# Patient Record
Sex: FEMALE | Race: WHITE | NOT HISPANIC OR LATINO | Employment: PART TIME | URBAN - NONMETROPOLITAN AREA
[De-identification: names, ages, dates, MRNs, and addresses within clinical notes are randomized per-mention and may not be internally consistent; named-entity substitution may affect disease eponyms.]

---

## 2020-11-02 ENCOUNTER — APPOINTMENT (EMERGENCY)
Dept: CT IMAGING | Facility: HOSPITAL | Age: 65
DRG: 140 | End: 2020-11-02
Payer: COMMERCIAL

## 2020-11-02 ENCOUNTER — APPOINTMENT (EMERGENCY)
Dept: RADIOLOGY | Facility: HOSPITAL | Age: 65
DRG: 140 | End: 2020-11-02
Payer: COMMERCIAL

## 2020-11-02 ENCOUNTER — HOSPITAL ENCOUNTER (INPATIENT)
Facility: HOSPITAL | Age: 65
LOS: 3 days | Discharge: HOME | DRG: 140 | End: 2020-11-05
Attending: EMERGENCY MEDICINE | Admitting: FAMILY MEDICINE
Payer: COMMERCIAL

## 2020-11-02 DIAGNOSIS — J44.1 COPD WITH ACUTE EXACERBATION (HCC): ICD-10-CM

## 2020-11-02 DIAGNOSIS — R09.02 HYPOXIA: ICD-10-CM

## 2020-11-02 DIAGNOSIS — R06.00 DYSPNEA: Primary | ICD-10-CM

## 2020-11-02 PROBLEM — R60.0 LOCALIZED EDEMA: Status: ACTIVE | Noted: 2020-11-02

## 2020-11-02 PROBLEM — R06.02 SHORTNESS OF BREATH: Status: ACTIVE | Noted: 2020-11-02

## 2020-11-02 PROBLEM — J90 PLEURAL EFFUSION: Status: ACTIVE | Noted: 2020-11-02

## 2020-11-02 PROBLEM — J96.01 ACUTE RESPIRATORY FAILURE WITH HYPOXIA (HCC): Status: ACTIVE | Noted: 2020-11-02

## 2020-11-02 PROBLEM — E87.1 HYPONATREMIA: Status: ACTIVE | Noted: 2020-11-02

## 2020-11-02 PROBLEM — Z72.0 TOBACCO ABUSE: Status: ACTIVE | Noted: 2020-11-02

## 2020-11-02 PROBLEM — E11.9 TYPE 2 DIABETES MELLITUS WITHOUT COMPLICATION, WITHOUT LONG-TERM CURRENT USE OF INSULIN (HCC): Status: ACTIVE | Noted: 2020-11-02

## 2020-11-02 PROBLEM — I10 ESSENTIAL HYPERTENSION: Status: ACTIVE | Noted: 2020-11-02

## 2020-11-02 LAB
ALBUMIN SERPL BCP-MCNC: 3 G/DL (ref 3.5–5)
ALP SERPL-CCNC: 112 U/L (ref 46–116)
ALT SERPL W P-5'-P-CCNC: 17 U/L (ref 12–78)
ANION GAP SERPL CALCULATED.3IONS-SCNC: 2 MMOL/L (ref 4–13)
AST SERPL W P-5'-P-CCNC: 12 U/L (ref 5–45)
ATRIAL RATE: 63 BPM
ATRIAL RATE: 68 BPM
BACTERIA UR QL AUTO: ABNORMAL /HPF
BASE EX.OXY STD BLDV CALC-SCNC: 59.9 % (ref 60–80)
BASE EXCESS BLDV CALC-SCNC: 7.3 MMOL/L
BASOPHILS # BLD AUTO: 0.02 THOUSANDS/ΜL (ref 0–0.1)
BASOPHILS NFR BLD AUTO: 0 % (ref 0–1)
BILIRUB SERPL-MCNC: 0.82 MG/DL (ref 0.2–1)
BILIRUB UR QL STRIP: NEGATIVE
BUN SERPL-MCNC: 8 MG/DL (ref 5–25)
CALCIUM ALBUM COR SERPL-MCNC: 9.4 MG/DL (ref 8.3–10.1)
CALCIUM SERPL-MCNC: 8.6 MG/DL (ref 8.3–10.1)
CHLORIDE SERPL-SCNC: 92 MMOL/L (ref 100–108)
CHOLEST SERPL-MCNC: 151 MG/DL (ref 50–200)
CLARITY UR: CLEAR
CO2 SERPL-SCNC: 37 MMOL/L (ref 21–32)
COLOR UR: YELLOW
CREAT SERPL-MCNC: 0.83 MG/DL (ref 0.6–1.3)
D DIMER PPP FEU-MCNC: 0.93 UG/ML FEU
EOSINOPHIL # BLD AUTO: 0.05 THOUSAND/ΜL (ref 0–0.61)
EOSINOPHIL NFR BLD AUTO: 1 % (ref 0–6)
ERYTHROCYTE [DISTWIDTH] IN BLOOD BY AUTOMATED COUNT: 15.8 % (ref 11.6–15.1)
GFR SERPL CREATININE-BSD FRML MDRD: 75 ML/MIN/1.73SQ M
GLUCOSE SERPL-MCNC: 172 MG/DL (ref 65–140)
GLUCOSE SERPL-MCNC: 232 MG/DL (ref 65–140)
GLUCOSE UR STRIP-MCNC: NEGATIVE MG/DL
HCO3 BLDV-SCNC: 36.5 MMOL/L (ref 24–30)
HCT VFR BLD AUTO: 45.7 % (ref 34.8–46.1)
HDLC SERPL-MCNC: 44 MG/DL
HGB BLD-MCNC: 14.6 G/DL (ref 11.5–15.4)
HGB UR QL STRIP.AUTO: NEGATIVE
IMM GRANULOCYTES # BLD AUTO: 0.04 THOUSAND/UL (ref 0–0.2)
IMM GRANULOCYTES NFR BLD AUTO: 0 % (ref 0–2)
KETONES UR STRIP-MCNC: NEGATIVE MG/DL
LACTATE SERPL-SCNC: 1.4 MMOL/L (ref 0.5–2)
LDLC SERPL CALC-MCNC: 91 MG/DL (ref 0–100)
LEUKOCYTE ESTERASE UR QL STRIP: ABNORMAL
LYMPHOCYTES # BLD AUTO: 1.19 THOUSANDS/ΜL (ref 0.6–4.47)
LYMPHOCYTES NFR BLD AUTO: 13 % (ref 14–44)
MCH RBC QN AUTO: 30.7 PG (ref 26.8–34.3)
MCHC RBC AUTO-ENTMCNC: 31.9 G/DL (ref 31.4–37.4)
MCV RBC AUTO: 96 FL (ref 82–98)
MONOCYTES # BLD AUTO: 0.99 THOUSAND/ΜL (ref 0.17–1.22)
MONOCYTES NFR BLD AUTO: 10 % (ref 4–12)
NEUTROPHILS # BLD AUTO: 7.2 THOUSANDS/ΜL (ref 1.85–7.62)
NEUTS SEG NFR BLD AUTO: 76 % (ref 43–75)
NITRITE UR QL STRIP: NEGATIVE
NON-SQ EPI CELLS URNS QL MICRO: ABNORMAL /HPF
NONHDLC SERPL-MCNC: 107 MG/DL
NRBC BLD AUTO-RTO: 0 /100 WBCS
NT-PROBNP SERPL-MCNC: 2853 PG/ML
O2 CT BLDV-SCNC: 13.3 ML/DL
P AXIS: 71 DEGREES
P AXIS: 75 DEGREES
PCO2 BLDV: 72.1 MM HG (ref 42–50)
PH BLDV: 7.32 [PH] (ref 7.3–7.4)
PH UR STRIP.AUTO: 5.5 [PH]
PLATELET # BLD AUTO: 216 THOUSANDS/UL (ref 149–390)
PMV BLD AUTO: 10.1 FL (ref 8.9–12.7)
PO2 BLDV: 33.9 MM HG (ref 35–45)
POTASSIUM SERPL-SCNC: 3.5 MMOL/L (ref 3.5–5.3)
PR INTERVAL: 124 MS
PR INTERVAL: 138 MS
PROT SERPL-MCNC: 6.9 G/DL (ref 6.4–8.2)
PROT UR STRIP-MCNC: NEGATIVE MG/DL
QRS AXIS: 69 DEGREES
QRS AXIS: 70 DEGREES
QRSD INTERVAL: 74 MS
QRSD INTERVAL: 86 MS
QT INTERVAL: 424 MS
QT INTERVAL: 432 MS
QTC INTERVAL: 433 MS
QTC INTERVAL: 459 MS
RBC # BLD AUTO: 4.75 MILLION/UL (ref 3.81–5.12)
RBC #/AREA URNS AUTO: ABNORMAL /HPF
SARS-COV-2 RNA RESP QL NAA+PROBE: NEGATIVE
SODIUM SERPL-SCNC: 131 MMOL/L (ref 136–145)
SP GR UR STRIP.AUTO: <=1.005 (ref 1–1.03)
T WAVE AXIS: 63 DEGREES
T WAVE AXIS: 69 DEGREES
TRIGL SERPL-MCNC: 80 MG/DL
TROPONIN I SERPL-MCNC: 0.02 NG/ML
TSH SERPL DL<=0.05 MIU/L-ACNC: 1.34 UIU/ML (ref 0.36–3.74)
UROBILINOGEN UR QL STRIP.AUTO: 1 E.U./DL
VENTRICULAR RATE: 63 BPM
VENTRICULAR RATE: 68 BPM
WBC # BLD AUTO: 9.49 THOUSAND/UL (ref 4.31–10.16)
WBC #/AREA URNS AUTO: ABNORMAL /HPF

## 2020-11-02 PROCEDURE — 82805 BLOOD GASES W/O2 SATURATION: CPT | Performed by: EMERGENCY MEDICINE

## 2020-11-02 PROCEDURE — 93005 ELECTROCARDIOGRAM TRACING: CPT

## 2020-11-02 PROCEDURE — 81001 URINALYSIS AUTO W/SCOPE: CPT | Performed by: EMERGENCY MEDICINE

## 2020-11-02 PROCEDURE — 84145 PROCALCITONIN (PCT): CPT | Performed by: FAMILY MEDICINE

## 2020-11-02 PROCEDURE — 83605 ASSAY OF LACTIC ACID: CPT | Performed by: EMERGENCY MEDICINE

## 2020-11-02 PROCEDURE — G1004 CDSM NDSC: HCPCS

## 2020-11-02 PROCEDURE — 84443 ASSAY THYROID STIM HORMONE: CPT | Performed by: FAMILY MEDICINE

## 2020-11-02 PROCEDURE — 87449 NOS EACH ORGANISM AG IA: CPT | Performed by: FAMILY MEDICINE

## 2020-11-02 PROCEDURE — 36415 COLL VENOUS BLD VENIPUNCTURE: CPT | Performed by: EMERGENCY MEDICINE

## 2020-11-02 PROCEDURE — 80053 COMPREHEN METABOLIC PANEL: CPT | Performed by: EMERGENCY MEDICINE

## 2020-11-02 PROCEDURE — 99285 EMERGENCY DEPT VISIT HI MDM: CPT | Performed by: EMERGENCY MEDICINE

## 2020-11-02 PROCEDURE — 80061 LIPID PANEL: CPT | Performed by: FAMILY MEDICINE

## 2020-11-02 PROCEDURE — 99223 1ST HOSP IP/OBS HIGH 75: CPT | Performed by: FAMILY MEDICINE

## 2020-11-02 PROCEDURE — 85025 COMPLETE CBC W/AUTO DIFF WBC: CPT | Performed by: EMERGENCY MEDICINE

## 2020-11-02 PROCEDURE — 99285 EMERGENCY DEPT VISIT HI MDM: CPT

## 2020-11-02 PROCEDURE — 96374 THER/PROPH/DIAG INJ IV PUSH: CPT

## 2020-11-02 PROCEDURE — 87635 SARS-COV-2 COVID-19 AMP PRB: CPT | Performed by: EMERGENCY MEDICINE

## 2020-11-02 PROCEDURE — 71275 CT ANGIOGRAPHY CHEST: CPT

## 2020-11-02 PROCEDURE — 83880 ASSAY OF NATRIURETIC PEPTIDE: CPT | Performed by: EMERGENCY MEDICINE

## 2020-11-02 PROCEDURE — 85379 FIBRIN DEGRADATION QUANT: CPT | Performed by: EMERGENCY MEDICINE

## 2020-11-02 PROCEDURE — 71045 X-RAY EXAM CHEST 1 VIEW: CPT

## 2020-11-02 PROCEDURE — 82948 REAGENT STRIP/BLOOD GLUCOSE: CPT

## 2020-11-02 PROCEDURE — 83036 HEMOGLOBIN GLYCOSYLATED A1C: CPT | Performed by: FAMILY MEDICINE

## 2020-11-02 PROCEDURE — 84484 ASSAY OF TROPONIN QUANT: CPT | Performed by: EMERGENCY MEDICINE

## 2020-11-02 PROCEDURE — 87040 BLOOD CULTURE FOR BACTERIA: CPT | Performed by: EMERGENCY MEDICINE

## 2020-11-02 RX ORDER — TRAZODONE HYDROCHLORIDE 50 MG/1
100 TABLET ORAL
Status: DISCONTINUED | OUTPATIENT
Start: 2020-11-02 | End: 2020-11-05 | Stop reason: HOSPADM

## 2020-11-02 RX ORDER — CEFTRIAXONE 1 G/50ML
1000 INJECTION, SOLUTION INTRAVENOUS EVERY 24 HOURS
Status: DISCONTINUED | OUTPATIENT
Start: 2020-11-03 | End: 2020-11-04

## 2020-11-02 RX ORDER — METOPROLOL TARTRATE 100 MG/1
100 TABLET ORAL EVERY 12 HOURS SCHEDULED
COMMUNITY

## 2020-11-02 RX ORDER — AZITHROMYCIN 250 MG/1
500 TABLET, FILM COATED ORAL EVERY 24 HOURS
Status: DISCONTINUED | OUTPATIENT
Start: 2020-11-03 | End: 2020-11-04

## 2020-11-02 RX ORDER — CITALOPRAM 10 MG/1
10 TABLET ORAL DAILY
COMMUNITY

## 2020-11-02 RX ORDER — AMLODIPINE BESYLATE 10 MG/1
10 TABLET ORAL
COMMUNITY

## 2020-11-02 RX ORDER — ATORVASTATIN CALCIUM 40 MG/1
40 TABLET, FILM COATED ORAL DAILY
Status: DISCONTINUED | OUTPATIENT
Start: 2020-11-03 | End: 2020-11-05 | Stop reason: HOSPADM

## 2020-11-02 RX ORDER — CITALOPRAM 10 MG/1
10 TABLET ORAL DAILY
Status: DISCONTINUED | OUTPATIENT
Start: 2020-11-03 | End: 2020-11-05 | Stop reason: HOSPADM

## 2020-11-02 RX ORDER — LISINOPRIL 20 MG/1
40 TABLET ORAL DAILY
Status: DISCONTINUED | OUTPATIENT
Start: 2020-11-03 | End: 2020-11-05 | Stop reason: HOSPADM

## 2020-11-02 RX ORDER — PANTOPRAZOLE SODIUM 20 MG/1
20 TABLET, DELAYED RELEASE ORAL
Status: DISCONTINUED | OUTPATIENT
Start: 2020-11-03 | End: 2020-11-05 | Stop reason: HOSPADM

## 2020-11-02 RX ORDER — LISINOPRIL 40 MG/1
40 TABLET ORAL DAILY
COMMUNITY

## 2020-11-02 RX ORDER — CLONIDINE HYDROCHLORIDE 0.1 MG/1
0.1 TABLET ORAL EVERY 12 HOURS SCHEDULED
COMMUNITY

## 2020-11-02 RX ORDER — DEXAMETHASONE SODIUM PHOSPHATE 10 MG/ML
10 INJECTION, SOLUTION INTRAMUSCULAR; INTRAVENOUS ONCE
Status: COMPLETED | OUTPATIENT
Start: 2020-11-02 | End: 2020-11-02

## 2020-11-02 RX ORDER — CLONIDINE HYDROCHLORIDE 0.1 MG/1
0.1 TABLET ORAL EVERY 12 HOURS SCHEDULED
Status: DISCONTINUED | OUTPATIENT
Start: 2020-11-02 | End: 2020-11-05 | Stop reason: HOSPADM

## 2020-11-02 RX ORDER — DOCUSATE SODIUM 100 MG/1
100 CAPSULE, LIQUID FILLED ORAL 2 TIMES DAILY
Status: DISCONTINUED | OUTPATIENT
Start: 2020-11-02 | End: 2020-11-05 | Stop reason: HOSPADM

## 2020-11-02 RX ORDER — GUAIFENESIN 100 MG/5ML
200 SOLUTION ORAL EVERY 4 HOURS PRN
Status: DISCONTINUED | OUTPATIENT
Start: 2020-11-02 | End: 2020-11-05 | Stop reason: HOSPADM

## 2020-11-02 RX ORDER — OMEPRAZOLE 20 MG/1
20 CAPSULE, DELAYED RELEASE ORAL DAILY
Status: ON HOLD | COMMUNITY
End: 2021-03-26

## 2020-11-02 RX ORDER — POLYETHYLENE GLYCOL 3350 17 G/17G
17 POWDER, FOR SOLUTION ORAL DAILY
Status: DISCONTINUED | OUTPATIENT
Start: 2020-11-03 | End: 2020-11-05 | Stop reason: HOSPADM

## 2020-11-02 RX ORDER — NICOTINE 21 MG/24HR
1 PATCH, TRANSDERMAL 24 HOURS TRANSDERMAL DAILY
Status: DISCONTINUED | OUTPATIENT
Start: 2020-11-03 | End: 2020-11-05 | Stop reason: HOSPADM

## 2020-11-02 RX ORDER — AMLODIPINE BESYLATE 10 MG/1
10 TABLET ORAL DAILY
Status: DISCONTINUED | OUTPATIENT
Start: 2020-11-03 | End: 2020-11-05 | Stop reason: HOSPADM

## 2020-11-02 RX ORDER — PIOGLITAZONEHYDROCHLORIDE 15 MG/1
15 TABLET ORAL DAILY
COMMUNITY

## 2020-11-02 RX ORDER — SODIUM CHLORIDE FOR INHALATION 0.9 %
3 VIAL, NEBULIZER (ML) INHALATION
Status: DISCONTINUED | OUTPATIENT
Start: 2020-11-02 | End: 2020-11-03

## 2020-11-02 RX ORDER — METOPROLOL TARTRATE 50 MG/1
100 TABLET, FILM COATED ORAL EVERY 12 HOURS SCHEDULED
Status: DISCONTINUED | OUTPATIENT
Start: 2020-11-02 | End: 2020-11-05 | Stop reason: HOSPADM

## 2020-11-02 RX ORDER — TRAZODONE HYDROCHLORIDE 100 MG/1
100 TABLET ORAL
COMMUNITY

## 2020-11-02 RX ORDER — ALBUTEROL SULFATE 90 UG/1
4 AEROSOL, METERED RESPIRATORY (INHALATION) ONCE
Status: COMPLETED | OUTPATIENT
Start: 2020-11-02 | End: 2020-11-02

## 2020-11-02 RX ORDER — GLIPIZIDE 5 MG/1
5 TABLET ORAL DAILY
COMMUNITY

## 2020-11-02 RX ORDER — FLUTICASONE PROPIONATE 50 MCG
1 SPRAY, SUSPENSION (ML) NASAL DAILY
Status: DISCONTINUED | OUTPATIENT
Start: 2020-11-03 | End: 2020-11-05 | Stop reason: HOSPADM

## 2020-11-02 RX ORDER — FUROSEMIDE 10 MG/ML
40 INJECTION INTRAMUSCULAR; INTRAVENOUS ONCE
Status: COMPLETED | OUTPATIENT
Start: 2020-11-02 | End: 2020-11-02

## 2020-11-02 RX ORDER — ATORVASTATIN CALCIUM 40 MG/1
40 TABLET, FILM COATED ORAL
COMMUNITY

## 2020-11-02 RX ORDER — ACETAMINOPHEN 325 MG/1
650 TABLET ORAL EVERY 6 HOURS PRN
Status: DISCONTINUED | OUTPATIENT
Start: 2020-11-02 | End: 2020-11-05 | Stop reason: HOSPADM

## 2020-11-02 RX ORDER — LEVALBUTEROL 1.25 MG/.5ML
1.25 SOLUTION, CONCENTRATE RESPIRATORY (INHALATION)
Status: DISCONTINUED | OUTPATIENT
Start: 2020-11-02 | End: 2020-11-05 | Stop reason: HOSPADM

## 2020-11-02 RX ORDER — BUDESONIDE 0.5 MG/2ML
0.5 INHALANT ORAL
Status: DISCONTINUED | OUTPATIENT
Start: 2020-11-02 | End: 2020-11-05 | Stop reason: HOSPADM

## 2020-11-02 RX ORDER — CEFTRIAXONE 1 G/50ML
1000 INJECTION, SOLUTION INTRAVENOUS ONCE
Status: COMPLETED | OUTPATIENT
Start: 2020-11-02 | End: 2020-11-02

## 2020-11-02 RX ORDER — METHYLPREDNISOLONE SODIUM SUCCINATE 40 MG/ML
40 INJECTION, POWDER, LYOPHILIZED, FOR SOLUTION INTRAMUSCULAR; INTRAVENOUS EVERY 8 HOURS
Status: DISCONTINUED | OUTPATIENT
Start: 2020-11-02 | End: 2020-11-03

## 2020-11-02 RX ORDER — FLUTICASONE PROPIONATE 50 MCG
1 SPRAY, SUSPENSION (ML) NASAL DAILY
COMMUNITY
End: 2021-06-20 | Stop reason: ALTCHOICE

## 2020-11-02 RX ORDER — NITROGLYCERIN 0.4 MG/1
0.4 TABLET SUBLINGUAL ONCE
Status: DISCONTINUED | OUTPATIENT
Start: 2020-11-02 | End: 2020-11-05 | Stop reason: HOSPADM

## 2020-11-02 RX ADMIN — METHYLPREDNISOLONE SODIUM SUCCINATE 40 MG: 40 INJECTION, POWDER, FOR SOLUTION INTRAMUSCULAR; INTRAVENOUS at 21:44

## 2020-11-02 RX ADMIN — DEXAMETHASONE SODIUM PHOSPHATE 10 MG: 10 INJECTION, SOLUTION INTRAMUSCULAR; INTRAVENOUS at 15:44

## 2020-11-02 RX ADMIN — ISODIUM CHLORIDE 3 ML: 0.03 SOLUTION RESPIRATORY (INHALATION) at 21:21

## 2020-11-02 RX ADMIN — CEFTRIAXONE 1000 MG: 1 INJECTION, SOLUTION INTRAVENOUS at 17:58

## 2020-11-02 RX ADMIN — INSULIN LISPRO 1 UNITS: 100 INJECTION, SOLUTION INTRAVENOUS; SUBCUTANEOUS at 21:50

## 2020-11-02 RX ADMIN — NITROGLYCERIN 1 INCH: 20 OINTMENT TOPICAL at 16:24

## 2020-11-02 RX ADMIN — BUDESONIDE 0.5 MG: 0.5 INHALANT RESPIRATORY (INHALATION) at 21:22

## 2020-11-02 RX ADMIN — AZITHROMYCIN MONOHYDRATE 500 MG: 500 INJECTION, POWDER, LYOPHILIZED, FOR SOLUTION INTRAVENOUS at 16:40

## 2020-11-02 RX ADMIN — DOCUSATE SODIUM 100 MG: 100 CAPSULE, LIQUID FILLED ORAL at 21:41

## 2020-11-02 RX ADMIN — IOHEXOL 85 ML: 350 INJECTION, SOLUTION INTRAVENOUS at 15:14

## 2020-11-02 RX ADMIN — IPRATROPIUM BROMIDE 0.5 MG: 0.5 SOLUTION RESPIRATORY (INHALATION) at 21:21

## 2020-11-02 RX ADMIN — FUROSEMIDE 40 MG: 10 INJECTION, SOLUTION INTRAMUSCULAR; INTRAVENOUS at 17:58

## 2020-11-02 RX ADMIN — ALBUTEROL SULFATE 4 PUFF: 90 AEROSOL, METERED RESPIRATORY (INHALATION) at 14:20

## 2020-11-02 RX ADMIN — METOPROLOL TARTRATE 100 MG: 50 TABLET, FILM COATED ORAL at 21:41

## 2020-11-02 RX ADMIN — LEVALBUTEROL HYDROCHLORIDE 1.25 MG: 1.25 SOLUTION, CONCENTRATE RESPIRATORY (INHALATION) at 21:22

## 2020-11-02 RX ADMIN — CLONIDINE HYDROCHLORIDE 0.1 MG: 0.1 TABLET ORAL at 21:40

## 2020-11-03 ENCOUNTER — APPOINTMENT (INPATIENT)
Dept: NON INVASIVE DIAGNOSTICS | Facility: HOSPITAL | Age: 65
DRG: 140 | End: 2020-11-03
Payer: COMMERCIAL

## 2020-11-03 PROBLEM — J44.1 COPD WITH ACUTE EXACERBATION (HCC): Status: ACTIVE | Noted: 2020-11-03

## 2020-11-03 PROBLEM — R06.02 SHORTNESS OF BREATH: Status: RESOLVED | Noted: 2020-11-02 | Resolved: 2020-11-03

## 2020-11-03 LAB
ALBUMIN SERPL BCP-MCNC: 2.6 G/DL (ref 3.5–5)
ALP SERPL-CCNC: 95 U/L (ref 46–116)
ALT SERPL W P-5'-P-CCNC: 15 U/L (ref 12–78)
ANION GAP SERPL CALCULATED.3IONS-SCNC: 3 MMOL/L (ref 4–13)
AST SERPL W P-5'-P-CCNC: 10 U/L (ref 5–45)
BASOPHILS # BLD AUTO: 0 THOUSANDS/ΜL (ref 0–0.1)
BASOPHILS NFR BLD AUTO: 0 % (ref 0–1)
BILIRUB SERPL-MCNC: 0.55 MG/DL (ref 0.2–1)
BUN SERPL-MCNC: 6 MG/DL (ref 5–25)
CALCIUM ALBUM COR SERPL-MCNC: 9.9 MG/DL (ref 8.3–10.1)
CALCIUM SERPL-MCNC: 8.8 MG/DL (ref 8.3–10.1)
CHLORIDE SERPL-SCNC: 96 MMOL/L (ref 100–108)
CO2 SERPL-SCNC: 39 MMOL/L (ref 21–32)
CREAT SERPL-MCNC: 0.59 MG/DL (ref 0.6–1.3)
EOSINOPHIL # BLD AUTO: 0.02 THOUSAND/ΜL (ref 0–0.61)
EOSINOPHIL NFR BLD AUTO: 0 % (ref 0–6)
ERYTHROCYTE [DISTWIDTH] IN BLOOD BY AUTOMATED COUNT: 15.6 % (ref 11.6–15.1)
EST. AVERAGE GLUCOSE BLD GHB EST-MCNC: 140 MG/DL
GFR SERPL CREATININE-BSD FRML MDRD: 98 ML/MIN/1.73SQ M
GLUCOSE SERPL-MCNC: 150 MG/DL (ref 65–140)
GLUCOSE SERPL-MCNC: 166 MG/DL (ref 65–140)
GLUCOSE SERPL-MCNC: 168 MG/DL (ref 65–140)
GLUCOSE SERPL-MCNC: 209 MG/DL (ref 65–140)
GLUCOSE SERPL-MCNC: 74 MG/DL (ref 65–140)
HBA1C MFR BLD: 6.5 %
HCT VFR BLD AUTO: 45.1 % (ref 34.8–46.1)
HGB BLD-MCNC: 14.2 G/DL (ref 11.5–15.4)
IMM GRANULOCYTES # BLD AUTO: 0.03 THOUSAND/UL (ref 0–0.2)
IMM GRANULOCYTES NFR BLD AUTO: 1 % (ref 0–2)
L PNEUMO1 AG UR QL IA.RAPID: NEGATIVE
LYMPHOCYTES # BLD AUTO: 0.38 THOUSANDS/ΜL (ref 0.6–4.47)
LYMPHOCYTES NFR BLD AUTO: 9 % (ref 14–44)
MCH RBC QN AUTO: 29.8 PG (ref 26.8–34.3)
MCHC RBC AUTO-ENTMCNC: 31.5 G/DL (ref 31.4–37.4)
MCV RBC AUTO: 95 FL (ref 82–98)
MONOCYTES # BLD AUTO: 0.14 THOUSAND/ΜL (ref 0.17–1.22)
MONOCYTES NFR BLD AUTO: 3 % (ref 4–12)
NEUTROPHILS # BLD AUTO: 3.9 THOUSANDS/ΜL (ref 1.85–7.62)
NEUTS SEG NFR BLD AUTO: 87 % (ref 43–75)
NRBC BLD AUTO-RTO: 0 /100 WBCS
PLATELET # BLD AUTO: 168 THOUSANDS/UL (ref 149–390)
PMV BLD AUTO: 10.3 FL (ref 8.9–12.7)
POTASSIUM SERPL-SCNC: 3.4 MMOL/L (ref 3.5–5.3)
PROCALCITONIN SERPL-MCNC: <0.05 NG/ML
PROT SERPL-MCNC: 6.1 G/DL (ref 6.4–8.2)
RBC # BLD AUTO: 4.77 MILLION/UL (ref 3.81–5.12)
S PNEUM AG UR QL: NEGATIVE
SODIUM SERPL-SCNC: 138 MMOL/L (ref 136–145)
WBC # BLD AUTO: 4.47 THOUSAND/UL (ref 4.31–10.16)

## 2020-11-03 PROCEDURE — 94760 N-INVAS EAR/PLS OXIMETRY 1: CPT

## 2020-11-03 PROCEDURE — 93306 TTE W/DOPPLER COMPLETE: CPT

## 2020-11-03 PROCEDURE — 85025 COMPLETE CBC W/AUTO DIFF WBC: CPT | Performed by: FAMILY MEDICINE

## 2020-11-03 PROCEDURE — 82948 REAGENT STRIP/BLOOD GLUCOSE: CPT

## 2020-11-03 PROCEDURE — 99232 SBSQ HOSP IP/OBS MODERATE 35: CPT | Performed by: FAMILY MEDICINE

## 2020-11-03 PROCEDURE — 80053 COMPREHEN METABOLIC PANEL: CPT | Performed by: FAMILY MEDICINE

## 2020-11-03 PROCEDURE — 99223 1ST HOSP IP/OBS HIGH 75: CPT | Performed by: INTERNAL MEDICINE

## 2020-11-03 PROCEDURE — 94640 AIRWAY INHALATION TREATMENT: CPT

## 2020-11-03 RX ORDER — METHYLPREDNISOLONE SODIUM SUCCINATE 40 MG/ML
40 INJECTION, POWDER, LYOPHILIZED, FOR SOLUTION INTRAMUSCULAR; INTRAVENOUS EVERY 12 HOURS SCHEDULED
Status: DISCONTINUED | OUTPATIENT
Start: 2020-11-03 | End: 2020-11-05 | Stop reason: HOSPADM

## 2020-11-03 RX ADMIN — AMLODIPINE BESYLATE 10 MG: 10 TABLET ORAL at 09:17

## 2020-11-03 RX ADMIN — INSULIN LISPRO 2 UNITS: 100 INJECTION, SOLUTION INTRAVENOUS; SUBCUTANEOUS at 11:58

## 2020-11-03 RX ADMIN — INSULIN LISPRO 1 UNITS: 100 INJECTION, SOLUTION INTRAVENOUS; SUBCUTANEOUS at 17:03

## 2020-11-03 RX ADMIN — IPRATROPIUM BROMIDE 0.5 MG: 0.5 SOLUTION RESPIRATORY (INHALATION) at 14:10

## 2020-11-03 RX ADMIN — CITALOPRAM HYDROBROMIDE 10 MG: 10 TABLET ORAL at 09:20

## 2020-11-03 RX ADMIN — ATORVASTATIN CALCIUM 40 MG: 40 TABLET, FILM COATED ORAL at 09:17

## 2020-11-03 RX ADMIN — LISINOPRIL 40 MG: 20 TABLET ORAL at 09:17

## 2020-11-03 RX ADMIN — INSULIN HUMAN 10 UNITS: 100 INJECTION, SUSPENSION SUBCUTANEOUS at 17:04

## 2020-11-03 RX ADMIN — ENOXAPARIN SODIUM 40 MG: 40 INJECTION SUBCUTANEOUS at 09:16

## 2020-11-03 RX ADMIN — METOPROLOL TARTRATE 100 MG: 50 TABLET, FILM COATED ORAL at 09:17

## 2020-11-03 RX ADMIN — CLONIDINE HYDROCHLORIDE 0.1 MG: 0.1 TABLET ORAL at 09:17

## 2020-11-03 RX ADMIN — METHYLPREDNISOLONE SODIUM SUCCINATE 40 MG: 40 INJECTION, POWDER, FOR SOLUTION INTRAMUSCULAR; INTRAVENOUS at 06:44

## 2020-11-03 RX ADMIN — CEFTRIAXONE 1000 MG: 1 INJECTION, SOLUTION INTRAVENOUS at 17:02

## 2020-11-03 RX ADMIN — LEVALBUTEROL HYDROCHLORIDE 1.25 MG: 1.25 SOLUTION, CONCENTRATE RESPIRATORY (INHALATION) at 07:15

## 2020-11-03 RX ADMIN — BUDESONIDE 0.5 MG: 0.5 INHALANT RESPIRATORY (INHALATION) at 19:45

## 2020-11-03 RX ADMIN — CLONIDINE HYDROCHLORIDE 0.1 MG: 0.1 TABLET ORAL at 21:14

## 2020-11-03 RX ADMIN — LEVALBUTEROL HYDROCHLORIDE 1.25 MG: 1.25 SOLUTION, CONCENTRATE RESPIRATORY (INHALATION) at 14:10

## 2020-11-03 RX ADMIN — DOCUSATE SODIUM 100 MG: 100 CAPSULE, LIQUID FILLED ORAL at 17:06

## 2020-11-03 RX ADMIN — METOPROLOL TARTRATE 100 MG: 50 TABLET, FILM COATED ORAL at 21:15

## 2020-11-03 RX ADMIN — TRAZODONE HYDROCHLORIDE 100 MG: 50 TABLET ORAL at 21:15

## 2020-11-03 RX ADMIN — AZITHROMYCIN 500 MG: 250 TABLET, FILM COATED ORAL at 17:02

## 2020-11-03 RX ADMIN — DOCUSATE SODIUM 100 MG: 100 CAPSULE, LIQUID FILLED ORAL at 09:17

## 2020-11-03 RX ADMIN — METHYLPREDNISOLONE SODIUM SUCCINATE 40 MG: 40 INJECTION, POWDER, FOR SOLUTION INTRAMUSCULAR; INTRAVENOUS at 21:14

## 2020-11-03 RX ADMIN — IPRATROPIUM BROMIDE 0.5 MG: 0.5 SOLUTION RESPIRATORY (INHALATION) at 19:45

## 2020-11-03 RX ADMIN — IPRATROPIUM BROMIDE 0.5 MG: 0.5 SOLUTION RESPIRATORY (INHALATION) at 07:14

## 2020-11-03 RX ADMIN — POLYETHYLENE GLYCOL 3350 17 G: 17 POWDER, FOR SOLUTION ORAL at 09:19

## 2020-11-03 RX ADMIN — LEVALBUTEROL HYDROCHLORIDE 1.25 MG: 1.25 SOLUTION, CONCENTRATE RESPIRATORY (INHALATION) at 19:45

## 2020-11-03 RX ADMIN — BUDESONIDE 0.5 MG: 0.5 INHALANT RESPIRATORY (INHALATION) at 07:14

## 2020-11-03 RX ADMIN — PANTOPRAZOLE SODIUM 20 MG: 20 TABLET, DELAYED RELEASE ORAL at 05:05

## 2020-11-04 LAB
GLUCOSE SERPL-MCNC: 103 MG/DL (ref 65–140)
GLUCOSE SERPL-MCNC: 141 MG/DL (ref 65–140)
GLUCOSE SERPL-MCNC: 155 MG/DL (ref 65–140)
GLUCOSE SERPL-MCNC: 181 MG/DL (ref 65–140)
PROCALCITONIN SERPL-MCNC: <0.05 NG/ML

## 2020-11-04 PROCEDURE — 94760 N-INVAS EAR/PLS OXIMETRY 1: CPT

## 2020-11-04 PROCEDURE — 82948 REAGENT STRIP/BLOOD GLUCOSE: CPT

## 2020-11-04 PROCEDURE — 87070 CULTURE OTHR SPECIMN AEROBIC: CPT | Performed by: FAMILY MEDICINE

## 2020-11-04 PROCEDURE — 94640 AIRWAY INHALATION TREATMENT: CPT

## 2020-11-04 PROCEDURE — 99232 SBSQ HOSP IP/OBS MODERATE 35: CPT | Performed by: FAMILY MEDICINE

## 2020-11-04 PROCEDURE — 87205 SMEAR GRAM STAIN: CPT | Performed by: FAMILY MEDICINE

## 2020-11-04 PROCEDURE — 84145 PROCALCITONIN (PCT): CPT | Performed by: FAMILY MEDICINE

## 2020-11-04 RX ORDER — AZITHROMYCIN 250 MG/1
500 TABLET, FILM COATED ORAL EVERY 24 HOURS
Status: DISCONTINUED | OUTPATIENT
Start: 2020-11-04 | End: 2020-11-05 | Stop reason: HOSPADM

## 2020-11-04 RX ADMIN — CLONIDINE HYDROCHLORIDE 0.1 MG: 0.1 TABLET ORAL at 08:15

## 2020-11-04 RX ADMIN — IPRATROPIUM BROMIDE 0.5 MG: 0.5 SOLUTION RESPIRATORY (INHALATION) at 13:30

## 2020-11-04 RX ADMIN — BUDESONIDE 0.5 MG: 0.5 INHALANT RESPIRATORY (INHALATION) at 19:47

## 2020-11-04 RX ADMIN — PANTOPRAZOLE SODIUM 20 MG: 20 TABLET, DELAYED RELEASE ORAL at 05:23

## 2020-11-04 RX ADMIN — ENOXAPARIN SODIUM 40 MG: 40 INJECTION SUBCUTANEOUS at 08:15

## 2020-11-04 RX ADMIN — INSULIN HUMAN 10 UNITS: 100 INJECTION, SUSPENSION SUBCUTANEOUS at 16:11

## 2020-11-04 RX ADMIN — AZITHROMYCIN 500 MG: 250 TABLET, FILM COATED ORAL at 15:53

## 2020-11-04 RX ADMIN — LEVALBUTEROL HYDROCHLORIDE 1.25 MG: 1.25 SOLUTION, CONCENTRATE RESPIRATORY (INHALATION) at 07:15

## 2020-11-04 RX ADMIN — BUDESONIDE 0.5 MG: 0.5 INHALANT RESPIRATORY (INHALATION) at 07:15

## 2020-11-04 RX ADMIN — FLUTICASONE PROPIONATE 1 SPRAY: 50 SPRAY, METERED NASAL at 08:27

## 2020-11-04 RX ADMIN — DOCUSATE SODIUM 100 MG: 100 CAPSULE, LIQUID FILLED ORAL at 08:15

## 2020-11-04 RX ADMIN — INSULIN LISPRO 1 UNITS: 100 INJECTION, SOLUTION INTRAVENOUS; SUBCUTANEOUS at 16:11

## 2020-11-04 RX ADMIN — METOPROLOL TARTRATE 100 MG: 50 TABLET, FILM COATED ORAL at 08:16

## 2020-11-04 RX ADMIN — LISINOPRIL 40 MG: 20 TABLET ORAL at 08:15

## 2020-11-04 RX ADMIN — LEVALBUTEROL HYDROCHLORIDE 1.25 MG: 1.25 SOLUTION, CONCENTRATE RESPIRATORY (INHALATION) at 13:30

## 2020-11-04 RX ADMIN — AMLODIPINE BESYLATE 10 MG: 10 TABLET ORAL at 08:15

## 2020-11-04 RX ADMIN — POLYETHYLENE GLYCOL 3350 17 G: 17 POWDER, FOR SOLUTION ORAL at 08:15

## 2020-11-04 RX ADMIN — CITALOPRAM HYDROBROMIDE 10 MG: 10 TABLET ORAL at 08:28

## 2020-11-04 RX ADMIN — INSULIN HUMAN 10 UNITS: 100 INJECTION, SUSPENSION SUBCUTANEOUS at 08:27

## 2020-11-04 RX ADMIN — IPRATROPIUM BROMIDE 0.5 MG: 0.5 SOLUTION RESPIRATORY (INHALATION) at 19:47

## 2020-11-04 RX ADMIN — LEVALBUTEROL HYDROCHLORIDE 1.25 MG: 1.25 SOLUTION, CONCENTRATE RESPIRATORY (INHALATION) at 19:47

## 2020-11-04 RX ADMIN — METHYLPREDNISOLONE SODIUM SUCCINATE 40 MG: 40 INJECTION, POWDER, FOR SOLUTION INTRAMUSCULAR; INTRAVENOUS at 22:24

## 2020-11-04 RX ADMIN — IPRATROPIUM BROMIDE 0.5 MG: 0.5 SOLUTION RESPIRATORY (INHALATION) at 07:15

## 2020-11-04 RX ADMIN — ATORVASTATIN CALCIUM 40 MG: 40 TABLET, FILM COATED ORAL at 08:15

## 2020-11-04 RX ADMIN — DOCUSATE SODIUM 100 MG: 100 CAPSULE, LIQUID FILLED ORAL at 17:08

## 2020-11-04 RX ADMIN — METOPROLOL TARTRATE 100 MG: 50 TABLET, FILM COATED ORAL at 22:24

## 2020-11-04 RX ADMIN — METHYLPREDNISOLONE SODIUM SUCCINATE 40 MG: 40 INJECTION, POWDER, FOR SOLUTION INTRAMUSCULAR; INTRAVENOUS at 08:15

## 2020-11-04 RX ADMIN — Medication 1 PATCH: at 08:16

## 2020-11-04 RX ADMIN — INSULIN LISPRO 1 UNITS: 100 INJECTION, SOLUTION INTRAVENOUS; SUBCUTANEOUS at 12:15

## 2020-11-04 RX ADMIN — CLONIDINE HYDROCHLORIDE 0.1 MG: 0.1 TABLET ORAL at 22:23

## 2020-11-05 VITALS
TEMPERATURE: 98.4 F | WEIGHT: 155.2 LBS | DIASTOLIC BLOOD PRESSURE: 89 MMHG | OXYGEN SATURATION: 93 % | HEIGHT: 63 IN | SYSTOLIC BLOOD PRESSURE: 156 MMHG | HEART RATE: 68 BPM | BODY MASS INDEX: 27.5 KG/M2 | RESPIRATION RATE: 16 BRPM

## 2020-11-05 LAB
GLUCOSE SERPL-MCNC: 153 MG/DL (ref 65–140)
GLUCOSE SERPL-MCNC: 168 MG/DL (ref 65–140)

## 2020-11-05 PROCEDURE — 99239 HOSP IP/OBS DSCHRG MGMT >30: CPT | Performed by: FAMILY MEDICINE

## 2020-11-05 PROCEDURE — 94760 N-INVAS EAR/PLS OXIMETRY 1: CPT

## 2020-11-05 PROCEDURE — 94640 AIRWAY INHALATION TREATMENT: CPT

## 2020-11-05 PROCEDURE — 82948 REAGENT STRIP/BLOOD GLUCOSE: CPT

## 2020-11-05 RX ORDER — PREDNISONE 10 MG/1
TABLET ORAL
Qty: 20 TABLET | Refills: 0 | Status: SHIPPED | OUTPATIENT
Start: 2020-11-06 | End: 2020-11-14

## 2020-11-05 RX ORDER — NICOTINE 21 MG/24HR
1 PATCH, TRANSDERMAL 24 HOURS TRANSDERMAL DAILY
Qty: 28 PATCH | Refills: 0 | Status: SHIPPED | OUTPATIENT
Start: 2020-11-06 | End: 2021-06-20 | Stop reason: ALTCHOICE

## 2020-11-05 RX ORDER — AZITHROMYCIN 500 MG/1
500 TABLET, FILM COATED ORAL EVERY 24 HOURS
Qty: 2 TABLET | Refills: 0 | Status: SHIPPED | OUTPATIENT
Start: 2020-11-05 | End: 2020-11-07

## 2020-11-05 RX ORDER — FLUTICASONE FUROATE AND VILANTEROL 100; 25 UG/1; UG/1
1 POWDER RESPIRATORY (INHALATION) DAILY
Qty: 1 INHALER | Refills: 0 | Status: SHIPPED | OUTPATIENT
Start: 2020-11-05 | End: 2021-03-30 | Stop reason: HOSPADM

## 2020-11-05 RX ADMIN — FLUTICASONE PROPIONATE 1 SPRAY: 50 SPRAY, METERED NASAL at 08:09

## 2020-11-05 RX ADMIN — IPRATROPIUM BROMIDE 0.5 MG: 0.5 SOLUTION RESPIRATORY (INHALATION) at 08:28

## 2020-11-05 RX ADMIN — CLONIDINE HYDROCHLORIDE 0.1 MG: 0.1 TABLET ORAL at 08:07

## 2020-11-05 RX ADMIN — CITALOPRAM HYDROBROMIDE 10 MG: 10 TABLET ORAL at 08:09

## 2020-11-05 RX ADMIN — LEVALBUTEROL HYDROCHLORIDE 1.25 MG: 1.25 SOLUTION, CONCENTRATE RESPIRATORY (INHALATION) at 14:32

## 2020-11-05 RX ADMIN — PANTOPRAZOLE SODIUM 20 MG: 20 TABLET, DELAYED RELEASE ORAL at 05:15

## 2020-11-05 RX ADMIN — ENOXAPARIN SODIUM 40 MG: 40 INJECTION SUBCUTANEOUS at 08:07

## 2020-11-05 RX ADMIN — AZITHROMYCIN 500 MG: 250 TABLET, FILM COATED ORAL at 16:05

## 2020-11-05 RX ADMIN — INSULIN LISPRO 1 UNITS: 100 INJECTION, SOLUTION INTRAVENOUS; SUBCUTANEOUS at 11:06

## 2020-11-05 RX ADMIN — AMLODIPINE BESYLATE 10 MG: 10 TABLET ORAL at 08:07

## 2020-11-05 RX ADMIN — ATORVASTATIN CALCIUM 40 MG: 40 TABLET, FILM COATED ORAL at 08:07

## 2020-11-05 RX ADMIN — METHYLPREDNISOLONE SODIUM SUCCINATE 40 MG: 40 INJECTION, POWDER, FOR SOLUTION INTRAMUSCULAR; INTRAVENOUS at 08:07

## 2020-11-05 RX ADMIN — METOPROLOL TARTRATE 100 MG: 50 TABLET, FILM COATED ORAL at 08:07

## 2020-11-05 RX ADMIN — Medication 1 PATCH: at 08:08

## 2020-11-05 RX ADMIN — BUDESONIDE 0.5 MG: 0.5 INHALANT RESPIRATORY (INHALATION) at 08:28

## 2020-11-05 RX ADMIN — INSULIN HUMAN 10 UNITS: 100 INJECTION, SUSPENSION SUBCUTANEOUS at 08:09

## 2020-11-05 RX ADMIN — DOCUSATE SODIUM 100 MG: 100 CAPSULE, LIQUID FILLED ORAL at 08:07

## 2020-11-05 RX ADMIN — LISINOPRIL 40 MG: 20 TABLET ORAL at 08:06

## 2020-11-05 RX ADMIN — INSULIN LISPRO 1 UNITS: 100 INJECTION, SOLUTION INTRAVENOUS; SUBCUTANEOUS at 08:09

## 2020-11-05 RX ADMIN — IPRATROPIUM BROMIDE 0.5 MG: 0.5 SOLUTION RESPIRATORY (INHALATION) at 14:32

## 2020-11-06 LAB
BACTERIA SPT RESP CULT: NORMAL
GRAM STN SPEC: NORMAL

## 2020-11-07 LAB
BACTERIA BLD CULT: NORMAL
BACTERIA BLD CULT: NORMAL

## 2021-02-12 ENCOUNTER — TELEPHONE (OUTPATIENT)
Dept: FAMILY MEDICINE CLINIC | Facility: HOSPITAL | Age: 66
End: 2021-02-12

## 2021-03-26 ENCOUNTER — HOSPITAL ENCOUNTER (INPATIENT)
Facility: HOSPITAL | Age: 66
LOS: 4 days | Discharge: HOME | DRG: 133 | End: 2021-03-30
Attending: EMERGENCY MEDICINE | Admitting: FAMILY MEDICINE
Payer: COMMERCIAL

## 2021-03-26 ENCOUNTER — APPOINTMENT (EMERGENCY)
Dept: RADIOLOGY | Facility: HOSPITAL | Age: 66
DRG: 133 | End: 2021-03-26
Payer: COMMERCIAL

## 2021-03-26 DIAGNOSIS — J44.1 COPD EXACERBATION (HCC): Primary | ICD-10-CM

## 2021-03-26 DIAGNOSIS — J44.1 COPD WITH ACUTE EXACERBATION (HCC): ICD-10-CM

## 2021-03-26 DIAGNOSIS — Z72.0 TOBACCO ABUSE: ICD-10-CM

## 2021-03-26 DIAGNOSIS — J18.9 PNEUMONIA: ICD-10-CM

## 2021-03-26 DIAGNOSIS — R09.02 HYPOXIA: ICD-10-CM

## 2021-03-26 LAB
ALBUMIN SERPL BCP-MCNC: 3.3 G/DL (ref 3.5–5)
ALP SERPL-CCNC: 132 U/L (ref 46–116)
ALT SERPL W P-5'-P-CCNC: 17 U/L (ref 12–78)
ANION GAP SERPL CALCULATED.3IONS-SCNC: 3 MMOL/L (ref 4–13)
AST SERPL W P-5'-P-CCNC: 13 U/L (ref 5–45)
BASOPHILS # BLD AUTO: 0.03 THOUSANDS/ΜL (ref 0–0.1)
BASOPHILS NFR BLD AUTO: 1 % (ref 0–1)
BILIRUB SERPL-MCNC: 0.95 MG/DL (ref 0.2–1)
BUN SERPL-MCNC: 6 MG/DL (ref 5–25)
CALCIUM ALBUM COR SERPL-MCNC: 9.6 MG/DL (ref 8.3–10.1)
CALCIUM SERPL-MCNC: 9 MG/DL (ref 8.3–10.1)
CHLORIDE SERPL-SCNC: 95 MMOL/L (ref 100–108)
CK SERPL-CCNC: 42 U/L (ref 26–192)
CO2 SERPL-SCNC: 35 MMOL/L (ref 21–32)
CREAT SERPL-MCNC: 0.7 MG/DL (ref 0.6–1.3)
CRP SERPL QL: 12.9 MG/L
EOSINOPHIL # BLD AUTO: 0.02 THOUSAND/ΜL (ref 0–0.61)
EOSINOPHIL NFR BLD AUTO: 0 % (ref 0–6)
ERYTHROCYTE [DISTWIDTH] IN BLOOD BY AUTOMATED COUNT: 13.2 % (ref 11.6–15.1)
FLUAV RNA RESP QL NAA+PROBE: NEGATIVE
FLUBV RNA RESP QL NAA+PROBE: NEGATIVE
GFR SERPL CREATININE-BSD FRML MDRD: 92 ML/MIN/1.73SQ M
GLUCOSE SERPL-MCNC: 113 MG/DL (ref 65–140)
GLUCOSE SERPL-MCNC: 125 MG/DL (ref 65–140)
GLUCOSE SERPL-MCNC: 245 MG/DL (ref 65–140)
HCT VFR BLD AUTO: 48.1 % (ref 34.8–46.1)
HGB BLD-MCNC: 15.5 G/DL (ref 11.5–15.4)
IMM GRANULOCYTES # BLD AUTO: 0.01 THOUSAND/UL (ref 0–0.2)
IMM GRANULOCYTES NFR BLD AUTO: 0 % (ref 0–2)
INR PPP: 1.04 (ref 0.84–1.19)
LACTATE SERPL-SCNC: 0.7 MMOL/L (ref 0.5–2)
LYMPHOCYTES # BLD AUTO: 1.04 THOUSANDS/ΜL (ref 0.6–4.47)
LYMPHOCYTES NFR BLD AUTO: 16 % (ref 14–44)
MAGNESIUM SERPL-MCNC: 1.7 MG/DL (ref 1.6–2.6)
MCH RBC QN AUTO: 30.3 PG (ref 26.8–34.3)
MCHC RBC AUTO-ENTMCNC: 32.2 G/DL (ref 31.4–37.4)
MCV RBC AUTO: 94 FL (ref 82–98)
MONOCYTES # BLD AUTO: 0.67 THOUSAND/ΜL (ref 0.17–1.22)
MONOCYTES NFR BLD AUTO: 10 % (ref 4–12)
NEUTROPHILS # BLD AUTO: 4.65 THOUSANDS/ΜL (ref 1.85–7.62)
NEUTS SEG NFR BLD AUTO: 73 % (ref 43–75)
NRBC BLD AUTO-RTO: 0 /100 WBCS
NT-PROBNP SERPL-MCNC: 990 PG/ML
PLATELET # BLD AUTO: 195 THOUSANDS/UL (ref 149–390)
PMV BLD AUTO: 10.6 FL (ref 8.9–12.7)
POTASSIUM SERPL-SCNC: 3.5 MMOL/L (ref 3.5–5.3)
PROT SERPL-MCNC: 7.5 G/DL (ref 6.4–8.2)
PROTHROMBIN TIME: 13.4 SECONDS (ref 11.6–14.5)
RBC # BLD AUTO: 5.12 MILLION/UL (ref 3.81–5.12)
RSV RNA RESP QL NAA+PROBE: NEGATIVE
SARS-COV-2 RNA RESP QL NAA+PROBE: NEGATIVE
SODIUM SERPL-SCNC: 133 MMOL/L (ref 136–145)
TROPONIN I SERPL-MCNC: <0.02 NG/ML
WBC # BLD AUTO: 6.42 THOUSAND/UL (ref 4.31–10.16)

## 2021-03-26 PROCEDURE — 94640 AIRWAY INHALATION TREATMENT: CPT

## 2021-03-26 PROCEDURE — 94760 N-INVAS EAR/PLS OXIMETRY 1: CPT

## 2021-03-26 PROCEDURE — 99285 EMERGENCY DEPT VISIT HI MDM: CPT | Performed by: EMERGENCY MEDICINE

## 2021-03-26 PROCEDURE — 99223 1ST HOSP IP/OBS HIGH 75: CPT | Performed by: FAMILY MEDICINE

## 2021-03-26 PROCEDURE — 87040 BLOOD CULTURE FOR BACTERIA: CPT | Performed by: EMERGENCY MEDICINE

## 2021-03-26 PROCEDURE — 82550 ASSAY OF CK (CPK): CPT | Performed by: EMERGENCY MEDICINE

## 2021-03-26 PROCEDURE — 84484 ASSAY OF TROPONIN QUANT: CPT | Performed by: EMERGENCY MEDICINE

## 2021-03-26 PROCEDURE — 86140 C-REACTIVE PROTEIN: CPT | Performed by: EMERGENCY MEDICINE

## 2021-03-26 PROCEDURE — 93005 ELECTROCARDIOGRAM TRACING: CPT

## 2021-03-26 PROCEDURE — 83605 ASSAY OF LACTIC ACID: CPT | Performed by: EMERGENCY MEDICINE

## 2021-03-26 PROCEDURE — 85610 PROTHROMBIN TIME: CPT | Performed by: EMERGENCY MEDICINE

## 2021-03-26 PROCEDURE — 71045 X-RAY EXAM CHEST 1 VIEW: CPT

## 2021-03-26 PROCEDURE — 0241U HB NFCT DS VIR RESP RNA 4 TRGT: CPT | Performed by: EMERGENCY MEDICINE

## 2021-03-26 PROCEDURE — 80053 COMPREHEN METABOLIC PANEL: CPT | Performed by: EMERGENCY MEDICINE

## 2021-03-26 PROCEDURE — 85025 COMPLETE CBC W/AUTO DIFF WBC: CPT | Performed by: EMERGENCY MEDICINE

## 2021-03-26 PROCEDURE — 99285 EMERGENCY DEPT VISIT HI MDM: CPT

## 2021-03-26 PROCEDURE — 83880 ASSAY OF NATRIURETIC PEPTIDE: CPT | Performed by: EMERGENCY MEDICINE

## 2021-03-26 PROCEDURE — 83735 ASSAY OF MAGNESIUM: CPT | Performed by: EMERGENCY MEDICINE

## 2021-03-26 PROCEDURE — 36415 COLL VENOUS BLD VENIPUNCTURE: CPT | Performed by: EMERGENCY MEDICINE

## 2021-03-26 PROCEDURE — 82948 REAGENT STRIP/BLOOD GLUCOSE: CPT

## 2021-03-26 RX ORDER — AMLODIPINE BESYLATE 10 MG/1
10 TABLET ORAL DAILY
Status: DISCONTINUED | OUTPATIENT
Start: 2021-03-26 | End: 2021-03-30 | Stop reason: HOSPADM

## 2021-03-26 RX ORDER — IPRATROPIUM BROMIDE AND ALBUTEROL SULFATE 2.5; .5 MG/3ML; MG/3ML
3 SOLUTION RESPIRATORY (INHALATION)
Status: DISCONTINUED | OUTPATIENT
Start: 2021-03-26 | End: 2021-03-26

## 2021-03-26 RX ORDER — METOPROLOL TARTRATE 50 MG/1
100 TABLET, FILM COATED ORAL EVERY 12 HOURS SCHEDULED
Status: DISCONTINUED | OUTPATIENT
Start: 2021-03-26 | End: 2021-03-30 | Stop reason: HOSPADM

## 2021-03-26 RX ORDER — TRAZODONE HYDROCHLORIDE 50 MG/1
100 TABLET ORAL
Status: DISCONTINUED | OUTPATIENT
Start: 2021-03-26 | End: 2021-03-30 | Stop reason: HOSPADM

## 2021-03-26 RX ORDER — HEPARIN SODIUM 5000 [USP'U]/ML
5000 INJECTION, SOLUTION INTRAVENOUS; SUBCUTANEOUS EVERY 8 HOURS SCHEDULED
Status: DISCONTINUED | OUTPATIENT
Start: 2021-03-26 | End: 2021-03-30 | Stop reason: HOSPADM

## 2021-03-26 RX ORDER — ALBUTEROL SULFATE 2.5 MG/3ML
2.5 SOLUTION RESPIRATORY (INHALATION) EVERY 4 HOURS PRN
Status: DISCONTINUED | OUTPATIENT
Start: 2021-03-26 | End: 2021-03-30 | Stop reason: HOSPADM

## 2021-03-26 RX ORDER — LEVALBUTEROL 1.25 MG/.5ML
1.25 SOLUTION, CONCENTRATE RESPIRATORY (INHALATION)
Status: DISCONTINUED | OUTPATIENT
Start: 2021-03-26 | End: 2021-03-26

## 2021-03-26 RX ORDER — FLUTICASONE PROPIONATE 50 MCG
1 SPRAY, SUSPENSION (ML) NASAL DAILY
Status: DISCONTINUED | OUTPATIENT
Start: 2021-03-26 | End: 2021-03-30 | Stop reason: HOSPADM

## 2021-03-26 RX ORDER — CITALOPRAM 10 MG/1
10 TABLET ORAL DAILY
Status: DISCONTINUED | OUTPATIENT
Start: 2021-03-26 | End: 2021-03-30 | Stop reason: HOSPADM

## 2021-03-26 RX ORDER — LEVALBUTEROL 1.25 MG/.5ML
1.25 SOLUTION, CONCENTRATE RESPIRATORY (INHALATION)
Status: DISCONTINUED | OUTPATIENT
Start: 2021-03-26 | End: 2021-03-28

## 2021-03-26 RX ORDER — BUDESONIDE 0.5 MG/2ML
0.5 INHALANT ORAL
Status: DISCONTINUED | OUTPATIENT
Start: 2021-03-26 | End: 2021-03-30 | Stop reason: HOSPADM

## 2021-03-26 RX ORDER — METHYLPREDNISOLONE SODIUM SUCCINATE 125 MG/2ML
125 INJECTION, POWDER, LYOPHILIZED, FOR SOLUTION INTRAMUSCULAR; INTRAVENOUS ONCE
Status: COMPLETED | OUTPATIENT
Start: 2021-03-26 | End: 2021-03-26

## 2021-03-26 RX ORDER — CEFTRIAXONE 1 G/50ML
1000 INJECTION, SOLUTION INTRAVENOUS ONCE
Status: COMPLETED | OUTPATIENT
Start: 2021-03-26 | End: 2021-03-26

## 2021-03-26 RX ORDER — AZITHROMYCIN 250 MG/1
500 TABLET, FILM COATED ORAL EVERY 24 HOURS
Status: COMPLETED | OUTPATIENT
Start: 2021-03-26 | End: 2021-03-28

## 2021-03-26 RX ORDER — PANTOPRAZOLE SODIUM 20 MG/1
20 TABLET, DELAYED RELEASE ORAL
Status: DISCONTINUED | OUTPATIENT
Start: 2021-03-27 | End: 2021-03-30 | Stop reason: HOSPADM

## 2021-03-26 RX ORDER — CLONIDINE HYDROCHLORIDE 0.1 MG/1
0.1 TABLET ORAL EVERY 12 HOURS SCHEDULED
Status: DISCONTINUED | OUTPATIENT
Start: 2021-03-26 | End: 2021-03-30 | Stop reason: HOSPADM

## 2021-03-26 RX ORDER — NICOTINE 21 MG/24HR
1 PATCH, TRANSDERMAL 24 HOURS TRANSDERMAL DAILY
Status: DISCONTINUED | OUTPATIENT
Start: 2021-03-26 | End: 2021-03-30 | Stop reason: HOSPADM

## 2021-03-26 RX ORDER — ATORVASTATIN CALCIUM 40 MG/1
40 TABLET, FILM COATED ORAL DAILY
Status: DISCONTINUED | OUTPATIENT
Start: 2021-03-26 | End: 2021-03-30 | Stop reason: HOSPADM

## 2021-03-26 RX ORDER — GUAIFENESIN 600 MG
600 TABLET, EXTENDED RELEASE 12 HR ORAL 2 TIMES DAILY
Status: DISCONTINUED | OUTPATIENT
Start: 2021-03-26 | End: 2021-03-30 | Stop reason: HOSPADM

## 2021-03-26 RX ORDER — METHYLPREDNISOLONE SODIUM SUCCINATE 40 MG/ML
40 INJECTION, POWDER, LYOPHILIZED, FOR SOLUTION INTRAMUSCULAR; INTRAVENOUS EVERY 8 HOURS
Status: DISCONTINUED | OUTPATIENT
Start: 2021-03-26 | End: 2021-03-27

## 2021-03-26 RX ORDER — LISINOPRIL 20 MG/1
40 TABLET ORAL DAILY
Status: DISCONTINUED | OUTPATIENT
Start: 2021-03-26 | End: 2021-03-30 | Stop reason: HOSPADM

## 2021-03-26 RX ADMIN — HEPARIN SODIUM 5000 UNITS: 5000 INJECTION INTRAVENOUS; SUBCUTANEOUS at 16:08

## 2021-03-26 RX ADMIN — CEFTRIAXONE 1000 MG: 1 INJECTION, SOLUTION INTRAVENOUS at 16:01

## 2021-03-26 RX ADMIN — TRAZODONE HYDROCHLORIDE 100 MG: 50 TABLET ORAL at 21:02

## 2021-03-26 RX ADMIN — CITALOPRAM HYDROBROMIDE 10 MG: 10 TABLET ORAL at 16:02

## 2021-03-26 RX ADMIN — INSULIN LISPRO 3 UNITS: 100 INJECTION, SOLUTION INTRAVENOUS; SUBCUTANEOUS at 21:01

## 2021-03-26 RX ADMIN — IPRATROPIUM BROMIDE AND ALBUTEROL SULFATE 3 ML: 2.5; .5 SOLUTION RESPIRATORY (INHALATION) at 13:06

## 2021-03-26 RX ADMIN — METHYLPREDNISOLONE SODIUM SUCCINATE 40 MG: 40 INJECTION, POWDER, FOR SOLUTION INTRAMUSCULAR; INTRAVENOUS at 16:02

## 2021-03-26 RX ADMIN — METHYLPREDNISOLONE SODIUM SUCCINATE 125 MG: 125 INJECTION, POWDER, FOR SOLUTION INTRAMUSCULAR; INTRAVENOUS at 14:23

## 2021-03-26 RX ADMIN — GUAIFENESIN 600 MG: 600 TABLET ORAL at 17:04

## 2021-03-26 RX ADMIN — CLONIDINE HYDROCHLORIDE 0.1 MG: 0.1 TABLET ORAL at 21:02

## 2021-03-26 RX ADMIN — BUDESONIDE 0.5 MG: 0.5 INHALANT RESPIRATORY (INHALATION) at 19:59

## 2021-03-26 RX ADMIN — ATORVASTATIN CALCIUM 40 MG: 40 TABLET, FILM COATED ORAL at 16:03

## 2021-03-26 RX ADMIN — AMLODIPINE BESYLATE 10 MG: 10 TABLET ORAL at 16:02

## 2021-03-26 RX ADMIN — HEPARIN SODIUM 5000 UNITS: 5000 INJECTION INTRAVENOUS; SUBCUTANEOUS at 21:00

## 2021-03-26 RX ADMIN — IPRATROPIUM BROMIDE 0.5 MG: 0.5 SOLUTION RESPIRATORY (INHALATION) at 19:59

## 2021-03-26 RX ADMIN — METHYLPREDNISOLONE SODIUM SUCCINATE 40 MG: 40 INJECTION, POWDER, FOR SOLUTION INTRAMUSCULAR; INTRAVENOUS at 23:42

## 2021-03-26 RX ADMIN — Medication 1 PATCH: at 16:04

## 2021-03-26 RX ADMIN — METOPROLOL TARTRATE 100 MG: 50 TABLET, FILM COATED ORAL at 21:02

## 2021-03-26 RX ADMIN — LEVALBUTEROL HYDROCHLORIDE 1.25 MG: 1.25 SOLUTION, CONCENTRATE RESPIRATORY (INHALATION) at 19:59

## 2021-03-26 RX ADMIN — AZITHROMYCIN 500 MG: 250 TABLET, FILM COATED ORAL at 16:07

## 2021-03-26 RX ADMIN — LISINOPRIL 40 MG: 20 TABLET ORAL at 16:02

## 2021-03-26 NOTE — ED PROVIDER NOTES
History  Chief Complaint   Patient presents with    Shortness of Breath     pt c/o worsening sob w/fatigue and  diminished appetite for past 3 wks  deniest travel/fevers/cough     Patient is a 30-year-old female presenting to the emergency department complaining of worsening shortness of breath over the past 2 weeks, with fatigue and decreased appetite, she denies a fever or chills, denies a cough or congestion, no nausea, vomiting or diarrhea, patient is a smoker, she was briefly on home oxygen after a recent admission for COPD exacerbation, she denies any COVID-19 contacts          Prior to Admission Medications   Prescriptions Last Dose Informant Patient Reported? Taking? Ergocalciferol (VITAMIN D2 PO)   Yes No   Sig: Take 1 25 mg by mouth once a week   amLODIPine (NORVASC) 10 mg tablet   Yes No   Sig: Take 10 mg by mouth daily   atorvastatin (LIPITOR) 40 mg tablet   Yes No   Sig: Take 40 mg by mouth daily   citalopram (CeleXA) 10 mg tablet   Yes No   Sig: Take 10 mg by mouth daily   cloNIDine (CATAPRES) 0 1 mg tablet   Yes No   Sig: Take 0 1 mg by mouth every 12 (twelve) hours   fluticasone (FLONASE) 50 mcg/act nasal spray   Yes No   Si spray into each nostril daily   fluticasone-vilanterol (BREO ELLIPTA) 100-25 mcg/inh inhaler   No No   Sig: Inhale 1 puff daily Rinse mouth after use     glipiZIDE (GLUCOTROL) 5 mg tablet   Yes No   Sig: Take 5 mg by mouth daily   ipratropium-albuterol (COMBIVENT RESPIMAT) inhaler   Yes No   Sig: Inhale 1 puff 4 (four) times a day   lisinopril (ZESTRIL) 40 mg tablet   Yes No   Sig: Take 40 mg by mouth daily   metoprolol tartrate (LOPRESSOR) 100 mg tablet   Yes No   Sig: Take 100 mg by mouth every 12 (twelve) hours   nicotine (NICODERM CQ) 14 mg/24hr TD 24 hr patch   No No   Sig: Place 1 patch on the skin daily   omeprazole (PriLOSEC OTC) 20 MG tablet   Yes No   Sig: Take by mouth   omeprazole (PriLOSEC) 20 mg delayed release capsule   Yes No   Sig: Take 20 mg by mouth daily pioglitazone (ACTOS) 15 mg tablet   Yes No   Sig: Take 15 mg by mouth daily   tiotropium (SPIRIVA) 18 mcg inhalation capsule   Yes No   Sig: Place 18 mcg into inhaler and inhale daily   traZODone (DESYREL) 100 mg tablet   Yes No   Sig: Take 100 mg by mouth daily at bedtime      Facility-Administered Medications: None       Past Medical History:   Diagnosis Date    COPD (chronic obstructive pulmonary disease) ([facility] [address] )        History reviewed  No pertinent surgical history  History reviewed  No pertinent family history  I have reviewed and agree with the history as documented  E-Cigarette/Vaping    E-Cigarette Use Never User      E-Cigarette/Vaping Substances     Social History     Tobacco Use    Smoking status: Current Every Day Smoker     Types: Cigarettes    Smokeless tobacco: Never Used   Substance Use Topics    Alcohol use: Not Currently    Drug use: Never       Review of Systems   Constitutional: Positive for appetite change and fatigue  HENT: Negative  Eyes: Negative  Respiratory: Positive for shortness of breath and wheezing  Cardiovascular: Negative  Gastrointestinal: Negative  Endocrine: Negative  Genitourinary: Negative  Musculoskeletal: Negative  Skin: Negative  Allergic/Immunologic: Negative  Neurological: Negative  Hematological: Negative  Psychiatric/Behavioral: Negative  Physical Exam  Physical Exam  Constitutional:       Appearance: She is well-developed  She is ill-appearing  HENT:      Head: Normocephalic and atraumatic  Mouth/Throat:      Mouth: Mucous membranes are dry  Eyes:      Conjunctiva/sclera: Conjunctivae normal       Pupils: Pupils are equal, round, and reactive to light  Neck:      Musculoskeletal: Normal range of motion and neck supple  Cardiovascular:      Rate and Rhythm: Normal rate  Pulmonary:      Effort: Pulmonary effort is normal  Tachypnea present  Breath sounds: Wheezing present     Abdominal: Palpations: Abdomen is soft  Musculoskeletal: Normal range of motion  Skin:     General: Skin is warm and dry  Neurological:      Mental Status: She is alert and oriented to person, place, and time  Vital Signs  ED Triage Vitals [03/26/21 1217]   Temperature Pulse Respirations Blood Pressure SpO2   98 9 °F (37 2 °C) 87 (!) 25 (!) 183/93 94 %      Temp Source Heart Rate Source Patient Position - Orthostatic VS BP Location FiO2 (%)   Temporal Monitor Lying Right arm --      Pain Score       --           Vitals:    03/26/21 1245 03/26/21 1300 03/26/21 1330 03/26/21 1345   BP: 162/89 138/62 128/67 128/70   Pulse: 75 70 62 61   Patient Position - Orthostatic VS:  Lying                 ED Medications  Medications   ipratropium-albuterol (DUO-NEB) 0 5-2 5 mg/3 mL inhalation solution 3 mL (3 mL Nebulization Given 3/26/21 1306)   ipratropium-albuterol (DUO-NEB) 0 5-2 5 mg/3 mL inhalation solution 3 mL (3 mL Nebulization Given 3/26/21 1306)   ceftriaxone (ROCEPHIN) 1 g/50 mL in dextrose IVPB (has no administration in time range)   methylPREDNISolone sodium succinate (Solu-MEDROL) injection 125 mg (has no administration in time range)       Diagnostic Studies  Results Reviewed     Procedure Component Value Units Date/Time    Blood culture #1 [[id number]] Collected: 03/26/21 1343    Lab Status: In process Specimen: Blood from Arm, Right Updated: 03/26/21 1345    COVID19, Influenza A/B, RSV PCR, [facility] [[id number]]  (Normal) Collected: 03/26/21 1248    Lab Status: Final result Specimen: Nares from Nose Updated: 03/26/21 1334     SARS-CoV-2 Negative     INFLUENZA A PCR Negative     INFLUENZA B PCR Negative     RSV PCR Negative    Narrative: This test has been authorized by FDA under an EUA (Emergency Use Assay) for use by authorized laboratories  Clinical caution and judgement should be used with the interpretation of these results with consideration of the clinical impression and other laboratory testing  Testing reported as "Positive" or "Negative" has been proven to be accurate according to standard laboratory validation requirements  All testing is performed with control materials showing appropriate reactivity at standard intervals  Troponin I [[id number]]  (Normal) Collected: 03/26/21 1248    Lab Status: Final result Specimen: Blood from Arm, Right Updated: 03/26/21 1319     Troponin I <0 02 ng/mL     Magnesium [[id number]]  (Normal) Collected: 03/26/21 1248    Lab Status: Final result Specimen: Blood from Arm, Right Updated: 03/26/21 1318     Magnesium 1 7 mg/dL     C-reactive protein [[id number]]  (Abnormal) Collected: 03/26/21 1248    Lab Status: Final result Specimen: Blood from Arm, Right Updated: 03/26/21 1318     CRP 12 9 mg/L     NT-BNP PRO [[id number]]  (Abnormal) Collected: 03/26/21 1248    Lab Status: Final result Specimen: Blood from Arm, Right Updated: 03/26/21 1318     NT-proBNP 990 pg/mL     CK (with reflex to MB) [[id number]]  (Normal) Collected: 03/26/21 1248    Lab Status: Final result Specimen: Blood from Arm, Right Updated: 03/26/21 1318     Total CK 42 U/L     Lactic acid, plasma [[id number]]  (Normal) Collected: 03/26/21 1248    Lab Status: Final result Specimen: Blood from Arm, Right Updated: 03/26/21 1314     LACTIC ACID 0 7 mmol/L     Narrative:      Result may be elevated if tourniquet was used during collection      Comprehensive metabolic panel [[id number]]  (Abnormal) Collected: 03/26/21 1248    Lab Status: Final result Specimen: Blood from Arm, Right Updated: 03/26/21 1311     Sodium 133 mmol/L      Potassium 3 5 mmol/L      Chloride 95 mmol/L      CO2 35 mmol/L      ANION GAP 3 mmol/L      BUN 6 mg/dL      Creatinine 0 70 mg/dL      Glucose 125 mg/dL      Calcium 9 0 mg/dL      Corrected Calcium 9 6 mg/dL      AST 13 U/L      ALT 17 U/L      Alkaline Phosphatase 132 U/L      Total Protein 7 5 g/dL      Albumin 3 3 g/dL      Total Bilirubin 0 95 mg/dL      eGFR 92 ml/min/1 73sq m Narrative:      National Kidney Disease Foundation guidelines for Chronic Kidney Disease (CKD):     Stage 1 with normal or high GFR (GFR > 90 mL/min/1 73 square meters)    Stage 2 Mild CKD (GFR = 60-89 mL/min/1 73 square meters)    Stage 3A Moderate CKD (GFR = 45-59 mL/min/1 73 square meters)    Stage 3B Moderate CKD (GFR = 30-44 mL/min/1 73 square meters)    Stage 4 Severe CKD (GFR = 15-29 mL/min/1 73 square meters)    Stage 5 End Stage CKD (GFR <15 mL/min/1 73 square meters)  Note: GFR calculation is accurate only with a steady state creatinine    Protime-INR [[id number]]  (Normal) Collected: 03/26/21 1248    Lab Status: Final result Specimen: Blood from Arm, Right Updated: 03/26/21 1306     Protime 13 4 seconds      INR 1 04    CBC and differential [[id number]]  (Abnormal) Collected: 03/26/21 1248    Lab Status: Final result Specimen: Blood from Arm, Right Updated: 03/26/21 1257     WBC 6 42 Thousand/uL      RBC 5 12 Million/uL      Hemoglobin 15 5 g/dL      Hematocrit 48 1 %      MCV 94 fL      MCH 30 3 pg      MCHC 32 2 g/dL      RDW 13 2 %      MPV 10 6 fL      Platelets 514 Thousands/uL      nRBC 0 /100 WBCs      Neutrophils Relative 73 %      Immat GRANS % 0 %      Lymphocytes Relative 16 %      Monocytes Relative 10 %      Eosinophils Relative 0 %      Basophils Relative 1 %      Neutrophils Absolute 4 65 Thousands/µL      Immature Grans Absolute 0 01 Thousand/uL      Lymphocytes Absolute 1 04 Thousands/µL      Monocytes Absolute 0 67 Thousand/µL      Eosinophils Absolute 0 02 Thousand/µL      Basophils Absolute 0 03 Thousands/µL     Blood culture #2 [[id number]] Collected: 03/26/21 1248    Lab Status: In process Specimen: Blood from Arm, Right Updated: 03/26/21 1253                 XR chest 1 view portable   ED Interpretation by Dora Hamilton,  (03/26 1356)   Haziness RLL, ?early infiltrate      Final Result by Grey Hilliard MD (03/26 1356)      No acute cardiopulmonary disease  Workstation performed: [id number]                    Procedures  ECG 12 Lead Documentation Only    Date/Time: 3/26/2021 12:32 PM  Performed by: Danielle Cleveland DO  Authorized by: Danielle Cleveland DO     Indications / Diagnosis:  Shortness breath  ECG reviewed by me, the ED Provider: yes    Patient location:  ED  Previous ECG:     Previous ECG:  Compared to current    Comparison ECG info:  11-2-2020    Similarity:  No change    Comparison to cardiac monitor: Yes    Interpretation:     Interpretation: non-specific    Rate:     ECG rate:  81    ECG rate assessment: normal    Rhythm:     Rhythm: sinus rhythm    Ectopy:     Ectopy: none    QRS:     QRS intervals:  Normal  Conduction:     Conduction: normal    ST segments:     ST segments:  Normal  T waves:     T waves: normal               ED Course  ED Course as of Mar 26 1401   Fri Mar 26, 2021   1335 As a walked in the room for my initial evaluation patient's pulse ox 72% on room air with a good waveform, placed on 3 L nasal cannula provided with DuoNeb and albuterol breathing treatments, maintaining oxygenation above 94% on 3 L nasal cannula      1359 Patient with COPD exacerbation, hypoxic without oxygen, does not have oxygen at home, she did have a for brief time after a previous admission but states it was taken away when PCP said she no longer needed at, patient requiring 3 L nasal cannula to make it pain oxygenation above 90%, therefore discussed with hospitalist team who agrees to admit for further evaluation and treatment, plan was discussed with patient who is in agreement as well                                                Disposition  Final diagnoses:   [facility] exacerbation ([facility] )   Pneumonia   Hypoxia     Time reflects when diagnosis was documented in both MDM as applicable and the Disposition within this note     Time User Action Codes Description Comment    3/26/2021  2:00 PM Zander Olivarez Add [J44 1] COPD exacerbation ([facility] )     3/26/2021  2:01 PM Michelle Rowe [J18 9] Pneumonia     3/26/2021  2:01 PM Jud Olivarez [R09 02] Hypoxia       ED Disposition     ED Disposition Condition Date/Time Comment    Admit Stable Fri Mar 26, 2021  2:00 PM Case was discussed with Dr Chris Coronel and the patient's admission status was agreed to be Admission Status: inpatient status to the service of Dr Chris Coronel   Follow-up Information    None         Patient's Medications   Discharge Prescriptions    No medications on file     No discharge procedures on file      PDMP Review       Value Time User    PDMP Reviewed  Yes 11/2/2020  4:44 PM Lexy Phipps MD          ED Provider  Electronically Signed by           Tara Garrison DO  03/26/21 5477

## 2021-03-26 NOTE — ASSESSMENT & PLAN NOTE
· Due to starting smoking and she heard herself wheezing for the past 3 weeks not feeling well start steroids Solu-Medrol 40 mg IV q 8 hours  Place her on Pulmicort and DuoNeb Q 6  Mucinex  On Zithromax for 3 days

## 2021-03-26 NOTE — RESPIRATORY THERAPY NOTE
RT Protocol Note  Eunice Nelson 61 y o  female MRN: [id number]  Unit/Bed#: -01 Encounter: [id number]    Assessment    Principal Problem:    Acute respiratory failure with hypoxia ([facility] )  Active Problems:    Tobacco abuse      Home Pulmonary Medications:  Combivent, Spiriva       Past Medical History:   Diagnosis Date    COPD (chronic obstructive pulmonary disease) ([facility] )      Social History     Socioeconomic History    Marital status:      Spouse name: None    Number of children: None    Years of education: None    Highest education level: None   Occupational History    None   Social Needs    Financial resource strain: None    Food insecurity     Worry: None     Inability: None    Transportation needs     Medical: None     Non-medical: None   Tobacco Use    Smoking status: Current Every Day Smoker     Types: Cigarettes    Smokeless tobacco: Never Used   Substance and Sexual Activity    Alcohol use: Not Currently    Drug use: Never    Sexual activity: None   Lifestyle    Physical activity     Days per week: None     Minutes per session: None    Stress: None   Relationships    Social connections     Talks on phone: None     Gets together: None     Attends Muslim service: None     Active member of club or organization: None     Attends meetings of clubs or organizations: None     Relationship status: None    Intimate partner violence     Fear of current or ex partner: None     Emotionally abused: None     Physically abused: None     Forced sexual activity: None   Other Topics Concern    None   Social History Narrative    None       Subjective    Subjective Data: Pt feels much improved since last udn tx       Objective    Physical Exam:   Assessment Type: Assess only  General Appearance: Alert, Awake  Respiratory Pattern: Normal  Chest Assessment: Chest expansion symmetrical  Bilateral Breath Sounds: Diminished  Cough: Non-productive  O2 Device: 3LPM NC    Vitals:  Blood pressure 125/69, pulse 64, temperature 98 9 °F (37 2 °C), temperature source Temporal, resp  rate 20, height 5' 3" (1 6 m), weight 87 6 kg (193 lb 2 oz), SpO2 93 %  Imaging and other studies: I have personally reviewed pertinent reports  O2 Device: 3LPM NC     Plan    Respiratory Plan: Mild Distress pathway, Home Bronchodilator Patient pathway        Resp Comments: Pt has increasing SOB over last 2 weeks  CXR shows clear lungs  Pt is a current 1ppd smoker with 40yr hx  Pt left here 11/20 on home O2 however she was able to come off of it  Pt has COPD hx  Pt states she is not taking Breo but is taking inhalers when needed  Pt states she ran out of nicotine patches and just did not call her MD for more  Pt feels much improved since last udn tx at 1300  Xopenex/Atrovent Q6 ordered  Pt resting comfortably on 3LPM NC

## 2021-03-26 NOTE — ASSESSMENT & PLAN NOTE
Lab Results   Component Value Date    HGBA1C 6 5 (H) 11/02/2020       No results for input(s): POCGLU in the last 72 hours  Blood Sugar Average: Last 72 hrs:  ·  will hold p o  medications all placed on sliding scale adjust as needed as she is on steroids  Alar Island Pedicle Flap Text: The defect edges were debeveled with a #15 scalpel blade.  Given the location of the defect, shape of the defect and the proximity to the alar rim an island pedicle advancement flap was deemed most appropriate.  Using a sterile surgical marker, an appropriate advancement flap was drawn incorporating the defect, outlining the appropriate donor tissue and placing the expected incisions within the nasal ala running parallel to the alar rim. The area thus outlined was incised with a #15 scalpel blade.  The skin margins were undermined minimally to an appropriate distance in all directions around the primary defect and laterally outward around the island pedicle utilizing iris scissors.  There was minimal undermining beneath the pedicle flap.

## 2021-03-26 NOTE — ASSESSMENT & PLAN NOTE
· Patient quit smoking with subsidence back smoking in January because her son  in her sister  and she did not know that she had to call her PCP to have refills for nicotine patches  her on smoking cessation also provided her a nicotine patch while she is here

## 2021-03-26 NOTE — H&P
[address]  H&P- Jesica Von 1955, 61 y o  female MRN: [id number]  Unit/Bed#: -Atul Encounter: [id number]  Primary Care Provider: Chantel Bal DO   Date and time admitted to hospital: 3/26/2021 12:14 PM    * Acute respiratory failure with hypoxia ([facility] [address] )  Assessment & Plan  · The patient presenting 76% on room air currently on 3 L secondary to acute COPD exacerbation and she started smoking again  Will provide COPD treatment chest x-ray is negative for any pneumonia COVID-19 is negative  COPD with acute exacerbation ([facility] [address] )  Assessment & Plan  · Due to starting smoking and she heard herself wheezing for the past 3 weeks not feeling well start steroids Solu-Medrol 40 mg IV q 8 hours  Place her on Pulmicort and DuoNeb Q 6  Mucinex  On Zithromax for 3 days  Type 2 diabetes mellitus without complication, without long-term current use of insulin ([facility])  Assessment & Plan  Lab Results   Component Value Date    HGBA1C 6 5 (H) 2020       No results for input(s): POCGLU in the last 72 hours  Blood Sugar Average: Last 72 hrs:  ·  will hold p o  medications all placed on sliding scale adjust as needed as she is on steroids  Essential hypertension  Assessment & Plan  · For start lisinopril clonidine metoprolol and Norvasc    Tobacco abuse  Assessment & Plan  · Patient quit smoking with subsidence back smoking in January because her son  in her sister  and she did not know that she had to call her PCP to have refills for nicotine patches  her on smoking cessation also provided her a nicotine patch while she is here      VTE Prophylaxis: Heparin  / sequential compression device   Code Status:  Full code  POLST: There is no POLST form on file for this patient (pre-hospital)  Discussion with family:  No    Anticipated Length of Stay:  Patient will be admitted on an Inpatient basis with an anticipated length of stay of  > 2 midnights     Justification for Hospital Stay:  Acute hypoxic respiratory failure with COPD exacerbation    Total Time for Visit, including Counseling / Coordination of Care: 1 hour  Greater than 50% of this total time spent on direct patient counseling and coordination of care  Chief Complaint:   Shortness of breath    History of Present Illness:    Sea Da Silva is a 61 y o  female who presents with not feeling well for the past 3 weeks she actually started smoking back in January and she was short of breath for the past 3 weeks wheezing but not coughing no chest pain  She is doing much better after the treatment downstairs  Her son  in February and so did her sister and also she does not know that she has to call and actually get her nicotine patches refills  Since last admission she was doing relatively well and she was able to get off oxygen after a month post discharge     Denies any fever    Review of Systems:    Review of Systems   Constitutional: Negative for chills and fever  HENT: Negative for ear pain and sore throat  Eyes: Negative for pain and visual disturbance  Respiratory: Positive for shortness of breath  Negative for cough  Cardiovascular: Negative for chest pain and palpitations  Gastrointestinal: Negative for abdominal pain and vomiting  Genitourinary: Negative for dysuria and hematuria  Musculoskeletal: Negative for arthralgias and back pain  Skin: Negative for color change and rash  Neurological: Negative for seizures and syncope  All other systems reviewed and are negative  Past Medical and Surgical History:     Past Medical History:   Diagnosis Date    COPD [facility]chronic obstructive [facility] disease) ([facility]        History reviewed  No pertinent surgical history  Meds/Allergies:    Prior to Admission medications    Medication Sig Start Date End Date Taking?  Authorizing Provider   amLODIPine (NORVASC) 10 mg tablet Take 10 mg by mouth daily   Yes Historical Provider, MD   atorvastatin (LIPITOR) 40 mg tablet Take 40 mg by mouth daily   Yes Historical Provider, MD   cloNIDine (CATAPRES) 0 1 mg tablet Take 0 1 mg by mouth every 12 (twelve) hours   Yes Historical Provider, MD   Ergocalciferol (VITAMIN D2 PO) Take 1 25 mg by mouth once a week   Yes Historical Provider, MD   glipiZIDE (GLUCOTROL) 5 mg tablet Take 5 mg by mouth daily   Yes Historical Provider, MD   ipratropium-albuterol (COMBIVENT RESPIMAT) inhaler Inhale 1 puff 4 (four) times a day   Yes Historical Provider, MD   lisinopril (ZESTRIL) 40 mg tablet Take 40 mg by mouth daily   Yes Historical Provider, MD   metoprolol tartrate (LOPRESSOR) 100 mg tablet Take 100 mg by mouth every 12 (twelve) hours   Yes Historical Provider, MD   omeprazole (PriLOSEC OTC) 20 MG tablet Take by mouth   Yes Historical Provider, MD   pioglitazone (ACTOS) 15 mg tablet Take 15 mg by mouth daily   Yes Historical Provider, MD   tiotropium (SPIRIVA) 18 mcg inhalation capsule Place 18 mcg into inhaler and inhale daily   Yes Historical Provider, MD   citalopram (CeleXA) 10 mg tablet Take 10 mg by mouth daily    Historical Provider, MD   fluticasone (FLONASE) 50 mcg/act nasal spray 1 spray into each nostril daily    Historical Provider, MD   fluticasone-vilanterol (BREO ELLIPTA) 100-25 mcg/inh inhaler Inhale 1 puff daily Rinse mouth after use  Patient not taking: Reported on 3/26/2021 11/5/20   John Bird MD   nicotine (NICODERM CQ) 14 mg/24hr TD 24 hr patch Place 1 patch on the skin daily  Patient not taking: Reported on 3/26/2021 11/6/20   John Bird MD   traZODone (DESYREL) 100 mg tablet Take 100 mg by mouth daily at bedtime    Historical Provider, MD   omeprazole (PriLOSEC) 20 mg delayed release capsule Take 20 mg by mouth daily  3/26/21  Historical Provider, MD LIEBERMAN have reviewed home medications using allscripts  Allergies: Allergies   Allergen Reactions    Clindamycin        Social History:     Marital Status:     Substance Use History: Social History     Substance and Sexual Activity   Alcohol Use Not Currently     Social History     Tobacco Use   Smoking Status Current Every Day Smoker    Types: Cigarettes   Smokeless Tobacco Never Used     Social History     Substance and Sexual Activity   Drug Use Never       Family History:    History reviewed  No pertinent family history  Physical Exam:     Vitals:   Blood Pressure: 125/69 (03/26/21 1400)  Pulse: 64 (03/26/21 1515)  Temperature: 98 9 °F (37 2 °C) (03/26/21 1217)  Temp Source: Temporal (03/26/21 1217)  Respirations: 20 (03/26/21 1515)  Height: 5' 3" (160 cm) (03/26/21 1217)  Weight - Scale: 87 6 kg (193 lb 2 oz) (03/26/21 1217)  SpO2: 93 % (03/26/21 1515)    Physical Exam  Vitals signs and nursing note reviewed  Constitutional:       General: She is not in acute distress  Appearance: She is well-developed  HENT:      Head: Normocephalic and atraumatic  Eyes:      Conjunctiva/sclera: Conjunctivae normal    Neck:      Musculoskeletal: Neck supple  Cardiovascular:      Rate and Rhythm: Normal rate and regular rhythm  Heart sounds: No murmur  Pulmonary:      Effort: Pulmonary effort is normal  No respiratory distress  Comments: A decreased but not hear any wheezing  Abdominal:      Palpations: Abdomen is soft  Tenderness: There is no abdominal tenderness  Skin:     General: Skin is warm and dry  Neurological:      General: No focal deficit present  Mental Status: She is alert and oriented to person, place, and time  Mental status is at baseline     Psychiatric:         Mood and Affect: Mood normal              Additional Data:     Lab Results: I have personally reviewed pertinent films in PACS    Results from last 7 days   Lab Units 03/26/21  1248   WBC Thousand/uL 6 42   HEMOGLOBIN g/dL 15 5*   HEMATOCRIT % 48 1*   PLATELETS Thousands/uL 195   NEUTROS PCT % 73   LYMPHS PCT % 16   MONOS PCT % 10   EOS PCT % 0     Results from last 7 days   Lab Units 03/26/21  1248   SODIUM mmol/L 133*   POTASSIUM mmol/L 3 5   CHLORIDE mmol/L 95*   CO2 mmol/L 35*   BUN mg/dL 6   CREATININE mg/dL 0 70   ANION GAP mmol/L 3*   CALCIUM mg/dL 9 0   ALBUMIN g/dL 3 3*   TOTAL BILIRUBIN mg/dL 0 95   ALK PHOS U/L 132*   ALT U/L 17   AST U/L 13   GLUCOSE RANDOM mg/dL 125     Results from last 7 days   Lab Units 03/26/21  1248   INR  1 04             Results from last 7 days   Lab Units 03/26/21  1248   LACTIC ACID mmol/L 0 7       Imaging: I have personally reviewed pertinent films in PACS    XR chest 1 view portable   ED Interpretation by Danielle Cleveland DO (03/26 1356)   Haziness RLL, ?early infiltrate      Final Result by Venecia Lim MD (03/26 1356)      No acute cardiopulmonary disease  Workstation performed: [id number]             EKG, Pathology, and Other Studies Reviewed on Admission:   · EKG:  Reviewed    Allscripts / Epic Records Reviewed: Yes     ** Please Note: This note has been constructed using a voice recognition system   **

## 2021-03-26 NOTE — PLAN OF CARE
Problem: RESPIRATORY - ADULT  Goal: Achieves optimal ventilation and oxygenation  Description: INTERVENTIONS:  - Assess for changes in respiratory status  - Assess for changes in mentation and behavior  - Position to facilitate oxygenation and minimize respiratory effort  - Oxygen administered by appropriate delivery if ordered  - Initiate smoking cessation education as indicated  - Encourage broncho-pulmonary hygiene including cough, deep breathe, Incentive Spirometry  - Assess the need for suctioning and aspirate as needed  - Assess and instruct to report SOB or any respiratory difficulty  - Respiratory Therapy support as indicated  Outcome: Progressing     Problem: PAIN - ADULT  Goal: Verbalizes/displays adequate comfort level or baseline comfort level  Description: Interventions:  - Encourage patient to monitor pain and request assistance  - Assess pain using appropriate pain scale  - Administer analgesics based on type and severity of pain and evaluate response  - Implement non-pharmacological measures as appropriate and evaluate response  - Consider cultural and social influences on pain and pain management  - Notify physician/advanced practitioner if interventions unsuccessful or patient reports new pain  Outcome: Progressing     Problem: INFECTION - ADULT  Goal: Absence or prevention of progression during hospitalization  Description: INTERVENTIONS:  - Assess and monitor for signs and symptoms of infection  - Monitor lab/diagnostic results  - Monitor all insertion sites, i e  indwelling lines, tubes, and drains  - Monitor endotracheal if appropriate and nasal secretions for changes in amount and color  - [place] appropriate cooling/warming therapies per order  - Administer medications as ordered  - Instruct and encourage patient and family to use good hand hygiene technique  - Identify and instruct in appropriate isolation precautions for identified infection/condition  Outcome: Progressing     Problem: SAFETY ADULT  Goal: Patient will remain free of falls  Description: INTERVENTIONS:  - Assess patient frequently for physical needs  -  Identify cognitive and physical deficits and behaviors that affect risk of falls    -  [place] fall precautions as indicated by assessment   - Educate patient/family on patient safety including physical limitations  - Instruct patient to call for assistance with activity based on assessment  - Modify environment to reduce risk of injury  - Consider OT/PT consult to assist with strengthening/mobility  Outcome: Progressing  Goal: Maintain or return to baseline ADL function  Description: INTERVENTIONS:  -  Assess patient's ability to carry out ADLs; assess patient's baseline for ADL function and identify physical deficits which impact ability to perform ADLs (bathing, care of mouth/teeth, toileting, grooming, dressing, etc )  - Assess/evaluate cause of self-care deficits   - Assess range of motion  - Assess patient's mobility; develop plan if impaired  - Assess patient's need for assistive devices and provide as appropriate  - Encourage maximum independence but intervene and supervise when necessary  - Involve family in performance of ADLs  - Assess for home care needs following discharge   - Consider OT consult to assist with ADL evaluation and planning for discharge  - Provide patient education as appropriate  Outcome: Progressing  Goal: Maintain or return mobility status to optimal level  Description: INTERVENTIONS:  - Assess patient's baseline mobility status (ambulation, transfers, stairs, etc )    - Identify cognitive and physical deficits and behaviors that affect mobility  - Identify mobility aids required to assist with transfers and/or ambulation (gait belt, sit-to-stand, lift, walker, cane, etc )  - [place] fall precautions as indicated by assessment  - Record patient progress and toleration of activity level on Mobility SBAR; progress patient to next Phase/Stage  - Instruct patient to call for assistance with activity based on assessment  - Consider rehabilitation consult to assist with strengthening/weightbearing, etc   Outcome: Progressing     Problem: DISCHARGE PLANNING  Goal: Discharge to home or other facility with appropriate resources  Description: INTERVENTIONS:  - Identify barriers to discharge w/patient and caregiver  - Arrange for needed discharge resources and transportation as appropriate  - Identify discharge learning needs (meds, wound care, etc )  - Arrange for interpretive services to assist at discharge as needed  - Refer to Case Management Department for coordinating discharge planning if the patient needs post-hospital services based on physician/advanced practitioner order or complex needs related to functional status, cognitive ability, or social support system  Outcome: Progressing     Problem: Knowledge Deficit  Goal: Patient/family/caregiver demonstrates understanding of disease process, treatment plan, medications, and discharge instructions  Description: Complete learning assessment and assess knowledge base    Interventions:  - Provide teaching at level of understanding  - Provide teaching via preferred learning methods  Outcome: Progressing

## 2021-03-26 NOTE — ASSESSMENT & PLAN NOTE
· The patient presenting 76% on room air currently on 3 L secondary to acute COPD exacerbation and she started smoking again  Will provide COPD treatment chest x-ray is negative for any pneumonia COVID-19 is negative

## 2021-03-27 LAB
GLUCOSE SERPL-MCNC: 171 MG/DL (ref 65–140)
GLUCOSE SERPL-MCNC: 192 MG/DL (ref 65–140)
GLUCOSE SERPL-MCNC: 232 MG/DL (ref 65–140)
GLUCOSE SERPL-MCNC: 289 MG/DL (ref 65–140)

## 2021-03-27 PROCEDURE — 82948 REAGENT STRIP/BLOOD GLUCOSE: CPT

## 2021-03-27 PROCEDURE — 94760 N-INVAS EAR/PLS OXIMETRY 1: CPT

## 2021-03-27 PROCEDURE — 99232 SBSQ HOSP IP/OBS MODERATE 35: CPT | Performed by: FAMILY MEDICINE

## 2021-03-27 PROCEDURE — 94640 AIRWAY INHALATION TREATMENT: CPT

## 2021-03-27 RX ORDER — METHYLPREDNISOLONE SODIUM SUCCINATE 40 MG/ML
40 INJECTION, POWDER, LYOPHILIZED, FOR SOLUTION INTRAMUSCULAR; INTRAVENOUS EVERY 12 HOURS SCHEDULED
Status: DISCONTINUED | OUTPATIENT
Start: 2021-03-27 | End: 2021-03-30 | Stop reason: HOSPADM

## 2021-03-27 RX ORDER — DOCUSATE SODIUM 100 MG/1
100 CAPSULE, LIQUID FILLED ORAL 2 TIMES DAILY PRN
Status: DISCONTINUED | OUTPATIENT
Start: 2021-03-27 | End: 2021-03-30 | Stop reason: HOSPADM

## 2021-03-27 RX ADMIN — FLUTICASONE PROPIONATE 1 SPRAY: 50 SPRAY, METERED NASAL at 08:53

## 2021-03-27 RX ADMIN — LEVALBUTEROL HYDROCHLORIDE 1.25 MG: 1.25 SOLUTION, CONCENTRATE RESPIRATORY (INHALATION) at 01:41

## 2021-03-27 RX ADMIN — INSULIN HUMAN 10 UNITS: 100 INJECTION, SUSPENSION SUBCUTANEOUS at 16:44

## 2021-03-27 RX ADMIN — CLONIDINE HYDROCHLORIDE 0.1 MG: 0.1 TABLET ORAL at 21:24

## 2021-03-27 RX ADMIN — METOPROLOL TARTRATE 100 MG: 50 TABLET, FILM COATED ORAL at 09:00

## 2021-03-27 RX ADMIN — HEPARIN SODIUM 5000 UNITS: 5000 INJECTION INTRAVENOUS; SUBCUTANEOUS at 14:21

## 2021-03-27 RX ADMIN — TRAZODONE HYDROCHLORIDE 100 MG: 50 TABLET ORAL at 21:24

## 2021-03-27 RX ADMIN — CLONIDINE HYDROCHLORIDE 0.1 MG: 0.1 TABLET ORAL at 09:00

## 2021-03-27 RX ADMIN — METHYLPREDNISOLONE SODIUM SUCCINATE 40 MG: 40 INJECTION, POWDER, FOR SOLUTION INTRAMUSCULAR; INTRAVENOUS at 21:20

## 2021-03-27 RX ADMIN — LEVALBUTEROL HYDROCHLORIDE 1.25 MG: 1.25 SOLUTION, CONCENTRATE RESPIRATORY (INHALATION) at 07:21

## 2021-03-27 RX ADMIN — LEVALBUTEROL HYDROCHLORIDE 1.25 MG: 1.25 SOLUTION, CONCENTRATE RESPIRATORY (INHALATION) at 13:13

## 2021-03-27 RX ADMIN — INSULIN LISPRO 2 UNITS: 100 INJECTION, SOLUTION INTRAVENOUS; SUBCUTANEOUS at 22:54

## 2021-03-27 RX ADMIN — LISINOPRIL 40 MG: 20 TABLET ORAL at 09:00

## 2021-03-27 RX ADMIN — LEVALBUTEROL HYDROCHLORIDE 1.25 MG: 1.25 SOLUTION, CONCENTRATE RESPIRATORY (INHALATION) at 22:39

## 2021-03-27 RX ADMIN — HEPARIN SODIUM 5000 UNITS: 5000 INJECTION INTRAVENOUS; SUBCUTANEOUS at 05:07

## 2021-03-27 RX ADMIN — AZITHROMYCIN 500 MG: 250 TABLET, FILM COATED ORAL at 16:43

## 2021-03-27 RX ADMIN — INSULIN LISPRO 1 UNITS: 100 INJECTION, SOLUTION INTRAVENOUS; SUBCUTANEOUS at 08:00

## 2021-03-27 RX ADMIN — PANTOPRAZOLE SODIUM 20 MG: 20 TABLET, DELAYED RELEASE ORAL at 05:06

## 2021-03-27 RX ADMIN — CITALOPRAM HYDROBROMIDE 10 MG: 10 TABLET ORAL at 09:01

## 2021-03-27 RX ADMIN — IPRATROPIUM BROMIDE 0.5 MG: 0.5 SOLUTION RESPIRATORY (INHALATION) at 07:21

## 2021-03-27 RX ADMIN — INSULIN LISPRO 4 UNITS: 100 INJECTION, SOLUTION INTRAVENOUS; SUBCUTANEOUS at 12:23

## 2021-03-27 RX ADMIN — GUAIFENESIN 600 MG: 600 TABLET ORAL at 09:00

## 2021-03-27 RX ADMIN — AMLODIPINE BESYLATE 10 MG: 10 TABLET ORAL at 09:00

## 2021-03-27 RX ADMIN — IPRATROPIUM BROMIDE 0.5 MG: 0.5 SOLUTION RESPIRATORY (INHALATION) at 22:40

## 2021-03-27 RX ADMIN — INSULIN HUMAN 10 UNITS: 100 INJECTION, SUSPENSION SUBCUTANEOUS at 08:54

## 2021-03-27 RX ADMIN — IPRATROPIUM BROMIDE 0.5 MG: 0.5 SOLUTION RESPIRATORY (INHALATION) at 13:13

## 2021-03-27 RX ADMIN — IPRATROPIUM BROMIDE 0.5 MG: 0.5 SOLUTION RESPIRATORY (INHALATION) at 01:41

## 2021-03-27 RX ADMIN — HEPARIN SODIUM 5000 UNITS: 5000 INJECTION INTRAVENOUS; SUBCUTANEOUS at 21:18

## 2021-03-27 RX ADMIN — METHYLPREDNISOLONE SODIUM SUCCINATE 40 MG: 40 INJECTION, POWDER, FOR SOLUTION INTRAMUSCULAR; INTRAVENOUS at 09:00

## 2021-03-27 RX ADMIN — Medication 1 PATCH: at 09:01

## 2021-03-27 RX ADMIN — BUDESONIDE 0.5 MG: 0.5 INHALANT RESPIRATORY (INHALATION) at 22:40

## 2021-03-27 RX ADMIN — BUDESONIDE 0.5 MG: 0.5 INHALANT RESPIRATORY (INHALATION) at 07:21

## 2021-03-27 RX ADMIN — GUAIFENESIN 600 MG: 600 TABLET ORAL at 16:50

## 2021-03-27 RX ADMIN — METOPROLOL TARTRATE 100 MG: 50 TABLET, FILM COATED ORAL at 21:24

## 2021-03-27 RX ADMIN — INSULIN LISPRO 3 UNITS: 100 INJECTION, SOLUTION INTRAVENOUS; SUBCUTANEOUS at 16:44

## 2021-03-27 RX ADMIN — ATORVASTATIN CALCIUM 40 MG: 40 TABLET, FILM COATED ORAL at 09:00

## 2021-03-27 RX ADMIN — BISACODYL 10 MG: 5 TABLET, COATED ORAL at 12:24

## 2021-03-27 NOTE — UTILIZATION REVIEW
Notification of Inpatient Admission/Inpatient Authorization Request   This is a Notification of Inpatient Admission for [address]  Be advised that this patient was admitted to our facility under Inpatient Status  Contact Garima Nash at [phone] for additional admission information  [facility] DEPT  DEDICATED [phone]  Patient Name:   Coretta Spangler   YOB: 1955       [address]   [address]:   [address]  Tax ID: [id number]  NPI: [id number] Attending Provider/NPI:  Phone:  Address: [address] [[id number]]  [phone]  [facility]AME AS FACILITY   Place of Service Code: 24 Place of Service Name:  [address] [place] [address]   Start Date: 3/26/21 1401     Discharge Date & Time: No discharge date for patient encounter  Type of Admission: Inpatient Status Discharge Disposition (if discharged): Home/Self Care   Patient Diagnoses: Pneumonia [J18 9]  SOB (shortness of breath) [R06 02]  Hypoxia [R09 02]  COPD exacerbation ([address] ) [J44 1]     Orders: Admission Orders (From admission, onward)     Ordered        03/26/21 1401  Inpatient Admission  Once                    Assigned Utilization Review Contact: Garima Nash  Utilization   Network Utilization Review Department  Phone: [phone]; Fax [phone]  Email: [email or web address]   ATTENTION PAYERS: Please call the assigned Utilization  directly with any questions or concerns ALL voicemails in the department are confidential  Send all requests for admission clinical reviews, approved or denied determinations and any other requests to dedicated fax number belonging to the campus where the patient is receiving treatment

## 2021-03-27 NOTE — RESPIRATORY THERAPY NOTE
RT Protocol Note  Rodriguez Arias 61 y o  female MRN: [id number]  Unit/Bed#: -01 Encounter: [id number]    Assessment    Principal Problem:    Acute respiratory failure with hypoxia ([place] [address] )  Active Problems:    Tobacco abuse    Essential hypertension    Type 2 diabetes mellitus without complication, without long-term current use of insulin ([facility])    COPD with acute exacerbation ([place] [address] )      Home Pulmonary Medications:Combovent, breo elipta       Past Medical History:   Diagnosis Date    COPD (chronic obstructive pulmonary disease) ([place] [address] )      Social History     Socioeconomic History    Marital status:      Spouse name: None    Number of children: None    Years of education: None    Highest education level: None   Occupational History    None   Social Needs    Financial resource strain: None    Food insecurity     Worry: None     Inability: None    Transportation needs     Medical: None     Non-medical: None   Tobacco Use    Smoking status: Current Every Day Smoker     Types: Cigarettes    Smokeless tobacco: Never Used   Substance and Sexual Activity    Alcohol use: Not Currently    Drug use: Never    Sexual activity: None   Lifestyle    Physical activity     Days per week: None     Minutes per session: None    Stress: None   Relationships    Social connections     Talks on phone: None     Gets together: None     Attends Sabianism service: None     Active member of club or organization: None     Attends meetings of clubs or organizations: None     Relationship status: None    Intimate partner violence     Fear of current or ex partner: None     Emotionally abused: None     Physically abused: None     Forced sexual activity: None   Other Topics Concern    None   Social History Narrative    None       Subjective    Subjective Data: Pt feels much improved since last udn tx       Objective    Physical Exam:   Assessment Type: During-treatment  General Appearance: Alert, Awake  Respiratory Pattern: Normal  Chest Assessment: Chest expansion symmetrical  Bilateral Breath Sounds: Diminished  Cough: None  O2 Device: 3L nasal cannula    Vitals:  Blood pressure 125/69, pulse 83, temperature 98 9 °F (37 2 °C), temperature source Temporal, resp  rate 17, height 5' 3" (1 6 m), weight 87 6 kg (193 lb 2 oz), SpO2 92 %            Imaging and other studies: No acute cardiopulmonary disease        O2 Device: 3L nasal cannula     Plan    Respiratory Plan: Mild Distress pathway        Resp Comments: no dyspnea on 3L

## 2021-03-27 NOTE — ASSESSMENT & PLAN NOTE
Lab Results   Component Value Date    HGBA1C 6 5 (H) 11/02/2020       Recent Labs     03/26/21  1611 03/26/21 2058 03/27/21  0707   POCGLU 113 245* 171*       Blood Sugar Average: Last 72 hrs:  · (P) 025 [id number]   · Uncontrolled secondary to the steroids  Will place facing to while here in [facility] 10 units b i d

## 2021-03-27 NOTE — PLAN OF CARE
Problem: RESPIRATORY - ADULT  Goal: Achieves optimal ventilation and oxygenation  Description: INTERVENTIONS:  - Assess for changes in respiratory status  - Assess for changes in mentation and behavior  - Position to facilitate oxygenation and minimize respiratory effort, encourage ambulation OOB to chair  - Oxygen administered by appropriate delivery if ordered  - Initiate smoking cessation education as indicated  - Encourage broncho-pulmonary hygiene including cough, deep breathe, Incentive Spirometry  - Assess the need for suctioning and aspirate as needed  - Assess and instruct to report SOB or any respiratory difficulty  - Respiratory Therapy support as indicated  Outcome: Progressing     Problem: PAIN - ADULT  Goal: Verbalizes/displays adequate comfort level or baseline comfort level  Description: Interventions:  - Encourage patient to monitor pain and request assistance  - Assess pain using appropriate pain scale  0-10  - Administer analgesics based on type and severity of pain and evaluate response  - Implement non-pharmacological measures as appropriate and evaluate response  - Consider cultural and social influences on pain and pain management  - Notify physician/advanced practitioner if interventions unsuccessful or patient reports new pain  Outcome: Progressing     Problem: INFECTION - ADULT  Goal: Absence or prevention of progression during hospitalization  Description: INTERVENTIONS:  - Assess and monitor for signs and symptoms of infection  - Monitor lab/diagnostic results  - Monitor all insertion sites, i e  indwelling lines, tubes, and drains  - Monitor endotracheal if appropriate and nasal secretions for changes in amount and color  - [place] appropriate cooling/warming therapies per order  - Administer medications as ordered  - Instruct and encourage patient and family to use good hand hygiene technique  - Identify and instruct in appropriate isolation precautions for identified infection/condition  Outcome: Progressing     Problem: SAFETY ADULT  Goal: Patient will remain free of falls  Description: INTERVENTIONS:  - Assess patient frequently for physical needs  -  Identify cognitive and physical deficits and behaviors that affect risk of falls    -  [place] fall precautions as indicated by assessment   - Educate patient/family on patient safety including physical limitations  - Instruct patient to call for assistance with activity based on assessment  - Modify environment to reduce risk of injury, call bell within reach  - Consider OT/PT consult to assist with strengthening/mobility  Outcome: Progressing  Goal: Maintain or return to baseline ADL function  Description: INTERVENTIONS:  -  Assess patient's ability to carry out ADLs; assess patient's baseline for ADL function and identify physical deficits which impact ability to perform ADLs (bathing, care of mouth/teeth, toileting, grooming, dressing, etc )  - Assess/evaluate cause of self-care deficits   - Assess range of motion  - Assess patient's mobility; develop plan if impaired  - Assess patient's need for assistive devices and provide as appropriate  - Encourage maximum independence but intervene and supervise when necessary  - Involve family in performance of ADLs  - Assess for home care needs following discharge   - Consider OT consult to assist with ADL evaluation and planning for discharge  - Provide patient education as appropriate  Outcome: Progressing  Goal: Maintain or return mobility status to optimal level  Description: INTERVENTIONS:  - Assess patient's baseline mobility status (ambulation, transfers, stairs, etc )    - Identify cognitive and physical deficits and behaviors that affect mobility  - Identify mobility aids required to assist with transfers and/or ambulation (gait belt, sit-to-stand, lift, walker, cane, etc )  - [place] fall precautions as indicated by assessment  - Record patient progress and toleration of activity level on Mobility SBAR; progress patient to next Phase/Stage  - Instruct patient to call for assistance with activity based on assessment  - Consider rehabilitation consult to assist with strengthening/weightbearing, etc   Outcome: Progressing     Problem: DISCHARGE PLANNING  Goal: Discharge to home or other facility with appropriate resources  Description: INTERVENTIONS:  - Identify barriers to discharge w/patient and caregiver  - Arrange for needed discharge resources and transportation as appropriate  - Identify discharge learning needs (meds, wound care, etc )  - Arrange for interpretive services to assist at discharge as needed  - Refer to Case Management Department for coordinating discharge planning if the patient needs post-hospital services based on physician/advanced practitioner order or complex needs related to functional status, cognitive ability, or social support system  Outcome: Progressing     Problem: Knowledge Deficit  Goal: Patient/family/caregiver demonstrates understanding of disease process, treatment plan, medications, and discharge instructions  Description: Complete learning assessment and assess knowledge base  Interventions:  - Provide teaching at level of understanding  - Provide teaching via preferred learning methods  Outcome: Progressing     Problem: Potential for Falls  Goal: Patient will remain free of falls  Description: INTERVENTIONS:  - Assess patient frequently for physical needs  -  Identify cognitive and physical deficits and behaviors that affect risk of falls    -  [place] fall precautions as indicated by assessment   - Educate patient/family on patient safety including physical limitations  - Instruct patient to call for assistance with activity based on assessment  - Modify environment to reduce risk of injury, call bell within reach  - Consider OT/PT consult to assist with strengthening/mobility  Outcome: Progressing

## 2021-03-27 NOTE — ASSESSMENT & PLAN NOTE
· Improved Due to starting smoking continue steroids were decreased down to 40 mg IV q 12 hours  Continue Mucinex Zithromax 2 more days and Pulmicort    Will do home O2 studies tomorrow and suspect will be discharged home tomorrow

## 2021-03-27 NOTE — PROGRESS NOTES
[address]  Progress Note Selvin Hayes 1955, 61 y o  female MRN: [id number]  Unit/Bed#: -Atul Encounter: [id number]  Primary Care Provider: Vic Mortensen DO   Date and time admitted to hospital: 3/26/2021 12:14 PM    * Acute respiratory failure with hypoxia ([facility] [address] )  Assessment & Plan  · The patient presenting 76% on room air currently on 3 L secondary to acute COPD exacerbation and she started smoking again  Will provide COPD treatment chest x-ray is negative for any pneumonia COVID-19 is negative  Will do home O2 studies tomorrow    COPD with acute exacerbation ([facility] [address] )  Assessment & Plan  · Improved Due to starting smoking continue steroids were decreased down to 40 mg IV q 12 hours  Continue Mucinex Zithromax 2 more days and Pulmicort  Will do home O2 studies tomorrow and suspect will be discharged home tomorrow    Type 2 diabetes mellitus without complication, without long-term current use of insulin [facility])  Assessment & Plan  Lab Results   Component Value Date    HGBA1C 6 5 (H) 2020       Recent Labs     21  1611 21  2058 21  0707   POCGLU 113 245* 171*       Blood Sugar Average: Last 72 hrs:  · (P) 961 [id number]   · Uncontrolled secondary to the steroids  Will place facing to while here in [facility] 10 units b i d  Essential hypertension  Assessment & Plan  · Continue lisinopril clonidine metoprolol and Norvasc    Tobacco abuse  Assessment & Plan  · Patient quit smoking with subsidence back smoking in January because her son  in her sister  and she did not know that she had to call her PCP to have refills for nicotine patches  her on smoking cessation also provided her a nicotine patch while she is here        VTE Pharmacologic Prophylaxis:   Pharmacologic: Heparin  Mechanical VTE Prophylaxis in Place: Yes    Patient Centered Rounds: I have performed bedside rounds with nursing staff today      Discussions with Specialists or Other Care Team Provider:  I discussed with the nursing    Education and Discussions with Family / Patient:  Patient    Time Spent for Care: 30 minutes  More than 50% of total time spent on counseling and coordination of care as described above  Current Length of Stay: 1 day(s)    Current Patient Status: Inpatient   Certification Statement: The patient will continue to require additional inpatient hospital stay due to COPD exacerbation    Discharge Plan:  Anticipate discharge tomorrow    Code Status: Level 1 - Full Code      Subjective:   Patient seen and examined shortness of breath is improved she has been eating no chest pain and cough improved    Objective:     Vitals:   Temp (24hrs), Av °F (36 7 °C), Min:97 5 °F (36 4 °C), Max:98 9 °F (37 2 °C)    Temp:  [97 5 °F (36 4 °C)-98 9 °F (37 2 °C)] 97 6 °F (36 4 °C)  HR:  [61-87] 66  Resp:  [17-25] 18  BP: (112-183)/(61-93) 134/86  SpO2:  [89 %-94 %] 93 %  Body mass index is 34 21 kg/m²  Input and Output Summary (last 24 hours): Intake/Output Summary (Last 24 hours) at 3/27/2021 1130  Last data filed at 3/27/2021 1101  Gross per 24 hour   Intake 720 ml   Output --   Net 720 ml       Physical Exam:     Physical Exam  Vitals signs and nursing note reviewed  Constitutional:       General: She is not in acute distress  Appearance: She is well-developed  HENT:      Head: Normocephalic and atraumatic  Eyes:      Conjunctiva/sclera: Conjunctivae normal    Neck:      Musculoskeletal: Neck supple  Cardiovascular:      Rate and Rhythm: Normal rate and regular rhythm  Heart sounds: No murmur  Pulmonary:      Effort: Pulmonary effort is normal  No respiratory distress  Breath sounds: No wheezing or rales  Comments: Mild decreased  Abdominal:      Palpations: Abdomen is soft  Tenderness: There is no abdominal tenderness  Musculoskeletal:         General: No swelling  Skin:     General: Skin is warm and dry     Neurological: General: No focal deficit present  Mental Status: She is alert and oriented to person, place, and time  Psychiatric:         Mood and Affect: Mood normal            Additional Data:     Labs:    Results from last 7 days   Lab Units 03/26/21  1248   WBC Thousand/uL 6 42   HEMOGLOBIN g/dL 15 5*   HEMATOCRIT % 48 1*   PLATELETS Thousands/uL 195   NEUTROS PCT % 73   LYMPHS PCT % 16   MONOS PCT % 10   EOS PCT % 0     Results from last 7 days   Lab Units 03/26/21  1248   SODIUM mmol/L 133*   POTASSIUM mmol/L 3 5   CHLORIDE mmol/L 95*   CO2 mmol/L 35*   BUN mg/dL 6   CREATININE mg/dL 0 70   ANION GAP mmol/L 3*   CALCIUM mg/dL 9 0   ALBUMIN g/dL 3 3*   TOTAL BILIRUBIN mg/dL 0 95   ALK PHOS U/L 132*   ALT U/L 17   AST U/L 13   GLUCOSE RANDOM mg/dL 125     Results from last 7 days   Lab Units 03/26/21  1248   INR  1 04     Results from last 7 days   Lab Units 03/27/21  0707 03/26/21  2058 03/26/21  1611   POC GLUCOSE mg/dl 171* 245* 113         Results from last 7 days   Lab Units 03/26/21  1248   LACTIC ACID mmol/L 0 7           * I Have Reviewed All Lab Data Listed Above  * Additional Pertinent Lab Tests Reviewed: No New Labs Available For Today    Imaging:    Imaging Reports Reviewed Today Include:  None today  Imaging Personally Reviewed by Myself Includes:      Recent Cultures (last 7 days):     Results from last 7 days   Lab Units 03/26/21  1343 03/26/21  1248   BLOOD CULTURE  Received in Microbiology Lab  Culture in Progress  Received in Microbiology Lab  Culture in Progress         Last 24 Hours Medication List:   Current Facility-Administered Medications   Medication Dose Route Frequency Provider Last Rate    albuterol  2 5 mg Nebulization Q4H PRN Sho Del Rio MD      amLODIPine  10 mg Oral Daily Sho Del Rio MD      atorvastatin  40 mg Oral Daily Sho Del Rio MD      azithromycin  500 mg Oral Q24H Sho Del Rio MD      bisacodyl  10 mg Oral Once Sho Del Rio MD      budesonide  0 5 mg Nebulization Q12H Kevin Singer MD      citalopram  10 mg Oral Daily Kevin Singer MD      cloNIDine  0 1 mg Oral Q12H [address] Kevin Singer MD      docusate sodium  100 mg Oral BID PRN Kevin Singer MD      fluticasone  1 spray Nasal Daily Kevin Singer MD      guaiFENesin  600 mg Oral BID Kevin Singer MD      heparin (porcine)  5,000 Units Subcutaneous [facility] Kevin Singer MD      insulin lispro  1-6 Units Subcutaneous TID [facility] Kevin Singer MD      insulin lispro  1-6 Units Subcutaneous HS Kevin Singer MD      insulin NPH  10 Units Subcutaneous BID AC Kevin Singer MD      ipratropium  0 5 mg Nebulization Q6H Kevin Singer MD      levalbuterol  1 25 mg Nebulization Q6H Kevin Singer MD      lisinopril  40 mg Oral Daily Kevin Singer MD      methylPREDNISolone sodium succinate  40 mg Intravenous Q12H Tre English MD      metoprolol tartrate  100 mg Oral Q12H [address] Kevin Singer MD      nicotine  1 patch Transdermal Daily Kevin Singer MD      pantoprazole  20 mg Oral Early Morning Kevin Singer MD      traZODone  100 mg Oral HS Kevin Singer MD          Today, Patient Was Seen By: Kevin Singer MD    ** Please Note: Dictation voice to text software may have been used in the creation of this document   **

## 2021-03-27 NOTE — UTILIZATION REVIEW
Initial Clinical Review    Admission: Date/Time/Statement:   Admission Orders (From admission, onward)     Ordered        03/26/21 1401  Inpatient Admission  Once                   Orders Placed This Encounter   Procedures    Inpatient Admission     Standing Status:   Standing     Number of Occurrences:   1     Order Specific Question:   Level of Care     Answer:   Med Surg [16]     Order Specific Question:   Estimated length of stay     Answer:   More than 2 Midnights     Order Specific Question:   Certification     Answer:   I certify that inpatient services are medically necessary for this patient for a duration of greater than two midnights  See H&P and MD Progress Notes for additional information about the patient's course of treatment  ED Arrival Information     Expected Arrival Acuity Means of Arrival Escorted By Service Admission Type    - 3/26/2021 12:09 Emergent Walk-In Family Member Hospitalist Emergency    Arrival Complaint    sob         Chief Complaint   Patient presents with    Shortness of Breath     pt c/o worsening sob w/fatigue and  diminished appetite for past 3 wks  deniest travel/fevers/cough     Assessment/Plan:  62 yo female to ED from home w/ not feeling well for the past 3 weeks , started smoking back in Jan , SOB for the past 3 weeks wheezing but not coughing   Feels better after Tx   Admitted IP status plan for acute resp failure w/ hypoxia   76 %on RA on 3l sec to COPD exacerbation   Start IV solumedrol , place on pulmicort and duonebs , on zithromax x3 days    DM SSI and monitor   HTN lisinopril and lopressor and norvasc  3/27 IM Note   On 2l , cont COPD tx   Do home O2 study tomorrow  Steroids dec to 40 m IV q12h , cont Mucinex Zithromax 2 more days and Pulmicort  BS uncontrolled sec to steroids inc NPH and monitor   Breath sounds dec     ED Triage Vitals   Temperature Pulse Respirations Blood Pressure SpO2   03/26/21 1217 03/26/21 1217 03/26/21 1217 03/26/21 1217 03/26/21 1217   98 9 °F (37 2 °C) 87 (!) 25 (!) 183/93 94 %      Temp Source Heart Rate Source Patient Position - Orthostatic VS BP Location FiO2 (%)   03/26/21 1217 03/26/21 1217 03/26/21 1217 03/26/21 1217 --   Temporal Monitor Lying Right arm       Pain Score       03/26/21 1432       No Pain          Wt Readings from Last 1 Encounters:   03/26/21 87 6 kg (193 lb 2 oz)     Additional Vital Signs:   03/27/21 0720  --  --  18  --  --  93 %  28  2 L/min  Nasal cannula  --   03/27/21 07:04:08  97 6 °F (36 4 °C)  66  18  134/86  102  93 %  --  --  --  --   03/27/21 0143  --  --  --  --  --  93 %  --  --  --  --   03/26/21 21:57:27  97 5 °F (36 4 °C)  71  20  112/61  78  91 %  --  --  --  --   03/26/21 20:58:27  --  79  --  114/65  81  89 %Abnormal   --  --  --  --   03/26/21 1959  --  83  17  --  --  92 %  --  --  --  --   03/26/21 1515  --  64  20  --  --  93 %  32  3 L/min  Nasal cannula  --   03/26/21 1441  --  --  --  --  --  --  36  4 L/min  Nasal cannula  --   03/26/21 1400  --  61  20  125/69  92  94 %  32  3 L/min  Nasal cannula  --   03/26/21 1345  --  61  20  128/70  94  91 %  32  3 L/min  Nasal cannula  --   03/26/21 1330  --  62  18  128/67  92  90 %  32  3 L/min  Nasal cannula  --   03/26/21 1300  --  70  20  138/62  99  94 %  32  3 L/min  Nasal cannula  Lying   03/26/21 1245  --  75  20  162/89  119  93 %  32  3 L/min  Nasal cannula  --   03/26/21 1230  --  73  20  150/82  111  91 %  32             Pertinent Labs/Diagnostic Test Results:   3/26  PCXR No acute cardiopulmonary disease  3/26 EKG  NSR   Results from last 7 days   Lab Units 03/26/21  1248   SARS-COV-2  Negative     Results from last 7 days   Lab Units 03/26/21  1248   WBC Thousand/uL 6 42   HEMOGLOBIN g/dL 15 5*   HEMATOCRIT % 48 1*   PLATELETS Thousands/uL 195   NEUTROS ABS Thousands/µL 4 65     Results from last 7 days   Lab Units 03/26/21  1248   SODIUM mmol/L 133*   POTASSIUM mmol/L 3 5   CHLORIDE mmol/L 95*   CO2 mmol/L 35*   ANION GAP mmol/L 3* BUN mg/dL 6   CREATININE mg/dL 0 70   EGFR ml/min/1 73sq m 92   CALCIUM mg/dL 9 0   MAGNESIUM mg/dL 1 7     Results from last 7 days   Lab Units 03/26/21  1248   AST U/L 13   ALT U/L 17   ALK PHOS U/L 132*   TOTAL PROTEIN g/dL 7 5   ALBUMIN g/dL 3 3*   TOTAL BILIRUBIN mg/dL 0 95     Results from last 7 days   Lab Units 03/27/21  0707 03/26/21  2058 03/26/21  1611   POC GLUCOSE mg/dl 171* 245* 113     Results from last 7 days   Lab Units 03/26/21  1248   GLUCOSE RANDOM mg/dL 125     Results from last 7 days   Lab Units 03/26/21  1248   CK TOTAL U/L 42     Results from last 7 days   Lab Units 03/26/21  1248   TROPONIN I ng/mL <0 02     Results from last 7 days   Lab Units 03/26/21  1248   PROTIME seconds 13 4   INR  1 04       Results from last 7 days   Lab Units 03/26/21  1248   LACTIC ACID mmol/L 0 7       Results from last 7 days   Lab Units 03/26/21  1248   NT-PRO BNP pg/mL 990*     Results from last 7 days   Lab Units 03/26/21  1248   CRP mg/L 12 9*     Results from last 7 days   Lab Units 03/26/21  1248   INFLUENZA A PCR  Negative   INFLUENZA B PCR  Negative   RSV PCR  Negative     Results from last 7 days   Lab Units 03/26/21  1343 03/26/21  1248   BLOOD CULTURE  Received in Microbiology Lab  Culture in Progress  Received in Microbiology Lab  Culture in Progress       ED Treatment:   Medication Administration from 03/26/2021 1208 to 03/26/2021 1431       Date/Time Order Dose Route Action     03/26/2021 1306 ipratropium-albuterol (DUO-NEB) 0 5-2 5 mg/3 mL inhalation solution 3 mL 3 mL Nebulization Given     03/26/2021 1306 ipratropium-albuterol (DUO-NEB) 0 5-2 5 mg/3 mL inhalation solution 3 mL 3 mL Nebulization Given     03/26/2021 1423 methylPREDNISolone sodium succinate (Solu-MEDROL) injection 125 mg 125 mg Intravenous Given        Past Medical History:   Diagnosis Date    [facility]      Present on Admission:   Acute respiratory failure with hypoxia (HCC)   Tobacco abuse   Essential hypertension   COPD with acute exacerbation (HCC)   Type 2 diabetes mellitus without complication, without long-term current use of insulin (HCC)      Admitting Diagnosis: Pneumonia [J18 9]  SOB (shortness of breath) [R06 02]  Hypoxia [R09 02]  COPD exacerbation (HCC) [J44 1]  Age/Sex: 61 y o  female  Admission Orders:  Scheduled Medications:  amLODIPine, 10 mg, Oral, Daily  atorvastatin, 40 mg, Oral, Daily  azithromycin, 500 mg, Oral, Q24H  budesonide, 0 5 mg, Nebulization, Q12H  citalopram, 10 mg, Oral, Daily  cloNIDine, 0 1 mg, Oral, Q12H NATHALY  fluticasone, 1 spray, Nasal, Daily  guaiFENesin, 600 mg, Oral, BID  heparin (porcine), 5,000 Units, Subcutaneous, Q8H NATHALY  insulin lispro, 1-6 Units, Subcutaneous, TID AC  insulin lispro, 1-6 Units, Subcutaneous, HS  insulin NPH, 10 Units, Subcutaneous, BID AC  ipratropium, 0 5 mg, Nebulization, Q6H  levalbuterol, 1 25 mg, Nebulization, Q6H  lisinopril, 40 mg, Oral, Daily  methylPREDNISolone sodium succinate, 40 mg, Intravenous, Q8H  metoprolol tartrate, 100 mg, Oral, Q12H NATHALY  nicotine, 1 patch, Transdermal, Daily  pantoprazole, 20 mg, Oral, Early Morning  traZODone, 100 mg, Oral, HS      Continuous IV Infusions:     PRN Meds:  albuterol, 2 5 mg, Nebulization, Q4H PRN          Network Utilization Review Department  ATTENTION: Please call with any questions or concerns to [phone] and carefully listen to the prompts so that you are directed to the right person  All voicemails are confidential   Maeve Chan all requests for admission clinical reviews, approved or denied determinations and any other requests to dedicated fax number below belonging to the campus where the patient is receiving treatment   List of dedicated fax numbers for the Facilities:  [address] DENIALS (Administrative/Medical Necessity) [phone]   [address] [place] [address] (Maternity/NICU/Pediatrics) [address] [place] [address],[address] [address] [facility] [address] ([address]) [address] [place] [address] [address] [place] [address] [phone]

## 2021-03-27 NOTE — ASSESSMENT & PLAN NOTE
· The patient presenting 76% on room air currently on 3 L secondary to acute COPD exacerbation and she started smoking again  Will provide COPD treatment chest x-ray is negative for any pneumonia COVID-19 is negative    Will do home O2 studies tomorrow

## 2021-03-27 NOTE — PLAN OF CARE
Problem: SAFETY ADULT  Goal: Patient will remain free of falls  Description: INTERVENTIONS:  - Assess patient frequently for physical needs  -  Identify cognitive and physical deficits and behaviors that affect risk of falls    -  [place] fall precautions as indicated by assessment   - Educate patient/family on patient safety including physical limitations  - Instruct patient to call for assistance with activity based on assessment  - Modify environment to reduce risk of injury  - Consider OT/PT consult to assist with strengthening/mobility  Outcome: Progressing  Goal: Maintain or return to baseline ADL function  Description: INTERVENTIONS:  -  Assess patient's ability to carry out ADLs; assess patient's baseline for ADL function and identify physical deficits which impact ability to perform ADLs (bathing, care of mouth/teeth, toileting, grooming, dressing, etc )  - Assess/evaluate cause of self-care deficits   - Assess range of motion  - Assess patient's mobility; develop plan if impaired  - Assess patient's need for assistive devices and provide as appropriate  - Encourage maximum independence but intervene and supervise when necessary  - Involve family in performance of ADLs  - Assess for home care needs following discharge   - Consider OT consult to assist with ADL evaluation and planning for discharge  - Provide patient education as appropriate  Outcome: Progressing  Goal: Maintain or return mobility status to optimal level  Description: INTERVENTIONS:  - Assess patient's baseline mobility status (ambulation, transfers, stairs, etc )    - Identify cognitive and physical deficits and behaviors that affect mobility  - Identify mobility aids required to assist with transfers and/or ambulation (gait belt, sit-to-stand, lift, walker, cane, etc )  - [place] fall precautions as indicated by assessment  - Record patient progress and toleration of activity level on Mobility SBAR; progress patient to next Phase/Stage  - Instruct patient to call for assistance with activity based on assessment  - Consider rehabilitation consult to assist with strengthening/weightbearing, etc   Outcome: Progressing

## 2021-03-27 NOTE — NURSING NOTE
Offered patient oob to chair, bath deferred at this time   Per patient I am being discharged tomorrow will shower when I get home

## 2021-03-28 ENCOUNTER — APPOINTMENT (INPATIENT)
Dept: RADIOLOGY | Facility: HOSPITAL | Age: 66
DRG: 133 | End: 2021-03-28
Payer: COMMERCIAL

## 2021-03-28 LAB
ATRIAL RATE: 81 BPM
GLUCOSE SERPL-MCNC: 150 MG/DL (ref 65–140)
GLUCOSE SERPL-MCNC: 159 MG/DL (ref 65–140)
GLUCOSE SERPL-MCNC: 170 MG/DL (ref 65–140)
GLUCOSE SERPL-MCNC: 216 MG/DL (ref 65–140)
P AXIS: 77 DEGREES
PR INTERVAL: 146 MS
QRS AXIS: 83 DEGREES
QRSD INTERVAL: 76 MS
QT INTERVAL: 354 MS
QTC INTERVAL: 411 MS
T WAVE AXIS: 64 DEGREES
VENTRICULAR RATE: 81 BPM

## 2021-03-28 PROCEDURE — 94761 N-INVAS EAR/PLS OXIMETRY MLT: CPT

## 2021-03-28 PROCEDURE — 94640 AIRWAY INHALATION TREATMENT: CPT

## 2021-03-28 PROCEDURE — 94760 N-INVAS EAR/PLS OXIMETRY 1: CPT

## 2021-03-28 PROCEDURE — 82948 REAGENT STRIP/BLOOD GLUCOSE: CPT

## 2021-03-28 PROCEDURE — 71046 X-RAY EXAM CHEST 2 VIEWS: CPT

## 2021-03-28 PROCEDURE — 99232 SBSQ HOSP IP/OBS MODERATE 35: CPT | Performed by: FAMILY MEDICINE

## 2021-03-28 RX ORDER — GLIPIZIDE 5 MG/1
5 TABLET ORAL DAILY
Status: DISCONTINUED | OUTPATIENT
Start: 2021-03-28 | End: 2021-03-30 | Stop reason: HOSPADM

## 2021-03-28 RX ORDER — LEVALBUTEROL 1.25 MG/.5ML
1.25 SOLUTION, CONCENTRATE RESPIRATORY (INHALATION)
Status: DISCONTINUED | OUTPATIENT
Start: 2021-03-28 | End: 2021-03-30 | Stop reason: HOSPADM

## 2021-03-28 RX ORDER — PIOGLITAZONEHYDROCHLORIDE 15 MG/1
15 TABLET ORAL DAILY
Status: DISCONTINUED | OUTPATIENT
Start: 2021-03-28 | End: 2021-03-30 | Stop reason: HOSPADM

## 2021-03-28 RX ADMIN — INSULIN HUMAN 10 UNITS: 100 INJECTION, SUSPENSION SUBCUTANEOUS at 08:00

## 2021-03-28 RX ADMIN — INSULIN LISPRO 1 UNITS: 100 INJECTION, SOLUTION INTRAVENOUS; SUBCUTANEOUS at 16:30

## 2021-03-28 RX ADMIN — METHYLPREDNISOLONE SODIUM SUCCINATE 40 MG: 40 INJECTION, POWDER, FOR SOLUTION INTRAMUSCULAR; INTRAVENOUS at 08:27

## 2021-03-28 RX ADMIN — PIOGLITAZONE 15 MG: 15 TABLET ORAL at 13:20

## 2021-03-28 RX ADMIN — LEVALBUTEROL HYDROCHLORIDE 1.25 MG: 1.25 SOLUTION, CONCENTRATE RESPIRATORY (INHALATION) at 07:08

## 2021-03-28 RX ADMIN — HEPARIN SODIUM 5000 UNITS: 5000 INJECTION INTRAVENOUS; SUBCUTANEOUS at 13:23

## 2021-03-28 RX ADMIN — Medication 1 PATCH: at 08:26

## 2021-03-28 RX ADMIN — METOPROLOL TARTRATE 100 MG: 50 TABLET, FILM COATED ORAL at 21:50

## 2021-03-28 RX ADMIN — BUDESONIDE 0.5 MG: 0.5 INHALANT RESPIRATORY (INHALATION) at 07:08

## 2021-03-28 RX ADMIN — PANTOPRAZOLE SODIUM 20 MG: 20 TABLET, DELAYED RELEASE ORAL at 05:14

## 2021-03-28 RX ADMIN — GLIPIZIDE 5 MG: 5 TABLET ORAL at 13:20

## 2021-03-28 RX ADMIN — ATORVASTATIN CALCIUM 40 MG: 40 TABLET, FILM COATED ORAL at 08:27

## 2021-03-28 RX ADMIN — TRAZODONE HYDROCHLORIDE 100 MG: 50 TABLET ORAL at 21:50

## 2021-03-28 RX ADMIN — INSULIN LISPRO 2 UNITS: 100 INJECTION, SOLUTION INTRAVENOUS; SUBCUTANEOUS at 11:31

## 2021-03-28 RX ADMIN — LEVALBUTEROL HYDROCHLORIDE 1.25 MG: 1.25 SOLUTION, CONCENTRATE RESPIRATORY (INHALATION) at 13:01

## 2021-03-28 RX ADMIN — CLONIDINE HYDROCHLORIDE 0.1 MG: 0.1 TABLET ORAL at 08:27

## 2021-03-28 RX ADMIN — BUDESONIDE 0.5 MG: 0.5 INHALANT RESPIRATORY (INHALATION) at 20:33

## 2021-03-28 RX ADMIN — FLUTICASONE PROPIONATE 1 SPRAY: 50 SPRAY, METERED NASAL at 08:16

## 2021-03-28 RX ADMIN — DOCUSATE SODIUM 100 MG: 100 CAPSULE ORAL at 08:48

## 2021-03-28 RX ADMIN — CITALOPRAM HYDROBROMIDE 10 MG: 10 TABLET ORAL at 08:27

## 2021-03-28 RX ADMIN — HEPARIN SODIUM 5000 UNITS: 5000 INJECTION INTRAVENOUS; SUBCUTANEOUS at 05:14

## 2021-03-28 RX ADMIN — IPRATROPIUM BROMIDE 0.5 MG: 0.5 SOLUTION RESPIRATORY (INHALATION) at 20:33

## 2021-03-28 RX ADMIN — IPRATROPIUM BROMIDE 0.5 MG: 0.5 SOLUTION RESPIRATORY (INHALATION) at 13:01

## 2021-03-28 RX ADMIN — HEPARIN SODIUM 5000 UNITS: 5000 INJECTION INTRAVENOUS; SUBCUTANEOUS at 21:50

## 2021-03-28 RX ADMIN — INSULIN LISPRO 1 UNITS: 100 INJECTION, SOLUTION INTRAVENOUS; SUBCUTANEOUS at 21:51

## 2021-03-28 RX ADMIN — GUAIFENESIN 600 MG: 600 TABLET ORAL at 08:27

## 2021-03-28 RX ADMIN — CLONIDINE HYDROCHLORIDE 0.1 MG: 0.1 TABLET ORAL at 21:50

## 2021-03-28 RX ADMIN — AZITHROMYCIN 500 MG: 250 TABLET, FILM COATED ORAL at 15:36

## 2021-03-28 RX ADMIN — LISINOPRIL 40 MG: 20 TABLET ORAL at 08:27

## 2021-03-28 RX ADMIN — AMLODIPINE BESYLATE 10 MG: 10 TABLET ORAL at 08:27

## 2021-03-28 RX ADMIN — GUAIFENESIN 600 MG: 600 TABLET ORAL at 17:40

## 2021-03-28 RX ADMIN — LEVALBUTEROL HYDROCHLORIDE 1.25 MG: 1.25 SOLUTION, CONCENTRATE RESPIRATORY (INHALATION) at 20:33

## 2021-03-28 RX ADMIN — DOCUSATE SODIUM 100 MG: 100 CAPSULE ORAL at 17:44

## 2021-03-28 RX ADMIN — METHYLPREDNISOLONE SODIUM SUCCINATE 40 MG: 40 INJECTION, POWDER, FOR SOLUTION INTRAMUSCULAR; INTRAVENOUS at 21:50

## 2021-03-28 RX ADMIN — METOPROLOL TARTRATE 100 MG: 50 TABLET, FILM COATED ORAL at 08:27

## 2021-03-28 RX ADMIN — INSULIN LISPRO 1 UNITS: 100 INJECTION, SOLUTION INTRAVENOUS; SUBCUTANEOUS at 08:16

## 2021-03-28 RX ADMIN — IPRATROPIUM BROMIDE 0.5 MG: 0.5 SOLUTION RESPIRATORY (INHALATION) at 07:08

## 2021-03-28 NOTE — ASSESSMENT & PLAN NOTE
· Improved Due to starting smoking continue steroids were decreased down to 40 mg IV q 12 hours  Continue Mucinex Zithromax 2 more days and Pulmicort  Her lungs without wheezing more airway but she did not do well on her ambulatory O2 studies  Will do another chest x-ray continue current treatment    Will do another home O2 tomorrow

## 2021-03-28 NOTE — PLAN OF CARE
Problem: RESPIRATORY - ADULT  Goal: Achieves optimal ventilation and oxygenation  Description: INTERVENTIONS:  - Assess for changes in respiratory status  - Assess for changes in mentation and behavior  - Position to facilitate oxygenation and minimize respiratory effort, encourage ambulation OOB to chair  - Oxygen administered by appropriate delivery if ordered  - Initiate smoking cessation education as indicated  - Encourage broncho-pulmonary hygiene including cough, deep breathe, Incentive Spirometry  - Assess the need for suctioning and aspirate as needed  - Assess and instruct to report SOB or any respiratory difficulty  - Respiratory Therapy support as indicated  Outcome: Not Progressing     Problem: PAIN - ADULT  Goal: Verbalizes/displays adequate comfort level or baseline comfort level  Description: Interventions:  - Encourage patient to monitor pain and request assistance  - Assess pain using appropriate pain scale  0-10  - Administer analgesics based on type and severity of pain and evaluate response  - Implement non-pharmacological measures as appropriate and evaluate response  - Consider cultural and social influences on pain and pain management  - Notify physician/advanced practitioner if interventions unsuccessful or patient reports new pain  Outcome: Progressing     Problem: INFECTION - ADULT  Goal: Absence or prevention of progression during hospitalization  Description: INTERVENTIONS:  - Assess and monitor for signs and symptoms of infection  - Monitor lab/diagnostic results  - Monitor all insertion sites, i e  indwelling lines, tubes, and drains  - Monitor endotracheal if appropriate and nasal secretions for changes in amount and color  - [place] appropriate cooling/warming therapies per order  - Administer medications as ordered  - Instruct and encourage patient and family to use good hand hygiene technique  - Identify and instruct in appropriate isolation precautions for identified infection/condition  Outcome: Progressing     Problem: SAFETY ADULT  Goal: Patient will remain free of falls  Description: INTERVENTIONS:  - Assess patient frequently for physical needs  -  Identify cognitive and physical deficits and behaviors that affect risk of falls    -  [place] fall precautions as indicated by assessment   - Educate patient/family on patient safety including physical limitations  - Instruct patient to call for assistance with activity based on assessment  - Modify environment to reduce risk of injury, call bell within reach  - Consider OT/PT consult to assist with strengthening/mobility  Outcome: Progressing  Goal: Maintain or return to baseline ADL function  Description: INTERVENTIONS:  -  Assess patient's ability to carry out ADLs; assess patient's baseline for ADL function and identify physical deficits which impact ability to perform ADLs (bathing, care of mouth/teeth, toileting, grooming, dressing, etc )  - Assess/evaluate cause of self-care deficits   - Assess range of motion  - Assess patient's mobility; develop plan if impaired  - Assess patient's need for assistive devices and provide as appropriate  - Encourage maximum independence but intervene and supervise when necessary  - Involve family in performance of ADLs  - Assess for home care needs following discharge   - Consider OT consult to assist with ADL evaluation and planning for discharge  - Provide patient education as appropriate  Outcome: Progressing  Goal: Maintain or return mobility status to optimal level  Description: INTERVENTIONS:  - Assess patient's baseline mobility status (ambulation, transfers, stairs, etc )    - Identify cognitive and physical deficits and behaviors that affect mobility  - Identify mobility aids required to assist with transfers and/or ambulation (gait belt, sit-to-stand, lift, walker, cane, etc )  - [place] fall precautions as indicated by assessment  - Record patient progress and toleration of activity level on Mobility SBAR; progress patient to next Phase/Stage  - Instruct patient to call for assistance with activity based on assessment  - Consider rehabilitation consult to assist with strengthening/weightbearing, etc   Outcome: Progressing     Problem: DISCHARGE PLANNING  Goal: Discharge to home or other facility with appropriate resources  Description: INTERVENTIONS:  - Identify barriers to discharge w/patient and caregiver  - Arrange for needed discharge resources and transportation as appropriate  - Identify discharge learning needs (meds, wound care, etc )  - Arrange for interpretive services to assist at discharge as needed  - Refer to Case Management Department for coordinating discharge planning if the patient needs post-hospital services based on physician/advanced practitioner order or complex needs related to functional status, cognitive ability, or social support system  Outcome: Progressing     Problem: Knowledge Deficit  Goal: Patient/family/caregiver demonstrates understanding of disease process, treatment plan, medications, and discharge instructions  Description: Complete learning assessment and assess knowledge base  Interventions:  - Provide teaching at level of understanding  - Provide teaching via preferred learning methods  Outcome: Progressing     Problem: Potential for Falls  Goal: Patient will remain free of falls  Description: INTERVENTIONS:  - Assess patient frequently for physical needs  -  Identify cognitive and physical deficits and behaviors that affect risk of falls    -  [place] fall precautions as indicated by assessment   - Educate patient/family on patient safety including physical limitations  - Instruct patient to call for assistance with activity based on assessment  - Modify environment to reduce risk of injury, call bell within reach  - Consider OT/PT consult to assist with strengthening/mobility  Outcome: Progressing

## 2021-03-28 NOTE — PROGRESS NOTES
[address]  Progress Note Leon Blair 1955, 61 y o  female MRN: [id number]  Unit/Bed#: -Atul Encounter: [id number]  Primary Care Provider: Carol Beasley DO   Date and time admitted to hospital: 3/26/2021 12:14 PM    * Acute respiratory failure with hypoxia ([facility] [address] )  Assessment & Plan  · The patient presenting 76% on room air currently on 3 L secondary to acute COPD exacerbation and she started smoking again  Will provide COPD treatment chest x-ray is negative for any pneumonia COVID-19 is negative  Home O2 studies sugar is 6 L ambulation  Will repeat another chest x-ray continue with steroids    COPD with acute exacerbation ([facility] [address] )  Assessment & Plan  · Improved Due to starting smoking continue steroids were decreased down to 40 mg IV q 12 hours  Continue Mucinex Zithromax 2 more days and Pulmicort  Her lungs without wheezing more airway but she did not do well on her ambulatory O2 studies  Will do another chest x-ray continue current treatment  Will do another home O2 tomorrow    Type 2 diabetes mellitus without complication, without long-term current use of insulin [facility])  Assessment & Plan  Lab Results   Component Value Date    HGBA1C 6 5 (H) 2020       Recent Labs     21  1556 21  2219 21  0806 21  1152   POCGLU 232* 192* 159* 216*       Blood Sugar Average: Last 72 hrs:  · (P) 202  125   · Uncontrolled secondary to the steroids    Will restart her glyburide and Actos she is eating better    Essential hypertension  Assessment & Plan  · Continue lisinopril clonidine metoprolol and Norvasc    Tobacco abuse  Assessment & Plan  · Patient quit smoking with subsidence back smoking in January because her son  in her sister  and she did not know that she had to call her PCP to have refills for nicotine patches  her on smoking cessation also provided her a nicotine patch while she is here      VTE Pharmacologic Prophylaxis: Pharmacologic: Heparin  Mechanical VTE Prophylaxis in Place: Yes    Patient Centered Rounds: I have performed bedside rounds with nursing staff today  Discussions with Specialists or Other Care Team Provider:  Have discussed with nursing    Education and Discussions with Family / Patient:  Patient    Time Spent for Care: 30 minutes  More than 50% of total time spent on counseling and coordination of care as described above  Current Length of Stay: 2 day(s)    Current Patient Status: Inpatient   Certification Statement: The patient will continue to require additional inpatient hospital stay due to Acute hypoxic respiratory failure    Discharge Plan:  Pending progress    Code Status: Level 1 - Full Code      Subjective:   Patient seen and examined denies any shortness of breath or chest pain    Objective:     Vitals:   Temp (24hrs), Av 8 °F (36 6 °C), Min:97 6 °F (36 4 °C), Max:98 1 °F (36 7 °C)    Temp:  [97 6 °F (36 4 °C)-98 1 °F (36 7 °C)] 97 6 °F (36 4 °C)  HR:  [64-77] 69  Resp:  [15-20] 20  BP: (101-115)/(52-63) 114/63  SpO2:  [89 %-96 %] 93 %  Body mass index is 34 21 kg/m²  Input and Output Summary (last 24 hours): Intake/Output Summary (Last 24 hours) at 3/28/2021 1203  Last data filed at 3/28/2021 0519  Gross per 24 hour   Intake 480 ml   Output --   Net 480 ml       Physical Exam:     Physical Exam  Vitals signs and nursing note reviewed  Constitutional:       General: She is not in acute distress  Appearance: She is well-developed  HENT:      Head: Normocephalic and atraumatic  Eyes:      Conjunctiva/sclera: Conjunctivae normal    Neck:      Musculoskeletal: Neck supple  Cardiovascular:      Rate and Rhythm: Normal rate and regular rhythm  Heart sounds: No murmur  Pulmonary:      Effort: Pulmonary effort is normal  No respiratory distress  Breath sounds: No wheezing or rales  Comments: Mild decrease  Abdominal:      Palpations: Abdomen is soft  Tenderness: There is no abdominal tenderness  Skin:     General: Skin is warm and dry  Neurological:      Mental Status: She is alert  Additional Data:     Labs:    Results from last 7 days   Lab Units 03/26/21  1248   WBC Thousand/uL 6 42   HEMOGLOBIN g/dL 15 5*   HEMATOCRIT % 48 1*   PLATELETS Thousands/uL 195   NEUTROS PCT % 73   LYMPHS PCT % 16   MONOS PCT % 10   EOS PCT % 0     Results from last 7 days   Lab Units 03/26/21  1248   SODIUM mmol/L 133*   POTASSIUM mmol/L 3 5   CHLORIDE mmol/L 95*   CO2 mmol/L 35*   BUN mg/dL 6   CREATININE mg/dL 0 70   ANION GAP mmol/L 3*   CALCIUM mg/dL 9 0   ALBUMIN g/dL 3 3*   TOTAL BILIRUBIN mg/dL 0 95   ALK PHOS U/L 132*   ALT U/L 17   AST U/L 13   GLUCOSE RANDOM mg/dL 125     Results from last 7 days   Lab Units 03/26/21  1248   INR  1 04     Results from last 7 days   Lab Units 03/28/21  1152 03/28/21  0806 03/27/21  2219 03/27/21  1556 03/27/21  1156 03/27/21  0707 03/26/21  2058 03/26/21  1611   POC GLUCOSE mg/dl 216* 159* 192* 232* 289* 171* 245* 113         Results from last 7 days   Lab Units 03/26/21  1248   LACTIC ACID mmol/L 0 7           * I Have Reviewed All Lab Data Listed Above  * Additional Pertinent Lab Tests Reviewed: All Labs Within Last 24 Hours Reviewed    Imaging:    Imaging Reports Reviewed Today Include: none today  Imaging Personally Reviewed by Myself Includes:      Recent Cultures (last 7 days):     Results from last 7 days   Lab Units 03/26/21  1343 03/26/21  1248   BLOOD CULTURE  No Growth at 24 hrs  No Growth at 24 hrs         Last 24 Hours Medication List:   Current Facility-Administered Medications   Medication Dose Route Frequency Provider Last Rate    albuterol  2 5 mg Nebulization Q4H PRN Sho Del Rio MD      amLODIPine  10 mg Oral Daily Sho Del Rio MD      atorvastatin  40 mg Oral Daily Sho Del Rio MD      azithromycin  500 mg Oral Q24H Sho Del Rio MD      budesonide  0 5 mg Nebulization Q12H Lorena Floyd Chavo Block MD      citalopram  10 mg Oral Daily Stephanie Bryan MD      cloNIDine  0 1 mg Oral Q12H [facility] Stephanie Bryan MD      docusate sodium  100 mg Oral BID PRN Stephanie Bryan MD      fluticasone  1 spray Nasal Daily Stephanie Bryan MD      glipiZIDE  5 mg Oral Daily Stephanie Bryan MD      guaiFENesin  600 mg Oral BID Stephanie Bryan MD      heparin (porcine)  5,000 Units Subcutaneous [facility] Stehpanie Bryan MD      insulin lispro  1-6 Units Subcutaneous TID [facility] Stephanie Bryan MD      insulin lispro  1-6 Units Subcutaneous HS Stephanie Bryan MD      ipratropium  0 5 mg Nebulization Q6H While awake Stephanie Bryan MD      levalbuterol  1 25 mg Nebulization Q6H While awake Stephanie Bryan MD      lisinopril  40 mg Oral Daily Stephanie Bryan MD      methylPREDNISolone sodium succinate  40 mg Intravenous Q12H [facility] Stephanie Bryan MD      metoprolol tartrate  100 mg Oral Q12H [facility] Stephanie Bryan MD      nicotine  1 patch Transdermal Daily Stephanie Bryan MD      pantoprazole  20 mg Oral Early Morning Stephanie Bryan MD      pioglitazone  15 mg Oral Daily Stephanie Bryan MD      traZODone  100 mg Oral HS Stephanie Bryan MD          Today, Patient Was Seen By: Stephanie Bryan MD    ** Please Note: Dictation voice to text software may have been used in the creation of this document   **

## 2021-03-28 NOTE — ASSESSMENT & PLAN NOTE
Lab Results   Component Value Date    HGBA1C 6 5 (H) 11/02/2020       Recent Labs     03/27/21  1556 03/27/21  2219 03/28/21  0806 03/28/21  1152   POCGLU 232* 192* 159* 216*       Blood Sugar Average: Last 72 hrs:  · (P) 202  125   · Uncontrolled secondary to the steroids    Will restart her glyburide and Actos she is eating better

## 2021-03-28 NOTE — RESPIRATORY THERAPY NOTE
Home Oxygen Qualifying Test       Patient name: Lucia Key        : 1955   Date of Test:  2021  Diagnosis:      Home Oxygen Test:    **Medicare Guidelines require item(s) 1-5 on all ambulatory patients or 1 and 2 on non-ambulatory patients  1   Baseline SPO2 on Room Air at rest 80 %  2   SPO2 during exercise on Room Air N/A %  During exercise monitor SpO2  If SPO2 increases >=89% with ambulation do not add supplemental             oxygen  If <= 88% on room air add O2 via NC and titrate patient  Patient must be ambulated with O2 and titrated to > 88% with exertion  3   SPO2 on Oxygen at rest 90 % 3 lpm     4   SPO2 during exercise on Oxygen  84% a liter flow of 6 lpm     5   Exercise performed:          walking          [x]  Supplemental Home Oxygen is indicated  []  Client does not qualify for home oxygen  Respiratory Additional Notes- Pt requires 3LPM NC O2 during rest to maintain SpO2>89%  Pt SpO2 84% on 6LPM NC during ambulation     Renuka Mirza, RT

## 2021-03-28 NOTE — RESPIRATORY THERAPY NOTE
RT Protocol Note  Sea Da Silva 61 y o  female MRN: [id number]  Unit/Bed#: -01 Encounter: [id number]    Assessment    Principal Problem:    Acute respiratory failure with hypoxia ([place] [address] )  Active Problems:    Tobacco abuse    Essential hypertension    Type 2 diabetes mellitus without complication, without long-term current use of insulin (HCC)    COPD with acute exacerbation ([facility])      Home Pulmonary Medications:  Dwana Lulas, Combovent       Past Medical History:   Diagnosis Date    COPD (chronic obstructive pulmonary disease) ([place] [address] )      Social History     Socioeconomic History    Marital status:       Spouse name: None    Number of children: None    Years of education: None    Highest education level: None   Occupational History    None   Social Needs    Financial resource strain: None    Food insecurity     Worry: None     Inability: None    Transportation needs     Medical: None     Non-medical: None   Tobacco Use    Smoking status: Current Every Day Smoker     Types: Cigarettes    Smokeless tobacco: Never Used   Substance and Sexual Activity    Alcohol use: Not Currently    Drug use: Never    Sexual activity: None   Lifestyle    Physical activity     Days per week: None     Minutes per session: None    Stress: None   Relationships    Social connections     Talks on phone: None     Gets together: None     Attends Adventism service: None     Active member of club or organization: None     Attends meetings of clubs or organizations: None     Relationship status: None    Intimate partner violence     Fear of current or ex partner: None     Emotionally abused: None     Physically abused: None     Forced sexual activity: None   Other Topics Concern    None   Social History Narrative    None       Subjective    Subjective Data: Patient requested she be allowed to sleep ay 0200    Objective    Physical Exam:   Assessment Type: During-treatment  General Appearance: Alert, Awake  Respiratory Pattern: Normal  Chest Assessment: Chest expansion symmetrical  Bilateral Breath Sounds: Diminished  O2 Device: 2L nasal cannula    Vitals:  Blood pressure 114/62, pulse 72, temperature 97 7 °F (36 5 °C), resp  rate 15, height 5' 3" (1 6 m), weight 87 6 kg (193 lb 2 oz), SpO2 93 %  Imaging and other studies: The lungs are clear  No pneumothorax or pleural effusion      O2 Device: 2L nasal cannula     Plan    Respiratory Plan: Mild Distress pathway        Resp Comments: treatnebt late as therapist was needed in ED

## 2021-03-28 NOTE — ASSESSMENT & PLAN NOTE
· The patient presenting 76% on room air currently on 3 L secondary to acute COPD exacerbation and she started smoking again  Will provide COPD treatment chest x-ray is negative for any pneumonia COVID-19 is negative  Home O2 studies sugar is 6 L ambulation    Will repeat another chest x-ray continue with steroids

## 2021-03-28 NOTE — PLAN OF CARE
Problem: SAFETY ADULT  Goal: Patient will remain free of falls  Description: INTERVENTIONS:  - Assess patient frequently for physical needs  -  Identify cognitive and physical deficits and behaviors that affect risk of falls    -  [place] fall precautions as indicated by assessment   - Educate patient/family on patient safety including physical limitations  - Instruct patient to call for assistance with activity based on assessment  - Modify environment to reduce risk of injury, call bell within reach  - Consider OT/PT consult to assist with strengthening/mobility  Outcome: Progressing  Goal: Maintain or return to baseline ADL function  Description: INTERVENTIONS:  -  Assess patient's ability to carry out ADLs; assess patient's baseline for ADL function and identify physical deficits which impact ability to perform ADLs (bathing, care of mouth/teeth, toileting, grooming, dressing, etc )  - Assess/evaluate cause of self-care deficits   - Assess range of motion  - Assess patient's mobility; develop plan if impaired  - Assess patient's need for assistive devices and provide as appropriate  - Encourage maximum independence but intervene and supervise when necessary  - Involve family in performance of ADLs  - Assess for home care needs following discharge   - Consider OT consult to assist with ADL evaluation and planning for discharge  - Provide patient education as appropriate  Outcome: Progressing  Goal: Maintain or return mobility status to optimal level  Description: INTERVENTIONS:  - Assess patient's baseline mobility status (ambulation, transfers, stairs, etc )    - Identify cognitive and physical deficits and behaviors that affect mobility  - Identify mobility aids required to assist with transfers and/or ambulation (gait belt, sit-to-stand, lift, walker, cane, etc )  - [place] fall precautions as indicated by assessment  - Record patient progress and toleration of activity level on Mobility SBAR; progress patient to next Phase/Stage  - Instruct patient to call for assistance with activity based on assessment  - Consider rehabilitation consult to assist with strengthening/weightbearing, etc   Outcome: Progressing

## 2021-03-29 LAB
D DIMER PPP FEU-MCNC: <0.27 UG/ML FEU
GLUCOSE SERPL-MCNC: 126 MG/DL (ref 65–140)
GLUCOSE SERPL-MCNC: 191 MG/DL (ref 65–140)
GLUCOSE SERPL-MCNC: 281 MG/DL (ref 65–140)
GLUCOSE SERPL-MCNC: 71 MG/DL (ref 65–140)

## 2021-03-29 PROCEDURE — 94760 N-INVAS EAR/PLS OXIMETRY 1: CPT

## 2021-03-29 PROCEDURE — 85379 FIBRIN DEGRADATION QUANT: CPT | Performed by: FAMILY MEDICINE

## 2021-03-29 PROCEDURE — 94761 N-INVAS EAR/PLS OXIMETRY MLT: CPT

## 2021-03-29 PROCEDURE — 94640 AIRWAY INHALATION TREATMENT: CPT

## 2021-03-29 PROCEDURE — 82948 REAGENT STRIP/BLOOD GLUCOSE: CPT

## 2021-03-29 PROCEDURE — 99232 SBSQ HOSP IP/OBS MODERATE 35: CPT | Performed by: FAMILY MEDICINE

## 2021-03-29 RX ORDER — FUROSEMIDE 10 MG/ML
20 INJECTION INTRAMUSCULAR; INTRAVENOUS ONCE
Status: COMPLETED | OUTPATIENT
Start: 2021-03-29 | End: 2021-03-29

## 2021-03-29 RX ADMIN — HEPARIN SODIUM 5000 UNITS: 5000 INJECTION INTRAVENOUS; SUBCUTANEOUS at 21:13

## 2021-03-29 RX ADMIN — METHYLPREDNISOLONE SODIUM SUCCINATE 40 MG: 40 INJECTION, POWDER, FOR SOLUTION INTRAMUSCULAR; INTRAVENOUS at 21:13

## 2021-03-29 RX ADMIN — CLONIDINE HYDROCHLORIDE 0.1 MG: 0.1 TABLET ORAL at 21:13

## 2021-03-29 RX ADMIN — FUROSEMIDE 20 MG: 10 INJECTION, SOLUTION INTRAMUSCULAR; INTRAVENOUS at 12:27

## 2021-03-29 RX ADMIN — GLIPIZIDE 5 MG: 5 TABLET ORAL at 08:47

## 2021-03-29 RX ADMIN — INSULIN LISPRO 4 UNITS: 100 INJECTION, SOLUTION INTRAVENOUS; SUBCUTANEOUS at 16:40

## 2021-03-29 RX ADMIN — IPRATROPIUM BROMIDE 0.5 MG: 0.5 SOLUTION RESPIRATORY (INHALATION) at 19:40

## 2021-03-29 RX ADMIN — PIOGLITAZONE 15 MG: 15 TABLET ORAL at 08:47

## 2021-03-29 RX ADMIN — FLUTICASONE PROPIONATE 1 SPRAY: 50 SPRAY, METERED NASAL at 12:23

## 2021-03-29 RX ADMIN — LEVALBUTEROL HYDROCHLORIDE 1.25 MG: 1.25 SOLUTION, CONCENTRATE RESPIRATORY (INHALATION) at 13:30

## 2021-03-29 RX ADMIN — IPRATROPIUM BROMIDE 0.5 MG: 0.5 SOLUTION RESPIRATORY (INHALATION) at 07:22

## 2021-03-29 RX ADMIN — IPRATROPIUM BROMIDE 0.5 MG: 0.5 SOLUTION RESPIRATORY (INHALATION) at 13:30

## 2021-03-29 RX ADMIN — METOPROLOL TARTRATE 100 MG: 50 TABLET, FILM COATED ORAL at 08:47

## 2021-03-29 RX ADMIN — Medication 1 PATCH: at 08:48

## 2021-03-29 RX ADMIN — AMLODIPINE BESYLATE 10 MG: 10 TABLET ORAL at 08:47

## 2021-03-29 RX ADMIN — LEVALBUTEROL HYDROCHLORIDE 1.25 MG: 1.25 SOLUTION, CONCENTRATE RESPIRATORY (INHALATION) at 19:40

## 2021-03-29 RX ADMIN — LEVALBUTEROL HYDROCHLORIDE 1.25 MG: 1.25 SOLUTION, CONCENTRATE RESPIRATORY (INHALATION) at 07:22

## 2021-03-29 RX ADMIN — ATORVASTATIN CALCIUM 40 MG: 40 TABLET, FILM COATED ORAL at 08:47

## 2021-03-29 RX ADMIN — CLONIDINE HYDROCHLORIDE 0.1 MG: 0.1 TABLET ORAL at 08:47

## 2021-03-29 RX ADMIN — INSULIN LISPRO 2 UNITS: 100 INJECTION, SOLUTION INTRAVENOUS; SUBCUTANEOUS at 21:16

## 2021-03-29 RX ADMIN — METOPROLOL TARTRATE 100 MG: 50 TABLET, FILM COATED ORAL at 21:13

## 2021-03-29 RX ADMIN — GUAIFENESIN 600 MG: 600 TABLET ORAL at 08:47

## 2021-03-29 RX ADMIN — LISINOPRIL 40 MG: 20 TABLET ORAL at 08:47

## 2021-03-29 RX ADMIN — BUDESONIDE 0.5 MG: 0.5 INHALANT RESPIRATORY (INHALATION) at 07:22

## 2021-03-29 RX ADMIN — BUDESONIDE 0.5 MG: 0.5 INHALANT RESPIRATORY (INHALATION) at 19:40

## 2021-03-29 RX ADMIN — HEPARIN SODIUM 5000 UNITS: 5000 INJECTION INTRAVENOUS; SUBCUTANEOUS at 06:04

## 2021-03-29 RX ADMIN — CITALOPRAM HYDROBROMIDE 10 MG: 10 TABLET ORAL at 08:47

## 2021-03-29 RX ADMIN — GUAIFENESIN 600 MG: 600 TABLET ORAL at 18:42

## 2021-03-29 RX ADMIN — METHYLPREDNISOLONE SODIUM SUCCINATE 40 MG: 40 INJECTION, POWDER, FOR SOLUTION INTRAMUSCULAR; INTRAVENOUS at 08:47

## 2021-03-29 RX ADMIN — HEPARIN SODIUM 5000 UNITS: 5000 INJECTION INTRAVENOUS; SUBCUTANEOUS at 14:23

## 2021-03-29 RX ADMIN — PANTOPRAZOLE SODIUM 20 MG: 20 TABLET, DELAYED RELEASE ORAL at 06:04

## 2021-03-29 NOTE — RESPIRATORY THERAPY NOTE
RT Protocol Note  Sukhjinder Reed 61 y o  female MRN: [id number]  Unit/Bed#: -01 Encounter: [id number]    Assessment    Principal Problem:    Acute respiratory failure with hypoxia (HCC)  Active Problems:    Tobacco abuse    Essential hypertension    Type 2 diabetes mellitus without complication, without long-term current use of insulin (HCC)    COPD with acute exacerbation (HCC)      Home Pulmonary Medications:         Past Medical History:   Diagnosis Date    COPD (chronic obstructive pulmonary disease) ([facility] [address] )      Social History     Socioeconomic History    Marital status:      Spouse name: None    Number of children: None    Years of education: None    Highest education level: None   Occupational History    None   Social Needs    Financial resource strain: None    Food insecurity     Worry: None     Inability: None    Transportation needs     Medical: None     Non-medical: None   Tobacco Use    Smoking status: Current Every Day Smoker     Types: Cigarettes    Smokeless tobacco: Never Used   Substance and Sexual Activity    Alcohol use: Not Currently    Drug use: Never    Sexual activity: None   Lifestyle    Physical activity     Days per week: None     Minutes per session: None    Stress: None   Relationships    Social connections     Talks on phone: None     Gets together: None     Attends Denominational service: None     Active member of club or organization: None     Attends meetings of clubs or organizations: None     Relationship status: None    Intimate partner violence     Fear of current or ex partner: None     Emotionally abused: None     Physically abused: None     Forced sexual activity: None   Other Topics Concern    None   Social History Narrative    None       Subjective    Subjective Data: Pt states breathing feels good today      Objective    Physical Exam:   Assessment Type: During-treatment  General Appearance: Alert, Awake  Respiratory Pattern: Normal  Chest Assessment: Chest expansion symmetrical  Bilateral Breath Sounds: Diminished, Clear  Cough: None  O2 Device: 3LPM NC    Vitals:  Blood pressure 134/76, pulse 73, temperature (!) 97 3 °F (36 3 °C), resp  rate 18, height 5' 3" (1 6 m), weight 87 6 kg (193 lb 2 oz), SpO2 (!) 89 %  Imaging and other studies: I have personally reviewed pertinent reports  O2 Device: 3LPM NC     Plan    Respiratory Plan: Mild Distress pathway        Resp Comments: (P) Home O2 eval done  Pt is improved from yesterday however SpO2 continues<89% on 6LM NC with ambulation  Dr Joel Fuentes aware, would like to continue steriods, no oxymizer trial at this time

## 2021-03-29 NOTE — ASSESSMENT & PLAN NOTE
Lab Results   Component Value Date    HGBA1C 6 5 (H) 11/02/2020       Recent Labs     03/28/21  1607 03/28/21  2046 03/29/21  0709 03/29/21  1058   POCGLU 150* 170* 71 126       Blood Sugar Average: Last 72 hrs:  · (P) 541 [id number]   · Uncontrolled secondary to the steroids    continue glyburide and Actos she is eating better sugars are stable

## 2021-03-29 NOTE — PROGRESS NOTES
[address]  Progress Note Almaz Kaye 1955, 61 y o  female MRN: [id number]  Unit/Bed#: [facility] [place] Encounter: [id number]  Primary Care Provider: Allen Samuel DO   Date and time admitted to hospital: 3/26/2021 12:14 PM    * Acute respiratory failure with hypoxia ([facility] )  Assessment & Plan  · The patient presenting 76% on room air currently on 3 L secondary to acute COPD exacerbation and she started smoking again  Will provide COPD treatment chest x-ray is negative for any pneumonia COVID-19 is negative  Home O2 studies at rest her fine at 3 L but when she did ambulation again she requires 6 L  I did repeat a chest x-ray which looks like there is cephalization and there is some mild effusions  I will repeat her per proBNP she had a normal 2D echo I suspected she does have some pulmonary hypertension will give her a trial of Lasix 20 mg IV x1 I did obtain a D-dimer which was negative  Will re-evaluate after diuresis tomorrow    COPD with acute exacerbation ([facility] )  Assessment & Plan  · Improved Due to starting smoking continue steroids were decreased down to 40 mg IV q 12 hours  Continue Mucinex Zithromax 2 more days and Pulmicort  Her lungs without wheezing more airway but she did not do well on her ambulatory O2 studies  Will do another home O2 tomorrow again tomorrow    Type 2 diabetes mellitus without complication, without long-term current use of insulin [facility])  Assessment & Plan  Lab Results   Component Value Date    HGBA1C 6 5 (H) 11/02/2020       Recent Labs     03/28/21  1607 03/28/21  2046 03/29/21  0709 03/29/21  1058   POCGLU 150* 170* 71 126       Blood Sugar Average: Last 72 hrs:  · (P) 986 [id number]   · Uncontrolled secondary to the steroids    continue glyburide and Actos she is eating better sugars are stable    Essential hypertension  Assessment & Plan  · Continue lisinopril clonidine metoprolol and Norvasc    Tobacco abuse  Assessment & Plan  · Patient quit smoking with subsidence back smoking in January because her son  in her sister  and she did not know that she had to call her PCP to have refills for nicotine patches  her on smoking cessation also provided her a nicotine patch while she is here        VTE Pharmacologic Prophylaxis:   Pharmacologic: Heparin  Mechanical VTE Prophylaxis in Place: Yes    Patient Centered Rounds: I have performed bedside rounds with nursing staff today  Discussions with Specialists or Other Care Team Provider:  I have discussed with nursing    Education and Discussions with Family / Patient:  Patient    Time Spent for Care: 30 minutes  More than 50% of total time spent on counseling and coordination of care as described above  Current Length of Stay: 3 day(s)    Current Patient Status: Inpatient   Certification Statement: The patient will continue to require additional inpatient hospital stay due to Hypoxic respiratory failure    Discharge Plan:  Possible discharge in 24 hours    Code Status: Level 1 - Full Code      Subjective:   Patient seen and examined although she is feeling much better shortness of breath has improved cough is improved    Objective:     Vitals:   Temp (24hrs), Av 7 °F (36 5 °C), Min:97 3 °F (36 3 °C), Max:98 1 °F (36 7 °C)    Temp:  [97 3 °F (36 3 °C)-98 1 °F (36 7 °C)] 97 3 °F (36 3 °C)  HR:  [70-84] 73  Resp:  [15-20] 18  BP: (116-134)/(63-76) 134/76  SpO2:  [89 %-97 %] 89 %  Body mass index is 34 21 kg/m²  Input and Output Summary (last 24 hours): Intake/Output Summary (Last 24 hours) at 3/29/2021 1115  Last data filed at 3/29/2021 0500  Gross per 24 hour   Intake 1320 ml   Output --   Net 1320 ml       Physical Exam:     Physical Exam  Vitals signs and nursing note reviewed  Constitutional:       General: She is not in acute distress  Appearance: She is well-developed  HENT:      Head: Normocephalic and atraumatic     Eyes: Conjunctiva/sclera: Conjunctivae normal    Neck:      Musculoskeletal: Neck supple  Cardiovascular:      Rate and Rhythm: Normal rate and regular rhythm  Heart sounds: No murmur  Pulmonary:      Effort: Pulmonary effort is normal  No respiratory distress  Breath sounds: Rales (Bibasilar) present  Abdominal:      Palpations: Abdomen is soft  Tenderness: There is no abdominal tenderness  Skin:     General: Skin is warm and dry  Neurological:      General: No focal deficit present  Mental Status: She is alert and oriented to person, place, and time  Psychiatric:         Mood and Affect: Mood normal            Additional Data:     Labs:    Results from last 7 days   Lab Units 03/26/21  1248   WBC Thousand/uL 6 42   HEMOGLOBIN g/dL 15 5*   HEMATOCRIT % 48 1*   PLATELETS Thousands/uL 195   NEUTROS PCT % 73   LYMPHS PCT % 16   MONOS PCT % 10   EOS PCT % 0     Results from last 7 days   Lab Units 03/26/21  1248   SODIUM mmol/L 133*   POTASSIUM mmol/L 3 5   CHLORIDE mmol/L 95*   CO2 mmol/L 35*   BUN mg/dL 6   CREATININE mg/dL 0 70   ANION GAP mmol/L 3*   CALCIUM mg/dL 9 0   ALBUMIN g/dL 3 3*   TOTAL BILIRUBIN mg/dL 0 95   ALK PHOS U/L 132*   ALT U/L 17   AST U/L 13   GLUCOSE RANDOM mg/dL 125     Results from last 7 days   Lab Units 03/26/21  1248   INR  1 04     Results from last 7 days   Lab Units 03/29/21  1058 03/29/21  0709 03/28/21  2046 03/28/21  1607 03/28/21  1152 03/28/21  0806 03/27/21  2219 03/27/21  1556 03/27/21  1156 03/27/21  0707 03/26/21  2058 03/26/21  1611   POC GLUCOSE mg/dl 126 71 170* 150* 216* 159* 192* 232* 289* 171* 245* 113         Results from last 7 days   Lab Units 03/26/21  1248   LACTIC ACID mmol/L 0 7           * I Have Reviewed All Lab Data Listed Above  * Additional Pertinent Lab Tests Reviewed:  All Labs Within Last 24 Hours Reviewed    Imaging:    Imaging Reports Reviewed Today Include:  Chest x-ray today  Imaging Personally Reviewed by Myself Includes: Recent Cultures (last 7 days):     Results from last 7 days   Lab Units 03/26/21  1343 03/26/21  1248   BLOOD CULTURE  No Growth at 48 hrs  No Growth at 48 hrs  Last 24 Hours Medication List:   Current Facility-Administered Medications   Medication Dose Route Frequency Provider Last Rate    albuterol  2 5 mg Nebulization Q4H PRN Albert Mallory MD      amLODIPine  10 mg Oral Daily Albert Mallory MD      atorvastatin  40 mg Oral Daily Albert Mallory MD      budesonide  0 5 mg Nebulization Q12H Albert Mallory MD      citalopram  10 mg Oral Daily Albert Mallory MD      cloNIDine  0 1 mg Oral Q12H [address] Albert Mallory MD      docusate sodium  100 mg Oral BID PRN Albert Mallory MD      fluticasone  1 spray Nasal Daily Albert Mallory MD      furosemide  20 mg Intravenous Once Albert Mallory MD      glipiZIDE  5 mg Oral Daily Albert Mallory MD      guaiFENesin  600 mg Oral BID Albert Mallory MD      heparin (porcine)  5,000 Units Subcutaneous [facility] Albert Mallory MD      insulin lispro  1-6 Units Subcutaneous TID [facility] Albert Mallory MD      insulin lispro  1-6 Units Subcutaneous HS Albert Mallory MD      ipratropium  0 5 mg Nebulization Q6H While awake Albert Mallory MD      levalbuterol  1 25 mg Nebulization Q6H While awake Albert Mallory MD      lisinopril  40 mg Oral Daily Albert Mallory MD      methylPREDNISolone sodium succinate  40 mg Intravenous Q12H [address] Albert Mallory MD      metoprolol tartrate  100 mg Oral Q12H [address] Albert Mallory MD      nicotine  1 patch Transdermal Daily Albert Mallory MD      pantoprazole  20 mg Oral Early Morning Albert Mallory MD      pioglitazone  15 mg Oral Daily Albert Mallory MD      traZODone  100 mg Oral HS Albert Mallory MD          Today, Patient Was Seen By: Albert Mallory MD    ** Please Note: Dictation voice to text software may have been used in the creation of this document   **

## 2021-03-29 NOTE — RESPIRATORY THERAPY NOTE
RT Protocol Note  Francisco Flor 61 y o  female MRN: [id number]  Unit/Bed#: -01 Encounter: [id number]    Assessment    Principal Problem:    Acute respiratory failure with hypoxia ([facility] )  Active Problems:    Tobacco abuse    Essential hypertension    Type 2 diabetes mellitus without complication, without long-term current use of insulin (HCC)    COPD with acute exacerbation ([facility])      Home Pulmonary Medications:  Reta Hines       Past Medical History:   Diagnosis Date    COPD (chronic obstructive pulmonary disease) ([facility] )      Social History     Socioeconomic History    Marital status:      Spouse name: None    Number of children: None    Years of education: None    Highest education level: None   Occupational History    None   Social Needs    Financial resource strain: None    Food insecurity     Worry: None     Inability: None    Transportation needs     Medical: None     Non-medical: None   Tobacco Use    Smoking status: Current Every Day Smoker     Types: Cigarettes    Smokeless tobacco: Never Used   Substance and Sexual Activity    Alcohol use: Not Currently    Drug use: Never    Sexual activity: None   Lifestyle    Physical activity     Days per week: None     Minutes per session: None    Stress: None   Relationships    Social connections     Talks on phone: None     Gets together: None     Attends Druze service: None     Active member of club or organization: None     Attends meetings of clubs or organizations: None     Relationship status: None    Intimate partner violence     Fear of current or ex partner: None     Emotionally abused: None     Physically abused: None     Forced sexual activity: None   Other Topics Concern    None   Social History Narrative    None       Subjective    Subjective Data: Pt offers no resp c/o at this time       Objective    Physical Exam:   Assessment Type: During-treatment  General Appearance: Alert, Awake  Respiratory Pattern: Normal  Chest Assessment: Chest expansion symmetrical  Bilateral Breath Sounds: Clear, Diminished  Cough: None  O2 Device: 3L nasal cannula    Vitals:  Blood pressure 117/64, pulse 84, temperature 98 1 °F (36 7 °C), resp  rate 15, height 5' 3" (1 6 m), weight 87 6 kg (193 lb 2 oz), SpO2 92 %            The lungs are clear    No pneumothorax or pleural effusion  Imaging and other studies:     O2 Device: 3L nasal cannula     Plan    Respiratory Plan: Mild Distress pathway        Resp Comments: no dyspnea at rest on 3L

## 2021-03-29 NOTE — RESPIRATORY THERAPY NOTE
Home Oxygen Qualifying Test       Patient name: Warden Roper        : 1955   Date of Test:  2021  Diagnosis:      Home Oxygen Test:    **Medicare Guidelines require item(s) 1-5 on all ambulatory patients or 1 and 2 on non-ambulatory patients  1   Baseline SPO2 on Room Air at rest 85 %  2   SPO2 during exercise on Room Air N/A %  During exercise monitor SpO2  If SPO2 increases >=89% with ambulation do not add supplemental             oxygen  If <= 88% on room air add O2 via NC and titrate patient  Patient must be ambulated with O2 and titrated to > 88% with exertion  3   SPO2 on Oxygen at rest 94 % 3 lpm     4   SPO2 during exercise on Oxygen  87% a liter flow of 6 lpm     5   Exercise performed:          walking          [x]  Supplemental Home Oxygen is indicated  []  Client does not qualify for home oxygen  Respiratory Additional Notes- Pt can not maintain SpO2>89% on 6LPM NC with ambulation     Jerome Mirza, RT

## 2021-03-29 NOTE — CASE MANAGEMENT
Current LOS 3 days  BRIANNE completed chart review  Pt case discussed with attending Dr Virgilio Mcintyre  Pt admitted with Acute Respiratory Failure with hypoxia  Pt currently requiring 3L NC supplemental O2  Pt did have Home O2 study completed  Pt requires 3L at rest and 6L with ambulation  Pt will need a new home set up prior to d/c  Pt had repeat chest x-ray appears there is cephalization and there is some mild effusions  Suspected she does have some pulmonary hypertension  Started on IV Lasix  CM will continue to follow pt medical course and assist with d/c planning as needed

## 2021-03-29 NOTE — ASSESSMENT & PLAN NOTE
· Improved Due to starting smoking continue steroids were decreased down to 40 mg IV q 12 hours  Continue Mucinex Zithromax 2 more days and Pulmicort  Her lungs without wheezing more airway but she did not do well on her ambulatory O2 studies      Will do another home O2 tomorrow again tomorrow

## 2021-03-29 NOTE — ASSESSMENT & PLAN NOTE
· The patient presenting 76% on room air currently on 3 L secondary to acute COPD exacerbation and she started smoking again  Will provide COPD treatment chest x-ray is negative for any pneumonia COVID-19 is negative  Home O2 studies at rest her fine at 3 L but when she did ambulation again she requires 6 L  I did repeat a chest x-ray which looks like there is cephalization and there is some mild effusions  I will repeat her per proBNP she had a normal 2D echo I suspected she does have some pulmonary hypertension will give her a trial of Lasix 20 mg IV x1 I did obtain a D-dimer which was negative    Will re-evaluate after diuresis tomorrow

## 2021-03-29 NOTE — PLAN OF CARE
Problem: RESPIRATORY - ADULT  Goal: Achieves optimal ventilation and oxygenation  Description: INTERVENTIONS:  - Assess for changes in respiratory status  - Assess for changes in mentation and behavior  - Position to facilitate oxygenation and minimize respiratory effort, encourage ambulation OOB to chair  - Oxygen administered by appropriate delivery if ordered  - Initiate smoking cessation education as indicated  - Encourage broncho-pulmonary hygiene including cough, deep breathe, Incentive Spirometry  - Assess the need for suctioning and aspirate as needed  - Assess and instruct to report SOB or any respiratory difficulty  - Respiratory Therapy support as indicated  Outcome: Progressing     Problem: PAIN - ADULT  Goal: Verbalizes/displays adequate comfort level or baseline comfort level  Description: Interventions:  - Encourage patient to monitor pain and request assistance  - Assess pain using appropriate pain scale  0-10  - Administer analgesics based on type and severity of pain and evaluate response  - Implement non-pharmacological measures as appropriate and evaluate response  - Consider cultural and social influences on pain and pain management  - Notify physician/advanced practitioner if interventions unsuccessful or patient reports new pain  Outcome: Progressing     Problem: INFECTION - ADULT  Goal: Absence or prevention of progression during hospitalization  Description: INTERVENTIONS:  - Assess and monitor for signs and symptoms of infection  - Monitor lab/diagnostic results  - Monitor all insertion sites, i e  indwelling lines, tubes, and drains  - Monitor endotracheal if appropriate and nasal secretions for changes in amount and color  - [place] appropriate cooling/warming therapies per order  - Administer medications as ordered  - Instruct and encourage patient and family to use good hand hygiene technique  - Identify and instruct in appropriate isolation precautions for identified infection/condition  Outcome: Progressing     Problem: SAFETY ADULT  Goal: Patient will remain free of falls  Description: INTERVENTIONS:  - Assess patient frequently for physical needs  -  Identify cognitive and physical deficits and behaviors that affect risk of falls    -  [place] fall precautions as indicated by assessment   - Educate patient/family on patient safety including physical limitations  - Instruct patient to call for assistance with activity based on assessment  - Modify environment to reduce risk of injury, call bell within reach  - Consider OT/PT consult to assist with strengthening/mobility  Outcome: Progressing  Goal: Maintain or return to baseline ADL function  Description: INTERVENTIONS:  -  Assess patient's ability to carry out ADLs; assess patient's baseline for ADL function and identify physical deficits which impact ability to perform ADLs (bathing, care of mouth/teeth, toileting, grooming, dressing, etc )  - Assess/evaluate cause of self-care deficits   - Assess range of motion  - Assess patient's mobility; develop plan if impaired  - Assess patient's need for assistive devices and provide as appropriate  - Encourage maximum independence but intervene and supervise when necessary  - Involve family in performance of ADLs  - Assess for home care needs following discharge   - Consider OT consult to assist with ADL evaluation and planning for discharge  - Provide patient education as appropriate  Outcome: Progressing  Goal: Maintain or return mobility status to optimal level  Description: INTERVENTIONS:  - Assess patient's baseline mobility status (ambulation, transfers, stairs, etc )    - Identify cognitive and physical deficits and behaviors that affect mobility  - Identify mobility aids required to assist with transfers and/or ambulation (gait belt, sit-to-stand, lift, walker, cane, etc )  - [place] fall precautions as indicated by assessment  - Record patient progress and toleration of activity level on Mobility SBAR; progress patient to next Phase/Stage  - Instruct patient to call for assistance with activity based on assessment  - Consider rehabilitation consult to assist with strengthening/weightbearing, etc   Outcome: Progressing     Problem: DISCHARGE PLANNING  Goal: Discharge to home or other facility with appropriate resources  Description: INTERVENTIONS:  - Identify barriers to discharge w/patient and caregiver  - Arrange for needed discharge resources and transportation as appropriate  - Identify discharge learning needs (meds, wound care, etc )  - Arrange for interpretive services to assist at discharge as needed  - Refer to Case Management Department for coordinating discharge planning if the patient needs post-hospital services based on physician/advanced practitioner order or complex needs related to functional status, cognitive ability, or social support system  Outcome: Progressing     Problem: Knowledge Deficit  Goal: Patient/family/caregiver demonstrates understanding of disease process, treatment plan, medications, and discharge instructions  Description: Complete learning assessment and assess knowledge base  Interventions:  - Provide teaching at level of understanding  - Provide teaching via preferred learning methods  Outcome: Progressing     Problem: Potential for Falls  Goal: Patient will remain free of falls  Description: INTERVENTIONS:  - Assess patient frequently for physical needs  -  Identify cognitive and physical deficits and behaviors that affect risk of falls    -  [place] fall precautions as indicated by assessment   - Educate patient/family on patient safety including physical limitations  - Instruct patient to call for assistance with activity based on assessment  - Modify environment to reduce risk of injury, call bell within reach  - Consider OT/PT consult to assist with strengthening/mobility  Outcome: Progressing

## 2021-03-30 VITALS
TEMPERATURE: 98.1 F | BODY MASS INDEX: 34.22 KG/M2 | SYSTOLIC BLOOD PRESSURE: 115 MMHG | WEIGHT: 193.12 LBS | RESPIRATION RATE: 21 BRPM | DIASTOLIC BLOOD PRESSURE: 64 MMHG | HEART RATE: 70 BPM | OXYGEN SATURATION: 95 % | HEIGHT: 63 IN

## 2021-03-30 LAB
ANION GAP SERPL CALCULATED.3IONS-SCNC: 0 MMOL/L (ref 4–13)
BUN SERPL-MCNC: 12 MG/DL (ref 5–25)
CALCIUM SERPL-MCNC: 9.2 MG/DL (ref 8.3–10.1)
CHLORIDE SERPL-SCNC: 98 MMOL/L (ref 100–108)
CO2 SERPL-SCNC: 39 MMOL/L (ref 21–32)
CREAT SERPL-MCNC: 0.69 MG/DL (ref 0.6–1.3)
GFR SERPL CREATININE-BSD FRML MDRD: 92 ML/MIN/1.73SQ M
GLUCOSE SERPL-MCNC: 125 MG/DL (ref 65–140)
GLUCOSE SERPL-MCNC: 165 MG/DL (ref 65–140)
GLUCOSE SERPL-MCNC: 204 MG/DL (ref 65–140)
GLUCOSE SERPL-MCNC: 217 MG/DL (ref 65–140)
NT-PROBNP SERPL-MCNC: 244 PG/ML
POTASSIUM SERPL-SCNC: 4.7 MMOL/L (ref 3.5–5.3)
SODIUM SERPL-SCNC: 137 MMOL/L (ref 136–145)

## 2021-03-30 PROCEDURE — 83880 ASSAY OF NATRIURETIC PEPTIDE: CPT | Performed by: FAMILY MEDICINE

## 2021-03-30 PROCEDURE — 94640 AIRWAY INHALATION TREATMENT: CPT

## 2021-03-30 PROCEDURE — 82948 REAGENT STRIP/BLOOD GLUCOSE: CPT

## 2021-03-30 PROCEDURE — 94760 N-INVAS EAR/PLS OXIMETRY 1: CPT

## 2021-03-30 PROCEDURE — 94761 N-INVAS EAR/PLS OXIMETRY MLT: CPT

## 2021-03-30 PROCEDURE — 99239 HOSP IP/OBS DSCHRG MGMT >30: CPT | Performed by: STUDENT IN AN ORGANIZED HEALTH CARE EDUCATION/TRAINING PROGRAM

## 2021-03-30 PROCEDURE — 80048 BASIC METABOLIC PNL TOTAL CA: CPT | Performed by: FAMILY MEDICINE

## 2021-03-30 RX ORDER — PREDNISONE 20 MG/1
TABLET ORAL
Qty: 12 TABLET | Refills: 0 | Status: SHIPPED | OUTPATIENT
Start: 2021-03-30 | End: 2021-04-11

## 2021-03-30 RX ORDER — NICOTINE 21 MG/24HR
1 PATCH, TRANSDERMAL 24 HOURS TRANSDERMAL DAILY
Qty: 28 PATCH | Refills: 0 | Status: SHIPPED | OUTPATIENT
Start: 2021-03-31 | End: 2021-06-20 | Stop reason: ALTCHOICE

## 2021-03-30 RX ADMIN — IPRATROPIUM BROMIDE 0.5 MG: 0.5 SOLUTION RESPIRATORY (INHALATION) at 13:44

## 2021-03-30 RX ADMIN — CITALOPRAM HYDROBROMIDE 10 MG: 10 TABLET ORAL at 08:29

## 2021-03-30 RX ADMIN — LEVALBUTEROL HYDROCHLORIDE 1.25 MG: 1.25 SOLUTION, CONCENTRATE RESPIRATORY (INHALATION) at 13:44

## 2021-03-30 RX ADMIN — ATORVASTATIN CALCIUM 40 MG: 40 TABLET, FILM COATED ORAL at 08:29

## 2021-03-30 RX ADMIN — LEVALBUTEROL HYDROCHLORIDE 1.25 MG: 1.25 SOLUTION, CONCENTRATE RESPIRATORY (INHALATION) at 07:36

## 2021-03-30 RX ADMIN — INSULIN LISPRO 2 UNITS: 100 INJECTION, SOLUTION INTRAVENOUS; SUBCUTANEOUS at 11:41

## 2021-03-30 RX ADMIN — METOPROLOL TARTRATE 100 MG: 50 TABLET, FILM COATED ORAL at 08:29

## 2021-03-30 RX ADMIN — METHYLPREDNISOLONE SODIUM SUCCINATE 40 MG: 40 INJECTION, POWDER, FOR SOLUTION INTRAMUSCULAR; INTRAVENOUS at 08:29

## 2021-03-30 RX ADMIN — INSULIN LISPRO 2 UNITS: 100 INJECTION, SOLUTION INTRAVENOUS; SUBCUTANEOUS at 16:56

## 2021-03-30 RX ADMIN — PIOGLITAZONE 15 MG: 15 TABLET ORAL at 08:29

## 2021-03-30 RX ADMIN — LISINOPRIL 40 MG: 20 TABLET ORAL at 08:29

## 2021-03-30 RX ADMIN — CLONIDINE HYDROCHLORIDE 0.1 MG: 0.1 TABLET ORAL at 08:29

## 2021-03-30 RX ADMIN — GLIPIZIDE 5 MG: 5 TABLET ORAL at 08:29

## 2021-03-30 RX ADMIN — BUDESONIDE 0.5 MG: 0.5 INHALANT RESPIRATORY (INHALATION) at 07:36

## 2021-03-30 RX ADMIN — HEPARIN SODIUM 5000 UNITS: 5000 INJECTION INTRAVENOUS; SUBCUTANEOUS at 14:46

## 2021-03-30 RX ADMIN — GUAIFENESIN 600 MG: 600 TABLET ORAL at 08:29

## 2021-03-30 RX ADMIN — Medication 1 PATCH: at 08:34

## 2021-03-30 RX ADMIN — PANTOPRAZOLE SODIUM 20 MG: 20 TABLET, DELAYED RELEASE ORAL at 05:08

## 2021-03-30 RX ADMIN — AMLODIPINE BESYLATE 10 MG: 10 TABLET ORAL at 08:29

## 2021-03-30 RX ADMIN — IPRATROPIUM BROMIDE 0.5 MG: 0.5 SOLUTION RESPIRATORY (INHALATION) at 07:36

## 2021-03-30 RX ADMIN — HEPARIN SODIUM 5000 UNITS: 5000 INJECTION INTRAVENOUS; SUBCUTANEOUS at 05:08

## 2021-03-30 NOTE — PLAN OF CARE
Problem: RESPIRATORY - ADULT  Goal: Achieves optimal ventilation and oxygenation  Description: INTERVENTIONS:  - Assess for changes in respiratory status  - Assess for changes in mentation and behavior  - Position to facilitate oxygenation and minimize respiratory effort, encourage ambulation OOB to chair  - Oxygen administered by appropriate delivery if ordered  - Initiate smoking cessation education as indicated  - Encourage broncho-pulmonary hygiene including cough, deep breathe, Incentive Spirometry  - Assess the need for suctioning and aspirate as needed  - Assess and instruct to report SOB or any respiratory difficulty  - Respiratory Therapy support as indicated  3/30/2021 1449 by Cindy Wang RN  Outcome: Adequate for Discharge  3/30/2021 1209 by Cindy Wang RN  Outcome: Progressing     Problem: PAIN - ADULT  Goal: Verbalizes/displays adequate comfort level or baseline comfort level  Description: Interventions:  - Encourage patient to monitor pain and request assistance  - Assess pain using appropriate pain scale  0-10  - Administer analgesics based on type and severity of pain and evaluate response  - Implement non-pharmacological measures as appropriate and evaluate response  - Consider cultural and social influences on pain and pain management  - Notify physician/advanced practitioner if interventions unsuccessful or patient reports new pain  3/30/2021 1449 by Cindy Wang RN  Outcome: Adequate for Discharge  3/30/2021 1209 by Cindy Wang RN  Outcome: Progressing     Problem: INFECTION - ADULT  Goal: Absence or prevention of progression during hospitalization  Description: INTERVENTIONS:  - Assess and monitor for signs and symptoms of infection  - Monitor lab/diagnostic results  - Monitor all insertion sites, i e  indwelling lines, tubes, and drains  - Monitor endotracheal if appropriate and nasal secretions for changes in amount and color  - [place] appropriate cooling/warming therapies per order  - Administer medications as ordered  - Instruct and encourage patient and family to use good hand hygiene technique  - Identify and instruct in appropriate isolation precautions for identified infection/condition  3/30/2021 1449 by Albaro Liao RN  Outcome: Adequate for Discharge  3/30/2021 1209 by Albaro Liao RN  Outcome: Progressing     Problem: SAFETY ADULT  Goal: Patient will remain free of falls  Description: INTERVENTIONS:  - Assess patient frequently for physical needs  -  Identify cognitive and physical deficits and behaviors that affect risk of falls    -  [place] fall precautions as indicated by assessment   - Educate patient/family on patient safety including physical limitations  - Instruct patient to call for assistance with activity based on assessment  - Modify environment to reduce risk of injury, call bell within reach  - Consider OT/PT consult to assist with strengthening/mobility  3/30/2021 1449 by Albaro Liao RN  Outcome: Adequate for Discharge  3/30/2021 1209 by Albaro Liao RN  Outcome: Progressing  Goal: Maintain or return to baseline ADL function  Description: INTERVENTIONS:  -  Assess patient's ability to carry out ADLs; assess patient's baseline for ADL function and identify physical deficits which impact ability to perform ADLs (bathing, care of mouth/teeth, toileting, grooming, dressing, etc )  - Assess/evaluate cause of self-care deficits   - Assess range of motion  - Assess patient's mobility; develop plan if impaired  - Assess patient's need for assistive devices and provide as appropriate  - Encourage maximum independence but intervene and supervise when necessary  - Involve family in performance of ADLs  - Assess for home care needs following discharge   - Consider OT consult to assist with ADL evaluation and planning for discharge  - Provide patient education as appropriate  3/30/2021 1449 by Albaro Liao RN  Outcome: Adequate for Discharge  3/30/2021 1209 by Albaro Liao RN  Outcome: Progressing  Goal: Maintain or return mobility status to optimal level  Description: INTERVENTIONS:  - Assess patient's baseline mobility status (ambulation, transfers, stairs, etc )    - Identify cognitive and physical deficits and behaviors that affect mobility  - Identify mobility aids required to assist with transfers and/or ambulation (gait belt, sit-to-stand, lift, walker, cane, etc )  - [place] fall precautions as indicated by assessment  - Record patient progress and toleration of activity level on Mobility SBAR; progress patient to next Phase/Stage  - Instruct patient to call for assistance with activity based on assessment  - Consider rehabilitation consult to assist with strengthening/weightbearing, etc   3/30/2021 1449 by Hailey Villareal RN  Outcome: Adequate for Discharge  3/30/2021 1209 by Hailey Villareal RN  Outcome: Progressing     Problem: DISCHARGE PLANNING  Goal: Discharge to home or other facility with appropriate resources  Description: INTERVENTIONS:  - Identify barriers to discharge w/patient and caregiver  - Arrange for needed discharge resources and transportation as appropriate  - Identify discharge learning needs (meds, wound care, etc )  - Arrange for interpretive services to assist at discharge as needed  - Refer to Case Management Department for coordinating discharge planning if the patient needs post-hospital services based on physician/advanced practitioner order or complex needs related to functional status, cognitive ability, or social support system  3/30/2021 1449 by Hailey Villareal RN  Outcome: Adequate for Discharge  3/30/2021 1209 by Hailey Villareal RN  Outcome: Progressing     Problem: Knowledge Deficit  Goal: Patient/family/caregiver demonstrates understanding of disease process, treatment plan, medications, and discharge instructions  Description: Complete learning assessment and assess knowledge base    Interventions:  - Provide teaching at level of understanding  - Provide teaching via preferred learning methods  3/30/2021 1449 by Letitia Ahumada, RN  Outcome: Adequate for Discharge  3/30/2021 1209 by Letitia Ahumada, RN  Outcome: Progressing     Problem: Potential for Falls  Goal: Patient will remain free of falls  Description: INTERVENTIONS:  - Assess patient frequently for physical needs  -  Identify cognitive and physical deficits and behaviors that affect risk of falls    -  [place] fall precautions as indicated by assessment   - Educate patient/family on patient safety including physical limitations  - Instruct patient to call for assistance with activity based on assessment  - Modify environment to reduce risk of injury, call bell within reach  - Consider OT/PT consult to assist with strengthening/mobility  3/30/2021 1449 by Letitia Ahumada, RN  Outcome: Adequate for Discharge  3/30/2021 1209 by Letitia Ahumada, RN  Outcome: Progressing

## 2021-03-30 NOTE — DISCHARGE SUMMARY
[address]  Discharge- Sukhjinder Reed 1955, 61 y o  female MRN: [id number]  Unit/Bed#: -01 Encounter: [id number]  Primary Care Provider: Michael Clark DO   Date and time admitted to hospital: 3/26/2021 12:14 PM    COPD with acute exacerbation [facility])  Assessment & Plan  · Improved Due to starting smoking continue steroids were decreased down to 40 mg IV q 12 hours  Continue Mucinex Zithromax 2 more days and Pulmicort  Her lungs without wheezing more airway but she did not do well on her ambulatory O2 studies  · Home O2 eval improved today - 2L at rest and 5L on ambulation    Type 2 diabetes mellitus without complication, without long-term current use of insulin [facility])  Assessment & Plan  Lab Results   Component Value Date    HGBA1C 6 5 (H) 2020       Recent Labs     21  1606 21  2034 21  0725 21  1054   POCGLU 281* 191* 125 204*       Blood Sugar Average: Last 72 hrs:  · (P) 552 [id number]   · Uncontrolled secondary to the steroids  continue glyburide and Actos she is eating better sugars are stable    Essential hypertension  Assessment & Plan  · Continue lisinopril clonidine metoprolol and Norvasc    Tobacco abuse  Assessment & Plan  · Patient quit smoking with subsidence back smoking in January because her son  in her sister  and she did not know that she had to call her PCP to have refills for nicotine patches  her on smoking cessation also provided her a nicotine patch while she is here  · Discharge with nicotine patch as patient states that this has helped her a lot    * Acute respiratory failure with hypoxia ([facility] [address] )  Assessment & Plan  · The patient presenting 76% on room air currently on 3 L secondary to acute COPD exacerbation and she started smoking again      · Patient's symptoms improved with COPD treatment, steroids and nebs  · Chest x-ray is negative for any pneumonia   · COVID-19 is negative  · Home O2 studies today improved: 5L on ambulation, 2L at rest   · Normal 2D echo   · D-dimer negative  · Pro-BNP mildly elevated at 244      Discharging Physician / Practitioner: Johana Mercado MD  PCP: Chetna Arnold DO  Admission Date:   Admission Orders (From admission, onward)     Ordered        03/26/21 1401  Inpatient Admission  Once                   Discharge Date: 03/30/21    Resolved Problems  Date Reviewed: 3/29/2021    None          Consultations During Hospital Stay:  · none    Procedures Performed:   · none    Significant Findings / Test Results:   XR chest pa & lateral   Final Result by Henny Floyd MD (03/29 2835)      Stable mild interstitial prominence and pulmonary vascular redistribution  Small effusions have increased  Workstation performed: [id number]         XR chest 1 view portable   ED Interpretation by Nathalie Quinonez DO (03/26 9793)   Haziness RLL, ?early infiltrate      Final Result by Mirella Escalera MD (03/26 8656)      No acute cardiopulmonary disease  Workstation performed: [id number]         ·   ·     Incidental Findings:   · none    Test Results Pending at Discharge (will require follow up):   · none     Outpatient Tests Requested:  · none    Complications:  none    Reason for Admission:  Acute COPD exacerbation    Hospital Course:     Rodriguez Arias is a 61 y o  female patient who originally presented to the hospital on 3/26/2021 due to worsening dyspnea  Patient states that her shortness of breath had worsened when she had started smoking again when she ran out of nicotine patches  Patient denied any or recent upper respiratory tract infection denied any cold  Denies any fevers or chills  Denies any sick contacts  Denies any lower extremity edema  Please see above list of diagnoses and related plan for additional information       Condition at Discharge: stable     Discharge Day Visit / Exam:     Subjective:  Patient seen examined at bedside  No events overnight  Patient endorses that she is feeling significantly better at this time  Denies any shortness of breath at this time and endorses that she is able to ambulate without any problems on oxygen  Vitals: Blood Pressure: 115/64 (03/30/21 1531)  Pulse: 70 (03/30/21 1531)  Temperature: 98 1 °F (36 7 °C) (03/30/21 1531)  Temp Source: Oral (03/30/21 0726)  Respirations: 21 (03/30/21 0726)  Height: 5' 3" (160 cm) (03/26/21 1217)  Weight - Scale: 87 6 kg (193 lb 2 oz) (03/26/21 1217)  SpO2: 95 % (03/30/21 1531)    Exam:   Physical Exam  Vitals signs and nursing note reviewed  Constitutional:       General: She is not in acute distress  Appearance: She is well-developed  HENT:      Head: Normocephalic and atraumatic  Eyes:      Conjunctiva/sclera: Conjunctivae normal    Neck:      Musculoskeletal: Neck supple  Cardiovascular:      Rate and Rhythm: Normal rate and regular rhythm  Heart sounds: No murmur  Pulmonary:      Effort: Pulmonary effort is normal  No respiratory distress  Breath sounds: Normal breath sounds  Abdominal:      Palpations: Abdomen is soft  Tenderness: There is no abdominal tenderness  Skin:     General: Skin is warm and dry  Neurological:      Mental Status: She is alert  Discussion with Family:  Patient to update family      Discharge instructions/Information to patient and family:   See after visit summary for information provided to patient and family  Provisions for Follow-Up Care:  See after visit summary for information related to follow-up care and any pertinent home health orders  Disposition:     Home    For Discharges to [facility]:   · Not Applicable to this Patient - Not Applicable to this Patient    Planned Readmission: none     Discharge Statement:  I spent >35 minutes discharging the patient  This time was spent on the day of discharge   I had direct contact with the patient on the day of discharge  Greater than 50% of the total time was spent examining patient, answering all patient questions, arranging and discussing plan of care with patient as well as directly providing post-discharge instructions  Additional time then spent on discharge activities  Discharge Medications:  See after visit summary for reconciled discharge medications provided to patient and family        ** Please Note: This note has been constructed using a voice recognition system **

## 2021-03-30 NOTE — ASSESSMENT & PLAN NOTE
· Patient quit smoking with subsidence back smoking in January because her son  in her sister  and she did not know that she had to call her PCP to have refills for nicotine patches  her on smoking cessation also provided her a nicotine patch while she is here  · Discharge with nicotine patch as patient states that this has helped her a lot

## 2021-03-30 NOTE — CASE MANAGEMENT
Current LOS 4 days  CM completed chart review  Pt case discussed with attending Dr Aracely Howell  Pt admitted with Acute Respiratory Failure with hypoxia  Pt currently requiring 2L NC supplemental O2  Pt is medically stable for d/c  Pt had home O2 evaluation completed  Pt will require O2 at time of d/c  All documents faxed to [facility] Patient  Confirmed with Lorraine Comment that a portable tank will be delivered to the bedside for transportation home  Information for [facility] and home delivery instruction added to AVS      PCP f/u scheduled  Information added to AVS      CM confirmed pt will have transportation home at time of d/c  Pt states her granddaughter is able to pick her up  CM will continue to follow pt care and remain available until d/c

## 2021-03-30 NOTE — PLAN OF CARE
Problem: RESPIRATORY - ADULT  Goal: Achieves optimal ventilation and oxygenation  Description: INTERVENTIONS:  - Assess for changes in respiratory status  - Assess for changes in mentation and behavior  - Position to facilitate oxygenation and minimize respiratory effort, encourage ambulation OOB to chair  - Oxygen administered by appropriate delivery if ordered  - Initiate smoking cessation education as indicated  - Encourage broncho-pulmonary hygiene including cough, deep breathe, Incentive Spirometry  - Assess the need for suctioning and aspirate as needed  - Assess and instruct to report SOB or any respiratory difficulty  - Respiratory Therapy support as indicated  Outcome: Progressing     Problem: PAIN - ADULT  Goal: Verbalizes/displays adequate comfort level or baseline comfort level  Description: Interventions:  - Encourage patient to monitor pain and request assistance  - Assess pain using appropriate pain scale  0-10  - Administer analgesics based on type and severity of pain and evaluate response  - Implement non-pharmacological measures as appropriate and evaluate response  - Consider cultural and social influences on pain and pain management  - Notify physician/advanced practitioner if interventions unsuccessful or patient reports new pain  Outcome: Progressing     Problem: INFECTION - ADULT  Goal: Absence or prevention of progression during hospitalization  Description: INTERVENTIONS:  - Assess and monitor for signs and symptoms of infection  - Monitor lab/diagnostic results  - Monitor all insertion sites, i e  indwelling lines, tubes, and drains  - Monitor endotracheal if appropriate and nasal secretions for changes in amount and color  - [place] appropriate cooling/warming therapies per order  - Administer medications as ordered  - Instruct and encourage patient and family to use good hand hygiene technique  - Identify and instruct in appropriate isolation precautions for identified infection/condition  Outcome: Progressing     Problem: SAFETY ADULT  Goal: Patient will remain free of falls  Description: INTERVENTIONS:  - Assess patient frequently for physical needs  -  Identify cognitive and physical deficits and behaviors that affect risk of falls    -  [place] fall precautions as indicated by assessment   - Educate patient/family on patient safety including physical limitations  - Instruct patient to call for assistance with activity based on assessment  - Modify environment to reduce risk of injury, call bell within reach  - Consider OT/PT consult to assist with strengthening/mobility  Outcome: Progressing  Goal: Maintain or return to baseline ADL function  Description: INTERVENTIONS:  -  Assess patient's ability to carry out ADLs; assess patient's baseline for ADL function and identify physical deficits which impact ability to perform ADLs (bathing, care of mouth/teeth, toileting, grooming, dressing, etc )  - Assess/evaluate cause of self-care deficits   - Assess range of motion  - Assess patient's mobility; develop plan if impaired  - Assess patient's need for assistive devices and provide as appropriate  - Encourage maximum independence but intervene and supervise when necessary  - Involve family in performance of ADLs  - Assess for home care needs following discharge   - Consider OT consult to assist with ADL evaluation and planning for discharge  - Provide patient education as appropriate  Outcome: Progressing  Goal: Maintain or return mobility status to optimal level  Description: INTERVENTIONS:  - Assess patient's baseline mobility status (ambulation, transfers, stairs, etc )    - Identify cognitive and physical deficits and behaviors that affect mobility  - Identify mobility aids required to assist with transfers and/or ambulation (gait belt, sit-to-stand, lift, walker, cane, etc )  - [place] fall precautions as indicated by assessment  - Record patient progress and toleration of activity level on Mobility SBAR; progress patient to next Phase/Stage  - Instruct patient to call for assistance with activity based on assessment  - Consider rehabilitation consult to assist with strengthening/weightbearing, etc   Outcome: Progressing     Problem: DISCHARGE PLANNING  Goal: Discharge to home or other facility with appropriate resources  Description: INTERVENTIONS:  - Identify barriers to discharge w/patient and caregiver  - Arrange for needed discharge resources and transportation as appropriate  - Identify discharge learning needs (meds, wound care, etc )  - Arrange for interpretive services to assist at discharge as needed  - Refer to Case Management Department for coordinating discharge planning if the patient needs post-hospital services based on physician/advanced practitioner order or complex needs related to functional status, cognitive ability, or social support system  Outcome: Progressing     Problem: Knowledge Deficit  Goal: Patient/family/caregiver demonstrates understanding of disease process, treatment plan, medications, and discharge instructions  Description: Complete learning assessment and assess knowledge base  Interventions:  - Provide teaching at level of understanding  - Provide teaching via preferred learning methods  Outcome: Progressing     Problem: Potential for Falls  Goal: Patient will remain free of falls  Description: INTERVENTIONS:  - Assess patient frequently for physical needs  -  Identify cognitive and physical deficits and behaviors that affect risk of falls    -  [place] fall precautions as indicated by assessment   - Educate patient/family on patient safety including physical limitations  - Instruct patient to call for assistance with activity based on assessment  - Modify environment to reduce risk of injury, call bell within reach  - Consider OT/PT consult to assist with strengthening/mobility  Outcome: Progressing

## 2021-03-30 NOTE — RESPIRATORY THERAPY NOTE
Home Oxygen Qualifying Test       Patient name: Misha Maher        : 1955   Date of Test:  2021  Diagnosis:      Home Oxygen Test:    **Medicare Guidelines require item(s) 1-5 on all ambulatory patients or 1 and 2 on non-ambulatory patients  1   Baseline SPO2 on Room Air at rest 88 %  2   SPO2 during exercise on Room Air 85 %  During exercise monitor SpO2  If SPO2 increases >=89% with ambulation do not add supplemental             oxygen  If <= 88% on room air add O2 via NC and titrate patient  Patient must be ambulated with O2 and titrated to > 88% with exertion  3   SPO2 on Oxygen at rest 92 % 2 lpm     4   SPO2 during exercise on Oxygen  92% a liter flow of 5 lpm     5   Exercise performed:          walking, duration 6 (min), distance 200 (feet)          [x]  Supplemental Home Oxygen is indicated  []  Client does not qualify for home oxygen  Respiratory Additional Notes- Pt requires 5 L NC to walk and 2 L NC at rest at this time       [address], RT

## 2021-03-30 NOTE — ASSESSMENT & PLAN NOTE
· Improved Due to starting smoking continue steroids were decreased down to 40 mg IV q 12 hours  Continue Mucinex Zithromax 2 more days and Pulmicort  Her lungs without wheezing more airway but she did not do well on her ambulatory O2 studies        · Home O2 eval improved today - 2L at rest and 5L on ambulation

## 2021-03-30 NOTE — PLAN OF CARE
Problem: RESPIRATORY - ADULT  Goal: Achieves optimal ventilation and oxygenation  Description: INTERVENTIONS:  - Assess for changes in respiratory status  - Assess for changes in mentation and behavior  - Position to facilitate oxygenation and minimize respiratory effort, encourage ambulation OOB to chair  - Oxygen administered by appropriate delivery if ordered  - Initiate smoking cessation education as indicated  - Encourage broncho-pulmonary hygiene including cough, deep breathe, Incentive Spirometry  - Assess the need for suctioning and aspirate as needed  - Assess and instruct to report SOB or any respiratory difficulty  - Respiratory Therapy support as indicated  Outcome: Progressing     Problem: PAIN - ADULT  Goal: Verbalizes/displays adequate comfort level or baseline comfort level  Description: Interventions:  - Encourage patient to monitor pain and request assistance  - Assess pain using appropriate pain scale  0-10  - Administer analgesics based on type and severity of pain and evaluate response  - Implement non-pharmacological measures as appropriate and evaluate response  - Consider cultural and social influences on pain and pain management  - Notify physician/advanced practitioner if interventions unsuccessful or patient reports new pain  Outcome: Progressing     Problem: INFECTION - ADULT  Goal: Absence or prevention of progression during hospitalization  Description: INTERVENTIONS:  - Assess and monitor for signs and symptoms of infection  - Monitor lab/diagnostic results  - Monitor all insertion sites, i e  indwelling lines, tubes, and drains  - Monitor endotracheal if appropriate and nasal secretions for changes in amount and color  - [place] appropriate cooling/warming therapies per order  - Administer medications as ordered  - Instruct and encourage patient and family to use good hand hygiene technique  - Identify and instruct in appropriate isolation precautions for identified infection/condition  Outcome: Progressing     Problem: SAFETY ADULT  Goal: Patient will remain free of falls  Description: INTERVENTIONS:  - Assess patient frequently for physical needs  -  Identify cognitive and physical deficits and behaviors that affect risk of falls    -  [place] fall precautions as indicated by assessment   - Educate patient/family on patient safety including physical limitations  - Instruct patient to call for assistance with activity based on assessment  - Modify environment to reduce risk of injury, call bell within reach  - Consider OT/PT consult to assist with strengthening/mobility  Outcome: Progressing  Goal: Maintain or return to baseline ADL function  Description: INTERVENTIONS:  -  Assess patient's ability to carry out ADLs; assess patient's baseline for ADL function and identify physical deficits which impact ability to perform ADLs (bathing, care of mouth/teeth, toileting, grooming, dressing, etc )  - Assess/evaluate cause of self-care deficits   - Assess range of motion  - Assess patient's mobility; develop plan if impaired  - Assess patient's need for assistive devices and provide as appropriate  - Encourage maximum independence but intervene and supervise when necessary  - Involve family in performance of ADLs  - Assess for home care needs following discharge   - Consider OT consult to assist with ADL evaluation and planning for discharge  - Provide patient education as appropriate  Outcome: Progressing  Goal: Maintain or return mobility status to optimal level  Description: INTERVENTIONS:  - Assess patient's baseline mobility status (ambulation, transfers, stairs, etc )    - Identify cognitive and physical deficits and behaviors that affect mobility  - Identify mobility aids required to assist with transfers and/or ambulation (gait belt, sit-to-stand, lift, walker, cane, etc )  - [place] fall precautions as indicated by assessment  - Record patient progress and toleration of activity level on Mobility SBAR; progress patient to next Phase/Stage  - Instruct patient to call for assistance with activity based on assessment  - Consider rehabilitation consult to assist with strengthening/weightbearing, etc   Outcome: Progressing     Problem: DISCHARGE PLANNING  Goal: Discharge to home or other facility with appropriate resources  Description: INTERVENTIONS:  - Identify barriers to discharge w/patient and caregiver  - Arrange for needed discharge resources and transportation as appropriate  - Identify discharge learning needs (meds, wound care, etc )  - Arrange for interpretive services to assist at discharge as needed  - Refer to Case Management Department for coordinating discharge planning if the patient needs post-hospital services based on physician/advanced practitioner order or complex needs related to functional status, cognitive ability, or social support system  Outcome: Progressing     Problem: Knowledge Deficit  Goal: Patient/family/caregiver demonstrates understanding of disease process, treatment plan, medications, and discharge instructions  Description: Complete learning assessment and assess knowledge base  Interventions:  - Provide teaching at level of understanding  - Provide teaching via preferred learning methods  Outcome: Progressing     Problem: Potential for Falls  Goal: Patient will remain free of falls  Description: INTERVENTIONS:  - Assess patient frequently for physical needs  -  Identify cognitive and physical deficits and behaviors that affect risk of falls    -  [place] fall precautions as indicated by assessment   - Educate patient/family on patient safety including physical limitations  - Instruct patient to call for assistance with activity based on assessment  - Modify environment to reduce risk of injury, call bell within reach  - Consider OT/PT consult to assist with strengthening/mobility  Outcome: Progressing

## 2021-03-30 NOTE — ASSESSMENT & PLAN NOTE
Lab Results   Component Value Date    HGBA1C 6 5 (H) 11/02/2020       Recent Labs     03/29/21  1606 03/29/21  2034 03/30/21  0725 03/30/21  1054   POCGLU 281* 191* 125 204*       Blood Sugar Average: Last 72 hrs:  · (P) 837 [id number]   · Uncontrolled secondary to the steroids    continue glyburide and Actos she is eating better sugars are stable

## 2021-03-30 NOTE — NURSING NOTE
Patient ready for discharge; IV removed intact, no bleeding noted;  AVs printed and reviewed with patient to include portable oxygen use  Patient transported via wheelchair to exit accompanied by PCA and granddaughter

## 2021-03-31 LAB
BACTERIA BLD CULT: NORMAL
BACTERIA BLD CULT: NORMAL

## 2021-03-31 NOTE — UTILIZATION REVIEW
Notification of Discharge  This is a Notification of Discharge from our facility [address]  Please be advised that this patient has been discharge from our facility  Below you will find the admission and discharge date and time including the patients disposition  PRESENTATION DATE: 3/26/2021 12:14 PM  OBS ADMISSION DATE:   IP ADMISSION DATE: 3/26/21 1401   DISCHARGE DATE: 3/30/2021  6:05 PM  DISPOSITION: Home/Self Care Home/Self Care   Admission Orders listed below:  Admission Orders (From admission, onward)     Ordered        03/26/21 1401  Inpatient Admission  Once                   Please contact the UR Department if additional information is required to close this patient's authorization/case  [address] [facility] Utilization Review Department  Main: [phone] x carefully listen to the prompts  All voicemails are confidential   [email or web address]  Send all requests for admission clinical reviews, approved or denied determinations and any other requests to dedicated fax number below belonging to the campus where the patient is receiving treatment   List of dedicated fax numbers:  [address] DENIALS (Administrative/Medical Necessity) [phone]   [address] (Maternity/NICU/Pediatrics) [phone]   Shell Quinones [phone]   Dev Zhong [phone]   [address] [place] [address] [phone]   [address] [place] [address] [phone]   [facility]  [phone]   [address] [facility] [address]  [address] [address] [address] [facility] [phone]

## 2021-06-20 ENCOUNTER — HOSPITAL ENCOUNTER (EMERGENCY)
Facility: HOSPITAL | Age: 66
Discharge: HOME | End: 2021-06-20
Attending: EMERGENCY MEDICINE | Admitting: EMERGENCY MEDICINE
Payer: COMMERCIAL

## 2021-06-20 ENCOUNTER — APPOINTMENT (EMERGENCY)
Dept: RADIOLOGY | Facility: HOSPITAL | Age: 66
End: 2021-06-20
Payer: COMMERCIAL

## 2021-06-20 VITALS
HEART RATE: 66 BPM | HEIGHT: 63 IN | TEMPERATURE: 98.4 F | DIASTOLIC BLOOD PRESSURE: 81 MMHG | OXYGEN SATURATION: 95 % | RESPIRATION RATE: 24 BRPM | WEIGHT: 194.45 LBS | BODY MASS INDEX: 34.45 KG/M2 | SYSTOLIC BLOOD PRESSURE: 138 MMHG

## 2021-06-20 DIAGNOSIS — J44.1 COPD WITH ACUTE EXACERBATION (HCC): Primary | ICD-10-CM

## 2021-06-20 LAB
ALBUMIN SERPL BCP-MCNC: 3.3 G/DL (ref 3.5–5)
ALP SERPL-CCNC: 128 U/L (ref 46–116)
ALT SERPL W P-5'-P-CCNC: 19 U/L (ref 12–78)
ANION GAP SERPL CALCULATED.3IONS-SCNC: 3 MMOL/L (ref 4–13)
AST SERPL W P-5'-P-CCNC: 19 U/L (ref 5–45)
BASOPHILS # BLD AUTO: 0.02 THOUSANDS/ΜL (ref 0–0.1)
BASOPHILS NFR BLD AUTO: 0 % (ref 0–1)
BILIRUB SERPL-MCNC: 0.6 MG/DL (ref 0.2–1)
BUN SERPL-MCNC: 7 MG/DL (ref 5–25)
CALCIUM ALBUM COR SERPL-MCNC: 10.3 MG/DL (ref 8.3–10.1)
CALCIUM SERPL-MCNC: 9.7 MG/DL (ref 8.3–10.1)
CHLORIDE SERPL-SCNC: 93 MMOL/L (ref 100–108)
CO2 SERPL-SCNC: 36 MMOL/L (ref 21–32)
CREAT SERPL-MCNC: 0.56 MG/DL (ref 0.6–1.3)
EOSINOPHIL # BLD AUTO: 0.03 THOUSAND/ΜL (ref 0–0.61)
EOSINOPHIL NFR BLD AUTO: 1 % (ref 0–6)
ERYTHROCYTE [DISTWIDTH] IN BLOOD BY AUTOMATED COUNT: 13.8 % (ref 11.6–15.1)
GFR SERPL CREATININE-BSD FRML MDRD: 99 ML/MIN/1.73SQ M
GLUCOSE SERPL-MCNC: 108 MG/DL (ref 65–140)
HCT VFR BLD AUTO: 39.8 % (ref 34.8–46.1)
HGB BLD-MCNC: 13.4 G/DL (ref 11.5–15.4)
IMM GRANULOCYTES # BLD AUTO: 0.03 THOUSAND/UL (ref 0–0.2)
IMM GRANULOCYTES NFR BLD AUTO: 1 % (ref 0–2)
LYMPHOCYTES # BLD AUTO: 1.58 THOUSANDS/ΜL (ref 0.6–4.47)
LYMPHOCYTES NFR BLD AUTO: 27 % (ref 14–44)
MCH RBC QN AUTO: 29.3 PG (ref 26.8–34.3)
MCHC RBC AUTO-ENTMCNC: 33.7 G/DL (ref 31.4–37.4)
MCV RBC AUTO: 87 FL (ref 82–98)
MONOCYTES # BLD AUTO: 0.71 THOUSAND/ΜL (ref 0.17–1.22)
MONOCYTES NFR BLD AUTO: 12 % (ref 4–12)
NEUTROPHILS # BLD AUTO: 3.59 THOUSANDS/ΜL (ref 1.85–7.62)
NEUTS SEG NFR BLD AUTO: 59 % (ref 43–75)
NRBC BLD AUTO-RTO: 0 /100 WBCS
NT-PROBNP SERPL-MCNC: 304 PG/ML
PLATELET # BLD AUTO: 171 THOUSANDS/UL (ref 149–390)
PMV BLD AUTO: 10.7 FL (ref 8.9–12.7)
POTASSIUM SERPL-SCNC: 4.1 MMOL/L (ref 3.5–5.3)
PROT SERPL-MCNC: 7.5 G/DL (ref 6.4–8.2)
RBC # BLD AUTO: 4.57 MILLION/UL (ref 3.81–5.12)
SARS-COV-2 RNA RESP QL NAA+PROBE: NEGATIVE
SODIUM SERPL-SCNC: 132 MMOL/L (ref 136–145)
TROPONIN I SERPL-MCNC: <0.02 NG/ML
WBC # BLD AUTO: 5.96 THOUSAND/UL (ref 4.31–10.16)

## 2021-06-20 PROCEDURE — 99285 EMERGENCY DEPT VISIT HI MDM: CPT | Performed by: EMERGENCY MEDICINE

## 2021-06-20 PROCEDURE — 83880 ASSAY OF NATRIURETIC PEPTIDE: CPT | Performed by: EMERGENCY MEDICINE

## 2021-06-20 PROCEDURE — U0005 INFEC AGEN DETEC AMPLI PROBE: HCPCS | Performed by: EMERGENCY MEDICINE

## 2021-06-20 PROCEDURE — U0003 INFECTIOUS AGENT DETECTION BY NUCLEIC ACID (DNA OR RNA); SEVERE ACUTE RESPIRATORY SYNDROME CORONAVIRUS 2 (SARS-COV-2) (CORONAVIRUS DISEASE [COVID-19]), AMPLIFIED PROBE TECHNIQUE, MAKING USE OF HIGH THROUGHPUT TECHNOLOGIES AS DESCRIBED BY CMS-2020-01-R: HCPCS | Performed by: EMERGENCY MEDICINE

## 2021-06-20 PROCEDURE — 99285 EMERGENCY DEPT VISIT HI MDM: CPT

## 2021-06-20 PROCEDURE — 36415 COLL VENOUS BLD VENIPUNCTURE: CPT | Performed by: EMERGENCY MEDICINE

## 2021-06-20 PROCEDURE — 85025 COMPLETE CBC W/AUTO DIFF WBC: CPT | Performed by: EMERGENCY MEDICINE

## 2021-06-20 PROCEDURE — 71045 X-RAY EXAM CHEST 1 VIEW: CPT

## 2021-06-20 PROCEDURE — 93005 ELECTROCARDIOGRAM TRACING: CPT

## 2021-06-20 PROCEDURE — 94640 AIRWAY INHALATION TREATMENT: CPT

## 2021-06-20 PROCEDURE — 84484 ASSAY OF TROPONIN QUANT: CPT | Performed by: EMERGENCY MEDICINE

## 2021-06-20 PROCEDURE — 80053 COMPREHEN METABOLIC PANEL: CPT | Performed by: EMERGENCY MEDICINE

## 2021-06-20 RX ORDER — ALBUTEROL SULFATE 2.5 MG/3ML
2.5 SOLUTION RESPIRATORY (INHALATION) EVERY 6 HOURS PRN
Qty: 75 ML | Refills: 3 | Status: SHIPPED | OUTPATIENT
Start: 2021-06-20

## 2021-06-20 RX ORDER — SULFAMETHOXAZOLE AND TRIMETHOPRIM 800; 160 MG/1; MG/1
1 TABLET ORAL 2 TIMES DAILY
Qty: 14 TABLET | Refills: 0 | Status: SHIPPED | OUTPATIENT
Start: 2021-06-20 | End: 2021-06-27

## 2021-06-20 RX ORDER — PREDNISONE 20 MG/1
40 TABLET ORAL DAILY
Qty: 10 TABLET | Refills: 0 | Status: SHIPPED | OUTPATIENT
Start: 2021-06-21 | End: 2021-06-26

## 2021-06-20 RX ORDER — PREDNISONE 20 MG/1
60 TABLET ORAL ONCE
Status: COMPLETED | OUTPATIENT
Start: 2021-06-20 | End: 2021-06-20

## 2021-06-20 RX ORDER — IPRATROPIUM BROMIDE AND ALBUTEROL SULFATE 2.5; .5 MG/3ML; MG/3ML
3 SOLUTION RESPIRATORY (INHALATION) ONCE
Status: COMPLETED | OUTPATIENT
Start: 2021-06-20 | End: 2021-06-20

## 2021-06-20 RX ORDER — SULFAMETHOXAZOLE AND TRIMETHOPRIM 800; 160 MG/1; MG/1
1 TABLET ORAL ONCE
Status: COMPLETED | OUTPATIENT
Start: 2021-06-20 | End: 2021-06-20

## 2021-06-20 RX ADMIN — IPRATROPIUM BROMIDE AND ALBUTEROL SULFATE 3 ML: 2.5; .5 SOLUTION RESPIRATORY (INHALATION) at 18:21

## 2021-06-20 RX ADMIN — SULFAMETHOXAZOLE AND TRIMETHOPRIM 1 TABLET: 800; 160 TABLET ORAL at 19:16

## 2021-06-20 RX ADMIN — PREDNISONE 60 MG: 20 TABLET ORAL at 18:14

## 2021-06-20 NOTE — ED NOTES
Reviewed with patient how to use nebulizer  Her and caregiver verbalized understanding of essential concepts       Doreen López RN  06/20/21 1932

## 2021-06-20 NOTE — ED PROVIDER NOTES
History  Chief Complaint   Patient presents with    Shortness of Breath     pt c/o worsening sob w/tightness in chest and cough for past week  hx copd  wears 2-4 L NC daily  denies travel/fevers/n/v/d     Patient is a very patient is very pleasant 81-year-old female presents emergency room with chief complaint of shortness of breath and worsening chest tightness ongoing for about the last week  Denies any diaphoresis  Patient has a history of COPD and does continue to smoke  She is on home O2 chronically  She has been recently in our facility for similar symptomatology  She has also had previously had 1 episode of congestive heart failure as reported by her granddaughter  She has also had pneumonias in the past     Patient reports that she is having chest tightness  Is not radiating  No nausea vomiting  No diaphoresis  She is having shortness of breath  She has no previous history of myocardial infarction  She has had a cough  There is no fever reported  Cough has been nonproductive  She also feels as if she may be wheezing  She has been compliant with her home medications as per her own report      History provided by:  Patient  Shortness of Breath  Severity:  Moderate  Onset quality:  Gradual  Duration:  5 days  Timing:  Constant  Progression:  Worsening  Chronicity:  Chronic  Context: activity    Relieved by:  Rest and oxygen  Worsened by:  Exertion, stress and activity  Ineffective treatments:  Inhaler  Associated symptoms: chest pain and cough    Associated symptoms: no abdominal pain, no ear pain, no fever, no headaches, no rash, no sore throat, no vomiting and no wheezing    Chest pain:     Timing:  Intermittent    Progression:  Waxing and waning  Cough:     Cough characteristics:  Unable to specify    Sputum characteristics:  Unable to specify      Prior to Admission Medications   Prescriptions Last Dose Informant Patient Reported? Taking?    Ergocalciferol (VITAMIN D2 PO)   Yes Yes   Sig: Take 1 25 mg by mouth once a week   amLODIPine (NORVASC) 10 mg tablet   Yes Yes   Sig: Take 10 mg by mouth daily at bedtime    atorvastatin (LIPITOR) 40 mg tablet   Yes Yes   Sig: Take 40 mg by mouth daily at bedtime    citalopram (CeleXA) 10 mg tablet   Yes Yes   Sig: Take 10 mg by mouth daily   cloNIDine (CATAPRES) 0 1 mg tablet   Yes Yes   Sig: Take 0 1 mg by mouth every 12 (twelve) hours   glipiZIDE (GLUCOTROL) 5 mg tablet   Yes Yes   Sig: Take 5 mg by mouth daily   ipratropium-albuterol (COMBIVENT RESPIMAT) inhaler   Yes Yes   Sig: Inhale 1 puff 4 (four) times a day   lisinopril (ZESTRIL) 40 mg tablet   Yes Yes   Sig: Take 40 mg by mouth daily   metoprolol tartrate (LOPRESSOR) 100 mg tablet   Yes Yes   Sig: Take 100 mg by mouth every 12 (twelve) hours   omeprazole (PriLOSEC OTC) 20 MG tablet   Yes Yes   Sig: Take 20 mg by mouth daily    pioglitazone (ACTOS) 15 mg tablet   Yes Yes   Sig: Take 15 mg by mouth daily   tiotropium (SPIRIVA) 18 mcg inhalation capsule   Yes Yes   Sig: Place 18 mcg into inhaler and inhale daily   traZODone (DESYREL) 100 mg tablet   Yes Yes   Sig: Take 100 mg by mouth daily at bedtime as needed       Facility-Administered Medications: None       Past Medical History:   Diagnosis Date    COPD (chronic obstructive pulmonary disease) ([facility])        History reviewed  No pertinent surgical history  History reviewed  No pertinent family history  I have reviewed and agree with the history as documented  E-Cigarette/Vaping    E-Cigarette Use Never User      E-Cigarette/Vaping Substances     Social History     Tobacco Use    Smoking status: Light Tobacco Smoker     Types: Cigarettes    Smokeless tobacco: Never Used   Vaping Use    Vaping Use: Never used   Substance Use Topics    Alcohol use: Not Currently    Drug use: Never       Review of Systems   Constitutional: Negative for activity change, chills, fatigue and fever  HENT: Positive for congestion   Negative for ear pain, nosebleeds, rhinorrhea, sinus pressure and sore throat  Eyes: Negative  Negative for redness and itching  Respiratory: Positive for cough, chest tightness and shortness of breath  Negative for wheezing  Cardiovascular: Positive for chest pain  Negative for palpitations and leg swelling  Gastrointestinal: Negative for abdominal pain, diarrhea, nausea and vomiting  Endocrine: Negative  Negative for cold intolerance and heat intolerance  Genitourinary: Negative for dysuria, flank pain, frequency and urgency  Musculoskeletal: Positive for back pain and myalgias  Negative for arthralgias  Skin: Negative  Negative for pallor, rash and wound  Allergic/Immunologic: Negative  Neurological: Negative for dizziness, weakness and headaches  Hematological: Negative  Does not bruise/bleed easily  Psychiatric/Behavioral: Negative  All other systems reviewed and are negative  Physical Exam  Physical Exam  Vitals and nursing note reviewed  Constitutional:       General: She is awake  She is not in acute distress  Appearance: Normal appearance  She is well-developed  She is not ill-appearing or toxic-appearing  HENT:      Head: Normocephalic and atraumatic  Hair is normal       Jaw: No pain on movement  Right Ear: External ear normal       Left Ear: External ear normal       Nose: Nose normal    Eyes:      General: Lids are normal  No scleral icterus  Extraocular Movements: Extraocular movements intact  Pupils: Pupils are equal, round, and reactive to light  Cardiovascular:      Rate and Rhythm: Normal rate and regular rhythm  Heart sounds: Normal heart sounds  No murmur heard  Pulmonary:      Effort: Pulmonary effort is normal  No respiratory distress  Breath sounds: No stridor  Decreased breath sounds and wheezing present  No rhonchi or rales  Abdominal:      General: Abdomen is flat  There is no distension  Palpations: Abdomen is soft  Tenderness: There is no abdominal tenderness  There is no guarding  Musculoskeletal:         General: No deformity  Normal range of motion  Cervical back: Normal range of motion and neck supple  Right lower leg: No tenderness  Left lower leg: No tenderness  Skin:     General: Skin is warm and dry  Coloration: Skin is not jaundiced or pale  Findings: No rash  Neurological:      Mental Status: She is alert and oriented to person, place, and time  Mental status is at baseline  Cranial Nerves: No cranial nerve deficit  Psychiatric:         Attention and Perception: Attention normal          Mood and Affect: Mood normal          Speech: Speech normal          Behavior: Behavior normal          Vital Signs  ED Triage Vitals [06/20/21 1743]   Temperature Pulse Respirations Blood Pressure SpO2   98 4 °F (36 9 °C) 73 (!) 25 (!) 181/94 96 %      Temp Source Heart Rate Source Patient Position - Orthostatic VS BP Location FiO2 (%)   Temporal Monitor Lying Left arm --      Pain Score       7           Vitals:    06/20/21 1743 06/20/21 1830 06/20/21 1915   BP: (!) 181/94 159/82 138/81   Pulse: 73 66 66   Patient Position - Orthostatic VS: Lying           Visual Acuity      ED Medications  Medications   ipratropium-albuterol (DUO-NEB) 0 5-2 5 mg/3 mL inhalation solution 3 mL (3 mL Nebulization Given 6/20/21 1821)   predniSONE tablet 60 mg (60 mg Oral Given 6/20/21 1814)   sulfamethoxazole-trimethoprim (BACTRIM DS) 800-160 mg per tablet 1 tablet (1 tablet Oral Given 6/20/21 1916)       Diagnostic Studies  Results Reviewed     Procedure Component Value Units Date/Time    Novel Coronavirus Skip Galeano [facility] [[id number]]  (Normal) Collected: 06/20/21 1820    Lab Status: Final result Specimen: Nares from Nose Updated: 06/20/21 1925     SARS-CoV-2 Negative    Narrative:       The specimen collection materials, transport medium, and/or testing methodology utilized in the production of these test results have been proven to be reliable in a limited validation with an abbreviated program under the Emergency Utilization Authorization provided by the FDA  Testing reported as "Presumptive positive" will be confirmed with secondary testing to ensure result accuracy  Clinical caution and judgement should be used with the interpretation of these results with consideration of the clinical impression and other laboratory testing  Testing reported as "Positive" or "Negative" has been proven to be accurate according to standard laboratory validation requirements  All testing is performed with control materials showing appropriate reactivity at standard intervals        NT-BNP PRO [[id number]]  (Abnormal) Collected: 06/20/21 1820    Lab Status: Final result Specimen: Blood from Arm, Right Updated: 06/20/21 1854     NT-proBNP 304 pg/mL     Troponin I [[id number]]  (Normal) Collected: 06/20/21 1820    Lab Status: Final result Specimen: Blood from Arm, Right Updated: 06/20/21 1852     Troponin I <0 02 ng/mL     Comprehensive metabolic panel [[id number]]  (Abnormal) Collected: 06/20/21 1820    Lab Status: Final result Specimen: Blood from Arm, Right Updated: 06/20/21 1848     Sodium 132 mmol/L      Potassium 4 1 mmol/L      Chloride 93 mmol/L      CO2 36 mmol/L      ANION GAP 3 mmol/L      BUN 7 mg/dL      Creatinine 0 56 mg/dL      Glucose 108 mg/dL      Calcium 9 7 mg/dL      Corrected Calcium 10 3 mg/dL      AST 19 U/L      ALT 19 U/L      Alkaline Phosphatase 128 U/L      Total Protein 7 5 g/dL      Albumin 3 3 g/dL      Total Bilirubin 0 60 mg/dL      eGFR 99 ml/min/1 73sq m     Narrative:      [place] guidelines for Chronic Kidney Disease (CKD):     Stage 1 with normal or high GFR (GFR > 90 mL/min/1 73 square meters)    Stage 2 Mild CKD (GFR = 60-89 mL/min/1 73 square meters)    Stage 3A Moderate CKD (GFR = 45-59 mL/min/1 73 square meters)    Stage 3B Moderate CKD (GFR = 30-44 mL/min/1 73 square meters)    Stage 4 Severe CKD (GFR = 15-29 mL/min/1 73 square meters)    Stage 5 End Stage CKD (GFR <15 mL/min/1 73 square meters)  Note: GFR calculation is accurate only with a steady state creatinine    CBC and differential [[id number]] Collected: 06/20/21 1820    Lab Status: Final result Specimen: Blood from Arm, Right Updated: 06/20/21 1833     WBC 5 96 Thousand/uL      RBC 4 57 Million/uL      Hemoglobin 13 4 g/dL      Hematocrit 39 8 %      MCV 87 fL      MCH 29 3 pg      MCHC 33 7 g/dL      RDW 13 8 %      MPV 10 7 fL      Platelets 487 Thousands/uL      nRBC 0 /100 WBCs      Neutrophils Relative 59 %      Immat GRANS % 1 %      Lymphocytes Relative 27 %      Monocytes Relative 12 %      Eosinophils Relative 1 %      Basophils Relative 0 %      Neutrophils Absolute 3 59 Thousands/µL      Immature Grans Absolute 0 03 Thousand/uL      Lymphocytes Absolute 1 58 Thousands/µL      Monocytes Absolute 0 71 Thousand/µL      Eosinophils Absolute 0 03 Thousand/µL      Basophils Absolute 0 02 Thousands/µL                  XR chest 1 view portable   ED Interpretation by Rosi Russo DO (06/20 1851)   No acute change from March 26, 2021 no active disease at this time                   Procedures  ECG 12 Lead Documentation Only    Date/Time: 6/20/2021 5:59 PM  Performed by: Rosi Russo DO  Authorized by: Rosi Russo DO     Indications / Diagnosis:  Chest tightness  ECG reviewed by me, the ED Provider: yes    Patient location:  ED  Previous ECG:     Previous ECG:  Compared to current    Comparison ECG info:  No acute change when compared to an EKG performed on March 26, 2021    Similarity:  No change    Comparison to cardiac monitor: Yes    Interpretation:     Interpretation: normal    Rate:     ECG rate assessment: normal    Rhythm:     Rhythm: sinus rhythm    Ectopy:     Ectopy: none    QRS:     QRS axis:  Normal  Conduction:     Conduction: normal    ST segments:     ST segments: Normal  T waves:     T waves: normal               ED Course  ED Course as of Jun 20 2005   Sun Jun 20, 2021   1849 Patient's chemistries are reviewed and consistent with patient's baseline  1859 BNP is only mildly elevated      1859 Troponin I: <0 02   1909 Discussed findings with patient and her family who are at bedside  Low threshold for return  Will start antibiotics, steroids and nebulizer  1940 COVID negative                                              MDM  Number of Diagnoses or Management Options  COPD with acute exacerbation [facility]): new and requires workup     Amount and/or Complexity of Data Reviewed  Clinical lab tests: ordered and reviewed  Tests in the radiology section of CPT®: ordered and reviewed  Decide to obtain previous medical records or to obtain history from someone other than the patient: yes  Independent visualization of images, tracings, or specimens: yes    Risk of Complications, Morbidity, and/or Mortality  Presenting problems: high  Diagnostic procedures: moderate  Management options: moderate    Patient Progress  Patient progress: improved      Disposition  Final diagnoses:   COPD with acute exacerbation ([address] )     Time reflects when diagnosis was documented in both MDM as applicable and the Disposition within this note     Time User Action Codes Description Comment    6/20/2021  7:06 PM Lorenza Eli Add [J44 1] COPD with acute exacerbation [facility])       ED Disposition     ED Disposition Condition Date/Time Comment    Discharge Stable [place] Jun 20, 2021  7:41 PM Jessika Lux discharge to home/self care              Follow-up Information     Follow up With Specialties Details Why Contact Mandy Griffith, DO Family Medicine  As scheduled Trey Mosley [address] [id number] [phone]            Discharge Medication List as of 6/20/2021  7:41 PM      START taking these medications    Details   albuterol (2 5 mg/3 mL) 0 083 % nebulizer solution Take 3 mL (2 5 mg total) by nebulization every 6 (six) hours as needed for wheezing or shortness of breath, Starting Sun 6/20/2021, Normal      predniSONE 20 mg tablet Take 2 tablets (40 mg total) by mouth daily for 5 days, Starting Mon 6/21/2021, Until Sat 6/26/2021, Normal      sulfamethoxazole-trimethoprim (BACTRIM DS) 800-160 mg per tablet Take 1 tablet by mouth 2 (two) times a day for 7 days smx-tmp DS (BACTRIM) 800-160 mg tabs (1tab q12 D10), Starting Sun 6/20/2021, Until Sun 6/27/2021, Normal         CONTINUE these medications which have NOT CHANGED    Details   amLODIPine (NORVASC) 10 mg tablet Take 10 mg by mouth daily at bedtime , Historical Med      atorvastatin (LIPITOR) 40 mg tablet Take 40 mg by mouth daily at bedtime , Historical Med      citalopram (CeleXA) 10 mg tablet Take 10 mg by mouth daily, Historical Med      cloNIDine (CATAPRES) 0 1 mg tablet Take 0 1 mg by mouth every 12 (twelve) hours, Historical Med      Ergocalciferol (VITAMIN D2 PO) Take 1 25 mg by mouth once a week, Historical Med      glipiZIDE (GLUCOTROL) 5 mg tablet Take 5 mg by mouth daily, Historical Med      ipratropium-albuterol (COMBIVENT RESPIMAT) inhaler Inhale 1 puff 4 (four) times a day, Historical Med      lisinopril (ZESTRIL) 40 mg tablet Take 40 mg by mouth daily, Historical Med      metoprolol tartrate (LOPRESSOR) 100 mg tablet Take 100 mg by mouth every 12 (twelve) hours, Historical Med      omeprazole (PriLOSEC OTC) 20 MG tablet Take 20 mg by mouth daily , Historical Med      pioglitazone (ACTOS) 15 mg tablet Take 15 mg by mouth daily, Historical Med      tiotropium (SPIRIVA) 18 mcg inhalation capsule Place 18 mcg into inhaler and inhale daily, Historical Med      traZODone (DESYREL) 100 mg tablet Take 100 mg by mouth daily at bedtime as needed , Historical Med           Outpatient Discharge Orders   Nebulizer       PDMP Review       Value Time User    PDMP Reviewed  Yes 3/30/2021  1:09 PM Laurel Marroquin MD ED Provider  Electronically Signed by           Bola Gibson DO  06/20/21 2005

## 2021-06-20 NOTE — DISCHARGE INSTRUCTIONS
Please continue your routine medications  We have added an antibiotic for the next 7 days as well as steroids for the next 5 days  We have also provided you with a nebulizer  This is to be used as an alternative to the albuterol you are using or as a rescue  However you should still use your regular inhalers  If you become at all worse you should return to the emergency room  Your imaging studies have been preliminarily reviewed by the emergency department  Further review by Radiology is pending at this time  If there is a discrepancy or a finding of additional concern identified, we will attempt to contact you at the number you have provided us  If you do not hear from us, follow-up with your primary care provider within 1-2 weeks is always recommended to ensure that all findings were normal or as initially reported    Your results may also be available on [facility]

## 2021-06-27 LAB
ATRIAL RATE: 73 BPM
P AXIS: 72 DEGREES
PR INTERVAL: 126 MS
QRS AXIS: 69 DEGREES
QRSD INTERVAL: 78 MS
QT INTERVAL: 384 MS
QTC INTERVAL: 423 MS
T WAVE AXIS: 68 DEGREES
VENTRICULAR RATE: 73 BPM

## 2021-06-27 PROCEDURE — 93010 ELECTROCARDIOGRAM REPORT: CPT | Performed by: INTERNAL MEDICINE

## 2021-10-27 ENCOUNTER — TRANSCRIBE ORDERS (OUTPATIENT)
Dept: CARDIOLOGY CLINIC | Facility: CLINIC | Age: 66
End: 2021-10-27

## 2021-10-27 DIAGNOSIS — J44.1 COPD EXACERBATION (HCC): Primary | ICD-10-CM

## 2022-03-17 DIAGNOSIS — Z12.31 ENCOUNTER FOR SCREENING MAMMOGRAM FOR MALIGNANT NEOPLASM OF BREAST: ICD-10-CM

## 2023-02-03 ENCOUNTER — APPOINTMENT (EMERGENCY)
Dept: RADIOLOGY | Facility: HOSPITAL | Age: 68
End: 2023-02-03

## 2023-02-03 ENCOUNTER — HOSPITAL ENCOUNTER (INPATIENT)
Facility: HOSPITAL | Age: 68
LOS: 3 days | Discharge: HOME | End: 2023-02-06
Attending: EMERGENCY MEDICINE | Admitting: FAMILY MEDICINE

## 2023-02-03 ENCOUNTER — APPOINTMENT (EMERGENCY)
Dept: CT IMAGING | Facility: HOSPITAL | Age: 68
End: 2023-02-03

## 2023-02-03 DIAGNOSIS — K57.90 DIVERTICULOSIS: ICD-10-CM

## 2023-02-03 DIAGNOSIS — E87.1 HYPONATREMIA: ICD-10-CM

## 2023-02-03 DIAGNOSIS — J44.1 COPD EXACERBATION (HCC): Primary | ICD-10-CM

## 2023-02-03 DIAGNOSIS — J44.1 COPD WITH ACUTE EXACERBATION (HCC): ICD-10-CM

## 2023-02-03 DIAGNOSIS — F17.200 SMOKER: ICD-10-CM

## 2023-02-03 PROBLEM — R11.10 VOMITING: Status: ACTIVE | Noted: 2023-02-03

## 2023-02-03 LAB
2HR DELTA HS TROPONIN: 1 NG/L
4HR DELTA HS TROPONIN: 1 NG/L
ALBUMIN SERPL BCP-MCNC: 4.6 G/DL (ref 3.5–5)
ALP SERPL-CCNC: 90 U/L (ref 34–104)
ALT SERPL W P-5'-P-CCNC: 12 U/L (ref 7–52)
ANION GAP SERPL CALCULATED.3IONS-SCNC: 1 MMOL/L (ref 4–13)
ANION GAP SERPL CALCULATED.3IONS-SCNC: 5 MMOL/L (ref 4–13)
AST SERPL W P-5'-P-CCNC: 19 U/L (ref 13–39)
BASOPHILS # BLD AUTO: 0.03 THOUSANDS/ÂΜL (ref 0–0.1)
BASOPHILS NFR BLD AUTO: 0 % (ref 0–1)
BILIRUB DIRECT SERPL-MCNC: 0.13 MG/DL (ref 0–0.2)
BILIRUB SERPL-MCNC: 0.53 MG/DL (ref 0.2–1)
BUN SERPL-MCNC: 11 MG/DL (ref 5–25)
BUN SERPL-MCNC: 12 MG/DL (ref 5–25)
CALCIUM SERPL-MCNC: 9 MG/DL (ref 8.4–10.2)
CALCIUM SERPL-MCNC: 9.9 MG/DL (ref 8.4–10.2)
CARDIAC TROPONIN I PNL SERPL HS: 3 NG/L
CARDIAC TROPONIN I PNL SERPL HS: 4 NG/L
CARDIAC TROPONIN I PNL SERPL HS: 4 NG/L
CHLORIDE SERPL-SCNC: 83 MMOL/L (ref 96–108)
CHLORIDE SERPL-SCNC: 83 MMOL/L (ref 96–108)
CO2 SERPL-SCNC: 38 MMOL/L (ref 21–32)
CO2 SERPL-SCNC: 40 MMOL/L (ref 21–32)
CREAT SERPL-MCNC: 0.57 MG/DL (ref 0.6–1.3)
CREAT SERPL-MCNC: 0.62 MG/DL (ref 0.6–1.3)
EOSINOPHIL # BLD AUTO: 0.01 THOUSAND/ÂΜL (ref 0–0.61)
EOSINOPHIL NFR BLD AUTO: 0 % (ref 0–6)
ERYTHROCYTE [DISTWIDTH] IN BLOOD BY AUTOMATED COUNT: 12.3 % (ref 11.6–15.1)
FLUAV RNA RESP QL NAA+PROBE: NEGATIVE
FLUBV RNA RESP QL NAA+PROBE: NEGATIVE
GFR SERPL CREATININE-BSD FRML MDRD: 94 ML/MIN/1.73SQ M
GFR SERPL CREATININE-BSD FRML MDRD: 96 ML/MIN/1.73SQ M
GLUCOSE SERPL-MCNC: 101 MG/DL (ref 65–140)
GLUCOSE SERPL-MCNC: 218 MG/DL (ref 65–140)
HCT VFR BLD AUTO: 45.4 % (ref 34.8–46.1)
HGB BLD-MCNC: 14.7 G/DL (ref 11.5–15.4)
IMM GRANULOCYTES # BLD AUTO: 0.03 THOUSAND/UL (ref 0–0.2)
IMM GRANULOCYTES NFR BLD AUTO: 0 % (ref 0–2)
LIPASE SERPL-CCNC: 7 U/L (ref 11–82)
LYMPHOCYTES # BLD AUTO: 1.57 THOUSANDS/ÂΜL (ref 0.6–4.47)
LYMPHOCYTES NFR BLD AUTO: 21 % (ref 14–44)
MCH RBC QN AUTO: 29.1 PG (ref 26.8–34.3)
MCHC RBC AUTO-ENTMCNC: 32.4 G/DL (ref 31.4–37.4)
MCV RBC AUTO: 90 FL (ref 82–98)
MONOCYTES # BLD AUTO: 1.02 THOUSAND/ÂΜL (ref 0.17–1.22)
MONOCYTES NFR BLD AUTO: 14 % (ref 4–12)
NEUTROPHILS # BLD AUTO: 4.87 THOUSANDS/ÂΜL (ref 1.85–7.62)
NEUTS SEG NFR BLD AUTO: 65 % (ref 43–75)
NRBC BLD AUTO-RTO: 0 /100 WBCS
OSMOLALITY UR: 533 MMOL/KG
PLATELET # BLD AUTO: 190 THOUSANDS/UL (ref 149–390)
PLATELET # BLD AUTO: 201 THOUSANDS/UL (ref 149–390)
PMV BLD AUTO: 10.4 FL (ref 8.9–12.7)
PMV BLD AUTO: 10.4 FL (ref 8.9–12.7)
POTASSIUM SERPL-SCNC: 4.4 MMOL/L (ref 3.5–5.3)
POTASSIUM SERPL-SCNC: 4.9 MMOL/L (ref 3.5–5.3)
PROCALCITONIN SERPL-MCNC: <0.05 NG/ML
PROT SERPL-MCNC: 8.7 G/DL (ref 6.4–8.4)
RBC # BLD AUTO: 5.05 MILLION/UL (ref 3.81–5.12)
RSV RNA RESP QL NAA+PROBE: NEGATIVE
SARS-COV-2 RNA RESP QL NAA+PROBE: NEGATIVE
SODIUM 24H UR-SCNC: 32 MOL/L
SODIUM SERPL-SCNC: 124 MMOL/L (ref 135–147)
SODIUM SERPL-SCNC: 126 MMOL/L (ref 135–147)
WBC # BLD AUTO: 7.53 THOUSAND/UL (ref 4.31–10.16)

## 2023-02-03 RX ORDER — PANTOPRAZOLE SODIUM 40 MG/1
40 TABLET, DELAYED RELEASE ORAL
Status: DISCONTINUED | OUTPATIENT
Start: 2023-02-03 | End: 2023-02-03

## 2023-02-03 RX ORDER — GUAIFENESIN 600 MG/1
600 TABLET, EXTENDED RELEASE ORAL 2 TIMES DAILY
Status: DISCONTINUED | OUTPATIENT
Start: 2023-02-03 | End: 2023-02-05

## 2023-02-03 RX ORDER — IPRATROPIUM BROMIDE AND ALBUTEROL SULFATE 2.5; .5 MG/3ML; MG/3ML
3 SOLUTION RESPIRATORY (INHALATION)
Status: DISCONTINUED | OUTPATIENT
Start: 2023-02-03 | End: 2023-02-06 | Stop reason: HOSPADM

## 2023-02-03 RX ORDER — LISINOPRIL 20 MG/1
40 TABLET ORAL DAILY
Status: DISCONTINUED | OUTPATIENT
Start: 2023-02-03 | End: 2023-02-06 | Stop reason: HOSPADM

## 2023-02-03 RX ORDER — ATORVASTATIN CALCIUM 40 MG/1
40 TABLET, FILM COATED ORAL
Status: DISCONTINUED | OUTPATIENT
Start: 2023-02-03 | End: 2023-02-06 | Stop reason: HOSPADM

## 2023-02-03 RX ORDER — AZITHROMYCIN 250 MG/1
500 TABLET, FILM COATED ORAL EVERY 24 HOURS
Status: COMPLETED | OUTPATIENT
Start: 2023-02-03 | End: 2023-02-05

## 2023-02-03 RX ORDER — PANTOPRAZOLE SODIUM 40 MG/1
40 TABLET, DELAYED RELEASE ORAL
Status: DISCONTINUED | OUTPATIENT
Start: 2023-02-03 | End: 2023-02-06 | Stop reason: HOSPADM

## 2023-02-03 RX ORDER — SODIUM CHLORIDE, SODIUM GLUCONATE, SODIUM ACETATE, POTASSIUM CHLORIDE, MAGNESIUM CHLORIDE, SODIUM PHOSPHATE, DIBASIC, AND POTASSIUM PHOSPHATE .53; .5; .37; .037; .03; .012; .00082 G/100ML; G/100ML; G/100ML; G/100ML; G/100ML; G/100ML; G/100ML
50 INJECTION, SOLUTION INTRAVENOUS CONTINUOUS
Status: DISCONTINUED | OUTPATIENT
Start: 2023-02-03 | End: 2023-02-04

## 2023-02-03 RX ORDER — METHYLPREDNISOLONE SODIUM SUCCINATE 125 MG/2ML
80 INJECTION, POWDER, LYOPHILIZED, FOR SOLUTION INTRAMUSCULAR; INTRAVENOUS ONCE
Status: COMPLETED | OUTPATIENT
Start: 2023-02-03 | End: 2023-02-03

## 2023-02-03 RX ORDER — ACETAMINOPHEN 325 MG/1
650 TABLET ORAL EVERY 6 HOURS PRN
Status: DISCONTINUED | OUTPATIENT
Start: 2023-02-03 | End: 2023-02-06 | Stop reason: HOSPADM

## 2023-02-03 RX ORDER — SODIUM CHLORIDE 9 MG/ML
3 INJECTION INTRAVENOUS
Status: DISCONTINUED | OUTPATIENT
Start: 2023-02-03 | End: 2023-02-06 | Stop reason: HOSPADM

## 2023-02-03 RX ORDER — HEPARIN SODIUM 5000 [USP'U]/ML
5000 INJECTION, SOLUTION INTRAVENOUS; SUBCUTANEOUS EVERY 8 HOURS SCHEDULED
Status: DISCONTINUED | OUTPATIENT
Start: 2023-02-03 | End: 2023-02-06 | Stop reason: HOSPADM

## 2023-02-03 RX ORDER — CLONIDINE HYDROCHLORIDE 0.1 MG/1
0.1 TABLET ORAL EVERY 12 HOURS SCHEDULED
Status: DISCONTINUED | OUTPATIENT
Start: 2023-02-03 | End: 2023-02-06 | Stop reason: HOSPADM

## 2023-02-03 RX ORDER — IPRATROPIUM BROMIDE AND ALBUTEROL SULFATE 2.5; .5 MG/3ML; MG/3ML
3 SOLUTION RESPIRATORY (INHALATION) EVERY 20 MIN
Status: COMPLETED | OUTPATIENT
Start: 2023-02-03 | End: 2023-02-03

## 2023-02-03 RX ORDER — METOPROLOL TARTRATE 50 MG/1
100 TABLET, FILM COATED ORAL EVERY 12 HOURS SCHEDULED
Status: DISCONTINUED | OUTPATIENT
Start: 2023-02-03 | End: 2023-02-06 | Stop reason: HOSPADM

## 2023-02-03 RX ORDER — ONDANSETRON 2 MG/ML
4 INJECTION INTRAMUSCULAR; INTRAVENOUS ONCE
Status: COMPLETED | OUTPATIENT
Start: 2023-02-03 | End: 2023-02-03

## 2023-02-03 RX ORDER — CITALOPRAM 10 MG/1
10 TABLET ORAL DAILY
Status: DISCONTINUED | OUTPATIENT
Start: 2023-02-03 | End: 2023-02-06 | Stop reason: HOSPADM

## 2023-02-03 RX ORDER — AMLODIPINE BESYLATE 10 MG/1
10 TABLET ORAL
Status: DISCONTINUED | OUTPATIENT
Start: 2023-02-03 | End: 2023-02-06 | Stop reason: HOSPADM

## 2023-02-03 RX ORDER — TRAZODONE HYDROCHLORIDE 100 MG/1
100 TABLET ORAL
Status: DISCONTINUED | OUTPATIENT
Start: 2023-02-03 | End: 2023-02-06 | Stop reason: HOSPADM

## 2023-02-03 RX ORDER — METHYLPREDNISOLONE SODIUM SUCCINATE 40 MG/ML
40 INJECTION, POWDER, LYOPHILIZED, FOR SOLUTION INTRAMUSCULAR; INTRAVENOUS EVERY 8 HOURS
Status: DISCONTINUED | OUTPATIENT
Start: 2023-02-03 | End: 2023-02-04

## 2023-02-03 RX ORDER — ONDANSETRON 2 MG/ML
4 INJECTION INTRAMUSCULAR; INTRAVENOUS EVERY 8 HOURS PRN
Status: DISCONTINUED | OUTPATIENT
Start: 2023-02-03 | End: 2023-02-06 | Stop reason: HOSPADM

## 2023-02-03 RX ADMIN — IPRATROPIUM BROMIDE AND ALBUTEROL SULFATE 3 ML: 2.5; .5 SOLUTION RESPIRATORY (INHALATION) at 05:55

## 2023-02-03 RX ADMIN — IPRATROPIUM BROMIDE AND ALBUTEROL SULFATE 3 ML: 2.5; .5 SOLUTION RESPIRATORY (INHALATION) at 08:20

## 2023-02-03 RX ADMIN — METHYLPREDNISOLONE SODIUM SUCCINATE 40 MG: 40 INJECTION, POWDER, FOR SOLUTION INTRAMUSCULAR; INTRAVENOUS at 21:14

## 2023-02-03 RX ADMIN — IOHEXOL 100 ML: 350 INJECTION, SOLUTION INTRAVENOUS at 06:06

## 2023-02-03 RX ADMIN — IPRATROPIUM BROMIDE AND ALBUTEROL SULFATE 3 ML: 2.5; .5 SOLUTION RESPIRATORY (INHALATION) at 05:45

## 2023-02-03 RX ADMIN — IPRATROPIUM BROMIDE AND ALBUTEROL SULFATE 3 ML: 2.5; .5 SOLUTION RESPIRATORY (INHALATION) at 14:02

## 2023-02-03 RX ADMIN — IPRATROPIUM BROMIDE AND ALBUTEROL SULFATE 3 ML: 2.5; .5 SOLUTION RESPIRATORY (INHALATION) at 20:45

## 2023-02-03 RX ADMIN — LISINOPRIL 40 MG: 20 TABLET ORAL at 08:14

## 2023-02-03 RX ADMIN — CITALOPRAM HYDROBROMIDE 10 MG: 20 TABLET ORAL at 08:17

## 2023-02-03 RX ADMIN — PANTOPRAZOLE SODIUM 40 MG: 40 TABLET, DELAYED RELEASE ORAL at 08:14

## 2023-02-03 RX ADMIN — ONDANSETRON 4 MG: 2 INJECTION INTRAMUSCULAR; INTRAVENOUS at 21:25

## 2023-02-03 RX ADMIN — METHYLPREDNISOLONE SODIUM SUCCINATE 40 MG: 40 INJECTION, POWDER, FOR SOLUTION INTRAMUSCULAR; INTRAVENOUS at 14:10

## 2023-02-03 RX ADMIN — HEPARIN SODIUM 5000 UNITS: 5000 INJECTION INTRAVENOUS; SUBCUTANEOUS at 08:16

## 2023-02-03 RX ADMIN — CLONIDINE HYDROCHLORIDE 0.1 MG: 0.1 TABLET ORAL at 08:15

## 2023-02-03 RX ADMIN — IPRATROPIUM BROMIDE AND ALBUTEROL SULFATE 3 ML: 2.5; .5 SOLUTION RESPIRATORY (INHALATION) at 05:15

## 2023-02-03 RX ADMIN — GUAIFENESIN 600 MG: 600 TABLET, EXTENDED RELEASE ORAL at 08:19

## 2023-02-03 RX ADMIN — GUAIFENESIN 600 MG: 600 TABLET, EXTENDED RELEASE ORAL at 17:55

## 2023-02-03 RX ADMIN — ATORVASTATIN CALCIUM 40 MG: 40 TABLET, FILM COATED ORAL at 21:35

## 2023-02-03 RX ADMIN — ONDANSETRON 4 MG: 2 INJECTION INTRAMUSCULAR; INTRAVENOUS at 05:26

## 2023-02-03 RX ADMIN — AMLODIPINE BESYLATE 10 MG: 10 TABLET ORAL at 21:37

## 2023-02-03 RX ADMIN — METOPROLOL TARTRATE 100 MG: 50 TABLET, FILM COATED ORAL at 08:18

## 2023-02-03 RX ADMIN — SODIUM CHLORIDE, SODIUM GLUCONATE, SODIUM ACETATE, POTASSIUM CHLORIDE, MAGNESIUM CHLORIDE, SODIUM PHOSPHATE, DIBASIC, AND POTASSIUM PHOSPHATE 50 ML/HR: .53; .5; .37; .037; .03; .012; .00082 INJECTION, SOLUTION INTRAVENOUS at 21:04

## 2023-02-03 RX ADMIN — HEPARIN SODIUM 5000 UNITS: 5000 INJECTION INTRAVENOUS; SUBCUTANEOUS at 21:14

## 2023-02-03 RX ADMIN — HEPARIN SODIUM 5000 UNITS: 5000 INJECTION INTRAVENOUS; SUBCUTANEOUS at 14:15

## 2023-02-03 RX ADMIN — AZITHROMYCIN MONOHYDRATE 500 MG: 250 TABLET ORAL at 08:10

## 2023-02-03 RX ADMIN — METHYLPREDNISOLONE SODIUM SUCCINATE 80 MG: 125 INJECTION, POWDER, FOR SOLUTION INTRAMUSCULAR; INTRAVENOUS at 05:27

## 2023-02-03 RX ADMIN — METOPROLOL TARTRATE 100 MG: 50 TABLET, FILM COATED ORAL at 21:38

## 2023-02-03 RX ADMIN — CLONIDINE HYDROCHLORIDE 0.1 MG: 0.1 TABLET ORAL at 21:37

## 2023-02-03 RX ADMIN — SODIUM CHLORIDE, SODIUM GLUCONATE, SODIUM ACETATE, POTASSIUM CHLORIDE, MAGNESIUM CHLORIDE, SODIUM PHOSPHATE, DIBASIC, AND POTASSIUM PHOSPHATE 50 ML/HR: .53; .5; .37; .037; .03; .012; .00082 INJECTION, SOLUTION INTRAVENOUS at 07:43

## 2023-02-03 NOTE — ASSESSMENT & PLAN NOTE
Lab Results   Component Value Date    HGBA1C 6 4 (H) 10/18/2022       No results for input(s): POCGLU in the last 72 hours      Blood Sugar Average: Last 72 hrs:  · Hold oral medications  · Start SSI  · Monitor closely while on steroids

## 2023-02-03 NOTE — ED NOTES
Pt stated to AM RN that she needed all of her pills crushed, that was provided in hand off report to this RN at 1100  This RN informed pt that Mucinex is unable to be crushed, split or chewed per pharmacy  Pt was able to take mucinex whole with water w/o any issue  Pt has a regular diet ordered and has had no issue with swallowing while eating or drinking        Hettie Phalen, JENNIFER  02/03/23 1873

## 2023-02-03 NOTE — ASSESSMENT & PLAN NOTE
· Episode of vomiting on arrival   · CT a/p without acute findings  · Currently resolved, afebrile     ·  PRN antiemetics

## 2023-02-03 NOTE — ASSESSMENT & PLAN NOTE
· Na 126 on admission   · Appears slightly hypovolemic on exam   · Will start Isolyte @ 50 cc/hr   · Check urine lytes  · Recheck BMP this afternoon

## 2023-02-03 NOTE — ED PROVIDER NOTES
History  Chief Complaint   Patient presents with   • Shortness of Breath     Pt started with increased SOB on Wednesday and has been getting increasingly worse  Pt on 4L NC at baseline for COPD     HPI   68F w hx of COPD on 4L of O2, HTN, DM2 at baseline presenting with shortness of breath  "I just want my lungs checked " Has been feeling increasingly short of breath for the past few days  Vomiting today as well  Denies fevers, cough, rhinorrhea, chest pain/pressure, abd pain, n/v/d  Endorses compliance w medications  Not been on steroids or antibiotics  She smokes  Semajronnell Deborah is with patient and approached me after I spoke to patient  He says she minimizes her symptoms and she has been having vomiting throughout the day  Also with shortness of breath over the past few days and today at rest  He says she smokes a lot  Prior to Admission Medications   Prescriptions Last Dose Informant Patient Reported? Taking?    Ergocalciferol (VITAMIN D2 PO) Not Taking  Yes No   Sig: Take 1 25 mg by mouth once a week   Patient not taking: Reported on 2/3/2023   albuterol (2 5 mg/3 mL) 0 083 % nebulizer solution 2/3/2023  No Yes   Sig: Take 3 mL (2 5 mg total) by nebulization every 6 (six) hours as needed for wheezing or shortness of breath   amLODIPine (NORVASC) 10 mg tablet 2/2/2023  Yes Yes   Sig: Take 10 mg by mouth daily at bedtime    atorvastatin (LIPITOR) 40 mg tablet 2/2/2023  Yes Yes   Sig: Take 40 mg by mouth daily at bedtime    citalopram (CeleXA) 10 mg tablet 2/2/2023  Yes Yes   Sig: Take 10 mg by mouth daily   cloNIDine (CATAPRES) 0 1 mg tablet 2/2/2023  Yes Yes   Sig: Take 0 1 mg by mouth every 12 (twelve) hours   glipiZIDE (GLUCOTROL) 5 mg tablet 2/2/2023  Yes Yes   Sig: Take 5 mg by mouth daily   ipratropium-albuterol (COMBIVENT RESPIMAT) inhaler 2/2/2023  Yes Yes   Sig: Inhale 1 puff 4 (four) times a day   lisinopril (ZESTRIL) 40 mg tablet 2/2/2023  Yes Yes   Sig: Take 40 mg by mouth daily   metoprolol tartrate (LOPRESSOR) 100 mg tablet 2/2/2023  Yes Yes   Sig: Take 100 mg by mouth every 12 (twelve) hours   omeprazole (PriLOSEC OTC) 20 MG tablet 2/2/2023  Yes Yes   Sig: Take 20 mg by mouth daily    pioglitazone (ACTOS) 15 mg tablet 2/2/2023  Yes Yes   Sig: Take 15 mg by mouth daily   tiotropium (SPIRIVA) 18 mcg inhalation capsule 2/2/2023  Yes Yes   Sig: Place 18 mcg into inhaler and inhale daily   traZODone (DESYREL) 100 mg tablet Past Week  Yes Yes   Sig: Take 100 mg by mouth daily at bedtime as needed       Facility-Administered Medications: None       Past Medical History:   Diagnosis Date   • COPD (chronic obstructive pulmonary disease) ([facility] [address] )        History reviewed  No pertinent surgical history  History reviewed  No pertinent family history  I have reviewed and agree with the history as documented  E-Cigarette/Vaping   • E-Cigarette Use Never User      E-Cigarette/Vaping Substances     Social History     Tobacco Use   • Smoking status: Every Day     Packs/day: 0 25     Types: Cigarettes   • Smokeless tobacco: Never   Vaping Use   • Vaping Use: Never used   Substance Use Topics   • Alcohol use: Not Currently   • Drug use: Never       Review of Systems   Constitutional: Negative for chills and fever  HENT: Negative for ear pain and sore throat  Eyes: Negative for pain and visual disturbance  Respiratory: Positive for shortness of breath  Negative for cough  Cardiovascular: Negative for chest pain and palpitations  Gastrointestinal: Positive for nausea and vomiting  Negative for abdominal pain  Genitourinary: Negative for dysuria and hematuria  Musculoskeletal: Negative for arthralgias and back pain  Skin: Negative for color change and rash  Neurological: Negative for seizures and syncope  All other systems reviewed and are negative  Physical Exam  Physical Exam  Vitals and nursing note reviewed  Constitutional:       General: She is not in acute distress       Appearance: She is well-developed  She is ill-appearing  HENT:      Head: Normocephalic and atraumatic  Right Ear: External ear normal       Left Ear: External ear normal       Nose: Nose normal    Eyes:      Conjunctiva/sclera: Conjunctivae normal    Cardiovascular:      Rate and Rhythm: Normal rate and regular rhythm  Pulmonary:      Effort: Pulmonary effort is normal  Tachypnea present  No respiratory distress  Breath sounds: Examination of the right-upper field reveals decreased breath sounds  Examination of the left-upper field reveals decreased breath sounds  Examination of the right-middle field reveals decreased breath sounds  Examination of the left-middle field reveals decreased breath sounds  Examination of the right-lower field reveals decreased breath sounds  Examination of the left-lower field reveals decreased breath sounds  Decreased breath sounds present  Abdominal:      Palpations: Abdomen is soft  Tenderness: There is no abdominal tenderness  Musculoskeletal:      Cervical back: Normal range of motion and neck supple  Skin:     General: Skin is warm and dry  Neurological:      General: No focal deficit present  Mental Status: She is alert  Mental status is at baseline           Vital Signs  ED Triage Vitals   Temperature Pulse Respirations Blood Pressure SpO2   02/03/23 0454 02/03/23 0454 02/03/23 0454 02/03/23 0456 02/03/23 0454   (!) 97 °F (36 1 °C) 89 (!) 24 (!) 175/85 (!) 78 %      Temp Source Heart Rate Source Patient Position - Orthostatic VS BP Location FiO2 (%)   02/03/23 0454 02/03/23 0454 02/03/23 0456 02/03/23 0456 --   Temporal Monitor Lying Left arm       Pain Score       02/03/23 0454       No Pain           Vitals:    02/03/23 1530 02/03/23 1545 02/03/23 1600 02/03/23 1615   BP: 140/77 145/83     Pulse: 68 77 85 80   Patient Position - Orthostatic VS:             Visual Acuity      ED Medications  Medications   sodium chloride (PF) 0 9 % injection 3 mL (has no administration in time range)   amLODIPine (NORVASC) tablet 10 mg (has no administration in time range)   atorvastatin (LIPITOR) tablet 40 mg (has no administration in time range)   citalopram (CeleXA) tablet 10 mg (10 mg Oral Given 2/3/23 0817)   cloNIDine (CATAPRES) tablet 0 1 mg (0 1 mg Oral Given 2/3/23 0815)   lisinopril (ZESTRIL) tablet 40 mg (40 mg Oral Given 2/3/23 0814)   metoprolol tartrate (LOPRESSOR) tablet 100 mg (100 mg Oral Given 2/3/23 0818)   pantoprazole (PROTONIX) EC tablet 40 mg (40 mg Oral Given 2/3/23 0814)   traZODone (DESYREL) tablet 100 mg (has no administration in time range)   multi-electrolyte (PLASMALYTE-A/ISOLYTE-S PH 7 4) IV solution (50 mL/hr Intravenous New Bag 2/3/23 0743)   acetaminophen (TYLENOL) tablet 650 mg (has no administration in time range)   guaiFENesin (MUCINEX) 12 hr tablet 600 mg (600 mg Oral Given 2/3/23 0819)   ipratropium-albuterol (DUO-NEB) 0 5-2 5 mg/3 mL inhalation solution 3 mL (3 mL Nebulization Given 2/3/23 1402)   methylPREDNISolone sodium succinate (Solu-MEDROL) injection 40 mg (40 mg Intravenous Given 2/3/23 1410)   heparin (porcine) subcutaneous injection 5,000 Units (5,000 Units Subcutaneous Given 2/3/23 1415)   azithromycin (ZITHROMAX) tablet 500 mg (500 mg Oral Given 2/3/23 0810)   ondansetron (ZOFRAN) injection 4 mg (has no administration in time range)   ipratropium-albuterol (DUO-NEB) 0 5-2 5 mg/3 mL inhalation solution 3 mL (3 mL Nebulization Given 2/3/23 0555)   methylPREDNISolone sodium succinate (Solu-MEDROL) injection 80 mg (80 mg Intravenous Given 2/3/23 0527)   ondansetron (ZOFRAN) injection 4 mg (4 mg Intravenous Given 2/3/23 0526)   iohexol (OMNIPAQUE) 350 MG/ML injection (SINGLE-DOSE) 100 mL (100 mL Intravenous Given 2/3/23 0606)       Diagnostic Studies  Results Reviewed     Procedure Component Value Units Date/Time    Basic metabolic panel [[id number]]  (Abnormal) Collected: 02/03/23 1415    Lab Status: Final result Specimen: Blood from Arm, Left Updated: 02/03/23 1439     Sodium 124 mmol/L      Potassium 4 9 mmol/L      Chloride 83 mmol/L      CO2 40 mmol/L      ANION GAP 1 mmol/L      BUN 12 mg/dL      Creatinine 0 62 mg/dL      Glucose 218 mg/dL      Calcium 9 0 mg/dL      eGFR 94 ml/min/1 73sq m     Narrative:      [place] guidelines for Chronic Kidney Disease (CKD):   •  Stage 1 with normal or high GFR (GFR > 90 mL/min/1 73 square meters)  •  Stage 2 Mild CKD (GFR = 60-89 mL/min/1 73 square meters)  •  Stage 3A Moderate CKD (GFR = 45-59 mL/min/1 73 square meters)  •  Stage 3B Moderate CKD (GFR = 30-44 mL/min/1 73 square meters)  •  Stage 4 Severe CKD (GFR = 15-29 mL/min/1 73 square meters)  •  Stage 5 End Stage CKD (GFR <15 mL/min/1 73 square meters)  Note: GFR calculation is accurate only with a steady state creatinine    Osmolality, urine [[id number]]  (Normal) Collected: 02/03/23 0928    Lab Status: Final result Specimen: Urine, Other Updated: 02/03/23 1340     Osmolality, Ur 533 mmol/KG     Blood culture #1 [[id number]] Collected: 02/03/23 0533    Lab Status: Preliminary result Specimen: Blood from Hand, Left Updated: 02/03/23 1101     Blood Culture Received in Microbiology Lab  Culture in Progress  Blood culture #2 [[id number]] Collected: 02/03/23 0526    Lab Status: Preliminary result Specimen: Blood from Arm, Left Updated: 02/03/23 1101     Blood Culture Received in Microbiology Lab  Culture in Progress      HS Troponin I 4hr [[id number]]  (Normal) Collected: 02/03/23 1020    Lab Status: Final result Specimen: Blood from Arm, Left Updated: 02/03/23 1049     hs TnI 4hr 4 ng/L      Delta 4hr hsTnI 1 ng/L     Sodium, urine, random [[id number]] Collected: 02/03/23 0928    Lab Status: Final result Specimen: Urine, Other Updated: 02/03/23 0948     Sodium, Ur 32    Procalcitonin [[id number]]  (Normal) Collected: 02/03/23 0511    Lab Status: Final result Specimen: Blood from Arm, Left Updated: 02/03/23 0966 Procalcitonin <0 05 ng/ml     HS Troponin I 2hr [[id number]]  (Normal) Collected: 02/03/23 0731    Lab Status: Final result Specimen: Blood from Arm, Left Updated: 02/03/23 0810     hs TnI 2hr 4 ng/L      Delta 2hr hsTnI 1 ng/L     Platelet count [[id number]]  (Normal) Collected: 02/03/23 0731    Lab Status: Final result Specimen: Blood from Arm, Left Updated: 02/03/23 0739     Platelets 483 Thousands/uL      MPV 10 4 fL     Sputum culture and Gram stain [[id number]]     Lab Status: No result Specimen: Sputum     Lipase [[id number]]  (Abnormal) Collected: 02/03/23 0511    Lab Status: Final result Specimen: Blood from Arm, Left Updated: 02/03/23 0715     Lipase 7 u/L     Hepatic function panel [[id number]]  (Abnormal) Collected: 02/03/23 0511    Lab Status: Final result Specimen: Blood from Arm, Left Updated: 02/03/23 0715     Total Bilirubin 0 53 mg/dL      Bilirubin, Direct 0 13 mg/dL      Alkaline Phosphatase 90 U/L      AST 19 U/L      ALT 12 U/L      Total Protein 8 7 g/dL      Albumin 4 6 g/dL     FLU/RSV/COVID - if FLU/RSV clinically relevant [[id number]]  (Normal) Collected: 02/03/23 0511    Lab Status: Final result Specimen: Nares from Nasopharyngeal Swab Updated: 02/03/23 0602     SARS-CoV-2 Negative     INFLUENZA A PCR Negative     INFLUENZA B PCR Negative     RSV PCR Negative    Narrative:      FOR PEDIATRIC PATIENTS - copy/paste COVID Guidelines URL to browser: [email or web address]    SARS-CoV-2 assay is a Nucleic Acid Amplification assay intended for the  qualitative detection of nucleic acid from SARS-CoV-2 in nasopharyngeal  swabs  Results are for the presumptive identification of SARS-CoV-2 RNA  Positive results are indicative of infection with SARS-CoV-2, the virus  causing COVID-19, but do not rule out bacterial infection or co-infection  with other viruses   Laboratories within the [employer] and its  territories are required to report all positive results to the appropriate  public health authorities  Negative results do not preclude SARS-CoV-2  infection and should not be used as the sole basis for treatment or other  patient management decisions  Negative results must be combined with  clinical observations, patient history, and epidemiological information  This test has not been FDA cleared or approved  This test has been authorized by FDA under an Emergency Use Authorization  (EUA)  This test is only authorized for the duration of time the  declaration that circumstances exist justifying the authorization of the  emergency use of an in vitro diagnostic tests for detection of SARS-CoV-2  virus and/or diagnosis of COVID-19 infection under section 564(b)(1) of  the Act, 21 U  S C  559GLY-6(S)(1), unless the authorization is terminated  or revoked sooner  The test has been validated but independent review by FDA  and CLIA is pending  Test performed using [employer] (acquired by [employer]) GeneXpert: This RT-PCR assay targets N2,  a region unique to SARS-CoV-2  A conserved region in the E-gene was chosen  for pan-Sarbecovirus detection which includes SARS-CoV-2  According to CMS-2020-01-R, this platform meets the definition of high-throughput technology      HS Troponin 0hr (reflex protocol) [[id number]]  (Normal) Collected: 02/03/23 0511    Lab Status: Final result Specimen: Blood from Arm, Left Updated: 02/03/23 0554     hs TnI 0hr 3 ng/L     Basic metabolic panel [[id number]]  (Abnormal) Collected: 02/03/23 0511    Lab Status: Final result Specimen: Blood from Arm, Left Updated: 02/03/23 0547     Sodium 126 mmol/L      Potassium 4 4 mmol/L      Chloride 83 mmol/L      CO2 38 mmol/L      ANION GAP 5 mmol/L      BUN 11 mg/dL      Creatinine 0 57 mg/dL      Glucose 101 mg/dL      Calcium 9 9 mg/dL      eGFR 96 ml/min/1 73sq m     Narrative:      [place] guidelines for Chronic Kidney Disease (CKD):   •  Stage 1 with normal or high GFR (GFR > 90 mL/min/1 73 square meters)  •  Stage 2 Mild CKD (GFR = 60-89 mL/min/1 73 square meters)  •  Stage 3A Moderate CKD (GFR = 45-59 mL/min/1 73 square meters)  •  Stage 3B Moderate CKD (GFR = 30-44 mL/min/1 73 square meters)  •  Stage 4 Severe CKD (GFR = 15-29 mL/min/1 73 square meters)  •  Stage 5 End Stage CKD (GFR <15 mL/min/1 73 square meters)  Note: GFR calculation is accurate only with a steady state creatinine    CBC and differential [[id number]]  (Abnormal) Collected: 02/03/23 0511    Lab Status: Final result Specimen: Blood from Arm, Left Updated: 02/03/23 0523     WBC 7 53 Thousand/uL      RBC 5 05 Million/uL      Hemoglobin 14 7 g/dL      Hematocrit 45 4 %      MCV 90 fL      MCH 29 1 pg      MCHC 32 4 g/dL      RDW 12 3 %      MPV 10 4 fL      Platelets 450 Thousands/uL      nRBC 0 /100 WBCs      Neutrophils Relative 65 %      Immat GRANS % 0 %      Lymphocytes Relative 21 %      Monocytes Relative 14 %      Eosinophils Relative 0 %      Basophils Relative 0 %      Neutrophils Absolute 4 87 Thousands/µL      Immature Grans Absolute 0 03 Thousand/uL      Lymphocytes Absolute 1 57 Thousands/µL      Monocytes Absolute 1 02 Thousand/µL      Eosinophils Absolute 0 01 Thousand/µL      Basophils Absolute 0 03 Thousands/µL                  X-ray chest 2 views   ED Interpretation by Yohana Goodrich MD ([id number]/63 [id number])   No acute cardiopulm abnl      Final Result by Puneet Lehman MD (02/03 1521)      No acute cardiopulmonary disease  Workstation performed: [id number]         CT abdomen pelvis with contrast   Final Result by Marj Garcia MD ([id number]/63 [id number])      No acute pathology  Colonic diverticulosis              Workstation performed: [id number]                  Procedures  ECG 12 Lead Documentation Only    Date/Time: 2/3/2023 6:26 AM  Performed by: Yohana Goodrich MD  Authorized by: Yohana Goodrich MD     Patient location:  ED  Previous ECG:     Previous ECG:  Compared to current    Comparison ECG info: 6/20/2021    Similarity:  No change  Interpretation:     Interpretation: normal    Rate:     ECG rate:  88    ECG rate assessment: normal    Rhythm:     Rhythm: sinus rhythm    Ectopy:     Ectopy: none    QRS:     QRS axis:  Normal  Conduction:     Conduction: normal    ST segments:     ST segments:  Non-specific  T waves:     T waves: non-specific             ED Course  ED Course as of 02/03/23 1625   Fri Feb 03, 2023   0526 WBC: 7 53   0526 Hemoglobin: 14 7   0553 Sodium(!): 126  Acute hyponatremia   0559 hs TnI 0hr: 3   0617 CT Abd/Pelvis  IMPRESSION:  No acute pathology  Colonic diverticulosis  4707 I discussed with Jonnathan Fernandez, hospitalist  Accepts patient for admission  Medical Decision Making  68F on 4L of O2 at baseline presenting with shortness of breath and nausea/vomiting  Poor historian  Reported by her grandson to minimize her symptoms  On exam, patient with very diminished breath sounds and tightness  Also began vomiting in the room while bloodwork was collected  Patient given zofran for nausea/vomiting  Patient given stacked Duonebs and IV methylprednisolone for COPD  Lungs sounds continued to be diminished but were less so after Duoneb treatment  For SOB, ACS unlikely as patient's EKG w/o acute ischemic abnl and troponins stable at 3 -> 4  Negative for COVD/Flu/RSV  CXR independently reviewed and interpreted by me as no acute abnl; no infiltrates or effusion contributing to patient's dyspnea  Patient therefore thought to have COPD exacerbation  As for nausea/vomiting, patient worked up for intraabdominal pathologies with CT abdd/pelvis such as bowel obstruction vs diverticulitis vs cholecystitis vs hepatitis  CT abd pelvis showed no acute pathologies  However, CMP significant for hyponatremia 126 (prior sodiums from 132-137)  Patient was therefore admitted to medicine for COPD exacerbation and acute hyponatremia       COPD exacerbation ([facility] [address] ): acute illness or injury  Hyponatremia: acute illness or injury  Amount and/or Complexity of Data Reviewed  Independent Historian:      Details: mor  Labs: ordered  Decision-making details documented in ED Course  Radiology: ordered and independent interpretation performed  Decision-making details documented in ED Course  ECG/medicine tests: ordered and independent interpretation performed  Decision-making details documented in ED Course  Risk  Prescription drug management  Decision regarding hospitalization  Disposition  Final diagnoses:   COPD exacerbation ([facility] )   Hyponatremia   Diverticulosis   Smoker     Time reflects when diagnosis was documented in both MDM as applicable and the Disposition within this note     Time User Action Codes Description Comment    2/3/2023  6:00 AM Veldon Lown Add [J44 1] COPD exacerbation ([facility] )     2/3/2023  6:00 AM Veldon Lown Add [E87 1] Hyponatremia     2/3/2023  7:12 AM Analilia Pierce Add [J44 1] COPD with acute exacerbation ([facility] )     2/3/2023  4:25 PM Veldon Lown Add [K57 90] Diverticulosis     2/3/2023  4:25 PM Veldon Lown Add Fred Cogan Smoker       ED Disposition     ED Disposition   Admit    Condition   Stable    Date/Time   Fri Feb 3, 2023  6:47 AM    Comment   Case was discussed with Derrell Santos and the patient's admission status was agreed to be Admission Status: inpatient status to the service of Dr Kostas Allison            Follow-up Information    None         Patient's Medications   Discharge Prescriptions    No medications on file       No discharge procedures on file      PDMP Review       Value Time User    PDMP Reviewed  Yes 3/30/2021  1:09 PM Gab Maradiaga MD          ED Provider  Electronically Signed by           Renetta Gaitan MD  02/03/23 9910

## 2023-02-03 NOTE — H&P
[address]  H&P- Bisi Kaplan 1955, 79 y o  female MRN: [id number]  Unit/Bed#: ED 11 Encounter: [id number]  Primary Care Provider: Terell Moran DO   Date and time admitted to hospital: 2/3/2023  4:50 AM    * COPD exacerbation ([address] )  Assessment & Plan  · Presents with shortness of breath, chest tightness over the last several days  Found to be hypoxic on chronic 4L NC, required up to 6L NC  · In ED, received neb treatment, IV steroids with improvement-> back down to 4L NC  · Patient with severely diminished breath sounds on exam  · Will continue IV Solumedrol for now   · Continue DuoNebs   · Start Zithromax per protocol     Acute hyponatremia  Assessment & Plan  · Na 126 on admission   · Appears slightly hypovolemic on exam   · Will start Isolyte @ 50 cc/hr   · Check urine lytes  · Recheck BMP this afternoon     Type 2 diabetes mellitus without complication, without long-term current use of insulin ([facility])  Assessment & Plan  Lab Results   Component Value Date    HGBA1C 6 4 (H) 10/18/2022       No results for input(s): POCGLU in the last 72 hours  Blood Sugar Average: Last 72 hrs:  · Hold oral medications  · Start SSI  · Monitor closely while on steroids     Vomiting  Assessment & Plan  · Episode of vomiting on arrival   · CT a/p without acute findings  · Currently resolved, afebrile   ·  PRN antiemetics     Essential hypertension  Assessment & Plan  · Continue Lopressor 100 mg BID, Norvasc, clonidine, lisinopril     Tobacco abuse  Assessment & Plan  · Current smoker  · Cessation encouraged, declined nicotine patch       VTE Pharmacologic Prophylaxis:   Moderate Risk (Score 3-4) - Pharmacological DVT Prophylaxis Ordered: heparin  Code Status: Level 1 - Full Code   Discussion with family: Patient declined call to        Anticipated Length of Stay: Patient will be admitted on an inpatient basis with an anticipated length of stay of greater than 2 midnights secondary to copd exac , hyponatremia- iv steroids, nebs, ivf, labs  Total Time for Visit, including Counseling / Coordination of Care: 70 minutes Greater than 50% of this total time spent on direct patient counseling and coordination of care  Chief Complaint: sob    History of Present Illness:  Forrest Jarquin is a 79 y o  female with a PMH of COPD , chronic resp fail on 4L, tobacco use, DM2 who presents with complaints of shortness of breath and chest tightness over the last several days  Patient reports increased use of rescue inhaler  Productive cough with white sputum  Wears 4L NC at baseline  Current smoker about 7 cigarettes daily  Denies fevers, chills, chest pain, abdominal pain  Review of Systems:  Review of Systems   Constitutional: Positive for activity change  Respiratory: Positive for cough, chest tightness and shortness of breath  Cardiovascular: Negative for palpitations and leg swelling  Gastrointestinal: Positive for nausea and vomiting  Negative for abdominal pain, constipation and diarrhea  Genitourinary: Negative for difficulty urinating  Musculoskeletal: Negative for arthralgias and back pain  Neurological: Negative for dizziness, light-headedness, numbness and headaches  All other systems reviewed and are negative  Past Medical and Surgical History:   Past Medical History:   Diagnosis Date   • COPD (chronic obstructive pulmonary disease) ([facility] [address] )        History reviewed  No pertinent surgical history  Meds/Allergies:  Prior to Admission medications    Medication Sig Start Date End Date Taking?  Authorizing Provider   albuterol (2 5 mg/3 mL) 0 083 % nebulizer solution Take 3 mL (2 5 mg total) by nebulization every 6 (six) hours as needed for wheezing or shortness of breath 6/20/21  Yes Shen Buckley,    amLODIPine (NORVASC) 10 mg tablet Take 10 mg by mouth daily at bedtime    Yes Historical Provider, MD   atorvastatin (LIPITOR) 40 mg tablet Take 40 mg by mouth daily at bedtime    Yes Historical Provider, MD   citalopram (CeleXA) 10 mg tablet Take 10 mg by mouth daily   Yes Historical Provider, MD   cloNIDine (CATAPRES) 0 1 mg tablet Take 0 1 mg by mouth every 12 (twelve) hours   Yes Historical Provider, MD   glipiZIDE (GLUCOTROL) 5 mg tablet Take 5 mg by mouth daily   Yes Historical Provider, MD   ipratropium-albuterol (COMBIVENT RESPIMAT) inhaler Inhale 1 puff 4 (four) times a day   Yes Historical Provider, MD   lisinopril (ZESTRIL) 40 mg tablet Take 40 mg by mouth daily   Yes Historical Provider, MD   metoprolol tartrate (LOPRESSOR) 100 mg tablet Take 100 mg by mouth every 12 (twelve) hours   Yes Historical Provider, MD   omeprazole (PriLOSEC OTC) 20 MG tablet Take 20 mg by mouth daily    Yes Historical Provider, MD   pioglitazone (ACTOS) 15 mg tablet Take 15 mg by mouth daily   Yes Historical Provider, MD   tiotropium (SPIRIVA) 18 mcg inhalation capsule Place 18 mcg into inhaler and inhale daily   Yes Historical Provider, MD   traZODone (DESYREL) 100 mg tablet Take 100 mg by mouth daily at bedtime as needed    Yes Historical Provider, MD   Ergocalciferol (VITAMIN D2 PO) Take 1 25 mg by mouth once a week  Patient not taking: Reported on 2/3/2023    Historical Provider, MD     I have reviewed home medications with patient personally  Allergies: Allergies   Allergen Reactions   • Clindamycin        Social History:  Marital Status:      Patient Pre-hospital Living Situation: Home  Patient Pre-hospital Level of Mobility: walks  Patient Pre-hospital Diet Restrictions:   Substance Use History:   Social History     Substance and Sexual Activity   Alcohol Use Not Currently     Social History     Tobacco Use   Smoking Status Every Day   • Packs/day: 0 25   • Types: Cigarettes   Smokeless Tobacco Never     Social History     Substance and Sexual Activity   Drug Use Never       Family History:  History reviewed  No pertinent family history      Physical Exam: Vitals:   Blood Pressure: 146/75 (02/03/23 0545)  Pulse: 79 (02/03/23 0545)  Temperature: (!) 97 °F (36 1 °C) (02/03/23 0454)  Temp Source: Temporal (02/03/23 0454)  Respirations: (!) 32 (02/03/23 0545)  Height: 5' 3" (160 cm) (02/03/23 0454)  Weight - Scale: 68 kg (150 lb) (02/03/23 0454)  SpO2: 95 % (02/03/23 0545)    Physical Exam  Vitals and nursing note reviewed  Constitutional:       General: She is not in acute distress  Appearance: Normal appearance  She is well-developed  She is not toxic-appearing  HENT:      Head: Normocephalic and atraumatic  Eyes:      Conjunctiva/sclera: Conjunctivae normal    Cardiovascular:      Rate and Rhythm: Normal rate and regular rhythm  Heart sounds: No murmur heard  Pulmonary:      Effort: Pulmonary effort is normal  No respiratory distress  Breath sounds: Normal breath sounds  No wheezing or rales  Comments: Severely diminished breath sounds throughout   Abdominal:      General: There is no distension  Palpations: Abdomen is soft  Tenderness: There is no abdominal tenderness  There is no guarding  Musculoskeletal:         General: No swelling  Normal range of motion  Cervical back: Neck supple  Right lower leg: No edema  Left lower leg: No edema  Skin:     General: Skin is warm and dry  Capillary Refill: Capillary refill takes less than 2 seconds  Neurological:      General: No focal deficit present  Mental Status: She is alert and oriented to person, place, and time     Psychiatric:         Mood and Affect: Mood normal           Additional Data:     Lab Results:  Results from last 7 days   Lab Units 02/03/23  0511   WBC Thousand/uL 7 53   HEMOGLOBIN g/dL 14 7   HEMATOCRIT % 45 4   PLATELETS Thousands/uL 201   NEUTROS PCT % 65   LYMPHS PCT % 21   MONOS PCT % 14*   EOS PCT % 0     Results from last 7 days   Lab Units 02/03/23  0511   SODIUM mmol/L 126*   POTASSIUM mmol/L 4 4   CHLORIDE mmol/L 83*   CO2 mmol/L 38*   BUN mg/dL 11   CREATININE mg/dL 0 57*   ANION GAP mmol/L 5   CALCIUM mg/dL 9 9   ALBUMIN g/dL 4 6   TOTAL BILIRUBIN mg/dL 0 53   ALK PHOS U/L 90   ALT U/L 12   AST U/L 19   GLUCOSE RANDOM mg/dL 101                       Lines/Drains:  Invasive Devices     Peripheral Intravenous Line  Duration           Peripheral IV 02/03/23 Left Antecubital <1 day                    Imaging: Reviewed radiology reports from this admission including: chest xray and abdominal/pelvic CT  X-ray chest 2 views   ED Interpretation by Zion De Leon MD (02/03 6251)   No acute cardiopulm abnl      CT abdomen pelvis with contrast   Final Result by Rhina Willard MD (02/03 0706)      No acute pathology  Colonic diverticulosis  Workstation performed: [id number]             EKG and Other Studies Reviewed on Admission:   · EKG: NSR  HR 88     ** Please Note: This note has been constructed using a voice recognition system   **

## 2023-02-03 NOTE — ASSESSMENT & PLAN NOTE
· Presents with shortness of breath, chest tightness over the last several days  Found to be hypoxic on chronic 4L NC, required up to 6L NC     · In ED, received neb treatment, IV steroids with improvement-> back down to 4L NC  · Patient with severely diminished breath sounds on exam  · Will continue IV Solumedrol for now   · Continue DuoNebs   · Start Zithromax per protocol

## 2023-02-04 PROBLEM — R78.81 BACTEREMIA: Status: ACTIVE | Noted: 2023-02-04

## 2023-02-04 LAB
ANION GAP SERPL CALCULATED.3IONS-SCNC: 1 MMOL/L (ref 4–13)
ANION GAP SERPL CALCULATED.3IONS-SCNC: 2 MMOL/L (ref 4–13)
BUN SERPL-MCNC: 13 MG/DL (ref 5–25)
BUN SERPL-MCNC: 16 MG/DL (ref 5–25)
CALCIUM SERPL-MCNC: 9.2 MG/DL (ref 8.4–10.2)
CALCIUM SERPL-MCNC: 9.4 MG/DL (ref 8.4–10.2)
CHLORIDE SERPL-SCNC: 81 MMOL/L (ref 96–108)
CHLORIDE SERPL-SCNC: 85 MMOL/L (ref 96–108)
CO2 SERPL-SCNC: 43 MMOL/L (ref 21–32)
CO2 SERPL-SCNC: 43 MMOL/L (ref 21–32)
CREAT SERPL-MCNC: 0.59 MG/DL (ref 0.6–1.3)
CREAT SERPL-MCNC: 0.68 MG/DL (ref 0.6–1.3)
FLUAV RNA RESP QL NAA+PROBE: NEGATIVE
FLUBV RNA RESP QL NAA+PROBE: NEGATIVE
GFR SERPL CREATININE-BSD FRML MDRD: 91 ML/MIN/1.73SQ M
GFR SERPL CREATININE-BSD FRML MDRD: 95 ML/MIN/1.73SQ M
GLUCOSE SERPL-MCNC: 185 MG/DL (ref 65–140)
GLUCOSE SERPL-MCNC: 187 MG/DL (ref 65–140)
POTASSIUM SERPL-SCNC: 4.2 MMOL/L (ref 3.5–5.3)
POTASSIUM SERPL-SCNC: 4.8 MMOL/L (ref 3.5–5.3)
PROCALCITONIN SERPL-MCNC: <0.05 NG/ML
RSV RNA RESP QL NAA+PROBE: NEGATIVE
SARS-COV-2 RNA RESP QL NAA+PROBE: NEGATIVE
SODIUM SERPL-SCNC: 125 MMOL/L (ref 135–147)
SODIUM SERPL-SCNC: 130 MMOL/L (ref 135–147)
STREPTOCOCCUS DNA BLD POS NAA+NON-PROBE: DETECTED
URATE SERPL-MCNC: 3.4 MG/DL (ref 2–7.5)

## 2023-02-04 RX ORDER — CEFTRIAXONE 1 G/50ML
1000 INJECTION, SOLUTION INTRAVENOUS EVERY 24 HOURS
Status: DISCONTINUED | OUTPATIENT
Start: 2023-02-04 | End: 2023-02-06 | Stop reason: HOSPADM

## 2023-02-04 RX ORDER — METHYLPREDNISOLONE SODIUM SUCCINATE 40 MG/ML
40 INJECTION, POWDER, LYOPHILIZED, FOR SOLUTION INTRAMUSCULAR; INTRAVENOUS EVERY 12 HOURS SCHEDULED
Status: DISCONTINUED | OUTPATIENT
Start: 2023-02-04 | End: 2023-02-06 | Stop reason: HOSPADM

## 2023-02-04 RX ORDER — SODIUM CHLORIDE 9 MG/ML
50 INJECTION, SOLUTION INTRAVENOUS ONCE
Status: COMPLETED | OUTPATIENT
Start: 2023-02-04 | End: 2023-02-04

## 2023-02-04 RX ADMIN — GUAIFENESIN 600 MG: 600 TABLET, EXTENDED RELEASE ORAL at 08:33

## 2023-02-04 RX ADMIN — METOPROLOL TARTRATE 100 MG: 50 TABLET, FILM COATED ORAL at 08:33

## 2023-02-04 RX ADMIN — GUAIFENESIN 600 MG: 600 TABLET, EXTENDED RELEASE ORAL at 16:36

## 2023-02-04 RX ADMIN — METHYLPREDNISOLONE SODIUM SUCCINATE 40 MG: 40 INJECTION, POWDER, FOR SOLUTION INTRAMUSCULAR; INTRAVENOUS at 22:00

## 2023-02-04 RX ADMIN — IPRATROPIUM BROMIDE AND ALBUTEROL SULFATE 3 ML: 2.5; .5 SOLUTION RESPIRATORY (INHALATION) at 01:43

## 2023-02-04 RX ADMIN — CEFTRIAXONE 1000 MG: 1 INJECTION, SOLUTION INTRAVENOUS at 12:22

## 2023-02-04 RX ADMIN — CITALOPRAM HYDROBROMIDE 10 MG: 20 TABLET ORAL at 08:33

## 2023-02-04 RX ADMIN — IPRATROPIUM BROMIDE AND ALBUTEROL SULFATE 3 ML: 2.5; .5 SOLUTION RESPIRATORY (INHALATION) at 19:40

## 2023-02-04 RX ADMIN — CLONIDINE HYDROCHLORIDE 0.1 MG: 0.1 TABLET ORAL at 08:33

## 2023-02-04 RX ADMIN — ATORVASTATIN CALCIUM 40 MG: 40 TABLET, FILM COATED ORAL at 22:04

## 2023-02-04 RX ADMIN — HEPARIN SODIUM 5000 UNITS: 5000 INJECTION INTRAVENOUS; SUBCUTANEOUS at 05:11

## 2023-02-04 RX ADMIN — PANTOPRAZOLE SODIUM 40 MG: 40 TABLET, DELAYED RELEASE ORAL at 05:13

## 2023-02-04 RX ADMIN — LISINOPRIL 40 MG: 20 TABLET ORAL at 08:33

## 2023-02-04 RX ADMIN — AMLODIPINE BESYLATE 10 MG: 10 TABLET ORAL at 22:03

## 2023-02-04 RX ADMIN — AZITHROMYCIN MONOHYDRATE 500 MG: 250 TABLET ORAL at 08:33

## 2023-02-04 RX ADMIN — HEPARIN SODIUM 5000 UNITS: 5000 INJECTION INTRAVENOUS; SUBCUTANEOUS at 22:03

## 2023-02-04 RX ADMIN — SODIUM CHLORIDE 50 ML/HR: 0.9 INJECTION, SOLUTION INTRAVENOUS at 13:36

## 2023-02-04 RX ADMIN — METOPROLOL TARTRATE 100 MG: 50 TABLET, FILM COATED ORAL at 22:00

## 2023-02-04 RX ADMIN — IPRATROPIUM BROMIDE AND ALBUTEROL SULFATE 3 ML: 2.5; .5 SOLUTION RESPIRATORY (INHALATION) at 13:15

## 2023-02-04 RX ADMIN — HEPARIN SODIUM 5000 UNITS: 5000 INJECTION INTRAVENOUS; SUBCUTANEOUS at 16:35

## 2023-02-04 RX ADMIN — METHYLPREDNISOLONE SODIUM SUCCINATE 40 MG: 40 INJECTION, POWDER, FOR SOLUTION INTRAMUSCULAR; INTRAVENOUS at 04:44

## 2023-02-04 RX ADMIN — IPRATROPIUM BROMIDE AND ALBUTEROL SULFATE 3 ML: 2.5; .5 SOLUTION RESPIRATORY (INHALATION) at 07:55

## 2023-02-04 RX ADMIN — CLONIDINE HYDROCHLORIDE 0.1 MG: 0.1 TABLET ORAL at 22:00

## 2023-02-04 NOTE — ASSESSMENT & PLAN NOTE
1 set of blood culture is growing gram-positive cocci in chains and pairs  Consult blood culture still pending  Placed on ceftriaxone  Also received azithromycin as per COPD protocol  Monitor for final reports from culture

## 2023-02-04 NOTE — RESPIRATORY THERAPY NOTE
RT Protocol Note  Janeen Sheppard 79 y o  female MRN: [id number]  Unit/Bed#: -01 Encounter: [id number]    Assessment    Principal Problem:    COPD exacerbation ([place] [address] )  Active Problems:    Acute hyponatremia    Tobacco abuse    Essential hypertension    Type 2 diabetes mellitus without complication, without long-term current use of insulin (HCC)    Vomiting      Home Pulmonary Medications:  albuterol  Home Devices/Therapy: Home O2    Past Medical History:   Diagnosis Date   • COPD (chronic obstructive pulmonary disease) ([place] [address] )      Social History     Socioeconomic History   • Marital status:      Spouse name: None   • Number of children: None   • Years of education: None   • Highest education level: None   Occupational History   • None   Tobacco Use   • Smoking status: Every Day     Packs/day: 0 25     Types: Cigarettes   • Smokeless tobacco: Never   Vaping Use   • Vaping Use: Never used   Substance and Sexual Activity   • Alcohol use: Not Currently   • Drug use: Never   • Sexual activity: None   Other Topics Concern   • None   Social History Narrative   • None     Social Determinants of Health     Financial Resource Strain: Not on file   Food Insecurity: Not on file   Transportation Needs: Not on file   Physical Activity: Not on file   Stress: Not on file   Social Connections: Not on file   Intimate Partner Violence: Not on file   Housing Stability: Not on file       Subjective    Subjective Data: Patient is current 1/2 pack per day smoker  She uses 2-3L O2 continuously and uses albuterol 3-4 times per day    Objective    Physical Exam:   Assessment Type: During-treatment  General Appearance: Awake, Alert  Respiratory Pattern: Normal  Chest Assessment: Chest expansion symmetrical  Bilateral Breath Sounds: Expiratory wheezes  Cough: None  O2 Device: nasal cannula (4L)    Vitals:  Blood pressure 162/67, pulse 80, temperature 98 3 °F (36 8 °C), temperature source Axillary, resp   rate 16, height 5' 3" (1 6 m), weight 71 3 kg (157 lb 3 oz), SpO2 99 %  Imaging and other studies: The lungs are clear  No pneumothorax or pleural effusion      O2 Device: nasal cannula (4L)     Plan    Respiratory Plan: Mild Distress pathway        Resp Comments: stable on 4L

## 2023-02-04 NOTE — UTILIZATION REVIEW
NOTIFICATION OF INPATIENT ADMISSION   AUTHORIZATION REQUEST   SERVICING FACILITY:   [address] [place] [address]  Diogo Forte, [address] Solo  Tax ID: [id number]  NPI: [id number] ATTENDING PROVIDER:  Attending Name and NPI#: Blanca Hernandez Md [[id number]]  Address: [facility]  Diogo Forte, Jeremiah Solo  Phone: [phone]   ADMISSION INFORMATION:  Place of Service: Inpatient [address] [place] [address]  [address]  Place of Service Code: 21  Inpatient Admission Date/Time: 2/3/23  6:47 AM  Discharge Date/Time: No discharge date for patient encounter  Admitting Diagnosis Code/Description:  Diverticulosis [K57 90]  Hyponatremia [E87 1]  SOB (shortness of breath) [R06 02]  Smoker [F17 200]  COPD exacerbation ([address] ) [J44 1]  COPD with acute exacerbation ([facility] [address] ) [J44 1]     UTILIZATION REVIEW CONTACT:  Eliza Cantor Utilization   Network Utilization Review Department  Phone: [phone]  Fax [phone]  Email: [email or web address]  Contact for approvals/pending authorizations, clinical reviews, and discharge  PHYSICIAN ADVISORY SERVICES:  Medical Necessity Denial & [employer] Review  Phone: [phone]  Fax: [phone]  Email: [email or web address]

## 2023-02-04 NOTE — ED NOTES
Pt placed in an inpatient bed for comfort for the night  Pt provided pillows and warm blankets  Pt denies no other complaints or needs at this time       Awa Taveras RN  02/03/23 9737

## 2023-02-04 NOTE — PROGRESS NOTES
[address] Zhou  Progress Note Boni Johnson 1955, 79 y o  female MRN: [id number]  Unit/Bed#: -01 Encounter: [id number]  Primary Care Provider: Odalis Beard DO   Date and time admitted to hospital: 2/3/2023  4:50 AM    COPD exacerbation [facility])  Assessment & Plan  · Presents with shortness of breath, chest tightness over the last several days  Found to be hypoxic on chronic 4L NC, required up to 6L NC  · In ED, received neb treatment, IV steroids with improvement-> back down to 4L NC  · Patient with severely diminished breath sounds on exam  · Will continue IV Solumedrol for now   · Continue DuoNebs   · Start Zithromax per protocol     * Bacteremia  Assessment & Plan  1 set of blood culture is growing gram-positive cocci in chains and pairs  Consult blood culture still pending  Placed on ceftriaxone  Also received azithromycin as per COPD protocol  Monitor for final reports from culture    Vomiting  Assessment & Plan  · Episode of vomiting on arrival   · CT a/p without acute findings  · Currently resolved, afebrile   ·  PRN antiemetics     Type 2 diabetes mellitus without complication, without long-term current use of insulin (HCC)  Assessment & Plan  Lab Results   Component Value Date    HGBA1C 6 4 (H) 10/18/2022       No results for input(s): POCGLU in the last 72 hours      Blood Sugar Average: Last 72 hrs:  · Hold oral medications  · Start SSI  · Monitor closely while on steroids     Essential hypertension  Assessment & Plan  · Continue Lopressor 100 mg BID, Norvasc, clonidine, lisinopril     Tobacco abuse  Assessment & Plan  · Current smoker  · Cessation encouraged, declined nicotine patch     Acute hyponatremia  Assessment & Plan  · Na 126 on admission > 124  · And received gentle hydration, but is still not significant improvement  · Patient is on Celexa-also developing bacteremia  · Appears slightly hypovolemic on exam   · Follow nephrology recommendation          VTE Pharmacologic Prophylaxis:   Moderate Risk (Score 3-4) - Pharmacological DVT Prophylaxis Ordered: heparin  Patient Centered Rounds: I performed bedside rounds with nursing staff today  Discussions with Specialists or Other Care Team Provider: None    Education and Discussions with Family / Patient: Updated  (grand daugtter) via phone  Current Length of Stay: 1 day(s)  Current Patient Status: Inpatient   Certification Statement: The patient will continue to require additional inpatient hospital stay due to To monitor above condition  Discharge Plan: Anticipate discharge in 48 hrs to To be determined    Code Status: Level 1 - Full Code    Subjective: And evaluated during the rounds  Resting comfortably  Denies any significant complaint other than mild short of breath  Objective:     Vitals:   Temp (24hrs), Av 8 °F (37 1 °C), Min:98 3 °F (36 8 °C), Max:99 2 °F (37 3 °C)    Temp:  [98 3 °F (36 8 °C)-99 2 °F (37 3 °C)] 99 2 °F (37 3 °C)  HR:  [] 80  Resp:  [11-31] 15  BP: (138-162)/(67-83) 138/78  SpO2:  [93 %-99 %] 93 %  Body mass index is 27 84 kg/m²  Input and Output Summary (last 24 hours): Intake/Output Summary (Last 24 hours) at 2023 1407  Last data filed at 2023 0701  Gross per 24 hour   Intake 1497 5 ml   Output --   Net 1497 5 ml       Physical Exam:   Physical Exam  Vitals and nursing note reviewed  Constitutional:       Appearance: Normal appearance  She is not ill-appearing or diaphoretic  HENT:      Mouth/Throat:      Pharynx: No oropharyngeal exudate or posterior oropharyngeal erythema  Eyes:      General: No scleral icterus  Left eye: No discharge  Extraocular Movements: Extraocular movements intact  Conjunctiva/sclera: Conjunctivae normal       Pupils: Pupils are equal, round, and reactive to light  Cardiovascular:      Rate and Rhythm: Normal rate  Heart sounds: No murmur heard      No friction rub  No gallop  Pulmonary:      Effort: Pulmonary effort is normal  No respiratory distress  Breath sounds: No stridor  Wheezing present  No rhonchi  Abdominal:      General: Abdomen is flat  Bowel sounds are normal  There is no distension  Palpations: There is no mass  Tenderness: There is no abdominal tenderness  Hernia: No hernia is present  Musculoskeletal:         General: No swelling, tenderness, deformity or signs of injury  Cervical back: Normal range of motion  Skin:     General: Skin is warm  Capillary Refill: Capillary refill takes less than 2 seconds  Coloration: Skin is not jaundiced or pale  Findings: No bruising or erythema  Neurological:      General: No focal deficit present  Mental Status: She is alert  She is disoriented  Cranial Nerves: No cranial nerve deficit  Sensory: No sensory deficit  Motor: No weakness  Coordination: Coordination normal          Additional Data:     Labs:  Results from last 7 days   Lab Units 02/03/23  0731 02/03/23  0511   WBC Thousand/uL  --  7 53   HEMOGLOBIN g/dL  --  14 7   HEMATOCRIT %  --  45 4   PLATELETS Thousands/uL 190 201   NEUTROS PCT %  --  65   LYMPHS PCT %  --  21   MONOS PCT %  --  14*   EOS PCT %  --  0     Results from last 7 days   Lab Units 02/04/23  1211 02/03/23  1415 02/03/23  0511   SODIUM mmol/L 125*   < > 126*   POTASSIUM mmol/L 4 8   < > 4 4   CHLORIDE mmol/L 81*   < > 83*   CO2 mmol/L 43*   < > 38*   BUN mg/dL 13   < > 11   CREATININE mg/dL 0 59*   < > 0 57*   ANION GAP mmol/L 1*   < > 5   CALCIUM mg/dL 9 4   < > 9 9   ALBUMIN g/dL  --   --  4 6   TOTAL BILIRUBIN mg/dL  --   --  0 53   ALK PHOS U/L  --   --  90   ALT U/L  --   --  12   AST U/L  --   --  19   GLUCOSE RANDOM mg/dL 185*   < > 101    < > = values in this interval not displayed                   Results from last 7 days   Lab Units 02/04/23  0443 02/03/23  0511   PROCALCITONIN ng/ml <0 05 <0 05 Lines/Drains:  Invasive Devices     Peripheral Intravenous Line  Duration           Peripheral IV 02/03/23 Left Antecubital 1 day                      Imaging: No pertinent imaging reviewed  Recent Cultures (last 7 days):   Results from last 7 days   Lab Units 02/03/23  0533 02/03/23  0526   BLOOD CULTURE   --  No Growth at 24 hrs  GRAM STAIN RESULT  Gram positive cocci in pairs and chains*  --        Last 24 Hours Medication List:   Current Facility-Administered Medications   Medication Dose Route Frequency Provider Last Rate   • acetaminophen  650 mg Oral Q6H PRN AYLIN Hazel     • amLODIPine  10 mg Oral HS AYLIN Hazel     • atorvastatin  40 mg Oral HS AYLIN Hazel     • azithromycin  500 mg Oral Q24H AYLIN Hazel     • cefTRIAXone  1,000 mg Intravenous Q24H Litzy Hills MD 1,000 mg (02/04/23 1222)   • citalopram  10 mg Oral Daily AYLIN Hazel     • cloNIDine  0 1 mg Oral Q12H 1 AYLIN Chacko     • guaiFENesin  600 mg Oral BID AYLIN Hazel     • heparin (porcine)  5,000 Units Subcutaneous Q8H [address] AYLIN Hazel     • ipratropium-albuterol  3 mL Nebulization Q6H AYLIN Hazel     • lisinopril  40 mg Oral Daily AYLIN Hazel     • methylPREDNISolone sodium succinate  40 mg Intravenous Q12H [address] Ailyn Aguilera MD     • metoprolol tartrate  100 mg Oral Q12H [address] AYLIN Hazel     • ondansetron  4 mg Intravenous Q8H PRN AYLIN Hazel     • pantoprazole  40 mg Oral Early Morning AYLIN Hazel     • sodium chloride (PF)  3 mL Intravenous Q1H PRN Goldy Cesar MD     • traZODone  100 mg Oral HS PRN AYLIN Hazel          Today, Patient Was Seen By: Litzy Hills MD    **Please Note: This note may have been constructed using a voice recognition system  **

## 2023-02-04 NOTE — ASSESSMENT & PLAN NOTE
· Na 126 on admission > 124  · And received gentle hydration, but is still not significant improvement  · Patient is on Celexa-also developing bacteremia  · Appears slightly hypovolemic on exam   · Follow nephrology recommendation

## 2023-02-04 NOTE — CONSULTS
Loly Macias Viars 79 y o  female MRN: [id number]  Unit/Bed#: -01 Encounter: [id number]        Assessment and Plan:    1  Hyponatremia, acute on chronic, appears euvolemic, asymptomatic  Potential etiologies include nonosmotic adh release due to COPD, vomiting, celexa  Reports poor intake that would sound concerning for insufficient solute, however, urine osm 530 (which goes against this)  Uric acid 3 4 more supportive for SIADH  Not at risk for overcorrecting  Na trend unchanged  Orthostatic negative  If Na declines on NSS with repeat chem tonight, would stop IVF  I started a moderate FR at 1500ml although she does not report excess intake  Denies etoh use as well  Would consider tolvaptan tomorrow if Na <125 at 7 5 mg x1     2  Elevated bicarbonate due to compensation for chronic resp acidosis  Avoid bicarb containing fluids  3  Bacteremia, repeat cx pending  abx per primary  4  Type 2 dm   A1C under good control from October  bg per primary  5  Essential HTN   Under reasonable control at present  Continue lopressor, norvasc, clonidine and lisinopril  Avoid HCTZ  HPI:    Reese Rosen is a 79 y o  female with hx COPD and chronic resp failure presented to ED with SOB/chest tighness for several days with increased rescue inhaler use  She admitted to several episodes of vomiting  She had productive cough with white sputum  She does not drink more than 2 quarts of fluid per day  Denied any leg swelling or weight changes  She denies hx hyponatremia  Her sodium has been mildly low in the past  Her sodium on 2/3 was 126  Her sodium repeated was 124  This afternoon Na 125  With glucose correction she is closer to 126  She has chronic resp acidosis with compensatory met alkalosis  Denies following with nephrology in the past for this issue  She is on celexa  Orthostatic checked and were negative  Urine osm 533, urine Na 32     She was given plasmalyte yesterday which was stopped this AM      Denies etoh use  Reports poor po intake in general--eats 1-2 meals per day, mainly carbs  Reason for Consult:     Review of Systems:    A complete 10-point review of systems was performed  Aside from what was mentioned in the HPI, it is otherwise negative  Historical Information   Past Medical History:   Diagnosis Date   • COPD (chronic obstructive pulmonary disease) ([facility] [address] )      History reviewed  No pertinent surgical history  Social History   Social History     Substance and Sexual Activity   Alcohol Use Not Currently     Social History     Substance and Sexual Activity   Drug Use Never     Social History     Tobacco Use   Smoking Status Every Day   • Packs/day: 0 25   • Types: Cigarettes   Smokeless Tobacco Never       Family History:   History reviewed  No pertinent family history      Medications:  Pertinent medications were reviewed  Current Facility-Administered Medications   Medication Dose Route Frequency Provider Last Rate   • acetaminophen  650 mg Oral Q6H PRN AYLIN Pereyra     • amLODIPine  10 mg Oral HS AYLIN Pereyra     • atorvastatin  40 mg Oral HS AYLIN Pereyra     • azithromycin  500 mg Oral Q24H AYLIN Pereyra     • cefTRIAXone  1,000 mg Intravenous Q24H Erica Davila MD 1,000 mg (02/04/23 1222)   • citalopram  10 mg Oral Daily AYLIN Pereyra     • cloNIDine  0 1 mg Oral Q12H [address] AYLIN Pereyra     • guaiFENesin  600 mg Oral BID AYLIN Pereyra     • heparin (porcine)  5,000 Units Subcutaneous [facility] AYLIN Pereyra     • ipratropium-albuterol  3 mL Nebulization Q6H AYLIN Pereyra     • lisinopril  40 mg Oral Daily AYLIN Pereyra     • methylPREDNISolone sodium succinate  40 mg Intravenous Q12H [address] Annemarie Aguilera MD     • metoprolol tartrate  100 mg Oral Q12H 1 AYLIN Chacko     • ondansetron  4 mg Intravenous Q8H PRN AYLIN Damon     • pantoprazole  40 mg Oral Early Morning AYLIN Damon     • sodium chloride (PF)  3 mL Intravenous Q1H PRN Renetta Gaitan MD     • traZODone  100 mg Oral HS PRN AYLIN Damon           Allergies   Allergen Reactions   • Clindamycin          Vitals:   /77 (BP Location: Right arm)   Pulse 78   Temp 98 1 °F (36 7 °C) (Temporal)   Resp 17   Ht 5' 3" (1 6 m)   Wt 71 3 kg (157 lb 3 oz)   SpO2 93%   BMI 27 84 kg/m²   Body mass index is 27 84 kg/m²  SpO2: 93 %,   SpO2 Activity: At Rest,   O2 Device: Nasal cannula      Intake/Output Summary (Last 24 hours) at 2/4/2023 1713  Last data filed at 2/4/2023 1641  Gross per 24 hour   Intake 1826 67 ml   Output 450 ml   Net 1376 67 ml     Invasive Devices     Peripheral Intravenous Line  Duration           Peripheral IV 02/03/23 Left Antecubital 1 day                Physical Exam:  General: conscious, cooperative, in no acute distress  Eyes: conjunctivae pink, anicteric sclerae  ENT: lips and mucous membranes moist  Neck: supple, no JVD, no masses  Chest: clear breath sounds bilateral, no crackles, ronchus or wheezings  CVS: distinct S1 & S2, normal rate, regular rhythm  Abdomen: soft, non-tender, non-distended, normoactive bowel sounds  Extremities: no edema of both legs  Skin: no rash  Neuro: awake, alert, oriented      Diagnostic Data:  Lab: I have personally reviewed pertinent lab results  ,   CBC:  Results from last 7 days   Lab Units 02/03/23  0731 02/03/23  0511   WBC Thousand/uL  --  7 53   HEMOGLOBIN g/dL  --  14 7   HEMATOCRIT %  --  45 4   PLATELETS Thousands/uL 190 201      CMP:   Lab Results   Component Value Date    SODIUM 125 (L) 02/04/2023    K 4 8 02/04/2023    CL 81 (L) 02/04/2023    CO2 43 (H) 02/04/2023    BUN 13 02/04/2023    CREATININE 0 59 (L) 02/04/2023    CALCIUM 9 4 02/04/2023    EGFR 95 02/04/2023   ,   PT/INR: No results found for: PT, INR, Magnesium: No components found for: MAG,  Phosphorous: No results found for: PHOS    Microbiology:  @LABRCNTIP,(urinecx:7)@        Humera Arrow, DO    Portions of the record may have been created with voice recognition software  Occasional wrong word or "sound a like" substitutions may have occurred due to the inherent limitations of voice recognition software  Read the chart carefully and recognize, using context, where substitutions have occurred

## 2023-02-05 LAB
ALBUMIN SERPL BCP-MCNC: 3.8 G/DL (ref 3.5–5)
ALP SERPL-CCNC: 65 U/L (ref 34–104)
ALT SERPL W P-5'-P-CCNC: 11 U/L (ref 7–52)
ANION GAP SERPL CALCULATED.3IONS-SCNC: 0 MMOL/L (ref 4–13)
AST SERPL W P-5'-P-CCNC: 12 U/L (ref 13–39)
ATRIAL RATE: 73 BPM
ATRIAL RATE: 88 BPM
ATRIAL RATE: 92 BPM
ATRIAL RATE: 93 BPM
BASOPHILS # BLD AUTO: 0 THOUSANDS/ÂΜL (ref 0–0.1)
BASOPHILS NFR BLD AUTO: 0 % (ref 0–1)
BILIRUB SERPL-MCNC: 0.33 MG/DL (ref 0.2–1)
BUN SERPL-MCNC: 14 MG/DL (ref 5–25)
CALCIUM SERPL-MCNC: 9.3 MG/DL (ref 8.4–10.2)
CHLORIDE SERPL-SCNC: 87 MMOL/L (ref 96–108)
CO2 SERPL-SCNC: 44 MMOL/L (ref 21–32)
CREAT SERPL-MCNC: 0.58 MG/DL (ref 0.6–1.3)
EOSINOPHIL # BLD AUTO: 0 THOUSAND/ÂΜL (ref 0–0.61)
EOSINOPHIL NFR BLD AUTO: 0 % (ref 0–6)
ERYTHROCYTE [DISTWIDTH] IN BLOOD BY AUTOMATED COUNT: 12.2 % (ref 11.6–15.1)
GFR SERPL CREATININE-BSD FRML MDRD: 96 ML/MIN/1.73SQ M
GLUCOSE SERPL-MCNC: 187 MG/DL (ref 65–140)
GLUCOSE SERPL-MCNC: 203 MG/DL (ref 65–140)
GLUCOSE SERPL-MCNC: 213 MG/DL (ref 65–140)
GLUCOSE SERPL-MCNC: 231 MG/DL (ref 65–140)
HCT VFR BLD AUTO: 38.7 % (ref 34.8–46.1)
HGB BLD-MCNC: 12.4 G/DL (ref 11.5–15.4)
IMM GRANULOCYTES # BLD AUTO: 0.03 THOUSAND/UL (ref 0–0.2)
IMM GRANULOCYTES NFR BLD AUTO: 0 % (ref 0–2)
LYMPHOCYTES # BLD AUTO: 0.61 THOUSANDS/ÂΜL (ref 0.6–4.47)
LYMPHOCYTES NFR BLD AUTO: 9 % (ref 14–44)
MCH RBC QN AUTO: 29.2 PG (ref 26.8–34.3)
MCHC RBC AUTO-ENTMCNC: 32 G/DL (ref 31.4–37.4)
MCV RBC AUTO: 91 FL (ref 82–98)
MONOCYTES # BLD AUTO: 0.28 THOUSAND/ÂΜL (ref 0.17–1.22)
MONOCYTES NFR BLD AUTO: 4 % (ref 4–12)
NEUTROPHILS # BLD AUTO: 5.93 THOUSANDS/ÂΜL (ref 1.85–7.62)
NEUTS SEG NFR BLD AUTO: 87 % (ref 43–75)
NRBC BLD AUTO-RTO: 0 /100 WBCS
P AXIS: 53 DEGREES
P AXIS: 79 DEGREES
PLATELET # BLD AUTO: 152 THOUSANDS/UL (ref 149–390)
PMV BLD AUTO: 10.5 FL (ref 8.9–12.7)
POTASSIUM SERPL-SCNC: 4.5 MMOL/L (ref 3.5–5.3)
PR INTERVAL: 100 MS
PR INTERVAL: 130 MS
PR INTERVAL: 136 MS
PR INTERVAL: 150 MS
PROT SERPL-MCNC: 6.8 G/DL (ref 6.4–8.4)
QRS AXIS: 57 DEGREES
QRS AXIS: 57 DEGREES
QRS AXIS: 63 DEGREES
QRS AXIS: 70 DEGREES
QRSD INTERVAL: 60 MS
QRSD INTERVAL: 72 MS
QRSD INTERVAL: 76 MS
QRSD INTERVAL: 76 MS
QT INTERVAL: 358 MS
QT INTERVAL: 360 MS
QT INTERVAL: 362 MS
QT INTERVAL: 392 MS
QTC INTERVAL: 431 MS
QTC INTERVAL: 433 MS
QTC INTERVAL: 447 MS
QTC INTERVAL: 447 MS
RBC # BLD AUTO: 4.24 MILLION/UL (ref 3.81–5.12)
SODIUM SERPL-SCNC: 131 MMOL/L (ref 135–147)
T WAVE AXIS: 68 DEGREES
T WAVE AXIS: 68 DEGREES
T WAVE AXIS: 74 DEGREES
T WAVE AXIS: 76 DEGREES
VENTRICULAR RATE: 73 BPM
VENTRICULAR RATE: 88 BPM
VENTRICULAR RATE: 92 BPM
VENTRICULAR RATE: 93 BPM
WBC # BLD AUTO: 6.85 THOUSAND/UL (ref 4.31–10.16)

## 2023-02-05 RX ORDER — GUAIFENESIN 100 MG/5ML
200 SOLUTION ORAL EVERY 4 HOURS PRN
Status: DISCONTINUED | OUTPATIENT
Start: 2023-02-05 | End: 2023-02-06 | Stop reason: HOSPADM

## 2023-02-05 RX ORDER — INSULIN LISPRO 100 [IU]/ML
2-12 INJECTION, SOLUTION INTRAVENOUS; SUBCUTANEOUS
Status: DISCONTINUED | OUTPATIENT
Start: 2023-02-05 | End: 2023-02-06 | Stop reason: HOSPADM

## 2023-02-05 RX ADMIN — HEPARIN SODIUM 5000 UNITS: 5000 INJECTION INTRAVENOUS; SUBCUTANEOUS at 06:25

## 2023-02-05 RX ADMIN — GUAIFENESIN 200 MG: 200 SOLUTION ORAL at 18:10

## 2023-02-05 RX ADMIN — CLONIDINE HYDROCHLORIDE 0.1 MG: 0.1 TABLET ORAL at 08:44

## 2023-02-05 RX ADMIN — IPRATROPIUM BROMIDE AND ALBUTEROL SULFATE 3 ML: 2.5; .5 SOLUTION RESPIRATORY (INHALATION) at 13:14

## 2023-02-05 RX ADMIN — CEFTRIAXONE 1000 MG: 1 INJECTION, SOLUTION INTRAVENOUS at 08:44

## 2023-02-05 RX ADMIN — IPRATROPIUM BROMIDE AND ALBUTEROL SULFATE 3 ML: 2.5; .5 SOLUTION RESPIRATORY (INHALATION) at 08:05

## 2023-02-05 RX ADMIN — AZITHROMYCIN MONOHYDRATE 500 MG: 250 TABLET ORAL at 08:44

## 2023-02-05 RX ADMIN — METHYLPREDNISOLONE SODIUM SUCCINATE 40 MG: 40 INJECTION, POWDER, FOR SOLUTION INTRAMUSCULAR; INTRAVENOUS at 08:44

## 2023-02-05 RX ADMIN — CITALOPRAM HYDROBROMIDE 10 MG: 20 TABLET ORAL at 08:44

## 2023-02-05 RX ADMIN — GUAIFENESIN 600 MG: 600 TABLET, EXTENDED RELEASE ORAL at 08:44

## 2023-02-05 RX ADMIN — INSULIN LISPRO 4 UNITS: 100 INJECTION, SOLUTION INTRAVENOUS; SUBCUTANEOUS at 12:56

## 2023-02-05 RX ADMIN — HEPARIN SODIUM 5000 UNITS: 5000 INJECTION INTRAVENOUS; SUBCUTANEOUS at 14:23

## 2023-02-05 RX ADMIN — METOPROLOL TARTRATE 100 MG: 50 TABLET, FILM COATED ORAL at 08:44

## 2023-02-05 RX ADMIN — CLONIDINE HYDROCHLORIDE 0.1 MG: 0.1 TABLET ORAL at 21:37

## 2023-02-05 RX ADMIN — IPRATROPIUM BROMIDE AND ALBUTEROL SULFATE 3 ML: 2.5; .5 SOLUTION RESPIRATORY (INHALATION) at 19:23

## 2023-02-05 RX ADMIN — ATORVASTATIN CALCIUM 40 MG: 40 TABLET, FILM COATED ORAL at 21:37

## 2023-02-05 RX ADMIN — HEPARIN SODIUM 5000 UNITS: 5000 INJECTION INTRAVENOUS; SUBCUTANEOUS at 21:37

## 2023-02-05 RX ADMIN — METHYLPREDNISOLONE SODIUM SUCCINATE 40 MG: 40 INJECTION, POWDER, FOR SOLUTION INTRAMUSCULAR; INTRAVENOUS at 21:37

## 2023-02-05 RX ADMIN — PANTOPRAZOLE SODIUM 40 MG: 40 TABLET, DELAYED RELEASE ORAL at 06:25

## 2023-02-05 RX ADMIN — METOPROLOL TARTRATE 100 MG: 50 TABLET, FILM COATED ORAL at 21:37

## 2023-02-05 RX ADMIN — LISINOPRIL 40 MG: 20 TABLET ORAL at 08:44

## 2023-02-05 RX ADMIN — IPRATROPIUM BROMIDE AND ALBUTEROL SULFATE 3 ML: 2.5; .5 SOLUTION RESPIRATORY (INHALATION) at 03:07

## 2023-02-05 RX ADMIN — INSULIN LISPRO 2 UNITS: 100 INJECTION, SOLUTION INTRAVENOUS; SUBCUTANEOUS at 16:20

## 2023-02-05 RX ADMIN — AMLODIPINE BESYLATE 10 MG: 10 TABLET ORAL at 21:37

## 2023-02-05 RX ADMIN — INSULIN LISPRO 4 UNITS: 100 INJECTION, SOLUTION INTRAVENOUS; SUBCUTANEOUS at 21:40

## 2023-02-05 NOTE — ASSESSMENT & PLAN NOTE
1 set of blood culture is growing gram-positive cocci in chains and pairs  Second set of culture shows no growth in 48 hours-seems contaminated  Placed on ceftriaxone  Also received azithromycin as per COPD protocol

## 2023-02-05 NOTE — PLAN OF CARE
Problem: Potential for Falls  Goal: Patient will remain free of falls  Description: INTERVENTIONS:  - Educate patient/family on patient safety including physical limitations  - Instruct patient to call for assistance with activity   - Consult OT/PT to assist with strengthening/mobility   - Keep Call bell within reach  - Keep bed low and locked with side rails adjusted as appropriate  - Keep care items and personal belongings within reach  - Initiate and maintain comfort rounds  - Make Fall Risk Sign visible to staff  - Offer Toileting every 2Hours, in advance of need  - Obtain necessary fall risk management equipment:   - Apply yellow socks and bracelet for high fall risk patients  - Consider moving patient to room near nurses station  Outcome: Progressing

## 2023-02-05 NOTE — QUICK NOTE
Patient not seen and examined today  Chart reviewed  Patient sodium up to 131 this AM, appropriate correction  Stable from renal perspective  Sp NSS yesterday  Would not check cortisol on steroids     Can check thyroid studies for hyponatremia eval

## 2023-02-05 NOTE — PLAN OF CARE
Problem: Potential for Falls  Goal: Patient will remain free of falls  Description: INTERVENTIONS:  - Educate patient/family on patient safety including physical limitations  - Instruct patient to call for assistance with activity   - Consult OT/PT to assist with strengthening/mobility   - Keep Call bell within reach  - Keep bed low and locked with side rails adjusted as appropriate  - Keep care items and personal belongings within reach  - Initiate and maintain comfort rounds  - Make Fall Risk Sign visible to staff  - Offer Toileting every 2 Hours, in advance of need  - Initiate/Maintain bed alarm  - Obtain necessary fall risk management equipment: walker, yellow socks  - Apply yellow socks and bracelet for high fall risk patients  - Consider moving patient to room near nurses station  Outcome: Progressing     Problem: Prexisting or High Potential for Compromised Skin Integrity  Goal: Skin integrity is maintained or improved  Description: INTERVENTIONS:  - Identify patients at risk for skin breakdown  - Assess and monitor skin integrity  - Assess and monitor nutrition and hydration status  - Monitor labs   - Assess for incontinence   - Turn and reposition patient  - Assist with mobility/ambulation  - Relieve pressure over bony prominences  - Avoid friction and shearing  - Provide appropriate hygiene as needed including keeping skin clean and dry  - Evaluate need for skin moisturizer/barrier cream  - Collaborate with interdisciplinary team   - Patient/family teaching  - Consider wound care consult   Outcome: Progressing     Problem: PAIN - ADULT  Goal: Verbalizes/displays adequate comfort level or baseline comfort level  Description: Interventions:  - Encourage patient to monitor pain and request assistance  - Assess pain using appropriate pain scale  - Administer analgesics based on type and severity of pain and evaluate response  - Implement non-pharmacological measures as appropriate and evaluate response  - Consider cultural and social influences on pain and pain management  - Notify physician/advanced practitioner if interventions unsuccessful or patient reports new pain  Outcome: Progressing     Problem: INFECTION - ADULT  Goal: Absence or prevention of progression during hospitalization  Description: INTERVENTIONS:  - Assess and monitor for signs and symptoms of infection  - Monitor lab/diagnostic results  - Monitor all insertion sites, i e  indwelling lines, tubes, and drains  - Monitor endotracheal if appropriate and nasal secretions for changes in amount and color  - [place] appropriate cooling/warming therapies per order  - Administer medications as ordered  - Instruct and encourage patient and family to use good hand hygiene technique  - Identify and instruct in appropriate isolation precautions for identified infection/condition  Outcome: Progressing  Goal: Absence of fever/infection during neutropenic period  Description: INTERVENTIONS:  - Monitor WBC    Outcome: Progressing     Problem: SAFETY ADULT  Goal: Patient will remain free of falls  Description: INTERVENTIONS:  - Educate patient/family on patient safety including physical limitations  - Instruct patient to call for assistance with activity   - Consult OT/PT to assist with strengthening/mobility   - Keep Call bell within reach  - Keep bed low and locked with side rails adjusted as appropriate  - Keep care items and personal belongings within reach  - Initiate and maintain comfort rounds  - Make Fall Risk Sign visible to staff  - Offer Toileting every 2 Hours, in advance of need  - Initiate/Maintain bed alarm  - Obtain necessary fall risk management equipment: walker, yellow socks  - Apply yellow socks and bracelet for high fall risk patients  - Consider moving patient to room near nurses station  Outcome: Progressing  Goal: Maintain or return to baseline ADL function  Description: INTERVENTIONS:  -  Assess patient's ability to carry out ADLs; assess patient's baseline for ADL function and identify physical deficits which impact ability to perform ADLs (bathing, care of mouth/teeth, toileting, grooming, dressing, etc )  - Assess/evaluate cause of self-care deficits   - Assess range of motion  - Assess patient's mobility; develop plan if impaired  - Assess patient's need for assistive devices and provide as appropriate  - Encourage maximum independence but intervene and supervise when necessary  - Involve family in performance of ADLs  - Assess for home care needs following discharge   - Consider OT consult to assist with ADL evaluation and planning for discharge  - Provide patient education as appropriate  Outcome: Progressing  Goal: Maintains/Returns to pre admission functional level  Description: INTERVENTIONS:  - Perform BMAT or MOVE assessment daily    - Set and communicate daily mobility goal to care team and patient/family/caregiver  - Collaborate with rehabilitation services on mobility goals if consulted  - Perform Range of Motion  4 times a day  - Reposition patient every 2 hours    - Dangle patient 2 times a day  - Stand patient 2 times a day  - Ambulate patient 2 times a day  - Out of bed to chair 2 times a day   - Out of bed for meals 3 times a day  - Out of bed for toileting  - Record patient progress and toleration of activity level   Outcome: Progressing     Problem: DISCHARGE PLANNING  Goal: Discharge to home or other facility with appropriate resources  Description: INTERVENTIONS:  - Identify barriers to discharge w/patient and caregiver  - Arrange for needed discharge resources and transportation as appropriate  - Identify discharge learning needs (meds, wound care, etc )  - Arrange for interpretive services to assist at discharge as needed  - Refer to Case Management Department for coordinating discharge planning if the patient needs post-hospital services based on physician/advanced practitioner order or complex needs related to functional status, cognitive ability, or social support system  Outcome: Progressing     Problem: Knowledge Deficit  Goal: Patient/family/caregiver demonstrates understanding of disease process, treatment plan, medications, and discharge instructions  Description: Complete learning assessment and assess knowledge base    Interventions:  - Provide teaching at level of understanding  - Provide teaching via preferred learning methods  Outcome: Progressing     Problem: RESPIRATORY - ADULT  Goal: Achieves optimal ventilation and oxygenation  Description: INTERVENTIONS:  - Assess for changes in respiratory status  - Assess for changes in mentation and behavior  - Position to facilitate oxygenation and minimize respiratory effort  - Oxygen administered by appropriate delivery if ordered  - Initiate smoking cessation education as indicated  - Encourage broncho-pulmonary hygiene including cough, deep breathe, Incentive Spirometry  - Assess the need for suctioning and aspirate as needed  - Assess and instruct to report SOB or any respiratory difficulty  - Respiratory Therapy support as indicated  Outcome: Progressing

## 2023-02-05 NOTE — RESPIRATORY THERAPY NOTE
RT Protocol Note  Elizabeth Keith 79 y o  female MRN: [id number]  Unit/Bed#: -01 Encounter: [id number]    Assessment    Principal Problem:    Bacteremia  Active Problems:    Acute hyponatremia    Tobacco abuse    Essential hypertension    Type 2 diabetes mellitus without complication, without long-term current use of insulin (HCC)    COPD exacerbation (HCC)    Vomiting      Home Pulmonary Medications:    Home Devices/Therapy: Home O2    Past Medical History:   Diagnosis Date   • COPD (chronic obstructive pulmonary disease) ([address] )      Social History     Socioeconomic History   • Marital status:      Spouse name: None   • Number of children: None   • Years of education: None   • Highest education level: None   Occupational History   • None   Tobacco Use   • Smoking status: Every Day     Packs/day: 0 25     Types: Cigarettes   • Smokeless tobacco: Never   Vaping Use   • Vaping Use: Never used   Substance and Sexual Activity   • Alcohol use: Not Currently   • Drug use: Never   • Sexual activity: None   Other Topics Concern   • None   Social History Narrative   • None     Social Determinants of Health     Financial Resource Strain: Not on file   Food Insecurity: Not on file   Transportation Needs: Not on file   Physical Activity: Not on file   Stress: Not on file   Social Connections: Not on file   Intimate Partner Violence: Not on file   Housing Stability: Not on file       Subjective    Subjective Data: Patient is current 1/2 pack per day smoker  She uses 2-3L O2 continuously and uses albuterol 3-4 times per day    Objective    Physical Exam:        Vitals:  Blood pressure 159/74, pulse 75, temperature 98 °F (36 7 °C), temperature source Temporal, resp  rate 20, height 5' 3" (1 6 m), weight 71 3 kg (157 lb 3 oz), SpO2 98 %  Imaging and other studies: I have personally reviewed pertinent reports           Plan    Respiratory Plan: Mild Distress pathway        Resp Comments: Decreased to 3L

## 2023-02-05 NOTE — PROGRESS NOTES
[address]  Progress Note Isabella Clemencia 1955, 79 y o  female MRN: [id number]  Unit/Bed#: -01 Encounter: [id number]  Primary Care Provider: Rl Bear DO   Date and time admitted to hospital: 2/3/2023  4:50 AM    COPD exacerbation [facility])  Assessment & Plan  · Presents with shortness of breath, chest tightness over the last several days  Found to be hypoxic on chronic 4L NC, required up to 6L NC  · Currently saturating 3 L oxygen  · Patient with severely diminished breath sounds on exam  · Will continue IV Solumedrol for now   · Continue DuoNebs   · Start Zithromax per protocol     * Bacteremia  Assessment & Plan  1 set of blood culture is growing gram-positive cocci in chains and pairs  Second set of culture shows no growth in 48 hours-seems contaminated  Placed on ceftriaxone  Also received azithromycin as per COPD protocol      Vomiting  Assessment & Plan  · Episode of vomiting on arrival   · CT a/p without acute findings  · Currently resolved, afebrile     ·  PRN antiemetics     Type 2 diabetes mellitus without complication, without long-term current use of insulin ([facility])  Assessment & Plan  Lab Results   Component Value Date    HGBA1C 6 4 (H) 10/18/2022       Recent Labs     02/05/23  1234   POCGLU 203*       Blood Sugar Average: Last 72 hrs:  · Hold oral medications  · Start SSI  · Monitor closely while on steroids     Essential hypertension  Assessment & Plan  · Continue Lopressor 100 mg BID, Norvasc, clonidine, lisinopril     Tobacco abuse  Assessment & Plan  · Current smoker  · Cessation encouraged, declined nicotine patch     Acute hyponatremia  Assessment & Plan  · Na 126 on admission > 124  · After receiving normal saline as per nephrology recommendation, sodium improved to 131  · Check TSH  · About to check cortisol since patient remained on a steroid          VTE Pharmacologic Prophylaxis:   Moderate Risk (Score 3-4) - Pharmacological DVT Prophylaxis Ordered: heparin  Patient Centered Rounds: I performed bedside rounds with nursing staff today  Discussions with Specialists or Other Care Team Provider: Nephrology note reviewed    Education and Discussions with Family / Patient: none  Current Length of Stay: 2 day(s)  Current Patient Status: Inpatient   Certification Statement: The patient will continue to require additional inpatient hospital stay due to To monitor above conditions  Discharge Plan: Anticipate discharge in 24-48 hrs to rehab facility  Code Status: Level 1 - Full Code    Subjective:   Seen and evaluated and examined  Resting comfortably  Denies any significant complaint  Objective:     Vitals:   Temp (24hrs), Av 2 °F (36 8 °C), Min:97 7 °F (36 5 °C), Max:99 1 °F (37 3 °C)    Temp:  [97 7 °F (36 5 °C)-99 1 °F (37 3 °C)] 97 7 °F (36 5 °C)  HR:  [65-95] 65  Resp:  [17-38] 38  BP: (129-159)/(66-78) 130/78  SpO2:  [91 %-98 %] 98 %  Body mass index is 27 84 kg/m²  Input and Output Summary (last 24 hours): Intake/Output Summary (Last 24 hours) at 2023 1352  Last data filed at 2023 2837  Gross per 24 hour   Intake 590 ml   Output 1600 ml   Net -1010 ml       Physical Exam:   Physical Exam  Vitals and nursing note reviewed  HENT:      Mouth/Throat:      Mouth: Mucous membranes are moist       Pharynx: Oropharynx is clear  No oropharyngeal exudate  Eyes:      General: No scleral icterus  Left eye: No discharge  Extraocular Movements: Extraocular movements intact  Conjunctiva/sclera: Conjunctivae normal       Pupils: Pupils are equal, round, and reactive to light  Cardiovascular:      Rate and Rhythm: Normal rate  Heart sounds: No murmur heard  No friction rub  No gallop  Pulmonary:      Effort: Pulmonary effort is normal       Breath sounds: No stridor  Wheezing present  No rhonchi or rales  Abdominal:      General: Abdomen is flat  Bowel sounds are normal  There is no distension  Palpations: There is no mass  Tenderness: There is no abdominal tenderness  Hernia: No hernia is present  Musculoskeletal:      Cervical back: Normal range of motion  No rigidity  Lymphadenopathy:      Cervical: No cervical adenopathy  Skin:     General: Skin is warm  Coloration: Skin is not jaundiced  Findings: No bruising  Neurological:      General: No focal deficit present  Mental Status: She is alert and oriented to person, place, and time  Cranial Nerves: No cranial nerve deficit  Sensory: No sensory deficit  Motor: No weakness  Coordination: Coordination normal    Psychiatric:         Mood and Affect: Mood normal        Additional Data:     Labs:  Results from last 7 days   Lab Units 02/05/23  0425   WBC Thousand/uL 6 85   HEMOGLOBIN g/dL 12 4   HEMATOCRIT % 38 7   PLATELETS Thousands/uL 152   NEUTROS PCT % 87*   LYMPHS PCT % 9*   MONOS PCT % 4   EOS PCT % 0     Results from last 7 days   Lab Units 02/05/23  0425   SODIUM mmol/L 131*   POTASSIUM mmol/L 4 5   CHLORIDE mmol/L 87*   CO2 mmol/L 44*   BUN mg/dL 14   CREATININE mg/dL 0 58*   ANION GAP mmol/L 0*   CALCIUM mg/dL 9 3   ALBUMIN g/dL 3 8   TOTAL BILIRUBIN mg/dL 0 33   ALK PHOS U/L 65   ALT U/L 11   AST U/L 12*   GLUCOSE RANDOM mg/dL 213*         Results from last 7 days   Lab Units 02/05/23  1234   POC GLUCOSE mg/dl 203*         Results from last 7 days   Lab Units 02/04/23  0443 02/03/23  0511   PROCALCITONIN ng/ml <0 05 <0 05       Lines/Drains:  Invasive Devices     Peripheral Intravenous Line  Duration           Peripheral IV 02/03/23 Left Antecubital 2 days                      Imaging: No pertinent imaging reviewed  Recent Cultures (last 7 days):   Results from last 7 days   Lab Units 02/03/23  0533 02/03/23  0526   BLOOD CULTURE  Alpha Hemolytic Streptococcus* No Growth at 48 hrs     GRAM STAIN RESULT  Gram positive cocci in pairs and chains*  --        Last 24 Hours Medication List: Current Facility-Administered Medications   Medication Dose Route Frequency Provider Last Rate   • acetaminophen  650 mg Oral Q6H PRN Litzy Hills MD     • amLODIPine  10 mg Oral HS Litzy Hills MD     • atorvastatin  40 mg Oral HS Litzy Hills MD     • cefTRIAXone  1,000 mg Intravenous Q24H Litzy Hills MD Stopped (02/05/23 0354)   • citalopram  10 mg Oral Daily Litzy Hills MD     • cloNIDine  0 1 mg Oral Q12H [facility] Ailyn Aguilera MD     • guaiFENesin  600 mg Oral BID Litzy Hills MD     • heparin (porcine)  5,000 Units Subcutaneous Q8H Faisal Aguilera MD     • insulin lispro  2-12 Units Subcutaneous TID AC Litzy Hills MD     • insulin lispro  2-12 Units Subcutaneous HS Litzy Hills MD     • ipratropium-albuterol  3 mL Nebulization Q6H Litzy Hills MD     • lisinopril  40 mg Oral Daily Litzy Hills MD     • methylPREDNISolone sodium succinate  40 mg Intravenous Q12H Faisal Aguilera MD     • metoprolol tartrate  100 mg Oral Q12H [facility] Ailyn Aguilera MD     • ondansetron  4 mg Intravenous Q8H PRN Litzy Hills MD     • pantoprazole  40 mg Oral Early Morning Litzy Hills MD     • sodium chloride (PF)  3 mL Intravenous Q1H PRN Litzy Hills MD     • traZODone  100 mg Oral HS PRN Litzy Hills MD          Today, Patient Was Seen By: Litzy Hills MD    **Please Note: This note may have been constructed using a voice recognition system  **

## 2023-02-05 NOTE — ASSESSMENT & PLAN NOTE
· Na 126 on admission > 124  · After receiving normal saline as per nephrology recommendation, sodium improved to 131  · Check TSH  · About to check cortisol since patient remained on a steroid

## 2023-02-05 NOTE — ASSESSMENT & PLAN NOTE
Lab Results   Component Value Date    HGBA1C 6 4 (H) 10/18/2022       Recent Labs     02/05/23  1234   POCGLU 203*       Blood Sugar Average: Last 72 hrs:  · Hold oral medications  · Start SSI  · Monitor closely while on steroids

## 2023-02-05 NOTE — ASSESSMENT & PLAN NOTE
· Presents with shortness of breath, chest tightness over the last several days  Found to be hypoxic on chronic 4L NC, required up to 6L NC     · Currently saturating 3 L oxygen  · Patient with severely diminished breath sounds on exam  · Will continue IV Solumedrol for now   · Continue DuoNebs   · Start Zithromax per protocol

## 2023-02-05 NOTE — UTILIZATION REVIEW
Initial Clinical Review    Admission: Date/Time/Statement:   Admission Orders (From admission, onward)     Ordered        02/03/23 0647  INPATIENT ADMISSION  Once                      Orders Placed This Encounter   Procedures   • INPATIENT ADMISSION     Standing Status:   Standing     Number of Occurrences:   1     Order Specific Question:   Level of Care     Answer:   Med Surg [16]     Order Specific Question:   Estimated length of stay     Answer:   More than 2 Midnights     Order Specific Question:   Certification     Answer:   I certify that inpatient services are medically necessary for this patient for a duration of greater than two midnights  See H&P and MD Progress Notes for additional information about the patient's course of treatment  ED Arrival Information     Expected   -    Arrival   2/3/2023 04:46    Acuity   Emergent            Means of arrival   Walk-In    Escorted by   Family Member    Service   Hospitalist    Admission type   Emergency            Arrival complaint   abd pain           Chief Complaint   Patient presents with   • Shortness of Breath     Pt started with increased SOB on Wednesday and has been getting increasingly worse  Pt on 4L NC at baseline for COPD       Initial Presentation: 79 y o  female presents to ED from home with shortness of breath and chest tightness for past several days  Has increased her use of rescue inhaler  Has a  Productive cough with white sputum  Severely  Diminished breath sounds on exam    PMH  Is  COPD, chronic respiratory failure on home  O2  4L  NC, DM2 and tobacco abuse  Labs reveal sodium  126  CT abd/pelvis  Shows diverticulosis  CXR  NAD  Required  O2  Up to 6L  NC on  Arrival,  After IV  Steroids and nebulizers, back down to 4 L  NC  Admit  Ip  ICU LOC  With  Exacerbation  COPD, Acute hyponatremia and plan is  IV  S/medrol,  O2  4L  NC, Duonebs, monitor labs  And  PRASANNA  Date:    2/4        Day 2:   Continue  IV  S/medrol and PRASANNA  Continue Duonebs  Monitor labs and follow blood cultures  Sodium today  124  Complains  Of  Shortness of breath  1/2  Blood cultures now positive  GPC  Waiting final cultures  Adding  Zithromax  Nephrology consult  Hyponatremia, acute/chronic  Asymptomatic  Start fluid restrict  Potential etiologies include nonosmotic adh release due to COPD, vomiting, celexa  Reports poor intake that would sound concerning for insufficient solute, however, urine osm 530 (which goes against this)  Uric acid 3 4 more supportive for SIADH  Avoid bicarb containing fluids       ED Triage Vitals   Temperature Pulse Respirations Blood Pressure SpO2   02/03/23 0454 02/03/23 0454 02/03/23 0454 02/03/23 0456 02/03/23 0454   (!) 97 °F (36 1 °C) 89 (!) 24 (!) 175/85 (!) 78 %      Temp Source Heart Rate Source Patient Position - Orthostatic VS BP Location FiO2 (%)   02/03/23 0454 02/03/23 0454 02/03/23 0456 02/03/23 0456 --   Temporal Monitor Lying Left arm       Pain Score       02/03/23 0454       No Pain          Wt Readings from Last 1 Encounters:   02/04/23 71 3 kg (157 lb 3 oz)     Additional Vital Signs:   02/04/23 1155 -- -- -- 138/78 -- -- -- -- -- Sitting - Orthostatic VS   02/04/23 1150 -- -- -- 148/78 -- -- -- -- -- Lying - Orthostatic VS   02/04/23 0740 -- -- -- -- -- -- 36 4 L/min  Nasal cannula --   Nasal Cannula O2 Flow Rate (L/min): Decreased to 3L at 02/04/23 0740   02/04/23 0737 99 2 °F (37 3 °C) 81 15 148/72 104 98 % -- -- Nasal cannula Lying   02/03/23 2300 98 3 °F (36 8 °C) 80 16 162/67 -- 99 % 36 4 L/min Nasal cannula --   02/03/23 2045 -- 80 17 -- -- 97 % -- -- -- --   02/03/23 1800 -- 80 -- -- -- 93 % -- -- -- --   02/03/23 1745 -- 78 -- -- -- 98 % -- -- -- --   02/03/23 1730 -- 77 -- -- -- 98 % -- -- -- --   02/03/23 1715 -- 102 -- -- -- 96 % -- -- -- --   02/03/23 1700 -- 72 -- -- -- 98 % -- -- -- --   02/03/23 1645 -- 83 -- -- -- 96 % -- -- -- --   02/03/23 1630 -- 72 -- -- -- 99 % -- -- -- -- 02/03/23 1615 -- 80 -- -- -- 93 % -- -- -- --   02/03/23 1600 -- 85 11 Abnormal  -- -- 97 % -- -- -- --   02/03/23 1545 -- 77 31 Abnormal  145/83 108 99 % -- -- -- --   02/03/23 1530 -- 68 -- 140/77 101 97 % -- -- -- --   02/03/23 1445 -- 70 -- 141/74 101 96 % -- -- -- --   02/03/23 1430 -- 78 17 149/81 110 96 % -- -- -- --   02/03/23 1415 -- 65 -- 157/80 110 97 % -- -- -- --   02/03/23 1400 -- 70 30 Abnormal  151/83 103 97 % -- -- -- --   02/03/23 1345 -- 64 23 Abnormal  150/74 106 98 % -- -- -- --   02/03/23 1330 -- 71 43 Abnormal  141/65 94 98 % -- -- -- --   02/03/23 1315 -- 74 57 Abnormal  162/82 116 98 % -- -- -- --   02/03/23 1300 -- 74 48 Abnormal  145/83 109 94 % -- -- -- --   02/03/23 1245 -- 67 33 Abnormal  -- -- 100 % -- -- -- --   02/03/23 1230 -- 59 37 Abnormal  -- -- 100 % -- -- -- --   02/03/23 1215 -- 71 38 Abnormal  -- -- 100 % -- -- -- --   02/03/23 1200 -- 73 30 Abnormal  -- -- 98 % -- -- -- --   02/03/23 1145 -- 59 32 Abnormal  -- -- 100 % -- -- -- --   02/03/23 1130 -- 60 30 Abnormal  -- -- 100 % -- -- -- --   02/03/23 1115 -- 61 34 Abnormal  -- -- 100 % -- -- -- --   02/03/23 1100 -- 66 34 Abnormal  -- -- 99 % -- -- -- --   02/03/23 1000 -- 76 26 Abnormal  -- -- 91 % -- -- -- --   02/03/23 0915 -- 83 28 Abnormal  -- -- 89 % Abnormal  -- -- None (Room air) --   02/03/23 0900 -- -- 26 Abnormal  -- -- 91 % -- -- None (Room air)        Pertinent Labs/Diagnostic Test Results:   X-ray chest 2 views   ED Interpretation by Clemencia Unger MD (02/03 1663)   No acute cardiopulm abnl      Final Result by Rudolph Ojeda MD (02/03 5261)      No acute cardiopulmonary disease  Workstation performed: [id number]         CT abdomen pelvis with contrast   Final Result by Debbie Sung MD (57/59 5378)      No acute pathology  Colonic diverticulosis              Workstation performed: [id number]           Results from last 7 days   Lab Units 02/04/23  0011 02/03/23  0511   SARS-COV-2 Negative Negative     Results from last 7 days   Lab Units 02/05/23  0425 02/03/23  0731 02/03/23  0511   WBC Thousand/uL 6 85  --  7 53   HEMOGLOBIN g/dL 12 4  --  14 7   HEMATOCRIT % 38 7  --  45 4   PLATELETS Thousands/uL 152 190 201   NEUTROS ABS Thousands/µL 5 93  --  4 87         Results from last 7 days   Lab Units 02/05/23  0425 02/04/23 2014 02/04/23  1211 02/03/23  1415 02/03/23  0511   SODIUM mmol/L 131* 130* 125* 124* 126*   POTASSIUM mmol/L 4 5 4 2 4 8 4 9 4 4   CHLORIDE mmol/L 87* 85* 81* 83* 83*   CO2 mmol/L 44* 43* 43* 40* 38*   ANION GAP mmol/L 0* 2* 1* 1* 5   BUN mg/dL 14 16 13 12 11   CREATININE mg/dL 0 58* 0 68 0 59* 0 62 0 57*   EGFR ml/min/1 73sq m 96 91 95 94 96   CALCIUM mg/dL 9 3 9 2 9 4 9 0 9 9     Results from last 7 days   Lab Units 02/05/23  0425 02/03/23  0511   AST U/L 12* 19   ALT U/L 11 12   ALK PHOS U/L 65 90   TOTAL PROTEIN g/dL 6 8 8 7*   ALBUMIN g/dL 3 8 4 6   TOTAL BILIRUBIN mg/dL 0 33 0 53   BILIRUBIN DIRECT mg/dL  --  0 13         Results from last 7 days   Lab Units 02/05/23  0425 02/04/23 2014 02/04/23  1211 02/03/23  1415 02/03/23  0511   GLUCOSE RANDOM mg/dL 213* 187* 185* 218* 101           Results from last 7 days   Lab Units 02/03/23  1020 02/03/23  0731 02/03/23  0511   HS TNI 0HR ng/L  --   --  3   HS TNI 2HR ng/L  --  4  --    HSTNI D2 ng/L  --  1  --    HS TNI 4HR ng/L 4  --   --    HSTNI D4 ng/L 1  --   --                  Results from last 7 days   Lab Units 02/04/23  0443 02/03/23  0511   PROCALCITONIN ng/ml <0 05 <0 05                             Results from last 7 days   Lab Units 02/03/23  0511   LIPASE u/L 7*             Results from last 7 days   Lab Units 02/03/23  0928   OSMO UR mmol/     Results from last 7 days   Lab Units 02/03/23  0928   SODIUM UR  32     Results from last 7 days   Lab Units 02/04/23  0011 02/03/23  0511   INFLUENZA A PCR  Negative Negative   INFLUENZA B PCR  Negative Negative   RSV PCR  Negative Negative               Results from last 7 days   Lab Units 02/03/23  0533 02/03/23  0526   BLOOD CULTURE   --  No Growth at 48 hrs     GRAM STAIN RESULT  Gram positive cocci in pairs and chains*  --                ED Treatment:   Medication Administration from 02/03/2023 0446 to 02/04/2023 0434       Date/Time Order Dose Route Action Comments     02/03/2023 0555 EST ipratropium-albuterol (DUO-NEB) 0 5-2 5 mg/3 mL inhalation solution 3 mL 3 mL Nebulization Given --     02/03/2023 0545 EST ipratropium-albuterol (DUO-NEB) 0 5-2 5 mg/3 mL inhalation solution 3 mL 3 mL Nebulization Given --     02/03/2023 0515 EST ipratropium-albuterol (DUO-NEB) 0 5-2 5 mg/3 mL inhalation solution 3 mL 3 mL Nebulization Given --     02/03/2023 0527 EST methylPREDNISolone sodium succinate (Solu-MEDROL) injection 80 mg 80 mg Intravenous Given --     02/03/2023 0526 EST ondansetron (ZOFRAN) injection 4 mg 4 mg Intravenous Given --     02/03/2023 0606 EST iohexol (OMNIPAQUE) 350 MG/ML injection (SINGLE-DOSE) 100 mL 100 mL Intravenous Given --     02/03/2023 2137 EST amLODIPine (NORVASC) tablet 10 mg 10 mg Oral Given --     02/03/2023 2135 EST atorvastatin (LIPITOR) tablet 40 mg 40 mg Oral Given --     02/03/2023 0817 EST citalopram (CeleXA) tablet 10 mg 10 mg Oral Given --     02/03/2023 2137 EST cloNIDine (CATAPRES) tablet 0 1 mg 0 1 mg Oral Given --     02/03/2023 0815 EST cloNIDine (CATAPRES) tablet 0 1 mg 0 1 mg Oral Given --     02/03/2023 0814 EST lisinopril (ZESTRIL) tablet 40 mg 40 mg Oral Given --     02/03/2023 2138 EST metoprolol tartrate (LOPRESSOR) tablet 100 mg 100 mg Oral Given --     02/03/2023 0818 EST metoprolol tartrate (LOPRESSOR) tablet 100 mg 100 mg Oral Given --     02/03/2023 0814 EST pantoprazole (PROTONIX) EC tablet 40 mg 40 mg Oral Given --     02/03/2023 2104 EST multi-electrolyte (PLASMALYTE-A/ISOLYTE-S PH 7 4) IV solution 50 mL/hr Intravenous New Bag --     02/03/2023 2100 EST multi-electrolyte (PLASMALYTE-A/ISOLYTE-S PH 7 4) IV solution 0 mL/hr Intravenous Stopped --     02/03/2023 0743 EST multi-electrolyte (PLASMALYTE-A/ISOLYTE-S PH 7 4) IV solution 50 mL/hr Intravenous New Bag --     02/03/2023 1755 EST guaiFENesin (MUCINEX) 12 hr tablet 600 mg 600 mg Oral Given --     02/03/2023 0819 EST guaiFENesin (MUCINEX) 12 hr tablet 600 mg 600 mg Oral Given --     02/04/2023 0143 EST ipratropium-albuterol (DUO-NEB) 0 5-2 5 mg/3 mL inhalation solution 3 mL 3 mL Nebulization Given --     02/03/2023 2045 EST ipratropium-albuterol (DUO-NEB) 0 5-2 5 mg/3 mL inhalation solution 3 mL 3 mL Nebulization Given --     02/03/2023 1402 EST ipratropium-albuterol (DUO-NEB) 0 5-2 5 mg/3 mL inhalation solution 3 mL 3 mL Nebulization Given --     02/03/2023 0820 EST ipratropium-albuterol (DUO-NEB) 0 5-2 5 mg/3 mL inhalation solution 3 mL 3 mL Nebulization Given --     02/03/2023 2114 EST methylPREDNISolone sodium succinate (Solu-MEDROL) injection 40 mg 40 mg Intravenous Given --     02/03/2023 1410 EST methylPREDNISolone sodium succinate (Solu-MEDROL) injection 40 mg 40 mg Intravenous Given --     02/03/2023 2114 EST heparin (porcine) subcutaneous injection 5,000 Units 5,000 Units Subcutaneous Given --     02/03/2023 1415 EST heparin (porcine) subcutaneous injection 5,000 Units 5,000 Units Subcutaneous Given --     02/03/2023 0816 EST heparin (porcine) subcutaneous injection 5,000 Units 5,000 Units Subcutaneous Given --     02/03/2023 0810 EST azithromycin (ZITHROMAX) tablet 500 mg 500 mg Oral Given --     02/03/2023 2125 EST ondansetron (ZOFRAN) injection 4 mg 4 mg Intravenous Given --     02/04/2023 0133 EST pantoprazole (PROTONIX) EC tablet 40 mg 40 mg Oral Not Given Symptoms resolved        Present on Admission:  • Type 2 diabetes mellitus without complication, without long-term current use of insulin (HCC)  • Tobacco abuse  • Essential hypertension  • Acute hyponatremia  • Bacteremia      Admitting Diagnosis: Diverticulosis [K57 90]  Hyponatremia [E87 1]  SOB (shortness of breath) [R06 02]  Smoker [F17 200]  COPD exacerbation (HCC) [J44 1]  COPD with acute exacerbation ([facility] [address] ) [J44 1]  Age/Sex: 79 y o  female  Admission Orders:  Scheduled Medications:  amLODIPine, 10 mg, Oral, HS  atorvastatin, 40 mg, Oral, HS  cefTRIAXone, 1,000 mg, Intravenous, Q24H  citalopram, 10 mg, Oral, Daily  cloNIDine, 0 1 mg, Oral, Q12H NATHALY  guaiFENesin, 600 mg, Oral, BID  heparin (porcine), 5,000 Units, Subcutaneous, Q8H [facility]  ipratropium-albuterol, 3 mL, Nebulization, Q6H  lisinopril, 40 mg, Oral, Daily  methylPREDNISolone sodium succinate, 40 mg, Intravenous, Q12H NATHALY  metoprolol tartrate, 100 mg, Oral, Q12H NATHALY  pantoprazole, 40 mg, Oral, Early Morning      Continuous IV Infusions:     PRN Meds:  acetaminophen, 650 mg, Oral, Q6H PRN  ondansetron, 4 mg, Intravenous, Q8H PRN  sodium chloride (PF), 3 mL, Intravenous, Q1H PRN  traZODone, 100 mg, Oral, HS PRN        IP CONSULT TO NEPHROLOGY    Network Utilization Review Department  ATTENTION: Please call with any questions or concerns to [phone] and carefully listen to the prompts so that you are directed to the right person  All voicemails are confidential   Ehsan Nassar all requests for admission clinical reviews, approved or denied determinations and any other requests to dedicated fax number below belonging to the campus where the patient is receiving treatment   List of dedicated fax numbers for the Facilities:  [address] DENIALS (Administrative/Medical Necessity) [phone]   [address] (Maternity/NICU/Pediatrics) [phone]   [address] Monica Solo [phone]   Azeb Garcia  [phone]   [address] [place] [address] [address] [facility] - 911 [facility] [phone] [address] [place] [address] 000 [facility] [phone]

## 2023-02-06 ENCOUNTER — TELEPHONE (OUTPATIENT)
Dept: OTHER | Facility: HOSPITAL | Age: 68
End: 2023-02-06

## 2023-02-06 VITALS
HEIGHT: 63 IN | WEIGHT: 157.19 LBS | DIASTOLIC BLOOD PRESSURE: 73 MMHG | OXYGEN SATURATION: 97 % | RESPIRATION RATE: 16 BRPM | BODY MASS INDEX: 27.85 KG/M2 | SYSTOLIC BLOOD PRESSURE: 133 MMHG | TEMPERATURE: 98.1 F | HEART RATE: 77 BPM

## 2023-02-06 DIAGNOSIS — E87.1 HYPONATREMIA: Primary | ICD-10-CM

## 2023-02-06 LAB
ALBUMIN SERPL BCP-MCNC: 3.6 G/DL (ref 3.5–5)
ALP SERPL-CCNC: 57 U/L (ref 34–104)
ALT SERPL W P-5'-P-CCNC: 11 U/L (ref 7–52)
ANION GAP SERPL CALCULATED.3IONS-SCNC: -2 MMOL/L (ref 4–13)
AST SERPL W P-5'-P-CCNC: 10 U/L (ref 13–39)
BACTERIA BLD CULT: ABNORMAL
BASE EX.OXY STD BLDV CALC-SCNC: 94.9 % (ref 60–80)
BASE EXCESS BLDV CALC-SCNC: 7.8 MMOL/L
BASOPHILS # BLD AUTO: 0.01 THOUSANDS/ÂΜL (ref 0–0.1)
BASOPHILS NFR BLD AUTO: 0 % (ref 0–1)
BILIRUB SERPL-MCNC: 0.29 MG/DL (ref 0.2–1)
BUN SERPL-MCNC: 16 MG/DL (ref 5–25)
CALCIUM SERPL-MCNC: 9.1 MG/DL (ref 8.4–10.2)
CHLORIDE SERPL-SCNC: 91 MMOL/L (ref 96–108)
CO2 SERPL-SCNC: 45 MMOL/L (ref 21–32)
CREAT SERPL-MCNC: 0.54 MG/DL (ref 0.6–1.3)
EOSINOPHIL # BLD AUTO: 0 THOUSAND/ÂΜL (ref 0–0.61)
EOSINOPHIL NFR BLD AUTO: 0 % (ref 0–6)
ERYTHROCYTE [DISTWIDTH] IN BLOOD BY AUTOMATED COUNT: 12.3 % (ref 11.6–15.1)
GFR SERPL CREATININE-BSD FRML MDRD: 98 ML/MIN/1.73SQ M
GLUCOSE SERPL-MCNC: 174 MG/DL (ref 65–140)
GLUCOSE SERPL-MCNC: 210 MG/DL (ref 65–140)
GLUCOSE SERPL-MCNC: 250 MG/DL (ref 65–140)
GRAM STN SPEC: ABNORMAL
HCO3 BLDV-SCNC: 34.2 MMOL/L (ref 24–30)
HCT VFR BLD AUTO: 38 % (ref 34.8–46.1)
HGB BLD-MCNC: 12.1 G/DL (ref 11.5–15.4)
IMM GRANULOCYTES # BLD AUTO: 0.03 THOUSAND/UL (ref 0–0.2)
IMM GRANULOCYTES NFR BLD AUTO: 1 % (ref 0–2)
LYMPHOCYTES # BLD AUTO: 0.55 THOUSANDS/ÂΜL (ref 0.6–4.47)
LYMPHOCYTES NFR BLD AUTO: 10 % (ref 14–44)
MCH RBC QN AUTO: 29.2 PG (ref 26.8–34.3)
MCHC RBC AUTO-ENTMCNC: 31.8 G/DL (ref 31.4–37.4)
MCV RBC AUTO: 92 FL (ref 82–98)
MONOCYTES # BLD AUTO: 0.22 THOUSAND/ÂΜL (ref 0.17–1.22)
MONOCYTES NFR BLD AUTO: 4 % (ref 4–12)
NEUTROPHILS # BLD AUTO: 4.48 THOUSANDS/ÂΜL (ref 1.85–7.62)
NEUTS SEG NFR BLD AUTO: 85 % (ref 43–75)
NRBC BLD AUTO-RTO: 0 /100 WBCS
O2 CT BLDV-SCNC: 18.2 ML/DL
PCO2 BLDV: 55.4 MM HG (ref 42–50)
PH BLDV: 7.41 [PH] (ref 7.3–7.4)
PLATELET # BLD AUTO: 155 THOUSANDS/UL (ref 149–390)
PMV BLD AUTO: 10.4 FL (ref 8.9–12.7)
PO2 BLDV: 80.2 MM HG (ref 35–45)
POTASSIUM SERPL-SCNC: 4.3 MMOL/L (ref 3.5–5.3)
PROT SERPL-MCNC: 6.4 G/DL (ref 6.4–8.4)
RBC # BLD AUTO: 4.15 MILLION/UL (ref 3.81–5.12)
SODIUM SERPL-SCNC: 134 MMOL/L (ref 135–147)
STREPTOCOCCUS DNA BLD POS NAA+NON-PROBE: DETECTED
T4 FREE SERPL-MCNC: 1.18 NG/DL (ref 0.76–1.46)
TSH SERPL DL<=0.05 MIU/L-ACNC: 0.19 UIU/ML (ref 0.45–4.5)
WBC # BLD AUTO: 5.29 THOUSAND/UL (ref 4.31–10.16)

## 2023-02-06 RX ORDER — GUAIFENESIN 100 MG/5ML
200 SOLUTION ORAL EVERY 4 HOURS PRN
Qty: 118 ML | Refills: 0 | Status: SHIPPED | OUTPATIENT
Start: 2023-02-06

## 2023-02-06 RX ORDER — PREDNISONE 20 MG/1
40 TABLET ORAL DAILY
Qty: 10 TABLET | Refills: 0 | Status: SHIPPED | OUTPATIENT
Start: 2023-02-06 | End: 2023-02-11

## 2023-02-06 RX ORDER — POLYETHYLENE GLYCOL 3350 17 G/17G
17 POWDER, FOR SOLUTION ORAL DAILY
Status: DISCONTINUED | OUTPATIENT
Start: 2023-02-06 | End: 2023-02-06 | Stop reason: HOSPADM

## 2023-02-06 RX ADMIN — INSULIN LISPRO 2 UNITS: 100 INJECTION, SOLUTION INTRAVENOUS; SUBCUTANEOUS at 08:44

## 2023-02-06 RX ADMIN — METOPROLOL TARTRATE 100 MG: 50 TABLET, FILM COATED ORAL at 08:44

## 2023-02-06 RX ADMIN — IPRATROPIUM BROMIDE AND ALBUTEROL SULFATE 3 ML: 2.5; .5 SOLUTION RESPIRATORY (INHALATION) at 07:05

## 2023-02-06 RX ADMIN — HEPARIN SODIUM 5000 UNITS: 5000 INJECTION INTRAVENOUS; SUBCUTANEOUS at 04:55

## 2023-02-06 RX ADMIN — POLYETHYLENE GLYCOL 3350 17 G: 17 POWDER, FOR SOLUTION ORAL at 11:14

## 2023-02-06 RX ADMIN — CITALOPRAM HYDROBROMIDE 10 MG: 20 TABLET ORAL at 08:44

## 2023-02-06 RX ADMIN — IPRATROPIUM BROMIDE AND ALBUTEROL SULFATE 3 ML: 2.5; .5 SOLUTION RESPIRATORY (INHALATION) at 01:32

## 2023-02-06 RX ADMIN — INSULIN LISPRO 6 UNITS: 100 INJECTION, SOLUTION INTRAVENOUS; SUBCUTANEOUS at 11:35

## 2023-02-06 RX ADMIN — IPRATROPIUM BROMIDE AND ALBUTEROL SULFATE 3 ML: 2.5; .5 SOLUTION RESPIRATORY (INHALATION) at 13:00

## 2023-02-06 RX ADMIN — PANTOPRAZOLE SODIUM 40 MG: 40 TABLET, DELAYED RELEASE ORAL at 04:55

## 2023-02-06 RX ADMIN — METHYLPREDNISOLONE SODIUM SUCCINATE 40 MG: 40 INJECTION, POWDER, FOR SOLUTION INTRAMUSCULAR; INTRAVENOUS at 08:45

## 2023-02-06 RX ADMIN — CEFTRIAXONE 1000 MG: 1 INJECTION, SOLUTION INTRAVENOUS at 08:45

## 2023-02-06 RX ADMIN — LISINOPRIL 40 MG: 20 TABLET ORAL at 08:44

## 2023-02-06 RX ADMIN — CLONIDINE HYDROCHLORIDE 0.1 MG: 0.1 TABLET ORAL at 08:44

## 2023-02-06 NOTE — PLAN OF CARE
Problem: Potential for Falls  Goal: Patient will remain free of falls  Description: INTERVENTIONS:  - Educate patient/family on patient safety including physical limitations  - Instruct patient to call for assistance with activity   - Consult OT/PT to assist with strengthening/mobility   - Keep Call bell within reach  - Keep bed low and locked with side rails adjusted as appropriate  - Keep care items and personal belongings within reach  - Initiate and maintain comfort rounds  - Make Fall Risk Sign visible to staff  - Offer Toileting every 2 Hours, in advance of need  - Initiate/Maintain bed alarm  - Obtain necessary fall risk management equipment: walker, yellow socks  - Apply yellow socks and bracelet for high fall risk patients  - Consider moving patient to room near nurses station  Outcome: Progressing     Problem: Prexisting or High Potential for Compromised Skin Integrity  Goal: Skin integrity is maintained or improved  Description: INTERVENTIONS:  - Identify patients at risk for skin breakdown  - Assess and monitor skin integrity  - Assess and monitor nutrition and hydration status  - Monitor labs   - Assess for incontinence   - Turn and reposition patient  - Assist with mobility/ambulation  - Relieve pressure over bony prominences  - Avoid friction and shearing  - Provide appropriate hygiene as needed including keeping skin clean and dry  - Evaluate need for skin moisturizer/barrier cream  - Collaborate with interdisciplinary team   - Patient/family teaching  - Consider wound care consult   Outcome: Progressing     Problem: PAIN - ADULT  Goal: Verbalizes/displays adequate comfort level or baseline comfort level  Description: Interventions:  - Encourage patient to monitor pain and request assistance  - Assess pain using appropriate pain scale  - Administer analgesics based on type and severity of pain and evaluate response  - Implement non-pharmacological measures as appropriate and evaluate response  - Consider cultural and social influences on pain and pain management  - Notify physician/advanced practitioner if interventions unsuccessful or patient reports new pain  Outcome: Progressing     Problem: INFECTION - ADULT  Goal: Absence or prevention of progression during hospitalization  Description: INTERVENTIONS:  - Assess and monitor for signs and symptoms of infection  - Monitor lab/diagnostic results  - Monitor all insertion sites, i e  indwelling lines, tubes, and drains  - Monitor endotracheal if appropriate and nasal secretions for changes in amount and color  - [place] appropriate cooling/warming therapies per order  - Administer medications as ordered  - Instruct and encourage patient and family to use good hand hygiene technique  - Identify and instruct in appropriate isolation precautions for identified infection/condition  Outcome: Progressing  Goal: Absence of fever/infection during neutropenic period  Description: INTERVENTIONS:  - Monitor WBC    Outcome: Progressing     Problem: SAFETY ADULT  Goal: Patient will remain free of falls  Description: INTERVENTIONS:  - Educate patient/family on patient safety including physical limitations  - Instruct patient to call for assistance with activity   - Consult OT/PT to assist with strengthening/mobility   - Keep Call bell within reach  - Keep bed low and locked with side rails adjusted as appropriate  - Keep care items and personal belongings within reach  - Initiate and maintain comfort rounds  - Make Fall Risk Sign visible to staff  - Offer Toileting every 2 Hours, in advance of need  - Initiate/Maintain bed alarm  - Obtain necessary fall risk management equipment: walker, yellow socks  - Apply yellow socks and bracelet for high fall risk patients  - Consider moving patient to room near nurses station  Outcome: Progressing  Goal: Maintain or return to baseline ADL function  Description: INTERVENTIONS:  -  Assess patient's ability to carry out ADLs; assess patient's baseline for ADL function and identify physical deficits which impact ability to perform ADLs (bathing, care of mouth/teeth, toileting, grooming, dressing, etc )  - Assess/evaluate cause of self-care deficits   - Assess range of motion  - Assess patient's mobility; develop plan if impaired  - Assess patient's need for assistive devices and provide as appropriate  - Encourage maximum independence but intervene and supervise when necessary  - Involve family in performance of ADLs  - Assess for home care needs following discharge   - Consider OT consult to assist with ADL evaluation and planning for discharge  - Provide patient education as appropriate  Outcome: Progressing  Goal: Maintains/Returns to pre admission functional level  Description: INTERVENTIONS:  - Perform BMAT or MOVE assessment daily    - Set and communicate daily mobility goal to care team and patient/family/caregiver  - Collaborate with rehabilitation services on mobility goals if consulted  - Perform Range of Motion  4 times a day  - Reposition patient every 2 hours    - Dangle patient 2 times a day  - Stand patient 2 times a day  - Ambulate patient 2 times a day  - Out of bed to chair 2 times a day   - Out of bed for meals 3 times a day  - Out of bed for toileting  - Record patient progress and toleration of activity level   Outcome: Progressing

## 2023-02-06 NOTE — CASE MANAGEMENT
Case Management Discharge Planning Note    Patient name Syd Jarrell  Location /-69 MRN [id number]  : 1955 Date 2023       Current Admission Date: 2/3/2023  Current Admission Diagnosis:Bacteremia   Patient Active Problem List    Diagnosis Date Noted   • Bacteremia 2023   • Vomiting 2023   • COPD exacerbation ([address] ) 2020   • Acute respiratory failure with hypoxia ([address] ) 2020   • Acute hyponatremia 2020   • Tobacco abuse 2020   • Essential hypertension 2020   • Type 2 diabetes mellitus without complication, without long-term current use of insulin ([facility] [address] ) 2020      LOS (days): 3  Geometric Mean LOS (GMLOS) (days): 2 70  Days to GMLOS:-0 6     OBJECTIVE:  Risk of Unplanned Readmission Score: 13 24         Current admission status: Inpatient   Preferred Pharmacy:   [address] [place] [address] #[id number] [facility], [facility] [address]  [address] [place] [address]  Phone: [phone] Fax: [phone] [phone] [address] [place] [id number]  Phone: [phone] Fax: [phone]    Primary Care Provider: Cherri Bey DO    Primary Insurance: Ev Flores [facility] HOSPITAL REP  Secondary Insurance: [address]:        Patient discussed in huddle today stable for discharge home with family    Patient has chonic o2 at home and caretaker brought O2 tank to transport patient home      Family transported pt home today   No needs identified

## 2023-02-06 NOTE — PLAN OF CARE
Problem: Potential for Falls  Goal: Patient will remain free of falls  Description: INTERVENTIONS:  - Educate patient/family on patient safety including physical limitations  - Instruct patient to call for assistance with activity   - Consult OT/PT to assist with strengthening/mobility   - Keep Call bell within reach  - Keep bed low and locked with side rails adjusted as appropriate  - Keep care items and personal belongings within reach  - Initiate and maintain comfort rounds  - Make Fall Risk Sign visible to staff  - Offer Toileting every 2 Hours, in advance of need  - Initiate/Maintain bed alarm  - Obtain necessary fall risk management equipment: walker, yellow socks  - Apply yellow socks and bracelet for high fall risk patients  - Consider moving patient to room near nurses station  Outcome: Progressing     Problem: Prexisting or High Potential for Compromised Skin Integrity  Goal: Skin integrity is maintained or improved  Description: INTERVENTIONS:  - Identify patients at risk for skin breakdown  - Assess and monitor skin integrity  - Assess and monitor nutrition and hydration status  - Monitor labs   - Assess for incontinence   - Turn and reposition patient  - Assist with mobility/ambulation  - Relieve pressure over bony prominences  - Avoid friction and shearing  - Provide appropriate hygiene as needed including keeping skin clean and dry  - Evaluate need for skin moisturizer/barrier cream  - Collaborate with interdisciplinary team   - Patient/family teaching  - Consider wound care consult   Outcome: Progressing     Problem: PAIN - ADULT  Goal: Verbalizes/displays adequate comfort level or baseline comfort level  Description: Interventions:  - Encourage patient to monitor pain and request assistance  - Assess pain using appropriate pain scale  - Administer analgesics based on type and severity of pain and evaluate response  - Implement non-pharmacological measures as appropriate and evaluate response  - Consider cultural and social influences on pain and pain management  - Notify physician/advanced practitioner if interventions unsuccessful or patient reports new pain  Outcome: Progressing     Problem: INFECTION - ADULT  Goal: Absence or prevention of progression during hospitalization  Description: INTERVENTIONS:  - Assess and monitor for signs and symptoms of infection  - Monitor lab/diagnostic results  - Monitor all insertion sites, i e  indwelling lines, tubes, and drains  - Monitor endotracheal if appropriate and nasal secretions for changes in amount and color  - [place] appropriate cooling/warming therapies per order  - Administer medications as ordered  - Instruct and encourage patient and family to use good hand hygiene technique  - Identify and instruct in appropriate isolation precautions for identified infection/condition  Outcome: Progressing  Goal: Absence of fever/infection during neutropenic period  Description: INTERVENTIONS:  - Monitor WBC    Outcome: Progressing     Problem: SAFETY ADULT  Goal: Patient will remain free of falls  Description: INTERVENTIONS:  - Educate patient/family on patient safety including physical limitations  - Instruct patient to call for assistance with activity   - Consult OT/PT to assist with strengthening/mobility   - Keep Call bell within reach  - Keep bed low and locked with side rails adjusted as appropriate  - Keep care items and personal belongings within reach  - Initiate and maintain comfort rounds  - Make Fall Risk Sign visible to staff  - Offer Toileting every 2 Hours, in advance of need  - Initiate/Maintain bed alarm  - Obtain necessary fall risk management equipment: walker, yellow socks  - Apply yellow socks and bracelet for high fall risk patients  - Consider moving patient to room near nurses station  Outcome: Progressing  Goal: Maintain or return to baseline ADL function  Description: INTERVENTIONS:  -  Assess patient's ability to carry out ADLs; assess patient's baseline for ADL function and identify physical deficits which impact ability to perform ADLs (bathing, care of mouth/teeth, toileting, grooming, dressing, etc )  - Assess/evaluate cause of self-care deficits   - Assess range of motion  - Assess patient's mobility; develop plan if impaired  - Assess patient's need for assistive devices and provide as appropriate  - Encourage maximum independence but intervene and supervise when necessary  - Involve family in performance of ADLs  - Assess for home care needs following discharge   - Consider OT consult to assist with ADL evaluation and planning for discharge  - Provide patient education as appropriate  Outcome: Progressing  Goal: Maintains/Returns to pre admission functional level  Description: INTERVENTIONS:  - Perform BMAT or MOVE assessment daily    - Set and communicate daily mobility goal to care team and patient/family/caregiver  - Collaborate with rehabilitation services on mobility goals if consulted  - Perform Range of Motion  4 times a day  - Reposition patient every 2 hours    - Dangle patient 2 times a day  - Stand patient 2 times a day  - Ambulate patient 2 times a day  - Out of bed to chair 2 times a day   - Out of bed for meals 3 times a day  - Out of bed for toileting  - Record patient progress and toleration of activity level   Outcome: Progressing     Problem: DISCHARGE PLANNING  Goal: Discharge to home or other facility with appropriate resources  Description: INTERVENTIONS:  - Identify barriers to discharge w/patient and caregiver  - Arrange for needed discharge resources and transportation as appropriate  - Identify discharge learning needs (meds, wound care, etc )  - Arrange for interpretive services to assist at discharge as needed  - Refer to Case Management Department for coordinating discharge planning if the patient needs post-hospital services based on physician/advanced practitioner order or complex needs related to functional status, cognitive ability, or social support system  Outcome: Progressing     Problem: Knowledge Deficit  Goal: Patient/family/caregiver demonstrates understanding of disease process, treatment plan, medications, and discharge instructions  Description: Complete learning assessment and assess knowledge base    Interventions:  - Provide teaching at level of understanding  - Provide teaching via preferred learning methods  Outcome: Progressing     Problem: RESPIRATORY - ADULT  Goal: Achieves optimal ventilation and oxygenation  Description: INTERVENTIONS:  - Assess for changes in respiratory status  - Assess for changes in mentation and behavior  - Position to facilitate oxygenation and minimize respiratory effort  - Oxygen administered by appropriate delivery if ordered  - Initiate smoking cessation education as indicated  - Encourage broncho-pulmonary hygiene including cough, deep breathe, Incentive Spirometry  - Assess the need for suctioning and aspirate as needed  - Assess and instruct to report SOB or any respiratory difficulty  - Respiratory Therapy support as indicated  Outcome: Progressing     Problem: MOBILITY - ADULT  Goal: Maintain or return to baseline ADL function  Description: INTERVENTIONS:  -  Assess patient's ability to carry out ADLs; assess patient's baseline for ADL function and identify physical deficits which impact ability to perform ADLs (bathing, care of mouth/teeth, toileting, grooming, dressing, etc )  - Assess/evaluate cause of self-care deficits   - Assess range of motion  - Assess patient's mobility; develop plan if impaired  - Assess patient's need for assistive devices and provide as appropriate  - Encourage maximum independence but intervene and supervise when necessary  - Involve family in performance of ADLs  - Assess for home care needs following discharge   - Consider OT consult to assist with ADL evaluation and planning for discharge  - Provide patient education as appropriate  Outcome: Progressing  Goal: Maintains/Returns to pre admission functional level  Description: INTERVENTIONS:  - Perform BMAT or MOVE assessment daily    - Set and communicate daily mobility goal to care team and patient/family/caregiver  - Collaborate with rehabilitation services on mobility goals if consulted  - Perform Range of Motion  4 times a day  - Reposition patient every 2 hours    - Dangle patient 2 times a day  - Stand patient 2 times a day  - Ambulate patient 2 times a day  - Out of bed to chair 2 times a day   - Out of bed for meals 3 times a day  - Out of bed for toileting  - Record patient progress and toleration of activity level   Outcome: Progressing

## 2023-02-06 NOTE — NURSING NOTE
Discharge instructions reviewed with patient and grand daughter   Both verbalize understanding of same and agree to follow up with PCP nephrology and Pulmonology

## 2023-02-06 NOTE — ASSESSMENT & PLAN NOTE
Lab Results   Component Value Date    HGBA1C 6 4 (H) 10/18/2022       Recent Labs     02/05/23  1554 02/05/23  2111 02/06/23  0725 02/06/23  1130   POCGLU 187* 231* 174* 250*       Blood Sugar Average: Last 72 hrs:  · Blood sugar elevated secondary to steroid effect  · Continue to monitor  · Can resume home medication

## 2023-02-06 NOTE — ASSESSMENT & PLAN NOTE
1 set of blood culture is growing gram-positive cocci in chains and pairs  Second set of culture shows no growth in 72 hours-seems contaminated  Prescription 3 dose of IV ceftriaxone    Also received azithromycin as per COPD protocol

## 2023-02-06 NOTE — PLAN OF CARE
Problem: Potential for Falls  Goal: Patient will remain free of falls  Description: INTERVENTIONS:  - Educate patient/family on patient safety including physical limitations  - Instruct patient to call for assistance with activity   - Consult OT/PT to assist with strengthening/mobility   - Keep Call bell within reach  - Keep bed low and locked with side rails adjusted as appropriate  - Keep care items and personal belongings within reach  - Initiate and maintain comfort rounds  - Make Fall Risk Sign visible to staff  - Offer Toileting every 2 Hours, in advance of need  - Initiate/Maintain bed alarm  - Obtain necessary fall risk management equipment: walker, yellow socks  - Apply yellow socks and bracelet for high fall risk patients  - Consider moving patient to room near nurses station  2/6/2023 1414 by Mounika Graves RN  Outcome: Adequate for Discharge  2/6/2023 1059 by Mounika Graves RN  Outcome: Progressing     Problem: Prexisting or High Potential for Compromised Skin Integrity  Goal: Skin integrity is maintained or improved  Description: INTERVENTIONS:  - Identify patients at risk for skin breakdown  - Assess and monitor skin integrity  - Assess and monitor nutrition and hydration status  - Monitor labs   - Assess for incontinence   - Turn and reposition patient  - Assist with mobility/ambulation  - Relieve pressure over bony prominences  - Avoid friction and shearing  - Provide appropriate hygiene as needed including keeping skin clean and dry  - Evaluate need for skin moisturizer/barrier cream  - Collaborate with interdisciplinary team   - Patient/family teaching  - Consider wound care consult   2/6/2023 1414 by Mounika Graves RN  Outcome: Adequate for Discharge  2/6/2023 1059 by Mounika Graves RN  Outcome: Progressing     Problem: PAIN - ADULT  Goal: Verbalizes/displays adequate comfort level or baseline comfort level  Description: Interventions:  - Encourage patient to monitor pain and request assistance  - Assess pain using appropriate pain scale  - Administer analgesics based on type and severity of pain and evaluate response  - Implement non-pharmacological measures as appropriate and evaluate response  - Consider cultural and social influences on pain and pain management  - Notify physician/advanced practitioner if interventions unsuccessful or patient reports new pain  2/6/2023 1414 by Meenakshi Kevin RN  Outcome: Adequate for Discharge  2/6/2023 1059 by Meenakhsi Kevin RN  Outcome: Progressing     Problem: INFECTION - ADULT  Goal: Absence or prevention of progression during hospitalization  Description: INTERVENTIONS:  - Assess and monitor for signs and symptoms of infection  - Monitor lab/diagnostic results  - Monitor all insertion sites, i e  indwelling lines, tubes, and drains  - Monitor endotracheal if appropriate and nasal secretions for changes in amount and color  - [place] appropriate cooling/warming therapies per order  - Administer medications as ordered  - Instruct and encourage patient and family to use good hand hygiene technique  - Identify and instruct in appropriate isolation precautions for identified infection/condition  2/6/2023 1414 by Meenakshi Kevin RN  Outcome: Adequate for Discharge  2/6/2023 1059 by Meenakshi Kevin RN  Outcome: Progressing  Goal: Absence of fever/infection during neutropenic period  Description: INTERVENTIONS:  - Monitor WBC    2/6/2023 1414 by Meenakshi Kevin RN  Outcome: Adequate for Discharge  2/6/2023 1059 by Meenakshi Kevin RN  Outcome: Progressing     Problem: SAFETY ADULT  Goal: Patient will remain free of falls  Description: INTERVENTIONS:  - Educate patient/family on patient safety including physical limitations  - Instruct patient to call for assistance with activity   - Consult OT/PT to assist with strengthening/mobility   - Keep Call bell within reach  - Keep bed low and locked with side rails adjusted as appropriate  - Keep care items and personal belongings within reach  - Initiate and maintain comfort rounds  - Make Fall Risk Sign visible to staff  - Offer Toileting every 2 Hours, in advance of need  - Initiate/Maintain bed alarm  - Obtain necessary fall risk management equipment: walker, yellow socks  - Apply yellow socks and bracelet for high fall risk patients  - Consider moving patient to room near nurses station  2/6/2023 1414 by Quan Gutierrez RN  Outcome: Adequate for Discharge  2/6/2023 1059 by Quan Gutierrez RN  Outcome: Progressing  Goal: Maintain or return to baseline ADL function  Description: INTERVENTIONS:  -  Assess patient's ability to carry out ADLs; assess patient's baseline for ADL function and identify physical deficits which impact ability to perform ADLs (bathing, care of mouth/teeth, toileting, grooming, dressing, etc )  - Assess/evaluate cause of self-care deficits   - Assess range of motion  - Assess patient's mobility; develop plan if impaired  - Assess patient's need for assistive devices and provide as appropriate  - Encourage maximum independence but intervene and supervise when necessary  - Involve family in performance of ADLs  - Assess for home care needs following discharge   - Consider OT consult to assist with ADL evaluation and planning for discharge  - Provide patient education as appropriate  2/6/2023 1414 by Quan Gutierrez RN  Outcome: Adequate for Discharge  2/6/2023 1059 by Quan Gutierrez RN  Outcome: Progressing  Goal: Maintains/Returns to pre admission functional level  Description: INTERVENTIONS:  - Perform BMAT or MOVE assessment daily    - Set and communicate daily mobility goal to care team and patient/family/caregiver  - Collaborate with rehabilitation services on mobility goals if consulted  - Perform Range of Motion  4 times a day  - Reposition patient every 2 hours    - Dangle patient 2 times a day  - Stand patient 2 times a day  - Ambulate patient 2 times a day  - Out of bed to chair 2 times a day   - Out of bed for meals 3 times a day  - Out of bed for toileting  - Record patient progress and toleration of activity level   2/6/2023 1414 by Lauren Rice RN  Outcome: Adequate for Discharge  2/6/2023 1059 by Lauren Rice RN  Outcome: Progressing     Problem: DISCHARGE PLANNING  Goal: Discharge to home or other facility with appropriate resources  Description: INTERVENTIONS:  - Identify barriers to discharge w/patient and caregiver  - Arrange for needed discharge resources and transportation as appropriate  - Identify discharge learning needs (meds, wound care, etc )  - Arrange for interpretive services to assist at discharge as needed  - Refer to Case Management Department for coordinating discharge planning if the patient needs post-hospital services based on physician/advanced practitioner order or complex needs related to functional status, cognitive ability, or social support system  2/6/2023 1414 by Lauren Rice RN  Outcome: Adequate for Discharge  2/6/2023 1059 by Lauren Rice RN  Outcome: Progressing     Problem: Knowledge Deficit  Goal: Patient/family/caregiver demonstrates understanding of disease process, treatment plan, medications, and discharge instructions  Description: Complete learning assessment and assess knowledge base    Interventions:  - Provide teaching at level of understanding  - Provide teaching via preferred learning methods  2/6/2023 1414 by Lauren Rice RN  Outcome: Adequate for Discharge  2/6/2023 1059 by Lauren Rice RN  Outcome: Progressing     Problem: RESPIRATORY - ADULT  Goal: Achieves optimal ventilation and oxygenation  Description: INTERVENTIONS:  - Assess for changes in respiratory status  - Assess for changes in mentation and behavior  - Position to facilitate oxygenation and minimize respiratory effort  - Oxygen administered by appropriate delivery if ordered  - Initiate smoking cessation education as indicated  - Encourage broncho-pulmonary hygiene including cough, deep breathe, Incentive Spirometry  - Assess the need for suctioning and aspirate as needed  - Assess and instruct to report SOB or any respiratory difficulty  - Respiratory Therapy support as indicated  2/6/2023 1414 by Luis Manuel Can RN  Outcome: Adequate for Discharge  2/6/2023 1059 by Luis Manuel Can RN  Outcome: Progressing     Problem: MOBILITY - ADULT  Goal: Maintain or return to baseline ADL function  Description: INTERVENTIONS:  -  Assess patient's ability to carry out ADLs; assess patient's baseline for ADL function and identify physical deficits which impact ability to perform ADLs (bathing, care of mouth/teeth, toileting, grooming, dressing, etc )  - Assess/evaluate cause of self-care deficits   - Assess range of motion  - Assess patient's mobility; develop plan if impaired  - Assess patient's need for assistive devices and provide as appropriate  - Encourage maximum independence but intervene and supervise when necessary  - Involve family in performance of ADLs  - Assess for home care needs following discharge   - Consider OT consult to assist with ADL evaluation and planning for discharge  - Provide patient education as appropriate  2/6/2023 1414 by Luis Manuel Can RN  Outcome: Adequate for Discharge  2/6/2023 1059 by Luis Manuel Can RN  Outcome: Progressing  Goal: Maintains/Returns to pre admission functional level  Description: INTERVENTIONS:  - Perform BMAT or MOVE assessment daily    - Set and communicate daily mobility goal to care team and patient/family/caregiver  - Collaborate with rehabilitation services on mobility goals if consulted  - Perform Range of Motion  4 times a day  - Reposition patient every    2 hours    - Dangle patient 2 times a day  - Stand patient 2 times a day  - Ambulate patient 2 times a day  - Out of bed to chair 2 times a day   - Out of bed for meals 3 times a day  - Out of bed for toileting  - Record patient progress and toleration of activity level   2/6/2023 1414 by Luis Manuel Can RN  Outcome: Adequate for Discharge  2/6/2023 1059 by Luis Manuel Can RN  Outcome: Progressing

## 2023-02-06 NOTE — ASSESSMENT & PLAN NOTE
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

## 2023-02-06 NOTE — DISCHARGE SUMMARY
[address]  Discharge- Marie Mail 1955, 79 y o  female MRN: [id number]  Unit/Bed#: -01 Encounter: [id number]  Primary Care Provider: Martinez Childers DO   Date and time admitted to hospital: 2/3/2023  4:50 AM    COPD exacerbation [facility])  Assessment & Plan  · Presents with shortness of breath, chest tightness over the last several days  Found to be hypoxic on chronic 4L NC, required up to 6L NC    · Patient condition significantly improved, back to baseline with 3 L of oxygen    * Bacteremia  Assessment & Plan  1 set of blood culture is growing gram-positive cocci in chains and pairs  Second set of culture shows no growth in 72 hours-seems contaminated  Prescription 3 dose of IV ceftriaxone  Also received azithromycin as per COPD protocol      Vomiting  Assessment & Plan  · Episode of vomiting on arrival   · CT a/p without acute findings  · Currently resolved, afebrile     ·  PRN antiemetics     Type 2 diabetes mellitus without complication, without long-term current use of insulin [facility])  Assessment & Plan  Lab Results   Component Value Date    HGBA1C 6 4 (H) 10/18/2022       Recent Labs     02/05/23  1554 02/05/23  2111 02/06/23  0725 02/06/23  1130   POCGLU 187* 231* 174* 250*       Blood Sugar Average: Last 72 hrs:  · Blood sugar elevated secondary to steroid effect  · Continue to monitor  · Can resume home medication    Essential hypertension  Assessment & Plan  · Continue Lopressor 100 mg BID, Norvasc, clonidine, lisinopril     Tobacco abuse  Assessment & Plan  · Current smoker  · Cessation encouraged, declined nicotine patch     Acute hyponatremia  Assessment & Plan  · Sodium level improved to 134  · Mildly factorial in nature-vomiting, COPD, Celexa, urine studies concerning for SIADH-as per nephrology, continue fluid restriction to 1500 cc/day  · After receiving normal saline as per nephrology recommendation, sodium improved to 131  Lab Results   Component Value Date [id number] 0 194 (L) 02/06/2023       · About to check cortisol since patient remained on a steroid      Medical Problems     Resolved Problems  Date Reviewed: 2/3/2023   None       Discharging Physician / Practitioner: Farzana See MD  PCP: Rl Bear DO  Admission Date:   Admission Orders (From admission, onward)     Ordered        02/03/23 0647  INPATIENT ADMISSION  Once                      Discharge Date: 02/06/23    Consultations During Hospital Stay:  · Nephrology  · PT/OT    Procedures Performed:   X-ray chest 2 views   ED Interpretation by Vale Hammans, MD (95/28 [id number])   No acute cardiopulm abnl      Final Result by Ines Baeza MD (02/03 1521)      No acute cardiopulmonary disease  Workstation performed: [id number]         CT abdomen pelvis with contrast   Final Result by Terra López MD (95/17 [id number])      No acute pathology  Colonic diverticulosis              Workstation performed: [id number]         ·   ·     Significant Findings / Test Results:   Lab Results   Component Value Date    WBC 5 29 02/06/2023    HGB 12 1 02/06/2023    HCT 38 0 02/06/2023    MCV 92 02/06/2023     02/06/2023   ·   Lab Results   Component Value Date    SODIUM 134 (L) 02/06/2023    K 4 3 02/06/2023    CL 91 (L) 02/06/2023    CO2 45 (H) 02/06/2023    AGAP -2 (L) 02/06/2023    BUN 16 02/06/2023    CREATININE 0 54 (L) 02/06/2023    GLUC 210 (H) 02/06/2023    CALCIUM 9 1 02/06/2023    AST 10 (L) 02/06/2023    ALT 11 02/06/2023    ALKPHOS 57 02/06/2023    TP 6 4 02/06/2023    TBILI 0 29 02/06/2023    EGFR 98 02/06/2023   ·   Collected Updated Procedure Result Status Patient Facility Result Comment    02/04/2023 0011 02/04/2023 0055 FLU/RSV/COVID - if FLU/RSV clinically relevant [[id number]]    Nares from Nasopharyngeal Swab    Final result [facility] - copy/paste COVID Guidelines URL to browser: [email or web address]   SARS-CoV-2 assay is a Nucleic Acid Amplification assay intended for the   qualitative detection of nucleic acid from SARS-CoV-2 in nasopharyngeal   swabs  Results are for the presumptive identification of SARS-CoV-2 RNA  Positive results are indicative of infection with SARS-CoV-2, the virus   causing COVID-19, but do not rule out bacterial infection or co-infection   with other viruses  Laboratories within the [place] and its   territories are required to report all positive results to the appropriate   public health authorities  Negative results do not preclude SARS-CoV-2   infection and should not be used as the sole basis for treatment or other   patient management decisions  Negative results must be combined with   clinical observations, patient history, and epidemiological information  This test has not been FDA cleared or approved  This test has been authorized by FDA under an Emergency Use Authorization   (EUA)  This test is only authorized for the duration of time the   declaration that circumstances exist justifying the authorization of the   emergency use of an in vitro diagnostic tests for detection of SARS-CoV-2   virus and/or diagnosis of COVID-19 infection under section 564(b)(1) of   the Act, 21 U  [place] C  615IWA-0(W)(5), unless the authorization is terminated   or revoked sooner  The test has been validated but independent review by FDA   and CLIA is pending  Test performed using [employer] Gene[employer]pert: This RT-PCR assay targets N2,   a region unique to SARS-CoV-2  A conserved region in the E-gene was chosen   for pan-Sarbecovirus detection which includes SARS-CoV-2  According to CMS-2020-01-R, this platform meets the definition of high-throughput technology      Component Value   SARS-CoV-2 Negative   INFLUENZA A PCR Negative   INFLUENZA B PCR Negative   RSV PCR Negative          02/03/2023 0533 02/06/2023 1317 Blood culture #1 [[id number]]   (Abnormal)   Blood from Hand, Left    Preliminary result [address]  Component Value   Blood Culture Streptococcus thermophilus Abnormal  P    Presumptive identification   This organism has been edited  The previous result was Alpha Hemolytic Streptococcus on 2/5/2023 at 1230 EST  Gram Stain Result Gram positive cocci in pairs and chains Abnormal  P    Refer to Blood Culture Identification Panel results     This is an appended report  These results have been appended to a previously preliminary verified report  02/03/2023 0533 02/04/2023 0141 Blood Culture Identification Panel [[id number]]    (Abnormal)   Blood from Hand, Left    Preliminary result [address] Routine culture and susceptiblity to follow for confirmation  Film Array panel tests for 11 gram positive organisms, 15 gram negative organisms, 7 yeast species and 10 resistance genes  Component Value   Streptococcus Detected Abnormal  P    Pathogen is not Enterococcus, Streptococcus agalactiae, or Streptococcus pyogenes     Possible contaminant; consider ceftriaxone if critically ill OR growing in 2/2 OR suspect true bacteremia              02/03/2023 0526 02/06/2023 1001 Blood culture #2 [[id number]]   Blood from Arm, Left    Preliminary result [address]  Component Value   Blood Culture No Growth at 72 hrs  P             02/03/2023 0511 02/03/2023 0602 FLU/RSV/COVID - if FLU/RSV clinically relevant [[id number]]    Nares from Nasopharyngeal Swab    Final result [facility] - copy/paste COVID Guidelines URL to browser: [email or web address]   SARS-CoV-2 assay is a Nucleic Acid Amplification assay intended for the   qualitative detection of nucleic acid from SARS-CoV-2 in nasopharyngeal   swabs   Results are for the presumptive identification of SARS-CoV-2 RNA  Positive results are indicative of infection with SARS-CoV-2, the virus   causing COVID-19, but do not rule out bacterial infection or co-infection   with other viruses  Laboratories within the [place] and its   territories are required to report all positive results to the appropriate   public health authorities  Negative results do not preclude SARS-CoV-2   infection and should not be used as the sole basis for treatment or other   patient management decisions  Negative results must be combined with   clinical observations, patient history, and epidemiological information  This test has not been FDA cleared or approved  This test has been authorized by FDA under an Emergency Use Authorization   (EUA)  This test is only authorized for the duration of time the   declaration that circumstances exist justifying the authorization of the   emergency use of an in vitro diagnostic tests for detection of SARS-CoV-2   virus and/or diagnosis of COVID-19 infection under section 564(b)(1) of   the Act, 21 U  S C  860IYY-8(J)(7), unless the authorization is terminated   or revoked sooner  The test has been validated but independent review by FDA   and CLIA is pending  Test performed using [employer] Gene[employer]pert: This RT-PCR assay targets N2,   a region unique to SARS-CoV-2  A conserved region in the E-gene was chosen   for pan-Sarbecovirus detection which includes SARS-CoV-2  According to CMS-2020-01-R, this platform meets the definition of high-throughput technology  Component Value   SARS-CoV-2 Negative   INFLUENZA A PCR Negative   INFLUENZA B PCR Negative   RSV PCR Negative            ·     Incidental Findings:   · As mentioned above  · I reviewed the above mentioned incidental findings with the patient and/or family and they expressed understanding      Test Results Pending at Discharge (will require follow up):   · none     Outpatient Tests Requested:  · BMP in 1 week    Complications: Hyponatremia    Reason for Admission: Shortness of breath    Hospital Course:   Osmar Melendez is a 79 y o  female patient who originally presented to the hospital on 2/3/2023 due to shortness of breath secondary to COPD exacerbation  Patient admitted  The diagnosis for COPD exacerbation, received treatment with IV Solu-Medrol, inhaled DuoNeb and Zithromax  Later in patient blood culture, 1 set of the blood culture came back positive, second set blood culture shows negative, which seems contaminated  Patient received ceftriaxone IV 3 doses  No more antibiotic needed  She also has diabetes, blood sugar remain elevated due to steroid side effect  Patient also found acute hyponatremia, nephrology consulted, with treatment, sodium level improved  It was suspected multifactorial from COPD, vomiting and Celexa also urine studies concerning for SIADH  Nephrology recommends 1500 cc fluid restriction per day  Patient also had chronic respiratory acidosis with compensation  With treatment, patient condition improved, patient back to her baseline  Lab results, imaging findings, treatment plan and option discussed in details with patient  And family on the bedside  Verbalized understanding agrees  Please see above list of diagnoses and related plan for additional information  Condition at Discharge: stable    Discharge Day Visit / Exam:   Subjective: Seen and evaluated during the event  Resting comfortably  Denies any significant complaint other than cough  Vitals: Blood Pressure: 133/73 (02/06/23 0725)  Pulse: 77 (02/06/23 0725)  Temperature: 98 1 °F (36 7 °C) (02/06/23 0725)  Temp Source: Temporal (02/05/23 1464)  Respirations: 16 (02/06/23 0725)  Height: 5' 3" (160 cm) (02/04/23 0436)  Weight - Scale: 71 3 kg (157 lb 3 oz) (02/04/23 0436)  SpO2: 97 % (02/06/23 1300)  Exam:   Physical Exam  Vitals and nursing note reviewed  Exam conducted with a chaperone present  Constitutional:       Appearance: Normal appearance  She is not ill-appearing or diaphoretic  HENT:      Head: Normocephalic  Nose: Nose normal  No congestion  Mouth/Throat:      Mouth: Mucous membranes are moist       Pharynx: Oropharynx is clear  No oropharyngeal exudate  Eyes:      General: No scleral icterus  Left eye: No discharge  Extraocular Movements: Extraocular movements intact  Conjunctiva/sclera: Conjunctivae normal       Pupils: Pupils are equal, round, and reactive to light  Cardiovascular:      Rate and Rhythm: Normal rate  Heart sounds: Normal heart sounds  No murmur heard  No friction rub  No gallop  Pulmonary:      Effort: Pulmonary effort is normal  No respiratory distress  Breath sounds: No stridor  No wheezing or rhonchi  Abdominal:      General: Abdomen is flat  Bowel sounds are normal  There is no distension  Palpations: There is no mass  Tenderness: There is no abdominal tenderness  Hernia: No hernia is present  Musculoskeletal:         General: No swelling, tenderness or deformity  Normal range of motion  Cervical back: Normal range of motion  No rigidity  Lymphadenopathy:      Cervical: No cervical adenopathy  Neurological:      General: No focal deficit present  Mental Status: She is alert and oriented to person, place, and time  Cranial Nerves: No cranial nerve deficit  Sensory: No sensory deficit  Motor: No weakness  Coordination: Coordination normal          Discussion with Family: Updated  (Multiple family members) at bedside  Discharge instructions/Information to patient and family:   See after visit summary for information provided to patient and family  Provisions for Follow-Up Care:  See after visit summary for information related to follow-up care and any pertinent home health orders         Disposition:   Home    Planned Readmission: If condition get worse Discharge Statement:    Greater than 50% of the total time was spent examining patient, answering all patient questions, arranging and discussing plan of care with patient as well as directly providing post-discharge instructions  Additional time then spent on discharge activities  Discharge Medications:  See after visit summary for reconciled discharge medications provided to patient and/or family        **Please Note: This note may have been constructed using a voice recognition system**

## 2023-02-06 NOTE — PROGRESS NOTES
Progress Note - Nephrology   Irene Calhoun 79 y o  female MRN: [id number]  Unit/Bed#: -01 Encounter: [id number]    A/P:  1  Hyponatremia, uncorrected Na 134  Etiology multifactorial with COPD,vomiting, celexa  urine studies were concerning for SIADH  Orthostatic negative  Low uric acid noted  She did partially improve with IVF trial and FR  May have had hypovolemic component as well  Stable from sodium perspective  Continue mod FR 50 ounces at DC  Fu bmp in 1 week at dc  2  Elevated bicarbonate due to metabolic compensation from chronic respiratory acidosis likely  Could consider VBG to assess given rising trends  She is not on any diuretics  Her baseline TCO2 upper 35-39      3  Acute COPD exacerbation  Management per primary  oxygen requirements stable  4  Essential HTN   Continue current regimen--lopressor, norvasc, clonidine, lisinopril  Follow up reason for today's visit:     Hyponatremia       Subjective:   Patient seen and examined today  Reporting constipation  Denies cp/sob/n/v/d  Tolerating diet  No issues urinating  Discussed care with RN  A complete 10 point review of systems was performed and is otherwise negative  O2 requirement stable at 3 L  Objective:     Vitals: Blood pressure 133/73, pulse 77, temperature 98 1 °F (36 7 °C), resp  rate 16, height 5' 3" (1 6 m), weight 71 3 kg (157 lb 3 oz), SpO2 97 %  ,Body mass index is 27 84 kg/m²  Weight (last 2 days)     Date/Time Weight    02/04/23 0436 71 3 (157 19)            Intake/Output Summary (Last 24 hours) at 2/6/2023 1112  Last data filed at 2/6/2023 0828  Gross per 24 hour   Intake 523 ml   Output --   Net 523 ml     I/O last 3 completed shifts:   In: 725 [P O :645; I V :30; IV Piggyback:50]  Out: 1150 [Urine:1150]         Physical Exam: /73   Pulse 77   Temp 98 1 °F (36 7 °C)   Resp 16   Ht 5' 3" (1 6 m)   Wt 71 3 kg (157 lb 3 oz)   SpO2 97%   BMI 27 84 kg/m²     General Appearance:    Alert, cooperative, no distress, appears stated age   Head:    Normocephalic, without obvious abnormality, atraumatic   Eyes:    Conjunctiva/corneas clear   Ears:    Normal external ears   Nose:   Nares normal, septum midline, mucosa normal, no drainage    or sinus tenderness   Throat:   Lips, mucosa, and tongue normal; teeth and gums normal   Neck:    Back:      Lungs:     + wheezing  No rales    Chest wall:    No tenderness or deformity   Heart:    Regular rate and rhythm, S1 and S2 normal, no murmur, rub   or gallop   Abdomen:     Soft, non-tender, bowel sounds active   Extremities:   Extremities normal, atraumatic, no cyanosis or edema   Skin:   Skin color, texture, turgor normal, no rashes or lesions   Lymph nodes:   Cervical normal   Neurologic:   CNII-XII intact            Lab, Imaging and other studies: I have personally reviewed pertinent labs  CBC:   Lab Results   Component Value Date    WBC 5 29 02/06/2023    HGB 12 1 02/06/2023    HCT 38 0 02/06/2023    MCV 92 02/06/2023     02/06/2023    MCH 29 2 02/06/2023    MCHC 31 8 02/06/2023    RDW 12 3 02/06/2023    MPV 10 4 02/06/2023    NRBC 0 02/06/2023     CMP:   Lab Results   Component Value Date    K 4 3 02/06/2023    CL 91 (L) 02/06/2023    CO2 45 (H) 02/06/2023    BUN 16 02/06/2023    CREATININE 0 54 (L) 02/06/2023    CALCIUM 9 1 02/06/2023    AST 10 (L) 02/06/2023    ALT 11 02/06/2023    ALKPHOS 57 02/06/2023    EGFR 98 02/06/2023         Results from last 7 days   Lab Units 02/06/23  0502 02/05/23  0425 02/04/23 2014 02/03/23  1415 02/03/23  0511   POTASSIUM mmol/L 4 3 4 5 4 2   < > 4 4   CHLORIDE mmol/L 91* 87* 85*   < > 83*   CO2 mmol/L 45* 44* 43*   < > 38*   BUN mg/dL 16 14 16   < > 11   CREATININE mg/dL 0 54* 0 58* 0 68   < > 0 57*   CALCIUM mg/dL 9 1 9 3 9 2   < > 9 9   ALK PHOS U/L 57 65  --   --  90   ALT U/L 11 11  --   --  12   AST U/L 10* 12*  --   --  19    < > = values in this interval not displayed           Phosphorus: No results found for: PHOS  Magnesium: No results found for: MG  Urinalysis: No results found for: Kerney Snow, SPECGRAV, PHUR, LEUKOCYTESUR, NITRITE, PROTEINUA, GLUCOSEU, KETONESU, BILIRUBINUR, BLOODU  Ionized Calcium: No results found for: CAION  Coagulation: No results found for: PT, INR, APTT  Troponin: No results found for: TROPONINI  ABG: No results found for: PHART, PND7MTH, PO2ART, ZGK0KDZ, G2FUWXIY, BEART, SOURCE  Radiology review:     IMAGING  No results found      Current Facility-Administered Medications   Medication Dose Route Frequency   • acetaminophen (TYLENOL) tablet 650 mg  650 mg Oral Q6H PRN   • amLODIPine (NORVASC) tablet 10 mg  10 mg Oral HS   • atorvastatin (LIPITOR) tablet 40 mg  40 mg Oral HS   • cefTRIAXone (ROCEPHIN) IVPB (premix in dextrose) 1,000 mg 50 mL  1,000 mg Intravenous Q24H   • citalopram (CeleXA) tablet 10 mg  10 mg Oral Daily   • cloNIDine (CATAPRES) tablet 0 1 mg  0 1 mg Oral Q12H [address]   • guaiFENesin (ROBITUSSIN) oral liquid 200 mg  200 mg Oral Q4H PRN   • heparin (porcine) subcutaneous injection 5,000 Units  5,000 Units Subcutaneous Q8H [address]   • insulin lispro (HumaLOG) 100 units/mL subcutaneous injection 2-12 Units  2-12 Units Subcutaneous TID AC   • insulin lispro (HumaLOG) 100 units/mL subcutaneous injection 2-12 Units  2-12 Units Subcutaneous HS   • ipratropium-albuterol (DUO-NEB) 0 5-2 5 mg/3 mL inhalation solution 3 mL  3 mL Nebulization Q6H   • lisinopril (ZESTRIL) tablet 40 mg  40 mg Oral Daily   • methylPREDNISolone sodium succinate (Solu-MEDROL) injection 40 mg  40 mg Intravenous Q12H NATHALY   • metoprolol tartrate (LOPRESSOR) tablet 100 mg  100 mg Oral Q12H [address]   • ondansetron (ZOFRAN) injection 4 mg  4 mg Intravenous Q8H PRN   • pantoprazole (PROTONIX) EC tablet 40 mg  40 mg Oral Early Morning   • polyethylene glycol (MIRALAX) packet 17 g  17 g Oral Daily   • sodium chloride (PF) 0 9 % injection 3 mL  3 mL Intravenous Q1H PRN   • traZODone (DESYREL) tablet 100 mg  100 mg Oral HS PRN     Medications Discontinued During This Encounter   Medication Reason   • pantoprazole (PROTONIX) EC tablet 40 mg    • methylPREDNISolone sodium succinate (Solu-MEDROL) injection 40 mg    • multi-electrolyte (PLASMALYTE-A/ISOLYTE-S PH 7 4) IV solution    • guaiFENesin (MUCINEX) 12 hr tablet 600 mg        DO Shar      This progress note was produced in part using a dictation device which may document imprecise wording from author's original intent

## 2023-02-06 NOTE — ASSESSMENT & PLAN NOTE
· Presents with shortness of breath, chest tightness over the last several days   Found to be hypoxic on chronic 4L NC, required up to 6L NC    · Patient condition significantly improved, back to baseline with 3 L of oxygen

## 2023-02-08 LAB — BACTERIA BLD CULT: NORMAL

## 2023-02-22 ENCOUNTER — TELEPHONE (OUTPATIENT)
Dept: CARDIAC REHAB | Facility: CLINIC | Age: 68
End: 2023-02-22

## 2023-02-23 ENCOUNTER — TELEPHONE (OUTPATIENT)
Dept: CARDIOLOGY CLINIC | Facility: CLINIC | Age: 68
End: 2023-02-23

## 2023-06-24 ENCOUNTER — HOSPITAL ENCOUNTER (INPATIENT)
Facility: HOSPITAL | Age: 68
LOS: 4 days | Discharge: HOME | DRG: 190 | End: 2023-06-28
Attending: EMERGENCY MEDICINE | Admitting: HOSPITALIST
Payer: COMMERCIAL

## 2023-06-24 ENCOUNTER — APPOINTMENT (EMERGENCY)
Dept: RADIOLOGY | Facility: HOSPITAL | Age: 68
DRG: 190 | End: 2023-06-24
Payer: COMMERCIAL

## 2023-06-24 DIAGNOSIS — J44.1 COPD EXACERBATION (HCC): Primary | ICD-10-CM

## 2023-06-24 DIAGNOSIS — Z72.0 TOBACCO ABUSE: ICD-10-CM

## 2023-06-24 DIAGNOSIS — R09.02 HYPOXIA: ICD-10-CM

## 2023-06-24 LAB
ALBUMIN SERPL BCP-MCNC: 4.2 G/DL (ref 3.5–5)
ALP SERPL-CCNC: 75 U/L (ref 34–104)
ALT SERPL W P-5'-P-CCNC: 8 U/L (ref 7–52)
ANION GAP SERPL CALCULATED.3IONS-SCNC: 6 MMOL/L
AST SERPL W P-5'-P-CCNC: 12 U/L (ref 13–39)
BASOPHILS # BLD AUTO: 0.02 THOUSANDS/ÂΜL (ref 0–0.1)
BASOPHILS NFR BLD AUTO: 0 % (ref 0–1)
BILIRUB SERPL-MCNC: 0.46 MG/DL (ref 0.2–1)
BNP SERPL-MCNC: 189 PG/ML (ref 0–100)
BUN SERPL-MCNC: 10 MG/DL (ref 5–25)
CALCIUM SERPL-MCNC: 9.7 MG/DL (ref 8.4–10.2)
CARDIAC TROPONIN I PNL SERPL HS: 10 NG/L
CHLORIDE SERPL-SCNC: 92 MMOL/L (ref 96–108)
CO2 SERPL-SCNC: 35 MMOL/L (ref 21–32)
CREAT SERPL-MCNC: 0.55 MG/DL (ref 0.6–1.3)
EOSINOPHIL # BLD AUTO: 0.03 THOUSAND/ÂΜL (ref 0–0.61)
EOSINOPHIL NFR BLD AUTO: 0 % (ref 0–6)
ERYTHROCYTE [DISTWIDTH] IN BLOOD BY AUTOMATED COUNT: 12.4 % (ref 11.6–15.1)
GFR SERPL CREATININE-BSD FRML MDRD: 98 ML/MIN/1.73SQ M
GLUCOSE SERPL-MCNC: 132 MG/DL (ref 65–140)
GLUCOSE SERPL-MCNC: 169 MG/DL (ref 65–140)
HCT VFR BLD AUTO: 40.4 % (ref 34.8–46.1)
HGB BLD-MCNC: 12.6 G/DL (ref 11.5–15.4)
IMM GRANULOCYTES # BLD AUTO: 0.06 THOUSAND/UL (ref 0–0.2)
IMM GRANULOCYTES NFR BLD AUTO: 1 % (ref 0–2)
LYMPHOCYTES # BLD AUTO: 1.29 THOUSANDS/ÂΜL (ref 0.6–4.47)
LYMPHOCYTES NFR BLD AUTO: 12 % (ref 14–44)
MCH RBC QN AUTO: 29.6 PG (ref 26.8–34.3)
MCHC RBC AUTO-ENTMCNC: 31.2 G/DL (ref 31.4–37.4)
MCV RBC AUTO: 95 FL (ref 82–98)
MONOCYTES # BLD AUTO: 1.06 THOUSAND/ÂΜL (ref 0.17–1.22)
MONOCYTES NFR BLD AUTO: 10 % (ref 4–12)
NEUTROPHILS # BLD AUTO: 8.38 THOUSANDS/ÂΜL (ref 1.85–7.62)
NEUTS SEG NFR BLD AUTO: 77 % (ref 43–75)
NRBC BLD AUTO-RTO: 0 /100 WBCS
PLATELET # BLD AUTO: 157 THOUSANDS/UL (ref 149–390)
PMV BLD AUTO: 10.2 FL (ref 8.9–12.7)
POTASSIUM SERPL-SCNC: 4.1 MMOL/L (ref 3.5–5.3)
PROCALCITONIN SERPL-MCNC: <0.05 NG/ML
PROT SERPL-MCNC: 7.5 G/DL (ref 6.4–8.4)
RBC # BLD AUTO: 4.25 MILLION/UL (ref 3.81–5.12)
SARS-COV-2 RNA RESP QL NAA+PROBE: NEGATIVE
SODIUM SERPL-SCNC: 133 MMOL/L (ref 135–147)
WBC # BLD AUTO: 10.84 THOUSAND/UL (ref 4.31–10.16)

## 2023-06-24 PROCEDURE — 83880 ASSAY OF NATRIURETIC PEPTIDE: CPT | Performed by: PHYSICIAN ASSISTANT

## 2023-06-24 PROCEDURE — 94760 N-INVAS EAR/PLS OXIMETRY 1: CPT

## 2023-06-24 PROCEDURE — 80053 COMPREHEN METABOLIC PANEL: CPT | Performed by: PHYSICIAN ASSISTANT

## 2023-06-24 PROCEDURE — 36415 COLL VENOUS BLD VENIPUNCTURE: CPT | Performed by: PHYSICIAN ASSISTANT

## 2023-06-24 PROCEDURE — 84484 ASSAY OF TROPONIN QUANT: CPT | Performed by: PHYSICIAN ASSISTANT

## 2023-06-24 PROCEDURE — 96374 THER/PROPH/DIAG INJ IV PUSH: CPT

## 2023-06-24 PROCEDURE — 87635 SARS-COV-2 COVID-19 AMP PRB: CPT | Performed by: PHYSICIAN ASSISTANT

## 2023-06-24 PROCEDURE — 93005 ELECTROCARDIOGRAM TRACING: CPT

## 2023-06-24 PROCEDURE — 99285 EMERGENCY DEPT VISIT HI MDM: CPT

## 2023-06-24 PROCEDURE — 99223 1ST HOSP IP/OBS HIGH 75: CPT | Performed by: NURSE PRACTITIONER

## 2023-06-24 PROCEDURE — 94640 AIRWAY INHALATION TREATMENT: CPT

## 2023-06-24 PROCEDURE — 94664 DEMO&/EVAL PT USE INHALER: CPT

## 2023-06-24 PROCEDURE — 71045 X-RAY EXAM CHEST 1 VIEW: CPT

## 2023-06-24 PROCEDURE — 82948 REAGENT STRIP/BLOOD GLUCOSE: CPT

## 2023-06-24 PROCEDURE — 84145 PROCALCITONIN (PCT): CPT | Performed by: NURSE PRACTITIONER

## 2023-06-24 PROCEDURE — 85025 COMPLETE CBC W/AUTO DIFF WBC: CPT | Performed by: PHYSICIAN ASSISTANT

## 2023-06-24 RX ORDER — LEVALBUTEROL INHALATION SOLUTION 1.25 MG/3ML
1.25 SOLUTION RESPIRATORY (INHALATION) EVERY 6 HOURS PRN
Status: DISCONTINUED | OUTPATIENT
Start: 2023-06-24 | End: 2023-06-28 | Stop reason: HOSPADM

## 2023-06-24 RX ORDER — INSULIN LISPRO 100 [IU]/ML
1-6 INJECTION, SOLUTION INTRAVENOUS; SUBCUTANEOUS
Status: DISCONTINUED | OUTPATIENT
Start: 2023-06-24 | End: 2023-06-28 | Stop reason: HOSPADM

## 2023-06-24 RX ORDER — ACETAMINOPHEN 325 MG/1
650 TABLET ORAL EVERY 6 HOURS PRN
Status: DISCONTINUED | OUTPATIENT
Start: 2023-06-24 | End: 2023-06-28 | Stop reason: HOSPADM

## 2023-06-24 RX ORDER — ENOXAPARIN SODIUM 100 MG/ML
40 INJECTION SUBCUTANEOUS DAILY
Status: DISCONTINUED | OUTPATIENT
Start: 2023-06-25 | End: 2023-06-28 | Stop reason: HOSPADM

## 2023-06-24 RX ORDER — CITALOPRAM HYDROBROMIDE 10 MG/1
10 TABLET ORAL DAILY
Status: DISCONTINUED | OUTPATIENT
Start: 2023-06-25 | End: 2023-06-28 | Stop reason: HOSPADM

## 2023-06-24 RX ORDER — PANTOPRAZOLE SODIUM 40 MG/1
40 TABLET, DELAYED RELEASE ORAL
Status: DISCONTINUED | OUTPATIENT
Start: 2023-06-25 | End: 2023-06-28 | Stop reason: HOSPADM

## 2023-06-24 RX ORDER — ATORVASTATIN CALCIUM 40 MG/1
40 TABLET, FILM COATED ORAL
Status: DISCONTINUED | OUTPATIENT
Start: 2023-06-24 | End: 2023-06-28 | Stop reason: HOSPADM

## 2023-06-24 RX ORDER — BUDESONIDE 0.5 MG/2ML
0.5 INHALANT ORAL
Status: DISCONTINUED | OUTPATIENT
Start: 2023-06-25 | End: 2023-06-28 | Stop reason: HOSPADM

## 2023-06-24 RX ORDER — LEVALBUTEROL INHALATION SOLUTION 1.25 MG/3ML
1.25 SOLUTION RESPIRATORY (INHALATION)
Status: DISCONTINUED | OUTPATIENT
Start: 2023-06-25 | End: 2023-06-28 | Stop reason: HOSPADM

## 2023-06-24 RX ORDER — IPRATROPIUM BROMIDE AND ALBUTEROL SULFATE 2.5; .5 MG/3ML; MG/3ML
3 SOLUTION RESPIRATORY (INHALATION) ONCE
Status: COMPLETED | OUTPATIENT
Start: 2023-06-24 | End: 2023-06-24

## 2023-06-24 RX ORDER — LISINOPRIL 20 MG/1
40 TABLET ORAL DAILY
Status: DISCONTINUED | OUTPATIENT
Start: 2023-06-25 | End: 2023-06-28 | Stop reason: HOSPADM

## 2023-06-24 RX ORDER — SODIUM CHLORIDE FOR INHALATION 0.9 %
3 VIAL, NEBULIZER (ML) INHALATION
Status: DISCONTINUED | OUTPATIENT
Start: 2023-06-25 | End: 2023-06-25

## 2023-06-24 RX ORDER — INSULIN LISPRO 100 [IU]/ML
1-6 INJECTION, SOLUTION INTRAVENOUS; SUBCUTANEOUS
Status: DISCONTINUED | OUTPATIENT
Start: 2023-06-25 | End: 2023-06-28 | Stop reason: HOSPADM

## 2023-06-24 RX ORDER — GLIPIZIDE 5 MG/1
10 TABLET ORAL
Status: DISCONTINUED | OUTPATIENT
Start: 2023-06-25 | End: 2023-06-27

## 2023-06-24 RX ORDER — PIOGLITAZONEHYDROCHLORIDE 15 MG/1
15 TABLET ORAL DAILY
Status: DISCONTINUED | OUTPATIENT
Start: 2023-06-25 | End: 2023-06-27

## 2023-06-24 RX ORDER — METHYLPREDNISOLONE SODIUM SUCCINATE 40 MG/ML
40 INJECTION, POWDER, LYOPHILIZED, FOR SOLUTION INTRAMUSCULAR; INTRAVENOUS EVERY 12 HOURS SCHEDULED
Status: COMPLETED | OUTPATIENT
Start: 2023-06-25 | End: 2023-06-25

## 2023-06-24 RX ORDER — AMLODIPINE BESYLATE 10 MG/1
10 TABLET ORAL
Status: DISCONTINUED | OUTPATIENT
Start: 2023-06-24 | End: 2023-06-28 | Stop reason: HOSPADM

## 2023-06-24 RX ORDER — CLONIDINE HYDROCHLORIDE 0.1 MG/1
0.1 TABLET ORAL EVERY 12 HOURS SCHEDULED
Status: DISCONTINUED | OUTPATIENT
Start: 2023-06-24 | End: 2023-06-28 | Stop reason: HOSPADM

## 2023-06-24 RX ORDER — LORAZEPAM 0.5 MG/1
0.5 TABLET ORAL EVERY 6 HOURS PRN
Status: DISCONTINUED | OUTPATIENT
Start: 2023-06-24 | End: 2023-06-28 | Stop reason: HOSPADM

## 2023-06-24 RX ORDER — BUDESONIDE 0.5 MG/2ML
0.5 INHALANT ORAL 2 TIMES DAILY
COMMUNITY

## 2023-06-24 RX ORDER — METHYLPREDNISOLONE SODIUM SUCCINATE 125 MG/2ML
125 INJECTION, POWDER, LYOPHILIZED, FOR SOLUTION INTRAMUSCULAR; INTRAVENOUS ONCE
Status: COMPLETED | OUTPATIENT
Start: 2023-06-24 | End: 2023-06-24

## 2023-06-24 RX ORDER — GUAIFENESIN 600 MG/1
600 TABLET, EXTENDED RELEASE ORAL 2 TIMES DAILY
Status: DISCONTINUED | OUTPATIENT
Start: 2023-06-24 | End: 2023-06-25

## 2023-06-24 RX ORDER — ERGOCALCIFEROL 1.25 MG/1
50000 CAPSULE ORAL WEEKLY
COMMUNITY

## 2023-06-24 RX ORDER — GLIPIZIDE 10 MG/1
10 TABLET ORAL
COMMUNITY

## 2023-06-24 RX ORDER — SODIUM CHLORIDE FOR INHALATION 0.9 %
3 VIAL, NEBULIZER (ML) INHALATION EVERY 6 HOURS PRN
Status: DISCONTINUED | OUTPATIENT
Start: 2023-06-24 | End: 2023-06-28 | Stop reason: HOSPADM

## 2023-06-24 RX ORDER — AZITHROMYCIN 250 MG/1
500 TABLET, FILM COATED ORAL EVERY 24 HOURS
Status: COMPLETED | OUTPATIENT
Start: 2023-06-24 | End: 2023-06-26

## 2023-06-24 RX ADMIN — LORAZEPAM 0.5 MG: 0.5 TABLET ORAL at 22:17

## 2023-06-24 RX ADMIN — LEVALBUTEROL HYDROCHLORIDE 1.25 MG: 1.25 SOLUTION RESPIRATORY (INHALATION) at 22:47

## 2023-06-24 RX ADMIN — GUAIFENESIN 600 MG: 600 TABLET ORAL at 22:16

## 2023-06-24 RX ADMIN — AMLODIPINE BESYLATE 10 MG: 10 TABLET ORAL at 22:18

## 2023-06-24 RX ADMIN — METHYLPREDNISOLONE SODIUM SUCCINATE 125 MG: 125 INJECTION, POWDER, FOR SOLUTION INTRAMUSCULAR; INTRAVENOUS at 18:39

## 2023-06-24 RX ADMIN — INSULIN LISPRO 1 UNITS: 100 INJECTION, SOLUTION INTRAVENOUS; SUBCUTANEOUS at 22:19

## 2023-06-24 RX ADMIN — IPRATROPIUM BROMIDE AND ALBUTEROL SULFATE 3 ML: 2.5; .5 SOLUTION RESPIRATORY (INHALATION) at 18:39

## 2023-06-24 RX ADMIN — AZITHROMYCIN MONOHYDRATE 500 MG: 250 TABLET ORAL at 22:16

## 2023-06-24 RX ADMIN — CLONIDINE HYDROCHLORIDE 0.1 MG: 0.1 TABLET ORAL at 22:19

## 2023-06-24 NOTE — ED PROVIDER NOTES
History  Chief Complaint   Patient presents with   • Shortness of Breath     Pt having difficulty breathing x 2-3 days  PMHX: COPD Pt usually uses 3L O2 but is on 4L at this time  61-year-old female presents the emergency department with daughter for evaluation of shortness of breath  Patient states for the past couple days she has had increased difficulty taking deep breath  Does have history of COPD and is on 2 L at rest  to 3 L nasal cannula oxygen with activity  chronically  Has been requiring increased nasal cannula oxygen at home  Currently on 4 L nasal cannula  Patient denies any leg swelling  She denies any palpitations or chest pain  She denies any cough  Patient reports her face does occasionally feel flushed  She denies headaches, visual changes, dizziness or confusion  Unable to lay flat due to increased shortness of breath  Is a current every day smoker  Prior to Admission Medications   Prescriptions Last Dose Informant Patient Reported? Taking?    albuterol (2 5 mg/3 mL) 0 083 % nebulizer solution   No No   Sig: Take 3 mL (2 5 mg total) by nebulization every 6 (six) hours as needed for wheezing or shortness of breath   amLODIPine (NORVASC) 10 mg tablet   Yes No   Sig: Take 10 mg by mouth daily at bedtime    atorvastatin (LIPITOR) 40 mg tablet   Yes No   Sig: Take 40 mg by mouth daily at bedtime    citalopram (CeleXA) 10 mg tablet   Yes No   Sig: Take 10 mg by mouth daily   cloNIDine (CATAPRES) 0 1 mg tablet   Yes No   Sig: Take 0 1 mg by mouth every 12 (twelve) hours   glipiZIDE (GLUCOTROL) 5 mg tablet   Yes No   Sig: Take 5 mg by mouth daily   guaiFENesin (ROBITUSSIN) 200 MG/10ML oral liquid   No No   Sig: Take 10 mL (200 mg total) by mouth every 4 (four) hours as needed (cough)   ipratropium-albuterol (COMBIVENT RESPIMAT) inhaler   Yes No   Sig: Inhale 1 puff 4 (four) times a day   lisinopril (ZESTRIL) 40 mg tablet   Yes No   Sig: Take 40 mg by mouth daily   metoprolol tartrate (LOPRESSOR) 100 mg tablet   Yes No   Sig: Take 100 mg by mouth every 12 (twelve) hours   omeprazole (PriLOSEC OTC) 20 MG tablet   Yes No   Sig: Take 20 mg by mouth daily    pioglitazone (ACTOS) 15 mg tablet   Yes No   Sig: Take 15 mg by mouth daily   tiotropium (SPIRIVA) 18 mcg inhalation capsule   Yes No   Sig: Place 18 mcg into inhaler and inhale daily   traZODone (DESYREL) 100 mg tablet   Yes No   Sig: Take 100 mg by mouth daily at bedtime as needed       Facility-Administered Medications: None       Past Medical History:   Diagnosis Date   • COPD (chronic obstructive pulmonary disease) ([facility] [address] )        History reviewed  No pertinent surgical history  History reviewed  No pertinent family history  I have reviewed and agree with the history as documented  E-Cigarette/Vaping   • E-Cigarette Use Never User      E-Cigarette/Vaping Substances     Social History     Tobacco Use   • Smoking status: Every Day     Packs/day: 0 25     Types: Cigarettes   • Smokeless tobacco: Never   Vaping Use   • Vaping Use: Never used   Substance Use Topics   • Alcohol use: Not Currently   • Drug use: Never       Review of Systems   Constitutional: Negative for appetite change, diaphoresis, fatigue and fever  HENT: Negative  Respiratory: Positive for shortness of breath  Negative for cough, choking, chest tightness, wheezing and stridor  Cardiovascular: Negative for chest pain, palpitations and leg swelling  Gastrointestinal: Negative  Musculoskeletal: Negative  Skin: Negative  Neurological: Negative  All other systems reviewed and are negative  Physical Exam  Physical Exam  Vitals and nursing note reviewed  Constitutional:       General: She is not in acute distress  Appearance: She is well-developed  She is ill-appearing  She is not toxic-appearing or diaphoretic  HENT:      Head: Normocephalic        Mouth/Throat:      Mouth: Mucous membranes are moist    Eyes:      Pupils: Pupils are equal, round, and reactive to light  Cardiovascular:      Rate and Rhythm: Normal rate and regular rhythm  Pulmonary:      Effort: Tachypnea present  Breath sounds: Decreased breath sounds and wheezing present  Chest:      Chest wall: No mass or tenderness  Abdominal:      Palpations: Abdomen is soft  Tenderness: There is no abdominal tenderness  Musculoskeletal:      Cervical back: Normal range of motion and neck supple  Comments: Trace edema mainly around bilateral ankles   Skin:     General: Skin is warm and dry  Capillary Refill: Capillary refill takes less than 2 seconds  Findings: No ecchymosis, erythema or rash  Neurological:      General: No focal deficit present  Mental Status: She is alert and oriented to person, place, and time     Psychiatric:         Mood and Affect: Mood normal          Vital Signs  ED Triage Vitals   Temperature Pulse Respirations Blood Pressure SpO2   06/24/23 1813 06/24/23 1813 06/24/23 1813 06/24/23 1813 06/24/23 1813   97 9 °F (36 6 °C) 98 (!) 26 (!) 195/86 (!) 88 %      Temp Source Heart Rate Source Patient Position - Orthostatic VS BP Location FiO2 (%)   06/24/23 1813 06/24/23 1915 06/24/23 1813 06/24/23 1813 --   Temporal Monitor Sitting Right arm       Pain Score       --                  Vitals:    06/24/23 1813 06/24/23 1915 06/24/23 1946   BP: (!) 195/86 145/66    Pulse: 98 76 93   Patient Position - Orthostatic VS: Sitting           Visual Acuity      ED Medications  Medications   methylPREDNISolone sodium succinate (Solu-MEDROL) injection 125 mg (125 mg Intravenous Given 6/24/23 1839)   ipratropium-albuterol (DUO-NEB) 0 5-2 5 mg/3 mL inhalation solution 3 mL (3 mL Nebulization Given 6/24/23 1839)       Diagnostic Studies  Results Reviewed     Procedure Component Value Units Date/Time    COVID only [[id number]]  (Normal) Collected: 06/24/23 1836    Lab Status: Final result Specimen: Nares from Nose Updated: 06/24/23 1911 SARS-CoV-2 Negative    Narrative:      FOR PEDIATRIC PATIENTS - copy/paste COVID Guidelines URL to browser: [email or web address]    SARS-CoV-2 assay is a Nucleic Acid Amplification assay intended for the  qualitative detection of nucleic acid from SARS-CoV-2 in nasopharyngeal  swabs  Results are for the presumptive identification of SARS-CoV-2 RNA  Positive results are indicative of infection with SARS-CoV-2, the virus  causing COVID-19, but do not rule out bacterial infection or co-infection  with other viruses  Laboratories within the [place] and its  territories are required to report all positive results to the appropriate  public health authorities  Negative results do not preclude SARS-CoV-2  infection and should not be used as the sole basis for treatment or other  patient management decisions  Negative results must be combined with  clinical observations, patient history, and epidemiological information  This test has not been FDA cleared or approved  This test has been authorized by FDA under an Emergency Use Authorization  (EUA)  This test is only authorized for the duration of time the  declaration that circumstances exist justifying the authorization of the  emergency use of an in vitro diagnostic tests for detection of SARS-CoV-2  virus and/or diagnosis of COVID-19 infection under section 564(b)(1) of  the Act, 21 U  S C  474HEH-6(H)(5), unless the authorization is terminated  or revoked sooner  The test has been validated but independent review by FDA  and CLIA is pending  Test performed using [employer] Gene[employer]pert: This RT-PCR assay targets N2,  a region unique to SARS-CoV-2  A conserved region in the E-gene was chosen  for pan-Sarbecovirus detection which includes SARS-CoV-2  According to CMS-2020-01-R, this platform meets the definition of high-throughput technology      HS Troponin 0hr (reflex protocol) [[id number]]  (Normal) Collected: 06/24/23 1836    Lab Status: Final result Specimen: Blood from Arm, Right Updated: 06/24/23 1906     hs TnI 0hr 10 ng/L     HS Troponin I 2hr [[id number]]     Lab Status: No result Specimen: Blood     B-Type Natriuretic Peptide(BNP) [[id number]]  (Abnormal) Collected: 06/24/23 1836    Lab Status: Final result Specimen: Blood from Arm, Right Updated: 06/24/23 1905      pg/mL     Comprehensive metabolic panel [[id number]]  (Abnormal) Collected: 06/24/23 1836    Lab Status: Final result Specimen: Blood from Arm, Right Updated: 06/24/23 1901     Sodium 133 mmol/L      Potassium 4 1 mmol/L      Chloride 92 mmol/L      CO2 35 mmol/L      ANION GAP 6 mmol/L      BUN 10 mg/dL      Creatinine 0 55 mg/dL      Glucose 132 mg/dL      Calcium 9 7 mg/dL      AST 12 U/L      ALT 8 U/L      Alkaline Phosphatase 75 U/L      Total Protein 7 5 g/dL      Albumin 4 2 g/dL      Total Bilirubin 0 46 mg/dL      eGFR 98 ml/min/1 73sq m     Narrative:      [facility] guidelines for Chronic Kidney Disease (CKD):   •  Stage 1 with normal or high GFR (GFR > 90 mL/min/1 73 square meters)  •  Stage 2 Mild CKD (GFR = 60-89 mL/min/1 73 square meters)  •  Stage 3A Moderate CKD (GFR = 45-59 mL/min/1 73 square meters)  •  Stage 3B Moderate CKD (GFR = 30-44 mL/min/1 73 square meters)  •  Stage 4 Severe CKD (GFR = 15-29 mL/min/1 73 square meters)  •  Stage 5 End Stage CKD (GFR <15 mL/min/1 73 square meters)  Note: GFR calculation is accurate only with a steady state creatinine    CBC and differential [[id number]]  (Abnormal) Collected: 06/24/23 1836    Lab Status: Final result Specimen: Blood from Arm, Right Updated: 06/24/23 1841     WBC 10 84 Thousand/uL      RBC 4 25 Million/uL      Hemoglobin 12 6 g/dL      Hematocrit 40 4 %      MCV 95 fL      MCH 29 6 pg      MCHC 31 2 g/dL      RDW 12 4 %      MPV 10 2 fL      Platelets 941 Thousands/uL      nRBC 0 /100 WBCs      Neutrophils Relative 77 %      Immat GRANS % 1 %      Lymphocytes Relative 12 %      Monocytes Relative 10 %      Eosinophils Relative 0 %      Basophils Relative 0 %      Neutrophils Absolute 8 38 Thousands/µL      Immature Grans Absolute 0 06 Thousand/uL      Lymphocytes Absolute 1 29 Thousands/µL      Monocytes Absolute 1 06 Thousand/µL      Eosinophils Absolute 0 03 Thousand/µL      Basophils Absolute 0 02 Thousands/µL                  XR chest 1 view portable    (Results Pending)              Procedures  ECG 12 Lead Documentation Only    Date/Time: 6/24/2023 6:25 PM    Performed by: Aby Ruiz PA-C  Authorized by: Aby Ruiz PA-C    Patient location:  ED  Interpretation:     Interpretation: non-specific    Rate:     ECG rate:  77    ECG rate assessment: normal    Rhythm:     Rhythm: sinus rhythm    Ectopy:     Ectopy: none    QRS:     QRS axis:  Normal    QRS intervals:  Normal  Conduction:     Conduction: normal    ST segments:     ST segments:  Normal  T waves:     T waves: normal               ED Course  ED Course as of 06/24/23 1954   Sat Jun 24, 2023   1906 WBC(!): 10 84   1907 hs TnI 0hr: 10   1907 Sodium(!): 133   1907 BNP(!): 189   1911 SARS-COV-2: Negative   1931 TT sent to medicine requesting admission   1944 Accepted by medicine for admission             SBIRT 22yo+    Flowsheet Row Most Recent Value   Initial Alcohol Screen: US AUDIT-C     1  How often do you have a drink containing alcohol? 0 Filed at: 06/24/2023 1842   2  How many drinks containing alcohol do you have on a typical day you are drinking? 0 Filed at: 06/24/2023 1842   3a  Male UNDER 65: How often do you have five or more drinks on one occasion? 0 Filed at: 06/24/2023 1842   3b  FEMALE Any Age, or MALE 65+: How often do you have 4 or more drinks on one occassion? 0 Filed at: 06/24/2023 1842   Audit-C Score 0 Filed at: 06/24/2023 1842   LIVIER: How many times in the past year have you    Used an illegal drug or used a prescription medication for non-medical reasons? Never Filed at: 06/24/2023 [address]  51-year-old female presented to the emergency department for evaluation of increased shortness of breath  History of COPD, utilizes2/3 liters nasal cannula oxygen at baseline  Vitals and medical record reviewed  On exam patient with noted wheezing  Trace edema bilateral ankles  Heart regular rate and rhythm  She was hypoxic on arrival on her 2 L nasal cannula this was increased  Status post DuoNeb and Solu-Medrol she had improvement and was around 92% on 2L nc   ED interpretation of chest x-ray was negative for acute cardiopulmonary findings  EKG nonischemic, troponin 10, low concern for MI patient denies any chest pain  She did have elevated BNP  COVID-negative  Patient continues with mild shortness of breath  Is tachypneic and granddaughter states she is breathing heavier than normal   I recommended admission for continued treatment and patient was agreeable to treatment plan  She was excepted to medicine service  COPD exacerbation ([facility] ): acute illness or injury  Hypoxia: acute illness or injury  Amount and/or Complexity of Data Reviewed  Independent Historian:      Details: Granddaughter helps provide history  Labs: ordered  Decision-making details documented in ED Course  Radiology: ordered  ECG/medicine tests: ordered and independent interpretation performed  Discussion of management or test interpretation with external provider(s): Medicine for admission    Risk  Prescription drug management  Decision regarding hospitalization            Disposition  Final diagnoses:   COPD exacerbation ([facility] )   Hypoxia     Time reflects when diagnosis was documented in both MDM as applicable and the Disposition within this note     Time User Action Codes Description Comment    6/24/2023  7:44 PM Fabian Rae Add [J44 1] COPD exacerbation ([facility] )     6/24/2023  7:44 PM Fabian Rae Add [R09 02] Hypoxia       ED Disposition     ED Disposition   Admit    Condition   Stable    Date/Time   Sat Jun 24, 2023  7:44 PM    Comment   Case was discussed with Maria Antonia and the patient's admission status was agreed to be Admission Status: inpatient status to the service of Dr Jemma Maria             Follow-up Information    None         Patient's Medications   Discharge Prescriptions    No medications on file       No discharge procedures on file      PDMP Review       Value Time User    PDMP Reviewed  Yes 3/30/2021  1:09 PM Puneet Salmeron MD          ED Provider  Electronically Signed by           Delaney Daniels PA-C  06/24/23 1954

## 2023-06-25 PROBLEM — R00.0 TACHYCARDIA: Status: ACTIVE | Noted: 2023-06-25

## 2023-06-25 PROBLEM — J96.21 ACUTE ON CHRONIC RESPIRATORY FAILURE WITH HYPOXIA (HCC): Status: ACTIVE | Noted: 2020-11-02

## 2023-06-25 LAB
ANION GAP SERPL CALCULATED.3IONS-SCNC: 8 MMOL/L
BASO STIPL BLD QL SMEAR: PRESENT
BASOPHILS # BLD MANUAL: 0 THOUSAND/UL (ref 0–0.1)
BASOPHILS NFR MAR MANUAL: 0 % (ref 0–1)
BUN SERPL-MCNC: 12 MG/DL (ref 5–25)
CALCIUM SERPL-MCNC: 9.5 MG/DL (ref 8.4–10.2)
CHLORIDE SERPL-SCNC: 91 MMOL/L (ref 96–108)
CO2 SERPL-SCNC: 33 MMOL/L (ref 21–32)
CREAT SERPL-MCNC: 0.55 MG/DL (ref 0.6–1.3)
EOSINOPHIL # BLD MANUAL: 0 THOUSAND/UL (ref 0–0.4)
EOSINOPHIL NFR BLD MANUAL: 0 % (ref 0–6)
ERYTHROCYTE [DISTWIDTH] IN BLOOD BY AUTOMATED COUNT: 12 % (ref 11.6–15.1)
GFR SERPL CREATININE-BSD FRML MDRD: 98 ML/MIN/1.73SQ M
GLUCOSE SERPL-MCNC: 156 MG/DL (ref 65–140)
GLUCOSE SERPL-MCNC: 179 MG/DL (ref 65–140)
GLUCOSE SERPL-MCNC: 184 MG/DL (ref 65–140)
GLUCOSE SERPL-MCNC: 240 MG/DL (ref 65–140)
GLUCOSE SERPL-MCNC: 47 MG/DL (ref 65–140)
GLUCOSE SERPL-MCNC: 53 MG/DL (ref 65–140)
GLUCOSE SERPL-MCNC: 99 MG/DL (ref 65–140)
HCT VFR BLD AUTO: 36.7 % (ref 34.8–46.1)
HGB BLD-MCNC: 11.9 G/DL (ref 11.5–15.4)
LYMPHOCYTES # BLD AUTO: 0.67 THOUSAND/UL (ref 0.6–4.47)
LYMPHOCYTES # BLD AUTO: 7 % (ref 14–44)
MAGNESIUM SERPL-MCNC: 1.8 MG/DL (ref 1.9–2.7)
MCH RBC QN AUTO: 30 PG (ref 26.8–34.3)
MCHC RBC AUTO-ENTMCNC: 32.4 G/DL (ref 31.4–37.4)
MCV RBC AUTO: 92 FL (ref 82–98)
MONOCYTES # BLD AUTO: 0.1 THOUSAND/UL (ref 0–1.22)
MONOCYTES NFR BLD: 1 % (ref 4–12)
NEUTROPHILS # BLD MANUAL: 8.78 THOUSAND/UL (ref 1.85–7.62)
NEUTS BAND NFR BLD MANUAL: 1 % (ref 0–8)
NEUTS SEG NFR BLD AUTO: 91 % (ref 43–75)
PLATELET # BLD AUTO: 159 THOUSANDS/UL (ref 149–390)
PLATELET BLD QL SMEAR: ADEQUATE
PMV BLD AUTO: 10.1 FL (ref 8.9–12.7)
POLYCHROMASIA BLD QL SMEAR: PRESENT
POTASSIUM SERPL-SCNC: 4.1 MMOL/L (ref 3.5–5.3)
PROCALCITONIN SERPL-MCNC: <0.05 NG/ML
RBC # BLD AUTO: 3.97 MILLION/UL (ref 3.81–5.12)
RBC MORPH BLD: PRESENT
SODIUM SERPL-SCNC: 132 MMOL/L (ref 135–147)
STOMATOCYTES BLD QL SMEAR: PRESENT
WBC # BLD AUTO: 9.54 THOUSAND/UL (ref 4.31–10.16)

## 2023-06-25 PROCEDURE — 84145 PROCALCITONIN (PCT): CPT | Performed by: NURSE PRACTITIONER

## 2023-06-25 PROCEDURE — 80048 BASIC METABOLIC PNL TOTAL CA: CPT | Performed by: NURSE PRACTITIONER

## 2023-06-25 PROCEDURE — 94664 DEMO&/EVAL PT USE INHALER: CPT

## 2023-06-25 PROCEDURE — 94640 AIRWAY INHALATION TREATMENT: CPT

## 2023-06-25 PROCEDURE — 83735 ASSAY OF MAGNESIUM: CPT | Performed by: NURSE PRACTITIONER

## 2023-06-25 PROCEDURE — 99233 SBSQ HOSP IP/OBS HIGH 50: CPT | Performed by: HOSPITALIST

## 2023-06-25 PROCEDURE — 82948 REAGENT STRIP/BLOOD GLUCOSE: CPT

## 2023-06-25 PROCEDURE — 85027 COMPLETE CBC AUTOMATED: CPT | Performed by: NURSE PRACTITIONER

## 2023-06-25 PROCEDURE — 94760 N-INVAS EAR/PLS OXIMETRY 1: CPT

## 2023-06-25 PROCEDURE — 85007 BL SMEAR W/DIFF WBC COUNT: CPT | Performed by: NURSE PRACTITIONER

## 2023-06-25 RX ORDER — POLYETHYLENE GLYCOL 3350 17 G/17G
17 POWDER, FOR SOLUTION ORAL DAILY
Status: DISCONTINUED | OUTPATIENT
Start: 2023-06-26 | End: 2023-06-28 | Stop reason: HOSPADM

## 2023-06-25 RX ORDER — PREDNISONE 20 MG/1
40 TABLET ORAL DAILY
Status: DISCONTINUED | OUTPATIENT
Start: 2023-06-26 | End: 2023-06-28 | Stop reason: HOSPADM

## 2023-06-25 RX ORDER — METOPROLOL TARTRATE 50 MG/1
50 TABLET, FILM COATED ORAL EVERY 12 HOURS SCHEDULED
Status: DISCONTINUED | OUTPATIENT
Start: 2023-06-25 | End: 2023-06-28 | Stop reason: HOSPADM

## 2023-06-25 RX ORDER — INSULIN LISPRO 100 [IU]/ML
5 INJECTION, SOLUTION INTRAVENOUS; SUBCUTANEOUS
Status: DISCONTINUED | OUTPATIENT
Start: 2023-06-25 | End: 2023-06-25

## 2023-06-25 RX ORDER — INSULIN LISPRO 100 [IU]/ML
4 INJECTION, SOLUTION INTRAVENOUS; SUBCUTANEOUS
Status: DISCONTINUED | OUTPATIENT
Start: 2023-06-25 | End: 2023-06-26

## 2023-06-25 RX ADMIN — ENOXAPARIN SODIUM 40 MG: 40 INJECTION SUBCUTANEOUS at 08:21

## 2023-06-25 RX ADMIN — IPRATROPIUM BROMIDE 0.5 MG: 0.5 SOLUTION RESPIRATORY (INHALATION) at 14:00

## 2023-06-25 RX ADMIN — AMLODIPINE BESYLATE 10 MG: 10 TABLET ORAL at 21:09

## 2023-06-25 RX ADMIN — IPRATROPIUM BROMIDE 0.5 MG: 0.5 SOLUTION RESPIRATORY (INHALATION) at 19:46

## 2023-06-25 RX ADMIN — PANTOPRAZOLE SODIUM 40 MG: 40 TABLET, DELAYED RELEASE ORAL at 06:12

## 2023-06-25 RX ADMIN — INSULIN LISPRO 4 UNITS: 100 INJECTION, SOLUTION INTRAVENOUS; SUBCUTANEOUS at 18:47

## 2023-06-25 RX ADMIN — METOPROLOL TARTRATE 50 MG: 50 TABLET, FILM COATED ORAL at 12:03

## 2023-06-25 RX ADMIN — LEVALBUTEROL HYDROCHLORIDE 1.25 MG: 1.25 SOLUTION RESPIRATORY (INHALATION) at 14:00

## 2023-06-25 RX ADMIN — BUDESONIDE 0.5 MG: 0.5 INHALANT RESPIRATORY (INHALATION) at 08:41

## 2023-06-25 RX ADMIN — INSULIN LISPRO 3 UNITS: 100 INJECTION, SOLUTION INTRAVENOUS; SUBCUTANEOUS at 11:58

## 2023-06-25 RX ADMIN — AZITHROMYCIN MONOHYDRATE 500 MG: 250 TABLET ORAL at 21:09

## 2023-06-25 RX ADMIN — INSULIN LISPRO 4 UNITS: 100 INJECTION, SOLUTION INTRAVENOUS; SUBCUTANEOUS at 11:59

## 2023-06-25 RX ADMIN — CLONIDINE HYDROCHLORIDE 0.1 MG: 0.1 TABLET ORAL at 21:09

## 2023-06-25 RX ADMIN — METHYLPREDNISOLONE SODIUM SUCCINATE 40 MG: 40 INJECTION, POWDER, FOR SOLUTION INTRAMUSCULAR; INTRAVENOUS at 06:10

## 2023-06-25 RX ADMIN — LISINOPRIL 40 MG: 20 TABLET ORAL at 08:19

## 2023-06-25 RX ADMIN — LEVALBUTEROL HYDROCHLORIDE 1.25 MG: 1.25 SOLUTION RESPIRATORY (INHALATION) at 19:46

## 2023-06-25 RX ADMIN — IPRATROPIUM BROMIDE 0.5 MG: 0.5 SOLUTION RESPIRATORY (INHALATION) at 08:41

## 2023-06-25 RX ADMIN — CLONIDINE HYDROCHLORIDE 0.1 MG: 0.1 TABLET ORAL at 08:20

## 2023-06-25 RX ADMIN — CITALOPRAM HYDROBROMIDE 10 MG: 10 TABLET ORAL at 08:20

## 2023-06-25 RX ADMIN — INSULIN LISPRO 1 UNITS: 100 INJECTION, SOLUTION INTRAVENOUS; SUBCUTANEOUS at 18:46

## 2023-06-25 RX ADMIN — LEVALBUTEROL HYDROCHLORIDE 1.25 MG: 1.25 SOLUTION RESPIRATORY (INHALATION) at 08:41

## 2023-06-25 RX ADMIN — METOPROLOL TARTRATE 50 MG: 50 TABLET, FILM COATED ORAL at 21:27

## 2023-06-25 RX ADMIN — METHYLPREDNISOLONE SODIUM SUCCINATE 40 MG: 40 INJECTION, POWDER, FOR SOLUTION INTRAMUSCULAR; INTRAVENOUS at 21:06

## 2023-06-25 RX ADMIN — BUDESONIDE 0.5 MG: 0.5 INHALANT RESPIRATORY (INHALATION) at 19:46

## 2023-06-25 RX ADMIN — PIOGLITAZONE HYDROCHLORIDE 15 MG: 15 TABLET ORAL at 08:19

## 2023-06-25 RX ADMIN — ATORVASTATIN CALCIUM 40 MG: 40 TABLET, FILM COATED ORAL at 21:09

## 2023-06-25 RX ADMIN — GLIPIZIDE 10 MG: 5 TABLET ORAL at 06:12

## 2023-06-25 NOTE — ASSESSMENT & PLAN NOTE
Lab Results   Component Value Date    HGBA1C 6 9 (H) 02/10/2023       Recent Labs     06/24/23 2137   POCGLU 169*       Blood Sugar Average: Last 72 hrs:  (P) 169     · Not insulin-dependent  · Continue PTA Actos and glipizide, metformin on hold  · Sliding scale coverage Accu-Cheks  · Hypoglycemia protocol  · Monitor closely due to steroid-induced hyperglycemia  · Carbohydrate controlled diet

## 2023-06-25 NOTE — H&P
[address] [facility]  H&P  Name: Kenisha Medley 79 y o  female I MRN: [id number]  Unit/Bed#: -01 I Date of Admission: 6/24/2023   Date of Service: 6/25/2023 I Hospital Day: 1      Assessment/Plan   * COPD exacerbation ([place] [address] )  Assessment & Plan  · POA with dyspnea, hypoxia, wheezing unrelieved by home medications  · Continued tobacco abuse  · Admit under COPD pathway  · Nebulized bronchodilators, inhaled corticosteroids  · IV Solu-Medrol with transition to prednisone with rapid taper  · Appreciate pulmonary consultation    Type 2 diabetes mellitus without complication, without long-term current use of insulin ([place] [address] )  Assessment & Plan  Lab Results   Component Value Date    HGBA1C 6 9 (H) 02/10/2023       Recent Labs     06/24/23 2137   POCGLU 169*       Blood Sugar Average: Last 72 hrs:  (P) 169     · Not insulin-dependent  · Continue PTA Actos and glipizide, metformin on hold  · Sliding scale coverage Accu-Cheks  · Hypoglycemia protocol  · Monitor closely due to steroid-induced hyperglycemia  · Carbohydrate controlled diet    Essential hypertension  Assessment & Plan  · Continue PTA lisinopril, clonidine, amlodipine  · PTA metoprolol 100 mg twice daily on hold due to acute COPD exacerbation, may benefit from cessation of medication with severe COPD  · Trend blood pressures    Tobacco abuse  Assessment & Plan  · Continues to smoke 7 cigarettes/day  · Cessation counseling discussed  · Declines nicotine patch    Chronic hyponatremia  Assessment & Plan  · Sodium at baseline  · Monitor    Acute on chronic respiratory failure with hypoxia (HCC)  Assessment & Plan  · Chronically maintained on 2 5 L at rest and 4 L with exertion  · Requiring 4 L at rest at time of admission due to COPD exacerbation  · Wean to home requirement as tolerated       VTE Pharmacologic Prophylaxis: VTE Score: 5 High Risk (Score >/= 5) - Pharmacological DVT Prophylaxis Ordered: enoxaparin (Lovenox)   Sequential Compression Devices Ordered  Code Status: Level 1 - Full Code   Discussion with family: Patient declined call to   Anticipated Length of Stay: Patient will be admitted on an inpatient basis with an anticipated length of stay of greater than 2 midnights secondary to COPD exacerbation  Total Time Spent on Date of Encounter in care of patient: 65 minutes This time was spent on one or more of the following: performing physical exam; counseling and coordination of care; obtaining or reviewing history; documenting in the medical record; reviewing/ordering tests, medications or procedures; communicating with other healthcare professionals and discussing with patient's family/caregivers  Chief Complaint: Dyspnea    History of Present Illness:  Janae Davila is a 79 y o  female with a PMH of COPD chronic leg maintained on supplemental home oxygen, tobacco abuse, diabetes mellitus non-insulin-dependent, hypertension and anxiety who presents with worsening dyspnea worsening over the past few days  Found to have COPD exacerbation requiring 4 L of supplemental oxygen at time of rest, improving with nebulized bronchodilators and IV Solu-Medrol  Presented to the medical service for further evaluation and treatment  Review of Systems:  Review of Systems   Constitutional: Positive for activity change  Negative for chills and fever  HENT: Negative for ear pain and sore throat  Eyes: Negative for pain and visual disturbance  Respiratory: Positive for cough and shortness of breath  Cardiovascular: Negative for chest pain and palpitations  Gastrointestinal: Negative for abdominal pain and vomiting  Genitourinary: Negative for dysuria and hematuria  Musculoskeletal: Negative for arthralgias and back pain  Skin: Negative for color change and rash  Neurological: Negative for seizures and syncope  Psychiatric/Behavioral: The patient is nervous/anxious      All other systems reviewed and are negative  Past Medical and Surgical History:   Past Medical History:   Diagnosis Date   • Anxiety    • COPD (chronic obstructive pulmonary disease) ([facility] )    • Diabetes mellitus ([facility] )    • GERD (gastroesophageal reflux disease)    • Smoker        History reviewed  No pertinent surgical history  Meds/Allergies:  Prior to Admission medications    Medication Sig Start Date End Date Taking? Authorizing Provider   albuterol (2 5 mg/3 mL) 0 083 % nebulizer solution Take 3 mL (2 5 mg total) by nebulization every 6 (six) hours as needed for wheezing or shortness of breath 6/20/21  Yes Jeremy Buckley DO   amLODIPine (NORVASC) 10 mg tablet Take 10 mg by mouth daily at bedtime    Yes Historical Provider, MD   atorvastatin (LIPITOR) 40 mg tablet Take 40 mg by mouth daily at bedtime    Yes Historical Provider, MD   budesonide (PULMICORT) 0 5 mg/2 mL nebulizer solution Take 0 5 mg by nebulization 2 (two) times a day Rinse mouth after use     Yes Historical Provider, MD   citalopram (CeleXA) 10 mg tablet Take 10 mg by mouth daily   Yes Historical Provider, MD   cloNIDine (CATAPRES) 0 1 mg tablet Take 0 1 mg by mouth every 12 (twelve) hours   Yes Historical Provider, MD   ergocalciferol (VITAMIN D2) 50,000 units Take 50,000 Units by mouth once a week   Yes Historical Provider, MD   glipiZIDE (GLUCOTROL) 10 mg tablet Take 10 mg by mouth daily in the early morning W breakfast   Yes Historical Provider, MD   lisinopril (ZESTRIL) 40 mg tablet Take 40 mg by mouth daily   Yes Historical Provider, MD   metoprolol tartrate (LOPRESSOR) 100 mg tablet Take 100 mg by mouth every 12 (twelve) hours   Yes Historical Provider, MD   omeprazole (PriLOSEC OTC) 20 MG tablet Take 20 mg by mouth daily    Yes Historical Provider, MD   pioglitazone (ACTOS) 15 mg tablet Take 15 mg by mouth daily   Yes Historical Provider, MD   tiotropium (SPIRIVA) 18 mcg inhalation capsule Place 18 mcg into inhaler and inhale daily   Yes Historical Provider, MD "  ipratropium-albuterol (COMBIVENT RESPIMAT) inhaler Inhale 1 puff 4 (four) times a day    Historical Provider, MD   traZODone (DESYREL) 100 mg tablet Take 100 mg by mouth daily at bedtime as needed   Patient not taking: Reported on 6/24/2023    Historical Provider, MD   glipiZIDE (GLUCOTROL) 5 mg tablet Take 5 mg by mouth daily  Patient not taking: Reported on 6/24/2023 6/24/23  Historical Provider, MD   guaiFENesin (ROBITUSSIN) 200 MG/10ML oral liquid Take 10 mL (200 mg total) by mouth every 4 (four) hours as needed (cough)  Patient not taking: Reported on 6/24/2023 2/6/23 6/24/23  Maryann Ornelas MD     I have reviewed home medications with patient personally  Allergies: Allergies   Allergen Reactions   • Clindamycin        Social History:  Marital Status:    Occupation: retired  Patient Pre-hospital Living Situation: Home  Patient Pre-hospital Level of Mobility: walks  Patient Pre-hospital Diet Restrictions: none  Substance Use History:   Social History     Substance and Sexual Activity   Alcohol Use Not Currently     Social History     Tobacco Use   Smoking Status Every Day   • Packs/day: 0 25   • Types: Cigarettes   Smokeless Tobacco Never     Social History     Substance and Sexual Activity   Drug Use Never       Family History:  History reviewed  No pertinent family history  Physical Exam:     Vitals:   Blood Pressure: 162/88 (06/24/23 2140)  Pulse: 67 (06/25/23 0404)  Temperature: 97 7 °F (36 5 °C) (06/24/23 2140)  Temp Source: Temporal (06/24/23 1813)  Respirations: 20 (06/25/23 0404)  Height: 5' 3\" (160 cm) (06/24/23 1813)  Weight - Scale: 71 7 kg (158 lb) (06/24/23 2039)  SpO2: 97 % (06/25/23 0404)    Physical Exam  Vitals and nursing note reviewed  Constitutional:       General: She is not in acute distress  Appearance: She is well-developed  She is ill-appearing  HENT:      Head: Normocephalic and atraumatic     Eyes:      Conjunctiva/sclera: Conjunctivae normal  " Cardiovascular:      Rate and Rhythm: Normal rate and regular rhythm  Heart sounds: No murmur heard  Pulmonary:      Effort: Respiratory distress present  Breath sounds: Wheezing present  Abdominal:      Palpations: Abdomen is soft  Tenderness: There is no abdominal tenderness  Musculoskeletal:         General: No swelling  Cervical back: Neck supple  Skin:     General: Skin is warm and dry  Capillary Refill: Capillary refill takes less than 2 seconds  Neurological:      General: No focal deficit present  Mental Status: She is alert and oriented to person, place, and time  Psychiatric:         Mood and Affect: Mood is anxious           Behavior: Behavior normal           Additional Data:     Lab Results:  Results from last 7 days   Lab Units 06/25/23  0445 06/24/23  1836   WBC Thousand/uL 9 54 10 84*   HEMOGLOBIN g/dL 11 9 12 6   HEMATOCRIT % 36 7 40 4   PLATELETS Thousands/uL 159 157   NEUTROS PCT %  --  77*   LYMPHS PCT %  --  12*   MONOS PCT %  --  10   EOS PCT %  --  0     Results from last 7 days   Lab Units 06/25/23  0445 06/24/23  1836   SODIUM mmol/L 132* 133*   POTASSIUM mmol/L 4 1 4 1   CHLORIDE mmol/L 91* 92*   CO2 mmol/L 33* 35*   BUN mg/dL 12 10   CREATININE mg/dL 0 55* 0 55*   ANION GAP mmol/L 8 6   CALCIUM mg/dL 9 5 9 7   ALBUMIN g/dL  --  4 2   TOTAL BILIRUBIN mg/dL  --  0 46   ALK PHOS U/L  --  75   ALT U/L  --  8   AST U/L  --  12*   GLUCOSE RANDOM mg/dL 179* 132         Results from last 7 days   Lab Units 06/24/23  2137   POC GLUCOSE mg/dl 169*         Results from last 7 days   Lab Units 06/25/23  0445 06/24/23  1836   PROCALCITONIN ng/ml <0 05 <0 05       Lines/Drains:  Invasive Devices     Peripheral Intravenous Line  Duration           Peripheral IV 06/24/23 Right Antecubital <1 day                    Imaging: Reviewed radiology reports from this admission including: chest xray  XR chest 1 view portable    (Results Pending)   no infiltrate or effusion    EKG and Other Studies Reviewed on Admission:   · EKG: NSR  HR 77     ** Please Note: This note has been constructed using a voice recognition system   **

## 2023-06-25 NOTE — RESPIRATORY THERAPY NOTE
"RT Protocol Note  Princess Pratt 79 y o  female MRN: [id number]  Unit/Bed#: -01 Encounter: [id number]    Assessment    Principal Problem:    COPD exacerbation ([place] [address] )  Active Problems:    Acute respiratory failure with hypoxia ([place] [address] )    Tobacco abuse    Essential hypertension    Type 2 diabetes mellitus without complication, without long-term current use of insulin ([facility])      Home Pulmonary Medications:  Albuterol and Pulmicort  Home Devices/Therapy: Home O2    Past Medical History:   Diagnosis Date   • Anxiety    • COPD (chronic obstructive pulmonary disease) ([facility])    • Diabetes mellitus ([place] [address] )    • GERD (gastroesophageal reflux disease)    • Smoker      Social History     Socioeconomic History   • Marital status:      Spouse name: None   • Number of children: None   • Years of education: None   • Highest education level: None   Occupational History   • None   Tobacco Use   • Smoking status: Every Day     Packs/day: 0 25     Types: Cigarettes   • Smokeless tobacco: Never   Vaping Use   • Vaping Use: Never used   Substance and Sexual Activity   • Alcohol use: Not Currently   • Drug use: Never   • Sexual activity: None   Other Topics Concern   • None   Social History Narrative   • None     Social Determinants of Health     Financial Resource Strain: Not on file   Food Insecurity: Not on file   Transportation Needs: Not on file   Physical Activity: Not on file   Stress: Not on file   Social Connections: Not on file   Intimate Partner Violence: Not on file   Housing Stability: Not on file       Subjective    Subjective Data: Pt is a current smoker of about 1/2 pack of cigarettes    Objective    Physical Exam:   Assessment Type: During-treatment  General Appearance: Alert, Awake  Respiratory Pattern: Dyspnea with exertion  Chest Assessment: Chest expansion symmetrical  O2 Device: 4 lpm nc    Vitals:  Blood pressure 162/88, pulse 95, temperature 97 7 °F (36 5 °C), resp   rate (!) 24, height 5' 3\" (1 6 m), weight " 71 7 kg (158 lb), SpO2 99 %  Imaging and other studies: I have personally reviewed pertinent reports  O2 Device: 4 lpm nc     Plan    Respiratory Plan: Mild Distress pathway, Home Bronchodilator Patient pathway        Resp Comments: 79 yr old pt, S/p SOB, Hx of COPD and chronic 02 support of 2-3lpm, pt indicates she utilizes Albuterol and pulmicort txs at home daily  awake and responsive at this time, SOB with exertion, prn tx provided  Pt is a current smoker

## 2023-06-25 NOTE — RESPIRATORY THERAPY NOTE
"RT Protocol Note  Jessica Michael 79 y o  female MRN: [id number]  Unit/Bed#: -01 Encounter: [id number]    Assessment    Principal Problem:    COPD exacerbation ([facility] )  Active Problems:    Acute on chronic respiratory failure with hypoxia ([facility])    Chronic hyponatremia    Tobacco abuse    Essential hypertension    Type 2 diabetes mellitus without complication, without long-term current use of insulin ([facility])    Tachycardia      Home Pulmonary Medications:    Home Devices/Therapy: Home O2    Past Medical History:   Diagnosis Date   • Anxiety    • COPD (chronic obstructive pulmonary disease) ([facility])    • Diabetes mellitus ([facility] )    • GERD (gastroesophageal reflux disease)    • Smoker      Social History     Socioeconomic History   • Marital status:      Spouse name: None   • Number of children: None   • Years of education: None   • Highest education level: None   Occupational History   • None   Tobacco Use   • Smoking status: Every Day     Packs/day: 0 25     Types: Cigarettes   • Smokeless tobacco: Never   Vaping Use   • Vaping Use: Never used   Substance and Sexual Activity   • Alcohol use: Not Currently   • Drug use: Never   • Sexual activity: None   Other Topics Concern   • None   Social History Narrative   • None     Social Determinants of Health     Financial Resource Strain: Not on file   Food Insecurity: Not on file   Transportation Needs: Not on file   Physical Activity: Not on file   Stress: Not on file   Social Connections: Not on file   Intimate Partner Violence: Not on file   Housing Stability: Not on file       Subjective    Subjective Data: Pt is a current smoker of about 1/2 pack of cigarettes    Objective    Physical Exam:   Assessment Type: During-treatment  General Appearance: Awake, Alert  Respiratory Pattern: Dyspnea with exertion  Chest Assessment: Chest expansion symmetrical    Vitals:  Blood pressure 133/66, pulse 76, temperature 97 5 °F (36 4 °C), resp   rate 20, height 5' 3\" (1 6 m), " weight 71 7 kg (158 lb), SpO2 94 %  Imaging and other studies: I have personally reviewed pertinent reports        O2 Device: 4 lpm nc     Plan    Respiratory Plan: Mild Distress pathway, Home Bronchodilator Patient pathway        Resp Comments: Pt stable on 3lpm NC, Sp02 ~ 95%, scheduled neb tx given, breathing pattern unlabored during rest

## 2023-06-25 NOTE — ASSESSMENT & PLAN NOTE
· Continue PTA lisinopril, clonidine, amlodipine  · PTA metoprolol 100 mg twice daily, consider given severe COPD may benefit from cessation, was stopped on admission now with tachycardia suspect a component of which may be BB withdrawal, will restart at 50mg BID and reduce further as able  · Trend blood pressures

## 2023-06-25 NOTE — ASSESSMENT & PLAN NOTE
· POA with dyspnea, hypoxia, wheezing unrelieved by home medications  · Continued tobacco abuse, discussed cessation with patient, education will be provided  · Nebulized bronchodilators, inhaled corticosteroids  · IV Solu-Medrol to transition to PO steroids tomorrow  · Appreciate pulmonary consultation

## 2023-06-25 NOTE — ASSESSMENT & PLAN NOTE
· Chronically maintained on 2 5 L at rest and 4 L with exertion  · Requiring 4 L at rest at time of admission due to COPD exacerbation  · Wean to home requirement as tolerated

## 2023-06-25 NOTE — ASSESSMENT & PLAN NOTE
· POA with dyspnea, hypoxia, wheezing unrelieved by home medications  · Continued tobacco abuse  · Admit under COPD pathway  · Nebulized bronchodilators, inhaled corticosteroids  · IV Solu-Medrol with transition to prednisone with rapid taper  · Appreciate pulmonary consultation

## 2023-06-25 NOTE — ASSESSMENT & PLAN NOTE
· Chronically maintained on 2 5 L at rest and 4 L with exertion  · Requiring 4 L at rest at time of admission due to COPD exacerbation  · Wean to home requirement as tolerated, patient currently at rest 2 5L which is baseline

## 2023-06-25 NOTE — ASSESSMENT & PLAN NOTE
· Likely multifactorial related to steroids, underlying illness, as well as possible BB withdrawal given cessation of home dose on admission  · EKG on admission NSR, regular on exam so likely sinus tachycardia  · Continue to monitor VS  · Denies CP, palpitations  · Restart BB dose as above and address underlying acute illness as above

## 2023-06-25 NOTE — PROGRESS NOTES
[address]  Progress Note  Name: Reagan Vail  MRN: [id number]  Unit/Bed#: -64 I Date of Admission: 6/24/2023   Date of Service: 6/25/2023 I Hospital Day: 1    Assessment/Plan   * COPD exacerbation ([address] )  Assessment & Plan  · POA with dyspnea, hypoxia, wheezing unrelieved by home medications  · Continued tobacco abuse, discussed cessation with patient, education will be provided  · Nebulized bronchodilators, inhaled corticosteroids  · IV Solu-Medrol to transition to PO steroids tomorrow  · Appreciate pulmonary consultation    Acute on chronic respiratory failure with hypoxia (HCC)  Assessment & Plan  · Chronically maintained on 2 5 L at rest and 4 L with exertion  · Requiring 4 L at rest at time of admission due to COPD exacerbation  · Wean to home requirement as tolerated, patient currently at rest 3L    Tachycardia  Assessment & Plan  · Likely multifactorial related to steroids, underlying illness, as well as possible BB withdrawal given cessation of home dose on admission  · EKG on admission NSR, regular on exam so likely sinus tachycardia  · Continue to monitor VS  · Denies CP, palpitations  · Restart BB dose as above and address underlying acute illness as above    Type 2 diabetes mellitus without complication, without long-term current use of insulin [facility])  Assessment & Plan  Lab Results   Component Value Date    HGBA1C 6 9 (H) 02/10/2023       Recent Labs     06/24/23  2137 06/25/23  0739 06/25/23  1053   POCGLU 169* 184* 240*       Blood Sugar Average: Last 72 hrs:  (P) 242 [id number]     · Not insulin-dependent  · Continue PTA Actos and glipizide, metformin on hold  · Sliding scale coverage Accu-Cheks, will add meal time coverage  · Hypoglycemia protocol  · Monitor closely due to steroid-induced hyperglycemia  · Carbohydrate controlled diet    Essential hypertension  Assessment & Plan  · Continue PTA lisinopril, clonidine, amlodipine  · PTA metoprolol 100 mg twice daily, consider given severe COPD may benefit from cessation, was stopped on admission now with tachycardia suspect a component of which may be BB withdrawal, will restart at 50mg BID and reduce further as able  · Trend blood pressures    Tobacco abuse  Assessment & Plan  · Continues to smoke 7 cigarettes/day  · Cessation counseling discussed  · Declines nicotine patch    Chronic hyponatremia  Assessment & Plan  · Sodium at baseline  · Monitor             VTE Pharmacologic Prophylaxis: VTE Score: 5 High Risk (Score >/= 5) - Pharmacological DVT Prophylaxis Ordered: enoxaparin (Lovenox)  Sequential Compression Devices Ordered  Patient Centered Rounds: I performed bedside rounds with nursing staff today  Discussions with Specialists or Other Care Team Provider: None    Education and Discussions with Family / Patient: Attempted to update  (granddaughter) via phone  Total Time Spent on Date of Encounter in care of patient: 35 minutes This time was spent on one or more of the following: performing physical exam; counseling and coordination of care; obtaining or reviewing history; documenting in the medical record; reviewing/ordering tests, medications or procedures; communicating with other healthcare professionals and discussing with patient's family/caregivers  Current Length of Stay: 1 day(s)  Current Patient Status: Inpatient   Certification Statement: The patient will continue to require additional inpatient hospital stay due to ongoing management of acute illness  Discharge Plan: Anticipate discharge in 24-48 hrs to home  Code Status: Level 1 - Full Code    Subjective:   Patient reports feeling better than prior, but only slightly, her breathing feels a little more comfortable but she still feels short of breath  She did not have a significant cough or mucus production  She denies any chest pain      Objective:     Vitals:   Temp (24hrs), Av 8 °F (36 6 °C), Min:97 7 °F (36 5 °C), Max:97 9 °F (36 6 °C)    Temp:  [97 7 °F (36 5 °C)-97 9 °F (36 6 °C)] 97 7 °F (36 5 °C)  HR:  [] 122  Resp:  [16-26] 20  BP: (145-195)/(66-98) 174/98  SpO2:  [86 %-99 %] 90 %  Body mass index is 27 99 kg/m²  Input and Output Summary (last 24 hours): Intake/Output Summary (Last 24 hours) at 6/25/2023 1109  Last data filed at 6/24/2023 2235  Gross per 24 hour   Intake 582 ml   Output --   Net 582 ml       Physical Exam:   Physical Exam  Vitals and nursing note reviewed  Constitutional:       General: She is not in acute distress  Appearance: She is well-developed  HENT:      Head: Normocephalic and atraumatic  Eyes:      Conjunctiva/sclera: Conjunctivae normal    Cardiovascular:      Rate and Rhythm: Regular rhythm  Tachycardia present  Heart sounds: No murmur heard  Pulmonary:      Effort: No respiratory distress  Comments: Patient with moderately reduced breath sounds, no wheezing, adequate respiratory effort, no respiratory distress  Patient on 3 L nasal cannula  Abdominal:      Palpations: Abdomen is soft  Tenderness: There is no abdominal tenderness  Musculoskeletal:         General: No swelling  Cervical back: Neck supple  Skin:     General: Skin is warm and dry  Capillary Refill: Capillary refill takes less than 2 seconds  Neurological:      Mental Status: She is alert     Psychiatric:         Mood and Affect: Mood normal           Additional Data:     Labs:  Results from last 7 days   Lab Units 06/25/23  0445 06/24/23  1836   WBC Thousand/uL 9 54 10 84*   HEMOGLOBIN g/dL 11 9 12 6   HEMATOCRIT % 36 7 40 4   PLATELETS Thousands/uL 159 157   BANDS PCT % 1  --    NEUTROS PCT %  --  77*   LYMPHS PCT %  --  12*   LYMPHO PCT % 7*  --    MONOS PCT %  --  10   MONO PCT % 1*  --    EOS PCT % 0 0     Results from last 7 days   Lab Units 06/25/23  0445 06/24/23  1836   SODIUM mmol/L 132* 133*   POTASSIUM mmol/L 4 1 4 1   CHLORIDE mmol/L 91* 92*   CO2 mmol/L 33* 35*   BUN mg/dL 12 10   CREATININE mg/dL 0 55* 0 55*   ANION GAP mmol/L 8 6   CALCIUM mg/dL 9 5 9 7   ALBUMIN g/dL  --  4 2   TOTAL BILIRUBIN mg/dL  --  0 46   ALK PHOS U/L  --  75   ALT U/L  --  8   AST U/L  --  12*   GLUCOSE RANDOM mg/dL 179* 132         Results from last 7 days   Lab Units 06/25/23  1053 06/25/23  0739 06/24/23  2137   POC GLUCOSE mg/dl 240* 184* 169*         Results from last 7 days   Lab Units 06/25/23  0445 06/24/23  1836   PROCALCITONIN ng/ml <0 05 <0 05       Lines/Drains:  Invasive Devices     Peripheral Intravenous Line  Duration           Peripheral IV 06/24/23 Right Antecubital <1 day                      Imaging: Reviewed radiology reports from this admission including: chest xray and Personally reviewed the following imaging: chest xray    Recent Cultures (last 7 days):         Last 24 Hours Medication List:   Current Facility-Administered Medications   Medication Dose Route Frequency Provider Last Rate   • acetaminophen  650 mg Oral Q6H PRN Hailey S Maria Antonia, CRNP     • amLODIPine  10 mg Oral HS Hailey S Maria Antonia, CRNP     • atorvastatin  40 mg Oral HS Hailey S Maria Antonia, CRNP     • azithromycin  500 mg Oral Q24H Hailey S Maria Antonia, CRNP     • budesonide  0 5 mg Nebulization Q12H Hailey S Maria Antonia, CRNP     • citalopram  10 mg Oral Daily Hailey S Maria Antonia, CRNP     • cloNIDine  0 1 mg Oral Q12H [facility] Hailey S Maria Antonia, CRNP     • enoxaparin  40 mg Subcutaneous Daily Hailey S Maria Antonia, CRNP     • glipiZIDE  10 mg Oral Early Morning Hailey S Maria Antonia, CRNP     • guaiFENesin  600 mg Oral BID Hailey S Maria Antonia, CRNP     • insulin lispro  1-6 Units Subcutaneous TID AC Hailey S Maria Antonia, CRNP     • insulin lispro  1-6 Units Subcutaneous HS Hailey S Maria Antonia, CRNP     • ipratropium  0 5 mg Nebulization TID Hailey S Maria Antonia, CRNP     • levalbuterol  1 25 mg Nebulization TID Hailey S Maria Antonia, CRNP     • levalbuterol  1 25 mg Nebulization Q6H PRN Hailey S Maria Antonia, CRNP      And   • sodium chloride  3 mL Nebulization Q6H PRN Hailey Em, GURUNP     • lisinopril  40 mg Oral Daily Hailey S Maria Antonia, CRNP     • LORazepam  0 5 mg Oral Q6H PRN Hailey S Maria Antonia, CRNP     • methylPREDNISolone sodium succinate  40 mg Intravenous Q12H [address] Adrian Oniel Counts, DO     • metoprolol tartrate  50 mg Oral Q12H [address] Adrian Oniel Counts, DO     • pantoprazole  40 mg Oral Early Morning Hailey S Maria Antonia, CRNP     • pioglitazone  15 mg Oral Daily Hailey S Maria Antonia, CRNP     • [START ON 6/26/2023] predniSONE  40 mg Oral Daily Adrian Oniel Counts, DO          Today, Patient Was Seen By: Camila Mason DO    **Please Note: This note may have been constructed using a voice recognition system  **

## 2023-06-25 NOTE — ASSESSMENT & PLAN NOTE
· Continue PTA lisinopril, clonidine, amlodipine  · PTA metoprolol 100 mg twice daily on hold due to acute COPD exacerbation, may benefit from cessation of medication with severe COPD  · Trend blood pressures

## 2023-06-25 NOTE — UTILIZATION REVIEW
Initial Clinical Review    Admission: Date/Time/Statement:   Admission Orders (From admission, onward)     Ordered        06/24/23 1945  INPATIENT ADMISSION  Once                      Orders Placed This Encounter   Procedures   • INPATIENT ADMISSION     Standing Status:   Standing     Number of Occurrences:   1     Order Specific Question:   Level of Care     Answer:   Med Surg [16]     Order Specific Question:   Estimated length of stay     Answer:   More than 2 Midnights     Order Specific Question:   Certification     Answer:   I certify that inpatient services are medically necessary for this patient for a duration of greater than two midnights  See H&P and MD Progress Notes for additional information about the patient's course of treatment  ED Arrival Information     Expected   -    Arrival   6/24/2023 18:09    Acuity   Emergent            Means of arrival   Walk-In    Escorted by   Family Member    Service   Hospitalist    Admission type   Emergency            Arrival complaint   sob           Chief Complaint   Patient presents with   • Shortness of Breath     Pt having difficulty breathing x 2-3 days  PMHX: COPD Pt usually uses 3L O2 but is on 4L at this time  Initial Presentation: 79 y o  female to ED from home w/  worsening dyspnea worsening over the past few days  Found to have COPD exacerbation requiring 4 L of supplemental oxygen at time of rest, improving with nebulized bronchodilators and IV Solu-Medrol  PMHX COPD   Admitted IP status w/ COPD exacerbation plan for IV solumedrol , pulm consult   DM SSI and monitor   HTN cont lisinopril , clonidine , amlodipine and trend BP   Chronically maintained on 2 5l at rest and 4l w/ exertion , wean as able   PE: wheezing , anxious     Date: 6/25   Day 2: cont to feel SOB   Cont IV steroids plan to transition to po 6/26  Cont nebs   O2 sat 90 % on 3l        Date: 6/26    Day 3: Has surpassed a 2nd midnight with active treatments and services, which include treatment of COPD exacerbation , wean O2 , IV steroids and nebs          ED Triage Vitals   Temperature Pulse Respirations Blood Pressure SpO2   06/24/23 1813 06/24/23 1813 06/24/23 1813 06/24/23 1813 06/24/23 1813   97 9 °F (36 6 °C) 98 (!) 26 (!) 195/86 (!) 88 %      Temp Source Heart Rate Source Patient Position - Orthostatic VS BP Location FiO2 (%)   06/24/23 1813 06/24/23 1915 06/24/23 1813 06/24/23 1813 --   Temporal Monitor Sitting Right arm       Pain Score       06/24/23 2104       No Pain          Wt Readings from Last 1 Encounters:   06/24/23 71 7 kg (158 lb)     Additional Vital Signs:   06/25/23 0842 -- -- -- -- -- 90 % 32 3 L/min Nasal cannula --   06/25/23 07:13:09 97 7 °F (36 5 °C) 122 Abnormal  -- 174/98 Abnormal  123 93 % -- -- -- --   06/25/23 0404 -- 67 20 -- -- 97 % 34 3 5 L/min Nasal cannula --   06/24/23 2252 -- 95 24 Abnormal  -- -- 99 % -- -- -- --   06/24/23 2247 -- -- -- -- -- 90 % -- -- -- --   06/24/23 2238 -- -- -- -- -- 90 % 44 6 L/min -- --   06/24/23 21:40:03 97 7 °F (36 5 °C) 96 24 Abnormal  162/88 113 91 % 36 4 L/min Nasal cannula --   06/24/23 20:46:50 97 7 °F (36 5 °C) 86 22 157/89 112 92 % -- -- -- --   06/24/23 1946 -- 93 20 -- -- 86 % Abnormal  36 4 L/min Nasal cannula --   06/24/23 1915 -- 76 16 145/66 95 92 % 36 4 L/min -- --   06/24/23 1842 -- -- -- -- -- -- -- -- Nasal cannula --   06/24/23 1815 -- -- -- -- -- 91 % 36 4 L/min         Pertinent Labs/Diagnostic Test Results:   6/24 EKG   Rhythm: sinus rhythm     Ectopy:     Ectopy: none     QRS:     QRS axis:  Normal     QRS intervals:  Normal   Conduction:     Conduction: normal     ST segments:     ST segments:  Normal   T waves:     T waves: normal    XR chest 1 view portable    (Results Pending)   No acute cardiopulmonary disease  Results from last 7 days   Lab Units 06/24/23 1836   SARS-COV-2  Negative     Results from last 7 days   Lab Units 06/25/23  0445 06/24/23 1836   WBC Thousand/uL 9 54 10 84* HEMOGLOBIN g/dL 11 9 12 6   HEMATOCRIT % 36 7 40 4   PLATELETS Thousands/uL 159 157   NEUTROS ABS Thousands/µL  --  8 38*   BANDS PCT % 1  --          Results from last 7 days   Lab Units 06/25/23  0445 06/24/23  1836   SODIUM mmol/L 132* 133*   POTASSIUM mmol/L 4 1 4 1   CHLORIDE mmol/L 91* 92*   CO2 mmol/L 33* 35*   ANION GAP mmol/L 8 6   BUN mg/dL 12 10   CREATININE mg/dL 0 55* 0 55*   EGFR ml/min/1 73sq m 98 98   CALCIUM mg/dL 9 5 9 7   MAGNESIUM mg/dL 1 8*  --      Results from last 7 days   Lab Units 06/24/23  1836   AST U/L 12*   ALT U/L 8   ALK PHOS U/L 75   TOTAL PROTEIN g/dL 7 5   ALBUMIN g/dL 4 2   TOTAL BILIRUBIN mg/dL 0 46     Results from last 7 days   Lab Units 06/25/23  0739 06/24/23  2137   POC GLUCOSE mg/dl 184* 169*     Results from last 7 days   Lab Units 06/25/23  0445 06/24/23  1836   GLUCOSE RANDOM mg/dL 179* 132       Results from last 7 days   Lab Units 06/24/23  1836   HS TNI 0HR ng/L 10       Results from last 7 days   Lab Units 06/25/23  0445 06/24/23  1836   PROCALCITONIN ng/ml <0 05 <0 05       Results from last 7 days   Lab Units 06/24/23  1836   BNP pg/mL 189*         ED Treatment:   Medication Administration from 06/24/2023 1807 to 06/24/2023 2038       Date/Time Order Dose Route Action     06/24/2023 1839 EDT methylPREDNISolone sodium succinate (Solu-MEDROL) injection 125 mg 125 mg Intravenous Given     06/24/2023 1839 EDT ipratropium-albuterol (DUO-NEB) 0 5-2 5 mg/3 mL inhalation solution 3 mL 3 mL Nebulization Given        Past Medical History:   Diagnosis Date   • Anxiety    • COPD (chronic obstructive pulmonary disease) ([facility])    • Diabetes mellitus ([place] [address] )    • GERD (gastroesophageal reflux disease)    • Smoker      Present on Admission:  • Type 2 diabetes mellitus without complication, without long-term current use of insulin ([facility])  • Tobacco abuse  • COPD exacerbation ([address] )  • Essential hypertension  • Acute on chronic respiratory failure with hypoxia ([facility] )      Admitting Diagnosis: Shortness of breath [R06 02]  Hypoxia [R09 02]  COPD exacerbation (HCC) [J44 1]  Age/Sex: 79 y o  female  Admission Orders:  Scheduled Medications:  amLODIPine, 10 mg, Oral, HS  atorvastatin, 40 mg, Oral, HS  azithromycin, 500 mg, Oral, Q24H  budesonide, 0 5 mg, Nebulization, Q12H  citalopram, 10 mg, Oral, Daily  cloNIDine, 0 1 mg, Oral, Q12H NATHALY  enoxaparin, 40 mg, Subcutaneous, Daily  glipiZIDE, 10 mg, Oral, Early Morning  guaiFENesin, 600 mg, Oral, BID  insulin lispro, 1-6 Units, Subcutaneous, TID AC  insulin lispro, 1-6 Units, Subcutaneous, HS  ipratropium, 0 5 mg, Nebulization, TID  levalbuterol, 1 25 mg, Nebulization, TID  lisinopril, 40 mg, Oral, Daily  methylPREDNISolone sodium succinate, 40 mg, Intravenous, Q12H NATHALY  pantoprazole, 40 mg, Oral, Early Morning  pioglitazone, 15 mg, Oral, Daily      Continuous IV Infusions:     PRN Meds:  acetaminophen, 650 mg, Oral, Q6H PRN  levalbuterol, 1 25 mg, Nebulization, Q6H PRN   And  sodium chloride, 3 mL, Nebulization, Q6H PRN  LORazepam, 0 5 mg, Oral, Q6H PRN    fingerstick ac and hs   Up and OOB       IP CONSULT TO PULMONOLOGY    Network Utilization Review Department  ATTENTION: Please call with any questions or concerns to [phone] and carefully listen to the prompts so that you are directed to the right person  All voicemails are confidential   Gabriela Rosas all requests for admission clinical reviews, approved or denied determinations and any other requests to dedicated fax number below belonging to the campus where the patient is receiving treatment   List of dedicated fax numbers for the Facilities:  [address] DENIALS (Administrative/Medical Necessity) [phone]   [address] (Maternity/NICU/Pediatrics) Gloria Alanis 172 [phone]   [facility] [phone]   [facility] [phone] 1306 [facility] 150 [facility] [address] [place] [address] [facility] [address] [facility] 134 289 [facility] [phone]

## 2023-06-25 NOTE — ASSESSMENT & PLAN NOTE
· Likely multifactorial related to steroids, underlying illness, as well as possible BB withdrawal given cessation of home dose on admission  · EKG on admission NSR, regular on exam so likely sinus tachycardia  · Continue to monitor VS  · Denies CP, palpitations  · Resolved/improved, continue BB as above and manage underlying illness

## 2023-06-25 NOTE — ASSESSMENT & PLAN NOTE
· POA with dyspnea, hypoxia, wheezing unrelieved by home medications  · Continued tobacco abuse, discussed cessation with patient, education will be provided  · Nebulized bronchodilators, inhaled corticosteroids  · Transitioned [place] steroids  · Incentive spirometer  · Appreciate pulmonary consultation

## 2023-06-25 NOTE — ASSESSMENT & PLAN NOTE
Lab Results   Component Value Date    HGBA1C 6 9 (H) 02/10/2023       Recent Labs     06/24/23  2137 06/25/23  0739 06/25/23  1053   POCGLU 169* 184* 240*       Blood Sugar Average: Last 72 hrs:  (P) 232 [id number]     · Not insulin-dependent  · Continue PTA Actos and glipizide, metformin on hold  · Sliding scale coverage Accu-Cheks, and added mealtime coverage -- had some hypoglycemia so reduced to 2u AC  · Hypoglycemia protocol  · Monitor closely due to steroid-induced hyperglycemia  · Carbohydrate controlled diet

## 2023-06-25 NOTE — ASSESSMENT & PLAN NOTE
· Continue PTA lisinopril, clonidine, amlodipine  · PTA metoprolol 100 mg twice daily, given severe COPD may benefit from cessation, was stopped on admission and had some tachycardia (likely multifactorial but considered BB withdrawal)  · Continue Metoprolol at 50mg BID, wean further if able  · Trend blood pressures

## 2023-06-25 NOTE — ASSESSMENT & PLAN NOTE
· Chronically maintained on 2 5 L at rest and 4 L with exertion  · Requiring 4 L at rest at time of admission due to COPD exacerbation  · Wean to home requirement as tolerated, patient currently at rest 3L

## 2023-06-25 NOTE — UTILIZATION REVIEW
NOTIFICATION OF INPATIENT ADMISSION   AUTHORIZATION REQUEST   SERVICING FACILITY:   [address] [place] [address]  Diogo Wilcox [address] [place] [address], [address]  Tax ID: [id number]  NPI: [id number] ATTENDING PROVIDER:  Attending Name and NPI#: Nikki Anderson [[id number]]  Address: [facility]  Diogo Lord[address], [facility] [place]  Phone: [phone]   ADMISSION INFORMATION:  Place of Service: [place] [address]  Place of Service Code: 21  Inpatient Admission Date/Time: 6/24/23  7:45 PM  Discharge Date/Time: No discharge date for patient encounter  Admitting Diagnosis Code/Description:  Shortness of breath [R06 02]  Hypoxia [R09 02]  [facility] [J44 1]     UTILIZATION REVIEW CONTACT:  Bib Carroll Utilization   Network Utilization Review Department  Phone: [phone]  Fax [phone]  Email: [email or web address]  Contact for approvals/pending authorizations, clinical reviews, and discharge  PHYSICIAN ADVISORY SERVICES:  Medical Necessity Denial & [facility] Review  Phone: [phone]  Fax: [phone]  Email: [email or web address]

## 2023-06-26 LAB
ANION GAP SERPL CALCULATED.3IONS-SCNC: 2 MMOL/L
ATRIAL RATE: 77 BPM
BUN SERPL-MCNC: 15 MG/DL (ref 5–25)
CALCIUM SERPL-MCNC: 9.6 MG/DL (ref 8.4–10.2)
CHLORIDE SERPL-SCNC: 90 MMOL/L (ref 96–108)
CO2 SERPL-SCNC: 41 MMOL/L (ref 21–32)
CREAT SERPL-MCNC: 0.62 MG/DL (ref 0.6–1.3)
GFR SERPL CREATININE-BSD FRML MDRD: 94 ML/MIN/1.73SQ M
GLUCOSE SERPL-MCNC: 117 MG/DL (ref 65–140)
GLUCOSE SERPL-MCNC: 166 MG/DL (ref 65–140)
GLUCOSE SERPL-MCNC: 167 MG/DL (ref 65–140)
GLUCOSE SERPL-MCNC: 180 MG/DL (ref 65–140)
GLUCOSE SERPL-MCNC: 219 MG/DL (ref 65–140)
GLUCOSE SERPL-MCNC: 268 MG/DL (ref 65–140)
GLUCOSE SERPL-MCNC: 67 MG/DL (ref 65–140)
P AXIS: 71 DEGREES
POTASSIUM SERPL-SCNC: 4.5 MMOL/L (ref 3.5–5.3)
PR INTERVAL: 134 MS
QRS AXIS: 51 DEGREES
QRSD INTERVAL: 76 MS
QT INTERVAL: 354 MS
QTC INTERVAL: 400 MS
SODIUM SERPL-SCNC: 133 MMOL/L (ref 135–147)
T WAVE AXIS: 63 DEGREES
VENTRICULAR RATE: 77 BPM

## 2023-06-26 PROCEDURE — 82948 REAGENT STRIP/BLOOD GLUCOSE: CPT

## 2023-06-26 PROCEDURE — 80048 BASIC METABOLIC PNL TOTAL CA: CPT | Performed by: HOSPITALIST

## 2023-06-26 PROCEDURE — 94640 AIRWAY INHALATION TREATMENT: CPT

## 2023-06-26 PROCEDURE — 99233 SBSQ HOSP IP/OBS HIGH 50: CPT | Performed by: HOSPITALIST

## 2023-06-26 PROCEDURE — 94760 N-INVAS EAR/PLS OXIMETRY 1: CPT

## 2023-06-26 RX ORDER — INSULIN LISPRO 100 [IU]/ML
2 INJECTION, SOLUTION INTRAVENOUS; SUBCUTANEOUS
Status: DISCONTINUED | OUTPATIENT
Start: 2023-06-26 | End: 2023-06-27

## 2023-06-26 RX ADMIN — INSULIN LISPRO 4 UNITS: 100 INJECTION, SOLUTION INTRAVENOUS; SUBCUTANEOUS at 11:39

## 2023-06-26 RX ADMIN — CLONIDINE HYDROCHLORIDE 0.1 MG: 0.1 TABLET ORAL at 09:13

## 2023-06-26 RX ADMIN — POLYETHYLENE GLYCOL 3350 17 G: 17 POWDER, FOR SOLUTION ORAL at 09:12

## 2023-06-26 RX ADMIN — METOPROLOL TARTRATE 50 MG: 50 TABLET, FILM COATED ORAL at 22:15

## 2023-06-26 RX ADMIN — IPRATROPIUM BROMIDE 0.5 MG: 0.5 SOLUTION RESPIRATORY (INHALATION) at 13:40

## 2023-06-26 RX ADMIN — INSULIN LISPRO 1 UNITS: 100 INJECTION, SOLUTION INTRAVENOUS; SUBCUTANEOUS at 11:40

## 2023-06-26 RX ADMIN — GLIPIZIDE 10 MG: 5 TABLET ORAL at 05:23

## 2023-06-26 RX ADMIN — LEVALBUTEROL HYDROCHLORIDE 1.25 MG: 1.25 SOLUTION RESPIRATORY (INHALATION) at 07:06

## 2023-06-26 RX ADMIN — BUDESONIDE 0.5 MG: 0.5 INHALANT RESPIRATORY (INHALATION) at 07:06

## 2023-06-26 RX ADMIN — CITALOPRAM HYDROBROMIDE 10 MG: 10 TABLET ORAL at 09:13

## 2023-06-26 RX ADMIN — MAGNESIUM HYDROXIDE 30 ML: 400 SUSPENSION ORAL at 15:50

## 2023-06-26 RX ADMIN — INSULIN LISPRO 1 UNITS: 100 INJECTION, SOLUTION INTRAVENOUS; SUBCUTANEOUS at 22:21

## 2023-06-26 RX ADMIN — METOPROLOL TARTRATE 50 MG: 50 TABLET, FILM COATED ORAL at 09:13

## 2023-06-26 RX ADMIN — AZITHROMYCIN MONOHYDRATE 500 MG: 250 TABLET ORAL at 22:18

## 2023-06-26 RX ADMIN — AMLODIPINE BESYLATE 10 MG: 10 TABLET ORAL at 22:15

## 2023-06-26 RX ADMIN — BUDESONIDE 0.5 MG: 0.5 INHALANT RESPIRATORY (INHALATION) at 19:48

## 2023-06-26 RX ADMIN — ENOXAPARIN SODIUM 40 MG: 40 INJECTION SUBCUTANEOUS at 09:12

## 2023-06-26 RX ADMIN — LEVALBUTEROL HYDROCHLORIDE 1.25 MG: 1.25 SOLUTION RESPIRATORY (INHALATION) at 13:40

## 2023-06-26 RX ADMIN — CLONIDINE HYDROCHLORIDE 0.1 MG: 0.1 TABLET ORAL at 22:15

## 2023-06-26 RX ADMIN — INSULIN LISPRO 2 UNITS: 100 INJECTION, SOLUTION INTRAVENOUS; SUBCUTANEOUS at 09:11

## 2023-06-26 RX ADMIN — PIOGLITAZONE HYDROCHLORIDE 15 MG: 15 TABLET ORAL at 09:13

## 2023-06-26 RX ADMIN — LISINOPRIL 40 MG: 20 TABLET ORAL at 09:13

## 2023-06-26 RX ADMIN — IPRATROPIUM BROMIDE 0.5 MG: 0.5 SOLUTION RESPIRATORY (INHALATION) at 07:06

## 2023-06-26 RX ADMIN — INSULIN LISPRO 4 UNITS: 100 INJECTION, SOLUTION INTRAVENOUS; SUBCUTANEOUS at 09:12

## 2023-06-26 RX ADMIN — IPRATROPIUM BROMIDE 0.5 MG: 0.5 SOLUTION RESPIRATORY (INHALATION) at 19:48

## 2023-06-26 RX ADMIN — PANTOPRAZOLE SODIUM 40 MG: 40 TABLET, DELAYED RELEASE ORAL at 05:23

## 2023-06-26 RX ADMIN — ATORVASTATIN CALCIUM 40 MG: 40 TABLET, FILM COATED ORAL at 22:18

## 2023-06-26 RX ADMIN — LEVALBUTEROL HYDROCHLORIDE 1.25 MG: 1.25 SOLUTION RESPIRATORY (INHALATION) at 19:48

## 2023-06-26 RX ADMIN — PREDNISONE 40 MG: 20 TABLET ORAL at 09:13

## 2023-06-26 NOTE — PROGRESS NOTES
[address]  Progress Note  Name: Ryan Doran  MRN: [id number]  Unit/Bed#: -81 I Date of Admission: 6/24/2023   Date of Service: 6/26/2023 I Hospital Day: 2    Assessment/Plan   * COPD exacerbation ([address] )  Assessment & Plan  · POA with dyspnea, hypoxia, wheezing unrelieved by home medications  · Continued tobacco abuse, discussed cessation with patient, education will be provided  · Nebulized bronchodilators, inhaled corticosteroids  · Transitioned [place] steroids  · Incentive spirometer  · Appreciate pulmonary consultation    Acute on chronic respiratory failure with hypoxia (HCC)  Assessment & Plan  · Chronically maintained on 2 5 L at rest and 4 L with exertion  · Requiring 4 L at rest at time of admission due to COPD exacerbation  · Wean to home requirement as tolerated, patient currently at rest 2 5L which is baseline    Tachycardia  Assessment & Plan  · Likely multifactorial related to steroids, underlying illness, as well as possible BB withdrawal given cessation of home dose on admission  · EKG on admission NSR, regular on exam so likely sinus tachycardia  · Continue to monitor VS  · Denies CP, palpitations  · Resolved/improved, continue BB as above and manage underlying illness    Type 2 diabetes mellitus without complication, without long-term current use of insulin [facility])  Assessment & Plan  Lab Results   Component Value Date    HGBA1C 6 9 (H) 02/10/2023       Recent Labs     06/24/23  2137 06/25/23  0739 06/25/23  1053   POCGLU 169* 184* 240*       Blood Sugar Average: Last 72 hrs:  (P) 584 [id number]     · Not insulin-dependent  · Continue PTA Actos and glipizide, metformin on hold  · Sliding scale coverage Accu-Cheks, and added mealtime coverage -- had some hypoglycemia so reduced to 2u AC  · Hypoglycemia protocol  · Monitor closely due to steroid-induced hyperglycemia  · Carbohydrate controlled diet    Essential hypertension  Assessment & Plan  · Continue PTA lisinopril, clonidine, amlodipine  · PTA metoprolol 100 mg twice daily, given severe COPD may benefit from cessation, was stopped on admission and had some tachycardia (likely multifactorial but considered BB withdrawal)  · Continue Metoprolol at 50mg BID, wean further if able  · Trend blood pressures    Tobacco abuse  Assessment & Plan  · Continues to smoke 7 cigarettes/day  · Cessation counseling discussed  · Declines nicotine patch    Chronic hyponatremia  Assessment & Plan  · Sodium at baseline  · Monitor as needed               VTE Pharmacologic Prophylaxis: VTE Score: 5 High Risk (Score >/= 5) - Pharmacological DVT Prophylaxis Ordered: enoxaparin (Lovenox)  Sequential Compression Devices Ordered  Patient Centered Rounds: I performed bedside rounds with nursing staff today  Discussions with Specialists or Other Care Team Provider: none    Education and Discussions with Family / Patient: Updated  (grand daughter) via phone  Total Time Spent on Date of Encounter in care of patient: 45 minutes This time was spent on one or more of the following: performing physical exam; counseling and coordination of care; obtaining or reviewing history; documenting in the medical record; reviewing/ordering tests, medications or procedures; communicating with other healthcare professionals and discussing with patient's family/caregivers  Current Length of Stay: 2 day(s)  Current Patient Status: Inpatient   Certification Statement: The patient will continue to require additional inpatient hospital stay due to ongoing management COPD exac  Discharge Plan: Anticipate discharge in 24-48 hrs to home      Code Status: Level 1 - Full Code    Subjective:   Patient feels improved breathing at rest, still dyspneic with exertion  Starting to have a somewhat dry cough    Objective:     Vitals:   Temp (24hrs), Av 5 °F (36 4 °C), Min:97 3 °F (36 3 °C), Max:97 7 °F (36 5 °C)    Temp:  [97 3 °F (36 3 °C)-97 7 °F (36 5 °C)] 97 3 °F (36 3 °C)  HR:  [] 89  Resp:  [18-22] 18  BP: (133-166)/(66-81) 166/80  SpO2:  [87 %-96 %] 91 %  Body mass index is 27 99 kg/m²  Input and Output Summary (last 24 hours): Intake/Output Summary (Last 24 hours) at 6/26/2023 1219  Last data filed at 6/25/2023 1700  Gross per 24 hour   Intake 200 ml   Output --   Net 200 ml       Physical Exam:   Physical Exam  Vitals and nursing note reviewed  Constitutional:       General: She is not in acute distress  Appearance: She is well-developed  HENT:      Head: Normocephalic and atraumatic  Eyes:      Conjunctiva/sclera: Conjunctivae normal    Cardiovascular:      Rate and Rhythm: Regular rhythm  Tachycardia present  Heart sounds: No murmur heard  Pulmonary:      Effort: No respiratory distress  Comments: Airways are tight, minimal airway movement, no wheezing, not significantly changed from prior  Abdominal:      Palpations: Abdomen is soft  Tenderness: There is no abdominal tenderness  Musculoskeletal:         General: No swelling  Cervical back: Neck supple  Skin:     General: Skin is warm and dry  Capillary Refill: Capillary refill takes less than 2 seconds  Neurological:      Mental Status: She is alert     Psychiatric:         Mood and Affect: Mood normal           Additional Data:     Labs:  Results from last 7 days   Lab Units 06/25/23  0445 06/24/23  1836   WBC Thousand/uL 9 54 10 84*   HEMOGLOBIN g/dL 11 9 12 6   HEMATOCRIT % 36 7 40 4   PLATELETS Thousands/uL 159 157   BANDS PCT % 1  --    NEUTROS PCT %  --  77*   LYMPHS PCT %  --  12*   LYMPHO PCT % 7*  --    MONOS PCT %  --  10   MONO PCT % 1*  --    EOS PCT % 0 0     Results from last 7 days   Lab Units 06/26/23  0455 06/25/23  0445 06/24/23  1836   SODIUM mmol/L 133*   < > 133*   POTASSIUM mmol/L 4 5   < > 4 1   CHLORIDE mmol/L 90*   < > 92*   CO2 mmol/L 41*   < > 35*   BUN mg/dL 15   < > 10   CREATININE mg/dL 0 62   < > 0 55*   ANION GAP mmol/L 2   < > 6   CALCIUM mg/dL 9 6   < > 9 7   ALBUMIN g/dL  --   --  4 2   TOTAL BILIRUBIN mg/dL  --   --  0 46   ALK PHOS U/L  --   --  75   ALT U/L  --   --  8   AST U/L  --   --  12*   GLUCOSE RANDOM mg/dL 180*   < > 132    < > = values in this interval not displayed  Results from last 7 days   Lab Units 06/26/23  1059 06/26/23  0741 06/26/23  0522 06/25/23  2126 06/25/23  2051 06/25/23  2049 06/25/23  1610 06/25/23  1053 06/25/23  0739 06/24/23  2137   POC GLUCOSE mg/dl 167* 219* 268* 99 47* 53* 156* 240* 184* 169*         Results from last 7 days   Lab Units 06/25/23  0445 06/24/23  1836   PROCALCITONIN ng/ml <0 05 <0 05       Lines/Drains:  Invasive Devices     Peripheral Intravenous Line  Duration           Peripheral IV 06/24/23 Right Antecubital 1 day                      Imaging: No pertinent imaging reviewed      Recent Cultures (last 7 days):         Last 24 Hours Medication List:   Current Facility-Administered Medications   Medication Dose Route Frequency Provider Last Rate   • acetaminophen  650 mg Oral Q6H PRN Hailey S Maria Antonia, CRNP     • amLODIPine  10 mg Oral HS Hailey S Maria Antonia, CRNP     • atorvastatin  40 mg Oral HS Hailey S Maria Antonia, CRNP     • azithromycin  500 mg Oral Q24H Hailey S Maria Antonia, CRNP     • budesonide  0 5 mg Nebulization Q12H Hailey S Maria Antonia, CRNP     • citalopram  10 mg Oral Daily Hailey S Maria Antonia, CRNP     • cloNIDine  0 1 mg Oral Q12H [facility] Hailey S Maria Antonia, CRNP     • enoxaparin  40 mg Subcutaneous Daily Hailey S Maria Antonia, CRNP     • glipiZIDE  10 mg Oral Early Morning Hailey S Maria Antonia, CRNP     • insulin lispro  1-6 Units Subcutaneous TID AC Hailey S Maria Antonia, CRNP     • insulin lispro  1-6 Units Subcutaneous HS Hailey S Maria Antonia, CRNP     • insulin lispro  2 Units Subcutaneous TID With Meals Adrian Mason, DO     • ipratropium  0 5 mg Nebulization TID Hailey S Maria Antonia, CRNP     • levalbuterol  1 25 mg Nebulization TID Hailey S Maria Antonia, CRNP     • levalbuterol  1 25 mg Nebulization Q6H PRN Hailey S Maria Antonia, CRNP      And   • sodium chloride  3 mL Nebulization Q6H PRN Hailey S Maria Antonia, CRNP     • lisinopril  40 mg Oral Daily Hailey S Maria Antonia, CRNP     • LORazepam  0 5 mg Oral Q6H PRN Hailey S Maria Antonia, CRNP     • metoprolol tartrate  50 mg Oral Q12H [facility] Adrian Mason, DO     • pantoprazole  40 mg Oral Early Morning Hailey S Maria Antonia, CRNP     • pioglitazone  15 mg Oral Daily Hailey S Maria Antonia, CRNP     • polyethylene glycol  17 g Oral Daily Ailyn Aguilera MD     • predniSONE  40 mg Oral Daily Adrian Mason, DO          Today, Patient Was Seen By: Cally Mason DO    **Please Note: This note may have been constructed using a voice recognition system  **

## 2023-06-26 NOTE — PLAN OF CARE
Problem: Potential for Falls  Goal: Patient will remain free of falls  Description: INTERVENTIONS:  - Educate patient/family on patient safety including physical limitations  - Instruct patient to call for assistance with activity   - Consult OT/PT to assist with strengthening/mobility   - Keep Call bell within reach  - Keep bed low and locked with side rails adjusted as appropriate  - Keep care items and personal belongings within reach  - Initiate and maintain comfort rounds  - Make Fall Risk Sign visible to staff  - Apply yellow socks and bracelet for high fall risk patients  - Consider moving patient to room near nurses station  Outcome: Progressing     Problem: MOBILITY - ADULT  Goal: Maintain or return to baseline ADL function  Description: INTERVENTIONS:  -  Assess patient's ability to carry out ADLs; assess patient's baseline for ADL function and identify physical deficits which impact ability to perform ADLs (bathing, care of mouth/teeth, toileting, grooming, dressing, etc )  - Assess/evaluate cause of self-care deficits   - Assess range of motion  - Assess patient's mobility; develop plan if impaired  - Assess patient's need for assistive devices and provide as appropriate  - Encourage maximum independence but intervene and supervise when necessary  - Involve family in performance of ADLs  - Assess for home care needs following discharge   - Consider OT consult to assist with ADL evaluation and planning for discharge  - Provide patient education as appropriate  Outcome: Progressing  Goal: Maintains/Returns to pre admission functional level  Description: INTERVENTIONS:  - Perform BMAT or MOVE assessment daily    - Set and communicate daily mobility goal to care team and patient/family/caregiver     - Collaborate with rehabilitation services on mobility goals if consulted  - Out of bed for toileting  - Record patient progress and toleration of activity level   Outcome: Progressing     Problem: PAIN - ADULT  Goal: Verbalizes/displays adequate comfort level or baseline comfort level  Description: Interventions:  - Encourage patient to monitor pain and request assistance  - Assess pain using appropriate pain scale  - Administer analgesics based on type and severity of pain and evaluate response  - Implement non-pharmacological measures as appropriate and evaluate response  - Consider cultural and social influences on pain and pain management  - Notify physician/advanced practitioner if interventions unsuccessful or patient reports new pain  Outcome: Progressing     Problem: INFECTION - ADULT  Goal: Absence or prevention of progression during hospitalization  Description: INTERVENTIONS:  - Assess and monitor for signs and symptoms of infection  - Monitor lab/diagnostic results  - Monitor all insertion sites, i e  indwelling lines, tubes, and drains  - Monitor endotracheal if appropriate and nasal secretions for changes in amount and color  - [place] appropriate cooling/warming therapies per order  - Administer medications as ordered  - Instruct and encourage patient and family to use good hand hygiene technique  - Identify and instruct in appropriate isolation precautions for identified infection/condition  Outcome: Progressing  Goal: Absence of fever/infection during neutropenic period  Description: INTERVENTIONS:  - Monitor WBC    Outcome: Progressing     Problem: SAFETY ADULT  Goal: Patient will remain free of falls  Description: INTERVENTIONS:  - Educate patient/family on patient safety including physical limitations  - Instruct patient to call for assistance with activity   - Consult OT/PT to assist with strengthening/mobility   - Keep Call bell within reach  - Keep bed low and locked with side rails adjusted as appropriate  - Keep care items and personal belongings within reach  - Initiate and maintain comfort rounds  - Make Fall Risk Sign visible to staff  - Apply yellow socks and bracelet for high fall risk patients  - Consider moving patient to room near nurses station  Outcome: Progressing  Goal: Maintain or return to baseline ADL function  Description: INTERVENTIONS:  -  Assess patient's ability to carry out ADLs; assess patient's baseline for ADL function and identify physical deficits which impact ability to perform ADLs (bathing, care of mouth/teeth, toileting, grooming, dressing, etc )  - Assess/evaluate cause of self-care deficits   - Assess range of motion  - Assess patient's mobility; develop plan if impaired  - Assess patient's need for assistive devices and provide as appropriate  - Encourage maximum independence but intervene and supervise when necessary  - Involve family in performance of ADLs  - Assess for home care needs following discharge   - Consider OT consult to assist with ADL evaluation and planning for discharge  - Provide patient education as appropriate  Outcome: Progressing  Goal: Maintains/Returns to pre admission functional level  Description: INTERVENTIONS:  - Perform BMAT or MOVE assessment daily    - Set and communicate daily mobility goal to care team and patient/family/caregiver     - Collaborate with rehabilitation services on mobility goals if consulted  - Out of bed for toileting  - Record patient progress and toleration of activity level   Outcome: Progressing     Problem: DISCHARGE PLANNING  Goal: Discharge to home or other facility with appropriate resources  Description: INTERVENTIONS:  - Identify barriers to discharge w/patient and caregiver  - Arrange for needed discharge resources and transportation as appropriate  - Identify discharge learning needs (meds, wound care, etc )  - Arrange for interpretive services to assist at discharge as needed  - Refer to Case Management Department for coordinating discharge planning if the patient needs post-hospital services based on physician/advanced practitioner order or complex needs related to functional status, cognitive ability, or social support system  Outcome: Progressing     Problem: Knowledge Deficit  Goal: Patient/family/caregiver demonstrates understanding of disease process, treatment plan, medications, and discharge instructions  Description: Complete learning assessment and assess knowledge base    Interventions:  - Provide teaching at level of understanding  - Provide teaching via preferred learning methods  Outcome: Progressing     Problem: RESPIRATORY - ADULT  Goal: Achieves optimal ventilation and oxygenation  Description: INTERVENTIONS:  - Assess for changes in respiratory status  - Assess for changes in mentation and behavior  - Position to facilitate oxygenation and minimize respiratory effort  - Oxygen administered by appropriate delivery if ordered  - Initiate smoking cessation education as indicated  - Encourage broncho-pulmonary hygiene including cough, deep breathe, Incentive Spirometry  - Assess the need for suctioning and aspirate as needed  - Assess and instruct to report SOB or any respiratory difficulty  - Respiratory Therapy support as indicated  Outcome: Progressing     Problem: MUSCULOSKELETAL - ADULT  Goal: Maintain or return mobility to safest level of function  Description: INTERVENTIONS:  - Assess patient's ability to carry out ADLs; assess patient's baseline for ADL function and identify physical deficits which impact ability to perform ADLs (bathing, care of mouth/teeth, toileting, grooming, dressing, etc )  - Assess/evaluate cause of self-care deficits   - Assess range of motion  - Assess patient's mobility  - Assess patient's need for assistive devices and provide as appropriate  - Encourage maximum independence but intervene and supervise when necessary  - Involve family in performance of ADLs  - Assess for home care needs following discharge   - Consider OT consult to assist with ADL evaluation and planning for discharge  - Provide patient education as appropriate  Outcome: Progressing  Goal: Maintain proper alignment of affected body part  Description: INTERVENTIONS:  - Support, maintain and protect limb and body alignment  - Provide patient/ family with appropriate education  Outcome: Progressing     Problem: Prexisting or High Potential for Compromised Skin Integrity  Goal: Skin integrity is maintained or improved  Description: INTERVENTIONS:  - Identify patients at risk for skin breakdown  - Assess and monitor skin integrity  - Assess and monitor nutrition and hydration status  - Monitor labs   - Assess for incontinence   - Turn and reposition patient  - Assist with mobility/ambulation  - Relieve pressure over bony prominences  - Avoid friction and shearing  - Provide appropriate hygiene as needed including keeping skin clean and dry  - Evaluate need for skin moisturizer/barrier cream  - Collaborate with interdisciplinary team   - Patient/family teaching  - Consider wound care consult   Outcome: Progressing     Problem: Nutrition/Hydration-ADULT  Goal: Nutrient/Hydration intake appropriate for improving, restoring or maintaining nutritional needs  Description: Monitor and assess patient's nutrition/hydration status for malnutrition  Collaborate with interdisciplinary team and initiate plan and interventions as ordered  Monitor patient's weight and dietary intake as ordered or per policy  Utilize nutrition screening tool and intervene as necessary  Determine patient's food preferences and provide high-protein, high-caloric foods as appropriate       INTERVENTIONS:  - Monitor oral intake, urinary output, labs, and treatment plans  - Assess nutrition and hydration status and recommend course of action  - Evaluate amount of meals eaten  - Assist patient with eating if necessary   - Allow adequate time for meals  - Recommend/ encourage appropriate diets, oral nutritional supplements, and vitamin/mineral supplements  - Order, calculate, and assess calorie counts as needed  - Recommend, monitor, and adjust tube feedings and TPN/PPN based on assessed needs  - Assess need for intravenous fluids  - Provide specific nutrition/hydration education as appropriate  - Include patient/family/caregiver in decisions related to nutrition  Outcome: Progressing

## 2023-06-26 NOTE — CASE MANAGEMENT
Case Management Assessment & Discharge Planning Note    Patient name Mane Vera  Location /-25 MRN [id number]  : 1955 Date 2023       Current Admission Date: 2023  Current Admission Diagnosis:COPD exacerbation [facility])   Patient Active Problem List    Diagnosis Date Noted   • Tachycardia 2023   • Bacteremia 2023   • Vomiting 2023   • [facility] ) 2020   • Acute on chronic respiratory failure with hypoxia ([place] [address] ) 2020   • Chronic hyponatremia 2020   • Tobacco abuse 2020   • Essential hypertension 2020   • Type 2 diabetes mellitus without complication, without long-term current use of insulin ([place] [address] ) 2020      LOS (days): 2  Geometric Mean LOS (GMLOS) (days):   Days to GMLOS:     OBJECTIVE:    Risk of Unplanned Readmission Score: 16 17         Current admission status: Inpatient  Referral Reason:  (discharge planning   asking patient about Home Health due to CPOD and DM)    Preferred Pharmacy:   [facility] [address] [place] [address] [address] [place] [address] [address] [address]  Phone: [phone] Fax: [phone] - [address] [place] [address] [place] [id number]  Phone: [phone] Fax: [phone]    Primary Care Provider: Epi Mckee DO    Primary Insurance: Florence Tomas [facility]  Secondary Insurance: [address]    ASSESSMENT:    CM met with patient at the bedside,baseline information  was obtained  CM discussed the role of CM in helping the patient develop a discharge plan and assist the patient in carry out their plan  Patient stated she lives with grand daughter who assist as needed  Patient stated she gets her O2 from [employer] [facility] CM called and they said she isnt there client      Patient stated she has Nebulizer and it is broken no sure where she got it from There are no active Health Care Proxies on file  Advance Directives  Does patient have a [address]?: No  Was patient offered paperwork?: Yes (declined)  Does patient currently have a Health Care decision maker?: Yes, please see Health Care Proxy section ([address] granddaughter)  Does patient have Advance Directives?: No  Was patient offered paperwork?: Yes (declined)  Primary Contact: Momo Wei daughter         Readmission Root Cause  30 Day Readmission: No    Patient Information  Admitted from[de-identified] Home  Mental Status: Alert  During Assessment patient was accompanied by: Not accompanied during assessment  Assessment information provided by[de-identified] Patient  Primary Caregiver:  Other (Comment) (grand daughter)  Caregiver's Name[de-identified] Momo Wei daughter  Caregiver's Relationship to Patient[de-identified] Family Member  Caregiver's Telephone Number[de-identified] see face sheet  Support Systems: Other (Comment) (granddaughter [address])  [place] of Residence: [address] do you live in?: [address] [place] [address]  In the last 12 months, was there a time when you were not able to pay the mortgage or rent on time?: No  In the last 12 months, how many places have you lived?: 1  In the last 12 months, was there a time when you did not have a steady place to sleep or slept in a shelter (including now)?: No  Homeless/housing insecurity resource given?: N/A  Living Arrangements: Other (Comment) (grand daughter Barry Carver)  Is patient a ?: No    Activities of Daily Living Prior to Admission  Functional Status: Independent  Completes ADLs independently?: Yes  Ambulates independently?: Yes  Does patient use assisted devices?: No  Does patient currently own DME?: No  Does patient have a history of Outpatient Therapy (PT/OT)?: No  Does the patient have a history of Short-Term Rehab?: No  Does patient have a history of HHC?: No  Does patient currently have [facility]?: No         Patient Information Continued  Income Source: SSI/SSD  Does patient have prescription coverage?: Yes  Within the past 12 months, you worried that your food would run out before you got the money to buy more : Never true  Within the past 12 months, the food you bought just didn't last and you didn't have money to get more : Never true  Food insecurity resource given?: N/A  Does patient receive dialysis treatments?: No  Does patient have a history of substance abuse?: No  Does patient have a history of Mental Health Diagnosis?: No         Means of Transportation  Means of Transport to Appts[de-identified] Family transport  In the past 12 months, has lack of transportation kept you from medical appointments or from getting medications?: No  In the past 12 months, has lack of transportation kept you from meetings, work, or from getting things needed for daily living?: No  Was application for public transport provided?: N/A        DISCHARGE DETAILS:    Discharge planning discussed with[de-identified] patient  Freedom of Choice: Yes  Comments - Freedom of Choice: CM discussed FOC for Home health services on discharge  patient declining  CM encouraged home health to help manage long  term DX  COPD and DM patient stated she will think about it  CM contacted family/caregiver?: No- see comments (no at this time)  Were Treatment Team discharge recommendations reviewed with patient/caregiver?: Yes  Did patient/caregiver verbalize understanding of patient care needs?: Yes  Were patient/caregiver advised of the risks associated with not following Treatment Team discharge recommendations?: Yes    Contacts  Patient Contacts: Micki Jules daughter  Relationship to Patient[de-identified] Family  Reason/Outcome: Discharge Planning          Patient thinking about  Home Health to help manage COPD and DM   Patient stated her nebulizer is working correctly at home  CM to follow

## 2023-06-26 NOTE — PLAN OF CARE
Problem: Potential for Falls  Goal: Patient will remain free of falls  Description: INTERVENTIONS:  - Educate patient/family on patient safety including physical limitations  - Instruct patient to call for assistance with activity   - Consult OT/PT to assist with strengthening/mobility   - Keep Call bell within reach  - Keep bed low and locked with side rails adjusted as appropriate  - Keep care items and personal belongings within reach  - Initiate and maintain comfort rounds  - Make Fall Risk Sign visible to staff  - Offer Toileting every  Hours, in advance of need  - Initiate/Maintain alarm  - Obtain necessary fall risk management equipment:   - Apply yellow socks and bracelet for high fall risk patients  - Consider moving patient to room near nurses station  Outcome: Progressing     Problem: MOBILITY - ADULT  Goal: Maintain or return to baseline ADL function  Description: INTERVENTIONS:  -  Assess patient's ability to carry out ADLs; assess patient's baseline for ADL function and identify physical deficits which impact ability to perform ADLs (bathing, care of mouth/teeth, toileting, grooming, dressing, etc )  - Assess/evaluate cause of self-care deficits   - Assess range of motion  - Assess patient's mobility; develop plan if impaired  - Assess patient's need for assistive devices and provide as appropriate  - Encourage maximum independence but intervene and supervise when necessary  - Involve family in performance of ADLs  - Assess for home care needs following discharge   - Consider OT consult to assist with ADL evaluation and planning for discharge  - Provide patient education as appropriate  Outcome: Progressing     Problem: PAIN - ADULT  Goal: Verbalizes/displays adequate comfort level or baseline comfort level  Description: Interventions:  - Encourage patient to monitor pain and request assistance  - Assess pain using appropriate pain scale  - Administer analgesics based on type and severity of pain and evaluate response  - Implement non-pharmacological measures as appropriate and evaluate response  - Consider cultural and social influences on pain and pain management  - Notify physician/advanced practitioner if interventions unsuccessful or patient reports new pain  Outcome: Progressing

## 2023-06-27 LAB
GLUCOSE SERPL-MCNC: 104 MG/DL (ref 65–140)
GLUCOSE SERPL-MCNC: 227 MG/DL (ref 65–140)
GLUCOSE SERPL-MCNC: 257 MG/DL (ref 65–140)
GLUCOSE SERPL-MCNC: 57 MG/DL (ref 65–140)
GLUCOSE SERPL-MCNC: 60 MG/DL (ref 65–140)
GLUCOSE SERPL-MCNC: 62 MG/DL (ref 65–140)

## 2023-06-27 PROCEDURE — 94640 AIRWAY INHALATION TREATMENT: CPT

## 2023-06-27 PROCEDURE — 82948 REAGENT STRIP/BLOOD GLUCOSE: CPT

## 2023-06-27 PROCEDURE — 99223 1ST HOSP IP/OBS HIGH 75: CPT | Performed by: PHYSICIAN ASSISTANT

## 2023-06-27 PROCEDURE — 99232 SBSQ HOSP IP/OBS MODERATE 35: CPT | Performed by: FAMILY MEDICINE

## 2023-06-27 PROCEDURE — 94760 N-INVAS EAR/PLS OXIMETRY 1: CPT

## 2023-06-27 RX ADMIN — INSULIN LISPRO 3 UNITS: 100 INJECTION, SOLUTION INTRAVENOUS; SUBCUTANEOUS at 21:31

## 2023-06-27 RX ADMIN — METOPROLOL TARTRATE 50 MG: 50 TABLET, FILM COATED ORAL at 09:20

## 2023-06-27 RX ADMIN — BUDESONIDE 0.5 MG: 0.5 INHALANT RESPIRATORY (INHALATION) at 07:35

## 2023-06-27 RX ADMIN — GLIPIZIDE 10 MG: 5 TABLET ORAL at 05:26

## 2023-06-27 RX ADMIN — PIOGLITAZONE HYDROCHLORIDE 15 MG: 15 TABLET ORAL at 09:20

## 2023-06-27 RX ADMIN — PANTOPRAZOLE SODIUM 40 MG: 40 TABLET, DELAYED RELEASE ORAL at 05:26

## 2023-06-27 RX ADMIN — CLONIDINE HYDROCHLORIDE 0.1 MG: 0.1 TABLET ORAL at 21:06

## 2023-06-27 RX ADMIN — IPRATROPIUM BROMIDE 0.5 MG: 0.5 SOLUTION RESPIRATORY (INHALATION) at 13:41

## 2023-06-27 RX ADMIN — METOPROLOL TARTRATE 50 MG: 50 TABLET, FILM COATED ORAL at 21:06

## 2023-06-27 RX ADMIN — LISINOPRIL 40 MG: 20 TABLET ORAL at 09:21

## 2023-06-27 RX ADMIN — BUDESONIDE 0.5 MG: 0.5 INHALANT RESPIRATORY (INHALATION) at 20:02

## 2023-06-27 RX ADMIN — INSULIN LISPRO 1 UNITS: 100 INJECTION, SOLUTION INTRAVENOUS; SUBCUTANEOUS at 09:24

## 2023-06-27 RX ADMIN — PREDNISONE 40 MG: 20 TABLET ORAL at 09:20

## 2023-06-27 RX ADMIN — IPRATROPIUM BROMIDE 0.5 MG: 0.5 SOLUTION RESPIRATORY (INHALATION) at 07:35

## 2023-06-27 RX ADMIN — CITALOPRAM HYDROBROMIDE 10 MG: 10 TABLET ORAL at 09:21

## 2023-06-27 RX ADMIN — ATORVASTATIN CALCIUM 40 MG: 40 TABLET, FILM COATED ORAL at 21:06

## 2023-06-27 RX ADMIN — ENOXAPARIN SODIUM 40 MG: 40 INJECTION SUBCUTANEOUS at 09:21

## 2023-06-27 RX ADMIN — IPRATROPIUM BROMIDE 0.5 MG: 0.5 SOLUTION RESPIRATORY (INHALATION) at 20:02

## 2023-06-27 RX ADMIN — INSULIN LISPRO 2 UNITS: 100 INJECTION, SOLUTION INTRAVENOUS; SUBCUTANEOUS at 09:24

## 2023-06-27 RX ADMIN — LEVALBUTEROL HYDROCHLORIDE 1.25 MG: 1.25 SOLUTION RESPIRATORY (INHALATION) at 13:40

## 2023-06-27 RX ADMIN — INSULIN LISPRO 2 UNITS: 100 INJECTION, SOLUTION INTRAVENOUS; SUBCUTANEOUS at 16:21

## 2023-06-27 RX ADMIN — CLONIDINE HYDROCHLORIDE 0.1 MG: 0.1 TABLET ORAL at 09:20

## 2023-06-27 RX ADMIN — POLYETHYLENE GLYCOL 3350 17 G: 17 POWDER, FOR SOLUTION ORAL at 09:20

## 2023-06-27 RX ADMIN — AMLODIPINE BESYLATE 10 MG: 10 TABLET ORAL at 21:06

## 2023-06-27 RX ADMIN — LEVALBUTEROL HYDROCHLORIDE 1.25 MG: 1.25 SOLUTION RESPIRATORY (INHALATION) at 20:02

## 2023-06-27 RX ADMIN — LEVALBUTEROL HYDROCHLORIDE 1.25 MG: 1.25 SOLUTION RESPIRATORY (INHALATION) at 07:35

## 2023-06-27 NOTE — PROGRESS NOTES
[address]  Progress Note  Name: Gary Lyon  MRN: [id number]  Unit/Bed#: -29 I Date of Admission: 6/24/2023   Date of Service: 6/27/2023 I Hospital Day: 3    Assessment/Plan   Acute on chronic respiratory failure with hypoxia (HCC)  Assessment & Plan  · Chronically maintained on 2 5 L at rest and 4 L with exertion  · Requiring 4 L at rest at time of admission due to COPD exacerbation  · Wean to home requirement as tolerated, patient currently at rest 2 5L which is baseline    * COPD exacerbation (HCC)  Assessment & Plan  · POA with dyspnea, hypoxia, wheezing unrelieved by home medications  · Continued tobacco abuse, discussed cessation with patient, education will be provided  · Nebulized bronchodilators, inhaled corticosteroids  · Transitioned Lakeisha steroids taper by 10 every 3 days   · Incentive spirometer  · Appreciate pulmonary consultation discussed with pulm    Tachycardia  Assessment & Plan  · Likely multifactorial related to steroids, underlying illness, as well as possible BB withdrawal given cessation of home dose on admission  · EKG on admission NSR, regular on exam so likely sinus tachycardia  · Continue to monitor VS  · Denies CP, palpitations  · Resolved/improved, continue BB as above and manage underlying illness    Type 2 diabetes mellitus without complication, without long-term current use of insulin [facility])  Assessment & Plan  Lab Results   Component Value Date    HGBA1C 6 9 (H) 02/10/2023       Recent Labs     06/27/23  0716 06/27/23  1048 06/27/23  1108 06/27/23  1131   POCGLU 62* 57* 60* 104       Blood Sugar Average: Last 72 hrs:  (P) 131 [id number]     · Not insulin-dependent  · Pt hypoglycemic dc actos and glipizide  · Sliding scale coverage Accu-Cheks, and added mealtime coverage -- had some hypoglycemia so reduced to 2u AC  · Hypoglycemia protocol  · Monitor closely due to steroid-induced hyperglycemia  · Carbohydrate controlled diet    Essential hypertension  Assessment & Plan  · Continue PTA lisinopril, clonidine, amlodipine  · PTA metoprolol 100 mg twice daily, given severe COPD may benefit from cessation, was stopped on admission and had some tachycardia (likely multifactorial but considered BB withdrawal)  · Continue Metoprolol at 50mg BID, wean further if able  · Trend blood pressures    Tobacco abuse  Assessment & Plan  · Continues to smoke 7 cigarettes/day  · Cessation counseling discussed  · Declines nicotine patch    Chronic hyponatremia  Assessment & Plan  · Sodium at baseline 130's   · Monitor as needed             VTE Pharmacologic Prophylaxis: VTE Score: 5 High Risk (Score >/= 5) - Pharmacological DVT Prophylaxis Ordered: enoxaparin (Lovenox)  Sequential Compression Devices Ordered  Patient Centered Rounds: I performed bedside rounds with nursing staff today  Discussions with Specialists or Other Care Team Provider: pulm    Education and Discussions with Family / Patient:pt  Total Time Spent on Date of Encounter in care of patient: 35 minutes This time was spent on one or more of the following: performing physical exam; counseling and coordination of care; obtaining or reviewing history; documenting in the medical record; reviewing/ordering tests, medications or procedures; communicating with other healthcare professionals and discussing with patient's family/caregivers  Current Length of Stay: 3 day(s)  Current Patient Status: Inpatient   Certification Statement: The patient will continue to require additional inpatient hospital stay due to copd exacerbation  Discharge Plan: Anticipate discharge tomorrow to home      Code Status: Level 1 - Full Code    Subjective:   Seen and examined mild improvement of sob    Objective:     Vitals:   Temp (24hrs), Av 4 °F (36 3 °C), Min:97 3 °F (36 3 °C), Max:97 5 °F (36 4 °C)    Temp:  [97 3 °F (36 3 °C)-97 5 °F (36 4 °C)] 97 3 °F (36 3 °C)  HR:  [] 89  Resp:  [16-19] 16  BP: (130-164)/(60-94) 130/60  SpO2:  [87 %-96 %] 88 %  Body mass index is 27 99 kg/m²  Input and Output Summary (last 24 hours): Intake/Output Summary (Last 24 hours) at 6/27/2023 1258  Last data filed at 6/27/2023 0815  Gross per 24 hour   Intake 1140 ml   Output --   Net 1140 ml       Physical Exam:   Physical Exam  Vitals and nursing note reviewed  Constitutional:       General: She is not in acute distress  Appearance: She is well-developed  HENT:      Head: Normocephalic and atraumatic  Eyes:      Conjunctiva/sclera: Conjunctivae normal    Cardiovascular:      Rate and Rhythm: Normal rate and regular rhythm  Heart sounds: No murmur heard  Pulmonary:      Effort: Pulmonary effort is normal  No respiratory distress  Breath sounds: Normal breath sounds  No wheezing or rales  Abdominal:      General: There is no distension  Palpations: Abdomen is soft  Tenderness: There is no abdominal tenderness  Musculoskeletal:         General: No swelling  Cervical back: Neck supple  Skin:     General: Skin is warm and dry  Capillary Refill: Capillary refill takes less than 2 seconds  Neurological:      Mental Status: She is alert and oriented to person, place, and time     Psychiatric:         Mood and Affect: Mood normal          Additional Data:     Labs:  Results from last 7 days   Lab Units 06/25/23  0445 06/24/23  1836   WBC Thousand/uL 9 54 10 84*   HEMOGLOBIN g/dL 11 9 12 6   HEMATOCRIT % 36 7 40 4   PLATELETS Thousands/uL 159 157   BANDS PCT % 1  --    NEUTROS PCT %  --  77*   LYMPHS PCT %  --  12*   LYMPHO PCT % 7*  --    MONOS PCT %  --  10   MONO PCT % 1*  --    EOS PCT % 0 0     Results from last 7 days   Lab Units 06/26/23  0455 06/25/23  0445 06/24/23  1836   SODIUM mmol/L 133*   < > 133*   POTASSIUM mmol/L 4 5   < > 4 1   CHLORIDE mmol/L 90*   < > 92*   CO2 mmol/L 41*   < > 35*   BUN mg/dL 15   < > 10   CREATININE mg/dL 0 62   < > 0 55*   ANION GAP mmol/L 2   < > 6   CALCIUM mg/dL 9 6   < > 9 7   ALBUMIN g/dL  --   --  4 2   TOTAL BILIRUBIN mg/dL  --   --  0 46   ALK PHOS U/L  --   --  75   ALT U/L  --   --  8   AST U/L  --   --  12*   GLUCOSE RANDOM mg/dL 180*   < > 132    < > = values in this interval not displayed           Results from last 7 days   Lab Units 06/27/23  1131 06/27/23  1108 06/27/23  1048 06/27/23  0716 06/26/23  2146 06/26/23  1618 06/26/23  1548 06/26/23  1059 06/26/23  0741 06/26/23  0522 06/25/23  2126 06/25/23  2051   POC GLUCOSE mg/dl 104 60* 57* 62* 166* 117 67 167* 219* 268* 99 47*         Results from last 7 days   Lab Units 06/25/23  0445 06/24/23  1836   PROCALCITONIN ng/ml <0 05 <0 05       Lines/Drains:  Invasive Devices     Peripheral Intravenous Line  Duration           Peripheral IV 06/24/23 Right Antecubital 2 days                      Imaging: Reviewed radiology reports from this admission including: chest xray    Recent Cultures (last 7 days):         Last 24 Hours Medication List:   Current Facility-Administered Medications   Medication Dose Route Frequency Provider Last Rate   • acetaminophen  650 mg Oral Q6H PRN Hailey S Maria Antonia, CRNP     • amLODIPine  10 mg Oral HS Hailey S Maria Antonia, CRNP     • atorvastatin  40 mg Oral HS Hailey S Maria Antonia, CRNP     • budesonide  0 5 mg Nebulization Q12H Hailey S Maria Antonia, CRNP     • citalopram  10 mg Oral Daily Hailey S Maria Antonia, CRNP     • cloNIDine  0 1 mg Oral Q12H [facility] Hailey S Maria Antonia, CRNP     • enoxaparin  40 mg Subcutaneous Daily Hailey S Maria Antonia, CRNP     • insulin lispro  1-6 Units Subcutaneous TID AC Hailey S Maria Antonia, CRNP     • insulin lispro  1-6 Units Subcutaneous HS Hailey S Maria Antonia, CRNP     • ipratropium  0 5 mg Nebulization TID Hailey S Maria Antonia, CRNP     • levalbuterol  1 25 mg Nebulization TID Hailey S Maria Antonia, CRNP     • levalbuterol  1 25 mg Nebulization Q6H PRN Hailey S Maria Antonia, CRNP      And   • sodium chloride  3 mL Nebulization Q6H PRN Hailey BLAS Maria Antonia, CRNP     • lisinopril  40 mg Oral Daily Hailey Wattx, CRNP • LORazepam  0 5 mg Oral Q6H PRN Hailey S Maria Antonia, CRNP     • magnesium hydroxide  30 mL Oral Daily PRN Adrian Oniel Counts, DO     • metoprolol tartrate  50 mg Oral Q12H [address] Adrian Oniel Counts, DO     • pantoprazole  40 mg Oral Early Morning Hailey S Maria Antonia, CRNP     • polyethylene glycol  17 g Oral Daily Ailyn Aguilera MD     • predniSONE  40 mg Oral Daily Adrian Oniel Counts, DO          Today, Patient Was Seen By: Farhan Jackson MD    **Please Note: This note may have been constructed using a voice recognition system  **

## 2023-06-27 NOTE — ASSESSMENT & PLAN NOTE
Lab Results   Component Value Date    HGBA1C 6 9 (H) 02/10/2023       Recent Labs     06/27/23  0716 06/27/23  1048 06/27/23  1108 06/27/23  1131   POCGLU 62* 57* 60* 104       Blood Sugar Average: Last 72 hrs:  (P) 891 [id number]     · Not insulin-dependent  · Pt hypoglycemic dc actos and glipizide  · Sliding scale coverage Accu-Cheks, and added mealtime coverage -- had some hypoglycemia so reduced to 2u AC  · Hypoglycemia protocol  · Monitor closely due to steroid-induced hyperglycemia  · Carbohydrate controlled diet

## 2023-06-27 NOTE — PLAN OF CARE
Problem: Potential for Falls  Goal: Patient will remain free of falls  Description: INTERVENTIONS:  - Educate patient/family on patient safety including physical limitations  - Instruct patient to call for assistance with activity   - Consult OT/PT to assist with strengthening/mobility   - Keep Call bell within reach  - Keep bed low and locked with side rails adjusted as appropriate  - Keep care items and personal belongings within reach  - Initiate and maintain comfort rounds  - Make Fall Risk Sign visible to staff  - Offer Toileting every  Hours, in advance of need  - Initiate/Maintain alarm  - Obtain necessary fall risk management equipment:   - Apply yellow socks and bracelet for high fall risk patients  - Consider moving patient to room near nurses station  Outcome: Progressing     Problem: MOBILITY - ADULT  Goal: Maintain or return to baseline ADL function  Description: INTERVENTIONS:  -  Assess patient's ability to carry out ADLs; assess patient's baseline for ADL function and identify physical deficits which impact ability to perform ADLs (bathing, care of mouth/teeth, toileting, grooming, dressing, etc )  - Assess/evaluate cause of self-care deficits   - Assess range of motion  - Assess patient's mobility; develop plan if impaired  - Assess patient's need for assistive devices and provide as appropriate  - Encourage maximum independence but intervene and supervise when necessary  - Involve family in performance of ADLs  - Assess for home care needs following discharge   - Consider OT consult to assist with ADL evaluation and planning for discharge  - Provide patient education as appropriate  Outcome: Progressing  Goal: Maintains/Returns to pre admission functional level  Description: INTERVENTIONS:  - Perform BMAT or MOVE assessment daily    - Set and communicate daily mobility goal to care team and patient/family/caregiver     - Collaborate with rehabilitation services on mobility goals if consulted  - Perform Range of Motion  times a day  - Reposition patient every  hours    - Dangle patient  times a day  - Stand patient  times a day  - Ambulate patient  times a day  - Out of bed to chair times a day   - Out of bed for meals  times a day  - Out of bed for toileting  - Record patient progress and toleration of activity level   Outcome: Progressing     Problem: PAIN - ADULT  Goal: Verbalizes/displays adequate comfort level or baseline comfort level  Description: Interventions:  - Encourage patient to monitor pain and request assistance  - Assess pain using appropriate pain scale  - Administer analgesics based on type and severity of pain and evaluate response  - Implement non-pharmacological measures as appropriate and evaluate response  - Consider cultural and social influences on pain and pain management  - Notify physician/advanced practitioner if interventions unsuccessful or patient reports new pain  Outcome: Progressing     Problem: INFECTION - ADULT  Goal: Absence or prevention of progression during hospitalization  Description: INTERVENTIONS:  - Assess and monitor for signs and symptoms of infection  - Monitor lab/diagnostic results  - Monitor all insertion sites, i e  indwelling lines, tubes, and drains  - Monitor endotracheal if appropriate and nasal secretions for changes in amount and color  - [place] appropriate cooling/warming therapies per order  - Administer medications as ordered  - Instruct and encourage patient and family to use good hand hygiene technique  - Identify and instruct in appropriate isolation precautions for identified infection/condition  Outcome: Progressing  Goal: Absence of fever/infection during neutropenic period  Description: INTERVENTIONS:  - Monitor WBC    Outcome: Progressing     Problem: SAFETY ADULT  Goal: Patient will remain free of falls  Description: INTERVENTIONS:  - Educate patient/family on patient safety including physical limitations  - Instruct patient to call for assistance with activity   - Consult OT/PT to assist with strengthening/mobility   - Keep Call bell within reach  - Keep bed low and locked with side rails adjusted as appropriate  - Keep care items and personal belongings within reach  - Initiate and maintain comfort rounds  - Make Fall Risk Sign visible to staff  - Offer Toileting every  Hours, in advance of need  - Initiate/Maintain alarm  - Obtain necessary fall risk management equipment:   - Apply yellow socks and bracelet for high fall risk patients  - Consider moving patient to room near nurses station  Outcome: Progressing  Goal: Maintain or return to baseline ADL function  Description: INTERVENTIONS:  -  Assess patient's ability to carry out ADLs; assess patient's baseline for ADL function and identify physical deficits which impact ability to perform ADLs (bathing, care of mouth/teeth, toileting, grooming, dressing, etc )  - Assess/evaluate cause of self-care deficits   - Assess range of motion  - Assess patient's mobility; develop plan if impaired  - Assess patient's need for assistive devices and provide as appropriate  - Encourage maximum independence but intervene and supervise when necessary  - Involve family in performance of ADLs  - Assess for home care needs following discharge   - Consider OT consult to assist with ADL evaluation and planning for discharge  - Provide patient education as appropriate  Outcome: Progressing  Goal: Maintains/Returns to pre admission functional level  Description: INTERVENTIONS:  - Perform BMAT or MOVE assessment daily    - Set and communicate daily mobility goal to care team and patient/family/caregiver  - Collaborate with rehabilitation services on mobility goals if consulted  - Perform Range of Motion  times a day  - Reposition patient every  hours    - Dangle patient  times a day  - Stand patient  times a day  - Ambulate patient  times a day  - Out of bed to chair  times a day   - Out of bed for meals  times a day  - Out of bed for toileting  - Record patient progress and toleration of activity level   Outcome: Progressing     Problem: DISCHARGE PLANNING  Goal: Discharge to home or other facility with appropriate resources  Description: INTERVENTIONS:  - Identify barriers to discharge w/patient and caregiver  - Arrange for needed discharge resources and transportation as appropriate  - Identify discharge learning needs (meds, wound care, etc )  - Arrange for interpretive services to assist at discharge as needed  - Refer to Case Management Department for coordinating discharge planning if the patient needs post-hospital services based on physician/advanced practitioner order or complex needs related to functional status, cognitive ability, or social support system  Outcome: Progressing     Problem: Knowledge Deficit  Goal: Patient/family/caregiver demonstrates understanding of disease process, treatment plan, medications, and discharge instructions  Description: Complete learning assessment and assess knowledge base    Interventions:  - Provide teaching at level of understanding  - Provide teaching via preferred learning methods  Outcome: Progressing     Problem: RESPIRATORY - ADULT  Goal: Achieves optimal ventilation and oxygenation  Description: INTERVENTIONS:  - Assess for changes in respiratory status  - Assess for changes in mentation and behavior  - Position to facilitate oxygenation and minimize respiratory effort  - Oxygen administered by appropriate delivery if ordered  - Initiate smoking cessation education as indicated  - Encourage broncho-pulmonary hygiene including cough, deep breathe, Incentive Spirometry  - Assess the need for suctioning and aspirate as needed  - Assess and instruct to report SOB or any respiratory difficulty  - Respiratory Therapy support as indicated  Outcome: Progressing     Problem: MUSCULOSKELETAL - ADULT  Goal: Maintain or return mobility to safest level of function  Description: INTERVENTIONS:  - Assess patient's ability to carry out ADLs; assess patient's baseline for ADL function and identify physical deficits which impact ability to perform ADLs (bathing, care of mouth/teeth, toileting, grooming, dressing, etc )  - Assess/evaluate cause of self-care deficits   - Assess range of motion  - Assess patient's mobility  - Assess patient's need for assistive devices and provide as appropriate  - Encourage maximum independence but intervene and supervise when necessary  - Involve family in performance of ADLs  - Assess for home care needs following discharge   - Consider OT consult to assist with ADL evaluation and planning for discharge  - Provide patient education as appropriate  Outcome: Progressing  Goal: Maintain proper alignment of affected body part  Description: INTERVENTIONS:  - Support, maintain and protect limb and body alignment  - Provide patient/ family with appropriate education  Outcome: Progressing     Problem: Prexisting or High Potential for Compromised Skin Integrity  Goal: Skin integrity is maintained or improved  Description: INTERVENTIONS:  - Identify patients at risk for skin breakdown  - Assess and monitor skin integrity  - Assess and monitor nutrition and hydration status  - Monitor labs   - Assess for incontinence   - Turn and reposition patient  - Assist with mobility/ambulation  - Relieve pressure over bony prominences  - Avoid friction and shearing  - Provide appropriate hygiene as needed including keeping skin clean and dry  - Evaluate need for skin moisturizer/barrier cream  - Collaborate with interdisciplinary team   - Patient/family teaching  - Consider wound care consult   Outcome: Progressing     Problem: Nutrition/Hydration-ADULT  Goal: Nutrient/Hydration intake appropriate for improving, restoring or maintaining nutritional needs  Description: Monitor and assess patient's nutrition/hydration status for malnutrition   Collaborate with interdisciplinary team and initiate plan and interventions as ordered  Monitor patient's weight and dietary intake as ordered or per policy  Utilize nutrition screening tool and intervene as necessary  Determine patient's food preferences and provide high-protein, high-caloric foods as appropriate       INTERVENTIONS:  - Monitor oral intake, urinary output, labs, and treatment plans  - Assess nutrition and hydration status and recommend course of action  - Evaluate amount of meals eaten  - Assist patient with eating if necessary   - Allow adequate time for meals  - Recommend/ encourage appropriate diets, oral nutritional supplements, and vitamin/mineral supplements  - Order, calculate, and assess calorie counts as needed  - Recommend, monitor, and adjust tube feedings and TPN/PPN based on assessed needs  - Assess need for intravenous fluids  - Provide specific nutrition/hydration education as appropriate  - Include patient/family/caregiver in decisions related to nutrition  Outcome: Progressing

## 2023-06-27 NOTE — ASSESSMENT & PLAN NOTE
· POA with dyspnea, hypoxia, wheezing unrelieved by home medications  · Continued tobacco abuse, discussed cessation with patient, education will be provided  · Nebulized bronchodilators, inhaled corticosteroids  · Transitioned [place] steroids taper by 10 every 3 days   · Incentive spirometer  · Appreciate pulmonary consultation discussed with pulm

## 2023-06-27 NOTE — PLAN OF CARE
Problem: MOBILITY - ADULT  Goal: Maintain or return to baseline ADL function  Description: INTERVENTIONS:  -  Assess patient's ability to carry out ADLs; assess patient's baseline for ADL function and identify physical deficits which impact ability to perform ADLs (bathing, care of mouth/teeth, toileting, grooming, dressing, etc )  - Assess/evaluate cause of self-care deficits   - Assess range of motion  - Assess patient's mobility; develop plan if impaired  - Assess patient's need for assistive devices and provide as appropriate  - Encourage maximum independence but intervene and supervise when necessary  - Involve family in performance of ADLs  - Assess for home care needs following discharge   - Consider OT consult to assist with ADL evaluation and planning for discharge  - Provide patient education as appropriate  Outcome: Progressing     Problem: PAIN - ADULT  Goal: Verbalizes/displays adequate comfort level or baseline comfort level  Description: Interventions:  - Encourage patient to monitor pain and request assistance  - Assess pain using appropriate pain scale  - Administer analgesics based on type and severity of pain and evaluate response  - Implement non-pharmacological measures as appropriate and evaluate response  - Consider cultural and social influences on pain and pain management  - Notify physician/advanced practitioner if interventions unsuccessful or patient reports new pain  Outcome: Progressing     Problem: INFECTION - ADULT  Goal: Absence or prevention of progression during hospitalization  Description: INTERVENTIONS:  - Assess and monitor for signs and symptoms of infection  - Monitor lab/diagnostic results  - Monitor all insertion sites, i e  indwelling lines, tubes, and drains  - Monitor endotracheal if appropriate and nasal secretions for changes in amount and color  - [place] appropriate cooling/warming therapies per order  - Administer medications as ordered  - Instruct and encourage patient and family to use good hand hygiene technique  - Identify and instruct in appropriate isolation precautions for identified infection/condition  Outcome: Progressing  Goal: Absence of fever/infection during neutropenic period  Description: INTERVENTIONS:  - Monitor WBC    Outcome: Progressing     Problem: DISCHARGE PLANNING  Goal: Discharge to home or other facility with appropriate resources  Description: INTERVENTIONS:  - Identify barriers to discharge w/patient and caregiver  - Arrange for needed discharge resources and transportation as appropriate  - Identify discharge learning needs (meds, wound care, etc )  - Arrange for interpretive services to assist at discharge as needed  - Refer to Case Management Department for coordinating discharge planning if the patient needs post-hospital services based on physician/advanced practitioner order or complex needs related to functional status, cognitive ability, or social support system  Outcome: Progressing     Problem: Knowledge Deficit  Goal: Patient/family/caregiver demonstrates understanding of disease process, treatment plan, medications, and discharge instructions  Description: Complete learning assessment and assess knowledge base    Interventions:  - Provide teaching at level of understanding  - Provide teaching via preferred learning methods  Outcome: Progressing     Problem: RESPIRATORY - ADULT  Goal: Achieves optimal ventilation and oxygenation  Description: INTERVENTIONS:  - Assess for changes in respiratory status  - Assess for changes in mentation and behavior  - Position to facilitate oxygenation and minimize respiratory effort  - Oxygen administered by appropriate delivery if ordered  - Initiate smoking cessation education as indicated  - Encourage broncho-pulmonary hygiene including cough, deep breathe, Incentive Spirometry  - Assess the need for suctioning and aspirate as needed  - Assess and instruct to report SOB or any respiratory difficulty  - Respiratory Therapy support as indicated  Outcome: Progressing     Problem: Nutrition/Hydration-ADULT  Goal: Nutrient/Hydration intake appropriate for improving, restoring or maintaining nutritional needs  Description: Monitor and assess patient's nutrition/hydration status for malnutrition  Collaborate with interdisciplinary team and initiate plan and interventions as ordered  Monitor patient's weight and dietary intake as ordered or per policy  Utilize nutrition screening tool and intervene as necessary  Determine patient's food preferences and provide high-protein, high-caloric foods as appropriate       INTERVENTIONS:  - Monitor oral intake, urinary output, labs, and treatment plans  - Assess nutrition and hydration status and recommend course of action  - Evaluate amount of meals eaten  - Assist patient with eating if necessary   - Allow adequate time for meals  - Recommend/ encourage appropriate diets, oral nutritional supplements, and vitamin/mineral supplements  - Order, calculate, and assess calorie counts as needed  - Recommend, monitor, and adjust tube feedings and TPN/PPN based on assessed needs  - Assess need for intravenous fluids  - Provide specific nutrition/hydration education as appropriate  - Include patient/family/caregiver in decisions related to nutrition  Outcome: Progressing

## 2023-06-27 NOTE — NURSING NOTE
Patient's blood sugar 57  Patient asymptomatic and denies any signs or symptoms of hypoglycemia  Given apple juice, milk, and dalton crackers with peanut butter  Blood sugar checked 15 minutes later and sugar was 60  Patient was given two orange juices  Will recheck in 15 minutes  Will continue to monitor

## 2023-06-27 NOTE — CONSULTS
Consultation - Pulmonary Medicine   Yang Cerda 79 y o  female MRN: [id number]  Unit/Bed#: -01 Encounter: [id number]      Assessment/Plan:    1  Chronic hypoxic respiratory failure  1  Initially requiring 4 L nasal cannula continuously  Acute portion resolved and now back on 2 5 L nasal cannula which is her requirement at baseline  2  Keep SPO2 above 88%  3  Pulmonary toilet:  Increase activity as tolerated, out of bed as tolerated, cough and deep breathing exercises encourage, incentive spirometry q 1 hour  2  COPD of unknown severity with acute exacerbation  1  Transitioned to prednisone 40 mg p o  daily yesterday  Taper by 10 mg every 3 days until completion  2  Completed azithromycin x3 days this admission  3  While inpatient continue Pulmicort every 12 hours and Xopenex/Atrovent nebs 3 times daily  4  At discharge, pulmonary regimen is Pulmicort every 12 hours, Spiriva Respimat 2 5 mcg daily, as needed albuterol HFA/nebs  5  Would benefit from outpatient pulmonary follow-up and pulmonary function test   3  Nicotine dependence  1  Smoking cessation encouraged  2  NRT while inpatient     History of Present Illness   Physician Requesting Consult: Josef Roach MD  Reason for Consult / Principal Problem: COPD exacerbation  Hx and PE limited by: n/a  Chief Complaint: SOB  HPI: Yang Cerda is a 79 y o   female who presented to [address] [place] [address] with complaints of shortness of breath  Patient has past medical hist positive for COPD, chronic hypoxic respiratory failure on 2 5 L nasal cannula, nicotine dependence, diabetes mellitus type 2, hypertension, anxiety  Patient reports several days of worsening dyspnea  Other pertinent symptoms included wheeze and nonproductive cough/chest congestion  Patient came to the ED 6/24/2023 for the above-mentioned symptoms  Found to be in a COPD exacerbation requiring 4 L nasal cannula    Reviewed with nebulizers and IV Solu-Medrol and admitted to medical surgical floor  Pulmonary consult to help manage this  Presently, patient states that she is feeling improved but not yet to the point where she cannot take care of herself at home  She states that she spends most of her days by herself while her granddaughter is at work but she is with her at night to help with things  Denies fevers chills, dizziness or headache  No difficulty swallowing, GERD, nausea, vomiting, abdominal pain or diarrhea, leg pain or leg swelling  Denies worsening cough or hemoptysis  Wheezing improved  Dyspnea on exertion still persists when she gets up to use the bathroom  From a pulmonary perspective, patient states that she was supposed to follow-up with pulmonologist years ago but never followed through  She was told that she has severe COPD  Per chart review there is orders from 2017 but no physical report that I can review  She is maintained on budesonide, Spiriva, as needed albuterol  She uses 2 5 L nasal cannula continuously according to patient  She currently smokes half pack per day  On average smoked 1 pack/day x 45 years  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 products  No recent travel  Denies bird exposure  Patient has several dogs and birds as pets  Inpatient consult to Pulmonology  Consult performed by: Afsaneh Don PA-C  Consult ordered by: AYLIN Valladares          Review of Systems   All other systems reviewed and are negative  Historical Information   Past Medical History:   Diagnosis Date   • Anxiety    • COPD (chronic obstructive pulmonary disease) ([facility] [address] )    • [facility]    • GERD (gastroesophageal reflux disease)    • Smoker      History reviewed  No pertinent surgical history    Social History   Social History     Substance and Sexual Activity   Alcohol Use Not Currently     Social History     Substance and Sexual Activity   Drug Use Never     Social History     Tobacco Use Smoking Status Every Day   • Packs/day: 0 25   • Types: Cigarettes   Smokeless Tobacco Never     E-Cigarette/Vaping   • E-Cigarette Use Never User      E-Cigarette/Vaping Substances     Occupational History: Retired     Family History: History reviewed  No pertinent family history      Meds/Allergies   all current active meds have been reviewed, pertinent pulmonary meds have been reviewed and current meds:   Current Facility-Administered Medications   Medication Dose Route Frequency   • acetaminophen (TYLENOL) tablet 650 mg  650 mg Oral Q6H PRN   • amLODIPine (NORVASC) tablet 10 mg  10 mg Oral HS   • atorvastatin (LIPITOR) tablet 40 mg  40 mg Oral HS   • budesonide (PULMICORT) inhalation solution 0 5 mg  0 5 mg Nebulization Q12H   • citalopram (CeleXA) tablet 10 mg  10 mg Oral Daily   • cloNIDine (CATAPRES) tablet 0 1 mg  0 1 mg Oral Q12H [facility]   • enoxaparin (LOVENOX) subcutaneous injection 40 mg  40 mg Subcutaneous Daily   • insulin lispro (HumaLOG) 100 units/mL subcutaneous injection 1-6 Units  1-6 Units Subcutaneous TID AC   • insulin lispro (HumaLOG) 100 units/mL subcutaneous injection 1-6 Units  1-6 Units Subcutaneous HS   • ipratropium (ATROVENT) 0 02 % inhalation solution 0 5 mg  0 5 mg Nebulization TID   • levalbuterol (XOPENEX) inhalation solution 1 25 mg  1 25 mg Nebulization TID   • levalbuterol (XOPENEX) inhalation solution 1 25 mg  1 25 mg Nebulization Q6H PRN    And   • sodium chloride 0 9 % inhalation solution 3 mL  3 mL Nebulization Q6H PRN   • lisinopril (ZESTRIL) tablet 40 mg  40 mg Oral Daily   • LORazepam (ATIVAN) tablet 0 5 mg  0 5 mg Oral Q6H PRN   • magnesium hydroxide (MILK OF MAGNESIA) oral suspension 30 mL  30 mL Oral Daily PRN   • metoprolol tartrate (LOPRESSOR) tablet 50 mg  50 mg Oral Q12H NATHALY   • pantoprazole (PROTONIX) EC tablet 40 mg  40 mg Oral Early Morning   • polyethylene glycol (MIRALAX) packet 17 g  17 g Oral Daily   • predniSONE tablet 40 mg  40 mg Oral Daily "      Allergies   Allergen Reactions   • Clindamycin        Objective   Vitals: Blood pressure 142/80, pulse 91, temperature 97 5 °F (36 4 °C), resp  rate 19, height 5' 3\" (1 6 m), weight 71 7 kg (158 lb), SpO2 90 %  2 5L NC,Body mass index is 27 99 kg/m²  Intake/Output Summary (Last 24 hours) at 6/27/2023 1523  Last data filed at 6/27/2023 0815  Gross per 24 hour   Intake 1140 ml   Output --   Net 1140 ml     Invasive Devices     Peripheral Intravenous Line  Duration           Peripheral IV 06/24/23 Right Antecubital 2 days                Physical Exam  Vitals and nursing note reviewed  Constitutional:       General: She is not in acute distress  Appearance: Normal appearance  HENT:      Head: Normocephalic and atraumatic  Nose: Nose normal       Mouth/Throat:      Mouth: Mucous membranes are moist       Pharynx: Oropharynx is clear  Eyes:      Extraocular Movements: Extraocular movements intact  Cardiovascular:      Rate and Rhythm: Normal rate and regular rhythm  Heart sounds: No murmur heard  Pulmonary:      Effort: Pulmonary effort is normal       Breath sounds: No wheezing or rhonchi  Musculoskeletal:      Cervical back: Normal range of motion  No muscular tenderness  Skin:     General: Skin is warm and dry  Neurological:      General: No focal deficit present  Mental Status: She is alert  Mental status is at baseline  Coordination: Abnormal coordination:      Psychiatric:         Mood and Affect: Mood normal          Behavior: Behavior normal          Lab Results: I have personally reviewed pertinent lab results  , ABG: No results found for: \"PHART\", \"VPN0XYN\", \"PO2ART\", \"SOX8CXT\", \"Y3KYNRRJ\", \"BEART\", \"SOURCE\", BNP: No results found for: \"BNP\", CBC: No results found for: \"WBC\", \"HGB\", \"HCT\", \"MCV\", \"PLT\", \"ADJUSTEDWBC\", \"RBC\", \"MCH\", \"MCHC\", \"RDW\", \"MPV\", \"NRBC\", CMP: No results found for: \"SODIUM\", \"K\", \"CL\", \"CO2\", \"ANIONGAP\", \"BUN\", \"CREATININE\", \"GLUCOSE\", " "\"CALCIUM\", \"AST\", \"ALT\", \"ALKPHOS\", \"PROT\", \"BILITOT\", \"EGFR\", PT/INR: No results found for: \"PT\", \"INR\", Troponin: No results found for: \"TROPONINI\"    Imaging Studies: I have personally reviewed pertinent reports  and I have personally reviewed pertinent films in PACS     Chest x-ray 6/24/2023  No acute cardiopulmonary disease  EKG, Pathology, and Other Studies: I have personally reviewed pertinent reports  Echo 11/3/2020  EF is 55 to 60%  Right ventricle mildly dilated with normal systolic function  Trace tricuspid regurgitation  Pulmonary Results (PFTs, PSG): none to review      VTE Prophylaxis: Enoxaparin (Lovenox)    Code Status: Level 1 - Full Code      Portions of the record may have been created with voice recognition software  Occasional wrong word or \"sound a like\" substitutions may have occurred due to the inherent limitations of voice recognition software  Read the chart carefully and recognize, using context, where substitutions have occurred      "

## 2023-06-27 NOTE — ASSESSMENT & PLAN NOTE
Anesthesia Pre Eval Note    Anesthesia ROS/Med Hx        Anesthetic Complication History:  Patient does not have a history of anesthetic complications      Pulmonary Review:    The patient is a former smoker.     Neuro/Psych Review:  Patient does not have a neuro/psych history       Cardiovascular Review:  Exercise tolerance: good (>4 METS)  Positive for hypertension  Positive for hyperlipidemia    GI/HEPATIC/RENAL Review:  Patient does not have a GI/hepatic/renalhistory       End/Other Review:  Positive for diabetes  Positive for obesity class I - 30.00 - 34.99  Positive for cancer  Additional Results:     ALLERGIES:   -- Ace Inhibitors -- Angioedema   -- Lisinopril -- SWELLING       Lab Results       Component                Value               Date                       WBC                      5.4                 11/10/2021                 WBC                      5.7                 05/24/2019                 RBC                      4.13                11/10/2021                 RBC                      4.29                05/24/2019                 HGB                      12.2                11/10/2021                 HGB                      12.6                05/24/2019                 HCT                      35.7 (L)            11/10/2021                 HCT                      37.3                05/24/2019                 MCHC                     34.2                11/10/2021                 MCHC                     33.8                05/24/2019                 SODIUM                   141                 11/10/2021                 SODIUM                   141                 05/24/2019                 POTASSIUM                3.4                 11/10/2021                 POTASSIUM                4.5                 05/24/2019                 CHLORIDE                 102                 11/10/2021                 CHLORIDE                 103                 05/24/2019                 CO2            · Continues to smoke 7 cigarettes/day  · Cessation counseling discussed  · Declines nicotine patch            30                  11/10/2021                 CO2                      25                  05/24/2019                 GLUCOSE                  144 (H)             11/10/2021                 GLUCOSE                  158 (H)             05/24/2019                 BUN                      7                   11/10/2021                 BUN                      14                  05/24/2019                 CREATININE               0.66                11/10/2021                 CREATININE               0.66                05/24/2019                 GFRESTIMATE              >90                 11/10/2021                 GFRA                     >90                 05/24/2019                 GFRNA                    87                  05/24/2019                 CALCIUM                  9.2                 11/10/2021                 CALCIUM                  9.5                 05/24/2019                 PLT                      281                 11/10/2021                 PLT                      280                 05/24/2019             Past Medical History:  No date: Colon polyp  No date: Diabetes mellitus (CMS/HCC)  No date: Essential (primary) hypertension  No date: High cholesterol    Past Surgical History:  1973: Abdomen surgery      Comment:  post op pregnancy bleeding done vaginally  No date: Colonoscopy       Prior to Admission medications :  Medication metformin (GLUCOPHAGE) 1000 MG tablet, Sig TAKE 1 TABLET TWICE DAILY, Start Date 1/13/22, End Date , Taking? Yes, Authorizing Provider Esau Hwang MD    Medication amLODIPine (NORVASC) 10 MG tablet, Sig TAKE 1 TABLET BY MOUTH EVERY DAY, Start Date 1/13/22, End Date , Taking? Yes, Authorizing Provider Esau Hwang MD    Medication bisacodyl (DULCOLAX) 5 MG EC tablet, Sig Take 2 tablets by mouth at 1 time after drinking the first half of the prep., Start Date 1/12/22, End Date , Taking? Yes, Authorizing Provider Mellisa Ferguson  MD    Medication metoPROLOL succinate (TOPROL-XL) 25 MG 24 hr tablet, Sig Take 1 tablet by mouth daily., Start Date 10/20/21, End Date , Taking? Yes, Authorizing Provider Esau Hwang MD    Medication pravastatin (PRAVACHOL) 40 MG tablet, Sig TAKE 1 TABLET BY MOUTH EVERY DAY, Start Date 10/18/21, End Date , Taking? Yes, Authorizing Provider Esau Hwang MD    Medication fexofenadine (ALLEGRA) 180 MG tablet, Sig Take 1 tablet every 12 hours for 3 months, then reduce dose as directed as tolerated., Start Date 8/10/21, End Date , Taking? Yes, Authorizing Provider Meño Carroll MD    Medication aspirin 81 MG tablet, Sig Take 81 mg by mouth daily., Start Date , End Date , Taking? Yes, Authorizing Provider Outside Provider    Medication calcium carbonate-vitamin D (CALTRATE+D) 600-400 MG-UNIT per tablet, Sig Take 1 tablet by mouth daily., Start Date , End Date , Taking? Yes, Authorizing Provider Outside Provider    Medication triamcinolone (ARISTOCORT) 0.1 % cream, Sig Apply sparingly 2 a day  Once better stop, Start Date 10/20/21, End Date 1/27/22, Taking? , Authorizing Provider Esau Hwang MD    Medication econazole (SPECTAZOLE) 1 % cream, Sig Apply topically 2 times daily., Start Date 7/21/21, End Date , Taking? , Authorizing Provider Esau Hwang MD    Medication Vitamin D, Ergocalciferol, 1.25 mg (50,000 units) capsule, Sig Take 1 cap a wk for 3 months once done take calcium with vitamin d 600 once a day, Start Date 2/24/21, End Date 1/27/22, Taking? , Authorizing Provider Esau Hwang MD    Medication Continuous Blood Gluc  (FreeStyle [employer] 14 Day [place]) Device, Sig 1 kit 2 times daily., Start Date 10/21/20, End Date , Taking? , Authorizing Provider Esau Hwang MD    Medication Continuous Blood Gluc Sensor (FreeStyle [employer] 14 Day Sensor) Misc, Sig 1 Units 2 times daily., Start Date 10/21/20, End Date , Taking? , Authorizing Provider Esau Hwang MD    Medication  EPINEPHrine (Auvi-Q) 0.3 MG/0.3ML auto-injector, Sig Inject 0.3 mLs into the muscle 1 time as needed for Anaphylaxis., Start Date 10/14/20, End Date , Taking? , Authorizing Provider Esau Hwang MD    Medication diphenhydrAMINE (BENADRYL) 50 MG tablet, Sig Take 1 tablet by mouth 3 times daily. Cam cause drowsiness, Start Date 10/14/20, End Date , Taking? , Authorizing Provider Esau Hwang MD    Medication zoster vaccine recomb adjuvanted (SHINGRIX) 50 MCG/0.5ML injection, Sig Inject 0.5 mLs into the muscle 1 time. Repeat dose in 2 to 6 months (unless 1 dose already given), for a total of 2 doses., Start Date 5/31/19, End Date , Taking? , Authorizing Provider Esau Hwang MD         Patient Vitals in the past 24 hrs:  01/27/22 1009, Height:5' 3\" (1.6 m), Weight:88 kg (194 lb)  01/27/22 1002, BP:(!) 155/75, Temp:36.8 °C (98.2 °F), Temp src:Oral, Pulse:96, Resp:16, SpO2:98 %      Relevant Problems   No relevant active problems       Physical Exam     Airway   Mallampati: III  TM Distance: >3 FB  Neck ROM: Full  TMJ Mobility: Good    Cardiovascular  Cardiovascular exam normal  Cardio Rhythm: Regular  Cardio Rate: Normal    Head Assessment  Head assessment: Normocephalic and Atraumatic    General Assessment  General Assessment: Alert and oriented and No acute distress    Dental Exam    Patient has:  Upper dentures and Lower dentures    Pulmonary Exam  Pulmonary exam normal  Breath sounds clear to auscultation:  Yes    Abdominal Exam    Patient Demonstrates:  Obese      Anesthesia Plan:    ASA Status: 3  Anesthesia Type: MAC    Induction: Intravenous  Preferred Airway Type: Nasal Cannula  Premedication: None      Post-op Pain Management: Per Surgeon      Checklist  Reviewed: Lab Results, Nursing Notes, Patient Summary, Beta Blocker Status, Outside Records, Care Everywhere, DNR Status, NPO Status, Allergies, Medications, Problem list and Past Med History  Consent/Risks Discussed Statement:  The proposed  anesthetic plan, including its risks and benefits, have been discussed with the Patient along with the risks and benefits of alternatives. Questions were encouraged and answered and the patient and/or representative understands and agrees to proceed.    I have discussed elements of the patient's history or examination, as noted above and/or as follows, that place the patient at higher risk of complications; BMI> 30 (obesity) and age.    I discussed with the patient (and/or patient's legal representative) the risks and benefits of the proposed anesthesia plan, MAC, which may include services performed by other anesthesia providers.    Alternative anesthesia plans, if available, were reviewed with the patient (and/or patient's legal representative). Discussion has been held with the patient (and/or patient's legal representative) regarding risks of anesthesia, which include Sore Throat, Dental Injury, Hypotension, Allergic Reaction, Aspiration, Intra-operative Awareness, Emergence Delirium, conversion to general anesthesia, anxiety, depressed breathing, nausea, bleeding/hematoma, eye injury, nerve injury, oral injury, organ damage, post-op intubation, infection, ICU admit, death, vomiting and memory loss and emergent situations that may require change in anesthesia plan.    The patient (and/or patient's legal representative) has indicated understanding, his/her questions have been answered, and he/she wishes to proceed with the planned anesthetic.    Blood Products: Not Anticipated    Comments  Plan Comments: RECORDS, EKG AND CHEST X-RAYS WHEN AVAILABLE

## 2023-06-28 VITALS
HEART RATE: 94 BPM | BODY MASS INDEX: 28 KG/M2 | RESPIRATION RATE: 20 BRPM | OXYGEN SATURATION: 95 % | WEIGHT: 158 LBS | TEMPERATURE: 97.5 F | DIASTOLIC BLOOD PRESSURE: 79 MMHG | SYSTOLIC BLOOD PRESSURE: 152 MMHG | HEIGHT: 63 IN

## 2023-06-28 LAB
GLUCOSE SERPL-MCNC: 288 MG/DL (ref 65–140)
GLUCOSE SERPL-MCNC: 99 MG/DL (ref 65–140)

## 2023-06-28 PROCEDURE — 94640 AIRWAY INHALATION TREATMENT: CPT

## 2023-06-28 PROCEDURE — 94760 N-INVAS EAR/PLS OXIMETRY 1: CPT

## 2023-06-28 PROCEDURE — 99239 HOSP IP/OBS DSCHRG MGMT >30: CPT | Performed by: FAMILY MEDICINE

## 2023-06-28 PROCEDURE — 82948 REAGENT STRIP/BLOOD GLUCOSE: CPT

## 2023-06-28 RX ORDER — PREDNISONE 10 MG/1
TABLET ORAL
Qty: 18 TABLET | Refills: 0 | Status: SHIPPED | OUTPATIENT
Start: 2023-06-29 | End: 2023-07-08

## 2023-06-28 RX ORDER — FLUTICASONE PROPIONATE 50 MCG
1 SPRAY, SUSPENSION (ML) NASAL DAILY
Qty: 9.9 ML | Refills: 0 | Status: SHIPPED | OUTPATIENT
Start: 2023-06-28

## 2023-06-28 RX ORDER — FLUTICASONE PROPIONATE 50 MCG
1 SPRAY, SUSPENSION (ML) NASAL DAILY
Status: DISCONTINUED | OUTPATIENT
Start: 2023-06-28 | End: 2023-06-28 | Stop reason: HOSPADM

## 2023-06-28 RX ADMIN — INSULIN LISPRO 4 UNITS: 100 INJECTION, SOLUTION INTRAVENOUS; SUBCUTANEOUS at 11:27

## 2023-06-28 RX ADMIN — PANTOPRAZOLE SODIUM 40 MG: 40 TABLET, DELAYED RELEASE ORAL at 05:05

## 2023-06-28 RX ADMIN — BUDESONIDE 0.5 MG: 0.5 INHALANT RESPIRATORY (INHALATION) at 08:30

## 2023-06-28 RX ADMIN — POLYETHYLENE GLYCOL 3350 17 G: 17 POWDER, FOR SOLUTION ORAL at 09:26

## 2023-06-28 RX ADMIN — METOPROLOL TARTRATE 50 MG: 50 TABLET, FILM COATED ORAL at 09:26

## 2023-06-28 RX ADMIN — LISINOPRIL 40 MG: 20 TABLET ORAL at 09:27

## 2023-06-28 RX ADMIN — CLONIDINE HYDROCHLORIDE 0.1 MG: 0.1 TABLET ORAL at 09:27

## 2023-06-28 RX ADMIN — IPRATROPIUM BROMIDE 0.5 MG: 0.5 SOLUTION RESPIRATORY (INHALATION) at 08:25

## 2023-06-28 RX ADMIN — FLUTICASONE PROPIONATE 1 SPRAY: 50 SPRAY, METERED NASAL at 11:27

## 2023-06-28 RX ADMIN — CITALOPRAM HYDROBROMIDE 10 MG: 10 TABLET ORAL at 09:27

## 2023-06-28 RX ADMIN — LEVALBUTEROL HYDROCHLORIDE 1.25 MG: 1.25 SOLUTION RESPIRATORY (INHALATION) at 08:20

## 2023-06-28 RX ADMIN — PREDNISONE 40 MG: 20 TABLET ORAL at 09:27

## 2023-06-28 RX ADMIN — ENOXAPARIN SODIUM 40 MG: 40 INJECTION SUBCUTANEOUS at 09:26

## 2023-06-28 NOTE — ASSESSMENT & PLAN NOTE
Lab Results   Component Value Date    HGBA1C 6 9 (H) 02/10/2023       Recent Labs     06/27/23  1131 06/27/23  1548 06/27/23  2128 06/28/23  0733   POCGLU 104 227* 257* 99       Blood Sugar Average: Last 72 hrs:  (P) 940 [id number]     · Not insulin-dependent  · Pt hypoglycemic dc actos and glipizide  · Sliding scale coverage Accu-Cheks, and added mealtime coverage --hypoglycemia has resolved has hyperglycemia yesterday    May restart her glipizide and Actos as an outpatient  · Hypoglycemia protocol  · Monitor closely due to steroid-induced hyperglycemia  · Carbohydrate controlled diet

## 2023-06-28 NOTE — DISCHARGE SUMMARY
[address]  Discharge- Sebastien Elmerks 1955, 79 y o  female MRN: [id number]  Unit/Bed#: -01 Encounter: [id number]  Primary Care Provider: Martín Morales DO   Date and time admitted to hospital: 6/24/2023  6:16 PM    Acute on chronic respiratory failure with hypoxia ([address] )  Assessment & Plan  · Chronically maintained on 2 5 L at rest and 4 L with exertion  · Requiring 4 L at rest at time of admission due to COPD exacerbation  · Wean to home requirement as tolerated, patient currently at rest 2 5L which is baseline    * COPD exacerbation (HCC)  Assessment & Plan  · POA with dyspnea, hypoxia, wheezing unrelieved by home medications  · Continued tobacco abuse, discussed cessation with patient, education will be provided  · Nebulized bronchodilators, inhaled corticosteroids  · Transitioned Lakeisha steroids taper by 10 every 3 days   · Incentive spirometer  · Appreciate pulmonary consultation discussed with pulm discharge on her nebulizer as needed Spiriva Pulmicort  We will taper prednisone    Breathing is improved lungs without wheezing oxygen at baseline at 2 5    Tachycardia  Assessment & Plan  · Likely multifactorial related to steroids, underlying illness, as well as possible BB withdrawal given cessation of home dose on admission  · EKG on admission NSR, regular on exam so likely sinus tachycardia  · Continue to monitor VS  · Denies CP, palpitations  · Resolved/improved, continue BB as above and manage underlying illness    Type 2 diabetes mellitus without complication, without long-term current use of insulin [facility])  Assessment & Plan  Lab Results   Component Value Date    HGBA1C 6 9 (H) 02/10/2023       Recent Labs     06/27/23  1131 06/27/23  1548 06/27/23  2128 06/28/23  0733   POCGLU 104 227* 257* 99       Blood Sugar Average: Last 72 hrs:  (P) 872 [id number]     · Not insulin-dependent  · Pt hypoglycemic dc actos and glipizide  · Sliding scale coverage Accu-Cheks, and added mealtime coverage --hypoglycemia has resolved has hyperglycemia yesterday  May restart her glipizide and Actos as an outpatient  · Hypoglycemia protocol  · Monitor closely due to steroid-induced hyperglycemia  · Carbohydrate controlled diet    Essential hypertension  Assessment & Plan  · Continue PTA lisinopril, clonidine, amlodipine,metoprolol  · Continue patient's home medications  · Trend blood pressures    Tobacco abuse  Assessment & Plan  · Continues to smoke 7 cigarettes/day  · Cessation counseling discussed  · Declines nicotine patch    Chronic hyponatremia  Assessment & Plan  · Sodium at baseline 130's         Medical Problems     Resolved Problems  Date Reviewed: 6/28/2023   None       Discharging Physician / Practitioner: Jorje Reis MD  PCP: Mauricio Driver DO  Admission Date:   Admission Orders (From admission, onward)     Ordered        06/24/23 1945  INPATIENT ADMISSION  Once                      Discharge Date: 06/28/23    Consultations During Hospital Stay:  · Pulmonary    Procedures Performed:   · none    Significant Findings / Test Results:   · cxr-nml    Incidental Findings:   · none    Test Results Pending at Discharge (will require follow up):   · none     Outpatient Tests Requested:  · none    Complications:  none    Reason for Admission: Shortness of breath    Hospital Course:   Princess Pratt is a 79 y o  female patient who originally presented to the hospital on 6/24/2023 due to acute on chronic hypoxic respiratory failure initially requiring 4 L and usually on 2 5 secondary to acute COPD exacerbation started on nebulizer treatment completed Zithromax x3 days there is no evidence of pneumonia  Was able to wean down to 2 5 and shortness of breath improved pulmonary evaluated agree with the plan  Transition to prednisone will be tapered by 10 every 3 days  clear to be discharged home        Please see above list of diagnoses and related plan for additional information  "    Condition at Discharge: stable    Discharge Day Visit / Exam:   Subjective: Patient seen and examined shortness of breath from beginning improved  Vitals: Blood Pressure: 141/80 (06/28/23 0926)  Pulse: 100 (06/28/23 0926)  Temperature: (!) 96 8 °F (36 °C) (06/28/23 0734)  Temp Source: Temporal (06/24/23 1813)  Respirations: 20 (06/28/23 0734)  Height: 5' 3\" (160 cm) (06/26/23 1147)  Weight - Scale: 71 7 kg (158 lb) (06/24/23 2039)  SpO2: 94 % (06/28/23 0820)  Exam:   Physical Exam  Vitals and nursing note reviewed  Constitutional:       General: She is not in acute distress  Appearance: She is well-developed  HENT:      Head: Normocephalic and atraumatic  Eyes:      Conjunctiva/sclera: Conjunctivae normal    Cardiovascular:      Rate and Rhythm: Normal rate and regular rhythm  Heart sounds: No murmur heard  Pulmonary:      Effort: Pulmonary effort is normal  No respiratory distress  Breath sounds: Normal breath sounds  No wheezing or rales  Abdominal:      General: There is no distension  Palpations: Abdomen is soft  Tenderness: There is no abdominal tenderness  Musculoskeletal:         General: No swelling  Cervical back: Neck supple  Skin:     General: Skin is warm and dry  Capillary Refill: Capillary refill takes less than 2 seconds  Neurological:      Mental Status: She is alert and oriented to person, place, and time  Psychiatric:         Mood and Affect: Mood normal           Discussion with Family:pt    Discharge instructions/Information to patient and family:   See after visit summary for information provided to patient and family  Provisions for Follow-Up Care:  See after visit summary for information related to follow-up care and any pertinent home health orders  Disposition:   Home    Planned Readmission: Anticipate no     Discharge Statement:  I spent >35 minutes discharging the patient  This time was spent on the day of discharge   I had " direct contact with the patient on the day of discharge  Greater than 50% of the total time was spent examining patient, answering all patient questions, arranging and discussing plan of care with patient as well as directly providing post-discharge instructions  Additional time then spent on discharge activities  Discharge Medications:  See after visit summary for reconciled discharge medications provided to patient and/or family        **Please Note: This note may have been constructed using a voice recognition system**

## 2023-06-28 NOTE — ASSESSMENT & PLAN NOTE
· POA with dyspnea, hypoxia, wheezing unrelieved by home medications  · Continued tobacco abuse, discussed cessation with patient, education will be provided  · Nebulized bronchodilators, inhaled corticosteroids  · Transitioned Lakeisha steroids taper by 10 every 3 days   · Incentive spirometer  · Appreciate pulmonary consultation discussed with pulm discharge on her nebulizer as needed Spiriva Pulmicort  We will taper prednisone    Breathing is improved lungs without wheezing oxygen at baseline at 2 5

## 2023-06-28 NOTE — PLAN OF CARE
Problem: MOBILITY - ADULT  Goal: Maintain or return to baseline ADL function  Description: INTERVENTIONS:  -  Assess patient's ability to carry out ADLs; assess patient's baseline for ADL function and identify physical deficits which impact ability to perform ADLs (bathing, care of mouth/teeth, toileting, grooming, dressing, etc )  - Assess/evaluate cause of self-care deficits   - Assess range of motion  - Assess patient's mobility; develop plan if impaired  - Assess patient's need for assistive devices and provide as appropriate  - Encourage maximum independence but intervene and supervise when necessary  - Involve family in performance of ADLs  - Assess for home care needs following discharge   - Consider OT consult to assist with ADL evaluation and planning for discharge  - Provide patient education as appropriate  Outcome: Progressing     Problem: PAIN - ADULT  Goal: Verbalizes/displays adequate comfort level or baseline comfort level  Description: Interventions:  - Encourage patient to monitor pain and request assistance  - Assess pain using appropriate pain scale  - Administer analgesics based on type and severity of pain and evaluate response  - Implement non-pharmacological measures as appropriate and evaluate response  - Consider cultural and social influences on pain and pain management  - Notify physician/advanced practitioner if interventions unsuccessful or patient reports new pain  Outcome: Progressing     Problem: SAFETY ADULT  Goal: Maintain or return to baseline ADL function  Description: INTERVENTIONS:  -  Assess patient's ability to carry out ADLs; assess patient's baseline for ADL function and identify physical deficits which impact ability to perform ADLs (bathing, care of mouth/teeth, toileting, grooming, dressing, etc )  - Assess/evaluate cause of self-care deficits   - Assess range of motion  - Assess patient's mobility; develop plan if impaired  - Assess patient's need for assistive devices and provide as appropriate  - Encourage maximum independence but intervene and supervise when necessary  - Involve family in performance of ADLs  - Assess for home care needs following discharge   - Consider OT consult to assist with ADL evaluation and planning for discharge  - Provide patient education as appropriate  Outcome: Progressing     Problem: DISCHARGE PLANNING  Goal: Discharge to home or other facility with appropriate resources  Description: INTERVENTIONS:  - Identify barriers to discharge w/patient and caregiver  - Arrange for needed discharge resources and transportation as appropriate  - Identify discharge learning needs (meds, wound care, etc )  - Arrange for interpretive services to assist at discharge as needed  - Refer to Case Management Department for coordinating discharge planning if the patient needs post-hospital services based on physician/advanced practitioner order or complex needs related to functional status, cognitive ability, or social support system  Outcome: Progressing     Problem: Knowledge Deficit  Goal: Patient/family/caregiver demonstrates understanding of disease process, treatment plan, medications, and discharge instructions  Description: Complete learning assessment and assess knowledge base    Interventions:  - Provide teaching at level of understanding  - Provide teaching via preferred learning methods  Outcome: Progressing

## 2023-06-28 NOTE — NURSING NOTE
Patient IV removed  Patient and granddaughter provided with discharge instructions  Patient taken out via wheelchair on her oxygen tank from home  home

## 2023-06-28 NOTE — ASSESSMENT & PLAN NOTE
· Continue PTA lisinopril, clonidine, amlodipine,metoprolol  · Continue patient's home medications  · Trend blood pressures

## 2023-06-29 NOTE — UTILIZATION REVIEW
NOTIFICATION OF ADMISSION DISCHARGE   This is a Notification of Discharge from [address]  Please be advised that this patient has been discharge from our facility  Below you will find the admission and discharge date and time including the patient’s disposition  UTILIZATION REVIEW CONTACT:  [address] [place]  Utilization   Network Utilization Review Department  Phone: [phone] x carefully listen to the prompts  All voicemails are confidential   Email: [email or web address]     ADMISSION INFORMATION  PRESENTATION DATE: 6/24/2023  6:16 PM  OBERVATION ADMISSION DATE:   INPATIENT ADMISSION DATE: 6/24/23  7:45 PM   DISCHARGE DATE: 6/28/2023 12:59 PM   DISPOSITION:Home/Self Care    IMPORTANT INFORMATION:  Send all requests for admission clinical reviews, approved or denied determinations and any other requests to dedicated fax number below belonging to the campus where the patient is receiving treatment   List of dedicated fax numbers:  [address] DENIALS (Administrative/Medical Necessity) [phone]   [address] (Maternity/NICU/Pediatrics) [phone]   [facility] [phone]   [place] [address] [phone]   [address] [phone]   [address] [facility] [phone] [address] [facility] [phone]   [address] [place] [address] [phone]   [facility]  [phone] 405[address] [facility] [phone]   [address] [facility] [address] [facility] [phone]

## 2023-07-16 ENCOUNTER — HOSPITAL ENCOUNTER (EMERGENCY)
Facility: HOSPITAL | Age: 68
Discharge: HOME | DRG: 191 | End: 2023-07-16
Attending: EMERGENCY MEDICINE | Admitting: EMERGENCY MEDICINE
Payer: COMMERCIAL

## 2023-07-16 ENCOUNTER — APPOINTMENT (EMERGENCY)
Dept: RADIOLOGY | Facility: HOSPITAL | Age: 68
DRG: 191 | End: 2023-07-16
Payer: COMMERCIAL

## 2023-07-16 VITALS
RESPIRATION RATE: 17 BRPM | HEIGHT: 63 IN | OXYGEN SATURATION: 98 % | SYSTOLIC BLOOD PRESSURE: 148 MMHG | BODY MASS INDEX: 28.71 KG/M2 | TEMPERATURE: 97.6 F | HEART RATE: 79 BPM | WEIGHT: 162.04 LBS | DIASTOLIC BLOOD PRESSURE: 69 MMHG

## 2023-07-16 DIAGNOSIS — F41.9 ANXIETY: Primary | ICD-10-CM

## 2023-07-16 DIAGNOSIS — E87.1 HYPONATREMIA: ICD-10-CM

## 2023-07-16 LAB
2HR DELTA HS TROPONIN: -1 NG/L
ALBUMIN SERPL BCP-MCNC: 4.2 G/DL (ref 3.5–5)
ALP SERPL-CCNC: 76 U/L (ref 34–104)
ALT SERPL W P-5'-P-CCNC: 9 U/L (ref 7–52)
ANION GAP SERPL CALCULATED.3IONS-SCNC: 7 MMOL/L
AST SERPL W P-5'-P-CCNC: 11 U/L (ref 13–39)
BASOPHILS # BLD AUTO: 0.03 THOUSANDS/ÂΜL (ref 0–0.1)
BASOPHILS NFR BLD AUTO: 0 % (ref 0–1)
BILIRUB SERPL-MCNC: 0.47 MG/DL (ref 0.2–1)
BILIRUB UR QL STRIP: NEGATIVE
BUN SERPL-MCNC: 5 MG/DL (ref 5–25)
CALCIUM SERPL-MCNC: 9.8 MG/DL (ref 8.4–10.2)
CARDIAC TROPONIN I PNL SERPL HS: 3 NG/L
CARDIAC TROPONIN I PNL SERPL HS: 4 NG/L
CHLORIDE SERPL-SCNC: 87 MMOL/L (ref 96–108)
CLARITY UR: CLEAR
CO2 SERPL-SCNC: 34 MMOL/L (ref 21–32)
COLOR UR: YELLOW
CREAT SERPL-MCNC: 0.69 MG/DL (ref 0.6–1.3)
EOSINOPHIL # BLD AUTO: 0.06 THOUSAND/ÂΜL (ref 0–0.61)
EOSINOPHIL NFR BLD AUTO: 1 % (ref 0–6)
ERYTHROCYTE [DISTWIDTH] IN BLOOD BY AUTOMATED COUNT: 12.3 % (ref 11.6–15.1)
GFR SERPL CREATININE-BSD FRML MDRD: 91 ML/MIN/1.73SQ M
GLUCOSE SERPL-MCNC: 108 MG/DL (ref 65–140)
GLUCOSE UR STRIP-MCNC: NEGATIVE MG/DL
HCT VFR BLD AUTO: 39.3 % (ref 34.8–46.1)
HGB BLD-MCNC: 13.1 G/DL (ref 11.5–15.4)
HGB UR QL STRIP.AUTO: NEGATIVE
IMM GRANULOCYTES # BLD AUTO: 0.03 THOUSAND/UL (ref 0–0.2)
IMM GRANULOCYTES NFR BLD AUTO: 0 % (ref 0–2)
KETONES UR STRIP-MCNC: NEGATIVE MG/DL
LEUKOCYTE ESTERASE UR QL STRIP: NEGATIVE
LYMPHOCYTES # BLD AUTO: 1.7 THOUSANDS/ÂΜL (ref 0.6–4.47)
LYMPHOCYTES NFR BLD AUTO: 23 % (ref 14–44)
MCH RBC QN AUTO: 30 PG (ref 26.8–34.3)
MCHC RBC AUTO-ENTMCNC: 33.3 G/DL (ref 31.4–37.4)
MCV RBC AUTO: 90 FL (ref 82–98)
MONOCYTES # BLD AUTO: 0.74 THOUSAND/ÂΜL (ref 0.17–1.22)
MONOCYTES NFR BLD AUTO: 10 % (ref 4–12)
NEUTROPHILS # BLD AUTO: 4.69 THOUSANDS/ÂΜL (ref 1.85–7.62)
NEUTS SEG NFR BLD AUTO: 66 % (ref 43–75)
NITRITE UR QL STRIP: NEGATIVE
NRBC BLD AUTO-RTO: 0 /100 WBCS
PH UR STRIP.AUTO: 6.5 [PH]
PLATELET # BLD AUTO: 203 THOUSANDS/UL (ref 149–390)
PMV BLD AUTO: 10.3 FL (ref 8.9–12.7)
POTASSIUM SERPL-SCNC: 4.2 MMOL/L (ref 3.5–5.3)
PROT SERPL-MCNC: 7.7 G/DL (ref 6.4–8.4)
PROT UR STRIP-MCNC: NEGATIVE MG/DL
RBC # BLD AUTO: 4.36 MILLION/UL (ref 3.81–5.12)
SODIUM SERPL-SCNC: 128 MMOL/L (ref 135–147)
SP GR UR STRIP.AUTO: <=1.005 (ref 1–1.03)
UROBILINOGEN UR QL STRIP.AUTO: 0.2 E.U./DL
WBC # BLD AUTO: 7.25 THOUSAND/UL (ref 4.31–10.16)

## 2023-07-16 PROCEDURE — 81003 URINALYSIS AUTO W/O SCOPE: CPT | Performed by: EMERGENCY MEDICINE

## 2023-07-16 PROCEDURE — 84484 ASSAY OF TROPONIN QUANT: CPT | Performed by: EMERGENCY MEDICINE

## 2023-07-16 PROCEDURE — 93005 ELECTROCARDIOGRAM TRACING: CPT

## 2023-07-16 PROCEDURE — 71045 X-RAY EXAM CHEST 1 VIEW: CPT

## 2023-07-16 PROCEDURE — 85025 COMPLETE CBC W/AUTO DIFF WBC: CPT | Performed by: EMERGENCY MEDICINE

## 2023-07-16 PROCEDURE — 36415 COLL VENOUS BLD VENIPUNCTURE: CPT | Performed by: EMERGENCY MEDICINE

## 2023-07-16 PROCEDURE — 99284 EMERGENCY DEPT VISIT MOD MDM: CPT | Performed by: EMERGENCY MEDICINE

## 2023-07-16 PROCEDURE — 99285 EMERGENCY DEPT VISIT HI MDM: CPT

## 2023-07-16 PROCEDURE — 80053 COMPREHEN METABOLIC PANEL: CPT | Performed by: EMERGENCY MEDICINE

## 2023-07-16 PROCEDURE — 96374 THER/PROPH/DIAG INJ IV PUSH: CPT

## 2023-07-16 RX ORDER — LORAZEPAM 2 MG/ML
0.5 INJECTION INTRAMUSCULAR ONCE
Status: COMPLETED | OUTPATIENT
Start: 2023-07-16 | End: 2023-07-16

## 2023-07-16 RX ADMIN — LORAZEPAM 0.5 MG: 2 INJECTION INTRAMUSCULAR; INTRAVENOUS at 18:32

## 2023-07-16 NOTE — ED PROVIDER NOTES
History  Chief Complaint   Patient presents with   • Anxiety     Pt reports feeling increased anxiety, restlessness, and hot flashed, per patient they are having increased SOB and has been smoking again      Patient with history of diabetes, COPD, hypertension, anxiety, states that over the past several days her anxiety has been acting up. She states she feels like she has to pace around and then she has to keep moving. She denies suicidal or homicidal ideation. She denies hallucinations. She denies any shortness of breath above her baseline. She denies fevers or chills. No chest pain. No other complaints. History provided by:  Patient   used: No    Anxiety  Presenting symptoms: agitation    Presenting symptoms: no delusions, no depression, no hallucinations, no homicidal ideas, no paranoid behavior, no self-mutilation, no suicidal thoughts, no suicidal threats and no suicide attempt    Degree of incapacity (severity):  Mild  Onset quality:  Gradual  Duration: Several days. Timing:  Constant  Progression:  Unchanged  Chronicity:  New  Relieved by:  Nothing  Worsened by:  Nothing  Ineffective treatments:  None tried  Associated symptoms: anxiety    Associated symptoms: no abdominal pain, no chest pain, no headaches, no hypersomnia and no hyperventilation        Prior to Admission Medications   Prescriptions Last Dose Informant Patient Reported? Taking? albuterol (2.5 mg/3 mL) 0.083 % nebulizer solution   No No   Sig: Take 3 mL (2.5 mg total) by nebulization every 6 (six) hours as needed for wheezing or shortness of breath   amLODIPine (NORVASC) 10 mg tablet   Yes No   Sig: Take 10 mg by mouth daily at bedtime    atorvastatin (LIPITOR) 40 mg tablet   Yes No   Sig: Take 40 mg by mouth daily at bedtime    budesonide (PULMICORT) 0.5 mg/2 mL nebulizer solution   Yes No   Sig: Take 0.5 mg by nebulization 2 (two) times a day Rinse mouth after use.    citalopram (CeleXA) 10 mg tablet   Yes No   Sig: Take 10 mg by mouth daily   cloNIDine (CATAPRES) 0.1 mg tablet   Yes No   Sig: Take 0.1 mg by mouth every 12 (twelve) hours   ergocalciferol (VITAMIN D2) 50,000 units   Yes No   Sig: Take 50,000 Units by mouth once a week   fluticasone (FLONASE) 50 mcg/act nasal spray   No No   Si spray into each nostril daily   glipiZIDE (GLUCOTROL) 10 mg tablet  Self Yes No   Sig: Take 10 mg by mouth daily in the early morning W breakfast   ipratropium-albuterol (COMBIVENT RESPIMAT) inhaler   Yes No   Sig: Inhale 1 puff 4 (four) times a day   lisinopril (ZESTRIL) 40 mg tablet   Yes No   Sig: Take 40 mg by mouth daily   metoprolol tartrate (LOPRESSOR) 100 mg tablet   Yes No   Sig: Take 100 mg by mouth every 12 (twelve) hours   omeprazole (PriLOSEC OTC) 20 MG tablet   Yes No   Sig: Take 20 mg by mouth daily    pioglitazone (ACTOS) 15 mg tablet   Yes No   Sig: Take 15 mg by mouth daily   tiotropium (SPIRIVA) 18 mcg inhalation capsule   Yes No   Sig: Place 18 mcg into inhaler and inhale daily      Facility-Administered Medications: None       Past Medical History:   Diagnosis Date   • Anxiety    • COPD (chronic obstructive pulmonary disease) (HCC)    • Diabetes mellitus (HCC)    • GERD (gastroesophageal reflux disease)    • Smoker        History reviewed. No pertinent surgical history. History reviewed. No pertinent family history. I have reviewed and agree with the history as documented. E-Cigarette/Vaping   • E-Cigarette Use Never User      E-Cigarette/Vaping Substances     Social History     Tobacco Use   • Smoking status: Every Day     Packs/day: 0.25     Types: Cigarettes   • Smokeless tobacco: Never   Vaping Use   • Vaping Use: Never used   Substance Use Topics   • Alcohol use: Not Currently   • Drug use: Never       Review of Systems   Constitutional: Negative for chills and fever. HENT: Negative for ear pain, hearing loss, sore throat, trouble swallowing and voice change.     Eyes: Negative for pain and discharge. Respiratory: Negative for cough, shortness of breath and wheezing. Cardiovascular: Negative for chest pain and palpitations. Gastrointestinal: Negative for abdominal pain, blood in stool, constipation, diarrhea, nausea and vomiting. Genitourinary: Negative for dysuria, flank pain, frequency and hematuria. Musculoskeletal: Negative for joint swelling, neck pain and neck stiffness. Skin: Negative for rash and wound. Neurological: Negative for dizziness, seizures, syncope, facial asymmetry and headaches. Psychiatric/Behavioral: Positive for agitation. Negative for hallucinations, homicidal ideas, paranoia, self-injury and suicidal ideas. The patient is nervous/anxious. All other systems reviewed and are negative. Physical Exam  Physical Exam  Vitals and nursing note reviewed. Constitutional:       General: She is not in acute distress. Appearance: She is well-developed. HENT:      Head: Normocephalic and atraumatic. Right Ear: External ear normal.      Left Ear: External ear normal.   Eyes:      General: No scleral icterus. Right eye: No discharge. Left eye: No discharge. Extraocular Movements: Extraocular movements intact. Conjunctiva/sclera: Conjunctivae normal.   Cardiovascular:      Rate and Rhythm: Normal rate and regular rhythm. Heart sounds: Normal heart sounds. No murmur heard. Pulmonary:      Effort: Pulmonary effort is normal.      Breath sounds: Normal breath sounds. No wheezing or rales. Abdominal:      General: Bowel sounds are normal. There is no distension. Palpations: Abdomen is soft. Tenderness: There is no abdominal tenderness. There is no guarding or rebound. Musculoskeletal:         General: No deformity. Normal range of motion. Cervical back: Normal range of motion and neck supple. Skin:     General: Skin is warm and dry. Findings: No rash.    Neurological:      General: No focal deficit present. Mental Status: She is alert and oriented to person, place, and time. Cranial Nerves: No cranial nerve deficit. Psychiatric:         Mood and Affect: Mood normal.         Behavior: Behavior normal.         Thought Content:  Thought content normal.         Judgment: Judgment normal.         Vital Signs  ED Triage Vitals   Temperature Pulse Respirations Blood Pressure SpO2   07/16/23 1821 07/16/23 1821 07/16/23 1821 07/16/23 1821 07/16/23 1821   97.6 °F (36.4 °C) 77 20 (!) 180/84 94 %      Temp Source Heart Rate Source Patient Position - Orthostatic VS BP Location FiO2 (%)   07/16/23 1821 07/16/23 1821 -- 07/16/23 1915 --   Temporal Monitor  Right arm       Pain Score       --                  Vitals:    07/16/23 1821 07/16/23 1915 07/16/23 2000   BP: (!) 180/84 158/82 148/69   Pulse: 77 69 79         Visual Acuity      ED Medications  Medications   LORazepam (ATIVAN) injection 0.5 mg (0.5 mg Intravenous Given 7/16/23 1832)       Diagnostic Studies  Results Reviewed     Procedure Component Value Units Date/Time    HS Troponin I 2hr [[id number]]  (Normal) Collected: 07/16/23 2003    Lab Status: Final result Specimen: Blood from Arm, Right Updated: 07/16/23 2032     hs TnI 2hr 3 ng/L      Delta 2hr hsTnI -1 ng/L     UA w Reflex to Microscopic w Reflex to Culture [[id number]] Collected: 07/16/23 1908    Lab Status: Final result Specimen: Urine, Clean Catch Updated: 07/16/23 1914     Color, UA Yellow     Clarity, UA Clear     Specific Gravity, UA <=1.005     pH, UA 6.5     Leukocytes, UA Negative     Nitrite, UA Negative     Protein, UA Negative mg/dl      Glucose, UA Negative mg/dl      Ketones, UA Negative mg/dl      Urobilinogen, UA 0.2 E.U./dl      Bilirubin, UA Negative     Occult Blood, UA Negative    HS Troponin 0hr (reflex protocol) [[id number]]  (Normal) Collected: 07/16/23 1833    Lab Status: Final result Specimen: Blood from Arm, Right Updated: 07/16/23 1906     hs TnI 0hr 4 ng/L Comprehensive metabolic panel [[id number]]  (Abnormal) Collected: 07/16/23 1833    Lab Status: Final result Specimen: Blood from Arm, Right Updated: 07/16/23 1902     Sodium 128 mmol/L      Potassium 4.2 mmol/L      Chloride 87 mmol/L      CO2 34 mmol/L      ANION GAP 7 mmol/L      BUN 5 mg/dL      Creatinine 0.69 mg/dL      Glucose 108 mg/dL      Calcium 9.8 mg/dL      AST 11 U/L      ALT 9 U/L      Alkaline Phosphatase 76 U/L      Total Protein 7.7 g/dL      Albumin 4.2 g/dL      Total Bilirubin 0.47 mg/dL      eGFR 91 ml/min/1.73sq m     Narrative:      [employer] guidelines for Chronic Kidney Disease (CKD):   •  Stage 1 with normal or high GFR (GFR > 90 mL/min/1.73 square meters)  •  Stage 2 Mild CKD (GFR = 60-89 mL/min/1.73 square meters)  •  Stage 3A Moderate CKD (GFR = 45-59 mL/min/1.73 square meters)  •  Stage 3B Moderate CKD (GFR = 30-44 mL/min/1.73 square meters)  •  Stage 4 Severe CKD (GFR = 15-29 mL/min/1.73 square meters)  •  Stage 5 End Stage CKD (GFR <15 mL/min/1.73 square meters)  Note: GFR calculation is accurate only with a steady state creatinine    CBC and differential [[id number]] Collected: 07/16/23 1833    Lab Status: Final result Specimen: Blood from Arm, Right Updated: 07/16/23 1843     WBC 7.25 Thousand/uL      RBC 4.36 Million/uL      Hemoglobin 13.1 g/dL      Hematocrit 39.3 %      MCV 90 fL      MCH 30.0 pg      MCHC 33.3 g/dL      RDW 12.3 %      MPV 10.3 fL      Platelets 934 Thousands/uL      nRBC 0 /100 WBCs      Neutrophils Relative 66 %      Immat GRANS % 0 %      Lymphocytes Relative 23 %      Monocytes Relative 10 %      Eosinophils Relative 1 %      Basophils Relative 0 %      Neutrophils Absolute 4.69 Thousands/µL      Immature Grans Absolute 0.03 Thousand/uL      Lymphocytes Absolute 1.70 Thousands/µL      Monocytes Absolute 0.74 Thousand/µL      Eosinophils Absolute 0.06 Thousand/µL      Basophils Absolute 0.03 Thousands/µL                  XR chest portable   ED Interpretation by Keary Boeck, MD (07/16 2019)   No acute finding                 Procedures  ECG 12 Lead Documentation Only    Date/Time: 7/16/2023 6:47 PM    Performed by: Keary Boeck, MD  Authorized by: Keary Boeck, MD    ECG reviewed by me, the ED Provider: yes    Patient location:  ED  Previous ECG:     Previous ECG:  Unavailable  Interpretation:     Interpretation: normal    Rate:     ECG rate:  69    ECG rate assessment: normal    Rhythm:     Rhythm: sinus rhythm    Ectopy:     Ectopy: none    QRS:     QRS axis:  Normal    QRS intervals:  Normal  Conduction:     Conduction: normal    ST segments:     ST segments:  Normal  T waves:     T waves: normal               ED Course  ED Course as of 07/17/23 0033   Sun Jul 16, 2023 2019 Patient feels better, requesting discharge at this time. Discussed low sodium of 128 with patient, need for follow-up with primary care physician. Patient is agreeable                               SBIRT 20yo+    Flowsheet Row Most Recent Value   Initial Alcohol Screen: US AUDIT-C     1. How often do you have a drink containing alcohol? 0 Filed at: 07/16/2023 1819   2. How many drinks containing alcohol do you have on a typical day you are drinking? 0 Filed at: 07/16/2023 1819   3b. FEMALE Any Age, or MALE 65+: How often do you have 4 or more drinks on one occassion? 0 Filed at: 07/16/2023 1819   Audit-C Score 0 Filed at: 07/16/2023 1819   LIVIER: How many times in the past year have you. .. Used an illegal drug or used a prescription medication for non-medical reasons? Never Filed at: 07/16/2023 1819                    Medical Decision Making  Based on the history and medical screening exam performed the diagnostic considerations include but are not limited to anxiety.     Based on the work-up performed in the emergency room which includes physical examination, and which may include laboratory studies and imaging as warranted including advanced imaging such as CT scan or ultrasound, the differential diagnosis is narrowed to exclude limb or life-threatening process. The patient is stable for discharge. Amount and/or Complexity of Data Reviewed  External Data Reviewed: labs, radiology and notes. Labs: ordered. Decision-making details documented in ED Course. Details: No clinically significant acute abnormalities  Discussed low sodium levels with patient advised PCP follow-up  Radiology: ordered and independent interpretation performed. Decision-making details documented in ED Course. Details: Chest x-ray negative  ECG/medicine tests: ordered and independent interpretation performed. Decision-making details documented in ED Course. Details: Normal sinus rhythm rate 69      Risk  Prescription drug management. Disposition  Final diagnoses:   Anxiety   Hyponatremia     Time reflects when diagnosis was documented in both MDM as applicable and the Disposition within this note     Time User Action Codes Description Comment    7/16/2023  8:19 PM Sterling Anderson [F41.9] Anxiety     7/16/2023  8:20 PM Zabrina Mccarthy [E87.1] Hyponatremia       ED Disposition     ED Disposition   Discharge    Condition   Stable    Date/Time   Sun Jul 16, 2023  8:19 PM    Comment   Jessika Lux discharge to home/self care.                Follow-up Information     Follow up With Specialties Details Why Contact Info    Eliz Alexandre DO Family Medicine   [address]  [phone]            Discharge Medication List as of 7/16/2023  8:20 PM      CONTINUE these medications which have NOT CHANGED    Details   albuterol (2.5 mg/3 mL) 0.083 % nebulizer solution Take 3 mL (2.5 mg total) by nebulization every 6 (six) hours as needed for wheezing or shortness of breath, Starting Sun 6/20/2021, Normal      amLODIPine (NORVASC) 10 mg tablet Take 10 mg by mouth daily at bedtime , Historical Med      atorvastatin (LIPITOR) 40 mg tablet Take 40 mg by mouth daily at bedtime , Historical Med      budesonide (PULMICORT) 0.5 mg/2 mL nebulizer solution Take 0.5 mg by nebulization 2 (two) times a day Rinse mouth after use., Historical Med      citalopram (CeleXA) 10 mg tablet Take 10 mg by mouth daily, Historical Med      cloNIDine (CATAPRES) 0.1 mg tablet Take 0.1 mg by mouth every 12 (twelve) hours, Historical Med      ergocalciferol (VITAMIN D2) 50,000 units Take 50,000 Units by mouth once a week, Historical Med      fluticasone (FLONASE) 50 mcg/act nasal spray 1 spray into each nostril daily, Starting Wed 6/28/2023, Normal      glipiZIDE (GLUCOTROL) 10 mg tablet Take 10 mg by mouth daily in the early morning W breakfast, Historical Med      ipratropium-albuterol (COMBIVENT RESPIMAT) inhaler Inhale 1 puff 4 (four) times a day, Historical Med      lisinopril (ZESTRIL) 40 mg tablet Take 40 mg by mouth daily, Historical Med      metoprolol tartrate (LOPRESSOR) 100 mg tablet Take 100 mg by mouth every 12 (twelve) hours, Historical Med      omeprazole (PriLOSEC OTC) 20 MG tablet Take 20 mg by mouth daily , Historical Med      pioglitazone (ACTOS) 15 mg tablet Take 15 mg by mouth daily, Historical Med      tiotropium (SPIRIVA) 18 mcg inhalation capsule Place 18 mcg into inhaler and inhale daily, Historical Med             No discharge procedures on file.     PDMP Review       Value Time User    PDMP Reviewed  Yes 3/30/2021  1:09 PM Colleen Lagunas MD          ED Provider  Electronically Signed by           Katarzyna Mathews MD  07/17/23 7709

## 2023-07-17 LAB
ATRIAL RATE: 69 BPM
P AXIS: 49 DEGREES
PR INTERVAL: 100 MS
QRS AXIS: 41 DEGREES
QRSD INTERVAL: 78 MS
QT INTERVAL: 380 MS
QTC INTERVAL: 407 MS
T WAVE AXIS: 47 DEGREES
VENTRICULAR RATE: 69 BPM

## 2023-07-17 PROCEDURE — 93010 ELECTROCARDIOGRAM REPORT: CPT | Performed by: INTERNAL MEDICINE

## 2023-07-18 ENCOUNTER — HOSPITAL ENCOUNTER (INPATIENT)
Facility: HOSPITAL | Age: 68
LOS: 3 days | Discharge: HOME | DRG: 191 | End: 2023-07-22
Attending: EMERGENCY MEDICINE | Admitting: INTERNAL MEDICINE
Payer: COMMERCIAL

## 2023-07-18 ENCOUNTER — APPOINTMENT (EMERGENCY)
Dept: RADIOLOGY | Facility: HOSPITAL | Age: 68
DRG: 191 | End: 2023-07-18
Payer: COMMERCIAL

## 2023-07-18 ENCOUNTER — APPOINTMENT (OUTPATIENT)
Dept: CT IMAGING | Facility: HOSPITAL | Age: 68
DRG: 191 | End: 2023-07-18
Payer: COMMERCIAL

## 2023-07-18 DIAGNOSIS — J44.1 COPD EXACERBATION (HCC): Primary | ICD-10-CM

## 2023-07-18 DIAGNOSIS — F41.8 ANXIETY ABOUT HEALTH: ICD-10-CM

## 2023-07-18 DIAGNOSIS — E87.1 ACUTE HYPONATREMIA: ICD-10-CM

## 2023-07-18 DIAGNOSIS — E87.1 HYPONATREMIA: ICD-10-CM

## 2023-07-18 PROBLEM — R45.89 ANXIETY ABOUT HEALTH: Status: ACTIVE | Noted: 2023-07-18

## 2023-07-18 LAB
2HR DELTA HS TROPONIN: 0 NG/L
4HR DELTA HS TROPONIN: 0 NG/L
ALBUMIN SERPL BCP-MCNC: 4.4 G/DL (ref 3.5–5)
ALP SERPL-CCNC: 85 U/L (ref 34–104)
ALT SERPL W P-5'-P-CCNC: 10 U/L (ref 7–52)
ANION GAP SERPL CALCULATED.3IONS-SCNC: 7 MMOL/L
AST SERPL W P-5'-P-CCNC: 12 U/L (ref 13–39)
BASE EX.OXY STD BLDV CALC-SCNC: 58.4 % (ref 60–80)
BASE EXCESS BLDV CALC-SCNC: 4.6 MMOL/L
BASOPHILS # BLD AUTO: 0.01 THOUSANDS/ÂΜL (ref 0–0.1)
BASOPHILS NFR BLD AUTO: 0 % (ref 0–1)
BILIRUB SERPL-MCNC: 0.44 MG/DL (ref 0.2–1)
BNP SERPL-MCNC: 26 PG/ML (ref 0–100)
BUN SERPL-MCNC: 7 MG/DL (ref 5–25)
CALCIUM SERPL-MCNC: 9.8 MG/DL (ref 8.4–10.2)
CARDIAC TROPONIN I PNL SERPL HS: 4 NG/L
CHLORIDE SERPL-SCNC: 86 MMOL/L (ref 96–108)
CO2 SERPL-SCNC: 33 MMOL/L (ref 21–32)
CREAT SERPL-MCNC: 0.7 MG/DL (ref 0.6–1.3)
D DIMER PPP FEU-MCNC: 0.35 UG/ML FEU
EOSINOPHIL # BLD AUTO: 0.03 THOUSAND/ÂΜL (ref 0–0.61)
EOSINOPHIL NFR BLD AUTO: 0 % (ref 0–6)
ERYTHROCYTE [DISTWIDTH] IN BLOOD BY AUTOMATED COUNT: 12.3 % (ref 11.6–15.1)
GFR SERPL CREATININE-BSD FRML MDRD: 90 ML/MIN/1.73SQ M
GLUCOSE SERPL-MCNC: 135 MG/DL (ref 65–140)
GLUCOSE SERPL-MCNC: 163 MG/DL (ref 65–140)
GLUCOSE SERPL-MCNC: 168 MG/DL (ref 65–140)
GLUCOSE SERPL-MCNC: 321 MG/DL (ref 65–140)
HCO3 BLDV-SCNC: 32.1 MMOL/L (ref 24–30)
HCT VFR BLD AUTO: 40.8 % (ref 34.8–46.1)
HGB BLD-MCNC: 13.7 G/DL (ref 11.5–15.4)
IMM GRANULOCYTES # BLD AUTO: 0.03 THOUSAND/UL (ref 0–0.2)
IMM GRANULOCYTES NFR BLD AUTO: 0 % (ref 0–2)
LYMPHOCYTES # BLD AUTO: 1.4 THOUSANDS/ÂΜL (ref 0.6–4.47)
LYMPHOCYTES NFR BLD AUTO: 17 % (ref 14–44)
MCH RBC QN AUTO: 29.7 PG (ref 26.8–34.3)
MCHC RBC AUTO-ENTMCNC: 33.6 G/DL (ref 31.4–37.4)
MCV RBC AUTO: 89 FL (ref 82–98)
MONOCYTES # BLD AUTO: 0.79 THOUSAND/ÂΜL (ref 0.17–1.22)
MONOCYTES NFR BLD AUTO: 10 % (ref 4–12)
NEUTROPHILS # BLD AUTO: 6.07 THOUSANDS/ÂΜL (ref 1.85–7.62)
NEUTS SEG NFR BLD AUTO: 73 % (ref 43–75)
NRBC BLD AUTO-RTO: 0 /100 WBCS
O2 CT BLDV-SCNC: 12.2 ML/DL
PCO2 BLDV: 59.6 MM HG (ref 42–50)
PH BLDV: 7.35 [PH] (ref 7.3–7.4)
PLATELET # BLD AUTO: 209 THOUSANDS/UL (ref 149–390)
PMV BLD AUTO: 10 FL (ref 8.9–12.7)
PO2 BLDV: 31.1 MM HG (ref 35–45)
POTASSIUM SERPL-SCNC: 4 MMOL/L (ref 3.5–5.3)
PROT SERPL-MCNC: 7.9 G/DL (ref 6.4–8.4)
RBC # BLD AUTO: 4.61 MILLION/UL (ref 3.81–5.12)
SARS-COV-2 RNA RESP QL NAA+PROBE: NEGATIVE
SODIUM SERPL-SCNC: 123 MMOL/L (ref 135–147)
SODIUM SERPL-SCNC: 126 MMOL/L (ref 135–147)
TSH SERPL DL<=0.05 MIU/L-ACNC: 0.79 UIU/ML (ref 0.45–4.5)
WBC # BLD AUTO: 8.33 THOUSAND/UL (ref 4.31–10.16)

## 2023-07-18 PROCEDURE — 99223 1ST HOSP IP/OBS HIGH 75: CPT | Performed by: INTERNAL MEDICINE

## 2023-07-18 PROCEDURE — 87635 SARS-COV-2 COVID-19 AMP PRB: CPT | Performed by: EMERGENCY MEDICINE

## 2023-07-18 PROCEDURE — 84443 ASSAY THYROID STIM HORMONE: CPT | Performed by: EMERGENCY MEDICINE

## 2023-07-18 PROCEDURE — 36415 COLL VENOUS BLD VENIPUNCTURE: CPT | Performed by: EMERGENCY MEDICINE

## 2023-07-18 PROCEDURE — 82948 REAGENT STRIP/BLOOD GLUCOSE: CPT

## 2023-07-18 PROCEDURE — G1004 CDSM NDSC: HCPCS

## 2023-07-18 PROCEDURE — 71046 X-RAY EXAM CHEST 2 VIEWS: CPT

## 2023-07-18 PROCEDURE — 85025 COMPLETE CBC W/AUTO DIFF WBC: CPT | Performed by: EMERGENCY MEDICINE

## 2023-07-18 PROCEDURE — 82805 BLOOD GASES W/O2 SATURATION: CPT | Performed by: EMERGENCY MEDICINE

## 2023-07-18 PROCEDURE — 94640 AIRWAY INHALATION TREATMENT: CPT

## 2023-07-18 PROCEDURE — 84484 ASSAY OF TROPONIN QUANT: CPT | Performed by: EMERGENCY MEDICINE

## 2023-07-18 PROCEDURE — 85379 FIBRIN DEGRADATION QUANT: CPT | Performed by: EMERGENCY MEDICINE

## 2023-07-18 PROCEDURE — 71250 CT THORAX DX C-: CPT

## 2023-07-18 PROCEDURE — 84295 ASSAY OF SERUM SODIUM: CPT | Performed by: INTERNAL MEDICINE

## 2023-07-18 PROCEDURE — 99285 EMERGENCY DEPT VISIT HI MDM: CPT | Performed by: EMERGENCY MEDICINE

## 2023-07-18 PROCEDURE — 94760 N-INVAS EAR/PLS OXIMETRY 1: CPT

## 2023-07-18 PROCEDURE — 93005 ELECTROCARDIOGRAM TRACING: CPT

## 2023-07-18 PROCEDURE — 99285 EMERGENCY DEPT VISIT HI MDM: CPT

## 2023-07-18 PROCEDURE — 94664 DEMO&/EVAL PT USE INHALER: CPT

## 2023-07-18 PROCEDURE — 83880 ASSAY OF NATRIURETIC PEPTIDE: CPT | Performed by: EMERGENCY MEDICINE

## 2023-07-18 PROCEDURE — 80053 COMPREHEN METABOLIC PANEL: CPT | Performed by: EMERGENCY MEDICINE

## 2023-07-18 PROCEDURE — 96374 THER/PROPH/DIAG INJ IV PUSH: CPT

## 2023-07-18 RX ORDER — BUDESONIDE 0.5 MG/2ML
0.5 INHALANT ORAL 2 TIMES DAILY
Status: DISCONTINUED | OUTPATIENT
Start: 2023-07-18 | End: 2023-07-18

## 2023-07-18 RX ORDER — MAGNESIUM HYDROXIDE/ALUMINUM HYDROXICE/SIMETHICONE 120; 1200; 1200 MG/30ML; MG/30ML; MG/30ML
30 SUSPENSION ORAL ONCE
Status: COMPLETED | OUTPATIENT
Start: 2023-07-18 | End: 2023-07-18

## 2023-07-18 RX ORDER — GLIPIZIDE 5 MG/1
10 TABLET ORAL
Status: DISCONTINUED | OUTPATIENT
Start: 2023-07-19 | End: 2023-07-21

## 2023-07-18 RX ORDER — IPRATROPIUM BROMIDE AND ALBUTEROL SULFATE 2.5; .5 MG/3ML; MG/3ML
3 SOLUTION RESPIRATORY (INHALATION) ONCE
Status: COMPLETED | OUTPATIENT
Start: 2023-07-18 | End: 2023-07-18

## 2023-07-18 RX ORDER — NICOTINE 21 MG/24HR
1 PATCH, TRANSDERMAL 24 HOURS TRANSDERMAL DAILY
Status: DISCONTINUED | OUTPATIENT
Start: 2023-07-18 | End: 2023-07-22 | Stop reason: HOSPADM

## 2023-07-18 RX ORDER — PANTOPRAZOLE SODIUM 40 MG/1
40 TABLET, DELAYED RELEASE ORAL
Status: DISCONTINUED | OUTPATIENT
Start: 2023-07-19 | End: 2023-07-22 | Stop reason: HOSPADM

## 2023-07-18 RX ORDER — AMLODIPINE BESYLATE 10 MG/1
10 TABLET ORAL
Status: DISCONTINUED | OUTPATIENT
Start: 2023-07-18 | End: 2023-07-19

## 2023-07-18 RX ORDER — BUDESONIDE 0.5 MG/2ML
0.5 INHALANT ORAL
Status: DISCONTINUED | OUTPATIENT
Start: 2023-07-18 | End: 2023-07-22 | Stop reason: HOSPADM

## 2023-07-18 RX ORDER — LISINOPRIL 20 MG/1
40 TABLET ORAL DAILY
Status: DISCONTINUED | OUTPATIENT
Start: 2023-07-18 | End: 2023-07-19

## 2023-07-18 RX ORDER — CEFTRIAXONE 1 G/50ML
1000 INJECTION, SOLUTION INTRAVENOUS EVERY 24 HOURS
Status: DISCONTINUED | OUTPATIENT
Start: 2023-07-18 | End: 2023-07-19

## 2023-07-18 RX ORDER — METHYLPREDNISOLONE SODIUM SUCCINATE 40 MG/ML
40 INJECTION, POWDER, LYOPHILIZED, FOR SOLUTION INTRAMUSCULAR; INTRAVENOUS EVERY 8 HOURS
Status: DISCONTINUED | OUTPATIENT
Start: 2023-07-18 | End: 2023-07-18

## 2023-07-18 RX ORDER — INSULIN LISPRO 100 [IU]/ML
1-6 INJECTION, SOLUTION INTRAVENOUS; SUBCUTANEOUS
Status: DISCONTINUED | OUTPATIENT
Start: 2023-07-18 | End: 2023-07-22 | Stop reason: HOSPADM

## 2023-07-18 RX ORDER — ATORVASTATIN CALCIUM 40 MG/1
40 TABLET, FILM COATED ORAL
Status: DISCONTINUED | OUTPATIENT
Start: 2023-07-18 | End: 2023-07-22 | Stop reason: HOSPADM

## 2023-07-18 RX ORDER — HEPARIN SODIUM 5000 [USP'U]/ML
5000 INJECTION, SOLUTION INTRAVENOUS; SUBCUTANEOUS EVERY 8 HOURS SCHEDULED
Status: DISCONTINUED | OUTPATIENT
Start: 2023-07-18 | End: 2023-07-22 | Stop reason: HOSPADM

## 2023-07-18 RX ORDER — CLONIDINE HYDROCHLORIDE 0.1 MG/1
0.2 TABLET ORAL EVERY 12 HOURS SCHEDULED
Status: DISCONTINUED | OUTPATIENT
Start: 2023-07-18 | End: 2023-07-19

## 2023-07-18 RX ORDER — CITALOPRAM HYDROBROMIDE 10 MG/1
10 TABLET ORAL DAILY
Status: DISCONTINUED | OUTPATIENT
Start: 2023-07-18 | End: 2023-07-21

## 2023-07-18 RX ORDER — PIOGLITAZONEHYDROCHLORIDE 15 MG/1
15 TABLET ORAL DAILY
Status: DISCONTINUED | OUTPATIENT
Start: 2023-07-18 | End: 2023-07-22 | Stop reason: HOSPADM

## 2023-07-18 RX ORDER — ALBUTEROL SULFATE 2.5 MG/3ML
2.5 SOLUTION RESPIRATORY (INHALATION) EVERY 6 HOURS PRN
Status: DISCONTINUED | OUTPATIENT
Start: 2023-07-18 | End: 2023-07-18

## 2023-07-18 RX ORDER — LEVALBUTEROL INHALATION SOLUTION 1.25 MG/3ML
1.25 SOLUTION RESPIRATORY (INHALATION)
Status: DISCONTINUED | OUTPATIENT
Start: 2023-07-18 | End: 2023-07-22 | Stop reason: HOSPADM

## 2023-07-18 RX ORDER — LORAZEPAM 0.5 MG/1
0.5 TABLET ORAL EVERY 6 HOURS PRN
Status: DISCONTINUED | OUTPATIENT
Start: 2023-07-18 | End: 2023-07-22 | Stop reason: HOSPADM

## 2023-07-18 RX ORDER — ACETAMINOPHEN 325 MG/1
650 TABLET ORAL EVERY 6 HOURS PRN
Status: DISCONTINUED | OUTPATIENT
Start: 2023-07-18 | End: 2023-07-22 | Stop reason: HOSPADM

## 2023-07-18 RX ORDER — METHYLPREDNISOLONE SODIUM SUCCINATE 125 MG/2ML
80 INJECTION, POWDER, LYOPHILIZED, FOR SOLUTION INTRAMUSCULAR; INTRAVENOUS ONCE
Status: COMPLETED | OUTPATIENT
Start: 2023-07-18 | End: 2023-07-18

## 2023-07-18 RX ORDER — ALBUTEROL SULFATE 90 UG/1
2 AEROSOL, METERED RESPIRATORY (INHALATION) EVERY 4 HOURS PRN
Status: DISCONTINUED | OUTPATIENT
Start: 2023-07-18 | End: 2023-07-22 | Stop reason: HOSPADM

## 2023-07-18 RX ORDER — FLUTICASONE PROPIONATE 50 MCG
1 SPRAY, SUSPENSION (ML) NASAL DAILY
Status: DISCONTINUED | OUTPATIENT
Start: 2023-07-18 | End: 2023-07-22 | Stop reason: HOSPADM

## 2023-07-18 RX ORDER — METHYLPREDNISOLONE SODIUM SUCCINATE 40 MG/ML
40 INJECTION, POWDER, LYOPHILIZED, FOR SOLUTION INTRAMUSCULAR; INTRAVENOUS EVERY 8 HOURS
Status: DISCONTINUED | OUTPATIENT
Start: 2023-07-18 | End: 2023-07-19

## 2023-07-18 RX ORDER — ONDANSETRON 2 MG/ML
4 INJECTION INTRAMUSCULAR; INTRAVENOUS EVERY 4 HOURS PRN
Status: DISCONTINUED | OUTPATIENT
Start: 2023-07-18 | End: 2023-07-22 | Stop reason: HOSPADM

## 2023-07-18 RX ORDER — METOPROLOL TARTRATE 50 MG/1
100 TABLET, FILM COATED ORAL EVERY 12 HOURS SCHEDULED
Status: DISCONTINUED | OUTPATIENT
Start: 2023-07-18 | End: 2023-07-22 | Stop reason: HOSPADM

## 2023-07-18 RX ORDER — LEVALBUTEROL INHALATION SOLUTION 1.25 MG/3ML
1.25 SOLUTION RESPIRATORY (INHALATION)
Status: DISCONTINUED | OUTPATIENT
Start: 2023-07-18 | End: 2023-07-18

## 2023-07-18 RX ORDER — DOCUSATE SODIUM 100 MG/1
100 CAPSULE, LIQUID FILLED ORAL 2 TIMES DAILY PRN
Status: DISCONTINUED | OUTPATIENT
Start: 2023-07-18 | End: 2023-07-22 | Stop reason: HOSPADM

## 2023-07-18 RX ORDER — SODIUM CHLORIDE FOR INHALATION 0.9 %
3 VIAL, NEBULIZER (ML) INHALATION
Status: DISCONTINUED | OUTPATIENT
Start: 2023-07-18 | End: 2023-07-18

## 2023-07-18 RX ADMIN — CLONIDINE HYDROCHLORIDE 0.2 MG: 0.1 TABLET ORAL at 14:38

## 2023-07-18 RX ADMIN — ALUMINUM HYDROXIDE, MAGNESIUM HYDROXIDE, AND SIMETHICONE 30 ML: 200; 200; 20 SUSPENSION ORAL at 14:34

## 2023-07-18 RX ADMIN — HEPARIN SODIUM 5000 UNITS: 5000 INJECTION INTRAVENOUS; SUBCUTANEOUS at 14:38

## 2023-07-18 RX ADMIN — INSULIN LISPRO 1 UNITS: 100 INJECTION, SOLUTION INTRAVENOUS; SUBCUTANEOUS at 12:44

## 2023-07-18 RX ADMIN — METOPROLOL TARTRATE 100 MG: 50 TABLET, FILM COATED ORAL at 14:38

## 2023-07-18 RX ADMIN — LORAZEPAM 0.5 MG: 0.5 TABLET ORAL at 17:26

## 2023-07-18 RX ADMIN — LEVALBUTEROL HYDROCHLORIDE 1.25 MG: 1.25 SOLUTION RESPIRATORY (INHALATION) at 19:59

## 2023-07-18 RX ADMIN — PIOGLITAZONE HYDROCHLORIDE 15 MG: 15 TABLET ORAL at 14:38

## 2023-07-18 RX ADMIN — METHYLPREDNISOLONE SODIUM SUCCINATE 80 MG: 125 INJECTION, POWDER, FOR SOLUTION INTRAMUSCULAR; INTRAVENOUS at 10:12

## 2023-07-18 RX ADMIN — METHYLPREDNISOLONE SODIUM SUCCINATE 40 MG: 40 INJECTION, POWDER, FOR SOLUTION INTRAMUSCULAR; INTRAVENOUS at 17:26

## 2023-07-18 RX ADMIN — IPRATROPIUM BROMIDE AND ALBUTEROL SULFATE 3 ML: .5; 3 SOLUTION RESPIRATORY (INHALATION) at 10:13

## 2023-07-18 RX ADMIN — CITALOPRAM HYDROBROMIDE 10 MG: 10 TABLET ORAL at 14:38

## 2023-07-18 RX ADMIN — LISINOPRIL 40 MG: 20 TABLET ORAL at 14:38

## 2023-07-18 RX ADMIN — IPRATROPIUM BROMIDE 0.5 MG: 0.5 SOLUTION RESPIRATORY (INHALATION) at 19:59

## 2023-07-18 RX ADMIN — BUDESONIDE 0.5 MG: 0.5 INHALANT ORAL at 19:59

## 2023-07-18 RX ADMIN — INSULIN LISPRO 5 UNITS: 100 INJECTION, SOLUTION INTRAVENOUS; SUBCUTANEOUS at 16:09

## 2023-07-18 RX ADMIN — METOPROLOL TARTRATE 100 MG: 50 TABLET, FILM COATED ORAL at 22:15

## 2023-07-18 RX ADMIN — CEFTRIAXONE 1000 MG: 1 INJECTION, SOLUTION INTRAVENOUS at 14:39

## 2023-07-18 RX ADMIN — HEPARIN SODIUM 5000 UNITS: 5000 INJECTION INTRAVENOUS; SUBCUTANEOUS at 22:15

## 2023-07-18 RX ADMIN — ALBUTEROL SULFATE 2 PUFF: 90 AEROSOL, METERED RESPIRATORY (INHALATION) at 18:03

## 2023-07-18 RX ADMIN — CLONIDINE HYDROCHLORIDE 0.2 MG: 0.1 TABLET ORAL at 22:15

## 2023-07-18 RX ADMIN — ATORVASTATIN CALCIUM 40 MG: 40 TABLET, FILM COATED ORAL at 22:15

## 2023-07-18 RX ADMIN — AMLODIPINE BESYLATE 10 MG: 10 TABLET ORAL at 22:15

## 2023-07-18 NOTE — ASSESSMENT & PLAN NOTE
Lab Results   Component Value Date    HGBA1C 6.9 (H) 02/10/2023     Recent Labs     07/18/23  1206 07/18/23  1552   POCGLU 163* 321*     · Prior to admission on glipizide XL and pioglitazone. Anticipate steroid-induced hyperglycemia.     · Continue both and add sliding scale coverage

## 2023-07-18 NOTE — H&P
[facility]  H&P  Name: Uche Hernandez 79 y.o. female I MRN: [id number]  Unit/Bed#: -01 I Date of Admission: 7/18/2023   Date of Service: 7/18/2023 I Hospital Day: 0      Assessment/Plan   * COPD exacerbation ([address])  Assessment & Plan  History of COPD chronically on 2 L still smoking hypertension diabetes and anxiety presents with worsening shortness of breath found to have COPD exacerbation  · No infiltrates on CXR  · Continue IV methylprednisolone and bronchodilators  · Ceftriaxone for possibility of tracheobronchitis  · Advise smoking cessation. She states that she will stop after this hospitalization. Will provide nicotine patch. Chronic hyponatremia  Assessment & Plan  · Acute on chronic hyponatremia may be secondary to active lung process  · Most recent TSH within limits. · Check CT chest to rule out malignancy given active smoking. Last CT screening test was in February 2020 at [facility]  · Recheck at 6 PM and in a.m. Results from last 7 days   Lab Units 07/18/23  1007 07/16/23  1833   SODIUM mmol/L 126* 128*       Anxiety about health  Assessment & Plan  · More anxious than usual.  Add lorazepam.    · Continue citalopram.    Type 2 diabetes mellitus without complication, without long-term current use of insulin [facility])  Assessment & Plan  Lab Results   Component Value Date    HGBA1C 6.9 (H) 02/10/2023     Recent Labs     07/18/23  1206 07/18/23  1552   POCGLU 163* 321*     · Prior to admission on glipizide XL and pioglitazone. Anticipate steroid-induced hyperglycemia. · Continue both and add sliding scale coverage    Essential hypertension  Assessment & Plan  · Continue amlodipine clonidine lisinopril and metoprolol    Tobacco abuse  Assessment & Plan  · Advised smoking cessation. Nicotine patch ordered. VTE Pharmacologic Prophylaxis: VTE Score: 3 Moderate Risk (Score 3-4) - Pharmacological DVT Prophylaxis Ordered: heparin.   Code Status: Level 1 - Full Code  Discussion with family:     Anticipated Length of Stay: Patient will be admitted on an observation basis with an anticipated length of stay of less than 2 midnights secondary to COPD exacerbation with hyponatremia. Total Time Spent on Date of Encounter in care of patient: This time was spent on one or more of the following: performing physical exam; counseling and coordination of care; obtaining or reviewing history; documenting in the medical record; reviewing/ordering tests, medications or procedures; communicating with other healthcare professionals and discussing with patient's family/caregivers. Chief Complaint:     Shortness of Breath (Patient has worsening sob since last visit on Sunday. Patient wears 2.5L O2 chronically for COPD. )    History of Present Illness:    Pearl Estes is a 79 y.o. female with a past medical history of COPD chronically on 2 L anxiety diabetes and hypertension who presents with worsening shortness of breath. The patient reports since Sunday she has had worsening shortness of breath. She was seen in the ED on 7/16/2023 and discharged home. Since then she remains symptomatic. She came back to the emergency department where she was found to have hyponatremia as well prompting admission. She denies any nausea vomiting diarrhea fevers or chills. She denies enough water intake. She does have some GI upset. Review of Systems:  Review of Systems   Constitutional: Negative for chills and fever. HENT: Negative for facial swelling and trouble swallowing. Eyes: Negative for visual disturbance. Respiratory: Positive for shortness of breath and wheezing. Cardiovascular: Negative for chest pain and palpitations. Gastrointestinal: Negative for abdominal distention, abdominal pain and vomiting. Genitourinary: Negative for dysuria and hematuria. Musculoskeletal: Negative for myalgias. Skin: Negative for rash.    Neurological: Negative for speech difficulty and numbness. Psychiatric/Behavioral: Negative for agitation. The patient is nervous/anxious. All other systems reviewed and are negative. Past Medical and Surgical History:   Past Medical History:   Diagnosis Date   • Anxiety    • COPD (chronic obstructive pulmonary disease) ([address])    • Diabetes mellitus ([address])    • Essential (primary) hypertension    • GERD (gastroesophageal reflux disease)    • Smoker      History reviewed. No pertinent surgical history. Meds/Allergies: Allergies: Allergies   Allergen Reactions   • Clindamycin      Prior to Admission Medications   Prescriptions Last Dose Informant Patient Reported? Taking? albuterol (2.5 mg/3 mL) 0.083 % nebulizer solution 2023  No Yes   Sig: Take 3 mL (2.5 mg total) by nebulization every 6 (six) hours as needed for wheezing or shortness of breath   amLODIPine (NORVASC) 10 mg tablet 2023  Yes Yes   Sig: Take 10 mg by mouth daily at bedtime    atorvastatin (LIPITOR) 40 mg tablet 2023  Yes Yes   Sig: Take 40 mg by mouth daily at bedtime    budesonide (PULMICORT) 0.5 mg/2 mL nebulizer solution 2023  Yes Yes   Sig: Take 0.5 mg by nebulization 2 (two) times a day Rinse mouth after use.    citalopram (CeleXA) 10 mg tablet 2023  Yes Yes   Sig: Take 10 mg by mouth daily   cloNIDine (CATAPRES) 0.2 mg tablet 2023  Yes Yes   Sig: Take 0.2 mg by mouth every 12 (twelve) hours   ergocalciferol (VITAMIN D2) 50,000 units 2023  Yes Yes   Sig: Take 50,000 Units by mouth once a week   fluticasone (FLONASE) 50 mcg/act nasal spray 2023  No Yes   Si spray into each nostril daily   glipiZIDE (GLUCOTROL) 10 mg tablet 2023 Self Yes Yes   Sig: Take 10 mg by mouth daily in the early morning W breakfast   ipratropium-albuterol (COMBIVENT RESPIMAT) inhaler 2023  Yes Yes   Sig: Inhale 1 puff 4 (four) times a day   lisinopril (ZESTRIL) 40 mg tablet 2023  Yes Yes   Sig: Take 40 mg by mouth daily   metoprolol tartrate (LOPRESSOR) 100 mg tablet 7/17/2023  Yes Yes   Sig: Take 100 mg by mouth every 12 (twelve) hours   omeprazole (PriLOSEC OTC) 20 MG tablet 7/17/2023  Yes Yes   Sig: Take 20 mg by mouth daily    pioglitazone (ACTOS) 15 mg tablet 7/17/2023  Yes Yes   Sig: Take 15 mg by mouth daily   tiotropium (SPIRIVA) 18 mcg inhalation capsule 7/17/2023  Yes Yes   Sig: Place 18 mcg into inhaler and inhale daily      Facility-Administered Medications: None     Social History:     Social History     Socioeconomic History   • Marital status:      Spouse name: Not on file   • Number of children: Not on file   • Years of education: Not on file   • Highest education level: Not on file   Occupational History   • Not on file   Tobacco Use   • Smoking status: Every Day     Packs/day: 0.25     Types: Cigarettes   • Smokeless tobacco: Never   Vaping Use   • Vaping Use: Never used   Substance and Sexual Activity   • Alcohol use: Not Currently   • Drug use: Never   • Sexual activity: Not on file   Other Topics Concern   • Not on file   Social History Narrative   • Not on file     Social Determinants of Health     Financial Resource Strain: Not on file   Food Insecurity: No Food Insecurity (6/26/2023)    Hunger Vital Sign    • Worried About Running Out of Food in the Last Year: Never true    • Ran Out of Food in the Last Year: Never true   Transportation Needs: No Transportation Needs (6/26/2023)    PRAPARE - Transportation    • Lack of Transportation (Medical): No    • Lack of Transportation (Non-Medical):  No   Physical Activity: Not on file   Stress: Not on file   Social Connections: Not on file   Intimate Partner Violence: Not on file   Housing Stability: Low Risk  (6/26/2023)    Housing Stability Vital Sign    • Unable to Pay for Housing in the Last Year: No    • Number of Places Lived in the Last Year: 1    • Unstable Housing in the Last Year: No     Patient Pre-hospital Living Situation:   Patient Pre-hospital Level of Mobility: Patient Pre-hospital Diet Restrictions:     Family History:  History reviewed. No pertinent family history. Physical Exam:   Vitals:   Blood Pressure: 143/77 (07/18/23 1158)  Pulse: 77 (07/18/23 1158)  Temperature: 97.9 °F (36.6 °C) (07/18/23 1158)  Temp Source: Temporal (07/18/23 1158)  Respirations: 20 (07/18/23 1158)  Height: 5' 2.99" (160 cm) (07/18/23 1158)  Weight - Scale: 68.8 kg (151 lb 10.8 oz) (07/18/23 1218)  SpO2: 95 % (07/18/23 1158)    Physical Exam  Vitals reviewed. Constitutional:       General: She is not in acute distress. Appearance: Normal appearance. HENT:      Head: Atraumatic. Mouth/Throat:      Mouth: Mucous membranes are moist.   Eyes:      General: No scleral icterus. Extraocular Movements: Extraocular movements intact. Cardiovascular:      Rate and Rhythm: Normal rate and regular rhythm. Heart sounds: Normal heart sounds. Pulmonary:      Breath sounds: Decreased breath sounds present. No wheezing. Abdominal:      General: Bowel sounds are normal.      Palpations: Abdomen is soft. Tenderness: There is no guarding or rebound. Musculoskeletal:         General: No swelling. Cervical back: Normal range of motion. Skin:     General: Skin is warm. Neurological:      Mental Status: She is alert and oriented to person, place, and time. Motor: Weakness present. Psychiatric:         Mood and Affect: Mood is anxious. Lab Results: I have personally reviewed pertinent reports.     Results from last 7 days   Lab Units 07/18/23  1007   WBC Thousand/uL 8.33   HEMOGLOBIN g/dL 13.7   HEMATOCRIT % 40.8   PLATELETS Thousands/uL 209   NEUTROS PCT % 73   LYMPHS PCT % 17   MONOS PCT % 10   EOS PCT % 0     Results from last 7 days   Lab Units 07/18/23  1007 07/16/23  1833   SODIUM mmol/L 126* 128*   POTASSIUM mmol/L 4.0 4.2   CHLORIDE mmol/L 86* 87*   CO2 mmol/L 33* 34*   ANION GAP mmol/L 7 7   BUN mg/dL 7 5   CREATININE mg/dL 0.70 0.69   CALCIUM mg/dL 9.8 9. 8   ALBUMIN g/dL 4.4 4.2   TOTAL BILIRUBIN mg/dL 0.44 0.47   ALK PHOS U/L 85 76   ALT U/L 10 9   AST U/L 12* 11*   EGFR ml/min/1.73sq m 90 91   GLUCOSE RANDOM mg/dL 135 108             Results from last 7 days   Lab Units 07/18/23  1230 07/18/23  1007   HS TNI 0HR ng/L  --  4   HS TNI 2HR ng/L 4  --          Results from last 7 days   Lab Units 07/18/23  1007   D-DIMER QUANTITATIVE ug/ml FEU 0.35          Results from last 7 days   Lab Units 07/16/23  1908   COLOR UA  Yellow   CLARITY UA  Clear   SPEC GRAV UA  <=1.005   PH UA  6.5   LEUKOCYTES UA  Negative   NITRITE UA  Negative   GLUCOSE UA mg/dl Negative   KETONES UA mg/dl Negative   BILIRUBIN UA  Negative   BLOOD UA  Negative           Results from last 7 days   Lab Units 07/18/23  1007   SARS-COV-2  Negative       Lines/Drains  Invasive Devices     Peripheral Intravenous Line  Duration           Peripheral IV 07/18/23 Right Antecubital <1 day                Imaging: I have personally reviewed pertinent films in PACS  XR chest 2 views    Result Date: 7/18/2023  Impression: No acute cardiopulmonary disease. Findings are stable Workstation performed: [id number]       EKG, Pathology, and Other Studies Reviewed on Admission:   EKG  Result Date: 07/18/23  Personally reviewed strips with impression of: Normal sinus rhythm 76 bpm    ** Please Note: This note has been constructed using a voice recognition system.  **

## 2023-07-18 NOTE — ASSESSMENT & PLAN NOTE
· Acute on chronic hyponatremia may be secondary to active lung process  · Most recent TSH within limits. · Check CT chest to rule out malignancy given active smoking. Last CT screening test was in February 2020 at [facility] AT THE [facility]  · Recheck at 6 PM and in a.m.     Results from last 7 days   Lab Units 07/18/23  1007 07/16/23  1833   SODIUM mmol/L 126* 128*

## 2023-07-18 NOTE — ED PROVIDER NOTES
History  Chief Complaint   Patient presents with   • Shortness of Breath     Patient has worsening sob since last visit on . Patient wears 2.5L O2 chronically for COPD. History provided by:  Medical records and patient  Shortness of Breath  Severity:  Mild  Onset quality:  Gradual  Duration:  3 days  Timing:  Constant  Progression:  Unchanged  Chronicity:  New  Context comment:  History of COPD, chronic 2 L nasal cannula oxygen use, still smoking, 3 days of increased work of breathing and cough. Relieved by:  Nothing  Worsened by:  Nothing  Ineffective treatments:  Oxygen  Associated symptoms: cough    Associated symptoms: no abdominal pain, no chest pain, no ear pain, no fever, no headaches, no rash, no sore throat and no vomiting    Risk factors: tobacco use        Prior to Admission Medications   Prescriptions Last Dose Informant Patient Reported? Taking? albuterol (2.5 mg/3 mL) 0.083 % nebulizer solution   No No   Sig: Take 3 mL (2.5 mg total) by nebulization every 6 (six) hours as needed for wheezing or shortness of breath   amLODIPine (NORVASC) 10 mg tablet   Yes No   Sig: Take 10 mg by mouth daily at bedtime    atorvastatin (LIPITOR) 40 mg tablet   Yes No   Sig: Take 40 mg by mouth daily at bedtime    budesonide (PULMICORT) 0.5 mg/2 mL nebulizer solution   Yes No   Sig: Take 0.5 mg by nebulization 2 (two) times a day Rinse mouth after use.    citalopram (CeleXA) 10 mg tablet   Yes No   Sig: Take 10 mg by mouth daily   cloNIDine (CATAPRES) 0.1 mg tablet   Yes No   Sig: Take 0.1 mg by mouth every 12 (twelve) hours   ergocalciferol (VITAMIN D2) 50,000 units   Yes No   Sig: Take 50,000 Units by mouth once a week   fluticasone (FLONASE) 50 mcg/act nasal spray   No No   Si spray into each nostril daily   glipiZIDE (GLUCOTROL) 10 mg tablet  Self Yes No   Sig: Take 10 mg by mouth daily in the early morning W breakfast   ipratropium-albuterol (COMBIVENT RESPIMAT) inhaler   Yes No   Sig: Inhale 1 puff 4 (four) times a day   lisinopril (ZESTRIL) 40 mg tablet   Yes No   Sig: Take 40 mg by mouth daily   metoprolol tartrate (LOPRESSOR) 100 mg tablet   Yes No   Sig: Take 100 mg by mouth every 12 (twelve) hours   omeprazole (PriLOSEC OTC) 20 MG tablet   Yes No   Sig: Take 20 mg by mouth daily    pioglitazone (ACTOS) 15 mg tablet   Yes No   Sig: Take 15 mg by mouth daily   tiotropium (SPIRIVA) 18 mcg inhalation capsule   Yes No   Sig: Place 18 mcg into inhaler and inhale daily      Facility-Administered Medications: None       Past Medical History:   Diagnosis Date   • Anxiety    • COPD (chronic obstructive pulmonary disease) ([facility])    • Diabetes mellitus ([address])    • GERD (gastroesophageal reflux disease)    • Smoker        History reviewed. No pertinent surgical history. History reviewed. No pertinent family history. I have reviewed and agree with the history as documented. E-Cigarette/Vaping   • E-Cigarette Use Never User      E-Cigarette/Vaping Substances     Social History     Tobacco Use   • Smoking status: Every Day     Packs/day: 0.25     Types: Cigarettes   • Smokeless tobacco: Never   Vaping Use   • Vaping Use: Never used   Substance Use Topics   • Alcohol use: Not Currently   • Drug use: Never       Review of Systems   Constitutional: Negative for chills, fatigue and fever. Flushed   HENT: Negative for ear discharge, ear pain, rhinorrhea and sore throat. Eyes: Negative for pain and visual disturbance. Respiratory: Positive for cough and shortness of breath. Cardiovascular: Negative for chest pain and palpitations. Gastrointestinal: Negative for abdominal pain, diarrhea, nausea and vomiting. Endocrine: Negative for polydipsia, polyphagia and polyuria. Genitourinary: Negative for difficulty urinating, dysuria, flank pain and hematuria. Musculoskeletal: Negative for arthralgias and back pain. Skin: Negative for color change and rash.    Allergic/Immunologic: Negative for immunocompromised state. Neurological: Negative for dizziness, seizures, syncope, weakness and headaches. Psychiatric/Behavioral: Negative for confusion and self-injury. The patient is not nervous/anxious. All other systems reviewed and are negative. Physical Exam  Physical Exam  Vitals and nursing note reviewed. Constitutional:       General: She is not in acute distress. Appearance: Normal appearance. She is not ill-appearing, toxic-appearing or diaphoretic. HENT:      Head: Normocephalic and atraumatic. Nose: Nose normal. No congestion or rhinorrhea. Mouth/Throat:      Mouth: Mucous membranes are moist.      Pharynx: Oropharynx is clear. No oropharyngeal exudate or posterior oropharyngeal erythema. Eyes:      General:         Right eye: No discharge. Left eye: No discharge. Cardiovascular:      Rate and Rhythm: Normal rate and regular rhythm. Pulses: Normal pulses. Heart sounds: Normal heart sounds. No murmur heard. No gallop. Pulmonary:      Effort: Pulmonary effort is normal. Tachypnea present. No respiratory distress. Breath sounds: No stridor. Examination of the right-lower field reveals decreased breath sounds and rales. Examination of the left-lower field reveals decreased breath sounds and rales. Decreased breath sounds and rales present. No wheezing or rhonchi. Chest:      Chest wall: No tenderness. Abdominal:      General: Bowel sounds are normal. There is no distension. Palpations: Abdomen is soft. There is no mass. Tenderness: There is no abdominal tenderness. There is no right CVA tenderness, left CVA tenderness, guarding or rebound. Hernia: No hernia is present. Musculoskeletal:         General: Normal range of motion. Cervical back: Normal range of motion and neck supple. Skin:     General: Skin is warm and dry. Capillary Refill: Capillary refill takes less than 2 seconds.    Neurological:      General: No focal deficit present. Mental Status: She is alert and oriented to person, place, and time. Cranial Nerves: No cranial nerve deficit. Sensory: No sensory deficit. Motor: No weakness. Coordination: Coordination normal.      Gait: Gait normal.      Deep Tendon Reflexes: Reflexes normal.   Psychiatric:         Mood and Affect: Mood normal.         Behavior: Behavior normal.         Thought Content:  Thought content normal.         Judgment: Judgment normal.         Vital Signs  ED Triage Vitals [07/18/23 0956]   Temperature Pulse Respirations Blood Pressure SpO2   (!) 97.4 °F (36.3 °C) 82 (!) 25 167/74 90 %      Temp Source Heart Rate Source Patient Position - Orthostatic VS BP Location FiO2 (%)   Temporal Monitor Lying Left arm --      Pain Score       --           Vitals:    07/18/23 1045 07/18/23 1100 07/18/23 1115 07/18/23 1130   BP:       Pulse: 79 77 79 87   Patient Position - Orthostatic VS:             Visual Acuity      ED Medications  Medications   insulin lispro (HumaLOG) 100 units/mL subcutaneous injection 1-6 Units (has no administration in time range)   ipratropium-albuterol (DUO-NEB) 0.5-2.5 mg/3 mL inhalation solution 3 mL (3 mL Nebulization Given 7/18/23 1013)   methylPREDNISolone sodium succinate (Solu-MEDROL) injection 80 mg (80 mg Intravenous Given 7/18/23 1012)       Diagnostic Studies  Results Reviewed     Procedure Component Value Units Date/Time    Comprehensive metabolic panel [[id number]]  (Abnormal) Collected: 07/18/23 1007    Lab Status: Final result Specimen: Blood from Arm, Right Updated: 07/18/23 1118     Sodium 126 mmol/L      Potassium 4.0 mmol/L      Chloride 86 mmol/L      CO2 33 mmol/L      ANION GAP 7 mmol/L      BUN 7 mg/dL      Creatinine 0.70 mg/dL      Glucose 135 mg/dL      Calcium 9.8 mg/dL      AST 12 U/L      ALT 10 U/L      Alkaline Phosphatase 85 U/L      Total Protein 7.9 g/dL      Albumin 4.4 g/dL      Total Bilirubin 0.44 mg/dL      eGFR 90 ml/min/1.73sq m     Narrative:      [facility] Disease (CKD):   •  Stage 1 with normal or high GFR (GFR > 90 mL/min/1.73 square meters)  •  Stage 2 Mild CKD (GFR = 60-89 mL/min/1.73 square meters)  •  Stage 3A Moderate CKD (GFR = 45-59 mL/min/1.73 square meters)  •  Stage 3B Moderate CKD (GFR = 30-44 mL/min/1.73 square meters)  •  Stage 4 Severe CKD (GFR = 15-29 mL/min/1.73 square meters)  •  Stage 5 End Stage CKD (GFR <15 mL/min/1.73 square meters)  Note: GFR calculation is accurate only with a steady state creatinine    TSH [[id number]] Collected: 07/16/23 1833    Lab Status: In process Specimen: Blood from Arm, Right Updated: 07/18/23 1116    COVID only [[id number]]  (Normal) Collected: 07/18/23 1007    Lab Status: Final result Specimen: Nares from Nose Updated: 07/18/23 1048     SARS-CoV-2 Negative    Narrative:      FOR PEDIATRIC PATIENTS - copy/paste COVID Guidelines URL to browser: [email or web address]    SARS-CoV-2 assay is a Nucleic Acid Amplification assay intended for the  qualitative detection of nucleic acid from SARS-CoV-2 in nasopharyngeal  swabs. Results are for the presumptive identification of SARS-CoV-2 RNA. Positive results are indicative of infection with SARS-CoV-2, the virus  causing COVID-19, but do not rule out bacterial infection or co-infection  with other viruses. Laboratories within the [facility] and its  territories are required to report all positive results to the appropriate  public health authorities. Negative results do not preclude SARS-CoV-2  infection and should not be used as the sole basis for treatment or other  patient management decisions. Negative results must be combined with  clinical observations, patient history, and epidemiological information. This test has not been FDA cleared or approved.     This test has been authorized by FDA under an Emergency Use Authorization  (EUA). This test is only authorized for the duration of time the  declaration that circumstances exist justifying the authorization of the  emergency use of an in vitro diagnostic tests for detection of SARS-CoV-2  virus and/or diagnosis of COVID-19 infection under section 564(b)(1) of  the Act, 21 U. S.C. 163KKG-6(D)(7), unless the authorization is terminated  or revoked sooner. The test has been validated but independent review by FDA  and CLIA is pending. Test performed using [employer] GeneXpert: This RT-PCR assay targets N2,  a region unique to SARS-CoV-2. A conserved region in the E-gene was chosen  for pan-Sarbecovirus detection which includes SARS-CoV-2. According to CMS-2020-01-R, this platform meets the definition of high-throughput technology. HS Troponin I 2hr [[id number]]     Lab Status: No result Specimen: Blood     HS Troponin 0hr (reflex protocol) [[id number]]  (Normal) Collected: 07/18/23 1007    Lab Status: Final result Specimen: Blood from Arm, Right Updated: 07/18/23 1044     hs TnI 0hr 4 ng/L     B-Type Natriuretic Peptide(BNP) [[id number]]  (Normal) Collected: 07/18/23 1007    Lab Status: Final result Specimen: Blood from Arm, Right Updated: 07/18/23 1040     BNP 26 pg/mL     D-dimer, quantitative [[id number]]  (Normal) Collected: 07/18/23 1007    Lab Status: Final result Specimen: Blood from Arm, Right Updated: 07/18/23 1031     D-Dimer, Quant 0.35 ug/ml FEU     Narrative: In the evaluation for possible pulmonary embolism, in the appropriate (Well's Score of 4 or less) patient, the age adjusted d-dimer cutoff for this patient can be calculated as:    Age x 0.01 (in ug/mL) for Age-adjusted D-dimer exclusion threshold for a patient over 50 years.     Blood gas, venous [[id number]]  (Abnormal) Collected: 07/18/23 1007    Lab Status: Final result Specimen: Blood from Arm, Right Updated: 07/18/23 1016     pH, Jose 7.349     pCO2, Jose 59.6 mm Hg      pO2, Jose 31.1 mm Hg      HCO3, Jose 32.1 mmol/L      Base Excess, Jose 4.6 mmol/L      O2 Content, Jose 12.2 ml/dL      O2 HGB, VENOUS 58.4 %     CBC and differential [[id number]] Collected: 07/18/23 1007    Lab Status: Final result Specimen: Blood from Arm, Right Updated: 07/18/23 1015     WBC 8.33 Thousand/uL      RBC 4.61 Million/uL      Hemoglobin 13.7 g/dL      Hematocrit 40.8 %      MCV 89 fL      MCH 29.7 pg      MCHC 33.6 g/dL      RDW 12.3 %      MPV 10.0 fL      Platelets 721 Thousands/uL      nRBC 0 /100 WBCs      Neutrophils Relative 73 %      Immat GRANS % 0 %      Lymphocytes Relative 17 %      Monocytes Relative 10 %      Eosinophils Relative 0 %      Basophils Relative 0 %      Neutrophils Absolute 6.07 Thousands/µL      Immature Grans Absolute 0.03 Thousand/uL      Lymphocytes Absolute 1.40 Thousands/µL      Monocytes Absolute 0.79 Thousand/µL      Eosinophils Absolute 0.03 Thousand/µL      Basophils Absolute 0.01 Thousands/µL                  XR chest 2 views    (Results Pending)              Procedures  Procedures         ED Course                                             Medical Decision Making  6351: Patient appears chronically ill, vital signs reviewed. Patient has a history of COPD. Concerns for mild exacerbation along with anxiety. In no acute distress. Chronic oxygen use at home. Recent work-up 2 days prior to arrival was reviewed. No DVT stigmata however plan to complete basic labs including cardiac enzymes, D-dimer, check EKG and chest x-ray. I will give DuoNeb and Solu-Medrol, reevaluate. 1051: Chest x-ray and labs reviewed. Patient feels improved with above care. Given worsening hyponatremia and ongoing COPD exacerbation plan to consult medicine for observational admission. COPD exacerbation ([address]): acute illness or injury  Hyponatremia: acute illness or injury  Amount and/or Complexity of Data Reviewed  External Data Reviewed: labs. Details: sodium 128  Labs: ordered.   Radiology: ordered and independent interpretation performed. Details: Chest x-ray hyperinflated, no acute process  ECG/medicine tests: ordered and independent interpretation performed. Details: Normal sinus rhythm 76 bpm, no acute ischemia      Risk  Prescription drug management. Decision regarding hospitalization. Disposition  Final diagnoses:   COPD exacerbation ([address])   Hyponatremia     Time reflects when diagnosis was documented in both MDM as applicable and the Disposition within this note     Time User Action Codes Description Comment    7/18/2023 10:49 AM Cyrilla Bitters Add [J44.1] COPD exacerbation ([address])     7/18/2023 10:49 AM Cyrilla Bitters Add [E87.1] Hyponatremia       ED Disposition     ED Disposition   Admit    Condition   Stable    Date/Time   Tue Jul 18, 2023 10:49 AM    Comment              Follow-up Information    None         Patient's Medications   Discharge Prescriptions    No medications on file       No discharge procedures on file.     PDMP Review       Value Time User    PDMP Reviewed  Yes 3/30/2021  1:09 PM Zeny Gupta MD          ED Provider  Electronically Signed by           Estefany Martin MD  07/18/23 8828

## 2023-07-18 NOTE — ED NOTES
Nate txt sent to [facility] charge RN. Pt tro be transported to unit.  No s/s of distress, Vs stable, A&Ox4     Lala Davila RN  07/18/23 2025

## 2023-07-18 NOTE — PLAN OF CARE
Problem: MOBILITY - ADULT  Goal: Maintain or return to baseline ADL function  Description: INTERVENTIONS:  -  Assess patient's ability to carry out ADLs; assess patient's baseline for ADL function and identify physical deficits which impact ability to perform ADLs (bathing, care of mouth/teeth, toileting, grooming, dressing, etc.)  - Assess/evaluate cause of self-care deficits   - Assess range of motion  - Assess patient's mobility; develop plan if impaired  - Assess patient's need for assistive devices and provide as appropriate  - Encourage maximum independence but intervene and supervise when necessary  - Involve family in performance of ADLs  - Assess for home care needs following discharge   - Consider OT consult to assist with ADL evaluation and planning for discharge  - Provide patient education as appropriate  Outcome: Progressing  Goal: Maintains/Returns to pre admission functional level  Description: INTERVENTIONS:  - Perform BMAT or MOVE assessment daily.   - Set and communicate daily mobility goal to care team and patient/family/caregiver. - Collaborate with rehabilitation services on mobility goals if consulted  - Perform Range of Motion 3 times a day. - Reposition patient every 2 hours.   - Dangle patient 3 times a day  - Stand patient 3 times a day  - Ambulate patient 3 times a day  - Out of bed to chair 3 times a day   - Out of bed for meals 3 times a day  - Out of bed for toileting  - Record patient progress and toleration of activity level   Outcome: Progressing     Problem: PAIN - ADULT  Goal: Verbalizes/displays adequate comfort level or baseline comfort level  Description: Interventions:  - Encourage patient to monitor pain and request assistance  - Assess pain using appropriate pain scale  - Administer analgesics based on type and severity of pain and evaluate response  - Implement non-pharmacological measures as appropriate and evaluate response  - Consider cultural and social influences on pain and pain management  - Notify physician/advanced practitioner if interventions unsuccessful or patient reports new pain  Outcome: Progressing     Problem: INFECTION - ADULT  Goal: Absence or prevention of progression during hospitalization  Description: INTERVENTIONS:  - Assess and monitor for signs and symptoms of infection  - Monitor lab/diagnostic results  - Monitor all insertion sites, i.e. indwelling lines, tubes, and drains  - Monitor endotracheal if appropriate and nasal secretions for changes in amount and color  - [place] appropriate cooling/warming therapies per order  - Administer medications as ordered  - Instruct and encourage patient and family to use good hand hygiene technique  - Identify and instruct in appropriate isolation precautions for identified infection/condition  Outcome: Progressing  Goal: Absence of fever/infection during neutropenic period  Description: INTERVENTIONS:  - Monitor WBC    Outcome: Progressing     Problem: SAFETY ADULT  Goal: Maintain or return to baseline ADL function  Description: INTERVENTIONS:  -  Assess patient's ability to carry out ADLs; assess patient's baseline for ADL function and identify physical deficits which impact ability to perform ADLs (bathing, care of mouth/teeth, toileting, grooming, dressing, etc.)  - Assess/evaluate cause of self-care deficits   - Assess range of motion  - Assess patient's mobility; develop plan if impaired  - Assess patient's need for assistive devices and provide as appropriate  - Encourage maximum independence but intervene and supervise when necessary  - Involve family in performance of ADLs  - Assess for home care needs following discharge   - Consider OT consult to assist with ADL evaluation and planning for discharge  - Provide patient education as appropriate  Outcome: Progressing  Goal: Maintains/Returns to pre admission functional level  Description: INTERVENTIONS:  - Perform BMAT or MOVE assessment daily.   - Set and communicate daily mobility goal to care team and patient/family/caregiver. - Collaborate with rehabilitation services on mobility goals if consulted  - Perform Range of Motion 3 times a day. - Reposition patient every 2 hours.   - Dangle patient 3 times a day  - Stand patient 3 times a day  - Ambulate patient 3 times a day  - Out of bed to chair 3 times a day   - Out of bed for meals 3 times a day  - Out of bed for toileting  - Record patient progress and toleration of activity level   Outcome: Progressing  Goal: Patient will remain free of falls  Description: INTERVENTIONS:  - Educate patient/family on patient safety including physical limitations  - Instruct patient to call for assistance with activity   - Consult OT/PT to assist with strengthening/mobility   - Keep Call bell within reach  - Keep bed low and locked with side rails adjusted as appropriate  - Keep care items and personal belongings within reach  - Initiate and maintain comfort rounds  - Make Fall Risk Sign visible to staff  - Offer Toileting every 2 Hours, in advance of need  - Initiate/Maintain bed alarm  - Obtain necessary fall risk management equipment: walker  - Apply yellow socks and bracelet for high fall risk patients  - Consider moving patient to room near nurses station  Outcome: Progressing     Problem: DISCHARGE PLANNING  Goal: Discharge to home or other facility with appropriate resources  Description: INTERVENTIONS:  - Identify barriers to discharge w/patient and caregiver  - Arrange for needed discharge resources and transportation as appropriate  - Identify discharge learning needs (meds, wound care, etc.)  - Arrange for interpretive services to assist at discharge as needed  - Refer to Case Management Department for coordinating discharge planning if the patient needs post-hospital services based on physician/advanced practitioner order or complex needs related to functional status, cognitive ability, or social support system  Outcome: Progressing

## 2023-07-19 PROBLEM — J96.10 CHRONIC RESPIRATORY FAILURE (HCC): Status: ACTIVE | Noted: 2020-11-02

## 2023-07-19 LAB
ALBUMIN SERPL BCP-MCNC: 3.7 G/DL (ref 3.5–5)
ALP SERPL-CCNC: 68 U/L (ref 34–104)
ALT SERPL W P-5'-P-CCNC: 9 U/L (ref 7–52)
ANION GAP SERPL CALCULATED.3IONS-SCNC: 5 MMOL/L
ANION GAP SERPL CALCULATED.3IONS-SCNC: 6 MMOL/L
AST SERPL W P-5'-P-CCNC: 9 U/L (ref 13–39)
ATRIAL RATE: 76 BPM
BILIRUB SERPL-MCNC: 0.37 MG/DL (ref 0.2–1)
BUN SERPL-MCNC: 15 MG/DL (ref 5–25)
BUN SERPL-MCNC: 9 MG/DL (ref 5–25)
CALCIUM SERPL-MCNC: 9.2 MG/DL (ref 8.4–10.2)
CALCIUM SERPL-MCNC: 9.3 MG/DL (ref 8.4–10.2)
CHLORIDE SERPL-SCNC: 80 MMOL/L (ref 96–108)
CHLORIDE SERPL-SCNC: 85 MMOL/L (ref 96–108)
CO2 SERPL-SCNC: 32 MMOL/L (ref 21–32)
CO2 SERPL-SCNC: 35 MMOL/L (ref 21–32)
CREAT SERPL-MCNC: 0.54 MG/DL (ref 0.6–1.3)
CREAT SERPL-MCNC: 0.68 MG/DL (ref 0.6–1.3)
ERYTHROCYTE [DISTWIDTH] IN BLOOD BY AUTOMATED COUNT: 12.3 % (ref 11.6–15.1)
GFR SERPL CREATININE-BSD FRML MDRD: 91 ML/MIN/1.73SQ M
GFR SERPL CREATININE-BSD FRML MDRD: 98 ML/MIN/1.73SQ M
GLUCOSE SERPL-MCNC: 153 MG/DL (ref 65–140)
GLUCOSE SERPL-MCNC: 171 MG/DL (ref 65–140)
GLUCOSE SERPL-MCNC: 202 MG/DL (ref 65–140)
GLUCOSE SERPL-MCNC: 210 MG/DL (ref 65–140)
GLUCOSE SERPL-MCNC: 227 MG/DL (ref 65–140)
GLUCOSE SERPL-MCNC: 246 MG/DL (ref 65–140)
HCT VFR BLD AUTO: 33.9 % (ref 34.8–46.1)
HGB BLD-MCNC: 11.8 G/DL (ref 11.5–15.4)
MCH RBC QN AUTO: 30.5 PG (ref 26.8–34.3)
MCHC RBC AUTO-ENTMCNC: 34.8 G/DL (ref 31.4–37.4)
MCV RBC AUTO: 88 FL (ref 82–98)
OSMOLALITY UR/SERPL-RTO: 271 MMOL/KG (ref 282–298)
OSMOLALITY UR: 369 MMOL/KG
P AXIS: 47 DEGREES
PLATELET # BLD AUTO: 173 THOUSANDS/UL (ref 149–390)
PMV BLD AUTO: 10 FL (ref 8.9–12.7)
POTASSIUM SERPL-SCNC: 4 MMOL/L (ref 3.5–5.3)
POTASSIUM SERPL-SCNC: 4.6 MMOL/L (ref 3.5–5.3)
PR INTERVAL: 96 MS
PROCALCITONIN SERPL-MCNC: <0.05 NG/ML
PROT SERPL-MCNC: 6.6 G/DL (ref 6.4–8.4)
QRS AXIS: 44 DEGREES
QRSD INTERVAL: 80 MS
QT INTERVAL: 368 MS
QTC INTERVAL: 414 MS
RBC # BLD AUTO: 3.87 MILLION/UL (ref 3.81–5.12)
SODIUM 24H UR-SCNC: 25 MOL/L
SODIUM SERPL-SCNC: 121 MMOL/L (ref 135–147)
SODIUM SERPL-SCNC: 122 MMOL/L (ref 135–147)
T WAVE AXIS: 55 DEGREES
VENTRICULAR RATE: 76 BPM
WBC # BLD AUTO: 4.53 THOUSAND/UL (ref 4.31–10.16)

## 2023-07-19 PROCEDURE — 80048 BASIC METABOLIC PNL TOTAL CA: CPT | Performed by: INTERNAL MEDICINE

## 2023-07-19 PROCEDURE — NC001 PR NO CHARGE: Performed by: INTERNAL MEDICINE

## 2023-07-19 PROCEDURE — 85027 COMPLETE CBC AUTOMATED: CPT | Performed by: INTERNAL MEDICINE

## 2023-07-19 PROCEDURE — 84300 ASSAY OF URINE SODIUM: CPT

## 2023-07-19 PROCEDURE — 94640 AIRWAY INHALATION TREATMENT: CPT

## 2023-07-19 PROCEDURE — 99232 SBSQ HOSP IP/OBS MODERATE 35: CPT

## 2023-07-19 PROCEDURE — 99223 1ST HOSP IP/OBS HIGH 75: CPT | Performed by: INTERNAL MEDICINE

## 2023-07-19 PROCEDURE — 83935 ASSAY OF URINE OSMOLALITY: CPT

## 2023-07-19 PROCEDURE — 93010 ELECTROCARDIOGRAM REPORT: CPT | Performed by: INTERNAL MEDICINE

## 2023-07-19 PROCEDURE — 94760 N-INVAS EAR/PLS OXIMETRY 1: CPT

## 2023-07-19 PROCEDURE — 82948 REAGENT STRIP/BLOOD GLUCOSE: CPT

## 2023-07-19 PROCEDURE — 83930 ASSAY OF BLOOD OSMOLALITY: CPT

## 2023-07-19 PROCEDURE — 84145 PROCALCITONIN (PCT): CPT | Performed by: INTERNAL MEDICINE

## 2023-07-19 PROCEDURE — 80053 COMPREHEN METABOLIC PANEL: CPT | Performed by: INTERNAL MEDICINE

## 2023-07-19 RX ORDER — CLONIDINE HYDROCHLORIDE 0.1 MG/1
0.2 TABLET ORAL EVERY 12 HOURS SCHEDULED
Status: DISCONTINUED | OUTPATIENT
Start: 2023-07-19 | End: 2023-07-22 | Stop reason: HOSPADM

## 2023-07-19 RX ORDER — FUROSEMIDE 10 MG/ML
20 INJECTION INTRAMUSCULAR; INTRAVENOUS ONCE
Status: COMPLETED | OUTPATIENT
Start: 2023-07-19 | End: 2023-07-19

## 2023-07-19 RX ORDER — AMLODIPINE BESYLATE 10 MG/1
10 TABLET ORAL
Status: DISCONTINUED | OUTPATIENT
Start: 2023-07-19 | End: 2023-07-22 | Stop reason: HOSPADM

## 2023-07-19 RX ORDER — METHYLPREDNISOLONE SODIUM SUCCINATE 40 MG/ML
40 INJECTION, POWDER, LYOPHILIZED, FOR SOLUTION INTRAMUSCULAR; INTRAVENOUS EVERY 12 HOURS SCHEDULED
Status: DISCONTINUED | OUTPATIENT
Start: 2023-07-19 | End: 2023-07-21

## 2023-07-19 RX ORDER — SODIUM CHLORIDE 1 G/1
2 TABLET ORAL 3 TIMES DAILY
Status: DISCONTINUED | OUTPATIENT
Start: 2023-07-19 | End: 2023-07-20

## 2023-07-19 RX ORDER — SODIUM CHLORIDE 1 G/1
2 TABLET ORAL
Status: DISCONTINUED | OUTPATIENT
Start: 2023-07-19 | End: 2023-07-19

## 2023-07-19 RX ORDER — SIMETHICONE 80 MG
80 TABLET,CHEWABLE ORAL EVERY 6 HOURS PRN
Status: DISCONTINUED | OUTPATIENT
Start: 2023-07-19 | End: 2023-07-22 | Stop reason: HOSPADM

## 2023-07-19 RX ORDER — LISINOPRIL 20 MG/1
40 TABLET ORAL DAILY
Status: DISCONTINUED | OUTPATIENT
Start: 2023-07-20 | End: 2023-07-22 | Stop reason: HOSPADM

## 2023-07-19 RX ADMIN — HEPARIN SODIUM 5000 UNITS: 5000 INJECTION INTRAVENOUS; SUBCUTANEOUS at 05:01

## 2023-07-19 RX ADMIN — METHYLPREDNISOLONE SODIUM SUCCINATE 40 MG: 40 INJECTION, POWDER, FOR SOLUTION INTRAMUSCULAR; INTRAVENOUS at 05:01

## 2023-07-19 RX ADMIN — HEPARIN SODIUM 5000 UNITS: 5000 INJECTION INTRAVENOUS; SUBCUTANEOUS at 21:39

## 2023-07-19 RX ADMIN — ALBUTEROL SULFATE 2 PUFF: 90 AEROSOL, METERED RESPIRATORY (INHALATION) at 10:56

## 2023-07-19 RX ADMIN — BUDESONIDE 0.5 MG: 0.5 INHALANT ORAL at 07:02

## 2023-07-19 RX ADMIN — SODIUM CHLORIDE TAB 1 GM 2 G: 1 TAB at 16:23

## 2023-07-19 RX ADMIN — INSULIN LISPRO 1 UNITS: 100 INJECTION, SOLUTION INTRAVENOUS; SUBCUTANEOUS at 08:54

## 2023-07-19 RX ADMIN — INSULIN LISPRO 2 UNITS: 100 INJECTION, SOLUTION INTRAVENOUS; SUBCUTANEOUS at 21:39

## 2023-07-19 RX ADMIN — SODIUM CHLORIDE TAB 1 GM 2 G: 1 TAB at 10:57

## 2023-07-19 RX ADMIN — METOPROLOL TARTRATE 100 MG: 50 TABLET, FILM COATED ORAL at 08:54

## 2023-07-19 RX ADMIN — LEVALBUTEROL HYDROCHLORIDE 1.25 MG: 1.25 SOLUTION RESPIRATORY (INHALATION) at 07:02

## 2023-07-19 RX ADMIN — CLONIDINE HYDROCHLORIDE 0.2 MG: 0.1 TABLET ORAL at 08:54

## 2023-07-19 RX ADMIN — CLONIDINE HYDROCHLORIDE 0.2 MG: 0.1 TABLET ORAL at 21:37

## 2023-07-19 RX ADMIN — CITALOPRAM HYDROBROMIDE 10 MG: 10 TABLET ORAL at 08:54

## 2023-07-19 RX ADMIN — INSULIN LISPRO 2 UNITS: 100 INJECTION, SOLUTION INTRAVENOUS; SUBCUTANEOUS at 16:24

## 2023-07-19 RX ADMIN — PIOGLITAZONE HYDROCHLORIDE 15 MG: 15 TABLET ORAL at 08:54

## 2023-07-19 RX ADMIN — METHYLPREDNISOLONE SODIUM SUCCINATE 40 MG: 40 INJECTION, POWDER, FOR SOLUTION INTRAMUSCULAR; INTRAVENOUS at 21:38

## 2023-07-19 RX ADMIN — ALBUTEROL SULFATE 2 PUFF: 90 AEROSOL, METERED RESPIRATORY (INHALATION) at 19:00

## 2023-07-19 RX ADMIN — ATORVASTATIN CALCIUM 40 MG: 40 TABLET, FILM COATED ORAL at 21:37

## 2023-07-19 RX ADMIN — INSULIN LISPRO 2 UNITS: 100 INJECTION, SOLUTION INTRAVENOUS; SUBCUTANEOUS at 11:59

## 2023-07-19 RX ADMIN — METOPROLOL TARTRATE 100 MG: 50 TABLET, FILM COATED ORAL at 21:37

## 2023-07-19 RX ADMIN — BUDESONIDE 0.5 MG: 0.5 INHALANT ORAL at 20:03

## 2023-07-19 RX ADMIN — SIMETHICONE 80 MG: 80 TABLET, CHEWABLE ORAL at 11:58

## 2023-07-19 RX ADMIN — FUROSEMIDE 20 MG: 10 INJECTION, SOLUTION INTRAMUSCULAR; INTRAVENOUS at 21:39

## 2023-07-19 RX ADMIN — IPRATROPIUM BROMIDE 0.5 MG: 0.5 SOLUTION RESPIRATORY (INHALATION) at 20:03

## 2023-07-19 RX ADMIN — HEPARIN SODIUM 5000 UNITS: 5000 INJECTION INTRAVENOUS; SUBCUTANEOUS at 14:23

## 2023-07-19 RX ADMIN — ACETAMINOPHEN 650 MG: 325 TABLET ORAL at 21:38

## 2023-07-19 RX ADMIN — GLIPIZIDE 10 MG: 5 TABLET ORAL at 05:01

## 2023-07-19 RX ADMIN — AMLODIPINE BESYLATE 10 MG: 10 TABLET ORAL at 21:38

## 2023-07-19 RX ADMIN — LEVALBUTEROL HYDROCHLORIDE 1.25 MG: 1.25 SOLUTION RESPIRATORY (INHALATION) at 20:03

## 2023-07-19 RX ADMIN — LEVALBUTEROL HYDROCHLORIDE 1.25 MG: 1.25 SOLUTION RESPIRATORY (INHALATION) at 13:52

## 2023-07-19 RX ADMIN — PANTOPRAZOLE SODIUM 40 MG: 40 TABLET, DELAYED RELEASE ORAL at 05:01

## 2023-07-19 RX ADMIN — IPRATROPIUM BROMIDE 0.5 MG: 0.5 SOLUTION RESPIRATORY (INHALATION) at 13:52

## 2023-07-19 RX ADMIN — NICOTINE 1 PATCH: 14 PATCH, EXTENDED RELEASE TRANSDERMAL at 19:34

## 2023-07-19 RX ADMIN — METHYLPREDNISOLONE SODIUM SUCCINATE 40 MG: 40 INJECTION, POWDER, FOR SOLUTION INTRAMUSCULAR; INTRAVENOUS at 10:58

## 2023-07-19 RX ADMIN — LISINOPRIL 40 MG: 20 TABLET ORAL at 08:54

## 2023-07-19 RX ADMIN — SODIUM CHLORIDE TAB 1 GM 2 G: 1 TAB at 21:37

## 2023-07-19 RX ADMIN — IPRATROPIUM BROMIDE 0.5 MG: 0.5 SOLUTION RESPIRATORY (INHALATION) at 07:02

## 2023-07-19 NOTE — ASSESSMENT & PLAN NOTE
Lab Results   Component Value Date    HGBA1C 6.9 (H) 02/10/2023     Recent Labs     07/19/23  1546 07/19/23  2117 07/20/23  0728 07/20/23  1125   POCGLU 227* 210* 155* 129     · Prior to admission on glipizide XL and pioglitazone.   · Continue both and sliding scale coverage

## 2023-07-19 NOTE — ASSESSMENT & PLAN NOTE
History of COPD chronically on 2 L still smoking hypertension diabetes and anxiety presents with worsening shortness of breath found to have COPD exacerbation  · No infiltrates on CXR  · Continue IV methylprednisolone and bronchodilators  · Ceftriaxone for possibility of tracheobronchitis -we will discontinue at this time procalcitonin and no leukocytosis, CT negative for pneumonia  · Advise smoking cessation. She states that she will stop after this hospitalization. Will provide nicotine patch.   · CT chest with out lung nodule or mass, linear density seen in right lung base due to atelectasis, no acute consolidation or pneumonia  · Greatly improved today -decrease steroids to twice daily

## 2023-07-19 NOTE — ASSESSMENT & PLAN NOTE
· More anxious than usual.  Add lorazepam.    · Continue citalopram -patient does not want to switch medications at this time, considering outpatient psychiatric consult due to possible contributing factor for hyponatremia

## 2023-07-19 NOTE — UTILIZATION REVIEW
Initial Clinical Review    WAS OBSERVATION 7/18/23 @ 1122 CONVERTED TO INPATIENT ADMISSION 7/19/23 @ 1041 DUE TO CONTINUED STAY REQUIRED TO CARE FOR PATIENT WITH DX: COPD EXACERBATION. Admission: Date/Time/Statement:   Admission Orders (From admission, onward)     Ordered        07/19/23 1041  Inpatient Admission  Once            07/18/23 1122  Place in Observation  Once                      Orders Placed This Encounter   Procedures   • Inpatient Admission     Standing Status:   Standing     Number of Occurrences:   1     Order Specific Question:   Level of Care     Answer:   Med Surg [16]     Order Specific Question:   Estimated length of stay     Answer:   More than 2 Midnights     Order Specific Question:   Certification     Answer:   I certify that inpatient services are medically necessary for this patient for a duration of greater than two midnights. See H&P and MD Progress Notes for additional information about the patient's course of treatment. ED Arrival Information     Expected   -    Arrival   7/18/2023 09:51    Acuity   Emergent            Means of arrival   Walk-In    Escorted by   Self    Service   Hospitalist    Admission type   Emergency            Arrival complaint   Sob           Chief Complaint   Patient presents with   • Shortness of Breath     Patient has worsening sob since last visit on Sunday. Patient wears 2.5L O2 chronically for COPD. Date: 7/20/23     Day 3 (overnights): Has surpassed a 2nd midnight with active treatments and services, which include see below. Initial Presentation: 79 y.o. female to ED via walk-in from home  Present to ED with worsening shortness of breath.   PMHX COPD chronically on 2 L; anxiety; diabetes and hypertension   Admitted to OBS with DX: COPD exacerbation   on exam: Tachy; tachypnea; hypertensive; decreased breath sounds; weakness present; anxious; hyponatremia -   PLAN: Cont iv solumedrol; cont Duo Nebs; cont iv abx; monitor labs; Accuchecks with ssic; f/u CT    Date: 7/19/23 - CHANGED TO INPATIENT   Breathing greatly improved today -decrease steroids to twice daily; Sodium has steadily trended down since admission - Fluid restriction added  Plan: Cont iv solumedrol; cont Duo Nebs; cont iv abx; monitor labs; Accuchecks with ssic; f/u CT; fluid restsriction; nephrology consulted - added on salt tabs 2 g p.o. 3 times daily; rec'd lasix iv x1    NEPHROLOGY CONSULT:   Hyponatremia, has chronic hyponatremia issues with baseline sodium low to mid 130s; Serum sodium 128 on admission on 7/16/2023 continue to slowly drop with most recent 122 this a.m. 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 She mentions of having adequate p.o. diet intake prior to admission. Denies any nausea or vomiting. She is oriented, active, alert; We will start salt tablet 2 g p.o. 3 times daily; Repeat BMP at 6 PM today; Goal sodium correction no more than 8 meq in 24 hours, goal sodium around 130 by tomorrow a.m.; If sodium level not improving, consider 1.8% hypertonic saline; May have to add Lasix depending on repeat BMP results and urine osmolality      Date: 7/20/23   Day 2 - INPATIENT   Continues with hyponatremia -  - currently on salt tablets; COPD appears well controlled. CT chest without evidence of malignancy or infiltrates  Plan: rec'd lasix iv x1; Cont iv solumedrol; cont Duo Nebs; monitor labs;  Accuchecks with ssic; fluid restsriction; cont salt tabs 2 g p.o. 3 times daily      ED Triage Vitals   Temperature Pulse Respirations Blood Pressure SpO2   07/18/23 0956 07/18/23 0956 07/18/23 0956 07/18/23 0956 07/18/23 0956   (!) 97.4 °F (36.3 °C) 82 (!) 25 167/74 90 %      Temp Source Heart Rate Source Patient Position - Orthostatic VS BP Location FiO2 (%)   07/18/23 0956 07/18/23 5850 07/18/23 0956 07/18/23 0956 --   Temporal Monitor Lying Left arm       Pain Score       07/18/23 1158       No Pain          Wt Readings from Last 1 Encounters:   07/18/23 68.8 kg (151 lb 10.8 oz)     Additional Vital Signs:   Date/Time Temp Pulse Resp BP MAP (mmHg) SpO2 Calculated FIO2 (%) - Nasal Cannula Nasal Cannula O2 Flow Rate (L/min) O2 Device Patient Position - Orthostatic VS   07/20/23 1326 -- -- -- -- -- 94 % 28 2 L/min Nasal cannula --   07/20/23 0900 -- -- -- -- -- -- -- -- Nasal cannula --   07/20/23 08:25:17 -- 75 -- 131/88 102 95 % -- -- -- --   07/20/23 0748 -- -- -- -- -- 97 % 28 2 L/min Nasal cannula --   07/20/23 06:30:18 97.5 °F (36.4 °C) 62 19 130/68 89 98 % -- -- -- --   07/19/23 21:38:30 98.1 °F (36.7 °C) 78 20 131/65 87 96 % 28 2 L/min Nasal cannula --   07/19/23 2004 -- -- -- -- -- 97 % -- -- -- --   07/19/23 1952 -- -- -- -- -- -- 28 2 L/min Nasal cannula --   07/19/23 15:16:24 97.5 °F (36.4 °C) 75 22 132/68 89 96 % -- -- -- --   07/19/23 0856 -- -- -- -- -- -- 28 2 L/min Nasal cannula --       Date/Time Temp Pulse Resp BP MAP (mmHg) SpO2 Calculated FIO2 (%) - Nasal Cannula Nasal Cannula O2 Flow Rate (L/min) O2 Device Patient Position - Orthostatic VS   07/19/23 07:13:21 98.1 °F (36.7 °C) 70 19 137/87 104 100 % -- -- -- --   07/18/23 22:57:30 97.2 °F (36.2 °C) Abnormal  70 18 127/75 92 98 % 28 2 L/min Nasal cannula --   07/18/23 22:14:45 97 °F (36.1 °C) Abnormal  79 -- 127/87 100 98 % -- -- -- --   07/18/23 2015 -- -- -- -- -- -- 28 2 L/min Nasal cannula  --   O2 Device: chronic 2L at 07/18/23 2015 07/18/23 2001 -- -- 18 -- -- 98 % 28 2 L/min Nasal cannula --   07/18/23 14:37:17 -- 109 Abnormal  -- 159/87 111 94 % -- -- -- --   07/18/23 1237 -- -- -- -- -- -- 30 2.5 L/min Nasal cannula --   07/18/23 11:58:59 97.9 °F (36.6 °C) 77 20 143/77 99 95 % -- -- -- --   07/18/23 1130 -- 87 -- -- -- 97 % -- -- -- --   07/18/23 1115 -- 79 -- -- -- 97 % -- -- -- --   07/18/23 1100 -- 77 -- -- -- 97 % -- -- -- --   07/18/23 1045 -- 79 -- -- -- 97 % -- -- -- --   07/18/23 1017 -- -- -- -- -- -- -- -- Nasal cannula --   07/18/23 1015 -- 73 -- 126/64 86 99 % -- -- -- --   07/18/23 0956 97.4 °F (36.3 °C) Abnormal  82 25 Abnormal  167/74 -- 90 % 30 2.5 L/min Nasal cannula Lying           EKG: Sinus rhythm with short FL  Otherwise normal ECG  When compared with ECG of 24-JUN-2023 18:17,  No significant change was found    Pertinent Labs/Diagnostic Test Results:   CT chest wo contrast   Final Result by Alexandra De Jesus MD (07/19 1054)      There is no suspicious lung nodule or mass which would suggest lung primary      Linear density seen right lung base due to atelectasis   No acute consolidation/pneumonia      Continue lung screening            Workstation performed: [id number]         XR chest 2 views   Final Result by Lam Mccartney MD (07/18 1300)      No acute cardiopulmonary disease.       Findings are stable            Workstation performed: [id number]           Results from last 7 days   Lab Units 07/18/23  1007   SARS-COV-2  Negative     Results from last 7 days   Lab Units 07/19/23  0500 07/18/23  1007 07/16/23  1833   WBC Thousand/uL 4.53 8.33 7.25   HEMOGLOBIN g/dL 11.8 13.7 13.1   HEMATOCRIT % 33.9* 40.8 39.3   PLATELETS Thousands/uL 173 209 203   NEUTROS ABS Thousands/µL  --  6.07 4.69         Results from last 7 days   Lab Units 07/19/23  0500 07/18/23  1736 07/18/23  1007 07/16/23  1833   SODIUM mmol/L 122* 123* 126* 128*   POTASSIUM mmol/L 4.6  --  4.0 4.2   CHLORIDE mmol/L 85*  --  86* 87*   CO2 mmol/L 32  --  33* 34*   ANION GAP mmol/L 5  --  7 7   BUN mg/dL 9  --  7 5   CREATININE mg/dL 0.54*  --  0.70 0.69   EGFR ml/min/1.73sq m 98  --  90 91   CALCIUM mg/dL 9.2  --  9.8 9.8     Results from last 7 days   Lab Units 07/19/23  0500 07/18/23  1007 07/16/23  1833   AST U/L 9* 12* 11*   ALT U/L 9 10 9   ALK PHOS U/L 68 85 76   TOTAL PROTEIN g/dL 6.6 7.9 7.7   ALBUMIN g/dL 3.7 4.4 4.2 TOTAL BILIRUBIN mg/dL 0.37 0.44 0.47     Results from last 7 days   Lab Units 07/19/23  1120 07/19/23  0711 07/18/23  2105 07/18/23  1552 07/18/23  1206   POC GLUCOSE mg/dl 202* 153* 168* 321* 163*     Results from last 7 days   Lab Units 07/19/23  0500 07/18/23  1007 07/16/23  1833   GLUCOSE RANDOM mg/dL 171* 135 108       Results from last 7 days   Lab Units 07/18/23  1007   PH IDA  7.349   PCO2 IDA mm Hg 59.6*   PO2 IDA mm Hg 31.1*   HCO3 IDA mmol/L 32.1*   BASE EXC IDA mmol/L 4.6   O2 CONTENT IDA ml/dL 12.2   O2 HGB, VENOUS % 58.4*             Results from last 7 days   Lab Units 07/18/23  1409 07/18/23  1230 07/18/23  1007 07/16/23 2003 07/16/23  1833   HS TNI 0HR ng/L  --   --  4  --  4   HS TNI 2HR ng/L  --  4  --  3  --    HSTNI D2 ng/L  --  0  --  -1  --    HS TNI 4HR ng/L 4  --   --   --   --    HSTNI D4 ng/L 0  --   --   --   --      Results from last 7 days   Lab Units 07/18/23  1007   D-DIMER QUANTITATIVE ug/ml FEU 0.35         Results from last 7 days   Lab Units 07/16/23  1833   TSH 3RD GENERATON uIU/mL 0.793     Results from last 7 days   Lab Units 07/19/23  0500   PROCALCITONIN ng/ml <0.05       Results from last 7 days   Lab Units 07/18/23  1007   BNP pg/mL 26       Results from last 7 days   Lab Units 07/19/23  0929 07/16/23  1908   CLARITY UA   --  Clear   COLOR UA   --  Yellow   SPEC GRAV UA   --  <=1.005   PH UA   --  6.5   GLUCOSE UA mg/dl  --  Negative   KETONES UA mg/dl  --  Negative   BLOOD UA   --  Negative   PROTEIN UA mg/dl  --  Negative   NITRITE UA   --  Negative   BILIRUBIN UA   --  Negative   UROBILINOGEN UA E.U./dl  --  0.2   LEUKOCYTES UA   --  Negative   SODIUM UR  25  --        ED Treatment:   Medication Administration from 07/18/2023 0951 to 07/18/2023 1149       Date/Time Order Dose Route Action     07/18/2023 1013 EDT ipratropium-albuterol (DUO-NEB) 0.5-2.5 mg/3 mL inhalation solution 3 mL 3 mL Nebulization Given     07/18/2023 1012 EDT methylPREDNISolone sodium succinate (Solu-MEDROL) injection 80 mg 80 mg Intravenous Given          Present on Admission:  • COPD exacerbation ([address])  • Acute hyponatremia  • Essential hypertension  • Type 2 diabetes mellitus without complication, without long-term current use of insulin (HCC)  • Tobacco abuse      Admitting Diagnosis: Hyponatremia [E87.1]  SOB (shortness of breath) [R06.02]  COPD exacerbation (HCC) [J44.1]     Age/Sex: 79 y.o. female     Admission Orders: SCDs; Consistent Carbohydrate Diet. Blood glucose checks ACHS. Fluid restriction 1500    Scheduled Medications:  amLODIPine, 10 mg, Oral, HS  atorvastatin, 40 mg, Oral, HS  budesonide, 0.5 mg, Nebulization, BID  cefTRIAXone, 1,000 mg, Intravenous, Q24H  citalopram, 10 mg, Oral, Daily  cloNIDine, 0.2 mg, Oral, Q12H NATHALY  fluticasone, 1 spray, Nasal, Daily  glipiZIDE, 10 mg, Oral, Early Morning  heparin (porcine), 5,000 Units, Subcutaneous, Q8H NATHALY  insulin lispro, 1-6 Units, Subcutaneous, 4x Daily (AC & HS)  ipratropium, 0.5 mg, Nebulization, TID  levalbuterol, 1.25 mg, Nebulization, TID  lisinopril, 40 mg, Oral, Daily  methylPREDNISolone sodium succinate, 40 mg, Intravenous, Q8H  metoprolol tartrate, 100 mg, Oral, Q12H NATHALY  nicotine, 1 patch, Transdermal, Daily  pantoprazole, 40 mg, Oral, Early Morning  pioglitazone, 15 mg, Oral, Daily    furosemide (LASIX) injection 20 mg  Dose: 20 mg  Freq: Once Route: IV  Start: 07/19/23 2115 End: 07/19/23 2139    furosemide (LASIX) injection 20 mg  Dose: 20 mg  Freq:  Once Route: IV  Start: 07/20/23 0915 End: 07/20/23 1054    Continuous IV Infusions: None     PRN Meds:  acetaminophen, 650 mg, Oral, Q6H PRN (7/19 rec'd x1)   albuterol, 2 puff, Inhalation, Q4H PRN (7/18 rec'd x1)  (7/19 rec'd x2)   docusate sodium, 100 mg, Oral, BID PRN  LORazepam, 0.5 mg, Oral, Q6H PRN (7/18 rec'd x1)  ondansetron, 4 mg, Intravenous, Q4H PRN        IP CONSULT TO NEPHROLOGY    Network Utilization Review Department  ATTENTION: Please call with any questions or concerns to [phone] and carefully listen to the prompts so that you are directed to the right person. All voicemails are confidential.  [facility] all requests for admission clinical reviews, approved or denied determinations and any other requests to dedicated fax number below belonging to the campus where the patient is receiving treatment.  List of dedicated fax numbers for the Facilities:  [place] DENIALS (Administrative/Medical Necessity) [phone] [address] [facility] (Maternity/NICU/Pediatrics) [phone]   [address] [place] [address] [phone]   [facility] [address] [facility] [phone]   [address] [place] [address] [phone]   [address] [address] [place], [place] [address] [phone]

## 2023-07-19 NOTE — PLAN OF CARE
Problem: MOBILITY - ADULT  Goal: Maintain or return to baseline ADL function  Description: INTERVENTIONS:  -  Assess patient's ability to carry out ADLs; assess patient's baseline for ADL function and identify physical deficits which impact ability to perform ADLs (bathing, care of mouth/teeth, toileting, grooming, dressing, etc.)  - Assess/evaluate cause of self-care deficits   - Assess range of motion  - Assess patient's mobility; develop plan if impaired  - Assess patient's need for assistive devices and provide as appropriate  - Encourage maximum independence but intervene and supervise when necessary  - Involve family in performance of ADLs  - Assess for home care needs following discharge   - Consider OT consult to assist with ADL evaluation and planning for discharge  - Provide patient education as appropriate  Outcome: Progressing  Goal: Maintains/Returns to pre admission functional level  Description: INTERVENTIONS:  - Perform BMAT or MOVE assessment daily.   - Set and communicate daily mobility goal to care team and patient/family/caregiver. - Collaborate with rehabilitation services on mobility goals if consulted  - Perform Range of Motion 3 times a day. - Reposition patient every 2 hours.   - Dangle patient 3 times a day  - Stand patient 3 times a day  - Ambulate patient 3 times a day  - Out of bed to chair 3 times a day   - Out of bed for meals 3 times a day  - Out of bed for toileting  - Record patient progress and toleration of activity level   Outcome: Progressing     Problem: PAIN - ADULT  Goal: Verbalizes/displays adequate comfort level or baseline comfort level  Description: Interventions:  - Encourage patient to monitor pain and request assistance  - Assess pain using appropriate pain scale  - Administer analgesics based on type and severity of pain and evaluate response  - Implement non-pharmacological measures as appropriate and evaluate response  - Consider cultural and social influences on pain and pain management  - Notify physician/advanced practitioner if interventions unsuccessful or patient reports new pain  Outcome: Progressing     Problem: INFECTION - ADULT  Goal: Absence or prevention of progression during hospitalization  Description: INTERVENTIONS:  - Assess and monitor for signs and symptoms of infection  - Monitor lab/diagnostic results  - Monitor all insertion sites, i.e. indwelling lines, tubes, and drains  - Monitor endotracheal if appropriate and nasal secretions for changes in amount and color  - [place] appropriate cooling/warming therapies per order  - Administer medications as ordered  - Instruct and encourage patient and family to use good hand hygiene technique  - Identify and instruct in appropriate isolation precautions for identified infection/condition  Outcome: Progressing  Goal: Absence of fever/infection during neutropenic period  Description: INTERVENTIONS:  - Monitor WBC    Outcome: Progressing     Problem: SAFETY ADULT  Goal: Maintain or return to baseline ADL function  Description: INTERVENTIONS:  -  Assess patient's ability to carry out ADLs; assess patient's baseline for ADL function and identify physical deficits which impact ability to perform ADLs (bathing, care of mouth/teeth, toileting, grooming, dressing, etc.)  - Assess/evaluate cause of self-care deficits   - Assess range of motion  - Assess patient's mobility; develop plan if impaired  - Assess patient's need for assistive devices and provide as appropriate  - Encourage maximum independence but intervene and supervise when necessary  - Involve family in performance of ADLs  - Assess for home care needs following discharge   - Consider OT consult to assist with ADL evaluation and planning for discharge  - Provide patient education as appropriate  Outcome: Progressing  Goal: Maintains/Returns to pre admission functional level  Description: INTERVENTIONS:  - Perform BMAT or MOVE assessment daily.   - Set and communicate daily mobility goal to care team and patient/family/caregiver. - Collaborate with rehabilitation services on mobility goals if consulted  - Perform Range of Motion 3 times a day. - Reposition patient every 2 hours.   - Dangle patient 3 times a day  - Stand patient 3 times a day  - Ambulate patient 3 times a day  - Out of bed to chair 3 times a day   - Out of bed for meals 3 times a day  - Out of bed for toileting  - Record patient progress and toleration of activity level   Outcome: Progressing  Goal: Patient will remain free of falls  Description: INTERVENTIONS:  - Educate patient/family on patient safety including physical limitations  - Instruct patient to call for assistance with activity   - Consult OT/PT to assist with strengthening/mobility   - Keep Call bell within reach  - Keep bed low and locked with side rails adjusted as appropriate  - Keep care items and personal belongings within reach  - Initiate and maintain comfort rounds  - Make Fall Risk Sign visible to staff  - Offer Toileting every 2 Hours, in advance of need  - Initiate/Maintain bedalarm  - Obtain necessary fall risk management equipment:   - Apply yellow socks and bracelet for high fall risk patients  - Consider moving patient to room near nurses station  Outcome: Progressing     Problem: DISCHARGE PLANNING  Goal: Discharge to home or other facility with appropriate resources  Description: INTERVENTIONS:  - Identify barriers to discharge w/patient and caregiver  - Arrange for needed discharge resources and transportation as appropriate  - Identify discharge learning needs (meds, wound care, etc.)  - Arrange for interpretive services to assist at discharge as needed  - Refer to Case Management Department for coordinating discharge planning if the patient needs post-hospital services based on physician/advanced practitioner order or complex needs related to functional status, cognitive ability, or social support system  Outcome: Progressing

## 2023-07-19 NOTE — ASSESSMENT & PLAN NOTE
· Acute on chronic hyponatremia   · Most recent TSH within limits  · CT chest -no signs for active malignancy last CT screening test was in February 2020 at [facility]  · Discussed holding citalopram but this is a long-term medication and patient hesitant to stop. · Appreciate nephrology evaluation. Currently on salt tablets and may need hypertonic fluids or tolvaptan.     Results from last 7 days   Lab Units 07/20/23  0601 07/19/23  1832 07/19/23  0500 07/18/23  1736   SODIUM mmol/L 125* 121* 122* 123*

## 2023-07-19 NOTE — ASSESSMENT & PLAN NOTE
History of COPD chronically on 2 L still smoking hypertension diabetes and anxiety presents with worsening shortness of breath found to have COPD exacerbation  · No infiltrates on CXR  · Continue IV methylprednisolone and bronchodilators  · Advise smoking cessation. She states that she will stop after this hospitalization; continue nicotine patch.   · CT chest without evidence of malignancy or infiltrates

## 2023-07-19 NOTE — RESPIRATORY THERAPY NOTE
RT Protocol Note  Adilson Rocha 79 y.o. female MRN: [id number]  Unit/Bed#: -01 Encounter: [id number]    Assessment    Principal Problem:    COPD exacerbation ([address])  Active Problems:    Chronic hyponatremia    Tobacco abuse    Essential hypertension    Type 2 diabetes mellitus without complication, without long-term current use of insulin (HCC)    Anxiety about health    Home Pulmonary Medications:    Home Devices/Therapy: Home O2    Past Medical History:   Diagnosis Date    Anxiety     COPD (chronic obstructive pulmonary disease) (HCC)     Diabetes mellitus ([address])     Essential (primary) hypertension     GERD (gastroesophageal reflux disease)     Smoker      Social History     Socioeconomic History    Marital status:      Spouse name: None    Number of children: None    Years of education: None    Highest education level: None   Occupational History    None   Tobacco Use    Smoking status: Every Day     Packs/day: 0.25     Types: Cigarettes    Smokeless tobacco: Never   Vaping Use    Vaping Use: Never used   Substance and Sexual Activity    Alcohol use: Not Currently    Drug use: Never    Sexual activity: None   Other Topics Concern    None   Social History Narrative    None     Social Determinants of Health     Financial Resource Strain: Not on file   Food Insecurity: No Food Insecurity (6/26/2023)    Hunger Vital Sign     Worried About Running Out of Food in the Last Year: Never true     Ran Out of Food in the Last Year: Never true   Transportation Needs: No Transportation Needs (6/26/2023)    PRAPARE - Transportation     Lack of Transportation (Medical): No     Lack of Transportation (Non-Medical):  No   Physical Activity: Not on file   Stress: Not on file   Social Connections: Not on file   Intimate Partner Violence: Not on file   Housing Stability: Low Risk  (6/26/2023)    Housing Stability Vital Sign     Unable to Pay for Housing in the Last Year: No     Number of [address] in the Last Year: 1 Unstable Housing in the Last Year: No       Subjective         Objective    Physical Exam:   Assessment Type: During-treatment  General Appearance: Alert, Awake  Respiratory Pattern: Dyspnea with exertion  Chest Assessment: Chest expansion symmetrical  Cough: Strong  O2 Device: 2 L    Vitals:  Blood pressure 159/87, pulse (!) 109, temperature 97.9 °F (36.6 °C), temperature source Temporal, resp. rate 18, height 5' 2.99" (1.6 m), weight 68.8 kg (151 lb 10.8 oz), SpO2 98 %. Imaging and other studies: I have personally reviewed pertinent reports. O2 Device: 2 L     Plan             Resp Comments: (P) pt admitted with COPD exac. current smoker about 5 cigarettes a day. on home O2 2 L along with nebulizer. pt assessed and is in no acute resp distress at this time.  respirations normal. neb tx given

## 2023-07-19 NOTE — ASSESSMENT & PLAN NOTE
· More anxious than usual.  Added lorazepam.    · Currently on citalopram.  Due to hyponatremia there was discussion regarding stopping this.   Due to long-term medications patient hesitant and will continue for now

## 2023-07-19 NOTE — PLAN OF CARE
Problem: MOBILITY - ADULT  Goal: Maintain or return to baseline ADL function  Description: INTERVENTIONS:  -  Assess patient's ability to carry out ADLs; assess patient's baseline for ADL function and identify physical deficits which impact ability to perform ADLs (bathing, care of mouth/teeth, toileting, grooming, dressing, etc.)  - Assess/evaluate cause of self-care deficits   - Assess range of motion  - Assess patient's mobility; develop plan if impaired  - Assess patient's need for assistive devices and provide as appropriate  - Encourage maximum independence but intervene and supervise when necessary  - Involve family in performance of ADLs  - Assess for home care needs following discharge   - Consider OT consult to assist with ADL evaluation and planning for discharge  - Provide patient education as appropriate  Outcome: Progressing     Problem: PAIN - ADULT  Goal: Verbalizes/displays adequate comfort level or baseline comfort level  Description: Interventions:  - Encourage patient to monitor pain and request assistance  - Assess pain using appropriate pain scale  - Administer analgesics based on type and severity of pain and evaluate response  - Implement non-pharmacological measures as appropriate and evaluate response  - Consider cultural and social influences on pain and pain management  - Notify physician/advanced practitioner if interventions unsuccessful or patient reports new pain  Outcome: Progressing     Problem: INFECTION - ADULT  Goal: Absence or prevention of progression during hospitalization  Description: INTERVENTIONS:  - Assess and monitor for signs and symptoms of infection  - Monitor lab/diagnostic results  - Monitor all insertion sites, i.e. indwelling lines, tubes, and drains  - Monitor endotracheal if appropriate and nasal secretions for changes in amount and color  - [place] appropriate cooling/warming therapies per order  - Administer medications as ordered  - Instruct and encourage patient and family to use good hand hygiene technique  - Identify and instruct in appropriate isolation precautions for identified infection/condition  Outcome: Progressing     Problem: SAFETY ADULT  Goal: Maintain or return to baseline ADL function  Description: INTERVENTIONS:  -  Assess patient's ability to carry out ADLs; assess patient's baseline for ADL function and identify physical deficits which impact ability to perform ADLs (bathing, care of mouth/teeth, toileting, grooming, dressing, etc.)  - Assess/evaluate cause of self-care deficits   - Assess range of motion  - Assess patient's mobility; develop plan if impaired  - Assess patient's need for assistive devices and provide as appropriate  - Encourage maximum independence but intervene and supervise when necessary  - Involve family in performance of ADLs  - Assess for home care needs following discharge   - Consider OT consult to assist with ADL evaluation and planning for discharge  - Provide patient education as appropriate  Outcome: Progressing

## 2023-07-19 NOTE — ASSESSMENT & PLAN NOTE
Lab Results   Component Value Date    HGBA1C 6.9 (H) 02/10/2023     Recent Labs     07/18/23  1552 07/18/23  2105 07/19/23  0711 07/19/23  1120   POCGLU 321* 168* 153* 202*     · Prior to admission on glipizide XL and pioglitazone. Anticipate steroid-induced hyperglycemia.     · Continue both and add sliding scale coverage

## 2023-07-19 NOTE — TELEMEDICINE
REQUIRED DOCUMENTATION:     1. This service was provided via Telemedicine. 2. Provider located at [facility]. 3. TeleMed provider: Radha Liriano MD.  4. Identify all parties in room with patient during tele consult:  Patient  5. Patient was then informed that this was a Telemedicine visit and that the exam was being conducted confidentially over secure lines. My office door was closed. No one else was in the room. Patient acknowledged consent and understanding of privacy and security of the Telemedicine visit, and gave us permission to have the assistant stay in the room in order to assist with the history and to conduct the exam.  I informed the patient that I have reviewed their record in Epic and presented the opportunity for them to ask any questions regarding the visit today. The patient agreed to participate. Consultation - Nephrology   Clari Carcamo 79 y.o. female MRN: [id number]  Unit/Bed#: -01 Encounter: [id number]    ASSESSMENT AND PLAN:  Patient is 82-year-old female with significant medical issues of COPD, on chronic 2 L O2, hypertension, diabetes, presented with worsening shortness of breath. We are consulted for hyponatremia management. Hyponatremia, has chronic hyponatremia issues with baseline sodium low to mid 130s  -Serum sodium 128 on admission on 7/16/2023 continue to slowly drop with most recent 122 this a.m.  -Serum osmolality, urine osmolality results to follow, urine sodium 25  -During prior evaluation suspect secondary to SIADH in the setting of COPD, Celexa  -Fluid restriction 1.2 L/day  -Patient has not received any IV fluid. She mentions of having adequate p.o. diet intake prior to admission. Denies any nausea or vomiting.   She is oriented, active, alert  -We will start salt tablet 2 g p.o. 3 times daily  -Repeat BMP at 6 PM today  -Goal sodium correction no more than 8 meq in 24 hours, goal sodium around 130 by tomorrow a.m.  -If sodium level not improving, consider 1.8% hypertonic saline  -May have to add Lasix depending on repeat BMP results and urine osmolality  -Consider discontinue Celexa    Renal function stable with serum creatinine 0.5  UA bland, noted low specific gravity    Hypertension  -Blood pressure acceptable, avoid hypotension. Goal SBP 130s  -Current regimen includes amlodipine 10 mg daily, clonidine 0.2 mg twice daily, lisinopril 40 mg daily, metoprolol 100 mg twice daily.  -Change holding parameter for amlodipine, clonidine, lisinopril    COPD exacerbation, currently on steroid, antibiotic as per primary team.  Subjectively she seems to be feeling better. Discussed above plan in detail with primary team and they agree with above recommendations. HISTORY OF PRESENT ILLNESS:  Requesting Physician: Bryce Aragon MD  Reason for Consult: Hyponatremia    Ricardo Anthony is a 79y.o. year old female who was admitted to [facility] after presenting with worsening shortness of breath. A renal consultation is requested today for assistance in the management of hyponatremia. Old medical records including prior lab values were reviewed from [facility] records. Patient presented with worsening shortness of breath. She has underlying COPD and remains chronically on 2 L O2. She denies any nausea, vomiting. Denies any headache. Denies any urinary complaint. Denies any recent NSAID use. Denies any obvious history of malignancy. She admits to have good p.o. intake without significant change in her appetite lately. She has good liquid intake lately. PAST MEDICAL HISTORY:  Past Medical History:   Diagnosis Date   • Anxiety    • COPD (chronic obstructive pulmonary disease) ([address] )    • Diabetes mellitus ([address] )    • Essential (primary) hypertension    • GERD (gastroesophageal reflux disease)    • Smoker        PAST SURGICAL HISTORY:  History reviewed. No pertinent surgical history.     ALLERGIES:  Allergies   Allergen Reactions   • Clindamycin SOCIAL HISTORY:  Social History     Substance and Sexual Activity   Alcohol Use Not Currently     Social History     Substance and Sexual Activity   Drug Use Never     Social History     Tobacco Use   Smoking Status Every Day   • Packs/day: 0.25   • Types: Cigarettes   Smokeless Tobacco Never       FAMILY HISTORY:  History reviewed. No pertinent family history.     MEDICATIONS:    Current Facility-Administered Medications:   •  acetaminophen (TYLENOL) tablet 650 mg, 650 mg, Oral, Q6H PRN, Nobie Printers, DO  •  albuterol (PROVENTIL HFA,VENTOLIN HFA) inhaler 2 puff, 2 puff, Inhalation, Q4H PRN, Nobie Printers, DO, 2 puff at 07/18/23 1803  •  amLODIPine (NORVASC) tablet 10 mg, 10 mg, Oral, HS, Giorgio Hammer, DO, 10 mg at 07/18/23 2215  •  atorvastatin (LIPITOR) tablet 40 mg, 40 mg, Oral, HS, Giorgio Hammer, DO, 40 mg at 07/18/23 2215  •  budesonide (PULMICORT) inhalation solution 0.5 mg, 0.5 mg, Nebulization, BID, Giorgio Hammer, DO, 0.5 mg at 07/19/23 0702  •  cefTRIAXone (ROCEPHIN) IVPB (premix in dextrose) 1,000 mg 50 mL, 1,000 mg, Intravenous, Q24H, Giorgio Hammer DO, Last Rate: 100 mL/hr at 07/18/23 1439, 1,000 mg at 07/18/23 1439  •  citalopram (CeleXA) tablet 10 mg, 10 mg, Oral, Daily, Giorgio Hammer DO, 10 mg at 07/19/23 1682  •  cloNIDine (CATAPRES) tablet 0.2 mg, 0.2 mg, Oral, Q12H [address], Giorgio Hammer, DO, 0.2 mg at 07/19/23 0854  •  docusate sodium (COLACE) capsule 100 mg, 100 mg, Oral, BID PRN, Nobie Printers, DO  •  fluticasone (FLONASE) 50 mcg/act nasal spray 1 spray, 1 spray, Nasal, Daily, Giorgio Hammer, DO  •  glipiZIDE (GLUCOTROL) tablet 10 mg, 10 mg, Oral, Early Morning, Giorgio Hammer DO, 10 mg at 07/19/23 0501  •  heparin (porcine) subcutaneous injection 5,000 Units, 5,000 Units, Subcutaneous, Q8H [address], Giorgio Hammer DO, 5,000 Units at 07/19/23 0501  •  insulin lispro (HumaLOG) 100 units/mL subcutaneous injection 1-6 Units, 1-6 Units, Subcutaneous, 4x Daily (AC & HS), 1 Units at 07/19/23 0455 **AND** Fingerstick Glucose (POCT), , , 4x Daily AC and at bedtime, Will Lee DO  •  ipratropium (ATROVENT) 0.02 % inhalation solution 0.5 mg, 0.5 mg, Nebulization, TID, Giorgio Hammer DO, 0.5 mg at 07/19/23 0702  •  levalbuterol (XOPENEX) inhalation solution 1.25 mg, 1.25 mg, Nebulization, TID, 1.25 mg at 07/19/23 0702 **AND** [DISCONTINUED] sodium chloride 0.9 % inhalation solution 3 mL, 3 mL, Nebulization, TID, Giorgio Hammer DO  •  lisinopril (ZESTRIL) tablet 40 mg, 40 mg, Oral, Daily, Giorgio Hammre DO, 40 mg at 07/19/23 0854  •  LORazepam (ATIVAN) tablet 0.5 mg, 0.5 mg, Oral, Q6H PRN, Will Lee DO, 0.5 mg at 07/18/23 1726  •  methylPREDNISolone sodium succinate (Solu-MEDROL) injection 40 mg, 40 mg, Intravenous, Q8H, Giorgio Hammer, DO, 40 mg at 07/19/23 0501  •  metoprolol tartrate (LOPRESSOR) tablet 100 mg, 100 mg, Oral, Q12H [facility], Giorgio Hammer, DO, 100 mg at 07/19/23 0854  •  nicotine (NICODERM CQ) 14 mg/24hr TD 24 hr patch 1 patch, 1 patch, Transdermal, Daily, Giorgio Hammer DO  •  ondansetron (ZOFRAN) injection 4 mg, 4 mg, Intravenous, Q4H PRN, Giorgio Hammer, DO  •  pantoprazole (PROTONIX) EC tablet 40 mg, 40 mg, Oral, Early Morning, Giorgio Hammer DO, 40 mg at 07/19/23 0501  •  pioglitazone (ACTOS) tablet 15 mg, 15 mg, Oral, Daily, Giorgio Hammer, DO, 15 mg at 07/19/23 0854  •  sodium chloride tablet 2 g, 2 g, Oral, TID, Steven Perez MD    REVIEW OF SYSTEMS:  Complete 10 point review of systems were obtained and discussed in length with the patient. Complete review of systems were negative / unremarkable except mentioned in the HPI section.      PHYSICAL EXAM:  Current Weight: Weight - Scale: 68.8 kg (151 lb 10.8 oz)  First Weight: Weight - Scale: 69.8 kg (153 lb 14.1 oz)  Vitals:    07/19/23 0713   BP: 137/87   Pulse: 70   Resp: 19   Temp: 98.1 °F (36.7 °C)   SpO2: 100%       Intake/Output Summary (Last 24 hours) at 7/19/2023 1023  Last data filed at 7/19/2023 0952  Gross per 24 hour   Intake 1080 ml   Output 250 ml   Net 830 ml     Wt Readings from Last 3 Encounters:   07/18/23 68.8 kg (151 lb 10.8 oz)   07/16/23 73.5 kg (162 lb 0.6 oz)   06/24/23 71.7 kg (158 lb)     Temp Readings from Last 3 Encounters:   07/19/23 98.1 °F (36.7 °C)   07/16/23 97.6 °F (36.4 °C) (Temporal)   06/28/23 97.5 °F (36.4 °C)     BP Readings from Last 3 Encounters:   07/19/23 137/87   07/16/23 148/69   06/28/23 152/79     Pulse Readings from Last 3 Encounters:   07/19/23 70   07/16/23 79   06/28/23 94        Physical Examination is based on telemedicine:  ENT: External examination of ear and nose unremarkable  Respiratory: No conversational dyspnea noted, no acute respiratory distress, remains on O2  CVS: No significant edema  GI: Nondistended  CNS: Active, alert, oriented  Psych: Conscious, coherent  Skin: No new rash  Musculoskeletal: No obvious new gross deformity noted    Invasive Devices:      Lab Results:   Results from last 7 days   Lab Units 07/19/23  0500 07/18/23  1007 07/16/23  1833   WBC Thousand/uL 4.53 8.33 7.25   HEMOGLOBIN g/dL 11.8 13.7 13.1   HEMATOCRIT % 33.9* 40.8 39.3   PLATELETS Thousands/uL 173 209 203   POTASSIUM mmol/L 4.6 4.0 4.2   CHLORIDE mmol/L 85* 86* 87*   CO2 mmol/L 32 33* 34*   BUN mg/dL 9 7 5   CREATININE mg/dL 0.54* 0.70 0.69   CALCIUM mg/dL 9.2 9.8 9.8       Other Studies:   XR chest 2 views   Final Result by Miguel Stringer MD (07/18 1300)      No acute cardiopulmonary disease. Findings are stable            Workstation performed: [id number]         CT chest wo contrast    (Results Pending)   Chest X images personally reviewed which shows clear lungs. Portions of the record may have been created with voice recognition software. Occasional wrong word or "sound a like" substitutions may have occurred due to the inherent limitations of voice recognition software. Read the chart carefully and recognize, using context, where substitutions have occurred.

## 2023-07-19 NOTE — ASSESSMENT & PLAN NOTE
· In the setting of COPD  · Chronically requires oxygen at home at 2 L nasal cannula  · Currently is stable at her baseline Rhombic Flap Text: The defect edges were debeveled with a #15 scalpel blade.  Given the location of the defect and the proximity to free margins a rhombic flap was deemed most appropriate.  Using a sterile surgical marker, an appropriate rhombic flap was drawn incorporating the defect.    The area thus outlined was incised deep to adipose tissue with a #15 scalpel blade.  The skin margins were undermined to an appropriate distance in all directions utilizing iris scissors.

## 2023-07-19 NOTE — PROGRESS NOTES
[facility]  Progress Note  Name: Ousmane Rainey  MRN: [id number]  Unit/Bed#: -01 I Date of Admission: 7/18/2023   Date of Service: 7/19/2023 I Hospital Day: 0    Assessment/Plan   * COPD exacerbation ([facility] [address] [facility])  Assessment & Plan  History of COPD chronically on 2 L still smoking hypertension diabetes and anxiety presents with worsening shortness of breath found to have COPD exacerbation  · No infiltrates on CXR  · Continue IV methylprednisolone and bronchodilators  · Ceftriaxone for possibility of tracheobronchitis -we will discontinue at this time procalcitonin and no leukocytosis, CT negative for pneumonia  · Advise smoking cessation. She states that she will stop after this hospitalization. Will provide nicotine patch. · CT chest with out lung nodule or mass, linear density seen in right lung base due to atelectasis, no acute consolidation or pneumonia  · Greatly improved today -decrease steroids to twice daily    Anxiety about health  Assessment & Plan  · More anxious than usual.  Add lorazepam.    · Continue citalopram -patient does not want to switch medications at this time, considering outpatient psychiatric consult due to possible contributing factor for hyponatremia    Type 2 diabetes mellitus without complication, without long-term current use of insulin [facility])  Assessment & Plan  Lab Results   Component Value Date    HGBA1C 6.9 (H) 02/10/2023     Recent Labs     07/18/23  1552 07/18/23  2105 07/19/23  0711 07/19/23  1120   POCGLU 321* 168* 153* 202*     · Prior to admission on glipizide XL and pioglitazone. Anticipate steroid-induced hyperglycemia. · Continue both and add sliding scale coverage    Essential hypertension  Assessment & Plan  · Continue amlodipine clonidine lisinopril and metoprolol    Tobacco abuse  Assessment & Plan  · Advised smoking cessation. Nicotine patch ordered.     Acute hyponatremia  Assessment & Plan  · Acute on chronic hyponatremia   · Most recent TSH within limits (7/16)  · Check CT chest -no signs for active malignancy last CT screening test was in February 2020 at [address]  · Sodium has steadily trended down since admission  · With history of SIADH, does not seem compliant with fluid restriction, does not follow with nephrology  · Also takes Celexa, could be contributing however has been taking for a while and not considering changing medications  · Fluid restriction added  · Nephrology consulted - added on salt tabs 2 g p.o. 3 times daily  · Recheck BMP at 6 PM, consider 1.8% hypertonic saline if sodium level not improving, may also have to add Lasix depending on repeat BMP and urine osmolality  · Goal sodium correction no more than 8 mEq in 24 hours, goal sodium around 130 by tomorrow a.m. Results from last 7 days   Lab Units 07/19/23  0500 07/18/23  1736 07/18/23  1007 07/16/23  1833   SODIUM mmol/L 122* 123* 126* 128*       Chronic respiratory failure (HCC)  Assessment & Plan  In the setting of COPD  Chronically requires oxygen at home at 2 L nasal cannula  Currently is stable at her baseline           VTE Pharmacologic Prophylaxis: VTE Score: 3 Moderate Risk (Score 3-4) - Pharmacological DVT Prophylaxis Ordered: heparin. Patient Centered Rounds: I performed bedside rounds with nursing staff today. Discussions with Specialists or Other Care Team Provider: CM, nephrology     Education and Discussions with Family / Patient: Patient declined call to . Total Time Spent on Date of Encounter in care of patient: 35 minutes This time was spent on one or more of the following: performing physical exam; counseling and coordination of care; obtaining or reviewing history; documenting in the medical record; reviewing/ordering tests, medications or procedures; communicating with other healthcare professionals and discussing with patient's family/caregivers.     Current Length of Stay: 0 day(s)  Current Patient Status: Inpatient Certification Statement: The patient will continue to require additional inpatient hospital stay due to COPD exacerbation, acute hyponatremia  Discharge Plan: Anticipate discharge in 24-48 hrs to home. Code Status: Level 1 - Full Code    Subjective:   Seen and examined. Endorses great improvement in her shortness of breath, intermittently has been quite anxious. Discussed with her her sodium and needing fluid restriction -patient agreeable for fluid restriction. Discussed with her her anxiety and Celexa, wishing to continue. Denies any new concerns. Objective:     Vitals:   Temp (24hrs), Av.5 °F (36.4 °C), Min:97 °F (36.1 °C), Max:98.1 °F (36.7 °C)    Temp:  [97 °F (36.1 °C)-98.1 °F (36.7 °C)] 97.5 °F (36.4 °C)  HR:  [70-79] 75  Resp:  [18-22] 22  BP: (127-137)/(68-87) 132/68  SpO2:  [96 %-100 %] 96 %  Body mass index is 26.88 kg/m². Input and Output Summary (last 24 hours): Intake/Output Summary (Last 24 hours) at 2023 1538  Last data filed at 2023 1341  Gross per 24 hour   Intake 1200 ml   Output 250 ml   Net 950 ml       Physical Exam:   Physical Exam  Vitals and nursing note reviewed. Constitutional:       General: She is not in acute distress. Appearance: Normal appearance. HENT:      Head: Normocephalic and atraumatic. Nose: No congestion. Mouth/Throat:      Mouth: Mucous membranes are moist.   Eyes:      Conjunctiva/sclera: Conjunctivae normal.   Cardiovascular:      Rate and Rhythm: Normal rate and regular rhythm. Pulses: Normal pulses. Heart sounds: Normal heart sounds. No murmur heard. Pulmonary:      Effort: Pulmonary effort is normal. No respiratory distress. Breath sounds: Wheezing (Mild) present. Comments: Diffusely diminished  Abdominal:      General: Bowel sounds are normal.      Palpations: Abdomen is soft. Tenderness: There is no abdominal tenderness. Musculoskeletal:         General: Normal range of motion.       Right lower leg: No edema. Left lower leg: No edema. Skin:     General: Skin is warm and dry. Neurological:      Mental Status: She is alert and oriented to person, place, and time. Psychiatric:         Mood and Affect: Mood is anxious.           Additional Data:     Labs:  Results from last 7 days   Lab Units 07/19/23  0500 07/18/23  1007   WBC Thousand/uL 4.53 8.33   HEMOGLOBIN g/dL 11.8 13.7   HEMATOCRIT % 33.9* 40.8   PLATELETS Thousands/uL 173 209   NEUTROS PCT %  --  73   LYMPHS PCT %  --  17   MONOS PCT %  --  10   EOS PCT %  --  0     Results from last 7 days   Lab Units 07/19/23  0500   SODIUM mmol/L 122*   POTASSIUM mmol/L 4.6   CHLORIDE mmol/L 85*   CO2 mmol/L 32   BUN mg/dL 9   CREATININE mg/dL 0.54*   ANION GAP mmol/L 5   CALCIUM mg/dL 9.2   ALBUMIN g/dL 3.7   TOTAL BILIRUBIN mg/dL 0.37   ALK PHOS U/L 68   ALT U/L 9   AST U/L 9*   GLUCOSE RANDOM mg/dL 171*         Results from last 7 days   Lab Units 07/19/23  1120 07/19/23  0711 07/18/23  2105 07/18/23  1552 07/18/23  1206   POC GLUCOSE mg/dl 202* 153* 168* 321* 163*         Results from last 7 days   Lab Units 07/19/23  0500   PROCALCITONIN ng/ml <0.05       Lines/Drains:  Invasive Devices     Peripheral Intravenous Line  Duration           Peripheral IV 07/18/23 Right;Ventral (anterior) Forearm 1 day                      Imaging: Reviewed radiology reports from this admission including: chest CT scan    Recent Cultures (last 7 days):         Last 24 Hours Medication List:   Current Facility-Administered Medications   Medication Dose Route Frequency Provider Last Rate   • acetaminophen  650 mg Oral Q6H PRN [place] Spearfish, DO     • albuterol  2 puff Inhalation Q4H PRN Torie Spearfish, DO     • amLODIPine  10 mg Oral HS Steven Abraham MD     • atorvastatin  40 mg Oral HS Giorgio Hammer DO     • budesonide  0.5 mg Nebulization BID Torie Caldwell, DO     • citalopram  10 mg Oral Daily Giorgio Hammer DO     • cloNIDine  0.2 mg Oral Q12H [facility]     • docusate sodium  100 mg Oral BID PRN Roberto Carlos Mangle, DO     • fluticasone  1 spray Nasal Daily Giorgio Hammer, DO     • glipiZIDE  10 mg Oral Early Morning Giorgio Hammer, DO     • heparin (porcine)  5,000 Units Subcutaneous [facility] Giorgiostephy Hammer, DO     • insulin lispro  1-6 Units Subcutaneous 4x Daily (AC & HS) Roberto Carlos Mangle, DO     • ipratropium  0.5 mg Nebulization TID Roberto Carlos Mangle, DO     • levalbuterol  1.25 mg Nebulization TID [place] Mangle, DO     • [START ON 7/20/2023] lisinopril  40 mg Oral Daily Dhaval Lewis Skiff, MD     • LORazepam  0.5 mg Oral Q6H PRN Roberto Carlos Mangle, DO     • methylPREDNISolone sodium succinate  40 mg Intravenous Q12H [address] Timbo eD La Garza PA-C     • metoprolol tartrate  100 mg Oral Q12H [address] [place] Michael, DO     • nicotine  1 patch Transdermal Daily Giorgio Hammer, DO     • ondansetron  4 mg Intravenous Q4H PRN [place] Ramirezle, DO     • pantoprazole  40 mg Oral Early Morning Giorgio Hammer, DO     • pioglitazone  15 mg Oral Daily Giorgio Hammer, DO     • simethicone  80 mg Oral Q6H PRN Timbo De La Garza PA-C     • sodium chloride  2 g Oral TID Cindy Gonzales MD          Today, Patient Was Seen By: Timbo De La Garza PA-C    **Please Note: This note may have been constructed using a voice recognition system. **

## 2023-07-19 NOTE — ASSESSMENT & PLAN NOTE
· Acute on chronic hyponatremia   · Most recent TSH within limits (7/16)  · Check CT chest -no signs for active malignancy last CT screening test was in February 2020 at [address]  · Sodium has steadily trended down since admission  · With history of SIADH, does not seem compliant with fluid restriction, does not follow with nephrology  · Also takes Celexa, could be contributing however has been taking for a while and not considering changing medications  · Fluid restriction added  · Nephrology consulted - added on salt tabs 2 g p.o. 3 times daily  · Recheck BMP at 6 PM, consider 1.8% hypertonic saline if sodium level not improving, may also have to add Lasix depending on repeat BMP and urine osmolality  · Goal sodium correction no more than 8 mEq in 24 hours, goal sodium around 130 by tomorrow a.m.     Results from last 7 days   Lab Units 07/19/23  0500 07/18/23  1736 07/18/23  1007 07/16/23  1833   SODIUM mmol/L 122* 123* 126* 128*

## 2023-07-20 LAB
ANION GAP SERPL CALCULATED.3IONS-SCNC: 5 MMOL/L
ANION GAP SERPL CALCULATED.3IONS-SCNC: 6 MMOL/L
ANION GAP SERPL CALCULATED.3IONS-SCNC: 7 MMOL/L
BUN SERPL-MCNC: 12 MG/DL (ref 5–25)
BUN SERPL-MCNC: 17 MG/DL (ref 5–25)
BUN SERPL-MCNC: 18 MG/DL (ref 5–25)
CALCIUM SERPL-MCNC: 9.1 MG/DL (ref 8.4–10.2)
CALCIUM SERPL-MCNC: 9.1 MG/DL (ref 8.4–10.2)
CALCIUM SERPL-MCNC: 9.3 MG/DL (ref 8.4–10.2)
CHLORIDE SERPL-SCNC: 84 MMOL/L (ref 96–108)
CHLORIDE SERPL-SCNC: 86 MMOL/L (ref 96–108)
CHLORIDE SERPL-SCNC: 89 MMOL/L (ref 96–108)
CO2 SERPL-SCNC: 33 MMOL/L (ref 21–32)
CO2 SERPL-SCNC: 34 MMOL/L (ref 21–32)
CO2 SERPL-SCNC: 35 MMOL/L (ref 21–32)
CREAT SERPL-MCNC: 0.58 MG/DL (ref 0.6–1.3)
CREAT SERPL-MCNC: 0.71 MG/DL (ref 0.6–1.3)
CREAT SERPL-MCNC: 0.83 MG/DL (ref 0.6–1.3)
GFR SERPL CREATININE-BSD FRML MDRD: 73 ML/MIN/1.73SQ M
GFR SERPL CREATININE-BSD FRML MDRD: 89 ML/MIN/1.73SQ M
GFR SERPL CREATININE-BSD FRML MDRD: 96 ML/MIN/1.73SQ M
GLUCOSE SERPL-MCNC: 129 MG/DL (ref 65–140)
GLUCOSE SERPL-MCNC: 155 MG/DL (ref 65–140)
GLUCOSE SERPL-MCNC: 169 MG/DL (ref 65–140)
GLUCOSE SERPL-MCNC: 176 MG/DL (ref 65–140)
GLUCOSE SERPL-MCNC: 253 MG/DL (ref 65–140)
GLUCOSE SERPL-MCNC: 261 MG/DL (ref 65–140)
GLUCOSE SERPL-MCNC: 275 MG/DL (ref 65–140)
POTASSIUM SERPL-SCNC: 4 MMOL/L (ref 3.5–5.3)
POTASSIUM SERPL-SCNC: 4.4 MMOL/L (ref 3.5–5.3)
POTASSIUM SERPL-SCNC: 4.5 MMOL/L (ref 3.5–5.3)
SODIUM SERPL-SCNC: 125 MMOL/L (ref 135–147)
SODIUM SERPL-SCNC: 125 MMOL/L (ref 135–147)
SODIUM SERPL-SCNC: 129 MMOL/L (ref 135–147)

## 2023-07-20 PROCEDURE — 99232 SBSQ HOSP IP/OBS MODERATE 35: CPT | Performed by: INTERNAL MEDICINE

## 2023-07-20 PROCEDURE — 94760 N-INVAS EAR/PLS OXIMETRY 1: CPT

## 2023-07-20 PROCEDURE — 80048 BASIC METABOLIC PNL TOTAL CA: CPT | Performed by: INTERNAL MEDICINE

## 2023-07-20 PROCEDURE — 82948 REAGENT STRIP/BLOOD GLUCOSE: CPT

## 2023-07-20 PROCEDURE — 94640 AIRWAY INHALATION TREATMENT: CPT

## 2023-07-20 RX ORDER — FUROSEMIDE 10 MG/ML
20 INJECTION INTRAMUSCULAR; INTRAVENOUS ONCE
Status: COMPLETED | OUTPATIENT
Start: 2023-07-20 | End: 2023-07-20

## 2023-07-20 RX ADMIN — FLUTICASONE PROPIONATE 1 SPRAY: 50 SPRAY, METERED NASAL at 09:04

## 2023-07-20 RX ADMIN — FUROSEMIDE 20 MG: 10 INJECTION, SOLUTION INTRAMUSCULAR; INTRAVENOUS at 10:54

## 2023-07-20 RX ADMIN — IPRATROPIUM BROMIDE 0.5 MG: 0.5 SOLUTION RESPIRATORY (INHALATION) at 19:47

## 2023-07-20 RX ADMIN — LEVALBUTEROL HYDROCHLORIDE 1.25 MG: 1.25 SOLUTION RESPIRATORY (INHALATION) at 13:26

## 2023-07-20 RX ADMIN — CITALOPRAM HYDROBROMIDE 10 MG: 10 TABLET ORAL at 09:02

## 2023-07-20 RX ADMIN — CLONIDINE HYDROCHLORIDE 0.2 MG: 0.1 TABLET ORAL at 21:30

## 2023-07-20 RX ADMIN — PIOGLITAZONE HYDROCHLORIDE 15 MG: 15 TABLET ORAL at 09:02

## 2023-07-20 RX ADMIN — INSULIN LISPRO 3 UNITS: 100 INJECTION, SOLUTION INTRAVENOUS; SUBCUTANEOUS at 21:37

## 2023-07-20 RX ADMIN — METOPROLOL TARTRATE 100 MG: 50 TABLET, FILM COATED ORAL at 09:02

## 2023-07-20 RX ADMIN — GLIPIZIDE 10 MG: 5 TABLET ORAL at 06:10

## 2023-07-20 RX ADMIN — AMLODIPINE BESYLATE 10 MG: 10 TABLET ORAL at 21:30

## 2023-07-20 RX ADMIN — INSULIN LISPRO 3 UNITS: 100 INJECTION, SOLUTION INTRAVENOUS; SUBCUTANEOUS at 16:01

## 2023-07-20 RX ADMIN — HEPARIN SODIUM 5000 UNITS: 5000 INJECTION INTRAVENOUS; SUBCUTANEOUS at 21:30

## 2023-07-20 RX ADMIN — PANTOPRAZOLE SODIUM 40 MG: 40 TABLET, DELAYED RELEASE ORAL at 06:11

## 2023-07-20 RX ADMIN — IPRATROPIUM BROMIDE 0.5 MG: 0.5 SOLUTION RESPIRATORY (INHALATION) at 13:26

## 2023-07-20 RX ADMIN — LISINOPRIL 40 MG: 20 TABLET ORAL at 09:02

## 2023-07-20 RX ADMIN — METHYLPREDNISOLONE SODIUM SUCCINATE 40 MG: 40 INJECTION, POWDER, FOR SOLUTION INTRAMUSCULAR; INTRAVENOUS at 09:03

## 2023-07-20 RX ADMIN — HEPARIN SODIUM 5000 UNITS: 5000 INJECTION INTRAVENOUS; SUBCUTANEOUS at 06:11

## 2023-07-20 RX ADMIN — HEPARIN SODIUM 5000 UNITS: 5000 INJECTION INTRAVENOUS; SUBCUTANEOUS at 14:00

## 2023-07-20 RX ADMIN — ATORVASTATIN CALCIUM 40 MG: 40 TABLET, FILM COATED ORAL at 21:29

## 2023-07-20 RX ADMIN — BUDESONIDE 0.5 MG: 0.5 INHALANT ORAL at 07:48

## 2023-07-20 RX ADMIN — LEVALBUTEROL HYDROCHLORIDE 1.25 MG: 1.25 SOLUTION RESPIRATORY (INHALATION) at 19:46

## 2023-07-20 RX ADMIN — METOPROLOL TARTRATE 100 MG: 50 TABLET, FILM COATED ORAL at 21:29

## 2023-07-20 RX ADMIN — SODIUM CHLORIDE TAB 1 GM 2 G: 1 TAB at 15:59

## 2023-07-20 RX ADMIN — LEVALBUTEROL HYDROCHLORIDE 1.25 MG: 1.25 SOLUTION RESPIRATORY (INHALATION) at 07:48

## 2023-07-20 RX ADMIN — SODIUM CHLORIDE TAB 1 GM 2 G: 1 TAB at 09:02

## 2023-07-20 RX ADMIN — CLONIDINE HYDROCHLORIDE 0.2 MG: 0.1 TABLET ORAL at 09:02

## 2023-07-20 RX ADMIN — BUDESONIDE 0.5 MG: 0.5 INHALANT ORAL at 19:47

## 2023-07-20 RX ADMIN — NICOTINE 1 PATCH: 14 PATCH, EXTENDED RELEASE TRANSDERMAL at 09:04

## 2023-07-20 RX ADMIN — INSULIN LISPRO 1 UNITS: 100 INJECTION, SOLUTION INTRAVENOUS; SUBCUTANEOUS at 08:00

## 2023-07-20 RX ADMIN — METHYLPREDNISOLONE SODIUM SUCCINATE 40 MG: 40 INJECTION, POWDER, FOR SOLUTION INTRAMUSCULAR; INTRAVENOUS at 21:30

## 2023-07-20 RX ADMIN — SODIUM CHLORIDE: 4 INJECTION, SOLUTION, CONCENTRATE INTRAVENOUS at 17:45

## 2023-07-20 RX ADMIN — IPRATROPIUM BROMIDE 0.5 MG: 0.5 SOLUTION RESPIRATORY (INHALATION) at 07:48

## 2023-07-20 NOTE — PLAN OF CARE
Problem: MOBILITY - ADULT  Goal: Maintain or return to baseline ADL function  Description: INTERVENTIONS:  -  Assess patient's ability to carry out ADLs; assess patient's baseline for ADL function and identify physical deficits which impact ability to perform ADLs (bathing, care of mouth/teeth, toileting, grooming, dressing, etc.)  - Assess/evaluate cause of self-care deficits   - Assess range of motion  - Assess patient's mobility; develop plan if impaired  - Assess patient's need for assistive devices and provide as appropriate  - Encourage maximum independence but intervene and supervise when necessary  - Involve family in performance of ADLs  - Assess for home care needs following discharge   - Consider OT consult to assist with ADL evaluation and planning for discharge  - Provide patient education as appropriate  Outcome: Progressing  Goal: Maintains/Returns to pre admission functional level  Description: INTERVENTIONS:  - Perform BMAT or MOVE assessment daily.   - Set and communicate daily mobility goal to care team and patient/family/caregiver. - Collaborate with rehabilitation services on mobility goals if consulted  - Perform Range of Motion    times a day. - Reposition patient every    hours.   - Dangle patient    times a day  - Stand patient    times a day  - Ambulate patient    times a day  - Out of bed to chair    times a day   - Out of bed for meals    times a day  - Out of bed for toileting  - Record patient progress and toleration of activity level   Outcome: Progressing     Problem: PAIN - ADULT  Goal: Verbalizes/displays adequate comfort level or baseline comfort level  Description: Interventions:  - Encourage patient to monitor pain and request assistance  - Assess pain using appropriate pain scale  - Administer analgesics based on type and severity of pain and evaluate response  - Implement non-pharmacological measures as appropriate and evaluate response  - Consider cultural and social influences on pain and pain management  - Notify physician/advanced practitioner if interventions unsuccessful or patient reports new pain  Outcome: Progressing     Problem: INFECTION - ADULT  Goal: Absence or prevention of progression during hospitalization  Description: INTERVENTIONS:  - Assess and monitor for signs and symptoms of infection  - Monitor lab/diagnostic results  - Monitor all insertion sites, i.e. indwelling lines, tubes, and drains  - Monitor endotracheal if appropriate and nasal secretions for changes in amount and color  - [place] appropriate cooling/warming therapies per order  - Administer medications as ordered  - Instruct and encourage patient and family to use good hand hygiene technique  - Identify and instruct in appropriate isolation precautions for identified infection/condition  Outcome: Progressing  Goal: Absence of fever/infection during neutropenic period  Description: INTERVENTIONS:  - Monitor WBC    Outcome: Progressing     Problem: SAFETY ADULT  Goal: Maintain or return to baseline ADL function  Description: INTERVENTIONS:  -  Assess patient's ability to carry out ADLs; assess patient's baseline for ADL function and identify physical deficits which impact ability to perform ADLs (bathing, care of mouth/teeth, toileting, grooming, dressing, etc.)  - Assess/evaluate cause of self-care deficits   - Assess range of motion  - Assess patient's mobility; develop plan if impaired  - Assess patient's need for assistive devices and provide as appropriate  - Encourage maximum independence but intervene and supervise when necessary  - Involve family in performance of ADLs  - Assess for home care needs following discharge   - Consider OT consult to assist with ADL evaluation and planning for discharge  - Provide patient education as appropriate  Outcome: Progressing  Goal: Maintains/Returns to pre admission functional level  Description: INTERVENTIONS:  - Perform BMAT or MOVE assessment daily.   - Set and communicate daily mobility goal to care team and patient/family/caregiver. - Collaborate with rehabilitation services on mobility goals if consulted  - Perform Range of Motion    times a day. - Reposition patient every    hours.   - Dangle patient    times a day  - Stand patient    times a day  - Ambulate patient    times a day  - Out of bed to chair    times a day   - Out of bed for meals    times a day  - Out of bed for toileting  - Record patient progress and toleration of activity level   Outcome: Progressing  Goal: Patient will remain free of falls  Description: INTERVENTIONS:  - Educate patient/family on patient safety including physical limitations  - Instruct patient to call for assistance with activity   - Consult OT/PT to assist with strengthening/mobility   - Keep Call bell within reach  - Keep bed low and locked with side rails adjusted as appropriate  - Keep care items and personal belongings within reach  - Initiate and maintain comfort rounds  - Make Fall Risk Sign visible to staff  - Offer Toileting every    Hours, in advance of need  - Initiate/Maintain   alarm  - Obtain necessary fall risk management equipment:           - Apply yellow socks and bracelet for high fall risk patients  - Consider moving patient to room near nurses station  Outcome: Progressing     Problem: DISCHARGE PLANNING  Goal: Discharge to home or other facility with appropriate resources  Description: INTERVENTIONS:  - Identify barriers to discharge w/patient and caregiver  - Arrange for needed discharge resources and transportation as appropriate  - Identify discharge learning needs (meds, wound care, etc.)  - Arrange for interpretive services to assist at discharge as needed  - Refer to Case Management Department for coordinating discharge planning if the patient needs post-hospital services based on physician/advanced practitioner order or complex needs related to functional status, cognitive ability, or social support system  Outcome: Progressing     Problem: Knowledge Deficit  Goal: Patient/family/caregiver demonstrates understanding of disease process, treatment plan, medications, and discharge instructions  Description: Complete learning assessment and assess knowledge base.   Interventions:  - Provide teaching at level of understanding  - Provide teaching via preferred learning methods  Outcome: Progressing

## 2023-07-20 NOTE — PROGRESS NOTES
[facility]  Progress Note  Name: Gustavo Crowell  MRN: [id number]  Unit/Bed#: -01 I Date of Admission: 7/18/2023   Date of Service: 7/20/2023 I Hospital Day: 1    Assessment/Plan   * COPD exacerbation ([facility])  Assessment & Plan  History of COPD chronically on 2 L still smoking hypertension diabetes and anxiety presents with worsening shortness of breath found to have COPD exacerbation  · No infiltrates on CXR  · Continue IV methylprednisolone and bronchodilators  · Advise smoking cessation. She states that she will stop after this hospitalization; continue nicotine patch. · CT chest without evidence of malignancy or infiltrates    Acute hyponatremia  Assessment & Plan  · Acute on chronic hyponatremia   · Most recent TSH within limits  · CT chest -no signs for active malignancy last CT screening test was in February 2020 at [address]  · Discussed holding citalopram but this is a long-term medication and patient hesitant to stop. · Appreciate nephrology evaluation. Currently on salt tablets and may need hypertonic fluids or tolvaptan. Results from last 7 days   Lab Units 07/20/23  0601 07/19/23  1832 07/19/23  0500 07/18/23  1736   SODIUM mmol/L 125* 121* 122* 123*       Anxiety about health  Assessment & Plan  · More anxious than usual.  Added lorazepam.    · Currently on citalopram.  Due to hyponatremia there was discussion regarding stopping this. Due to long-term medications patient hesitant and will continue for now    Type 2 diabetes mellitus without complication, without long-term current use of insulin [facility])  Assessment & Plan  Lab Results   Component Value Date    HGBA1C 6.9 (H) 02/10/2023     Recent Labs     07/19/23  1546 07/19/23  2117 07/20/23  0728 07/20/23  1125   POCGLU 227* 210* 155* 129     · Prior to admission on glipizide XL and pioglitazone.   · Continue both and sliding scale coverage    Essential hypertension  Assessment & Plan  · Continue amlodipine clonidine lisinopril and metoprolol    Tobacco abuse  Assessment & Plan  · Advised smoking cessation. Nicotine patch ordered. Chronic respiratory failure (HCC)  Assessment & Plan  · In the setting of COPD  · Chronically requires oxygen at home at 2 L nasal cannula  · Currently is stable at her baseline    VTE Pharmacologic Prophylaxis: VTE Score: 3 Moderate Risk (Score 3-4) - Pharmacological DVT Prophylaxis Ordered: heparin. Patient Centered Rounds: I have performed bedside rounds with nursing staff today. Discussions with Specialists or Other Care Team Provider: Case management    Education and Discussions with Family / Patient: Updated  [phone]) via phone. Time Spent for Care: This time was spent on one or more of the following: performing physical exam; counseling and coordination of care; obtaining or reviewing history; documenting in the medical record; reviewing/ordering tests, medications or procedures; communicating with other healthcare professionals and discussing with patient's family/caregivers. Current Length of Stay: 1 day(s)  Current Patient Status: Inpatient   Certification Statement: The patient will continue to require additional inpatient hospital stay due to Hyponatremia  Discharge Plan: Anticipate discharge in 24-48 hrs to home. Code Status: Level 1 - Full Code      Subjective:   Patient seen and examined. No new complaints. COPD appears well controlled. Unfortunately sodium still low    Objective:   Vitals: Blood pressure 131/88, pulse 75, temperature 97.5 °F (36.4 °C), resp. rate 19, height 5' 2.99" (1.6 m), weight 68.8 kg (151 lb 10.8 oz), SpO2 94 %. Intake/Output Summary (Last 24 hours) at 7/20/2023 1439  Last data filed at 7/20/2023 0121  Gross per 24 hour   Intake 120 ml   Output 600 ml   Net -480 ml       Physical Exam  Vitals reviewed. Constitutional:       General: She is not in acute distress. Appearance: Normal appearance.    HENT:      Head: Atraumatic. Eyes:      General: No scleral icterus. Cardiovascular:      Rate and Rhythm: Regular rhythm. Heart sounds: Normal heart sounds. Pulmonary:      Breath sounds: Decreased air movement present. Decreased breath sounds present. No wheezing. Abdominal:      General: Bowel sounds are normal.      Palpations: Abdomen is soft. Tenderness: There is no guarding or rebound. Musculoskeletal:         General: No swelling. Skin:     General: Skin is warm. Neurological:      Mental Status: She is alert. Motor: No weakness. Psychiatric:         Mood and Affect: Mood normal.       Additional Data:   Labs:  Results from last 7 days   Lab Units 07/19/23  0500 07/18/23 1007 07/16/23  1833   WBC Thousand/uL 4.53 8.33 7.25   HEMOGLOBIN g/dL 11.8 13.7 13.1   PLATELETS Thousands/uL 173 209 203   MCV fL 88 89 90     Results from last 7 days   Lab Units 07/20/23  0601 07/19/23  1832 07/19/23  0500 07/18/23  1736 07/18/23 1007 07/16/23  1833   SODIUM mmol/L 125* 121* 122*   < > 126* 128*   POTASSIUM mmol/L 4.5 4.0 4.6  --  4.0 4.2   CHLORIDE mmol/L 86* 80* 85*  --  86* 87*   CO2 mmol/L 33* 35* 32  --  33* 34*   ANION GAP mmol/L 6 6 5  --  7 7   BUN mg/dL 12 15 9  --  7 5   CREATININE mg/dL 0.58* 0.68 0.54*  --  0.70 0.69   CALCIUM mg/dL 9.3 9.3 9.2  --  9.8 9.8   ALBUMIN g/dL  --   --  3.7  --  4.4 4.2   TOTAL BILIRUBIN mg/dL  --   --  0.37  --  0.44 0.47   ALK PHOS U/L  --   --  68  --  85 76   ALT U/L  --   --  9  --  10 9   AST U/L  --   --  9*  --  12* 11*   EGFR ml/min/1.73sq m 96 91 98  --  90 91   GLUCOSE RANDOM mg/dL 176* 246* 171*  --  135 108    < > = values in this interval not displayed.              Results from last 7 days   Lab Units 07/18/23  1409 07/18/23  1230 07/18/23  1007   HS TNI 0HR ng/L  --   --  4   HS TNI 2HR ng/L  --  4  --    HS TNI 4HR ng/L 4  --   --           Results from last 7 days   Lab Units 07/19/23  0500   PROCALCITONIN ng/ml <0.05     Results from last 7 days Lab Units 07/20/23  1125 07/20/23  0728 07/19/23  2117 07/19/23  1546 07/19/23  1120 07/19/23  0711 07/18/23  2105 07/18/23  1552 07/18/23  1206   POC GLUCOSE mg/dl 129 155* 210* 227* 202* 153* 168* 321* 163*         Results from last 7 days   Lab Units 07/16/23  1833   TSH 3RD GENERATON uIU/mL 0.793     * I Have Reviewed All Lab Data Listed Above. Cultures:         Results from last 7 days   Lab Units 07/18/23  1007   SARS-COV-2  Negative           Lines/Drains:  Invasive Devices     Peripheral Intravenous Line  Duration           Peripheral IV 07/18/23 Right;Ventral (anterior) Forearm 1 day              Telemetry:      Imaging:  Imaging Reports Reviewed Today Include:   CT chest wo contrast    Result Date: 7/19/2023  Impression: There is no suspicious lung nodule or mass which would suggest lung primary Linear density seen right lung base due to atelectasis No acute consolidation/pneumonia Continue lung screening Workstation performed: [id number]     XR chest 2 views    Result Date: 7/18/2023  Impression: No acute cardiopulmonary disease.  Findings are stable Workstation performed: [id number]       Scheduled Meds:  Current Facility-Administered Medications   Medication Dose Route Frequency Provider Last Rate   • acetaminophen  650 mg Oral Q6H PRN Sirisha Pryor, DO     • albuterol  2 puff Inhalation Q4H PRN Sirisha Pryor, DO     • amLODIPine  10 mg Oral HS Steven Helm MD     • atorvastatin  40 mg Oral HS Giorgio Hammer DO     • budesonide  0.5 mg Nebulization BID Sirisha Pryor DO     • citalopram  10 mg Oral Daily Sirisha Pryor DO     • cloNIDine  0.2 mg Oral Q12H [address] Steven Helm MD     • docusate sodium  100 mg Oral BID PRN Sirisha Pryor DO     • fluticasone  1 spray Nasal Daily Giorgio Hammer DO     • glipiZIDE  10 mg Oral Early Morning Giorgio Hammer DO     • heparin (porcine)  5,000 Units Subcutaneous [facility] Giorgio Hammer DO     • insulin lispro  1-6 Units Subcutaneous 4x Daily (AC & HS) Sadie Cross, DO     • ipratropium  0.5 mg Nebulization TID Sadie Cross DO     • levalbuterol  1.25 mg Nebulization TID Sadie Cross DO     • lisinopril  40 mg Oral Daily Steven Hernandez MD     • LORazepam  0.5 mg Oral Q6H PRN Sadie Cross DO     • methylPREDNISolone sodium succinate  40 mg Intravenous Q12H [facility], PA-C     • metoprolol tartrate  100 mg Oral Q12H [facility] Sadie Cross DO     • nicotine  1 patch Transdermal Daily Giorgio Hammer DO     • ondansetron  4 mg Intravenous Q4H PRN Sadie Cross DO     • pantoprazole  40 mg Oral Early Morning Giorgio Hammer DO     • pioglitazone  15 mg Oral Daily Sadie Cross DO     • simethicone  80 mg Oral Q6H PRN [facility], PA-C     • sodium chloride  2 g Oral TID Kinjal Raphael MD         Today, Patient Was Seen By: Sadie Cross DO    ** Please Note: Dictation voice to text software may have been used in the creation of this document.  **

## 2023-07-20 NOTE — NURSING NOTE
Resumed care of patient at this time. VS stable. Denies any pain or discomfort. Call bell within reach.

## 2023-07-20 NOTE — QUICK NOTE
Repeat sodium 121. Will trial Lasix per nephrology recommendations. Discussed and reinforced fluid restriction of 1200 mL with patient. Repeat sodium in a.m.

## 2023-07-20 NOTE — UTILIZATION REVIEW
NOTIFICATION OF INPATIENT ADMISSION   AUTHORIZATION REQUEST   SERVICING FACILITY:   [address]  Tax ID: [id number]  NPI: [id number] ATTENDING PROVIDER:  Attending Name and NPI#: Guillermo Gu [[id number]]  Address: [address] [place] [address]  Phone: [phone]   ADMISSION INFORMATION:  Place of Service: [address] [place] [address]  Place of Service Code: 21  Inpatient Admission Date/Time: 7/19/23 10:41 AM  Discharge Date/Time: No discharge date for patient encounter. Admitting Diagnosis Code/Description:  Hyponatremia [E87.1]  SOB (shortness of breath) [R06.02]  COPD exacerbation (HCC) [J44.1]     UTILIZATION REVIEW CONTACT:  Nancy Carpenter Utilization   Network Utilization Review Department  Phone: [phone]  Fax [phone]  Email: [email or web address]  Contact for approvals/pending authorizations, clinical reviews, and discharge. PHYSICIAN ADVISORY SERVICES:  Medical Necessity Denial & Rckj-sb-Rlnk Review  Phone: [phone]  Fax: [phone]  Email: [email or web address]

## 2023-07-20 NOTE — PLAN OF CARE
Problem: PAIN - ADULT  Goal: Verbalizes/displays adequate comfort level or baseline comfort level  Description: Interventions:  - Encourage patient to monitor pain and request assistance  - Assess pain using appropriate pain scale  - Administer analgesics based on type and severity of pain and evaluate response  - Implement non-pharmacological measures as appropriate and evaluate response  - Consider cultural and social influences on pain and pain management  - Notify physician/advanced practitioner if interventions unsuccessful or patient reports new pain  Outcome: Progressing     Problem: MOBILITY - ADULT  Goal: Maintain or return to baseline ADL function  Description: INTERVENTIONS:  -  Assess patient's ability to carry out ADLs; assess patient's baseline for ADL function and identify physical deficits which impact ability to perform ADLs (bathing, care of mouth/teeth, toileting, grooming, dressing, etc.)  - Assess/evaluate cause of self-care deficits   - Assess range of motion  - Assess patient's mobility; develop plan if impaired  - Assess patient's need for assistive devices and provide as appropriate  - Encourage maximum independence but intervene and supervise when necessary  - Involve family in performance of ADLs  - Assess for home care needs following discharge   - Consider OT consult to assist with ADL evaluation and planning for discharge  - Provide patient education as appropriate  Outcome: Progressing     Problem: Knowledge Deficit  Goal: Patient/family/caregiver demonstrates understanding of disease process, treatment plan, medications, and discharge instructions  Description: Complete learning assessment and assess knowledge base.   Interventions:  - Provide teaching at level of understanding  - Provide teaching via preferred learning methods  Outcome: Progressing

## 2023-07-20 NOTE — TELEMEDICINE
REQUIRED DOCUMENTATION:     1. This service was provided via Telemedicine. 2. Provider located at [place]. 3. TeleMed provider: Lenin Padilla MD.  4. Identify all parties in room with patient during tele consult:  Patient  5. Patient was then informed that this was a Telemedicine visit and that the exam was being conducted confidentially over secure lines. My office door was closed. No one else was in the room. Patient acknowledged consent and understanding of privacy and security of the Telemedicine visit, and gave us permission to have the assistant stay in the room in order to assist with the history and to conduct the exam.  I informed the patient that I have reviewed their record in Epic and presented the opportunity for them to ask any questions regarding the visit today. The patient agreed to participate. NEPHROLOGY PROGRESS NOTE   Shane Read 79 y.o. female MRN: [id number]  Unit/Bed#: -01 Encounter: [id number]  Reason for Consult: Hyponatremia    ASSESSMENT AND PLAN:  Patient is 82-year-old female with significant medical issues of COPD, on chronic 2 L O2, hypertension, diabetes, presented with worsening shortness of breath. We are consulted for hyponatremia management.     Hyponatremia, has chronic hyponatremia issues with baseline sodium low to mid 130s  -Serum sodium 128 on admission on 7/16/2023 decreased 122 yesterday.   -Sodium level now slowly improving 125 this AM.   -Serum osmolality 271, urine sodium 25, urine osmolality 369.  -Suspect component of SIADH in the setting of COPD, Celexa  -Fluid restriction 1.2 L/day  -Continue salt tablet 2 g p.o. 3 times daily, status post Lasix yesterday, will do repeat dose of 20 mg IV once today. -Repeat BMP at 3 PM  -If sodium level not improving further, consider 1.8% hypertonic saline versus tolvaptan.   -Goal sodium correction no more than 8 meq in 24 hours, goal sodium around 132-133 by tomorrow a.m.  -Consider discontinue Celexa     Renal function stable with serum creatinine 0.5  UA bland, noted low specific gravity     Hypertension  -Blood pressure acceptable, avoid hypotension. Goal SBP 130s  -Current regimen includes amlodipine 10 mg daily, clonidine 0.2 mg twice daily, lisinopril 40 mg daily, metoprolol 100 mg twice daily.      COPD exacerbation, currently on steroid, antibiotic as per primary team. Subjectively she seems to be feeling better. Remains on stable 2 L O2 via nasal cannula    Discussed above plan in detail with primary team and they agree with above recommendations. SUBJECTIVE:  Patient seen and examined via video telemedicine. Her breathing seems to be better, O2 requirement seems to be stable at her chronic requirement. Denies any nausea, vomiting, denies headache.     OBJECTIVE:  Current Weight: Weight - Scale: 68.8 kg (151 lb 10.8 oz)  Vitals:    07/20/23 0825   BP: 131/88   Pulse: 75   Resp:    Temp:    SpO2: 95%       Intake/Output Summary (Last 24 hours) at 7/20/2023 0857  Last data filed at 7/20/2023 0121  Gross per 24 hour   Intake 840 ml   Output 600 ml   Net 240 ml     Wt Readings from Last 3 Encounters:   07/18/23 68.8 kg (151 lb 10.8 oz)   07/16/23 73.5 kg (162 lb 0.6 oz)   06/24/23 71.7 kg (158 lb)     Temp Readings from Last 3 Encounters:   07/20/23 97.5 °F (36.4 °C)   07/16/23 97.6 °F (36.4 °C) (Temporal)   06/28/23 97.5 °F (36.4 °C)     BP Readings from Last 3 Encounters:   07/20/23 131/88   07/16/23 148/69   06/28/23 152/79     Pulse Readings from Last 3 Encounters:   07/20/23 75   07/16/23 79   06/28/23 94        Physical Examination is based on video telemedicine:  Respiratory: No conversational dyspnea noted, no acute respiratory distress  CVS: No significant edema in legs  GI: Nondistended  CNS: Active, alert, oriented x3  Skin: No new rash  Musculoskeletal: No obvious new gross deformity noted    Medications:    Current Facility-Administered Medications:   •  acetaminophen (TYLENOL) tablet 650 mg, 650 mg, Oral, Q6H PRN, Valiant Sammi, DO, 650 mg at 07/19/23 2138  •  albuterol (PROVENTIL HFA,VENTOLIN HFA) inhaler 2 puff, 2 puff, Inhalation, Q4H PRN, Valiant Sammi, DO, 2 puff at 07/19/23 1900  •  amLODIPine (NORVASC) tablet 10 mg, 10 mg, Oral, HS, Steven Vaca MD, 10 mg at 07/19/23 2138  •  atorvastatin (LIPITOR) tablet 40 mg, 40 mg, Oral, HS, Giorgio Hammer DO, 40 mg at 07/19/23 2137  •  budesonide (PULMICORT) inhalation solution 0.5 mg, 0.5 mg, Nebulization, BID, Giorgio Hammer DO, 0.5 mg at 07/20/23 1870  •  citalopram (CeleXA) tablet 10 mg, 10 mg, Oral, Daily, Giorgio Hammer DO, 10 mg at 07/19/23 0854  •  cloNIDine (CATAPRES) tablet 0.2 mg, 0.2 mg, Oral, Q12H [facility], Steven Vaca MD, 0.2 mg at 07/19/23 2137  •  docusate sodium (COLACE) capsule 100 mg, 100 mg, Oral, BID PRN, Valiant Sammi, DO  •  fluticasone (FLONASE) 50 mcg/act nasal spray 1 spray, 1 spray, Nasal, Daily, Giorgio Hammer DO  •  glipiZIDE (GLUCOTROL) tablet 10 mg, 10 mg, Oral, Early Morning, Giorgio Hammer DO, 10 mg at 07/20/23 8887  •  heparin (porcine) subcutaneous injection 5,000 Units, 5,000 Units, Subcutaneous, Q8H [facility], Giorgio Hammer DO, 5,000 Units at 07/20/23 2885  •  insulin lispro (HumaLOG) 100 units/mL subcutaneous injection 1-6 Units, 1-6 Units, Subcutaneous, 4x Daily (AC & HS), 2 Units at 07/19/23 2139 **AND** Fingerstick Glucose (POCT), , , 4x Daily AC and at bedtime, Chet Chapa DO  •  ipratropium (ATROVENT) 0.02 % inhalation solution 0.5 mg, 0.5 mg, Nebulization, TID, Giorgio Hammer DO, 0.5 mg at 07/20/23 0081  •  levalbuterol (XOPENEX) inhalation solution 1.25 mg, 1.25 mg, Nebulization, TID, 1.25 mg at 07/20/23 0748 **AND** [DISCONTINUED] sodium chloride 0.9 % inhalation solution 3 mL, 3 mL, Nebulization, TID, Giorgio Hammer DO  •  lisinopril (ZESTRIL) tablet 40 mg, 40 mg, Oral, Daily, Steven Perez MD  •  LORazepam (ATIVAN) tablet 0.5 mg, 0.5 mg, Oral, Q6H PRN, Chet Chapa DO, 0.5 mg at 07/18/23 1726  •  methylPREDNISolone sodium succinate (Solu-MEDROL) injection 40 mg, 40 mg, Intravenous, Q12H [facility], Pooja Ferrera PA-C, 40 mg at 07/19/23 2138  •  metoprolol tartrate (LOPRESSOR) tablet 100 mg, 100 mg, Oral, Q12H [facility], Giorgio Hammer DO, 100 mg at 07/19/23 2137  •  nicotine (NICODERM CQ) 14 mg/24hr TD 24 hr patch 1 patch, 1 patch, Transdermal, Daily, Giorgio Hammer DO, 1 patch at 07/19/23 1934  •  ondansetron (ZOFRAN) injection 4 mg, 4 mg, Intravenous, Q4H PRN, Elzbieta Baptiste DO  •  pantoprazole (PROTONIX) EC tablet 40 mg, 40 mg, Oral, Early Morning, Giorgio Hammer DO, 40 mg at 07/20/23 9137  •  pioglitazone (ACTOS) tablet 15 mg, 15 mg, Oral, Daily, Giorgio Hammer DO, 15 mg at 07/19/23 0854  •  simethicone (MYLICON) chewable tablet 80 mg, 80 mg, Oral, Q6H PRN, Pooja Ferrera PA-C, 80 mg at 07/19/23 1158  •  sodium chloride tablet 2 g, 2 g, Oral, TID, Steven Palomino MD, 2 g at 07/19/23 2137    Laboratory Results:  Results from last 7 days   Lab Units 07/20/23  0601 07/19/23  1832 07/19/23  0500 07/18/23  1736 07/18/23  1007 07/16/23  1833   WBC Thousand/uL  --   --  4.53  --  8.33 7.25   HEMOGLOBIN g/dL  --   --  11.8  --  13.7 13.1   HEMATOCRIT %  --   --  33.9*  --  40.8 39.3   PLATELETS Thousands/uL  --   --  173  --  209 203   SODIUM mmol/L 125* 121* 122* 123* 126* 128*   POTASSIUM mmol/L 4.5 4.0 4.6  --  4.0 4.2   CHLORIDE mmol/L 86* 80* 85*  --  86* 87*   CO2 mmol/L 33* 35* 32  --  33* 34*   BUN mg/dL 12 15 9  --  7 5   CREATININE mg/dL 0.58* 0.68 0.54*  --  0.70 0.69   CALCIUM mg/dL 9.3 9.3 9.2  --  9.8 9.8       CT chest wo contrast   Final Result by Steffi Vasquez MD (07/19 1054)      There is no suspicious lung nodule or mass which would suggest lung primary      Linear density seen right lung base due to atelectasis   No acute consolidation/pneumonia      Continue lung screening            Workstation performed: [id number]         XR chest 2 views   Final Result by Yuri Hong MD (07/18 1300)      No acute cardiopulmonary disease. Findings are stable            Workstation performed: [id number]             Portions of the record may have been created with voice recognition software. Occasional wrong word or "sound a like" substitutions may have occurred due to the inherent limitations of voice recognition software. Read the chart carefully and recognize, using context, where substitutions have occurred.

## 2023-07-20 NOTE — QUICK NOTE
Na unchanged when corrected for BG. Continue sodium chloride tablet 2 gram TID. Lasix IV added from previous recommendations. Check BMP in AM.   evaluate volume status in AM. Consider tolvaptan if euvolemic/hypervolemic.

## 2023-07-21 LAB
ANION GAP SERPL CALCULATED.3IONS-SCNC: 5 MMOL/L
ANION GAP SERPL CALCULATED.3IONS-SCNC: 6 MMOL/L
BUN SERPL-MCNC: 15 MG/DL (ref 5–25)
BUN SERPL-MCNC: 15 MG/DL (ref 5–25)
CALCIUM SERPL-MCNC: 9 MG/DL (ref 8.4–10.2)
CALCIUM SERPL-MCNC: 9.2 MG/DL (ref 8.4–10.2)
CHLORIDE SERPL-SCNC: 92 MMOL/L (ref 96–108)
CHLORIDE SERPL-SCNC: 93 MMOL/L (ref 96–108)
CO2 SERPL-SCNC: 33 MMOL/L (ref 21–32)
CO2 SERPL-SCNC: 33 MMOL/L (ref 21–32)
CREAT SERPL-MCNC: 0.57 MG/DL (ref 0.6–1.3)
CREAT SERPL-MCNC: 0.65 MG/DL (ref 0.6–1.3)
ERYTHROCYTE [DISTWIDTH] IN BLOOD BY AUTOMATED COUNT: 12.4 % (ref 11.6–15.1)
GFR SERPL CREATININE-BSD FRML MDRD: 92 ML/MIN/1.73SQ M
GFR SERPL CREATININE-BSD FRML MDRD: 96 ML/MIN/1.73SQ M
GLUCOSE SERPL-MCNC: 157 MG/DL (ref 65–140)
GLUCOSE SERPL-MCNC: 200 MG/DL (ref 65–140)
GLUCOSE SERPL-MCNC: 206 MG/DL (ref 65–140)
GLUCOSE SERPL-MCNC: 222 MG/DL (ref 65–140)
GLUCOSE SERPL-MCNC: 266 MG/DL (ref 65–140)
GLUCOSE SERPL-MCNC: 313 MG/DL (ref 65–140)
HCT VFR BLD AUTO: 33.9 % (ref 34.8–46.1)
HGB BLD-MCNC: 11.5 G/DL (ref 11.5–15.4)
MCH RBC QN AUTO: 30.1 PG (ref 26.8–34.3)
MCHC RBC AUTO-ENTMCNC: 33.9 G/DL (ref 31.4–37.4)
MCV RBC AUTO: 89 FL (ref 82–98)
PLATELET # BLD AUTO: 196 THOUSANDS/UL (ref 149–390)
PMV BLD AUTO: 10.6 FL (ref 8.9–12.7)
POTASSIUM SERPL-SCNC: 3.9 MMOL/L (ref 3.5–5.3)
POTASSIUM SERPL-SCNC: 4 MMOL/L (ref 3.5–5.3)
RBC # BLD AUTO: 3.82 MILLION/UL (ref 3.81–5.12)
SODIUM SERPL-SCNC: 130 MMOL/L (ref 135–147)
SODIUM SERPL-SCNC: 132 MMOL/L (ref 135–147)
WBC # BLD AUTO: 6.77 THOUSAND/UL (ref 4.31–10.16)

## 2023-07-21 PROCEDURE — 82948 REAGENT STRIP/BLOOD GLUCOSE: CPT

## 2023-07-21 PROCEDURE — 99232 SBSQ HOSP IP/OBS MODERATE 35: CPT | Performed by: INTERNAL MEDICINE

## 2023-07-21 PROCEDURE — 80048 BASIC METABOLIC PNL TOTAL CA: CPT | Performed by: INTERNAL MEDICINE

## 2023-07-21 PROCEDURE — 94640 AIRWAY INHALATION TREATMENT: CPT

## 2023-07-21 PROCEDURE — 85027 COMPLETE CBC AUTOMATED: CPT | Performed by: INTERNAL MEDICINE

## 2023-07-21 PROCEDURE — 94760 N-INVAS EAR/PLS OXIMETRY 1: CPT

## 2023-07-21 RX ORDER — PREDNISONE 10 MG/1
10 TABLET ORAL DAILY
Status: DISCONTINUED | OUTPATIENT
Start: 2023-07-31 | End: 2023-07-22 | Stop reason: HOSPADM

## 2023-07-21 RX ORDER — SODIUM CHLORIDE 1 G/1
2 TABLET ORAL
Status: DISCONTINUED | OUTPATIENT
Start: 2023-07-21 | End: 2023-07-22 | Stop reason: HOSPADM

## 2023-07-21 RX ORDER — GLIPIZIDE 2.5 MG/1
10 TABLET, EXTENDED RELEASE ORAL
Status: DISCONTINUED | OUTPATIENT
Start: 2023-07-22 | End: 2023-07-22 | Stop reason: HOSPADM

## 2023-07-21 RX ORDER — PREDNISONE 20 MG/1
40 TABLET ORAL DAILY
Status: DISCONTINUED | OUTPATIENT
Start: 2023-07-22 | End: 2023-07-22 | Stop reason: HOSPADM

## 2023-07-21 RX ORDER — PREDNISONE 20 MG/1
20 TABLET ORAL DAILY
Status: DISCONTINUED | OUTPATIENT
Start: 2023-07-28 | End: 2023-07-22 | Stop reason: HOSPADM

## 2023-07-21 RX ORDER — BUPROPION HYDROCHLORIDE 150 MG/1
150 TABLET ORAL DAILY
Status: DISCONTINUED | OUTPATIENT
Start: 2023-07-21 | End: 2023-07-22 | Stop reason: HOSPADM

## 2023-07-21 RX ADMIN — INSULIN LISPRO 2 UNITS: 100 INJECTION, SOLUTION INTRAVENOUS; SUBCUTANEOUS at 21:43

## 2023-07-21 RX ADMIN — IPRATROPIUM BROMIDE 0.5 MG: 0.5 SOLUTION RESPIRATORY (INHALATION) at 07:25

## 2023-07-21 RX ADMIN — INSULIN LISPRO 3 UNITS: 100 INJECTION, SOLUTION INTRAVENOUS; SUBCUTANEOUS at 17:06

## 2023-07-21 RX ADMIN — IPRATROPIUM BROMIDE 0.5 MG: 0.5 SOLUTION RESPIRATORY (INHALATION) at 20:46

## 2023-07-21 RX ADMIN — METOPROLOL TARTRATE 100 MG: 50 TABLET, FILM COATED ORAL at 21:43

## 2023-07-21 RX ADMIN — HEPARIN SODIUM 5000 UNITS: 5000 INJECTION INTRAVENOUS; SUBCUTANEOUS at 14:31

## 2023-07-21 RX ADMIN — HEPARIN SODIUM 5000 UNITS: 5000 INJECTION INTRAVENOUS; SUBCUTANEOUS at 05:13

## 2023-07-21 RX ADMIN — SODIUM CHLORIDE TAB 1 GM 2 G: 1 TAB at 17:06

## 2023-07-21 RX ADMIN — LEVALBUTEROL HYDROCHLORIDE 1.25 MG: 1.25 SOLUTION RESPIRATORY (INHALATION) at 20:46

## 2023-07-21 RX ADMIN — PIOGLITAZONE HYDROCHLORIDE 15 MG: 15 TABLET ORAL at 09:37

## 2023-07-21 RX ADMIN — INSULIN LISPRO 1 UNITS: 100 INJECTION, SOLUTION INTRAVENOUS; SUBCUTANEOUS at 08:04

## 2023-07-21 RX ADMIN — BUDESONIDE 0.5 MG: 0.5 INHALANT ORAL at 07:25

## 2023-07-21 RX ADMIN — LEVALBUTEROL HYDROCHLORIDE 1.25 MG: 1.25 SOLUTION RESPIRATORY (INHALATION) at 13:05

## 2023-07-21 RX ADMIN — INSULIN LISPRO 5 UNITS: 100 INJECTION, SOLUTION INTRAVENOUS; SUBCUTANEOUS at 11:55

## 2023-07-21 RX ADMIN — BUPROPION HYDROCHLORIDE 150 MG: 150 TABLET, EXTENDED RELEASE ORAL at 11:54

## 2023-07-21 RX ADMIN — ATORVASTATIN CALCIUM 40 MG: 40 TABLET, FILM COATED ORAL at 21:43

## 2023-07-21 RX ADMIN — METHYLPREDNISOLONE SODIUM SUCCINATE 40 MG: 40 INJECTION, POWDER, FOR SOLUTION INTRAMUSCULAR; INTRAVENOUS at 09:40

## 2023-07-21 RX ADMIN — LEVALBUTEROL HYDROCHLORIDE 1.25 MG: 1.25 SOLUTION RESPIRATORY (INHALATION) at 07:25

## 2023-07-21 RX ADMIN — IPRATROPIUM BROMIDE 0.5 MG: 0.5 SOLUTION RESPIRATORY (INHALATION) at 13:05

## 2023-07-21 RX ADMIN — GLIPIZIDE 10 MG: 5 TABLET ORAL at 05:12

## 2023-07-21 RX ADMIN — BUDESONIDE 0.5 MG: 0.5 INHALANT ORAL at 20:46

## 2023-07-21 RX ADMIN — HEPARIN SODIUM 5000 UNITS: 5000 INJECTION INTRAVENOUS; SUBCUTANEOUS at 21:43

## 2023-07-21 RX ADMIN — PANTOPRAZOLE SODIUM 40 MG: 40 TABLET, DELAYED RELEASE ORAL at 05:13

## 2023-07-21 NOTE — ASSESSMENT & PLAN NOTE
· More anxious than usual.  Added lorazepam.    · Currently on citalopram.  Due to persistent hyponatremia patient is agreeable to stopping this.   · She asked for a scheduled medication as a substitute for citalopram.  Discussed with nephrology and will try wellbutrin

## 2023-07-21 NOTE — PLAN OF CARE
Problem: MOBILITY - ADULT  Goal: Maintain or return to baseline ADL function  Description: INTERVENTIONS:  -  Assess patient's ability to carry out ADLs; assess patient's baseline for ADL function and identify physical deficits which impact ability to perform ADLs (bathing, care of mouth/teeth, toileting, grooming, dressing, etc.)  - Assess/evaluate cause of self-care deficits   - Assess range of motion  - Assess patient's mobility; develop plan if impaired  - Assess patient's need for assistive devices and provide as appropriate  - Encourage maximum independence but intervene and supervise when necessary  - Involve family in performance of ADLs  - Assess for home care needs following discharge   - Consider OT consult to assist with ADL evaluation and planning for discharge  - Provide patient education as appropriate  Outcome: Progressing  Goal: Maintains/Returns to pre admission functional level  Description: INTERVENTIONS:  - Perform BMAT or MOVE assessment daily.   - Set and communicate daily mobility goal to care team and patient/family/caregiver.    - Collaborate with rehabilitation services on mobility goals if consulted  - Out of bed for toileting  - Record patient progress and toleration of activity level   Outcome: Progressing     Problem: PAIN - ADULT  Goal: Verbalizes/displays adequate comfort level or baseline comfort level  Description: Interventions:  - Encourage patient to monitor pain and request assistance  - Assess pain using appropriate pain scale  - Administer analgesics based on type and severity of pain and evaluate response  - Implement non-pharmacological measures as appropriate and evaluate response  - Consider cultural and social influences on pain and pain management  - Notify physician/advanced practitioner if interventions unsuccessful or patient reports new pain  Outcome: Progressing     Problem: INFECTION - ADULT  Goal: Absence or prevention of progression during hospitalization  Description: INTERVENTIONS:  - Assess and monitor for signs and symptoms of infection  - Monitor lab/diagnostic results  - Monitor all insertion sites, i.e. indwelling lines, tubes, and drains  - Monitor endotracheal if appropriate and nasal secretions for changes in amount and color  - [place] appropriate cooling/warming therapies per order  - Administer medications as ordered  - Instruct and encourage patient and family to use good hand hygiene technique  - Identify and instruct in appropriate isolation precautions for identified infection/condition  Outcome: Progressing  Goal: Absence of fever/infection during neutropenic period  Description: INTERVENTIONS:  - Monitor WBC    Outcome: Progressing     Problem: SAFETY ADULT  Goal: Maintain or return to baseline ADL function  Description: INTERVENTIONS:  -  Assess patient's ability to carry out ADLs; assess patient's baseline for ADL function and identify physical deficits which impact ability to perform ADLs (bathing, care of mouth/teeth, toileting, grooming, dressing, etc.)  - Assess/evaluate cause of self-care deficits   - Assess range of motion  - Assess patient's mobility; develop plan if impaired  - Assess patient's need for assistive devices and provide as appropriate  - Encourage maximum independence but intervene and supervise when necessary  - Involve family in performance of ADLs  - Assess for home care needs following discharge   - Consider OT consult to assist with ADL evaluation and planning for discharge  - Provide patient education as appropriate  Outcome: Progressing  Goal: Maintains/Returns to pre admission functional level  Description: INTERVENTIONS:  - Perform BMAT or MOVE assessment daily.   - Set and communicate daily mobility goal to care team and patient/family/caregiver.    - Collaborate with rehabilitation services on mobility goals if consulted  - Stand patient 3 times a day  - Ambulate patient 3 times a day  - Out of bed to chair 3 times a day   - Out of bed for meals 3 times a day  - Out of bed for toileting  - Record patient progress and toleration of activity level   Outcome: Progressing  Goal: Patient will remain free of falls  Description: INTERVENTIONS:  - Educate patient/family on patient safety including physical limitations  - Instruct patient to call for assistance with activity   - Consult OT/PT to assist with strengthening/mobility   - Keep Call bell within reach  - Keep bed low and locked with side rails adjusted as appropriate  - Keep care items and personal belongings within reach  - Initiate and maintain comfort rounds  - Make Fall Risk Sign visible to staff  - Offer Toileting every 2 Hours, in advance of need  - Initiate/Maintain bed alarm  - Obtain necessary fall risk management equipment  - Apply yellow socks and bracelet for high fall risk patients  - Consider moving patient to room near nurses station  Outcome: Progressing     Problem: DISCHARGE PLANNING  Goal: Discharge to home or other facility with appropriate resources  Description: INTERVENTIONS:  - Identify barriers to discharge w/patient and caregiver  - Arrange for needed discharge resources and transportation as appropriate  - Identify discharge learning needs (meds, wound care, etc.)  - Arrange for interpretive services to assist at discharge as needed  - Refer to Case Management Department for coordinating discharge planning if the patient needs post-hospital services based on physician/advanced practitioner order or complex needs related to functional status, cognitive ability, or social support system  Outcome: Progressing     Problem: Knowledge Deficit  Goal: Patient/family/caregiver demonstrates understanding of disease process, treatment plan, medications, and discharge instructions  Description: Complete learning assessment and assess knowledge base.   Interventions:  - Provide teaching at level of understanding  - Provide teaching via preferred learning methods  Outcome: Progressing     Problem: RESPIRATORY - ADULT  Goal: Achieves optimal ventilation and oxygenation  Description: INTERVENTIONS:  - Assess for changes in respiratory status  - Assess for changes in mentation and behavior  - Position to facilitate oxygenation and minimize respiratory effort  - Oxygen administered by appropriate delivery if ordered  - Initiate smoking cessation education as indicated  - Encourage broncho-pulmonary hygiene including cough, deep breathe, Incentive Spirometry  - Assess the need for suctioning and aspirate as needed  - Assess and instruct to report SOB or any respiratory difficulty  - Respiratory Therapy support as indicated  Outcome: Progressing     Problem: METABOLIC, FLUID AND ELECTROLYTES - ADULT  Goal: Electrolytes maintained within normal limits  Description: INTERVENTIONS:  - Monitor labs and assess patient for signs and symptoms of electrolyte imbalances  - Administer electrolyte replacement as ordered  - Monitor response to electrolyte replacements, including repeat lab results as appropriate  - Instruct patient on fluid and nutrition as appropriate  Outcome: Progressing  Goal: Fluid balance maintained  Description: INTERVENTIONS:  - Monitor labs   - Monitor I/O and WT  - Instruct patient on fluid and nutrition as appropriate  - Assess for signs & symptoms of volume excess or deficit  Outcome: Progressing  Goal: Glucose maintained within target range  Description: INTERVENTIONS:  - Monitor Blood Glucose as ordered  - Assess for signs and symptoms of hyperglycemia and hypoglycemia  - Administer ordered medications to maintain glucose within target range  - Assess nutritional intake and initiate nutrition service referral as needed  Outcome: Progressing

## 2023-07-21 NOTE — PROGRESS NOTES
[facility]  Progress Note  Name: Edin Avelar  MRN: [id number]  Unit/Bed#: -01 I Date of Admission: 7/18/2023   Date of Service: 7/21/2023 I Hospital Day: 2    Assessment/Plan   * COPD exacerbation ([address] [facility])  Assessment & Plan  History of COPD chronically on 2 L still smoking hypertension diabetes and anxiety presents with worsening shortness of breath found to have COPD exacerbation  · No infiltrates on CXR  · Currently on IV methylprednisolone and bronchodilators. We will start prednisone taper starting tomorrow  · Advise smoking cessation. She states that she will stop after this hospitalization; continue nicotine patch. · CT chest without evidence of malignancy or infiltrates    Acute hyponatremia  Assessment & Plan  · Acute on chronic hyponatremia   · Most recent TSH within limits  · CT chest -no signs for active malignancy last CT screening test was in February 2020 at [facility]  · Did not respond on salt tablets and was started on 1.8% saline yesterday by nephrology. · Discussed need to discontinue SSRI,. Will switch citalopram to wellbutrin given patient's concerns for worsening anxiety    Results from last 7 days   Lab Units 07/21/23  0503 07/20/23  2215 07/20/23  1608 07/20/23  0601   SODIUM mmol/L 130* 129* 125* 125*       Anxiety about health  Assessment & Plan  · More anxious than usual.  Added lorazepam.    · Currently on citalopram.  Due to persistent hyponatremia patient is agreeable to stopping this.   · She asked for a scheduled medication as a substitute for citalopram.  Discussed with nephrology and will try wellbutrin    Type 2 diabetes mellitus without complication, without long-term current use of insulin [facility])  Assessment & Plan  Lab Results   Component Value Date    HGBA1C 6.9 (H) 02/10/2023     Recent Labs     07/20/23  1125 07/20/23  1550 07/20/23  2124 07/21/23  0743   POCGLU 129 261* 253* 157*     · Prior to admission on glipizide XL and pioglitazone. · Continue both and sliding scale coverage    Essential hypertension  Assessment & Plan  · Continue amlodipine clonidine lisinopril and metoprolol    Chronic respiratory failure (HCC)  Assessment & Plan  · In the setting of COPD  · Chronically requires oxygen at home at 2 L nasal cannula  · Currently is stable at her baseline    VTE Pharmacologic Prophylaxis: VTE Score: 3 Moderate Risk (Score 3-4) - Pharmacological DVT Prophylaxis Ordered: heparin. Patient Centered Rounds: I have performed bedside rounds with nursing staff today. Discussions with Specialists or Other Care Team Provider: Case management and nephrology    Education and Discussions with Family / Patient: Updated  (grand-daughter) via phone. Time Spent for Care: This time was spent on one or more of the following: performing physical exam; counseling and coordination of care; obtaining or reviewing history; documenting in the medical record; reviewing/ordering tests, medications or procedures; communicating with other healthcare professionals and discussing with patient's family/caregivers. Current Length of Stay: 2 day(s)  Current Patient Status: Inpatient   Certification Statement: The patient will continue to require additional inpatient hospital stay due to Hyponatremia  Discharge Plan: Anticipate discharge in 24-48 hrs to home. Code Status: Level 1 - Full Code      Subjective:   Patient seen and examined. Feeling pretty good, shortness of breath appears to be at baseline. Asking about discharge. Objective:   Vitals: Blood pressure 123/71, pulse 72, temperature (!) 97.3 °F (36.3 °C), resp. rate 20, height 5' 2.99" (1.6 m), weight 68.8 kg (151 lb 10.8 oz), SpO2 93 %. Intake/Output Summary (Last 24 hours) at 7/21/2023 0912  Last data filed at 7/21/2023 0851  Gross per 24 hour   Intake 900 ml   Output --   Net 900 ml       Physical Exam  Vitals reviewed.    Constitutional:       General: She is not in acute distress. Appearance: Normal appearance. HENT:      Head: Atraumatic. Cardiovascular:      Rate and Rhythm: Regular rhythm. Heart sounds: Normal heart sounds. Pulmonary:      Breath sounds: Decreased breath sounds present. No wheezing. Abdominal:      General: Bowel sounds are normal.      Palpations: Abdomen is soft. Tenderness: There is no guarding or rebound. Musculoskeletal:         General: No swelling. Skin:     General: Skin is warm. Neurological:      Mental Status: She is alert. Cranial Nerves: No cranial nerve deficit. Motor: No weakness. Psychiatric:         Mood and Affect: Mood normal.       Additional Data:   Labs:  Results from last 7 days   Lab Units 07/21/23  0503 07/19/23  0500 07/18/23  1007   WBC Thousand/uL 6.77 4.53 8.33   HEMOGLOBIN g/dL 11.5 11.8 13.7   PLATELETS Thousands/uL 196 173 209   MCV fL 89 88 89     Results from last 7 days   Lab Units 07/21/23  0503 07/20/23  2215 07/20/23  1608 07/19/23  1832 07/19/23  0500 07/18/23  1736 07/18/23  1007 07/16/23  1833   SODIUM mmol/L 130* 129* 125*   < > 122*   < > 126* 128*   POTASSIUM mmol/L 4.0 4.0 4.4   < > 4.6  --  4.0 4.2   CHLORIDE mmol/L 92* 89* 84*   < > 85*  --  86* 87*   CO2 mmol/L 33* 35* 34*   < > 32  --  33* 34*   ANION GAP mmol/L 5 5 7   < > 5  --  7 7   BUN mg/dL 15 18 17   < > 9  --  7 5   CREATININE mg/dL 0.57* 0.71 0.83   < > 0.54*  --  0.70 0.69   CALCIUM mg/dL 9.0 9.1 9.1   < > 9.2  --  9.8 9.8   ALBUMIN g/dL  --   --   --   --  3.7  --  4.4 4.2   TOTAL BILIRUBIN mg/dL  --   --   --   --  0.37  --  0.44 0.47   ALK PHOS U/L  --   --   --   --  68  --  85 76   ALT U/L  --   --   --   --  9  --  10 9   AST U/L  --   --   --   --  9*  --  12* 11*   EGFR ml/min/1.73sq m 96 89 73   < > 98  --  90 91   GLUCOSE RANDOM mg/dL 200* 169* 275*   < > 171*  --  135 108    < > = values in this interval not displayed.              Results from last 7 days   Lab Units 07/18/23  1409 07/18/23  1230 07/18/23  1007   HS TNI 0HR ng/L  --   --  4   HS TNI 2HR ng/L  --  4  --    HS TNI 4HR ng/L 4  --   --           Results from last 7 days   Lab Units 07/19/23  0500   PROCALCITONIN ng/ml <0.05     Results from last 7 days   Lab Units 07/21/23  0743 07/20/23  2124 07/20/23  1550 07/20/23  1125 07/20/23  0728 07/19/23  2117 07/19/23  1546 07/19/23  1120 07/19/23  0711 07/18/23  2105 07/18/23  1552 07/18/23  1206   POC GLUCOSE mg/dl 157* 253* 261* 129 155* 210* 227* 202* 153* 168* 321* 163*         Results from last 7 days   Lab Units 07/16/23  1833   TSH 3RD GENERATON uIU/mL 0.793     * I Have Reviewed All Lab Data Listed Above. Cultures:         Results from last 7 days   Lab Units 07/18/23  1007   SARS-COV-2  Negative           Lines/Drains:  Invasive Devices     Peripheral Intravenous Line  Duration           Peripheral IV 07/18/23 Right;Ventral (anterior) Forearm 2 days              Telemetry:      Imaging:  Imaging Reports Reviewed Today Include:   CT chest wo contrast    Result Date: 7/19/2023  Impression: There is no suspicious lung nodule or mass which would suggest lung primary Linear density seen right lung base due to atelectasis No acute consolidation/pneumonia Continue lung screening Workstation performed: [id number]     XR chest 2 views    Result Date: 7/18/2023  Impression: No acute cardiopulmonary disease.  Findings are stable Workstation performed: [id number]       Scheduled Meds:  Current Facility-Administered Medications   Medication Dose Route Frequency Provider Last Rate   • acetaminophen  650 mg Oral Q6H PRN Ragena Adarsh, DO     • albuterol  2 puff Inhalation Q4H PRN Racielna Adarsh, DO     • amLODIPine  10 mg Oral HS Steven Valverde MD     • atorvastatin  40 mg Oral HS Giorgio Hammer DO     • budesonide  0.5 mg Nebulization BID Javier Altamirano DO     • citalopram  10 mg Oral Daily Giorgio Hammer DO     • cloNIDine  0.2 mg Oral Q12H [address] Steven Perez MD     • docusate sodium  100 mg Oral BID PRN Wilfredo Narayanan, DO     • fluticasone  1 spray Nasal Daily Giorgio Hammer, DO     • glipiZIDE  10 mg Oral Early Morning Giorgio Hammer DO     • heparin (porcine)  5,000 Units Subcutaneous [facility] Giorgio Hammer, DO     • insulin lispro  1-6 Units Subcutaneous 4x Daily (AC & HS) Wilfredo Narayanan, DO     • ipratropium  0.5 mg Nebulization TID Wilfredo Narayanan, DO     • levalbuterol  1.25 mg Nebulization TID Wilfredo Narayanan, DO     • lisinopril  40 mg Oral Daily Steven Montgomery MD     • LORazepam  0.5 mg Oral Q6H PRN Wilfredo Narayanan DO     • methylPREDNISolone sodium succinate  40 mg Intravenous Q12H [address] Mouna Molina PA-C     • metoprolol tartrate  100 mg Oral Q12H [address] Wilfredo Narayanan, DO     • nicotine  1 patch Transdermal Daily Giorgio Hammer, DO     • ondansetron  4 mg Intravenous Q4H PRN Wilfredo Narayanan DO     • pantoprazole  40 mg Oral Early Morning Giorgio Hammer, DO     • pioglitazone  15 mg Oral Daily Wilfredo Narayanan DO     • simethicone  80 mg Oral Q6H PRN Mouna Molina PA-C     • sodium chloride 23.4% 308 mEq in sterile water 1,000 mL infusion   Intravenous Continuous Darryle Rodes, MD 30 mL/hr at 07/20/23 1745       Today, Patient Was Seen By: Wilfredo Narayanan DO    ** Please Note: Dictation voice to text software may have been used in the creation of this document.  **

## 2023-07-21 NOTE — ASSESSMENT & PLAN NOTE
· In the setting of COPD  · Chronically requires oxygen at home at 2 L nasal cannula  · Currently is stable at her baseline

## 2023-07-21 NOTE — PLAN OF CARE
Problem: MOBILITY - ADULT  Goal: Maintain or return to baseline ADL function  Description: INTERVENTIONS:  -  Assess patient's ability to carry out ADLs; assess patient's baseline for ADL function and identify physical deficits which impact ability to perform ADLs (bathing, care of mouth/teeth, toileting, grooming, dressing, etc.)  - Assess/evaluate cause of self-care deficits   - Assess range of motion  - Assess patient's mobility; develop plan if impaired  - Assess patient's need for assistive devices and provide as appropriate  - Encourage maximum independence but intervene and supervise when necessary  - Involve family in performance of ADLs  - Assess for home care needs following discharge   - Consider OT consult to assist with ADL evaluation and planning for discharge  - Provide patient education as appropriate  Outcome: Not Progressing

## 2023-07-21 NOTE — TELEMEDICINE
REQUIRED DOCUMENTATION:     1. This service was provided via Telemedicine. 2. Provider located at [place]. 3. TeleMed provider: Gillian Pickett MD.  4. Identify all parties in room with patient during tele consult:  Patient  5. Patient was then informed that this was a Telemedicine visit and that the exam was being conducted confidentially over secure lines. My office door was closed. No one else was in the room. Patient acknowledged consent and understanding of privacy and security of the Telemedicine visit, and gave us permission to have the assistant stay in the room in order to assist with the history and to conduct the exam.  I informed the patient that I have reviewed their record in Epic and presented the opportunity for them to ask any questions regarding the visit today. The patient agreed to participate. NEPHROLOGY PROGRESS NOTE   Brenda Bourne 79 y.o. female MRN: [id number]  Unit/Bed#: -01 Encounter: [id number]  Reason for Consult: Hyponatremia    ASSESSMENT AND PLAN:  Patient is 80-year-old female with significant medical issues of COPD, on chronic 2 L O2, hypertension, diabetes, presented with worsening shortness of breath.  We are consulted for hyponatremia management.     Hyponatremia, has chronic hyponatremia issues with baseline sodium low to mid 130s  -Serum sodium 128 on admission on 7/16/2023 decreased 121 on 7/19/2023.   -Serum osmolality 271, urine sodium 25, urine osmolality 369.   TSH 0.79  -Check a.m. cortisol  -Suspect component of SIADH in the setting of COPD, Celexa  -Fluid restriction 1.2 L/day  -Due to no significant improvement in sodium level yesterday, she was started on 1.8% hypertonic saline 30 mill per hour.  -Most recent serum sodium corrected value around 131 this AM.  -Continue 1.8% hypertonic saline for now, repeat BMP at 12 PM.  -If sodium level continues to slowly improve, will plan to change to salt tablet.  -Consider discontinue Celexa     Renal function stable with serum creatinine 0.5  UA bland, noted low specific gravity     Hypertension  -Blood pressure acceptable, avoid hypotension.  Goal SBP 130s  -Current regimen includes amlodipine 10 mg daily, clonidine 0.2 mg twice daily, lisinopril 40 mg daily, metoprolol 100 mg twice daily.      COPD exacerbation, currently on steroid, antibiotic as per primary team. Subjectively she seems to be feeling better. Remains on stable 2 L O2 via nasal cannula    Discussed above plan in detail with primary team and they agree with above recommendations. SUBJECTIVE:  Patient seen and examined via telemedicine. She denies any nausea, vomiting or headache. Denies any worsening shortness of breath.   Remains on stable O2 via nasal cannula    OBJECTIVE:  Current Weight: Weight - Scale: 68.8 kg (151 lb 10.8 oz)  Vitals:    07/21/23 0742   BP: 123/71   Pulse: 72   Resp: 20   Temp:    SpO2: 93%       Intake/Output Summary (Last 24 hours) at 7/21/2023 0831  Last data filed at 7/20/2023 1727  Gross per 24 hour   Intake 600 ml   Output --   Net 600 ml     Wt Readings from Last 3 Encounters:   07/18/23 68.8 kg (151 lb 10.8 oz)   07/16/23 73.5 kg (162 lb 0.6 oz)   06/24/23 71.7 kg (158 lb)     Temp Readings from Last 3 Encounters:   07/20/23 (!) 97.3 °F (36.3 °C)   07/16/23 97.6 °F (36.4 °C) (Temporal)   06/28/23 97.5 °F (36.4 °C)     BP Readings from Last 3 Encounters:   07/21/23 123/71   07/16/23 148/69   06/28/23 152/79     Pulse Readings from Last 3 Encounters:   07/21/23 72   07/16/23 79   06/28/23 94        Physical Examination is based on video telemedicine:  Respiratory: No conversational dyspnea noted, no acute respiratory distress  CVS: No significant edema  GI: Nondistended  CNS: Active, alert, oriented x3  Skin: No new rash  Musculoskeletal: No obvious new gross deformity noted    Medications:    Current Facility-Administered Medications:   •  acetaminophen (TYLENOL) tablet 650 mg, 650 mg, Oral, Q6H PRN, Shermon Go DO Harman, 650 mg at 07/19/23 2138  •  albuterol (PROVENTIL HFA,VENTOLIN HFA) inhaler 2 puff, 2 puff, Inhalation, Q4H PRN, Hermilo Pounds, DO, 2 puff at 07/19/23 1900  •  amLODIPine (NORVASC) tablet 10 mg, 10 mg, Oral, HS, Steven Maharaj MD, 10 mg at 07/20/23 2130  •  atorvastatin (LIPITOR) tablet 40 mg, 40 mg, Oral, HS, Giorgio Hammer DO, 40 mg at 07/20/23 2129  •  budesonide (PULMICORT) inhalation solution 0.5 mg, 0.5 mg, Nebulization, BID, Giorgio Hammer DO, 0.5 mg at 07/21/23 0725  •  citalopram (CeleXA) tablet 10 mg, 10 mg, Oral, Daily, Giorgio Hammer DO, 10 mg at 07/20/23 0902  •  cloNIDine (CATAPRES) tablet 0.2 mg, 0.2 mg, Oral, Q12H [address], Steven Maharaj MD, 0.2 mg at 07/20/23 2130  •  docusate sodium (COLACE) capsule 100 mg, 100 mg, Oral, BID PRN, Hermilo Pounds, DO  •  fluticasone (FLONASE) 50 mcg/act nasal spray 1 spray, 1 spray, Nasal, Daily, Giorgio Hammer DO, 1 spray at 07/20/23 0904  •  glipiZIDE (GLUCOTROL) tablet 10 mg, 10 mg, Oral, Early Morning, Giorgio Hammer DO, 10 mg at 07/21/23 5825  •  heparin (porcine) subcutaneous injection 5,000 Units, 5,000 Units, Subcutaneous, Q8H [address], Giorgio Hammer DO, 5,000 Units at 07/21/23 0513  •  insulin lispro (HumaLOG) 100 units/mL subcutaneous injection 1-6 Units, 1-6 Units, Subcutaneous, 4x Daily (AC & HS), 1 Units at 07/21/23 0804 **AND** Fingerstick Glucose (POCT), , , 4x Daily AC and at bedtime, Hermilo Pounds, DO  •  ipratropium (ATROVENT) 0.02 % inhalation solution 0.5 mg, 0.5 mg, Nebulization, TID, Giorgio Hammer DO, 0.5 mg at 07/21/23 0725  •  levalbuterol (XOPENEX) inhalation solution 1.25 mg, 1.25 mg, Nebulization, TID, 1.25 mg at 07/21/23 0725 **AND** [DISCONTINUED] sodium chloride 0.9 % inhalation solution 3 mL, 3 mL, Nebulization, TID, Giorgio Hammer DO  •  lisinopril (ZESTRIL) tablet 40 mg, 40 mg, Oral, Daily, Steven Perez MD, 40 mg at 07/20/23 4760  •  LORazepam (ATIVAN) tablet 0.5 mg, 0.5 mg, Oral, Q6H PRN, Giorgio Hammer DO, 0.5 mg at 07/18/23 1726  •  methylPREDNISolone sodium succinate (Solu-MEDROL) injection 40 mg, 40 mg, Intravenous, Q12H [address], Leana Pierre PA-C, 40 mg at 07/20/23 2130  •  metoprolol tartrate (LOPRESSOR) tablet 100 mg, 100 mg, Oral, Q12H [address], Giorgio Hammer, DO, 100 mg at 07/20/23 2129  •  nicotine (NICODERM CQ) 14 mg/24hr TD 24 hr patch 1 patch, 1 patch, Transdermal, Daily, Giorgio Hammer, DO, 1 patch at 07/20/23 0904  •  ondansetron (ZOFRAN) injection 4 mg, 4 mg, Intravenous, Q4H PRN, Flash Schmitt DO  •  pantoprazole (PROTONIX) EC tablet 40 mg, 40 mg, Oral, Early Morning, Giorgio Hammer DO, 40 mg at 07/21/23 2821  •  pioglitazone (ACTOS) tablet 15 mg, 15 mg, Oral, Daily, Giorgio Hammer DO, 15 mg at 07/20/23 0902  •  simethicone (MYLICON) chewable tablet 80 mg, 80 mg, Oral, Q6H PRN, Laena Pierre PA-C, 80 mg at 07/19/23 1158  •  sodium chloride 23.4% 308 mEq in sterile water 1,000 mL infusion, , Intravenous, Continuous, Steven Thayer MD, Last Rate: 30 mL/hr at 07/20/23 1745, New Bag at 07/20/23 1745    Laboratory Results:  Results from last 7 days   Lab Units 07/21/23  0503 07/20/23  2215 07/20/23  1608 07/20/23  0601 07/19/23  1832 07/19/23  0500 07/18/23  1736 07/18/23  1007 07/16/23  1833   WBC Thousand/uL 6.77  --   --   --   --  4.53  --  8.33 7.25   HEMOGLOBIN g/dL 11.5  --   --   --   --  11.8  --  13.7 13.1   HEMATOCRIT % 33.9*  --   --   --   --  33.9*  --  40.8 39.3   PLATELETS Thousands/uL 196  --   --   --   --  173  --  209 203   SODIUM mmol/L 130* 129* 125* 125* 121* 122* 123* 126* 128*   POTASSIUM mmol/L 4.0 4.0 4.4 4.5 4.0 4.6  --  4.0 4.2   CHLORIDE mmol/L 92* 89* 84* 86* 80* 85*  --  86* 87*   CO2 mmol/L 33* 35* 34* 33* 35* 32  --  33* 34*   BUN mg/dL 15 18 17 12 15 9  --  7 5   CREATININE mg/dL 0.57* 0.71 0.83 0.58* 0.68 0.54*  --  0.70 0.69   CALCIUM mg/dL 9.0 9.1 9.1 9.3 9.3 9.2  --  9.8 9.8       CT chest wo contrast   Final Result by Gabriela Newell MD (07/19 6675)      There is no suspicious lung nodule or mass which would suggest lung primary      Linear density seen right lung base due to atelectasis   No acute consolidation/pneumonia      Continue lung screening            Workstation performed: [id number]         XR chest 2 views   Final Result by Marisa Cantor MD (07/18 1300)      No acute cardiopulmonary disease. Findings are stable            Workstation performed: [id number]             Portions of the record may have been created with voice recognition software. Occasional wrong word or "sound a like" substitutions may have occurred due to the inherent limitations of voice recognition software. Read the chart carefully and recognize, using context, where substitutions have occurred.

## 2023-07-21 NOTE — ASSESSMENT & PLAN NOTE
History of COPD chronically on 2 L still smoking hypertension diabetes and anxiety presents with worsening shortness of breath found to have COPD exacerbation  · No infiltrates on CXR  · Currently on IV methylprednisolone and bronchodilators. We will start prednisone taper starting tomorrow  · Advise smoking cessation. She states that she will stop after this hospitalization; continue nicotine patch.   · CT chest without evidence of malignancy or infiltrates

## 2023-07-21 NOTE — ASSESSMENT & PLAN NOTE
· Acute on chronic hyponatremia   · Most recent TSH within limits  · CT chest -no signs for active malignancy last CT screening test was in February 2020 at [address]  · Did not respond on salt tablets and was started on 1.8% saline yesterday by nephrology. · Discussed need to discontinue SSRI,.   Will switch citalopram to wellbutrin given patient's concerns for worsening anxiety    Results from last 7 days   Lab Units 07/21/23  0503 07/20/23  2215 07/20/23  1608 07/20/23  0601   SODIUM mmol/L 130* 129* 125* 125*

## 2023-07-21 NOTE — ASSESSMENT & PLAN NOTE
Lab Results   Component Value Date    HGBA1C 6.9 (H) 02/10/2023     Recent Labs     07/20/23  1125 07/20/23  1550 07/20/23  2124 07/21/23  0743   POCGLU 129 261* 253* 157*     · Prior to admission on glipizide XL and pioglitazone.   · Continue both and sliding scale coverage

## 2023-07-22 VITALS
WEIGHT: 151.68 LBS | HEART RATE: 66 BPM | OXYGEN SATURATION: 100 % | DIASTOLIC BLOOD PRESSURE: 76 MMHG | RESPIRATION RATE: 18 BRPM | HEIGHT: 63 IN | BODY MASS INDEX: 26.88 KG/M2 | SYSTOLIC BLOOD PRESSURE: 142 MMHG | TEMPERATURE: 97.3 F

## 2023-07-22 LAB
ANION GAP SERPL CALCULATED.3IONS-SCNC: 4 MMOL/L
BUN SERPL-MCNC: 16 MG/DL (ref 5–25)
CALCIUM SERPL-MCNC: 8.8 MG/DL (ref 8.4–10.2)
CHLORIDE SERPL-SCNC: 96 MMOL/L (ref 96–108)
CO2 SERPL-SCNC: 35 MMOL/L (ref 21–32)
CORTIS AM PEAK SERPL-MCNC: 1.1 UG/DL (ref 6.7–22.6)
CREAT SERPL-MCNC: 0.63 MG/DL (ref 0.6–1.3)
ERYTHROCYTE [DISTWIDTH] IN BLOOD BY AUTOMATED COUNT: 12.8 % (ref 11.6–15.1)
GFR SERPL CREATININE-BSD FRML MDRD: 93 ML/MIN/1.73SQ M
GLUCOSE SERPL-MCNC: 103 MG/DL (ref 65–140)
GLUCOSE SERPL-MCNC: 92 MG/DL (ref 65–140)
HCT VFR BLD AUTO: 36.2 % (ref 34.8–46.1)
HGB BLD-MCNC: 11.6 G/DL (ref 11.5–15.4)
MCH RBC QN AUTO: 29.9 PG (ref 26.8–34.3)
MCHC RBC AUTO-ENTMCNC: 32 G/DL (ref 31.4–37.4)
MCV RBC AUTO: 93 FL (ref 82–98)
PLATELET # BLD AUTO: 200 THOUSANDS/UL (ref 149–390)
PMV BLD AUTO: 10 FL (ref 8.9–12.7)
POTASSIUM SERPL-SCNC: 3.4 MMOL/L (ref 3.5–5.3)
RBC # BLD AUTO: 3.88 MILLION/UL (ref 3.81–5.12)
SODIUM SERPL-SCNC: 135 MMOL/L (ref 135–147)
WBC # BLD AUTO: 7.23 THOUSAND/UL (ref 4.31–10.16)

## 2023-07-22 PROCEDURE — 80048 BASIC METABOLIC PNL TOTAL CA: CPT | Performed by: INTERNAL MEDICINE

## 2023-07-22 PROCEDURE — 82533 TOTAL CORTISOL: CPT | Performed by: INTERNAL MEDICINE

## 2023-07-22 PROCEDURE — 99239 HOSP IP/OBS DSCHRG MGMT >30: CPT

## 2023-07-22 PROCEDURE — 85027 COMPLETE CBC AUTOMATED: CPT | Performed by: INTERNAL MEDICINE

## 2023-07-22 PROCEDURE — 82948 REAGENT STRIP/BLOOD GLUCOSE: CPT

## 2023-07-22 PROCEDURE — 94760 N-INVAS EAR/PLS OXIMETRY 1: CPT

## 2023-07-22 PROCEDURE — 94640 AIRWAY INHALATION TREATMENT: CPT

## 2023-07-22 RX ORDER — BUPROPION HYDROCHLORIDE 150 MG/1
150 TABLET ORAL DAILY
Qty: 30 TABLET | Refills: 0 | Status: SHIPPED | OUTPATIENT
Start: 2023-07-23

## 2023-07-22 RX ORDER — PREDNISONE 20 MG/1
TABLET ORAL
Qty: 13 TABLET | Refills: 0 | Status: SHIPPED | OUTPATIENT
Start: 2023-07-23 | End: 2023-08-06

## 2023-07-22 RX ORDER — POTASSIUM CHLORIDE 20 MEQ/1
20 TABLET, EXTENDED RELEASE ORAL ONCE
Status: COMPLETED | OUTPATIENT
Start: 2023-07-22 | End: 2023-07-22

## 2023-07-22 RX ORDER — SODIUM CHLORIDE 1 G/1
2 TABLET ORAL
Qty: 180 TABLET | Refills: 0 | Status: SHIPPED | OUTPATIENT
Start: 2023-07-22 | End: 2023-07-22 | Stop reason: SDUPTHER

## 2023-07-22 RX ORDER — BUPROPION HYDROCHLORIDE 150 MG/1
150 TABLET ORAL DAILY
Qty: 30 TABLET | Refills: 0 | Status: SHIPPED | OUTPATIENT
Start: 2023-07-23 | End: 2023-07-22 | Stop reason: SDUPTHER

## 2023-07-22 RX ORDER — SODIUM CHLORIDE 1 G/1
2 TABLET ORAL
Qty: 180 TABLET | Refills: 0 | Status: ON HOLD | OUTPATIENT
Start: 2023-07-22 | End: 2023-08-06 | Stop reason: SDUPTHER

## 2023-07-22 RX ORDER — PREDNISONE 20 MG/1
TABLET ORAL
Qty: 13 TABLET | Refills: 0 | Status: SHIPPED | OUTPATIENT
Start: 2023-07-23 | End: 2023-07-22 | Stop reason: SDUPTHER

## 2023-07-22 RX ADMIN — PIOGLITAZONE HYDROCHLORIDE 15 MG: 15 TABLET ORAL at 08:10

## 2023-07-22 RX ADMIN — BUPROPION HYDROCHLORIDE 150 MG: 150 TABLET, EXTENDED RELEASE ORAL at 08:11

## 2023-07-22 RX ADMIN — IPRATROPIUM BROMIDE 0.5 MG: 0.5 SOLUTION RESPIRATORY (INHALATION) at 07:38

## 2023-07-22 RX ADMIN — METOPROLOL TARTRATE 100 MG: 50 TABLET, FILM COATED ORAL at 08:12

## 2023-07-22 RX ADMIN — PREDNISONE 40 MG: 20 TABLET ORAL at 08:11

## 2023-07-22 RX ADMIN — LISINOPRIL 40 MG: 20 TABLET ORAL at 08:11

## 2023-07-22 RX ADMIN — GLIPIZIDE 10 MG: 2.5 TABLET, EXTENDED RELEASE ORAL at 08:12

## 2023-07-22 RX ADMIN — SODIUM CHLORIDE TAB 1 GM 2 G: 1 TAB at 08:11

## 2023-07-22 RX ADMIN — LEVALBUTEROL HYDROCHLORIDE 1.25 MG: 1.25 SOLUTION RESPIRATORY (INHALATION) at 07:38

## 2023-07-22 RX ADMIN — POTASSIUM CHLORIDE 20 MEQ: 1500 TABLET, EXTENDED RELEASE ORAL at 09:47

## 2023-07-22 RX ADMIN — PANTOPRAZOLE SODIUM 40 MG: 40 TABLET, DELAYED RELEASE ORAL at 04:54

## 2023-07-22 RX ADMIN — BUDESONIDE 0.5 MG: 0.5 INHALANT ORAL at 07:38

## 2023-07-22 RX ADMIN — CLONIDINE HYDROCHLORIDE 0.2 MG: 0.1 TABLET ORAL at 08:10

## 2023-07-22 RX ADMIN — HEPARIN SODIUM 5000 UNITS: 5000 INJECTION INTRAVENOUS; SUBCUTANEOUS at 04:54

## 2023-07-22 NOTE — DISCHARGE SUMMARY
[address] [facility]  Discharge- Pearl Estes 1955, 79 y.o. female MRN: [id number]  Unit/Bed#: -Atul Encounter: [id number]  Primary Care Provider: Emerson Arreola DO   Date and time admitted to hospital: 7/18/2023  9:52 AM    * COPD exacerbation ([address] [facility])  Assessment & Plan  History of COPD chronically on 2 L still smoking hypertension diabetes and anxiety presents with worsening shortness of breath found to have COPD exacerbation  · No infiltrates on CXR  · Currently on IV methylprednisolone and bronchodilators. We will start prednisone taper starting tomorrow  · Advise smoking cessation. She states that she will stop after this hospitalization; continue nicotine patch. · CT chest without evidence of malignancy or infiltrates  · Patient breathing without any difficulties. Is feeling back to her baseline breathing. Should follow-up with pulmonology on discharge and continue with prednisone taper. · Follow-up with PCP    Acute hyponatremia  Assessment & Plan  · Acute on chronic hyponatremia   · Most recent TSH within limits  · CT chest -no signs for active malignancy last CT screening test was in February 2020 at [facility]  · Did not respond on salt tablets and was started on 1.8% saline yesterday by nephrology. · Discussed need to discontinue SSRI,. Will switch citalopram to wellbutrin given patient's concerns for worsening anxiety  · 1.8% saline was discontinued as patient's sodium had improved. Was changed to salt tablets. She will continue salt tablets on discharge. She should continue at 1.2 L fluid restriction per day.   · Should follow-up with nephrology on discharge, referral placed    Results from last 7 days   Lab Units 07/22/23  0451 07/21/23  1203 07/21/23  0503 07/20/23  2215   SODIUM mmol/L 135 132* 130* 129*       Anxiety about health  Assessment & Plan  · More anxious than usual.  Added lorazepam.    · Currently on citalopram.  Due to persistent hyponatremia patient is agreeable to stopping this. · She asked for a scheduled medication as a substitute for citalopram.  Discussed with nephrology and will try wellbutrin  · Continue follow-up with PCP outpatient    Type 2 diabetes mellitus without complication, without long-term current use of insulin [facility])  Assessment & Plan  Lab Results   Component Value Date    HGBA1C 6.9 (H) 02/10/2023     Recent Labs     07/21/23  1136 07/21/23  1613 07/21/23  2122 07/22/23  0753   POCGLU 313* 266* 206* 92     · Prior to admission on glipizide XL and pioglitazone. · Continue both and sliding scale coverage    Essential hypertension  Assessment & Plan  · Continue amlodipine clonidine lisinopril and metoprolol    Tobacco abuse  Assessment & Plan  · Advised smoking cessation. Nicotine patch ordered. Chronic respiratory failure (HCC)  Assessment & Plan  · In the setting of COPD  · Chronically requires oxygen at home at 2 L nasal cannula  · Currently is stable at her baseline      Medical Problems     Resolved Problems  Date Reviewed: 7/22/2023   None       Discharging Physician / Practitioner: Mary Sheppard PA-C  PCP: Andre Spence DO  Admission Date:   Admission Orders (From admission, onward)     Ordered        07/19/23 1041  Inpatient Admission  Once            07/18/23 1122  Place in Observation  Once                      Discharge Date: 07/22/23    Consultations During Hospital Stay:  · Nephrology    Procedures Performed:   · None    Significant Findings / Test Results:   · CT chest: There is no suspicious lung nodule or mass which would suggest lung primary. Linear density seen right lung base due to atelectasis. No acute consolidation/pneumonia.  Continue lung screening  · XR chest: No acute cardiopulmonary disease  · Na: 121  · Urine sodium: 25  · Urine osmolality: 369  · Serum osmolality 271  · Pro-Carlos Alberto normal  · D-dimer normal  · BNP normal    Incidental Findings:   · None     Test Results Pending at Discharge (will require follow up):   · A.m. cortisol     Outpatient Tests Requested:  · Repeat BMP in 1 week  · Follow-up with PCP in 1 week  · Follow-up with nephrology outpatient  · Follow-up with pulmonology outpatient    Complications:  None     Reason for Admission: COPD exacerbation    Hospital Course:   Milton Spencer is a 79 y.o. female patient who originally presented to the hospital on 7/18/2023 due to worsening shortness of breath. Was seen in the ED on 7/16 discharged home and then came back due to being symptomatic. In emergency department also noted to have hyponatremia. CT chest ordered to rule out malignancy with results as above. Patient started on IV Solu-Medrol and bronchodilators along with ceftriaxone for COPD exacerbation. Due to hyponatremia, nephrology was Consulted. Started on 1.2 L fluid restriction started on salt tablets 2 g 3 times daily. Patient was then started 1.8% hypertonic saline due to no improvement in sodium. Trial of Lasix was done due to decreasing of sodium. Repeat dose of Lasix was done to improve sodium. Still without significant improvement of sodium, started on 1.8% hypertonic saline which was subsequently changed salt tablet due to improvement of sodium. Celexa discontinued and started on Wellbutrin. Patient steroids were weaned down and patient was started on a steroid taper. She will continue with steroid taper on discharge. She should follow-up with pulmonology on discharge. Should follow-up with nephrology due to hyponatremia. Repeat BMP in 1 week. Continue with Wellbutrin and discuss with PCP further dosing adjustments. Please see above list of diagnoses and related plan for additional information. Condition at Discharge: fair    Discharge Day Visit / Exam:   Subjective:  Patient seen and examined at bedside this morning. She states that she is feeling very well today. She is looking forward to going home.   She denies any nausea, vomiting, diarrhea, constipation, fever, chills, chest pain, shortness of breath. Vitals: Blood Pressure: 142/76 (07/22/23 0754)  Pulse: 66 (07/22/23 0754)  Temperature: (!) 97.3 °F (36.3 °C) (07/22/23 0754)  Temp Source: Temporal (07/19/23 2138)  Respirations: 18 (07/22/23 0754)  Height: 5' 2.99" (160 cm) (07/18/23 1158)  Weight - Scale: 68.8 kg (151 lb 10.8 oz) (07/18/23 1218)  SpO2: 100 % (07/22/23 0754)  Exam:   Physical Exam  Vitals reviewed. Constitutional:       General: She is not in acute distress. Appearance: She is ill-appearing (chronically). She is not toxic-appearing. HENT:      Head: Normocephalic and atraumatic. Nose: Nose normal.      Mouth/Throat:      Mouth: Mucous membranes are moist.   Eyes:      Pupils: Pupils are equal, round, and reactive to light. Cardiovascular:      Rate and Rhythm: Normal rate and regular rhythm. Heart sounds: No murmur heard. Pulmonary:      Effort: No respiratory distress. Breath sounds: No stridor. No wheezing. Comments: Decreased breath sounds  Abdominal:      General: Bowel sounds are normal. There is no distension. Palpations: Abdomen is soft. There is no mass. Tenderness: There is no abdominal tenderness. Musculoskeletal:         General: Normal range of motion. Right lower leg: No edema. Left lower leg: No edema. Skin:     General: Skin is warm and dry. Neurological:      General: No focal deficit present. Mental Status: She is alert and oriented to person, place, and time. Motor: No weakness. Psychiatric:         Mood and Affect: Mood normal.         Behavior: Behavior normal.          Discussion with Family: Patient declined call to . Discharge instructions/Information to patient and family:   See after visit summary for information provided to patient and family. Provisions for Follow-Up Care:  See after visit summary for information related to follow-up care and any pertinent home health orders. Disposition:   Home    Planned Readmission: no      Discharge Statement:  I spent 40 minutes discharging the patient. This time was spent on the day of discharge. I had direct contact with the patient on the day of discharge. Greater than 50% of the total time was spent examining patient, answering all patient questions, arranging and discussing plan of care with patient as well as directly providing post-discharge instructions. Additional time then spent on discharge activities. Discharge Medications:  See after visit summary for reconciled discharge medications provided to patient and/or family.       **Please Note: This note may have been constructed using a voice recognition system**

## 2023-07-22 NOTE — DISCHARGE INSTR - AVS FIRST PAGE
Dear Josep Yang,     It was our pleasure to care for you here at [facility]. For follow up as well as any medication refills, we recommend that you follow up with your primary care physician. Here are the most important instructions/ recommendations at discharge:     Notable Medication Adjustments -   Stop taking citalopram and start taking Wellbutrin  Continue with steroid taper on discharge  Start taking sodium tablets   Testing Required after Discharge -   Repeat BMP in 1 week  Important follow up information -   Follow-up with PCP in 1 week  Follow-up with nephrology  Follow up with pulmonology  Other Instructions -   Please continue with the fluid restriction of 1.2 L daily  Please review this entire after visit summary as additional general instructions including medication list, appointments, activity, diet, any pertinent wound care, and other additional recommendations from your care team that may be provided for you.       Sincerely,     Jasson Gutierrez PA-C

## 2023-07-22 NOTE — CASE MANAGEMENT
Case Management Discharge Planning Note    Patient name Dakota Sewell /-57 MRN [id number]  : 1955 Date 2023       Current Admission Date: 2023  Current Admission Diagnosis:COPD exacerbation [facility])   Patient Active Problem List    Diagnosis Date Noted   • Anxiety about health 2023   • Tachycardia 2023   • Bacteremia 2023   • Vomiting 2023   • COPD exacerbation ([address]) 2020   • Chronic respiratory failure ([address]) 2020   • Acute hyponatremia 2020   • Tobacco abuse 2020   • Essential hypertension 2020   • Type 2 diabetes mellitus without complication, without long-term current use of insulin ([address]) 2020      LOS (days): 3  Geometric Mean LOS (GMLOS) (days): 2.70  Days to GMLOS:-0.3     OBJECTIVE:  Risk of Unplanned Readmission Score: 22.41         Current admission status: Inpatient   Preferred Pharmacy:   [facility] [address], [address] [place] [address]  Phone: [phone] Fax: [phone] - [address] [address]  Phone: [phone] Fax: [phone]    Primary Care Provider: Denise Ruiz DO    Primary Insurance: [facility] REP  Secondary Insurance: [address] [place] [address]  DETAILS:    Discharge planning discussed with[de-identified] patient and family at bedside        CM contacted family/caregiver?: Yes (family at bedside)  Were Treatment Team discharge recommendations reviewed with patient/caregiver?: Yes  Did patient/caregiver verbalize understanding of patient care needs?: Yes  Were patient/caregiver advised of the risks associated with not following Treatment Team discharge recommendations?: Yes    Contacts  Patient Contacts: sanantha granddaughter  Relationship to Patient[de-identified] Family  Contact Method:  In Person  Reason/Outcome: Discharge [address]         Is the patient interested in [address] [facility] at discharge?: No    DME Referral Provided  Referral made for DME?: No         Would you like to participate in our [address] service program?  : No - Declined    Treatment Team Recommendation: Home  Discharge Destination Plan[de-identified] Home  Transport at Discharge : Family             IMM Given (Date):: 07/22/23  IMM Given to[de-identified] Patient           Patient stable for discharge today home with family. Patient family brought O2 tank from home for transport patient stated she believes her DME is [employer]   Patient to resume Nebulizer's as prior to admission. CM was unaware of discharge today. CM spoke to patient at the bedside, reviewed DC IMM with patient and informed that patient can stay an additional 4 hours for reconsidering appealing the discharge as the medicare rights were review on the day of discharge. Pt verbalized understanding and feels ready to go home and does not intend to stay 4 hours to reconsider. Copy with patient and copy in bin to be scanned to system. Family at bedside to transport patient home.

## 2023-07-22 NOTE — ASSESSMENT & PLAN NOTE
History of COPD chronically on 2 L still smoking hypertension diabetes and anxiety presents with worsening shortness of breath found to have COPD exacerbation  · No infiltrates on CXR  · Currently on IV methylprednisolone and bronchodilators. We will start prednisone taper starting tomorrow  · Advise smoking cessation. She states that she will stop after this hospitalization; continue nicotine patch. · CT chest without evidence of malignancy or infiltrates  · Patient breathing without any difficulties. Is feeling back to her baseline breathing. Should follow-up with pulmonology on discharge and continue with prednisone taper.   · Follow-up with PCP

## 2023-07-22 NOTE — ASSESSMENT & PLAN NOTE
Lab Results   Component Value Date    HGBA1C 6.9 (H) 02/10/2023     Recent Labs     07/21/23  1136 07/21/23  1613 07/21/23  2122 07/22/23  0753   POCGLU 313* 266* 206* 92     · Prior to admission on glipizide XL and pioglitazone.   · Continue both and sliding scale coverage

## 2023-07-22 NOTE — PLAN OF CARE
Problem: MOBILITY - ADULT  Goal: Maintain or return to baseline ADL function  Description: INTERVENTIONS:  -  Assess patient's ability to carry out ADLs; assess patient's baseline for ADL function and identify physical deficits which impact ability to perform ADLs (bathing, care of mouth/teeth, toileting, grooming, dressing, etc.)  - Assess/evaluate cause of self-care deficits   - Assess range of motion  - Assess patient's mobility; develop plan if impaired  - Assess patient's need for assistive devices and provide as appropriate  - Encourage maximum independence but intervene and supervise when necessary  - Involve family in performance of ADLs  - Assess for home care needs following discharge   - Consider OT consult to assist with ADL evaluation and planning for discharge  - Provide patient education as appropriate  Outcome: Progressing  Goal: Maintains/Returns to pre admission functional level  Description: INTERVENTIONS:  - Perform BMAT or MOVE assessment daily.   - Set and communicate daily mobility goal to care team and patient/family/caregiver.    - Collaborate with rehabilitation services on mobility goals if consulted  - Ambulate patient 3 times a day  - Out of bed to chair 3 times a day   - Out of bed for meals 3 times a day  - Out of bed for toileting  - Record patient progress and toleration of activity level   Outcome: Progressing     Problem: PAIN - ADULT  Goal: Verbalizes/displays adequate comfort level or baseline comfort level  Description: Interventions:  - Encourage patient to monitor pain and request assistance  - Assess pain using appropriate pain scale  - Administer analgesics based on type and severity of pain and evaluate response  - Implement non-pharmacological measures as appropriate and evaluate response  - Consider cultural and social influences on pain and pain management  - Notify physician/advanced practitioner if interventions unsuccessful or patient reports new pain  Outcome: Progressing     Problem: SAFETY ADULT  Goal: Maintain or return to baseline ADL function  Description: INTERVENTIONS:  -  Assess patient's ability to carry out ADLs; assess patient's baseline for ADL function and identify physical deficits which impact ability to perform ADLs (bathing, care of mouth/teeth, toileting, grooming, dressing, etc.)  - Assess/evaluate cause of self-care deficits   - Assess range of motion  - Assess patient's mobility; develop plan if impaired  - Assess patient's need for assistive devices and provide as appropriate  - Encourage maximum independence but intervene and supervise when necessary  - Involve family in performance of ADLs  - Assess for home care needs following discharge   - Consider OT consult to assist with ADL evaluation and planning for discharge  - Provide patient education as appropriate  Outcome: Progressing

## 2023-07-22 NOTE — ASSESSMENT & PLAN NOTE
· More anxious than usual.  Added lorazepam.    · Currently on citalopram.  Due to persistent hyponatremia patient is agreeable to stopping this.   · She asked for a scheduled medication as a substitute for citalopram.  Discussed with nephrology and will try wellbutrin  · Continue follow-up with PCP outpatient

## 2023-07-22 NOTE — NURSING NOTE
Pt discharged via wheelchair with all belongings, including 2 oxygen tanks, 1 O2 hook, medications and instructions. Pt's daughter present for review of instructions. All questions answered.

## 2023-07-22 NOTE — ASSESSMENT & PLAN NOTE
· Acute on chronic hyponatremia   · Most recent TSH within limits  · CT chest -no signs for active malignancy last CT screening test was in February 2020 at [address]  · Did not respond on salt tablets and was started on 1.8% saline yesterday by nephrology. · Discussed need to discontinue SSRI,. Will switch citalopram to wellbutrin given patient's concerns for worsening anxiety  · 1.8% saline was discontinued as patient's sodium had improved. Was changed to salt tablets. She will continue salt tablets on discharge. She should continue at 1.2 L fluid restriction per day.   · Should follow-up with nephrology on discharge, referral placed    Results from last 7 days   Lab Units 07/22/23  0451 07/21/23  1203 07/21/23  0503 07/20/23  2215   SODIUM mmol/L 135 132* 130* 129*

## 2023-07-24 ENCOUNTER — TELEPHONE (OUTPATIENT)
Dept: OTHER | Facility: HOSPITAL | Age: 68
End: 2023-07-24

## 2023-07-24 NOTE — TELEPHONE ENCOUNTER
She was seen in the hospital for hyponatremia consult. She will need outpatient renal follow-up in 2 months with repeat BMP before office visit.      Of note, her serum cortisol level low but this was while she was on steroids for COPD in the hospital.

## 2023-07-24 NOTE — UTILIZATION REVIEW
NOTIFICATION OF ADMISSION DISCHARGE   This is a Notification of Discharge from [address] [place] [address]. Please be advised that this patient has been discharge from our facility. Below you will find the admission and discharge date and time including the patient’s disposition. UTILIZATION REVIEW CONTACT:  Sakina Luo  Utilization   Network Utilization Review Department  Phone: [phone] carefully listen to the prompts. All voicemails are confidential.  Email: [email or web address]     ADMISSION INFORMATION  PRESENTATION DATE: 7/18/2023  9:52 AM  OBERVATION ADMISSION DATE:   INPATIENT ADMISSION DATE: 7/19/23 10:41 AM   DISCHARGE DATE: 7/22/2023 10:50 AM   DISPOSITION:Home/Self Care    IMPORTANT INFORMATION:  Send all requests for admission clinical reviews, approved or denied determinations and any other requests to dedicated fax number below belonging to the campus where the patient is receiving treatment.  List of dedicated fax numbers:  [facility] DENIALS (Administrative/Medical Necessity) [phone] [address] [facility] (Maternity/NICU/Pediatrics) [phone]   [facility] [phone]   [facility] [phone] [address] [facility] [phone]   [address] [place] [address] [phone]   [facility] [phone]   [address] [facility] [address] [facility] [phone]   [address] [place] [address] [phone] [address] [facility] [phone] [address] [facility] [address] [facility] [phone]

## 2023-07-26 ENCOUNTER — TELEPHONE (OUTPATIENT)
Dept: NEPHROLOGY | Facility: CLINIC | Age: 68
End: 2023-07-26

## 2023-07-26 NOTE — TELEPHONE ENCOUNTER
Hfu scheduled for 12-4-23. Pt daughter refused to come to another office for a 2 month hfu will only go to [facility]. will place on [employer] list 7-26-23.

## 2023-07-29 ENCOUNTER — APPOINTMENT (EMERGENCY)
Dept: RADIOLOGY | Facility: HOSPITAL | Age: 68
End: 2023-07-29
Payer: COMMERCIAL

## 2023-07-29 ENCOUNTER — HOSPITAL ENCOUNTER (EMERGENCY)
Facility: HOSPITAL | Age: 68
Discharge: HOME | End: 2023-07-29
Attending: EMERGENCY MEDICINE
Payer: COMMERCIAL

## 2023-07-29 VITALS
SYSTOLIC BLOOD PRESSURE: 110 MMHG | TEMPERATURE: 97.6 F | BODY MASS INDEX: 27.73 KG/M2 | OXYGEN SATURATION: 95 % | HEIGHT: 62 IN | WEIGHT: 150.7 LBS | DIASTOLIC BLOOD PRESSURE: 68 MMHG | RESPIRATION RATE: 20 BRPM | HEART RATE: 90 BPM

## 2023-07-29 DIAGNOSIS — J44.1 COPD EXACERBATION (HCC): Primary | ICD-10-CM

## 2023-07-29 DIAGNOSIS — J44.1 COPD WITH ACUTE EXACERBATION (HCC): ICD-10-CM

## 2023-07-29 LAB
ALBUMIN SERPL BCP-MCNC: 4.2 G/DL (ref 3.5–5)
ALP SERPL-CCNC: 65 U/L (ref 34–104)
ALT SERPL W P-5'-P-CCNC: 11 U/L (ref 7–52)
ANION GAP SERPL CALCULATED.3IONS-SCNC: 8 MMOL/L
APTT PPP: 25 SECONDS (ref 23–37)
AST SERPL W P-5'-P-CCNC: 9 U/L (ref 13–39)
BASOPHILS # BLD AUTO: 0.03 THOUSANDS/ÂΜL (ref 0–0.1)
BASOPHILS NFR BLD AUTO: 0 % (ref 0–1)
BILIRUB SERPL-MCNC: 0.54 MG/DL (ref 0.2–1)
BNP SERPL-MCNC: 21 PG/ML (ref 0–100)
BUN SERPL-MCNC: 7 MG/DL (ref 5–25)
CALCIUM SERPL-MCNC: 9.9 MG/DL (ref 8.4–10.2)
CARDIAC TROPONIN I PNL SERPL HS: 4 NG/L
CHLORIDE SERPL-SCNC: 94 MMOL/L (ref 96–108)
CO2 SERPL-SCNC: 32 MMOL/L (ref 21–32)
CREAT SERPL-MCNC: 0.85 MG/DL (ref 0.6–1.3)
EOSINOPHIL # BLD AUTO: 0.02 THOUSAND/ÂΜL (ref 0–0.61)
EOSINOPHIL NFR BLD AUTO: 0 % (ref 0–6)
ERYTHROCYTE [DISTWIDTH] IN BLOOD BY AUTOMATED COUNT: 12.7 % (ref 11.6–15.1)
GFR SERPL CREATININE-BSD FRML MDRD: 71 ML/MIN/1.73SQ M
GLUCOSE SERPL-MCNC: 140 MG/DL (ref 65–140)
HCT VFR BLD AUTO: 41.2 % (ref 34.8–46.1)
HGB BLD-MCNC: 13.2 G/DL (ref 11.5–15.4)
IMM GRANULOCYTES # BLD AUTO: 0.09 THOUSAND/UL (ref 0–0.2)
IMM GRANULOCYTES NFR BLD AUTO: 1 % (ref 0–2)
INR PPP: 0.86 (ref 0.84–1.19)
LACTATE SERPL-SCNC: 1 MMOL/L (ref 0.5–2)
LYMPHOCYTES # BLD AUTO: 2.17 THOUSANDS/ÂΜL (ref 0.6–4.47)
LYMPHOCYTES NFR BLD AUTO: 23 % (ref 14–44)
MAGNESIUM SERPL-MCNC: 2 MG/DL (ref 1.9–2.7)
MCH RBC QN AUTO: 29.5 PG (ref 26.8–34.3)
MCHC RBC AUTO-ENTMCNC: 32 G/DL (ref 31.4–37.4)
MCV RBC AUTO: 92 FL (ref 82–98)
MONOCYTES # BLD AUTO: 0.99 THOUSAND/ÂΜL (ref 0.17–1.22)
MONOCYTES NFR BLD AUTO: 11 % (ref 4–12)
NEUTROPHILS # BLD AUTO: 6.12 THOUSANDS/ÂΜL (ref 1.85–7.62)
NEUTS SEG NFR BLD AUTO: 65 % (ref 43–75)
NRBC BLD AUTO-RTO: 0 /100 WBCS
PLATELET # BLD AUTO: 223 THOUSANDS/UL (ref 149–390)
PMV BLD AUTO: 9.6 FL (ref 8.9–12.7)
POTASSIUM SERPL-SCNC: 3.9 MMOL/L (ref 3.5–5.3)
PROCALCITONIN SERPL-MCNC: <0.05 NG/ML
PROT SERPL-MCNC: 7.2 G/DL (ref 6.4–8.4)
PROTHROMBIN TIME: 11.8 SECONDS (ref 11.6–14.5)
RBC # BLD AUTO: 4.47 MILLION/UL (ref 3.81–5.12)
SODIUM SERPL-SCNC: 134 MMOL/L (ref 135–147)
WBC # BLD AUTO: 9.42 THOUSAND/UL (ref 4.31–10.16)

## 2023-07-29 PROCEDURE — 80053 COMPREHEN METABOLIC PANEL: CPT | Performed by: PHYSICIAN ASSISTANT

## 2023-07-29 PROCEDURE — 94640 AIRWAY INHALATION TREATMENT: CPT

## 2023-07-29 PROCEDURE — 93005 ELECTROCARDIOGRAM TRACING: CPT

## 2023-07-29 PROCEDURE — 84145 PROCALCITONIN (PCT): CPT | Performed by: PHYSICIAN ASSISTANT

## 2023-07-29 PROCEDURE — 85025 COMPLETE CBC W/AUTO DIFF WBC: CPT | Performed by: PHYSICIAN ASSISTANT

## 2023-07-29 PROCEDURE — 85730 THROMBOPLASTIN TIME PARTIAL: CPT | Performed by: PHYSICIAN ASSISTANT

## 2023-07-29 PROCEDURE — 96367 TX/PROPH/DG ADDL SEQ IV INF: CPT

## 2023-07-29 PROCEDURE — 84484 ASSAY OF TROPONIN QUANT: CPT | Performed by: PHYSICIAN ASSISTANT

## 2023-07-29 PROCEDURE — 85610 PROTHROMBIN TIME: CPT | Performed by: PHYSICIAN ASSISTANT

## 2023-07-29 PROCEDURE — 83735 ASSAY OF MAGNESIUM: CPT | Performed by: PHYSICIAN ASSISTANT

## 2023-07-29 PROCEDURE — 36415 COLL VENOUS BLD VENIPUNCTURE: CPT | Performed by: PHYSICIAN ASSISTANT

## 2023-07-29 PROCEDURE — 83880 ASSAY OF NATRIURETIC PEPTIDE: CPT | Performed by: PHYSICIAN ASSISTANT

## 2023-07-29 PROCEDURE — 83605 ASSAY OF LACTIC ACID: CPT | Performed by: PHYSICIAN ASSISTANT

## 2023-07-29 PROCEDURE — 71045 X-RAY EXAM CHEST 1 VIEW: CPT

## 2023-07-29 PROCEDURE — C9113 INJ PANTOPRAZOLE SODIUM, VIA: HCPCS | Performed by: PHYSICIAN ASSISTANT

## 2023-07-29 PROCEDURE — 94760 N-INVAS EAR/PLS OXIMETRY 1: CPT

## 2023-07-29 PROCEDURE — 96375 TX/PRO/DX INJ NEW DRUG ADDON: CPT

## 2023-07-29 PROCEDURE — 96365 THER/PROPH/DIAG IV INF INIT: CPT

## 2023-07-29 PROCEDURE — 87040 BLOOD CULTURE FOR BACTERIA: CPT | Performed by: PHYSICIAN ASSISTANT

## 2023-07-29 PROCEDURE — 99285 EMERGENCY DEPT VISIT HI MDM: CPT | Performed by: PHYSICIAN ASSISTANT

## 2023-07-29 PROCEDURE — 99285 EMERGENCY DEPT VISIT HI MDM: CPT

## 2023-07-29 PROCEDURE — 94644 CONT INHLJ TX 1ST HOUR: CPT

## 2023-07-29 RX ORDER — LORAZEPAM 2 MG/ML
1 INJECTION INTRAMUSCULAR ONCE
Status: COMPLETED | OUTPATIENT
Start: 2023-07-29 | End: 2023-07-29

## 2023-07-29 RX ORDER — METHYLPREDNISOLONE SODIUM SUCCINATE 125 MG/2ML
125 INJECTION, POWDER, LYOPHILIZED, FOR SOLUTION INTRAMUSCULAR; INTRAVENOUS ONCE
Status: COMPLETED | OUTPATIENT
Start: 2023-07-29 | End: 2023-07-29

## 2023-07-29 RX ORDER — MAGNESIUM SULFATE HEPTAHYDRATE 40 MG/ML
2 INJECTION, SOLUTION INTRAVENOUS ONCE
Status: COMPLETED | OUTPATIENT
Start: 2023-07-29 | End: 2023-07-29

## 2023-07-29 RX ORDER — SODIUM CHLORIDE FOR INHALATION 0.9 %
3 VIAL, NEBULIZER (ML) INHALATION ONCE
Status: COMPLETED | OUTPATIENT
Start: 2023-07-29 | End: 2023-07-29

## 2023-07-29 RX ORDER — CEFTRIAXONE 1 G/50ML
1000 INJECTION, SOLUTION INTRAVENOUS ONCE
Status: COMPLETED | OUTPATIENT
Start: 2023-07-29 | End: 2023-07-29

## 2023-07-29 RX ORDER — PANTOPRAZOLE SODIUM 40 MG/10ML
40 INJECTION, POWDER, LYOPHILIZED, FOR SOLUTION INTRAVENOUS ONCE
Status: COMPLETED | OUTPATIENT
Start: 2023-07-29 | End: 2023-07-29

## 2023-07-29 RX ADMIN — CEFTRIAXONE 1000 MG: 1 INJECTION, SOLUTION INTRAVENOUS at 11:31

## 2023-07-29 RX ADMIN — ALBUTEROL SULFATE 10 MG: 2.5 SOLUTION RESPIRATORY (INHALATION) at 10:04

## 2023-07-29 RX ADMIN — ISODIUM CHLORIDE 3 ML: 0.03 SOLUTION RESPIRATORY (INHALATION) at 10:05

## 2023-07-29 RX ADMIN — IPRATROPIUM BROMIDE 1 MG: 0.5 SOLUTION RESPIRATORY (INHALATION) at 10:05

## 2023-07-29 RX ADMIN — PANTOPRAZOLE SODIUM 40 MG: 40 INJECTION, POWDER, FOR SOLUTION INTRAVENOUS at 11:25

## 2023-07-29 RX ADMIN — MAGNESIUM SULFATE HEPTAHYDRATE 2 G: 40 INJECTION, SOLUTION INTRAVENOUS at 12:02

## 2023-07-29 RX ADMIN — LORAZEPAM 1 MG: 2 INJECTION INTRAMUSCULAR; INTRAVENOUS at 11:26

## 2023-07-29 RX ADMIN — METHYLPREDNISOLONE SODIUM SUCCINATE 125 MG: 125 INJECTION, POWDER, FOR SOLUTION INTRAMUSCULAR; INTRAVENOUS at 10:00

## 2023-07-29 NOTE — ED PROVIDER NOTES
History  Chief Complaint   Patient presents with   • Shortness of Breath     Patient c/o sudden onset of SOB last night. Patient states "I couldn't catch my breath."  Patient with increased WOB. Denies fever, cough or recent illness. Wears 3 liters O2 at home. The patient is a 72-year-old female, current smoker, past medical history of COPD, chronic 3 L nasal cannula oxygen, diabetes, who presents to the emergency department today with the chief complaint of increased shortness of breath x3 days. The patient is escorted from home by her daughter. The patient states that she has been using her nebulizer treatments and was recently hospitalized for hyponatremia and COPD exacerbation 7/18-7/22. Patient is still on prednisone at home. No current antibiotic use. Denies any chest pain,fevers, nausea vomiting, falls or traumas back pain, abdominal pain. Admits to dry persistent cough       Shortness of Breath  Severity:  Moderate  Onset quality:  Gradual  Duration:  3 days  Timing:  Constant  Progression:  Worsening  Chronicity:  Recurrent  Context: activity and weather changes    Relieved by:  Nothing  Worsened by: Activity, coughing and movement  Ineffective treatments:  Inhaler, rest, position changes and oxygen  Associated symptoms: cough    Associated symptoms: no abdominal pain, no chest pain, no fever, no rash, no sputum production and no vomiting    Cough:     Cough characteristics:  Non-productive    Sputum characteristics:  Nondescript    Timing:  Constant    Progression:  Worsening    Chronicity:  Recurrent  Risk factors: tobacco use        Prior to Admission Medications   Prescriptions Last Dose Informant Patient Reported? Taking?    albuterol (2.5 mg/3 mL) 0.083 % nebulizer solution   No No   Sig: Take 3 mL (2.5 mg total) by nebulization every 6 (six) hours as needed for wheezing or shortness of breath   amLODIPine (NORVASC) 10 mg tablet   Yes No   Sig: Take 10 mg by mouth daily at bedtime atorvastatin (LIPITOR) 40 mg tablet   Yes No   Sig: Take 40 mg by mouth daily at bedtime    buPROPion (WELLBUTRIN XL) 150 mg 24 hr tablet   No No   Sig: Take 1 tablet (150 mg total) by mouth daily Do not start before 2023. budesonide (PULMICORT) 0.5 mg/2 mL nebulizer solution   Yes No   Sig: Take 0.5 mg by nebulization 2 (two) times a day Rinse mouth after use. cloNIDine (CATAPRES) 0.2 mg tablet   Yes No   Sig: Take 0.2 mg by mouth every 12 (twelve) hours   ergocalciferol (VITAMIN D2) 50,000 units   Yes No   Sig: Take 50,000 Units by mouth once a week   fluticasone (FLONASE) 50 mcg/act nasal spray   No No   Si spray into each nostril daily   glipiZIDE (GLUCOTROL) 10 mg tablet  Self Yes No   Sig: Take 10 mg by mouth daily in the early morning W breakfast   ipratropium-albuterol (COMBIVENT RESPIMAT) inhaler   Yes No   Sig: Inhale 1 puff 4 (four) times a day   lisinopril (ZESTRIL) 40 mg tablet   Yes No   Sig: Take 40 mg by mouth daily   metoprolol tartrate (LOPRESSOR) 100 mg tablet   Yes No   Sig: Take 100 mg by mouth every 12 (twelve) hours   omeprazole (PriLOSEC OTC) 20 MG tablet   Yes No   Sig: Take 20 mg by mouth daily    pioglitazone (ACTOS) 15 mg tablet   Yes No   Sig: Take 15 mg by mouth daily   predniSONE 20 mg tablet   No No   Sig: Take 2 tablets (40 mg total) by mouth daily for 2 days, THEN 1.5 tablets (30 mg total) daily for 3 days, THEN 1 tablet (20 mg total) daily for 3 days, THEN 0.5 tablets (10 mg total) daily for 3 days. Do not start before 2023.    sodium chloride 1 g tablet   No No   Sig: Take 2 tablets (2 g total) by mouth 3 (three) times a day with meals   tiotropium (SPIRIVA) 18 mcg inhalation capsule   Yes No   Sig: Place 18 mcg into inhaler and inhale daily      Facility-Administered Medications: None       Past Medical History:   Diagnosis Date   • Anxiety    • COPD (chronic obstructive pulmonary disease) ([address])    • Diabetes mellitus ([address])    • Essential (primary) hypertension    • GERD (gastroesophageal reflux disease)    • Smoker        History reviewed. No pertinent surgical history. History reviewed. No pertinent family history. I have reviewed and agree with the history as documented. E-Cigarette/Vaping   • E-Cigarette Use Never User      E-Cigarette/Vaping Substances     Social History     Tobacco Use   • Smoking status: Every Day     Packs/day: 0.50     Types: Cigarettes   • Smokeless tobacco: Never   Vaping Use   • Vaping Use: Never used   Substance Use Topics   • Alcohol use: Not Currently   • Drug use: Never       Review of Systems   Constitutional: Negative for fever. Respiratory: Positive for cough and shortness of breath. Negative for sputum production. Cardiovascular: Negative for chest pain. Gastrointestinal: Negative for abdominal pain and vomiting. Skin: Negative for rash. All other systems reviewed and are negative. Physical Exam  Physical Exam  Vitals and nursing note reviewed. Constitutional:       General: She is not in acute distress. Appearance: She is well-developed. HENT:      Head: Normocephalic and atraumatic. Eyes:      Extraocular Movements: Extraocular movements intact. Conjunctiva/sclera: Conjunctivae normal.      Pupils: Pupils are equal, round, and reactive to light. Cardiovascular:      Rate and Rhythm: Normal rate and regular rhythm. Pulses: Normal pulses. Heart sounds: No murmur heard. Pulmonary:      Effort: Pulmonary effort is normal. No respiratory distress. Breath sounds: Decreased breath sounds present. Chest:      Chest wall: No tenderness. Abdominal:      Palpations: Abdomen is soft. Tenderness: There is no abdominal tenderness. Musculoskeletal:         General: No swelling. Cervical back: Neck supple. Right lower leg: No edema. Left lower leg: No edema. Skin:     General: Skin is warm and dry.       Capillary Refill: Capillary refill takes less than 2 seconds. Neurological:      General: No focal deficit present. Mental Status: She is alert and oriented to person, place, and time.    Psychiatric:         Mood and Affect: Mood normal.         Vital Signs  ED Triage Vitals [07/29/23 0933]   Temperature Pulse Respirations Blood Pressure SpO2   97.6 °F (36.4 °C) 71 22 125/74 97 %      Temp Source Heart Rate Source Patient Position - Orthostatic VS BP Location FiO2 (%)   Temporal Monitor Sitting Left arm --      Pain Score       No Pain           Vitals:    07/29/23 1230 07/29/23 1300 07/29/23 1330 07/29/23 1400   BP: 102/78 99/54 106/74 110/68   Pulse: 80 84 92 90   Patient Position - Orthostatic VS: Sitting Lying Lying Lying         Visual Acuity      ED Medications  Medications   albuterol inhalation solution 10 mg (10 mg Nebulization Given 7/29/23 1004)     And   ipratropium (ATROVENT) 0.02 % inhalation solution 1 mg (1 mg Nebulization Given 7/29/23 1005)     And   sodium chloride 0.9 % inhalation solution 3 mL (3 mL Nebulization Given 7/29/23 1005)   methylPREDNISolone sodium succinate (Solu-MEDROL) injection 125 mg (125 mg Intravenous Given 7/29/23 1000)   LORazepam (ATIVAN) injection 1 mg (1 mg Intravenous Given 7/29/23 1126)   pantoprazole (PROTONIX) injection 40 mg (40 mg Intravenous Given 7/29/23 1125)   cefTRIAXone (ROCEPHIN) IVPB (premix in dextrose) 1,000 mg 50 mL (0 mg Intravenous Stopped 7/29/23 1201)   magnesium sulfate 2 g/50 mL IVPB (premix) 2 g (0 g Intravenous Stopped 7/29/23 1302)       Diagnostic Studies  Results Reviewed     Procedure Component Value Units Date/Time    Procalcitonin [[id number]]  (Normal) Collected: 07/29/23 0945    Lab Status: Final result Specimen: Blood from Arm, Right Updated: 07/29/23 1027     Procalcitonin <0.05 ng/ml     HS Troponin 0hr (reflex protocol) [[id number]]  (Normal) Collected: 07/29/23 0945    Lab Status: Final result Specimen: Blood from Arm, Right Updated: 07/29/23 1023     hs TnI 0hr 4 ng/L     Blood culture #2 [[id number]] Collected: 07/29/23 1015    Lab Status: In process Specimen: Blood from Arm, Left Updated: 07/29/23 1023    B-Type Natriuretic Peptide(BNP) [[id number]]  (Normal) Collected: 07/29/23 0945    Lab Status: Final result Specimen: Blood from Arm, Right Updated: 07/29/23 1023     BNP 21 pg/mL     Comprehensive metabolic panel [[id number]]  (Abnormal) Collected: 07/29/23 0945    Lab Status: Final result Specimen: Blood from Arm, Right Updated: 07/29/23 1021     Sodium 134 mmol/L      Potassium 3.9 mmol/L      Chloride 94 mmol/L      CO2 32 mmol/L      ANION GAP 8 mmol/L      BUN 7 mg/dL      Creatinine 0.85 mg/dL      Glucose 140 mg/dL      Calcium 9.9 mg/dL      AST 9 U/L      ALT 11 U/L      Alkaline Phosphatase 65 U/L      Total Protein 7.2 g/dL      Albumin 4.2 g/dL      Total Bilirubin 0.54 mg/dL      eGFR 71 ml/min/1.73sq m     Narrative:      [place] guidelines for Chronic Kidney Disease (CKD):   •  Stage 1 with normal or high GFR (GFR > 90 mL/min/1.73 square meters)  •  Stage 2 Mild CKD (GFR = 60-89 mL/min/1.73 square meters)  •  Stage 3A Moderate CKD (GFR = 45-59 mL/min/1.73 square meters)  •  Stage 3B Moderate CKD (GFR = 30-44 mL/min/1.73 square meters)  •  Stage 4 Severe CKD (GFR = 15-29 mL/min/1.73 square meters)  •  Stage 5 End Stage CKD (GFR <15 mL/min/1.73 square meters)  Note: GFR calculation is accurate only with a steady state creatinine    Magnesium [[id number]]  (Normal) Collected: 07/29/23 0945    Lab Status: Final result Specimen: Blood from Arm, Right Updated: 07/29/23 1021     Magnesium 2.0 mg/dL     Lactic acid, plasma (w/reflex if result > 2.0) [[id number]]  (Normal) Collected: 07/29/23 0945    Lab Status: Final result Specimen: Blood from Arm, Right Updated: 07/29/23 1019     LACTIC ACID 1.0 mmol/L     Narrative:      Result may be elevated if tourniquet was used during collection.     Lennox Corona [[id number]]  (Normal) Collected: 07/29/23 0945    Lab Status: Final result Specimen: Blood from Arm, Right Updated: 07/29/23 1012     Protime 11.8 seconds      INR 0.86    APTT [[id number]]  (Normal) Collected: 07/29/23 0945    Lab Status: Final result Specimen: Blood from Arm, Right Updated: 07/29/23 1012     PTT 25 seconds     CBC and differential [[id number]] Collected: 07/29/23 0945    Lab Status: Final result Specimen: Blood from Arm, Right Updated: 07/29/23 0958     WBC 9.42 Thousand/uL      RBC 4.47 Million/uL      Hemoglobin 13.2 g/dL      Hematocrit 41.2 %      MCV 92 fL      MCH 29.5 pg      MCHC 32.0 g/dL      RDW 12.7 %      MPV 9.6 fL      Platelets 719 Thousands/uL      nRBC 0 /100 WBCs      Neutrophils Relative 65 %      Immat GRANS % 1 %      Lymphocytes Relative 23 %      Monocytes Relative 11 %      Eosinophils Relative 0 %      Basophils Relative 0 %      Neutrophils Absolute 6.12 Thousands/µL      Immature Grans Absolute 0.09 Thousand/uL      Lymphocytes Absolute 2.17 Thousands/µL      Monocytes Absolute 0.99 Thousand/µL      Eosinophils Absolute 0.02 Thousand/µL      Basophils Absolute 0.03 Thousands/µL     Blood culture #1 [[id number]] Collected: 07/29/23 0945    Lab Status:  In process Specimen: Blood from Arm, Right Updated: 07/29/23 0955                 XR chest 1 view portable   ED Interpretation by Krys Castanon PA-C (07/29 1322)   No pneumoina                   Procedures  ECG 12 Lead Documentation Only    Date/Time: 7/29/2023 10:06 AM    Performed by: Krys Castanon PA-C  Authorized by: Krys Castanon PA-C    Indications / Diagnosis:  Sob  ECG reviewed by me, the ED Provider: yes    Patient location:  ED  Previous ECG:     Previous ECG:  Unavailable  Interpretation:     Interpretation: normal    Rate:     ECG rate:  69    ECG rate assessment: normal    Rhythm:     Rhythm: sinus rhythm               ED Course  ED Course as of 07/29/23 1446   Sat Jul 29, 2023   1230 Dr. Murrieta was TT for admission/ observation for sob, the patient will be evaluated by Dr. Chica Shaikh   1319 Dr. Chica Shaikh spoke with the patient and evaluation preformed. Patient stable for discharge and was recently admitted and discharged from [address] [place] [address] for this. SBIRT 22yo+    Flowsheet Row Most Recent Value   Initial Alcohol Screen: US AUDIT-C     1. How often do you have a drink containing alcohol? 0 Filed at: 07/29/2023 0958   2. How many drinks containing alcohol do you have on a typical day you are drinking? 0 Filed at: 07/29/2023 0958   3b. FEMALE Any Age, or MALE 65+: How often do you have 4 or more drinks on one occassion? 0 Filed at: 07/29/2023 0958   Audit-C Score 0 Filed at: 07/29/2023 9384   LIVIER: How many times in the past year have you. .. Used an illegal drug or used a prescription medication for non-medical reasons? Never Filed at: 07/29/2023 4638                    Medical Decision Making  The patient is a 80-year-old female, current smoker, past medical history of COPD, chronic 3 L nasal cannula oxygen, diabetes, who presents to the emergency department today with the chief complaint of increased shortness of breath x3 days. The patient is escorted from home by her daughter. The patient states that she has been using her nebulizer treatments and was recently hospitalized for hyponatremia and COPD exacerbation 7/18-7/22. Patient is still on prednisone at home. No current antibiotic use. Denies any chest pain,fevers, nausea vomiting, falls or traumas back pain, abdominal pain. Admits to dry persistent cough      and on physical examination had tachypnea and decreased breath sounds. EKG unremarkable for any acute ischemic changes. He was ordered heart neb and given IV Solu-Medrol. Improved breath sounds     Patient stable. Patient on oxygen at home. No pneumonia and labs normal. Dr. Chica Shaikh did evaluate the patient in the ED . No admission criteria found. Patient anxious but still on predisone taper at home.        Differential diagnosis, pneumonia, bacteremia, dehydration, anxiety, copd exacerbation       Amount and/or Complexity of Data Reviewed  Labs: ordered. Decision-making details documented in ED Course. Radiology: ordered and independent interpretation performed. Decision-making details documented in ED Course. ECG/medicine tests: ordered. Decision-making details documented in ED Course. Risk  Prescription drug management. Disposition  Final diagnoses:   COPD exacerbation ([address])     Time reflects when diagnosis was documented in both MDM as applicable and the Disposition within this note     Time User Action Codes Description Comment    7/29/2023 11:43 AM Roger Mccarthy [J44.1] COPD exacerbation ([address])     7/29/2023  1:21 PM Roger Mccarthy [J44.1] COPD with acute exacerbation [facility])       ED Disposition     ED Disposition   Discharge    Condition   Stable    Date/Time   Sat Jul 29, 2023  1:20 PM    Comment   Jessika Lux discharge to home/self care.                Follow-up Information    None         Discharge Medication List as of 7/29/2023  1:21 PM      CONTINUE these medications which have NOT CHANGED    Details   albuterol (2.5 mg/3 mL) 0.083 % nebulizer solution Take 3 mL (2.5 mg total) by nebulization every 6 (six) hours as needed for wheezing or shortness of breath, Starting Sun 6/20/2021, Normal      amLODIPine (NORVASC) 10 mg tablet Take 10 mg by mouth daily at bedtime , Historical Med      atorvastatin (LIPITOR) 40 mg tablet Take 40 mg by mouth daily at bedtime , Historical Med      budesonide (PULMICORT) 0.5 mg/2 mL nebulizer solution Take 0.5 mg by nebulization 2 (two) times a day Rinse mouth after use., Historical Med      buPROPion (WELLBUTRIN XL) 150 mg 24 hr tablet Take 1 tablet (150 mg total) by mouth daily Do not start before July 23, 2023., Starting Sun 7/23/2023, Normal      cloNIDine (CATAPRES) 0.2 mg tablet Take 0.2 mg by mouth every 12 (twelve) hours, Historical Med      ergocalciferol (VITAMIN D2) 50,000 units Take 50,000 Units by mouth once a week, Historical Med      fluticasone (FLONASE) 50 mcg/act nasal spray 1 spray into each nostril daily, Starting Wed 6/28/2023, Normal      glipiZIDE (GLUCOTROL) 10 mg tablet Take 10 mg by mouth daily in the early morning W breakfast, Historical Med      ipratropium-albuterol (COMBIVENT RESPIMAT) inhaler Inhale 1 puff 4 (four) times a day, Historical Med      lisinopril (ZESTRIL) 40 mg tablet Take 40 mg by mouth daily, Historical Med      metoprolol tartrate (LOPRESSOR) 100 mg tablet Take 100 mg by mouth every 12 (twelve) hours, Historical Med      omeprazole (PriLOSEC OTC) 20 MG tablet Take 20 mg by mouth daily , Historical Med      pioglitazone (ACTOS) 15 mg tablet Take 15 mg by mouth daily, Historical Med      predniSONE 20 mg tablet Multiple Dosages:Starting Sun 7/23/2023, Until Mon 7/24/2023 at 2359, THEN Starting Tue 7/25/2023, Until Thu 7/27/2023 at 2359, THEN Starting Fri 7/28/2023, Until Sun 7/30/2023 at 2359, THEN Starting Mon 7/31/2023, Until Wed 8/2/2023 at 2359Take 2 t ablets (40 mg total) by mouth daily for 2 days, THEN 1.5 tablets (30 mg total) daily for 3 days, THEN 1 tablet (20 mg total) daily for 3 days, THEN 0.5 tablets (10 mg total) daily for 3 days.  Do not start before July 23, 2023., Normal      sodium chloride 1 g tablet Take 2 tablets (2 g total) by mouth 3 (three) times a day with meals, Starting Sat 7/22/2023, Normal      tiotropium (SPIRIVA) 18 mcg inhalation capsule Place 18 mcg into inhaler and inhale daily, Historical Med                 PDMP Review       Value Time User    PDMP Reviewed  Yes 3/30/2021  1:09 PM Shey Claire MD          ED Provider  Electronically Signed by           Rubi Lin PA-C  07/29/23 4101

## 2023-07-29 NOTE — ED NOTES
This RN calling Melissa Wolf, daughter, at this time. Will come for patient shortly.      Deb Truong, [address] [place] [address]  07/29/23 1323

## 2023-07-29 NOTE — ED NOTES
Patient telling this RN she does not feel safe to go home related to breathing - patient requesting to be admitted for 2 nights.      Marleny Philippe  07/29/23 4718 Calm

## 2023-07-31 LAB
ATRIAL RATE: 69 BPM
P AXIS: 32 DEGREES
PR INTERVAL: 94 MS
QRS AXIS: 39 DEGREES
QRSD INTERVAL: 76 MS
QT INTERVAL: 356 MS
QTC INTERVAL: 381 MS
T WAVE AXIS: 37 DEGREES
VENTRICULAR RATE: 69 BPM

## 2023-07-31 PROCEDURE — 93010 ELECTROCARDIOGRAM REPORT: CPT | Performed by: INTERNAL MEDICINE

## 2023-08-01 ENCOUNTER — HOSPITAL ENCOUNTER (EMERGENCY)
Facility: HOSPITAL | Age: 68
Discharge: HOME | DRG: 191 | End: 2023-08-01
Attending: EMERGENCY MEDICINE | Admitting: EMERGENCY MEDICINE
Payer: COMMERCIAL

## 2023-08-01 ENCOUNTER — APPOINTMENT (EMERGENCY)
Dept: RADIOLOGY | Facility: HOSPITAL | Age: 68
DRG: 191 | End: 2023-08-01
Payer: COMMERCIAL

## 2023-08-01 VITALS
HEIGHT: 62 IN | DIASTOLIC BLOOD PRESSURE: 78 MMHG | RESPIRATION RATE: 24 BRPM | WEIGHT: 152.78 LBS | BODY MASS INDEX: 28.11 KG/M2 | OXYGEN SATURATION: 98 % | TEMPERATURE: 97.6 F | SYSTOLIC BLOOD PRESSURE: 152 MMHG | HEART RATE: 72 BPM

## 2023-08-01 DIAGNOSIS — J44.1 COPD EXACERBATION (HCC): Primary | ICD-10-CM

## 2023-08-01 LAB
ALBUMIN SERPL BCP-MCNC: 4 G/DL (ref 3.5–5)
ALP SERPL-CCNC: 58 U/L (ref 34–104)
ALT SERPL W P-5'-P-CCNC: 11 U/L (ref 7–52)
ANION GAP SERPL CALCULATED.3IONS-SCNC: 6 MMOL/L
AST SERPL W P-5'-P-CCNC: 9 U/L (ref 13–39)
ATRIAL RATE: 67 BPM
BASOPHILS # BLD AUTO: 0.02 THOUSANDS/ÂΜL (ref 0–0.1)
BASOPHILS NFR BLD AUTO: 0 % (ref 0–1)
BILIRUB SERPL-MCNC: 0.41 MG/DL (ref 0.2–1)
BNP SERPL-MCNC: 52 PG/ML (ref 0–100)
BUN SERPL-MCNC: 12 MG/DL (ref 5–25)
CALCIUM SERPL-MCNC: 9.9 MG/DL (ref 8.4–10.2)
CARDIAC TROPONIN I PNL SERPL HS: 4 NG/L
CHLORIDE SERPL-SCNC: 96 MMOL/L (ref 96–108)
CO2 SERPL-SCNC: 34 MMOL/L (ref 21–32)
CREAT SERPL-MCNC: 0.8 MG/DL (ref 0.6–1.3)
D DIMER PPP FEU-MCNC: 0.49 UG/ML FEU
EOSINOPHIL # BLD AUTO: 0.01 THOUSAND/ÂΜL (ref 0–0.61)
EOSINOPHIL NFR BLD AUTO: 0 % (ref 0–6)
ERYTHROCYTE [DISTWIDTH] IN BLOOD BY AUTOMATED COUNT: 12.8 % (ref 11.6–15.1)
FLUAV RNA RESP QL NAA+PROBE: NEGATIVE
FLUBV RNA RESP QL NAA+PROBE: NEGATIVE
GFR SERPL CREATININE-BSD FRML MDRD: 77 ML/MIN/1.73SQ M
GLUCOSE SERPL-MCNC: 74 MG/DL (ref 65–140)
HCT VFR BLD AUTO: 39.2 % (ref 34.8–46.1)
HGB BLD-MCNC: 12.7 G/DL (ref 11.5–15.4)
IMM GRANULOCYTES # BLD AUTO: 0.05 THOUSAND/UL (ref 0–0.2)
IMM GRANULOCYTES NFR BLD AUTO: 1 % (ref 0–2)
LIPASE SERPL-CCNC: 16 U/L (ref 11–82)
LYMPHOCYTES # BLD AUTO: 1.92 THOUSANDS/ÂΜL (ref 0.6–4.47)
LYMPHOCYTES NFR BLD AUTO: 20 % (ref 14–44)
MAGNESIUM SERPL-MCNC: 2.1 MG/DL (ref 1.9–2.7)
MCH RBC QN AUTO: 29.7 PG (ref 26.8–34.3)
MCHC RBC AUTO-ENTMCNC: 32.4 G/DL (ref 31.4–37.4)
MCV RBC AUTO: 92 FL (ref 82–98)
MONOCYTES # BLD AUTO: 0.81 THOUSAND/ÂΜL (ref 0.17–1.22)
MONOCYTES NFR BLD AUTO: 8 % (ref 4–12)
NEUTROPHILS # BLD AUTO: 6.81 THOUSANDS/ÂΜL (ref 1.85–7.62)
NEUTS SEG NFR BLD AUTO: 71 % (ref 43–75)
NRBC BLD AUTO-RTO: 0 /100 WBCS
P AXIS: 46 DEGREES
PLATELET # BLD AUTO: 186 THOUSANDS/UL (ref 149–390)
PMV BLD AUTO: 9.8 FL (ref 8.9–12.7)
POTASSIUM SERPL-SCNC: 4 MMOL/L (ref 3.5–5.3)
PR INTERVAL: 102 MS
PROT SERPL-MCNC: 7.1 G/DL (ref 6.4–8.4)
QRS AXIS: 39 DEGREES
QRSD INTERVAL: 84 MS
QT INTERVAL: 370 MS
QTC INTERVAL: 390 MS
RBC # BLD AUTO: 4.27 MILLION/UL (ref 3.81–5.12)
RSV RNA RESP QL NAA+PROBE: NEGATIVE
SARS-COV-2 RNA RESP QL NAA+PROBE: NEGATIVE
SODIUM SERPL-SCNC: 136 MMOL/L (ref 135–147)
T WAVE AXIS: 42 DEGREES
VENTRICULAR RATE: 67 BPM
WBC # BLD AUTO: 9.62 THOUSAND/UL (ref 4.31–10.16)

## 2023-08-01 PROCEDURE — 0241U HB NFCT DS VIR RESP RNA 4 TRGT: CPT | Performed by: EMERGENCY MEDICINE

## 2023-08-01 PROCEDURE — 99285 EMERGENCY DEPT VISIT HI MDM: CPT | Performed by: EMERGENCY MEDICINE

## 2023-08-01 PROCEDURE — 94640 AIRWAY INHALATION TREATMENT: CPT

## 2023-08-01 PROCEDURE — 80053 COMPREHEN METABOLIC PANEL: CPT | Performed by: EMERGENCY MEDICINE

## 2023-08-01 PROCEDURE — 84484 ASSAY OF TROPONIN QUANT: CPT | Performed by: EMERGENCY MEDICINE

## 2023-08-01 PROCEDURE — 93010 ELECTROCARDIOGRAM REPORT: CPT | Performed by: INTERNAL MEDICINE

## 2023-08-01 PROCEDURE — 96374 THER/PROPH/DIAG INJ IV PUSH: CPT

## 2023-08-01 PROCEDURE — 83690 ASSAY OF LIPASE: CPT | Performed by: EMERGENCY MEDICINE

## 2023-08-01 PROCEDURE — 36415 COLL VENOUS BLD VENIPUNCTURE: CPT | Performed by: EMERGENCY MEDICINE

## 2023-08-01 PROCEDURE — 83880 ASSAY OF NATRIURETIC PEPTIDE: CPT | Performed by: EMERGENCY MEDICINE

## 2023-08-01 PROCEDURE — 85379 FIBRIN DEGRADATION QUANT: CPT | Performed by: EMERGENCY MEDICINE

## 2023-08-01 PROCEDURE — 99285 EMERGENCY DEPT VISIT HI MDM: CPT

## 2023-08-01 PROCEDURE — 85025 COMPLETE CBC W/AUTO DIFF WBC: CPT | Performed by: EMERGENCY MEDICINE

## 2023-08-01 PROCEDURE — 71045 X-RAY EXAM CHEST 1 VIEW: CPT

## 2023-08-01 PROCEDURE — 83735 ASSAY OF MAGNESIUM: CPT | Performed by: EMERGENCY MEDICINE

## 2023-08-01 PROCEDURE — 93005 ELECTROCARDIOGRAM TRACING: CPT

## 2023-08-01 RX ORDER — PREDNISONE 10 MG/1
TABLET ORAL
Qty: 30 TABLET | Refills: 0 | Status: SHIPPED | OUTPATIENT
Start: 2023-08-01 | End: 2023-08-06

## 2023-08-01 RX ORDER — METHYLPREDNISOLONE SODIUM SUCCINATE 125 MG/2ML
125 INJECTION, POWDER, LYOPHILIZED, FOR SOLUTION INTRAMUSCULAR; INTRAVENOUS ONCE
Status: COMPLETED | OUTPATIENT
Start: 2023-08-01 | End: 2023-08-01

## 2023-08-01 RX ORDER — IPRATROPIUM BROMIDE AND ALBUTEROL SULFATE 2.5; .5 MG/3ML; MG/3ML
3 SOLUTION RESPIRATORY (INHALATION) ONCE
Status: COMPLETED | OUTPATIENT
Start: 2023-08-01 | End: 2023-08-01

## 2023-08-01 RX ORDER — CEFUROXIME AXETIL 250 MG/1
500 TABLET ORAL EVERY 12 HOURS SCHEDULED
Status: DISCONTINUED | OUTPATIENT
Start: 2023-08-01 | End: 2023-08-01 | Stop reason: HOSPADM

## 2023-08-01 RX ORDER — CEFUROXIME AXETIL 500 MG/1
500 TABLET ORAL EVERY 12 HOURS SCHEDULED
Qty: 20 TABLET | Refills: 0 | Status: SHIPPED | OUTPATIENT
Start: 2023-08-01 | End: 2023-08-06

## 2023-08-01 RX ADMIN — METHYLPREDNISOLONE SODIUM SUCCINATE 125 MG: 125 INJECTION, POWDER, FOR SOLUTION INTRAMUSCULAR; INTRAVENOUS at 10:24

## 2023-08-01 RX ADMIN — IPRATROPIUM BROMIDE AND ALBUTEROL SULFATE 3 ML: 2.5; .5 SOLUTION RESPIRATORY (INHALATION) at 10:26

## 2023-08-01 RX ADMIN — CEFUROXIME AXETIL 500 MG: 250 TABLET ORAL at 11:42

## 2023-08-01 NOTE — ED PROVIDER NOTES
History  Chief Complaint   Patient presents with   • Shortness of Breath     Worsening SOB since yesterday. No fevers, chills, cough, nasal congestion. Hx of COPD, wears 3LNC at baseline. Patient with a history of COPD. Normally on 3 L nasal cannula at home. Was admitted to this facility just a few weeks ago for same. Was seen here 2 days ago for same. Still on prednisone. States she got short of breath again starting yesterday. No pain. No fevers or chills. States she has not had any smoking recently. Has a cough. Still wheezing. Took a breathing treatment at home this morning without any relief. History provided by:  Patient   used: No    Shortness of Breath  Severity:  Mild  Onset quality:  Gradual  Duration:  1 day  Timing:  Constant  Progression:  Waxing and waning  Chronicity:  Recurrent  Context: not emotional upset, not occupational exposure and not pollens    Relieved by:  Nothing  Worsened by:  Exertion  Ineffective treatments:  Inhaler  Associated symptoms: cough and wheezing    Associated symptoms: no abdominal pain, no chest pain, no claudication, no ear pain, no fever, no headaches, no hemoptysis, no neck pain, no PND, no rash, no sore throat, no swollen glands and no vomiting        Prior to Admission Medications   Prescriptions Last Dose Informant Patient Reported? Taking? albuterol (2.5 mg/3 mL) 0.083 % nebulizer solution   No No   Sig: Take 3 mL (2.5 mg total) by nebulization every 6 (six) hours as needed for wheezing or shortness of breath   amLODIPine (NORVASC) 10 mg tablet   Yes No   Sig: Take 10 mg by mouth daily at bedtime    atorvastatin (LIPITOR) 40 mg tablet   Yes No   Sig: Take 40 mg by mouth daily at bedtime    buPROPion (WELLBUTRIN XL) 150 mg 24 hr tablet   No No   Sig: Take 1 tablet (150 mg total) by mouth daily Do not start before July 23, 2023.    budesonide (PULMICORT) 0.5 mg/2 mL nebulizer solution   Yes No   Sig: Take 0.5 mg by nebulization 2 (two) times a day Rinse mouth after use. cloNIDine (CATAPRES) 0.2 mg tablet   Yes No   Sig: Take 0.2 mg by mouth every 12 (twelve) hours   ergocalciferol (VITAMIN D2) 50,000 units   Yes No   Sig: Take 50,000 Units by mouth once a week   fluticasone (FLONASE) 50 mcg/act nasal spray   No No   Si spray into each nostril daily   glipiZIDE (GLUCOTROL) 10 mg tablet  Self Yes No   Sig: Take 10 mg by mouth daily in the early morning W breakfast   ipratropium-albuterol (COMBIVENT RESPIMAT) inhaler   Yes No   Sig: Inhale 1 puff 4 (four) times a day   lisinopril (ZESTRIL) 40 mg tablet   Yes No   Sig: Take 40 mg by mouth daily   metoprolol tartrate (LOPRESSOR) 100 mg tablet   Yes No   Sig: Take 100 mg by mouth every 12 (twelve) hours   omeprazole (PriLOSEC OTC) 20 MG tablet   Yes No   Sig: Take 20 mg by mouth daily    pioglitazone (ACTOS) 15 mg tablet   Yes No   Sig: Take 15 mg by mouth daily   predniSONE 20 mg tablet   No No   Sig: Take 2 tablets (40 mg total) by mouth daily for 2 days, THEN 1.5 tablets (30 mg total) daily for 3 days, THEN 1 tablet (20 mg total) daily for 3 days, THEN 0.5 tablets (10 mg total) daily for 3 days. Do not start before 2023. sodium chloride 1 g tablet   No No   Sig: Take 2 tablets (2 g total) by mouth 3 (three) times a day with meals   tiotropium (SPIRIVA) 18 mcg inhalation capsule   Yes No   Sig: Place 18 mcg into inhaler and inhale daily      Facility-Administered Medications: None       Past Medical History:   Diagnosis Date   • Anxiety    • COPD (chronic obstructive pulmonary disease) ([address])    • Diabetes mellitus ([address])    • Essential (primary) hypertension    • GERD (gastroesophageal reflux disease)    • Smoker        History reviewed. No pertinent surgical history. History reviewed. No pertinent family history. I have reviewed and agree with the history as documented.     E-Cigarette/Vaping   • E-Cigarette Use Never User      E-Cigarette/Vaping Substances     Social History     Tobacco Use   • Smoking status: Every Day     Packs/day: 0.50     Types: Cigarettes   • Smokeless tobacco: Never   Vaping Use   • Vaping Use: Never used   Substance Use Topics   • Alcohol use: Not Currently   • Drug use: Never       Review of Systems   Constitutional: Negative for chills and fever. HENT: Negative for ear pain, hearing loss, sore throat, trouble swallowing and voice change. Eyes: Negative for pain and discharge. Respiratory: Positive for cough, shortness of breath and wheezing. Negative for hemoptysis. Cardiovascular: Negative for chest pain, palpitations, claudication and PND. Gastrointestinal: Negative for abdominal pain, blood in stool, constipation, diarrhea, nausea and vomiting. Genitourinary: Negative for dysuria, flank pain, frequency and hematuria. Musculoskeletal: Negative for joint swelling, neck pain and neck stiffness. Skin: Negative for rash and wound. Neurological: Negative for dizziness, seizures, syncope, facial asymmetry and headaches. Psychiatric/Behavioral: Negative for hallucinations, self-injury and suicidal ideas. All other systems reviewed and are negative. Physical Exam  Physical Exam  Vitals and nursing note reviewed. Constitutional:       General: She is not in acute distress. Appearance: She is well-developed. HENT:      Head: Normocephalic and atraumatic. Right Ear: External ear normal.      Left Ear: External ear normal.   Eyes:      General: No scleral icterus. Right eye: No discharge. Left eye: No discharge. Extraocular Movements: Extraocular movements intact. Conjunctiva/sclera: Conjunctivae normal.   Cardiovascular:      Rate and Rhythm: Normal rate and regular rhythm. Heart sounds: Normal heart sounds. No murmur heard. Pulmonary:      Effort: Pulmonary effort is normal.      Breath sounds: Decreased breath sounds present. No wheezing or rales.    Abdominal:      General: Bowel sounds are normal. There is no distension. Palpations: Abdomen is soft. Tenderness: There is no abdominal tenderness. There is no guarding or rebound. Musculoskeletal:         General: No deformity. Normal range of motion. Cervical back: Normal range of motion and neck supple. Skin:     General: Skin is warm and dry. Findings: No rash. Neurological:      General: No focal deficit present. Mental Status: She is alert and oriented to person, place, and time. Cranial Nerves: No cranial nerve deficit. Psychiatric:         Mood and Affect: Mood normal.         Behavior: Behavior normal.         Thought Content:  Thought content normal.         Judgment: Judgment normal.         Vital Signs  ED Triage Vitals [08/01/23 1004]   Temperature Pulse Respirations Blood Pressure SpO2   97.6 °F (36.4 °C) 74 (!) 24 168/77 98 %      Temp Source Heart Rate Source Patient Position - Orthostatic VS BP Location FiO2 (%)   Temporal Monitor Sitting Right arm --      Pain Score       No Pain           Vitals:    08/01/23 1045 08/01/23 1100 08/01/23 1115 08/01/23 1130   BP: 132/63 143/71  152/78   Pulse: 66 65 80 72   Patient Position - Orthostatic VS:  Sitting  Sitting         Visual Acuity      ED Medications  Medications   cefuroxime (CEFTIN) tablet 500 mg (has no administration in time range)   methylPREDNISolone sodium succinate (Solu-MEDROL) injection 125 mg (125 mg Intravenous Given 8/1/23 1024)   ipratropium-albuterol (DUO-NEB) 0.5-2.5 mg/3 mL inhalation solution 3 mL (3 mL Nebulization Given 8/1/23 1026)       Diagnostic Studies  Results Reviewed     Procedure Component Value Units Date/Time    Lipase [[id number]]  (Normal) Collected: 08/01/23 1022    Lab Status: Final result Specimen: Blood from Arm, Right Updated: 08/01/23 1132     Lipase 16 u/L     B-Type Natriuretic Peptide(BNP) [[id number]]  (Normal) Collected: 08/01/23 1022    Lab Status: Final result Specimen: Blood from Arm, Right Updated: 08/01/23 1131     BNP 52 pg/mL     Magnesium [[id number]]  (Normal) Collected: 08/01/23 1022    Lab Status: Final result Specimen: Blood from Arm, Right Updated: 08/01/23 1116     Magnesium 2.1 mg/dL     HS Troponin 0hr (reflex protocol) [[id number]]  (Normal) Collected: 08/01/23 1022    Lab Status: Final result Specimen: Blood from Arm, Right Updated: 08/01/23 1108     hs TnI 0hr 4 ng/L     FLU/RSV/COVID - if FLU/RSV clinically relevant [[id number]]  (Normal) Collected: 08/01/23 1022    Lab Status: Final result Specimen: Nares from Nose Updated: 08/01/23 1108     SARS-CoV-2 Negative     INFLUENZA A PCR Negative     INFLUENZA B PCR Negative     RSV PCR Negative    Narrative:      FOR PEDIATRIC PATIENTS - copy/paste COVID Guidelines URL to browser: [email or web address]    SARS-CoV-2 assay is a Nucleic Acid Amplification assay intended for the  qualitative detection of nucleic acid from SARS-CoV-2 in nasopharyngeal  swabs. Results are for the presumptive identification of SARS-CoV-2 RNA. Positive results are indicative of infection with SARS-CoV-2, the virus  causing COVID-19, but do not rule out bacterial infection or co-infection  with other viruses. Laboratories within the [facility] and its  territories are required to report all positive results to the appropriate  public health authorities. Negative results do not preclude SARS-CoV-2  infection and should not be used as the sole basis for treatment or other  patient management decisions. Negative results must be combined with  clinical observations, patient history, and epidemiological information. This test has not been FDA cleared or approved. This test has been authorized by FDA under an Emergency Use Authorization  (EUA).  This test is only authorized for the duration of time the  declaration that circumstances exist justifying the authorization of the  emergency use of an in vitro diagnostic tests for detection of SARS-CoV-2  virus and/or diagnosis of COVID-19 infection under section 564(b)(1) of  the Act, 21 U. S.C. 336EZZ-2(R)(9), unless the authorization is terminated  or revoked sooner. The test has been validated but independent review by FDA  and CLIA is pending. Test performed using [employer] GeneXpert: This RT-PCR assay targets N2,  a region unique to SARS-CoV-2. A conserved region in the E-gene was chosen  for pan-Sarbecovirus detection which includes SARS-CoV-2. According to CMS-2020-01-R, this platform meets the definition of high-throughput technology. Comprehensive metabolic panel [[id number]]  (Abnormal) Collected: 08/01/23 1022    Lab Status: Final result Specimen: Blood from Arm, Right Updated: 08/01/23 1107     Sodium 136 mmol/L      Potassium 4.0 mmol/L      Chloride 96 mmol/L      CO2 34 mmol/L      ANION GAP 6 mmol/L      BUN 12 mg/dL      Creatinine 0.80 mg/dL      Glucose 74 mg/dL      Calcium 9.9 mg/dL      AST 9 U/L      ALT 11 U/L      Alkaline Phosphatase 58 U/L      Total Protein 7.1 g/dL      Albumin 4.0 g/dL      Total Bilirubin 0.41 mg/dL      eGFR 77 ml/min/1.73sq m     Narrative:      [place] guidelines for Chronic Kidney Disease (CKD):   •  Stage 1 with normal or high GFR (GFR > 90 mL/min/1.73 square meters)  •  Stage 2 Mild CKD (GFR = 60-89 mL/min/1.73 square meters)  •  Stage 3A Moderate CKD (GFR = 45-59 mL/min/1.73 square meters)  •  Stage 3B Moderate CKD (GFR = 30-44 mL/min/1.73 square meters)  •  Stage 4 Severe CKD (GFR = 15-29 mL/min/1.73 square meters)  •  Stage 5 End Stage CKD (GFR <15 mL/min/1.73 square meters)  Note: GFR calculation is accurate only with a steady state creatinine    D-Dimer [[id number]]  (Normal) Collected: 08/01/23 1022    Lab Status: Final result Specimen: Blood from Arm, Right Updated: 08/01/23 1100     D-Dimer, Quant 0.49 ug/ml FEU     Narrative:       In the evaluation for possible pulmonary embolism, in the appropriate (Well's Score of 4 or less) patient, the age adjusted d-dimer cutoff for this patient can be calculated as:    Age x 0.01 (in ug/mL) for Age-adjusted D-dimer exclusion threshold for a patient over 50 years. CBC and differential [[id number]] Collected: 08/01/23 1022    Lab Status: Final result Specimen: Blood from Arm, Right Updated: 08/01/23 1038     WBC 9.62 Thousand/uL      RBC 4.27 Million/uL      Hemoglobin 12.7 g/dL      Hematocrit 39.2 %      MCV 92 fL      MCH 29.7 pg      MCHC 32.4 g/dL      RDW 12.8 %      MPV 9.8 fL      Platelets 735 Thousands/uL      nRBC 0 /100 WBCs      Neutrophils Relative 71 %      Immat GRANS % 1 %      Lymphocytes Relative 20 %      Monocytes Relative 8 %      Eosinophils Relative 0 %      Basophils Relative 0 %      Neutrophils Absolute 6.81 Thousands/µL      Immature Grans Absolute 0.05 Thousand/uL      Lymphocytes Absolute 1.92 Thousands/µL      Monocytes Absolute 0.81 Thousand/µL      Eosinophils Absolute 0.01 Thousand/µL      Basophils Absolute 0.02 Thousands/µL                  XR chest 1 view portable   ED Interpretation by Anuja Ibarra MD (08/01 1049)   NAD                 Procedures  ECG 12 Lead Documentation Only    Date/Time: 8/1/2023 10:09 AM    Performed by: Anuja Ibarra MD  Authorized by: Anuja Ibarra MD    ECG reviewed by me, the ED Provider: yes    Patient location:  ED  Rate:     ECG rate:  70  Rhythm:     Rhythm: sinus rhythm    Ectopy:     Ectopy: none    QRS:     QRS axis:  Normal             ED Course  ED Course as of 08/01/23 1134   Tue Aug 01, 2023   1109 D-Dimer, Quant: 0.49   1120 Patient ambulated in the emergency department with her 3 L nasal cannula. Pulse ox stayed at 99%. Heart rate stayed in the 70s. [phone] Patient does not look in any distress. Does not desaturate with ambulation. Looks comfortable.   When he started talk to her about her condition the patient starts to get very anxious and starts breathing fast.  I do think she does have a component of COPD exacerbation but I also think there is a component of anxiety attributing to her feeling of shortness of breath. SBIRT 20yo+    Flowsheet Row Most Recent Value   Initial Alcohol Screen: US AUDIT-C     1. How often do you have a drink containing alcohol? 0 Filed at: 08/01/2023 1006   2. How many drinks containing alcohol do you have on a typical day you are drinking? 0 Filed at: 08/01/2023 1006   3b. FEMALE Any Age, or MALE 65+: How often do you have 4 or more drinks on one occassion? 0 Filed at: 08/01/2023 1006   Audit-C Score 0 Filed at: 08/01/2023 1006   LIVIER: How many times in the past year have you. .. Used an illegal drug or used a prescription medication for non-medical reasons? Never Filed at: 08/01/2023 1006                    Medical Decision Making  Amount and/or Complexity of Data Reviewed  Labs: ordered. Decision-making details documented in ED Course. Radiology: ordered and independent interpretation performed. Decision-making details documented in ED Course. ECG/medicine tests: ordered and independent interpretation performed. Decision-making details documented in ED Course. Discussion of management or test interpretation with external provider(s): Differential diagnosis includes but not limited to COPD exacerbation, bronchitis, pneumonia, PE, CHF, acute coronary syndrome. Risk  Prescription drug management. Disposition  Final diagnoses:   COPD exacerbation ([address])     Time reflects when diagnosis was documented in both MDM as applicable and the Disposition within this note     Time User Action Codes Description Comment    8/1/2023 11:21 AM Dakota Carroll Add [J44.1] COPD exacerbation [facility])       ED Disposition     ED Disposition   Discharge    Condition   Stable    Date/Time   Tue Aug 1, 2023 11:33 AM    Comment   Jessika Lux discharge to home/self care.                Follow-up Information     Follow up With Specialties Details Why Contact Info    Wilda Hammond DO Family Medicine Call today  [address]  [phone]            Patient's Medications   Discharge Prescriptions    CEFUROXIME (CEFTIN) 500 MG TABLET    Take 1 tablet (500 mg total) by mouth every 12 (twelve) hours for 10 days       Start Date: 8/1/2023  End Date: 8/11/2023       Order Dose: 500 mg       Quantity: 20 tablet    Refills: 0    PREDNISONE 10 MG TABLET    Take 4 tabs daily for thee days then take three tabs daily for three days then take two tablets daily for three days then take one tab daily for three days. Start Date: 8/1/2023  End Date: --       Order Dose: --       Quantity: 30 tablet    Refills: 0       No discharge procedures on file.     PDMP Review       Value Time User    PDMP Reviewed  Yes 3/30/2021  1:09 PM Akash Parker MD          ED Provider  Electronically Signed by           Lesa Becerril MD  08/01/23 1300 [facility] Katey Posada MD  08/01/23 [id number]

## 2023-08-01 NOTE — ED NOTES
Ambulated pt in hallway on 3L of oxygen. Pulse OX was 99% on 3L and heartrate was70. Dr. Mo Mtz made aware.      Gayatri Olivo  08/01/23 1123

## 2023-08-03 LAB
BACTERIA BLD CULT: NORMAL
BACTERIA BLD CULT: NORMAL

## 2023-08-04 ENCOUNTER — APPOINTMENT (EMERGENCY)
Dept: RADIOLOGY | Facility: HOSPITAL | Age: 68
DRG: 191 | End: 2023-08-04
Payer: COMMERCIAL

## 2023-08-04 ENCOUNTER — APPOINTMENT (INPATIENT)
Dept: CT IMAGING | Facility: HOSPITAL | Age: 68
DRG: 191 | End: 2023-08-04
Payer: COMMERCIAL

## 2023-08-04 ENCOUNTER — HOSPITAL ENCOUNTER (INPATIENT)
Facility: HOSPITAL | Age: 68
LOS: 2 days | Discharge: HOME | DRG: 191 | End: 2023-08-06
Attending: EMERGENCY MEDICINE | Admitting: FAMILY MEDICINE
Payer: COMMERCIAL

## 2023-08-04 DIAGNOSIS — J44.1 COPD EXACERBATION (HCC): Primary | ICD-10-CM

## 2023-08-04 DIAGNOSIS — J44.1 COPD WITH ACUTE EXACERBATION (HCC): ICD-10-CM

## 2023-08-04 DIAGNOSIS — E87.1 ACUTE HYPONATREMIA: ICD-10-CM

## 2023-08-04 DIAGNOSIS — F41.9 ANXIETY: ICD-10-CM

## 2023-08-04 LAB
ALBUMIN SERPL BCP-MCNC: 3.7 G/DL (ref 3.5–5)
ALP SERPL-CCNC: 50 U/L (ref 34–104)
ALT SERPL W P-5'-P-CCNC: 12 U/L (ref 7–52)
ANION GAP SERPL CALCULATED.3IONS-SCNC: 4 MMOL/L
AST SERPL W P-5'-P-CCNC: 8 U/L (ref 13–39)
ATRIAL RATE: 65 BPM
BASE EX.OXY STD BLDV CALC-SCNC: 53.4 % (ref 60–80)
BASE EXCESS BLDV CALC-SCNC: 4.4 MMOL/L
BASOPHILS # BLD AUTO: 0.03 THOUSANDS/ÂΜL (ref 0–0.1)
BASOPHILS NFR BLD AUTO: 0 % (ref 0–1)
BILIRUB SERPL-MCNC: 0.49 MG/DL (ref 0.2–1)
BNP SERPL-MCNC: 11 PG/ML (ref 0–100)
BUN SERPL-MCNC: 18 MG/DL (ref 5–25)
CALCIUM SERPL-MCNC: 9.3 MG/DL (ref 8.4–10.2)
CARDIAC TROPONIN I PNL SERPL HS: 4 NG/L
CHLORIDE SERPL-SCNC: 97 MMOL/L (ref 96–108)
CO2 SERPL-SCNC: 34 MMOL/L (ref 21–32)
CREAT SERPL-MCNC: 0.96 MG/DL (ref 0.6–1.3)
EOSINOPHIL # BLD AUTO: 0.03 THOUSAND/ÂΜL (ref 0–0.61)
EOSINOPHIL NFR BLD AUTO: 0 % (ref 0–6)
ERYTHROCYTE [DISTWIDTH] IN BLOOD BY AUTOMATED COUNT: 12.7 % (ref 11.6–15.1)
GFR SERPL CREATININE-BSD FRML MDRD: 61 ML/MIN/1.73SQ M
GLUCOSE SERPL-MCNC: 107 MG/DL (ref 65–140)
GLUCOSE SERPL-MCNC: 245 MG/DL (ref 65–140)
GLUCOSE SERPL-MCNC: 282 MG/DL (ref 65–140)
GLUCOSE SERPL-MCNC: 315 MG/DL (ref 65–140)
GLUCOSE SERPL-MCNC: 84 MG/DL (ref 65–140)
HCO3 BLDV-SCNC: 31.5 MMOL/L (ref 24–30)
HCT VFR BLD AUTO: 38.5 % (ref 34.8–46.1)
HGB BLD-MCNC: 12.5 G/DL (ref 11.5–15.4)
IMM GRANULOCYTES # BLD AUTO: 0.05 THOUSAND/UL (ref 0–0.2)
IMM GRANULOCYTES NFR BLD AUTO: 1 % (ref 0–2)
LYMPHOCYTES # BLD AUTO: 1.94 THOUSANDS/ÂΜL (ref 0.6–4.47)
LYMPHOCYTES NFR BLD AUTO: 26 % (ref 14–44)
MAGNESIUM SERPL-MCNC: 2.1 MG/DL (ref 1.9–2.7)
MCH RBC QN AUTO: 30 PG (ref 26.8–34.3)
MCHC RBC AUTO-ENTMCNC: 32.5 G/DL (ref 31.4–37.4)
MCV RBC AUTO: 92 FL (ref 82–98)
MONOCYTES # BLD AUTO: 0.76 THOUSAND/ÂΜL (ref 0.17–1.22)
MONOCYTES NFR BLD AUTO: 10 % (ref 4–12)
NEUTROPHILS # BLD AUTO: 4.59 THOUSANDS/ÂΜL (ref 1.85–7.62)
NEUTS SEG NFR BLD AUTO: 63 % (ref 43–75)
NRBC BLD AUTO-RTO: 0 /100 WBCS
O2 CT BLDV-SCNC: 10.3 ML/DL
P AXIS: 42 DEGREES
PCO2 BLDV: 57.5 MM HG (ref 42–50)
PH BLDV: 7.36 [PH] (ref 7.3–7.4)
PLATELET # BLD AUTO: 155 THOUSANDS/UL (ref 149–390)
PMV BLD AUTO: 9.9 FL (ref 8.9–12.7)
PO2 BLDV: 29.9 MM HG (ref 35–45)
POTASSIUM SERPL-SCNC: 3.9 MMOL/L (ref 3.5–5.3)
PR INTERVAL: 100 MS
PROCALCITONIN SERPL-MCNC: <0.05 NG/ML
PROT SERPL-MCNC: 6.5 G/DL (ref 6.4–8.4)
QRS AXIS: 41 DEGREES
QRSD INTERVAL: 78 MS
QT INTERVAL: 382 MS
QTC INTERVAL: 397 MS
RBC # BLD AUTO: 4.17 MILLION/UL (ref 3.81–5.12)
SODIUM SERPL-SCNC: 135 MMOL/L (ref 135–147)
T WAVE AXIS: 47 DEGREES
VENTRICULAR RATE: 65 BPM
WBC # BLD AUTO: 7.4 THOUSAND/UL (ref 4.31–10.16)

## 2023-08-04 PROCEDURE — 80053 COMPREHEN METABOLIC PANEL: CPT | Performed by: EMERGENCY MEDICINE

## 2023-08-04 PROCEDURE — 83735 ASSAY OF MAGNESIUM: CPT | Performed by: EMERGENCY MEDICINE

## 2023-08-04 PROCEDURE — 99223 1ST HOSP IP/OBS HIGH 75: CPT | Performed by: INTERNAL MEDICINE

## 2023-08-04 PROCEDURE — 83880 ASSAY OF NATRIURETIC PEPTIDE: CPT | Performed by: EMERGENCY MEDICINE

## 2023-08-04 PROCEDURE — 93005 ELECTROCARDIOGRAM TRACING: CPT

## 2023-08-04 PROCEDURE — 85025 COMPLETE CBC W/AUTO DIFF WBC: CPT | Performed by: EMERGENCY MEDICINE

## 2023-08-04 PROCEDURE — 94664 DEMO&/EVAL PT USE INHALER: CPT

## 2023-08-04 PROCEDURE — 87040 BLOOD CULTURE FOR BACTERIA: CPT | Performed by: FAMILY MEDICINE

## 2023-08-04 PROCEDURE — 99223 1ST HOSP IP/OBS HIGH 75: CPT | Performed by: FAMILY MEDICINE

## 2023-08-04 PROCEDURE — 84145 PROCALCITONIN (PCT): CPT | Performed by: FAMILY MEDICINE

## 2023-08-04 PROCEDURE — 94760 N-INVAS EAR/PLS OXIMETRY 1: CPT

## 2023-08-04 PROCEDURE — 71046 X-RAY EXAM CHEST 2 VIEWS: CPT

## 2023-08-04 PROCEDURE — 84484 ASSAY OF TROPONIN QUANT: CPT | Performed by: EMERGENCY MEDICINE

## 2023-08-04 PROCEDURE — 82948 REAGENT STRIP/BLOOD GLUCOSE: CPT

## 2023-08-04 PROCEDURE — G1004 CDSM NDSC: HCPCS

## 2023-08-04 PROCEDURE — 94640 AIRWAY INHALATION TREATMENT: CPT

## 2023-08-04 PROCEDURE — 36415 COLL VENOUS BLD VENIPUNCTURE: CPT | Performed by: EMERGENCY MEDICINE

## 2023-08-04 PROCEDURE — 99285 EMERGENCY DEPT VISIT HI MDM: CPT | Performed by: EMERGENCY MEDICINE

## 2023-08-04 PROCEDURE — 71275 CT ANGIOGRAPHY CHEST: CPT

## 2023-08-04 PROCEDURE — 93010 ELECTROCARDIOGRAM REPORT: CPT | Performed by: INTERNAL MEDICINE

## 2023-08-04 PROCEDURE — 82805 BLOOD GASES W/O2 SATURATION: CPT | Performed by: EMERGENCY MEDICINE

## 2023-08-04 PROCEDURE — 99285 EMERGENCY DEPT VISIT HI MDM: CPT

## 2023-08-04 RX ORDER — SODIUM CHLORIDE 1 G/1
2 TABLET ORAL
Status: DISCONTINUED | OUTPATIENT
Start: 2023-08-04 | End: 2023-08-06 | Stop reason: HOSPADM

## 2023-08-04 RX ORDER — SODIUM CHLORIDE FOR INHALATION 0.9 %
3 VIAL, NEBULIZER (ML) INHALATION EVERY 4 HOURS
Status: DISCONTINUED | OUTPATIENT
Start: 2023-08-04 | End: 2023-08-04

## 2023-08-04 RX ORDER — POLYETHYLENE GLYCOL 3350 17 G/17G
17 POWDER, FOR SOLUTION ORAL DAILY
Status: DISCONTINUED | OUTPATIENT
Start: 2023-08-04 | End: 2023-08-06 | Stop reason: HOSPADM

## 2023-08-04 RX ORDER — DOCUSATE SODIUM 100 MG/1
100 CAPSULE, LIQUID FILLED ORAL 2 TIMES DAILY
Status: DISCONTINUED | OUTPATIENT
Start: 2023-08-04 | End: 2023-08-06 | Stop reason: HOSPADM

## 2023-08-04 RX ORDER — BUPROPION HYDROCHLORIDE 150 MG/1
150 TABLET ORAL DAILY
Status: DISCONTINUED | OUTPATIENT
Start: 2023-08-04 | End: 2023-08-06 | Stop reason: HOSPADM

## 2023-08-04 RX ORDER — ENOXAPARIN SODIUM 100 MG/ML
40 INJECTION SUBCUTANEOUS DAILY
Status: DISCONTINUED | OUTPATIENT
Start: 2023-08-04 | End: 2023-08-06 | Stop reason: HOSPADM

## 2023-08-04 RX ORDER — INSULIN LISPRO 100 [IU]/ML
6 INJECTION, SOLUTION INTRAVENOUS; SUBCUTANEOUS
Status: DISCONTINUED | OUTPATIENT
Start: 2023-08-04 | End: 2023-08-06 | Stop reason: HOSPADM

## 2023-08-04 RX ORDER — LORAZEPAM 1 MG/1
1 TABLET ORAL ONCE
Status: COMPLETED | OUTPATIENT
Start: 2023-08-04 | End: 2023-08-04

## 2023-08-04 RX ORDER — METOPROLOL TARTRATE 50 MG/1
100 TABLET, FILM COATED ORAL EVERY 12 HOURS SCHEDULED
Status: DISCONTINUED | OUTPATIENT
Start: 2023-08-04 | End: 2023-08-04

## 2023-08-04 RX ORDER — SODIUM CHLORIDE 9 MG/ML
75 INJECTION, SOLUTION INTRAVENOUS CONTINUOUS
Status: DISCONTINUED | OUTPATIENT
Start: 2023-08-04 | End: 2023-08-05

## 2023-08-04 RX ORDER — DOXYCYCLINE HYCLATE 100 MG/1
100 CAPSULE ORAL EVERY 12 HOURS
Status: DISCONTINUED | OUTPATIENT
Start: 2023-08-04 | End: 2023-08-06 | Stop reason: HOSPADM

## 2023-08-04 RX ORDER — LEVALBUTEROL INHALATION SOLUTION 1.25 MG/3ML
1.25 SOLUTION RESPIRATORY (INHALATION)
Status: DISCONTINUED | OUTPATIENT
Start: 2023-08-04 | End: 2023-08-06 | Stop reason: HOSPADM

## 2023-08-04 RX ORDER — LISINOPRIL 20 MG/1
40 TABLET ORAL DAILY
Status: DISCONTINUED | OUTPATIENT
Start: 2023-08-04 | End: 2023-08-04

## 2023-08-04 RX ORDER — SODIUM CHLORIDE 9 MG/ML
20 INJECTION, SOLUTION INTRAVENOUS CONTINUOUS
Status: DISCONTINUED | OUTPATIENT
Start: 2023-08-04 | End: 2023-08-04

## 2023-08-04 RX ORDER — NICOTINE 21 MG/24HR
1 PATCH, TRANSDERMAL 24 HOURS TRANSDERMAL DAILY
Status: DISCONTINUED | OUTPATIENT
Start: 2023-08-04 | End: 2023-08-06 | Stop reason: HOSPADM

## 2023-08-04 RX ORDER — LEVALBUTEROL INHALATION SOLUTION 1.25 MG/3ML
1.25 SOLUTION RESPIRATORY (INHALATION) EVERY 4 HOURS
Status: DISCONTINUED | OUTPATIENT
Start: 2023-08-04 | End: 2023-08-04

## 2023-08-04 RX ORDER — FLUTICASONE PROPIONATE 50 MCG
1 SPRAY, SUSPENSION (ML) NASAL DAILY
Status: DISCONTINUED | OUTPATIENT
Start: 2023-08-04 | End: 2023-08-06 | Stop reason: HOSPADM

## 2023-08-04 RX ORDER — LISINOPRIL 20 MG/1
40 TABLET ORAL DAILY
Status: DISCONTINUED | OUTPATIENT
Start: 2023-08-05 | End: 2023-08-06 | Stop reason: HOSPADM

## 2023-08-04 RX ORDER — GUAIFENESIN 600 MG/1
600 TABLET, EXTENDED RELEASE ORAL 2 TIMES DAILY
Status: DISCONTINUED | OUTPATIENT
Start: 2023-08-04 | End: 2023-08-06 | Stop reason: HOSPADM

## 2023-08-04 RX ORDER — PANTOPRAZOLE SODIUM 40 MG/1
40 TABLET, DELAYED RELEASE ORAL
Status: DISCONTINUED | OUTPATIENT
Start: 2023-08-04 | End: 2023-08-06 | Stop reason: HOSPADM

## 2023-08-04 RX ORDER — BUDESONIDE 0.5 MG/2ML
0.5 INHALANT ORAL 2 TIMES DAILY
Status: DISCONTINUED | OUTPATIENT
Start: 2023-08-04 | End: 2023-08-04

## 2023-08-04 RX ORDER — AMLODIPINE BESYLATE 10 MG/1
10 TABLET ORAL
Status: DISCONTINUED | OUTPATIENT
Start: 2023-08-04 | End: 2023-08-04

## 2023-08-04 RX ORDER — INSULIN LISPRO 100 [IU]/ML
2-12 INJECTION, SOLUTION INTRAVENOUS; SUBCUTANEOUS
Status: DISCONTINUED | OUTPATIENT
Start: 2023-08-04 | End: 2023-08-06 | Stop reason: HOSPADM

## 2023-08-04 RX ORDER — BUDESONIDE 0.5 MG/2ML
0.5 INHALANT ORAL
Status: DISCONTINUED | OUTPATIENT
Start: 2023-08-04 | End: 2023-08-06 | Stop reason: HOSPADM

## 2023-08-04 RX ORDER — CLONIDINE HYDROCHLORIDE 0.1 MG/1
0.2 TABLET ORAL EVERY 12 HOURS SCHEDULED
Status: DISCONTINUED | OUTPATIENT
Start: 2023-08-04 | End: 2023-08-04

## 2023-08-04 RX ORDER — IPRATROPIUM BROMIDE AND ALBUTEROL SULFATE 2.5; .5 MG/3ML; MG/3ML
3 SOLUTION RESPIRATORY (INHALATION) ONCE
Status: COMPLETED | OUTPATIENT
Start: 2023-08-04 | End: 2023-08-04

## 2023-08-04 RX ORDER — AMLODIPINE BESYLATE 10 MG/1
10 TABLET ORAL
Status: DISCONTINUED | OUTPATIENT
Start: 2023-08-04 | End: 2023-08-06 | Stop reason: HOSPADM

## 2023-08-04 RX ORDER — METHYLPREDNISOLONE SODIUM SUCCINATE 40 MG/ML
40 INJECTION, POWDER, LYOPHILIZED, FOR SOLUTION INTRAMUSCULAR; INTRAVENOUS EVERY 8 HOURS
Status: DISCONTINUED | OUTPATIENT
Start: 2023-08-04 | End: 2023-08-05

## 2023-08-04 RX ORDER — CLONIDINE HYDROCHLORIDE 0.1 MG/1
0.2 TABLET ORAL EVERY 12 HOURS SCHEDULED
Status: DISCONTINUED | OUTPATIENT
Start: 2023-08-04 | End: 2023-08-06 | Stop reason: HOSPADM

## 2023-08-04 RX ORDER — BENZONATATE 100 MG/1
100 CAPSULE ORAL 3 TIMES DAILY PRN
Status: DISCONTINUED | OUTPATIENT
Start: 2023-08-04 | End: 2023-08-06 | Stop reason: HOSPADM

## 2023-08-04 RX ORDER — ATORVASTATIN CALCIUM 40 MG/1
40 TABLET, FILM COATED ORAL
Status: DISCONTINUED | OUTPATIENT
Start: 2023-08-04 | End: 2023-08-06 | Stop reason: HOSPADM

## 2023-08-04 RX ORDER — ACETAMINOPHEN 325 MG/1
650 TABLET ORAL EVERY 6 HOURS PRN
Status: DISCONTINUED | OUTPATIENT
Start: 2023-08-04 | End: 2023-08-06 | Stop reason: HOSPADM

## 2023-08-04 RX ADMIN — INSULIN LISPRO 6 UNITS: 100 INJECTION, SOLUTION INTRAVENOUS; SUBCUTANEOUS at 16:51

## 2023-08-04 RX ADMIN — DOXYCYCLINE 100 MG: 100 CAPSULE ORAL at 23:07

## 2023-08-04 RX ADMIN — CLONIDINE HYDROCHLORIDE 0.2 MG: 0.1 TABLET ORAL at 12:00

## 2023-08-04 RX ADMIN — IPRATROPIUM BROMIDE AND ALBUTEROL SULFATE 3 ML: .5; 3 SOLUTION RESPIRATORY (INHALATION) at 06:12

## 2023-08-04 RX ADMIN — ENOXAPARIN SODIUM 40 MG: 40 INJECTION SUBCUTANEOUS at 11:59

## 2023-08-04 RX ADMIN — INSULIN LISPRO 6 UNITS: 100 INJECTION, SOLUTION INTRAVENOUS; SUBCUTANEOUS at 12:01

## 2023-08-04 RX ADMIN — SODIUM CHLORIDE TAB 1 GM 2 G: 1 TAB at 11:59

## 2023-08-04 RX ADMIN — ATORVASTATIN CALCIUM 40 MG: 40 TABLET, FILM COATED ORAL at 21:58

## 2023-08-04 RX ADMIN — DOXYCYCLINE 100 MG: 100 CAPSULE ORAL at 12:00

## 2023-08-04 RX ADMIN — BUDESONIDE 0.5 MG: 0.5 INHALANT ORAL at 11:30

## 2023-08-04 RX ADMIN — INSULIN LISPRO 6 UNITS: 100 INJECTION, SOLUTION INTRAVENOUS; SUBCUTANEOUS at 21:55

## 2023-08-04 RX ADMIN — INSULIN LISPRO 4 UNITS: 100 INJECTION, SOLUTION INTRAVENOUS; SUBCUTANEOUS at 16:51

## 2023-08-04 RX ADMIN — SODIUM CHLORIDE 1000 ML: 0.9 INJECTION, SOLUTION INTRAVENOUS at 15:42

## 2023-08-04 RX ADMIN — LEVALBUTEROL HYDROCHLORIDE 1.25 MG: 1.25 SOLUTION RESPIRATORY (INHALATION) at 13:19

## 2023-08-04 RX ADMIN — METHYLPREDNISOLONE SODIUM SUCCINATE 40 MG: 40 INJECTION, POWDER, FOR SOLUTION INTRAMUSCULAR; INTRAVENOUS at 19:41

## 2023-08-04 RX ADMIN — LORAZEPAM 1 MG: 1 TABLET ORAL at 06:12

## 2023-08-04 RX ADMIN — SODIUM CHLORIDE 20 ML/HR: 0.9 INJECTION, SOLUTION INTRAVENOUS at 12:25

## 2023-08-04 RX ADMIN — GUAIFENESIN 600 MG: 600 TABLET ORAL at 15:43

## 2023-08-04 RX ADMIN — BUDESONIDE 0.5 MG: 0.5 INHALANT ORAL at 19:58

## 2023-08-04 RX ADMIN — LEVALBUTEROL HYDROCHLORIDE 1.25 MG: 1.25 SOLUTION RESPIRATORY (INHALATION) at 19:58

## 2023-08-04 RX ADMIN — METOPROLOL TARTRATE 100 MG: 50 TABLET, FILM COATED ORAL at 12:00

## 2023-08-04 RX ADMIN — IPRATROPIUM BROMIDE 0.5 MG: 0.5 SOLUTION RESPIRATORY (INHALATION) at 13:19

## 2023-08-04 RX ADMIN — PANTOPRAZOLE SODIUM 40 MG: 40 TABLET, DELAYED RELEASE ORAL at 12:00

## 2023-08-04 RX ADMIN — SODIUM CHLORIDE TAB 1 GM 2 G: 1 TAB at 15:44

## 2023-08-04 RX ADMIN — IPRATROPIUM BROMIDE 0.5 MG: 0.5 SOLUTION RESPIRATORY (INHALATION) at 19:58

## 2023-08-04 RX ADMIN — LISINOPRIL 40 MG: 20 TABLET ORAL at 11:59

## 2023-08-04 RX ADMIN — FLUTICASONE PROPIONATE 1 SPRAY: 50 SPRAY, METERED NASAL at 11:58

## 2023-08-04 RX ADMIN — METHYLPREDNISOLONE SODIUM SUCCINATE 40 MG: 40 INJECTION, POWDER, FOR SOLUTION INTRAMUSCULAR; INTRAVENOUS at 11:59

## 2023-08-04 RX ADMIN — BUPROPION HYDROCHLORIDE 150 MG: 150 TABLET, EXTENDED RELEASE ORAL at 12:00

## 2023-08-04 RX ADMIN — IOHEXOL 85 ML: 350 INJECTION, SOLUTION INTRAVENOUS at 09:31

## 2023-08-04 RX ADMIN — GUAIFENESIN 600 MG: 600 TABLET ORAL at 12:00

## 2023-08-04 NOTE — H&P
[facility]  H&P  Name: Ricardo Anthony 79 y.o. female I MRN: [id number]  Unit/Bed#: -01 I Date of Admission: 8/4/2023   Date of Service: 8/4/2023 I Hospital Day: 0      Assessment/Plan   Chronic respiratory failure ([facility])  Assessment & Plan  Per chart review, patient use 2.5 L during rest and evaluated during ambulation  Currently remain on the baseline  Continue current treatment    * COPD exacerbation ([facility])  Assessment & Plan  Has multiple ER visit and admission for the same reason-came with shortness of breath, wheezing  COVID/flu panel negative  Imaging does not reveal any underlying pneumonia  Continue IV steroid, doxycycline, inhaled nebulizer  Follow respiratory protocol  Check CTA PE  Last echo which was done 2020, ejection fraction 55 to 60%. Type 2 diabetes mellitus without complication, without long-term current use of insulin (HCC)  Assessment & Plan  Lab Results   Component Value Date    HGBA1C 7.0 (H) 07/27/2023       No results for input(s): "POCGLU" in the last 72 hours. Blood Sugar Average: Last 72 hrs:  Patient remain on steroid therapy which can affect patient blood sugar  Continue sliding scale, lispro as a scheduled  Hypoglycemia precaution  Adjust insulin therapy based on response. Tobacco abuse  Assessment & Plan  NicoDerm patch       VTE Pharmacologic Prophylaxis: VTE Score: 6 High Risk (Score >/= 5) - Pharmacological DVT Prophylaxis Ordered: enoxaparin (Lovenox). Sequential Compression Devices Ordered. Code Status: Level 1 - Full Code as per patient  Discussion with family: With patient. Anticipated Length of Stay: Patient will be admitted on an inpatient basis with an anticipated length of stay of greater than 2 midnights secondary to To monitor above conditions.     Total Time Spent on Date of Encounter in care of patient: Minutes This time was spent on one or more of the following: performing physical exam; counseling and coordination of care; obtaining or reviewing history; documenting in the medical record; reviewing/ordering tests, medications or procedures; communicating with other healthcare professionals and discussing with patient's family/caregivers. Chief Complaint: Short of breath    History of Present Illness:  Shalonda Meehan is a 79 y.o. female with a PMH of COPD, smoker, diabetes who presents with short of breath, wheezing. Patient reports the symptoms is ongoing, but for last few weeks she visited the ER multiple times due to short of breath. Patient reports she quit about 1-1/2 weeks ago. It does not matter is unrelated to seizure. Denies any nausea, vomiting, diarrhea. Review of Systems:  Review of Systems   Constitutional: Positive for activity change. Negative for appetite change, chills, diaphoresis, fatigue and fever. HENT: Negative for rhinorrhea, sinus pressure, sinus pain, sneezing and sore throat. Respiratory: Positive for cough, shortness of breath and wheezing. Negative for apnea and chest tightness. Cardiovascular: Negative for chest pain, palpitations and leg swelling. Gastrointestinal: Negative for abdominal distention, abdominal pain, anal bleeding and blood in stool. Genitourinary: Negative for difficulty urinating, dyspareunia and dysuria. Musculoskeletal: Negative for arthralgias and back pain. Skin: Negative for color change, pallor and rash. Neurological: Negative for dizziness, facial asymmetry, light-headedness and headaches. Hematological: Negative for adenopathy. Psychiatric/Behavioral: Negative for agitation, behavioral problems and confusion. All other systems reviewed and are negative. Past Medical and Surgical History:   Past Medical History:   Diagnosis Date   • Anxiety    • COPD (chronic obstructive pulmonary disease) ([facility])    • Diabetes mellitus ([facility])    • Essential (primary) hypertension    • GERD (gastroesophageal reflux disease)    • Smoker        History reviewed.  No pertinent surgical history. Meds/Allergies:  Prior to Admission medications    Medication Sig Start Date End Date Taking? Authorizing Provider   albuterol (2.5 mg/3 mL) 0.083 % nebulizer solution Take 3 mL (2.5 mg total) by nebulization every 6 (six) hours as needed for wheezing or shortness of breath 6/20/21   Ejniffer Ivy Marcio, DO   amLODIPine (NORVASC) 10 mg tablet Take 10 mg by mouth daily at bedtime     Historical Provider, MD   atorvastatin (LIPITOR) 40 mg tablet Take 40 mg by mouth daily at bedtime     Historical Provider, MD   budesonide (PULMICORT) 0.5 mg/2 mL nebulizer solution Take 0.5 mg by nebulization 2 (two) times a day Rinse mouth after use.     Historical Provider, MD   buPROPion (WELLBUTRIN XL) 150 mg 24 hr tablet Take 1 tablet (150 mg total) by mouth daily Do not start before July 23, 2023. 7/23/23   LEYDI Lee   cefuroxime (CEFTIN) 500 mg tablet Take 1 tablet (500 mg total) by mouth every 12 (twelve) hours for 10 days 8/1/23 8/11/23  Marie Bullock MD   cloNIDine (CATAPRES) 0.2 mg tablet Take 0.2 mg by mouth every 12 (twelve) hours    Historical Provider, MD   ergocalciferol (VITAMIN D2) 50,000 units Take 50,000 Units by mouth once a week    Historical Provider, MD   fluticasone (FLONASE) 50 mcg/act nasal spray 1 spray into each nostril daily 6/28/23   Sue Peres MD   glipiZIDE (GLUCOTROL) 10 mg tablet Take 10 mg by mouth daily in the early morning W breakfast    Historical Provider, MD   ipratropium-albuterol (COMBIVENT RESPIMAT) inhaler Inhale 1 puff 4 (four) times a day    Historical Provider, MD   lisinopril (ZESTRIL) 40 mg tablet Take 40 mg by mouth daily    Historical Provider, MD   metoprolol tartrate (LOPRESSOR) 100 mg tablet Take 100 mg by mouth every 12 (twelve) hours    Historical Provider, MD   omeprazole (PriLOSEC OTC) 20 MG tablet Take 20 mg by mouth daily     Historical Provider, MD   pioglitazone (ACTOS) 15 mg tablet Take 15 mg by mouth daily    Historical Provider, MD   predniSONE 10 mg tablet Take 4 tabs daily for thee days then take three tabs daily for three days then take two tablets daily for three days then take one tab daily for three days. 8/1/23   Ankit Levine MD   predniSONE 20 mg tablet Take 2 tablets (40 mg total) by mouth daily for 2 days, THEN 1.5 tablets (30 mg total) daily for 3 days, THEN 1 tablet (20 mg total) daily for 3 days, THEN 0.5 tablets (10 mg total) daily for 3 days. Do not start before July 23, 2023. 7/23/23 8/3/23  Jill Fink PA-C   sodium chloride 1 g tablet Take 2 tablets (2 g total) by mouth 3 (three) times a day with meals 7/22/23   Michelet Green PA-C   tiotropium [facility]) 18 mcg inhalation capsule Place 18 mcg into inhaler and inhale daily    Historical Provider, MD     I have reviewed home medications with patient personally. Allergies: Allergies   Allergen Reactions   • Clindamycin        Social History:  Marital Status:    Occupation: Unknown  Patient Pre-hospital Living Situation: Home  Patient Pre-hospital Level of Mobility: walks  Patient Pre-hospital Diet Restrictions: Cardiac/diabetic diet  Substance Use History:   Social History     Substance and Sexual Activity   Alcohol Use Not Currently     Social History     Tobacco Use   Smoking Status Some Days   • Packs/day: 0.50   • Types: Cigarettes   Smokeless Tobacco Never     Social History     Substance and Sexual Activity   Drug Use Never       Family History:  Family History   Problem Relation Age of Onset   • Diabetes Father        Physical Exam:     Vitals:   Blood Pressure: 133/80 (08/04/23 1655)  Pulse: 87 (08/04/23 1655)  Temperature: 97.5 °F (36.4 °C) (08/04/23 1512)  Temp Source: Temporal (08/04/23 0944)  Respirations: 17 (08/04/23 1131)  Height: 5' 2" (157.5 cm) (08/04/23 0549)  Weight - Scale: 68 kg (150 lb) (08/04/23 0549)  SpO2: 96 % (08/04/23 1655)    Physical Exam  Vitals and nursing note reviewed.    Constitutional: Appearance: Normal appearance. She is not ill-appearing or diaphoretic. HENT:      Right Ear: Tympanic membrane normal.      Nose: Nose normal. No congestion. Mouth/Throat:      Mouth: Mucous membranes are moist.      Pharynx: Oropharynx is clear. No oropharyngeal exudate. Eyes:      General: No scleral icterus. Left eye: No discharge. Extraocular Movements: Extraocular movements intact. Conjunctiva/sclera: Conjunctivae normal.      Pupils: Pupils are equal, round, and reactive to light. Cardiovascular:      Rate and Rhythm: Normal rate. Heart sounds: Normal heart sounds. No murmur heard. No friction rub. No gallop. Pulmonary:      Effort: Pulmonary effort is normal.      Breath sounds: Wheezing and rhonchi present. No rales. Abdominal:      General: Abdomen is flat. Bowel sounds are normal. There is no distension. Palpations: There is no mass. Tenderness: There is no abdominal tenderness. Hernia: No hernia is present. Musculoskeletal:         General: No swelling or deformity. Normal range of motion. Cervical back: Normal range of motion. Skin:     General: Skin is warm. Capillary Refill: Capillary refill takes less than 2 seconds. Coloration: Skin is not jaundiced or pale. Findings: No bruising or erythema. Neurological:      General: No focal deficit present. Mental Status: She is alert and oriented to person, place, and time. Cranial Nerves: No cranial nerve deficit. Sensory: No sensory deficit. Motor: No weakness.       Coordination: Coordination normal.         Additional Data:     Lab Results:  Results from last 7 days   Lab Units 08/04/23  0615   WBC Thousand/uL 7.40   HEMOGLOBIN g/dL 12.5   HEMATOCRIT % 38.5   PLATELETS Thousands/uL 155   NEUTROS PCT % 63   LYMPHS PCT % 26   MONOS PCT % 10   EOS PCT % 0     Results from last 7 days   Lab Units 08/04/23  0615   SODIUM mmol/L 135   POTASSIUM mmol/L 3.9 CHLORIDE mmol/L 97   CO2 mmol/L 34*   BUN mg/dL 18   CREATININE mg/dL 0.96   ANION GAP mmol/L 4   CALCIUM mg/dL 9.3   ALBUMIN g/dL 3.7   TOTAL BILIRUBIN mg/dL 0.49   ALK PHOS U/L 50   ALT U/L 12   AST U/L 8*   GLUCOSE RANDOM mg/dL 84     Results from last 7 days   Lab Units 07/29/23  0945   INR  0.86     Results from last 7 days   Lab Units 08/04/23  1625 08/04/23  1135   POC GLUCOSE mg/dl 245* 107         Results from last 7 days   Lab Units 08/04/23  1134 07/29/23  0945   LACTIC ACID mmol/L  --  1.0   PROCALCITONIN ng/ml <0.05 <0.05       Lines/Drains:  Invasive Devices     Peripheral Intravenous Line  Duration           Peripheral IV 08/04/23 Right Antecubital <1 day                    Imaging: Reviewed radiology reports from this admission including: chest CT scan  CTA chest pe study   Final Result by Alex Hernández MD (08/04 1128)   Addendum (preliminary) 1 of 1 by Alex Hernández MD (08/04 1128)   ADDENDUM:      When compared to a more recent CT from July 18, 2023, there has been no    change in the size or appearance of the right apical nodule. Because of    the short time interval, the lack of change does not alter the    differential diagnosis. This nodule should    still be considered suspicious. Follow-up is recommended with either    repeat CT of the chest in 3 months or PET/CT. Final      1. No evidence of pulmonary embolus. 2.  6 x 9 mm spiculated nodule in the apex of the right upper lobe, not present on a CTA from 11/2/2020, suspicious for cancer. Further evaluation with FDG PET/CT is recommended to determine the metabolic status of this nodule. The study was marked in [facility] for immediate notification. Workstation performed: [id number]         XR chest 2 views   Final Result by Ginny Mcadams MD (08/04 6173)      No acute cardiopulmonary disease.                   Workstation performed: [id number]             EKG and Other Studies Reviewed on Admission: · EKG: No EKG obtained. ** Please Note: This note has been constructed using a voice recognition system.  **

## 2023-08-04 NOTE — PLAN OF CARE
Problem: PAIN - ADULT  Goal: Verbalizes/displays adequate comfort level or baseline comfort level  Description: Interventions:  - Encourage patient to monitor pain and request assistance  - Assess pain using appropriate pain scale  - Administer analgesics based on type and severity of pain and evaluate response  - Implement non-pharmacological measures as appropriate and evaluate response  - Consider cultural and social influences on pain and pain management  - Notify physician/advanced practitioner if interventions unsuccessful or patient reports new pain  Outcome: Progressing     Problem: SAFETY ADULT  Goal: Patient will remain free of falls  Description: INTERVENTIONS:  - Educate patient/family on patient safety including physical limitations  - Instruct patient to call for assistance with activity   - Consult OT/PT to assist with strengthening/mobility   - Keep Call bell within reach  - Keep bed low and locked with side rails adjusted as appropriate  - Keep care items and personal belongings within reach  - Initiate and maintain comfort rounds  - Make Fall Risk Sign visible to staff  - Offer Toileting every  Hours, in advance of need  - Initiate/Maintain alarm  - Obtain necessary fall risk management equipment:   - Apply yellow socks and bracelet for high fall risk patients  - Consider moving patient to room near nurses station  Outcome: Progressing  Goal: Maintain or return to baseline ADL function  Description: INTERVENTIONS:  -  Assess patient's ability to carry out ADLs; assess patient's baseline for ADL function and identify physical deficits which impact ability to perform ADLs (bathing, care of mouth/teeth, toileting, grooming, dressing, etc.)  - Assess/evaluate cause of self-care deficits   - Assess range of motion  - Assess patient's mobility; develop plan if impaired  - Assess patient's need for assistive devices and provide as appropriate  - Encourage maximum independence but intervene and supervise when necessary  - Involve family in performance of ADLs  - Assess for home care needs following discharge   - Consider OT consult to assist with ADL evaluation and planning for discharge  - Provide patient education as appropriate  Outcome: Progressing  Goal: Maintains/Returns to pre admission functional level  Description: INTERVENTIONS:  - Perform BMAT or MOVE assessment daily.   - Set and communicate daily mobility goal to care team and patient/family/caregiver. - Collaborate with rehabilitation services on mobility goals if consulted  - Perform Range of Motion  times a day. - Reposition patient every  hours. - Dangle patient  times a day  - Stand patient  times a day  - Ambulate patient times a day  - Out of bed to chair  times a day   - Out of bed for meals times a day  - Out of bed for toileting  - Record patient progress and toleration of activity level   Outcome: Progressing     Problem: DISCHARGE PLANNING  Goal: Discharge to home or other facility with appropriate resources  Description: INTERVENTIONS:  - Identify barriers to discharge w/patient and caregiver  - Arrange for needed discharge resources and transportation as appropriate  - Identify discharge learning needs (meds, wound care, etc.)  - Arrange for interpretive services to assist at discharge as needed  - Refer to Case Management Department for coordinating discharge planning if the patient needs post-hospital services based on physician/advanced practitioner order or complex needs related to functional status, cognitive ability, or social support system  Outcome: Progressing     Problem: Knowledge Deficit  Goal: Patient/family/caregiver demonstrates understanding of disease process, treatment plan, medications, and discharge instructions  Description: Complete learning assessment and assess knowledge base.   Interventions:  - Provide teaching at level of understanding  - Provide teaching via preferred learning methods  Outcome: Progressing Problem: MOBILITY - ADULT  Goal: Maintain or return to baseline ADL function  Description: INTERVENTIONS:  -  Assess patient's ability to carry out ADLs; assess patient's baseline for ADL function and identify physical deficits which impact ability to perform ADLs (bathing, care of mouth/teeth, toileting, grooming, dressing, etc.)  - Assess/evaluate cause of self-care deficits   - Assess range of motion  - Assess patient's mobility; develop plan if impaired  - Assess patient's need for assistive devices and provide as appropriate  - Encourage maximum independence but intervene and supervise when necessary  - Involve family in performance of ADLs  - Assess for home care needs following discharge   - Consider OT consult to assist with ADL evaluation and planning for discharge  - Provide patient education as appropriate  Outcome: Progressing  Goal: Maintains/Returns to pre admission functional level  Description: INTERVENTIONS:  - Perform BMAT or MOVE assessment daily.   - Set and communicate daily mobility goal to care team and patient/family/caregiver. - Collaborate with rehabilitation services on mobility goals if consulted  - Perform Range of Motion  times a day. - Reposition patient every  hours.   - Dangle patient times a day  - Stand patient times a day  - Ambulate patient  times a day  - Out of bed to chair  times a day   - Out of bed for meals  times a day  - Out of bed for toileting  - Record patient progress and toleration of activity level   Outcome: Progressing

## 2023-08-04 NOTE — ASSESSMENT & PLAN NOTE
Has multiple ER visit and admission for the same reason-came with shortness of breath, wheezing  COVID/flu panel negative  Imaging does not reveal any underlying pneumonia  Continue IV steroid, doxycycline, inhaled nebulizer  Follow respiratory protocol  Check CTA PE  Last echo which was done 2020, ejection fraction 55 to 60%.

## 2023-08-04 NOTE — ED NOTES
Nate txt sent to [employer]. PT to be transported to unit.  No s/s of dijanelle, VS stable, A&Ox4     Clearance Mouna, [address]  08/04/23 2030

## 2023-08-04 NOTE — ASSESSMENT & PLAN NOTE
Per chart review, patient use 2.5 L during rest and evaluated during ambulation  Currently remain on the baseline  Continue current treatment

## 2023-08-04 NOTE — ED PROVIDER NOTES
History  Chief Complaint   Patient presents with   • Shortness of Breath     Pt started with sob yesterday and has been increasingly worse. Pt has COPD and wears 2.5L NC chronically       History provided by:  Medical records and patient  Shortness of Breath  Severity:  Moderate  Onset quality:  Gradual  Duration:  1 day  Timing:  Constant  Progression:  Unchanged  Chronicity:  Chronic  Context comment:  History of COPD, frequent ED visits for similar shortness of breath, return of shortness of breath last night, no improvement with at home Combivent, prednisone or recent antibiotics. Relieved by:  Nothing  Worsened by:  Nothing  Ineffective treatments:  Inhaler, sitting up, oxygen, rest and position changes  Associated symptoms: wheezing    Associated symptoms: no abdominal pain, no chest pain, no cough, no ear pain, no fever, no headaches, no rash, no sore throat and no vomiting    Risk factors: tobacco use    Risk factors: no hx of PE/DVT        Prior to Admission Medications   Prescriptions Last Dose Informant Patient Reported? Taking? albuterol (2.5 mg/3 mL) 0.083 % nebulizer solution   No No   Sig: Take 3 mL (2.5 mg total) by nebulization every 6 (six) hours as needed for wheezing or shortness of breath   amLODIPine (NORVASC) 10 mg tablet   Yes No   Sig: Take 10 mg by mouth daily at bedtime    atorvastatin (LIPITOR) 40 mg tablet   Yes No   Sig: Take 40 mg by mouth daily at bedtime    buPROPion (WELLBUTRIN XL) 150 mg 24 hr tablet   No No   Sig: Take 1 tablet (150 mg total) by mouth daily Do not start before July 23, 2023. budesonide (PULMICORT) 0.5 mg/2 mL nebulizer solution   Yes No   Sig: Take 0.5 mg by nebulization 2 (two) times a day Rinse mouth after use.    cefuroxime (CEFTIN) 500 mg tablet   No No   Sig: Take 1 tablet (500 mg total) by mouth every 12 (twelve) hours for 10 days   cloNIDine (CATAPRES) 0.2 mg tablet   Yes No   Sig: Take 0.2 mg by mouth every 12 (twelve) hours   ergocalciferol (VITAMIN D2) 50,000 units   Yes No   Sig: Take 50,000 Units by mouth once a week   fluticasone (FLONASE) 50 mcg/act nasal spray   No No   Si spray into each nostril daily   glipiZIDE (GLUCOTROL) 10 mg tablet  Self Yes No   Sig: Take 10 mg by mouth daily in the early morning W breakfast   ipratropium-albuterol (COMBIVENT RESPIMAT) inhaler   Yes No   Sig: Inhale 1 puff 4 (four) times a day   lisinopril (ZESTRIL) 40 mg tablet   Yes No   Sig: Take 40 mg by mouth daily   metoprolol tartrate (LOPRESSOR) 100 mg tablet   Yes No   Sig: Take 100 mg by mouth every 12 (twelve) hours   omeprazole (PriLOSEC OTC) 20 MG tablet   Yes No   Sig: Take 20 mg by mouth daily    pioglitazone (ACTOS) 15 mg tablet   Yes No   Sig: Take 15 mg by mouth daily   predniSONE 10 mg tablet   No No   Sig: Take 4 tabs daily for thee days then take three tabs daily for three days then take two tablets daily for three days then take one tab daily for three days. predniSONE 20 mg tablet   No No   Sig: Take 2 tablets (40 mg total) by mouth daily for 2 days, THEN 1.5 tablets (30 mg total) daily for 3 days, THEN 1 tablet (20 mg total) daily for 3 days, THEN 0.5 tablets (10 mg total) daily for 3 days. Do not start before 2023. sodium chloride 1 g tablet   No No   Sig: Take 2 tablets (2 g total) by mouth 3 (three) times a day with meals   tiotropium (SPIRIVA) 18 mcg inhalation capsule   Yes No   Sig: Place 18 mcg into inhaler and inhale daily      Facility-Administered Medications: None       Past Medical History:   Diagnosis Date   • Anxiety    • COPD (chronic obstructive pulmonary disease) ([address])    • Diabetes mellitus ([address])    • Essential (primary) hypertension    • GERD (gastroesophageal reflux disease)    • Smoker        History reviewed. No pertinent surgical history. History reviewed. No pertinent family history. I have reviewed and agree with the history as documented.     E-Cigarette/Vaping   • E-Cigarette Use Never User E-Cigarette/Vaping Substances     Social History     Tobacco Use   • Smoking status: Some Days     Packs/day: 0.50     Types: Cigarettes   • Smokeless tobacco: Never   Vaping Use   • Vaping Use: Never used   Substance Use Topics   • Alcohol use: Not Currently   • Drug use: Never       Review of Systems   Constitutional: Negative for chills, fatigue and fever. HENT: Negative for ear discharge, ear pain, rhinorrhea and sore throat. Eyes: Negative for pain and visual disturbance. Respiratory: Positive for shortness of breath and wheezing. Negative for cough. Cardiovascular: Negative for chest pain and palpitations. Gastrointestinal: Negative for abdominal pain, diarrhea, nausea and vomiting. Endocrine: Negative for polydipsia, polyphagia and polyuria. Genitourinary: Negative for difficulty urinating, dysuria, flank pain and hematuria. Musculoskeletal: Negative for arthralgias and back pain. Skin: Negative for color change and rash. Allergic/Immunologic: Negative for immunocompromised state. Neurological: Negative for dizziness, seizures, syncope, weakness and headaches. Psychiatric/Behavioral: Negative for confusion and self-injury. The patient is not nervous/anxious. All other systems reviewed and are negative. Physical Exam  Physical Exam  Vitals and nursing note reviewed. Constitutional:       General: She is not in acute distress. Appearance: Normal appearance. She is not ill-appearing, toxic-appearing or diaphoretic. HENT:      Head: Normocephalic and atraumatic. Nose: Nose normal. No congestion or rhinorrhea. Mouth/Throat:      Mouth: Mucous membranes are moist.      Pharynx: Oropharynx is clear. No oropharyngeal exudate or posterior oropharyngeal erythema. Eyes:      General:         Right eye: No discharge. Left eye: No discharge. Cardiovascular:      Rate and Rhythm: Normal rate and regular rhythm. Pulses: Normal pulses.       Heart sounds: Normal heart sounds. No murmur heard. No gallop. Pulmonary:      Effort: Tachypnea, accessory muscle usage and respiratory distress present. Breath sounds: No stridor. Examination of the right-middle field reveals rhonchi. Examination of the left-middle field reveals rhonchi. Examination of the right-lower field reveals decreased breath sounds and rhonchi. Examination of the left-lower field reveals decreased breath sounds and rhonchi. Decreased breath sounds and rhonchi present. No wheezing or rales. Comments: Pursed lip breathing, tachypneic 26 breaths/min  Chest:      Chest wall: No tenderness. Abdominal:      General: Bowel sounds are normal. There is no distension. Palpations: Abdomen is soft. There is no mass. Tenderness: There is no abdominal tenderness. There is no right CVA tenderness, left CVA tenderness, guarding or rebound. Hernia: No hernia is present. Musculoskeletal:         General: Normal range of motion. Cervical back: Normal range of motion and neck supple. Skin:     General: Skin is warm and dry. Capillary Refill: Capillary refill takes less than 2 seconds. Neurological:      General: No focal deficit present. Mental Status: She is alert and oriented to person, place, and time. Cranial Nerves: No cranial nerve deficit. Sensory: No sensory deficit. Motor: No weakness. Coordination: Coordination normal.      Gait: Gait normal.      Deep Tendon Reflexes: Reflexes normal.   Psychiatric:         Mood and Affect: Mood is anxious. Behavior: Behavior normal.         Thought Content:  Thought content normal.         Judgment: Judgment normal.         Vital Signs  ED Triage Vitals [08/04/23 0549]   Temperature Pulse Respirations Blood Pressure SpO2   (!) 96.4 °F (35.8 °C) 70 22 119/56 98 %      Temp Source Heart Rate Source Patient Position - Orthostatic VS BP Location FiO2 (%)   Temporal Monitor Lying Left arm -- Pain Score       No Pain           Vitals:    08/04/23 0549 08/04/23 0600 08/04/23 0645   BP: 119/56 119/56 112/55   Pulse: 70 69 65   Patient Position - Orthostatic VS: Lying Lying          Visual Acuity      ED Medications  Medications   ipratropium-albuterol (DUO-NEB) 0.5-2.5 mg/3 mL inhalation solution 3 mL (3 mL Nebulization Given 8/4/23 0612)   LORazepam (ATIVAN) tablet 1 mg (1 mg Oral Given 8/4/23 0612)       Diagnostic Studies  Results Reviewed     Procedure Component Value Units Date/Time    B-Type Natriuretic Peptide(BNP) [[id number]]  (Normal) Collected: 08/04/23 0615    Lab Status: Final result Specimen: Blood from Arm, Left Updated: 08/04/23 0653     BNP 11 pg/mL     HS Troponin 0hr (reflex protocol) [[id number]]  (Normal) Collected: 08/04/23 0615    Lab Status: Final result Specimen: Blood from Arm, Left Updated: 08/04/23 0649     hs TnI 0hr 4 ng/L     Comprehensive metabolic panel [[id number]]  (Abnormal) Collected: 08/04/23 0615    Lab Status: Final result Specimen: Blood from Arm, Left Updated: 08/04/23 0644     Sodium 135 mmol/L      Potassium 3.9 mmol/L      Chloride 97 mmol/L      CO2 34 mmol/L      ANION GAP 4 mmol/L      BUN 18 mg/dL      Creatinine 0.96 mg/dL      Glucose 84 mg/dL      Calcium 9.3 mg/dL      AST 8 U/L      ALT 12 U/L      Alkaline Phosphatase 50 U/L      Total Protein 6.5 g/dL      Albumin 3.7 g/dL      Total Bilirubin 0.49 mg/dL      eGFR 61 ml/min/1.73sq m     Narrative:      [place] guidelines for Chronic Kidney Disease (CKD):   •  Stage 1 with normal or high GFR (GFR > 90 mL/min/1.73 square meters)  •  Stage 2 Mild CKD (GFR = 60-89 mL/min/1.73 square meters)  •  Stage 3A Moderate CKD (GFR = 45-59 mL/min/1.73 square meters)  •  Stage 3B Moderate CKD (GFR = 30-44 mL/min/1.73 square meters)  •  Stage 4 Severe CKD (GFR = 15-29 mL/min/1.73 square meters)  •  Stage 5 End Stage CKD (GFR <15 mL/min/1.73 square meters)  Note: GFR calculation is accurate only with a steady state creatinine    Magnesium [[id number]]  (Normal) Collected: 08/04/23 0615    Lab Status: Final result Specimen: Blood from Arm, Left Updated: 08/04/23 0644     Magnesium 2.1 mg/dL     Blood gas, venous [[id number]]  (Abnormal) Collected: 08/04/23 0615    Lab Status: Final result Specimen: Blood from Arm, Left Updated: 08/04/23 0626     pH, Jose 7.356     pCO2, Jose 57.5 mm Hg      pO2, Jose 29.9 mm Hg      HCO3, Jose 31.5 mmol/L      Base Excess, Jose 4.4 mmol/L      O2 Content, Jose 10.3 ml/dL      O2 HGB, VENOUS 53.4 %     CBC and differential [[id number]] Collected: 08/04/23 0615    Lab Status: Final result Specimen: Blood from Arm, Left Updated: 08/04/23 0626     WBC 7.40 Thousand/uL      RBC 4.17 Million/uL      Hemoglobin 12.5 g/dL      Hematocrit 38.5 %      MCV 92 fL      MCH 30.0 pg      MCHC 32.5 g/dL      RDW 12.7 %      MPV 9.9 fL      Platelets 612 Thousands/uL      nRBC 0 /100 WBCs      Neutrophils Relative 63 %      Immat GRANS % 1 %      Lymphocytes Relative 26 %      Monocytes Relative 10 %      Eosinophils Relative 0 %      Basophils Relative 0 %      Neutrophils Absolute 4.59 Thousands/µL      Immature Grans Absolute 0.05 Thousand/uL      Lymphocytes Absolute 1.94 Thousands/µL      Monocytes Absolute 0.76 Thousand/µL      Eosinophils Absolute 0.03 Thousand/µL      Basophils Absolute 0.03 Thousands/µL                  XR chest 2 views    (Results Pending)              Procedures  Procedures         ED Course                                             Medical Decision Making  6729: Patient appears anxious, dyspneic, vital signs reviewed. History of COPD. Still smoking. Requires ATC oxygen 2.5 L. Patient has been seen in our emergency room recently on several occasions for similar. Recent work-up completed. This appears to be acute on chronic COPD exacerbation despite home regimen of albuterol, prednisone and was recently placed on antibiotics. Viral panels were -3 days ago. Chest x-ray was within normal limits. D-dimer was negative as well. I have entertained admitting patient due to frequent ER reevaluations. Plan to give DuoNeb and Ativan, reevaluate. Check basic labs including cardiac enzymes, BNP, VBG, check EKG and chest x-ray. 0656: Chest x-ray and labs reviewed. Plan to consult medicine for admission. Anxiety: acute illness or injury  COPD exacerbation ([address]): acute illness or injury  Amount and/or Complexity of Data Reviewed  External Data Reviewed: labs, radiology and ECG. Details: Chest x-ray  Labs: ordered. Radiology: ordered and independent interpretation performed. Details: Chest x-ray no acute pathology  ECG/medicine tests: ordered and independent interpretation performed. Details: Normal sinus rhythm 65 bpm, no acute ischemia      Risk  Prescription drug management. Decision regarding hospitalization. Disposition  Final diagnoses:   COPD exacerbation ([address])   Anxiety     Time reflects when diagnosis was documented in both MDM as applicable and the Disposition within this note     Time User Action Codes Description Comment    8/4/2023  6:53 AM Leeroy Ganser Add [J44.1] COPD exacerbation ([address])     8/4/2023  6:53 AM Leeroy Ganser Add [F41.9] Anxiety       ED Disposition     ED Disposition   Admit    Condition   Stable    Date/Time   Fri Aug 4, 2023  6:53 AM    Comment              Follow-up Information    None         Patient's Medications   Discharge Prescriptions    No medications on file       No discharge procedures on file.     PDMP Review       Value Time User    PDMP Reviewed  Yes 3/30/2021  1:09 PM Darren Wilson MD          ED Provider  Electronically Signed by           Jason Willis MD  08/04/23 0103

## 2023-08-04 NOTE — CONSULTS
Pulmonary Consultation   Norman Floyd 79 y.o. female MRN: [id number]  Unit/Bed#: -01 Encounter: [id number]      Reason for consultation: COPD, lung nodule    Requesting physician: Stephanie Cottrell MD    Impressions/Recommendations:   Patient is a 37-year-old female with presenting with acute exacerbation of COPD found to have a lung nodule. Discussed with patient given her high risk status the possibility of PET/CT versus repeat CT scan, she chose to have an interval follow-up. This would be due in October. Hypoxemic respiratory failure  -  On 2L NC  -Wean as tolerated to maintain SPO2 greater than 88%. Lung nodule  -  Reviewed with radiology  -Discussed with patient possibility of PET CT scan versus interval 3-month, patient chose to repeat in 3 months. Would do this based off of the July scan. Next CT scan will be due in 10/15/2023  -Patient would also benefit from PFTs to update severity of her lung disease    Severe COPD  -  Would benefit from updated PFTs  -  Continue pulmicort  -   Continue atrovent/xopenex  -  On solumedrol 40mg q8h    Nicotine dependence   -  On 21mg nicotine patch  -Counseled on smoking cessation    Plan of care discussed with JOANNA. History of Present Illness   HPI:  Norman Floyd is a 79 y.o. female who has a pmhx sig for nicotine dependence, chronic hypoxemia and COPD who presented with an acute exacerbation. The patient noted difficulty catching her breath on the day of admission. She denies fevers, chills, nausea, vomiting, chest pain or leg heaviness. She last took prednisone last week. On a normal day, she is able to complete of her ADLs without difficulty while wearing oxygen. She denies seasonal allergies. She denies night sweats, weight loss or hemoptysis. She is a former . She has 2 dogs at home. She takes spiriva, albuterol nebulizer (4x/daily), budesonide (1x/daily). She uses 2.5L NC at all times.   She is unsure of last hospitalization but denies intubation. She is only smoking about 2 puffs of a cigarette per day. She denies etoh or illicit drug use. Review of systems:  12 point review of systems was completed and was otherwise negative except as listed in HPI. Historical Information   Past Medical History:   Diagnosis Date   • Anxiety    • COPD (chronic obstructive pulmonary disease) ([address] )    • Diabetes mellitus ([address])    • Essential (primary) hypertension    • GERD (gastroesophageal reflux disease)    • Smoker      History reviewed. No pertinent surgical history. Has never had surgery  Family History   Problem Relation Age of Onset   • Diabetes Father        Social History     Socioeconomic History   • Marital status:      Spouse name: None   • Number of children: None   • Years of education: None   • Highest education level: None   Occupational History   • None   Tobacco Use   • Smoking status: Some Days     Packs/day: 0.50     Types: Cigarettes   • Smokeless tobacco: Never   Vaping Use   • Vaping Use: Never used   Substance and Sexual Activity   • Alcohol use: Not Currently   • Drug use: Never   • Sexual activity: None   Other Topics Concern   • None   Social History Narrative   • None     Social Determinants of Health     Financial Resource Strain: Not on file   Food Insecurity: No Food Insecurity (6/26/2023)    Hunger Vital Sign    • Worried About Running Out of Food in the Last Year: Never true    • Ran Out of Food in the Last Year: Never true   Transportation Needs: No Transportation Needs (6/26/2023)    PRAPARE - Transportation    • Lack of Transportation (Medical): No    • Lack of Transportation (Non-Medical):  No   Physical Activity: Not on file   Stress: Not on file   Social Connections: Not on file   Intimate Partner Violence: Not on file   Housing Stability: Low Risk  (6/26/2023)    Housing Stability Vital Sign    • Unable to Pay for Housing in the Last Year: No    • Number of Places Lived in the Last Year: 1    • Unstable Housing in the Last Year: No       Meds/Allergies   Current Facility-Administered Medications   Medication Dose Route Frequency   • acetaminophen (TYLENOL) tablet 650 mg  650 mg Oral Q6H PRN   • amLODIPine (NORVASC) tablet 10 mg  10 mg Oral HS   • atorvastatin (LIPITOR) tablet 40 mg  40 mg Oral HS   • benzonatate (TESSALON PERLES) capsule 100 mg  100 mg Oral TID PRN   • budesonide (PULMICORT) inhalation solution 0.5 mg  0.5 mg Nebulization Q12H   • buPROPion (WELLBUTRIN XL) 24 hr tablet 150 mg  150 mg Oral Daily   • cloNIDine (CATAPRES) tablet 0.2 mg  0.2 mg Oral Q12H [facility]   • docusate sodium (COLACE) capsule 100 mg  100 mg Oral BID   • doxycycline hyclate (VIBRAMYCIN) capsule 100 mg  100 mg Oral Q12H   • enoxaparin (LOVENOX) subcutaneous injection 40 mg  40 mg Subcutaneous Daily   • fluticasone (FLONASE) 50 mcg/act nasal spray 1 spray  1 spray Nasal Daily   • guaiFENesin (MUCINEX) 12 hr tablet 600 mg  600 mg Oral BID   • insulin lispro (HumaLOG) 100 units/mL subcutaneous injection 2-12 Units  2-12 Units Subcutaneous TID AC   • insulin lispro (HumaLOG) 100 units/mL subcutaneous injection 2-12 Units  2-12 Units Subcutaneous HS   • insulin lispro (HumaLOG) 100 units/mL subcutaneous injection 6 Units  6 Units Subcutaneous TID With Meals   • ipratropium (ATROVENT) 0.02 % inhalation solution 0.5 mg  0.5 mg Nebulization TID   • levalbuterol (XOPENEX) inhalation solution 1.25 mg  1.25 mg Nebulization TID   • lisinopril (ZESTRIL) tablet 40 mg  40 mg Oral Daily   • methylPREDNISolone sodium succinate (Solu-MEDROL) injection 40 mg  40 mg Intravenous Q8H   • metoprolol tartrate (LOPRESSOR) tablet 100 mg  100 mg Oral Q12H NATHALY   • nicotine (NICODERM CQ) 21 mg/24 hr TD 24 hr patch 1 patch  1 patch Transdermal Daily   • pantoprazole (PROTONIX) EC tablet 40 mg  40 mg Oral Early Morning   • polyethylene glycol (MIRALAX) packet 17 g  17 g Oral Daily   • sodium chloride 0.9 % infusion  20 mL/hr Intravenous Continuous   • sodium chloride tablet 2 g  2 g Oral TID With Meals     Medications Prior to Admission   Medication   • albuterol (2.5 mg/3 mL) 0.083 % nebulizer solution   • amLODIPine (NORVASC) 10 mg tablet   • atorvastatin (LIPITOR) 40 mg tablet   • budesonide (PULMICORT) 0.5 mg/2 mL nebulizer solution   • buPROPion (WELLBUTRIN XL) 150 mg 24 hr tablet   • cefuroxime (CEFTIN) 500 mg tablet   • cloNIDine (CATAPRES) 0.2 mg tablet   • ergocalciferol (VITAMIN D2) 50,000 units   • fluticasone (FLONASE) 50 mcg/act nasal spray   • glipiZIDE (GLUCOTROL) 10 mg tablet   • ipratropium-albuterol (COMBIVENT RESPIMAT) inhaler   • lisinopril (ZESTRIL) 40 mg tablet   • metoprolol tartrate (LOPRESSOR) 100 mg tablet   • omeprazole (PriLOSEC OTC) 20 MG tablet   • pioglitazone (ACTOS) 15 mg tablet   • predniSONE 10 mg tablet   • sodium chloride 1 g tablet   • tiotropium (SPIRIVA) 18 mcg inhalation capsule   • [] predniSONE 20 mg tablet     Allergies   Allergen Reactions   • Clindamycin        Vitals: Blood pressure 113/65, pulse 74, temperature (!) 97.2 °F (36.2 °C), temperature source Temporal, resp. rate 20, height 5' 2" (1.575 m), weight 68 kg (150 lb), SpO2 100 %., 2.5L NC, Body mass index is 27.44 kg/m².     No intake or output data in the 24 hours ending 23 1125    Physical Exam  General: Pleasant, Awake alert and oriented x 3, conversant without conversational dyspnea, NAD, normal affect  HEENT:  PERRL, Sclera noninjected, nonicteric OU, Nares patent, no nasal flaring, no nasal drainage, Mucous membranes, moist, no oral lesions, normal dentition  NECK: Trachea midline, no accessory muscle use, no stridor, no cervical or supraclavicular adenopathy, JVP not elevated  CARDIAC: Reg, single s1/S2, no m/r/g  PULM: Decreased breath sounds, minimal wheezing  CHEST: No gross deformities, equal chest expansion on inspiration bilaterally  ABD: Normoactive bowel sounds, soft nontender, nondistended, no rebound, no rigidity, no guarding  EXT: No cyanosis, no clubbing, no edema, normal capillary refill  SKIN:  No rashes, no lesions  NEURO: no focal neurologic deficits, AAOx3, moving all extremities appropriately    Labs: I have personally reviewed pertinent lab results. Results from last 7 days   Lab Units 08/04/23  0615 08/01/23  1022 07/29/23  0945   WBC Thousand/uL 7.40 9.62 9.42   HEMOGLOBIN g/dL 12.5 12.7 13.2   HEMATOCRIT % 38.5 39.2 41.2   PLATELETS Thousands/uL 155 186 223   NEUTROS PCT % 63 71 65   MONOS PCT % 10 8 11   EOS PCT % 0 0 0      Results from last 7 days   Lab Units 08/04/23  0615 08/01/23  1022 07/29/23  0945   POTASSIUM mmol/L 3.9 4.0 3.9   CHLORIDE mmol/L 97 96 94*   CO2 mmol/L 34* 34* 32   BUN mg/dL 18 12 7   CREATININE mg/dL 0.96 0.80 0.85   CALCIUM mg/dL 9.3 9.9 9.9   ALK PHOS U/L 50 58 65   ALT U/L 12 11 11   AST U/L 8* 9* 9*     Results from last 7 days   Lab Units 08/04/23  0615 08/01/23  1022 07/29/23  0945   MAGNESIUM mg/dL 2.1 2.1 2.0          Results from last 7 days   Lab Units 07/29/23  0945   INR  0.86   PTT seconds 25     Results from last 7 days   Lab Units 07/29/23  0945   LACTIC ACID mmol/L 1.0     0   Lab Value Date/Time    TROPONINI <0.02 06/20/2021 1820    TROPONINI <0.02 03/26/2021 1248    TROPONINI 0.02 11/02/2020 1407       Imaging and other studies: I have personally reviewed pertinent reports. and I have personally reviewed pertinent films in PACS with a Radiologist.  CTA 8/4/2023:  PULMONARY ARTERIAL TREE:  No pulmonary embolus is seen. LUNGS: 6 x 9 mm spiculated nodule in the apex of the right upper lobe (series 3, image 27), not present on the CTA from 11/2/2020. No other nodules. Subsegmental atelectasis in the bases of both lower lobes. Lungs otherwise clear. PLEURA:  Unremarkable. HEART/GREAT VESSELS: Coronary artery calcifications. Heart otherwise unremarkable. No thoracic aortic aneurysm. MEDIASTINUM AND SÁNCHEZ: No lymphadenopathy or mass.  Generalized mural thickening of the esophagus, suggestive of esophagitis. Trachea and main stem bronchi normal.  CHEST WALL AND LOWER NECK:   Unremarkable. VISUALIZED STRUCTURES IN THE UPPER ABDOMEN:  Unremarkable. OSSEOUS STRUCTURES:  No acute fracture or destructive osseous lesion. Pulmonary function testing:I have personally reviewed pertinent reports. 3/17/2017:  FEV1: 1.29  FVC: not listed  FEV1/FVC = 70     This testing supports COPD diagnosis Yes Gold 2 Grade A    EKG, Pathology, and Other Studies: I have personally reviewed pertinent reports. 8/1/2023:  Sinus rhythm with short CT    Code Status: Level 1 - Full Code    Oniel Chlorine Genie, [address] [place] [address]

## 2023-08-04 NOTE — PLAN OF CARE
Problem: PAIN - ADULT  Goal: Verbalizes/displays adequate comfort level or baseline comfort level  Description: Interventions:  - Encourage patient to monitor pain and request assistance  - Assess pain using appropriate pain scale  - Administer analgesics based on type and severity of pain and evaluate response  - Implement non-pharmacological measures as appropriate and evaluate response  - Consider cultural and social influences on pain and pain management  - Notify physician/advanced practitioner if interventions unsuccessful or patient reports new pain  Outcome: Progressing     Problem: SAFETY ADULT  Goal: Patient will remain free of falls  Description: INTERVENTIONS:  - Educate patient/family on patient safety including physical limitations  - Instruct patient to call for assistance with activity   - Consult OT/PT to assist with strengthening/mobility   - Keep Call bell within reach  - Keep bed low and locked with side rails adjusted as appropriate  - Keep care items and personal belongings within reach  - Initiate and maintain comfort rounds  - Make Fall Risk Sign visible to staff  - Offer Toileting every . Hours, in advance of need  - Initiate/Maintain . alarm  - Obtain necessary fall risk management equipment: .   - Apply yellow socks and bracelet for high fall risk patients  - Consider moving patient to room near nurses station  Outcome: Progressing  Goal: Maintain or return to baseline ADL function  Description: INTERVENTIONS:  -  Assess patient's ability to carry out ADLs; assess patient's baseline for ADL function and identify physical deficits which impact ability to perform ADLs (bathing, care of mouth/teeth, toileting, grooming, dressing, etc.)  - Assess/evaluate cause of self-care deficits   - Assess range of motion  - Assess patient's mobility; develop plan if impaired  - Assess patient's need for assistive devices and provide as appropriate  - Encourage maximum independence but intervene and supervise when necessary  - Involve family in performance of ADLs  - Assess for home care needs following discharge   - Consider OT consult to assist with ADL evaluation and planning for discharge  - Provide patient education as appropriate  Outcome: Progressing  Goal: Maintains/Returns to pre admission functional level  Description: INTERVENTIONS:  - Perform BMAT or MOVE assessment daily.   - Set and communicate daily mobility goal to care team and patient/family/caregiver. - Collaborate with rehabilitation services on mobility goals if consulted  - Perform Range of Motion . times a day. - Reposition patient every . hours. - Dangle patient . times a day  - Stand patient . times a day  - Ambulate patient . times a day  - Out of bed to chair . times a day   - Out of bed for meals . ... times a day  - Out of bed for toileting  - Record patient progress and toleration of activity level   Outcome: Progressing     Problem: DISCHARGE PLANNING  Goal: Discharge to home or other facility with appropriate resources  Description: INTERVENTIONS:  - Identify barriers to discharge w/patient and caregiver  - Arrange for needed discharge resources and transportation as appropriate  - Identify discharge learning needs (meds, wound care, etc.)  - Arrange for interpretive services to assist at discharge as needed  - Refer to Case Management Department for coordinating discharge planning if the patient needs post-hospital services based on physician/advanced practitioner order or complex needs related to functional status, cognitive ability, or social support system  Outcome: Progressing     Problem: Knowledge Deficit  Goal: Patient/family/caregiver demonstrates understanding of disease process, treatment plan, medications, and discharge instructions  Description: Complete learning assessment and assess knowledge base.   Interventions:  - Provide teaching at level of understanding  - Provide teaching via preferred learning methods  Outcome: Progressing

## 2023-08-04 NOTE — RESPIRATORY THERAPY NOTE
RT Protocol Note  Cielo Liao 79 y.o. female MRN: [id number]  Unit/Bed#: -01 Encounter: [id number]    Assessment    Principal Problem:    COPD exacerbation ([address] )  Active Problems:    Chronic respiratory failure ([address])    Tobacco abuse    Type 2 diabetes mellitus without complication, without long-term current use of insulin ([facility])      Home Pulmonary Medications:    Home Devices/Therapy: Home O2    Past Medical History:   Diagnosis Date    Anxiety     COPD (chronic obstructive pulmonary disease) (HCC)     Diabetes mellitus ([address])     Essential (primary) hypertension     GERD (gastroesophageal reflux disease)     Smoker      Social History     Socioeconomic History    Marital status:      Spouse name: None    Number of children: None    Years of education: None    Highest education level: None   Occupational History    None   Tobacco Use    Smoking status: Some Days     Packs/day: 0.50     Types: Cigarettes    Smokeless tobacco: Never   Vaping Use    Vaping Use: Never used   Substance and Sexual Activity    Alcohol use: Not Currently    Drug use: Never    Sexual activity: None   Other Topics Concern    None   Social History Narrative    None     Social Determinants of Health     Financial Resource Strain: Not on file   Food Insecurity: No Food Insecurity (6/26/2023)    Hunger Vital Sign     Worried About Running Out of Food in the Last Year: Never true     Ran Out of Food in the Last Year: Never true   Transportation Needs: No Transportation Needs (6/26/2023)    PRAPARE - Transportation     Lack of Transportation (Medical): No     Lack of Transportation (Non-Medical):  No   Physical Activity: Not on file   Stress: Not on file   Social Connections: Not on file   Intimate Partner Violence: Not on file   Housing Stability: Low Risk  (6/26/2023)    Housing Stability Vital Sign     Unable to Pay for Housing in the Last Year: No     Number of Places Lived in the Last Year: 1     Unstable Housing in the Last Year: No       Subjective         Objective    Physical Exam:   Assessment Type: During-treatment  General Appearance: Alert, Awake  Respiratory Pattern: Normal, Dyspnea with exertion  Chest Assessment: Chest expansion symmetrical  Bilateral Breath Sounds: Diminished  Cough: None  O2 Device: 2.5 L    Vitals:  Blood pressure 119/59, pulse 84, temperature (!) 97.3 °F (36.3 °C), resp. rate 17, height 5' 2" (1.575 m), weight 68 kg (150 lb), SpO2 97 %. Imaging and other studies: I have personally reviewed pertinent reports. O2 Device: 2.5 L     Plan    Respiratory Plan: (P) Mild Distress pathway        Resp Comments: (P) pt admitted with COPD exac. chronic O2 use 2.5 L . currently feels SOB with exertion and little movement such as sitting up. albuterol neb and spriva use at home. Xopenex/atrovent and pulmicort ordered here.  pt in no acute distress

## 2023-08-04 NOTE — ASSESSMENT & PLAN NOTE
Lab Results   Component Value Date    HGBA1C 7.0 (H) 07/27/2023       No results for input(s): "POCGLU" in the last 72 hours. Blood Sugar Average: Last 72 hrs:  Patient remain on steroid therapy which can affect patient blood sugar  Continue sliding scale, lispro as a scheduled  Hypoglycemia precaution  Adjust insulin therapy based on response.

## 2023-08-05 LAB
GLUCOSE SERPL-MCNC: 127 MG/DL (ref 65–140)
GLUCOSE SERPL-MCNC: 197 MG/DL (ref 65–140)
GLUCOSE SERPL-MCNC: 199 MG/DL (ref 65–140)
GLUCOSE SERPL-MCNC: 297 MG/DL (ref 65–140)
PROCALCITONIN SERPL-MCNC: <0.05 NG/ML

## 2023-08-05 PROCEDURE — 94760 N-INVAS EAR/PLS OXIMETRY 1: CPT

## 2023-08-05 PROCEDURE — 84145 PROCALCITONIN (PCT): CPT | Performed by: FAMILY MEDICINE

## 2023-08-05 PROCEDURE — 99232 SBSQ HOSP IP/OBS MODERATE 35: CPT

## 2023-08-05 PROCEDURE — 82948 REAGENT STRIP/BLOOD GLUCOSE: CPT

## 2023-08-05 PROCEDURE — 94640 AIRWAY INHALATION TREATMENT: CPT

## 2023-08-05 RX ORDER — PREDNISONE 20 MG/1
40 TABLET ORAL DAILY
Status: DISCONTINUED | OUTPATIENT
Start: 2023-08-06 | End: 2023-08-06 | Stop reason: HOSPADM

## 2023-08-05 RX ORDER — METHYLPREDNISOLONE SODIUM SUCCINATE 40 MG/ML
40 INJECTION, POWDER, LYOPHILIZED, FOR SOLUTION INTRAMUSCULAR; INTRAVENOUS EVERY 12 HOURS SCHEDULED
Status: COMPLETED | OUTPATIENT
Start: 2023-08-05 | End: 2023-08-05

## 2023-08-05 RX ORDER — METHYLPREDNISOLONE SODIUM SUCCINATE 40 MG/ML
40 INJECTION, POWDER, LYOPHILIZED, FOR SOLUTION INTRAMUSCULAR; INTRAVENOUS EVERY 12 HOURS SCHEDULED
Status: DISCONTINUED | OUTPATIENT
Start: 2023-08-05 | End: 2023-08-05

## 2023-08-05 RX ORDER — METHYLPREDNISOLONE SODIUM SUCCINATE 40 MG/ML
40 INJECTION, POWDER, LYOPHILIZED, FOR SOLUTION INTRAMUSCULAR; INTRAVENOUS ONCE
Status: COMPLETED | OUTPATIENT
Start: 2023-08-05 | End: 2023-08-05

## 2023-08-05 RX ADMIN — BUPROPION HYDROCHLORIDE 150 MG: 150 TABLET, EXTENDED RELEASE ORAL at 08:31

## 2023-08-05 RX ADMIN — SODIUM CHLORIDE TAB 1 GM 2 G: 1 TAB at 08:30

## 2023-08-05 RX ADMIN — METHYLPREDNISOLONE SODIUM SUCCINATE 40 MG: 40 INJECTION, POWDER, FOR SOLUTION INTRAMUSCULAR; INTRAVENOUS at 22:01

## 2023-08-05 RX ADMIN — METHYLPREDNISOLONE SODIUM SUCCINATE 40 MG: 40 INJECTION, POWDER, FOR SOLUTION INTRAMUSCULAR; INTRAVENOUS at 04:01

## 2023-08-05 RX ADMIN — GUAIFENESIN 600 MG: 600 TABLET ORAL at 08:30

## 2023-08-05 RX ADMIN — PANTOPRAZOLE SODIUM 40 MG: 40 TABLET, DELAYED RELEASE ORAL at 06:22

## 2023-08-05 RX ADMIN — INSULIN LISPRO 6 UNITS: 100 INJECTION, SOLUTION INTRAVENOUS; SUBCUTANEOUS at 08:29

## 2023-08-05 RX ADMIN — BUDESONIDE 0.5 MG: 0.5 INHALANT ORAL at 07:25

## 2023-08-05 RX ADMIN — INSULIN LISPRO 6 UNITS: 100 INJECTION, SOLUTION INTRAVENOUS; SUBCUTANEOUS at 12:19

## 2023-08-05 RX ADMIN — ATORVASTATIN CALCIUM 40 MG: 40 TABLET, FILM COATED ORAL at 20:54

## 2023-08-05 RX ADMIN — DOXYCYCLINE 100 MG: 100 CAPSULE ORAL at 22:03

## 2023-08-05 RX ADMIN — BUDESONIDE 0.5 MG: 0.5 INHALANT ORAL at 19:19

## 2023-08-05 RX ADMIN — INSULIN LISPRO 2 UNITS: 100 INJECTION, SOLUTION INTRAVENOUS; SUBCUTANEOUS at 22:03

## 2023-08-05 RX ADMIN — METHYLPREDNISOLONE SODIUM SUCCINATE 40 MG: 40 INJECTION, POWDER, FOR SOLUTION INTRAMUSCULAR; INTRAVENOUS at 20:42

## 2023-08-05 RX ADMIN — POLYETHYLENE GLYCOL 3350 17 G: 17 POWDER, FOR SOLUTION ORAL at 08:30

## 2023-08-05 RX ADMIN — SODIUM CHLORIDE 75 ML/HR: 0.9 INJECTION, SOLUTION INTRAVENOUS at 08:25

## 2023-08-05 RX ADMIN — SODIUM CHLORIDE TAB 1 GM 2 G: 1 TAB at 12:20

## 2023-08-05 RX ADMIN — DOXYCYCLINE 100 MG: 100 CAPSULE ORAL at 12:20

## 2023-08-05 RX ADMIN — LISINOPRIL 40 MG: 20 TABLET ORAL at 08:30

## 2023-08-05 RX ADMIN — LEVALBUTEROL HYDROCHLORIDE 1.25 MG: 1.25 SOLUTION RESPIRATORY (INHALATION) at 07:25

## 2023-08-05 RX ADMIN — IPRATROPIUM BROMIDE 0.5 MG: 0.5 SOLUTION RESPIRATORY (INHALATION) at 19:19

## 2023-08-05 RX ADMIN — SODIUM CHLORIDE TAB 1 GM 2 G: 1 TAB at 17:56

## 2023-08-05 RX ADMIN — LEVALBUTEROL HYDROCHLORIDE 1.25 MG: 1.25 SOLUTION RESPIRATORY (INHALATION) at 19:19

## 2023-08-05 RX ADMIN — GUAIFENESIN 600 MG: 600 TABLET ORAL at 17:57

## 2023-08-05 RX ADMIN — INSULIN LISPRO 6 UNITS: 100 INJECTION, SOLUTION INTRAVENOUS; SUBCUTANEOUS at 17:55

## 2023-08-05 RX ADMIN — CLONIDINE HYDROCHLORIDE 0.2 MG: 0.1 TABLET ORAL at 08:30

## 2023-08-05 RX ADMIN — INSULIN LISPRO 2 UNITS: 100 INJECTION, SOLUTION INTRAVENOUS; SUBCUTANEOUS at 08:28

## 2023-08-05 RX ADMIN — IPRATROPIUM BROMIDE 0.5 MG: 0.5 SOLUTION RESPIRATORY (INHALATION) at 13:07

## 2023-08-05 RX ADMIN — IPRATROPIUM BROMIDE 0.5 MG: 0.5 SOLUTION RESPIRATORY (INHALATION) at 07:25

## 2023-08-05 RX ADMIN — LEVALBUTEROL HYDROCHLORIDE 1.25 MG: 1.25 SOLUTION RESPIRATORY (INHALATION) at 13:07

## 2023-08-05 RX ADMIN — ENOXAPARIN SODIUM 40 MG: 40 INJECTION SUBCUTANEOUS at 08:26

## 2023-08-05 NOTE — PLAN OF CARE
Problem: PAIN - ADULT  Goal: Verbalizes/displays adequate comfort level or baseline comfort level  Description: Interventions:  - Encourage patient to monitor pain and request assistance  - Assess pain using appropriate pain scale  - Administer analgesics based on type and severity of pain and evaluate response  - Implement non-pharmacological measures as appropriate and evaluate response  - Consider cultural and social influences on pain and pain management  - Notify physician/advanced practitioner if interventions unsuccessful or patient reports new pain  Outcome: Progressing     Problem: SAFETY ADULT  Goal: Patient will remain free of falls  Description: INTERVENTIONS:  - Educate patient/family on patient safety including physical limitations  - Instruct patient to call for assistance with activity   - Consult OT/PT to assist with strengthening/mobility   - Keep Call bell within reach  - Keep bed low and locked with side rails adjusted as appropriate  - Keep care items and personal belongings within reach  - Initiate and maintain comfort rounds  - Make Fall Risk Sign visible to staff  - Offer Toileting every 2 Hours, in advance of need  - Consider moving patient to room near nurses station  Outcome: Progressing     Problem: DISCHARGE PLANNING  Goal: Discharge to home or other facility with appropriate resources  Description: INTERVENTIONS:  - Identify barriers to discharge w/patient and caregiver  - Arrange for needed discharge resources and transportation as appropriate  - Identify discharge learning needs (meds, wound care, etc.)  - Arrange for interpretive services to assist at discharge as needed  - Refer to Case Management Department for coordinating discharge planning if the patient needs post-hospital services based on physician/advanced practitioner order or complex needs related to functional status, cognitive ability, or social support system  Outcome: Progressing     Problem: Knowledge Deficit  Goal: Patient/family/caregiver demonstrates understanding of disease process, treatment plan, medications, and discharge instructions  Description: Complete learning assessment and assess knowledge base.   Interventions:  - Provide teaching at level of understanding  - Provide teaching via preferred learning methods  Outcome: Progressing     Problem: RESPIRATORY - ADULT  Goal: Achieves optimal ventilation and oxygenation  Description: INTERVENTIONS:  - Assess for changes in respiratory status  - Assess for changes in mentation and behavior  - Position to facilitate oxygenation and minimize respiratory effort  - Oxygen administered by appropriate delivery if ordered  - Initiate smoking cessation education as indicated  - Encourage broncho-pulmonary hygiene including cough, deep breathe, Incentive Spirometry  - Assess the need for suctioning and aspirate as needed  - Assess and instruct to report SOB or any respiratory difficulty  - Respiratory Therapy support as indicated  Outcome: Progressing

## 2023-08-05 NOTE — CASE MANAGEMENT
Case Management Assessment & Discharge Planning Note    Patient name Abdulaziz Diallo  Location /-84 MRN [id number]  : 1955 Date 2023       Current Admission Date: 2023  Current Admission Diagnosis:COPD exacerbation [facility])   Patient Active Problem List    Diagnosis Date Noted   • Anxiety about health 2023   • Tachycardia 2023   • Bacteremia 2023   • Vomiting 2023   • COPD exacerbation ([address]) 2020   • Chronic respiratory failure ([address]) 2020   • Acute hyponatremia 2020   • Tobacco abuse 2020   • Essential hypertension 2020   • Type 2 diabetes mellitus without complication, without long-term current use of insulin ([address]) 2020      LOS (days): 1  Geometric Mean LOS (GMLOS) (days): 3.50  Days to GMLOS:2.3     OBJECTIVE:  PATIENT READMITTED [address] of Unplanned Readmission Score: 27.75         Current admission status: Inpatient       Preferred Pharmacy:   [address] [place] [address] #[id number] [place] [address] [address]  Phone: [phone] Fax: [phone] - [address] [address]  Phone: [phone] Fax: [phone]    Primary Care Provider: Wilda Hammond DO    Primary Insurance: [employer] [facility] REP  Secondary Insurance: [address]  [phone]:  [place] [facility] Proxies    There are no active Health Care Proxies on file.                  Readmission Root Cause  30 Day Readmission: Yes  Who directed you to return to the hospital?: Self  Did you understand whom to contact if you had questions or problems?: Yes  Did you get your prescriptions before you left the hospital?: No  Reason[de-identified] Not preferred pharmacy  Were you able to get your prescriptions filled when you left the hospital?: Yes  Did you take your medications as prescribed?: Yes  Were you able to get to your follow-up appointments?: No  Reason[de-identified] Readmitted prior to appointment  During previous admission, was a post-acute recommendation made?: No  Patient was readmitted due to: SOB, anxiety  Action Plan: tbd    Patient Information  Admitted from[de-identified] Home  Mental Status: Alert  During Assessment patient was accompanied by: Not accompanied during assessment  Assessment information provided by[de-identified] Patient  Primary Caregiver: Self  Support Systems: Family members  [place] of Residence: [address] entry access options.  Select all that apply.: Stairs  Number of steps to enter home.: 2  Type of Current Residence: 2 story home  Upon entering residence, is there a bedroom on the main floor (no further steps)?: Yes  Upon entering residence, is there a bathroom on the main floor (no further steps)?: Yes  In the last 12 months, was there a time when you were not able to pay the mortgage or rent on time?: No  In the last 12 months, how many places have you lived?: 1  In the last 12 months, was there a time when you did not have a steady place to sleep or slept in a shelter (including now)?: No  Living Arrangements: Other (Comment) (lives with grandchildren)    Activities of Daily Living Prior to Admission  Functional Status: Independent  Completes ADLs independently?: Yes  Ambulates independently?: Yes  Does patient use assisted devices?: No  Does patient currently own DME?: Yes  What DME does the patient currently own?: Portable Oxygen tanks, Home Oxygen concentrator  Does patient have a history of Outpatient Therapy (PT/OT)?: No  Does the patient have a history of Short-Term Rehab?: No  Does patient have a history of HHC?: No  Does patient currently have [facility]?: No         Patient Information Continued  Does patient have prescription coverage?: Yes  Within the past 12 months, you worried that your food would run out before you got the money to buy more.: Never true  Within the past 12 months, the food you bought just didn't last and you didn't have money to get more.: Never true  Food insecurity resource given?: No  Does patient receive dialysis treatments?: No  Does patient have a history of substance abuse?: No  Does patient have a history of Mental Health Diagnosis?: No         Means of Transportation  Means of Transport to Appts[de-identified] Drives Self  In the past 12 months, has lack of transportation kept you from medical appointments or from getting medications?: No  In the past 12 months, has lack of transportation kept you from meetings, work, or from getting things needed for daily living?: No        DISCHARGE DETAILS:    Discharge planning discussed with[de-identified] patient  Freedom of Choice: Yes     CM contacted family/caregiver?: No- see comments (declined)             Contacts  Patient Contacts: grand daughter, Veronica Hernandez  Relationship to Patient[de-identified] Family                   Would you like to participate in our [address] service program?  : No - Declined

## 2023-08-05 NOTE — OCCUPATIONAL THERAPY NOTE
Occupational Therapy Screen    Patient Name: Pearl Estes  AGXYT'N Date: 8/5/2023 08/05/23 1035   Note Type   Note type Screen     OT orders received. Chart review completed. Patient admitted to [address] [place] [address] on 8/4/2023 with Dx: COPD exacerbation. Spoke with pt's nursing staff who reported patient was I in room. Observed pt completing functional mobility in room @ I. Spoke with pt who reported no concerns regarding ADLs, IADLs or functional mobility upon return to home. Pt appears to be at prior level of functioning at this time and does not require acute OT services. D/C OT effective 8/5/2023. If new concerns arise, please re-consult.     Mary Tom, OTR/L

## 2023-08-05 NOTE — ASSESSMENT & PLAN NOTE
· Has multiple ER visit and admission for the same reason-came with shortness of breath, wheezing  · COVID/flu panel negative  · Imaging does not reveal any underlying pneumonia  · Continue IV steroid, doxycycline, inhaled nebulizer  · Follow respiratory protocol  · CTA PE: no PE, 6 x 9 mm spiculated nodule in the apex of the right upper lobe, not present on a CTA from 11/2/2020, suspicious for cancer. Further evaluation with FDG PET/CT is recommended to determine the metabolic status of this nodule  · Last echo which was done 2020, ejection fraction 55 to 60%. · Pulm consult: continue solumedrol 40 mg q8h, pulmicort, atrovent/xopenex, would benefit from updated PFTs.  Next CT scan to be done 10/15/2023  · Decreased steroids to q12h  · Will need pulmonology follow up on discharge

## 2023-08-05 NOTE — PROGRESS NOTES
[facility]  Progress Note  Name: Isabella Smart  MRN: [id number]  Unit/Bed#: -01 I Date of Admission: 8/4/2023   Date of Service: 8/5/2023 I Hospital Day: 1    Assessment/Plan   * COPD exacerbation ([facility])  Assessment & Plan  · Has multiple ER visit and admission for the same reason-came with shortness of breath, wheezing  · COVID/flu panel negative  · Imaging does not reveal any underlying pneumonia  · Continue IV steroid, doxycycline, inhaled nebulizer  · Follow respiratory protocol  · CTA PE: no PE, 6 x 9 mm spiculated nodule in the apex of the right upper lobe, not present on a CTA from 11/2/2020, suspicious for cancer. Further evaluation with FDG PET/CT is recommended to determine the metabolic status of this nodule  · Last echo which was done 2020, ejection fraction 55 to 60%. · Pulm consult: continue solumedrol 40 mg q8h, pulmicort, atrovent/xopenex, would benefit from updated PFTs. Next CT scan to be done 10/15/2023  · Decreased steroids to q12h  · Will need pulmonology follow up on discharge    Anxiety about health  Assessment & Plan  · Patient admits to having lots of anxiety over her health and with her shortness of breath  · Continue wellbutrin 150 mg qd. Type 2 diabetes mellitus without complication, without long-term current use of insulin [facility])  Assessment & Plan  Lab Results   Component Value Date    HGBA1C 7.0 (H) 07/27/2023       Recent Labs     08/04/23  1625 08/04/23  2055 08/04/23  2154 08/05/23  0724   POCGLU 245* 315* 282* 199*       Blood Sugar Average: Last 72 hrs:  (P) 229.6   Patient remain on steroid therapy which can affect patient blood sugar  Continue sliding scale, lispro as a scheduled  Hypoglycemia precaution  Adjust insulin therapy based on response.     Tobacco abuse  Assessment & Plan  NicoDerm patch    Chronic respiratory failure (HCC)  Assessment & Plan  · Per chart review, patient use 2-3 L of oxygen during rest and evaluated during ambulation  · Currently remain on the baseline  · Continue current treatment         VTE Pharmacologic Prophylaxis: VTE Score: 6 High Risk (Score >/= 5) - Pharmacological DVT Prophylaxis Ordered: enoxaparin (Lovenox). Sequential Compression Devices Ordered. Patient Centered Rounds: I performed bedside rounds with nursing staff today. Discussions with Specialists or Other Care Team Provider: nursing, case management    Education and Discussions with Family / Patient: Updated  (grandchild) via phone. Total Time Spent on Date of Encounter in care of patient: 35 minutes This time was spent on one or more of the following: performing physical exam; counseling and coordination of care; obtaining or reviewing history; documenting in the medical record; reviewing/ordering tests, medications or procedures; communicating with other healthcare professionals and discussing with patient's family/caregivers. Current Length of Stay: 1 day(s)  Current Patient Status: Inpatient   Certification Statement: The patient will continue to require additional inpatient hospital stay due to COPD exacerbation  Discharge Plan: Anticipate discharge in 48-72 hrs to discharge location to be determined pending rehab evaluations. Code Status: Level 1 - Full Code    Subjective:   Patient seen and examined at bedside this morning. She is laying comfortably in bed. She states that her lungs feel much better today than yesterday. Denies any nausea, vomiting, diarrhea, constipation, abdominal pain, CP, fever, chills. Feels her SOB has improved. Does have some anxiety over her health, but stating that this is chronic for her. Does endorse feeling weak and has not been out of bed much. She states she does not remember talking to pulmonology yesterday. Discussed patient's CT scan findings with her, she verbalized understanding.      Objective:     Vitals:   Temp (24hrs), Av.3 °F (36.3 °C), Min:97.2 °F (36.2 °C), Max:97.5 °F (36.4 °C)    Temp:  [97.2 °F (36.2 °C)-97.5 °F (36.4 °C)] 97.3 °F (36.3 °C)  HR:  [75-91] 91  Resp:  [17-19] 19  BP: ()/(50-82) 154/82  SpO2:  [96 %-100 %] 100 %  Body mass index is 28.43 kg/m². Input and Output Summary (last 24 hours): Intake/Output Summary (Last 24 hours) at 8/5/2023 1014  Last data filed at 8/5/2023 1005  Gross per 24 hour   Intake 1745 ml   Output 100 ml   Net 1645 ml       Physical Exam:   Physical Exam  Vitals reviewed. Constitutional:       General: She is not in acute distress. Appearance: She is not ill-appearing or toxic-appearing. HENT:      Head: Normocephalic and atraumatic. Nose: Nose normal.      Mouth/Throat:      Mouth: Mucous membranes are moist.   Eyes:      Pupils: Pupils are equal, round, and reactive to light. Cardiovascular:      Rate and Rhythm: Normal rate and regular rhythm. Pulmonary:      Effort: No respiratory distress. Breath sounds: No stridor. Wheezing (faint expiratory) present. Comments: Decreased breath sounds bilaterally. Saturating well on 2.5 L NC  Abdominal:      General: Bowel sounds are normal. There is no distension. Palpations: Abdomen is soft. There is no mass. Tenderness: There is no abdominal tenderness. Musculoskeletal:      Right lower leg: No edema. Left lower leg: No edema. Skin:     General: Skin is warm and dry. Neurological:      Mental Status: She is alert and oriented to person, place, and time.    Psychiatric:         Mood and Affect: Mood normal.         Behavior: Behavior normal.          Additional Data:     Labs:  Results from last 7 days   Lab Units 08/04/23  0615   WBC Thousand/uL 7.40   HEMOGLOBIN g/dL 12.5   HEMATOCRIT % 38.5   PLATELETS Thousands/uL 155   NEUTROS PCT % 63   LYMPHS PCT % 26   MONOS PCT % 10   EOS PCT % 0     Results from last 7 days   Lab Units 08/04/23  0615   SODIUM mmol/L 135   POTASSIUM mmol/L 3.9   CHLORIDE mmol/L 97   CO2 mmol/L 34*   BUN mg/dL 18 CREATININE mg/dL 0.96   ANION GAP mmol/L 4   CALCIUM mg/dL 9.3   ALBUMIN g/dL 3.7   TOTAL BILIRUBIN mg/dL 0.49   ALK PHOS U/L 50   ALT U/L 12   AST U/L 8*   GLUCOSE RANDOM mg/dL 84         Results from last 7 days   Lab Units 08/05/23  0724 08/04/23  2154 08/04/23  2055 08/04/23  1625 08/04/23  1135   POC GLUCOSE mg/dl 199* 282* 315* 245* 107         Results from last 7 days   Lab Units 08/05/23  0720 08/04/23  1134   PROCALCITONIN ng/ml <0.05 <0.05       Lines/Drains:  Invasive Devices     Peripheral Intravenous Line  Duration           Peripheral IV 08/04/23 Proximal;Right;Ventral (anterior) Forearm <1 day              Imaging: Reviewed radiology reports from this admission including: CTA chest    Recent Cultures (last 7 days):   Results from last 7 days   Lab Units 08/04/23  1135 08/04/23  1134 07/29/23  1015   BLOOD CULTURE  Received in Microbiology Lab. Culture in Progress. Received in Microbiology Lab. Culture in Progress. No Growth After 5 Days.        Last 24 Hours Medication List:   Current Facility-Administered Medications   Medication Dose Route Frequency Provider Last Rate   • acetaminophen  650 mg Oral Q6H PRN Tara Hughes MD     • amLODIPine  10 mg Oral HS Tara Hughes MD     • atorvastatin  40 mg Oral HS Tara Hughes MD     • benzonatate  100 mg Oral TID PRN Tara Hughes MD     • budesonide  0.5 mg Nebulization Q12H Tara Hughes MD     • buPROPion  150 mg Oral Daily Tara Hughes MD     • cloNIDine  0.2 mg Oral Q12H Aston Aguilera MD     • docusate sodium  100 mg Oral BID Tara Hughes MD     • doxycycline hyclate  100 mg Oral Q12H Tara Hughes MD     • enoxaparin  40 mg Subcutaneous Daily Tara Hughes MD     • fluticasone  1 spray Nasal Daily Tara Hughes MD     • guaiFENesin  600 mg Oral BID Tara Hughes MD     • insulin lispro  2-12 Units Subcutaneous TID AC Ailyn Aguilera MD     • insulin lispro  2-12 Units Subcutaneous HS Ailyn MORILLO MD Willy     • insulin lispro  6 Units Subcutaneous TID With Meals Ailyn Aguilera MD     • ipratropium  0.5 mg Nebulization TID Zenon Sorto MD     • levalbuterol  1.25 mg Nebulization TID Zenon Sorto MD     • lisinopril  40 mg Oral Daily Zenon Sorto MD     • methylPREDNISolone sodium succinate  40 mg Intravenous Q12H [address] [facility] [place]     • nicotine  1 patch Transdermal Daily Zenon Sorto MD     • pantoprazole  40 mg Oral Early Morning Zenon Sorto MD     • polyethylene glycol  17 g Oral Daily Zenon Sorto MD     • sodium chloride  2 g Oral TID With Meals Zenon Sorto MD          Today, Patient Was Seen By: Danielle Duong PA-C    **Please Note: This note may have been constructed using a voice recognition system. **

## 2023-08-05 NOTE — ASSESSMENT & PLAN NOTE
· Patient admits to having lots of anxiety over her health and with her shortness of breath  · Continue wellbutrin 150 mg qd.

## 2023-08-05 NOTE — PHYSICAL THERAPY NOTE
Physical Therapy Screen    Patient Name: Dakota Crane    FTRCM'M Date: 8/5/2023     Problem List  Principal Problem:    COPD exacerbation ([address])  Active Problems:    Chronic respiratory failure ([address])    Tobacco abuse    Type 2 diabetes mellitus without complication, without long-term current use of insulin ([facility])    Anxiety about health       Past Medical History  Past Medical History:   Diagnosis Date    Anxiety     COPD (chronic obstructive pulmonary disease) ([address])     Diabetes mellitus ([address])     Essential (primary) hypertension     GERD (gastroesophageal reflux disease)     Smoker         Past Surgical History  History reviewed. No pertinent surgical history. 08/05/23 1033   PT Last Visit   PT Visit Date 08/05/23   Note Type   Note type Screen         Received order for PT consult. Chart reviewed. Pt admitted with diagnosis COPD exacerbation. Spoke with RN, Dinesh Kwok who reports patient is independent in room. Spoke with patient who reports she is at her PLOF of independent at this time. She reports ambulating in room without difficulty. Pt observed completing bed mobility, transfers and ambulation independently. Pt reports no concerns with returning home upon discharge. Will D/C PT services at this time as patient is at her PLOF of independent without acute care PT services warranted. Should patient's status change, please re-consult.     Rossi Bass, PT,DPT

## 2023-08-05 NOTE — ASSESSMENT & PLAN NOTE
· Per chart review, patient use 2-3 L of oxygen during rest and evaluated during ambulation  · Currently remain on the baseline  · Continue current treatment

## 2023-08-05 NOTE — RESPIRATORY THERAPY NOTE
RT Protocol Note  Esaw Anthony 79 y.o. female MRN: [id number]  Unit/Bed#: -01 Encounter: [id number]    Assessment    Principal Problem:    COPD exacerbation ([address])  Active Problems:    Chronic respiratory failure ([address])    Tobacco abuse    Type 2 diabetes mellitus without complication, without long-term current use of insulin ([facility])    Anxiety about health      Home Pulmonary Medications:  Combivent MDI QID  Budesonide BID  Spiriva DPI daily  Albuterol 2.5mg neb prn    Home Devices/Therapy: Home O2    Past Medical History:   Diagnosis Date   • Anxiety    • COPD (chronic obstructive pulmonary disease) ([address])    • Diabetes mellitus ([address] [facility])    • Essential (primary) hypertension    • GERD (gastroesophageal reflux disease)    • Smoker      Social History     Socioeconomic History   • Marital status:      Spouse name: None   • Number of children: None   • Years of education: None   • Highest education level: None   Occupational History   • None   Tobacco Use   • Smoking status: Some Days     Packs/day: 0.50     Types: Cigarettes   • Smokeless tobacco: Never   Vaping Use   • Vaping Use: Never used   Substance and Sexual Activity   • Alcohol use: Not Currently   • Drug use: Never   • Sexual activity: None   Other Topics Concern   • None   Social History Narrative   • None     Social Determinants of Health     Financial Resource Strain: Not on file   Food Insecurity: No Food Insecurity (8/5/2023)    Hunger Vital Sign    • Worried About Running Out of Food in the Last Year: Never true    • Ran Out of Food in the Last Year: Never true   Transportation Needs: No Transportation Needs (8/5/2023)    PRAPARE - Transportation    • Lack of Transportation (Medical): No    • Lack of Transportation (Non-Medical):  No   Physical Activity: Not on file   Stress: Not on file   Social Connections: Not on file   Intimate Partner Violence: Not on file   Housing Stability: Low Risk  (8/5/2023)    Housing Stability Vital Sign    • Unable to Pay for Housing in the Last Year: No    • Number of Places Lived in the Last Year: 1    • Unstable Housing in the Last Year: No       Subjective         Objective    Physical Exam:   Assessment Type: Pre-treatment  General Appearance: Alert, Awake  Respiratory Pattern: Normal  Chest Assessment: Chest expansion symmetrical  Bilateral Breath Sounds: Diminished  Cough: None  O2 Device: 3LNC    Vitals:  Blood pressure 117/62, pulse 97, temperature (!) 97 °F (36.1 °C), resp. rate 16, height 5' 2" (1.575 m), weight 70.5 kg (155 lb 6.8 oz), SpO2 98 %. Imaging and other studies: I have personally reviewed pertinent reports. O2 Device: 3LNC     Plan    Respiratory Plan: No distress/Pulmonary history  Airway Clearance Plan: Discontinue Protocol     Respiratory Comment: No respiratory distress noted on assessment. 3LNC in use, titrated to 2.6EDT without complication. Patient lungs are clear but diminished. No coughing up phelgm or mucus as per patient. No dyspnea on exertion as per patient. Will discontinue airway clearance protocol.

## 2023-08-05 NOTE — ASSESSMENT & PLAN NOTE
Lab Results   Component Value Date    HGBA1C 7.0 (H) 07/27/2023       Recent Labs     08/04/23  1625 08/04/23 2055 08/04/23  2154 08/05/23  0724   POCGLU 245* 315* 282* 199*       Blood Sugar Average: Last 72 hrs:  (P) 229.6   Patient remain on steroid therapy which can affect patient blood sugar  Continue sliding scale, lispro as a scheduled  Hypoglycemia precaution  Adjust insulin therapy based on response.

## 2023-08-06 VITALS
SYSTOLIC BLOOD PRESSURE: 154 MMHG | OXYGEN SATURATION: 97 % | DIASTOLIC BLOOD PRESSURE: 77 MMHG | HEART RATE: 60 BPM | RESPIRATION RATE: 18 BRPM | WEIGHT: 157.41 LBS | TEMPERATURE: 97.5 F | HEIGHT: 62 IN | BODY MASS INDEX: 28.97 KG/M2

## 2023-08-06 PROBLEM — R93.89 ABNORMAL CT SCAN: Status: ACTIVE | Noted: 2023-08-06

## 2023-08-06 LAB — GLUCOSE SERPL-MCNC: 173 MG/DL (ref 65–140)

## 2023-08-06 PROCEDURE — 99239 HOSP IP/OBS DSCHRG MGMT >30: CPT

## 2023-08-06 PROCEDURE — 94640 AIRWAY INHALATION TREATMENT: CPT

## 2023-08-06 PROCEDURE — 82948 REAGENT STRIP/BLOOD GLUCOSE: CPT

## 2023-08-06 PROCEDURE — 94760 N-INVAS EAR/PLS OXIMETRY 1: CPT

## 2023-08-06 RX ORDER — ALBUTEROL SULFATE 2.5 MG/3ML
2.5 SOLUTION RESPIRATORY (INHALATION) EVERY 6 HOURS PRN
Qty: 75 ML | Refills: 0 | Status: SHIPPED | OUTPATIENT
Start: 2023-08-06

## 2023-08-06 RX ORDER — BUDESONIDE 0.5 MG/2ML
0.5 INHALANT ORAL
Qty: 30 ML | Refills: 0 | Status: SHIPPED | OUTPATIENT
Start: 2023-08-06

## 2023-08-06 RX ORDER — BENZONATATE 100 MG/1
100 CAPSULE ORAL 3 TIMES DAILY PRN
Qty: 20 CAPSULE | Refills: 0 | Status: SHIPPED | OUTPATIENT
Start: 2023-08-06

## 2023-08-06 RX ORDER — DOXYCYCLINE HYCLATE 100 MG/1
100 CAPSULE ORAL EVERY 12 HOURS
Qty: 6 CAPSULE | Refills: 0 | Status: SHIPPED | OUTPATIENT
Start: 2023-08-06 | End: 2023-08-09

## 2023-08-06 RX ORDER — SODIUM CHLORIDE 1 G/1
2 TABLET ORAL
Qty: 180 TABLET | Refills: 0 | Status: SHIPPED | OUTPATIENT
Start: 2023-08-06

## 2023-08-06 RX ORDER — PREDNISONE 20 MG/1
TABLET ORAL
Qty: 13 TABLET | Refills: 0 | Status: SHIPPED | OUTPATIENT
Start: 2023-08-06 | End: 2023-08-17

## 2023-08-06 RX ADMIN — INSULIN LISPRO 6 UNITS: 100 INJECTION, SOLUTION INTRAVENOUS; SUBCUTANEOUS at 08:50

## 2023-08-06 RX ADMIN — BUDESONIDE 0.5 MG: 0.5 INHALANT ORAL at 07:14

## 2023-08-06 RX ADMIN — PANTOPRAZOLE SODIUM 40 MG: 40 TABLET, DELAYED RELEASE ORAL at 05:44

## 2023-08-06 RX ADMIN — LISINOPRIL 40 MG: 20 TABLET ORAL at 08:49

## 2023-08-06 RX ADMIN — IPRATROPIUM BROMIDE 0.5 MG: 0.5 SOLUTION RESPIRATORY (INHALATION) at 07:14

## 2023-08-06 RX ADMIN — GUAIFENESIN 600 MG: 600 TABLET ORAL at 08:49

## 2023-08-06 RX ADMIN — LEVALBUTEROL HYDROCHLORIDE 1.25 MG: 1.25 SOLUTION RESPIRATORY (INHALATION) at 07:14

## 2023-08-06 RX ADMIN — PREDNISONE 40 MG: 20 TABLET ORAL at 08:49

## 2023-08-06 RX ADMIN — POLYETHYLENE GLYCOL 3350 17 G: 17 POWDER, FOR SOLUTION ORAL at 08:49

## 2023-08-06 RX ADMIN — CLONIDINE HYDROCHLORIDE 0.2 MG: 0.1 TABLET ORAL at 08:49

## 2023-08-06 RX ADMIN — ENOXAPARIN SODIUM 40 MG: 40 INJECTION SUBCUTANEOUS at 08:47

## 2023-08-06 RX ADMIN — BUPROPION HYDROCHLORIDE 150 MG: 150 TABLET, EXTENDED RELEASE ORAL at 08:49

## 2023-08-06 RX ADMIN — INSULIN LISPRO 2 UNITS: 100 INJECTION, SOLUTION INTRAVENOUS; SUBCUTANEOUS at 08:47

## 2023-08-06 NOTE — NURSING NOTE
AVS and COPD education reviewed with patient, patient verbalized understanding. Patient escorted to front of building via wheelchair. Assisted into front passenger side of personal vehicle. Intent is to go home with niece. Status unchanged since initial assessment.

## 2023-08-06 NOTE — INCIDENTAL FINDINGS
The following findings require follow up:  Radiographic finding   Finding: 6 x 9 mm spiculated nodule in the apex of the right upper lobe, not present on a CTA from 11/2/2020, suspicious for cancer   Follow up required: FDG PET/CT    Follow up should be done within 3 month(s) - follow up with PCP in 1 week to discuss scheduling    Please notify the following clinician to assist with the follow up:   Dr. Lesia Gambino, DO

## 2023-08-06 NOTE — ASSESSMENT & PLAN NOTE
Lab Results   Component Value Date    HGBA1C 7.0 (H) 07/27/2023       Recent Labs     08/05/23  1059 08/05/23  1627 08/05/23  2114 08/06/23  0745   POCGLU 297* 127 197* 173*       Blood Sugar Average: Last 72 hrs:  (P) [id number]   Patient remain on steroid therapy which can affect patient blood sugar  Continue sliding scale, lispro as scheduled  Hypoglycemia precaution  Adjust insulin therapy based on response.

## 2023-08-06 NOTE — UTILIZATION REVIEW
NOTIFICATION OF INPATIENT ADMISSION   AUTHORIZATION REQUEST   SERVICING FACILITY:   [address]  Tax ID: [id number]  NPI: [id number] ATTENDING PROVIDER:  Attending Name and NPI#: Toñito Zhong Md [[id number]]  Address: [address] [place] [address]  Phone: [phone]   ADMISSION INFORMATION:  Place of Service: [address] [place] [address] Code: 21  Inpatient Admission Date/Time: 8/4/23  8:08 AM  Discharge Date/Time: 8/6/2023 10:55 AM  Admitting Diagnosis Code/Description:  Shortness of breath [R06.02]  Anxiety [F41.9]  COPD exacerbation ([facility]) [J44.1]     UTILIZATION REVIEW CONTACT:  Wade Chapa Utilization   Network Utilization Review Department  Phone: [phone]  Fax [phone]  Email: [email or web address]  Contact for approvals/pending authorizations, clinical reviews, and discharge. PHYSICIAN ADVISORY SERVICES:  Medical Necessity Denial & Rrlr-wy-Vckn Review  Phone: [phone]  Fax: [phone]  Email: [email or web address]

## 2023-08-06 NOTE — DISCHARGE SUMMARY
[address] [facility]  Discharge- Chula Palomino 1955, 79 y.o. female MRN: [id number]  Unit/Bed#: -01 Encounter: [id number]  Primary Care Provider: Lana Vidales DO   Date and time admitted to hospital: 8/4/2023  5:45 AM    * COPD exacerbation ([facility])  Assessment & Plan  · Has multiple ER visit and admission for the same reason-came with shortness of breath, wheezing  · COVID/flu panel negative  · Imaging does not reveal any underlying pneumonia  · Continue IV steroid, doxycycline, inhaled nebulizer  · Follow respiratory protocol  · CTA PE: no PE, 6 x 9 mm spiculated nodule in the apex of the right upper lobe, not present on a CTA from 11/2/2020, suspicious for cancer. Further evaluation with FDG PET/CT is recommended to determine the metabolic status of this nodule  · Last echo which was done 2020, ejection fraction 55 to 60%. · Pulm consult: continue solumedrol 40 mg q8h, pulmicort, atrovent/xopenex, would benefit from updated PFTs. Next CT scan to be done 10/15/2023  · Started on prednisone 40 mg to do taper by 10 mg every 3 days until completion. · Will need pulmonology follow up on discharge  · Given pulmonary rehab referral    Abnormal CT scan  Assessment & Plan  · CTA chest done showing: 6 x 9 mm spiculated nodule in the apex of the right upper lobe, not present on a CTA from 11/2/2020, suspicious for cancer. Further evaluation with FDG PET/CT is recommended to determine the metabolic status of this nodule  · Pulmonology discussed extensively at bedside with patient regarding this  · Instructed her to follow up with PCP and pulmonology for further imaging in 3 months. Anxiety about health  Assessment & Plan  · Patient admits to having lots of anxiety over her health and with her shortness of breath  · Continue wellbutrin 150 mg qd.      Type 2 diabetes mellitus without complication, without long-term current use of insulin [facility])  Assessment & Plan  Lab Results   Component Value Date    HGBA1C 7.0 (H) 07/27/2023       Recent Labs     08/05/23  1059 08/05/23  1627 08/05/23  2114 08/06/23  0745   POCGLU 297* 127 197* 173*       Blood Sugar Average: Last 72 hrs:  (P) [id number]   Patient remain on steroid therapy which can affect patient blood sugar  Continue sliding scale, lispro as scheduled  Hypoglycemia precaution  Adjust insulin therapy based on response. Tobacco abuse  Assessment & Plan  NicoDerm patch    Chronic respiratory failure ([address])  Assessment & Plan  · Per chart review, patient use 2-3 L of oxygen during rest and evaluated during ambulation  · Currently remain on the baseline  · Continue current treatment    Medical Problems     Resolved Problems  Date Reviewed: 8/6/2023   None       Discharging Physician / Practitioner: Bj Daly PA-C  PCP: Kolton Layman,   Admission Date:   Admission Orders (From admission, onward)     Ordered        08/04/23 0808  INPATIENT ADMISSION  Once                      Discharge Date: 08/06/23    Consultations During Hospital Stay:  · Pulmonology    Procedures Performed:   · none    Significant Findings / Test Results:   · CTA chest pe study: No evidence of pulmonary embolus. 6 x 9 mm spiculated nodule in the apex of the right upper lobe, not present on a CTA from 11/2/2020, suspicious for cancer. Further evaluation with FDG PET/CT is recommended to determine the metabolic status of this nodule  · XR chest: no acute cardiopulmonary disease  · procal negative x2  · Blood culture no growth at 24 hours  · BNP: 11    Incidental Findings:   · See above   · I reviewed the above mentioned incidental findings with the patient and/or family and they expressed understanding. - Pulmonology had extensive conversation as well regarding these findings    Test Results Pending at Discharge (will require follow up):   · none     Outpatient Tests Requested:  · Follow-up with pulmonology in 1 week.   Should have PFTs done outpatient. · Repeat CT chest in 3 months  · Follow-up with PCP in 1 week    Complications:  none    Reason for Admission: COPD exacerbation    Hospital Course:   Viviana Borrego is a 79 y.o. female patient who originally presented to the hospital on 8/4/2023 due to shortness of breath and wheezing. Patient visited ER multiple times due to shortness of breath and in the emergency department suspected to be in COPD exacerbation. Patient is on her baseline oxygen the entire hospitalization. Due to shortness of breath, CTA was done with results as above. Pulmonology was consulted. Pulmonology discussed patient's lung nodule finding recommending the possibility of PET CT scan versus interval 3 months and patient wants to repeat in 3 months. Next CT scan will be on 10/15/2023. Due to her COPD, would benefit from updated PFTs. Was started on Solu-Medrol 40 mg every 8 hours. Patient endorsed feeling weak, PT OT was consulted but patient was ambulating well on her own in the room and PT and OT had no recommendations. Patient's steroids were decreased to twice daily due to improvement of lungs and symptoms. On day of discharge, patient was decreased to prednisone 40 mg daily. She is going to be discharged home on a prednisone taper. She was also started on doxycycline while inpatient due to the COPD exacerbation and will be discharged with 3 more days of doxycycline to complete 5-day course. She should follow-up with PCP in 1 week. Follow-up with pulmonology in 1 week. Follow-up with pulmonology to discuss PFTs and lung nodule. Should have repeat chest CT done in 3 months. Discussed that she should follow-up with her PCP to talk about anxiety over her health as well as patient was very anxious over her COPD. Patient being discharged home. Please see above list of diagnoses and related plan for additional information.      Condition at Discharge: fair    Discharge Day Visit / Exam:   Subjective: Patient seen and examined at bedside this morning. She states that she is feeling very well. She has no shortness of breath and no wheezing. She said that she slept well. She is moving around the room without difficulty. She is on her baseline oxygen. She offers no acute complaints. She denies any nausea, vomiting, diarrhea, constipation, fever, chills, chest pain, shortness of breath. Discussed with patient that she would be ready for discharge today and she stated, "what about tomorrow I would rather sleep here for another night and go home that would make me feel better and ease my anxiety ". Discussed with patient that she is medically stable to be discharged home and if she starts having more symptoms she should return back to the emergency department for evaluation. Patient verbalized understanding. Vitals: Blood Pressure: 154/77 (08/06/23 0746)  Pulse: 60 (08/06/23 0746)  Temperature: 97.5 °F (36.4 °C) (08/06/23 0746)  Temp Source: Temporal (08/04/23 0944)  Respirations: 18 (08/05/23 2214)  Height: 5' 2" (157.5 cm) (08/04/23 0549)  Weight - Scale: 71.4 kg (157 lb 6.5 oz) (08/06/23 0544)  SpO2: 97 % (08/06/23 0852)  Exam:   Physical Exam  Vitals reviewed. Constitutional:       General: She is not in acute distress. Appearance: She is not ill-appearing or toxic-appearing. HENT:      Head: Normocephalic and atraumatic. Mouth/Throat:      Mouth: Mucous membranes are moist.   Eyes:      Pupils: Pupils are equal, round, and reactive to light. Cardiovascular:      Rate and Rhythm: Normal rate and regular rhythm. Heart sounds: No murmur heard. Pulmonary:      Effort: No respiratory distress. Breath sounds: No stridor. No wheezing, rhonchi or rales. Comments: Saturating well on 2.5 L NC, decreased breath sounds but no wheezing, rhonchi, rales appreciated  Abdominal:      General: Bowel sounds are normal. There is no distension. Palpations: Abdomen is soft. There is no mass. Tenderness: There is no abdominal tenderness. Musculoskeletal:         General: Normal range of motion. Right lower leg: No edema. Left lower leg: No edema. Skin:     General: Skin is warm and dry. Neurological:      General: No focal deficit present. Mental Status: She is alert and oriented to person, place, and time. Psychiatric:         Mood and Affect: Mood normal.         Behavior: Behavior normal.          Discussion with Family: Attempted to update  (grandchild) via phone. Left voicemail. Discharge instructions/Information to patient and family:   See after visit summary for information provided to patient and family. Provisions for Follow-Up Care:  See after visit summary for information related to follow-up care and any pertinent home health orders. Disposition:   Home    Planned Readmission: no but high chance due to patient's anxiety regarding her COPD and frequent exacerbations     Discharge Statement:  I spent 45 minutes discharging the patient. This time was spent on the day of discharge. I had direct contact with the patient on the day of discharge. Greater than 50% of the total time was spent examining patient, answering all patient questions, arranging and discussing plan of care with patient as well as directly providing post-discharge instructions. Additional time then spent on discharge activities. Discharge Medications:  See after visit summary for reconciled discharge medications provided to patient and/or family.       **Please Note: This note may have been constructed using a voice recognition system**

## 2023-08-06 NOTE — DISCHARGE INSTR - AVS FIRST PAGE
Dear Milton Spencer,     It was our pleasure to care for you here at [facility]. For follow up as well as any medication refills, we recommend that you follow up with your primary care physician. Here are the most important instructions/ recommendations at discharge:     Notable Medication Adjustments -   Start taking prednisone taper  Start taking doxycyline for 3 days  Testing Required after Discharge -    Repeat CT chest scan in 3 months (due on 10/15/2023) follow up with your PCP in 1 week to discuss this  Discuss with PCP updating your PFTs  Important follow up information -   Follow up with PCP in 1 week  Follow up with pulmonology in 1 week  Other Instructions -   Please continue to take medications as prescribed. It is important to follow up with pulmonology regarding your COPD exacerbations  Please review this entire after visit summary as additional general instructions including medication list, appointments, activity, diet, any pertinent wound care, and other additional recommendations from your care team that may be provided for you.       Sincerely,     Phillip Bullard PA-C

## 2023-08-06 NOTE — ASSESSMENT & PLAN NOTE
· Has multiple ER visit and admission for the same reason-came with shortness of breath, wheezing  · COVID/flu panel negative  · Imaging does not reveal any underlying pneumonia  · Continue IV steroid, doxycycline, inhaled nebulizer  · Follow respiratory protocol  · CTA PE: no PE, 6 x 9 mm spiculated nodule in the apex of the right upper lobe, not present on a CTA from 11/2/2020, suspicious for cancer. Further evaluation with FDG PET/CT is recommended to determine the metabolic status of this nodule  · Last echo which was done 2020, ejection fraction 55 to 60%. · Pulm consult: continue solumedrol 40 mg q8h, pulmicort, atrovent/xopenex, would benefit from updated PFTs. Next CT scan to be done 10/15/2023  · Started on prednisone 40 mg to do taper by 10 mg every 3 days until completion.    · Will need pulmonology follow up on discharge  · Given pulmonary rehab referral

## 2023-08-06 NOTE — PLAN OF CARE
Problem: PAIN - ADULT  Goal: Verbalizes/displays adequate comfort level or baseline comfort level  Description: Interventions:  - Encourage patient to monitor pain and request assistance  - Assess pain using appropriate pain scale  - Administer analgesics based on type and severity of pain and evaluate response  - Implement non-pharmacological measures as appropriate and evaluate response  - Consider cultural and social influences on pain and pain management  - Notify physician/advanced practitioner if interventions unsuccessful or patient reports new pain  Outcome: Progressing     Problem: SAFETY ADULT  Goal: Patient will remain free of falls  Description: INTERVENTIONS:  - Educate patient/family on patient safety including physical limitations  - Instruct patient to call for assistance with activity   - Consult OT/PT to assist with strengthening/mobility   - Keep Call bell within reach  - Keep bed low and locked with side rails adjusted as appropriate  - Keep care items and personal belongings within reach  - Initiate and maintain comfort rounds  - Make Fall Risk Sign visible to staff  - Offer Toileting every  Hours, in advance of need  - Initiate/Maintain alarm  - Obtain necessary fall risk management equipment:   - Apply yellow socks and bracelet for high fall risk patients  - Consider moving patient to room near nurses station  Outcome: Progressing  Goal: Maintain or return to baseline ADL function  Description: INTERVENTIONS:  -  Assess patient's ability to carry out ADLs; assess patient's baseline for ADL function and identify physical deficits which impact ability to perform ADLs (bathing, care of mouth/teeth, toileting, grooming, dressing, etc.)  - Assess/evaluate cause of self-care deficits   - Assess range of motion  - Assess patient's mobility; develop plan if impaired  - Assess patient's need for assistive devices and provide as appropriate  - Encourage maximum independence but intervene and supervise when necessary  - Involve family in performance of ADLs  - Assess for home care needs following discharge   - Consider OT consult to assist with ADL evaluation and planning for discharge  - Provide patient education as appropriate  Outcome: Progressing  Goal: Maintains/Returns to pre admission functional level  Description: INTERVENTIONS:  - Perform BMAT or MOVE assessment daily.   - Set and communicate daily mobility goal to care team and patient/family/caregiver. - Collaborate with rehabilitation services on mobility goals if consulted  - Perform Range of Motion  times a day. - Reposition patient every  hours. - Dangle patient  times a day  - Stand patient  times a day  - Ambulate patient  times a day  - Out of bed to chair  times a day   - Out of bed for meals  times a day  - Out of bed for toileting  - Record patient progress and toleration of activity level   Outcome: Progressing     Problem: DISCHARGE PLANNING  Goal: Discharge to home or other facility with appropriate resources  Description: INTERVENTIONS:  - Identify barriers to discharge w/patient and caregiver  - Arrange for needed discharge resources and transportation as appropriate  - Identify discharge learning needs (meds, wound care, etc.)  - Arrange for interpretive services to assist at discharge as needed  - Refer to Case Management Department for coordinating discharge planning if the patient needs post-hospital services based on physician/advanced practitioner order or complex needs related to functional status, cognitive ability, or social support system  Outcome: Progressing     Problem: Knowledge Deficit  Goal: Patient/family/caregiver demonstrates understanding of disease process, treatment plan, medications, and discharge instructions  Description: Complete learning assessment and assess knowledge base.   Interventions:  - Provide teaching at level of understanding  - Provide teaching via preferred learning methods  Outcome: Progressing     Problem: MOBILITY - ADULT  Goal: Maintain or return to baseline ADL function  Description: INTERVENTIONS:  -  Assess patient's ability to carry out ADLs; assess patient's baseline for ADL function and identify physical deficits which impact ability to perform ADLs (bathing, care of mouth/teeth, toileting, grooming, dressing, etc.)  - Assess/evaluate cause of self-care deficits   - Assess range of motion  - Assess patient's mobility; develop plan if impaired  - Assess patient's need for assistive devices and provide as appropriate  - Encourage maximum independence but intervene and supervise when necessary  - Involve family in performance of ADLs  - Assess for home care needs following discharge   - Consider OT consult to assist with ADL evaluation and planning for discharge  - Provide patient education as appropriate  Outcome: Progressing  Goal: Maintains/Returns to pre admission functional level  Description: INTERVENTIONS:  - Perform BMAT or MOVE assessment daily.   - Set and communicate daily mobility goal to care team and patient/family/caregiver. - Collaborate with rehabilitation services on mobility goals if consulted  - Perform Range of Motion  times a day. - Reposition patient every  hours.   - Dangle patient  times a day  - Stand patient  times a day  - Ambulate patient  times a day  - Out of bed to chair  times a day   - Out of bed for meals times a day  - Out of bed for toileting  - Record patient progress and toleration of activity level   Outcome: Progressing     Problem: RESPIRATORY - ADULT  Goal: Achieves optimal ventilation and oxygenation  Description: INTERVENTIONS:  - Assess for changes in respiratory status  - Assess for changes in mentation and behavior  - Position to facilitate oxygenation and minimize respiratory effort  - Oxygen administered by appropriate delivery if ordered  - Initiate smoking cessation education as indicated  - Encourage broncho-pulmonary hygiene including cough, deep breathe, Incentive Spirometry  - Assess the need for suctioning and aspirate as needed  - Assess and instruct to report SOB or any respiratory difficulty  - Respiratory Therapy support as indicated  Outcome: Progressing

## 2023-08-06 NOTE — ASSESSMENT & PLAN NOTE
· CTA chest done showing: 6 x 9 mm spiculated nodule in the apex of the right upper lobe, not present on a CTA from 11/2/2020, suspicious for cancer. Further evaluation with FDG PET/CT is recommended to determine the metabolic status of this nodule  · Pulmonology discussed extensively at bedside with patient regarding this  · Instructed her to follow up with PCP and pulmonology for further imaging in 3 months.

## 2023-08-07 NOTE — UTILIZATION REVIEW
NOTIFICATION OF ADMISSION DISCHARGE   This is a Notification of Discharge from [address] [place] [address]. Please be advised that this patient has been discharge from our facility. Below you will find the admission and discharge date and time including the patient’s disposition. UTILIZATION REVIEW CONTACT:  Arsenio Sam  Utilization   Network Utilization Review Department  Phone: [phone] x carefully listen to the prompts. All voicemails are confidential.  Email: [email or web address]     ADMISSION INFORMATION  PRESENTATION DATE: 8/4/2023  5:45 AM  OBERVATION ADMISSION DATE:   INPATIENT ADMISSION DATE: 8/4/23  8:08 AM   DISCHARGE DATE: 8/6/2023 10:55 AM   DISPOSITION:Home/Self Care    IMPORTANT INFORMATION:  Send all requests for admission clinical reviews, approved or denied determinations and any other requests to dedicated fax number below belonging to the campus where the patient is receiving treatment.  List of dedicated fax numbers:  [place] DENIALS (Administrative/Medical Necessity) [phone] [facility] (Maternity/NICU/Pediatrics) [phone]   [facility] [place] [phone]   [facility] [phone] [address] [facility] [phone]   [address] [place] [address] [phone]   [facility] [phone]   [address] [place], [place] [address]e [address] [facility] [phone]   [facility] [phone] [address] [phone] [address] [facility] [address] [place] [address] [phone]

## 2023-08-09 LAB
BACTERIA BLD CULT: NORMAL
BACTERIA BLD CULT: NORMAL

## 2023-08-25 PROCEDURE — 99285 EMERGENCY DEPT VISIT HI MDM: CPT

## 2023-08-25 PROCEDURE — 94640 AIRWAY INHALATION TREATMENT: CPT

## 2023-08-26 ENCOUNTER — HOSPITAL ENCOUNTER (INPATIENT)
Facility: HOSPITAL | Age: 68
LOS: 1 days | Discharge: HOME | DRG: 190 | End: 2023-08-27
Attending: EMERGENCY MEDICINE | Admitting: FAMILY MEDICINE
Payer: COMMERCIAL

## 2023-08-26 ENCOUNTER — APPOINTMENT (EMERGENCY)
Dept: RADIOLOGY | Facility: HOSPITAL | Age: 68
DRG: 190 | End: 2023-08-26
Payer: COMMERCIAL

## 2023-08-26 DIAGNOSIS — R09.02 HYPOXIA: ICD-10-CM

## 2023-08-26 DIAGNOSIS — R06.00 DYSPNEA: Primary | ICD-10-CM

## 2023-08-26 DIAGNOSIS — J44.1 COPD WITH ACUTE EXACERBATION (HCC): ICD-10-CM

## 2023-08-26 PROBLEM — J96.20 ACUTE ON CHRONIC RESPIRATORY FAILURE (HCC): Status: ACTIVE | Noted: 2020-11-02

## 2023-08-26 LAB
ALBUMIN SERPL BCP-MCNC: 3.9 G/DL (ref 3.5–5)
ALP SERPL-CCNC: 89 U/L (ref 34–104)
ALT SERPL W P-5'-P-CCNC: 13 U/L (ref 7–52)
ANION GAP SERPL CALCULATED.3IONS-SCNC: 4 MMOL/L
APTT PPP: 31 SECONDS (ref 23–37)
AST SERPL W P-5'-P-CCNC: 14 U/L (ref 13–39)
BASOPHILS # BLD AUTO: 0.05 THOUSANDS/ÂΜL (ref 0–0.1)
BASOPHILS NFR BLD AUTO: 1 % (ref 0–1)
BILIRUB SERPL-MCNC: 0.27 MG/DL (ref 0.2–1)
BNP SERPL-MCNC: 41 PG/ML (ref 0–100)
BUN SERPL-MCNC: 13 MG/DL (ref 5–25)
CALCIUM SERPL-MCNC: 9.5 MG/DL (ref 8.4–10.2)
CARDIAC TROPONIN I PNL SERPL HS: 7 NG/L
CHLORIDE SERPL-SCNC: 99 MMOL/L (ref 96–108)
CO2 SERPL-SCNC: 32 MMOL/L (ref 21–32)
CREAT SERPL-MCNC: 0.73 MG/DL (ref 0.6–1.3)
EOSINOPHIL # BLD AUTO: 0.12 THOUSAND/ÂΜL (ref 0–0.61)
EOSINOPHIL NFR BLD AUTO: 1 % (ref 0–6)
ERYTHROCYTE [DISTWIDTH] IN BLOOD BY AUTOMATED COUNT: 13 % (ref 11.6–15.1)
FLUAV RNA RESP QL NAA+PROBE: NEGATIVE
FLUBV RNA RESP QL NAA+PROBE: NEGATIVE
GFR SERPL CREATININE-BSD FRML MDRD: 86 ML/MIN/1.73SQ M
GLUCOSE SERPL-MCNC: 128 MG/DL (ref 65–140)
GLUCOSE SERPL-MCNC: 176 MG/DL (ref 65–140)
GLUCOSE SERPL-MCNC: 213 MG/DL (ref 65–140)
GLUCOSE SERPL-MCNC: 243 MG/DL (ref 65–140)
GLUCOSE SERPL-MCNC: 290 MG/DL (ref 65–140)
GLUCOSE SERPL-MCNC: 295 MG/DL (ref 65–140)
HCT VFR BLD AUTO: 38 % (ref 34.8–46.1)
HGB BLD-MCNC: 11.8 G/DL (ref 11.5–15.4)
IMM GRANULOCYTES # BLD AUTO: 0.08 THOUSAND/UL (ref 0–0.2)
IMM GRANULOCYTES NFR BLD AUTO: 1 % (ref 0–2)
INR PPP: 0.82 (ref 0.84–1.19)
LYMPHOCYTES # BLD AUTO: 1.88 THOUSANDS/ÂΜL (ref 0.6–4.47)
LYMPHOCYTES NFR BLD AUTO: 21 % (ref 14–44)
MAGNESIUM SERPL-MCNC: 2.3 MG/DL (ref 1.9–2.7)
MCH RBC QN AUTO: 29.9 PG (ref 26.8–34.3)
MCHC RBC AUTO-ENTMCNC: 31.1 G/DL (ref 31.4–37.4)
MCV RBC AUTO: 96 FL (ref 82–98)
MONOCYTES # BLD AUTO: 1.18 THOUSAND/ÂΜL (ref 0.17–1.22)
MONOCYTES NFR BLD AUTO: 13 % (ref 4–12)
NEUTROPHILS # BLD AUTO: 5.54 THOUSANDS/ÂΜL (ref 1.85–7.62)
NEUTS SEG NFR BLD AUTO: 63 % (ref 43–75)
NRBC BLD AUTO-RTO: 0 /100 WBCS
PLATELET # BLD AUTO: 242 THOUSANDS/UL (ref 149–390)
PMV BLD AUTO: 10.4 FL (ref 8.9–12.7)
POTASSIUM SERPL-SCNC: 4.4 MMOL/L (ref 3.5–5.3)
PROT SERPL-MCNC: 7.2 G/DL (ref 6.4–8.4)
PROTHROMBIN TIME: 11.6 SECONDS (ref 11.6–14.5)
RBC # BLD AUTO: 3.95 MILLION/UL (ref 3.81–5.12)
RSV RNA RESP QL NAA+PROBE: NEGATIVE
SARS-COV-2 RNA RESP QL NAA+PROBE: NEGATIVE
SODIUM SERPL-SCNC: 135 MMOL/L (ref 135–147)
WBC # BLD AUTO: 8.85 THOUSAND/UL (ref 4.31–10.16)

## 2023-08-26 PROCEDURE — 94760 N-INVAS EAR/PLS OXIMETRY 1: CPT

## 2023-08-26 PROCEDURE — 93005 ELECTROCARDIOGRAM TRACING: CPT

## 2023-08-26 PROCEDURE — 71046 X-RAY EXAM CHEST 2 VIEWS: CPT

## 2023-08-26 PROCEDURE — 0241U HB NFCT DS VIR RESP RNA 4 TRGT: CPT | Performed by: EMERGENCY MEDICINE

## 2023-08-26 PROCEDURE — 96374 THER/PROPH/DIAG INJ IV PUSH: CPT

## 2023-08-26 PROCEDURE — 80053 COMPREHEN METABOLIC PANEL: CPT | Performed by: EMERGENCY MEDICINE

## 2023-08-26 PROCEDURE — 94664 DEMO&/EVAL PT USE INHALER: CPT

## 2023-08-26 PROCEDURE — 82948 REAGENT STRIP/BLOOD GLUCOSE: CPT

## 2023-08-26 PROCEDURE — 94640 AIRWAY INHALATION TREATMENT: CPT

## 2023-08-26 PROCEDURE — 83880 ASSAY OF NATRIURETIC PEPTIDE: CPT | Performed by: EMERGENCY MEDICINE

## 2023-08-26 PROCEDURE — 85025 COMPLETE CBC W/AUTO DIFF WBC: CPT | Performed by: EMERGENCY MEDICINE

## 2023-08-26 PROCEDURE — 99285 EMERGENCY DEPT VISIT HI MDM: CPT | Performed by: EMERGENCY MEDICINE

## 2023-08-26 PROCEDURE — 99223 1ST HOSP IP/OBS HIGH 75: CPT | Performed by: FAMILY MEDICINE

## 2023-08-26 PROCEDURE — 36415 COLL VENOUS BLD VENIPUNCTURE: CPT | Performed by: EMERGENCY MEDICINE

## 2023-08-26 PROCEDURE — 84484 ASSAY OF TROPONIN QUANT: CPT | Performed by: EMERGENCY MEDICINE

## 2023-08-26 PROCEDURE — 85730 THROMBOPLASTIN TIME PARTIAL: CPT | Performed by: EMERGENCY MEDICINE

## 2023-08-26 PROCEDURE — 85610 PROTHROMBIN TIME: CPT | Performed by: EMERGENCY MEDICINE

## 2023-08-26 PROCEDURE — 83735 ASSAY OF MAGNESIUM: CPT | Performed by: EMERGENCY MEDICINE

## 2023-08-26 RX ORDER — FLUTICASONE PROPIONATE 50 MCG
1 SPRAY, SUSPENSION (ML) NASAL DAILY
Status: DISCONTINUED | OUTPATIENT
Start: 2023-08-26 | End: 2023-08-27 | Stop reason: HOSPADM

## 2023-08-26 RX ORDER — CLONIDINE HYDROCHLORIDE 0.1 MG/1
0.2 TABLET ORAL EVERY 12 HOURS SCHEDULED
Status: DISCONTINUED | OUTPATIENT
Start: 2023-08-26 | End: 2023-08-27 | Stop reason: HOSPADM

## 2023-08-26 RX ORDER — METOPROLOL TARTRATE 50 MG/1
100 TABLET, FILM COATED ORAL EVERY 12 HOURS SCHEDULED
Status: DISCONTINUED | OUTPATIENT
Start: 2023-08-26 | End: 2023-08-27 | Stop reason: HOSPADM

## 2023-08-26 RX ORDER — METHYLPREDNISOLONE SODIUM SUCCINATE 40 MG/ML
40 INJECTION, POWDER, LYOPHILIZED, FOR SOLUTION INTRAMUSCULAR; INTRAVENOUS EVERY 8 HOURS
Status: DISCONTINUED | OUTPATIENT
Start: 2023-08-26 | End: 2023-08-26

## 2023-08-26 RX ORDER — IPRATROPIUM BROMIDE AND ALBUTEROL SULFATE 2.5; .5 MG/3ML; MG/3ML
3 SOLUTION RESPIRATORY (INHALATION) ONCE
Status: COMPLETED | OUTPATIENT
Start: 2023-08-26 | End: 2023-08-26

## 2023-08-26 RX ORDER — INSULIN LISPRO 100 [IU]/ML
1-6 INJECTION, SOLUTION INTRAVENOUS; SUBCUTANEOUS
Status: DISCONTINUED | OUTPATIENT
Start: 2023-08-26 | End: 2023-08-27 | Stop reason: HOSPADM

## 2023-08-26 RX ORDER — ALBUTEROL SULFATE 2.5 MG/3ML
2.5 SOLUTION RESPIRATORY (INHALATION) ONCE
Status: COMPLETED | OUTPATIENT
Start: 2023-08-26 | End: 2023-08-26

## 2023-08-26 RX ORDER — HEPARIN SODIUM 5000 [USP'U]/ML
5000 INJECTION, SOLUTION INTRAVENOUS; SUBCUTANEOUS EVERY 8 HOURS SCHEDULED
Status: DISCONTINUED | OUTPATIENT
Start: 2023-08-26 | End: 2023-08-27 | Stop reason: HOSPADM

## 2023-08-26 RX ORDER — SODIUM CHLORIDE FOR INHALATION 0.9 %
3 VIAL, NEBULIZER (ML) INHALATION
Status: DISCONTINUED | OUTPATIENT
Start: 2023-08-26 | End: 2023-08-26

## 2023-08-26 RX ORDER — AMLODIPINE BESYLATE 10 MG/1
10 TABLET ORAL
Status: DISCONTINUED | OUTPATIENT
Start: 2023-08-26 | End: 2023-08-27 | Stop reason: HOSPADM

## 2023-08-26 RX ORDER — AZITHROMYCIN 250 MG/1
500 TABLET, FILM COATED ORAL DAILY
Status: DISCONTINUED | OUTPATIENT
Start: 2023-08-26 | End: 2023-08-27 | Stop reason: HOSPADM

## 2023-08-26 RX ORDER — BUPROPION HYDROCHLORIDE 150 MG/1
150 TABLET ORAL DAILY
Status: DISCONTINUED | OUTPATIENT
Start: 2023-08-26 | End: 2023-08-27 | Stop reason: HOSPADM

## 2023-08-26 RX ORDER — BUDESONIDE 0.5 MG/2ML
0.5 INHALANT ORAL
Status: DISCONTINUED | OUTPATIENT
Start: 2023-08-26 | End: 2023-08-27 | Stop reason: HOSPADM

## 2023-08-26 RX ORDER — LISINOPRIL 10 MG/1
40 TABLET ORAL DAILY
Status: DISCONTINUED | OUTPATIENT
Start: 2023-08-26 | End: 2023-08-27 | Stop reason: HOSPADM

## 2023-08-26 RX ORDER — ACETAMINOPHEN 325 MG/1
650 TABLET ORAL EVERY 6 HOURS PRN
Status: DISCONTINUED | OUTPATIENT
Start: 2023-08-26 | End: 2023-08-27 | Stop reason: HOSPADM

## 2023-08-26 RX ORDER — ALBUTEROL SULFATE 2.5 MG/3ML
2.5 SOLUTION RESPIRATORY (INHALATION) EVERY 6 HOURS PRN
Status: DISCONTINUED | OUTPATIENT
Start: 2023-08-26 | End: 2023-08-27 | Stop reason: HOSPADM

## 2023-08-26 RX ORDER — ATORVASTATIN CALCIUM 40 MG/1
40 TABLET, FILM COATED ORAL
Status: DISCONTINUED | OUTPATIENT
Start: 2023-08-26 | End: 2023-08-27 | Stop reason: HOSPADM

## 2023-08-26 RX ORDER — DEXAMETHASONE SODIUM PHOSPHATE 10 MG/ML
6 INJECTION, SOLUTION INTRAMUSCULAR; INTRAVENOUS ONCE
Status: COMPLETED | OUTPATIENT
Start: 2023-08-26 | End: 2023-08-26

## 2023-08-26 RX ORDER — LEVALBUTEROL INHALATION SOLUTION 1.25 MG/3ML
1.25 SOLUTION RESPIRATORY (INHALATION)
Status: DISCONTINUED | OUTPATIENT
Start: 2023-08-26 | End: 2023-08-27 | Stop reason: HOSPADM

## 2023-08-26 RX ORDER — METHYLPREDNISOLONE SODIUM SUCCINATE 40 MG/ML
40 INJECTION, POWDER, LYOPHILIZED, FOR SOLUTION INTRAMUSCULAR; INTRAVENOUS EVERY 12 HOURS SCHEDULED
Status: DISCONTINUED | OUTPATIENT
Start: 2023-08-26 | End: 2023-08-27 | Stop reason: HOSPADM

## 2023-08-26 RX ORDER — SODIUM CHLORIDE 1 G/1
2 TABLET ORAL
Status: DISCONTINUED | OUTPATIENT
Start: 2023-08-26 | End: 2023-08-27 | Stop reason: HOSPADM

## 2023-08-26 RX ORDER — TIOTROPIUM BROMIDE AND OLODATEROL 3.124; 2.736 UG/1; UG/1
2 SPRAY, METERED RESPIRATORY (INHALATION) DAILY
COMMUNITY
Start: 2023-08-02

## 2023-08-26 RX ORDER — NICOTINE 21 MG/24HR
1 PATCH, TRANSDERMAL 24 HOURS TRANSDERMAL DAILY
Status: DISCONTINUED | OUTPATIENT
Start: 2023-08-26 | End: 2023-08-27 | Stop reason: HOSPADM

## 2023-08-26 RX ORDER — PANTOPRAZOLE SODIUM 20 MG/1
20 TABLET, DELAYED RELEASE ORAL
Status: DISCONTINUED | OUTPATIENT
Start: 2023-08-26 | End: 2023-08-27 | Stop reason: HOSPADM

## 2023-08-26 RX ADMIN — ALBUTEROL SULFATE 2.5 MG: 2.5 SOLUTION RESPIRATORY (INHALATION) at 12:14

## 2023-08-26 RX ADMIN — LEVALBUTEROL HYDROCHLORIDE 1.25 MG: 1.25 SOLUTION RESPIRATORY (INHALATION) at 14:53

## 2023-08-26 RX ADMIN — LEVALBUTEROL HYDROCHLORIDE 1.25 MG: 1.25 SOLUTION RESPIRATORY (INHALATION) at 19:35

## 2023-08-26 RX ADMIN — METOPROLOL TARTRATE 100 MG: 50 TABLET, FILM COATED ORAL at 21:17

## 2023-08-26 RX ADMIN — IPRATROPIUM BROMIDE 0.5 MG: 0.5 SOLUTION RESPIRATORY (INHALATION) at 19:35

## 2023-08-26 RX ADMIN — LISINOPRIL 40 MG: 10 TABLET ORAL at 08:42

## 2023-08-26 RX ADMIN — HEPARIN SODIUM 5000 UNITS: 5000 INJECTION INTRAVENOUS; SUBCUTANEOUS at 14:07

## 2023-08-26 RX ADMIN — IPRATROPIUM BROMIDE 0.5 MG: 0.5 SOLUTION RESPIRATORY (INHALATION) at 07:54

## 2023-08-26 RX ADMIN — METHYLPREDNISOLONE SODIUM SUCCINATE 40 MG: 40 INJECTION, POWDER, FOR SOLUTION INTRAMUSCULAR; INTRAVENOUS at 21:17

## 2023-08-26 RX ADMIN — SODIUM CHLORIDE TAB 1 GM 2 G: 1 TAB at 14:06

## 2023-08-26 RX ADMIN — CLONIDINE HYDROCHLORIDE 0.2 MG: 0.1 TABLET ORAL at 21:17

## 2023-08-26 RX ADMIN — HEPARIN SODIUM 5000 UNITS: 5000 INJECTION INTRAVENOUS; SUBCUTANEOUS at 21:17

## 2023-08-26 RX ADMIN — BUDESONIDE 0.5 MG: 0.5 INHALANT RESPIRATORY (INHALATION) at 07:54

## 2023-08-26 RX ADMIN — IPRATROPIUM BROMIDE 0.5 MG: 0.5 SOLUTION RESPIRATORY (INHALATION) at 14:53

## 2023-08-26 RX ADMIN — INSULIN LISPRO 4 UNITS: 100 INJECTION, SOLUTION INTRAVENOUS; SUBCUTANEOUS at 11:56

## 2023-08-26 RX ADMIN — BUPROPION HYDROCHLORIDE 150 MG: 150 TABLET, EXTENDED RELEASE ORAL at 08:42

## 2023-08-26 RX ADMIN — INSULIN LISPRO 4 UNITS: 100 INJECTION, SOLUTION INTRAVENOUS; SUBCUTANEOUS at 21:19

## 2023-08-26 RX ADMIN — AMLODIPINE BESYLATE 10 MG: 10 TABLET ORAL at 21:17

## 2023-08-26 RX ADMIN — BUDESONIDE 0.5 MG: 0.5 INHALANT RESPIRATORY (INHALATION) at 19:35

## 2023-08-26 RX ADMIN — HEPARIN SODIUM 5000 UNITS: 5000 INJECTION INTRAVENOUS; SUBCUTANEOUS at 05:19

## 2023-08-26 RX ADMIN — PANTOPRAZOLE SODIUM 20 MG: 20 TABLET, DELAYED RELEASE ORAL at 05:19

## 2023-08-26 RX ADMIN — INSULIN LISPRO 2 UNITS: 100 INJECTION, SOLUTION INTRAVENOUS; SUBCUTANEOUS at 08:50

## 2023-08-26 RX ADMIN — FLUTICASONE PROPIONATE 1 SPRAY: 50 SPRAY, METERED NASAL at 08:52

## 2023-08-26 RX ADMIN — LEVALBUTEROL HYDROCHLORIDE 1.25 MG: 1.25 SOLUTION RESPIRATORY (INHALATION) at 07:54

## 2023-08-26 RX ADMIN — METHYLPREDNISOLONE SODIUM SUCCINATE 40 MG: 40 INJECTION, POWDER, FOR SOLUTION INTRAMUSCULAR; INTRAVENOUS at 08:42

## 2023-08-26 RX ADMIN — INSULIN LISPRO 3 UNITS: 100 INJECTION, SOLUTION INTRAVENOUS; SUBCUTANEOUS at 16:29

## 2023-08-26 RX ADMIN — AZITHROMYCIN 500 MG: 250 TABLET, FILM COATED ORAL at 03:52

## 2023-08-26 RX ADMIN — IPRATROPIUM BROMIDE AND ALBUTEROL SULFATE 3 ML: 2.5; .5 SOLUTION RESPIRATORY (INHALATION) at 00:28

## 2023-08-26 RX ADMIN — METOPROLOL TARTRATE 100 MG: 50 TABLET, FILM COATED ORAL at 08:49

## 2023-08-26 RX ADMIN — DEXAMETHASONE SODIUM PHOSPHATE 6 MG: 10 INJECTION, SOLUTION INTRAMUSCULAR; INTRAVENOUS at 00:28

## 2023-08-26 RX ADMIN — ATORVASTATIN CALCIUM 40 MG: 40 TABLET, FILM COATED ORAL at 21:17

## 2023-08-26 RX ADMIN — ALBUTEROL SULFATE 2.5 MG: 2.5 SOLUTION RESPIRATORY (INHALATION) at 01:43

## 2023-08-26 NOTE — ASSESSMENT & PLAN NOTE
Lab Results   Component Value Date    HGBA1C 7.0 (H) 07/27/2023       No results for input(s): "POCGLU" in the last 72 hours. Blood Sugar Average: Last 72 hrs:  · Hold oral medications. Start SSI.   · Hypoglycemia protocol

## 2023-08-26 NOTE — ASSESSMENT & PLAN NOTE
· Current smoker, reports she has cut down to 2-3 cigarettes per day   · Smoking cessation encouraged, nicotine patch ordered

## 2023-08-26 NOTE — UTILIZATION REVIEW
NOTIFICATION OF INPATIENT ADMISSION   AUTHORIZATION REQUEST   SERVICING FACILITY:   [address]  Tax ID: [id number]  NPI: [id number] ATTENDING PROVIDER:  Attending Name and NPI#: Meena Bonilla Md [[id number]]  Address: [address] [place] [address]  Phone: [phone]   ADMISSION INFORMATION:  Place of Service: Inpatient [address]  Place of Service Code: 21  Inpatient Admission Date/Time: 8/26/23  1:49 AM  Discharge Date/Time: No discharge date for patient encounter. Admitting Diagnosis Code/Description:  Dyspnea [R06.00]  SOB (shortness of breath) [R06.02]  Hypoxia [R09.02]  COPD with acute exacerbation ([address]) [J44.1]     UTILIZATION REVIEW CONTACT:  Searcy Olszewski, Utilization   Network Utilization Review Department  Phone: [phone]  Fax [phone]  Email: [email or web address]  Contact for approvals/pending authorizations, clinical reviews, and discharge. PHYSICIAN ADVISORY SERVICES:  Medical Necessity Denial & [facility] Review  Phone: [phone]  Fax: [phone]  Email: [email or web address]

## 2023-08-26 NOTE — RESPIRATORY THERAPY NOTE
RT Protocol Note  Lizeth Anders 79 y.o. female MRN: [id number]  Unit/Bed#: -01 Encounter: [id number]    Assessment    Principal Problem:    COPD exacerbation ([address] )  Active Problems:    Acute on chronic respiratory failure ([facility])    Tobacco abuse    Essential hypertension    Type 2 diabetes mellitus without complication, without long-term current use of insulin ([facility])      Home Pulmonary Medications:  Albuterol, Pulmicort, Stiolto Respimat  Home Devices/Therapy: Home O2 (2.5LPM NC)    Past Medical History:   Diagnosis Date    Anxiety     COPD (chronic obstructive pulmonary disease) ([facility])     Diabetes mellitus ([address])     Essential (primary) hypertension     GERD (gastroesophageal reflux disease)     Smoker      Social History     Socioeconomic History    Marital status:      Spouse name: None    Number of children: None    Years of education: None    Highest education level: None   Occupational History    None   Tobacco Use    Smoking status: Some Days     Packs/day: 0.25     Types: Cigarettes    Smokeless tobacco: Never   Vaping Use    Vaping Use: Never used   Substance and Sexual Activity    Alcohol use: Not Currently    Drug use: Never    Sexual activity: None   Other Topics Concern    None   Social History Narrative    None     Social Determinants of Health     Financial Resource Strain: Not on file   Food Insecurity: No Food Insecurity (8/5/2023)    Hunger Vital Sign     Worried About Running Out of Food in the Last Year: Never true     Ran Out of Food in the Last Year: Never true   Transportation Needs: No Transportation Needs (8/5/2023)    PRAPARE - Transportation     Lack of Transportation (Medical): No     Lack of Transportation (Non-Medical):  No   Physical Activity: Not on file   Stress: Not on file   Social Connections: Not on file   Intimate Partner Violence: Not on file   Housing Stability: Low Risk  (8/5/2023)    Housing Stability Vital Sign     Unable to Pay for Housing in the Last Year: No Number of Places Lived in the Last Year: 1     Unstable Housing in the Last Year: No       Subjective         Objective    Physical Exam:   Assessment Type: During-treatment  General Appearance: Alert, Awake  Respiratory Pattern: Dyspnea with exertion  Chest Assessment: Chest expansion symmetrical  Bilateral Breath Sounds: Diminished  Cough: Non-productive  O2 Device: 2. 5LPM NC    Vitals:  Blood pressure 126/56, pulse 75, temperature (!) 96.8 °F (36 °C), resp. rate 18, height 5' 2" (1.575 m), weight 75.4 kg (166 lb 3.6 oz), SpO2 96 %. Imaging and other studies: I have personally reviewed pertinent reports. O2 Device: 2. 5LPM NC     Plan    Respiratory Plan: Home Bronchodilator Patient pathway, Mild Distress pathway        Resp Comments: Pt here with SOB increased from baseline for past 2 days. Pt continues to smoke 0.25-0.5ppd. Pt educated on smoking cessation and COPD. Xopenex/Atrovent TID and Pulmicort ordered.

## 2023-08-26 NOTE — PLAN OF CARE
Problem: PAIN - ADULT  Goal: Verbalizes/displays adequate comfort level or baseline comfort level  Description: Interventions:  - Encourage patient to monitor pain and request assistance  - Assess pain using appropriate pain scale  - Administer analgesics based on type and severity of pain and evaluate response  - Implement non-pharmacological measures as appropriate and evaluate response  - Consider cultural and social influences on pain and pain management  - Notify physician/advanced practitioner if interventions unsuccessful or patient reports new pain  Outcome: Progressing     Problem: INFECTION - ADULT  Goal: Absence or prevention of progression during hospitalization  Description: INTERVENTIONS:  - Assess and monitor for signs and symptoms of infection  - Monitor lab/diagnostic results  - Monitor all insertion sites, i.e. indwelling lines, tubes, and drains  - Monitor endotracheal if appropriate and nasal secretions for changes in amount and color  - [place] appropriate cooling/warming therapies per order  - Administer medications as ordered  - Instruct and encourage patient and family to use good hand hygiene technique  - Identify and instruct in appropriate isolation precautions for identified infection/condition  Outcome: Progressing  Goal: Absence of fever/infection during neutropenic period  Description: INTERVENTIONS:  - Monitor WBC    Outcome: Progressing     Problem: SAFETY ADULT  Goal: Patient will remain free of falls  Description: INTERVENTIONS:  - Educate patient/family on patient safety including physical limitations  - Instruct patient to call for assistance with activity   - Consult OT/PT to assist with strengthening/mobility   - Keep Call bell within reach  - Keep bed low and locked with side rails adjusted as appropriate  - Keep care items and personal belongings within reach  - Initiate and maintain comfort rounds  - Make Fall Risk Sign visible to staff  - Apply yellow socks and bracelet for high fall risk patients  - Consider moving patient to room near nurses station  Outcome: Progressing  Goal: Maintain or return to baseline ADL function  Description: INTERVENTIONS:  -  Assess patient's ability to carry out ADLs; assess patient's baseline for ADL function and identify physical deficits which impact ability to perform ADLs (bathing, care of mouth/teeth, toileting, grooming, dressing, etc.)  - Assess/evaluate cause of self-care deficits   - Assess range of motion  - Assess patient's mobility; develop plan if impaired  - Assess patient's need for assistive devices and provide as appropriate  - Encourage maximum independence but intervene and supervise when necessary  - Involve family in performance of ADLs  - Assess for home care needs following discharge   - Consider OT consult to assist with ADL evaluation and planning for discharge  - Provide patient education as appropriate  Outcome: Progressing  Goal: Maintains/Returns to pre admission functional level  Description: INTERVENTIONS:  - Perform BMAT or MOVE assessment daily.   - Set and communicate daily mobility goal to care team and patient/family/caregiver. - Collaborate with rehabilitation services on mobility goals if consulted  - Perform Range of Motion 3 times a day. - Reposition patient every 2 hours.   - Dangle patient 3 times a day  - Stand patient 3 times a day  - Ambulate patient 3 times a day  - Out of bed to chair 3 times a day   - Out of bed for meals 3 times a day  - Out of bed for toileting  - Record patient progress and toleration of activity level   Outcome: Progressing     Problem: DISCHARGE PLANNING  Goal: Discharge to home or other facility with appropriate resources  Description: INTERVENTIONS:  - Identify barriers to discharge w/patient and caregiver  - Arrange for needed discharge resources and transportation as appropriate  - Identify discharge learning needs (meds, wound care, etc.)  - Arrange for interpretive services to assist at discharge as needed  - Refer to Case Management Department for coordinating discharge planning if the patient needs post-hospital services based on physician/advanced practitioner order or complex needs related to functional status, cognitive ability, or social support system  Outcome: Progressing     Problem: Knowledge Deficit  Goal: Patient/family/caregiver demonstrates understanding of disease process, treatment plan, medications, and discharge instructions  Description: Complete learning assessment and assess knowledge base.   Interventions:  - Provide teaching at level of understanding  - Provide teaching via preferred learning methods  Outcome: Progressing     Problem: RESPIRATORY - ADULT  Goal: Achieves optimal ventilation and oxygenation  Description: INTERVENTIONS:  - Assess for changes in respiratory status  - Assess for changes in mentation and behavior  - Position to facilitate oxygenation and minimize respiratory effort  - Oxygen administered by appropriate delivery if ordered  - Initiate smoking cessation education as indicated  - Encourage broncho-pulmonary hygiene including cough, deep breathe, Incentive Spirometry  - Assess the need for suctioning and aspirate as needed  - Assess and instruct to report SOB or any respiratory difficulty  - Respiratory Therapy support as indicated  Outcome: Progressing

## 2023-08-26 NOTE — ED NOTES
Pt ambulated to bathroom with home O2. Pulse ox checked when pt was back in bed. Pulse ox 84% on 2L NC. O2 increased to 4L NC at this time.       Marleny Johansen  08/26/23 [id number]

## 2023-08-26 NOTE — RESPIRATORY THERAPY NOTE
Resp Care       08/26/23 1214   Inhalation Therapy Tx   $ Inhalation Therapy Performed Yes   $ Pulse Oximetry Spot Check Charge Completed   SpO2 97 %   Pre-Treatment Pulse 78   Pre-Treatment Respirations 18   Duration 20   Breath Sounds Pre-Treatment Bilateral Diminished;Coarse   Delivery Source Oxygen;UDN   Position Sitting   Resp Comments pt requesting PRN tx. C/o SOB after eating lunch. Pt educated on taking slow deep breaths. PRN given bs coarse diminished.

## 2023-08-26 NOTE — ED PROVIDER NOTES
History  Chief Complaint   Patient presents with   • Shortness of Breath     Pt. Having increasing SOB, pt. Hx of COPD, wears O2 @ 2L via NC cont, pt. Was out today all day shopping, pt. Bypro increased SOB all day today, used albuterol last at 915 pm with no relief, denies cough, denies headache, fever or body aches, denies chest pain      Patient is a 81-year-old female with a history of COPD, presenting to the emergency department complaining of worsening shortness of breath throughout the day today, she reports a nonproductive cough, no fever, no chest pain, she does continue to smoke about a half a pack of cigarettes per day, she wears oxygen 24 hours a day, at rest she is normally at 2 L, when active she increases to 4 L, she takes her albuterol nebulizer treatments 4 times a day, and other medications as directed, she denies any sick contacts          Prior to Admission Medications   Prescriptions Last Dose Informant Patient Reported? Taking? albuterol (2.5 mg/3 mL) 0.083 % nebulizer solution 8/26/2023  No Yes   Sig: Take 3 mL (2.5 mg total) by nebulization every 6 (six) hours as needed for wheezing or shortness of breath   amLODIPine (NORVASC) 10 mg tablet 8/26/2023  Yes Yes   Sig: Take 10 mg by mouth daily at bedtime    atorvastatin (LIPITOR) 40 mg tablet 8/26/2023  Yes Yes   Sig: Take 40 mg by mouth daily at bedtime    benzonatate (TESSALON PERLES) 100 mg capsule   No No   Sig: Take 1 capsule (100 mg total) by mouth 3 (three) times a day as needed for cough   buPROPion (WELLBUTRIN XL) 150 mg 24 hr tablet 8/26/2023  No Yes   Sig: Take 1 tablet (150 mg total) by mouth daily Do not start before July 23, 2023. budesonide (PULMICORT) 0.5 mg/2 mL nebulizer solution 8/26/2023  No Yes   Sig: Take 2 mL (0.5 mg total) by nebulization every 12 (twelve) hours Rinse mouth after use.    cloNIDine (CATAPRES) 0.2 mg tablet 8/26/2023  Yes Yes   Sig: Take 0.2 mg by mouth every 12 (twelve) hours   ergocalciferol (VITAMIN D2) 50,000 units 2023  Yes Yes   Sig: Take 50,000 Units by mouth once a week   fluticasone (FLONASE) 50 mcg/act nasal spray 2023  No Yes   Si spray into each nostril daily   glipiZIDE (GLUCOTROL) 10 mg tablet 2023 Self Yes Yes   Sig: Take 10 mg by mouth daily in the early morning W breakfast   ipratropium-albuterol (COMBIVENT RESPIMAT) inhaler 2023  Yes Yes   Sig: Inhale 1 puff 4 (four) times a day   lisinopril (ZESTRIL) 40 mg tablet 2023  Yes Yes   Sig: Take 40 mg by mouth daily   metoprolol tartrate (LOPRESSOR) 100 mg tablet 2023  Yes Yes   Sig: Take 100 mg by mouth every 12 (twelve) hours   omeprazole (PriLOSEC OTC) 20 MG tablet 2023  Yes Yes   Sig: Take 20 mg by mouth daily    pioglitazone (ACTOS) 15 mg tablet 2023  Yes Yes   Sig: Take 15 mg by mouth daily   sodium chloride 1 g tablet 2023  No Yes   Sig: Take 2 tablets (2 g total) by mouth 3 (three) times a day with meals   tiotropium (SPIRIVA) 18 mcg inhalation capsule Not Taking  Yes No   Sig: Place 18 mcg into inhaler and inhale daily   Patient not taking: Reported on 2023   tiotropium-olodaterol (Stiolto Respimat) 2.5-2.5 MCG/ACT inhaler 2023  Yes Yes   Sig: Inhale 2 puffs daily      Facility-Administered Medications: None       Past Medical History:   Diagnosis Date   • Anxiety    • COPD (chronic obstructive pulmonary disease) ([address])    • Diabetes mellitus ([address])    • Essential (primary) hypertension    • GERD (gastroesophageal reflux disease)    • Smoker        History reviewed. No pertinent surgical history. Family History   Problem Relation Age of Onset   • Diabetes Father      I have reviewed and agree with the history as documented.     E-Cigarette/Vaping   • E-Cigarette Use Never User      E-Cigarette/Vaping Substances   • Nicotine No    • THC No    • CBD No    • Flavoring No      Social History     Tobacco Use   • Smoking status: Some Days     Packs/day: 0.25     Types: Cigarettes   • Smokeless tobacco: Never   Vaping Use   • Vaping Use: Never used   Substance Use Topics   • Alcohol use: Not Currently   • Drug use: Never       Review of Systems   Constitutional: Negative. HENT: Negative. Eyes: Negative. Respiratory: Positive for cough, chest tightness, shortness of breath and wheezing. Cardiovascular: Negative. Gastrointestinal: Negative. Endocrine: Negative. Genitourinary: Negative. Musculoskeletal: Negative. Skin: Negative. Allergic/Immunologic: Negative. Neurological: Negative. Hematological: Negative. Psychiatric/Behavioral: Negative. Physical Exam  Physical Exam  Constitutional:       Appearance: She is well-developed. HENT:      Head: Normocephalic and atraumatic. Eyes:      Conjunctiva/sclera: Conjunctivae normal.      Pupils: Pupils are equal, round, and reactive to light. Cardiovascular:      Rate and Rhythm: Normal rate and regular rhythm. Pulmonary:      Effort: Pulmonary effort is normal. Tachypnea present. Breath sounds: Decreased breath sounds and wheezing present. Abdominal:      Palpations: Abdomen is soft. Musculoskeletal:         General: Normal range of motion. Cervical back: Normal range of motion and neck supple. Skin:     General: Skin is warm and dry. Neurological:      Mental Status: She is alert and oriented to person, place, and time.          Vital Signs  ED Triage Vitals [08/26/23 0005]   Temperature Pulse Respirations Blood Pressure SpO2   (!) 97.2 °F (36.2 °C) 88 (!) 26 160/89 (!) 85 %      Temp Source Heart Rate Source Patient Position - Orthostatic VS BP Location FiO2 (%)   Temporal Monitor Sitting Right arm --      Pain Score       No Pain           Vitals:    08/26/23 0005 08/26/23 0045 08/26/23 0145 08/26/23 0218   BP: 160/89 105/68 144/70 133/84   Pulse: 88 76 76 79   Patient Position - Orthostatic VS: Sitting            Visual Acuity      ED Medications  Medications   amLODIPine (NORVASC) tablet 10 mg (has no administration in time range)   atorvastatin (LIPITOR) tablet 40 mg (has no administration in time range)   budesonide (PULMICORT) inhalation solution 0.5 mg (has no administration in time range)   buPROPion (WELLBUTRIN XL) 24 hr tablet 150 mg (has no administration in time range)   fluticasone (FLONASE) 50 mcg/act nasal spray 1 spray (has no administration in time range)   lisinopril (ZESTRIL) tablet 40 mg (has no administration in time range)   metoprolol tartrate (LOPRESSOR) tablet 100 mg (has no administration in time range)   pantoprazole (PROTONIX) EC tablet 20 mg (has no administration in time range)   acetaminophen (TYLENOL) tablet 650 mg (has no administration in time range)   nicotine (NICODERM CQ) 14 mg/24hr TD 24 hr patch 1 patch (has no administration in time range)   ipratropium (ATROVENT) 0.02 % inhalation solution 0.5 mg (has no administration in time range)   levalbuterol (XOPENEX) inhalation solution 1.25 mg (has no administration in time range)     And   sodium chloride 0.9 % inhalation solution 3 mL (has no administration in time range)   methylPREDNISolone sodium succinate (Solu-MEDROL) injection 40 mg (has no administration in time range)   azithromycin (ZITHROMAX) tablet 500 mg (has no administration in time range)   heparin (porcine) subcutaneous injection 5,000 Units (has no administration in time range)   insulin lispro (HumaLOG) 100 units/mL subcutaneous injection 1-6 Units (has no administration in time range)   insulin lispro (HumaLOG) 100 units/mL subcutaneous injection 1-6 Units (has no administration in time range)   ipratropium-albuterol (DUO-NEB) 0.5-2.5 mg/3 mL inhalation solution 3 mL (3 mL Nebulization Given 8/26/23 0028)   dexamethasone (PF) (DECADRON) injection 6 mg (6 mg Intravenous Given 8/26/23 0028)   albuterol inhalation solution 2.5 mg (2.5 mg Nebulization Given 8/26/23 0143)       Diagnostic Studies  Results Reviewed     Procedure Component Value Units Date/Time    Sputum culture and Gram stain [[id number]]     Lab Status: No result Specimen: Sputum     FLU/RSV/COVID - if FLU/RSV clinically relevant [[id number]]  (Normal) Collected: 08/26/23 0028    Lab Status: Final result Specimen: Nares from Nose Updated: 08/26/23 0150     SARS-CoV-2 Negative     INFLUENZA A PCR Negative     INFLUENZA B PCR Negative     RSV PCR Negative    Narrative:      FOR PEDIATRIC PATIENTS - copy/paste COVID Guidelines URL to browser: [email or web address]    SARS-CoV-2 assay is a Nucleic Acid Amplification assay intended for the  qualitative detection of nucleic acid from SARS-CoV-2 in nasopharyngeal  swabs. Results are for the presumptive identification of SARS-CoV-2 RNA. Positive results are indicative of infection with SARS-CoV-2, the virus  causing COVID-19, but do not rule out bacterial infection or co-infection  with other viruses. Laboratories within the [facility] and its  territories are required to report all positive results to the appropriate  public health authorities. Negative results do not preclude SARS-CoV-2  infection and should not be used as the sole basis for treatment or other  patient management decisions. Negative results must be combined with  clinical observations, patient history, and epidemiological information. This test has not been FDA cleared or approved. This test has been authorized by FDA under an Emergency Use Authorization  (EUA). This test is only authorized for the duration of time the  declaration that circumstances exist justifying the authorization of the  emergency use of an in vitro diagnostic tests for detection of SARS-CoV-2  virus and/or diagnosis of COVID-19 infection under section 564(b)(1) of  the Act, 21 U. S.C. 531TZW-0(B)(5), unless the authorization is terminated  or revoked sooner. The test has been validated but independent review by FDA  and CLIA is pending.     Test performed using [employer] GeneXpert: This RT-PCR assay targets N2,  a region unique to SARS-CoV-2. A conserved region in the E-gene was chosen  for pan-Sarbecovirus detection which includes SARS-CoV-2. According to CMS-2020-01-R, this platform meets the definition of high-throughput technology.     B-Type Natriuretic Peptide(BNP) [[id number]]  (Normal) Collected: 08/26/23 0028    Lab Status: Final result Specimen: Blood from Arm, Right Updated: 08/26/23 0135     BNP 41 pg/mL     HS Troponin 0hr (reflex protocol) [[id number]]  (Normal) Collected: 08/26/23 0028    Lab Status: Final result Specimen: Blood from Arm, Right Updated: 08/26/23 0113     hs TnI 0hr 7 ng/L     Comprehensive metabolic panel [[id number]] Collected: 08/26/23 0028    Lab Status: Final result Specimen: Blood from Arm, Right Updated: 08/26/23 0109     Sodium 135 mmol/L      Potassium 4.4 mmol/L      Chloride 99 mmol/L      CO2 32 mmol/L      ANION GAP 4 mmol/L      BUN 13 mg/dL      Creatinine 0.73 mg/dL      Glucose 128 mg/dL      Calcium 9.5 mg/dL      AST 14 U/L      ALT 13 U/L      Alkaline Phosphatase 89 U/L      Total Protein 7.2 g/dL      Albumin 3.9 g/dL      Total Bilirubin 0.27 mg/dL      eGFR 86 ml/min/1.73sq m     Narrative:      [place] guidelines for Chronic Kidney Disease (CKD):   •  Stage 1 with normal or high GFR (GFR > 90 mL/min/1.73 square meters)  •  Stage 2 Mild CKD (GFR = 60-89 mL/min/1.73 square meters)  •  Stage 3A Moderate CKD (GFR = 45-59 mL/min/1.73 square meters)  •  Stage 3B Moderate CKD (GFR = 30-44 mL/min/1.73 square meters)  •  Stage 4 Severe CKD (GFR = 15-29 mL/min/1.73 square meters)  •  Stage 5 End Stage CKD (GFR <15 mL/min/1.73 square meters)  Note: GFR calculation is accurate only with a steady state creatinine    Magnesium [[id number]]  (Normal) Collected: 08/26/23 0028    Lab Status: Final result Specimen: Blood from Arm, Right Updated: 08/26/23 0109     Magnesium 2.3 mg/dL Protime-INR [[id number]]  (Abnormal) Collected: 08/26/23 0028    Lab Status: Final result Specimen: Blood from Arm, Right Updated: 08/26/23 0108     Protime 11.6 seconds      INR 0.82    APTT [[id number]]  (Normal) Collected: 08/26/23 0028    Lab Status: Final result Specimen: Blood from Arm, Right Updated: 08/26/23 0108     PTT 31 seconds     CBC and differential [[id number]]  (Abnormal) Collected: 08/26/23 0028    Lab Status: Final result Specimen: Blood from Arm, Right Updated: 08/26/23 0043     WBC 8.85 Thousand/uL      RBC 3.95 Million/uL      Hemoglobin 11.8 g/dL      Hematocrit 38.0 %      MCV 96 fL      MCH 29.9 pg      MCHC 31.1 g/dL      RDW 13.0 %      MPV 10.4 fL      Platelets 180 Thousands/uL      nRBC 0 /100 WBCs      Neutrophils Relative 63 %      Immat GRANS % 1 %      Lymphocytes Relative 21 %      Monocytes Relative 13 %      Eosinophils Relative 1 %      Basophils Relative 1 %      Neutrophils Absolute 5.54 Thousands/µL      Immature Grans Absolute 0.08 Thousand/uL      Lymphocytes Absolute 1.88 Thousands/µL      Monocytes Absolute 1.18 Thousand/µL      Eosinophils Absolute 0.12 Thousand/µL      Basophils Absolute 0.05 Thousands/µL                  XR chest 2 views   ED Interpretation by Ricky Levy DO (08/26 0107)   No acute findings                 Procedures  ECG 12 Lead Documentation Only    Date/Time: 8/26/2023 12:26 AM    Performed by: Ricky Levy DO  Authorized by: Ricky Levy DO    Indications / Diagnosis:  Shortness of breath  ECG reviewed by me, the ED Provider: yes    Patient location:  ED  Previous ECG:     Previous ECG:  Unavailable    Comparison to cardiac monitor: Yes    Interpretation:     Interpretation: normal    Rate:     ECG rate:  82    ECG rate assessment: normal    Rhythm:     Rhythm: sinus rhythm    Ectopy:     Ectopy: none    QRS:     QRS axis:  Normal    QRS intervals:  Normal  Conduction:     Conduction: normal    ST segments:     ST segments:  Normal  T waves: T waves: normal               ED Course  ED Course as of 08/26/23 0251   Sat Aug 26, 2023   0107 CBC and differential(!)   0145 Notified by nursing staff that patient became hypoxic and tachypneic while ambulating to the bathroom with O2 nasal cannula in place   0249 FLU/RSV/COVID - if FLU/RSV clinically relevant   0249 B-Type Natriuretic Peptide(BNP)   0249 HS Troponin 0hr (reflex protocol)   0249 Comprehensive metabolic panel   7774 Magnesium   0249 APTT   0249 Protime-INR(!)   0249 XR chest 2 views   0249 ECG 12 lead                               SBIRT 20yo+    Flowsheet Row Most Recent Value   Initial Alcohol Screen: US AUDIT-C     1. How often do you have a drink containing alcohol? 0 Filed at: 08/26/2023 0013   2. How many drinks containing alcohol do you have on a typical day you are drinking? 0 Filed at: 08/26/2023 0013   3a. Male UNDER 65: How often do you have five or more drinks on one occasion? 0 Filed at: 08/26/2023 0013   3b. FEMALE Any Age, or MALE 65+: How often do you have 4 or more drinks on one occassion? 0 Filed at: 08/26/2023 0013   Audit-C Score 0 Filed at: 08/26/2023 8193   LIVIER: How many times in the past year have you. .. Used an illegal drug or used a prescription medication for non-medical reasons? Never Filed at: 08/26/2023 0013                    Medical Decision Making  This patient presents with symptoms most consistent with an acute COPD exacerbation. The constellation of symptoms are similar to prior exacerbations. The likely precipitant is viral respiratory infection versus weather change or air quality versus continued smoking of cigarettes. Low suspicion for alternate etiologies such as pneumothorax, acute PE, pneumonia. Presentation not consistent with other acute cardiopulmonary causes including ACS, CHF.   Patient given DuoNeb, albuterol and IV steroids here with improvement of symptoms however, patient became hypoxic and tachypneic upon ambulating to the restroom with nasal cannula O2 in place, therefore admission advised, patient in agreement, accepted by hospitalist service    COPD with acute exacerbation [facility]): chronic illness or injury with exacerbation, progression, or side effects of treatment  Dyspnea: acute illness or injury  Hypoxia: acute illness or injury  Amount and/or Complexity of Data Reviewed  External Data Reviewed: notes. Labs: ordered. Decision-making details documented in ED Course. Radiology: ordered and independent interpretation performed. Decision-making details documented in ED Course. ECG/medicine tests: ordered and independent interpretation performed. Decision-making details documented in ED Course. Risk  Decision regarding hospitalization. Disposition  Final diagnoses:   Dyspnea   COPD with acute exacerbation ([address])   Hypoxia     Time reflects when diagnosis was documented in both MDM as applicable and the Disposition within this note     Time User Action Codes Description Comment    8/26/2023  1:49 AM Macey Olivarez Add [R06.00] Dyspnea     8/26/2023  1:49 AM Macey Olivarez Add [J44.1] COPD with acute exacerbation ([address])     8/26/2023  1:49 AM Severiano Gallus Add [R09.02] Hypoxia       ED Disposition     ED Disposition   Admit    Condition   Stable    Date/Time   Sat Aug 26, 2023  1:49 AM    Comment   Case was discussed with IMMANUEL Pierce and the patient's admission status was agreed to be Admission Status: inpatient status to the service of Dr. Dago Gonzales .            Follow-up Information    None         Current Discharge Medication List      CONTINUE these medications which have NOT CHANGED    Details   albuterol (2.5 mg/3 mL) 0.083 % nebulizer solution Take 3 mL (2.5 mg total) by nebulization every 6 (six) hours as needed for wheezing or shortness of breath  Qty: 75 mL, Refills: 0    Associated Diagnoses: COPD with acute exacerbation (HCC)      amLODIPine (NORVASC) 10 mg tablet Take 10 mg by mouth daily at bedtime       atorvastatin (LIPITOR) 40 mg tablet Take 40 mg by mouth daily at bedtime       budesonide (PULMICORT) 0.5 mg/2 mL nebulizer solution Take 2 mL (0.5 mg total) by nebulization every 12 (twelve) hours Rinse mouth after use. Qty: 30 mL, Refills: 0    Associated Diagnoses: COPD exacerbation (HCC)      buPROPion (WELLBUTRIN XL) 150 mg 24 hr tablet Take 1 tablet (150 mg total) by mouth daily Do not start before July 23, 2023. Qty: 30 tablet, Refills: 0    Associated Diagnoses: Anxiety about health      cloNIDine (CATAPRES) 0.2 mg tablet Take 0.2 mg by mouth every 12 (twelve) hours      ergocalciferol (VITAMIN D2) 50,000 units Take 50,000 Units by mouth once a week      fluticasone (FLONASE) 50 mcg/act nasal spray 1 spray into each nostril daily  Qty: 9.9 mL, Refills: 0    Associated Diagnoses: COPD exacerbation (HCC)      glipiZIDE (GLUCOTROL) 10 mg tablet Take 10 mg by mouth daily in the early morning W breakfast      ipratropium-albuterol (COMBIVENT RESPIMAT) inhaler Inhale 1 puff 4 (four) times a day      lisinopril (ZESTRIL) 40 mg tablet Take 40 mg by mouth daily      metoprolol tartrate (LOPRESSOR) 100 mg tablet Take 100 mg by mouth every 12 (twelve) hours      omeprazole (PriLOSEC OTC) 20 MG tablet Take 20 mg by mouth daily       pioglitazone (ACTOS) 15 mg tablet Take 15 mg by mouth daily      sodium chloride 1 g tablet Take 2 tablets (2 g total) by mouth 3 (three) times a day with meals  Qty: 180 tablet, Refills: 0    Associated Diagnoses: Acute hyponatremia      tiotropium-olodaterol (Stiolto Respimat) 2.5-2.5 MCG/ACT inhaler Inhale 2 puffs daily      benzonatate (TESSALON PERLES) 100 mg capsule Take 1 capsule (100 mg total) by mouth 3 (three) times a day as needed for cough  Qty: 20 capsule, Refills: 0    Associated Diagnoses: COPD exacerbation (HCC)      tiotropium (SPIRIVA) 18 mcg inhalation capsule Place 18 mcg into inhaler and inhale daily             No discharge procedures on file.     PDMP Review       Value Time User PDMP Reviewed  Yes 8/4/2023  8:38 AM Moses Saunders MD          ED Provider  Electronically Signed by           Oscar Blevins DO  08/26/23 9457

## 2023-08-26 NOTE — ASSESSMENT & PLAN NOTE
· Presents with worsening shortness of breath that started the day of admission. Found to be hypoxic on 2L NC , requiring up to 4L NC. Wears 2 - 3L NC at baseline. Recently admitted in early August for COPD exacerbation . · Wheezing present on exam   · Continue IV solu-medrol every 8 hours, scheduled nebs Atrovent/Xopenex , Pulmicort   · Azithromycin x 3 days.    · Smoking cessation encouraged

## 2023-08-26 NOTE — ASSESSMENT & PLAN NOTE
· Per patient wears 2 L continuously and up to 3 L with exertion. Found to be hypoxic with SPO2 in the 80s on home O2 2 L NC. · Currently requiring up to 4 L nasal cannula.   · COVID flu rsv negative   · Secondary to acute COPD exacerbation - continue treatment   · Wean O2 to baseline

## 2023-08-26 NOTE — H&P
[address] [facility]  H&P  Name: Lizeth Anders 79 y.o. female I MRN: [id number]  Unit/Bed#: -01 I Date of Admission: 8/26/2023   Date of Service: 8/26/2023 I Hospital Day: 0      Assessment/Plan   * COPD exacerbation [facility])  Assessment & Plan  · Presents with worsening shortness of breath that started the day of admission. Found to be hypoxic on 2L NC , requiring up to 4L NC. Wears 2 - 3L NC at baseline. Recently admitted in early August for COPD exacerbation . · Wheezing present on exam   · Continue IV solu-medrol every 8 hours, scheduled nebs Atrovent/Xopenex , Pulmicort   · Azithromycin x 3 days. · Smoking cessation encouraged     Acute on chronic respiratory failure ([address])  Assessment & Plan  · Per patient wears 2 L continuously and up to 3 L with exertion. Found to be hypoxic with SPO2 in the 80s on home O2 2 L NC. · Currently requiring up to 4 L nasal cannula. · COVID flu rsv negative   · Secondary to acute COPD exacerbation - continue treatment   · Wean O2 to baseline       Type 2 diabetes mellitus without complication, without long-term current use of insulin ([facility])  Assessment & Plan  Lab Results   Component Value Date    HGBA1C 7.0 (H) 07/27/2023       No results for input(s): "POCGLU" in the last 72 hours. Blood Sugar Average: Last 72 hrs:  · Hold oral medications. Start SSI. · Hypoglycemia protocol     Essential hypertension  Assessment & Plan  · Continue lisinopril, metoprolol, Amlodipine     Tobacco abuse  Assessment & Plan  · Current smoker, reports she has cut down to 2-3 cigarettes per day   · Smoking cessation encouraged, nicotine patch ordered       VTE Pharmacologic Prophylaxis:   Moderate Risk (Score 3-4) - Pharmacological DVT Prophylaxis Ordered: heparin. Code Status: Level 1 - Full Code   Discussion with family: Patient declined call to .      Anticipated Length of Stay: Patient will be admitted on an inpatient basis with an anticipated length of stay of greater than 2 midnights secondary to acute copd exac - iv steroids, o2, nebs. Total Time Spent on Date of Encounter in care of patient: 75 minutes This time was spent on one or more of the following: performing physical exam; counseling and coordination of care; obtaining or reviewing history; documenting in the medical record; reviewing/ordering tests, medications or procedures; communicating with other healthcare professionals and discussing with patient's family/caregivers. Chief Complaint: shortness of breath     History of Present Illness:  Sourav Robles is a 79 y.o. female with a PMH of COPD, tobacco abuse, HTn , DM2 who presents from home with reports of acute shortness of breath that started the day of admission. Reports she was unable to catch her breath despite recuse inhaler use. Denies any cough or fevers. Wears 2L NC continuously and up to 3L NC with exertion. Still smoking but has cut down to 2-3 cigarettes per day . Recently admitted at [address] [place] [address] in early August for COPD exacerbation , finished prednisone taper. Offers no other complaints. Review of Systems:  Review of Systems   Constitutional: Positive for activity change. Negative for chills, fatigue and fever. Respiratory: Positive for shortness of breath. Negative for cough. Cardiovascular: Negative for chest pain, palpitations and leg swelling. Gastrointestinal: Negative for abdominal pain, diarrhea, nausea and vomiting. Genitourinary: Negative for difficulty urinating and dysuria. Musculoskeletal: Negative for arthralgias. Neurological: Negative for dizziness, weakness, light-headedness, numbness and headaches. Past Medical and Surgical History:   Past Medical History:   Diagnosis Date   • Anxiety    • COPD (chronic obstructive pulmonary disease) ([facility])    • Diabetes mellitus ([facility])    • Essential (primary) hypertension    • GERD (gastroesophageal reflux disease)    • Smoker        History reviewed.  No pertinent surgical history. Meds/Allergies:  Prior to Admission medications    Medication Sig Start Date End Date Taking? Authorizing Provider   albuterol (2.5 mg/3 mL) 0.083 % nebulizer solution Take 3 mL (2.5 mg total) by nebulization every 6 (six) hours as needed for wheezing or shortness of breath 8/6/23  Yes Alejandra Green PA-C   amLODIPine (NORVASC) 10 mg tablet Take 10 mg by mouth daily at bedtime    Yes Historical Provider, MD   atorvastatin (LIPITOR) 40 mg tablet Take 40 mg by mouth daily at bedtime    Yes Historical Provider, MD   budesonide (PULMICORT) 0.5 mg/2 mL nebulizer solution Take 2 mL (0.5 mg total) by nebulization every 12 (twelve) hours Rinse mouth after use.  8/6/23  Yes Maria De Jesus Gonzalez PA-C   buPROPion (WELLBUTRIN XL) 150 mg 24 hr tablet Take 1 tablet (150 mg total) by mouth daily Do not start before July 23, 2023. 7/23/23  Yes Maria De Jesus Gonzalez PA-C   cloNIDine (CATAPRES) 0.2 mg tablet Take 0.2 mg by mouth every 12 (twelve) hours   Yes Historical Provider, MD   ergocalciferol (VITAMIN D2) 50,000 units Take 50,000 Units by mouth once a week   Yes Historical Provider, MD   fluticasone (FLONASE) 50 mcg/act nasal spray 1 spray into each nostril daily 6/28/23  Yes Kristin Feng MD   glipiZIDE (GLUCOTROL) 10 mg tablet Take 10 mg by mouth daily in the early morning W breakfast   Yes Historical Provider, MD   ipratropium-albuterol (COMBIVENT RESPIMAT) inhaler Inhale 1 puff 4 (four) times a day   Yes Historical Provider, MD   lisinopril (ZESTRIL) 40 mg tablet Take 40 mg by mouth daily   Yes Historical Provider, MD   metoprolol tartrate (LOPRESSOR) 100 mg tablet Take 100 mg by mouth every 12 (twelve) hours   Yes Historical Provider, MD   omeprazole (PriLOSEC OTC) 20 MG tablet Take 20 mg by mouth daily    Yes Historical Provider, MD   pioglitazone (ACTOS) 15 mg tablet Take 15 mg by mouth daily   Yes Historical Provider, MD   sodium chloride 1 g tablet Take 2 tablets (2 g total) by mouth 3 (three) times a day with meals 8/6/23  Yes Guy Delarosa PA-C   tiotropium-olodaterol (Stiolto Respimat) 2.5-2.5 MCG/ACT inhaler Inhale 2 puffs daily 8/2/23  Yes Historical Provider, MD   benzonatate (TESSALON PERLES) 100 mg capsule Take 1 capsule (100 mg total) by mouth 3 (three) times a day as needed for cough 8/6/23   Ko Green PA-C   tiotropium [facility]) 18 mcg inhalation capsule Place 18 mcg into inhaler and inhale daily  Patient not taking: Reported on 8/26/2023    Historical Provider, MD     I have reviewed home medications with patient personally. Allergies: Allergies   Allergen Reactions   • Clindamycin        Social History:  Marital Status:      Patient Pre-hospital Living Situation: Home  Patient Pre-hospital Level of Mobility: walks  Patient Pre-hospital Diet Restrictions: none  Substance Use History:   Social History     Substance and Sexual Activity   Alcohol Use Not Currently     Social History     Tobacco Use   Smoking Status Some Days   • Packs/day: 0.25   • Types: Cigarettes   Smokeless Tobacco Never     Social History     Substance and Sexual Activity   Drug Use Never       Family History:  Family History   Problem Relation Age of Onset   • Diabetes Father        Physical Exam:     Vitals:   Blood Pressure: 133/84 (08/26/23 0218)  Pulse: 79 (08/26/23 0218)  Temperature: (!) 97.2 °F (36.2 °C) (08/26/23 0218)  Temp Source: Temporal (08/26/23 0005)  Respirations: 18 (08/26/23 0218)  Height: 5' 2" (157.5 cm) (08/26/23 0005)  Weight - Scale: 75.4 kg (166 lb 3.6 oz) (08/26/23 0005)  SpO2: 97 % (08/26/23 0218)    Physical Exam  Vitals and nursing note reviewed. Constitutional:       General: She is not in acute distress. Appearance: She is not toxic-appearing. Comments: Chronically ill appearing   HENT:      Head: Normocephalic and atraumatic. Mouth/Throat:      Mouth: Mucous membranes are dry. Cardiovascular:      Rate and Rhythm: Normal rate and regular rhythm. Pulses: Normal pulses. Heart sounds: Normal heart sounds. Pulmonary:      Effort: Pulmonary effort is normal.      Breath sounds: Wheezing present. Comments: Diminished throughout, faint wheezing. Pursed lip breathing . 4L NC. Does not appear in distress. Abdominal:      General: Bowel sounds are normal. There is no distension. Palpations: Abdomen is soft. Tenderness: There is no abdominal tenderness. There is no guarding or rebound. Musculoskeletal:         General: Normal range of motion. Right lower leg: No edema. Left lower leg: No edema. Skin:     General: Skin is warm and dry. Neurological:      General: No focal deficit present. Mental Status: She is alert and oriented to person, place, and time. Additional Data:     Lab Results:  Results from last 7 days   Lab Units 08/26/23  0028   WBC Thousand/uL 8.85   HEMOGLOBIN g/dL 11.8   HEMATOCRIT % 38.0   PLATELETS Thousands/uL 242   NEUTROS PCT % 63   LYMPHS PCT % 21   MONOS PCT % 13*   EOS PCT % 1     Results from last 7 days   Lab Units 08/26/23  0028   SODIUM mmol/L 135   POTASSIUM mmol/L 4.4   CHLORIDE mmol/L 99   CO2 mmol/L 32   BUN mg/dL 13   CREATININE mg/dL 0.73   ANION GAP mmol/L 4   CALCIUM mg/dL 9.5   ALBUMIN g/dL 3.9   TOTAL BILIRUBIN mg/dL 0.27   ALK PHOS U/L 89   ALT U/L 13   AST U/L 14   GLUCOSE RANDOM mg/dL 128     Results from last 7 days   Lab Units 08/26/23  0028   INR  0.82*                   Lines/Drains:  Invasive Devices     Peripheral Intravenous Line  Duration           Peripheral IV 08/26/23 Right Antecubital <1 day                    Imaging: Personally reviewed the following imaging: chest xray  XR chest 2 views   ED Interpretation by Shayne Leblanc DO (08/26 0107)   No acute findings          EKG and Other Studies Reviewed on Admission:   · EKG: NSR. HR 82.    ** Please Note: This note has been constructed using a voice recognition system.  **

## 2023-08-26 NOTE — CASE MANAGEMENT
Case Management Assessment & Discharge Planning Note    Patient name Tip Ped  Location /-50 MRN [id number]  : 1955 Date 2023       Current Admission Date: 2023  Current Admission Diagnosis:COPD exacerbation [facility])   Patient Active Problem List    Diagnosis Date Noted   • Abnormal CT scan 2023   • Anxiety about health 2023   • Tachycardia 2023   • Bacteremia 2023   • Vomiting 2023   • COPD exacerbation ([address]) 2020   • Acute on chronic respiratory failure ([address]) 2020   • Acute hyponatremia 2020   • Tobacco abuse 2020   • Essential hypertension 2020   • Type 2 diabetes mellitus without complication, without long-term current use of insulin ([address]) 2020      LOS (days): 0  Geometric Mean LOS (GMLOS) (days):   Days to GMLOS:     OBJECTIVE:  PATIENT READMITTED [address] of Unplanned Readmission Score: 28         Current admission status: Inpatient       Preferred Pharmacy:   [address] [place] [address] #[id number] [facility], [address]  [address] [place] [id number]  Phone: [phone] Fax: [phone] - [address] [address]  Phone: [phone] Fax: [phone]    Primary Care Provider: Christy Treviño DO    Primary Insurance: Venkatesh Ruelas [facility] REP  Secondary Insurance: [address]  [phone]:  [place] Proxies    There are no active Health Care Proxies on file.        Advance Directives  Does patient have a [address]?: No  Was patient offered paperwork?: Yes (patient declined)  Does patient currently have a Health Care decision maker?: No  Does patient have Advance Directives?: No  Was patient offered paperwork?: Yes (patient declined)  Primary Contact: Ruby Kwok, granddaughter         Readmission [facility] Cause  30 Day Readmission: Yes  Who directed you to return to the hospital?: Self  Did you understand whom to contact if you had questions or problems?: Yes  Did you get your prescriptions before you left the hospital?: Yes  Were you able to get your prescriptions filled when you left the hospital?: Yes  During previous admission, was a post-acute recommendation made?: No  Patient was readmitted due to: COPD exacerbation  Action Plan: home, f/u pulm outpatient    Patient Information  Admitted from[de-identified] Home  Mental Status: Alert  During Assessment patient was accompanied by: Not accompanied during assessment  Assessment information provided by[de-identified] Patient  Primary Caregiver: Self  Support Systems: Family members  [place] of Residence: [address] [place] [address] do you live in?: [address] entry access options.  Select all that apply.: Stairs  Number of steps to enter home.: 2  Do the steps have railings?: Yes  Type of Current Residence: [address] [place] [address] home  Upon entering residence, is there a bedroom on the main floor (no further steps)?: Yes  Upon entering residence, is there a bathroom on the main floor (no further steps)?: Yes  In the last 12 months, was there a time when you were not able to pay the mortgage or rent on time?: No  In the last 12 months, how many places have you lived?: 1  In the last 12 months, was there a time when you did not have a steady place to sleep or slept in a shelter (including now)?: No  Homeless/housing insecurity resource given?: N/A  Living Arrangements: Lives w/ Extended Family  Is patient a ?: No    Activities of Daily Living Prior to Admission  Functional Status: Independent  Completes ADLs independently?: Yes  Ambulates independently?: Yes  Does patient use assisted devices?: Yes  Assisted Devices (DME) used: Home Oxygen concentrator, Portable Oxygen tanks  DME Company Name (respiratory supplies): [employer]  O2 Rate(s): 2-3 L  Does patient currently own DME?: Yes  What DME does the patient currently own?: Home Oxygen concentrator, Portable Oxygen tanks  Does patient have a history of Outpatient Therapy (PT/OT)?: No  Does the patient have a history of Short-Term Rehab?: No  Does patient have a history of HHC?: No  Does patient currently have [facility]?: No         Patient Information Continued  Income Source: SSI/SSD  Does patient have prescription coverage?: Yes  Within the past 12 months, you worried that your food would run out before you got the money to buy more.: Never true  Within the past 12 months, the food you bought just didn't last and you didn't have money to get more.: Never true  Food insecurity resource given?: N/A  Does patient receive dialysis treatments?: No  Does patient have a history of substance abuse?: No  Does patient have a history of Mental Health Diagnosis?: No         Means of Transportation  Means of Transport to Appts[de-identified] Family transport  In the past 12 months, has lack of transportation kept you from medical appointments or from getting medications?: No  In the past 12 months, has lack of transportation kept you from meetings, work, or from getting things needed for daily living?: No  Was application for public transport provided?: N/A        DISCHARGE DETAILS:    Discharge planning discussed with[de-identified] patient  Freedom of Choice: Yes     CM contacted family/caregiver?: No- see comments (patient declined)  Were Treatment Team discharge recommendations reviewed with patient/caregiver?: Yes  Did patient/caregiver verbalize understanding of patient care needs?: Yes  Were patient/caregiver advised of the risks associated with not following Treatment Team discharge recommendations?: Yes         [address]         Is the patient interested in [facility] at discharge?: No    DME Referral Provided  Referral made for DME?: No         Would you like to participate in our [address] [facility] [address] service program?  : No - Declined    Treatment Team Recommendation: Home  Discharge Destination Plan[de-identified] Home  Transport at Discharge : Family                  CM met with patient at the bedside, baseline information was obtained. CM discussed the role of CM in helping the patient develop a discharge plan and assist the patient in carry out their plan. Patient lives in own home, first floor bedroom and full bathroom. Older granddaughter, Kiel Blackburn, moved out of home recently; younger granddaughter, Nayana Reyes, still living in home. Kiel Blackburn provides transportation for patient when needed. Patient has home O2 through [employer] supply. Supplies delivered every Thursday, per patient. CM to follow for additional CM discharge referral needs, if any.

## 2023-08-27 VITALS
DIASTOLIC BLOOD PRESSURE: 57 MMHG | BODY MASS INDEX: 30.59 KG/M2 | WEIGHT: 166.23 LBS | SYSTOLIC BLOOD PRESSURE: 110 MMHG | OXYGEN SATURATION: 96 % | TEMPERATURE: 97 F | RESPIRATION RATE: 21 BRPM | HEART RATE: 70 BPM | HEIGHT: 62 IN

## 2023-08-27 LAB
GLUCOSE SERPL-MCNC: 187 MG/DL (ref 65–140)
PROCALCITONIN SERPL-MCNC: <0.05 NG/ML

## 2023-08-27 PROCEDURE — 82948 REAGENT STRIP/BLOOD GLUCOSE: CPT

## 2023-08-27 PROCEDURE — 94640 AIRWAY INHALATION TREATMENT: CPT

## 2023-08-27 PROCEDURE — 84145 PROCALCITONIN (PCT): CPT

## 2023-08-27 PROCEDURE — 99239 HOSP IP/OBS DSCHRG MGMT >30: CPT | Performed by: NURSE PRACTITIONER

## 2023-08-27 PROCEDURE — 94760 N-INVAS EAR/PLS OXIMETRY 1: CPT

## 2023-08-27 RX ORDER — AZITHROMYCIN 500 MG/1
500 TABLET, FILM COATED ORAL DAILY
Qty: 1 TABLET | Refills: 0 | Status: SHIPPED | OUTPATIENT
Start: 2023-08-28 | End: 2023-08-29

## 2023-08-27 RX ORDER — PREDNISONE 20 MG/1
40 TABLET ORAL DAILY
Qty: 6 TABLET | Refills: 0 | Status: SHIPPED | OUTPATIENT
Start: 2023-08-28 | End: 2023-08-31

## 2023-08-27 RX ADMIN — LEVALBUTEROL HYDROCHLORIDE 1.25 MG: 1.25 SOLUTION RESPIRATORY (INHALATION) at 07:26

## 2023-08-27 RX ADMIN — METOPROLOL TARTRATE 100 MG: 50 TABLET, FILM COATED ORAL at 09:15

## 2023-08-27 RX ADMIN — HEPARIN SODIUM 5000 UNITS: 5000 INJECTION INTRAVENOUS; SUBCUTANEOUS at 05:18

## 2023-08-27 RX ADMIN — CLONIDINE HYDROCHLORIDE 0.2 MG: 0.1 TABLET ORAL at 09:15

## 2023-08-27 RX ADMIN — BUPROPION HYDROCHLORIDE 150 MG: 150 TABLET, EXTENDED RELEASE ORAL at 09:15

## 2023-08-27 RX ADMIN — AZITHROMYCIN 500 MG: 250 TABLET, FILM COATED ORAL at 09:15

## 2023-08-27 RX ADMIN — PANTOPRAZOLE SODIUM 20 MG: 20 TABLET, DELAYED RELEASE ORAL at 05:18

## 2023-08-27 RX ADMIN — METHYLPREDNISOLONE SODIUM SUCCINATE 40 MG: 40 INJECTION, POWDER, FOR SOLUTION INTRAMUSCULAR; INTRAVENOUS at 09:15

## 2023-08-27 RX ADMIN — INSULIN LISPRO 1 UNITS: 100 INJECTION, SOLUTION INTRAVENOUS; SUBCUTANEOUS at 09:22

## 2023-08-27 RX ADMIN — LISINOPRIL 40 MG: 10 TABLET ORAL at 09:15

## 2023-08-27 RX ADMIN — FLUTICASONE PROPIONATE 1 SPRAY: 50 SPRAY, METERED NASAL at 09:15

## 2023-08-27 RX ADMIN — BUDESONIDE 0.5 MG: 0.5 INHALANT RESPIRATORY (INHALATION) at 07:26

## 2023-08-27 RX ADMIN — IPRATROPIUM BROMIDE 0.5 MG: 0.5 SOLUTION RESPIRATORY (INHALATION) at 07:27

## 2023-08-27 NOTE — PLAN OF CARE
Problem: PAIN - ADULT  Goal: Verbalizes/displays adequate comfort level or baseline comfort level  Description: Interventions:  - Encourage patient to monitor pain and request assistance  - Assess pain using appropriate pain scale  - Administer analgesics based on type and severity of pain and evaluate response  - Implement non-pharmacological measures as appropriate and evaluate response  - Consider cultural and social influences on pain and pain management  - Notify physician/advanced practitioner if interventions unsuccessful or patient reports new pain  Outcome: Progressing     Problem: INFECTION - ADULT  Goal: Absence or prevention of progression during hospitalization  Description: INTERVENTIONS:  - Assess and monitor for signs and symptoms of infection  - Monitor lab/diagnostic results  - Monitor all insertion sites, i.e. indwelling lines, tubes, and drains  - Monitor endotracheal if appropriate and nasal secretions for changes in amount and color  - [place] appropriate cooling/warming therapies per order  - Administer medications as ordered  - Instruct and encourage patient and family to use good hand hygiene technique  - Identify and instruct in appropriate isolation precautions for identified infection/condition  Outcome: Progressing  Goal: Absence of fever/infection during neutropenic period  Description: INTERVENTIONS:  - Monitor WBC    Outcome: Progressing     Problem: SAFETY ADULT  Goal: Patient will remain free of falls  Description: INTERVENTIONS:  - Educate patient/family on patient safety including physical limitations  - Instruct patient to call for assistance with activity   - Consult OT/PT to assist with strengthening/mobility   - Keep Call bell within reach  - Keep bed low and locked with side rails adjusted as appropriate  - Keep care items and personal belongings within reach  - Initiate and maintain comfort rounds  - Make Fall Risk Sign visible to staff  - Apply yellow socks and bracelet for high fall risk patients  - Consider moving patient to room near nurses station  Outcome: Progressing  Goal: Maintain or return to baseline ADL function  Description: INTERVENTIONS:  -  Assess patient's ability to carry out ADLs; assess patient's baseline for ADL function and identify physical deficits which impact ability to perform ADLs (bathing, care of mouth/teeth, toileting, grooming, dressing, etc.)  - Assess/evaluate cause of self-care deficits   - Assess range of motion  - Assess patient's mobility; develop plan if impaired  - Assess patient's need for assistive devices and provide as appropriate  - Encourage maximum independence but intervene and supervise when necessary  - Involve family in performance of ADLs  - Assess for home care needs following discharge   - Consider OT consult to assist with ADL evaluation and planning for discharge  - Provide patient education as appropriate  Outcome: Progressing  Goal: Maintains/Returns to pre admission functional level  Description: INTERVENTIONS:  - Perform BMAT or MOVE assessment daily.   - Set and communicate daily mobility goal to care team and patient/family/caregiver. - Collaborate with rehabilitation services on mobility goals if consulted  - Perform Range of Motion 3 times a day. - Reposition patient every 2 hours.   - Dangle patient 3 times a day  - Stand patient 3 times a day  - Ambulate patient 3 times a day  - Out of bed to chair 3 times a day   - Out of bed for meals 3 times a day  - Out of bed for toileting  - Record patient progress and toleration of activity level   Outcome: Progressing     Problem: DISCHARGE PLANNING  Goal: Discharge to home or other facility with appropriate resources  Description: INTERVENTIONS:  - Identify barriers to discharge w/patient and caregiver  - Arrange for needed discharge resources and transportation as appropriate  - Identify discharge learning needs (meds, wound care, etc.)  - Arrange for interpretive services to assist at discharge as needed  - Refer to Case Management Department for coordinating discharge planning if the patient needs post-hospital services based on physician/advanced practitioner order or complex needs related to functional status, cognitive ability, or social support system  Outcome: Progressing     Problem: Knowledge Deficit  Goal: Patient/family/caregiver demonstrates understanding of disease process, treatment plan, medications, and discharge instructions  Description: Complete learning assessment and assess knowledge base.   Interventions:  - Provide teaching at level of understanding  - Provide teaching via preferred learning methods  Outcome: Progressing     Problem: RESPIRATORY - ADULT  Goal: Achieves optimal ventilation and oxygenation  Description: INTERVENTIONS:  - Assess for changes in respiratory status  - Assess for changes in mentation and behavior  - Position to facilitate oxygenation and minimize respiratory effort  - Oxygen administered by appropriate delivery if ordered  - Initiate smoking cessation education as indicated  - Encourage broncho-pulmonary hygiene including cough, deep breathe, Incentive Spirometry  - Assess the need for suctioning and aspirate as needed  - Assess and instruct to report SOB or any respiratory difficulty  - Respiratory Therapy support as indicated  Outcome: Progressing

## 2023-08-27 NOTE — NURSING NOTE
Reviewed discharge instructions with patient. Verbalized understanding of same. Prescriptions escribed. Belonging returned to patient. Reviewed COPD education. Questions answered. Medications returned.

## 2023-08-27 NOTE — UTILIZATION REVIEW
Initial Clinical Review    Admission: Date/Time/Statement:   Admission Orders (From admission, onward)     Ordered        08/26/23 0149  INPATIENT ADMISSION  Once                      Orders Placed This Encounter   Procedures   • INPATIENT ADMISSION     Standing Status:   Standing     Number of Occurrences:   1     Order Specific Question:   Level of Care     Answer:   Med Surg [16]     Order Specific Question:   Estimated length of stay     Answer:   More than 2 Midnights     Order Specific Question:   Certification     Answer:   I certify that inpatient services are medically necessary for this patient for a duration of greater than two midnights. See H&P and MD Progress Notes for additional information about the patient's course of treatment. ED Arrival Information     Expected   -    Arrival   8/25/2023 23:59    Acuity   Urgent            Means of arrival   Walk-In    Escorted by   Family Member    Service   Hospitalist    Admission type   Emergency            Arrival complaint   sob           Chief Complaint   Patient presents with   • Shortness of Breath     Pt. Having increasing SOB, pt. Hx of COPD, wears O2 @ 2L via NC cont, pt. Was out today all day shopping, pt. [place] increased SOB all day today, used albuterol last at 915 pm with no relief, denies cough, denies headache, fever or body aches, denies chest pain        Initial Presentation: 79 y.o. female to ED from home w/ acute SOB started on day of admission . Unable to catch her breath despite using rescue inhaler . Wears O2 at home and up to 3l w/ exertion . Still smoking cut down to 2-3 /day . PMHX COPD , HTN , DM . Found to be satting in 80 s on 2l . Placed on 4l in ED . Admitted IP status  W/ COPD exacerbation plan for iv solumedrol , azithromycin x3 days , nebs , inhaler and wean O2 as able . DM SSI . HTN Continue lisinopril, metoprolol, Amlodipine . PE: mm dry , wheezing , diminished BS , pursed lip breathing .        ED Triage Vitals [08/26/23 0005]   Temperature Pulse Respirations Blood Pressure SpO2   (!) 97.2 °F (36.2 °C) 88 (!) 26 160/89 (!) 85 %      Temp Source Heart Rate Source Patient Position - Orthostatic VS BP Location FiO2 (%)   Temporal Monitor Sitting Right arm --      Pain Score       No Pain          Wt Readings from Last 1 Encounters:   08/26/23 75.4 kg (166 lb 3.6 oz)     Additional Vital Signs:   08/27/23 0915 -- 70 -- 110/57 -- -- -- -- -- --   08/27/23 0900 -- -- -- -- -- 96 % 26 1.5 L/min Nasal cannula --   08/27/23 0728 -- -- -- -- -- 95 % 26 1.5 L/min Nasal cannula --   08/27/23 07:17:37 97 °F (36.1 °C) Abnormal  68 21 146/82 103 97 % -- -- -- --   08/26/23 21:48:36 97.2 °F (36.2 °C) Abnormal  77 16 126/60 82 100 % -- -- -- --   08/26/23 21:18:16 -- 75 -- 126/58 81 98 % -- -- -- --   08/26/23 2117 -- 75 -- 126/58 -- -- -- -- -- --   08/26/23 2000 -- -- -- -- -- 99 % 30 2.5 L/min Nasal cannula --   08/26/23 14:29:41 97 °F (36.1 °C) Abnormal  75 19 123/57 79 97 % -- -- -- --   08/26/23 1214 -- -- -- -- -- 97 % -- -- -- --   08/26/23 0852 -- -- -- -- -- 96 % 30 2.5 L/min Nasal cannula --   08/26/23 0745 -- 75 18 -- -- 96 % 30 2.5 L/min Nasal cannula --   08/26/23 07:35:10 96.8 °F (36 °C) Abnormal  71 19 126/56 79 97 % -- -- -- --   08/26/23 02:18:01 97.2 °F (36.2 °C) Abnormal  79 18 133/84 100 97 % -- -- -- --   08/26/23 0145 -- 76 20 144/70 95 100 % 36 4 L/min Nasal cannula --   08/26/23 0045 -- 76 -- 105/68 81 97 % 28 2 L/min Nasal cannula --   08/26/23 0013 -- -- -- -- -- -- -- -- Nasal cannula --   08/26/23 0007 -- -- -- -- -- 97 % 36 4 L         Pertinent Labs/Diagnostic Test Results:   XR chest 2 views   ED Interpretation by Radha Nascimento DO (08/26 0107)   No acute findings      Final Result by Araceli Bagley MD (08/26 7599)      No acute cardiopulmonary disease.       Findings are stable            Workstation performed: [id number]           Results from last 7 days   Lab Units 08/26/23  0028   SARS-COV-2 Negative     Results from last 7 days   Lab Units 08/26/23  0028   WBC Thousand/uL 8.85   HEMOGLOBIN g/dL 11.8   HEMATOCRIT % 38.0   PLATELETS Thousands/uL 242   NEUTROS ABS Thousands/µL 5.54         Results from last 7 days   Lab Units 08/26/23  0028   SODIUM mmol/L 135   POTASSIUM mmol/L 4.4   CHLORIDE mmol/L 99   CO2 mmol/L 32   ANION GAP mmol/L 4   BUN mg/dL 13   CREATININE mg/dL 0.73   EGFR ml/min/1.73sq m 86   CALCIUM mg/dL 9.5   MAGNESIUM mg/dL 2.3     Results from last 7 days   Lab Units 08/26/23  0028   AST U/L 14   ALT U/L 13   ALK PHOS U/L 89   TOTAL PROTEIN g/dL 7.2   ALBUMIN g/dL 3.9   TOTAL BILIRUBIN mg/dL 0.27     Results from last 7 days   Lab Units 08/27/23  0717 08/26/23  2036 08/26/23  1542 08/26/23  1117 08/26/23  0733 08/26/23  0315   POC GLUCOSE mg/dl 187* 290* 243* 295* 213* 176*     Results from last 7 days   Lab Units 08/26/23  0028   GLUCOSE RANDOM mg/dL 128     Results from last 7 days   Lab Units 08/26/23  0028   HS TNI 0HR ng/L 7         Results from last 7 days   Lab Units 08/26/23  0028   PROTIME seconds 11.6   INR  0.82*   PTT seconds 31         Results from last 7 days   Lab Units 08/27/23  0522   PROCALCITONIN ng/ml <0.05       Results from last 7 days   Lab Units 08/26/23  0028   BNP pg/mL 41       Results from last 7 days   Lab Units 08/26/23  0028   INFLUENZA A PCR  Negative   INFLUENZA B PCR  Negative   RSV PCR  Negative     ED Treatment:   Medication Administration from 08/25/2023 6659 to 08/26/2023 0211       Date/Time Order Dose Route Action     08/26/2023 0028 EDT ipratropium-albuterol (DUO-NEB) 0.5-2.5 mg/3 mL inhalation solution 3 mL 3 mL Nebulization Given     08/26/2023 0028 EDT dexamethasone (PF) (DECADRON) injection 6 mg 6 mg Intravenous Given     08/26/2023 0143 EDT albuterol inhalation solution 2.5 mg 2.5 mg Nebulization Given        Past Medical History:   Diagnosis Date   • Anxiety    • COPD (chronic obstructive pulmonary disease) ([address])    • Diabetes mellitus ([address]) • Essential (primary) hypertension    • GERD (gastroesophageal reflux disease)    • Smoker      Present on Admission:  • Type 2 diabetes mellitus without complication, without long-term current use of insulin (HCC)  • COPD exacerbation (HCC)  • Essential hypertension  • Acute on chronic respiratory failure (HCC)  • Tobacco abuse      Admitting Diagnosis: Dyspnea [R06.00]  SOB (shortness of breath) [R06.02]  Hypoxia [R09.02]  COPD with acute exacerbation (HCC) [J44.1]  Age/Sex: 79 y.o. female  Admission Orders:  Scheduled Medications:  amLODIPine, 10 mg, Oral, HS  atorvastatin, 40 mg, Oral, HS  azithromycin, 500 mg, Oral, Daily  budesonide, 0.5 mg, Nebulization, Q12H  buPROPion, 150 mg, Oral, Daily  cloNIDine, 0.2 mg, Oral, Q12H NATHALY  fluticasone, 1 spray, Nasal, Daily  heparin (porcine), 5,000 Units, Subcutaneous, Q8H NATHALY  insulin lispro, 1-6 Units, Subcutaneous, TID AC  insulin lispro, 1-6 Units, Subcutaneous, HS  ipratropium, 0.5 mg, Nebulization, TID  levalbuterol, 1.25 mg, Nebulization, TID  lisinopril, 40 mg, Oral, Daily  methylPREDNISolone sodium succinate, 40 mg, Intravenous, Q12H NATHALY  metoprolol tartrate, 100 mg, Oral, Q12H NATHALY  nicotine, 1 patch, Transdermal, Daily  pantoprazole, 20 mg, Oral, Early Morning  sodium chloride, 2 g, Oral, TID With Meals      Continuous IV Infusions:     PRN Meds:  acetaminophen, 650 mg, Oral, Q6H PRN  albuterol, 2.5 mg, Nebulization, Q6H PRN    fingerstick ac and hs   Up and OOB   Cont pulse ox   Tele     IP CONSULT TO PULMONOLOGY    Network Utilization Review Department  ATTENTION: Please call with any questions or concerns to [phone] and carefully listen to the prompts so that you are directed to the right person. All voicemails are confidential.  Sal Adhikari all requests for admission clinical reviews, approved or denied determinations and any other requests to dedicated fax number below belonging to the campus where the patient is receiving treatment.  List of dedicated fax numbers for the Facilities:  [place] DENIALS (Administrative/Medical Necessity) [phone] [address] (Maternity/NICU/Pediatrics) [phone]   [address] [facility] [address] [place] [phone]   [address] [facility] [address] [facility] [id number] [place] [address] [phone]   [address] [phone]

## 2023-08-27 NOTE — PLAN OF CARE
Problem: PAIN - ADULT  Goal: Verbalizes/displays adequate comfort level or baseline comfort level  Description: Interventions:  - Encourage patient to monitor pain and request assistance  - Assess pain using appropriate pain scale  - Administer analgesics based on type and severity of pain and evaluate response  - Implement non-pharmacological measures as appropriate and evaluate response  - Consider cultural and social influences on pain and pain management  - Notify physician/advanced practitioner if interventions unsuccessful or patient reports new pain  Outcome: Progressing     Problem: INFECTION - ADULT  Goal: Absence or prevention of progression during hospitalization  Description: INTERVENTIONS:  - Assess and monitor for signs and symptoms of infection  - Monitor lab/diagnostic results  - Monitor all insertion sites, i.e. indwelling lines, tubes, and drains  - Monitor endotracheal if appropriate and nasal secretions for changes in amount and color  - [place] appropriate cooling/warming therapies per order  - Administer medications as ordered  - Instruct and encourage patient and family to use good hand hygiene technique  - Identify and instruct in appropriate isolation precautions for identified infection/condition  Outcome: Progressing  Goal: Absence of fever/infection during neutropenic period  Description: INTERVENTIONS:  - Monitor WBC    Outcome: Progressing     Problem: SAFETY ADULT  Goal: Patient will remain free of falls  Description: INTERVENTIONS:  - Educate patient/family on patient safety including physical limitations  - Instruct patient to call for assistance with activity   - Consult OT/PT to assist with strengthening/mobility   - Keep Call bell within reach  - Keep bed low and locked with side rails adjusted as appropriate  - Keep care items and personal belongings within reach  - Initiate and maintain comfort rounds  - Make Fall Risk Sign visible to staff  - Apply yellow socks and bracelet for high fall risk patients  - Consider moving patient to room near nurses station  Outcome: Progressing  Goal: Maintain or return to baseline ADL function  Description: INTERVENTIONS:  -  Assess patient's ability to carry out ADLs; assess patient's baseline for ADL function and identify physical deficits which impact ability to perform ADLs (bathing, care of mouth/teeth, toileting, grooming, dressing, etc.)  - Assess/evaluate cause of self-care deficits   - Assess range of motion  - Assess patient's mobility; develop plan if impaired  - Assess patient's need for assistive devices and provide as appropriate  - Encourage maximum independence but intervene and supervise when necessary  - Involve family in performance of ADLs  - Assess for home care needs following discharge   - Consider OT consult to assist with ADL evaluation and planning for discharge  - Provide patient education as appropriate  Outcome: Progressing  Goal: Maintains/Returns to pre admission functional level  Description: INTERVENTIONS:  - Perform BMAT or MOVE assessment daily.   - Set and communicate daily mobility goal to care team and patient/family/caregiver. - Collaborate with rehabilitation services on mobility goals if consulted  - Perform Range of Motion 3 times a day. - Reposition patient every 2 hours.   - Dangle patient 3 times a day  - Stand patient 3 times a day  - Ambulate patient 3 times a day  - Out of bed to chair 3 times a day   - Out of bed for meals 3 times a day  - Out of bed for toileting  - Record patient progress and toleration of activity level   Outcome: Progressing     Problem: DISCHARGE PLANNING  Goal: Discharge to home or other facility with appropriate resources  Description: INTERVENTIONS:  - Identify barriers to discharge w/patient and caregiver  - Arrange for needed discharge resources and transportation as appropriate  - Identify discharge learning needs (meds, wound care, etc.)  - Arrange for interpretive services to assist at discharge as needed  - Refer to Case Management Department for coordinating discharge planning if the patient needs post-hospital services based on physician/advanced practitioner order or complex needs related to functional status, cognitive ability, or social support system  Outcome: Progressing     Problem: Knowledge Deficit  Goal: Patient/family/caregiver demonstrates understanding of disease process, treatment plan, medications, and discharge instructions  Description: Complete learning assessment and assess knowledge base.   Interventions:  - Provide teaching at level of understanding  - Provide teaching via preferred learning methods  Outcome: Progressing     Problem: RESPIRATORY - ADULT  Goal: Achieves optimal ventilation and oxygenation  Description: INTERVENTIONS:  - Assess for changes in respiratory status  - Assess for changes in mentation and behavior  - Position to facilitate oxygenation and minimize respiratory effort  - Oxygen administered by appropriate delivery if ordered  - Initiate smoking cessation education as indicated  - Encourage broncho-pulmonary hygiene including cough, deep breathe, Incentive Spirometry  - Assess the need for suctioning and aspirate as needed  - Assess and instruct to report SOB or any respiratory difficulty  - Respiratory Therapy support as indicated  Outcome: Progressing

## 2023-08-27 NOTE — DISCHARGE SUMMARY
[address] [facility]  Discharge- Aquiles Mitchell 1955, 79 y.o. female MRN: [id number]  Unit/Bed#: -01 Encounter: [id number]  Primary Care Provider: Martinez Burrell DO   Date and time admitted to hospital: 8/26/2023 12:00 AM    * COPD exacerbation ([facility])  Assessment & Plan  · Presents with worsening shortness of breath that started the day of admission. Found to be hypoxic on 2L NC , requiring up to 4L NC. Wears 2 - 3L NC at baseline. Recently admitted in early August for COPD exacerbation . · Wheezing present on exam   · Transition IV Solu-Medrol to p.o. prednisone 40 mg for additional 3 more days at discharge scheduled nebs Atrovent/Xopenex , Pulmicort   · Azithromycin x 3 days, will require additional 1 more day at discharge  · Smoking cessation encouraged     Type 2 diabetes mellitus without complication, without long-term current use of insulin [facility])  Assessment & Plan  Lab Results   Component Value Date    HGBA1C 7.0 (H) 07/27/2023       Recent Labs     08/26/23  1117 08/26/23  1542 08/26/23  2036 08/27/23  0717   POCGLU 295* 243* 290* 187*       Blood Sugar Average: Last 72 hrs:  · (P) 234Hold oral medications. Start SSI. · Hypoglycemia protocol     Essential hypertension  Assessment & Plan  · Continue lisinopril, metoprolol, Amlodipine     Tobacco abuse  Assessment & Plan  · Current smoker, reports she has cut down to 2-3 cigarettes per day   · Smoking cessation encouraged, nicotine patch ordered    Acute on chronic respiratory failure ([address])  Assessment & Plan  · Per patient wears 2 L continuously and up to 3 L with exertion. Found to be hypoxic with SPO2 in the 80s on home O2 2 L NC. · 2 L nasal cannula  · COVID flu rsv negative   · Secondary to acute COPD exacerbation -patient doing well lung sounds clear. · Discharged on azithromycin for additional 1 more day and short steroid dosing of 40 mg daily for the next 3 more days for total of 5 days.   · Wean O2 to baseline Medical Problems     Resolved Problems  Date Reviewed: 8/27/2023   None       Discharging Physician / Practitioner: Lainey Lopez, [address] [facility]  PCP: Richardson Matias DO  Admission Date:   Admission Orders (From admission, onward)     Ordered        08/26/23 0149  INPATIENT ADMISSION  Once                      Discharge Date: 08/27/23    Consultations During Hospital Stay:  · None    Procedures Performed:   · WBC count 8.85  · Procalcitonin less than 0.05  · Troponin 7  · BNP 41  · COVID/influenza/RSV negative  · Chest x-ray on 8/26/2023 with no acute cardiopulmonary disease finding stable    Significant Findings / Test Results:   · See above     Incidental Findings:   · None     Test Results Pending at Discharge (will require follow up): · None     Outpatient Tests Requested:  · Call to schedule follow-up with PCP within the next week    Complications: None    Reason for Admission: Shortness of breath. Hospital Course:   Ashley Saeed is a 79 y.o. female patient who originally presented to the hospital on 8/26/2023 due to shortness of breath. Patient with past medical history significant for COPD, tobacco abuse ongoing, hypertension, diabetes type 2 presents from home with reported acute shortness of breath that started day of admission. She reports she was unable to catch her breath despite with inhaler use. She denied any fevers or chills no cough at this time. She usually wears 2 L of nasal cannula had bumped this up to 3 L with exertion. Patient continues to smoke 2 to 3 cigarettes/day she reports she is trying to stop. Recently admitted on prior admission to [facility] for COPD exacerbation and finished his prednisone taper. Patient was initiated on IV Solu-Medrol, azithromycin and respiratory treatments. She has continued to improve. Lung sounds are now clear with no wheezing. She does not have a cough. Discussion with patient that she is stable for discharge.   We will discharge on an additional 1 more dose at discharge to complete 3 doses of azithromycin. She will also be discharged on additional 3 days of 40 mg of prednisone to complete short burst 5-day treatment of steroids. .  Patient is medically stable for discharge should call to schedule follow-up with PCP and recommend outpatient follow-up with pulmonary. Please see above list of diagnoses and related plan for additional information. Condition at Discharge: good    Discharge Day Visit / Exam:   Subjective: Patient looks well, feels well no shortness of breath on chronic oxygen stable at her baseline. Lung sounds are clear. Patient with no cough. We discussed medication regimen and discharge plan patient to follow-up with PCP within the next week we will call to find out from her oxygen company/DME if she has portable tank ordered    Vitals: Blood Pressure: 110/57 (08/27/23 0915)  Pulse: 70 (08/27/23 0915)  Temperature: (!) 97 °F (36.1 °C) (08/27/23 0717)  Temp Source: Temporal (08/26/23 0005)  Respirations: 21 (08/27/23 0717)  Height: 5' 2" (157.5 cm) (08/26/23 0005)  Weight - Scale: 75.4 kg (166 lb 3.6 oz) (08/26/23 0005)  SpO2: 96 % (08/27/23 0900)  Exam:   Physical Exam  Constitutional:       General: She is not in acute distress. Appearance: She is obese. She is not ill-appearing, toxic-appearing or diaphoretic. HENT:      Head: Normocephalic and atraumatic. Nose: No congestion. Mouth/Throat:      Pharynx: Oropharynx is clear. Eyes:      General:         Right eye: No discharge. Left eye: No discharge. Conjunctiva/sclera: Conjunctivae normal.   Cardiovascular:      Rate and Rhythm: Normal rate. Heart sounds: No murmur heard. No friction rub. No gallop. Pulmonary:      Effort: No respiratory distress. Breath sounds: No stridor. No wheezing, rhonchi or rales. Comments: Diminished bilateral bases poor effort  Chest:      Chest wall: No tenderness.    Abdominal:      General: There is no distension. Palpations: There is no mass. Tenderness: There is no abdominal tenderness. There is no guarding or rebound. Hernia: No hernia is present. Musculoskeletal:         General: No swelling, tenderness, deformity or signs of injury. Right lower leg: No edema. Left lower leg: No edema. Skin:     Coloration: Skin is not jaundiced or pale. Findings: No bruising, erythema, lesion or rash. Neurological:      Mental Status: She is alert and oriented to person, place, and time. Psychiatric:         Behavior: Behavior normal.          Discussion with Family: Patient. Discharge instructions/Information to patient and family:   See after visit summary for information provided to patient and family. Provisions for Follow-Up Care:  See after visit summary for information related to follow-up care and any pertinent home health orders. Disposition:   Home    Planned Readmission: No plan for readmission     Discharge Statement:  I spent 45 minutes discharging the patient. This time was spent on the day of discharge. I had direct contact with the patient on the day of discharge. Greater than 50% of the total time was spent examining patient, answering all patient questions, arranging and discussing plan of care with patient as well as directly providing post-discharge instructions. Additional time then spent on discharge activities. Discharge Medications:  See after visit summary for reconciled discharge medications provided to patient and/or family.       **Please Note: This note may have been constructed using a voice recognition system**

## 2023-08-27 NOTE — ASSESSMENT & PLAN NOTE
Lab Results   Component Value Date    HGBA1C 7.0 (H) 07/27/2023       Recent Labs     08/26/23  1117 08/26/23  1542 08/26/23 2036 08/27/23  0717   POCGLU 295* 243* 290* 187*       Blood Sugar Average: Last 72 hrs:  · (P) 234Hold oral medications. Start SSI.   · Hypoglycemia protocol

## 2023-08-27 NOTE — ASSESSMENT & PLAN NOTE
· Presents with worsening shortness of breath that started the day of admission. Found to be hypoxic on 2L NC , requiring up to 4L NC. Wears 2 - 3L NC at baseline. Recently admitted in early August for COPD exacerbation .    · Wheezing present on exam   · Transition IV Solu-Medrol to p.o. prednisone 40 mg for additional 3 more days at discharge scheduled nebs Atrovent/Xopenex , Pulmicort   · Azithromycin x 3 days, will require additional 1 more day at discharge  · Smoking cessation encouraged

## 2023-08-27 NOTE — ASSESSMENT & PLAN NOTE
· Per patient wears 2 L continuously and up to 3 L with exertion. Found to be hypoxic with SPO2 in the 80s on home O2 2 L NC. · 2 L nasal cannula  · COVID flu rsv negative   · Secondary to acute COPD exacerbation -patient doing well lung sounds clear. · Discharged on azithromycin for additional 1 more day and short steroid dosing of 40 mg daily for the next 3 more days for total of 5 days.   · Wean O2 to baseline

## 2023-08-28 LAB
ATRIAL RATE: 82 BPM
P AXIS: 76 DEGREES
PR INTERVAL: 138 MS
QRS AXIS: 54 DEGREES
QRSD INTERVAL: 68 MS
QT INTERVAL: 352 MS
QTC INTERVAL: 411 MS
T WAVE AXIS: 52 DEGREES
VENTRICULAR RATE: 82 BPM

## 2023-08-28 PROCEDURE — 93010 ELECTROCARDIOGRAM REPORT: CPT | Performed by: INTERNAL MEDICINE

## 2023-08-28 NOTE — UTILIZATION REVIEW
NOTIFICATION OF ADMISSION DISCHARGE   This is a Notification of Discharge from [address] [place] [address]. Please be advised that this patient has been discharge from our facility. Below you will find the admission and discharge date and time including the patient’s disposition. UTILIZATION REVIEW CONTACT:  Arsenio Sam  Utilization   Network Utilization Review Department  Phone: [phone] x carefully listen to the prompts. All voicemails are confidential.  Email: [email or web address]     ADMISSION INFORMATION  PRESENTATION DATE: 8/26/2023 12:00 AM  OBERVATION ADMISSION DATE:   INPATIENT ADMISSION DATE: 8/26/23  1:49 AM   DISCHARGE DATE: 8/27/2023 10:31 AM   DISPOSITION:Home/Self Care    IMPORTANT INFORMATION:  Send all requests for admission clinical reviews, approved or denied determinations and any other requests to dedicated fax number below belonging to the campus where the patient is receiving treatment.  List of dedicated fax numbers:  [place] DENIALS (Administrative/Medical Necessity) [phone] [address] [facility] (Maternity/NICU/Pediatrics) [phone]   [facility] [phone]   [facility] [phone] 1636 [facility] [phone]   [address] [place] [address] [phone]   [facility] [phone]   [address] [place] [address] 603 [facility] [phone] 506 [facility] [phone] 3441 [facility] [phone] 2720 [facility] [address] [facility] [phone]

## 2023-09-02 ENCOUNTER — HOSPITAL ENCOUNTER (EMERGENCY)
Facility: HOSPITAL | Age: 68
Discharge: HOME | End: 2023-09-02
Attending: EMERGENCY MEDICINE
Payer: COMMERCIAL

## 2023-09-02 ENCOUNTER — APPOINTMENT (EMERGENCY)
Dept: RADIOLOGY | Facility: HOSPITAL | Age: 68
End: 2023-09-02
Payer: COMMERCIAL

## 2023-09-02 VITALS
SYSTOLIC BLOOD PRESSURE: 178 MMHG | DIASTOLIC BLOOD PRESSURE: 84 MMHG | BODY MASS INDEX: 31.09 KG/M2 | HEART RATE: 82 BPM | OXYGEN SATURATION: 99 % | RESPIRATION RATE: 26 BRPM | WEIGHT: 169.97 LBS | TEMPERATURE: 97.5 F

## 2023-09-02 DIAGNOSIS — J44.1 COPD WITH ACUTE EXACERBATION (HCC): Primary | ICD-10-CM

## 2023-09-02 LAB
ALBUMIN SERPL BCP-MCNC: 3.8 G/DL (ref 3.5–5)
ALP SERPL-CCNC: 93 U/L (ref 34–104)
ALT SERPL W P-5'-P-CCNC: 13 U/L (ref 7–52)
ANION GAP SERPL CALCULATED.3IONS-SCNC: 4 MMOL/L
AST SERPL W P-5'-P-CCNC: 11 U/L (ref 13–39)
BASOPHILS # BLD AUTO: 0.04 THOUSANDS/ÂΜL (ref 0–0.1)
BASOPHILS NFR BLD AUTO: 0 % (ref 0–1)
BILIRUB SERPL-MCNC: 0.3 MG/DL (ref 0.2–1)
BNP SERPL-MCNC: 29 PG/ML (ref 0–100)
BUN SERPL-MCNC: 11 MG/DL (ref 5–25)
CALCIUM SERPL-MCNC: 9.7 MG/DL (ref 8.4–10.2)
CARDIAC TROPONIN I PNL SERPL HS: 8 NG/L
CHLORIDE SERPL-SCNC: 97 MMOL/L (ref 96–108)
CO2 SERPL-SCNC: 33 MMOL/L (ref 21–32)
CREAT SERPL-MCNC: 0.63 MG/DL (ref 0.6–1.3)
EOSINOPHIL # BLD AUTO: 0.1 THOUSAND/ÂΜL (ref 0–0.61)
EOSINOPHIL NFR BLD AUTO: 1 % (ref 0–6)
ERYTHROCYTE [DISTWIDTH] IN BLOOD BY AUTOMATED COUNT: 12.9 % (ref 11.6–15.1)
FLUAV RNA RESP QL NAA+PROBE: NEGATIVE
FLUBV RNA RESP QL NAA+PROBE: NEGATIVE
GFR SERPL CREATININE-BSD FRML MDRD: 93 ML/MIN/1.73SQ M
GLUCOSE SERPL-MCNC: 151 MG/DL (ref 65–140)
HCT VFR BLD AUTO: 36.6 % (ref 34.8–46.1)
HGB BLD-MCNC: 11.5 G/DL (ref 11.5–15.4)
IMM GRANULOCYTES # BLD AUTO: 0.11 THOUSAND/UL (ref 0–0.2)
IMM GRANULOCYTES NFR BLD AUTO: 1 % (ref 0–2)
LYMPHOCYTES # BLD AUTO: 2.05 THOUSANDS/ÂΜL (ref 0.6–4.47)
LYMPHOCYTES NFR BLD AUTO: 18 % (ref 14–44)
MAGNESIUM SERPL-MCNC: 2.2 MG/DL (ref 1.9–2.7)
MCH RBC QN AUTO: 29.9 PG (ref 26.8–34.3)
MCHC RBC AUTO-ENTMCNC: 31.4 G/DL (ref 31.4–37.4)
MCV RBC AUTO: 95 FL (ref 82–98)
MONOCYTES # BLD AUTO: 1.27 THOUSAND/ÂΜL (ref 0.17–1.22)
MONOCYTES NFR BLD AUTO: 11 % (ref 4–12)
NEUTROPHILS # BLD AUTO: 8.1 THOUSANDS/ÂΜL (ref 1.85–7.62)
NEUTS SEG NFR BLD AUTO: 69 % (ref 43–75)
NRBC BLD AUTO-RTO: 0 /100 WBCS
PLATELET # BLD AUTO: 181 THOUSANDS/UL (ref 149–390)
PMV BLD AUTO: 10.7 FL (ref 8.9–12.7)
POTASSIUM SERPL-SCNC: 4.4 MMOL/L (ref 3.5–5.3)
PROT SERPL-MCNC: 6.5 G/DL (ref 6.4–8.4)
RBC # BLD AUTO: 3.84 MILLION/UL (ref 3.81–5.12)
RSV RNA RESP QL NAA+PROBE: NEGATIVE
SARS-COV-2 RNA RESP QL NAA+PROBE: NEGATIVE
SODIUM SERPL-SCNC: 134 MMOL/L (ref 135–147)
WBC # BLD AUTO: 11.67 THOUSAND/UL (ref 4.31–10.16)

## 2023-09-02 PROCEDURE — 83735 ASSAY OF MAGNESIUM: CPT | Performed by: EMERGENCY MEDICINE

## 2023-09-02 PROCEDURE — 99285 EMERGENCY DEPT VISIT HI MDM: CPT | Performed by: EMERGENCY MEDICINE

## 2023-09-02 PROCEDURE — 80053 COMPREHEN METABOLIC PANEL: CPT | Performed by: EMERGENCY MEDICINE

## 2023-09-02 PROCEDURE — 83880 ASSAY OF NATRIURETIC PEPTIDE: CPT | Performed by: EMERGENCY MEDICINE

## 2023-09-02 PROCEDURE — 85025 COMPLETE CBC W/AUTO DIFF WBC: CPT | Performed by: EMERGENCY MEDICINE

## 2023-09-02 PROCEDURE — 84484 ASSAY OF TROPONIN QUANT: CPT | Performed by: EMERGENCY MEDICINE

## 2023-09-02 PROCEDURE — 71045 X-RAY EXAM CHEST 1 VIEW: CPT

## 2023-09-02 PROCEDURE — 94640 AIRWAY INHALATION TREATMENT: CPT

## 2023-09-02 PROCEDURE — 93005 ELECTROCARDIOGRAM TRACING: CPT

## 2023-09-02 PROCEDURE — 99285 EMERGENCY DEPT VISIT HI MDM: CPT

## 2023-09-02 PROCEDURE — 96374 THER/PROPH/DIAG INJ IV PUSH: CPT

## 2023-09-02 PROCEDURE — 36415 COLL VENOUS BLD VENIPUNCTURE: CPT | Performed by: EMERGENCY MEDICINE

## 2023-09-02 PROCEDURE — 0241U HB NFCT DS VIR RESP RNA 4 TRGT: CPT | Performed by: EMERGENCY MEDICINE

## 2023-09-02 RX ORDER — PREDNISONE 20 MG/1
TABLET ORAL
Qty: 20 TABLET | Refills: 0 | Status: SHIPPED | OUTPATIENT
Start: 2023-09-02 | End: 2023-09-13 | Stop reason: ALTCHOICE

## 2023-09-02 RX ORDER — IPRATROPIUM BROMIDE AND ALBUTEROL SULFATE 2.5; .5 MG/3ML; MG/3ML
3 SOLUTION RESPIRATORY (INHALATION) ONCE
Status: COMPLETED | OUTPATIENT
Start: 2023-09-02 | End: 2023-09-02

## 2023-09-02 RX ORDER — DEXAMETHASONE SODIUM PHOSPHATE 10 MG/ML
6 INJECTION, SOLUTION INTRAMUSCULAR; INTRAVENOUS ONCE
Status: COMPLETED | OUTPATIENT
Start: 2023-09-02 | End: 2023-09-02

## 2023-09-02 RX ADMIN — IPRATROPIUM BROMIDE AND ALBUTEROL SULFATE 3 ML: 2.5; .5 SOLUTION RESPIRATORY (INHALATION) at 02:06

## 2023-09-02 RX ADMIN — DEXAMETHASONE SODIUM PHOSPHATE 6 MG: 10 INJECTION, SOLUTION INTRAMUSCULAR; INTRAVENOUS at 02:06

## 2023-09-02 NOTE — ED PROVIDER NOTES
History  Chief Complaint   Patient presents with   • Shortness of Breath     Pt C/O increased SOB since this afternoon. PT has COPD and is chronically on 3L NC. States she hasn't increased her O2 at home today. Denies cough, CP, or any other symptoms. Patient is a 72-year-old female presenting to the emergency department complaining of shortness of breath that worsened throughout the day since yesterday afternoon, she has a nonproductive cough, she wears nasal cannula oxygen at 3 L 24 hours a day, she has a known diagnosis of COPD, she continues to smoke for cigarettes a day, she has an upcoming appointment with pulmonology on , patient reports she uses breathing treatments 4 times a day, not currently taking any steroids          Prior to Admission Medications   Prescriptions Last Dose Informant Patient Reported? Taking? albuterol (2.5 mg/3 mL) 0.083 % nebulizer solution   No No   Sig: Take 3 mL (2.5 mg total) by nebulization every 6 (six) hours as needed for wheezing or shortness of breath   amLODIPine (NORVASC) 10 mg tablet   Yes No   Sig: Take 10 mg by mouth daily at bedtime    atorvastatin (LIPITOR) 40 mg tablet   Yes No   Sig: Take 40 mg by mouth daily at bedtime    benzonatate (TESSALON PERLES) 100 mg capsule   No No   Sig: Take 1 capsule (100 mg total) by mouth 3 (three) times a day as needed for cough   buPROPion (WELLBUTRIN XL) 150 mg 24 hr tablet   No No   Sig: Take 1 tablet (150 mg total) by mouth daily Do not start before 2023. budesonide (PULMICORT) 0.5 mg/2 mL nebulizer solution   No No   Sig: Take 2 mL (0.5 mg total) by nebulization every 12 (twelve) hours Rinse mouth after use.    cloNIDine (CATAPRES) 0.2 mg tablet   Yes No   Sig: Take 0.2 mg by mouth every 12 (twelve) hours   ergocalciferol (VITAMIN D2) 50,000 units   Yes No   Sig: Take 50,000 Units by mouth once a week   fluticasone (FLONASE) 50 mcg/act nasal spray   No No   Si spray into each nostril daily   glipiZIDE (GLUCOTROL) 10 mg tablet  Self Yes No   Sig: Take 10 mg by mouth daily in the early morning W breakfast   lisinopril (ZESTRIL) 40 mg tablet   Yes No   Sig: Take 40 mg by mouth daily   metoprolol tartrate (LOPRESSOR) 100 mg tablet   Yes No   Sig: Take 100 mg by mouth every 12 (twelve) hours   omeprazole (PriLOSEC OTC) 20 MG tablet   Yes No   Sig: Take 20 mg by mouth daily    pioglitazone (ACTOS) 15 mg tablet   Yes No   Sig: Take 15 mg by mouth daily   sodium chloride 1 g tablet   No No   Sig: Take 2 tablets (2 g total) by mouth 3 (three) times a day with meals   tiotropium-olodaterol (Stiolto Respimat) 2.5-2.5 MCG/ACT inhaler   Yes No   Sig: Inhale 2 puffs daily      Facility-Administered Medications: None       Past Medical History:   Diagnosis Date   • Anxiety    • COPD (chronic obstructive pulmonary disease) ([facility])    • Diabetes mellitus ([address])    • Essential (primary) hypertension    • GERD (gastroesophageal reflux disease)    • Smoker        History reviewed. No pertinent surgical history. Family History   Problem Relation Age of Onset   • Diabetes Father      I have reviewed and agree with the history as documented. E-Cigarette/Vaping   • E-Cigarette Use Never User      E-Cigarette/Vaping Substances   • Nicotine No    • THC No    • CBD No    • Flavoring No      Social History     Tobacco Use   • Smoking status: Some Days     Packs/day: 0.25     Types: Cigarettes   • Smokeless tobacco: Never   Vaping Use   • Vaping Use: Never used   Substance Use Topics   • Alcohol use: Not Currently   • Drug use: Never       Review of Systems   Constitutional: Negative. HENT: Negative. Eyes: Negative. Respiratory: Positive for chest tightness, shortness of breath and wheezing. Cardiovascular: Negative. Gastrointestinal: Negative. Endocrine: Negative. Genitourinary: Negative. Musculoskeletal: Negative. Skin: Negative. Allergic/Immunologic: Negative. Neurological: Negative.     Hematological: Negative. Psychiatric/Behavioral: Negative. Physical Exam  Physical Exam  Constitutional:       Appearance: She is well-developed. HENT:      Head: Normocephalic and atraumatic. Eyes:      Conjunctiva/sclera: Conjunctivae normal.      Pupils: Pupils are equal, round, and reactive to light. Cardiovascular:      Rate and Rhythm: Normal rate. Pulmonary:      Effort: Pulmonary effort is normal. Tachypnea present. Breath sounds: Decreased breath sounds present. Abdominal:      Palpations: Abdomen is soft. Musculoskeletal:         General: Normal range of motion. Cervical back: Normal range of motion and neck supple. Skin:     General: Skin is warm and dry. Neurological:      Mental Status: She is alert and oriented to person, place, and time.          Vital Signs  ED Triage Vitals [09/02/23 0155]   Temperature Pulse Respirations Blood Pressure SpO2   97.5 °F (36.4 °C) 82 (!) 26 (!) 178/84 99 %      Temp Source Heart Rate Source Patient Position - Orthostatic VS BP Location FiO2 (%)   Temporal Monitor Sitting Right arm --      Pain Score       --           Vitals:    09/02/23 0155   BP: (!) 178/84   Pulse: 82   Patient Position - Orthostatic VS: Sitting         Visual Acuity      ED Medications  Medications   ipratropium-albuterol (DUO-NEB) 0.5-2.5 mg/3 mL inhalation solution 3 mL (3 mL Nebulization Given 9/2/23 0206)   dexamethasone (PF) (DECADRON) injection 6 mg (6 mg Intravenous Given 9/2/23 0206)       Diagnostic Studies  Results Reviewed     Procedure Component Value Units Date/Time    FLU/RSV/COVID - if FLU/RSV clinically relevant [[id number]]  (Normal) Collected: 09/02/23 0207    Lab Status: Final result Specimen: Nares from Nose Updated: 09/02/23 0246     SARS-CoV-2 Negative     INFLUENZA A PCR Negative     INFLUENZA B PCR Negative     RSV PCR Negative    Narrative:      FOR PEDIATRIC PATIENTS - copy/paste COVID Guidelines URL to browser: [email or web address]    SARS-CoV-2 assay is a Nucleic Acid Amplification assay intended for the  qualitative detection of nucleic acid from SARS-CoV-2 in nasopharyngeal  swabs. Results are for the presumptive identification of SARS-CoV-2 RNA. Positive results are indicative of infection with SARS-CoV-2, the virus  causing COVID-19, but do not rule out bacterial infection or co-infection  with other viruses. Laboratories within the [facility] and its  territories are required to report all positive results to the appropriate  public health authorities. Negative results do not preclude SARS-CoV-2  infection and should not be used as the sole basis for treatment or other  patient management decisions. Negative results must be combined with  clinical observations, patient history, and epidemiological information. This test has not been FDA cleared or approved. This test has been authorized by FDA under an Emergency Use Authorization  (EUA). This test is only authorized for the duration of time the  declaration that circumstances exist justifying the authorization of the  emergency use of an in vitro diagnostic tests for detection of SARS-CoV-2  virus and/or diagnosis of COVID-19 infection under section 564(b)(1) of  the Act, 21 U. S.C. 239IAK-7(D)(9), unless the authorization is terminated  or revoked sooner. The test has been validated but independent review by FDA  and CLIA is pending. Test performed using [employer] GeneXpert: This RT-PCR assay targets N2,  a region unique to SARS-CoV-2. A conserved region in the E-gene was chosen  for pan-Sarbecovirus detection which includes SARS-CoV-2. According to CMS-2020-01-R, this platform meets the definition of high-throughput technology.     B-Type Natriuretic Peptide(BNP) [[id number]]  (Normal) Collected: 09/02/23 0207    Lab Status: Final result Specimen: Blood Updated: 09/02/23 0236     BNP 29 pg/mL     HS Troponin 0hr (reflex protocol) [[id number]]  (Normal) Collected: 09/02/23 0207    Lab Status: Final result Specimen: Blood Updated: 09/02/23 0231     hs TnI 0hr 8 ng/L     Comprehensive metabolic panel [[id number]]  (Abnormal) Collected: 09/02/23 0207    Lab Status: Final result Specimen: Blood Updated: 09/02/23 0223     Sodium 134 mmol/L      Potassium 4.4 mmol/L      Chloride 97 mmol/L      CO2 33 mmol/L      ANION GAP 4 mmol/L      BUN 11 mg/dL      Creatinine 0.63 mg/dL      Glucose 151 mg/dL      Calcium 9.7 mg/dL      AST 11 U/L      ALT 13 U/L      Alkaline Phosphatase 93 U/L      Total Protein 6.5 g/dL      Albumin 3.8 g/dL      Total Bilirubin 0.30 mg/dL      eGFR 93 ml/min/1.73sq m     Narrative:      National Kidney Disease Foundation guidelines for Chronic Kidney Disease (CKD):   •  Stage 1 with normal or high GFR (GFR > 90 mL/min/1.73 square meters)  •  Stage 2 Mild CKD (GFR = 60-89 mL/min/1.73 square meters)  •  Stage 3A Moderate CKD (GFR = 45-59 mL/min/1.73 square meters)  •  Stage 3B Moderate CKD (GFR = 30-44 mL/min/1.73 square meters)  •  Stage 4 Severe CKD (GFR = 15-29 mL/min/1.73 square meters)  •  Stage 5 End Stage CKD (GFR <15 mL/min/1.73 square meters)  Note: GFR calculation is accurate only with a steady state creatinine    Magnesium [[id number]]  (Normal) Collected: 09/02/23 0207    Lab Status: Final result Specimen: Blood Updated: 09/02/23 0223     Magnesium 2.2 mg/dL     CBC and differential [[id number]]  (Abnormal) Collected: 09/02/23 0207    Lab Status: Final result Specimen: Blood Updated: 09/02/23 0209     WBC 11.67 Thousand/uL      RBC 3.84 Million/uL      Hemoglobin 11.5 g/dL      Hematocrit 36.6 %      MCV 95 fL      MCH 29.9 pg      MCHC 31.4 g/dL      RDW 12.9 %      MPV 10.7 fL      Platelets 109 Thousands/uL      nRBC 0 /100 WBCs      Neutrophils Relative 69 %      Immat GRANS % 1 %      Lymphocytes Relative 18 %      Monocytes Relative 11 %      Eosinophils Relative 1 % Basophils Relative 0 %      Neutrophils Absolute 8.10 Thousands/µL      Immature Grans Absolute 0.11 Thousand/uL      Lymphocytes Absolute 2.05 Thousands/µL      Monocytes Absolute 1.27 Thousand/µL      Eosinophils Absolute 0.10 Thousand/µL      Basophils Absolute 0.04 Thousands/µL                  XR chest 1 view portable   ED Interpretation by Massiel Oneal DO (09/02 0241)   No acute findings                 Procedures  ECG 12 Lead Documentation Only    Date/Time: 9/2/2023 3:16 AM    Performed by: Massiel Oneal DO  Authorized by: Massiel Oneal DO    Indications / Diagnosis:  Shortness of breath  ECG reviewed by me, the ED Provider: yes    Patient location:  ED  Previous ECG:     Comparison to cardiac monitor: Yes    Interpretation:     Interpretation: non-specific    Rate:     ECG rate:  77    ECG rate assessment: normal    Rhythm:     Rhythm: sinus rhythm    Ectopy:     Ectopy: none    QRS:     QRS intervals:  Normal  Conduction:     Conduction: normal    ST segments:     ST segments:  Normal  T waves:     T waves: inverted      Inverted:  III  Comments:      MT interval 104             ED Course  ED Course as of 09/02/23 0317   Sat Sep 02, 2023   0317 FLU/RSV/COVID - if FLU/RSV clinically relevant   0317 B-Type Natriuretic Peptide(BNP)   0317 HS Troponin 0hr (reflex protocol)   0317 Comprehensive metabolic panel(!)   4682 CBC and differential(!)   0317 Magnesium   0317 ECG 12 lead   0317 XR chest 1 view portable                               SBIRT 20yo+    Flowsheet Row Most Recent Value   Initial Alcohol Screen: US AUDIT-C     1. How often do you have a drink containing alcohol? 0 Filed at: 09/02/2023 0155   2. How many drinks containing alcohol do you have on a typical day you are drinking? 0 Filed at: 09/02/2023 0155   3a. Male UNDER 65: How often do you have five or more drinks on one occasion? 0 Filed at: 09/02/2023 0155   3b. FEMALE Any Age, or MALE 65+:  How often do you have 4 or more drinks on one occassion? 0 Filed at: 09/02/2023 0155   Audit-C Score 0 Filed at: 09/02/2023 0155   LIVIER: How many times in the past year have you. .. Used an illegal drug or used a prescription medication for non-medical reasons? Never Filed at: 09/02/2023 0155                    Medical Decision Making  This patient presents with symptoms most consistent with an acute COPD exacerbation. The constellation of symptoms are similar to prior exacerbations. The likely precipitant is viral respiratory infection versus weather change or air quality. Low suspicion for alternate etiologies such as pneumothorax, acute PE, pneumonia. Presentation not consistent with other acute cardiopulmonary causes including ACS, CHF. Patient given DuoNeb, albuterol and IV steroids here with improvement of symptoms. No hypoxia noted. Patient will be sent home with additional p.o. steroids and further breathing treatments/inhaler. COPD with acute exacerbation [facility]): chronic illness or injury with exacerbation, progression, or side effects of treatment  Amount and/or Complexity of Data Reviewed  Labs: ordered. Decision-making details documented in ED Course. Radiology: ordered and independent interpretation performed. Decision-making details documented in ED Course. ECG/medicine tests: ordered and independent interpretation performed. Decision-making details documented in ED Course. Risk  Prescription drug management. Disposition  Final diagnoses:   COPD with acute exacerbation ([address])     Time reflects when diagnosis was documented in both MDM as applicable and the Disposition within this note     Time User Action Codes Description Comment    9/2/2023  2:49 AM Lynne Olivarez Add [J44.1] COPD with acute exacerbation [facility])       ED Disposition     ED Disposition   Discharge    Condition   Stable    Date/Time   Sat Sep 2, 2023  2:49 AM    Sudeep Lux discharge to home/self care.                Follow-up Information Follow up With Specialties Details Why Contact Mandy Alejandro, DO Family Medicine In 2 days As needed [address]  [phone]            Discharge Medication List as of 9/2/2023  2:50 AM      START taking these medications    Details   predniSONE 20 mg tablet Multiple Dosages:Starting Sat 9/2/2023, Until Mon 9/4/2023 at 2359, THEN Starting Tue 9/5/2023, Until Thu 9/7/2023 at 2359, THEN Starting Fri 9/8/2023, Until Sun 9/10/2023 at 2359, THEN Starting Mon 9/11/2023, Until Wed 9/13/2023 at 2359Take 3 table ts (60 mg total) by mouth daily for 3 days, THEN 2 tablets (40 mg total) daily for 3 days, THEN 1 tablet (20 mg total) daily for 3 days, THEN 0.5 tablets (10 mg total) daily for 3 days. , Normal         CONTINUE these medications which have NOT CHANGED    Details   albuterol (2.5 mg/3 mL) 0.083 % nebulizer solution Take 3 mL (2.5 mg total) by nebulization every 6 (six) hours as needed for wheezing or shortness of breath, Starting Sun 8/6/2023, Normal      amLODIPine (NORVASC) 10 mg tablet Take 10 mg by mouth daily at bedtime , Historical Med      atorvastatin (LIPITOR) 40 mg tablet Take 40 mg by mouth daily at bedtime , Historical Med      benzonatate (TESSALON PERLES) 100 mg capsule Take 1 capsule (100 mg total) by mouth 3 (three) times a day as needed for cough, Starting Sun 8/6/2023, Normal      budesonide (PULMICORT) 0.5 mg/2 mL nebulizer solution Take 2 mL (0.5 mg total) by nebulization every 12 (twelve) hours Rinse mouth after use., Starting Sun 8/6/2023, Normal      buPROPion (WELLBUTRIN XL) 150 mg 24 hr tablet Take 1 tablet (150 mg total) by mouth daily Do not start before July 23, 2023., Starting Sun 7/23/2023, Normal      cloNIDine (CATAPRES) 0.2 mg tablet Take 0.2 mg by mouth every 12 (twelve) hours, Historical Med      ergocalciferol (VITAMIN D2) 50,000 units Take 50,000 Units by mouth once a week, Historical Med      fluticasone (FLONASE) 50 mcg/act nasal spray 1 spray into each nostril daily, Starting Wed 6/28/2023, Normal      glipiZIDE (GLUCOTROL) 10 mg tablet Take 10 mg by mouth daily in the early morning W breakfast, Historical Med      lisinopril (ZESTRIL) 40 mg tablet Take 40 mg by mouth daily, Historical Med      metoprolol tartrate (LOPRESSOR) 100 mg tablet Take 100 mg by mouth every 12 (twelve) hours, Historical Med      omeprazole (PriLOSEC OTC) 20 MG tablet Take 20 mg by mouth daily , Historical Med      pioglitazone (ACTOS) 15 mg tablet Take 15 mg by mouth daily, Historical Med      sodium chloride 1 g tablet Take 2 tablets (2 g total) by mouth 3 (three) times a day with meals, Starting Sun 8/6/2023, Normal      tiotropium-olodaterol (Stiolto Respimat) 2.5-2.5 MCG/ACT inhaler Inhale 2 puffs daily, Starting Wed 8/2/2023, Historical Med             No discharge procedures on file.     PDMP Review       Value Time User    PDMP Reviewed  Yes 8/4/2023  8:38 AM Alpa Jean-Baptiste MD          ED Provider  Electronically Signed by           Gabriele Washington DO  09/02/23 8679

## 2023-09-04 ENCOUNTER — APPOINTMENT (EMERGENCY)
Dept: RADIOLOGY | Facility: HOSPITAL | Age: 68
End: 2023-09-04
Payer: COMMERCIAL

## 2023-09-04 ENCOUNTER — HOSPITAL ENCOUNTER (EMERGENCY)
Facility: HOSPITAL | Age: 68
Discharge: HOME | End: 2023-09-04
Attending: EMERGENCY MEDICINE | Admitting: EMERGENCY MEDICINE
Payer: COMMERCIAL

## 2023-09-04 VITALS
WEIGHT: 167.55 LBS | HEIGHT: 62 IN | BODY MASS INDEX: 30.83 KG/M2 | OXYGEN SATURATION: 95 % | RESPIRATION RATE: 22 BRPM | DIASTOLIC BLOOD PRESSURE: 75 MMHG | SYSTOLIC BLOOD PRESSURE: 135 MMHG | TEMPERATURE: 97.6 F | HEART RATE: 78 BPM

## 2023-09-04 DIAGNOSIS — R06.02 SHORTNESS OF BREATH: ICD-10-CM

## 2023-09-04 DIAGNOSIS — J44.1 COPD EXACERBATION (HCC): Primary | ICD-10-CM

## 2023-09-04 LAB
ALBUMIN SERPL BCP-MCNC: 3.9 G/DL (ref 3.5–5)
ALP SERPL-CCNC: 85 U/L (ref 34–104)
ALT SERPL W P-5'-P-CCNC: 15 U/L (ref 7–52)
ANION GAP SERPL CALCULATED.3IONS-SCNC: 6 MMOL/L
AST SERPL W P-5'-P-CCNC: 12 U/L (ref 13–39)
BASE EX.OXY STD BLDV CALC-SCNC: 69.3 % (ref 60–80)
BASE EXCESS BLDV CALC-SCNC: 2.4 MMOL/L
BASOPHILS # BLD AUTO: 0.03 THOUSANDS/ÂΜL (ref 0–0.1)
BASOPHILS NFR BLD AUTO: 0 % (ref 0–1)
BILIRUB SERPL-MCNC: 0.15 MG/DL (ref 0.2–1)
BNP SERPL-MCNC: 30 PG/ML (ref 0–100)
BUN SERPL-MCNC: 17 MG/DL (ref 5–25)
CALCIUM SERPL-MCNC: 9.5 MG/DL (ref 8.4–10.2)
CARDIAC TROPONIN I PNL SERPL HS: 5 NG/L
CHLORIDE SERPL-SCNC: 100 MMOL/L (ref 96–108)
CO2 SERPL-SCNC: 31 MMOL/L (ref 21–32)
CREAT SERPL-MCNC: 0.74 MG/DL (ref 0.6–1.3)
D DIMER PPP FEU-MCNC: 0.31 UG/ML FEU
EOSINOPHIL # BLD AUTO: 0.03 THOUSAND/ÂΜL (ref 0–0.61)
EOSINOPHIL NFR BLD AUTO: 0 % (ref 0–6)
ERYTHROCYTE [DISTWIDTH] IN BLOOD BY AUTOMATED COUNT: 13.2 % (ref 11.6–15.1)
FLUAV RNA RESP QL NAA+PROBE: NEGATIVE
FLUBV RNA RESP QL NAA+PROBE: NEGATIVE
GFR SERPL CREATININE-BSD FRML MDRD: 84 ML/MIN/1.73SQ M
GLUCOSE SERPL-MCNC: 115 MG/DL (ref 65–140)
HCO3 BLDV-SCNC: 30.2 MMOL/L (ref 24–30)
HCT VFR BLD AUTO: 38.2 % (ref 34.8–46.1)
HGB BLD-MCNC: 11.9 G/DL (ref 11.5–15.4)
IMM GRANULOCYTES # BLD AUTO: 0.07 THOUSAND/UL (ref 0–0.2)
IMM GRANULOCYTES NFR BLD AUTO: 1 % (ref 0–2)
LYMPHOCYTES # BLD AUTO: 2.77 THOUSANDS/ÂΜL (ref 0.6–4.47)
LYMPHOCYTES NFR BLD AUTO: 28 % (ref 14–44)
MCH RBC QN AUTO: 30 PG (ref 26.8–34.3)
MCHC RBC AUTO-ENTMCNC: 31.2 G/DL (ref 31.4–37.4)
MCV RBC AUTO: 96 FL (ref 82–98)
MONOCYTES # BLD AUTO: 1.08 THOUSAND/ÂΜL (ref 0.17–1.22)
MONOCYTES NFR BLD AUTO: 11 % (ref 4–12)
NEUTROPHILS # BLD AUTO: 6.07 THOUSANDS/ÂΜL (ref 1.85–7.62)
NEUTS SEG NFR BLD AUTO: 60 % (ref 43–75)
NRBC BLD AUTO-RTO: 0 /100 WBCS
O2 CT BLDV-SCNC: 12.9 ML/DL
PCO2 BLDV: 61.7 MM HG (ref 42–50)
PH BLDV: 7.31 [PH] (ref 7.3–7.4)
PLATELET # BLD AUTO: 195 THOUSANDS/UL (ref 149–390)
PMV BLD AUTO: 10.6 FL (ref 8.9–12.7)
PO2 BLDV: 41.4 MM HG (ref 35–45)
POTASSIUM SERPL-SCNC: 4.2 MMOL/L (ref 3.5–5.3)
PROT SERPL-MCNC: 7 G/DL (ref 6.4–8.4)
RBC # BLD AUTO: 3.97 MILLION/UL (ref 3.81–5.12)
RSV RNA RESP QL NAA+PROBE: NEGATIVE
SARS-COV-2 RNA RESP QL NAA+PROBE: NEGATIVE
SODIUM SERPL-SCNC: 137 MMOL/L (ref 135–147)
WBC # BLD AUTO: 10.05 THOUSAND/UL (ref 4.31–10.16)

## 2023-09-04 PROCEDURE — 99285 EMERGENCY DEPT VISIT HI MDM: CPT

## 2023-09-04 PROCEDURE — 71045 X-RAY EXAM CHEST 1 VIEW: CPT

## 2023-09-04 PROCEDURE — 93005 ELECTROCARDIOGRAM TRACING: CPT

## 2023-09-04 PROCEDURE — 94640 AIRWAY INHALATION TREATMENT: CPT

## 2023-09-04 PROCEDURE — 80053 COMPREHEN METABOLIC PANEL: CPT | Performed by: EMERGENCY MEDICINE

## 2023-09-04 PROCEDURE — 85379 FIBRIN DEGRADATION QUANT: CPT | Performed by: EMERGENCY MEDICINE

## 2023-09-04 PROCEDURE — 84484 ASSAY OF TROPONIN QUANT: CPT | Performed by: EMERGENCY MEDICINE

## 2023-09-04 PROCEDURE — 82805 BLOOD GASES W/O2 SATURATION: CPT | Performed by: EMERGENCY MEDICINE

## 2023-09-04 PROCEDURE — 83880 ASSAY OF NATRIURETIC PEPTIDE: CPT | Performed by: EMERGENCY MEDICINE

## 2023-09-04 PROCEDURE — 36415 COLL VENOUS BLD VENIPUNCTURE: CPT | Performed by: EMERGENCY MEDICINE

## 2023-09-04 PROCEDURE — 85025 COMPLETE CBC W/AUTO DIFF WBC: CPT | Performed by: EMERGENCY MEDICINE

## 2023-09-04 PROCEDURE — 99285 EMERGENCY DEPT VISIT HI MDM: CPT | Performed by: EMERGENCY MEDICINE

## 2023-09-04 PROCEDURE — 0241U HB NFCT DS VIR RESP RNA 4 TRGT: CPT | Performed by: EMERGENCY MEDICINE

## 2023-09-04 RX ORDER — ALBUTEROL SULFATE 2.5 MG/3ML
5 SOLUTION RESPIRATORY (INHALATION) ONCE
Status: COMPLETED | OUTPATIENT
Start: 2023-09-04 | End: 2023-09-04

## 2023-09-04 RX ORDER — IPRATROPIUM BROMIDE AND ALBUTEROL SULFATE 2.5; .5 MG/3ML; MG/3ML
3 SOLUTION RESPIRATORY (INHALATION)
Status: DISCONTINUED | OUTPATIENT
Start: 2023-09-04 | End: 2023-09-04 | Stop reason: HOSPADM

## 2023-09-04 RX ORDER — PREDNISONE 20 MG/1
40 TABLET ORAL DAILY
Qty: 8 TABLET | Refills: 0 | Status: SHIPPED | OUTPATIENT
Start: 2023-09-05 | End: 2023-09-09

## 2023-09-04 RX ADMIN — IPRATROPIUM BROMIDE AND ALBUTEROL SULFATE 3 ML: 2.5; .5 SOLUTION RESPIRATORY (INHALATION) at 01:33

## 2023-09-04 RX ADMIN — PREDNISONE 50 MG: 20 TABLET ORAL at 01:33

## 2023-09-04 RX ADMIN — ALBUTEROL SULFATE 5 MG: 2.5 SOLUTION RESPIRATORY (INHALATION) at 01:33

## 2023-09-04 NOTE — ED PROVIDER NOTES
History  Chief Complaint   Patient presents with   • Shortness of Breath     C/O SOB around 2100. Wears home O2 at 3 liters NC. Denies CP, N/V, cough, sick contact. Denies fevers     72-year-old female with past medical history pertinent for COPD, diabetes, anxiety, smoker, hypertension who presents to the emergency department complaining of shortness of breath started around 9 PM this evening. States she does wear O2 at 3 L nasal cannula at home baseline. No chest pain, nausea, vomiting, cough, sick contacts or fevers. States she used nebulizer at 11:30 PM without much improvement. Did use her inhalers this morning as well. Is not on any steroids. No chest pain. Denies any previous blood clots. No leg swelling. No recent URI symptoms. Denies any sick contacts. No other concerns today. Prior to Admission Medications   Prescriptions Last Dose Informant Patient Reported? Taking? albuterol (2.5 mg/3 mL) 0.083 % nebulizer solution   No No   Sig: Take 3 mL (2.5 mg total) by nebulization every 6 (six) hours as needed for wheezing or shortness of breath   amLODIPine (NORVASC) 10 mg tablet   Yes No   Sig: Take 10 mg by mouth daily at bedtime    atorvastatin (LIPITOR) 40 mg tablet   Yes No   Sig: Take 40 mg by mouth daily at bedtime    benzonatate (TESSALON PERLES) 100 mg capsule   No No   Sig: Take 1 capsule (100 mg total) by mouth 3 (three) times a day as needed for cough   buPROPion (WELLBUTRIN XL) 150 mg 24 hr tablet   No No   Sig: Take 1 tablet (150 mg total) by mouth daily Do not start before July 23, 2023. budesonide (PULMICORT) 0.5 mg/2 mL nebulizer solution   No No   Sig: Take 2 mL (0.5 mg total) by nebulization every 12 (twelve) hours Rinse mouth after use.    cloNIDine (CATAPRES) 0.2 mg tablet   Yes No   Sig: Take 0.2 mg by mouth every 12 (twelve) hours   ergocalciferol (VITAMIN D2) 50,000 units   Yes No   Sig: Take 50,000 Units by mouth once a week   fluticasone (FLONASE) 50 mcg/act nasal spray   No No   Si spray into each nostril daily   glipiZIDE (GLUCOTROL) 10 mg tablet  Self Yes No   Sig: Take 10 mg by mouth daily in the early morning W breakfast   lisinopril (ZESTRIL) 40 mg tablet   Yes No   Sig: Take 40 mg by mouth daily   metoprolol tartrate (LOPRESSOR) 100 mg tablet   Yes No   Sig: Take 100 mg by mouth every 12 (twelve) hours   omeprazole (PriLOSEC OTC) 20 MG tablet   Yes No   Sig: Take 20 mg by mouth daily    pioglitazone (ACTOS) 15 mg tablet   Yes No   Sig: Take 15 mg by mouth daily   predniSONE 20 mg tablet   No No   Sig: Take 3 tablets (60 mg total) by mouth daily for 3 days, THEN 2 tablets (40 mg total) daily for 3 days, THEN 1 tablet (20 mg total) daily for 3 days, THEN 0.5 tablets (10 mg total) daily for 3 days. sodium chloride 1 g tablet   No No   Sig: Take 2 tablets (2 g total) by mouth 3 (three) times a day with meals   tiotropium-olodaterol (Stiolto Respimat) 2.5-2.5 MCG/ACT inhaler   Yes No   Sig: Inhale 2 puffs daily      Facility-Administered Medications: None       Past Medical History:   Diagnosis Date   • Anxiety    • COPD (chronic obstructive pulmonary disease) ([facility])    • Diabetes mellitus ([facility])    • Essential (primary) hypertension    • GERD (gastroesophageal reflux disease)    • Smoker        History reviewed. No pertinent surgical history. Family History   Problem Relation Age of Onset   • Diabetes Father      I have reviewed and agree with the history as documented.     E-Cigarette/Vaping   • E-Cigarette Use Never User      E-Cigarette/Vaping Substances   • Nicotine No    • THC No    • CBD No    • Flavoring No      Social History     Tobacco Use   • Smoking status: Every Day     Packs/day: 0.50     Types: Cigarettes   • Smokeless tobacco: Never   Vaping Use   • Vaping Use: Never used   Substance Use Topics   • Alcohol use: Not Currently   • Drug use: Never       Review of Systems   Constitutional: Negative for activity change, appetite change, chills and fever.   HENT: Negative for congestion, rhinorrhea and sore throat. Eyes: Negative for visual disturbance. Respiratory: Positive for shortness of breath. Negative for chest tightness and wheezing. Cardiovascular: Negative for chest pain, palpitations and leg swelling. Gastrointestinal: Negative for abdominal pain, constipation, diarrhea, nausea and vomiting. Genitourinary: Negative for dysuria, flank pain and pelvic pain. Musculoskeletal: Negative for back pain and neck pain. Skin: Negative for rash. Neurological: Negative for weakness, numbness and headaches. Psychiatric/Behavioral: Negative for behavioral problems. Physical Exam  Physical Exam  Vitals and nursing note reviewed. Constitutional:       General: She is not in acute distress. Appearance: She is well-developed. She is not diaphoretic. HENT:      Head: Normocephalic and atraumatic. Right Ear: External ear normal.      Left Ear: External ear normal.      Nose: Nose normal.   Eyes:      Pupils: Pupils are equal, round, and reactive to light. Cardiovascular:      Rate and Rhythm: Normal rate and regular rhythm. Heart sounds: Normal heart sounds. Pulmonary:      Effort: Pulmonary effort is normal. No tachypnea, accessory muscle usage or respiratory distress. Breath sounds: Decreased breath sounds present. No wheezing, rhonchi or rales. Abdominal:      General: Bowel sounds are normal.      Palpations: Abdomen is soft. Tenderness: There is no abdominal tenderness. Musculoskeletal:         General: No tenderness or deformity. Normal range of motion. Cervical back: Normal range of motion and neck supple. Skin:     General: Skin is warm and dry. Capillary Refill: Capillary refill takes less than 2 seconds. Neurological:      Mental Status: She is alert and oriented to person, place, and time. Motor: No abnormal muscle tone.          Vital Signs  ED Triage Vitals [09/04/23 0109] Temperature Pulse Respirations Blood Pressure SpO2   97.6 °F (36.4 °C) 78 22 135/75 95 %      Temp Source Heart Rate Source Patient Position - Orthostatic VS BP Location FiO2 (%)   Oral Monitor Sitting Right arm --      Pain Score       No Pain           Vitals:    09/04/23 0109   BP: 135/75   Pulse: 78   Patient Position - Orthostatic VS: Sitting         Visual Acuity      ED Medications  Medications   ipratropium-albuterol (DUO-NEB) 0.5-2.5 mg/3 mL inhalation solution 3 mL (3 mL Nebulization Given 9/4/23 0133)   albuterol inhalation solution 5 mg (5 mg Nebulization Given 9/4/23 0133)   predniSONE tablet 50 mg (50 mg Oral Given 9/4/23 0133)       Diagnostic Studies  Results Reviewed     Procedure Component Value Units Date/Time    FLU/RSV/COVID - if FLU/RSV clinically relevant [[id number]]  (Normal) Collected: 09/04/23 0118    Lab Status: Final result Specimen: Nares from Nose Updated: 09/04/23 0206     SARS-CoV-2 Negative     INFLUENZA A PCR Negative     INFLUENZA B PCR Negative     RSV PCR Negative    Narrative:      FOR PEDIATRIC PATIENTS - copy/paste COVID Guidelines URL to browser: [email or web address] ashx    SARS-CoV-2 assay is a Nucleic Acid Amplification assay intended for the  qualitative detection of nucleic acid from SARS-CoV-2 in nasopharyngeal  swabs. Results are for the presumptive identification of SARS-CoV-2 RNA. Positive results are indicative of infection with SARS-CoV-2, the virus  causing COVID-19, but do not rule out bacterial infection or co-infection  with other viruses. Laboratories within the [facility] and its  territories are required to report all positive results to the appropriate  public health authorities. Negative results do not preclude SARS-CoV-2  infection and should not be used as the sole basis for treatment or other  patient management decisions.  Negative results must be combined with  clinical observations, patient history, and epidemiological information. This test has not been FDA cleared or approved. This test has been authorized by FDA under an Emergency Use Authorization  (EUA). This test is only authorized for the duration of time the  declaration that circumstances exist justifying the authorization of the  emergency use of an in vitro diagnostic tests for detection of SARS-CoV-2  virus and/or diagnosis of COVID-19 infection under section 564(b)(1) of  the Act, 21 U. S.C. 585ARA-8(E)(2), unless the authorization is terminated  or revoked sooner. The test has been validated but independent review by FDA  and CLIA is pending. Test performed using [employer] Gene[employer]pert: This RT-PCR assay targets N2,  a region unique to SARS-CoV-2. A conserved region in the E-gene was chosen  for pan-Sarbecovirus detection which includes SARS-CoV-2. According to CMS-2020-01-R, this platform meets the definition of high-throughput technology.     HS Troponin 0hr (reflex protocol) [[id number]]  (Normal) Collected: 09/04/23 0118    Lab Status: Final result Specimen: Blood from Arm, Left Updated: 09/04/23 0158     hs TnI 0hr 5 ng/L     Comprehensive metabolic panel [[id number]]  (Abnormal) Collected: 09/04/23 0118    Lab Status: Final result Specimen: Blood from Arm, Left Updated: 09/04/23 0152     Sodium 137 mmol/L      Potassium 4.2 mmol/L      Chloride 100 mmol/L      CO2 31 mmol/L      ANION GAP 6 mmol/L      BUN 17 mg/dL      Creatinine 0.74 mg/dL      Glucose 115 mg/dL      Calcium 9.5 mg/dL      AST 12 U/L      ALT 15 U/L      Alkaline Phosphatase 85 U/L      Total Protein 7.0 g/dL      Albumin 3.9 g/dL      Total Bilirubin 0.15 mg/dL      eGFR 84 ml/min/1.73sq m     Narrative:      [place] guidelines for Chronic Kidney Disease (CKD):   •  Stage 1 with normal or high GFR (GFR > 90 mL/min/1.73 square meters)  •  Stage 2 Mild CKD (GFR = 60-89 mL/min/1.73 square meters)  •  Stage 3A Moderate CKD (GFR = 45-59 mL/min/1.73 square meters)  •  Stage 3B Moderate CKD (GFR = 30-44 mL/min/1.73 square meters)  •  Stage 4 Severe CKD (GFR = 15-29 mL/min/1.73 square meters)  •  Stage 5 End Stage CKD (GFR <15 mL/min/1.73 square meters)  Note: GFR calculation is accurate only with a steady state creatinine    B-Type Natriuretic Peptide(BNP) [[id number]]  (Normal) Collected: 09/04/23 0118    Lab Status: Final result Specimen: Blood from Arm, Left Updated: 09/04/23 0152     BNP 30 pg/mL     D-dimer, quantitative [[id number]]  (Normal) Collected: 09/04/23 0118    Lab Status: Final result Specimen: Blood from Arm, Left Updated: 09/04/23 0141     D-Dimer, Quant 0.31 ug/ml FEU     Narrative: In the evaluation for possible pulmonary embolism, in the appropriate (Well's Score of 4 or less) patient, the age adjusted d-dimer cutoff for this patient can be calculated as:    Age x 0.01 (in ug/mL) for Age-adjusted D-dimer exclusion threshold for a patient over 50 years.     Blood gas, venous [[id number]]  (Abnormal) Collected: 09/04/23 0118    Lab Status: Final result Specimen: Blood from Arm, Left Updated: 09/04/23 0132     pH, Jose 7.308     pCO2, Jose 61.7 mm Hg      pO2, Jose 41.4 mm Hg      HCO3, Jose 30.2 mmol/L      Base Excess, Jose 2.4 mmol/L      O2 Content, Jose 12.9 ml/dL      O2 HGB, VENOUS 69.3 %     CBC and differential [[id number]]  (Abnormal) Collected: 09/04/23 0118    Lab Status: Final result Specimen: Blood from Arm, Left Updated: 09/04/23 0127     WBC 10.05 Thousand/uL      RBC 3.97 Million/uL      Hemoglobin 11.9 g/dL      Hematocrit 38.2 %      MCV 96 fL      MCH 30.0 pg      MCHC 31.2 g/dL      RDW 13.2 %      MPV 10.6 fL      Platelets 408 Thousands/uL      nRBC 0 /100 WBCs      Neutrophils Relative 60 %      Immat GRANS % 1 %      Lymphocytes Relative 28 %      Monocytes Relative 11 %      Eosinophils Relative 0 %      Basophils Relative 0 %      Neutrophils Absolute 6.07 Thousands/µL      Immature Grans Absolute 0.07 Thousand/uL      Lymphocytes Absolute 2.77 Thousands/µL      Monocytes Absolute 1.08 Thousand/µL      Eosinophils Absolute 0.03 Thousand/µL      Basophils Absolute 0.03 Thousands/µL                  XR chest 1 view portable    (Results Pending)              Procedures  ECG 12 Lead Documentation Only    Date/Time: 9/4/2023 1:19 AM    Performed by: Enzo Singh MD  Authorized by: Enzo Singh MD    ECG reviewed by me, the ED Provider: yes    Patient location:  ED  Previous ECG:     Previous ECG:  Unavailable  Interpretation:     Interpretation: normal    Rate:     ECG rate:  71    ECG rate assessment: normal    Rhythm:     Rhythm: sinus rhythm    Ectopy:     Ectopy: none    QRS:     QRS axis:  Normal  Conduction:     Conduction: normal    ST segments:     ST segments:  Normal  T waves:     T waves: normal    Comments:      Shortened DE               ED Course            RESULTS:  Results Reviewed     Procedure Component Value Units Date/Time    FLU/RSV/COVID - if FLU/RSV clinically relevant [[id number]]  (Normal) Collected: 09/04/23 0118    Lab Status: Final result Specimen: Nares from Nose Updated: 09/04/23 0206     SARS-CoV-2 Negative     INFLUENZA A PCR Negative     INFLUENZA B PCR Negative     RSV PCR Negative    Narrative:      FOR PEDIATRIC PATIENTS - copy/paste COVID Guidelines URL to browser: [email or web address] ashx    SARS-CoV-2 assay is a Nucleic Acid Amplification assay intended for the  qualitative detection of nucleic acid from SARS-CoV-2 in nasopharyngeal  swabs. Results are for the presumptive identification of SARS-CoV-2 RNA. Positive results are indicative of infection with SARS-CoV-2, the virus  causing COVID-19, but do not rule out bacterial infection or co-infection  with other viruses. Laboratories within the [facility] and its  territories are required to report all positive results to the appropriate  public health authorities.  Negative results do not preclude SARS-CoV-2  infection and should not be used as the sole basis for treatment or other  patient management decisions. Negative results must be combined with  clinical observations, patient history, and epidemiological information. This test has not been FDA cleared or approved. This test has been authorized by FDA under an Emergency Use Authorization  (EUA). This test is only authorized for the duration of time the  declaration that circumstances exist justifying the authorization of the  emergency use of an in vitro diagnostic tests for detection of SARS-CoV-2  virus and/or diagnosis of COVID-19 infection under section 564(b)(1) of  the Act, 21 U. S.C. 453GJL-0(Q)(9), unless the authorization is terminated  or revoked sooner. The test has been validated but independent review by FDA  and CLIA is pending. Test performed using [employer]: This RT-PCR assay targets N2,  a region unique to SARS-CoV-2. A conserved region in the E-gene was chosen  for pan-Sarbecovirus detection which includes SARS-CoV-2. According to CMS-2020-01-R, this platform meets the definition of high-throughput technology.     HS Troponin 0hr (reflex protocol) [[id number]]  (Normal) Collected: 09/04/23 0118    Lab Status: Final result Specimen: Blood from Arm, Left Updated: 09/04/23 0158     hs TnI 0hr 5 ng/L     Comprehensive metabolic panel [[id number]]  (Abnormal) Collected: 09/04/23 0118    Lab Status: Final result Specimen: Blood from Arm, Left Updated: 09/04/23 0152     Sodium 137 mmol/L      Potassium 4.2 mmol/L      Chloride 100 mmol/L      CO2 31 mmol/L      ANION GAP 6 mmol/L      BUN 17 mg/dL      Creatinine 0.74 mg/dL      Glucose 115 mg/dL      Calcium 9.5 mg/dL      AST 12 U/L      ALT 15 U/L      Alkaline Phosphatase 85 U/L      Total Protein 7.0 g/dL      Albumin 3.9 g/dL      Total Bilirubin 0.15 mg/dL      eGFR 84 ml/min/1.73sq m     Narrative:      [facility] guidelines for Chronic Kidney Disease (CKD):   •  Stage 1 with normal or high GFR (GFR > 90 mL/min/1.73 square meters)  •  Stage 2 Mild CKD (GFR = 60-89 mL/min/1.73 square meters)  •  Stage 3A Moderate CKD (GFR = 45-59 mL/min/1.73 square meters)  •  Stage 3B Moderate CKD (GFR = 30-44 mL/min/1.73 square meters)  •  Stage 4 Severe CKD (GFR = 15-29 mL/min/1.73 square meters)  •  Stage 5 End Stage CKD (GFR <15 mL/min/1.73 square meters)  Note: GFR calculation is accurate only with a steady state creatinine    B-Type Natriuretic Peptide(BNP) [[id number]]  (Normal) Collected: 09/04/23 0118    Lab Status: Final result Specimen: Blood from Arm, Left Updated: 09/04/23 0152     BNP 30 pg/mL     D-dimer, quantitative [[id number]]  (Normal) Collected: 09/04/23 0118    Lab Status: Final result Specimen: Blood from Arm, Left Updated: 09/04/23 0141     D-Dimer, Quant 0.31 ug/ml FEU     Narrative: In the evaluation for possible pulmonary embolism, in the appropriate (Well's Score of 4 or less) patient, the age adjusted d-dimer cutoff for this patient can be calculated as:    Age x 0.01 (in ug/mL) for Age-adjusted D-dimer exclusion threshold for a patient over 50 years.     Blood gas, venous [[id number]]  (Abnormal) Collected: 09/04/23 0118    Lab Status: Final result Specimen: Blood from Arm, Left Updated: 09/04/23 0132     pH, Jose 7.308     pCO2, Jose 61.7 mm Hg      pO2, Jose 41.4 mm Hg      HCO3, Jose 30.2 mmol/L      Base Excess, Jose 2.4 mmol/L      O2 Content, Jose 12.9 ml/dL      O2 HGB, VENOUS 69.3 %     CBC and differential [[id number]]  (Abnormal) Collected: 09/04/23 0118    Lab Status: Final result Specimen: Blood from Arm, Left Updated: 09/04/23 0127     WBC 10.05 Thousand/uL      RBC 3.97 Million/uL      Hemoglobin 11.9 g/dL      Hematocrit 38.2 %      MCV 96 fL      MCH 30.0 pg      MCHC 31.2 g/dL      RDW 13.2 %      MPV 10.6 fL      Platelets 858 Thousands/uL      nRBC 0 /100 WBCs      Neutrophils Relative 60 %      Immat GRANS % 1 %      Lymphocytes Relative 28 %      Monocytes Relative 11 %      Eosinophils Relative 0 %      Basophils Relative 0 %      Neutrophils Absolute 6.07 Thousands/µL      Immature Grans Absolute 0.07 Thousand/uL      Lymphocytes Absolute 2.77 Thousands/µL      Monocytes Absolute 1.08 Thousand/µL      Eosinophils Absolute 0.03 Thousand/µL      Basophils Absolute 0.03 Thousands/µL           XR chest 1 view portable    (Results Pending)       Vitals:    09/04/23 0109   BP: 135/75   TempSrc: Oral   Pulse: 78   Resp: 22   Patient Position - Orthostatic VS: Sitting   Temp: 97.6 °F (36.4 °C)             Medical Decision Making  70-year-old female with past medical history pertinent for COPD, diabetes, anxiety, smoker, hypertension who presents to the emergency department complaining of shortness of breath started around 9 PM this evening. States she does wear O2 at 3 L nasal cannula at home baseline. Vital signs on arrival here are nonconcerning. Patient is on her baseline 3 L/min here at 95 percent. Patient had decreased breath sounds bilaterally but no significant wheezing. Patient was treated with a DuoNeb treatment followed by albuterol. EKG obtained on arrival did not show any acute signs of ischemia. Lab work obtained along with chest x-ray to evaluate further. Lab work returned showing troponin of 5, BNP of 30, and D-dimer of 0.31. Otherwise lab work showed no immediate concerns. Chest x-ray returned showing no infection. Patient was able to ambulate here without any decrease in her O2 saturation on her baseline oxygen level. Patient was advised outpatient management with PCP follow-up. Patient started on a short course of prednisone here. Patient advised to continue her inhalers at home and return for worsening symptoms. Amount and/or Complexity of Data Reviewed  Labs: ordered. Decision-making details documented in ED Course. Radiology: ordered and independent interpretation performed. Decision-making details documented in ED Course. Risk  Prescription drug management. Disposition  Final diagnoses:   COPD exacerbation ([address])   Shortness of breath     Time reflects when diagnosis was documented in both MDM as applicable and the Disposition within this note     Time User Action Codes Description Comment    9/4/2023  1:22 AM Kathryn DING Add [J44.1] COPD exacerbation ([address])     9/4/2023  1:22 AM Antione Lugo Add [R06.02] Shortness of breath       ED Disposition     ED Disposition   Discharge    Condition   Stable    Date/Time   Mon Sep 4, 2023  1:22 AM    Comment   Jessika REID Viaalena discharge to home/self care. Follow-up Information     Follow up With Specialties Details Why Contact Info    Jimmie Morin DO Family Medicine   [address] [place] [address]  [phone]            Patient's Medications   Discharge Prescriptions    PREDNISONE 20 MG TABLET    Take 2 tablets (40 mg total) by mouth daily for 4 days Do not start before September 5, 2023. Start Date: 9/5/2023  End Date: 9/9/2023       Order Dose: 40 mg       Quantity: 8 tablet    Refills: 0       No discharge procedures on file.     PDMP Review       Value Time User    PDMP Reviewed  Yes 8/4/2023  8:38 AM Jean Mejia MD          ED Provider  Electronically Signed by           Antione Lugo MD  09/04/23 0048

## 2023-09-04 NOTE — ED NOTES
Pt ambulated around the ED with ease. Pt was 96% before starting and was 94% after completion. Pt has not signs of distress and stated she felt fine. Pt was on 3L O2 just as she is at home.       Radha Watkins RN  09/04/23 9703

## 2023-09-04 NOTE — DISCHARGE INSTRUCTIONS
Thank you for letting us take care of you. You have been evaluated for shortness of breath. Please stop smoking. Please use your inhalers as directed. Please use the medications prescribed. At this time, you have no clinical evidence of symptoms or problems that will require hospitalization, however you should be evaluated soon by a primary care physician, and contact information has been provided. Follow up with your primary care physician. This is important as many medical conditions can be managed as an outpatient, in addition to routine health screening. Seeing your primary doctor often can help identify changes in the medical issue that brought you to the ED for care today. If you experience any new symptoms or acute worsening of current symptoms, please return to the ED.

## 2023-09-05 LAB
ATRIAL RATE: 71 BPM
ATRIAL RATE: 77 BPM
P AXIS: 40 DEGREES
P AXIS: 43 DEGREES
PR INTERVAL: 104 MS
PR INTERVAL: 106 MS
QRS AXIS: 38 DEGREES
QRS AXIS: 38 DEGREES
QRSD INTERVAL: 74 MS
QRSD INTERVAL: 78 MS
QT INTERVAL: 376 MS
QT INTERVAL: 392 MS
QTC INTERVAL: 425 MS
QTC INTERVAL: 425 MS
T WAVE AXIS: 35 DEGREES
T WAVE AXIS: 38 DEGREES
VENTRICULAR RATE: 71 BPM
VENTRICULAR RATE: 77 BPM

## 2023-09-06 RX ORDER — SIMETHICONE 80 MG
80 TABLET,CHEWABLE ORAL
COMMUNITY
Start: 2023-07-27 | End: 2023-09-13 | Stop reason: ALTCHOICE

## 2023-09-11 ENCOUNTER — APPOINTMENT (EMERGENCY)
Dept: RADIOLOGY | Facility: HOSPITAL | Age: 68
End: 2023-09-11
Payer: COMMERCIAL

## 2023-09-11 ENCOUNTER — HOSPITAL ENCOUNTER (EMERGENCY)
Facility: HOSPITAL | Age: 68
Discharge: HOME | End: 2023-09-11
Attending: EMERGENCY MEDICINE
Payer: COMMERCIAL

## 2023-09-11 ENCOUNTER — OFFICE VISIT (OUTPATIENT)
Dept: PULMONOLOGY | Facility: CLINIC | Age: 68
End: 2023-09-11
Payer: COMMERCIAL

## 2023-09-11 VITALS
SYSTOLIC BLOOD PRESSURE: 178 MMHG | OXYGEN SATURATION: 100 % | TEMPERATURE: 97.8 F | HEIGHT: 62 IN | DIASTOLIC BLOOD PRESSURE: 70 MMHG | HEART RATE: 82 BPM | WEIGHT: 150 LBS | BODY MASS INDEX: 27.6 KG/M2 | RESPIRATION RATE: 20 BRPM

## 2023-09-11 VITALS
HEIGHT: 62 IN | HEART RATE: 113 BPM | BODY MASS INDEX: 29.63 KG/M2 | TEMPERATURE: 98.2 F | WEIGHT: 161 LBS | SYSTOLIC BLOOD PRESSURE: 156 MMHG | OXYGEN SATURATION: 97 % | DIASTOLIC BLOOD PRESSURE: 78 MMHG

## 2023-09-11 DIAGNOSIS — J44.1 COPD EXACERBATION (HCC): ICD-10-CM

## 2023-09-11 DIAGNOSIS — J44.1 COPD EXACERBATION (HCC): Primary | ICD-10-CM

## 2023-09-11 DIAGNOSIS — R91.1 LUNG NODULE: ICD-10-CM

## 2023-09-11 DIAGNOSIS — Z09 HOSPITAL DISCHARGE FOLLOW-UP: ICD-10-CM

## 2023-09-11 DIAGNOSIS — J44.9 COPD, SEVERITY TO BE DETERMINED (HCC): Primary | ICD-10-CM

## 2023-09-11 DIAGNOSIS — Z72.0 TOBACCO ABUSE: ICD-10-CM

## 2023-09-11 DIAGNOSIS — J96.11 CHRONIC HYPOXEMIC RESPIRATORY FAILURE (HCC): ICD-10-CM

## 2023-09-11 LAB
ALBUMIN SERPL BCP-MCNC: 4.1 G/DL (ref 3.5–5)
ALP SERPL-CCNC: 82 U/L (ref 34–104)
ALT SERPL W P-5'-P-CCNC: 11 U/L (ref 7–52)
ANION GAP SERPL CALCULATED.3IONS-SCNC: 4 MMOL/L
APTT PPP: 27 SECONDS (ref 23–37)
AST SERPL W P-5'-P-CCNC: 9 U/L (ref 13–39)
ATRIAL RATE: 83 BPM
BASOPHILS # BLD AUTO: 0.04 THOUSANDS/ÂΜL (ref 0–0.1)
BASOPHILS NFR BLD AUTO: 0 % (ref 0–1)
BILIRUB SERPL-MCNC: 0.37 MG/DL (ref 0.2–1)
BNP SERPL-MCNC: 23 PG/ML (ref 0–100)
BUN SERPL-MCNC: 12 MG/DL (ref 5–25)
CALCIUM SERPL-MCNC: 9.7 MG/DL (ref 8.4–10.2)
CARDIAC TROPONIN I PNL SERPL HS: 8 NG/L
CHLORIDE SERPL-SCNC: 95 MMOL/L (ref 96–108)
CO2 SERPL-SCNC: 35 MMOL/L (ref 21–32)
CREAT SERPL-MCNC: 0.62 MG/DL (ref 0.6–1.3)
EOSINOPHIL # BLD AUTO: 0.09 THOUSAND/ÂΜL (ref 0–0.61)
EOSINOPHIL NFR BLD AUTO: 1 % (ref 0–6)
ERYTHROCYTE [DISTWIDTH] IN BLOOD BY AUTOMATED COUNT: 13.2 % (ref 11.6–15.1)
FLUAV RNA RESP QL NAA+PROBE: NEGATIVE
FLUBV RNA RESP QL NAA+PROBE: NEGATIVE
GFR SERPL CREATININE-BSD FRML MDRD: 94 ML/MIN/1.73SQ M
GLUCOSE SERPL-MCNC: 156 MG/DL (ref 65–140)
HCT VFR BLD AUTO: 39.6 % (ref 34.8–46.1)
HGB BLD-MCNC: 12.4 G/DL (ref 11.5–15.4)
IMM GRANULOCYTES # BLD AUTO: 0.07 THOUSAND/UL (ref 0–0.2)
IMM GRANULOCYTES NFR BLD AUTO: 1 % (ref 0–2)
INR PPP: 0.8 (ref 0.84–1.19)
LYMPHOCYTES # BLD AUTO: 2.36 THOUSANDS/ÂΜL (ref 0.6–4.47)
LYMPHOCYTES NFR BLD AUTO: 20 % (ref 14–44)
MCH RBC QN AUTO: 29.9 PG (ref 26.8–34.3)
MCHC RBC AUTO-ENTMCNC: 31.3 G/DL (ref 31.4–37.4)
MCV RBC AUTO: 95 FL (ref 82–98)
MONOCYTES # BLD AUTO: 0.96 THOUSAND/ÂΜL (ref 0.17–1.22)
MONOCYTES NFR BLD AUTO: 8 % (ref 4–12)
NEUTROPHILS # BLD AUTO: 8.06 THOUSANDS/ÂΜL (ref 1.85–7.62)
NEUTS SEG NFR BLD AUTO: 70 % (ref 43–75)
NRBC BLD AUTO-RTO: 0 /100 WBCS
P AXIS: 73 DEGREES
PLATELET # BLD AUTO: 195 THOUSANDS/UL (ref 149–390)
PMV BLD AUTO: 10.4 FL (ref 8.9–12.7)
POTASSIUM SERPL-SCNC: 4.2 MMOL/L (ref 3.5–5.3)
PR INTERVAL: 118 MS
PROT SERPL-MCNC: 7.2 G/DL (ref 6.4–8.4)
PROTHROMBIN TIME: 11.3 SECONDS (ref 11.6–14.5)
QRS AXIS: 55 DEGREES
QRSD INTERVAL: 80 MS
QT INTERVAL: 372 MS
QTC INTERVAL: 437 MS
RBC # BLD AUTO: 4.15 MILLION/UL (ref 3.81–5.12)
RSV RNA RESP QL NAA+PROBE: NEGATIVE
SARS-COV-2 RNA RESP QL NAA+PROBE: NEGATIVE
SODIUM SERPL-SCNC: 134 MMOL/L (ref 135–147)
T WAVE AXIS: 53 DEGREES
VENTRICULAR RATE: 83 BPM
WBC # BLD AUTO: 11.58 THOUSAND/UL (ref 4.31–10.16)

## 2023-09-11 PROCEDURE — 99285 EMERGENCY DEPT VISIT HI MDM: CPT

## 2023-09-11 PROCEDURE — 85730 THROMBOPLASTIN TIME PARTIAL: CPT | Performed by: EMERGENCY MEDICINE

## 2023-09-11 PROCEDURE — 80053 COMPREHEN METABOLIC PANEL: CPT | Performed by: EMERGENCY MEDICINE

## 2023-09-11 PROCEDURE — 84484 ASSAY OF TROPONIN QUANT: CPT | Performed by: EMERGENCY MEDICINE

## 2023-09-11 PROCEDURE — 99285 EMERGENCY DEPT VISIT HI MDM: CPT | Performed by: EMERGENCY MEDICINE

## 2023-09-11 PROCEDURE — 0241U HB NFCT DS VIR RESP RNA 4 TRGT: CPT | Performed by: EMERGENCY MEDICINE

## 2023-09-11 PROCEDURE — 94640 AIRWAY INHALATION TREATMENT: CPT

## 2023-09-11 PROCEDURE — 85610 PROTHROMBIN TIME: CPT | Performed by: EMERGENCY MEDICINE

## 2023-09-11 PROCEDURE — 99214 OFFICE O/P EST MOD 30 MIN: CPT

## 2023-09-11 PROCEDURE — 71045 X-RAY EXAM CHEST 1 VIEW: CPT

## 2023-09-11 PROCEDURE — 93005 ELECTROCARDIOGRAM TRACING: CPT

## 2023-09-11 PROCEDURE — 36415 COLL VENOUS BLD VENIPUNCTURE: CPT | Performed by: EMERGENCY MEDICINE

## 2023-09-11 PROCEDURE — 83880 ASSAY OF NATRIURETIC PEPTIDE: CPT | Performed by: EMERGENCY MEDICINE

## 2023-09-11 PROCEDURE — 85025 COMPLETE CBC W/AUTO DIFF WBC: CPT | Performed by: EMERGENCY MEDICINE

## 2023-09-11 RX ORDER — IPRATROPIUM BROMIDE AND ALBUTEROL SULFATE 2.5; .5 MG/3ML; MG/3ML
3 SOLUTION RESPIRATORY (INHALATION) ONCE
Status: COMPLETED | OUTPATIENT
Start: 2023-09-11 | End: 2023-09-11

## 2023-09-11 RX ORDER — PREDNISONE 20 MG/1
40 TABLET ORAL ONCE
Status: COMPLETED | OUTPATIENT
Start: 2023-09-11 | End: 2023-09-11

## 2023-09-11 RX ORDER — BUPROPION HYDROCHLORIDE 150 MG/1
150 TABLET, EXTENDED RELEASE ORAL EVERY MORNING
COMMUNITY
Start: 2023-08-15

## 2023-09-11 RX ORDER — CEFUROXIME AXETIL 250 MG/1
500 TABLET ORAL EVERY 12 HOURS SCHEDULED
Status: DISCONTINUED | OUTPATIENT
Start: 2023-09-11 | End: 2023-09-11 | Stop reason: HOSPADM

## 2023-09-11 RX ORDER — OMEPRAZOLE 20 MG/1
20 CAPSULE, DELAYED RELEASE ORAL EVERY MORNING
COMMUNITY
Start: 2023-07-25 | End: 2023-09-13 | Stop reason: SDUPTHER

## 2023-09-11 RX ORDER — TIOTROPIUM BROMIDE INHALATION SPRAY 3.12 UG/1
SPRAY, METERED RESPIRATORY (INHALATION)
COMMUNITY
Start: 2023-07-28 | End: 2023-09-11

## 2023-09-11 RX ORDER — FLUTICASONE FUROATE, UMECLIDINIUM BROMIDE AND VILANTEROL TRIFENATATE 100; 62.5; 25 UG/1; UG/1; UG/1
1 POWDER RESPIRATORY (INHALATION) DAILY
Qty: 60 BLISTER | Refills: 0 | Status: SHIPPED | OUTPATIENT
Start: 2023-09-11 | End: 2023-10-11

## 2023-09-11 RX ORDER — NICOTINE 21 MG/24HR
PATCH, TRANSDERMAL 24 HOURS TRANSDERMAL
COMMUNITY
Start: 2023-07-27 | End: 2023-09-13 | Stop reason: ALTCHOICE

## 2023-09-11 RX ORDER — CEFUROXIME AXETIL 500 MG/1
500 TABLET ORAL EVERY 12 HOURS SCHEDULED
Qty: 14 TABLET | Refills: 0 | Status: SHIPPED | OUTPATIENT
Start: 2023-09-11 | End: 2023-09-18

## 2023-09-11 RX ORDER — NEBULIZER AND COMPRESSOR
EACH MISCELLANEOUS
COMMUNITY
Start: 2023-07-28

## 2023-09-11 RX ORDER — PREDNISONE 10 MG/1
TABLET ORAL
Qty: 30 TABLET | Refills: 0 | Status: SHIPPED | OUTPATIENT
Start: 2023-09-11

## 2023-09-11 RX ADMIN — IPRATROPIUM BROMIDE AND ALBUTEROL SULFATE 3 ML: 2.5; .5 SOLUTION RESPIRATORY (INHALATION) at 07:05

## 2023-09-11 RX ADMIN — IPRATROPIUM BROMIDE AND ALBUTEROL SULFATE 3 ML: 2.5; .5 SOLUTION RESPIRATORY (INHALATION) at 07:56

## 2023-09-11 RX ADMIN — PREDNISONE 40 MG: 20 TABLET ORAL at 08:00

## 2023-09-11 RX ADMIN — CEFUROXIME AXETIL 500 MG: 250 TABLET ORAL at 08:00

## 2023-09-11 NOTE — ASSESSMENT & PLAN NOTE
Patient will continue 2 L supplemental oxygen to maintain SPO2 above 88%. Order placed for portable concentrator.

## 2023-09-11 NOTE — ASSESSMENT & PLAN NOTE
Unknown severity. No prior PFTs. We will have patient complete PFTs. Patient also getting confused with nebulized and inhaler therapy, not taking adequately. Will simplify and start on Trelegy 100 1 puff daily. Instructed on use and reminded to rinse mouth after. May continue to use albuterol nebulizer every 6 hours as needed for shortness of breath or wheezing. Stop budesonide nebulizer and Stiolto inhaler.

## 2023-09-11 NOTE — ED PROVIDER NOTES
History  Chief Complaint   Patient presents with   • Shortness of Breath     Worsening SOB overnight. Wears 3L O2 continuous for COPD. History of COPD. On 3 L nasal cannula at home. Was smoking yesterday. Complains of shortness of breath this morning. No chest pain. No recent cough or cold symptoms. No recent steroids. No fevers or chills. No nausea vomiting or diarrhea. No rash. History provided by:  Patient   used: No    Shortness of Breath  Severity:  Mild  Onset quality:  Gradual  Duration: Started this morning. Timing:  Constant  Progression:  Worsening  Chronicity:  Recurrent  Context: smoke exposure    Context: not emotional upset and not pollens    Relieved by:  Nothing  Worsened by:  Nothing  Ineffective treatments: Nebulizer treatment. Associated symptoms: cough and wheezing    Associated symptoms: no abdominal pain, no chest pain, no claudication, no ear pain, no fever, no headaches, no hemoptysis, no neck pain, no rash, no sore throat, no sputum production, no syncope and no vomiting        Prior to Admission Medications   Prescriptions Last Dose Informant Patient Reported? Taking? albuterol (2.5 mg/3 mL) 0.083 % nebulizer solution   No No   Sig: Take 3 mL (2.5 mg total) by nebulization every 6 (six) hours as needed for wheezing or shortness of breath   amLODIPine (NORVASC) 10 mg tablet   Yes No   Sig: Take 10 mg by mouth daily at bedtime    atorvastatin (LIPITOR) 40 mg tablet   Yes No   Sig: Take 40 mg by mouth daily at bedtime    benzonatate (TESSALON PERLES) 100 mg capsule   No No   Sig: Take 1 capsule (100 mg total) by mouth 3 (three) times a day as needed for cough   buPROPion (WELLBUTRIN XL) 150 mg 24 hr tablet   No No   Sig: Take 1 tablet (150 mg total) by mouth daily Do not start before July 23, 2023.    budesonide (PULMICORT) 0.5 mg/2 mL nebulizer solution   No No   Sig: Take 2 mL (0.5 mg total) by nebulization every 12 (twelve) hours Rinse mouth after use.   cloNIDine (CATAPRES) 0.2 mg tablet   Yes No   Sig: Take 0.2 mg by mouth every 12 (twelve) hours   ergocalciferol (VITAMIN D2) 50,000 units   Yes No   Sig: Take 50,000 Units by mouth once a week   fluticasone (FLONASE) 50 mcg/act nasal spray   No No   Si spray into each nostril daily   glipiZIDE (GLUCOTROL) 10 mg tablet  Self Yes No   Sig: Take 10 mg by mouth daily in the early morning W breakfast   lisinopril (ZESTRIL) 40 mg tablet   Yes No   Sig: Take 40 mg by mouth daily   metoprolol tartrate (LOPRESSOR) 100 mg tablet   Yes No   Sig: Take 100 mg by mouth every 12 (twelve) hours   omeprazole (PriLOSEC OTC) 20 MG tablet   Yes No   Sig: Take 20 mg by mouth daily    pioglitazone (ACTOS) 15 mg tablet   Yes No   Sig: Take 15 mg by mouth daily   predniSONE 20 mg tablet   No No   Sig: Take 3 tablets (60 mg total) by mouth daily for 3 days, THEN 2 tablets (40 mg total) daily for 3 days, THEN 1 tablet (20 mg total) daily for 3 days, THEN 0.5 tablets (10 mg total) daily for 3 days. simethicone (MYLICON) 80 mg chewable tablet   Yes No   Sig: Chew 80 mg   sodium chloride 1 g tablet   No No   Sig: Take 2 tablets (2 g total) by mouth 3 (three) times a day with meals   tiotropium-olodaterol (Stiolto Respimat) 2.5-2.5 MCG/ACT inhaler   Yes No   Sig: Inhale 2 puffs daily      Facility-Administered Medications: None       Past Medical History:   Diagnosis Date   • Anxiety    • COPD (chronic obstructive pulmonary disease) ([facility])    • Diabetes mellitus ([facility])    • Essential (primary) hypertension    • GERD (gastroesophageal reflux disease)    • Smoker        History reviewed. No pertinent surgical history. Family History   Problem Relation Age of Onset   • Diabetes Father      I have reviewed and agree with the history as documented.     E-Cigarette/Vaping   • E-Cigarette Use Never User      E-Cigarette/Vaping Substances   • Nicotine No    • THC No    • CBD No    • Flavoring No      Social History     Tobacco Use   • Smoking status: Every Day     Packs/day: 0.50     Types: Cigarettes   • Smokeless tobacco: Never   Vaping Use   • Vaping Use: Never used   Substance Use Topics   • Alcohol use: Not Currently   • Drug use: Never       Review of Systems   Constitutional: Negative for chills and fever. HENT: Negative for ear pain, hearing loss, sore throat, trouble swallowing and voice change. Eyes: Negative for pain and discharge. Respiratory: Positive for cough, shortness of breath and wheezing. Negative for hemoptysis and sputum production. Cardiovascular: Negative for chest pain, palpitations, claudication and syncope. Gastrointestinal: Negative for abdominal pain, blood in stool, constipation, diarrhea, nausea and vomiting. Genitourinary: Negative for dysuria, flank pain, frequency and hematuria. Musculoskeletal: Negative for joint swelling, neck pain and neck stiffness. Skin: Negative for rash and wound. Neurological: Negative for dizziness, seizures, syncope, facial asymmetry and headaches. Psychiatric/Behavioral: Negative for hallucinations, self-injury and suicidal ideas. All other systems reviewed and are negative. Physical Exam  Physical Exam  Vitals and nursing note reviewed. Constitutional:       General: She is not in acute distress. Appearance: She is well-developed. HENT:      Head: Normocephalic and atraumatic. Right Ear: External ear normal.      Left Ear: External ear normal.   Eyes:      General: No scleral icterus. Right eye: No discharge. Left eye: No discharge. Extraocular Movements: Extraocular movements intact. Conjunctiva/sclera: Conjunctivae normal.   Cardiovascular:      Rate and Rhythm: Normal rate and regular rhythm. Heart sounds: Normal heart sounds. No murmur heard. Pulmonary:      Effort: Pulmonary effort is normal.      Breath sounds: Decreased breath sounds and wheezing present. No rales.    Abdominal:      General: Bowel sounds are normal. There is no distension. Palpations: Abdomen is soft. Tenderness: There is no abdominal tenderness. There is no guarding or rebound. Musculoskeletal:         General: No deformity. Normal range of motion. Cervical back: Normal range of motion and neck supple. Skin:     General: Skin is warm and dry. Findings: No rash. Neurological:      General: No focal deficit present. Mental Status: She is alert and oriented to person, place, and time. Cranial Nerves: No cranial nerve deficit. Psychiatric:         Mood and Affect: Mood normal.         Behavior: Behavior normal.         Thought Content:  Thought content normal.         Judgment: Judgment normal.         Vital Signs  ED Triage Vitals [09/11/23 0649]   Temperature Pulse Respirations Blood Pressure SpO2   97.8 °F (36.6 °C) 94 19 (!) 186/77 94 %      Temp Source Heart Rate Source Patient Position - Orthostatic VS BP Location FiO2 (%)   Temporal Right;Radial Sitting Right arm --      Pain Score       No Pain           Vitals:    09/11/23 0649   BP: (!) 186/77   Pulse: 94   Patient Position - Orthostatic VS: Sitting         Visual Acuity      ED Medications  Medications   predniSONE tablet 40 mg (has no administration in time range)   cefuroxime (CEFTIN) tablet 500 mg (has no administration in time range)   ipratropium-albuterol (DUO-NEB) 0.5-2.5 mg/3 mL inhalation solution 3 mL (3 mL Nebulization Given 9/11/23 0705)   ipratropium-albuterol (DUO-NEB) 0.5-2.5 mg/3 mL inhalation solution 3 mL (3 mL Nebulization Given 9/11/23 0756)       Diagnostic Studies  Results Reviewed     Procedure Component Value Units Date/Time    FLU/RSV/COVID - if FLU/RSV clinically relevant [[id number]]  (Normal) Collected: 09/11/23 0703    Lab Status: Final result Specimen: Nares from Nasopharyngeal Swab Updated: 09/11/23 0748     SARS-CoV-2 Negative     INFLUENZA A PCR Negative     INFLUENZA B PCR Negative     RSV PCR Negative    Narrative: FOR PEDIATRIC PATIENTS - copy/paste COVID Guidelines URL to browser: [email or web address]    SARS-CoV-2 assay is a Nucleic Acid Amplification assay intended for the  qualitative detection of nucleic acid from SARS-CoV-2 in nasopharyngeal  swabs. Results are for the presumptive identification of SARS-CoV-2 RNA. Positive results are indicative of infection with SARS-CoV-2, the virus  causing COVID-19, but do not rule out bacterial infection or co-infection  with other viruses. Laboratories within the [facility] and its  territories are required to report all positive results to the appropriate  public health authorities. Negative results do not preclude SARS-CoV-2  infection and should not be used as the sole basis for treatment or other  patient management decisions. Negative results must be combined with  clinical observations, patient history, and epidemiological information. This test has not been FDA cleared or approved. This test has been authorized by FDA under an Emergency Use Authorization  (EUA). This test is only authorized for the duration of time the  declaration that circumstances exist justifying the authorization of the  emergency use of an in vitro diagnostic tests for detection of SARS-CoV-2  virus and/or diagnosis of COVID-19 infection under section 564(b)(1) of  the Act, 21 U. S.C. 892YBD-8(L)(2), unless the authorization is terminated  or revoked sooner. The test has been validated but independent review by FDA  and CLIA is pending. Test performed using [employer] GeneXpert: This RT-PCR assay targets N2,  a region unique to SARS-CoV-2. A conserved region in the E-gene was chosen  for pan-Sarbecovirus detection which includes SARS-CoV-2. According to CMS-2020-01-R, this platform meets the definition of high-throughput technology.     B-Type Natriuretic Peptide(BNP) [[id number]]  (Normal) Collected: 09/11/23 0703    Lab Status: Final result Specimen: Blood from Arm, Left Updated: 09/11/23 0738     BNP 23 pg/mL     HS Troponin 0hr (reflex protocol) [[id number]]  (Normal) Collected: 09/11/23 0703    Lab Status: Final result Specimen: Blood from Arm, Left Updated: 09/11/23 0734     hs TnI 0hr 8 ng/L     Protime-INR [[id number]]  (Abnormal) Collected: 09/11/23 0703    Lab Status: Final result Specimen: Blood from Arm, Left Updated: 09/11/23 0730     Protime 11.3 seconds      INR 0.80    APTT [[id number]]  (Normal) Collected: 09/11/23 0703    Lab Status: Final result Specimen: Blood from Arm, Left Updated: 09/11/23 0730     PTT 27 seconds     Comprehensive metabolic panel [[id number]]  (Abnormal) Collected: 09/11/23 0703    Lab Status: Final result Specimen: Blood from Arm, Left Updated: 09/11/23 0730     Sodium 134 mmol/L      Potassium 4.2 mmol/L      Chloride 95 mmol/L      CO2 35 mmol/L      ANION GAP 4 mmol/L      BUN 12 mg/dL      Creatinine 0.62 mg/dL      Glucose 156 mg/dL      Calcium 9.7 mg/dL      AST 9 U/L      ALT 11 U/L      Alkaline Phosphatase 82 U/L      Total Protein 7.2 g/dL      Albumin 4.1 g/dL      Total Bilirubin 0.37 mg/dL      eGFR 94 ml/min/1.73sq m     Narrative:      [facility] guidelines for Chronic Kidney Disease (CKD):   •  Stage 1 with normal or high GFR (GFR > 90 mL/min/1.73 square meters)  •  Stage 2 Mild CKD (GFR = 60-89 mL/min/1.73 square meters)  •  Stage 3A Moderate CKD (GFR = 45-59 mL/min/1.73 square meters)  •  Stage 3B Moderate CKD (GFR = 30-44 mL/min/1.73 square meters)  •  Stage 4 Severe CKD (GFR = 15-29 mL/min/1.73 square meters)  •  Stage 5 End Stage CKD (GFR <15 mL/min/1.73 square meters)  Note: GFR calculation is accurate only with a steady state creatinine    CBC and differential [[id number]]  (Abnormal) Collected: 09/11/23 0703    Lab Status: Final result Specimen: Blood from Arm, Left Updated: 09/11/23 0712     WBC 11.58 Thousand/uL      RBC 4.15 Million/uL      Hemoglobin 12.4 g/dL Hematocrit 39.6 %      MCV 95 fL      MCH 29.9 pg      MCHC 31.3 g/dL      RDW 13.2 %      MPV 10.4 fL      Platelets 566 Thousands/uL      nRBC 0 /100 WBCs      Neutrophils Relative 70 %      Immat GRANS % 1 %      Lymphocytes Relative 20 %      Monocytes Relative 8 %      Eosinophils Relative 1 %      Basophils Relative 0 %      Neutrophils Absolute 8.06 Thousands/µL      Immature Grans Absolute 0.07 Thousand/uL      Lymphocytes Absolute 2.36 Thousands/µL      Monocytes Absolute 0.96 Thousand/µL      Eosinophils Absolute 0.09 Thousand/µL      Basophils Absolute 0.04 Thousands/µL                  XR chest 1 view portable   ED Interpretation by Fredis Garcia MD (09/11 0707)   No change from previous chest x-ray. Procedures  ECG 12 Lead Documentation Only    Date/Time: 9/11/2023 6:53 AM    Performed by: Fredis Garcia MD  Authorized by: Fredis Garcia MD    ECG reviewed by me, the ED Provider: yes    Patient location:  ED  Rate:     ECG rate:  80  Rhythm:     Rhythm: sinus rhythm    Ectopy:     Ectopy: none    QRS:     QRS axis:  Normal             ED Course  ED Course as of 09/11/23 0758   [facility] Sep 11, 2023   5722 Patient seen. Feels better. And expiratory wheeze noted. 1115 Patient ambulated to the bathroom without any difficulty. Most likely COPD exacerbation. Will treat with steroids and antibiotics. Patient is to follow-up with her pulmonary appointment that she is already scheduled for today. 2238 Discussed with patient about need to stop smoking. Medical Decision Making  Amount and/or Complexity of Data Reviewed  Labs: ordered. Decision-making details documented in ED Course. Radiology: ordered and independent interpretation performed. Decision-making details documented in ED Course. ECG/medicine tests: ordered and independent interpretation performed. Decision-making details documented in ED Course.   Discussion of management or test interpretation with external provider(s): Differential diagnosis includes but not limited to STEMI, NSTEMI, PE, pneumonia, pneumothorax, musculoskeletal chest pain, costochondritis, gastritis, cholelithiasis, contusion, strain    Risk  Prescription drug management. Disposition  Final diagnoses:   COPD exacerbation ([address])     Time reflects when diagnosis was documented in both MDM as applicable and the Disposition within this note     Time User Action Codes Description Comment    9/11/2023  7:07 AM Visperas, Portia Kayser Add [J44.1] COPD exacerbation [facility])       ED Disposition     ED Disposition   Discharge    Condition   Stable    Date/Time   Mon Sep 11, 2023  7:57 AM    Comment   Rukhsana Lux discharge to home/self care. Follow-up Information     Follow up With Specialties Details Why Contact Info    Kwadwo Viera, DO Family Medicine Call today  [address] [place] [address]  [phone]            Patient's Medications   Discharge Prescriptions    CEFUROXIME (CEFTIN) 500 MG TABLET    Take 1 tablet (500 mg total) by mouth every 12 (twelve) hours for 7 days       Start Date: 9/11/2023 End Date: 9/18/2023       Order Dose: 500 mg       Quantity: 14 tablet    Refills: 0    PREDNISONE 10 MG TABLET    Take 4 tabs daily for thee days then take three tabs daily for three days then take two tablets daily for three days then take one tab daily for three days. Start Date: 9/11/2023 End Date: --       Order Dose: --       Quantity: 30 tablet    Refills: 0       No discharge procedures on file.     PDMP Review       Value Time User    PDMP Reviewed  Yes 8/4/2023  8:38 AM Mars Douglas MD          ED Provider  Electronically Signed by           Jesica Aguilar MD  09/11/23 0401

## 2023-09-11 NOTE — ASSESSMENT & PLAN NOTE
Patient was seen in the ED this morning for increased shortness of breath and prescribed prednisone taper and antibiotics for COPD exacerbation. Patient's symptoms improved and does not appear in any acute distress during today's visit. Patient feels her symptoms this morning were due to her anxiety. She has normal lung sounds without any wheezing. SPO2 97% on 2 L oxygen. Patient denies any increased cough/mucus production. No fevers or chills. We will have patient hold off on starting antibiotics and prednisone taper at this time. Optimize inhaler regimen.

## 2023-09-11 NOTE — ASSESSMENT & PLAN NOTE
Continues to smoke 8 to 9 cigarettes/day. Patient has nicotine patches at home. Advised patient to start using and strongly encouraged her to quit to improve her lung disease and overall health. Patient verbalized understanding and is going to try. We will evaluate progress at next office visit.

## 2023-09-11 NOTE — PATIENT INSTRUCTIONS
Stop Stiolto and Budesonide. Start Trelegy 1 puff daily, rinse mouth after. Continue albuterol nebulizer every 6 hours as needed for shortness of breath or wheezing. Get Pulmonary function testing done. CT chest next month for lung nodule follow up. Continue oxygen- portable concentrator ordered.

## 2023-09-11 NOTE — PROGRESS NOTES
Pulmonary Follow Up Note  Krystle Boy 79 y.o. female MRN: [id number]  9/11/2023    Assessment:    COPD exacerbation ([address])  Patient was seen in the ED this morning for increased shortness of breath and prescribed prednisone taper and antibiotics for COPD exacerbation. Patient's symptoms improved and does not appear in any acute distress during today's visit. Patient feels her symptoms this morning were due to her anxiety. She has normal lung sounds without any wheezing. SPO2 97% on 2 L oxygen. Patient denies any increased cough/mucus production. No fevers or chills. We will have patient hold off on starting antibiotics and prednisone taper at this time. Optimize inhaler regimen. Chronic hypoxemic respiratory failure (HCC)  Patient will continue 2 L supplemental oxygen to maintain SPO2 above 88%. Order placed for portable concentrator. Tobacco abuse  Continues to smoke 8 to 9 cigarettes/day. Patient has nicotine patches at home. Advised patient to start using and strongly encouraged her to quit to improve her lung disease and overall health. Patient verbalized understanding and is going to try. We will evaluate progress at next office visit. COPD, severity to be determined ([address])  Unknown severity. No prior PFTs. We will have patient complete PFTs. Patient also getting confused with nebulized and inhaler therapy, not taking adequately. Will simplify and start on Trelegy 100 1 puff daily. Instructed on use and reminded to rinse mouth after. May continue to use albuterol nebulizer every 6 hours as needed for shortness of breath or wheezing. Stop budesonide nebulizer and Stiolto inhaler. Lung nodule  CTA chest from 8/4 demonstrated 6 x 9 mm spiculated nodule in the right upper lobe apex. Suspicious for cancer. Patient is at high risk with extensive smoking history and she continues to smoke. Will have repeat CT chest next month to further monitor.     Plan:    Diagnoses and all orders for this visit:    Tobacco abuse    COPD, severity to be determined ([address] [facility])  -     fluticasone-umeclidinium-vilanterol (Trelegy Ellipta) 100-62.5-25 mcg/actuation inhaler; Inhale 1 puff daily Rinse mouth after use. -     Complete PFT with post Bronchodilator and Six Minute walk; Future    Chronic hypoxemic respiratory failure [facility])  -     [address] discharge follow-up    Lung nodule  -     CT chest wo contrast; Future    COPD exacerbation ([address] [facility])  -     Ambulatory referral to Pulmonology  -     Ambulatory referral to Pulmonology    Other orders  -     simethicone (MYLICON) 80 mg chewable tablet; Chew 80 mg (Patient not taking: Reported on 9/11/2023)  -     omeprazole (PriLOSEC) 20 mg delayed release capsule; Take 20 mg by mouth every morning (Patient not taking: Reported on 9/11/2023)  -     Discontinue: Spiriva Respimat 2.5 MCG/ACT AERS inhaler; inhale 2 puffs by mouth IN THE MORNING (Patient not taking: Reported on 9/11/2023)  -     nicotine (NICODERM CQ) 14 mg/24hr TD 24 hr patch; PLACE 1 PATCH OVER 24 HOURS TOPICALLY ON THE SKIN IN THE MORNING . ..  (REFER TO PRESCRIPTION NOTES). (Patient not taking: Reported on 9/11/2023)  -     Nebulizers (Comp Air Compressor Nebulizer) MISC; As directed  -     buPROPion (WELLBUTRIN SR) 150 mg 12 hr tablet; Take 150 mg by mouth every morning            Return in about 3 months (around 12/11/2023). History of Present Illness     Chief Complaint: No chief complaint on file. Patient ID: Austin Starkey is a 79 y.o. y.o. female has a past medical history of Anxiety, COPD (chronic obstructive pulmonary disease) ([address] [facility]), Diabetes mellitus ([address] [place] [address]), Essential (primary) hypertension, GERD (gastroesophageal reflux disease), and Smoker.       9/11/2023  HPI: Ja Schofield is a 79 y.o. female with a past medical history significant for COPD, chronic hypoxic respiratory failure on 2.5 L home oxygen, current tobacco abuse with 45-pack-year history, DM 2, hypertension, and anxiety. She is here today for a hospital follow-up visit. On 8/4 she presented with increased shortness of breath. She was admitted and treated for COPD exacerbation and incidentally found to have a lung nodule. Discussed with patient PET/CT versus repeat CT scan. She chose to have interval follow-up, due in October. She was maintained on supplemental oxygen, nebulized budesonide, Stiolto, and albuterol. Patient again presented to the ED this morning with increasing shortness of breath overnight. Chest x-ray was negative, respiratory viral panel negative, blood work unremarkable. She was treated with duo nebulizer treatment and discharged on cefuroxime and prednisone taper. Patient states she feels like this episode was caused by anxiety this morning. She is feeling better currently. Denies any fevers, chills, increased cough or mucus production. No hemoptysis, night sweats, or unintentional weight loss. Has occasional wheezing and occasional shortness of breath at baseline. Continues wearing 2 L supplemental oxygen. She has been taking her Stiolto inhaler daily, budesonide nebulizer daily, and albuterol nebulizer 4 times per day. Continues to smoke 8 to 9 cigarettes/day. Has nicotine patches at home but is not using currently. Review of Systems   Constitutional: Negative for activity change, chills, diaphoresis, fever and unexpected weight change. HENT: Negative for congestion, postnasal drip, rhinorrhea, sore throat, trouble swallowing and voice change. Respiratory: Positive for cough, shortness of breath and wheezing. Negative for chest tightness. Cardiovascular: Negative for chest pain, palpitations and leg swelling. Allergic/Immunologic: Negative.         Historical Information   Past Medical History:   Diagnosis Date   • Anxiety    • COPD (chronic obstructive pulmonary disease) ([address] [facility])    • Diabetes mellitus ([address] [facility])    • Essential (primary) hypertension    • GERD (gastroesophageal reflux disease)    • Smoker      History reviewed. No pertinent surgical history. Family History   Problem Relation Age of Onset   • Diabetes Father        Smoking history: She reports that she has been smoking cigarettes. She has been smoking an average of .5 packs per day.  She has never used smokeless tobacco.    Occupational History:     Immunization History   Administered Date(s) Administered   • COVID-19 PFIZER VACCINE 0.3 ML IM 09/07/2021, 09/28/2021   • INFLUENZA 10/15/2009, 09/30/2011, 10/31/2018, 03/02/2020, 09/05/2020, 10/18/2022   • Influenza, seasonal, injectable 10/15/2009, 09/30/2011, 12/11/2012   • Pneumococcal Conjugate Vaccine 20-valent (Pcv20), Polysace 07/06/2023   • Pneumococcal Polysaccharide PPV23 05/27/2011, 03/17/2022   • Tdap 10/15/2009, 03/02/2020       Meds/Allergies     Current Outpatient Medications:   •  albuterol (2.5 mg/3 mL) 0.083 % nebulizer solution, Take 3 mL (2.5 mg total) by nebulization every 6 (six) hours as needed for wheezing or shortness of breath, Disp: 75 mL, Rfl: 0  •  amLODIPine (NORVASC) 10 mg tablet, Take 10 mg by mouth daily at bedtime , Disp: , Rfl:   •  atorvastatin (LIPITOR) 40 mg tablet, Take 40 mg by mouth daily at bedtime , Disp: , Rfl:   •  benzonatate (TESSALON PERLES) 100 mg capsule, Take 1 capsule (100 mg total) by mouth 3 (three) times a day as needed for cough, Disp: 20 capsule, Rfl: 0  •  buPROPion (WELLBUTRIN SR) 150 mg 12 hr tablet, Take 150 mg by mouth every morning, Disp: , Rfl:   •  cloNIDine (CATAPRES) 0.2 mg tablet, Take 0.2 mg by mouth every 12 (twelve) hours, Disp: , Rfl:   •  ergocalciferol (VITAMIN D2) 50,000 units, Take 50,000 Units by mouth once a week, Disp: , Rfl:   •  fluticasone (FLONASE) 50 mcg/act nasal spray, 1 spray into each nostril daily, Disp: 9.9 mL, Rfl: 0  •  fluticasone-umeclidinium-vilanterol (Trelegy Ellipta) 100-62.5-25 mcg/actuation inhaler, Inhale 1 puff daily Rinse mouth after use., Disp: 60 blister, Rfl: 0  •  glipiZIDE (GLUCOTROL) 10 mg tablet, Take 10 mg by mouth daily in the early morning W breakfast, Disp: , Rfl:   •  lisinopril (ZESTRIL) 40 mg tablet, Take 40 mg by mouth daily, Disp: , Rfl:   •  metoprolol tartrate (LOPRESSOR) 100 mg tablet, Take 100 mg by mouth every 12 (twelve) hours, Disp: , Rfl:   •  Nebulizers (Comp Air Compressor Nebulizer) MISC, As directed, Disp: , Rfl:   •  omeprazole (PriLOSEC OTC) 20 MG tablet, Take 20 mg by mouth daily , Disp: , Rfl:   •  pioglitazone (ACTOS) 15 mg tablet, Take 15 mg by mouth daily, Disp: , Rfl:   •  predniSONE 10 mg tablet, Take 4 tabs daily for thee days then take three tabs daily for three days then take two tablets daily for three days then take one tab daily for three days. , Disp: 30 tablet, Rfl: 0  •  sodium chloride 1 g tablet, Take 2 tablets (2 g total) by mouth 3 (three) times a day with meals, Disp: 180 tablet, Rfl: 0  •  buPROPion (WELLBUTRIN XL) 150 mg 24 hr tablet, Take 1 tablet (150 mg total) by mouth daily Do not start before July 23, 2023. (Patient not taking: Reported on 9/11/2023), Disp: 30 tablet, Rfl: 0  •  cefuroxime (CEFTIN) 500 mg tablet, Take 1 tablet (500 mg total) by mouth every 12 (twelve) hours for 7 days (Patient not taking: Reported on 9/11/2023), Disp: 14 tablet, Rfl: 0  •  nicotine (NICODERM CQ) 14 mg/24hr TD 24 hr patch, PLACE 1 PATCH OVER 24 HOURS TOPICALLY ON THE SKIN IN THE MORNING . ..  (REFER TO PRESCRIPTION NOTES). (Patient not taking: Reported on 9/11/2023), Disp: , Rfl:   •  omeprazole (PriLOSEC) 20 mg delayed release capsule, Take 20 mg by mouth every morning (Patient not taking: Reported on 9/11/2023), Disp: , Rfl:   •  predniSONE 20 mg tablet, Take 3 tablets (60 mg total) by mouth daily for 3 days, THEN 2 tablets (40 mg total) daily for 3 days, THEN 1 tablet (20 mg total) daily for 3 days, THEN 0.5 tablets (10 mg total) daily for 3 days.  (Patient not taking: Reported on 9/11/2023), Disp: 20 tablet, Rfl: 0  • simethicone (MYLICON) 80 mg chewable tablet, Chew 80 mg (Patient not taking: Reported on 9/11/2023), Disp: , Rfl:   No current facility-administered medications for this visit. Allergies: Allergies   Allergen Reactions   • Clindamycin          Vitals:  Vitals:    09/11/23 1532   BP: 156/78   BP Location: Left arm   Patient Position: Sitting   Cuff Size: Standard   Pulse: (!) 113   Temp: 98.2 °F (36.8 °C)   SpO2: 97%   Weight: 73 kg (161 lb)   Height: 5' 2" (1.575 m)   Oxygen Therapy  SpO2: 97 %  . Wt Readings from Last 3 Encounters:   09/11/23 73 kg (161 lb)   09/11/23 68 kg (150 lb)   09/04/23 76 kg (167 lb 8.8 oz)     Body mass index is 29.45 kg/m². Physical Exam  Vitals and nursing note reviewed. Constitutional:       General: She is not in acute distress. Appearance: Normal appearance. She is well-developed. Cardiovascular:      Rate and Rhythm: Normal rate and regular rhythm. Heart sounds: Normal heart sounds, S1 normal and S2 normal. No murmur heard. Pulmonary:      Effort: Pulmonary effort is normal.      Breath sounds: Normal breath sounds. No decreased breath sounds, wheezing, rhonchi or rales. Musculoskeletal:         General: No swelling. Right lower leg: No edema. Left lower leg: No edema. Neurological:      Mental Status: She is alert. Psychiatric:         Mood and Affect: Mood and affect normal.         Behavior: Behavior normal. Behavior is cooperative. Labs: I have personally reviewed pertinent lab results.   Lab Results   Component Value Date    WBC 11.58 (H) 09/11/2023    HGB 12.4 09/11/2023    HCT 39.6 09/11/2023    MCV 95 09/11/2023     09/11/2023     Lab Results   Component Value Date    CALCIUM 9.7 09/11/2023    K 4.2 09/11/2023    CO2 35 (H) 09/11/2023    CL 95 (L) 09/11/2023    BUN 12 09/11/2023    CREATININE 0.62 09/11/2023     No results found for: "IGE"  Lab Results   Component Value Date    ALT 11 09/11/2023    AST 9 (L) 09/11/2023 ALKPHOS 82 09/11/2023       Imaging and other studies: I have personally reviewed pertinent reports and I have personally reviewed pertinent films in PACS       CTA chest PE study 8/4/2023  No evidence of pulmonary embolus. 6 x 9 mm spiculated nodule in the apex of the right upper lobe, not present on CTA from 11/2/2020, suspicious for cancer. Further evaluation with FDG PET/CT recommended    Chest x-ray 9/4/2023  No acute cardiopulmonary disease.       Pulmonary function testing: none prior for review

## 2023-09-11 NOTE — ASSESSMENT & PLAN NOTE
CTA chest from 8/4 demonstrated 6 x 9 mm spiculated nodule in the right upper lobe apex. Suspicious for cancer. Patient is at high risk with extensive smoking history and she continues to smoke. Will have repeat CT chest next month to further monitor.

## 2023-09-13 ENCOUNTER — HOSPITAL ENCOUNTER (EMERGENCY)
Facility: HOSPITAL | Age: 68
Discharge: HOME | End: 2023-09-13
Attending: EMERGENCY MEDICINE
Payer: COMMERCIAL

## 2023-09-13 VITALS
HEART RATE: 81 BPM | RESPIRATION RATE: 24 BRPM | BODY MASS INDEX: 30.16 KG/M2 | WEIGHT: 164.9 LBS | TEMPERATURE: 97 F | SYSTOLIC BLOOD PRESSURE: 149 MMHG | DIASTOLIC BLOOD PRESSURE: 68 MMHG | OXYGEN SATURATION: 97 %

## 2023-09-13 DIAGNOSIS — F41.9 ANXIETY: Primary | ICD-10-CM

## 2023-09-13 DIAGNOSIS — J44.9 CHRONIC OBSTRUCTIVE PULMONARY DISEASE (COPD) (HCC): ICD-10-CM

## 2023-09-13 PROCEDURE — 99284 EMERGENCY DEPT VISIT MOD MDM: CPT | Performed by: EMERGENCY MEDICINE

## 2023-09-13 PROCEDURE — 99283 EMERGENCY DEPT VISIT LOW MDM: CPT

## 2023-09-13 RX ORDER — LORAZEPAM 0.5 MG/1
0.5 TABLET ORAL ONCE
Status: COMPLETED | OUTPATIENT
Start: 2023-09-13 | End: 2023-09-13

## 2023-09-13 RX ORDER — LORAZEPAM 1 MG/1
1 TABLET ORAL ONCE
Status: DISCONTINUED | OUTPATIENT
Start: 2023-09-13 | End: 2023-09-13

## 2023-09-13 RX ORDER — LORAZEPAM 0.5 MG/1
0.5 TABLET ORAL 3 TIMES DAILY PRN
Qty: 15 TABLET | Refills: 0 | Status: SHIPPED | OUTPATIENT
Start: 2023-09-13

## 2023-09-13 RX ADMIN — LORAZEPAM 0.5 MG: 0.5 TABLET ORAL at 01:31

## 2023-09-13 NOTE — ED PROVIDER NOTES
History  Chief Complaint   Patient presents with   • Anxiety     Pt states she did not take her nighttime anxiety medications and now feels anxious and SOB     Patient is a 57-year-old female with a history of COPD anxiety diabetes hypertension GERD presents the emergency department complaining of increased anxiety and difficulty sleeping reports symptoms started earlier today. Patient denies any acute shortness of breath above baseline for her no cough or fevers or chills or chest pain. Patient reports she is prescribed Wellbutrin for anxiety but has not been taking this recently. Patient recently taking steroid course for COPD exacerbation. History provided by:  Patient and relative  Anxiety  Degree of incapacity (severity): Moderate  Onset quality:  Gradual  Duration:  1 day  Timing:  Constant  Progression:  Worsening  Chronicity:  Recurrent  Associated symptoms: anxiety    Associated symptoms: no abdominal pain, no appetite change, no chest pain, no fatigue and no headaches        Prior to Admission Medications   Prescriptions Last Dose Informant Patient Reported? Taking? Ergocalciferol (VITAMIN D2 PO) 9/12/2023  Yes Yes   Sig: Take by mouth   Nebulizers (Comp Air Compressor Nebulizer) MISC 9/12/2023  Yes Yes   Sig: As directed   albuterol (2.5 mg/3 mL) 0.083 % nebulizer solution 9/12/2023  No Yes   Sig: Take 3 mL (2.5 mg total) by nebulization every 6 (six) hours as needed for wheezing or shortness of breath   amLODIPine (NORVASC) 10 mg tablet 9/12/2023  Yes Yes   Sig: Take 10 mg by mouth daily at bedtime    atorvastatin (LIPITOR) 40 mg tablet 9/12/2023  Yes Yes   Sig: Take 40 mg by mouth daily at bedtime    buPROPion (WELLBUTRIN SR) 150 mg 12 hr tablet Not Taking  Yes No   Sig: Take 150 mg by mouth every morning   Patient not taking: Reported on 9/13/2023   buPROPion (WELLBUTRIN XL) 150 mg 24 hr tablet   No No   Sig: Take 1 tablet (150 mg total) by mouth daily Do not start before July 23, 2023. Patient not taking: Reported on 2023   cefuroxime (CEFTIN) 500 mg tablet Past Week  No Yes   Sig: Take 1 tablet (500 mg total) by mouth every 12 (twelve) hours for 7 days   cloNIDine (CATAPRES) 0.2 mg tablet 2023  Yes Yes   Sig: Take 0.2 mg by mouth every 12 (twelve) hours   ergocalciferol (VITAMIN D2) 50,000 units 2023  Yes Yes   Sig: Take 50,000 Units by mouth once a week   fluticasone (FLONASE) 50 mcg/act nasal spray 2023  No Yes   Si spray into each nostril daily   fluticasone-umeclidinium-vilanterol (Trelegy Ellipta) 100-62.5-25 mcg/actuation inhaler 2023  No Yes   Sig: Inhale 1 puff daily Rinse mouth after use. glipiZIDE (GLUCOTROL) 10 mg tablet 2023 Self Yes Yes   Sig: Take 10 mg by mouth daily in the early morning W breakfast   lisinopril (ZESTRIL) 40 mg tablet 2023  Yes Yes   Sig: Take 40 mg by mouth daily   metoprolol tartrate (LOPRESSOR) 100 mg tablet 2023  Yes Yes   Sig: Take 100 mg by mouth every 12 (twelve) hours   omeprazole (PriLOSEC OTC) 20 MG tablet 2023  Yes Yes   Sig: Take 20 mg by mouth daily    pioglitazone (ACTOS) 15 mg tablet 2023  Yes Yes   Sig: Take 15 mg by mouth daily   predniSONE 10 mg tablet Past Week  No Yes   Sig: Take 4 tabs daily for thee days then take three tabs daily for three days then take two tablets daily for three days then take one tab daily for three days. sodium chloride 1 g tablet 2023  No Yes   Sig: Take 2 tablets (2 g total) by mouth 3 (three) times a day with meals      Facility-Administered Medications: None       Past Medical History:   Diagnosis Date   • Anxiety    • COPD (chronic obstructive pulmonary disease) ([address])    • Diabetes mellitus ([address])    • Essential (primary) hypertension    • GERD (gastroesophageal reflux disease)    • Smoker        History reviewed. No pertinent surgical history.     Family History   Problem Relation Age of Onset   • Diabetes Father      I have reviewed and agree with the history as documented. E-Cigarette/Vaping   • E-Cigarette Use Never User      E-Cigarette/Vaping Substances   • Nicotine No    • THC No    • CBD No    • Flavoring No      Social History     Tobacco Use   • Smoking status: Every Day     Packs/day: 0.50     Types: Cigarettes   • Smokeless tobacco: Never   Vaping Use   • Vaping Use: Never used   Substance Use Topics   • Alcohol use: Not Currently   • Drug use: Never       Review of Systems   Constitutional: Negative for activity change, appetite change, chills, fatigue and fever. HENT: Negative for congestion, ear pain, rhinorrhea and sore throat. Eyes: Negative for discharge, redness and visual disturbance. Respiratory: Negative for cough, chest tightness, shortness of breath and wheezing. Cardiovascular: Negative for chest pain and palpitations. Gastrointestinal: Negative for abdominal pain, constipation, diarrhea, nausea and vomiting. Endocrine: Negative for polydipsia and polyuria. Genitourinary: Negative for difficulty urinating, dysuria, frequency, hematuria and urgency. Musculoskeletal: Negative for arthralgias and myalgias. Skin: Negative for color change, pallor and rash. Neurological: Negative for dizziness, weakness, light-headedness, numbness and headaches. Hematological: Negative for adenopathy. Does not bruise/bleed easily. Psychiatric/Behavioral: Positive for sleep disturbance. The patient is nervous/anxious. All other systems reviewed and are negative. Physical Exam  Physical Exam  Vitals and nursing note reviewed. Constitutional:       Appearance: She is well-developed. HENT:      Head: Normocephalic and atraumatic. Right Ear: External ear normal.      Left Ear: External ear normal.      Nose: Nose normal.   Eyes:      Conjunctiva/sclera: Conjunctivae normal.      Pupils: Pupils are equal, round, and reactive to light. Cardiovascular:      Rate and Rhythm: Normal rate and regular rhythm.       Heart sounds: Normal heart sounds. Pulmonary:      Effort: Pulmonary effort is normal. No respiratory distress. Breath sounds: Wheezing present. No rales. Chest:      Chest wall: No tenderness. Abdominal:      General: Bowel sounds are normal. There is no distension. Palpations: Abdomen is soft. Tenderness: There is no abdominal tenderness. There is no guarding. Musculoskeletal:         General: Normal range of motion. Cervical back: Normal range of motion and neck supple. Skin:     General: Skin is warm and dry. Neurological:      Mental Status: She is alert and oriented to person, place, and time. Cranial Nerves: No cranial nerve deficit. Sensory: No sensory deficit. Vital Signs  ED Triage Vitals   Temperature Pulse Respirations Blood Pressure SpO2   09/13/23 0116 09/13/23 0116 09/13/23 0116 09/13/23 0116 09/13/23 0116   (!) 97 °F (36.1 °C) 86 20 149/68 97 %      Temp Source Heart Rate Source Patient Position - Orthostatic VS BP Location FiO2 (%)   09/13/23 0116 09/13/23 0116 09/13/23 0116 09/13/23 0116 --   Temporal Monitor Sitting Right arm       Pain Score       09/13/23 0130       No Pain           Vitals:    09/13/23 0116 09/13/23 0130   BP: 149/68 149/68   Pulse: 86 81   Patient Position - Orthostatic VS: Sitting          Visual Acuity      ED Medications  Medications   LORazepam (ATIVAN) tablet 0.5 mg (0.5 mg Oral Given 9/13/23 0131)       Diagnostic Studies  Results Reviewed     None                 No orders to display              Procedures  Procedures         ED Course  ED Course as of 09/13/23 0144   Wed Sep 13, 2023   0133 Patient ambulated to the restroom with nasal cannula oxygen and home oxygen tank without any dyspnea or hypoxia in the ED. no evidence of active COPD exacerbation or acute pathology at this time patient has had recent extensive work-up and frequent recurrent and recent ED visits.                                  SBIRT 22yo+    Flowsheet Row Most Recent Value   Initial Alcohol Screen: US AUDIT-C     1. How often do you have a drink containing alcohol? 0 Filed at: 09/13/2023 0115   2. How many drinks containing alcohol do you have on a typical day you are drinking? 0 Filed at: 09/13/2023 0115   3b. FEMALE Any Age, or MALE 65+: How often do you have 4 or more drinks on one occassion? 0 Filed at: 09/13/2023 0115   Audit-C Score 0 Filed at: 09/13/2023 0115   LIVIER: How many times in the past year have you. .. Used an illegal drug or used a prescription medication for non-medical reasons? Never Filed at: 09/13/2023 0115                    Medical Decision Making  Differential diagnosis includes but not limited to COPD exacerbation anxiety insomnia. Patient is clinically and hemodynamically stable in the emergency department she is essentially asymptomatic and only complaint is of anxiety and difficulty sleeping. Patient denies any acute shortness of breath or chest pain. Patient with normal O2 saturation on baseline oxygen requirement of 3 L in the ED no respiratory distress or difficulty breathing. For now will treat with oral Ativan in the ED for acute anxiety and advised prompt follow-up with primary physician for further evaluation and treatment return precautions and anticipatory guidance discussed. Anxiety: acute illness or injury  Chronic obstructive pulmonary disease (COPD) ([address]): acute illness or injury  Amount and/or Complexity of Data Reviewed  External Data Reviewed: labs, radiology, ECG and notes. Risk  Prescription drug management.           Disposition  Final diagnoses:   Anxiety   Chronic obstructive pulmonary disease (COPD) ([address])     Time reflects when diagnosis was documented in both MDM as applicable and the Disposition within this note     Time User Action Codes Description Comment    9/13/2023  1:23 AM Francis Morgan Add [F41.9] Anxiety     9/13/2023  1:23 AM Francis Morgan Add [J44.9] Chronic obstructive pulmonary disease (COPD) [facility])       ED Disposition     ED Disposition   Discharge    Condition   Stable    Date/Time   Wed Sep 13, 2023  1:23 AM    Comment   Aimee Daily Viars discharge to home/self care. Follow-up Information     Follow up With Specialties Details Why Contact Info    Christy Treviño DO Family Medicine Schedule an appointment as soon as possible for a visit in 2 days  [address]  [phone]            Patient's Medications   Discharge Prescriptions    LORAZEPAM (ATIVAN) 0.5 MG TABLET    Take 1 tablet (0.5 mg total) by mouth 3 (three) times a day as needed for anxiety or sleep for up to 15 doses       Start Date: 9/13/2023 End Date: --       Order Dose: 0.5 mg       Quantity: 15 tablet    Refills: 0       No discharge procedures on file.     PDMP Review       Value Time User    PDMP Reviewed  Yes 9/13/2023  1:19 AM Merlin Pagoda, DO          ED Provider  Electronically Signed by           Merlin Pagoda, DO  09/13/23 0144

## 2023-09-13 NOTE — ED NOTES
Pt appears very anxious, states "I forgot to take my Wellbutrin today and am feeling anxious and SOB".      207 Alexandre Mendez RN  09/13/23 8054

## 2023-09-26 ENCOUNTER — APPOINTMENT (EMERGENCY)
Dept: RADIOLOGY | Facility: HOSPITAL | Age: 68
End: 2023-09-26
Payer: COMMERCIAL

## 2023-09-26 ENCOUNTER — HOSPITAL ENCOUNTER (EMERGENCY)
Facility: HOSPITAL | Age: 68
Discharge: HOME | End: 2023-09-27
Attending: EMERGENCY MEDICINE | Admitting: EMERGENCY MEDICINE
Payer: COMMERCIAL

## 2023-09-26 DIAGNOSIS — J44.1 COPD WITH ACUTE EXACERBATION (HCC): Primary | ICD-10-CM

## 2023-09-26 PROCEDURE — 71045 X-RAY EXAM CHEST 1 VIEW: CPT

## 2023-09-26 PROCEDURE — 94640 AIRWAY INHALATION TREATMENT: CPT

## 2023-09-26 PROCEDURE — 93005 ELECTROCARDIOGRAM TRACING: CPT

## 2023-09-26 PROCEDURE — 85025 COMPLETE CBC W/AUTO DIFF WBC: CPT | Performed by: EMERGENCY MEDICINE

## 2023-09-26 PROCEDURE — 0241U HB NFCT DS VIR RESP RNA 4 TRGT: CPT | Performed by: EMERGENCY MEDICINE

## 2023-09-26 PROCEDURE — 80053 COMPREHEN METABOLIC PANEL: CPT | Performed by: EMERGENCY MEDICINE

## 2023-09-26 PROCEDURE — 99285 EMERGENCY DEPT VISIT HI MDM: CPT

## 2023-09-26 PROCEDURE — 99285 EMERGENCY DEPT VISIT HI MDM: CPT | Performed by: EMERGENCY MEDICINE

## 2023-09-26 PROCEDURE — 83880 ASSAY OF NATRIURETIC PEPTIDE: CPT | Performed by: EMERGENCY MEDICINE

## 2023-09-26 PROCEDURE — 84484 ASSAY OF TROPONIN QUANT: CPT | Performed by: EMERGENCY MEDICINE

## 2023-09-26 PROCEDURE — 96374 THER/PROPH/DIAG INJ IV PUSH: CPT

## 2023-09-26 PROCEDURE — 36415 COLL VENOUS BLD VENIPUNCTURE: CPT | Performed by: EMERGENCY MEDICINE

## 2023-09-26 RX ORDER — ALBUTEROL SULFATE 2.5 MG/3ML
2.5 SOLUTION RESPIRATORY (INHALATION) ONCE
Status: COMPLETED | OUTPATIENT
Start: 2023-09-26 | End: 2023-09-26

## 2023-09-26 RX ORDER — DEXAMETHASONE SODIUM PHOSPHATE 10 MG/ML
6 INJECTION, SOLUTION INTRAMUSCULAR; INTRAVENOUS ONCE
Status: COMPLETED | OUTPATIENT
Start: 2023-09-26 | End: 2023-09-26

## 2023-09-26 RX ORDER — IPRATROPIUM BROMIDE AND ALBUTEROL SULFATE 2.5; .5 MG/3ML; MG/3ML
3 SOLUTION RESPIRATORY (INHALATION) ONCE
Status: COMPLETED | OUTPATIENT
Start: 2023-09-26 | End: 2023-09-26

## 2023-09-26 RX ADMIN — ALBUTEROL SULFATE 2.5 MG: 2.5 SOLUTION RESPIRATORY (INHALATION) at 23:41

## 2023-09-26 RX ADMIN — DEXAMETHASONE SODIUM PHOSPHATE 6 MG: 10 INJECTION, SOLUTION INTRAMUSCULAR; INTRAVENOUS at 23:41

## 2023-09-26 RX ADMIN — IPRATROPIUM BROMIDE AND ALBUTEROL SULFATE 3 ML: .5; 3 SOLUTION RESPIRATORY (INHALATION) at 23:41

## 2023-09-27 VITALS
TEMPERATURE: 96.8 F | SYSTOLIC BLOOD PRESSURE: 104 MMHG | BODY MASS INDEX: 30 KG/M2 | WEIGHT: 164 LBS | RESPIRATION RATE: 22 BRPM | HEART RATE: 79 BPM | DIASTOLIC BLOOD PRESSURE: 57 MMHG | OXYGEN SATURATION: 95 %

## 2023-09-27 LAB
ALBUMIN SERPL BCP-MCNC: 3.8 G/DL (ref 3.5–5)
ALP SERPL-CCNC: 76 U/L (ref 34–104)
ALT SERPL W P-5'-P-CCNC: 12 U/L (ref 7–52)
ANION GAP SERPL CALCULATED.3IONS-SCNC: 5 MMOL/L
AST SERPL W P-5'-P-CCNC: 11 U/L (ref 13–39)
ATRIAL RATE: 87 BPM
BASOPHILS # BLD AUTO: 0.03 THOUSANDS/ÂΜL (ref 0–0.1)
BASOPHILS NFR BLD AUTO: 0 % (ref 0–1)
BILIRUB SERPL-MCNC: 0.34 MG/DL (ref 0.2–1)
BNP SERPL-MCNC: 70 PG/ML (ref 0–100)
BUN SERPL-MCNC: 11 MG/DL (ref 5–25)
CALCIUM SERPL-MCNC: 9.5 MG/DL (ref 8.4–10.2)
CARDIAC TROPONIN I PNL SERPL HS: 14 NG/L
CHLORIDE SERPL-SCNC: 100 MMOL/L (ref 96–108)
CO2 SERPL-SCNC: 32 MMOL/L (ref 21–32)
CREAT SERPL-MCNC: 0.67 MG/DL (ref 0.6–1.3)
EOSINOPHIL # BLD AUTO: 0.11 THOUSAND/ÂΜL (ref 0–0.61)
EOSINOPHIL NFR BLD AUTO: 1 % (ref 0–6)
ERYTHROCYTE [DISTWIDTH] IN BLOOD BY AUTOMATED COUNT: 13 % (ref 11.6–15.1)
FLUAV RNA RESP QL NAA+PROBE: NEGATIVE
FLUBV RNA RESP QL NAA+PROBE: NEGATIVE
GFR SERPL CREATININE-BSD FRML MDRD: 91 ML/MIN/1.73SQ M
GLUCOSE SERPL-MCNC: 240 MG/DL (ref 65–140)
HCT VFR BLD AUTO: 39.7 % (ref 34.8–46.1)
HGB BLD-MCNC: 12.5 G/DL (ref 11.5–15.4)
IMM GRANULOCYTES # BLD AUTO: 0.05 THOUSAND/UL (ref 0–0.2)
IMM GRANULOCYTES NFR BLD AUTO: 1 % (ref 0–2)
LYMPHOCYTES # BLD AUTO: 2.06 THOUSANDS/ÂΜL (ref 0.6–4.47)
LYMPHOCYTES NFR BLD AUTO: 22 % (ref 14–44)
MCH RBC QN AUTO: 30.1 PG (ref 26.8–34.3)
MCHC RBC AUTO-ENTMCNC: 31.5 G/DL (ref 31.4–37.4)
MCV RBC AUTO: 96 FL (ref 82–98)
MONOCYTES # BLD AUTO: 1.03 THOUSAND/ÂΜL (ref 0.17–1.22)
MONOCYTES NFR BLD AUTO: 11 % (ref 4–12)
NEUTROPHILS # BLD AUTO: 6.14 THOUSANDS/ÂΜL (ref 1.85–7.62)
NEUTS SEG NFR BLD AUTO: 65 % (ref 43–75)
NRBC BLD AUTO-RTO: 0 /100 WBCS
P AXIS: 53 DEGREES
PLATELET # BLD AUTO: 198 THOUSANDS/UL (ref 149–390)
PMV BLD AUTO: 10.4 FL (ref 8.9–12.7)
POTASSIUM SERPL-SCNC: 3.9 MMOL/L (ref 3.5–5.3)
PR INTERVAL: 138 MS
PROT SERPL-MCNC: 6.6 G/DL (ref 6.4–8.4)
QRS AXIS: 47 DEGREES
QRSD INTERVAL: 70 MS
QT INTERVAL: 370 MS
QTC INTERVAL: 445 MS
RBC # BLD AUTO: 4.15 MILLION/UL (ref 3.81–5.12)
RSV RNA RESP QL NAA+PROBE: NEGATIVE
SARS-COV-2 RNA RESP QL NAA+PROBE: NEGATIVE
SODIUM SERPL-SCNC: 137 MMOL/L (ref 135–147)
T WAVE AXIS: 53 DEGREES
VENTRICULAR RATE: 87 BPM
WBC # BLD AUTO: 9.42 THOUSAND/UL (ref 4.31–10.16)

## 2023-09-27 PROCEDURE — 93010 ELECTROCARDIOGRAM REPORT: CPT | Performed by: INTERNAL MEDICINE

## 2023-09-27 RX ORDER — IPRATROPIUM BROMIDE AND ALBUTEROL SULFATE 2.5; .5 MG/3ML; MG/3ML
3 SOLUTION RESPIRATORY (INHALATION) 3 TIMES DAILY
Qty: 180 ML | Refills: 3 | Status: SHIPPED | OUTPATIENT
Start: 2023-09-27

## 2023-09-27 NOTE — ED PROVIDER NOTES
History  Chief Complaint   Patient presents with   • Shortness of Breath     Started with SOB this after noon, stated she has been using her nebulizer with little relief, last used at 1900. Patient is a 70-year-old female with a known history of COPD, on oxygen via nasal cannula 24 hours a day at home, still smokes a few cigarettes a day, presenting to the emergency department complaining of worsening shortness of breath, chest tightness, wheezing, productive cough, symptoms worsening throughout the day today, she reports using her nebulizer treatments several times throughout the day with minimal intermittent relief, no fevers, no chest pain, no sick contacts          Prior to Admission Medications   Prescriptions Last Dose Informant Patient Reported? Taking? Ergocalciferol (VITAMIN D2 PO)   Yes No   Sig: Take by mouth   LORazepam (Ativan) 0.5 mg tablet   No No   Sig: Take 1 tablet (0.5 mg total) by mouth 3 (three) times a day as needed for anxiety or sleep for up to 15 doses   Nebulizers (Comp Air Compressor Nebulizer) MISC   Yes No   Sig: As directed   albuterol (2.5 mg/3 mL) 0.083 % nebulizer solution   No No   Sig: Take 3 mL (2.5 mg total) by nebulization every 6 (six) hours as needed for wheezing or shortness of breath   amLODIPine (NORVASC) 10 mg tablet   Yes No   Sig: Take 10 mg by mouth daily at bedtime    atorvastatin (LIPITOR) 40 mg tablet   Yes No   Sig: Take 40 mg by mouth daily at bedtime    buPROPion (WELLBUTRIN SR) 150 mg 12 hr tablet   Yes No   Sig: Take 150 mg by mouth every morning   Patient not taking: Reported on 9/13/2023   buPROPion (WELLBUTRIN XL) 150 mg 24 hr tablet   No No   Sig: Take 1 tablet (150 mg total) by mouth daily Do not start before July 23, 2023.    Patient not taking: Reported on 9/11/2023   cloNIDine (CATAPRES) 0.2 mg tablet   Yes No   Sig: Take 0.2 mg by mouth every 12 (twelve) hours   ergocalciferol (VITAMIN D2) 50,000 units   Yes No   Sig: Take 50,000 Units by mouth once a week   fluticasone (FLONASE) 50 mcg/act nasal spray   No No   Si spray into each nostril daily   fluticasone-umeclidinium-vilanterol (Trelegy Ellipta) 100-62.5-25 mcg/actuation inhaler   No No   Sig: Inhale 1 puff daily Rinse mouth after use. glipiZIDE (GLUCOTROL) 10 mg tablet  Self Yes No   Sig: Take 10 mg by mouth daily in the early morning W breakfast   lisinopril (ZESTRIL) 40 mg tablet   Yes No   Sig: Take 40 mg by mouth daily   metoprolol tartrate (LOPRESSOR) 100 mg tablet   Yes No   Sig: Take 100 mg by mouth every 12 (twelve) hours   omeprazole (PriLOSEC OTC) 20 MG tablet   Yes No   Sig: Take 20 mg by mouth daily    pioglitazone (ACTOS) 15 mg tablet   Yes No   Sig: Take 15 mg by mouth daily   predniSONE 10 mg tablet   No No   Sig: Take 4 tabs daily for thee days then take three tabs daily for three days then take two tablets daily for three days then take one tab daily for three days. sodium chloride 1 g tablet   No No   Sig: Take 2 tablets (2 g total) by mouth 3 (three) times a day with meals      Facility-Administered Medications: None       Past Medical History:   Diagnosis Date   • Anxiety    • COPD (chronic obstructive pulmonary disease) ([facility])    • Diabetes mellitus ([facility])    • Essential (primary) hypertension    • GERD (gastroesophageal reflux disease)    • Smoker        History reviewed. No pertinent surgical history. Family History   Problem Relation Age of Onset   • Diabetes Father      I have reviewed and agree with the history as documented. E-Cigarette/Vaping   • E-Cigarette Use Never User      E-Cigarette/Vaping Substances   • Nicotine No    • THC No    • CBD No    • Flavoring No      Social History     Tobacco Use   • Smoking status: Every Day     Packs/day: 0.50     Types: Cigarettes   • Smokeless tobacco: Never   Vaping Use   • Vaping Use: Never used   Substance Use Topics   • Alcohol use: Not Currently   • Drug use: Never       Review of Systems   Constitutional: Negative. HENT: Negative. Eyes: Negative. Respiratory: Positive for cough, chest tightness, shortness of breath and wheezing. Cardiovascular: Negative. Gastrointestinal: Negative. Endocrine: Negative. Genitourinary: Negative. Musculoskeletal: Negative. Skin: Negative. Allergic/Immunologic: Negative. Neurological: Negative. Hematological: Negative. Psychiatric/Behavioral: Negative. Physical Exam  Physical Exam  Constitutional:       Appearance: She is well-developed. HENT:      Head: Normocephalic and atraumatic. Eyes:      Conjunctiva/sclera: Conjunctivae normal.      Pupils: Pupils are equal, round, and reactive to light. Cardiovascular:      Rate and Rhythm: Normal rate. Pulmonary:      Effort: Pulmonary effort is normal.      Breath sounds: Wheezing present. Abdominal:      Palpations: Abdomen is soft. Musculoskeletal:         General: Normal range of motion. Cervical back: Normal range of motion and neck supple. Skin:     General: Skin is warm and dry. Neurological:      Mental Status: She is alert and oriented to person, place, and time.          Vital Signs  ED Triage Vitals [09/26/23 2334]   Temperature Pulse Respirations Blood Pressure SpO2   (!) 96.8 °F (36 °C) 74 22 153/77 93 %      Temp src Heart Rate Source Patient Position - Orthostatic VS BP Location FiO2 (%)   -- Monitor Sitting Right arm --      Pain Score       No Pain           Vitals:    09/26/23 2334 09/27/23 0015   BP: 153/77 104/57   Pulse: 74 79   Patient Position - Orthostatic VS: Sitting Sitting         Visual Acuity      ED Medications  Medications   ipratropium-albuterol (DUO-NEB) 0.5-2.5 mg/3 mL inhalation solution 3 mL (3 mL Nebulization Given 9/26/23 2341)   albuterol inhalation solution 2.5 mg (2.5 mg Nebulization Given 9/26/23 2341)   dexamethasone (PF) (DECADRON) injection 6 mg (6 mg Intravenous Given 9/26/23 2341)       Diagnostic Studies  Results Reviewed     Procedure Component Value Units Date/Time    FLU/RSV/COVID - if FLU/RSV clinically relevant [[id number]]  (Normal) Collected: 09/26/23 2340    Lab Status: Final result Specimen: Nares from Nose Updated: 09/27/23 0037     SARS-CoV-2 Negative     INFLUENZA A PCR Negative     INFLUENZA B PCR Negative     RSV PCR Negative    Narrative:      FOR PEDIATRIC PATIENTS - copy/paste COVID Guidelines URL to browser: [email or web address]    SARS-CoV-2 assay is a Nucleic Acid Amplification assay intended for the  qualitative detection of nucleic acid from SARS-CoV-2 in nasopharyngeal  swabs. Results are for the presumptive identification of SARS-CoV-2 RNA. Positive results are indicative of infection with SARS-CoV-2, the virus  causing COVID-19, but do not rule out bacterial infection or co-infection  with other viruses. Laboratories within the [facility] and its  territories are required to report all positive results to the appropriate  public health authorities. Negative results do not preclude SARS-CoV-2  infection and should not be used as the sole basis for treatment or other  patient management decisions. Negative results must be combined with  clinical observations, patient history, and epidemiological information. This test has not been FDA cleared or approved. This test has been authorized by FDA under an Emergency Use Authorization  (EUA). This test is only authorized for the duration of time the  declaration that circumstances exist justifying the authorization of the  emergency use of an in vitro diagnostic tests for detection of SARS-CoV-2  virus and/or diagnosis of COVID-19 infection under section 564(b)(1) of  the Act, 21 U. S.C. 403EBF-2(E)(0), unless the authorization is terminated  or revoked sooner. The test has been validated but independent review by FDA  and CLIA is pending. Test performed using [employer]:  This RT-PCR assay targets N2,  a region unique to SARS-CoV-2. A conserved region in the E-gene was chosen  for pan-Sarbecovirus detection which includes SARS-CoV-2. According to CMS-2020-01-R, this platform meets the definition of high-throughput technology.     HS Troponin 0hr (reflex protocol) [[id number]]  (Normal) Collected: 09/26/23 2340    Lab Status: Final result Specimen: Blood from Arm, Right Updated: 09/27/23 0029     hs TnI 0hr 14 ng/L     B-Type Natriuretic Peptide(BNP) [[id number]]  (Normal) Collected: 09/26/23 2340    Lab Status: Final result Specimen: Blood from Arm, Right Updated: 09/27/23 0028     BNP 70 pg/mL     Comprehensive metabolic panel [[id number]]  (Abnormal) Collected: 09/26/23 2340    Lab Status: Final result Specimen: Blood from Arm, Right Updated: 09/27/23 0026     Sodium 137 mmol/L      Potassium 3.9 mmol/L      Chloride 100 mmol/L      CO2 32 mmol/L      ANION GAP 5 mmol/L      BUN 11 mg/dL      Creatinine 0.67 mg/dL      Glucose 240 mg/dL      Calcium 9.5 mg/dL      AST 11 U/L      ALT 12 U/L      Alkaline Phosphatase 76 U/L      Total Protein 6.6 g/dL      Albumin 3.8 g/dL      Total Bilirubin 0.34 mg/dL      eGFR 91 ml/min/1.73sq m     Narrative:      [place] guidelines for Chronic Kidney Disease (CKD):   •  Stage 1 with normal or high GFR (GFR > 90 mL/min/1.73 square meters)  •  Stage 2 Mild CKD (GFR = 60-89 mL/min/1.73 square meters)  •  Stage 3A Moderate CKD (GFR = 45-59 mL/min/1.73 square meters)  •  Stage 3B Moderate CKD (GFR = 30-44 mL/min/1.73 square meters)  •  Stage 4 Severe CKD (GFR = 15-29 mL/min/1.73 square meters)  •  Stage 5 End Stage CKD (GFR <15 mL/min/1.73 square meters)  Note: GFR calculation is accurate only with a steady state creatinine    CBC and differential [[id number]] Collected: 09/26/23 2340    Lab Status: Final result Specimen: Blood from Arm, Right Updated: 09/27/23 0000     WBC 9.42 Thousand/uL      RBC 4.15 Million/uL      Hemoglobin 12.5 g/dL      Hematocrit 39.7 % MCV 96 fL      MCH 30.1 pg      MCHC 31.5 g/dL      RDW 13.0 %      MPV 10.4 fL      Platelets 228 Thousands/uL      nRBC 0 /100 WBCs      Neutrophils Relative 65 %      Immat GRANS % 1 %      Lymphocytes Relative 22 %      Monocytes Relative 11 %      Eosinophils Relative 1 %      Basophils Relative 0 %      Neutrophils Absolute 6.14 Thousands/µL      Immature Grans Absolute 0.05 Thousand/uL      Lymphocytes Absolute 2.06 Thousands/µL      Monocytes Absolute 1.03 Thousand/µL      Eosinophils Absolute 0.11 Thousand/µL      Basophils Absolute 0.03 Thousands/µL                  XR chest 1 view portable   ED Interpretation by Mecca Brown DO (09/27 0023)   No acute findings                 Procedures  ECG 12 Lead Documentation Only    Date/Time: 9/26/2023 11:42 PM    Performed by: Mecca Brown DO  Authorized by: Mecca Brown DO    Indications / Diagnosis:  Shortness of breath  ECG reviewed by me, the ED Provider: yes    Patient location:  ED  Previous ECG:     Comparison to cardiac monitor: Yes    Interpretation:     Interpretation: normal    Rate:     ECG rate:  87    ECG rate assessment: normal    Rhythm:     Rhythm: sinus rhythm    Ectopy:     Ectopy: none    QRS:     QRS intervals:  Normal  Conduction:     Conduction: normal    ST segments:     ST segments:  Normal  T waves:     T waves: normal               ED Course  ED Course as of 09/27/23 0055   Wed Sep 27, 2023   0052 Patient reports feeling much better on reevaluation, laboratory findings consistent with hyperglycemia, otherwise normal, discussed with patient at bedside, on exam patient has very mild wheeze on auscultation, significantly improved from previous evaluation   0054 FLU/RSV/COVID - if FLU/RSV clinically relevant   0054 HS Troponin 0hr (reflex protocol)   0054 B-Type Natriuretic Peptide(BNP)   0054 CBC and differential   0054 Comprehensive metabolic panel(!)   8321 XR chest 1 view portable   0055 ECG 12 lead SBIRT 22yo+    Flowsheet Row Most Recent Value   Initial Alcohol Screen: US AUDIT-C     1. How often do you have a drink containing alcohol? 0 Filed at: 09/26/2023 2335   2. How many drinks containing alcohol do you have on a typical day you are drinking? 0 Filed at: 09/26/2023 2335   3a. Male UNDER 65: How often do you have five or more drinks on one occasion? 0 Filed at: 09/26/2023 2335   3b. FEMALE Any Age, or MALE 65+: How often do you have 4 or more drinks on one occassion? 0 Filed at: 09/26/2023 2335   Audit-C Score 0 Filed at: 09/26/2023 2335                    Medical Decision Making  This patient presents with symptoms most consistent with an acute COPD exacerbation. The constellation of symptoms are similar to prior exacerbations. The likely precipitant is likely secondary to patient's continued cigarette smoking. Low suspicion for alternate etiologies such as pneumothorax, acute PE, pneumonia. Presentation not consistent with other acute cardiopulmonary causes including ACS, CHF. Patient given DuoNeb, albuterol and IV steroids here with improvement of symptoms. No hypoxia noted. Patient will be sent home with prescription for DuoNeb treatments, advised to discontinue smoking, follow-up with PCP and pulmonology    COPD with acute exacerbation [facility]): acute illness or injury  Amount and/or Complexity of Data Reviewed  Labs: ordered. Decision-making details documented in ED Course. Radiology: ordered and independent interpretation performed. Decision-making details documented in ED Course. ECG/medicine tests: ordered and independent interpretation performed. Decision-making details documented in ED Course. Risk  Prescription drug management.           Disposition  Final diagnoses:   COPD with acute exacerbation ([address])     Time reflects when diagnosis was documented in both MDM as applicable and the Disposition within this note     Time User Action Codes Description Comment    9/27/2023 12:46 AM Jelena Olivarez Add [J44.1] COPD with acute exacerbation [facility])       ED Disposition     ED Disposition   Discharge    Condition   Stable    Date/Time   Wed Sep 27, 2023 12:46 AM    Sudeep Lux discharge to home/self care. Follow-up Information     Follow up With Specialties Details Why Contact Info    Nayana Villarreal DO Family Medicine In 1 week  [address] [facility]  [phone]            Patient's Medications   Discharge Prescriptions    IPRATROPIUM-ALBUTEROL (DUO-NEB) 0.5-2.5 MG/3 ML NEBULIZER SOLUTION    Take 3 mL by nebulization 3 (three) times a day       Start Date: 9/27/2023 End Date: --       Order Dose: 3 mL       Quantity: 180 mL    Refills: 3       No discharge procedures on file.     PDMP Review       Value Time User    PDMP Reviewed  Yes 9/13/2023  1:19 AM Kimberly Adler DO          ED Provider  Electronically Signed by           Rodolfo Stubbs DO  09/27/23 6161

## 2023-09-29 ENCOUNTER — APPOINTMENT (EMERGENCY)
Dept: RADIOLOGY | Facility: HOSPITAL | Age: 68
End: 2023-09-29
Payer: COMMERCIAL

## 2023-09-29 ENCOUNTER — HOSPITAL ENCOUNTER (EMERGENCY)
Facility: HOSPITAL | Age: 68
Discharge: HOME | End: 2023-09-30
Attending: EMERGENCY MEDICINE | Admitting: EMERGENCY MEDICINE
Payer: COMMERCIAL

## 2023-09-29 DIAGNOSIS — J44.1 COPD EXACERBATION (HCC): Primary | ICD-10-CM

## 2023-09-29 LAB
ALBUMIN SERPL BCP-MCNC: 3.9 G/DL (ref 3.5–5)
ALP SERPL-CCNC: 74 U/L (ref 34–104)
ALT SERPL W P-5'-P-CCNC: 13 U/L (ref 7–52)
ANION GAP SERPL CALCULATED.3IONS-SCNC: 4 MMOL/L
AST SERPL W P-5'-P-CCNC: 11 U/L (ref 13–39)
BASOPHILS # BLD AUTO: 0.04 THOUSANDS/ÂΜL (ref 0–0.1)
BASOPHILS NFR BLD AUTO: 1 % (ref 0–1)
BILIRUB SERPL-MCNC: 0.23 MG/DL (ref 0.2–1)
BUN SERPL-MCNC: 14 MG/DL (ref 5–25)
CALCIUM SERPL-MCNC: 9.7 MG/DL (ref 8.4–10.2)
CHLORIDE SERPL-SCNC: 98 MMOL/L (ref 96–108)
CO2 SERPL-SCNC: 35 MMOL/L (ref 21–32)
CREAT SERPL-MCNC: 0.71 MG/DL (ref 0.6–1.3)
EOSINOPHIL # BLD AUTO: 0.09 THOUSAND/ÂΜL (ref 0–0.61)
EOSINOPHIL NFR BLD AUTO: 1 % (ref 0–6)
ERYTHROCYTE [DISTWIDTH] IN BLOOD BY AUTOMATED COUNT: 12.7 % (ref 11.6–15.1)
GFR SERPL CREATININE-BSD FRML MDRD: 89 ML/MIN/1.73SQ M
GLUCOSE SERPL-MCNC: 228 MG/DL (ref 65–140)
HCT VFR BLD AUTO: 39.7 % (ref 34.8–46.1)
HGB BLD-MCNC: 12.6 G/DL (ref 11.5–15.4)
IMM GRANULOCYTES # BLD AUTO: 0.05 THOUSAND/UL (ref 0–0.2)
IMM GRANULOCYTES NFR BLD AUTO: 1 % (ref 0–2)
LYMPHOCYTES # BLD AUTO: 2.36 THOUSANDS/ÂΜL (ref 0.6–4.47)
LYMPHOCYTES NFR BLD AUTO: 27 % (ref 14–44)
MCH RBC QN AUTO: 29.9 PG (ref 26.8–34.3)
MCHC RBC AUTO-ENTMCNC: 31.7 G/DL (ref 31.4–37.4)
MCV RBC AUTO: 94 FL (ref 82–98)
MONOCYTES # BLD AUTO: 0.88 THOUSAND/ÂΜL (ref 0.17–1.22)
MONOCYTES NFR BLD AUTO: 10 % (ref 4–12)
NEUTROPHILS # BLD AUTO: 5.41 THOUSANDS/ÂΜL (ref 1.85–7.62)
NEUTS SEG NFR BLD AUTO: 60 % (ref 43–75)
NRBC BLD AUTO-RTO: 0 /100 WBCS
PLATELET # BLD AUTO: 203 THOUSANDS/UL (ref 149–390)
PMV BLD AUTO: 10.6 FL (ref 8.9–12.7)
POTASSIUM SERPL-SCNC: 4.1 MMOL/L (ref 3.5–5.3)
PROT SERPL-MCNC: 6.9 G/DL (ref 6.4–8.4)
RBC # BLD AUTO: 4.22 MILLION/UL (ref 3.81–5.12)
SODIUM SERPL-SCNC: 137 MMOL/L (ref 135–147)
WBC # BLD AUTO: 8.83 THOUSAND/UL (ref 4.31–10.16)

## 2023-09-29 PROCEDURE — 96374 THER/PROPH/DIAG INJ IV PUSH: CPT

## 2023-09-29 PROCEDURE — 99285 EMERGENCY DEPT VISIT HI MDM: CPT

## 2023-09-29 PROCEDURE — 84484 ASSAY OF TROPONIN QUANT: CPT | Performed by: EMERGENCY MEDICINE

## 2023-09-29 PROCEDURE — 0241U HB NFCT DS VIR RESP RNA 4 TRGT: CPT | Performed by: EMERGENCY MEDICINE

## 2023-09-29 PROCEDURE — 83880 ASSAY OF NATRIURETIC PEPTIDE: CPT | Performed by: EMERGENCY MEDICINE

## 2023-09-29 PROCEDURE — 71046 X-RAY EXAM CHEST 2 VIEWS: CPT

## 2023-09-29 PROCEDURE — 80053 COMPREHEN METABOLIC PANEL: CPT | Performed by: EMERGENCY MEDICINE

## 2023-09-29 PROCEDURE — 36415 COLL VENOUS BLD VENIPUNCTURE: CPT | Performed by: EMERGENCY MEDICINE

## 2023-09-29 PROCEDURE — 93005 ELECTROCARDIOGRAM TRACING: CPT

## 2023-09-29 PROCEDURE — 85025 COMPLETE CBC W/AUTO DIFF WBC: CPT | Performed by: EMERGENCY MEDICINE

## 2023-09-29 PROCEDURE — 94640 AIRWAY INHALATION TREATMENT: CPT

## 2023-09-29 RX ORDER — IPRATROPIUM BROMIDE AND ALBUTEROL SULFATE 2.5; .5 MG/3ML; MG/3ML
3 SOLUTION RESPIRATORY (INHALATION) ONCE
Status: COMPLETED | OUTPATIENT
Start: 2023-09-29 | End: 2023-09-29

## 2023-09-29 RX ORDER — METHYLPREDNISOLONE SODIUM SUCCINATE 125 MG/2ML
125 INJECTION, POWDER, LYOPHILIZED, FOR SOLUTION INTRAMUSCULAR; INTRAVENOUS ONCE
Status: COMPLETED | OUTPATIENT
Start: 2023-09-29 | End: 2023-09-29

## 2023-09-29 RX ADMIN — IPRATROPIUM BROMIDE AND ALBUTEROL SULFATE 3 ML: 2.5; .5 SOLUTION RESPIRATORY (INHALATION) at 23:22

## 2023-09-29 RX ADMIN — METHYLPREDNISOLONE SODIUM SUCCINATE 125 MG: 125 INJECTION, POWDER, FOR SOLUTION INTRAMUSCULAR; INTRAVENOUS at 23:22

## 2023-09-30 VITALS
TEMPERATURE: 97.8 F | BODY MASS INDEX: 30 KG/M2 | HEART RATE: 64 BPM | RESPIRATION RATE: 16 BRPM | OXYGEN SATURATION: 98 % | SYSTOLIC BLOOD PRESSURE: 108 MMHG | DIASTOLIC BLOOD PRESSURE: 57 MMHG | WEIGHT: 164 LBS

## 2023-09-30 LAB
BNP SERPL-MCNC: 96 PG/ML (ref 0–100)
CARDIAC TROPONIN I PNL SERPL HS: 7 NG/L
FLUAV RNA RESP QL NAA+PROBE: NEGATIVE
FLUBV RNA RESP QL NAA+PROBE: NEGATIVE
RSV RNA RESP QL NAA+PROBE: NEGATIVE
SARS-COV-2 RNA RESP QL NAA+PROBE: NEGATIVE

## 2023-09-30 RX ORDER — PREDNISONE 20 MG/1
40 TABLET ORAL DAILY
Qty: 6 TABLET | Refills: 0 | Status: SHIPPED | OUTPATIENT
Start: 2023-09-30 | End: 2023-10-03

## 2023-09-30 NOTE — ED PROVIDER NOTES
History  Chief Complaint   Patient presents with   • Shortness of Breath     Pt reports increased SOB, fatigue beginning today     Patient with history of COPD normally on home oxygen complains of "feeling more short of breath" today than normal.  However, she denies fevers or chills, coughing, nausea or vomiting, increased oxygen requirement, abdominal pain, chest pain, or any other complaint. History provided by:  Patient   used: No    Shortness of Breath  Severity:  Mild  Onset quality:  Gradual  Timing:  Constant  Progression:  Unchanged  Chronicity:  New  Relieved by:  Nothing  Worsened by:  Nothing  Ineffective treatments:  None tried  Associated symptoms: no abdominal pain, no chest pain, no cough, no ear pain, no fever, no headaches, no hemoptysis, no neck pain, no rash, no sore throat, no sputum production, no syncope, no vomiting and no wheezing        Prior to Admission Medications   Prescriptions Last Dose Informant Patient Reported? Taking? Ergocalciferol (VITAMIN D2 PO)   Yes No   Sig: Take by mouth   LORazepam (Ativan) 0.5 mg tablet   No No   Sig: Take 1 tablet (0.5 mg total) by mouth 3 (three) times a day as needed for anxiety or sleep for up to 15 doses   Nebulizers (Comp Air Compressor Nebulizer) MISC   Yes No   Sig: As directed   albuterol (2.5 mg/3 mL) 0.083 % nebulizer solution   No No   Sig: Take 3 mL (2.5 mg total) by nebulization every 6 (six) hours as needed for wheezing or shortness of breath   amLODIPine (NORVASC) 10 mg tablet   Yes No   Sig: Take 10 mg by mouth daily at bedtime    atorvastatin (LIPITOR) 40 mg tablet   Yes No   Sig: Take 40 mg by mouth daily at bedtime    buPROPion (WELLBUTRIN SR) 150 mg 12 hr tablet   Yes No   Sig: Take 150 mg by mouth every morning   Patient not taking: Reported on 9/13/2023   buPROPion (WELLBUTRIN XL) 150 mg 24 hr tablet   No No   Sig: Take 1 tablet (150 mg total) by mouth daily Do not start before July 23, 2023.    Patient not taking: Reported on 2023   cloNIDine (CATAPRES) 0.2 mg tablet   Yes No   Sig: Take 0.2 mg by mouth every 12 (twelve) hours   ergocalciferol (VITAMIN D2) 50,000 units   Yes No   Sig: Take 50,000 Units by mouth once a week   fluticasone (FLONASE) 50 mcg/act nasal spray   No No   Si spray into each nostril daily   fluticasone-umeclidinium-vilanterol (Trelegy Ellipta) 100-62.5-25 mcg/actuation inhaler   No No   Sig: Inhale 1 puff daily Rinse mouth after use. glipiZIDE (GLUCOTROL) 10 mg tablet  Self Yes No   Sig: Take 10 mg by mouth daily in the early morning W breakfast   ipratropium-albuterol (DUO-NEB) 0.5-2.5 mg/3 mL nebulizer solution   No No   Sig: Take 3 mL by nebulization 3 (three) times a day   lisinopril (ZESTRIL) 40 mg tablet   Yes No   Sig: Take 40 mg by mouth daily   metoprolol tartrate (LOPRESSOR) 100 mg tablet   Yes No   Sig: Take 100 mg by mouth every 12 (twelve) hours   omeprazole (PriLOSEC OTC) 20 MG tablet   Yes No   Sig: Take 20 mg by mouth daily    pioglitazone (ACTOS) 15 mg tablet   Yes No   Sig: Take 15 mg by mouth daily   predniSONE 10 mg tablet   No No   Sig: Take 4 tabs daily for thee days then take three tabs daily for three days then take two tablets daily for three days then take one tab daily for three days. sodium chloride 1 g tablet   No No   Sig: Take 2 tablets (2 g total) by mouth 3 (three) times a day with meals      Facility-Administered Medications: None       Past Medical History:   Diagnosis Date   • Anxiety    • COPD (chronic obstructive pulmonary disease) ([address])    • Diabetes mellitus ([address])    • Essential (primary) hypertension    • GERD (gastroesophageal reflux disease)    • Smoker        History reviewed. No pertinent surgical history. Family History   Problem Relation Age of Onset   • Diabetes Father      I have reviewed and agree with the history as documented.     E-Cigarette/Vaping   • E-Cigarette Use Never User      E-Cigarette/Vaping Substances   • Nicotine No • THC No    • CBD No    • Flavoring No      Social History     Tobacco Use   • Smoking status: Every Day     Packs/day: 0.50     Types: Cigarettes   • Smokeless tobacco: Never   Vaping Use   • Vaping Use: Never used   Substance Use Topics   • Alcohol use: Not Currently   • Drug use: Never       Review of Systems   Constitutional: Negative for chills and fever. HENT: Negative for ear pain, hearing loss, sore throat, trouble swallowing and voice change. Eyes: Negative for pain and discharge. Respiratory: Positive for shortness of breath. Negative for cough, hemoptysis, sputum production and wheezing. Cardiovascular: Negative for chest pain, palpitations and syncope. Gastrointestinal: Negative for abdominal pain, blood in stool, constipation, diarrhea, nausea and vomiting. Genitourinary: Negative for dysuria, flank pain, frequency and hematuria. Musculoskeletal: Negative for joint swelling, neck pain and neck stiffness. Skin: Negative for rash and wound. Neurological: Negative for dizziness, seizures, syncope, facial asymmetry and headaches. Psychiatric/Behavioral: Negative for hallucinations, self-injury and suicidal ideas. All other systems reviewed and are negative. Physical Exam  Physical Exam  Vitals and nursing note reviewed. Constitutional:       General: She is not in acute distress. Appearance: She is well-developed. HENT:      Head: Normocephalic and atraumatic. Right Ear: External ear normal.      Left Ear: External ear normal.   Eyes:      General: No scleral icterus. Right eye: No discharge. Left eye: No discharge. Extraocular Movements: Extraocular movements intact. Conjunctiva/sclera: Conjunctivae normal.   Cardiovascular:      Rate and Rhythm: Normal rate and regular rhythm. Heart sounds: Normal heart sounds. No murmur heard.   Pulmonary:      Effort: Pulmonary effort is normal.      Breath sounds: Decreased breath sounds and wheezing present. No rhonchi or rales. Abdominal:      General: Bowel sounds are normal. There is no distension. Palpations: Abdomen is soft. Tenderness: There is no abdominal tenderness. There is no guarding or rebound. Musculoskeletal:         General: No deformity. Normal range of motion. Cervical back: Normal range of motion and neck supple. Skin:     General: Skin is warm and dry. Findings: No rash. Neurological:      General: No focal deficit present. Mental Status: She is alert and oriented to person, place, and time. Cranial Nerves: No cranial nerve deficit. Psychiatric:         Mood and Affect: Mood normal.         Behavior: Behavior normal.         Thought Content:  Thought content normal.         Judgment: Judgment normal.         Vital Signs  ED Triage Vitals [09/29/23 2255]   Temperature Pulse Respirations Blood Pressure SpO2   97.8 °F (36.6 °C) 72 16 153/92 98 %      Temp Source Heart Rate Source Patient Position - Orthostatic VS BP Location FiO2 (%)   Oral -- Lying Right arm --      Pain Score       No Pain           Vitals:    09/29/23 2255 09/29/23 2330   BP: 153/92 114/78   Pulse: 72 65   Patient Position - Orthostatic VS: Lying Sitting         Visual Acuity      ED Medications  Medications   ipratropium-albuterol (DUO-NEB) 0.5-2.5 mg/3 mL inhalation solution 3 mL (3 mL Nebulization Given 9/29/23 2322)   methylPREDNISolone sodium succinate (Solu-MEDROL) injection 125 mg (125 mg Intravenous Given 9/29/23 2322)       Diagnostic Studies  Results Reviewed     Procedure Component Value Units Date/Time    HS Troponin 0hr (reflex protocol) [[id number]]  (Normal) Collected: 09/29/23 2312    Lab Status: Final result Specimen: Blood from Arm, Right Updated: 09/30/23 0003     hs TnI 0hr 7 ng/L     COVID19, Influenza A/B, RSV PCR, [facility] [[id number]]  (Normal) Collected: 09/29/23 2312    Lab Status: Final result Specimen: Nares from Nose Updated: 09/30/23 0003     SARS-CoV-2 Negative     INFLUENZA A PCR Negative     INFLUENZA B PCR Negative     RSV PCR Negative    Narrative:      FOR PEDIATRIC PATIENTS - copy/paste COVID Guidelines URL to browser: [email or web address]    SARS-CoV-2 assay is a Nucleic Acid Amplification assay intended for the  qualitative detection of nucleic acid from SARS-CoV-2 in nasopharyngeal  swabs. Results are for the presumptive identification of SARS-CoV-2 RNA. Positive results are indicative of infection with SARS-CoV-2, the virus  causing COVID-19, but do not rule out bacterial infection or co-infection  with other viruses. Laboratories within the [facility] and its  territories are required to report all positive results to the appropriate  public health authorities. Negative results do not preclude SARS-CoV-2  infection and should not be used as the sole basis for treatment or other  patient management decisions. Negative results must be combined with  clinical observations, patient history, and epidemiological information. This test has not been FDA cleared or approved. This test has been authorized by FDA under an Emergency Use Authorization  (EUA). This test is only authorized for the duration of time the  declaration that circumstances exist justifying the authorization of the  emergency use of an in vitro diagnostic tests for detection of SARS-CoV-2  virus and/or diagnosis of COVID-19 infection under section 564(b)(1) of  the Act, 21 U. S.C. 756ERZ-2(F)(8), unless the authorization is terminated  or revoked sooner. The test has been validated but independent review by FDA  and CLIA is pending. Test performed using [employer] GeneXpert: This RT-PCR assay targets N2,  a region unique to SARS-CoV-2. A conserved region in the E-gene was chosen  for pan-Sarbecovirus detection which includes SARS-CoV-2.     According to CMS-2020-01-R, this platform meets the definition of high-throughput technology.     B-Type Natriuretic Peptide(BNP) [[id number]]  (Normal) Collected: 09/29/23 2312    Lab Status: Final result Specimen: Blood from Arm, Right Updated: 09/30/23 0001     BNP 96 pg/mL     Comprehensive metabolic panel [[id number]]  (Abnormal) Collected: 09/29/23 2312    Lab Status: Final result Specimen: Blood from Arm, Right Updated: 09/29/23 2359     Sodium 137 mmol/L      Potassium 4.1 mmol/L      Chloride 98 mmol/L      CO2 35 mmol/L      ANION GAP 4 mmol/L      BUN 14 mg/dL      Creatinine 0.71 mg/dL      Glucose 228 mg/dL      Calcium 9.7 mg/dL      AST 11 U/L      ALT 13 U/L      Alkaline Phosphatase 74 U/L      Total Protein 6.9 g/dL      Albumin 3.9 g/dL      Total Bilirubin 0.23 mg/dL      eGFR 89 ml/min/1.73sq m     Narrative:      [facility] guidelines for Chronic Kidney Disease (CKD):   •  Stage 1 with normal or high GFR (GFR > 90 mL/min/1.73 square meters)  •  Stage 2 Mild CKD (GFR = 60-89 mL/min/1.73 square meters)  •  Stage 3A Moderate CKD (GFR = 45-59 mL/min/1.73 square meters)  •  Stage 3B Moderate CKD (GFR = 30-44 mL/min/1.73 square meters)  •  Stage 4 Severe CKD (GFR = 15-29 mL/min/1.73 square meters)  •  Stage 5 End Stage CKD (GFR <15 mL/min/1.73 square meters)  Note: GFR calculation is accurate only with a steady state creatinine    CBC and differential [[id number]] Collected: 09/29/23 2312    Lab Status: Final result Specimen: Blood from Arm, Right Updated: 09/29/23 2327     WBC 8.83 Thousand/uL      RBC 4.22 Million/uL      Hemoglobin 12.6 g/dL      Hematocrit 39.7 %      MCV 94 fL      MCH 29.9 pg      MCHC 31.7 g/dL      RDW 12.7 %      MPV 10.6 fL      Platelets 662 Thousands/uL      nRBC 0 /100 WBCs      Neutrophils Relative 60 %      Immat GRANS % 1 %      Lymphocytes Relative 27 %      Monocytes Relative 10 %      Eosinophils Relative 1 %      Basophils Relative 1 %      Neutrophils Absolute 5.41 Thousands/µL      Immature Grans Absolute 0.05 Thousand/uL Lymphocytes Absolute 2.36 Thousands/µL      Monocytes Absolute 0.88 Thousand/µL      Eosinophils Absolute 0.09 Thousand/µL      Basophils Absolute 0.04 Thousands/µL                  XR chest pa & lateral   ED Interpretation by Guillermo Harris MD (09/29 2333)   No acute finding                 Procedures  ECG 12 Lead Documentation Only    Date/Time: 9/29/2023 11:04 PM    Performed by: Guillermo Harris MD  Authorized by: Guillermo Harris MD    ECG reviewed by me, the ED Provider: yes    Patient location:  ED  Previous ECG:     Previous ECG:  Unavailable  Interpretation:     Interpretation: normal    Rate:     ECG rate:  68    ECG rate assessment: normal    Rhythm:     Rhythm: sinus rhythm    Ectopy:     Ectopy: none    QRS:     QRS axis:  Normal    QRS intervals:  Normal  Conduction:     Conduction: normal    ST segments:     ST segments:  Normal  T waves:     T waves: normal               ED Course                               SBIRT 22yo+    Flowsheet Row Most Recent Value   Initial Alcohol Screen: US AUDIT-C     1. How often do you have a drink containing alcohol? 0 Filed at: 09/29/2023 2331   2. How many drinks containing alcohol do you have on a typical day you are drinking? 0 Filed at: 09/29/2023 2331   3b. FEMALE Any Age, or MALE 65+: How often do you have 4 or more drinks on one occassion? 0 Filed at: 09/29/2023 2331   Audit-C Score 0 Filed at: 09/29/2023 2331   LIVIER: How many times in the past year have you. .. Used an illegal drug or used a prescription medication for non-medical reasons? Never Filed at: 09/29/2023 2331                    Medical Decision Making  Based on the history and medical screening exam performed the diagnostic considerations include but are not limited to COPD exacerbation, pneumonia, COVID, influenza. .    Based on the work-up performed in the emergency room which includes physical examination, and which may include laboratory studies and imaging as warranted including advanced imaging such as CT scan or ultrasound, the differential diagnosis is narrowed to exclude limb or life-threatening process. The patient is stable for discharge. Chest x-ray negative, lab work negative. Patient feels better after nebulization and IV steroid. Appropriate for discharge. Will prescribe 3-day course of prednisone burst    Amount and/or Complexity of Data Reviewed  External Data Reviewed: labs, radiology and notes. Labs: ordered. Decision-making details documented in ED Course. Details: Benign  Radiology: ordered and independent interpretation performed. Decision-making details documented in ED Course. Details: Chest x-ray negative  ECG/medicine tests: ordered and independent interpretation performed. Decision-making details documented in ED Course. Details: Normal sinus rhythm rate 68      Risk  Prescription drug management. Disposition  Final diagnoses:   COPD exacerbation ([address])     Time reflects when diagnosis was documented in both MDM as applicable and the Disposition within this note     Time User Action Codes Description Comment    9/30/2023 12:06 AM Jefe Mccarthy [J44.1] COPD exacerbation [facility])       ED Disposition     ED Disposition   Discharge    Condition   Stable    Date/Time   Sat Sep 30, 2023 12:06 AM    Comment   Jessika Lux discharge to home/self care. Follow-up Information     Follow up With Specialties Details Why DO [facility]   [address]  [phone]            Patient's Medications   Discharge Prescriptions    PREDNISONE 20 MG TABLET    Take 2 tablets (40 mg total) by mouth daily for 3 days       Start Date: 9/30/2023 End Date: 10/3/2023       Order Dose: 40 mg       Quantity: 6 tablet    Refills: 0       No discharge procedures on file.     PDMP Review       Value Time User    PDMP Reviewed  Yes 9/13/2023  1:19 AM Gilma Reina DO          ED Provider  Electronically Signed by           Herminia Villegas MD  09/30/23 3905

## 2023-10-02 LAB
ATRIAL RATE: 68 BPM
P AXIS: 43 DEGREES
PR INTERVAL: 100 MS
QRS AXIS: 49 DEGREES
QRSD INTERVAL: 74 MS
QT INTERVAL: 386 MS
QTC INTERVAL: 410 MS
T WAVE AXIS: 54 DEGREES
VENTRICULAR RATE: 68 BPM

## 2023-10-05 ENCOUNTER — APPOINTMENT (EMERGENCY)
Dept: RADIOLOGY | Facility: HOSPITAL | Age: 68
End: 2023-10-05
Payer: COMMERCIAL

## 2023-10-05 ENCOUNTER — HOSPITAL ENCOUNTER (EMERGENCY)
Facility: HOSPITAL | Age: 68
Discharge: HOME | End: 2023-10-05
Attending: EMERGENCY MEDICINE | Admitting: EMERGENCY MEDICINE
Payer: COMMERCIAL

## 2023-10-05 VITALS
HEART RATE: 107 BPM | DIASTOLIC BLOOD PRESSURE: 76 MMHG | HEIGHT: 62 IN | RESPIRATION RATE: 20 BRPM | TEMPERATURE: 97.4 F | OXYGEN SATURATION: 98 % | WEIGHT: 164.68 LBS | SYSTOLIC BLOOD PRESSURE: 157 MMHG | BODY MASS INDEX: 30.31 KG/M2

## 2023-10-05 DIAGNOSIS — J44.1 COPD EXACERBATION (HCC): Primary | ICD-10-CM

## 2023-10-05 LAB
ALBUMIN SERPL BCP-MCNC: 4 G/DL (ref 3.5–5)
ALP SERPL-CCNC: 86 U/L (ref 34–104)
ALT SERPL W P-5'-P-CCNC: 12 U/L (ref 7–52)
ANION GAP SERPL CALCULATED.3IONS-SCNC: 9 MMOL/L
AST SERPL W P-5'-P-CCNC: 22 U/L (ref 13–39)
BASOPHILS # BLD AUTO: 0.03 THOUSANDS/ÂΜL (ref 0–0.1)
BASOPHILS NFR BLD AUTO: 0 % (ref 0–1)
BILIRUB SERPL-MCNC: 0.54 MG/DL (ref 0.2–1)
BNP SERPL-MCNC: 25 PG/ML (ref 0–100)
BUN SERPL-MCNC: 8 MG/DL (ref 5–25)
CALCIUM SERPL-MCNC: 9.7 MG/DL (ref 8.4–10.2)
CARDIAC TROPONIN I PNL SERPL HS: 9 NG/L
CHLORIDE SERPL-SCNC: 96 MMOL/L (ref 96–108)
CO2 SERPL-SCNC: 30 MMOL/L (ref 21–32)
CREAT SERPL-MCNC: 0.79 MG/DL (ref 0.6–1.3)
EOSINOPHIL # BLD AUTO: 0.09 THOUSAND/ÂΜL (ref 0–0.61)
EOSINOPHIL NFR BLD AUTO: 1 % (ref 0–6)
ERYTHROCYTE [DISTWIDTH] IN BLOOD BY AUTOMATED COUNT: 12.7 % (ref 11.6–15.1)
FLUAV RNA RESP QL NAA+PROBE: NEGATIVE
FLUBV RNA RESP QL NAA+PROBE: NEGATIVE
GFR SERPL CREATININE-BSD FRML MDRD: 78 ML/MIN/1.73SQ M
GLUCOSE SERPL-MCNC: 251 MG/DL (ref 65–140)
HCT VFR BLD AUTO: 41.7 % (ref 34.8–46.1)
HGB BLD-MCNC: 13.4 G/DL (ref 11.5–15.4)
IMM GRANULOCYTES # BLD AUTO: 0.05 THOUSAND/UL (ref 0–0.2)
IMM GRANULOCYTES NFR BLD AUTO: 0 % (ref 0–2)
LACTATE SERPL-SCNC: 1.5 MMOL/L (ref 0.5–2)
LYMPHOCYTES # BLD AUTO: 2.43 THOUSANDS/ÂΜL (ref 0.6–4.47)
LYMPHOCYTES NFR BLD AUTO: 22 % (ref 14–44)
MCH RBC QN AUTO: 29.5 PG (ref 26.8–34.3)
MCHC RBC AUTO-ENTMCNC: 32.1 G/DL (ref 31.4–37.4)
MCV RBC AUTO: 92 FL (ref 82–98)
MONOCYTES # BLD AUTO: 0.78 THOUSAND/ÂΜL (ref 0.17–1.22)
MONOCYTES NFR BLD AUTO: 7 % (ref 4–12)
NEUTROPHILS # BLD AUTO: 7.82 THOUSANDS/ÂΜL (ref 1.85–7.62)
NEUTS SEG NFR BLD AUTO: 70 % (ref 43–75)
NRBC BLD AUTO-RTO: 0 /100 WBCS
PLATELET # BLD AUTO: 212 THOUSANDS/UL (ref 149–390)
PMV BLD AUTO: 10.9 FL (ref 8.9–12.7)
POTASSIUM SERPL-SCNC: 4.5 MMOL/L (ref 3.5–5.3)
PROT SERPL-MCNC: 7.3 G/DL (ref 6.4–8.4)
RBC # BLD AUTO: 4.55 MILLION/UL (ref 3.81–5.12)
RSV RNA RESP QL NAA+PROBE: NEGATIVE
SARS-COV-2 RNA RESP QL NAA+PROBE: NEGATIVE
SODIUM SERPL-SCNC: 135 MMOL/L (ref 135–147)
WBC # BLD AUTO: 11.2 THOUSAND/UL (ref 4.31–10.16)

## 2023-10-05 PROCEDURE — 83880 ASSAY OF NATRIURETIC PEPTIDE: CPT | Performed by: EMERGENCY MEDICINE

## 2023-10-05 PROCEDURE — 96374 THER/PROPH/DIAG INJ IV PUSH: CPT

## 2023-10-05 PROCEDURE — 85025 COMPLETE CBC W/AUTO DIFF WBC: CPT | Performed by: EMERGENCY MEDICINE

## 2023-10-05 PROCEDURE — 71045 X-RAY EXAM CHEST 1 VIEW: CPT

## 2023-10-05 PROCEDURE — 83605 ASSAY OF LACTIC ACID: CPT | Performed by: EMERGENCY MEDICINE

## 2023-10-05 PROCEDURE — 94760 N-INVAS EAR/PLS OXIMETRY 1: CPT

## 2023-10-05 PROCEDURE — 84484 ASSAY OF TROPONIN QUANT: CPT | Performed by: EMERGENCY MEDICINE

## 2023-10-05 PROCEDURE — 36415 COLL VENOUS BLD VENIPUNCTURE: CPT | Performed by: EMERGENCY MEDICINE

## 2023-10-05 PROCEDURE — 93005 ELECTROCARDIOGRAM TRACING: CPT

## 2023-10-05 PROCEDURE — 94644 CONT INHLJ TX 1ST HOUR: CPT

## 2023-10-05 PROCEDURE — 0241U HB NFCT DS VIR RESP RNA 4 TRGT: CPT | Performed by: EMERGENCY MEDICINE

## 2023-10-05 PROCEDURE — 87040 BLOOD CULTURE FOR BACTERIA: CPT | Performed by: EMERGENCY MEDICINE

## 2023-10-05 PROCEDURE — 99285 EMERGENCY DEPT VISIT HI MDM: CPT

## 2023-10-05 PROCEDURE — 80053 COMPREHEN METABOLIC PANEL: CPT | Performed by: EMERGENCY MEDICINE

## 2023-10-05 PROCEDURE — 94640 AIRWAY INHALATION TREATMENT: CPT

## 2023-10-05 PROCEDURE — 99285 EMERGENCY DEPT VISIT HI MDM: CPT | Performed by: EMERGENCY MEDICINE

## 2023-10-05 RX ORDER — METHYLPREDNISOLONE 4 MG/1
TABLET ORAL
Qty: 21 TABLET | Refills: 0 | Status: SHIPPED | OUTPATIENT
Start: 2023-10-05

## 2023-10-05 RX ORDER — SODIUM CHLORIDE FOR INHALATION 0.9 %
3 VIAL, NEBULIZER (ML) INHALATION ONCE
Status: COMPLETED | OUTPATIENT
Start: 2023-10-05 | End: 2023-10-05

## 2023-10-05 RX ORDER — METHYLPREDNISOLONE SODIUM SUCCINATE 125 MG/2ML
125 INJECTION, POWDER, LYOPHILIZED, FOR SOLUTION INTRAMUSCULAR; INTRAVENOUS ONCE
Status: COMPLETED | OUTPATIENT
Start: 2023-10-05 | End: 2023-10-05

## 2023-10-05 RX ORDER — LEVOFLOXACIN 500 MG/1
500 TABLET, FILM COATED ORAL DAILY
Qty: 7 TABLET | Refills: 0 | Status: SHIPPED | OUTPATIENT
Start: 2023-10-05 | End: 2023-10-12

## 2023-10-05 RX ADMIN — METHYLPREDNISOLONE SODIUM SUCCINATE 125 MG: 125 INJECTION, POWDER, FOR SOLUTION INTRAMUSCULAR; INTRAVENOUS at 18:33

## 2023-10-05 RX ADMIN — ALBUTEROL SULFATE 10 MG: 2.5 SOLUTION RESPIRATORY (INHALATION) at 18:27

## 2023-10-05 RX ADMIN — IPRATROPIUM BROMIDE 1 MG: 0.5 SOLUTION RESPIRATORY (INHALATION) at 18:27

## 2023-10-05 RX ADMIN — ISODIUM CHLORIDE 3 ML: 0.03 SOLUTION RESPIRATORY (INHALATION) at 18:27

## 2023-10-05 NOTE — ED PROVIDER NOTES
History  Chief Complaint   Patient presents with   • Shortness of Breath     Patient reports SOB beginning this morning. Wears 3LNC at baseline. Patient with history of COPD, normally on 3 L of oxygen at home complains of 1 day of increased shortness of breath. She denies fevers or chills. She denies increased cough. She denies sick contacts. No chest pain or nausea or vomiting. No other complaints. History provided by:  Patient   used: No    Shortness of Breath  Severity:  Moderate  Onset quality:  Gradual  Duration:  1 day  Timing:  Constant  Progression:  Unchanged  Chronicity:  New  Relieved by:  Nothing  Worsened by:  Nothing  Ineffective treatments:  None tried  Associated symptoms: cough and wheezing    Associated symptoms: no abdominal pain, no chest pain, no ear pain, no fever, no headaches, no hemoptysis, no neck pain, no rash, no sore throat, no sputum production, no syncope and no vomiting        Prior to Admission Medications   Prescriptions Last Dose Informant Patient Reported? Taking?    Ergocalciferol (VITAMIN D2 PO)   Yes Yes   Sig: Take by mouth   LORazepam (Ativan) 0.5 mg tablet   No Yes   Sig: Take 1 tablet (0.5 mg total) by mouth 3 (three) times a day as needed for anxiety or sleep for up to 15 doses   Nebulizers (Comp Air Compressor Nebulizer) MISC   Yes Yes   Sig: As directed   albuterol (2.5 mg/3 mL) 0.083 % nebulizer solution   No Yes   Sig: Take 3 mL (2.5 mg total) by nebulization every 6 (six) hours as needed for wheezing or shortness of breath   amLODIPine (NORVASC) 10 mg tablet   Yes Yes   Sig: Take 10 mg by mouth daily at bedtime    atorvastatin (LIPITOR) 40 mg tablet   Yes Yes   Sig: Take 40 mg by mouth daily at bedtime    buPROPion (WELLBUTRIN SR) 150 mg 12 hr tablet Not Taking  Yes No   Sig: Take 150 mg by mouth every morning   Patient not taking: Reported on 9/13/2023   buPROPion (WELLBUTRIN XL) 150 mg 24 hr tablet Not Taking  No No   Sig: Take 1 tablet (150 mg total) by mouth daily Do not start before 2023. Patient not taking: Reported on 2023   cloNIDine (CATAPRES) 0.2 mg tablet   Yes Yes   Sig: Take 0.2 mg by mouth every 12 (twelve) hours   ergocalciferol (VITAMIN D2) 50,000 units   Yes Yes   Sig: Take 50,000 Units by mouth once a week   fluticasone (FLONASE) 50 mcg/act nasal spray   No Yes   Si spray into each nostril daily   fluticasone-umeclidinium-vilanterol (Trelegy Ellipta) 100-62.5-25 mcg/actuation inhaler   No Yes   Sig: Inhale 1 puff daily Rinse mouth after use. glipiZIDE (GLUCOTROL) 10 mg tablet  Self Yes Yes   Sig: Take 10 mg by mouth daily in the early morning W breakfast   ipratropium-albuterol (DUO-NEB) 0.5-2.5 mg/3 mL nebulizer solution   No Yes   Sig: Take 3 mL by nebulization 3 (three) times a day   lisinopril (ZESTRIL) 40 mg tablet   Yes Yes   Sig: Take 40 mg by mouth daily   metoprolol tartrate (LOPRESSOR) 100 mg tablet   Yes Yes   Sig: Take 100 mg by mouth every 12 (twelve) hours   omeprazole (PriLOSEC OTC) 20 MG tablet   Yes Yes   Sig: Take 20 mg by mouth daily    pioglitazone (ACTOS) 15 mg tablet   Yes Yes   Sig: Take 15 mg by mouth daily   predniSONE 10 mg tablet   No Yes   Sig: Take 4 tabs daily for thee days then take three tabs daily for three days then take two tablets daily for three days then take one tab daily for three days. sodium chloride 1 g tablet   No Yes   Sig: Take 2 tablets (2 g total) by mouth 3 (three) times a day with meals      Facility-Administered Medications: None       Past Medical History:   Diagnosis Date   • Anxiety    • COPD (chronic obstructive pulmonary disease) ([address])    • Diabetes mellitus ([address])    • Essential (primary) hypertension    • GERD (gastroesophageal reflux disease)    • Smoker        History reviewed. No pertinent surgical history.     Family History   Problem Relation Age of Onset   • Diabetes Father      I have reviewed and agree with the history as documented. E-Cigarette/Vaping   • E-Cigarette Use Never User      E-Cigarette/Vaping Substances   • Nicotine No    • THC No    • CBD No    • Flavoring No      Social History     Tobacco Use   • Smoking status: Every Day     Packs/day: 0.50     Types: Cigarettes   • Smokeless tobacco: Never   Vaping Use   • Vaping Use: Never used   Substance Use Topics   • Alcohol use: Not Currently   • Drug use: Never       Review of Systems   Constitutional: Negative for chills and fever. HENT: Negative for ear pain, hearing loss, sore throat, trouble swallowing and voice change. Eyes: Negative for pain and discharge. Respiratory: Positive for cough, shortness of breath and wheezing. Negative for hemoptysis and sputum production. Cardiovascular: Negative for chest pain, palpitations and syncope. Gastrointestinal: Negative for abdominal pain, blood in stool, constipation, diarrhea, nausea and vomiting. Genitourinary: Negative for dysuria, flank pain, frequency and hematuria. Musculoskeletal: Negative for joint swelling, neck pain and neck stiffness. Skin: Negative for rash and wound. Neurological: Negative for dizziness, seizures, syncope, facial asymmetry and headaches. Psychiatric/Behavioral: Negative for hallucinations, self-injury and suicidal ideas. All other systems reviewed and are negative. Physical Exam  Physical Exam  Vitals and nursing note reviewed. Constitutional:       General: She is not in acute distress. Appearance: She is well-developed. HENT:      Head: Normocephalic and atraumatic. Right Ear: External ear normal.      Left Ear: External ear normal.   Eyes:      General: No scleral icterus. Right eye: No discharge. Left eye: No discharge. Extraocular Movements: Extraocular movements intact. Conjunctiva/sclera: Conjunctivae normal.   Cardiovascular:      Rate and Rhythm: Normal rate and regular rhythm. Heart sounds: Normal heart sounds.  No murmur heard. Pulmonary:      Effort: Pulmonary effort is normal. No tachypnea, accessory muscle usage or respiratory distress. Breath sounds: Decreased breath sounds and wheezing present. No rhonchi or rales. Abdominal:      General: Bowel sounds are normal. There is no distension. Palpations: Abdomen is soft. Tenderness: There is no abdominal tenderness. There is no guarding or rebound. Musculoskeletal:         General: No deformity. Normal range of motion. Cervical back: Normal range of motion and neck supple. Skin:     General: Skin is warm and dry. Findings: No rash. Neurological:      General: No focal deficit present. Mental Status: She is alert and oriented to person, place, and time. Cranial Nerves: No cranial nerve deficit. Psychiatric:         Mood and Affect: Mood normal.         Behavior: Behavior normal.         Thought Content:  Thought content normal.         Judgment: Judgment normal.         Vital Signs  ED Triage Vitals [10/05/23 1750]   Temperature Pulse Respirations Blood Pressure SpO2   (!) 97.4 °F (36.3 °C) (!) 141 22 (!) 198/107 96 %      Temp Source Heart Rate Source Patient Position - Orthostatic VS BP Location FiO2 (%)   Temporal Monitor Lying Right arm --      Pain Score       No Pain           Vitals:    10/05/23 1830 10/05/23 1845 10/05/23 1915 10/05/23 1930   BP: (!) 175/85 (!) 178/86 169/79 157/76   Pulse: 99 100 100 (!) 107   Patient Position - Orthostatic VS: Sitting Sitting Sitting Sitting         Visual Acuity      ED Medications  Medications   albuterol inhalation solution 10 mg (10 mg Nebulization Given 10/5/23 1827)     And   ipratropium (ATROVENT) 0.02 % inhalation solution 1 mg (1 mg Nebulization Given 10/5/23 1827)     And   sodium chloride 0.9 % inhalation solution 3 mL (3 mL Nebulization Given 10/5/23 1827)   methylPREDNISolone sodium succinate (Solu-MEDROL) injection 125 mg (125 mg Intravenous Given 10/5/23 1833) Diagnostic Studies  Results Reviewed     Procedure Component Value Units Date/Time    COVID19, Influenza A/B, RSV PCR, [facility] [[id number]]  (Normal) Collected: 10/05/23 1820    Lab Status: Final result Specimen: Nares from Nose Updated: 10/05/23 1907     SARS-CoV-2 Negative     INFLUENZA A PCR Negative     INFLUENZA B PCR Negative     RSV PCR Negative    Narrative:      FOR PEDIATRIC PATIENTS - copy/paste COVID Guidelines URL to browser: [email or web address]    SARS-CoV-2 assay is a Nucleic Acid Amplification assay intended for the  qualitative detection of nucleic acid from SARS-CoV-2 in nasopharyngeal  swabs. Results are for the presumptive identification of SARS-CoV-2 RNA. Positive results are indicative of infection with SARS-CoV-2, the virus  causing COVID-19, but do not rule out bacterial infection or co-infection  with other viruses. Laboratories within the [facility] and its  territories are required to report all positive results to the appropriate  public health authorities. Negative results do not preclude SARS-CoV-2  infection and should not be used as the sole basis for treatment or other  patient management decisions. Negative results must be combined with  clinical observations, patient history, and epidemiological information. This test has not been FDA cleared or approved. This test has been authorized by FDA under an Emergency Use Authorization  (EUA). This test is only authorized for the duration of time the  declaration that circumstances exist justifying the authorization of the  emergency use of an in vitro diagnostic tests for detection of SARS-CoV-2  virus and/or diagnosis of COVID-19 infection under section 564(b)(1) of  the Act, 21 U. S.C. 453EVY-1(H)(2), unless the authorization is terminated  or revoked sooner. The test has been validated but independent review by FDA  and CLIA is pending.     Test performed using [employer] GeneXpert: This RT-PCR assay targets N2,  a region unique to SARS-CoV-2. A conserved region in the E-gene was chosen  for pan-Sarbecovirus detection which includes SARS-CoV-2. According to CMS-2020-01-R, this platform meets the definition of high-throughput technology. Comprehensive metabolic panel [[id number]]  (Abnormal) Collected: 10/05/23 1820    Lab Status: Final result Specimen: Blood from Arm, Left Updated: 10/05/23 1906     Sodium 135 mmol/L      Potassium 4.5 mmol/L      Chloride 96 mmol/L      CO2 30 mmol/L      ANION GAP 9 mmol/L      BUN 8 mg/dL      Creatinine 0.79 mg/dL      Glucose 251 mg/dL      Calcium 9.7 mg/dL      AST 22 U/L      ALT 12 U/L      Alkaline Phosphatase 86 U/L      Total Protein 7.3 g/dL      Albumin 4.0 g/dL      Total Bilirubin 0.54 mg/dL      eGFR 78 ml/min/1.73sq m     Narrative:      [place] guidelines for Chronic Kidney Disease (CKD):   •  Stage 1 with normal or high GFR (GFR > 90 mL/min/1.73 square meters)  •  Stage 2 Mild CKD (GFR = 60-89 mL/min/1.73 square meters)  •  Stage 3A Moderate CKD (GFR = 45-59 mL/min/1.73 square meters)  •  Stage 3B Moderate CKD (GFR = 30-44 mL/min/1.73 square meters)  •  Stage 4 Severe CKD (GFR = 15-29 mL/min/1.73 square meters)  •  Stage 5 End Stage CKD (GFR <15 mL/min/1.73 square meters)  Note: GFR calculation is accurate only with a steady state creatinine    Lactic acid, plasma (w/reflex if result > 2.0) [[id number]]  (Normal) Collected: 10/05/23 1820    Lab Status: Final result Specimen: Blood from Arm, Left Updated: 10/05/23 1906     LACTIC ACID 1.5 mmol/L     Narrative:      Result may be elevated if tourniquet was used during collection.     B-Type Natriuretic Peptide(BNP) [[id number]]  (Normal) Collected: 10/05/23 1820    Lab Status: Final result Specimen: Blood from Arm, Left Updated: 10/05/23 1859     BNP 25 pg/mL     HS Troponin 0hr (reflex protocol) [[id number]]  (Normal) Collected: 10/05/23 1820    Lab Status: Final result Specimen: Blood from Arm, Left Updated: 10/05/23 1853     hs TnI 0hr 9 ng/L     HS Troponin I 2hr [[id number]]     Lab Status: No result Specimen: Blood     CBC and differential [[id number]]  (Abnormal) Collected: 10/05/23 1820    Lab Status: Final result Specimen: Blood from Arm, Left Updated: 10/05/23 1831     WBC 11.20 Thousand/uL      RBC 4.55 Million/uL      Hemoglobin 13.4 g/dL      Hematocrit 41.7 %      MCV 92 fL      MCH 29.5 pg      MCHC 32.1 g/dL      RDW 12.7 %      MPV 10.9 fL      Platelets 550 Thousands/uL      nRBC 0 /100 WBCs      Neutrophils Relative 70 %      Immat GRANS % 0 %      Lymphocytes Relative 22 %      Monocytes Relative 7 %      Eosinophils Relative 1 %      Basophils Relative 0 %      Neutrophils Absolute 7.82 Thousands/µL      Immature Grans Absolute 0.05 Thousand/uL      Lymphocytes Absolute 2.43 Thousands/µL      Monocytes Absolute 0.78 Thousand/µL      Eosinophils Absolute 0.09 Thousand/µL      Basophils Absolute 0.03 Thousands/µL     Blood culture #2 [[id number]] Collected: 10/05/23 1820    Lab Status: In process Specimen: Blood from Arm, Right Updated: 10/05/23 1828    Blood culture #1 [[id number]] Collected: 10/05/23 1820    Lab Status:  In process Specimen: Blood from Arm, Left Updated: 10/05/23 1828                 XR chest portable   ED Interpretation by Maxine Kingsley MD (10/05 1858)   No acute finding                 Procedures  ECG 12 Lead Documentation Only    Date/Time: 10/5/2023 5:56 PM    Performed by: Maxine Kingsley MD  Authorized by: Maxine Kingsley MD    ECG reviewed by me, the ED Provider: yes    Patient location:  ED  Previous ECG:     Previous ECG:  Unavailable  Interpretation:     Interpretation: non-specific    Rate:     ECG rate:  116    ECG rate assessment: tachycardic    Rhythm:     Rhythm: sinus tachycardia    Ectopy:     Ectopy: none    QRS:     QRS axis:  Normal    QRS intervals:  Normal  Conduction:     Conduction: normal    ST segments:     ST segments:  Normal  T waves:     T waves: normal               ED Course                               SBIRT 20yo+    Flowsheet Row Most Recent Value   Initial Alcohol Screen: US AUDIT-C     1. How often do you have a drink containing alcohol? 0 Filed at: 10/05/2023 1750   2. How many drinks containing alcohol do you have on a typical day you are drinking? 0 Filed at: 10/05/2023 1750   3b. FEMALE Any Age, or MALE 65+: How often do you have 4 or more drinks on one occassion? 0 Filed at: 10/05/2023 1750   Audit-C Score 0 Filed at: 10/05/2023 1750   LIVIER: How many times in the past year have you. .. Used an illegal drug or used a prescription medication for non-medical reasons? Never Filed at: 10/05/2023 1750                    Medical Decision Making  Based on the history and medical screening exam performed the diagnostic considerations include but are not limited to pneumonia, COVID, asthma exacerbation, COPD exacerbation, viral respiratory illness. Based on the work-up performed in the emergency room which includes physical examination, and which may include laboratory studies and imaging as warranted including advanced imaging such as CT scan or ultrasound, the differential diagnosis is narrowed to exclude limb or life-threatening process. The patient is stable for discharge. Patient is significantly improved after nebulization and steroid in the emergency room. Wheezing is improved and lung sounds are generally improved as well. Patient feels better. Lab work reveals elevated glucose, otherwise normal.  COVID testing is negative. Chest x-ray is negative. Stable for discharge    Amount and/or Complexity of Data Reviewed  Labs: ordered. Decision-making details documented in ED Course. Details: Elevated glucose otherwise within normal limits  Radiology: ordered and independent interpretation performed. Decision-making details documented in ED Course.      Details: Chest x-ray negative  ECG/medicine tests: ordered and independent interpretation performed. Decision-making details documented in ED Course. Details: Sinus tachycardia rate 116, no acute finding otherwise      Risk  Prescription drug management. Disposition  Final diagnoses:   COPD exacerbation ([address])     Time reflects when diagnosis was documented in both MDM as applicable and the Disposition within this note     Time User Action Codes Description Comment    10/5/2023  7:42 PM Brendon Restrepo Add [J44.1] COPD exacerbation [facility])       ED Disposition     ED Disposition   Discharge    Condition   Stable    Date/Time   Thu Oct 5, 2023  7:42 PM    Comment   Jessika Lux discharge to home/self care. Follow-up Information     Follow up With Specialties Details Why Contact Info    Kwadwo Viera DO Family Medicine   [address] [place] [address]  [phone]            Patient's Medications   Discharge Prescriptions    LEVOFLOXACIN (LEVAQUIN) 500 MG TABLET    Take 1 tablet (500 mg total) by mouth daily for 7 days       Start Date: 10/5/2023 End Date: 10/12/2023       Order Dose: 500 mg       Quantity: 7 tablet    Refills: 0    METHYLPREDNISOLONE 4 MG TABLET THERAPY PACK    Use as directed on package       Start Date: 10/5/2023 End Date: --       Order Dose: --       Quantity: 21 tablet    Refills: 0       No discharge procedures on file.     PDMP Review       Value Time User    PDMP Reviewed  Yes 9/13/2023  1:19 AM Dara Loving DO          ED Provider  Electronically Signed by           Brendon Restrepo MD  10/05/23 0500

## 2023-10-06 LAB
ATRIAL RATE: 116 BPM
P AXIS: 77 DEGREES
PR INTERVAL: 114 MS
QRS AXIS: 47 DEGREES
QRSD INTERVAL: 68 MS
QT INTERVAL: 306 MS
QTC INTERVAL: 425 MS
T WAVE AXIS: 56 DEGREES
VENTRICULAR RATE: 116 BPM

## 2023-10-08 LAB
BACTERIA BLD CULT: NORMAL
BACTERIA BLD CULT: NORMAL

## 2023-10-11 LAB
BACTERIA BLD CULT: NORMAL
BACTERIA BLD CULT: NORMAL

## 2023-10-13 ENCOUNTER — APPOINTMENT (EMERGENCY)
Dept: RADIOLOGY | Facility: HOSPITAL | Age: 68
End: 2023-10-13
Payer: COMMERCIAL

## 2023-10-13 ENCOUNTER — HOSPITAL ENCOUNTER (EMERGENCY)
Facility: HOSPITAL | Age: 68
Discharge: HOME | End: 2023-10-13
Attending: STUDENT IN AN ORGANIZED HEALTH CARE EDUCATION/TRAINING PROGRAM | Admitting: STUDENT IN AN ORGANIZED HEALTH CARE EDUCATION/TRAINING PROGRAM
Payer: COMMERCIAL

## 2023-10-13 ENCOUNTER — HOSPITAL ENCOUNTER (EMERGENCY)
Facility: HOSPITAL | Age: 68
Discharge: HOME | End: 2023-10-13
Attending: EMERGENCY MEDICINE
Payer: COMMERCIAL

## 2023-10-13 VITALS
TEMPERATURE: 98 F | BODY MASS INDEX: 30.73 KG/M2 | RESPIRATION RATE: 16 BRPM | HEART RATE: 83 BPM | DIASTOLIC BLOOD PRESSURE: 81 MMHG | WEIGHT: 167 LBS | HEIGHT: 62 IN | OXYGEN SATURATION: 99 % | SYSTOLIC BLOOD PRESSURE: 122 MMHG

## 2023-10-13 VITALS
OXYGEN SATURATION: 95 % | HEIGHT: 62 IN | WEIGHT: 167.77 LBS | SYSTOLIC BLOOD PRESSURE: 112 MMHG | RESPIRATION RATE: 16 BRPM | HEART RATE: 70 BPM | DIASTOLIC BLOOD PRESSURE: 57 MMHG | BODY MASS INDEX: 30.87 KG/M2 | TEMPERATURE: 98 F

## 2023-10-13 DIAGNOSIS — J44.1 COPD EXACERBATION (HCC): Primary | ICD-10-CM

## 2023-10-13 DIAGNOSIS — R06.00 DYSPNEA: Primary | ICD-10-CM

## 2023-10-13 DIAGNOSIS — F41.9 ANXIETY: ICD-10-CM

## 2023-10-13 LAB
ALBUMIN SERPL BCP-MCNC: 4.1 G/DL (ref 3.5–5)
ALP SERPL-CCNC: 85 U/L (ref 34–104)
ALT SERPL W P-5'-P-CCNC: 12 U/L (ref 7–52)
ANION GAP SERPL CALCULATED.3IONS-SCNC: 5 MMOL/L
APTT PPP: 28 SECONDS (ref 23–37)
AST SERPL W P-5'-P-CCNC: 12 U/L (ref 13–39)
ATRIAL RATE: 83 BPM
BASOPHILS # BLD AUTO: 0.04 THOUSANDS/ÂΜL (ref 0–0.1)
BASOPHILS NFR BLD AUTO: 0 % (ref 0–1)
BILIRUB SERPL-MCNC: 0.34 MG/DL (ref 0.2–1)
BNP SERPL-MCNC: 32 PG/ML (ref 0–100)
BUN SERPL-MCNC: 9 MG/DL (ref 5–25)
CALCIUM SERPL-MCNC: 9.4 MG/DL (ref 8.4–10.2)
CARDIAC TROPONIN I PNL SERPL HS: 7 NG/L
CHLORIDE SERPL-SCNC: 100 MMOL/L (ref 96–108)
CO2 SERPL-SCNC: 32 MMOL/L (ref 21–32)
CREAT SERPL-MCNC: 0.69 MG/DL (ref 0.6–1.3)
EOSINOPHIL # BLD AUTO: 0.13 THOUSAND/ÂΜL (ref 0–0.61)
EOSINOPHIL NFR BLD AUTO: 1 % (ref 0–6)
ERYTHROCYTE [DISTWIDTH] IN BLOOD BY AUTOMATED COUNT: 12.8 % (ref 11.6–15.1)
GFR SERPL CREATININE-BSD FRML MDRD: 91 ML/MIN/1.73SQ M
GLUCOSE SERPL-MCNC: 205 MG/DL (ref 65–140)
HCT VFR BLD AUTO: 41.3 % (ref 34.8–46.1)
HGB BLD-MCNC: 13.3 G/DL (ref 11.5–15.4)
IMM GRANULOCYTES # BLD AUTO: 0.05 THOUSAND/UL (ref 0–0.2)
IMM GRANULOCYTES NFR BLD AUTO: 1 % (ref 0–2)
INR PPP: 0.81 (ref 0.84–1.19)
LYMPHOCYTES # BLD AUTO: 2.48 THOUSANDS/ÂΜL (ref 0.6–4.47)
LYMPHOCYTES NFR BLD AUTO: 28 % (ref 14–44)
MCH RBC QN AUTO: 29.8 PG (ref 26.8–34.3)
MCHC RBC AUTO-ENTMCNC: 32.2 G/DL (ref 31.4–37.4)
MCV RBC AUTO: 92 FL (ref 82–98)
MONOCYTES # BLD AUTO: 0.85 THOUSAND/ÂΜL (ref 0.17–1.22)
MONOCYTES NFR BLD AUTO: 9 % (ref 4–12)
NEUTROPHILS # BLD AUTO: 5.48 THOUSANDS/ÂΜL (ref 1.85–7.62)
NEUTS SEG NFR BLD AUTO: 61 % (ref 43–75)
NRBC BLD AUTO-RTO: 0 /100 WBCS
P AXIS: 32 DEGREES
PLATELET # BLD AUTO: 203 THOUSANDS/UL (ref 149–390)
PMV BLD AUTO: 10.8 FL (ref 8.9–12.7)
POTASSIUM SERPL-SCNC: 3.9 MMOL/L (ref 3.5–5.3)
PR INTERVAL: 126 MS
PROT SERPL-MCNC: 6.8 G/DL (ref 6.4–8.4)
PROTHROMBIN TIME: 11.5 SECONDS (ref 11.6–14.5)
QRS AXIS: 42 DEGREES
QRSD INTERVAL: 60 MS
QT INTERVAL: 362 MS
QTC INTERVAL: 425 MS
RBC # BLD AUTO: 4.47 MILLION/UL (ref 3.81–5.12)
SODIUM SERPL-SCNC: 137 MMOL/L (ref 135–147)
T WAVE AXIS: 48 DEGREES
VENTRICULAR RATE: 83 BPM
WBC # BLD AUTO: 9.03 THOUSAND/UL (ref 4.31–10.16)

## 2023-10-13 PROCEDURE — 80053 COMPREHEN METABOLIC PANEL: CPT | Performed by: EMERGENCY MEDICINE

## 2023-10-13 PROCEDURE — 85025 COMPLETE CBC W/AUTO DIFF WBC: CPT | Performed by: EMERGENCY MEDICINE

## 2023-10-13 PROCEDURE — 84484 ASSAY OF TROPONIN QUANT: CPT | Performed by: EMERGENCY MEDICINE

## 2023-10-13 PROCEDURE — 99285 EMERGENCY DEPT VISIT HI MDM: CPT | Performed by: STUDENT IN AN ORGANIZED HEALTH CARE EDUCATION/TRAINING PROGRAM

## 2023-10-13 PROCEDURE — 36415 COLL VENOUS BLD VENIPUNCTURE: CPT | Performed by: EMERGENCY MEDICINE

## 2023-10-13 PROCEDURE — 93005 ELECTROCARDIOGRAM TRACING: CPT

## 2023-10-13 PROCEDURE — 85610 PROTHROMBIN TIME: CPT | Performed by: EMERGENCY MEDICINE

## 2023-10-13 PROCEDURE — 93010 ELECTROCARDIOGRAM REPORT: CPT | Performed by: STUDENT IN AN ORGANIZED HEALTH CARE EDUCATION/TRAINING PROGRAM

## 2023-10-13 PROCEDURE — 83880 ASSAY OF NATRIURETIC PEPTIDE: CPT | Performed by: EMERGENCY MEDICINE

## 2023-10-13 PROCEDURE — 99285 EMERGENCY DEPT VISIT HI MDM: CPT | Performed by: EMERGENCY MEDICINE

## 2023-10-13 PROCEDURE — 71045 X-RAY EXAM CHEST 1 VIEW: CPT

## 2023-10-13 PROCEDURE — 85730 THROMBOPLASTIN TIME PARTIAL: CPT | Performed by: EMERGENCY MEDICINE

## 2023-10-13 RX ORDER — IPRATROPIUM BROMIDE AND ALBUTEROL SULFATE 2.5; .5 MG/3ML; MG/3ML
3 SOLUTION RESPIRATORY (INHALATION) ONCE
Status: COMPLETED | OUTPATIENT
Start: 2023-10-13 | End: 2023-10-13

## 2023-10-13 RX ORDER — PREDNISONE 20 MG/1
40 TABLET ORAL ONCE
Status: COMPLETED | OUTPATIENT
Start: 2023-10-13 | End: 2023-10-13

## 2023-10-13 RX ORDER — PREDNISONE 20 MG/1
40 TABLET ORAL DAILY
Qty: 8 TABLET | Refills: 0 | Status: SHIPPED | OUTPATIENT
Start: 2023-10-13 | End: 2023-10-17

## 2023-10-13 RX ADMIN — IPRATROPIUM BROMIDE AND ALBUTEROL SULFATE 3 ML: 2.5; .5 SOLUTION RESPIRATORY (INHALATION) at 18:49

## 2023-10-13 RX ADMIN — IPRATROPIUM BROMIDE AND ALBUTEROL SULFATE 3 ML: .5; 3 SOLUTION RESPIRATORY (INHALATION) at 06:25

## 2023-10-13 RX ADMIN — PREDNISONE 40 MG: 20 TABLET ORAL at 19:46

## 2023-10-13 NOTE — Clinical Note
Nabeel Ceballos accompanied Jessika Viars to the emergency department on 10/13/2023. Return date if applicable: If you have any questions or concerns, please don't hesitate to call.       Sharyn Ford RN

## 2023-10-13 NOTE — ED PROVIDER NOTES
History  Chief Complaint   Patient presents with    Shortness of Breath     Seen here this AM for same; continues to be SOB       History provided by:  Patient, medical records and relative    79year old F. Hx of COPD. On 3 L O2 via nasal cannula. Smokes cigarettes--approximately 8-10/day. Presents with worsening shortness of breath. Worsening since this AM. Was evaluated in the ED earlier in the day. CXR obtained and negative. Was administered a nebulizer treatment earlier--had mild improvement. Once discharged, the patient developed worsening dyspnea. Mild wheeze. Denies recent URI, fevers, chills. No known sick contacts. Hx of anxiety--took a dose of Atarax which did not improve the dyspnea. Denies chest pain. Prior to Admission Medications   Prescriptions Last Dose Informant Patient Reported? Taking? Ergocalciferol (VITAMIN D2 PO)   Yes No   Sig: Take by mouth   LORazepam (Ativan) 0.5 mg tablet   No No   Sig: Take 1 tablet (0.5 mg total) by mouth 3 (three) times a day as needed for anxiety or sleep for up to 15 doses   Nebulizers (Comp Air Compressor Nebulizer) MISC   Yes No   Sig: As directed   albuterol (2.5 mg/3 mL) 0.083 % nebulizer solution   No No   Sig: Take 3 mL (2.5 mg total) by nebulization every 6 (six) hours as needed for wheezing or shortness of breath   amLODIPine (NORVASC) 10 mg tablet   Yes No   Sig: Take 10 mg by mouth daily at bedtime    atorvastatin (LIPITOR) 40 mg tablet   Yes No   Sig: Take 40 mg by mouth daily at bedtime    buPROPion (WELLBUTRIN SR) 150 mg 12 hr tablet   Yes No   Sig: Take 150 mg by mouth every morning   Patient not taking: Reported on 9/13/2023   buPROPion (WELLBUTRIN XL) 150 mg 24 hr tablet   No No   Sig: Take 1 tablet (150 mg total) by mouth daily Do not start before July 23, 2023.    Patient not taking: Reported on 9/11/2023   cloNIDine (CATAPRES) 0.2 mg tablet   Yes No   Sig: Take 0.2 mg by mouth every 12 (twelve) hours   ergocalciferol (VITAMIN D2) 50,000 units Yes No   Sig: Take 50,000 Units by mouth once a week   fluticasone (FLONASE) 50 mcg/act nasal spray   No No   Si spray into each nostril daily   fluticasone-umeclidinium-vilanterol (Trelegy Ellipta) 100-62.5-25 mcg/actuation inhaler   No No   Sig: Inhale 1 puff daily Rinse mouth after use. glipiZIDE (GLUCOTROL) 10 mg tablet  Self Yes No   Sig: Take 10 mg by mouth daily in the early morning W breakfast   ipratropium-albuterol (DUO-NEB) 0.5-2.5 mg/3 mL nebulizer solution   No No   Sig: Take 3 mL by nebulization 3 (three) times a day   levofloxacin (LEVAQUIN) 500 mg tablet   No No   Sig: Take 1 tablet (500 mg total) by mouth daily for 7 days   lisinopril (ZESTRIL) 40 mg tablet   Yes No   Sig: Take 40 mg by mouth daily   methylPREDNISolone 4 MG tablet therapy pack   No No   Sig: Use as directed on package   metoprolol tartrate (LOPRESSOR) 100 mg tablet   Yes No   Sig: Take 100 mg by mouth every 12 (twelve) hours   omeprazole (PriLOSEC OTC) 20 MG tablet   Yes No   Sig: Take 20 mg by mouth daily    pioglitazone (ACTOS) 15 mg tablet   Yes No   Sig: Take 15 mg by mouth daily   predniSONE 10 mg tablet   No No   Sig: Take 4 tabs daily for thee days then take three tabs daily for three days then take two tablets daily for three days then take one tab daily for three days. sodium chloride 1 g tablet   No No   Sig: Take 2 tablets (2 g total) by mouth 3 (three) times a day with meals      Facility-Administered Medications: None       Past Medical History:   Diagnosis Date    Anxiety     COPD (chronic obstructive pulmonary disease) ([address] [facility])     Diabetes mellitus ([address] [facility])     Essential (primary) hypertension     GERD (gastroesophageal reflux disease)     Smoker        History reviewed. No pertinent surgical history. Family History   Problem Relation Age of Onset    Diabetes Father      I have reviewed and agree with the history as documented.     E-Cigarette/Vaping    E-Cigarette Use Never User      E-Cigarette/Vaping Substances    Nicotine No     THC No     CBD No     Flavoring No      Social History     Tobacco Use    Smoking status: Every Day     Packs/day: 0.50     Types: Cigarettes    Smokeless tobacco: Never   Vaping Use    Vaping Use: Never used   Substance Use Topics    Alcohol use: Not Currently    Drug use: Never     Review of Systems   Constitutional:  Negative for activity change, appetite change, chills, fatigue and fever. HENT:  Negative for congestion, rhinorrhea, sinus pressure, sinus pain and sore throat. Respiratory:  Positive for cough and shortness of breath. Negative for chest tightness and wheezing. Cardiovascular:  Negative for chest pain, palpitations and leg swelling. Gastrointestinal:  Negative for abdominal pain, nausea and vomiting. Musculoskeletal:  Negative for arthralgias and myalgias. Skin:  Negative for color change, pallor, rash and wound. Psychiatric/Behavioral:  The patient is nervous/anxious. All other systems reviewed and are negative. Physical Exam  Physical Exam  Vitals and nursing note reviewed. Constitutional:       General: She is not in acute distress. Appearance: She is not ill-appearing or toxic-appearing. Comments: On nasal cannula. HENT:      Head: Normocephalic and atraumatic. Eyes:      Extraocular Movements: Extraocular movements intact. Pupils: Pupils are equal, round, and reactive to light. Cardiovascular:      Rate and Rhythm: Normal rate and regular rhythm. Pulses: Normal pulses. Heart sounds: No murmur heard. Pulmonary:      Effort: Pulmonary effort is normal. No tachypnea or respiratory distress. Breath sounds: No stridor. Decreased breath sounds present. No wheezing, rhonchi or rales. Chest:      Chest wall: No tenderness. Abdominal:      General: Bowel sounds are normal.      Palpations: Abdomen is soft. Tenderness: There is no abdominal tenderness. There is no guarding or rebound.    Musculoskeletal: Right lower leg: No tenderness. No edema. Left lower leg: No tenderness. No edema. Skin:     General: Skin is warm and dry. Capillary Refill: Capillary refill takes less than 2 seconds. Coloration: Skin is not cyanotic or pale. Findings: No ecchymosis, erythema or rash. Nails: There is no clubbing. Neurological:      General: No focal deficit present. Mental Status: She is alert and oriented to person, place, and time. Cranial Nerves: No cranial nerve deficit. Motor: No weakness. Psychiatric:         Mood and Affect: Mood normal.         Behavior: Behavior normal.         Vital Signs  ED Triage Vitals [10/13/23 1815]   Temperature Pulse Respirations Blood Pressure SpO2   98 °F (36.7 °C) 98 18 (!) 171/82 98 %      Temp Source Heart Rate Source Patient Position - Orthostatic VS BP Location FiO2 (%)   Tympanic Monitor Sitting Right arm --      Pain Score       No Pain           Vitals:    10/13/23 1815 10/13/23 1845   BP: (!) 171/82 122/81   Pulse: 98 83   Patient Position - Orthostatic VS: Sitting      ED Medications  Medications   ipratropium-albuterol (DUO-NEB) 0.5-2.5 mg/3 mL inhalation solution 3 mL (3 mL Nebulization Given 10/13/23 1849)   predniSONE tablet 40 mg (40 mg Oral Given 10/13/23 1946)       Diagnostic Studies  Results Reviewed       None                   No orders to display          Procedures  Procedures     ED Course  ED Course as of 10/13/23 2148   [facility] Oct 13, 2023   1943 Upon reevaluation, the patient's breath sounds are improved. Subjectively, the patient's breathing is improved. Will administer a dose of prednisone. SBIRT 20yo+      Flowsheet Row Most Recent Value   Initial Alcohol Screen: US AUDIT-C     1. How often do you have a drink containing alcohol? 0 Filed at: 10/13/2023 1815   2. How many drinks containing alcohol do you have on a typical day you are drinking? 0 Filed at: 10/13/2023 1815   3a. Male UNDER 65:  How often do you have five or more drinks on one occasion? 0 Filed at: 10/13/2023 1815   3b. FEMALE Any Age, or MALE 65+: How often do you have 4 or more drinks on one occassion? 0 Filed at: 10/13/2023 1815   Audit-C Score 0 Filed at: 10/13/2023 1815   LIVIER: How many times in the past year have you. .. Used an illegal drug or used a prescription medication for non-medical reasons? Never Filed at: 10/13/2023 1815            Medical Decision Making  The differential diagnoses include but are not limited to COPD exacerbation, asthma exacerbation, PNA, bronchitis  Vital signs reviewed. On baseline oxygen requirement. No signs of respiratory distress. Decreased lung sounds throughout. No wheezing. CXR obtained earlier in the day w/o acute findings. Lung sounds, symptoms improved s/p Duoneb treatment. Prednisone prescribed. First dose administered in the ED. Recently completed a course of Levaquin. Smoking cessation encouraged. Other recommendations/return precautions discussed. All questions addressed. Stable for discharge. Problems Addressed:  COPD exacerbation ([address]): chronic illness or injury with exacerbation, progression, or side effects of treatment    Risk  Prescription drug management. Disposition  Final diagnoses:   COPD exacerbation ([address])     Time reflects when diagnosis was documented in both MDM as applicable and the Disposition within this note       Time User Action Codes Description Comment    10/13/2023  7:44 PM Aston Mccarthy [J44.1] COPD exacerbation [facility])           ED Disposition       ED Disposition   Discharge    Condition   Stable    Date/Time   Fri Oct 13, 2023 1944    Comment   Jessika REID Viaalena discharge to home/self care. Follow-up Information    None         Discharge Medication List as of 10/13/2023  7:45 PM        START taking these medications    Details   ! ! predniSONE 20 mg tablet Take 2 tablets (40 mg total) by mouth daily for 4 days, Starting Fri 10/13/2023, Until Tue 10/17/2023, Normal       !! - Potential duplicate medications found. Please discuss with provider.         CONTINUE these medications which have NOT CHANGED    Details   albuterol (2.5 mg/3 mL) 0.083 % nebulizer solution Take 3 mL (2.5 mg total) by nebulization every 6 (six) hours as needed for wheezing or shortness of breath, Starting Sun 8/6/2023, Normal      amLODIPine (NORVASC) 10 mg tablet Take 10 mg by mouth daily at bedtime , Historical Med      atorvastatin (LIPITOR) 40 mg tablet Take 40 mg by mouth daily at bedtime , Historical Med      buPROPion (WELLBUTRIN SR) 150 mg 12 hr tablet Take 150 mg by mouth every morning, Starting Tue 8/15/2023, Historical Med      buPROPion (WELLBUTRIN XL) 150 mg 24 hr tablet Take 1 tablet (150 mg total) by mouth daily Do not start before July 23, 2023., Starting Sun 7/23/2023, Normal      cloNIDine (CATAPRES) 0.2 mg tablet Take 0.2 mg by mouth every 12 (twelve) hours, Historical Med      Ergocalciferol (VITAMIN D2 PO) Take by mouth, Historical Med      ergocalciferol (VITAMIN D2) 50,000 units Take 50,000 Units by mouth once a week, Historical Med      fluticasone (FLONASE) 50 mcg/act nasal spray 1 spray into each nostril daily, Starting Wed 6/28/2023, Normal      fluticasone-umeclidinium-vilanterol (Trelegy Ellipta) 100-62.5-25 mcg/actuation inhaler Inhale 1 puff daily Rinse mouth after use., Starting Mon 9/11/2023, Until Wed 10/11/2023, Normal      glipiZIDE (GLUCOTROL) 10 mg tablet Take 10 mg by mouth daily in the early morning W breakfast, Historical Med      ipratropium-albuterol (DUO-NEB) 0.5-2.5 mg/3 mL nebulizer solution Take 3 mL by nebulization 3 (three) times a day, Starting Wed 9/27/2023, Normal      lisinopril (ZESTRIL) 40 mg tablet Take 40 mg by mouth daily, Historical Med      LORazepam (Ativan) 0.5 mg tablet Take 1 tablet (0.5 mg total) by mouth 3 (three) times a day as needed for anxiety or sleep for up to 15 doses, Starting Wed 9/13/2023, Normal methylPREDNISolone 4 MG tablet therapy pack Use as directed on package, Normal      metoprolol tartrate (LOPRESSOR) 100 mg tablet Take 100 mg by mouth every 12 (twelve) hours, Historical Med      Nebulizers (Comp Air Compressor Nebulizer) MISC As directed, Starting Fri 7/28/2023, Historical Med      omeprazole (PriLOSEC OTC) 20 MG tablet Take 20 mg by mouth daily , Historical Med      pioglitazone (ACTOS) 15 mg tablet Take 15 mg by mouth daily, Historical Med      !! predniSONE 10 mg tablet Take 4 tabs daily for thee days then take three tabs daily for three days then take two tablets daily for three days then take one tab daily for three days. , Normal      sodium chloride 1 g tablet Take 2 tablets (2 g total) by mouth 3 (three) times a day with meals, Starting Sun 8/6/2023, Normal       !! - Potential duplicate medications found. Please discuss with provider. STOP taking these medications       levofloxacin (LEVAQUIN) 500 mg tablet Comments:   Reason for Stopping:               No discharge procedures on file.     PDMP Review         Value Time User    PDMP Reviewed  Yes 9/13/2023  1:19 AM Barrett Reynoso DO            ED Provider  Electronically Signed by             Amisha Singh DO  10/13/23 2649

## 2023-10-13 NOTE — DISCHARGE INSTRUCTIONS
You are being prescribed a course of prednisone. Please take as directed. Stop smoking. Continue all prescribed medications, including your inhaler/nebulizer. Return to the emergency department for any concerning signs or symptoms.

## 2023-10-13 NOTE — ED NOTES
Pt ambulated to the bathroom without assistance and tolerated well. Pt back in bed with call bell in reach and resting comfortably. Daughter at bedside. Denies any complaints at this time.      Lauren Miranda RN  10/13/23 0568

## 2023-10-13 NOTE — ED PROVIDER NOTES
History  Chief Complaint   Patient presents with    Shortness of Breath     Patient states woke up from sleep and had a hard time catching her breath. Patient wears 3 liters O2 at home     80-year-old female returns emergency department accompanied by daughter describes waking this morning just prior to arrival breathing at baseline, comfortably on 3 L nasal cannula, checked her vital signs as usually and noticed oxygen saturation 98%, heart rate was in the 60s which is usually higher and she became anxious and then panicked, contacted her daughter with whom she resides and requested emergency department evaluation, notes she is currently feeling well and symptoms have resolved upon arriving. No chest pain. No hemoptysis. No fever or chills. She is not currently on steroids. States she is compliant with her breathing treatments using albuterol twice daily and DuoNebs 3 times daily via nebulizer. She notes she is smoking 8 to 10 cigarettes daily without any difficulty. History provided by:  Patient and relative  Shortness of Breath  Severity:  Severe  Onset quality:  Sudden  Duration:  2 hours  Timing:  Constant  Progression:  Resolved  Relieved by:  Nothing  Worsened by:  Nothing  Ineffective treatments:  Oxygen (Nebulized treatment)  Associated symptoms: no abdominal pain, no chest pain, no fever, no hemoptysis and no sputum production    Risk factors: tobacco use    Risk factors: no hx of PE/DVT        Prior to Admission Medications   Prescriptions Last Dose Informant Patient Reported? Taking?    Ergocalciferol (VITAMIN D2 PO)   Yes No   Sig: Take by mouth   LORazepam (Ativan) 0.5 mg tablet   No No   Sig: Take 1 tablet (0.5 mg total) by mouth 3 (three) times a day as needed for anxiety or sleep for up to 15 doses   Nebulizers (Comp Air Compressor Nebulizer) MISC   Yes No   Sig: As directed   albuterol (2.5 mg/3 mL) 0.083 % nebulizer solution   No No   Sig: Take 3 mL (2.5 mg total) by nebulization every 6 (six) hours as needed for wheezing or shortness of breath   amLODIPine (NORVASC) 10 mg tablet   Yes No   Sig: Take 10 mg by mouth daily at bedtime    atorvastatin (LIPITOR) 40 mg tablet   Yes No   Sig: Take 40 mg by mouth daily at bedtime    buPROPion (WELLBUTRIN SR) 150 mg 12 hr tablet   Yes No   Sig: Take 150 mg by mouth every morning   Patient not taking: Reported on 2023   buPROPion (WELLBUTRIN XL) 150 mg 24 hr tablet   No No   Sig: Take 1 tablet (150 mg total) by mouth daily Do not start before 2023. Patient not taking: Reported on 2023   cloNIDine (CATAPRES) 0.2 mg tablet   Yes No   Sig: Take 0.2 mg by mouth every 12 (twelve) hours   ergocalciferol (VITAMIN D2) 50,000 units   Yes No   Sig: Take 50,000 Units by mouth once a week   fluticasone (FLONASE) 50 mcg/act nasal spray   No No   Si spray into each nostril daily   fluticasone-umeclidinium-vilanterol (Trelegy Ellipta) 100-62.5-25 mcg/actuation inhaler   No No   Sig: Inhale 1 puff daily Rinse mouth after use.    glipiZIDE (GLUCOTROL) 10 mg tablet  Self Yes No   Sig: Take 10 mg by mouth daily in the early morning W breakfast   ipratropium-albuterol (DUO-NEB) 0.5-2.5 mg/3 mL nebulizer solution   No No   Sig: Take 3 mL by nebulization 3 (three) times a day   levofloxacin (LEVAQUIN) 500 mg tablet   No No   Sig: Take 1 tablet (500 mg total) by mouth daily for 7 days   lisinopril (ZESTRIL) 40 mg tablet   Yes No   Sig: Take 40 mg by mouth daily   methylPREDNISolone 4 MG tablet therapy pack   No No   Sig: Use as directed on package   metoprolol tartrate (LOPRESSOR) 100 mg tablet   Yes No   Sig: Take 100 mg by mouth every 12 (twelve) hours   omeprazole (PriLOSEC OTC) 20 MG tablet   Yes No   Sig: Take 20 mg by mouth daily    pioglitazone (ACTOS) 15 mg tablet   Yes No   Sig: Take 15 mg by mouth daily   predniSONE 10 mg tablet   No No   Sig: Take 4 tabs daily for thee days then take three tabs daily for three days then take two tablets daily for three days then take one tab daily for three days. sodium chloride 1 g tablet   No No   Sig: Take 2 tablets (2 g total) by mouth 3 (three) times a day with meals      Facility-Administered Medications: None       Past Medical History:   Diagnosis Date    Anxiety     COPD (chronic obstructive pulmonary disease) ([address] [facility])     Diabetes mellitus ([address] [facility])     Essential (primary) hypertension     GERD (gastroesophageal reflux disease)     Smoker        History reviewed. No pertinent surgical history. Family History   Problem Relation Age of Onset    Diabetes Father      I have reviewed and agree with the history as documented. E-Cigarette/Vaping    E-Cigarette Use Never User      E-Cigarette/Vaping Substances    Nicotine No     THC No     CBD No     Flavoring No      Social History     Tobacco Use    Smoking status: Every Day     Packs/day: 0.50     Types: Cigarettes    Smokeless tobacco: Never   Vaping Use    Vaping Use: Never used   Substance Use Topics    Alcohol use: Not Currently    Drug use: Never       Review of Systems   Constitutional:  Negative for fever. Respiratory:  Positive for shortness of breath. Negative for hemoptysis and sputum production. Cardiovascular:  Negative for chest pain. Gastrointestinal:  Negative for abdominal pain. All other systems reviewed and are negative. Physical Exam  Physical Exam  Vitals and nursing note reviewed. Constitutional:       Comments: Pleasant, comfortable-appearing   HENT:      Head: Normocephalic and atraumatic. Mouth/Throat:      Mouth: Mucous membranes are moist.      Pharynx: Oropharynx is clear. Eyes:      Conjunctiva/sclera: Conjunctivae normal.      Pupils: Pupils are equal, round, and reactive to light. Cardiovascular:      Rate and Rhythm: Normal rate and regular rhythm. Heart sounds: Normal heart sounds. Pulmonary:      Effort: Pulmonary effort is normal.      Breath sounds: Decreased breath sounds present.  No wheezing, rhonchi or rales. Abdominal:      General: Bowel sounds are normal. There is no distension. Palpations: Abdomen is soft. Tenderness: There is no abdominal tenderness. Musculoskeletal:         General: No deformity. Cervical back: Neck supple. Right lower leg: No edema. Left lower leg: No edema. Skin:     General: Skin is warm and dry. Neurological:      General: No focal deficit present. Mental Status: She is alert and oriented to person, place, and time. Cranial Nerves: No cranial nerve deficit. Coordination: Coordination normal.   Psychiatric:         Behavior: Behavior normal.         Thought Content:  Thought content normal.         Judgment: Judgment normal.         Vital Signs  ED Triage Vitals [10/13/23 0520]   Temperature Pulse Respirations Blood Pressure SpO2   97.9 °F (36.6 °C) 94 (!) 24 (!) 216/97 (!) 89 %      Temp Source Heart Rate Source Patient Position - Orthostatic VS BP Location FiO2 (%)   Temporal Monitor Sitting Right arm --      Pain Score       No Pain           Vitals:    10/13/23 0520 10/13/23 0530 10/13/23 0615   BP: (!) 216/97 140/70 119/73   Pulse: 94 75 71   Patient Position - Orthostatic VS: Sitting Sitting          Visual Acuity      ED Medications  Medications   ipratropium-albuterol (DUO-NEB) 0.5-2.5 mg/3 mL inhalation solution 3 mL (3 mL Nebulization Given 10/13/23 0625)       Diagnostic Studies  Results Reviewed       Procedure Component Value Units Date/Time    B-Type Natriuretic Peptide(BNP) [[id number]]  (Normal) Collected: 10/13/23 0620    Lab Status: Final result Specimen: Blood from Arm, Right Updated: 10/13/23 0655     BNP 32 pg/mL     HS Troponin I 2hr [[id number]]     Lab Status: No result Specimen: Blood     HS Troponin 0hr (reflex protocol) [[id number]]  (Normal) Collected: 10/13/23 0620    Lab Status: Final result Specimen: Blood from Arm, Right Updated: 10/13/23 0653     hs TnI 0hr 7 ng/L     Comprehensive metabolic panel [[id number]] (Abnormal) Collected: 10/13/23 0620    Lab Status: Final result Specimen: Blood from Arm, Right Updated: 10/13/23 0646     Sodium 137 mmol/L      Potassium 3.9 mmol/L      Chloride 100 mmol/L      CO2 32 mmol/L      ANION GAP 5 mmol/L      BUN 9 mg/dL      Creatinine 0.69 mg/dL      Glucose 205 mg/dL      Calcium 9.4 mg/dL      AST 12 U/L      ALT 12 U/L      Alkaline Phosphatase 85 U/L      Total Protein 6.8 g/dL      Albumin 4.1 g/dL      Total Bilirubin 0.34 mg/dL      eGFR 91 ml/min/1.73sq m     Narrative:      National Kid[employer] Foundation guidelines for Chronic Kidney Disease (CKD):     Stage 1 with normal or high GFR (GFR > 90 mL/min/1.73 square meters)    Stage 2 Mild CKD (GFR = 60-89 mL/min/1.73 square meters)    Stage 3A Moderate CKD (GFR = 45-59 mL/min/1.73 square meters)    Stage 3B Moderate CKD (GFR = 30-44 mL/min/1.73 square meters)    Stage 4 Severe CKD (GFR = 15-29 mL/min/1.73 square meters)    Stage 5 End Stage CKD (GFR <15 mL/min/1.73 square meters)  Note: GFR calculation is accurate only with a steady state creatinine    APTT [[id number]]  (Normal) Collected: 10/13/23 0620    Lab Status: Final result Specimen: Blood from Arm, Right Updated: 10/13/23 0642     PTT 28 seconds     Protime-INR [[id number]]  (Abnormal) Collected: 10/13/23 0620    Lab Status: Final result Specimen: Blood from Arm, Right Updated: 10/13/23 0642     Protime 11.5 seconds      INR 0.81    CBC and differential [[id number]] Collected: 10/13/23 2503    Lab Status: Final result Specimen: Blood from Arm, Right Updated: 10/13/23 0627     WBC 9.03 Thousand/uL      RBC 4.47 Million/uL      Hemoglobin 13.3 g/dL      Hematocrit 41.3 %      MCV 92 fL      MCH 29.8 pg      MCHC 32.2 g/dL      RDW 12.8 %      MPV 10.8 fL      Platelets 147 Thousands/uL      nRBC 0 /100 WBCs      Neutrophils Relative 61 %      Immat GRANS % 1 %      Lymphocytes Relative 28 %      Monocytes Relative 9 %      Eosinophils Relative 1 %      Basophils Relative 0 %      Neutrophils Absolute 5.48 Thousands/µL      Immature Grans Absolute 0.05 Thousand/uL      Lymphocytes Absolute 2.48 Thousands/µL      Monocytes Absolute 0.85 Thousand/µL      Eosinophils Absolute 0.13 Thousand/µL      Basophils Absolute 0.04 Thousands/µL                    XR chest 1 view portable    (Results Pending)              Procedures  Procedures         ED Course  ED Course as of 10/13/23 0707   Fri Oct 13, 2023   9232 Daughter notes patient currently being treated for anxiety and typical of typical panic attack, Ativan was not working recently and discontinued by primary clinician.   5542 XR chest 1 view portable  Questionably blunted right costophrenic angle, no obvious infiltrate or cardiomegaly or pneumothorax   0656 BNP: 32   0656 hs TnI 0hr: 7   0706 Remains feeling well, discussed results with patient and daughter, likely anxiety related, concomitant COPD issues, agrees to return immediately if worse or recurs, did not try hydroxyzine and will refill. Also aware chest x-ray radiology reading pending. Daughter bedside and supportive                               SBIRT 22yo+      Flowsheet Row Most Recent Value   Initial Alcohol Screen: US AUDIT-C     1. How often do you have a drink containing alcohol? 0 Filed at: 10/13/2023 0525   2. How many drinks containing alcohol do you have on a typical day you are drinking? 0 Filed at: 10/13/2023 0525   3b. FEMALE Any Age, or MALE 65+: How often do you have 4 or more drinks on one occassion? 0 Filed at: 10/13/2023 0525   Audit-C Score 0 Filed at: 10/13/2023 7098   LIVIER: How many times in the past year have you. .. Used an illegal drug or used a prescription medication for non-medical reasons? Never Filed at: 10/13/2023 1335                      Medical Decision Making  Amount and/or Complexity of Data Reviewed  Independent Historian: caregiver  External Data Reviewed: notes. Labs: ordered.  Decision-making details documented in ED Course. Radiology: ordered and independent interpretation performed. Decision-making details documented in ED Course. ECG/medicine tests: ordered and independent interpretation performed. Decision-making details documented in ED Course. Risk  Prescription drug management. Disposition  Final diagnoses:   Dyspnea - resolved   Anxiety     Time reflects when diagnosis was documented in both MDM as applicable and the Disposition within this note       Time User Action Codes Description Comment    10/13/2023  6:57 AM [place] Add [R06.00] Dyspnea     10/13/2023  6:57 AM [place] Modify [R06.00] Dyspnea resolved    10/13/2023  6:57 AM [place] Add [F41.9] Anxiety           ED Disposition       ED Disposition   Discharge    Condition   Stable    Date/Time   Fri Oct 13, 2023 9307    Comment   Jessika REID Viaalena discharge to home/self care. Follow-up Information       Follow up With Specialties Details Why Contact Info    Zita Luz DO Family Medicine Schedule an appointment as soon as possible for a visit in 3 days  [address]  [phone]              Patient's Medications   Discharge Prescriptions    No medications on file       No discharge procedures on file.     PDMP Review         Value Time User    PDMP Reviewed  Yes 9/13/2023  1:19 AM Wlilie Webber DO            ED Provider  Electronically Signed by             Liang Stein DO  10/13/23 5690

## 2023-10-14 LAB
ATRIAL RATE: 81 BPM
P AXIS: 63 DEGREES
PR INTERVAL: 94 MS
QRS AXIS: 47 DEGREES
QRSD INTERVAL: 78 MS
QT INTERVAL: 370 MS
QTC INTERVAL: 429 MS
T WAVE AXIS: 63 DEGREES
VENTRICULAR RATE: 81 BPM

## 2023-10-18 ENCOUNTER — HOSPITAL ENCOUNTER (EMERGENCY)
Facility: HOSPITAL | Age: 68
Discharge: HOME | DRG: 191 | End: 2023-10-18
Attending: EMERGENCY MEDICINE
Payer: COMMERCIAL

## 2023-10-18 ENCOUNTER — APPOINTMENT (EMERGENCY)
Dept: RADIOLOGY | Facility: HOSPITAL | Age: 68
DRG: 191 | End: 2023-10-18
Payer: COMMERCIAL

## 2023-10-18 VITALS
BODY MASS INDEX: 29.74 KG/M2 | HEART RATE: 76 BPM | DIASTOLIC BLOOD PRESSURE: 86 MMHG | HEIGHT: 62 IN | TEMPERATURE: 97.3 F | RESPIRATION RATE: 18 BRPM | OXYGEN SATURATION: 96 % | WEIGHT: 161.6 LBS | SYSTOLIC BLOOD PRESSURE: 192 MMHG

## 2023-10-18 DIAGNOSIS — J44.1 COPD EXACERBATION (HCC): Primary | ICD-10-CM

## 2023-10-18 LAB
ALBUMIN SERPL BCP-MCNC: 4.2 G/DL (ref 3.5–5)
ALP SERPL-CCNC: 72 U/L (ref 34–104)
ALT SERPL W P-5'-P-CCNC: 9 U/L (ref 7–52)
ANION GAP SERPL CALCULATED.3IONS-SCNC: 6 MMOL/L
AST SERPL W P-5'-P-CCNC: 10 U/L (ref 13–39)
BASOPHILS # BLD AUTO: 0.02 THOUSANDS/ÂΜL (ref 0–0.1)
BASOPHILS NFR BLD AUTO: 0 % (ref 0–1)
BILIRUB SERPL-MCNC: 0.37 MG/DL (ref 0.2–1)
BNP SERPL-MCNC: 93 PG/ML (ref 0–100)
BUN SERPL-MCNC: 7 MG/DL (ref 5–25)
CALCIUM SERPL-MCNC: 9.8 MG/DL (ref 8.4–10.2)
CARDIAC TROPONIN I PNL SERPL HS: 11 NG/L
CHLORIDE SERPL-SCNC: 95 MMOL/L (ref 96–108)
CO2 SERPL-SCNC: 36 MMOL/L (ref 21–32)
CREAT SERPL-MCNC: 0.68 MG/DL (ref 0.6–1.3)
EOSINOPHIL # BLD AUTO: 0.03 THOUSAND/ÂΜL (ref 0–0.61)
EOSINOPHIL NFR BLD AUTO: 0 % (ref 0–6)
ERYTHROCYTE [DISTWIDTH] IN BLOOD BY AUTOMATED COUNT: 12.8 % (ref 11.6–15.1)
FLUAV RNA RESP QL NAA+PROBE: NEGATIVE
FLUBV RNA RESP QL NAA+PROBE: NEGATIVE
GFR SERPL CREATININE-BSD FRML MDRD: 91 ML/MIN/1.73SQ M
GLUCOSE SERPL-MCNC: 171 MG/DL (ref 65–140)
HCT VFR BLD AUTO: 39.4 % (ref 34.8–46.1)
HGB BLD-MCNC: 12.6 G/DL (ref 11.5–15.4)
IMM GRANULOCYTES # BLD AUTO: 0.06 THOUSAND/UL (ref 0–0.2)
IMM GRANULOCYTES NFR BLD AUTO: 1 % (ref 0–2)
LYMPHOCYTES # BLD AUTO: 1.91 THOUSANDS/ÂΜL (ref 0.6–4.47)
LYMPHOCYTES NFR BLD AUTO: 20 % (ref 14–44)
MCH RBC QN AUTO: 29.6 PG (ref 26.8–34.3)
MCHC RBC AUTO-ENTMCNC: 32 G/DL (ref 31.4–37.4)
MCV RBC AUTO: 93 FL (ref 82–98)
MONOCYTES # BLD AUTO: 0.75 THOUSAND/ÂΜL (ref 0.17–1.22)
MONOCYTES NFR BLD AUTO: 8 % (ref 4–12)
NEUTROPHILS # BLD AUTO: 6.62 THOUSANDS/ÂΜL (ref 1.85–7.62)
NEUTS SEG NFR BLD AUTO: 71 % (ref 43–75)
NRBC BLD AUTO-RTO: 0 /100 WBCS
PLATELET # BLD AUTO: 218 THOUSANDS/UL (ref 149–390)
PMV BLD AUTO: 10.1 FL (ref 8.9–12.7)
POTASSIUM SERPL-SCNC: 3.6 MMOL/L (ref 3.5–5.3)
PROT SERPL-MCNC: 7 G/DL (ref 6.4–8.4)
RBC # BLD AUTO: 4.26 MILLION/UL (ref 3.81–5.12)
RSV RNA RESP QL NAA+PROBE: NEGATIVE
SARS-COV-2 RNA RESP QL NAA+PROBE: NEGATIVE
SODIUM SERPL-SCNC: 137 MMOL/L (ref 135–147)
WBC # BLD AUTO: 9.39 THOUSAND/UL (ref 4.31–10.16)

## 2023-10-18 PROCEDURE — 80053 COMPREHEN METABOLIC PANEL: CPT | Performed by: EMERGENCY MEDICINE

## 2023-10-18 PROCEDURE — 99285 EMERGENCY DEPT VISIT HI MDM: CPT

## 2023-10-18 PROCEDURE — 96374 THER/PROPH/DIAG INJ IV PUSH: CPT

## 2023-10-18 PROCEDURE — 71046 X-RAY EXAM CHEST 2 VIEWS: CPT

## 2023-10-18 PROCEDURE — 94640 AIRWAY INHALATION TREATMENT: CPT

## 2023-10-18 PROCEDURE — 99285 EMERGENCY DEPT VISIT HI MDM: CPT | Performed by: EMERGENCY MEDICINE

## 2023-10-18 PROCEDURE — 87040 BLOOD CULTURE FOR BACTERIA: CPT | Performed by: EMERGENCY MEDICINE

## 2023-10-18 PROCEDURE — 36415 COLL VENOUS BLD VENIPUNCTURE: CPT | Performed by: EMERGENCY MEDICINE

## 2023-10-18 PROCEDURE — 83880 ASSAY OF NATRIURETIC PEPTIDE: CPT | Performed by: EMERGENCY MEDICINE

## 2023-10-18 PROCEDURE — 84484 ASSAY OF TROPONIN QUANT: CPT | Performed by: EMERGENCY MEDICINE

## 2023-10-18 PROCEDURE — 85025 COMPLETE CBC W/AUTO DIFF WBC: CPT | Performed by: EMERGENCY MEDICINE

## 2023-10-18 PROCEDURE — 0241U HB NFCT DS VIR RESP RNA 4 TRGT: CPT | Performed by: EMERGENCY MEDICINE

## 2023-10-18 PROCEDURE — 93005 ELECTROCARDIOGRAM TRACING: CPT

## 2023-10-18 RX ORDER — METHYLPREDNISOLONE SODIUM SUCCINATE 125 MG/2ML
60 INJECTION, POWDER, LYOPHILIZED, FOR SOLUTION INTRAMUSCULAR; INTRAVENOUS ONCE
Status: COMPLETED | OUTPATIENT
Start: 2023-10-18 | End: 2023-10-18

## 2023-10-18 RX ORDER — IPRATROPIUM BROMIDE AND ALBUTEROL SULFATE 2.5; .5 MG/3ML; MG/3ML
3 SOLUTION RESPIRATORY (INHALATION) ONCE
Status: COMPLETED | OUTPATIENT
Start: 2023-10-18 | End: 2023-10-18

## 2023-10-18 RX ADMIN — IPRATROPIUM BROMIDE AND ALBUTEROL SULFATE 3 ML: 2.5; .5 SOLUTION RESPIRATORY (INHALATION) at 16:48

## 2023-10-18 RX ADMIN — METHYLPREDNISOLONE SODIUM SUCCINATE 60 MG: 125 INJECTION, POWDER, FOR SOLUTION INTRAMUSCULAR; INTRAVENOUS at 16:48

## 2023-10-18 NOTE — ED PROVIDER NOTES
History  Chief Complaint   Patient presents with    Shortness of Breath     Pt c/o anxiety and  increased sob w/productive cough (brownish phleghm) starting this morning. Hx copd-wears 3L NC daily. Denies travel/fevers/n/v/d     Patient with history of COPD, normally on 3 L nasal cannula, complains of 1 day of shortness of breath and difficulty breathing, not sure if it is related to her history of anxiety. Denies fevers or chills or chest pain. Denies abdominal pain or nausea or vomiting. History provided by:  Patient   used: No    Shortness of Breath  Severity:  Moderate  Onset quality:  Gradual  Duration:  1 day  Timing:  Constant  Progression:  Unchanged  Chronicity:  New  Relieved by:  Nothing  Worsened by:  Nothing  Ineffective treatments:  None tried  Associated symptoms: cough, sputum production and wheezing    Associated symptoms: no abdominal pain, no chest pain, no ear pain, no fever, no headaches, no hemoptysis, no neck pain, no rash, no sore throat, no syncope and no vomiting        Prior to Admission Medications   Prescriptions Last Dose Informant Patient Reported? Taking?    Ergocalciferol (VITAMIN D2 PO)   Yes No   Sig: Take by mouth   LORazepam (Ativan) 0.5 mg tablet   No No   Sig: Take 1 tablet (0.5 mg total) by mouth 3 (three) times a day as needed for anxiety or sleep for up to 15 doses   Nebulizers (Comp Air Compressor Nebulizer) MISC   Yes No   Sig: As directed   albuterol (2.5 mg/3 mL) 0.083 % nebulizer solution   No No   Sig: Take 3 mL (2.5 mg total) by nebulization every 6 (six) hours as needed for wheezing or shortness of breath   amLODIPine (NORVASC) 10 mg tablet   Yes No   Sig: Take 10 mg by mouth daily at bedtime    atorvastatin (LIPITOR) 40 mg tablet   Yes No   Sig: Take 40 mg by mouth daily at bedtime    buPROPion (WELLBUTRIN SR) 150 mg 12 hr tablet   Yes No   Sig: Take 150 mg by mouth every morning   Patient not taking: Reported on 9/13/2023   buPROPion (WELLBUTRIN XL) 150 mg 24 hr tablet   No No   Sig: Take 1 tablet (150 mg total) by mouth daily Do not start before 2023. Patient not taking: Reported on 2023   cloNIDine (CATAPRES) 0.2 mg tablet   Yes No   Sig: Take 0.2 mg by mouth every 12 (twelve) hours   ergocalciferol (VITAMIN D2) 50,000 units   Yes No   Sig: Take 50,000 Units by mouth once a week   fluticasone (FLONASE) 50 mcg/act nasal spray   No No   Si spray into each nostril daily   fluticasone-umeclidinium-vilanterol (Trelegy Ellipta) 100-62.5-25 mcg/actuation inhaler   No No   Sig: Inhale 1 puff daily Rinse mouth after use. glipiZIDE (GLUCOTROL) 10 mg tablet  Self Yes No   Sig: Take 10 mg by mouth daily in the early morning W breakfast   ipratropium-albuterol (DUO-NEB) 0.5-2.5 mg/3 mL nebulizer solution   No No   Sig: Take 3 mL by nebulization 3 (three) times a day   lisinopril (ZESTRIL) 40 mg tablet   Yes No   Sig: Take 40 mg by mouth daily   methylPREDNISolone 4 MG tablet therapy pack   No No   Sig: Use as directed on package   metoprolol tartrate (LOPRESSOR) 100 mg tablet   Yes No   Sig: Take 100 mg by mouth every 12 (twelve) hours   omeprazole (PriLOSEC OTC) 20 MG tablet   Yes No   Sig: Take 20 mg by mouth daily    pioglitazone (ACTOS) 15 mg tablet   Yes No   Sig: Take 15 mg by mouth daily   predniSONE 10 mg tablet   No No   Sig: Take 4 tabs daily for thee days then take three tabs daily for three days then take two tablets daily for three days then take one tab daily for three days.    predniSONE 20 mg tablet   No No   Sig: Take 2 tablets (40 mg total) by mouth daily for 4 days   sodium chloride 1 g tablet   No No   Sig: Take 2 tablets (2 g total) by mouth 3 (three) times a day with meals      Facility-Administered Medications: None       Past Medical History:   Diagnosis Date    Anxiety     COPD (chronic obstructive pulmonary disease) ([address])     Diabetes mellitus ([address] )     Essential (primary) hypertension     GERD (gastroesophageal reflux disease)     Smoker        History reviewed. No pertinent surgical history. Family History   Problem Relation Age of Onset    Diabetes Father      I have reviewed and agree with the history as documented. E-Cigarette/Vaping    E-Cigarette Use Never User      E-Cigarette/Vaping Substances    Nicotine No     THC No     CBD No     Flavoring No      Social History     Tobacco Use    Smoking status: Every Day     Packs/day: 0.50     Types: Cigarettes    Smokeless tobacco: Never   Vaping Use    Vaping Use: Never used   Substance Use Topics    Alcohol use: Not Currently    Drug use: Never       Review of Systems   Constitutional:  Negative for chills and fever. HENT:  Negative for ear pain, hearing loss, sore throat, trouble swallowing and voice change. Eyes:  Negative for pain and discharge. Respiratory:  Positive for cough, sputum production, shortness of breath and wheezing. Negative for hemoptysis. Cardiovascular:  Negative for chest pain, palpitations and syncope. Gastrointestinal:  Negative for abdominal pain, blood in stool, constipation, diarrhea, nausea and vomiting. Genitourinary:  Negative for dysuria, flank pain, frequency and hematuria. Musculoskeletal:  Negative for joint swelling, neck pain and neck stiffness. Skin:  Negative for rash and wound. Neurological:  Negative for dizziness, seizures, syncope, facial asymmetry and headaches. Psychiatric/Behavioral:  Negative for hallucinations, self-injury and suicidal ideas. All other systems reviewed and are negative. Physical Exam  Physical Exam  Vitals and nursing note reviewed. Constitutional:       General: She is not in acute distress. Appearance: She is well-developed. HENT:      Head: Normocephalic and atraumatic. Right Ear: External ear normal.      Left Ear: External ear normal.   Eyes:      General: No scleral icterus. Right eye: No discharge. Left eye: No discharge. Extraocular Movements: Extraocular movements intact. Conjunctiva/sclera: Conjunctivae normal.   Cardiovascular:      Rate and Rhythm: Normal rate and regular rhythm. Heart sounds: Normal heart sounds. No murmur heard. Pulmonary:      Effort: Pulmonary effort is normal.      Breath sounds: Examination of the right-upper field reveals wheezing. Examination of the left-upper field reveals wheezing. Examination of the right-middle field reveals decreased breath sounds and wheezing. Examination of the left-middle field reveals decreased breath sounds and wheezing. Examination of the right-lower field reveals decreased breath sounds and wheezing. Examination of the left-lower field reveals decreased breath sounds and wheezing. Decreased breath sounds and wheezing present. No rhonchi or rales. Abdominal:      General: Bowel sounds are normal. There is no distension. Palpations: Abdomen is soft. Tenderness: There is no abdominal tenderness. There is no guarding or rebound. Musculoskeletal:         General: No deformity. Normal range of motion. Cervical back: Normal range of motion and neck supple. Skin:     General: Skin is warm and dry. Findings: No rash. Neurological:      General: No focal deficit present. Mental Status: She is alert and oriented to person, place, and time. Cranial Nerves: No cranial nerve deficit. Psychiatric:         Mood and Affect: Mood normal.         Behavior: Behavior normal.         Thought Content:  Thought content normal.         Judgment: Judgment normal.         Vital Signs  ED Triage Vitals [10/18/23 1604]   Temperature Pulse Respirations Blood Pressure SpO2   (!) 97.3 °F (36.3 °C) 76 18 (!) 192/86 96 %      Temp Source Heart Rate Source Patient Position - Orthostatic VS BP Location FiO2 (%)   Tympanic Monitor Lying Left arm --      Pain Score       No Pain           Vitals:    10/18/23 1604   BP: (!) 192/86   Pulse: 76   Patient Position - Orthostatic VS: Lying         Visual Acuity      ED Medications  Medications   ipratropium-albuterol (DUO-NEB) 0.5-2.5 mg/3 mL inhalation solution 3 mL (3 mL Nebulization Given 10/18/23 1648)   methylPREDNISolone sodium succinate (Solu-MEDROL) injection 60 mg (60 mg Intravenous Given 10/18/23 1648)       Diagnostic Studies  Results Reviewed       Procedure Component Value Units Date/Time    HS Troponin I 2hr [[id number]]     Lab Status: No result Specimen: Blood     HS Troponin 0hr (reflex protocol) [[id number]]  (Normal) Collected: 10/18/23 1615    Lab Status: Final result Specimen: Blood from Arm, Right Updated: 10/18/23 1649     hs TnI 0hr 11 ng/L     B-Type Natriuretic Peptide(BNP) [[id number]]  (Normal) Collected: 10/18/23 1615    Lab Status: Final result Specimen: Blood from Arm, Right Updated: 10/18/23 1647     BNP 93 pg/mL     [facility] [[id number]] Collected: 10/18/23 1615    Lab Status: In process Specimen: Nares from Nasopharyngeal Swab Updated: 10/18/23 1645    Blood culture #1 [[id number]] Collected: 10/18/23 1643    Lab Status:  In process Specimen: Blood from Arm, Left Updated: 10/18/23 1645    Comprehensive metabolic panel [[id number]]  (Abnormal) Collected: 10/18/23 1615    Lab Status: Final result Specimen: Blood from Arm, Right Updated: 10/18/23 1641     Sodium 137 mmol/L      Potassium 3.6 mmol/L      Chloride 95 mmol/L      CO2 36 mmol/L      ANION GAP 6 mmol/L      BUN 7 mg/dL      Creatinine 0.68 mg/dL      Glucose 171 mg/dL      Calcium 9.8 mg/dL      AST 10 U/L      ALT 9 U/L      Alkaline Phosphatase 72 U/L      Total Protein 7.0 g/dL      Albumin 4.2 g/dL      Total Bilirubin 0.37 mg/dL      eGFR 91 ml/min/1.73sq m     Narrative:      [facility] ([facility]D):     Stage 1 with normal or high GFR (GFR > 90 mL/min/1.73 square meters)    Stage 2 Mild CKD (GFR = 60-89 mL/min/1.73 square meters)    Stage 3A Moderate CKD (GFR = 45-59 mL/min/1.73 square meters)    Stage 3B Moderate CKD (GFR = 30-44 mL/min/1.73 square meters)    Stage 4 Severe CKD (GFR = 15-29 mL/min/1.73 square meters)    Stage 5 End Stage CKD (GFR <15 mL/min/1.73 square meters)  Note: GFR calculation is accurate only with a steady state creatinine    CBC and differential [[id number]] Collected: 10/18/23 1615    Lab Status: Final result Specimen: Blood from Arm, Right Updated: 10/18/23 1621     WBC 9.39 Thousand/uL      RBC 4.26 Million/uL      Hemoglobin 12.6 g/dL      Hematocrit 39.4 %      MCV 93 fL      MCH 29.6 pg      MCHC 32.0 g/dL      RDW 12.8 %      MPV 10.1 fL      Platelets 414 Thousands/uL      nRBC 0 /100 WBCs      Neutrophils Relative 71 %      Immat GRANS % 1 %      Lymphocytes Relative 20 %      Monocytes Relative 8 %      Eosinophils Relative 0 %      Basophils Relative 0 %      Neutrophils Absolute 6.62 Thousands/µL      Immature Grans Absolute 0.06 Thousand/uL      Lymphocytes Absolute 1.91 Thousands/µL      Monocytes Absolute 0.75 Thousand/µL      Eosinophils Absolute 0.03 Thousand/µL      Basophils Absolute 0.02 Thousands/µL     Blood culture #2 [[id number]] Collected: 10/18/23 1615    Lab Status:  In process Specimen: Blood from Arm, Right Updated: 10/18/23 1619                   XR chest pa & lateral   ED Interpretation by Evonne Dueñas MD (10/18 1703)   No infiltrates                 Procedures  ECG 12 Lead Documentation Only    Date/Time: 10/18/2023 4:19 PM    Performed by: Evonne Dueñas MD  Authorized by: Evonne Dueñas MD    ECG reviewed by me, the ED Provider: yes    Patient location:  ED  Previous ECG:     Previous ECG:  Unavailable  Interpretation:     Interpretation: non-specific    Rate:     ECG rate:  67    ECG rate assessment: normal    Rhythm:     Rhythm: sinus rhythm    Ectopy:     Ectopy: PVCs    QRS:     QRS axis:  Normal    QRS intervals:  Normal  Conduction:     Conduction: normal    ST segments:     ST segments:  Normal  T waves:     T waves: normal             ED Course  ED Course as of 10/18/23 1712   Wed Oct 18, 2023   1703 Patient reports significant improvement after nebulization and IV steroid. Remains hemodynamically stable without hypoxia. Appropriate for discharge at this time. Patient is already on prednisone taper, will not prescribe additional steroid. SBIRT 22yo+      Flowsheet Row Most Recent Value   Initial Alcohol Screen: US AUDIT-C     1. How often do you have a drink containing alcohol? 0 Filed at: 10/18/2023 1634   2. How many drinks containing alcohol do you have on a typical day you are drinking? 0 Filed at: 10/18/2023 1634   3b. FEMALE Any Age, or MALE 65+: How often do you have 4 or more drinks on one occassion? 0 Filed at: 10/18/2023 1633   Audit-C Score 0 Filed at: 10/18/2023 1634   LIVIER: How many times in the past year have you. .. Used an illegal drug or used a prescription medication for non-medical reasons? Never Filed at: 10/18/2023 1634                      Medical Decision Making  Based on the history and medical screening exam performed the diagnostic considerations include but are not limited to asthma, COPD, pneumonia, COVID, anxiety. Based on the work-up performed in the emergency room which includes physical examination, and which may include laboratory studies and imaging as warranted including advanced imaging such as CT scan or ultrasound, the differential diagnosis is narrowed to exclude limb or life-threatening process. The patient is stable for discharge. Lab work benign. Chest x-ray negative. Improvement after steroid and nebulization. Appropriate for discharge    Amount and/or Complexity of Data Reviewed  Labs: ordered. Decision-making details documented in ED Course. Details: Negative  Radiology: ordered and independent interpretation performed. Decision-making details documented in ED Course.      Details: Chest x-ray negative  ECG/medicine tests: ordered and independent interpretation performed. Decision-making details documented in ED Course. Details: Normal sinus rhythm rate 67 with occasional PVC. Risk  Prescription drug management. Disposition  Final diagnoses:   COPD exacerbation ([address])     Time reflects when diagnosis was documented in both MDM as applicable and the Disposition within this note       Time User Action Codes Description Comment    10/18/2023  5:09 PM Rolan Wilde Add [J44.1] COPD exacerbation [facility])           ED Disposition       ED Disposition   Discharge    Condition   Stable    Date/Time   Wed Oct 18, 2023 1709    Comment   Jessika Lux discharge to home/self care. Follow-up Information       Follow up With Specialties Details Why Contact Info    Zita Luz DO Family Medicine   [address] [place] [address]  [phone]              Patient's Medications   Discharge Prescriptions    No medications on file       No discharge procedures on file.     PDMP Review         Value Time User    PDMP Reviewed  Yes 9/13/2023  1:19 AM Willie Webber DO            ED Provider  Electronically Signed by             Rolan Wilde MD  10/18/23 9706

## 2023-10-19 LAB
ATRIAL RATE: 67 BPM
P AXIS: 70 DEGREES
PR INTERVAL: 122 MS
QRS AXIS: 44 DEGREES
QRSD INTERVAL: 78 MS
QT INTERVAL: 396 MS
QTC INTERVAL: 418 MS
T WAVE AXIS: 45 DEGREES
VENTRICULAR RATE: 67 BPM

## 2023-10-21 ENCOUNTER — APPOINTMENT (INPATIENT)
Dept: CT IMAGING | Facility: HOSPITAL | Age: 68
DRG: 191 | End: 2023-10-21
Payer: COMMERCIAL

## 2023-10-21 ENCOUNTER — HOSPITAL ENCOUNTER (INPATIENT)
Facility: HOSPITAL | Age: 68
LOS: 2 days | Discharge: HOME | DRG: 191 | End: 2023-10-23
Attending: EMERGENCY MEDICINE | Admitting: FAMILY MEDICINE
Payer: COMMERCIAL

## 2023-10-21 ENCOUNTER — APPOINTMENT (EMERGENCY)
Dept: RADIOLOGY | Facility: HOSPITAL | Age: 68
DRG: 191 | End: 2023-10-21
Payer: COMMERCIAL

## 2023-10-21 DIAGNOSIS — Z72.0 TOBACCO ABUSE: ICD-10-CM

## 2023-10-21 DIAGNOSIS — J44.1 COPD EXACERBATION (HCC): Primary | ICD-10-CM

## 2023-10-21 LAB
ALBUMIN SERPL BCP-MCNC: 4.3 G/DL (ref 3.5–5)
ALP SERPL-CCNC: 78 U/L (ref 34–104)
ALT SERPL W P-5'-P-CCNC: 8 U/L (ref 7–52)
ANION GAP SERPL CALCULATED.3IONS-SCNC: 8 MMOL/L
APTT PPP: 26 SECONDS (ref 23–37)
AST SERPL W P-5'-P-CCNC: 9 U/L (ref 13–39)
BASOPHILS # BLD AUTO: 0.03 THOUSANDS/ÂΜL (ref 0–0.1)
BASOPHILS NFR BLD AUTO: 0 % (ref 0–1)
BILIRUB SERPL-MCNC: 0.71 MG/DL (ref 0.2–1)
BNP SERPL-MCNC: 19 PG/ML (ref 0–100)
BUN SERPL-MCNC: 10 MG/DL (ref 5–25)
CALCIUM SERPL-MCNC: 9.8 MG/DL (ref 8.4–10.2)
CARDIAC TROPONIN I PNL SERPL HS: 6 NG/L
CHLORIDE SERPL-SCNC: 96 MMOL/L (ref 96–108)
CO2 SERPL-SCNC: 34 MMOL/L (ref 21–32)
CREAT SERPL-MCNC: 0.73 MG/DL (ref 0.6–1.3)
D DIMER PPP FEU-MCNC: 0.36 UG/ML FEU
EOSINOPHIL # BLD AUTO: 0.04 THOUSAND/ÂΜL (ref 0–0.61)
EOSINOPHIL NFR BLD AUTO: 0 % (ref 0–6)
ERYTHROCYTE [DISTWIDTH] IN BLOOD BY AUTOMATED COUNT: 12.5 % (ref 11.6–15.1)
GFR SERPL CREATININE-BSD FRML MDRD: 86 ML/MIN/1.73SQ M
GLUCOSE SERPL-MCNC: 175 MG/DL (ref 65–140)
GLUCOSE SERPL-MCNC: 204 MG/DL (ref 65–140)
GLUCOSE SERPL-MCNC: 318 MG/DL (ref 65–140)
HCT VFR BLD AUTO: 41.4 % (ref 34.8–46.1)
HGB BLD-MCNC: 13.4 G/DL (ref 11.5–15.4)
IMM GRANULOCYTES # BLD AUTO: 0.04 THOUSAND/UL (ref 0–0.2)
IMM GRANULOCYTES NFR BLD AUTO: 0 % (ref 0–2)
INR PPP: 0.82 (ref 0.84–1.19)
LYMPHOCYTES # BLD AUTO: 1.92 THOUSANDS/ÂΜL (ref 0.6–4.47)
LYMPHOCYTES NFR BLD AUTO: 17 % (ref 14–44)
MCH RBC QN AUTO: 29.6 PG (ref 26.8–34.3)
MCHC RBC AUTO-ENTMCNC: 32.4 G/DL (ref 31.4–37.4)
MCV RBC AUTO: 91 FL (ref 82–98)
MONOCYTES # BLD AUTO: 0.94 THOUSAND/ÂΜL (ref 0.17–1.22)
MONOCYTES NFR BLD AUTO: 9 % (ref 4–12)
NEUTROPHILS # BLD AUTO: 8.07 THOUSANDS/ÂΜL (ref 1.85–7.62)
NEUTS SEG NFR BLD AUTO: 74 % (ref 43–75)
NRBC BLD AUTO-RTO: 0 /100 WBCS
PLATELET # BLD AUTO: 238 THOUSANDS/UL (ref 149–390)
PMV BLD AUTO: 10.2 FL (ref 8.9–12.7)
POTASSIUM SERPL-SCNC: 3.6 MMOL/L (ref 3.5–5.3)
PROCALCITONIN SERPL-MCNC: <0.05 NG/ML
PROT SERPL-MCNC: 7.2 G/DL (ref 6.4–8.4)
PROTHROMBIN TIME: 11.6 SECONDS (ref 11.6–14.5)
RBC # BLD AUTO: 4.53 MILLION/UL (ref 3.81–5.12)
SODIUM SERPL-SCNC: 138 MMOL/L (ref 135–147)
WBC # BLD AUTO: 11.04 THOUSAND/UL (ref 4.31–10.16)

## 2023-10-21 PROCEDURE — 36415 COLL VENOUS BLD VENIPUNCTURE: CPT | Performed by: EMERGENCY MEDICINE

## 2023-10-21 PROCEDURE — 94760 N-INVAS EAR/PLS OXIMETRY 1: CPT

## 2023-10-21 PROCEDURE — 99285 EMERGENCY DEPT VISIT HI MDM: CPT | Performed by: EMERGENCY MEDICINE

## 2023-10-21 PROCEDURE — 71275 CT ANGIOGRAPHY CHEST: CPT

## 2023-10-21 PROCEDURE — 80053 COMPREHEN METABOLIC PANEL: CPT | Performed by: EMERGENCY MEDICINE

## 2023-10-21 PROCEDURE — 85730 THROMBOPLASTIN TIME PARTIAL: CPT | Performed by: EMERGENCY MEDICINE

## 2023-10-21 PROCEDURE — 99285 EMERGENCY DEPT VISIT HI MDM: CPT

## 2023-10-21 PROCEDURE — 85610 PROTHROMBIN TIME: CPT | Performed by: EMERGENCY MEDICINE

## 2023-10-21 PROCEDURE — 82948 REAGENT STRIP/BLOOD GLUCOSE: CPT

## 2023-10-21 PROCEDURE — 71045 X-RAY EXAM CHEST 1 VIEW: CPT

## 2023-10-21 PROCEDURE — 84484 ASSAY OF TROPONIN QUANT: CPT | Performed by: EMERGENCY MEDICINE

## 2023-10-21 PROCEDURE — 96374 THER/PROPH/DIAG INJ IV PUSH: CPT

## 2023-10-21 PROCEDURE — 94664 DEMO&/EVAL PT USE INHALER: CPT

## 2023-10-21 PROCEDURE — 85025 COMPLETE CBC W/AUTO DIFF WBC: CPT | Performed by: EMERGENCY MEDICINE

## 2023-10-21 PROCEDURE — 93005 ELECTROCARDIOGRAM TRACING: CPT

## 2023-10-21 PROCEDURE — G1004 CDSM NDSC: HCPCS

## 2023-10-21 PROCEDURE — 83880 ASSAY OF NATRIURETIC PEPTIDE: CPT | Performed by: EMERGENCY MEDICINE

## 2023-10-21 PROCEDURE — 99223 1ST HOSP IP/OBS HIGH 75: CPT

## 2023-10-21 PROCEDURE — 94640 AIRWAY INHALATION TREATMENT: CPT

## 2023-10-21 PROCEDURE — 84145 PROCALCITONIN (PCT): CPT

## 2023-10-21 PROCEDURE — 85379 FIBRIN DEGRADATION QUANT: CPT | Performed by: EMERGENCY MEDICINE

## 2023-10-21 RX ORDER — AZITHROMYCIN 250 MG/1
500 TABLET, FILM COATED ORAL EVERY 24 HOURS
Status: COMPLETED | OUTPATIENT
Start: 2023-10-21 | End: 2023-10-23

## 2023-10-21 RX ORDER — HEPARIN SODIUM 5000 [USP'U]/ML
5000 INJECTION, SOLUTION INTRAVENOUS; SUBCUTANEOUS EVERY 8 HOURS SCHEDULED
Status: DISCONTINUED | OUTPATIENT
Start: 2023-10-21 | End: 2023-10-23 | Stop reason: HOSPADM

## 2023-10-21 RX ORDER — LEVALBUTEROL INHALATION SOLUTION 1.25 MG/3ML
1.25 SOLUTION RESPIRATORY (INHALATION)
Status: DISCONTINUED | OUTPATIENT
Start: 2023-10-21 | End: 2023-10-22

## 2023-10-21 RX ORDER — ALBUTEROL SULFATE 2.5 MG/3ML
2.5 SOLUTION RESPIRATORY (INHALATION) ONCE
Status: COMPLETED | OUTPATIENT
Start: 2023-10-21 | End: 2023-10-21

## 2023-10-21 RX ORDER — MAGNESIUM HYDROXIDE/ALUMINUM HYDROXICE/SIMETHICONE 120; 1200; 1200 MG/30ML; MG/30ML; MG/30ML
30 SUSPENSION ORAL EVERY 6 HOURS PRN
Status: DISCONTINUED | OUTPATIENT
Start: 2023-10-21 | End: 2023-10-23 | Stop reason: HOSPADM

## 2023-10-21 RX ORDER — INSULIN LISPRO 100 [IU]/ML
1-5 INJECTION, SOLUTION INTRAVENOUS; SUBCUTANEOUS
Status: DISCONTINUED | OUTPATIENT
Start: 2023-10-21 | End: 2023-10-23 | Stop reason: HOSPADM

## 2023-10-21 RX ORDER — DOCUSATE SODIUM 100 MG/1
100 CAPSULE, LIQUID FILLED ORAL 2 TIMES DAILY
Status: DISCONTINUED | OUTPATIENT
Start: 2023-10-21 | End: 2023-10-23 | Stop reason: HOSPADM

## 2023-10-21 RX ORDER — GUAIFENESIN 600 MG/1
600 TABLET, EXTENDED RELEASE ORAL EVERY 12 HOURS SCHEDULED
Status: DISCONTINUED | OUTPATIENT
Start: 2023-10-21 | End: 2023-10-23 | Stop reason: HOSPADM

## 2023-10-21 RX ORDER — IPRATROPIUM BROMIDE AND ALBUTEROL SULFATE 2.5; .5 MG/3ML; MG/3ML
3 SOLUTION RESPIRATORY (INHALATION) ONCE
Status: COMPLETED | OUTPATIENT
Start: 2023-10-21 | End: 2023-10-21

## 2023-10-21 RX ORDER — PREDNISONE 20 MG/1
40 TABLET ORAL DAILY
Status: DISCONTINUED | OUTPATIENT
Start: 2023-10-22 | End: 2023-10-23 | Stop reason: HOSPADM

## 2023-10-21 RX ORDER — HYDROXYZINE HYDROCHLORIDE 10 MG/1
10 TABLET, FILM COATED ORAL EVERY 6 HOURS PRN
COMMUNITY

## 2023-10-21 RX ORDER — ACETAMINOPHEN 325 MG/1
650 TABLET ORAL EVERY 6 HOURS PRN
Status: DISCONTINUED | OUTPATIENT
Start: 2023-10-21 | End: 2023-10-23 | Stop reason: HOSPADM

## 2023-10-21 RX ORDER — SODIUM CHLORIDE FOR INHALATION 0.9 %
3 VIAL, NEBULIZER (ML) INHALATION
Status: DISCONTINUED | OUTPATIENT
Start: 2023-10-21 | End: 2023-10-21

## 2023-10-21 RX ORDER — SERTRALINE HYDROCHLORIDE 25 MG/1
25 TABLET, FILM COATED ORAL DAILY
COMMUNITY

## 2023-10-21 RX ORDER — DEXAMETHASONE SODIUM PHOSPHATE 10 MG/ML
10 INJECTION, SOLUTION INTRAMUSCULAR; INTRAVENOUS ONCE
Status: COMPLETED | OUTPATIENT
Start: 2023-10-21 | End: 2023-10-21

## 2023-10-21 RX ADMIN — IOHEXOL 100 ML: 350 INJECTION, SOLUTION INTRAVENOUS at 16:44

## 2023-10-21 RX ADMIN — LEVALBUTEROL HYDROCHLORIDE 1.25 MG: 1.25 SOLUTION RESPIRATORY (INHALATION) at 13:51

## 2023-10-21 RX ADMIN — DEXAMETHASONE SODIUM PHOSPHATE 10 MG: 10 INJECTION, SOLUTION INTRAMUSCULAR; INTRAVENOUS at 09:00

## 2023-10-21 RX ADMIN — AZITHROMYCIN 500 MG: 250 TABLET, FILM COATED ORAL at 13:04

## 2023-10-21 RX ADMIN — IPRATROPIUM BROMIDE AND ALBUTEROL SULFATE 3 ML: 2.5; .5 SOLUTION RESPIRATORY (INHALATION) at 08:59

## 2023-10-21 RX ADMIN — ALBUTEROL SULFATE 2.5 MG: 2.5 SOLUTION RESPIRATORY (INHALATION) at 10:46

## 2023-10-21 RX ADMIN — IPRATROPIUM BROMIDE 0.5 MG: 0.5 SOLUTION RESPIRATORY (INHALATION) at 19:25

## 2023-10-21 RX ADMIN — INSULIN LISPRO 1 UNITS: 100 INJECTION, SOLUTION INTRAVENOUS; SUBCUTANEOUS at 21:21

## 2023-10-21 RX ADMIN — DOCUSATE SODIUM 100 MG: 100 CAPSULE, LIQUID FILLED ORAL at 13:04

## 2023-10-21 RX ADMIN — INSULIN LISPRO 3 UNITS: 100 INJECTION, SOLUTION INTRAVENOUS; SUBCUTANEOUS at 17:10

## 2023-10-21 RX ADMIN — GUAIFENESIN 600 MG: 600 TABLET ORAL at 13:04

## 2023-10-21 RX ADMIN — HEPARIN SODIUM 5000 UNITS: 5000 INJECTION INTRAVENOUS; SUBCUTANEOUS at 13:05

## 2023-10-21 RX ADMIN — HEPARIN SODIUM 5000 UNITS: 5000 INJECTION INTRAVENOUS; SUBCUTANEOUS at 21:21

## 2023-10-21 RX ADMIN — LEVALBUTEROL HYDROCHLORIDE 1.25 MG: 1.25 SOLUTION RESPIRATORY (INHALATION) at 19:25

## 2023-10-21 RX ADMIN — IPRATROPIUM BROMIDE 0.5 MG: 0.5 SOLUTION RESPIRATORY (INHALATION) at 13:51

## 2023-10-21 RX ADMIN — GUAIFENESIN 600 MG: 600 TABLET ORAL at 21:21

## 2023-10-21 NOTE — ASSESSMENT & PLAN NOTE
Continue home blood pressure medication regimen-amlodipine 10 mg daily at bedtime, clonidine 0.2 mg twice daily, lisinopril 40 mg daily and Lopressor 100 mg twice daily

## 2023-10-21 NOTE — ASSESSMENT & PLAN NOTE
With increased anxiety over her ongoing health and in general  Seen by PCP on 10/2 and started on Zoloft 25 mg a.m.   Continue Atarax  Anxiety contributes to shortness of breath symptoms

## 2023-10-21 NOTE — ED NOTES
Patient stating at this time that IV " is hurting really bad. Someone has to check it " this RN flushing IV per request. No infiltration noted; line flush with no difficulty; blood returned noted. New dressing applied. Family leaving at this time as needing to go to work. Call if patient is d/c.      Jessica Ley RN  10/21/23 1919

## 2023-10-21 NOTE — ED PROVIDER NOTES
History  Chief Complaint   Patient presents with    Shortness of Breath     Pt. Has hx of copd and started with increased sob this morning, wears  O2 @ 2.5-3 L cont, pt. Used duoneb and albuterol early this morning, denies chest pain     16-year-old female accompanied by daughter describes subacute onset worsening dyspnea, wheezing since completing steroids yesterday, was able to smoke few cigarettes yesterday. No hemoptysis. No fever. No chest pain. No other complaints      History provided by:  Patient  Shortness of Breath  Severity:  Severe  Onset quality:  Gradual  Duration:  2 days  Timing:  Constant  Progression:  Worsening  Chronicity:  Chronic  Context: activity    Worsened by: Activity  Associated symptoms: no abdominal pain, no chest pain and no fever    Risk factors: tobacco use    Risk factors: no hx of PE/DVT        Prior to Admission Medications   Prescriptions Last Dose Informant Patient Reported? Taking? Ergocalciferol (VITAMIN D2 PO)   Yes No   Sig: Take by mouth   LORazepam (Ativan) 0.5 mg tablet   No No   Sig: Take 1 tablet (0.5 mg total) by mouth 3 (three) times a day as needed for anxiety or sleep for up to 15 doses   Nebulizers (Comp Air Compressor Nebulizer) MISC   Yes No   Sig: As directed   albuterol (2.5 mg/3 mL) 0.083 % nebulizer solution   No No   Sig: Take 3 mL (2.5 mg total) by nebulization every 6 (six) hours as needed for wheezing or shortness of breath   amLODIPine (NORVASC) 10 mg tablet   Yes No   Sig: Take 10 mg by mouth daily at bedtime    atorvastatin (LIPITOR) 40 mg tablet   Yes No   Sig: Take 40 mg by mouth daily at bedtime    buPROPion (WELLBUTRIN SR) 150 mg 12 hr tablet   Yes No   Sig: Take 150 mg by mouth every morning   Patient not taking: Reported on 9/13/2023   buPROPion (WELLBUTRIN XL) 150 mg 24 hr tablet   No No   Sig: Take 1 tablet (150 mg total) by mouth daily Do not start before July 23, 2023.    Patient not taking: Reported on 9/11/2023   cloNIDine (CATAPRES) 0.2 mg tablet   Yes No   Sig: Take 0.2 mg by mouth every 12 (twelve) hours   ergocalciferol (VITAMIN D2) 50,000 units   Yes No   Sig: Take 50,000 Units by mouth once a week   fluticasone (FLONASE) 50 mcg/act nasal spray   No No   Si spray into each nostril daily   fluticasone-umeclidinium-vilanterol (Trelegy Ellipta) 100-62.5-25 mcg/actuation inhaler   No No   Sig: Inhale 1 puff daily Rinse mouth after use. glipiZIDE (GLUCOTROL) 10 mg tablet  Self Yes No   Sig: Take 10 mg by mouth daily in the early morning W breakfast   ipratropium-albuterol (DUO-NEB) 0.5-2.5 mg/3 mL nebulizer solution   No No   Sig: Take 3 mL by nebulization 3 (three) times a day   lisinopril (ZESTRIL) 40 mg tablet   Yes No   Sig: Take 40 mg by mouth daily   methylPREDNISolone 4 MG tablet therapy pack   No No   Sig: Use as directed on package   metoprolol tartrate (LOPRESSOR) 100 mg tablet   Yes No   Sig: Take 100 mg by mouth every 12 (twelve) hours   omeprazole (PriLOSEC OTC) 20 MG tablet   Yes No   Sig: Take 20 mg by mouth daily    pioglitazone (ACTOS) 15 mg tablet   Yes No   Sig: Take 15 mg by mouth daily   predniSONE 10 mg tablet   No No   Sig: Take 4 tabs daily for thee days then take three tabs daily for three days then take two tablets daily for three days then take one tab daily for three days. sodium chloride 1 g tablet   No No   Sig: Take 2 tablets (2 g total) by mouth 3 (three) times a day with meals      Facility-Administered Medications: None       Past Medical History:   Diagnosis Date    Anxiety     COPD (chronic obstructive pulmonary disease) ([address])     Diabetes mellitus ([address])     Essential (primary) hypertension     GERD (gastroesophageal reflux disease)     Smoker        History reviewed. No pertinent surgical history. Family History   Problem Relation Age of Onset    Diabetes Father      I have reviewed and agree with the history as documented.     E-Cigarette/Vaping    E-Cigarette Use Never User      E-Cigarette/Vaping Substances    Nicotine No     THC No     CBD No     Flavoring No      Social History     Tobacco Use    Smoking status: Every Day     Packs/day: 0.50     Types: Cigarettes     Passive exposure: Never    Smokeless tobacco: Never   Vaping Use    Vaping Use: Never used   Substance Use Topics    Alcohol use: Not Currently    Drug use: Never       Review of Systems   Constitutional:  Negative for fever. Respiratory:  Positive for shortness of breath. Cardiovascular:  Negative for chest pain. Gastrointestinal:  Negative for abdominal pain. All other systems reviewed and are negative. Physical Exam  Physical Exam  Vitals and nursing note reviewed. Constitutional:       Comments: Pleasant, uncomfortable-appearing, tachypneic   HENT:      Head: Normocephalic and atraumatic. Mouth/Throat:      Mouth: Mucous membranes are moist.      Pharynx: Oropharynx is clear. Eyes:      Conjunctiva/sclera: Conjunctivae normal.      Pupils: Pupils are equal, round, and reactive to light. Cardiovascular:      Rate and Rhythm: Normal rate and regular rhythm. Heart sounds: Normal heart sounds. Pulmonary:      Effort: Pulmonary effort is normal.      Breath sounds: Decreased breath sounds present. No rales. Abdominal:      General: Bowel sounds are normal. There is no distension. Palpations: Abdomen is soft. Tenderness: There is no abdominal tenderness. Musculoskeletal:         General: No deformity. Cervical back: Neck supple. Right lower leg: No edema. Left lower leg: No edema. Skin:     General: Skin is warm and dry. Neurological:      General: No focal deficit present. Mental Status: She is alert and oriented to person, place, and time. Cranial Nerves: No cranial nerve deficit. Coordination: Coordination normal.   Psychiatric:         Behavior: Behavior normal.         Thought Content:  Thought content normal.         Judgment: Judgment normal.         Vital Signs  ED Triage Vitals [10/21/23 0845]   Temperature Pulse Respirations Blood Pressure SpO2   98 °F (36.7 °C) 101 20 (!) 178/84 94 %      Temp Source Heart Rate Source Patient Position - Orthostatic VS BP Location FiO2 (%)   Temporal Monitor Sitting Left arm --      Pain Score       No Pain           Vitals:    10/21/23 0845 10/21/23 1048 10/21/23 1100   BP: (!) 178/84 (!) 185/78 170/74   Pulse: 101 71 74   Patient Position - Orthostatic VS: Sitting Sitting Sitting         Visual Acuity      ED Medications  Medications   ipratropium-albuterol (DUO-NEB) 0.5-2.5 mg/3 mL inhalation solution 3 mL (3 mL Nebulization Given 10/21/23 0859)   dexamethasone (PF) (DECADRON) injection 10 mg (10 mg Intravenous Given 10/21/23 0900)   albuterol inhalation solution 2.5 mg (2.5 mg Nebulization Given 10/21/23 1046)       Diagnostic Studies  Results Reviewed       Procedure Component Value Units Date/Time    D-dimer, quantitative [[id number]]  (Normal) Collected: 10/21/23 0856    Lab Status: Final result Specimen: Blood from Arm, Right Updated: 10/21/23 1041     D-Dimer, Quant 0.36 ug/ml FEU     Narrative: In the evaluation for possible pulmonary embolism, in the appropriate (Well's Score of 4 or less) patient, the age adjusted d-dimer cutoff for this patient can be calculated as:    Age x 0.01 (in ug/mL) for Age-adjusted D-dimer exclusion threshold for a patient over 50 years.     B-Type Natriuretic Peptide(BNP) [[id number]]  (Normal) Collected: 10/21/23 0856    Lab Status: Final result Specimen: Blood from Arm, Right Updated: 10/21/23 0940     BNP 19 pg/mL     HS Troponin 0hr (reflex protocol) [[id number]]  (Normal) Collected: 10/21/23 0856    Lab Status: Final result Specimen: Blood from Arm, Right Updated: 10/21/23 0925     hs TnI 0hr 6 ng/L     Comprehensive metabolic panel [[id number]]  (Abnormal) Collected: 10/21/23 0856    Lab Status: Final result Specimen: Blood from Arm, Right Updated: 10/21/23 0925     Sodium 138 mmol/L Potassium 3.6 mmol/L      Chloride 96 mmol/L      CO2 34 mmol/L      ANION GAP 8 mmol/L      BUN 10 mg/dL      Creatinine 0.73 mg/dL      Glucose 175 mg/dL      Calcium 9.8 mg/dL      AST 9 U/L      ALT 8 U/L      Alkaline Phosphatase 78 U/L      Total Protein 7.2 g/dL      Albumin 4.3 g/dL      Total Bilirubin 0.71 mg/dL      eGFR 86 ml/min/1.73sq m     Narrative:      National Kid[employer] Disease Foundation guidelines for Chronic Kidney Disease (CKD):     Stage 1 with normal or high GFR (GFR > 90 mL/min/1.73 square meters)    Stage 2 Mild CKD (GFR = 60-89 mL/min/1.73 square meters)    Stage 3A Moderate CKD (GFR = 45-59 mL/min/1.73 square meters)    Stage 3B Moderate CKD (GFR = 30-44 mL/min/1.73 square meters)    Stage 4 Severe CKD (GFR = 15-29 mL/min/1.73 square meters)    Stage 5 End Stage CKD (GFR <15 mL/min/1.73 square meters)  Note: GFR calculation is accurate only with a steady state creatinine    Protime-INR [[id number]]  (Abnormal) Collected: 10/21/23 0856    Lab Status: Final result Specimen: Blood from Arm, Right Updated: 10/21/23 0925     Protime 11.6 seconds      INR 0.82    APTT [[id number]]  (Normal) Collected: 10/21/23 0856    Lab Status: Final result Specimen: Blood from Arm, Right Updated: 10/21/23 0925     PTT 26 seconds     CBC and differential [[id number]]  (Abnormal) Collected: 10/21/23 0856    Lab Status: Final result Specimen: Blood from Arm, Right Updated: 10/21/23 0903     WBC 11.04 Thousand/uL      RBC 4.53 Million/uL      Hemoglobin 13.4 g/dL      Hematocrit 41.4 %      MCV 91 fL      MCH 29.6 pg      MCHC 32.4 g/dL      RDW 12.5 %      MPV 10.2 fL      Platelets 777 Thousands/uL      nRBC 0 /100 WBCs      Neutrophils Relative 74 %      Immat GRANS % 0 %      Lymphocytes Relative 17 %      Monocytes Relative 9 %      Eosinophils Relative 0 %      Basophils Relative 0 %      Neutrophils Absolute 8.07 Thousands/µL      Immature Grans Absolute 0.04 Thousand/uL      Lymphocytes Absolute 1.92 Thousands/µL      Monocytes Absolute 0.94 Thousand/µL      Eosinophils Absolute 0.04 Thousand/µL      Basophils Absolute 0.03 Thousands/µL                    XR chest portable    (Results Pending)              Procedures  Procedures         ED Course  ED Course as of 10/21/23 1111   Sat Oct 21, 2023   0854 EKG 8:48 AM normal sinus rhythm rate 83 normal axis first-degree AV block no ST elevation or depression interpreted by me   0947 BNP: 19   0947 hs TnI 0hr: 6   0947 eGFR: 86   0947 XR chest portable  No pneumothorax, cardiomegaly or infiltrate   0948 EKG 8:48 AM normal sinus rhythm rate 83 normal axis normal intervals no ST elevation or depression interpreted by me   1040 Patient notes persistent dyspnea, appears comfortable except mild tachypnea, will repeat treatment, daughter requests hospitalization   1053 D-Dimer, Quant: 0.36   1058  Oscar TT                               SBIRT 22yo+      Flowsheet Row Most Recent Value   Initial Alcohol Screen: US AUDIT-C     1. How often do you have a drink containing alcohol? 0 Filed at: 10/21/2023 1045   2. How many drinks containing alcohol do you have on a typical day you are drinking? 0 Filed at: 10/21/2023 1045   3a. Male UNDER 65: How often do you have five or more drinks on one occasion? 0 Filed at: 10/21/2023 1045   3b. FEMALE Any Age, or MALE 65+: How often do you have 4 or more drinks on one occassion? 0 Filed at: 10/21/2023 1045   Audit-C Score 0 Filed at: 10/21/2023 1045   LIVIER: How many times in the past year have you. .. Used an illegal drug or used a prescription medication for non-medical reasons? Never Filed at: 10/21/2023 1045                      Medical Decision Making  Amount and/or Complexity of Data Reviewed  Labs: ordered. Decision-making details documented in ED Course. Radiology: ordered and independent interpretation performed. Decision-making details documented in ED Course.   ECG/medicine tests: ordered and independent interpretation performed. Decision-making details documented in ED Course. Risk  Prescription drug management. Decision regarding hospitalization. Disposition  Final diagnoses:   COPD exacerbation ([address])     Time reflects when diagnosis was documented in both MDM as applicable and the Disposition within this note       Time User Action Codes Description Comment    10/21/2023 11:10 AM Kyle Yoder Add [J44.1] COPD exacerbation [facility])           ED Disposition       ED Disposition   Admit    Condition   Stable    Date/Time   Sat Oct 21, 2023 1110    Comment   Case was discussed with Oscar and the patient's admission status was agreed to be Admission Status: inpatient status to the service of Dr. Soco Cruz. Follow-up Information    None         Patient's Medications   Discharge Prescriptions    No medications on file       No discharge procedures on file.     PDMP Review         Value Time User    PDMP Reviewed  Yes 9/13/2023  1:19 AM Tessa Wagoner DO            ED Provider  Electronically Signed by             Kyle Yoder DO  10/21/23 1111

## 2023-10-21 NOTE — PLAN OF CARE
Problem: Potential for Falls  Goal: Patient will remain free of falls  Description: INTERVENTIONS:  - Educate patient/family on patient safety including physical limitations  - Instruct patient to call for assistance with activity   - Consult OT/PT to assist with strengthening/mobility   - Keep Call bell within reach  - Keep bed low and locked with side rails adjusted as appropriate  - Keep care items and personal belongings within reach  - Initiate and maintain comfort rounds  - Make Fall Risk Sign visible to staff  - Offer Toileting every 2 Hours, in advance of need  - Initiate/Maintain bed/chair alarm  - Obtain necessary fall risk management equipment:   - Apply yellow socks and bracelet for high fall risk patients  - Consider moving patient to room near nurses station  Outcome: Progressing     Problem: PAIN - ADULT  Goal: Verbalizes/displays adequate comfort level or baseline comfort level  Description: Interventions:  - Encourage patient to monitor pain and request assistance  - Assess pain using appropriate pain scale  - Administer analgesics based on type and severity of pain and evaluate response  - Implement non-pharmacological measures as appropriate and evaluate response  - Consider cultural and social influences on pain and pain management  - Notify physician/advanced practitioner if interventions unsuccessful or patient reports new pain  Outcome: Progressing     Problem: INFECTION - ADULT  Goal: Absence or prevention of progression during hospitalization  Description: INTERVENTIONS:  - Assess and monitor for signs and symptoms of infection  - Monitor lab/diagnostic results  - Monitor all insertion sites, i.e. indwelling lines, tubes, and drains  - Monitor endotracheal if appropriate and nasal secretions for changes in amount and color  - [place] appropriate cooling/warming therapies per order  - Administer medications as ordered  - Instruct and encourage patient and family to use good hand hygiene technique  - Identify and instruct in appropriate isolation precautions for identified infection/condition  Outcome: Progressing  Goal: Absence of fever/infection during neutropenic period  Description: INTERVENTIONS:  - Monitor WBC    Outcome: Progressing     Problem: SAFETY ADULT  Goal: Patient will remain free of falls  Description: INTERVENTIONS:  - Educate patient/family on patient safety including physical limitations  - Instruct patient to call for assistance with activity   - Consult OT/PT to assist with strengthening/mobility   - Keep Call bell within reach  - Keep bed low and locked with side rails adjusted as appropriate  - Keep care items and personal belongings within reach  - Initiate and maintain comfort rounds  - Make Fall Risk Sign visible to staff  - Offer Toileting every 2 Hours, in advance of need  - Initiate/Maintain bed/chair alarm  - Obtain necessary fall risk management equipment:   - Apply yellow socks and bracelet for high fall risk patients  - Consider moving patient to room near nurses station  Outcome: Progressing  Goal: Maintain or return to baseline ADL function  Description: INTERVENTIONS:  -  Assess patient's ability to carry out ADLs; assess patient's baseline for ADL function and identify physical deficits which impact ability to perform ADLs (bathing, care of mouth/teeth, toileting, grooming, dressing, etc.)  - Assess/evaluate cause of self-care deficits   - Assess range of motion  - Assess patient's mobility; develop plan if impaired  - Assess patient's need for assistive devices and provide as appropriate  - Encourage maximum independence but intervene and supervise when necessary  - Involve family in performance of ADLs  - Assess for home care needs following discharge   - Consider OT consult to assist with ADL evaluation and planning for discharge  - Provide patient education as appropriate  Outcome: Progressing  Goal: Maintains/Returns to pre admission functional level  Description: INTERVENTIONS:  - Perform BMAT or MOVE assessment daily.   - Set and communicate daily mobility goal to care team and patient/family/caregiver. - Collaborate with rehabilitation services on mobility goals if consulted  - Perform Range of Motion 3 times a day. - Reposition patient every 2 hours. - Dangle patient 3 times a day  - Stand patient 3 times a day  - Ambulate patient 3 times a day  - Out of bed to chair 3 times a day   - Out of bed for meals 3 times a day  - Out of bed for toileting  - Record patient progress and toleration of activity level   Outcome: Progressing     Problem: DISCHARGE PLANNING  Goal: Discharge to home or other facility with appropriate resources  Description: INTERVENTIONS:  - Identify barriers to discharge w/patient and caregiver  - Arrange for needed discharge resources and transportation as appropriate  - Identify discharge learning needs (meds, wound care, etc.)  - Arrange for interpretive services to assist at discharge as needed  - Refer to Case Management Department for coordinating discharge planning if the patient needs post-hospital services based on physician/advanced practitioner order or complex needs related to functional status, cognitive ability, or social support system  Outcome: Progressing     Problem: Knowledge Deficit  Goal: Patient/family/caregiver demonstrates understanding of disease process, treatment plan, medications, and discharge instructions  Description: Complete learning assessment and assess knowledge base.   Interventions:  - Provide teaching at level of understanding  - Provide teaching via preferred learning methods  Outcome: Progressing     Problem: RESPIRATORY - ADULT  Goal: Achieves optimal ventilation and oxygenation  Description: INTERVENTIONS:  - Assess for changes in respiratory status  - Assess for changes in mentation and behavior  - Position to facilitate oxygenation and minimize respiratory effort  - Oxygen administered by appropriate delivery if ordered  - Initiate smoking cessation education as indicated  - Encourage broncho-pulmonary hygiene including cough, deep breathe, Incentive Spirometry  - Assess the need for suctioning and aspirate as needed  - Assess and instruct to report SOB or any respiratory difficulty  - Respiratory Therapy support as indicated  Outcome: Progressing

## 2023-10-21 NOTE — ED NOTES
This RN giving patient breathing treatment at this time. This RN questioned by family if patient will be admitted or go home as visitor stating she needs to go to work. Provider asked regarding update at this time. Patient requesting to be admitted.      Shamir Ibarra RN  10/21/23 [id number]

## 2023-10-21 NOTE — ASSESSMENT & PLAN NOTE
Chronic respiratory failure secondary to COPD  Chronically utilizes 2 to 3 L of oxygen via nasal cannula

## 2023-10-21 NOTE — ASSESSMENT & PLAN NOTE
August 4 had suspicious lesion on chest CT-spiculated nodule  Follows with pulmonology and was scheduled for follow-up chest CT next month  Continue to recommend cigarette cessation

## 2023-10-21 NOTE — ASSESSMENT & PLAN NOTE
Patient presented to ED with slow worsening of shortness of breath, wheezing since completing steroids yesterday -she notes she was able to smoke a few cigarettes yesterday  Outpatient medications: Trelegy and albuterol nebulizer  Baseline oxygen use: 2 to 3 L  She is currently at her baseline oxygen usage  Presents with mild exacerbation -has had frequent ER visits in the last few months  Follows with [facility] pulmonology-was supposed to have PFTs last week     Chest x-ray without infiltrate or acute pulmonary edema/pleural effusion  Sputum cultures ordered  Supplemental oxygen to keep saturations at goal 88-92%, currently on her home oxygen  Respiratory protocol    Just completed an oral steroid taper yesterday, given IV steroids in the ER  Patient not wheezing on exam and will start oral prednisone tomorrow  Azithromycin for exacerbation

## 2023-10-21 NOTE — ASSESSMENT & PLAN NOTE
Lab Results   Component Value Date    HGBA1C 7.0 (H) 07/27/2023       No results for input(s): "POCGLU" in the last 72 hours.     Blood Sugar Average: Last 72 hrs:  Accu-Cheks AC/at bedtime  Hold oral medications  Correctional scale with meals and at bedtime

## 2023-10-21 NOTE — ASSESSMENT & PLAN NOTE
Smokes a few cigarettes daily -4-6  Able to have a few yesterday despite shortness of breath  Nicotine patch offered  Total cessation encouraged

## 2023-10-21 NOTE — H&P
[address] [facility]  H&P  Name: Ellie Kearns 79 y.o. female I MRN: [id number]  Unit/Bed#: -01 I Date of Admission: 10/21/2023   Date of Service: 10/21/2023 I Hospital Day: 0      Assessment/Plan   * COPD exacerbation [facility])  Assessment & Plan  Patient presented to ED with slow worsening of shortness of breath, wheezing since completing steroids yesterday -she notes she was able to smoke a few cigarettes yesterday  Outpatient medications: Trelegy and albuterol nebulizer  Baseline oxygen use: 2 to 3 L  She is currently at her baseline oxygen usage  Presents with mild exacerbation -has had frequent ER visits in the last few months  Follows with [facility] pulmonology-was supposed to have PFTs last week     Chest x-ray without infiltrate or acute pulmonary edema/pleural effusion  Sputum cultures ordered  Supplemental oxygen to keep saturations at goal 88-92%, currently on her home oxygen  Respiratory protocol    Just completed an oral steroid taper yesterday, given IV steroids in the ER  Patient not wheezing on exam and will start oral prednisone tomorrow  Azithromycin for exacerbation      Lung nodule  Assessment & Plan   August 4 had suspicious lesion on chest CT-spiculated nodule  Follows with pulmonology and was scheduled for follow-up chest CT next month  Continue to recommend cigarette cessation    Chronic hypoxemic respiratory failure ([address] [facility])  Assessment & Plan  Chronic respiratory failure secondary to COPD  Chronically utilizes 2 to 3 L of oxygen via nasal cannula    Anxiety about health  Assessment & Plan  With increased anxiety over her ongoing health and in general  Seen by PCP on 10/2 and started on Zoloft 25 mg a.m.   Continue Atarax  Anxiety contributes to shortness of breath symptoms    Type 2 diabetes mellitus without complication, without long-term current use of insulin [facility])  Assessment & Plan  Lab Results   Component Value Date    HGBA1C 7.0 (H) 07/27/2023       No results for input(s): "POCGLU" in the last 72 hours. Blood Sugar Average: Last 72 hrs:  Accu-Cheks AC/at bedtime  Hold oral medications  Correctional scale with meals and at bedtime      Essential hypertension  Assessment & Plan  Continue home blood pressure medication regimen-amlodipine 10 mg daily at bedtime, clonidine 0.2 mg twice daily, lisinopril 40 mg daily and Lopressor 100 mg twice daily    Tobacco abuse  Assessment & Plan  Smokes a few cigarettes daily -4-6  Able to have a few yesterday despite shortness of breath  Nicotine patch offered  Total cessation encouraged         VTE Pharmacologic Prophylaxis:   Moderate Risk (Score 3-4) - Pharmacological DVT Prophylaxis Ordered: heparin. Code Status: Level 1 - Full Code   Discussion with family: Attempted to update  (grand-daughter) via phone. Left voicemail. Anticipated Length of Stay: Patient will be admitted on an inpatient basis with an anticipated length of stay of greater than 2 midnights secondary to COPD exacerbation . Total Time Spent on Date of Encounter in care of patient:  mins. This time was spent on one or more of the following: performing physical exam; counseling and coordination of care; obtaining or reviewing history; documenting in the medical record; reviewing/ordering tests, medications or procedures; communicating with other healthcare professionals and discussing with patient's family/caregivers. Chief Complaint: Shortness of breath x 1 day    History of Present Illness:  Ryan Barnhart is a 79 y.o. female with a PMH of COPD, tobacco use, chronic respiratory failure, hypertension who presents with shortness of breath starting yesterday. She was recently treated for COPD exacerbation as an outpatient and finished a 4-day course of prednisone yesterday. After finishing course started to feel more short of breath. States she has not had a cigarette in the last 4 days, tried yesterday however was only able to take 1 puff. Denies any headache, dizziness, chest pain. No cough, sinus issues or fever/chills. She has not noticed any wheezing at home. Just feels like she cannot catch her breath. Review of Systems:  Review of Systems   Constitutional:  Negative for chills and fever. HENT:  Negative for ear pain and sore throat. Eyes:  Negative for pain and visual disturbance. Respiratory:  Positive for shortness of breath. Negative for cough. Cardiovascular:  Negative for chest pain and palpitations. Gastrointestinal:  Negative for abdominal pain and vomiting. Genitourinary:  Negative for dysuria and hematuria. Musculoskeletal:  Negative for arthralgias and back pain. Skin:  Negative for color change and rash. Neurological:  Negative for seizures and syncope. Psychiatric/Behavioral:  The patient is nervous/anxious. All other systems reviewed and are negative. Past Medical and Surgical History:   Past Medical History:   Diagnosis Date    Anxiety     COPD (chronic obstructive pulmonary disease) ([facility])     Diabetes mellitus ([facility])     Essential (primary) hypertension     GERD (gastroesophageal reflux disease)     Smoker        History reviewed. No pertinent surgical history. Meds/Allergies:  Prior to Admission medications    Medication Sig Start Date End Date Taking?  Authorizing Provider   albuterol (2.5 mg/3 mL) 0.083 % nebulizer solution Take 3 mL (2.5 mg total) by nebulization every 6 (six) hours as needed for wheezing or shortness of breath 8/6/23   Jarad Green PA-C   amLODIPine (NORVASC) 10 mg tablet Take 10 mg by mouth daily at bedtime     Historical Provider, MD   atorvastatin (LIPITOR) 40 mg tablet Take 40 mg by mouth daily at bedtime     Historical Provider, MD   buPROPion [facility] SR) 150 mg 12 hr tablet Take 150 mg by mouth every morning  Patient not taking: Reported on 9/13/2023 8/15/23   Historical Provider, MD   buPROPion (WELLBUTRIN XL) 150 mg 24 hr tablet Take 1 tablet (150 mg total) by mouth daily Do not start before July 23, 2023. Patient not taking: Reported on 9/11/2023 7/23/23   Zaki Reagan PA-C   cloNIDine (CATAPRES) 0.2 mg tablet Take 0.2 mg by mouth every 12 (twelve) hours    Historical Provider, MD   Ergocalciferol (VITAMIN D2 PO) Take by mouth    Historical Provider, MD   ergocalciferol (VITAMIN D2) 50,000 units Take 50,000 Units by mouth once a week    Historical Provider, MD   fluticasone (FLONASE) 50 mcg/act nasal spray 1 spray into each nostril daily 6/28/23   Jodi Bailey MD   fluticasone-umeclidinium-vilanterol (Trelegy Ellipta) 962-25.3-09 mcg/actuation inhaler Inhale 1 puff daily Rinse mouth after use. 9/11/23 10/11/23  AYLIN Ramirez   glipiZIDE (GLUCOTROL) 10 mg tablet Take 10 mg by mouth daily in the early morning W breakfast    Historical Provider, MD   ipratropium-albuterol (DUO-NEB) 0.5-2.5 mg/3 mL nebulizer solution Take 3 mL by nebulization 3 (three) times a day 9/27/23   Shayne Leblanc DO   lisinopril (ZESTRIL) 40 mg tablet Take 40 mg by mouth daily    Historical Provider, MD   LORazepam (Ativan) 0.5 mg tablet Take 1 tablet (0.5 mg total) by mouth 3 (three) times a day as needed for anxiety or sleep for up to 15 doses 9/13/23   Jaja Bautista DO   methylPREDNISolone 4 MG tablet therapy pack Use as directed on package 10/5/23   Arias Tena MD   metoprolol tartrate (LOPRESSOR) 100 mg tablet Take 100 mg by mouth every 12 (twelve) hours    Historical Provider, MD   Nebulizers (Comp Air Compressor Nebulizer) MISC As directed 7/28/23   Historical Provider, MD   omeprazole (PriLOSEC OTC) 20 MG tablet Take 20 mg by mouth daily     Historical Provider, MD   pioglitazone (ACTOS) 15 mg tablet Take 15 mg by mouth daily    Historical Provider, MD   predniSONE 10 mg tablet Take 4 tabs daily for thee days then take three tabs daily for three days then take two tablets daily for three days then take one tab daily for three days.  9/11/23   Pipe Stallings THELMA Carroll MD   sodium chloride 1 g tablet Take 2 tablets (2 g total) by mouth 3 (three) times a day with meals 8/6/23   Vitaly Walker PA-C     I have reviewed home medications with patient personally. Allergies: Allergies   Allergen Reactions    Clindamycin        Social History:  Marital Status:    Occupation: None  Patient Pre-hospital Living Situation: Home  Patient Pre-hospital Level of Mobility: walks  Patient Pre-hospital Diet Restrictions: None  Substance Use History:   Social History     Substance and Sexual Activity   Alcohol Use Not Currently     Social History     Tobacco Use   Smoking Status Every Day    Packs/day: 0.50    Types: Cigarettes    Passive exposure: Never   Smokeless Tobacco Never     Social History     Substance and Sexual Activity   Drug Use Never       Family History:  Family History   Problem Relation Age of Onset    Diabetes Father        Physical Exam:     Vitals:   Blood Pressure: 163/95 (10/21/23 1218)  Pulse: 101 (10/21/23 1218)  Temperature: (!) 97.3 °F (36.3 °C) (10/21/23 1218)  Temp Source: Temporal (10/21/23 0845)  Respirations: 22 (10/21/23 1218)  Height: 5' 2" (157.5 cm) (10/21/23 0845)  Weight - Scale: 72.7 kg (160 lb 4.4 oz) (10/21/23 0845)  SpO2: 98 % (10/21/23 1224)    Physical Exam  Vitals and nursing note reviewed. Constitutional:       General: She is not in acute distress. Appearance: Normal appearance. HENT:      Head: Normocephalic and atraumatic. Nose: No congestion. Mouth/Throat:      Mouth: Mucous membranes are moist.   Eyes:      Conjunctiva/sclera: Conjunctivae normal.   Cardiovascular:      Rate and Rhythm: Normal rate and regular rhythm. Pulses: Normal pulses. Heart sounds: Normal heart sounds. No murmur heard. Pulmonary:      Effort: Pulmonary effort is normal. No respiratory distress. Breath sounds: No wheezing.       Comments: Decreased breath sounds bilaterally  Abdominal:      General: Bowel sounds are normal.      Palpations: Abdomen is soft. Tenderness: There is no abdominal tenderness. Musculoskeletal:         General: Normal range of motion. Right lower leg: No edema. Left lower leg: No edema. Skin:     General: Skin is warm and dry. Neurological:      Mental Status: She is alert and oriented to person, place, and time. Psychiatric:         Mood and Affect: Mood is anxious. Additional Data:     Lab Results:  Results from last 7 days   Lab Units 10/21/23  0856   WBC Thousand/uL 11.04*   HEMOGLOBIN g/dL 13.4   HEMATOCRIT % 41.4   PLATELETS Thousands/uL 238   NEUTROS PCT % 74   LYMPHS PCT % 17   MONOS PCT % 9   EOS PCT % 0     Results from last 7 days   Lab Units 10/21/23  0856   SODIUM mmol/L 138   POTASSIUM mmol/L 3.6   CHLORIDE mmol/L 96   CO2 mmol/L 34*   BUN mg/dL 10   CREATININE mg/dL 0.73   ANION GAP mmol/L 8   CALCIUM mg/dL 9.8   ALBUMIN g/dL 4.3   TOTAL BILIRUBIN mg/dL 0.71   ALK PHOS U/L 78   ALT U/L 8   AST U/L 9*   GLUCOSE RANDOM mg/dL 175*     Results from last 7 days   Lab Units 10/21/23  0856   INR  0.82*             Results from last 7 days   Lab Units 10/21/23  0856   PROCALCITONIN ng/ml <0.05       Lines/Drains:  Invasive Devices       Peripheral Intravenous Line  Duration             Peripheral IV 10/21/23 Proximal;Right;Ventral (anterior) Forearm <1 day                        Imaging: Personally reviewed the following imaging: chest xray  XR chest portable    (Results Pending)       EKG and Other Studies Reviewed on Admission:   EKG: NSR. HR 83.    ** Please Note: This note has been constructed using a voice recognition system.  **

## 2023-10-21 NOTE — NURSING NOTE
Respiratory informed staff that heart rate was gradually increasing while she was with the patient. Heart rate was 140 when staff notified. PA aware. New orders received.

## 2023-10-21 NOTE — RESPIRATORY THERAPY NOTE
RT Protocol Note  Silverio Evans 79 y.o. female MRN: [id number]  Unit/Bed#: -01 Encounter: [id number]    Assessment    Principal Problem:    COPD exacerbation ([address])  Active Problems:    Tobacco abuse    Essential hypertension    Type 2 diabetes mellitus without complication, without long-term current use of insulin (HCC)    Anxiety about health    Chronic hypoxemic respiratory failure (HCC)    Lung nodule      Home Pulmonary Medications:    Home Devices/Therapy: Home O2, Other (Comment)    Past Medical History:   Diagnosis Date    Anxiety     COPD (chronic obstructive pulmonary disease) (HCC)     Diabetes mellitus ([address])     Essential (primary) hypertension     GERD (gastroesophageal reflux disease)     Smoker      Social History     Socioeconomic History    Marital status:      Spouse name: None    Number of children: None    Years of education: None    Highest education level: None   Occupational History    None   Tobacco Use    Smoking status: Every Day     Packs/day: 0.50     Types: Cigarettes     Passive exposure: Never    Smokeless tobacco: Never   Vaping Use    Vaping Use: Never used   Substance and Sexual Activity    Alcohol use: Not Currently    Drug use: Never    Sexual activity: Not Currently   Other Topics Concern    None   Social History Narrative    None     Social Determinants of Health     Financial Resource Strain: Not on file   Food Insecurity: No Food Insecurity (8/26/2023)    Hunger Vital Sign     Worried About Running Out of Food in the Last Year: Never true     Ran Out of Food in the Last Year: Never true   Transportation Needs: No Transportation Needs (8/26/2023)    PRAPARE - Transportation     Lack of Transportation (Medical): No     Lack of Transportation (Non-Medical):  No   Physical Activity: Not on file   Stress: Not on file   Social Connections: Not on file   Intimate Partner Violence: Not on file   Housing Stability: Low Risk  (8/26/2023)    Housing Stability Vital Sign Unable to Pay for Housing in the Last Year: No     Number of Places Lived in the Last Year: 1     Unstable Housing in the Last Year: No       Subjective         Objective    Physical Exam:   Assessment Type: Pre-treatment  General Appearance: Alert, Awake  Respiratory Pattern: Dyspnea at rest, Labored, Tachypneic  Chest Assessment: Chest expansion symmetrical  Bilateral Breath Sounds: Diminished  Cough: None  O2 Device: 3 L    Vitals:  Blood pressure 163/95, pulse 101, temperature (!) 97.3 °F (36.3 °C), resp. rate 22, height 5' 2" (1.575 m), weight 72.7 kg (160 lb 4.4 oz), SpO2 97 %. Imaging and other studies: I have personally reviewed pertinent reports. O2 Device: 3 L     Plan    Respiratory Plan: Mild Distress pathway, Home Bronchodilator Patient pathway      Resp Comments: pt admitted with exac COPD, currently on baseline O2 3 L. Did not complete recent appointment for PFTs. Pt states she feels SOB, sp02 97%. Nebs ordered TID. Resp Comments: tx stopped, pts HR elevated 140s-150s. pt c/o can't burp, feeling bloated, denies chest pain. RN alerted.

## 2023-10-22 LAB
ANION GAP SERPL CALCULATED.3IONS-SCNC: 3 MMOL/L
BACTERIA BLD CULT: NORMAL
BACTERIA BLD CULT: NORMAL
BUN SERPL-MCNC: 11 MG/DL (ref 5–25)
CALCIUM SERPL-MCNC: 9.9 MG/DL (ref 8.4–10.2)
CHLORIDE SERPL-SCNC: 99 MMOL/L (ref 96–108)
CO2 SERPL-SCNC: 38 MMOL/L (ref 21–32)
CREAT SERPL-MCNC: 0.62 MG/DL (ref 0.6–1.3)
ERYTHROCYTE [DISTWIDTH] IN BLOOD BY AUTOMATED COUNT: 12.3 % (ref 11.6–15.1)
GFR SERPL CREATININE-BSD FRML MDRD: 94 ML/MIN/1.73SQ M
GLUCOSE SERPL-MCNC: 145 MG/DL (ref 65–140)
GLUCOSE SERPL-MCNC: 164 MG/DL (ref 65–140)
GLUCOSE SERPL-MCNC: 229 MG/DL (ref 65–140)
GLUCOSE SERPL-MCNC: 300 MG/DL (ref 65–140)
GLUCOSE SERPL-MCNC: 379 MG/DL (ref 65–140)
HCT VFR BLD AUTO: 38.1 % (ref 34.8–46.1)
HGB BLD-MCNC: 12.5 G/DL (ref 11.5–15.4)
MCH RBC QN AUTO: 29.9 PG (ref 26.8–34.3)
MCHC RBC AUTO-ENTMCNC: 32.8 G/DL (ref 31.4–37.4)
MCV RBC AUTO: 91 FL (ref 82–98)
PLATELET # BLD AUTO: 195 THOUSANDS/UL (ref 149–390)
PMV BLD AUTO: 10.4 FL (ref 8.9–12.7)
POTASSIUM SERPL-SCNC: 4.2 MMOL/L (ref 3.5–5.3)
PROCALCITONIN SERPL-MCNC: <0.05 NG/ML
RBC # BLD AUTO: 4.18 MILLION/UL (ref 3.81–5.12)
SODIUM SERPL-SCNC: 140 MMOL/L (ref 135–147)
WBC # BLD AUTO: 6.91 THOUSAND/UL (ref 4.31–10.16)

## 2023-10-22 PROCEDURE — 85027 COMPLETE CBC AUTOMATED: CPT

## 2023-10-22 PROCEDURE — 87070 CULTURE OTHR SPECIMN AEROBIC: CPT

## 2023-10-22 PROCEDURE — 94640 AIRWAY INHALATION TREATMENT: CPT

## 2023-10-22 PROCEDURE — 94760 N-INVAS EAR/PLS OXIMETRY 1: CPT

## 2023-10-22 PROCEDURE — 99232 SBSQ HOSP IP/OBS MODERATE 35: CPT

## 2023-10-22 PROCEDURE — 82948 REAGENT STRIP/BLOOD GLUCOSE: CPT

## 2023-10-22 PROCEDURE — 87205 SMEAR GRAM STAIN: CPT

## 2023-10-22 PROCEDURE — 80048 BASIC METABOLIC PNL TOTAL CA: CPT

## 2023-10-22 PROCEDURE — 84145 PROCALCITONIN (PCT): CPT

## 2023-10-22 RX ORDER — LISINOPRIL 20 MG/1
40 TABLET ORAL DAILY
Status: DISCONTINUED | OUTPATIENT
Start: 2023-10-22 | End: 2023-10-22

## 2023-10-22 RX ORDER — FLUTICASONE FUROATE AND VILANTEROL 100; 25 UG/1; UG/1
1 POWDER RESPIRATORY (INHALATION) DAILY
Status: DISCONTINUED | OUTPATIENT
Start: 2023-10-22 | End: 2023-10-23 | Stop reason: HOSPADM

## 2023-10-22 RX ORDER — LORAZEPAM 2 MG/ML
1 INJECTION INTRAMUSCULAR ONCE
Status: COMPLETED | OUTPATIENT
Start: 2023-10-22 | End: 2023-10-22

## 2023-10-22 RX ORDER — LEVALBUTEROL INHALATION SOLUTION 1.25 MG/3ML
1.25 SOLUTION RESPIRATORY (INHALATION) EVERY 8 HOURS PRN
Status: DISCONTINUED | OUTPATIENT
Start: 2023-10-22 | End: 2023-10-23 | Stop reason: HOSPADM

## 2023-10-22 RX ORDER — METOPROLOL TARTRATE 50 MG/1
100 TABLET, FILM COATED ORAL EVERY 12 HOURS SCHEDULED
Status: DISCONTINUED | OUTPATIENT
Start: 2023-10-22 | End: 2023-10-23 | Stop reason: HOSPADM

## 2023-10-22 RX ORDER — HYDROXYZINE HYDROCHLORIDE 10 MG/1
10 TABLET, FILM COATED ORAL EVERY 6 HOURS PRN
Status: DISCONTINUED | OUTPATIENT
Start: 2023-10-22 | End: 2023-10-23 | Stop reason: HOSPADM

## 2023-10-22 RX ORDER — ERGOCALCIFEROL 1.25 MG/1
50000 CAPSULE ORAL WEEKLY
Status: DISCONTINUED | OUTPATIENT
Start: 2023-10-22 | End: 2023-10-23 | Stop reason: HOSPADM

## 2023-10-22 RX ORDER — FLUTICASONE PROPIONATE 50 MCG
1 SPRAY, SUSPENSION (ML) NASAL DAILY
Status: DISCONTINUED | OUTPATIENT
Start: 2023-10-22 | End: 2023-10-23 | Stop reason: HOSPADM

## 2023-10-22 RX ORDER — ATORVASTATIN CALCIUM 40 MG/1
40 TABLET, FILM COATED ORAL
Status: DISCONTINUED | OUTPATIENT
Start: 2023-10-22 | End: 2023-10-23 | Stop reason: HOSPADM

## 2023-10-22 RX ORDER — AMLODIPINE BESYLATE 10 MG/1
10 TABLET ORAL
Status: DISCONTINUED | OUTPATIENT
Start: 2023-10-22 | End: 2023-10-23 | Stop reason: HOSPADM

## 2023-10-22 RX ORDER — SERTRALINE HYDROCHLORIDE 25 MG/1
25 TABLET, FILM COATED ORAL DAILY
Status: DISCONTINUED | OUTPATIENT
Start: 2023-10-22 | End: 2023-10-23 | Stop reason: HOSPADM

## 2023-10-22 RX ORDER — CLONIDINE HYDROCHLORIDE 0.1 MG/1
0.2 TABLET ORAL EVERY 12 HOURS SCHEDULED
Status: DISCONTINUED | OUTPATIENT
Start: 2023-10-22 | End: 2023-10-23 | Stop reason: HOSPADM

## 2023-10-22 RX ADMIN — LORAZEPAM 1 MG: 2 INJECTION INTRAMUSCULAR; INTRAVENOUS at 06:22

## 2023-10-22 RX ADMIN — GUAIFENESIN 600 MG: 600 TABLET ORAL at 08:17

## 2023-10-22 RX ADMIN — UMECLIDINIUM 1 PUFF: 62.5 AEROSOL, POWDER ORAL at 14:22

## 2023-10-22 RX ADMIN — IPRATROPIUM BROMIDE 0.5 MG: 0.5 SOLUTION RESPIRATORY (INHALATION) at 07:19

## 2023-10-22 RX ADMIN — GUAIFENESIN 600 MG: 600 TABLET ORAL at 22:36

## 2023-10-22 RX ADMIN — FLUTICASONE FUROATE AND VILANTEROL TRIFENATATE 1 PUFF: 100; 25 POWDER RESPIRATORY (INHALATION) at 14:22

## 2023-10-22 RX ADMIN — AZITHROMYCIN 500 MG: 250 TABLET, FILM COATED ORAL at 11:33

## 2023-10-22 RX ADMIN — DOCUSATE SODIUM 100 MG: 100 CAPSULE, LIQUID FILLED ORAL at 08:17

## 2023-10-22 RX ADMIN — INSULIN LISPRO 4 UNITS: 100 INJECTION, SOLUTION INTRAVENOUS; SUBCUTANEOUS at 16:48

## 2023-10-22 RX ADMIN — METOPROLOL TARTRATE 100 MG: 50 TABLET, FILM COATED ORAL at 22:24

## 2023-10-22 RX ADMIN — PREDNISONE 40 MG: 20 TABLET ORAL at 08:17

## 2023-10-22 RX ADMIN — AMLODIPINE BESYLATE 10 MG: 10 TABLET ORAL at 22:24

## 2023-10-22 RX ADMIN — METOPROLOL TARTRATE 100 MG: 50 TABLET, FILM COATED ORAL at 11:54

## 2023-10-22 RX ADMIN — INSULIN LISPRO 2 UNITS: 100 INJECTION, SOLUTION INTRAVENOUS; SUBCUTANEOUS at 22:36

## 2023-10-22 RX ADMIN — HEPARIN SODIUM 5000 UNITS: 5000 INJECTION INTRAVENOUS; SUBCUTANEOUS at 06:10

## 2023-10-22 RX ADMIN — HEPARIN SODIUM 5000 UNITS: 5000 INJECTION INTRAVENOUS; SUBCUTANEOUS at 22:24

## 2023-10-22 RX ADMIN — HEPARIN SODIUM 5000 UNITS: 5000 INJECTION INTRAVENOUS; SUBCUTANEOUS at 14:19

## 2023-10-22 RX ADMIN — FLUTICASONE PROPIONATE 1 SPRAY: 50 SPRAY, METERED NASAL at 12:47

## 2023-10-22 RX ADMIN — INSULIN LISPRO 3 UNITS: 100 INJECTION, SOLUTION INTRAVENOUS; SUBCUTANEOUS at 12:00

## 2023-10-22 RX ADMIN — SERTRALINE HYDROCHLORIDE 25 MG: 25 TABLET, FILM COATED ORAL at 11:52

## 2023-10-22 RX ADMIN — CLONIDINE HYDROCHLORIDE 0.2 MG: 0.1 TABLET ORAL at 22:24

## 2023-10-22 RX ADMIN — LEVALBUTEROL HYDROCHLORIDE 1.25 MG: 1.25 SOLUTION RESPIRATORY (INHALATION) at 07:19

## 2023-10-22 RX ADMIN — SODIUM CHLORIDE 1000 ML: 0.9 INJECTION, SOLUTION INTRAVENOUS at 11:32

## 2023-10-22 RX ADMIN — ATORVASTATIN CALCIUM 40 MG: 40 TABLET, FILM COATED ORAL at 22:24

## 2023-10-22 RX ADMIN — CLONIDINE HYDROCHLORIDE 0.2 MG: 0.1 TABLET ORAL at 11:52

## 2023-10-22 NOTE — ASSESSMENT & PLAN NOTE
Lab Results   Component Value Date    HGBA1C 7.0 (H) 07/27/2023       Recent Labs     10/21/23  1610 10/21/23  2104 10/22/23  0714 10/22/23  1100   POCGLU 318* 204* 145* 300*       Blood Sugar Average: Last 72 hrs:  (P) 241.75  Accu-Cheks AC/at bedtime  Hold oral medications  Correctional scale with meals and at bedtime

## 2023-10-22 NOTE — PLAN OF CARE
Problem: Potential for Falls  Goal: Patient will remain free of falls  Description: INTERVENTIONS:  - Educate patient/family on patient safety including physical limitations  - Instruct patient to call for assistance with activity   - Consult OT/PT to assist with strengthening/mobility   - Keep Call bell within reach  - Keep bed low and locked with side rails adjusted as appropriate  - Keep care items and personal belongings within reach  - Initiate and maintain comfort rounds  - Make Fall Risk Sign visible to staff  - Offer Toileting every 2 Hours, in advance of need  - Initiate/Maintain bed/chair alarm  - Obtain necessary fall risk management equipment:   - Apply yellow socks and bracelet for high fall risk patients  - Consider moving patient to room near nurses station  Outcome: Progressing     Problem: PAIN - ADULT  Goal: Verbalizes/displays adequate comfort level or baseline comfort level  Description: Interventions:  - Encourage patient to monitor pain and request assistance  - Assess pain using appropriate pain scale  - Administer analgesics based on type and severity of pain and evaluate response  - Implement non-pharmacological measures as appropriate and evaluate response  - Consider cultural and social influences on pain and pain management  - Notify physician/advanced practitioner if interventions unsuccessful or patient reports new pain  Outcome: Progressing     Problem: INFECTION - ADULT  Goal: Absence or prevention of progression during hospitalization  Description: INTERVENTIONS:  - Assess and monitor for signs and symptoms of infection  - Monitor lab/diagnostic results  - Monitor all insertion sites, i.e. indwelling lines, tubes, and drains  - Monitor endotracheal if appropriate and nasal secretions for changes in amount and color  - [place] appropriate cooling/warming therapies per order  - Administer medications as ordered  - Instruct and encourage patient and family to use good hand hygiene technique  - Identify and instruct in appropriate isolation precautions for identified infection/condition  Outcome: Progressing  Goal: Absence of fever/infection during neutropenic period  Description: INTERVENTIONS:  - Monitor WBC    Outcome: Progressing     Problem: SAFETY ADULT  Goal: Patient will remain free of falls  Description: INTERVENTIONS:  - Educate patient/family on patient safety including physical limitations  - Instruct patient to call for assistance with activity   - Consult OT/PT to assist with strengthening/mobility   - Keep Call bell within reach  - Keep bed low and locked with side rails adjusted as appropriate  - Keep care items and personal belongings within reach  - Initiate and maintain comfort rounds  - Make Fall Risk Sign visible to staff  - Offer Toileting every 2 Hours, in advance of need  - Initiate/Maintain bed/chair alarm  - Obtain necessary fall risk management equipment:   - Apply yellow socks and bracelet for high fall risk patients  - Consider moving patient to room near nurses station  Outcome: Progressing  Goal: Maintain or return to baseline ADL function  Description: INTERVENTIONS:  -  Assess patient's ability to carry out ADLs; assess patient's baseline for ADL function and identify physical deficits which impact ability to perform ADLs (bathing, care of mouth/teeth, toileting, grooming, dressing, etc.)  - Assess/evaluate cause of self-care deficits   - Assess range of motion  - Assess patient's mobility; develop plan if impaired  - Assess patient's need for assistive devices and provide as appropriate  - Encourage maximum independence but intervene and supervise when necessary  - Involve family in performance of ADLs  - Assess for home care needs following discharge   - Consider OT consult to assist with ADL evaluation and planning for discharge  - Provide patient education as appropriate  Outcome: Progressing  Goal: Maintains/Returns to pre admission functional level  Description: INTERVENTIONS:  - Perform BMAT or MOVE assessment daily.   - Set and communicate daily mobility goal to care team and patient/family/caregiver. - Collaborate with rehabilitation services on mobility goals if consulted  - Perform Range of Motion 3 times a day. - Reposition patient every 2 hours. - Dangle patient 3 times a day  - Stand patient 3 times a day  - Ambulate patient 3 times a day  - Out of bed to chair 3 times a day   - Out of bed for meals 3 times a day  - Out of bed for toileting  - Record patient progress and toleration of activity level   Outcome: Progressing     Problem: DISCHARGE PLANNING  Goal: Discharge to home or other facility with appropriate resources  Description: INTERVENTIONS:  - Identify barriers to discharge w/patient and caregiver  - Arrange for needed discharge resources and transportation as appropriate  - Identify discharge learning needs (meds, wound care, etc.)  - Arrange for interpretive services to assist at discharge as needed  - Refer to Case Management Department for coordinating discharge planning if the patient needs post-hospital services based on physician/advanced practitioner order or complex needs related to functional status, cognitive ability, or social support system  Outcome: Progressing     Problem: Knowledge Deficit  Goal: Patient/family/caregiver demonstrates understanding of disease process, treatment plan, medications, and discharge instructions  Description: Complete learning assessment and assess knowledge base.   Interventions:  - Provide teaching at level of understanding  - Provide teaching via preferred learning methods  Outcome: Progressing     Problem: RESPIRATORY - ADULT  Goal: Achieves optimal ventilation and oxygenation  Description: INTERVENTIONS:  - Assess for changes in respiratory status  - Assess for changes in mentation and behavior  - Position to facilitate oxygenation and minimize respiratory effort  - Oxygen administered by appropriate delivery if ordered  - Initiate smoking cessation education as indicated  - Encourage broncho-pulmonary hygiene including cough, deep breathe, Incentive Spirometry  - Assess the need for suctioning and aspirate as needed  - Assess and instruct to report SOB or any respiratory difficulty  - Respiratory Therapy support as indicated  Outcome: Progressing     Problem: MOBILITY - ADULT  Goal: Maintain or return to baseline ADL function  Description: INTERVENTIONS:  -  Assess patient's ability to carry out ADLs; assess patient's baseline for ADL function and identify physical deficits which impact ability to perform ADLs (bathing, care of mouth/teeth, toileting, grooming, dressing, etc.)  - Assess/evaluate cause of self-care deficits   - Assess range of motion  - Assess patient's mobility; develop plan if impaired  - Assess patient's need for assistive devices and provide as appropriate  - Encourage maximum independence but intervene and supervise when necessary  - Involve family in performance of ADLs  - Assess for home care needs following discharge   - Consider OT consult to assist with ADL evaluation and planning for discharge  - Provide patient education as appropriate  Outcome: Progressing  Goal: Maintains/Returns to pre admission functional level  Description: INTERVENTIONS:  - Perform BMAT or MOVE assessment daily.   - Set and communicate daily mobility goal to care team and patient/family/caregiver. - Collaborate with rehabilitation services on mobility goals if consulted  - Perform Range of Motion 3 times a day. - Reposition patient every 2 hours.   - Dangle patient 3 times a day  - Stand patient 3 times a day  - Ambulate patient 3 times a day  - Out of bed to chair 3 times a day   - Out of bed for meals 3 times a day  - Out of bed for toileting  - Record patient progress and toleration of activity level   Outcome: Progressing

## 2023-10-22 NOTE — ASSESSMENT & PLAN NOTE
Patient presented to ED with slow worsening of shortness of breath, wheezing since completing steroids yesterday -she notes she was able to smoke a few cigarettes yesterday  Outpatient medications: Trelegy and albuterol nebulizer  Baseline oxygen use: 2 to 3 L  She is currently at her baseline oxygen usage  Presents with mild exacerbation -has had frequent ER visits in the last few months  Follows with [facility] pulmonology-was supposed to have PFTs last week     Chest x-ray without infiltrate or acute pulmonary edema/pleural effusion  CTA ruled out PE  Sputum cultures ordered  Supplemental oxygen to keep saturations at goal 88-92%, currently on her home oxygen  Respiratory protocol -nebulizations as needed given severe sinus tach with treatments even with Xopenex  Continue home inhaler  Just completed an oral steroid taper yesterday, given IV steroids in the ER  Started oral prednisone today  Azithromycin for exacerbation

## 2023-10-22 NOTE — UTILIZATION REVIEW
Initial Clinical Review    Admission: Date/Time/Statement:   Admission Orders (From admission, onward)       Ordered        10/21/23 1111  INPATIENT ADMISSION  Once                          Orders Placed This Encounter   Procedures    INPATIENT ADMISSION     Standing Status:   Standing     Number of Occurrences:   1     Order Specific Question:   Level of Care     Answer:   Med Surg [16]     Order Specific Question:   Estimated length of stay     Answer:   More than 2 Midnights     Order Specific Question:   Certification     Answer:   I certify that inpatient services are medically necessary for this patient for a duration of greater than two midnights. See H&P and MD Progress Notes for additional information about the patient's course of treatment. ED Arrival Information       Expected   -    Arrival   10/21/2023 08:35    Acuity   Urgent              Means of arrival   Walk-In    Escorted by   Family Member    Service   Hospitalist    Admission type   Emergency              Arrival complaint   sob             Chief Complaint   Patient presents with    Shortness of Breath     Pt. Has hx of copd and started with increased sob this morning, wears  O2 @ 2.5-3 L cont, pt. Used duoneb and albuterol early this morning, denies chest pain       Initial Presentation: 79 y.o. female to ED from home w/ SOB yest . PMHX  COPD, tobacco use, chronic respiratory failure, hypertension . recently treated for COPD exacerbation as an outpatient and finished a 4-day course of prednisone yesterday. Admitted IP status w/ COPD exacerbation . On O2 2-3 l at baseline . CXR wnl . Plan for sputum cx , maintain O2 sat 88-92% , start po prednisone , azithromycin . Cont atarax for anxiety . DM SSI and monitor . HTN cont amlodipine , clonidine , lisinopril and lopressor . PE: dec BS batsheva , anxious     Date: 10/22   Day 2: on home O2 . Cont inhaler . Started on po prednisone today . Azithrmycin for exacerbation . F/u sputum cx.  Diminished BS , no wheeze . Date: 10/23    Day 3: Has surpassed a 2nd midnight with active treatments and services, which include  continued monitoring of resp status , azithromycin and steroids .        ED Triage Vitals [10/21/23 0845]   Temperature Pulse Respirations Blood Pressure SpO2   98 °F (36.7 °C) 101 20 (!) 178/84 94 %      Temp Source Heart Rate Source Patient Position - Orthostatic VS BP Location FiO2 (%)   Temporal Monitor Sitting Left arm --      Pain Score       No Pain          Wt Readings from Last 1 Encounters:   10/21/23 72.7 kg (160 lb 4.4 oz)     Additional Vital Signs:   Date/Time Temp Pulse Resp BP MAP (mmHg) SpO2 Calculated FIO2 (%) - Nasal Cannula Nasal Cannula O2 Flow Rate (L/min) O2 Device Patient Position - Orthostatic VS   10/22/23 0903 -- 123 Abnormal  -- -- -- -- -- -- -- --   10/22/23 0900 -- 126 Abnormal  -- -- -- -- -- -- -- --   10/22/23 0855 -- 142 Abnormal  -- -- -- -- -- -- -- --   10/22/23 0850 -- 156 Abnormal  -- -- -- -- -- -- -- --   10/22/23 0835 -- 159 Abnormal  -- -- -- -- -- -- -- --   10/22/23 0825 -- 143 Abnormal  -- -- -- -- -- -- -- --   10/22/23 0749 -- -- -- -- -- 94 % 32 3 L/min Nasal cannula --   10/22/23 0720 -- -- -- -- -- 96 % 32 3 L/min Nasal cannula --   10/22/23 07:15:26 97.3 °F (36.3 °C) Abnormal  86 19 166/100 122 99 % -- -- -- Lying   10/22/23 06:08:54 98.1 °F (36.7 °C) 106 Abnormal  22 166/100 122 97 % 32 3 L/min Nasal cannula Lying   10/21/23 21:50:06 97.2 °F (36.2 °C) Abnormal  78 20 142/82 102 98 % -- -- -- --   10/21/23 1933 -- -- -- -- -- -- 32 3 L/min Nasal cannula --   10/21/23 1926 -- -- -- -- -- 98 % -- -- -- --   10/21/23 1854 -- 98 -- -- -- -- -- -- -- --   10/21/23 1427 -- 113 Abnormal  -- -- -- -- -- -- -- --   10/21/23 14:24:21 97.5 °F (36.4 °C) 114 Abnormal  23 Abnormal  155/87 110 96 % -- -- -- --   10/21/23 1411 -- 135 Abnormal  -- -- -- -- -- -- -- --   10/21/23 1402 -- 142 Abnormal  -- -- -- -- -- -- -- --   10/21/23 1400 -- 151 Abnormal  -- -- -- -- -- -- -- --   10/21/23 1359 -- 145 Abnormal  -- -- -- -- -- -- -- --   10/21/23 1353 -- -- -- -- -- 97 % -- -- -- --   10/21/23 1224 -- -- -- -- -- 98 % 32 3 L/min Nasal cannula --   10/21/23 12:18:03 97.3 °F (36.3 °C) Abnormal  101 22 163/95 118 95 % -- -- -- --   10/21/23 1200 -- 92 24 Abnormal  146/80 107 99 % -- -- Nasal cannula Sitting   10/21/23 1100 -- 74 24 Abnormal  170/74 107 99 % -- -- Nasal cannula Sitting   10/21/23 1048 -- 71 24 Abnormal  185/78 Abnormal  -- 95 % -- -- Nasal cannula Sitting   10/21/23 1029 -- -- -- -- -- -- --        Pertinent Labs/Diagnostic Test Results:   CTA chest pe study   Final Result by Tilmon Lefort, MD (10/21 1754)      1) No pulmonary embolism. 2) No acute pulmonary pathology. 3) Spiculated right apical nodule as on the August 2023 CT, not accurately measured on the current study due to motion artifact, however at least 9 mm on the prior study, new from 2020. Further evaluation with PET/CT is again recommended versus continued    surveillance with chest CT in 3-6 months. Workstation performed: [id number]         XR chest portable   Final Result by Prince Teresa MD (47/25 3472)      No acute disease with benign linear atelectasis in the left base.                Workstation performed: [id number]           Results from last 7 days   Lab Units 10/18/23  1615   SARS-COV-2  Negative     Results from last 7 days   Lab Units 10/22/23  0512 10/21/23  0856 10/18/23  1615   WBC Thousand/uL 6.91 11.04* 9.39   HEMOGLOBIN g/dL 12.5 13.4 12.6   HEMATOCRIT % 38.1 41.4 39.4   PLATELETS Thousands/uL 195 238 218   NEUTROS ABS Thousands/µL  --  8.07* 6.62         Results from last 7 days   Lab Units 10/22/23  0512 10/21/23  0856 10/18/23  1615   SODIUM mmol/L 140 138 137   POTASSIUM mmol/L 4.2 3.6 3.6   CHLORIDE mmol/L 99 96 95*   CO2 mmol/L 38* 34* 36*   ANION GAP mmol/L 3 8 6   BUN mg/dL 11 10 7   CREATININE mg/dL 0.62 0.73 0.68 EGFR ml/min/1.73sq m 94 86 91   CALCIUM mg/dL 9.9 9.8 9.8     Results from last 7 days   Lab Units 10/21/23  0856 10/18/23  1615   AST U/L 9* 10*   ALT U/L 8 9   ALK PHOS U/L 78 72   TOTAL PROTEIN g/dL 7.2 7.0   ALBUMIN g/dL 4.3 4.2   TOTAL BILIRUBIN mg/dL 0.71 0.37     Results from last 7 days   Lab Units 10/22/23  0714 10/21/23  2104 10/21/23  1610   POC GLUCOSE mg/dl 145* 204* 318*     Results from last 7 days   Lab Units 10/22/23  0512 10/21/23  0856 10/18/23  1615   GLUCOSE RANDOM mg/dL 164* 175* 171*         Results from last 7 days   Lab Units 10/21/23  0856 10/18/23  1615   HS TNI 0HR ng/L 6 11     Results from last 7 days   Lab Units 10/21/23  0856   D-DIMER QUANTITATIVE ug/ml FEU 0.36     Results from last 7 days   Lab Units 10/21/23  0856   PROTIME seconds 11.6   INR  0.82*   PTT seconds 26         Results from last 7 days   Lab Units 10/22/23  0512 10/21/23  0856   PROCALCITONIN ng/ml <0.05 <0.05                 Results from last 7 days   Lab Units 10/21/23  0856 10/18/23  1615   BNP pg/mL 19 93         Results from last 7 days   Lab Units 10/18/23  1615   INFLUENZA A PCR  Negative   INFLUENZA B PCR  Negative   RSV PCR  Negative     Results from last 7 days   Lab Units 10/18/23  1643 10/18/23  1615   BLOOD CULTURE  No Growth at 72 hrs. No Growth at 72 hrs.            ED Treatment:   Medication Administration from 10/21/2023 0834 to 10/21/2023 1215         Date/Time Order Dose Route Action     10/21/2023 0859 EDT ipratropium-albuterol (DUO-NEB) 0.5-2.5 mg/3 mL inhalation solution 3 mL 3 mL Nebulization Given     10/21/2023 0900 EDT dexamethasone (PF) (DECADRON) injection 10 mg 10 mg Intravenous Given     10/21/2023 1046 EDT albuterol inhalation solution 2.5 mg 2.5 mg Nebulization Given          Past Medical History:   Diagnosis Date    Anxiety     COPD (chronic obstructive pulmonary disease) ([address])     Diabetes mellitus ([address])     Essential (primary) hypertension     GERD (gastroesophageal reflux disease) Smoker      Present on Admission:   Chronic hypoxemic respiratory failure (HCC)   COPD exacerbation (HCC)   Type 2 diabetes mellitus without complication, without long-term current use of insulin (HCC)   Tobacco abuse   Lung nodule   Essential hypertension   Anxiety about health      Admitting Diagnosis: SOB (shortness of breath) [R06.02]  COPD exacerbation (HCC) [J44.1]  Age/Sex: 79 y.o. female  Admission Orders:  Scheduled Medications:  azithromycin, 500 mg, Oral, Q24H  docusate sodium, 100 mg, Oral, BID  guaiFENesin, 600 mg, Oral, Q12H NATHALY  heparin (porcine), 5,000 Units, Subcutaneous, Q8H [address]  insulin lispro, 1-5 Units, Subcutaneous, TID AC  insulin lispro, 1-5 Units, Subcutaneous, HS  ipratropium, 0.5 mg, Nebulization, TID  levalbuterol, 1.25 mg, Nebulization, TID  nicotine, 1 patch, Transdermal, Daily  predniSONE, 40 mg, Oral, Daily      Continuous IV Infusions:     PRN Meds:  acetaminophen, 650 mg, Oral, Q6H PRN  aluminum-magnesium hydroxide-simethicone, 30 mL, Oral, Q6H PRN      Fingerstick ac and hs   Up and OOB   Cont pulse ox   Tele   None    Network Utilization Review Department  ATTENTION: Please call with any questions or concerns to [phone] and carefully listen to the prompts so that you are directed to the right person. All voicemails are confidential.   For Discharge needs, contact Care Management DC Support Team at [phone] opt. [phone]  Send all requests for admission clinical reviews, approved or denied determinations and any other requests to dedicated fax number below belonging to the campus where the patient is receiving treatment.  List of dedicated fax numbers for the Facilities:  [place] DENIALS (Administrative/Medical Necessity) [phone]   DISCHARGE SUPPORT TEAM (NETWORK) [phone] Lorenzo Peters (Maternity/NICU/Pediatrics) [address] [place] [address] [address] [address] [facility] [address] [phone]

## 2023-10-22 NOTE — PROGRESS NOTES
[address] [facility]  Progress Note  Name: Christopher Lagunas  MRN: [id number]  Unit/Bed#: -01 I Date of Admission: 10/21/2023   Date of Service: 10/22/2023 I Hospital Day: 1    Assessment/Plan   * COPD exacerbation [facility])  Assessment & Plan  Patient presented to ED with slow worsening of shortness of breath, wheezing since completing steroids yesterday -she notes she was able to smoke a few cigarettes yesterday  Outpatient medications: Trelegy and albuterol nebulizer  Baseline oxygen use: 2 to 3 L  She is currently at her baseline oxygen usage  Presents with mild exacerbation -has had frequent ER visits in the last few months  Follows with [facility] pulmonology-was supposed to have PFTs last week     Chest x-ray without infiltrate or acute pulmonary edema/pleural effusion  CTA ruled out PE  Sputum cultures ordered  Supplemental oxygen to keep saturations at goal 88-92%, currently on her home oxygen  Respiratory protocol -nebulizations as needed given severe sinus tach with treatments even with Xopenex  Continue home inhaler  Just completed an oral steroid taper yesterday, given IV steroids in the ER  Started oral prednisone today  Azithromycin for exacerbation      Lung nodule  Assessment & Plan   August 4 had suspicious lesion on chest CT-spiculated nodule  Follows with pulmonology and was scheduled for follow-up chest CT next month  Continue to recommend cigarette cessation    Chronic hypoxemic respiratory failure ([address] [facility])  Assessment & Plan  Chronic respiratory failure secondary to COPD  Chronically utilizes 2 to 3 L of oxygen via nasal cannula    Anxiety about health  Assessment & Plan  With increased anxiety over her ongoing health and in general  Seen by PCP on 10/2 and started on Zoloft 25 mg a.m.   Continue Atarax  Anxiety contributes to shortness of breath symptoms    Tachycardia  Assessment & Plan  Sinus tach on EKG likely multifactorial  Throughout the day yesterday patient missed doses of home medications -continue them, also received IV steroids, nebulized breathing treatments    Monitor throughout the day today  Seems slightly dry so we will give fluid bolus      Type 2 diabetes mellitus without complication, without long-term current use of insulin [facility])  Assessment & Plan  Lab Results   Component Value Date    HGBA1C 7.0 (H) 07/27/2023       Recent Labs     10/21/23  1610 10/21/23  2104 10/22/23  0714 10/22/23  1100   POCGLU 318* 204* 145* 300*       Blood Sugar Average: Last 72 hrs:  (P) 241.75  Accu-Cheks AC/at bedtime  Hold oral medications  Correctional scale with meals and at bedtime      Essential hypertension  Assessment & Plan  Continue home blood pressure medication regimen-amlodipine 10 mg daily at bedtime, clonidine 0.2 mg twice daily, lisinopril 40 mg daily and Lopressor 100 mg twice daily    Tobacco abuse  Assessment & Plan  Smokes a few cigarettes daily -4-6  Able to have a few yesterday despite shortness of breath  Nicotine patch offered  Total cessation encouraged         VTE Pharmacologic Prophylaxis:   Moderate Risk (Score 3-4) - Pharmacological DVT Prophylaxis Ordered: heparin. Patient Centered Rounds: I performed bedside rounds with nursing staff today. Discussions with Specialists or Other Care Team Provider: Cm    Education and Discussions with Family / Patient: Updated  (Granddaughter) via phone. Total Time Spent on Date of Encounter in care of patient:  mins. This time was spent on one or more of the following: performing physical exam; counseling and coordination of care; obtaining or reviewing history; documenting in the medical record; reviewing/ordering tests, medications or procedures; communicating with other healthcare professionals and discussing with patient's family/caregivers.     Current Length of Stay: 1 day(s)  Current Patient Status: Inpatient   Certification Statement: The patient will continue to require additional inpatient hospital stay due to COPD exacerbation, sinus tachycardia    Discharge Plan: Anticipate discharge tomorrow to home. Code Status: Level 1 - Full Code    Subjective:   Seen and examined. Endorses feeling about the same. Had long discussion with her on the progression of COPD and that she likely will have some degree of shortness of breath all the time. She endorses not feeling any palpitations or dizziness, no chest pain. Objective:     Vitals:   Temp (24hrs), Av.5 °F (36.4 °C), Min:97.2 °F (36.2 °C), Max:98.1 °F (36.7 °C)    Temp:  [97.2 °F (36.2 °C)-98.1 °F (36.7 °C)] 97.3 °F (36.3 °C)  HR:  [] 155  Resp:  [19-23] 19  BP: (142-166)/() 164/87  SpO2:  [94 %-99 %] 94 %  Body mass index is 29.31 kg/m². Input and Output Summary (last 24 hours): Intake/Output Summary (Last 24 hours) at 10/22/2023 1247  Last data filed at 10/22/2023 0839  Gross per 24 hour   Intake 540 ml   Output 100 ml   Net 440 ml       Physical Exam:   Physical Exam  Vitals and nursing note reviewed. Constitutional:       General: She is not in acute distress. Appearance: Normal appearance. HENT:      Head: Normocephalic and atraumatic. Nose: No congestion. Mouth/Throat:      Mouth: Mucous membranes are moist.   Eyes:      Conjunctiva/sclera: Conjunctivae normal.   Cardiovascular:      Rate and Rhythm: Regular rhythm. Tachycardia present. Pulses: Normal pulses. Heart sounds: Normal heart sounds. No murmur heard. Pulmonary:      Effort: Pulmonary effort is normal. No respiratory distress. Breath sounds: Normal breath sounds. No wheezing. Comments: Diminished breath sounds bilaterally, no wheeze  Abdominal:      General: Bowel sounds are normal.      Palpations: Abdomen is soft. Tenderness: There is no abdominal tenderness. Musculoskeletal:         General: Normal range of motion. Right lower leg: No edema. Left lower leg: No edema.    Skin:     General: Skin is warm and dry. Neurological:      Mental Status: She is alert and oriented to person, place, and time. Additional Data:     Labs:  Results from last 7 days   Lab Units 10/22/23  0512 10/21/23  0856   WBC Thousand/uL 6.91 11.04*   HEMOGLOBIN g/dL 12.5 13.4   HEMATOCRIT % 38.1 41.4   PLATELETS Thousands/uL 195 238   NEUTROS PCT %  --  74   LYMPHS PCT %  --  17   MONOS PCT %  --  9   EOS PCT %  --  0     Results from last 7 days   Lab Units 10/22/23  0512 10/21/23  0856   SODIUM mmol/L 140 138   POTASSIUM mmol/L 4.2 3.6   CHLORIDE mmol/L 99 96   CO2 mmol/L 38* 34*   BUN mg/dL 11 10   CREATININE mg/dL 0.62 0.73   ANION GAP mmol/L 3 8   CALCIUM mg/dL 9.9 9.8   ALBUMIN g/dL  --  4.3   TOTAL BILIRUBIN mg/dL  --  0.71   ALK PHOS U/L  --  78   ALT U/L  --  8   AST U/L  --  9*   GLUCOSE RANDOM mg/dL 164* 175*     Results from last 7 days   Lab Units 10/21/23  0856   INR  0.82*     Results from last 7 days   Lab Units 10/22/23  1100 10/22/23  0714 10/21/23  2104 10/21/23  1610   POC GLUCOSE mg/dl 300* 145* 204* 318*         Results from last 7 days   Lab Units 10/22/23  0512 10/21/23  0856   PROCALCITONIN ng/ml <0.05 <0.05       Lines/Drains:  Invasive Devices       Peripheral Intravenous Line  Duration             Peripheral IV 10/21/23 Proximal;Right;Ventral (anterior) Forearm 1 day                          Imaging: Reviewed radiology reports from this admission including: chest CT scan    Recent Cultures (last 7 days):   Results from last 7 days   Lab Units 10/18/23  1643 10/18/23  1615   BLOOD CULTURE  No Growth at 72 hrs. No Growth at 72 hrs.        Last 24 Hours Medication List:   Current Facility-Administered Medications   Medication Dose Route Frequency Provider Last Rate    acetaminophen  650 mg Oral Q6H PRN Marzetta TICO Kerr-C      aluminum-magnesium hydroxide-simethicone  30 mL Oral Q6H PRN Marzetta Cirilo PA-C      amLODIPine  10 mg Oral HS Marzetta Cirilo PA-C      atorvastatin  40 mg Oral HS Ac Kerr PA-C azithromycin  500 mg Oral Q24H Kathe Middleton PA-C      cloNIDine  0.2 mg Oral Q12H [facility] Kathe Middleton PA-C      docusate sodium  100 mg Oral BID Kathe Middleton PA-C      ergocalciferol  50,000 Units Oral Weekly Kathe Middleton PA-C      fluticasone  1 spray Nasal Daily Kathe Middleton PA-C      Fluticasone Furoate-Vilanterol  1 puff Inhalation Daily Kathe Middleton PA-C      And    umeclidinium  1 puff Inhalation Daily Kathe Middleton PA-C      guaiFENesin  600 mg Oral Q12H [facility] Kathe Middleton PA-C      heparin (porcine)  5,000 Units Subcutaneous [facility] Kathe Middleton PA-C      hydrOXYzine HCL  10 mg Oral Q6H PRN Kathe Middleton PA-C      insulin lispro  1-5 Units Subcutaneous TID [facility] Kathe Middleton PA-C      insulin lispro  1-5 Units Subcutaneous HS Kathe Middleton PA-C      levalbuterol  1.25 mg Nebulization Q8H PRN Kathe Middleton PA-C      metoprolol tartrate  100 mg Oral Q12H [facility] Kathe Middleton PA-C      nicotine  1 patch Transdermal Daily Kathe Middleton PA-C      predniSONE  40 mg Oral Daily Kathe Middleton PA-C      sertraline  25 mg Oral Daily Kathe Middleton PA-C      sodium chloride  1,000 mL Intravenous Once Kathe Middleton PA-C 1,000 mL (10/22/23 1132)        Today, Patient Was Seen By: Kathe Middleton PA-C    **Please Note: This note may have been constructed using a voice recognition system. **

## 2023-10-22 NOTE — PLAN OF CARE
Problem: Potential for Falls  Goal: Patient will remain free of falls  Description: INTERVENTIONS:  - Educate patient/family on patient safety including physical limitations  - Instruct patient to call for assistance with activity   - Consult OT/PT to assist with strengthening/mobility   - Keep Call bell within reach  - Keep bed low and locked with side rails adjusted as appropriate  - Keep care items and personal belongings within reach  - Initiate and maintain comfort rounds  - Make Fall Risk Sign visible to staff  - Offer Toileting every 2 Hours, in advance of need  - Initiate/Maintain bed/chair alarm  - Obtain necessary fall risk management equipment:   - Apply yellow socks and bracelet for high fall risk patients  - Consider moving patient to room near nurses station  Outcome: Progressing     Problem: PAIN - ADULT  Goal: Verbalizes/displays adequate comfort level or baseline comfort level  Description: Interventions:  - Encourage patient to monitor pain and request assistance  - Assess pain using appropriate pain scale  - Administer analgesics based on type and severity of pain and evaluate response  - Implement non-pharmacological measures as appropriate and evaluate response  - Consider cultural and social influences on pain and pain management  - Notify physician/advanced practitioner if interventions unsuccessful or patient reports new pain  Outcome: Progressing     Problem: INFECTION - ADULT  Goal: Absence or prevention of progression during hospitalization  Description: INTERVENTIONS:  - Assess and monitor for signs and symptoms of infection  - Monitor lab/diagnostic results  - Monitor all insertion sites, i.e. indwelling lines, tubes, and drains  - Monitor endotracheal if appropriate and nasal secretions for changes in amount and color  - [place] appropriate cooling/warming therapies per order  - Administer medications as ordered  - Instruct and encourage patient and family to use good hand hygiene technique  - Identify and instruct in appropriate isolation precautions for identified infection/condition  Outcome: Progressing  Goal: Absence of fever/infection during neutropenic period  Description: INTERVENTIONS:  - Monitor WBC    Outcome: Progressing     Problem: SAFETY ADULT  Goal: Patient will remain free of falls  Description: INTERVENTIONS:  - Educate patient/family on patient safety including physical limitations  - Instruct patient to call for assistance with activity   - Consult OT/PT to assist with strengthening/mobility   - Keep Call bell within reach  - Keep bed low and locked with side rails adjusted as appropriate  - Keep care items and personal belongings within reach  - Initiate and maintain comfort rounds  - Make Fall Risk Sign visible to staff  - Offer Toileting every 2 Hours, in advance of need  - Initiate/Maintain bed/chair alarm  - Obtain necessary fall risk management equipment:   - Apply yellow socks and bracelet for high fall risk patients  - Consider moving patient to room near nurses station  Outcome: Progressing  Goal: Maintain or return to baseline ADL function  Description: INTERVENTIONS:  -  Assess patient's ability to carry out ADLs; assess patient's baseline for ADL function and identify physical deficits which impact ability to perform ADLs (bathing, care of mouth/teeth, toileting, grooming, dressing, etc.)  - Assess/evaluate cause of self-care deficits   - Assess range of motion  - Assess patient's mobility; develop plan if impaired  - Assess patient's need for assistive devices and provide as appropriate  - Encourage maximum independence but intervene and supervise when necessary  - Involve family in performance of ADLs  - Assess for home care needs following discharge   - Consider OT consult to assist with ADL evaluation and planning for discharge  - Provide patient education as appropriate  Outcome: Progressing  Goal: Maintains/Returns to pre admission functional level  Description: INTERVENTIONS:  - Perform BMAT or MOVE assessment daily.   - Set and communicate daily mobility goal to care team and patient/family/caregiver. - Collaborate with rehabilitation services on mobility goals if consulted  - Perform Range of Motion 3 times a day. - Reposition patient every 2 hours. - Dangle patient 3 times a day  - Stand patient 3 times a day  - Ambulate patient 3 times a day  - Out of bed to chair 3 times a day   - Out of bed for meals 3 times a day  - Out of bed for toileting  - Record patient progress and toleration of activity level   Outcome: Progressing     Problem: DISCHARGE PLANNING  Goal: Discharge to home or other facility with appropriate resources  Description: INTERVENTIONS:  - Identify barriers to discharge w/patient and caregiver  - Arrange for needed discharge resources and transportation as appropriate  - Identify discharge learning needs (meds, wound care, etc.)  - Arrange for interpretive services to assist at discharge as needed  - Refer to Case Management Department for coordinating discharge planning if the patient needs post-hospital services based on physician/advanced practitioner order or complex needs related to functional status, cognitive ability, or social support system  Outcome: Progressing     Problem: Knowledge Deficit  Goal: Patient/family/caregiver demonstrates understanding of disease process, treatment plan, medications, and discharge instructions  Description: Complete learning assessment and assess knowledge base.   Interventions:  - Provide teaching at level of understanding  - Provide teaching via preferred learning methods  Outcome: Progressing     Problem: RESPIRATORY - ADULT  Goal: Achieves optimal ventilation and oxygenation  Description: INTERVENTIONS:  - Assess for changes in respiratory status  - Assess for changes in mentation and behavior  - Position to facilitate oxygenation and minimize respiratory effort  - Oxygen administered by appropriate delivery if ordered  - Initiate smoking cessation education as indicated  - Encourage broncho-pulmonary hygiene including cough, deep breathe, Incentive Spirometry  - Assess the need for suctioning and aspirate as needed  - Assess and instruct to report SOB or any respiratory difficulty  - Respiratory Therapy support as indicated  Outcome: Progressing     Problem: MOBILITY - ADULT  Goal: Maintain or return to baseline ADL function  Description: INTERVENTIONS:  -  Assess patient's ability to carry out ADLs; assess patient's baseline for ADL function and identify physical deficits which impact ability to perform ADLs (bathing, care of mouth/teeth, toileting, grooming, dressing, etc.)  - Assess/evaluate cause of self-care deficits   - Assess range of motion  - Assess patient's mobility; develop plan if impaired  - Assess patient's need for assistive devices and provide as appropriate  - Encourage maximum independence but intervene and supervise when necessary  - Involve family in performance of ADLs  - Assess for home care needs following discharge   - Consider OT consult to assist with ADL evaluation and planning for discharge  - Provide patient education as appropriate  Outcome: Progressing  Goal: Maintains/Returns to pre admission functional level  Description: INTERVENTIONS:  - Perform BMAT or MOVE assessment daily.   - Set and communicate daily mobility goal to care team and patient/family/caregiver. - Collaborate with rehabilitation services on mobility goals if consulted  - Perform Range of Motion 3 times a day. - Reposition patient every 2 hours.   - Dangle patient 3 times a day  - Stand patient 3 times a day  - Ambulate patient 3 times a day  - Out of bed to chair 3 times a day   - Out of bed for meals 3 times a day  - Out of bed for toileting  - Record patient progress and toleration of activity level   Outcome: Progressing

## 2023-10-22 NOTE — ASSESSMENT & PLAN NOTE
Sinus tach on EKG likely multifactorial  Throughout the day yesterday patient missed doses of home medications -continue them, also received IV steroids, nebulized breathing treatments    Monitor throughout the day today  Seems slightly dry so we will give fluid bolus

## 2023-10-23 VITALS
BODY MASS INDEX: 29.49 KG/M2 | RESPIRATION RATE: 16 BRPM | HEIGHT: 62 IN | TEMPERATURE: 97 F | WEIGHT: 160.27 LBS | SYSTOLIC BLOOD PRESSURE: 127 MMHG | HEART RATE: 60 BPM | OXYGEN SATURATION: 100 % | DIASTOLIC BLOOD PRESSURE: 70 MMHG

## 2023-10-23 PROBLEM — R00.0 TACHYCARDIA: Status: RESOLVED | Noted: 2023-06-25 | Resolved: 2023-10-23

## 2023-10-23 LAB
ATRIAL RATE: 129 BPM
ATRIAL RATE: 83 BPM
BACTERIA BLD CULT: NORMAL
BACTERIA BLD CULT: NORMAL
GLUCOSE SERPL-MCNC: 128 MG/DL (ref 65–140)
GLUCOSE SERPL-MCNC: 249 MG/DL (ref 65–140)
P AXIS: 71 DEGREES
P AXIS: 72 DEGREES
PR INTERVAL: 118 MS
PR INTERVAL: 142 MS
QRS AXIS: 39 DEGREES
QRS AXIS: 40 DEGREES
QRSD INTERVAL: 70 MS
QRSD INTERVAL: 72 MS
QT INTERVAL: 274 MS
QT INTERVAL: 362 MS
QTC INTERVAL: 401 MS
QTC INTERVAL: 425 MS
T WAVE AXIS: 47 DEGREES
T WAVE AXIS: 50 DEGREES
VENTRICULAR RATE: 129 BPM
VENTRICULAR RATE: 83 BPM

## 2023-10-23 PROCEDURE — 99239 HOSP IP/OBS DSCHRG MGMT >30: CPT

## 2023-10-23 PROCEDURE — 93010 ELECTROCARDIOGRAM REPORT: CPT | Performed by: INTERNAL MEDICINE

## 2023-10-23 PROCEDURE — 82948 REAGENT STRIP/BLOOD GLUCOSE: CPT

## 2023-10-23 RX ORDER — PREDNISONE 10 MG/1
TABLET ORAL
Qty: 30 TABLET | Refills: 0 | Status: SHIPPED | OUTPATIENT
Start: 2023-10-23

## 2023-10-23 RX ADMIN — GUAIFENESIN 600 MG: 600 TABLET ORAL at 09:12

## 2023-10-23 RX ADMIN — METOPROLOL TARTRATE 100 MG: 50 TABLET, FILM COATED ORAL at 09:12

## 2023-10-23 RX ADMIN — CLONIDINE HYDROCHLORIDE 0.2 MG: 0.1 TABLET ORAL at 09:13

## 2023-10-23 RX ADMIN — UMECLIDINIUM 1 PUFF: 62.5 AEROSOL, POWDER ORAL at 09:23

## 2023-10-23 RX ADMIN — INSULIN LISPRO 2 UNITS: 100 INJECTION, SOLUTION INTRAVENOUS; SUBCUTANEOUS at 12:17

## 2023-10-23 RX ADMIN — HEPARIN SODIUM 5000 UNITS: 5000 INJECTION INTRAVENOUS; SUBCUTANEOUS at 05:32

## 2023-10-23 RX ADMIN — AZITHROMYCIN 500 MG: 250 TABLET, FILM COATED ORAL at 12:18

## 2023-10-23 RX ADMIN — SERTRALINE HYDROCHLORIDE 25 MG: 25 TABLET, FILM COATED ORAL at 09:14

## 2023-10-23 RX ADMIN — FLUTICASONE PROPIONATE 1 SPRAY: 50 SPRAY, METERED NASAL at 09:22

## 2023-10-23 RX ADMIN — PREDNISONE 40 MG: 20 TABLET ORAL at 09:14

## 2023-10-23 RX ADMIN — FLUTICASONE FUROATE AND VILANTEROL TRIFENATATE 1 PUFF: 100; 25 POWDER RESPIRATORY (INHALATION) at 09:24

## 2023-10-23 NOTE — PLAN OF CARE
Problem: Potential for Falls  Goal: Patient will remain free of falls  Description: INTERVENTIONS:  - Educate patient/family on patient safety including physical limitations  - Instruct patient to call for assistance with activity   - Consult OT/PT to assist with strengthening/mobility   - Keep Call bell within reach  - Keep bed low and locked with side rails adjusted as appropriate  - Keep care items and personal belongings within reach  - Initiate and maintain comfort rounds  - Make Fall Risk Sign visible to staff  - Offer Toileting every 2 Hours, in advance of need  - Initiate/Maintain bed/chair alarm  - Obtain necessary fall risk management equipment:   - Apply yellow socks and bracelet for high fall risk patients  - Consider moving patient to room near nurses station  Outcome: Progressing     Problem: PAIN - ADULT  Goal: Verbalizes/displays adequate comfort level or baseline comfort level  Description: Interventions:  - Encourage patient to monitor pain and request assistance  - Assess pain using appropriate pain scale  - Administer analgesics based on type and severity of pain and evaluate response  - Implement non-pharmacological measures as appropriate and evaluate response  - Consider cultural and social influences on pain and pain management  - Notify physician/advanced practitioner if interventions unsuccessful or patient reports new pain  Outcome: Progressing     Problem: INFECTION - ADULT  Goal: Absence or prevention of progression during hospitalization  Description: INTERVENTIONS:  - Assess and monitor for signs and symptoms of infection  - Monitor lab/diagnostic results  - Monitor all insertion sites, i.e. indwelling lines, tubes, and drains  - Monitor endotracheal if appropriate and nasal secretions for changes in amount and color  - [place] appropriate cooling/warming therapies per order  - Administer medications as ordered  - Instruct and encourage patient and family to use good hand hygiene technique  - Identify and instruct in appropriate isolation precautions for identified infection/condition  Outcome: Progressing     Problem: SAFETY ADULT  Goal: Patient will remain free of falls  Description: INTERVENTIONS:  - Educate patient/family on patient safety including physical limitations  - Instruct patient to call for assistance with activity   - Consult OT/PT to assist with strengthening/mobility   - Keep Call bell within reach  - Keep bed low and locked with side rails adjusted as appropriate  - Keep care items and personal belongings within reach  - Initiate and maintain comfort rounds  - Make Fall Risk Sign visible to staff  - Offer Toileting every 2 Hours, in advance of need  - Initiate/Maintain bed/chair alarm  - Obtain necessary fall risk management equipment:   - Apply yellow socks and bracelet for high fall risk patients  - Consider moving patient to room near nurses station  Outcome: Progressing  Goal: Maintain or return to baseline ADL function  Description: INTERVENTIONS:  -  Assess patient's ability to carry out ADLs; assess patient's baseline for ADL function and identify physical deficits which impact ability to perform ADLs (bathing, care of mouth/teeth, toileting, grooming, dressing, etc.)  - Assess/evaluate cause of self-care deficits   - Assess range of motion  - Assess patient's mobility; develop plan if impaired  - Assess patient's need for assistive devices and provide as appropriate  - Encourage maximum independence but intervene and supervise when necessary  - Involve family in performance of ADLs  - Assess for home care needs following discharge   - Consider OT consult to assist with ADL evaluation and planning for discharge  - Provide patient education as appropriate  Outcome: Progressing  Goal: Maintains/Returns to pre admission functional level  Description: INTERVENTIONS:  - Perform BMAT or MOVE assessment daily.   - Set and communicate daily mobility goal to care team and patient/family/caregiver. - Collaborate with rehabilitation services on mobility goals if consulted  - Perform Range of Motion 3 times a day. - Reposition patient every 2 hours. - Dangle patient 3 times a day  - Stand patient 3 times a day  - Ambulate patient 3 times a day  - Out of bed to chair 3 times a day   - Out of bed for meals 3 times a day  - Out of bed for toileting  - Record patient progress and toleration of activity level   Outcome: Progressing     Problem: DISCHARGE PLANNING  Goal: Discharge to home or other facility with appropriate resources  Description: INTERVENTIONS:  - Identify barriers to discharge w/patient and caregiver  - Arrange for needed discharge resources and transportation as appropriate  - Identify discharge learning needs (meds, wound care, etc.)  - Arrange for interpretive services to assist at discharge as needed  - Refer to Case Management Department for coordinating discharge planning if the patient needs post-hospital services based on physician/advanced practitioner order or complex needs related to functional status, cognitive ability, or social support system  Outcome: Progressing     Problem: Knowledge Deficit  Goal: Patient/family/caregiver demonstrates understanding of disease process, treatment plan, medications, and discharge instructions  Description: Complete learning assessment and assess knowledge base.   Interventions:  - Provide teaching at level of understanding  - Provide teaching via preferred learning methods  Outcome: Progressing     Problem: RESPIRATORY - ADULT  Goal: Achieves optimal ventilation and oxygenation  Description: INTERVENTIONS:  - Assess for changes in respiratory status  - Assess for changes in mentation and behavior  - Position to facilitate oxygenation and minimize respiratory effort  - Oxygen administered by appropriate delivery if ordered  - Initiate smoking cessation education as indicated  - Encourage broncho-pulmonary hygiene including cough, deep breathe, Incentive Spirometry  - Assess the need for suctioning and aspirate as needed  - Assess and instruct to report SOB or any respiratory difficulty  - Respiratory Therapy support as indicated  Outcome: Progressing     Problem: MOBILITY - ADULT  Goal: Maintain or return to baseline ADL function  Description: INTERVENTIONS:  -  Assess patient's ability to carry out ADLs; assess patient's baseline for ADL function and identify physical deficits which impact ability to perform ADLs (bathing, care of mouth/teeth, toileting, grooming, dressing, etc.)  - Assess/evaluate cause of self-care deficits   - Assess range of motion  - Assess patient's mobility; develop plan if impaired  - Assess patient's need for assistive devices and provide as appropriate  - Encourage maximum independence but intervene and supervise when necessary  - Involve family in performance of ADLs  - Assess for home care needs following discharge   - Consider OT consult to assist with ADL evaluation and planning for discharge  - Provide patient education as appropriate  Outcome: Progressing  Goal: Maintains/Returns to pre admission functional level  Description: INTERVENTIONS:  - Perform BMAT or MOVE assessment daily.   - Set and communicate daily mobility goal to care team and patient/family/caregiver. - Collaborate with rehabilitation services on mobility goals if consulted  - Perform Range of Motion 3 times a day. - Reposition patient every 2 hours.   - Dangle patient 3 times a day  - Stand patient 3 times a day  - Ambulate patient 3 times a day  - Out of bed to chair 3 times a day   - Out of bed for meals 3 times a day  - Out of bed for toileting  - Record patient progress and toleration of activity level   Outcome: Progressing

## 2023-10-23 NOTE — ASSESSMENT & PLAN NOTE
Patient presented to ED with slow worsening of shortness of breath, wheezing since completing steroids yesterday -she notes she was able to smoke a few cigarettes yesterday  Outpatient medications: Trelegy and albuterol nebulizer  Baseline oxygen use: 2 to 3 L  She is currently at her baseline oxygen usage  Presents with mild exacerbation -has had frequent ER visits in the last few months  Follows with [facility] pulmonology-was supposed to have PFTs last week     Chest x-ray without infiltrate or acute pulmonary edema/pleural effusion  CTA ruled out PE  Sputum cultures ordered  Supplemental oxygen to keep saturations at goal 88-92%, currently on her home oxygen  Respiratory protocol -nebulizations as needed given severe sinus tach with treatments even with Xopenex  Continue home inhaler  Just completed an oral steroid taper yesterday, given IV steroids in the ER  Continue oral prednisone taper at discharge  Azithromycin for exacerbation

## 2023-10-23 NOTE — NURSING NOTE
Discharge instructions reviewed with pt. Pt verbalized understanding of same and agreed to follow up with outpatient appointments.

## 2023-10-23 NOTE — ASSESSMENT & PLAN NOTE
Sinus tach on EKG likely multifactorial  Throughout the day yesterday patient missed doses of home medications -continue them, also received IV steroids, nebulized breathing treatments    Monitor throughout the day today  Seems slightly dry so we will give fluid bolus  Resolved after receiving her home medications, as needed nebulizers instead of scheduled and after fluid bolus

## 2023-10-23 NOTE — DISCHARGE SUMMARY
[address] [facility]  Discharge- Keyla Arrieta 1955, 79 y.o. female MRN: [id number]  Unit/Bed#: -01 Encounter: [id number]  Primary Care Provider: Lyudmila Calvillo DO   Date and time admitted to hospital: 10/21/2023  8:37 AM    * COPD exacerbation [facility])  Assessment & Plan  Patient presented to ED with slow worsening of shortness of breath, wheezing since completing steroids yesterday -she notes she was able to smoke a few cigarettes yesterday  Outpatient medications: Trelegy and albuterol nebulizer  Baseline oxygen use: 2 to 3 L  She is currently at her baseline oxygen usage  Presents with mild exacerbation -has had frequent ER visits in the last few months  Follows with [facility] pulmonology-was supposed to have PFTs last week     Chest x-ray without infiltrate or acute pulmonary edema/pleural effusion  CTA ruled out PE  Sputum cultures ordered  Supplemental oxygen to keep saturations at goal 88-92%, currently on her home oxygen  Respiratory protocol -nebulizations as needed given severe sinus tach with treatments even with Xopenex  Continue home inhaler  Just completed an oral steroid taper yesterday, given IV steroids in the ER  Continue oral prednisone taper at discharge  Azithromycin for exacerbation      Lung nodule  Assessment & Plan   August 4 had suspicious lesion on chest CT-spiculated nodule  Follows with pulmonology and was scheduled for follow-up chest CT next month  Continue to recommend cigarette cessation  Continues to need follow-up    Chronic hypoxemic respiratory failure ([address] [facility])  Assessment & Plan  Chronic respiratory failure secondary to COPD  Chronically utilizes 2 to 3 L of oxygen via nasal cannula    Anxiety about health  Assessment & Plan  With increased anxiety over her ongoing health and in general  Seen by PCP on 10/2 and started on Zoloft 25 mg a.m.   Continue Atarax  Anxiety contributes to shortness of breath symptoms    Type 2 diabetes mellitus without complication, without long-term current use of insulin [facility])  Assessment & Plan  Lab Results   Component Value Date    HGBA1C 7.0 (H) 07/27/2023       Recent Labs     10/22/23  1627 10/22/23  2108 10/23/23  0739 10/23/23  1136   POCGLU 379* 229* 128 249*         Blood Sugar Average: Last 72 hrs:  (P) 244  Accu-Cheks AC/at bedtime  Hold oral medications  Correctional scale with meals and at bedtime      Essential hypertension  Assessment & Plan  Continue home blood pressure medication regimen-amlodipine 10 mg daily at bedtime, clonidine 0.2 mg twice daily, lisinopril 40 mg daily and Lopressor 100 mg twice daily    Tobacco abuse  Assessment & Plan  Smokes a few cigarettes daily -4-6  Able to have a few yesterday despite shortness of breath  Nicotine patch offered  Total cessation encouraged    Tachycardia-resolved as of 10/23/2023  Assessment & Plan  Sinus tach on EKG likely multifactorial  Throughout the day yesterday patient missed doses of home medications -continue them, also received IV steroids, nebulized breathing treatments    Monitor throughout the day today  Seems slightly dry so we will give fluid bolus  Resolved after receiving her home medications, as needed nebulizers instead of scheduled and after fluid bolus        Medical Problems       Resolved Problems  Date Reviewed: 9/11/2023            Resolved    Tachycardia 10/23/2023     Resolved by  Ismael Murillo PA-C        Discharging Physician / Practitioner: Ismael Murillo PA-C  PCP: Dannie Porras DO  Admission Date:   Admission Orders (From admission, onward)       Ordered        10/21/23 1111  INPATIENT ADMISSION  Once                          Discharge Date: 10/23/23    Consultations During Hospital Stay:  None    Procedures Performed:   None    Significant Findings / Test Results:   XR chest portable - Result Date: 10/22/2023  No acute disease with benign linear atelectasis in the left base.      CTA chest pe study - Result Date: 10/21/2023  1) No pulmonary embolism. 2) No acute pulmonary pathology. 3) Spiculated right apical nodule as on the August 2023 CT, not accurately measured on the current study due to motion artifact, however at least 9 mm on the prior study, new from 2020. Further evaluation with PET/CT is again recommended versus continued  surveillance with chest CT in 3-6 months. Incidental Findings:   None   I reviewed the above mentioned incidental findings with the patient and/or family and they expressed understanding. Test Results Pending at Discharge (will require follow up): None     Outpatient Tests Requested:  None    Complications:  None    Reason for Admission: COPD exacerbation    Hospital Course:   Krystle Dhaliwal is a 79 y.o. female patient with severe COPD, chronic respiratory failure, hypertension, lung nodule and anxiety who originally presented to the hospital on 10/21/2023 due to increased shortness of breath after finishing recent steroid taper. At time presentation she had very mild COPD exacerbation, on her home oxygen without wheezing. CTA was done due to sinus tachycardia and shortness of breath-no PE found but continue to see a pulmonary nodule that needs follow-up. Patient was started on nebulizers, oral steroids and supportive care. Secondary to admission did have some sinus tachycardia throughout the day, likely secondary to missing home dose on day of admission as well as breathing treatments and IV steroids given. After fluid bolus and restarting home meds she felt much better. Today again having improvement in her shortness of breath and will go home. Please see above list of diagnoses and related plan for additional information. Condition at Discharge: fair    Discharge Day Visit / Exam:   Subjective: Seen and examined. Denies any new issues. Less short of breath.   Vitals: Blood Pressure: 127/70 (10/23/23 0740)  Pulse: 60 (10/23/23 0740)  Temperature: (!) 97 °F (36.1 °C) (10/23/23 0740)  Temp Source: Temporal (10/22/23 2224)  Respirations: 16 (10/23/23 0740)  Height: 5' 2" (157.5 cm) (10/21/23 0845)  Weight - Scale: 72.7 kg (160 lb 4.4 oz) (10/21/23 0845)  SpO2: 100 % (10/23/23 0740)  Exam:   Physical Exam  Vitals and nursing note reviewed. Constitutional:       General: She is not in acute distress. Appearance: Normal appearance. HENT:      Head: Normocephalic and atraumatic. Nose: No congestion. Mouth/Throat:      Mouth: Mucous membranes are moist.   Eyes:      Conjunctiva/sclera: Conjunctivae normal.   Cardiovascular:      Rate and Rhythm: Normal rate and regular rhythm. Pulses: Normal pulses. Heart sounds: Normal heart sounds. No murmur heard. Pulmonary:      Effort: Pulmonary effort is normal. No respiratory distress. Breath sounds: Normal breath sounds. No wheezing. Comments: Diminished breath sounds bilaterally, no wheeze  Abdominal:      General: Bowel sounds are normal.      Palpations: Abdomen is soft. Tenderness: There is no abdominal tenderness. Musculoskeletal:         General: Normal range of motion. Right lower leg: No edema. Left lower leg: No edema. Skin:     General: Skin is warm and dry. Neurological:      Mental Status: She is alert and oriented to person, place, and time. Psychiatric:         Mood and Affect: Mood is anxious. Discussion with Family:  Left voicemail with patient's daughter and patient's granddaughter. Discharge instructions/Information to patient and family:   See after visit summary for information provided to patient and family. Provisions for Follow-Up Care:  See after visit summary for information related to follow-up care and any pertinent home health orders. Disposition:   Home    Planned Readmission: None     Discharge Statement:  I spent  minutes discharging the patient. This time was spent on the day of discharge.  I had direct contact with the patient on the day of discharge. Greater than 50% of the total time was spent examining patient, answering all patient questions, arranging and discussing plan of care with patient as well as directly providing post-discharge instructions. Additional time then spent on discharge activities. Discharge Medications:  See after visit summary for reconciled discharge medications provided to patient and/or family.       **Please Note: This note may have been constructed using a voice recognition system**

## 2023-10-23 NOTE — CASE MANAGEMENT
Case Management Discharge Planning Note    Patient name Krystle Dhaliwal  Location /-93 MRN [id number]  : 1955 Date 10/23/2023       Current Admission Date: 10/21/2023  Current Admission Diagnosis:COPD exacerbation [facility])   Patient Active Problem List    Diagnosis Date Noted    Chronic hypoxemic respiratory failure ([address]) 2023    COPD, severity to be determined ([address]) 2023    Lung nodule 2023    Abnormal CT scan 2023    Anxiety about health 2023    Tachycardia 2023    Bacteremia 2023    Vomiting 2023    COPD exacerbation ([address]) 2020    Acute on chronic respiratory failure ([address]) 2020    Acute hyponatremia 2020    Tobacco abuse 2020    Essential hypertension 2020    Type 2 diabetes mellitus without complication, without long-term current use of insulin ([address]) 2020      LOS (days): 2  Geometric Mean LOS (GMLOS) (days):   Days to GMLOS:     OBJECTIVE:  Risk of Unplanned Readmission Score: 57.19         Current admission status: Inpatient   Preferred Pharmacy:   [address] [place] [address] [address] Brinada Frankel, [address] [address]  Phone: [phone] Fax: [address] - [address] [address]  Phone: [phone] Fax: [phone]    Primary Care Provider: Elo Alejandro DO    Primary Insurance: Jasmina Pearson [facility] REP  Secondary Insurance: [address] [place] [address] [address]:        CM contacted patients PCP and scheduled hospital discharge follow up appt for 10/25/23 at 1:20 PM.  CM placed on AVS

## 2023-10-23 NOTE — UTILIZATION REVIEW
NOTIFICATION OF INPATIENT ADMISSION   AUTHORIZATION REQUEST   SERVICING FACILITY:   [address]  Tax ID: [id number]  NPI: [id number] ATTENDING PROVIDER:  Attending Name and NPI#: Skye Marques [[id number]]  Address: [address] [place] [address]  Phone: [phone]   ADMISSION INFORMATION:  Place of Service: Inpatient [address]  Place of Service Code: 21  Inpatient Admission Date/Time: 10/21/23 11:11 AM  Discharge Date/Time: No discharge date for patient encounter. Admitting Diagnosis Code/Description:  SOB (shortness of breath) [R06.02]  COPD exacerbation ([address]) [J44.1]     UTILIZATION REVIEW CONTACT:  Isaias Maguire Utilization   Network Utilization Review Department  Phone: [phone]  Fax [phone]  Email: [email or web address]  Contact for approvals/pending authorizations, clinical reviews, and discharge. PHYSICIAN ADVISORY SERVICES:  Medical Necessity Denial & Dcws-[facility] Review  Phone: [phone]  Fax: [phone]  Email: [email or web address]     DISCHARGE SUPPORT TEAM:  For Patients Discharge Needs & Updates  Phone: [phone] opt. 2 Fax: [phone]  Email: [email or web address]

## 2023-10-23 NOTE — PLAN OF CARE
Problem: Potential for Falls  Goal: Patient will remain free of falls  Description: INTERVENTIONS:  - Educate patient/family on patient safety including physical limitations  - Instruct patient to call for assistance with activity   - Consult OT/PT to assist with strengthening/mobility   - Keep Call bell within reach  - Keep bed low and locked with side rails adjusted as appropriate  - Keep care items and personal belongings within reach  - Initiate and maintain comfort rounds  - Make Fall Risk Sign visible to staff  - Offer Toileting every 2 Hours, in advance of need  - Initiate/Maintain bed/chair alarm  - Obtain necessary fall risk management equipment:   - Apply yellow socks and bracelet for high fall risk patients  - Consider moving patient to room near nurses station  Outcome: Progressing     Problem: PAIN - ADULT  Goal: Verbalizes/displays adequate comfort level or baseline comfort level  Description: Interventions:  - Encourage patient to monitor pain and request assistance  - Assess pain using appropriate pain scale  - Administer analgesics based on type and severity of pain and evaluate response  - Implement non-pharmacological measures as appropriate and evaluate response  - Consider cultural and social influences on pain and pain management  - Notify physician/advanced practitioner if interventions unsuccessful or patient reports new pain  Outcome: Progressing     Problem: INFECTION - ADULT  Goal: Absence or prevention of progression during hospitalization  Description: INTERVENTIONS:  - Assess and monitor for signs and symptoms of infection  - Monitor lab/diagnostic results  - Monitor all insertion sites, i.e. indwelling lines, tubes, and drains  - Monitor endotracheal if appropriate and nasal secretions for changes in amount and color  - Two Dot appropriate cooling/warming therapies per order  - Administer medications as ordered  - Instruct and encourage patient and family to use good hand hygiene technique  - Identify and instruct in appropriate isolation precautions for identified infection/condition  Outcome: Progressing     Problem: SAFETY ADULT  Goal: Patient will remain free of falls  Description: INTERVENTIONS:  - Educate patient/family on patient safety including physical limitations  - Instruct patient to call for assistance with activity   - Consult OT/PT to assist with strengthening/mobility   - Keep Call bell within reach  - Keep bed low and locked with side rails adjusted as appropriate  - Keep care items and personal belongings within reach  - Initiate and maintain comfort rounds  - Make Fall Risk Sign visible to staff  - Offer Toileting every 2 Hours, in advance of need  - Initiate/Maintain bed/chair alarm  - Obtain necessary fall risk management equipment:   - Apply yellow socks and bracelet for high fall risk patients  - Consider moving patient to room near nurses station  Outcome: Progressing  Goal: Maintain or return to baseline ADL function  Description: INTERVENTIONS:  -  Assess patient's ability to carry out ADLs; assess patient's baseline for ADL function and identify physical deficits which impact ability to perform ADLs (bathing, care of mouth/teeth, toileting, grooming, dressing, etc.)  - Assess/evaluate cause of self-care deficits   - Assess range of motion  - Assess patient's mobility; develop plan if impaired  - Assess patient's need for assistive devices and provide as appropriate  - Encourage maximum independence but intervene and supervise when necessary  - Involve family in performance of ADLs  - Assess for home care needs following discharge   - Consider OT consult to assist with ADL evaluation and planning for discharge  - Provide patient education as appropriate  Outcome: Progressing  Goal: Maintains/Returns to pre admission functional level  Description: INTERVENTIONS:  - Perform BMAT or MOVE assessment daily.   - Set and communicate daily mobility goal to care team and patient/family/caregiver. - Collaborate with rehabilitation services on mobility goals if consulted  - Perform Range of Motion 3 times a day. - Reposition patient every 2 hours. - Dangle patient 3 times a day  - Stand patient 3 times a day  - Ambulate patient 3 times a day  - Out of bed to chair 3 times a day   - Out of bed for meals 3 times a day  - Out of bed for toileting  - Record patient progress and toleration of activity level   Outcome: Progressing     Problem: DISCHARGE PLANNING  Goal: Discharge to home or other facility with appropriate resources  Description: INTERVENTIONS:  - Identify barriers to discharge w/patient and caregiver  - Arrange for needed discharge resources and transportation as appropriate  - Identify discharge learning needs (meds, wound care, etc.)  - Arrange for interpretive services to assist at discharge as needed  - Refer to Case Management Department for coordinating discharge planning if the patient needs post-hospital services based on physician/advanced practitioner order or complex needs related to functional status, cognitive ability, or social support system  Outcome: Progressing     Problem: Knowledge Deficit  Goal: Patient/family/caregiver demonstrates understanding of disease process, treatment plan, medications, and discharge instructions  Description: Complete learning assessment and assess knowledge base.   Interventions:  - Provide teaching at level of understanding  - Provide teaching via preferred learning methods  Outcome: Progressing     Problem: RESPIRATORY - ADULT  Goal: Achieves optimal ventilation and oxygenation  Description: INTERVENTIONS:  - Assess for changes in respiratory status  - Assess for changes in mentation and behavior  - Position to facilitate oxygenation and minimize respiratory effort  - Oxygen administered by appropriate delivery if ordered  - Initiate smoking cessation education as indicated  - Encourage broncho-pulmonary hygiene including cough, deep breathe, Incentive Spirometry  - Assess the need for suctioning and aspirate as needed  - Assess and instruct to report SOB or any respiratory difficulty  - Respiratory Therapy support as indicated  Outcome: Progressing     Problem: MOBILITY - ADULT  Goal: Maintain or return to baseline ADL function  Description: INTERVENTIONS:  -  Assess patient's ability to carry out ADLs; assess patient's baseline for ADL function and identify physical deficits which impact ability to perform ADLs (bathing, care of mouth/teeth, toileting, grooming, dressing, etc.)  - Assess/evaluate cause of self-care deficits   - Assess range of motion  - Assess patient's mobility; develop plan if impaired  - Assess patient's need for assistive devices and provide as appropriate  - Encourage maximum independence but intervene and supervise when necessary  - Involve family in performance of ADLs  - Assess for home care needs following discharge   - Consider OT consult to assist with ADL evaluation and planning for discharge  - Provide patient education as appropriate  Outcome: Progressing  Goal: Maintains/Returns to pre admission functional level  Description: INTERVENTIONS:  - Perform BMAT or MOVE assessment daily.   - Set and communicate daily mobility goal to care team and patient/family/caregiver. - Collaborate with rehabilitation services on mobility goals if consulted  - Perform Range of Motion 3 times a day. - Reposition patient every 2 hours.   - Dangle patient 3 times a day  - Stand patient 3 times a day  - Ambulate patient 3 times a day  - Out of bed to chair 3 times a day   - Out of bed for meals 3 times a day  - Out of bed for toileting  - Record patient progress and toleration of activity level   Outcome: Progressing

## 2023-10-23 NOTE — DISCHARGE INSTR - AVS FIRST PAGE
Continue steroid taper as prescribed - sent to [address]   Also sent were nicotine patches - is it vital that you stop smoking completely   Follow up with pulmonology - it is very improetant to follow up and get pulmonary function testing     I also recommend pulmonary rehab in [place] - a referral is sent

## 2023-10-23 NOTE — ASSESSMENT & PLAN NOTE
Lab Results   Component Value Date    HGBA1C 7.0 (H) 07/27/2023       Recent Labs     10/22/23  1627 10/22/23  2108 10/23/23  0739 10/23/23  1136   POCGLU 379* 229* 128 249*         Blood Sugar Average: Last 72 hrs:  (P) 244  Accu-Cheks AC/at bedtime  Hold oral medications  Correctional scale with meals and at bedtime

## 2023-10-23 NOTE — ASSESSMENT & PLAN NOTE
August 4 had suspicious lesion on chest CT-spiculated nodule  Follows with pulmonology and was scheduled for follow-up chest CT next month  Continue to recommend cigarette cessation  Continues to need follow-up

## 2023-10-24 LAB
BACTERIA SPT RESP CULT: ABNORMAL
GRAM STN SPEC: ABNORMAL

## 2023-10-24 NOTE — UTILIZATION REVIEW
NOTIFICATION OF ADMISSION DISCHARGE   This is a Notification of Discharge from [address] [facility]. Please be advised that this patient has been discharge from our facility. Below you will find the admission and discharge date and time including the patient’s disposition. UTILIZATION REVIEW CONTACT:  Arsenio Sam  Utilization   Network Utilization Review Department  Phone: [phone] x carefully listen to the prompts. All voicemails are confidential.  Email: [email or web address]     ADMISSION INFORMATION  PRESENTATION DATE: 10/21/2023  8:37 AM  OBERVATION ADMISSION DATE:   INPATIENT ADMISSION DATE: 10/21/23 11:11 AM   DISCHARGE DATE: 10/23/2023  1:15 PM   DISPOSITION:Home/Self Care    Network Utilization Review Department  ATTENTION: Please call with any questions or concerns to [phone] and carefully listen to the prompts so that you are directed to the right person. All voicemails are confidential.   For Discharge needs, contact Care Management DC Support Team at [phone] opt. 2  Send all requests for admission clinical reviews, approved or denied determinations and any other requests to dedicated fax number below belonging to the campus where the patient is receiving treatment.  List of dedicated fax numbers for the Facilities:  [place] DENIALS (Administrative/Medical Necessity) [phone]   DISCHARGE SUPPORT TEAM (Network) [phone] [address] [facility] (Maternity/NICU/Pediatrics) [phone]   [address] [facility] [address] [facility] [address] [place] [address] [address] [phone]

## 2023-10-25 ENCOUNTER — HOSPITAL ENCOUNTER (EMERGENCY)
Facility: HOSPITAL | Age: 68
Discharge: HOME | End: 2023-10-25
Attending: EMERGENCY MEDICINE
Payer: COMMERCIAL

## 2023-10-25 ENCOUNTER — APPOINTMENT (EMERGENCY)
Dept: RADIOLOGY | Facility: HOSPITAL | Age: 68
End: 2023-10-25
Payer: COMMERCIAL

## 2023-10-25 VITALS
RESPIRATION RATE: 20 BRPM | DIASTOLIC BLOOD PRESSURE: 62 MMHG | SYSTOLIC BLOOD PRESSURE: 117 MMHG | HEART RATE: 61 BPM | OXYGEN SATURATION: 97 % | TEMPERATURE: 97.9 F

## 2023-10-25 DIAGNOSIS — J44.1 COPD WITH ACUTE EXACERBATION (HCC): Primary | ICD-10-CM

## 2023-10-25 LAB
ABO GROUP BLD: NORMAL
ALBUMIN SERPL BCP-MCNC: 3.9 G/DL (ref 3.5–5)
ALP SERPL-CCNC: 64 U/L (ref 34–104)
ALT SERPL W P-5'-P-CCNC: 9 U/L (ref 7–52)
ANION GAP SERPL CALCULATED.3IONS-SCNC: 5 MMOL/L
APTT PPP: 25 SECONDS (ref 23–37)
AST SERPL W P-5'-P-CCNC: 8 U/L (ref 13–39)
ATRIAL RATE: 59 BPM
BASOPHILS # BLD AUTO: 0.03 THOUSANDS/ÂΜL (ref 0–0.1)
BASOPHILS NFR BLD AUTO: 0 % (ref 0–1)
BILIRUB SERPL-MCNC: 0.53 MG/DL (ref 0.2–1)
BLD GP AB SCN SERPL QL: NEGATIVE
BUN SERPL-MCNC: 9 MG/DL (ref 5–25)
CALCIUM SERPL-MCNC: 9.3 MG/DL (ref 8.4–10.2)
CARDIAC TROPONIN I PNL SERPL HS: 8 NG/L
CHLORIDE SERPL-SCNC: 99 MMOL/L (ref 96–108)
CO2 SERPL-SCNC: 33 MMOL/L (ref 21–32)
CREAT SERPL-MCNC: 0.64 MG/DL (ref 0.6–1.3)
EOSINOPHIL # BLD AUTO: 0.06 THOUSAND/ÂΜL (ref 0–0.61)
EOSINOPHIL NFR BLD AUTO: 1 % (ref 0–6)
ERYTHROCYTE [DISTWIDTH] IN BLOOD BY AUTOMATED COUNT: 12.3 % (ref 11.6–15.1)
GFR SERPL CREATININE-BSD FRML MDRD: 93 ML/MIN/1.73SQ M
GLUCOSE SERPL-MCNC: 182 MG/DL (ref 65–140)
HCT VFR BLD AUTO: 38 % (ref 34.8–46.1)
HGB BLD-MCNC: 12.6 G/DL (ref 11.5–15.4)
IMM GRANULOCYTES # BLD AUTO: 0.06 THOUSAND/UL (ref 0–0.2)
IMM GRANULOCYTES NFR BLD AUTO: 1 % (ref 0–2)
INR PPP: 0.85 (ref 0.84–1.19)
LACTATE SERPL-SCNC: 0.7 MMOL/L (ref 0.5–2)
LYMPHOCYTES # BLD AUTO: 2.11 THOUSANDS/ÂΜL (ref 0.6–4.47)
LYMPHOCYTES NFR BLD AUTO: 24 % (ref 14–44)
MAGNESIUM SERPL-MCNC: 2 MG/DL (ref 1.9–2.7)
MCH RBC QN AUTO: 30.3 PG (ref 26.8–34.3)
MCHC RBC AUTO-ENTMCNC: 33.2 G/DL (ref 31.4–37.4)
MCV RBC AUTO: 91 FL (ref 82–98)
MONOCYTES # BLD AUTO: 0.86 THOUSAND/ÂΜL (ref 0.17–1.22)
MONOCYTES NFR BLD AUTO: 10 % (ref 4–12)
NEUTROPHILS # BLD AUTO: 5.68 THOUSANDS/ÂΜL (ref 1.85–7.62)
NEUTS SEG NFR BLD AUTO: 64 % (ref 43–75)
NRBC BLD AUTO-RTO: 0 /100 WBCS
P AXIS: 59 DEGREES
PLATELET # BLD AUTO: 213 THOUSANDS/UL (ref 149–390)
PMV BLD AUTO: 10.6 FL (ref 8.9–12.7)
POTASSIUM SERPL-SCNC: 4 MMOL/L (ref 3.5–5.3)
PR INTERVAL: 108 MS
PROT SERPL-MCNC: 6.5 G/DL (ref 6.4–8.4)
PROTHROMBIN TIME: 11.9 SECONDS (ref 11.6–14.5)
QRS AXIS: 41 DEGREES
QRSD INTERVAL: 64 MS
QT INTERVAL: 384 MS
QTC INTERVAL: 380 MS
RBC # BLD AUTO: 4.16 MILLION/UL (ref 3.81–5.12)
RH BLD: POSITIVE
SODIUM SERPL-SCNC: 137 MMOL/L (ref 135–147)
SPECIMEN EXPIRATION DATE: NORMAL
T WAVE AXIS: 44 DEGREES
VENTRICULAR RATE: 59 BPM
WBC # BLD AUTO: 8.8 THOUSAND/UL (ref 4.31–10.16)

## 2023-10-25 PROCEDURE — 94640 AIRWAY INHALATION TREATMENT: CPT

## 2023-10-25 PROCEDURE — 71045 X-RAY EXAM CHEST 1 VIEW: CPT

## 2023-10-25 PROCEDURE — 94644 CONT INHLJ TX 1ST HOUR: CPT

## 2023-10-25 PROCEDURE — 36415 COLL VENOUS BLD VENIPUNCTURE: CPT | Performed by: PHYSICIAN ASSISTANT

## 2023-10-25 PROCEDURE — 93005 ELECTROCARDIOGRAM TRACING: CPT

## 2023-10-25 PROCEDURE — 85025 COMPLETE CBC W/AUTO DIFF WBC: CPT | Performed by: PHYSICIAN ASSISTANT

## 2023-10-25 PROCEDURE — 84484 ASSAY OF TROPONIN QUANT: CPT | Performed by: PHYSICIAN ASSISTANT

## 2023-10-25 PROCEDURE — 83735 ASSAY OF MAGNESIUM: CPT | Performed by: PHYSICIAN ASSISTANT

## 2023-10-25 PROCEDURE — 86900 BLOOD TYPING SEROLOGIC ABO: CPT | Performed by: PHYSICIAN ASSISTANT

## 2023-10-25 PROCEDURE — 99285 EMERGENCY DEPT VISIT HI MDM: CPT

## 2023-10-25 PROCEDURE — 85730 THROMBOPLASTIN TIME PARTIAL: CPT | Performed by: PHYSICIAN ASSISTANT

## 2023-10-25 PROCEDURE — 94760 N-INVAS EAR/PLS OXIMETRY 1: CPT

## 2023-10-25 PROCEDURE — 99285 EMERGENCY DEPT VISIT HI MDM: CPT | Performed by: PHYSICIAN ASSISTANT

## 2023-10-25 PROCEDURE — 83605 ASSAY OF LACTIC ACID: CPT | Performed by: PHYSICIAN ASSISTANT

## 2023-10-25 PROCEDURE — 86850 RBC ANTIBODY SCREEN: CPT | Performed by: PHYSICIAN ASSISTANT

## 2023-10-25 PROCEDURE — 86901 BLOOD TYPING SEROLOGIC RH(D): CPT | Performed by: PHYSICIAN ASSISTANT

## 2023-10-25 PROCEDURE — 93010 ELECTROCARDIOGRAM REPORT: CPT | Performed by: INTERNAL MEDICINE

## 2023-10-25 PROCEDURE — 96374 THER/PROPH/DIAG INJ IV PUSH: CPT

## 2023-10-25 PROCEDURE — 80053 COMPREHEN METABOLIC PANEL: CPT | Performed by: PHYSICIAN ASSISTANT

## 2023-10-25 PROCEDURE — 85610 PROTHROMBIN TIME: CPT | Performed by: PHYSICIAN ASSISTANT

## 2023-10-25 RX ORDER — PREDNISONE 10 MG/1
TABLET ORAL
Qty: 38 TABLET | Refills: 0 | Status: SHIPPED | OUTPATIENT
Start: 2023-10-25 | End: 2023-11-14

## 2023-10-25 RX ORDER — SODIUM CHLORIDE FOR INHALATION 0.9 %
12 VIAL, NEBULIZER (ML) INHALATION ONCE
Status: COMPLETED | OUTPATIENT
Start: 2023-10-25 | End: 2023-10-25

## 2023-10-25 RX ORDER — METHYLPREDNISOLONE SODIUM SUCCINATE 125 MG/2ML
125 INJECTION, POWDER, LYOPHILIZED, FOR SOLUTION INTRAMUSCULAR; INTRAVENOUS ONCE
Status: COMPLETED | OUTPATIENT
Start: 2023-10-25 | End: 2023-10-25

## 2023-10-25 RX ORDER — AZITHROMYCIN 250 MG/1
TABLET, FILM COATED ORAL
Qty: 6 TABLET | Refills: 0 | Status: SHIPPED | OUTPATIENT
Start: 2023-10-25 | End: 2023-10-29

## 2023-10-25 RX ORDER — AZITHROMYCIN 250 MG/1
500 TABLET, FILM COATED ORAL ONCE
Status: COMPLETED | OUTPATIENT
Start: 2023-10-25 | End: 2023-10-25

## 2023-10-25 RX ADMIN — ALBUTEROL SULFATE 10 MG: 2.5 SOLUTION RESPIRATORY (INHALATION) at 12:56

## 2023-10-25 RX ADMIN — ISODIUM CHLORIDE 12 ML: 0.03 SOLUTION RESPIRATORY (INHALATION) at 12:56

## 2023-10-25 RX ADMIN — IPRATROPIUM BROMIDE 1 MG: 0.5 SOLUTION RESPIRATORY (INHALATION) at 12:56

## 2023-10-25 RX ADMIN — AZITHROMYCIN 500 MG: 250 TABLET, FILM COATED ORAL at 14:51

## 2023-10-25 RX ADMIN — METHYLPREDNISOLONE SODIUM SUCCINATE 125 MG: 125 INJECTION, POWDER, FOR SOLUTION INTRAMUSCULAR; INTRAVENOUS at 13:09

## 2023-10-25 NOTE — ED PROVIDER NOTES
History  Chief Complaint   Patient presents with    Shortness of Breath     Patient jsut d/c for the same on Monday. Patient back with sob. Patient is a 60-year-old female with a past medical history of COPD on chronic oxygen therapy who presents with daughter for the concern of shortness of breath. Patient states that she was hospitalized 10/21-10/23 COPD exacerbation. The patient was then discharged home. Patient states that last evening and today she has had worsening shortness of breath. Having wheezing. Daughter is concerned. Patient is upcoming appointment next month with pulmonology. Patient did smoke a cigarette yesterday while at home. No fevers chills, nausea vomiting falls or traumas. Patient did a nebulizer treatment at noon today prior to arrival.          Shortness of Breath  Severity:  Severe  Duration:  1 day  Timing:  Constant  Progression:  Worsening  Chronicity:  New  Context: weather changes    Relieved by:  Nothing  Worsened by:  Coughing, weather changes, stress and emotional stress  Associated symptoms: cough and wheezing    Associated symptoms: no abdominal pain, no claudication and no fever    Cough:     Cough characteristics:  Productive    Sputum characteristics:  White    Severity:  Moderate    Progression:  Unchanged    Chronicity:  Chronic  Wheezing:     Severity:  Moderate    Timing:  Constant    Chronicity:  Recurrent  Risk factors: tobacco use        Prior to Admission Medications   Prescriptions Last Dose Informant Patient Reported? Taking?    Nebulizers (Comp Air Compressor Nebulizer) MISC   Yes No   Sig: As directed   albuterol (2.5 mg/3 mL) 0.083 % nebulizer solution   No No   Sig: Take 3 mL (2.5 mg total) by nebulization every 6 (six) hours as needed for wheezing or shortness of breath   Patient taking differently: Take 530 mg by nebulization every 4 (four) hours as needed for wheezing or shortness of breath   amLODIPine (NORVASC) 10 mg tablet   Yes No   Sig: Take 10 mg by mouth daily at bedtime    atorvastatin (LIPITOR) 40 mg tablet   Yes No   Sig: Take 40 mg by mouth daily at bedtime    cloNIDine (CATAPRES) 0.2 mg tablet   Yes No   Sig: Take 0.2 mg by mouth every 12 (twelve) hours   ergocalciferol (VITAMIN D2) 50,000 units   Yes No   Sig: Take 50,000 Units by mouth once a week On saturday   fluticasone (FLONASE) 50 mcg/act nasal spray   No No   Si spray into each nostril daily   fluticasone-umeclidinium-vilanterol (Trelegy Ellipta) 100-62.5-25 mcg/actuation inhaler   No No   Sig: Inhale 1 puff daily Rinse mouth after use. glipiZIDE (GLUCOTROL) 10 mg tablet  Self Yes No   Sig: Take 10 mg by mouth daily in the early morning W breakfast   hydrOXYzine HCL (ATARAX) 10 mg tablet   Yes No   Sig: Take 10 mg by mouth every 6 (six) hours as needed for itching   ipratropium-albuterol (DUO-NEB) 0.5-2.5 mg/3 mL nebulizer solution   No No   Sig: Take 3 mL by nebulization 3 (three) times a day   lisinopril (ZESTRIL) 40 mg tablet   Yes No   Sig: Take 40 mg by mouth daily   metoprolol tartrate (LOPRESSOR) 100 mg tablet   Yes No   Sig: Take 100 mg by mouth every 12 (twelve) hours   nicotine (NICODERM CQ) 7 mg/24hr TD 24 hr patch   No No   Sig: Place 1 patch on the skin over 24 hours daily Do not start before 2023. omeprazole (PriLOSEC OTC) 20 MG tablet   Yes No   Sig: Take 20 mg by mouth daily    pioglitazone (ACTOS) 15 mg tablet   Yes No   Sig: Take 15 mg by mouth daily   predniSONE 10 mg tablet   No No   Sig: Take 4 tabs daily for thee days then take three tabs daily for three days then take two tablets daily for three days then take one tab daily for three days.    sertraline (ZOLOFT) 25 mg tablet   Yes No   Sig: Take 25 mg by mouth daily      Facility-Administered Medications: None       Past Medical History:   Diagnosis Date    Anxiety     COPD (chronic obstructive pulmonary disease) (HCC)     Diabetes mellitus ([address])     Essential (primary) hypertension     GERD (gastroesophageal reflux disease)     Smoker        History reviewed. No pertinent surgical history. Family History   Problem Relation Age of Onset    Diabetes Father      I have reviewed and agree with the history as documented. E-Cigarette/Vaping    E-Cigarette Use Never User      E-Cigarette/Vaping Substances    Nicotine No     THC No     CBD No     Flavoring No      Social History     Tobacco Use    Smoking status: Every Day     Packs/day: 0.50     Types: Cigarettes     Passive exposure: Never    Smokeless tobacco: Never   Vaping Use    Vaping Use: Never used   Substance Use Topics    Alcohol use: Not Currently    Drug use: Never       Review of Systems   Constitutional:  Negative for fever. Respiratory:  Positive for cough, shortness of breath and wheezing. Cardiovascular:  Negative for claudication. Gastrointestinal:  Negative for abdominal pain. All other systems reviewed and are negative. Physical Exam  Physical Exam  Vitals and nursing note reviewed. Constitutional:       General: She is not in acute distress. Appearance: She is well-developed. HENT:      Head: Normocephalic and atraumatic. Right Ear: External ear normal.      Left Ear: External ear normal.   Eyes:      Extraocular Movements: Extraocular movements intact. Pupils: Pupils are equal, round, and reactive to light. Cardiovascular:      Rate and Rhythm: Normal rate and regular rhythm. Heart sounds: No murmur heard. Pulmonary:      Effort: Pulmonary effort is normal. No respiratory distress. Breath sounds: Decreased breath sounds, wheezing and rhonchi present. Chest:      Chest wall: No tenderness. Abdominal:      General: Bowel sounds are normal.      Palpations: Abdomen is soft. There is no mass. Tenderness: There is no abdominal tenderness. There is no rebound. Hernia: No hernia is present. Musculoskeletal:      Cervical back: Normal range of motion and neck supple.       Right lower leg: No edema. Left lower leg: No edema. Skin:     General: Skin is warm and dry. Capillary Refill: Capillary refill takes less than 2 seconds. Neurological:      Mental Status: She is alert and oriented to person, place, and time.       Coordination: Coordination normal.   Psychiatric:         Behavior: Behavior normal.         Vital Signs  ED Triage Vitals   Temperature Pulse Respirations Blood Pressure SpO2   10/25/23 1231 10/25/23 1231 10/25/23 1231 10/25/23 1231 10/25/23 1231   97.9 °F (36.6 °C) 64 20 (!) 179/84 98 %      Temp Source Heart Rate Source Patient Position - Orthostatic VS BP Location FiO2 (%)   10/25/23 1231 10/25/23 1231 10/25/23 1449 10/25/23 1449 --   Temporal Monitor Lying Left arm       Pain Score       --                  Vitals:    10/25/23 1231 10/25/23 1245 10/25/23 1449   BP: (!) 179/84 (!) 179/84 117/62   Pulse: 64 62 61   Patient Position - Orthostatic VS:   Lying         Visual Acuity      ED Medications  Medications   albuterol inhalation solution 10 mg (10 mg Nebulization Given 10/25/23 1256)   ipratropium (ATROVENT) 0.02 % inhalation solution 1 mg (1 mg Nebulization Given 10/25/23 1256)   sodium chloride 0.9 % inhalation solution 12 mL (12 mL Nebulization Given 10/25/23 1256)   methylPREDNISolone sodium succinate (Solu-MEDROL) injection 125 mg (125 mg Intravenous Given 10/25/23 1309)   azithromycin (ZITHROMAX) tablet 500 mg (500 mg Oral Given 10/25/23 1451)       Diagnostic Studies  Results Reviewed       Procedure Component Value Units Date/Time    HS Troponin 0hr (reflex protocol) [[id number]]  (Normal) Collected: 10/25/23 1259    Lab Status: Final result Specimen: Blood from Arm, Right Updated: 10/25/23 1342     hs TnI 0hr 8 ng/L     Comprehensive metabolic panel [[id number]]  (Abnormal) Collected: 10/25/23 1259    Lab Status: Final result Specimen: Blood from Arm, Right Updated: 10/25/23 1337     Sodium 137 mmol/L      Potassium 4.0 mmol/L      Chloride 99 mmol/L      CO2 33 mmol/L      ANION GAP 5 mmol/L      BUN 9 mg/dL      Creatinine 0.64 mg/dL      Glucose 182 mg/dL      Calcium 9.3 mg/dL      AST 8 U/L      ALT 9 U/L      Alkaline Phosphatase 64 U/L      Total Protein 6.5 g/dL      Albumin 3.9 g/dL      Total Bilirubin 0.53 mg/dL      eGFR 93 ml/min/1.73sq m     Narrative:      [facility] guidelines for Chronic Kidney Disease (CKD):     Stage 1 with normal or high GFR (GFR > 90 mL/min/1.73 square meters)    Stage 2 Mild CKD (GFR = 60-89 mL/min/1.73 square meters)    Stage 3A Moderate CKD (GFR = 45-59 mL/min/1.73 square meters)    Stage 3B Moderate CKD (GFR = 30-44 mL/min/1.73 square meters)    Stage 4 Severe CKD (GFR = 15-29 mL/min/1.73 square meters)    Stage 5 End Stage CKD (GFR <15 mL/min/1.73 square meters)  Note: GFR calculation is accurate only with a steady state creatinine    Magnesium [[id number]]  (Normal) Collected: 10/25/23 1259    Lab Status: Final result Specimen: Blood from Arm, Right Updated: 10/25/23 1337     Magnesium 2.0 mg/dL     Lactic acid, plasma (w/reflex if result > 2.0) [[id number]]  (Normal) Collected: 10/25/23 1259    Lab Status: Final result Specimen: Blood from Arm, Right Updated: 10/25/23 1336     LACTIC ACID 0.7 mmol/L     Narrative:      Result may be elevated if tourniquet was used during collection.     Protime-INR [[id number]]  (Normal) Collected: 10/25/23 1259    Lab Status: Final result Specimen: Blood from Arm, Right Updated: 10/25/23 1330     Protime 11.9 seconds      INR 0.85    APTT [[id number]]  (Normal) Collected: 10/25/23 1259    Lab Status: Final result Specimen: Blood from Arm, Right Updated: 10/25/23 1330     PTT 25 seconds     CBC and differential [[id number]] Collected: 10/25/23 1259    Lab Status: Final result Specimen: Blood from Arm, Right Updated: 10/25/23 1313     WBC 8.80 Thousand/uL      RBC 4.16 Million/uL      Hemoglobin 12.6 g/dL      Hematocrit 38.0 %      MCV 91 fL      MCH 30.3 pg MCHC 33.2 g/dL      RDW 12.3 %      MPV 10.6 fL      Platelets 795 Thousands/uL      nRBC 0 /100 WBCs      Neutrophils Relative 64 %      Immat GRANS % 1 %      Lymphocytes Relative 24 %      Monocytes Relative 10 %      Eosinophils Relative 1 %      Basophils Relative 0 %      Neutrophils Absolute 5.68 Thousands/µL      Immature Grans Absolute 0.06 Thousand/uL      Lymphocytes Absolute 2.11 Thousands/µL      Monocytes Absolute 0.86 Thousand/µL      Eosinophils Absolute 0.06 Thousand/µL      Basophils Absolute 0.03 Thousands/µL                    XR chest 1 view portable    (Results Pending)              Procedures  ECG 12 Lead Documentation Only    Date/Time: 10/25/2023 1:09 PM    Performed by: Jac Champion PA-C  Authorized by: Jac Champion PA-C    Indications / Diagnosis:  Sob  ECG reviewed by me, the ED Provider: yes    Patient location:  ED  Previous ECG:     Previous ECG:  Compared to current    Comparison ECG info:  10/21, rate change but otherwise similar  Interpretation:     Interpretation: non-specific    Rate:     ECG rate:  59    ECG rate assessment: bradycardic    Rhythm:     Rhythm: sinus bradycardia             ED Course  ED Course as of 10/25/23 1554   Wed Oct 25, 2023   1343 hs TnI 0hr: 8                                             Medical Decision Making  Patient is a 53-year-old female with a past medical history of COPD on chronic oxygen therapy who presents with daughter for the concern of shortness of breath. Patient states that she was hospitalized 10/21-10/23 COPD exacerbation. The patient was then discharged home. Patient states that last evening and today she has had worsening shortness of breath. Having wheezing. Daughter is concerned. Patient is upcoming appointment next month with pulmonology. Patient did smoke a cigarette yesterday while at home. No fevers chills, nausea vomiting falls or traumas.   Patient did a nebulizer treatment at noon today prior to arrival.      On examination had decreased breath sounds and wheezing with rhonchi. Repeat lung examination after heart neb did demonstrate improved lung sounds with decreased wheezing. Patient was given IV Solu-Medrol while in the emergency department and 1 dose of azithromycin. X-ray of the chest did not reveal any changes. EKG did not demonstrate any acute ischemic changes. Patient was nontoxic-appearing and in no acute distress on her baseline 3 L nasal cannula oxygen. Patient unfortunately has been seen multiple times in the emergency department here over the last month. Most recently was discharged 1/23 for COPD exacerbation. She did stay from 10/21 to 10/23    Patient is not currently on prednisone or azithromycin. His next appointment with pulmonology is not until December but did see the patient in September. Patient continues to smoke cigarettes. I was able to schedule an outpatient follow-up appointment with the pulmonologist for November 1 at 10:40 AM.  Patient was educated to call and change the appointment if necessary. Patient was started back on prednisone and azithromycin to continue until she has her appointment. Also consulted care management for a portable oxygen concentrator however was at home the patient requires a prescription and then has to present this to a company for an assessment. I therefore called the primary care office and left a message requesting that the patient's PCP write this prescription for continuity of care and possible prior authorization needs    Differential included COPD, URI, pneumonia, ACS, anxiety   Patient expressed understanding and was in agreement with discharge and treatment plan. Amount and/or Complexity of Data Reviewed  Labs: ordered. Decision-making details documented in ED Course. Radiology: ordered. Decision-making details documented in ED Course. ECG/medicine tests: ordered.  Decision-making details documented in ED Course. Risk  Prescription drug management. Disposition  Final diagnoses:   COPD with acute exacerbation ([address])     Time reflects when diagnosis was documented in both MDM as applicable and the Disposition within this note       Time User Action Codes Description Comment    10/25/2023  1:24 PM Ramya Smalls Add [J44.1] COPD with acute exacerbation [facility])           ED Disposition       ED Disposition   Discharge    Condition   Stable    Date/Time   Wed Oct 25, 2023  2:11 PM    Comment   Jessika REID Viaalena discharge to home/self care. Follow-up Information       Follow up With Specialties Details Why [address], [address] [facility] Pulmonology, Nurse Practitioner Schedule an appointment as soon as possible for a visit   [address] [place] [address]  [phone]              Discharge Medication List as of 10/25/2023  2:24 PM        START taking these medications    Details   azithromycin (ZITHROMAX) 250 mg tablet Take 2 tablets today then 1 tablet daily x 4 days, Normal      !! predniSONE 10 mg tablet Multiple Dosages:Starting Wed 10/25/2023, Until Sun 10/29/2023 at 2359, THEN Starting Mon 10/30/2023, Until Fri 11/3/2023 at 2359, THEN Starting Sat 11/4/2023, Until Wed 11/8/2023 at 2359, THEN Starting Thu 11/9/2023, Until Mon 11/13/2023 at 2359Tak e 4 tablets (40 mg total) by mouth daily for 5 days, THEN 2 tablets (20 mg total) daily for 5 days, THEN 1 tablet (10 mg total) daily for 5 days, THEN 0.5 tablets (5 mg total) daily for 5 days. , Normal       !! - Potential duplicate medications found. Please discuss with provider.         CONTINUE these medications which have NOT CHANGED    Details   albuterol (2.5 mg/3 mL) 0.083 % nebulizer solution Take 3 mL (2.5 mg total) by nebulization every 6 (six) hours as needed for wheezing or shortness of breath, Starting Sun 8/6/2023, Normal      amLODIPine (NORVASC) 10 mg tablet Take 10 mg by mouth daily at bedtime , Historical Med      atorvastatin (LIPITOR) 40 mg tablet Take 40 mg by mouth daily at bedtime , Historical Med      cloNIDine (CATAPRES) 0.2 mg tablet Take 0.2 mg by mouth every 12 (twelve) hours, Historical Med      ergocalciferol (VITAMIN D2) 50,000 units Take 50,000 Units by mouth once a week On saturday, Historical Med      fluticasone (FLONASE) 50 mcg/act nasal spray 1 spray into each nostril daily, Starting Wed 6/28/2023, Normal      fluticasone-umeclidinium-vilanterol (Trelegy Ellipta) 100-62.5-25 mcg/actuation inhaler Inhale 1 puff daily Rinse mouth after use., Starting Mon 9/11/2023, Until Sat 10/21/2023, Normal      glipiZIDE (GLUCOTROL) 10 mg tablet Take 10 mg by mouth daily in the early morning W breakfast, Historical Med      hydrOXYzine HCL (ATARAX) 10 mg tablet Take 10 mg by mouth every 6 (six) hours as needed for itching, Historical Med      ipratropium-albuterol (DUO-NEB) 0.5-2.5 mg/3 mL nebulizer solution Take 3 mL by nebulization 3 (three) times a day, Starting Wed 9/27/2023, Normal      lisinopril (ZESTRIL) 40 mg tablet Take 40 mg by mouth daily, Historical Med      metoprolol tartrate (LOPRESSOR) 100 mg tablet Take 100 mg by mouth every 12 (twelve) hours, Historical Med      Nebulizers (Comp Air Compressor Nebulizer) MISC As directed, Starting Fri 7/28/2023, Historical Med      nicotine (NICODERM CQ) 7 mg/24hr TD 24 hr patch Place 1 patch on the skin over 24 hours daily Do not start before October 24, 2023., Starting Tue 10/24/2023, Normal      omeprazole (PriLOSEC OTC) 20 MG tablet Take 20 mg by mouth daily , Historical Med      pioglitazone (ACTOS) 15 mg tablet Take 15 mg by mouth daily, Historical Med      !! predniSONE 10 mg tablet Take 4 tabs daily for thee days then take three tabs daily for three days then take two tablets daily for three days then take one tab daily for three days. , Normal      sertraline (ZOLOFT) 25 mg tablet Take 25 mg by mouth daily, Historical Med       !! - Potential duplicate medications found. Please discuss with provider. No discharge procedures on file.     PDMP Review         Value Time User    PDMP Reviewed  Yes 9/13/2023  1:19 AM Loi , DO            ED Provider  Electronically Signed by             Claudette Vargas PA-C  10/25/23 7047

## 2023-10-25 NOTE — CASE MANAGEMENT
Case Management Discharge Planning Note    Patient name Max See  Location ED 10ED 10 MRN [id number]  : 1955 Date 10/25/2023       Current Admission Date: 10/25/2023  Current Admission Diagnosis:SOB (shortness of breath)   Patient Active Problem List    Diagnosis Date Noted    Chronic hypoxemic respiratory failure ([address]) 2023    COPD, severity to be determined ([address]) 2023    Lung nodule 2023    Abnormal CT scan 2023    Anxiety about health 2023    Bacteremia 2023    Vomiting 2023    COPD exacerbation ([address]) 2020    Acute on chronic respiratory failure ([address]) 2020    Acute hyponatremia 2020    Tobacco abuse 2020    Essential hypertension 2020    Type 2 diabetes mellitus without complication, without long-term current use of insulin ([address]) 2020      LOS (days): 0  Geometric Mean LOS (GMLOS) (days):   Days to GMLOS:     OBJECTIVE:            Current admission status: Emergency   Preferred Pharmacy:   [address] [place] [address] [address], [address] [address]  Phone: [phone] Fax: [address] [facility] #[id number] - [address] [address]  Phone: [phone] Fax: [phone]    Primary Care Provider: Catina Mariscal DO    Primary Insurance: [employer] [address] [place] [address]  Secondary Insurance: [place]    DISCHARGE DETAILS:        1300 Chart Review. Patient anxious dragging O2 tanks around, wants POC. CM encouraged ED to provide POC prescription for patient to take to [facility] to have them complete assessment for POC. CM went down to ED and patient was leaving. MD contacted PCP office and inquired if they would write POC prescription for patient.      BRIANNE left voice message for patient letting her know call was placed to PCP asking for POC prescription and encouraged patient to follow up with PCP or Pulm appt scheduled from ED.

## 2023-10-25 NOTE — DISCHARGE INSTRUCTIONS
November 1st at 10 : 40 am at the [facility] AT [place] office above     Please call and change this if needed

## 2023-10-27 ENCOUNTER — APPOINTMENT (EMERGENCY)
Dept: RADIOLOGY | Facility: HOSPITAL | Age: 68
End: 2023-10-27
Payer: COMMERCIAL

## 2023-10-27 ENCOUNTER — HOSPITAL ENCOUNTER (EMERGENCY)
Facility: HOSPITAL | Age: 68
Discharge: HOME | End: 2023-10-27
Attending: STUDENT IN AN ORGANIZED HEALTH CARE EDUCATION/TRAINING PROGRAM
Payer: COMMERCIAL

## 2023-10-27 VITALS
OXYGEN SATURATION: 93 % | SYSTOLIC BLOOD PRESSURE: 145 MMHG | TEMPERATURE: 98 F | HEIGHT: 62 IN | HEART RATE: 75 BPM | RESPIRATION RATE: 24 BRPM | DIASTOLIC BLOOD PRESSURE: 80 MMHG | BODY MASS INDEX: 30.22 KG/M2 | WEIGHT: 164.24 LBS

## 2023-10-27 DIAGNOSIS — J96.11 CHRONIC RESPIRATORY FAILURE WITH HYPOXIA (HCC): Primary | ICD-10-CM

## 2023-10-27 DIAGNOSIS — R06.02 SHORTNESS OF BREATH: ICD-10-CM

## 2023-10-27 DIAGNOSIS — Z87.09 HISTORY OF COPD: ICD-10-CM

## 2023-10-27 LAB
ATRIAL RATE: 67 BPM
P AXIS: 45 DEGREES
PR INTERVAL: 104 MS
QRS AXIS: 38 DEGREES
QRSD INTERVAL: 72 MS
QT INTERVAL: 376 MS
QTC INTERVAL: 397 MS
T WAVE AXIS: 38 DEGREES
VENTRICULAR RATE: 67 BPM

## 2023-10-27 PROCEDURE — 71045 X-RAY EXAM CHEST 1 VIEW: CPT

## 2023-10-27 PROCEDURE — 94640 AIRWAY INHALATION TREATMENT: CPT

## 2023-10-27 PROCEDURE — 93005 ELECTROCARDIOGRAM TRACING: CPT

## 2023-10-27 PROCEDURE — 99285 EMERGENCY DEPT VISIT HI MDM: CPT | Performed by: STUDENT IN AN ORGANIZED HEALTH CARE EDUCATION/TRAINING PROGRAM

## 2023-10-27 PROCEDURE — 99285 EMERGENCY DEPT VISIT HI MDM: CPT

## 2023-10-27 RX ORDER — IPRATROPIUM BROMIDE AND ALBUTEROL SULFATE 2.5; .5 MG/3ML; MG/3ML
3 SOLUTION RESPIRATORY (INHALATION) ONCE
Status: COMPLETED | OUTPATIENT
Start: 2023-10-27 | End: 2023-10-27

## 2023-10-27 RX ADMIN — IPRATROPIUM BROMIDE AND ALBUTEROL SULFATE 3 ML: 2.5; .5 SOLUTION RESPIRATORY (INHALATION) at 12:37

## 2023-10-27 NOTE — ED PROVIDER NOTES
History  Chief Complaint   Patient presents with    Shortness of Breath     Pt. Voiced started this morning and getting worse        History provided by:  Patient and medical records  Shortness of Breath  Severity:  Moderate  Onset quality:  Gradual  Duration:  1 month  Timing:  Intermittent  Progression:  Waxing and waning  Chronicity:  Recurrent  Context comment:  Hx of COPD. On 3 lpm O2 at baseline. Active smoker although she states that she hasn't smoked x 2 days. Evaluated in the ED multiple times within the last 3 wks. Has Pulm f/u next week. On pred/zithromax. Relieved by:  Nothing  Worsened by:  Exertion and activity  Ineffective treatments: O2, prednisone, abx, hydroxizine, nebulizer. Associated symptoms: cough and sputum production    Associated symptoms: no abdominal pain, no chest pain, no fever, no headaches, no hemoptysis, no neck pain, no rash, no sore throat, no vomiting and no wheezing      Prior to Admission Medications   Prescriptions Last Dose Informant Patient Reported? Taking?    Nebulizers (Comp Air Compressor Nebulizer) MISC   Yes No   Sig: As directed   albuterol (2.5 mg/3 mL) 0.083 % nebulizer solution   No No   Sig: Take 3 mL (2.5 mg total) by nebulization every 6 (six) hours as needed for wheezing or shortness of breath   Patient taking differently: Take 530 mg by nebulization every 4 (four) hours as needed for wheezing or shortness of breath   amLODIPine (NORVASC) 10 mg tablet   Yes No   Sig: Take 10 mg by mouth daily at bedtime    atorvastatin (LIPITOR) 40 mg tablet   Yes No   Sig: Take 40 mg by mouth daily at bedtime    azithromycin (ZITHROMAX) 250 mg tablet   No No   Sig: Take 2 tablets today then 1 tablet daily x 4 days   cloNIDine (CATAPRES) 0.2 mg tablet   Yes No   Sig: Take 0.2 mg by mouth every 12 (twelve) hours   ergocalciferol (VITAMIN D2) 50,000 units   Yes No   Sig: Take 50,000 Units by mouth once a week On saturday   fluticasone (FLONASE) 50 mcg/act nasal spray   No No Si spray into each nostril daily   fluticasone-umeclidinium-vilanterol (Trelegy Ellipta) 100-62.5-25 mcg/actuation inhaler   No No   Sig: Inhale 1 puff daily Rinse mouth after use. glipiZIDE (GLUCOTROL) 10 mg tablet  Self Yes No   Sig: Take 10 mg by mouth daily in the early morning W breakfast   hydrOXYzine HCL (ATARAX) 10 mg tablet   Yes No   Sig: Take 10 mg by mouth every 6 (six) hours as needed for itching   ipratropium-albuterol (DUO-NEB) 0.5-2.5 mg/3 mL nebulizer solution   No No   Sig: Take 3 mL by nebulization 3 (three) times a day   lisinopril (ZESTRIL) 40 mg tablet   Yes No   Sig: Take 40 mg by mouth daily   metoprolol tartrate (LOPRESSOR) 100 mg tablet   Yes No   Sig: Take 100 mg by mouth every 12 (twelve) hours   nicotine (NICODERM CQ) 7 mg/24hr TD 24 hr patch   No No   Sig: Place 1 patch on the skin over 24 hours daily Do not start before 2023. omeprazole (PriLOSEC OTC) 20 MG tablet   Yes No   Sig: Take 20 mg by mouth daily    pioglitazone (ACTOS) 15 mg tablet   Yes No   Sig: Take 15 mg by mouth daily   predniSONE 10 mg tablet   No No   Sig: Take 4 tabs daily for thee days then take three tabs daily for three days then take two tablets daily for three days then take one tab daily for three days. predniSONE 10 mg tablet   No No   Sig: Take 4 tablets (40 mg total) by mouth daily for 5 days, THEN 2 tablets (20 mg total) daily for 5 days, THEN 1 tablet (10 mg total) daily for 5 days, THEN 0.5 tablets (5 mg total) daily for 5 days. sertraline (ZOLOFT) 25 mg tablet   Yes No   Sig: Take 25 mg by mouth daily      Facility-Administered Medications: None       Past Medical History:   Diagnosis Date    Anxiety     COPD (chronic obstructive pulmonary disease) ([facility])     Diabetes mellitus ([address] )     Essential (primary) hypertension     GERD (gastroesophageal reflux disease)     Smoker        History reviewed. No pertinent surgical history.     Family History   Problem Relation Age of Onset Diabetes Father      I have reviewed and agree with the history as documented. E-Cigarette/Vaping    E-Cigarette Use Never User      E-Cigarette/Vaping Substances    Nicotine No     THC No     CBD No     Flavoring No      Social History     Tobacco Use    Smoking status: Every Day     Packs/day: 0.50     Types: Cigarettes     Passive exposure: Never    Smokeless tobacco: Never   Vaping Use    Vaping Use: Never used   Substance Use Topics    Alcohol use: Not Currently    Drug use: Never       Review of Systems   Constitutional:  Negative for activity change, appetite change, chills, fatigue and fever. HENT:  Negative for congestion, rhinorrhea, sinus pressure, sinus pain and sore throat. Eyes:  Negative for pain, discharge, redness and itching. Respiratory:  Positive for cough, sputum production and shortness of breath. Negative for hemoptysis, chest tightness and wheezing. Cardiovascular:  Negative for chest pain, palpitations and leg swelling. Gastrointestinal:  Negative for abdominal distention, abdominal pain, diarrhea, nausea and vomiting. Musculoskeletal:  Negative for back pain, myalgias, neck pain and neck stiffness. Skin:  Negative for color change, pallor, rash and wound. Neurological:  Negative for dizziness, syncope, weakness, light-headedness and headaches. Hematological:  Does not bruise/bleed easily. All other systems reviewed and are negative. Physical Exam  Physical Exam  Vitals and nursing note reviewed. Constitutional:       General: She is not in acute distress. Appearance: She is ill-appearing. She is not toxic-appearing. Comments: Chronically ill appearing     HENT:      Head: Normocephalic and atraumatic. Eyes:      Extraocular Movements: Extraocular movements intact. Cardiovascular:      Rate and Rhythm: Normal rate and regular rhythm. Pulses: Normal pulses. Heart sounds: No murmur heard.   Pulmonary:      Effort: Accessory muscle usage present. No respiratory distress. Breath sounds: No stridor. Examination of the right-middle field reveals decreased breath sounds. Examination of the left-middle field reveals decreased breath sounds. Examination of the right-lower field reveals decreased breath sounds. Examination of the left-lower field reveals decreased breath sounds. Decreased breath sounds present. No wheezing, rhonchi or rales. Chest:      Chest wall: No tenderness. Abdominal:      General: Bowel sounds are normal.      Tenderness: There is no abdominal tenderness. There is no guarding or rebound. Musculoskeletal:         General: Normal range of motion. Cervical back: Normal range of motion and neck supple. Right lower leg: No tenderness. No edema. Left lower leg: No tenderness. No edema. Skin:     General: Skin is warm and dry. Capillary Refill: Capillary refill takes less than 2 seconds. Coloration: Skin is not cyanotic or pale. Findings: No ecchymosis, erythema or rash. Nails: There is no clubbing. Neurological:      General: No focal deficit present. Mental Status: She is alert and oriented to person, place, and time. Cranial Nerves: No cranial nerve deficit. Motor: No weakness. Psychiatric:         Mood and Affect: Mood normal. Mood is not anxious. Behavior: Behavior normal. Behavior is not agitated.          Vital Signs  ED Triage Vitals [10/27/23 1155]   Temperature Pulse Respirations Blood Pressure SpO2   98 °F (36.7 °C) 75 (!) 24 145/80 93 %      Temp Source Heart Rate Source Patient Position - Orthostatic VS BP Location FiO2 (%)   Temporal Monitor -- -- --      Pain Score       No Pain           Vitals:    10/27/23 1155   BP: 145/80   Pulse: 75         Visual Acuity      ED Medications  Medications   ipratropium-albuterol (DUO-NEB) 0.5-2.5 mg/3 mL inhalation solution 3 mL (3 mL Nebulization Given 10/27/23 1237)       Diagnostic Studies  Results Reviewed None                   XR chest 1 view portable   ED Interpretation by Kimberly Tarango DO (10/27 1250)   No acute findings                 Procedures  ECG 12 Lead Documentation Only    Date/Time: 10/27/2023 12:09 PM    Performed by: Kimberly Tarango DO  Authorized by: Kimberly Tarango DO    Indications / Diagnosis:  Shortness of breath  Patient location:  ED  Previous ECG:     Previous ECG:  Compared to current    Comparison ECG info:  10/25/2023    Similarity:  No change  Interpretation:     Interpretation: normal    Rate:     ECG rate:  67    ECG rate assessment: normal    Rhythm:     Rhythm: sinus rhythm    Ectopy:     Ectopy: none    QRS:     QRS axis:  Normal    QRS intervals:  Normal  Conduction:     Conduction: normal    ST segments:     ST segments:  Normal  T waves:     T waves: normal             ED Course  ED Course as of 10/27/23 1526   Fri Oct 27, 2023   1208 ECG showing normal sinus rhythm. Heart rate 67 bpm.  Normal axis. No acute ischemic changes. 1250 CXR without acute findings. 1359 Upon reevaluation, the patient's lung sounds are improved status post DuoNeb treatment. SBIRT 20yo+      Flowsheet Row Most Recent Value   Initial Alcohol Screen: US AUDIT-C     1. How often do you have a drink containing alcohol? 0 Filed at: 10/27/2023 1223   2. How many drinks containing alcohol do you have on a typical day you are drinking? 0 Filed at: 10/27/2023 1223   3b. FEMALE Any Age, or MALE 65+: How often do you have 4 or more drinks on one occassion? 0 Filed at: 10/27/2023 1223   Audit-C Score 0 Filed at: 10/27/2023 1223   LIVIER: How many times in the past year have you. .. Used an illegal drug or used a prescription medication for non-medical reasons? Never Filed at: 10/27/2023 1223                      Medical Decision Making  The differential diagnoses include but are not limited to PE, COPD exacerbation, PNA, anxiety, bronchitis, ACS  Vital signs reviewed.  No increased oxygen demand compared to baseline. Currently on a course of prednisone/azithromycin for tx of COPD exacerbation. Has pulmonology f/u next week. CXR without acute findings. Lung sounds/subjective dyspnea improved s/p Duoneb treatment. Stable for discharge. Problems Addressed:  Chronic respiratory failure with hypoxia ([address]): chronic illness or injury  History of COPD: chronic illness or injury  Shortness of breath: acute illness or injury    Amount and/or Complexity of Data Reviewed  External Data Reviewed: labs, radiology, ECG and notes. Radiology: ordered and independent interpretation performed. Decision-making details documented in ED Course. ECG/medicine tests: ordered and independent interpretation performed. Decision-making details documented in ED Course. Risk  Prescription drug management. Disposition  Final diagnoses:   Chronic respiratory failure with hypoxia (HCC)   History of COPD   Shortness of breath     Time reflects when diagnosis was documented in both MDM as applicable and the Disposition within this note       Time User Action Codes Description Comment    10/27/2023  2:03 PM Isak Carrasco Add [J96.11] Chronic respiratory failure with hypoxia ([address])     10/27/2023  2:04 PM Isak Carrasco Add [Z87.09] History of COPD     10/27/2023  2:04 PM Isak Carrasco Add [R06.02] Shortness of breath           ED Disposition       ED Disposition   Discharge    Condition   Stable    Date/Time   Fri Oct 27, 2023 1404    Comment   Jesskia REID Viaalena discharge to home/self care.                    Follow-up Information    None         Discharge Medication List as of 10/27/2023  2:04 PM        CONTINUE these medications which have NOT CHANGED    Details   albuterol (2.5 mg/3 mL) 0.083 % nebulizer solution Take 3 mL (2.5 mg total) by nebulization every 6 (six) hours as needed for wheezing or shortness of breath, Starting Sun 8/6/2023, Normal      amLODIPine (NORVASC) 10 mg tablet Take 10 mg by mouth daily at bedtime , Historical Med      atorvastatin (LIPITOR) 40 mg tablet Take 40 mg by mouth daily at bedtime , Historical Med      azithromycin (ZITHROMAX) 250 mg tablet Take 2 tablets today then 1 tablet daily x 4 days, Normal      cloNIDine (CATAPRES) 0.2 mg tablet Take 0.2 mg by mouth every 12 (twelve) hours, Historical Med      ergocalciferol (VITAMIN D2) 50,000 units Take 50,000 Units by mouth once a week On saturday, Historical Med      fluticasone (FLONASE) 50 mcg/act nasal spray 1 spray into each nostril daily, Starting Wed 6/28/2023, Normal      fluticasone-umeclidinium-vilanterol (Trelegy Ellipta) 100-62.5-25 mcg/actuation inhaler Inhale 1 puff daily Rinse mouth after use., Starting Mon 9/11/2023, Until Sat 10/21/2023, Normal      glipiZIDE (GLUCOTROL) 10 mg tablet Take 10 mg by mouth daily in the early morning W breakfast, Historical Med      hydrOXYzine HCL (ATARAX) 10 mg tablet Take 10 mg by mouth every 6 (six) hours as needed for itching, Historical Med      ipratropium-albuterol (DUO-NEB) 0.5-2.5 mg/3 mL nebulizer solution Take 3 mL by nebulization 3 (three) times a day, Starting Wed 9/27/2023, Normal      lisinopril (ZESTRIL) 40 mg tablet Take 40 mg by mouth daily, Historical Med      metoprolol tartrate (LOPRESSOR) 100 mg tablet Take 100 mg by mouth every 12 (twelve) hours, Historical Med      Nebulizers (Comp Air Compressor Nebulizer) MISC As directed, Starting Fri 7/28/2023, Historical Med      nicotine (NICODERM CQ) 7 mg/24hr TD 24 hr patch Place 1 patch on the skin over 24 hours daily Do not start before October 24, 2023., Starting Tue 10/24/2023, Normal      omeprazole (PriLOSEC OTC) 20 MG tablet Take 20 mg by mouth daily , Historical Med      pioglitazone (ACTOS) 15 mg tablet Take 15 mg by mouth daily, Historical Med      !! predniSONE 10 mg tablet Take 4 tabs daily for thee days then take three tabs daily for three days then take two tablets daily for three days then take one tab daily for three days. , Normal      !! predniSONE 10 mg tablet Multiple Dosages:Starting Wed 10/25/2023, Until Sun 10/29/2023 at 2359, THEN Starting Mon 10/30/2023, Until Fri 11/3/2023 at 2359, THEN Starting Sat 11/4/2023, Until Wed 11/8/2023 at 2359, THEN Starting Thu 11/9/2023, Until Mon 11/13/2023 at 2359Tak e 4 tablets (40 mg total) by mouth daily for 5 days, THEN 2 tablets (20 mg total) daily for 5 days, THEN 1 tablet (10 mg total) daily for 5 days, THEN 0.5 tablets (5 mg total) daily for 5 days. , Normal      sertraline (ZOLOFT) 25 mg tablet Take 25 mg by mouth daily, Historical Med       !! - Potential duplicate medications found. Please discuss with provider. No discharge procedures on file.     PDMP Review         Value Time User    PDMP Reviewed  Yes 9/13/2023  1:19 AM Jefry Chen DO            ED Provider  Electronically Signed by             Carissa Barney DO  10/27/23 1526

## 2023-10-27 NOTE — DISCHARGE INSTRUCTIONS
The x-ray that was obtained did not show any significant changes. Continue all prescribed medications. Follow-up with pulmonology. Return to the emergency department for any concerning signs or symptoms.

## 2023-10-29 ENCOUNTER — APPOINTMENT (EMERGENCY)
Dept: RADIOLOGY | Facility: HOSPITAL | Age: 68
End: 2023-10-29
Payer: COMMERCIAL

## 2023-10-29 ENCOUNTER — HOSPITAL ENCOUNTER (EMERGENCY)
Facility: HOSPITAL | Age: 68
Discharge: HOME | End: 2023-10-29
Attending: EMERGENCY MEDICINE | Admitting: EMERGENCY MEDICINE
Payer: COMMERCIAL

## 2023-10-29 VITALS
RESPIRATION RATE: 24 BRPM | OXYGEN SATURATION: 99 % | TEMPERATURE: 97.8 F | DIASTOLIC BLOOD PRESSURE: 66 MMHG | SYSTOLIC BLOOD PRESSURE: 152 MMHG | HEART RATE: 72 BPM | BODY MASS INDEX: 29.92 KG/M2 | WEIGHT: 163.58 LBS

## 2023-10-29 DIAGNOSIS — J44.1 COPD EXACERBATION (HCC): Primary | ICD-10-CM

## 2023-10-29 DIAGNOSIS — F41.9 ANXIETY: ICD-10-CM

## 2023-10-29 LAB
2HR DELTA HS TROPONIN: 0 NG/L
ALBUMIN SERPL BCP-MCNC: 4.2 G/DL (ref 3.5–5)
ALP SERPL-CCNC: 63 U/L (ref 34–104)
ALT SERPL W P-5'-P-CCNC: 9 U/L (ref 7–52)
ANION GAP SERPL CALCULATED.3IONS-SCNC: 10 MMOL/L
APTT PPP: 21 SECONDS (ref 23–37)
AST SERPL W P-5'-P-CCNC: 9 U/L (ref 13–39)
BASOPHILS # BLD MANUAL: 0 THOUSAND/UL (ref 0–0.1)
BASOPHILS NFR MAR MANUAL: 0 % (ref 0–1)
BILIRUB SERPL-MCNC: 0.46 MG/DL (ref 0.2–1)
BUN SERPL-MCNC: 14 MG/DL (ref 5–25)
CALCIUM SERPL-MCNC: 9.3 MG/DL (ref 8.4–10.2)
CARDIAC TROPONIN I PNL SERPL HS: 9 NG/L
CARDIAC TROPONIN I PNL SERPL HS: 9 NG/L
CHLORIDE SERPL-SCNC: 93 MMOL/L (ref 96–108)
CO2 SERPL-SCNC: 30 MMOL/L (ref 21–32)
CREAT SERPL-MCNC: 0.9 MG/DL (ref 0.6–1.3)
EOSINOPHIL # BLD MANUAL: 0 THOUSAND/UL (ref 0–0.4)
EOSINOPHIL NFR BLD MANUAL: 0 % (ref 0–6)
ERYTHROCYTE [DISTWIDTH] IN BLOOD BY AUTOMATED COUNT: 12.5 % (ref 11.6–15.1)
GFR SERPL CREATININE-BSD FRML MDRD: 66 ML/MIN/1.73SQ M
GLUCOSE SERPL-MCNC: 468 MG/DL (ref 65–140)
HCT VFR BLD AUTO: 41.6 % (ref 34.8–46.1)
HGB BLD-MCNC: 13.6 G/DL (ref 11.5–15.4)
INR PPP: 0.84 (ref 0.84–1.19)
LYMPHOCYTES # BLD AUTO: 0.98 THOUSAND/UL (ref 0.6–4.47)
LYMPHOCYTES # BLD AUTO: 7 % (ref 14–44)
MCH RBC QN AUTO: 30 PG (ref 26.8–34.3)
MCHC RBC AUTO-ENTMCNC: 32.7 G/DL (ref 31.4–37.4)
MCV RBC AUTO: 92 FL (ref 82–98)
MONOCYTES # BLD AUTO: 0.28 THOUSAND/UL (ref 0–1.22)
MONOCYTES NFR BLD: 2 % (ref 4–12)
NEUTROPHILS # BLD MANUAL: 12.69 THOUSAND/UL (ref 1.85–7.62)
NEUTS SEG NFR BLD AUTO: 91 % (ref 43–75)
PLATELET # BLD AUTO: 233 THOUSANDS/UL (ref 149–390)
PLATELET BLD QL SMEAR: ADEQUATE
PMV BLD AUTO: 11.3 FL (ref 8.9–12.7)
POTASSIUM SERPL-SCNC: 3.8 MMOL/L (ref 3.5–5.3)
PROT SERPL-MCNC: 7 G/DL (ref 6.4–8.4)
PROTHROMBIN TIME: 11.8 SECONDS (ref 11.6–14.5)
RBC # BLD AUTO: 4.54 MILLION/UL (ref 3.81–5.12)
RBC MORPH BLD: NORMAL
SODIUM SERPL-SCNC: 133 MMOL/L (ref 135–147)
WBC # BLD AUTO: 13.95 THOUSAND/UL (ref 4.31–10.16)

## 2023-10-29 PROCEDURE — 84484 ASSAY OF TROPONIN QUANT: CPT | Performed by: PHYSICIAN ASSISTANT

## 2023-10-29 PROCEDURE — 85027 COMPLETE CBC AUTOMATED: CPT | Performed by: PHYSICIAN ASSISTANT

## 2023-10-29 PROCEDURE — 85730 THROMBOPLASTIN TIME PARTIAL: CPT | Performed by: PHYSICIAN ASSISTANT

## 2023-10-29 PROCEDURE — 71045 X-RAY EXAM CHEST 1 VIEW: CPT

## 2023-10-29 PROCEDURE — 36415 COLL VENOUS BLD VENIPUNCTURE: CPT | Performed by: PHYSICIAN ASSISTANT

## 2023-10-29 PROCEDURE — 96374 THER/PROPH/DIAG INJ IV PUSH: CPT

## 2023-10-29 PROCEDURE — 85610 PROTHROMBIN TIME: CPT | Performed by: PHYSICIAN ASSISTANT

## 2023-10-29 PROCEDURE — 94640 AIRWAY INHALATION TREATMENT: CPT

## 2023-10-29 PROCEDURE — 99285 EMERGENCY DEPT VISIT HI MDM: CPT | Performed by: PHYSICIAN ASSISTANT

## 2023-10-29 PROCEDURE — 99285 EMERGENCY DEPT VISIT HI MDM: CPT

## 2023-10-29 PROCEDURE — 85007 BL SMEAR W/DIFF WBC COUNT: CPT | Performed by: PHYSICIAN ASSISTANT

## 2023-10-29 PROCEDURE — 80053 COMPREHEN METABOLIC PANEL: CPT | Performed by: PHYSICIAN ASSISTANT

## 2023-10-29 PROCEDURE — 93005 ELECTROCARDIOGRAM TRACING: CPT

## 2023-10-29 RX ORDER — LORAZEPAM 2 MG/ML
0.5 INJECTION INTRAMUSCULAR ONCE
Status: COMPLETED | OUTPATIENT
Start: 2023-10-29 | End: 2023-10-29

## 2023-10-29 RX ORDER — IPRATROPIUM BROMIDE AND ALBUTEROL SULFATE 2.5; .5 MG/3ML; MG/3ML
3 SOLUTION RESPIRATORY (INHALATION) ONCE
Status: COMPLETED | OUTPATIENT
Start: 2023-10-29 | End: 2023-10-29

## 2023-10-29 RX ADMIN — IPRATROPIUM BROMIDE AND ALBUTEROL SULFATE 3 ML: 2.5; .5 SOLUTION RESPIRATORY (INHALATION) at 12:09

## 2023-10-29 RX ADMIN — LORAZEPAM 0.5 MG: 2 INJECTION INTRAMUSCULAR; INTRAVENOUS at 12:09

## 2023-10-29 RX ADMIN — SODIUM CHLORIDE 500 ML: 0.9 INJECTION, SOLUTION INTRAVENOUS at 14:05

## 2023-10-29 NOTE — DISCHARGE INSTRUCTIONS
Please continue taking medications as prescribed.   Follow-up with your family doctor and your pulmonologist.  Return with new or worsening symptoms  Patiently be aware that your blood sugar was elevated today and this should be also rechecked by your family doctor as well as checked at home

## 2023-10-29 NOTE — ED PROVIDER NOTES
History  Chief Complaint   Patient presents with    Shortness of Breath     Pt states she became anxious last night and developed a more difficult time breathing. On 3 L oxygen at home for copd. Pt took hydroxyzine meds at 1030 with no relief. Denies cp     28-year-old female presents the emergency department for evaluation of shortness of breath. States this started last night but worsened today. Reports she has history of COPD has been using her breathing treatments at home without significant improvement of symptoms. Patient is a current everyday smoker however states she did not smoke today due to the shortness of breath. She wears 3 L nasal cannula at baseline. Patient with multiple visits for the same. Daughter at bedside who states she believes shortness of breath is due to patient's anxiety. Reports when patient is alone she becomes anxious and her shortness of breath worsens. Asked the patient about this and patient states she is taking her anxiety medications does feel anxious when she is alone. Patient admits to a chronic dry cough. She denies congestion. She denies fevers or chills. Denies leg swelling. She denies any chest pain or palpitations. Prior to Admission Medications   Prescriptions Last Dose Informant Patient Reported? Taking?    Nebulizers (Comp Air Compressor Nebulizer) MISC   Yes No   Sig: As directed   albuterol (2.5 mg/3 mL) 0.083 % nebulizer solution   No No   Sig: Take 3 mL (2.5 mg total) by nebulization every 6 (six) hours as needed for wheezing or shortness of breath   Patient taking differently: Take 530 mg by nebulization every 4 (four) hours as needed for wheezing or shortness of breath   amLODIPine (NORVASC) 10 mg tablet   Yes No   Sig: Take 10 mg by mouth daily at bedtime    atorvastatin (LIPITOR) 40 mg tablet   Yes No   Sig: Take 40 mg by mouth daily at bedtime    azithromycin (ZITHROMAX) 250 mg tablet   No No   Sig: Take 2 tablets today then 1 tablet daily x 4 days   cloNIDine (CATAPRES) 0.2 mg tablet   Yes No   Sig: Take 0.2 mg by mouth every 12 (twelve) hours   ergocalciferol (VITAMIN D2) 50,000 units   Yes No   Sig: Take 50,000 Units by mouth once a week On saturday   fluticasone (FLONASE) 50 mcg/act nasal spray   No No   Si spray into each nostril daily   fluticasone-umeclidinium-vilanterol (Trelegy Ellipta) 100-62.5-25 mcg/actuation inhaler   No No   Sig: Inhale 1 puff daily Rinse mouth after use. glipiZIDE (GLUCOTROL) 10 mg tablet  Self Yes No   Sig: Take 10 mg by mouth daily in the early morning W breakfast   hydrOXYzine HCL (ATARAX) 10 mg tablet   Yes No   Sig: Take 10 mg by mouth every 6 (six) hours as needed for itching   ipratropium-albuterol (DUO-NEB) 0.5-2.5 mg/3 mL nebulizer solution   No No   Sig: Take 3 mL by nebulization 3 (three) times a day   lisinopril (ZESTRIL) 40 mg tablet   Yes No   Sig: Take 40 mg by mouth daily   metoprolol tartrate (LOPRESSOR) 100 mg tablet   Yes No   Sig: Take 100 mg by mouth every 12 (twelve) hours   nicotine (NICODERM CQ) 7 mg/24hr TD 24 hr patch   No No   Sig: Place 1 patch on the skin over 24 hours daily Do not start before 2023. omeprazole (PriLOSEC OTC) 20 MG tablet   Yes No   Sig: Take 20 mg by mouth daily    pioglitazone (ACTOS) 15 mg tablet   Yes No   Sig: Take 15 mg by mouth daily   predniSONE 10 mg tablet   No No   Sig: Take 4 tabs daily for thee days then take three tabs daily for three days then take two tablets daily for three days then take one tab daily for three days. predniSONE 10 mg tablet   No No   Sig: Take 4 tablets (40 mg total) by mouth daily for 5 days, THEN 2 tablets (20 mg total) daily for 5 days, THEN 1 tablet (10 mg total) daily for 5 days, THEN 0.5 tablets (5 mg total) daily for 5 days.    sertraline (ZOLOFT) 25 mg tablet   Yes No   Sig: Take 25 mg by mouth daily      Facility-Administered Medications: None       Past Medical History:   Diagnosis Date    Anxiety     COPD (chronic obstructive pulmonary disease) (HCC)     Diabetes mellitus ([address])     Essential (primary) hypertension     GERD (gastroesophageal reflux disease)     Smoker        History reviewed. No pertinent surgical history. Family History   Problem Relation Age of Onset    Diabetes Father      I have reviewed and agree with the history as documented. E-Cigarette/Vaping    E-Cigarette Use Never User      E-Cigarette/Vaping Substances    Nicotine No     THC No     CBD No     Flavoring No      Social History     Tobacco Use    Smoking status: Every Day     Packs/day: 0.50     Types: Cigarettes     Passive exposure: Never    Smokeless tobacco: Never   Vaping Use    Vaping Use: Never used   Substance Use Topics    Alcohol use: Not Currently    Drug use: Never       Review of Systems   Constitutional:  Negative for appetite change. Respiratory:  Positive for cough, shortness of breath and wheezing. Negative for stridor. Cardiovascular:  Negative for chest pain, palpitations and leg swelling. Gastrointestinal: Negative. Musculoskeletal: Negative. Neurological: Negative. Psychiatric/Behavioral:  The patient is nervous/anxious. All other systems reviewed and are negative. Physical Exam  Physical Exam  Vitals and nursing note reviewed. Constitutional:       General: She is not in acute distress. Appearance: She is well-developed. She is not ill-appearing, toxic-appearing or diaphoretic. HENT:      Head: Normocephalic. Mouth/Throat:      Mouth: Mucous membranes are moist.   Eyes:      Pupils: Pupils are equal, round, and reactive to light. Cardiovascular:      Rate and Rhythm: Normal rate and regular rhythm. Pulmonary:      Effort: Pulmonary effort is normal.      Breath sounds: Decreased breath sounds and wheezing present. No rhonchi or rales. Chest:      Chest wall: No mass or tenderness. Abdominal:      Palpations: Abdomen is soft.    Musculoskeletal:         General: Normal range of motion. Cervical back: Normal range of motion and neck supple. Right lower leg: No tenderness. No edema. Left lower leg: No tenderness. No edema. Skin:     General: Skin is warm and dry. Neurological:      General: No focal deficit present. Mental Status: She is alert. Psychiatric:         Mood and Affect: Mood is anxious.          Vital Signs  ED Triage Vitals   Temperature Pulse Respirations Blood Pressure SpO2   10/29/23 1130 10/29/23 1131 10/29/23 1131 10/29/23 1131 10/29/23 1131   97.8 °F (36.6 °C) 56 (!) 24 162/80 95 %      Temp Source Heart Rate Source Patient Position - Orthostatic VS BP Location FiO2 (%)   10/29/23 1130 10/29/23 1145 10/29/23 1145 10/29/23 1145 --   Temporal Monitor Sitting Right arm       Pain Score       10/29/23 1130       No Pain           Vitals:    10/29/23 1354 10/29/23 1356 10/29/23 1400 10/29/23 1430   BP: 108/58 108/58 108/58 152/66   Pulse: 75  73 72   Patient Position - Orthostatic VS: Sitting            Visual Acuity      ED Medications  Medications   ipratropium-albuterol (DUO-NEB) 0.5-2.5 mg/3 mL inhalation solution 3 mL (3 mL Nebulization Given 10/29/23 1209)   LORazepam (ATIVAN) injection 0.5 mg (0.5 mg Intravenous Given 10/29/23 1209)   sodium chloride 0.9 % bolus 500 mL (0 mL Intravenous Stopped 10/29/23 1432)       Diagnostic Studies  Results Reviewed       Procedure Component Value Units Date/Time    HS Troponin I 2hr [[id number]]  (Normal) Collected: 10/29/23 1404    Lab Status: Final result Specimen: Blood from Arm, Left Updated: 10/29/23 1432     hs TnI 2hr 9 ng/L      Delta 2hr hsTnI 0 ng/L     HS Troponin I 4hr [[id number]]     Lab Status: No result Specimen: Blood     Manual Differential(PHLEBS Do Not Order) [[id number]]  (Abnormal) Collected: 10/29/23 1210    Lab Status: Final result Specimen: Blood from Arm, Left Updated: 10/29/23 1249     Segmented % 91 %      Lymphocytes % 7 %      Monocytes % 2 %      Eosinophils, % 0 % Basophils % 0 %      Absolute Neutrophils 12.69 Thousand/uL      Lymphocytes Absolute 0.98 Thousand/uL      Monocytes Absolute 0.28 Thousand/uL      Eosinophils Absolute 0.00 Thousand/uL      Basophils Absolute 0.00 Thousand/uL      Total Counted --     RBC Morphology Normal     Platelet Estimate Adequate    HS Troponin 0hr (reflex protocol) [[id number]]  (Normal) Collected: 10/29/23 1210    Lab Status: Final result Specimen: Blood from Arm, Left Updated: 10/29/23 1240     hs TnI 0hr 9 ng/L     Comprehensive metabolic panel [[id number]]  (Abnormal) Collected: 10/29/23 1210    Lab Status: Final result Specimen: Blood from Arm, Left Updated: 10/29/23 1236     Sodium 133 mmol/L      Potassium 3.8 mmol/L      Chloride 93 mmol/L      CO2 30 mmol/L      ANION GAP 10 mmol/L      BUN 14 mg/dL      Creatinine 0.90 mg/dL      Glucose 468 mg/dL      Calcium 9.3 mg/dL      AST 9 U/L      ALT 9 U/L      Alkaline Phosphatase 63 U/L      Total Protein 7.0 g/dL      Albumin 4.2 g/dL      Total Bilirubin 0.46 mg/dL      eGFR 66 ml/min/1.73sq m     Narrative:      [facility] (CKD):     Stage 1 with normal or high GFR (GFR > 90 mL/min/1.73 square meters)    Stage 2 Mild CKD (GFR = 60-89 mL/min/1.73 square meters)    Stage 3A Moderate CKD (GFR = 45-59 mL/min/1.73 square meters)    Stage 3B Moderate CKD (GFR = 30-44 mL/min/1.73 square meters)    Stage 4 Severe CKD (GFR = 15-29 mL/min/1.73 square meters)    Stage 5 End Stage CKD (GFR <15 mL/min/1.73 square meters)  Note: GFR calculation is accurate only with a steady state creatinine    Protime-INR [[id number]]  (Normal) Collected: 10/29/23 1210    Lab Status: Final result Specimen: Blood from Arm, Left Updated: 10/29/23 1231     Protime 11.8 seconds      INR 0.84    APTT [[id number]]  (Abnormal) Collected: 10/29/23 1210    Lab Status: Final result Specimen: Blood from Arm, Left Updated: 10/29/23 1231     PTT 21 seconds     CBC and differential [[id number]]  (Abnormal) Collected: 10/29/23 1210    Lab Status: Final result Specimen: Blood from Arm, Left Updated: 10/29/23 1218     WBC 13.95 Thousand/uL      RBC 4.54 Million/uL      Hemoglobin 13.6 g/dL      Hematocrit 41.6 %      MCV 92 fL      MCH 30.0 pg      MCHC 32.7 g/dL      RDW 12.5 %      MPV 11.3 fL      Platelets 982 Thousands/uL                    XR chest 1 view portable    (Results Pending)              Procedures  ECG 12 Lead Documentation Only    Date/Time: 10/29/2023 11:40 PM    Performed by: María Elena Aviles PA-C  Authorized by: María Elena Aviles PA-C    Patient location:  ED  Interpretation:     Interpretation: non-specific    Rate:     ECG rate:  115    ECG rate assessment: tachycardic    Rhythm:     Rhythm: sinus tachycardia    Ectopy:     Ectopy: PVCs    QRS:     QRS axis:  Normal    QRS intervals:  Normal           ED Course  ED Course as of 10/29/23 1535   Sun Oct 29, 2023   1227 WBC(!): 13.95  Currently on prednisone    1407 Patient states she is feeling improved. Walked to the bathroom on her 3 L nasal cannula without significant shortness of breath. Patient would prefer admission. I discussed with Dr. Maris Tucker who recommended to medic treatment with discharge home. Patient has pulmonology follow-up next week. [facility] hsTnI: 0             SBIRT 20yo+      Flowsheet Row Most Recent Value   Initial Alcohol Screen: US AUDIT-C     1. How often do you have a drink containing alcohol? 0 Filed at: 10/29/2023 1131   2. How many drinks containing alcohol do you have on a typical day you are drinking? 0 Filed at: 10/29/2023 1131   3a. Male UNDER 65: How often do you have five or more drinks on one occasion? 0 Filed at: 10/29/2023 1131   3b. FEMALE Any Age, or MALE 65+: How often do you have 4 or more drinks on one occassion? 0 Filed at: 10/29/2023 1131   Audit-C Score 0 Filed at: 10/29/2023 1131   LIVIER: How many times in the past year have you. ..     Used an illegal drug or used a prescription medication for non-medical reasons? Never Filed at: 10/29/2023 1131                      Medical Decision Making  80-year-old female presents to the emergency department for evaluation of shortness of breath. Vitals and medical record reviewed. Patient at risk for falling but not limited to COPD, anxiety, pneumonia, MI. He EKG was nonischemic, troponin within normal limits x2, low concern for MI. Patient did have found leukocytosis however she is on prednisone could cause this elevation. ED interpretation of chest x-ray was negative for acute cardiopulmonary findings. Improvement of symptoms with DuoNeb and Ativan. Did discuss with medicine doctor for admission however this time is recommended patient be discharged home and follow-up with her pulmonologist as she has had multiple visits for the same. Patient is able to ambulate without difficulty. Has normal oxygen saturation on her baseline 3 L nasal cannula. She was agreeable to this treatment plan she was clinically and hemodynamically stable for discharge    Amount and/or Complexity of Data Reviewed  Labs: ordered. Decision-making details documented in ED Course. Radiology: ordered. Risk  Prescription drug management. Disposition  Final diagnoses:   COPD exacerbation ([address])   Anxiety     Time reflects when diagnosis was documented in both MDM as applicable and the Disposition within this note       Time User Action Codes Description Comment    10/29/2023  2:35 PM Maribel Todd Add [J44.1] COPD exacerbation ([address])     10/29/2023  2:35 PM Maribel Todd Add [F41.9] Anxiety           ED Disposition       ED Disposition   Discharge    Condition   Stable    Date/Time   Sun Oct 29, 2023 [id number] 1220 [facility] discharge to home/self care.                    Follow-up Information       Follow up With Specialties Details Why Contact Info    Mina Owen DO Family Medicine   [address] [facility] [address]  [phone]              Discharge Medication List as of 10/29/2023  2:35 PM        CONTINUE these medications which have NOT CHANGED    Details   albuterol (2.5 mg/3 mL) 0.083 % nebulizer solution Take 3 mL (2.5 mg total) by nebulization every 6 (six) hours as needed for wheezing or shortness of breath, Starting Sun 8/6/2023, Normal      amLODIPine (NORVASC) 10 mg tablet Take 10 mg by mouth daily at bedtime , Historical Med      atorvastatin (LIPITOR) 40 mg tablet Take 40 mg by mouth daily at bedtime , Historical Med      azithromycin (ZITHROMAX) 250 mg tablet Take 2 tablets today then 1 tablet daily x 4 days, Normal      cloNIDine (CATAPRES) 0.2 mg tablet Take 0.2 mg by mouth every 12 (twelve) hours, Historical Med      ergocalciferol (VITAMIN D2) 50,000 units Take 50,000 Units by mouth once a week On saturday, Historical Med      fluticasone (FLONASE) 50 mcg/act nasal spray 1 spray into each nostril daily, Starting Wed 6/28/2023, Normal      fluticasone-umeclidinium-vilanterol (Trelegy Ellipta) 100-62.5-25 mcg/actuation inhaler Inhale 1 puff daily Rinse mouth after use., Starting Mon 9/11/2023, Until Sat 10/21/2023, Normal      glipiZIDE (GLUCOTROL) 10 mg tablet Take 10 mg by mouth daily in the early morning W breakfast, Historical Med      hydrOXYzine HCL (ATARAX) 10 mg tablet Take 10 mg by mouth every 6 (six) hours as needed for itching, Historical Med      ipratropium-albuterol (DUO-NEB) 0.5-2.5 mg/3 mL nebulizer solution Take 3 mL by nebulization 3 (three) times a day, Starting Wed 9/27/2023, Normal      lisinopril (ZESTRIL) 40 mg tablet Take 40 mg by mouth daily, Historical Med      metoprolol tartrate (LOPRESSOR) 100 mg tablet Take 100 mg by mouth every 12 (twelve) hours, Historical Med      Nebulizers (Comp Air Compressor Nebulizer) MISC As directed, Starting Fri 7/28/2023, Historical Med      nicotine (NICODERM CQ) 7 mg/24hr TD 24 hr patch Place 1 patch on the skin over 24 hours daily Do not start before October 24, 2023., Starting Tue 10/24/2023, Normal      omeprazole (PriLOSEC OTC) 20 MG tablet Take 20 mg by mouth daily , Historical Med      pioglitazone (ACTOS) 15 mg tablet Take 15 mg by mouth daily, Historical Med      !! predniSONE 10 mg tablet Take 4 tabs daily for thee days then take three tabs daily for three days then take two tablets daily for three days then take one tab daily for three days. , Normal      !! predniSONE 10 mg tablet Multiple Dosages:Starting Wed 10/25/2023, Until Sun 10/29/2023 at 2359, THEN Starting Mon 10/30/2023, Until Fri 11/3/2023 at 2359, THEN Starting Sat 11/4/2023, Until Wed 11/8/2023 at 2359, THEN Starting Thu 11/9/2023, Until Mon 11/13/2023 at 2359Tak e 4 tablets (40 mg total) by mouth daily for 5 days, THEN 2 tablets (20 mg total) daily for 5 days, THEN 1 tablet (10 mg total) daily for 5 days, THEN 0.5 tablets (5 mg total) daily for 5 days. , Normal      sertraline (ZOLOFT) 25 mg tablet Take 25 mg by mouth daily, Historical Med       !! - Potential duplicate medications found. Please discuss with provider. No discharge procedures on file.     PDMP Review         Value Time User    PDMP Reviewed  Yes 9/13/2023  1:19 AM Olu Wilde,             ED Provider  Electronically Signed by             Maribel Todd PA-C  10/29/23 6472

## 2023-10-30 LAB
ATRIAL RATE: 115 BPM
P AXIS: 71 DEGREES
PR INTERVAL: 116 MS
QRS AXIS: 35 DEGREES
QRSD INTERVAL: 74 MS
QT INTERVAL: 306 MS
QTC INTERVAL: 423 MS
T WAVE AXIS: 63 DEGREES
VENTRICULAR RATE: 115 BPM

## 2023-11-02 ENCOUNTER — APPOINTMENT (EMERGENCY)
Dept: RADIOLOGY | Facility: HOSPITAL | Age: 68
End: 2023-11-02
Payer: COMMERCIAL

## 2023-11-02 ENCOUNTER — HOSPITAL ENCOUNTER (EMERGENCY)
Facility: HOSPITAL | Age: 68
Discharge: HOME | End: 2023-11-02
Attending: EMERGENCY MEDICINE
Payer: COMMERCIAL

## 2023-11-02 VITALS
HEIGHT: 62 IN | SYSTOLIC BLOOD PRESSURE: 229 MMHG | BODY MASS INDEX: 30.87 KG/M2 | OXYGEN SATURATION: 95 % | DIASTOLIC BLOOD PRESSURE: 94 MMHG | TEMPERATURE: 96.9 F | RESPIRATION RATE: 24 BRPM | WEIGHT: 167.77 LBS | HEART RATE: 82 BPM

## 2023-11-02 DIAGNOSIS — I10 HTN (HYPERTENSION): ICD-10-CM

## 2023-11-02 DIAGNOSIS — F41.9 ANXIETY: Primary | ICD-10-CM

## 2023-11-02 DIAGNOSIS — J44.9 COPD (CHRONIC OBSTRUCTIVE PULMONARY DISEASE) (HCC): ICD-10-CM

## 2023-11-02 LAB
ALBUMIN SERPL BCP-MCNC: 4 G/DL (ref 3.5–5)
ALP SERPL-CCNC: 55 U/L (ref 34–104)
ALT SERPL W P-5'-P-CCNC: 8 U/L (ref 7–52)
ANION GAP SERPL CALCULATED.3IONS-SCNC: 6 MMOL/L
APTT PPP: 25 SECONDS (ref 23–37)
AST SERPL W P-5'-P-CCNC: 10 U/L (ref 13–39)
ATRIAL RATE: 67 BPM
BASOPHILS # BLD AUTO: 0.04 THOUSANDS/ÂΜL (ref 0–0.1)
BASOPHILS NFR BLD AUTO: 0 % (ref 0–1)
BILIRUB SERPL-MCNC: 0.35 MG/DL (ref 0.2–1)
BNP SERPL-MCNC: 56 PG/ML (ref 0–100)
BUN SERPL-MCNC: 14 MG/DL (ref 5–25)
CALCIUM SERPL-MCNC: 9.3 MG/DL (ref 8.4–10.2)
CARDIAC TROPONIN I PNL SERPL HS: 6 NG/L
CHLORIDE SERPL-SCNC: 100 MMOL/L (ref 96–108)
CO2 SERPL-SCNC: 33 MMOL/L (ref 21–32)
CREAT SERPL-MCNC: 0.75 MG/DL (ref 0.6–1.3)
EOSINOPHIL # BLD AUTO: 0.06 THOUSAND/ÂΜL (ref 0–0.61)
EOSINOPHIL NFR BLD AUTO: 1 % (ref 0–6)
ERYTHROCYTE [DISTWIDTH] IN BLOOD BY AUTOMATED COUNT: 12.4 % (ref 11.6–15.1)
GFR SERPL CREATININE-BSD FRML MDRD: 83 ML/MIN/1.73SQ M
GLUCOSE SERPL-MCNC: 79 MG/DL (ref 65–140)
HCT VFR BLD AUTO: 40.9 % (ref 34.8–46.1)
HGB BLD-MCNC: 12.9 G/DL (ref 11.5–15.4)
IMM GRANULOCYTES # BLD AUTO: 0.13 THOUSAND/UL (ref 0–0.2)
IMM GRANULOCYTES NFR BLD AUTO: 1 % (ref 0–2)
INR PPP: 0.77 (ref 0.84–1.19)
LYMPHOCYTES # BLD AUTO: 4.03 THOUSANDS/ÂΜL (ref 0.6–4.47)
LYMPHOCYTES NFR BLD AUTO: 31 % (ref 14–44)
MCH RBC QN AUTO: 29.5 PG (ref 26.8–34.3)
MCHC RBC AUTO-ENTMCNC: 31.5 G/DL (ref 31.4–37.4)
MCV RBC AUTO: 93 FL (ref 82–98)
MONOCYTES # BLD AUTO: 0.83 THOUSAND/ÂΜL (ref 0.17–1.22)
MONOCYTES NFR BLD AUTO: 7 % (ref 4–12)
NEUTROPHILS # BLD AUTO: 7.73 THOUSANDS/ÂΜL (ref 1.85–7.62)
NEUTS SEG NFR BLD AUTO: 60 % (ref 43–75)
NRBC BLD AUTO-RTO: 0 /100 WBCS
P AXIS: 32 DEGREES
PLATELET # BLD AUTO: 199 THOUSANDS/UL (ref 149–390)
PMV BLD AUTO: 10.1 FL (ref 8.9–12.7)
POTASSIUM SERPL-SCNC: 3.5 MMOL/L (ref 3.5–5.3)
PR INTERVAL: 110 MS
PROT SERPL-MCNC: 6.5 G/DL (ref 6.4–8.4)
PROTHROMBIN TIME: 11.1 SECONDS (ref 11.6–14.5)
QRS AXIS: 40 DEGREES
QRSD INTERVAL: 64 MS
QT INTERVAL: 396 MS
QTC INTERVAL: 418 MS
RBC # BLD AUTO: 4.38 MILLION/UL (ref 3.81–5.12)
SODIUM SERPL-SCNC: 139 MMOL/L (ref 135–147)
T WAVE AXIS: 26 DEGREES
VENTRICULAR RATE: 67 BPM
WBC # BLD AUTO: 12.82 THOUSAND/UL (ref 4.31–10.16)

## 2023-11-02 PROCEDURE — 93005 ELECTROCARDIOGRAM TRACING: CPT

## 2023-11-02 PROCEDURE — 96374 THER/PROPH/DIAG INJ IV PUSH: CPT

## 2023-11-02 PROCEDURE — 99285 EMERGENCY DEPT VISIT HI MDM: CPT

## 2023-11-02 PROCEDURE — 99285 EMERGENCY DEPT VISIT HI MDM: CPT | Performed by: EMERGENCY MEDICINE

## 2023-11-02 PROCEDURE — 36415 COLL VENOUS BLD VENIPUNCTURE: CPT | Performed by: EMERGENCY MEDICINE

## 2023-11-02 PROCEDURE — 71045 X-RAY EXAM CHEST 1 VIEW: CPT

## 2023-11-02 PROCEDURE — 85730 THROMBOPLASTIN TIME PARTIAL: CPT | Performed by: EMERGENCY MEDICINE

## 2023-11-02 PROCEDURE — 85610 PROTHROMBIN TIME: CPT | Performed by: EMERGENCY MEDICINE

## 2023-11-02 PROCEDURE — 84484 ASSAY OF TROPONIN QUANT: CPT | Performed by: EMERGENCY MEDICINE

## 2023-11-02 PROCEDURE — 80053 COMPREHEN METABOLIC PANEL: CPT | Performed by: EMERGENCY MEDICINE

## 2023-11-02 PROCEDURE — 85025 COMPLETE CBC W/AUTO DIFF WBC: CPT | Performed by: EMERGENCY MEDICINE

## 2023-11-02 PROCEDURE — 83880 ASSAY OF NATRIURETIC PEPTIDE: CPT | Performed by: EMERGENCY MEDICINE

## 2023-11-02 RX ORDER — CLONIDINE HYDROCHLORIDE 0.1 MG/1
0.2 TABLET ORAL ONCE
Status: COMPLETED | OUTPATIENT
Start: 2023-11-02 | End: 2023-11-02

## 2023-11-02 RX ORDER — LORAZEPAM 2 MG/ML
1 INJECTION INTRAMUSCULAR ONCE
Status: COMPLETED | OUTPATIENT
Start: 2023-11-02 | End: 2023-11-02

## 2023-11-02 RX ORDER — LORAZEPAM 0.5 MG/1
0.5 TABLET ORAL 3 TIMES DAILY PRN
Qty: 20 TABLET | Refills: 0 | Status: SHIPPED | OUTPATIENT
Start: 2023-11-02

## 2023-11-02 RX ADMIN — LORAZEPAM 1 MG: 2 INJECTION INTRAMUSCULAR; INTRAVENOUS at 04:13

## 2023-11-02 RX ADMIN — CLONIDINE HYDROCHLORIDE 0.2 MG: 0.1 TABLET ORAL at 04:08

## 2023-11-02 NOTE — ED PROVIDER NOTES
History  Chief Complaint   Patient presents with    Shortness of Breath     Started with increased SOB around 1am. Pt wears 3L chronic O2. Denies any pain or any other symptoms       Patient is a 62-year-old female with history of COPD diabetes anxiety presents the emergency department complaining of anxiety and shortness of breath abrupt onset this evening no chest pain. No Fevers or chills      History provided by:  Patient  Shortness of Breath  Severity:  Moderate  Onset quality:  Sudden  Duration:  1 hour  Timing:  Constant  Progression:  Worsening  Chronicity:  Recurrent  Associated symptoms: no abdominal pain, no chest pain, no cough, no ear pain, no fever, no headaches, no rash, no sore throat, no vomiting and no wheezing        Prior to Admission Medications   Prescriptions Last Dose Informant Patient Reported? Taking? Nebulizers (Comp Air Compressor Nebulizer) MISC   Yes No   Sig: As directed   albuterol (2.5 mg/3 mL) 0.083 % nebulizer solution   No No   Sig: Take 3 mL (2.5 mg total) by nebulization every 6 (six) hours as needed for wheezing or shortness of breath   Patient taking differently: Take 530 mg by nebulization every 4 (four) hours as needed for wheezing or shortness of breath   amLODIPine (NORVASC) 10 mg tablet   Yes No   Sig: Take 10 mg by mouth daily at bedtime    atorvastatin (LIPITOR) 40 mg tablet   Yes No   Sig: Take 40 mg by mouth daily at bedtime    cloNIDine (CATAPRES) 0.2 mg tablet   Yes No   Sig: Take 0.2 mg by mouth every 12 (twelve) hours   ergocalciferol (VITAMIN D2) 50,000 units   Yes No   Sig: Take 50,000 Units by mouth once a week On saturday   fluticasone (FLONASE) 50 mcg/act nasal spray   No No   Si spray into each nostril daily   fluticasone-umeclidinium-vilanterol (Trelegy Ellipta) 100-62.5-25 mcg/actuation inhaler   No No   Sig: Inhale 1 puff daily Rinse mouth after use.    glipiZIDE (GLUCOTROL) 10 mg tablet  Self Yes No   Sig: Take 10 mg by mouth daily in the early morning W breakfast   hydrOXYzine HCL (ATARAX) 10 mg tablet   Yes No   Sig: Take 10 mg by mouth every 6 (six) hours as needed for itching   ipratropium-albuterol (DUO-NEB) 0.5-2.5 mg/3 mL nebulizer solution   No No   Sig: Take 3 mL by nebulization 3 (three) times a day   lisinopril (ZESTRIL) 40 mg tablet   Yes No   Sig: Take 40 mg by mouth daily   metoprolol tartrate (LOPRESSOR) 100 mg tablet   Yes No   Sig: Take 100 mg by mouth every 12 (twelve) hours   nicotine (NICODERM CQ) 7 mg/24hr TD 24 hr patch   No No   Sig: Place 1 patch on the skin over 24 hours daily Do not start before October 24, 2023. omeprazole (PriLOSEC OTC) 20 MG tablet   Yes No   Sig: Take 20 mg by mouth daily    pioglitazone (ACTOS) 15 mg tablet   Yes No   Sig: Take 15 mg by mouth daily   predniSONE 10 mg tablet   No No   Sig: Take 4 tabs daily for thee days then take three tabs daily for three days then take two tablets daily for three days then take one tab daily for three days. predniSONE 10 mg tablet   No No   Sig: Take 4 tablets (40 mg total) by mouth daily for 5 days, THEN 2 tablets (20 mg total) daily for 5 days, THEN 1 tablet (10 mg total) daily for 5 days, THEN 0.5 tablets (5 mg total) daily for 5 days. sertraline (ZOLOFT) 25 mg tablet   Yes No   Sig: Take 25 mg by mouth daily      Facility-Administered Medications: None       Past Medical History:   Diagnosis Date    Anxiety     COPD (chronic obstructive pulmonary disease) ([address] [facility])     Diabetes mellitus ([address] [facility])     Essential (primary) hypertension     GERD (gastroesophageal reflux disease)     Smoker        History reviewed. No pertinent surgical history. Family History   Problem Relation Age of Onset    Diabetes Father      I have reviewed and agree with the history as documented.     E-Cigarette/Vaping    E-Cigarette Use Never User      E-Cigarette/Vaping Substances    Nicotine No     THC No     CBD No     Flavoring No      Social History     Tobacco Use    Smoking status: Every Day Packs/day: 0.25     Types: Cigarettes     Passive exposure: Never    Smokeless tobacco: Never   Vaping Use    Vaping Use: Never used   Substance Use Topics    Alcohol use: Not Currently    Drug use: Never       Review of Systems   Constitutional:  Negative for activity change, appetite change, chills, fatigue and fever. HENT:  Negative for congestion, ear pain, rhinorrhea and sore throat. Eyes:  Negative for discharge, redness and visual disturbance. Respiratory:  Positive for shortness of breath. Negative for cough, chest tightness and wheezing. Cardiovascular:  Negative for chest pain and palpitations. Gastrointestinal:  Negative for abdominal pain, constipation, diarrhea, nausea and vomiting. Endocrine: Negative for polydipsia and polyuria. Genitourinary:  Negative for difficulty urinating, dysuria, frequency, hematuria and urgency. Musculoskeletal:  Negative for arthralgias and myalgias. Skin:  Negative for color change, pallor and rash. Neurological:  Negative for dizziness, weakness, light-headedness, numbness and headaches. Hematological:  Negative for adenopathy. Does not bruise/bleed easily. Psychiatric/Behavioral:  The patient is nervous/anxious. All other systems reviewed and are negative. Physical Exam  Physical Exam  Vitals and nursing note reviewed. Constitutional:       Appearance: She is well-developed. HENT:      Head: Normocephalic and atraumatic. Right Ear: External ear normal.      Left Ear: External ear normal.      Nose: Nose normal.   Eyes:      Conjunctiva/sclera: Conjunctivae normal.      Pupils: Pupils are equal, round, and reactive to light. Cardiovascular:      Rate and Rhythm: Normal rate and regular rhythm. Heart sounds: Normal heart sounds. Pulmonary:      Effort: Pulmonary effort is normal. Prolonged expiration present. No respiratory distress. Breath sounds: Wheezing present. No rales.    Chest:      Chest wall: No tenderness. Abdominal:      General: Bowel sounds are normal. There is no distension. Palpations: Abdomen is soft. Tenderness: There is no abdominal tenderness. There is no guarding. Musculoskeletal:         General: Normal range of motion. Cervical back: Normal range of motion and neck supple. Skin:     General: Skin is warm and dry. Neurological:      Mental Status: She is alert and oriented to person, place, and time. Cranial Nerves: No cranial nerve deficit. Sensory: No sensory deficit.          Vital Signs  ED Triage Vitals [11/02/23 0401]   Temperature Pulse Respirations Blood Pressure SpO2   (!) 96.9 °F (36.1 °C) 82 (!) 24 (!) 229/94 95 %      Temp Source Heart Rate Source Patient Position - Orthostatic VS BP Location FiO2 (%)   Temporal Monitor Sitting Right arm --      Pain Score       No Pain           Vitals:    11/02/23 0401 11/02/23 0408   BP: (!) 229/94 (!) 229/94   Pulse: 82    Patient Position - Orthostatic VS: Sitting          Visual Acuity      ED Medications  Medications   LORazepam (ATIVAN) injection 1 mg (1 mg Intravenous Given 11/2/23 0413)   cloNIDine (CATAPRES) tablet 0.2 mg (0.2 mg Oral Given 11/2/23 0408)       Diagnostic Studies  Results Reviewed       Procedure Component Value Units Date/Time    HS Troponin 0hr (reflex protocol) [[id number]]  (Normal) Collected: 11/02/23 0414    Lab Status: Final result Specimen: Blood from Arm, Right Updated: 11/02/23 0447     hs TnI 0hr 6 ng/L     HS Troponin I 2hr [[id number]]     Lab Status: No result Specimen: Blood     B-Type Natriuretic Peptide(BNP) [[id number]]  (Normal) Collected: 11/02/23 0414    Lab Status: Final result Specimen: Blood from Arm, Right Updated: 11/02/23 0445     BNP 56 pg/mL     Comprehensive metabolic panel [[id number]]  (Abnormal) Collected: 11/02/23 0414    Lab Status: Final result Specimen: Blood from Arm, Right Updated: 11/02/23 0442     Sodium 139 mmol/L      Potassium 3.5 mmol/L      Chloride 100 mmol/L      CO2 33 mmol/L      ANION GAP 6 mmol/L      BUN 14 mg/dL      Creatinine 0.75 mg/dL      Glucose 79 mg/dL      Calcium 9.3 mg/dL      AST 10 U/L      ALT 8 U/L      Alkaline Phosphatase 55 U/L      Total Protein 6.5 g/dL      Albumin 4.0 g/dL      Total Bilirubin 0.35 mg/dL      eGFR 83 ml/min/1.73sq m     Narrative:      National Kid[employer] guidelines for Chronic Kidney Disease (CKD):     Stage 1 with normal or high GFR (GFR > 90 mL/min/1.73 square meters)    Stage 2 Mild CKD (GFR = 60-89 mL/min/1.73 square meters)    Stage 3A Moderate CKD (GFR = 45-59 mL/min/1.73 square meters)    Stage 3B Moderate CKD (GFR = 30-44 mL/min/1.73 square meters)    Stage 4 Severe CKD (GFR = 15-29 mL/min/1.73 square meters)    Stage 5 End Stage CKD (GFR <15 mL/min/1.73 square meters)  Note: GFR calculation is accurate only with a steady state creatinine    Protime-INR [[id number]]  (Abnormal) Collected: 11/02/23 0414    Lab Status: Final result Specimen: Blood from Arm, Right Updated: 11/02/23 0436     Protime 11.1 seconds      INR 0.77    APTT [[id number]]  (Normal) Collected: 11/02/23 0414    Lab Status: Final result Specimen: Blood from Arm, Right Updated: 11/02/23 0436     PTT 25 seconds     CBC and differential [[id number]]  (Abnormal) Collected: 11/02/23 0414    Lab Status: Final result Specimen: Blood from Arm, Right Updated: 11/02/23 0421     WBC 12.82 Thousand/uL      RBC 4.38 Million/uL      Hemoglobin 12.9 g/dL      Hematocrit 40.9 %      MCV 93 fL      MCH 29.5 pg      MCHC 31.5 g/dL      RDW 12.4 %      MPV 10.1 fL      Platelets 726 Thousands/uL      nRBC 0 /100 WBCs      Neutrophils Relative 60 %      Immat GRANS % 1 %      Lymphocytes Relative 31 %      Monocytes Relative 7 %      Eosinophils Relative 1 %      Basophils Relative 0 %      Neutrophils Absolute 7.73 Thousands/µL      Immature Grans Absolute 0.13 Thousand/uL      Lymphocytes Absolute 4.03 Thousands/µL      Monocytes Absolute 0.83 Thousand/µL      Eosinophils Absolute 0.06 Thousand/µL      Basophils Absolute 0.04 Thousands/µL                    XR chest 1 view portable   ED Interpretation by Mac Pereira DO (11/02 4369)   No acute cardiopulmonary disease                 Procedures  Procedures         ED Course  ED Course as of 11/02/23 0458   Thu Nov 02, 2023   0408 In review of patient's recent records it does appear that patient has had her most prolonged periods of time without recurrent ED visits, a full 13 days, following the only visit in which she was prescribed anxiolytics in the ED rather than steroids. It does appear that frequent recurrent treatment with steroids is exacerbating hyperglycemia and anxiety. Patient does have underlying chronic COPD however given such frequent recurrent ED visits and patient's self-report and presentation this evening it is apparent the most pressing underlying cause at this time is significant poorly controlled anxiety. 6718 Patient reevaluated now feeling improved blood pressure 159/70. SBIRT 22yo+      Flowsheet Row Most Recent Value   Initial Alcohol Screen: US AUDIT-C     1. How often do you have a drink containing alcohol? 0 Filed at: 11/02/2023 0405   2. How many drinks containing alcohol do you have on a typical day you are drinking? 0 Filed at: 11/02/2023 0405   3a. Male UNDER 65: How often do you have five or more drinks on one occasion? 0 Filed at: 11/02/2023 0405   3b. FEMALE Any Age, or MALE 65+: How often do you have 4 or more drinks on one occassion? 0 Filed at: 11/02/2023 0405   Audit-C Score 0 Filed at: 11/02/2023 0405   LIVIER: How many times in the past year have you. .. Used an illegal drug or used a prescription medication for non-medical reasons?  Never Filed at: 11/02/2023 0405                      Medical Decision Making  Differential diagnosis included but not limited to COPD exacerbation pneumonia pneumothorax pulmonary embolism acute coronary syndrome anxiety hyperventilation. Patient remained clinically and hemodynamically stable in the emergency department blood pressure improved with antihypertensive medication given no evidence of hypertensive urgency or hypertensive emergency present. Patient felt much improved after IV Ativan given this nearly entirely resolved her symptoms which prompted her presentation to the ED no steroids or nebulizer treatments were administered or required patient remained with normal O2 saturation on baseline 3 L nasal cannula O2 and no signs of respiratory distress. History and presentation and work-up in the ED is consistent with chronic stable COPD and hypertension and acute anxiety. No evidence of acute cardiopulmonary pathology. advised patient to decrease steroid use and try to wean off prednisone soon as possible and recommended Ativan as needed for anxiety as well as continuing Atarax and advised prompt follow-up with primary physician for further evaluation and treatment and obtain test results. return precautions and anticipatory guidance discussed. Problems Addressed:  Anxiety: acute illness or injury  COPD (chronic obstructive pulmonary disease) ([address]): chronic illness or injury  HTN (hypertension): chronic illness or injury    Amount and/or Complexity of Data Reviewed  Labs: ordered. Decision-making details documented in ED Course. Radiology: ordered and independent interpretation performed. Decision-making details documented in ED Course. ECG/medicine tests: ordered and independent interpretation performed. Decision-making details documented in ED Course. Risk  Prescription drug management.              Disposition  Final diagnoses:   Anxiety   HTN (hypertension)   COPD (chronic obstructive pulmonary disease) ([address])     Time reflects when diagnosis was documented in both MDM as applicable and the Disposition within this note       Time User Action Codes Description Comment    11/2/2023 4:48 AM Tamara Manriquez Add [F41.9] Anxiety     11/2/2023  4:48 AM Molly CucoTyroneZack Cea Add [I10] HTN (hypertension)     11/2/2023  4:48 AM Tamara Manriquez Add [J44.9] COPD (chronic obstructive pulmonary disease) [facility])           ED Disposition       ED Disposition   Discharge    Condition   Stable    Date/Time   Thu Nov 2, 2023 7511    Comment   Jessika REID Viaalena discharge to home/self care. Follow-up Information       Follow up With Specialties Details Why Contact Info    Miguel Law DO Family Medicine Schedule an appointment as soon as possible for a visit in 2 days  [address] [facility]  [phone]              Patient's Medications   Discharge Prescriptions    LORAZEPAM (ATIVAN) 0.5 MG TABLET    Take 1 tablet (0.5 mg total) by mouth 3 (three) times a day as needed for anxiety for up to 20 doses       Start Date: 11/2/2023 End Date: --       Order Dose: 0.5 mg       Quantity: 20 tablet    Refills: 0       No discharge procedures on file.     PDMP Review         Value Time User    PDMP Reviewed  Yes 11/2/2023  4:52 AM Ronaldo Lopez DO            ED Provider  Electronically Signed by             Ronaldo Lopez DO  11/02/23 6980

## 2023-11-04 ENCOUNTER — HOSPITAL ENCOUNTER (EMERGENCY)
Facility: HOSPITAL | Age: 68
Discharge: HOME | End: 2023-11-04
Attending: EMERGENCY MEDICINE
Payer: COMMERCIAL

## 2023-11-04 VITALS
OXYGEN SATURATION: 100 % | HEART RATE: 69 BPM | SYSTOLIC BLOOD PRESSURE: 105 MMHG | TEMPERATURE: 97.6 F | DIASTOLIC BLOOD PRESSURE: 54 MMHG | RESPIRATION RATE: 16 BRPM

## 2023-11-04 DIAGNOSIS — F41.9 ANXIETY: ICD-10-CM

## 2023-11-04 DIAGNOSIS — R06.00 DYSPNEA: Primary | ICD-10-CM

## 2023-11-04 LAB
ALBUMIN SERPL BCP-MCNC: 3.7 G/DL (ref 3.5–5)
ALP SERPL-CCNC: 53 U/L (ref 34–104)
ALT SERPL W P-5'-P-CCNC: 9 U/L (ref 7–52)
ANION GAP SERPL CALCULATED.3IONS-SCNC: 5 MMOL/L
AST SERPL W P-5'-P-CCNC: 8 U/L (ref 13–39)
BASOPHILS # BLD AUTO: 0.04 THOUSANDS/ÂΜL (ref 0–0.1)
BASOPHILS NFR BLD AUTO: 0 % (ref 0–1)
BILIRUB SERPL-MCNC: 0.29 MG/DL (ref 0.2–1)
BNP SERPL-MCNC: 50 PG/ML (ref 0–100)
BUN SERPL-MCNC: 11 MG/DL (ref 5–25)
CALCIUM SERPL-MCNC: 9.1 MG/DL (ref 8.4–10.2)
CARDIAC TROPONIN I PNL SERPL HS: 6 NG/L
CHLORIDE SERPL-SCNC: 99 MMOL/L (ref 96–108)
CO2 SERPL-SCNC: 33 MMOL/L (ref 21–32)
CREAT SERPL-MCNC: 0.64 MG/DL (ref 0.6–1.3)
EOSINOPHIL # BLD AUTO: 0.05 THOUSAND/ÂΜL (ref 0–0.61)
EOSINOPHIL NFR BLD AUTO: 1 % (ref 0–6)
ERYTHROCYTE [DISTWIDTH] IN BLOOD BY AUTOMATED COUNT: 12.5 % (ref 11.6–15.1)
GFR SERPL CREATININE-BSD FRML MDRD: 93 ML/MIN/1.73SQ M
GLUCOSE SERPL-MCNC: 108 MG/DL (ref 65–140)
HCT VFR BLD AUTO: 38.4 % (ref 34.8–46.1)
HGB BLD-MCNC: 12.2 G/DL (ref 11.5–15.4)
IMM GRANULOCYTES # BLD AUTO: 0.08 THOUSAND/UL (ref 0–0.2)
IMM GRANULOCYTES NFR BLD AUTO: 1 % (ref 0–2)
LYMPHOCYTES # BLD AUTO: 3.42 THOUSANDS/ÂΜL (ref 0.6–4.47)
LYMPHOCYTES NFR BLD AUTO: 31 % (ref 14–44)
MCH RBC QN AUTO: 29.5 PG (ref 26.8–34.3)
MCHC RBC AUTO-ENTMCNC: 31.8 G/DL (ref 31.4–37.4)
MCV RBC AUTO: 93 FL (ref 82–98)
MONOCYTES # BLD AUTO: 0.67 THOUSAND/ÂΜL (ref 0.17–1.22)
MONOCYTES NFR BLD AUTO: 6 % (ref 4–12)
NEUTROPHILS # BLD AUTO: 6.72 THOUSANDS/ÂΜL (ref 1.85–7.62)
NEUTS SEG NFR BLD AUTO: 61 % (ref 43–75)
NRBC BLD AUTO-RTO: 0 /100 WBCS
PLATELET # BLD AUTO: 189 THOUSANDS/UL (ref 149–390)
PMV BLD AUTO: 9.8 FL (ref 8.9–12.7)
POTASSIUM SERPL-SCNC: 4.3 MMOL/L (ref 3.5–5.3)
PROCALCITONIN SERPL-MCNC: <0.05 NG/ML
PROT SERPL-MCNC: 6.1 G/DL (ref 6.4–8.4)
RBC # BLD AUTO: 4.14 MILLION/UL (ref 3.81–5.12)
SODIUM SERPL-SCNC: 137 MMOL/L (ref 135–147)
WBC # BLD AUTO: 10.98 THOUSAND/UL (ref 4.31–10.16)

## 2023-11-04 PROCEDURE — 99285 EMERGENCY DEPT VISIT HI MDM: CPT

## 2023-11-04 PROCEDURE — 96374 THER/PROPH/DIAG INJ IV PUSH: CPT

## 2023-11-04 PROCEDURE — 84484 ASSAY OF TROPONIN QUANT: CPT | Performed by: EMERGENCY MEDICINE

## 2023-11-04 PROCEDURE — 83880 ASSAY OF NATRIURETIC PEPTIDE: CPT | Performed by: EMERGENCY MEDICINE

## 2023-11-04 PROCEDURE — 99285 EMERGENCY DEPT VISIT HI MDM: CPT | Performed by: EMERGENCY MEDICINE

## 2023-11-04 PROCEDURE — 36415 COLL VENOUS BLD VENIPUNCTURE: CPT | Performed by: EMERGENCY MEDICINE

## 2023-11-04 PROCEDURE — 93005 ELECTROCARDIOGRAM TRACING: CPT

## 2023-11-04 PROCEDURE — 85025 COMPLETE CBC W/AUTO DIFF WBC: CPT | Performed by: EMERGENCY MEDICINE

## 2023-11-04 PROCEDURE — 84145 PROCALCITONIN (PCT): CPT | Performed by: EMERGENCY MEDICINE

## 2023-11-04 PROCEDURE — 80053 COMPREHEN METABOLIC PANEL: CPT | Performed by: EMERGENCY MEDICINE

## 2023-11-04 RX ORDER — LORAZEPAM 2 MG/ML
1 INJECTION INTRAMUSCULAR ONCE
Status: COMPLETED | OUTPATIENT
Start: 2023-11-04 | End: 2023-11-04

## 2023-11-04 RX ORDER — OLANZAPINE 10 MG/2ML
10 INJECTION, POWDER, FOR SOLUTION INTRAMUSCULAR ONCE
Status: DISCONTINUED | OUTPATIENT
Start: 2023-11-04 | End: 2023-11-04

## 2023-11-04 RX ADMIN — LORAZEPAM 1 MG: 2 INJECTION INTRAMUSCULAR; INTRAVENOUS at 03:45

## 2023-11-04 NOTE — ED PROVIDER NOTES
History  Chief Complaint   Patient presents with    Shortness of Breath     Pt reports increased SOB since 1700; tried nebulizer, inhaler, and ativan at home without relief     59-year-old female presents to the ED for of shortness of breath. Patient has a history of COPD and states that she has been using her inhaler and steroids at home however is not improving. She is accompanied by her daughter who states that her presentation today is similar to that of each time she comes to the ED. Her daughter points out that the patient has bad anxiety and refused to take her ativan until 2 am. She is oxygen dependent and denies any chest pain. She denies any chest pain or any other symptoms. Prior to Admission Medications   Prescriptions Last Dose Informant Patient Reported? Taking?    LORazepam (Ativan) 0.5 mg tablet   No No   Sig: Take 1 tablet (0.5 mg total) by mouth 3 (three) times a day as needed for anxiety for up to 20 doses   Nebulizers (Comp Air Compressor Nebulizer) MISC   Yes No   Sig: As directed   albuterol (2.5 mg/3 mL) 0.083 % nebulizer solution   No No   Sig: Take 3 mL (2.5 mg total) by nebulization every 6 (six) hours as needed for wheezing or shortness of breath   Patient taking differently: Take 530 mg by nebulization every 4 (four) hours as needed for wheezing or shortness of breath   amLODIPine (NORVASC) 10 mg tablet   Yes No   Sig: Take 10 mg by mouth daily at bedtime    atorvastatin (LIPITOR) 40 mg tablet   Yes No   Sig: Take 40 mg by mouth daily at bedtime    cloNIDine (CATAPRES) 0.2 mg tablet   Yes No   Sig: Take 0.2 mg by mouth every 12 (twelve) hours   ergocalciferol (VITAMIN D2) 50,000 units   Yes No   Sig: Take 50,000 Units by mouth once a week On saturday   fluticasone (FLONASE) 50 mcg/act nasal spray   No No   Si spray into each nostril daily   fluticasone-umeclidinium-vilanterol (Trelegy Ellipta) 100-62.5-25 mcg/actuation inhaler   No No   Sig: Inhale 1 puff daily Rinse mouth after use. glipiZIDE (GLUCOTROL) 10 mg tablet  Self Yes No   Sig: Take 10 mg by mouth daily in the early morning W breakfast   hydrOXYzine HCL (ATARAX) 10 mg tablet   Yes No   Sig: Take 10 mg by mouth every 6 (six) hours as needed for itching   ipratropium-albuterol (DUO-NEB) 0.5-2.5 mg/3 mL nebulizer solution   No No   Sig: Take 3 mL by nebulization 3 (three) times a day   lisinopril (ZESTRIL) 40 mg tablet   Yes No   Sig: Take 40 mg by mouth daily   metoprolol tartrate (LOPRESSOR) 100 mg tablet   Yes No   Sig: Take 100 mg by mouth every 12 (twelve) hours   nicotine (NICODERM CQ) 7 mg/24hr TD 24 hr patch   No No   Sig: Place 1 patch on the skin over 24 hours daily Do not start before October 24, 2023. omeprazole (PriLOSEC OTC) 20 MG tablet   Yes No   Sig: Take 20 mg by mouth daily    pioglitazone (ACTOS) 15 mg tablet   Yes No   Sig: Take 15 mg by mouth daily   predniSONE 10 mg tablet   No No   Sig: Take 4 tabs daily for thee days then take three tabs daily for three days then take two tablets daily for three days then take one tab daily for three days. predniSONE 10 mg tablet   No No   Sig: Take 4 tablets (40 mg total) by mouth daily for 5 days, THEN 2 tablets (20 mg total) daily for 5 days, THEN 1 tablet (10 mg total) daily for 5 days, THEN 0.5 tablets (5 mg total) daily for 5 days. sertraline (ZOLOFT) 25 mg tablet   Yes No   Sig: Take 25 mg by mouth daily      Facility-Administered Medications: None       Past Medical History:   Diagnosis Date    Anxiety     COPD (chronic obstructive pulmonary disease) ([address])     Diabetes mellitus ([address])     Essential (primary) hypertension     GERD (gastroesophageal reflux disease)     Smoker        History reviewed. No pertinent surgical history. Family History   Problem Relation Age of Onset    Diabetes Father      I have reviewed and agree with the history as documented.     E-Cigarette/Vaping    E-Cigarette Use Never User      E-Cigarette/Vaping Substances    Nicotine No     THC No     CBD No     Flavoring No      Social History     Tobacco Use    Smoking status: Every Day     Packs/day: 0.25     Types: Cigarettes     Passive exposure: Never    Smokeless tobacco: Never   Vaping Use    Vaping Use: Never used   Substance Use Topics    Alcohol use: Not Currently    Drug use: Never       Review of Systems   Constitutional:  Negative for chills and fever. HENT:  Negative for ear pain and sore throat. Eyes:  Negative for pain and visual disturbance. Respiratory:  Positive for chest tightness and shortness of breath. Negative for cough. Cardiovascular:  Negative for chest pain and palpitations. Gastrointestinal:  Negative for abdominal pain and vomiting. Genitourinary:  Negative for dysuria and hematuria. Musculoskeletal:  Negative for arthralgias and back pain. Skin:  Negative for color change and rash. Neurological:  Negative for seizures and syncope. Psychiatric/Behavioral:  The patient is nervous/anxious. All other systems reviewed and are negative. Physical Exam  Physical Exam  Vitals and nursing note reviewed. Constitutional:       Appearance: She is well-developed and normal weight. She is not ill-appearing, toxic-appearing or diaphoretic. Interventions: She is not intubated. HENT:      Head: Normocephalic and atraumatic. Eyes:      Extraocular Movements: Extraocular movements intact. Conjunctiva/sclera: Conjunctivae normal.   Neck:      Thyroid: No thyromegaly. Vascular: No hepatojugular reflux. Cardiovascular:      Rate and Rhythm: Normal rate and regular rhythm. Heart sounds: No murmur heard. Pulmonary:      Effort: Pulmonary effort is normal. No tachypnea, accessory muscle usage or respiratory distress. She is not intubated. Breath sounds: Normal breath sounds. No stridor. No decreased breath sounds, wheezing, rhonchi or rales. Chest:      Chest wall: No mass, deformity, tenderness, crepitus or edema.  There is no dullness to percussion. Abdominal:      Palpations: Abdomen is soft. There is no hepatomegaly or mass. Tenderness: There is no abdominal tenderness. There is no guarding. Musculoskeletal:         General: No swelling. Cervical back: Normal range of motion and neck supple. Skin:     General: Skin is warm and dry. Capillary Refill: Capillary refill takes less than 2 seconds. Neurological:      General: No focal deficit present. Mental Status: She is alert and oriented to person, place, and time. Cranial Nerves: No cranial nerve deficit. Motor: No weakness.    Psychiatric:         Mood and Affect: Mood normal.         Vital Signs  ED Triage Vitals [11/04/23 0315]   Temperature Pulse Respirations Blood Pressure SpO2   97.6 °F (36.4 °C) 70 22 140/79 100 %      Temp Source Heart Rate Source Patient Position - Orthostatic VS BP Location FiO2 (%)   Temporal Monitor Sitting Right arm --      Pain Score       No Pain           Vitals:    11/04/23 0315 11/04/23 0505   BP: 140/79 105/54   Pulse: 70 69   Patient Position - Orthostatic VS: Sitting          Visual Acuity      ED Medications  Medications   LORazepam (ATIVAN) injection 1 mg (1 mg Intravenous Given 11/4/23 0345)       Diagnostic Studies  Results Reviewed       Procedure Component Value Units Date/Time    Procalcitonin [[id number]]  (Normal) Collected: 11/04/23 0326    Lab Status: Final result Specimen: Blood from Arm, Right Updated: 11/04/23 0431     Procalcitonin <0.05 ng/ml     B-Type Natriuretic Peptide(BNP) [[id number]]  (Normal) Collected: 11/04/23 0326    Lab Status: Final result Specimen: Blood from Arm, Right Updated: 11/04/23 0404     BNP 50 pg/mL     HS Troponin 0hr (reflex protocol) [[id number]]  (Normal) Collected: 11/04/23 0326    Lab Status: Final result Specimen: Blood from Arm, Right Updated: 11/04/23 0403     hs TnI 0hr 6 ng/L     HS Troponin I 2hr [[id number]]     Lab Status: No result Specimen: Blood     HS Troponin I 4hr [[id number]]     Lab Status: No result Specimen: Blood     Comprehensive metabolic panel [[id number]]  (Abnormal) Collected: 11/04/23 0326    Lab Status: Final result Specimen: Blood from Arm, Right Updated: 11/04/23 0401     Sodium 137 mmol/L      Potassium 4.3 mmol/L      Chloride 99 mmol/L      CO2 33 mmol/L      ANION GAP 5 mmol/L      BUN 11 mg/dL      Creatinine 0.64 mg/dL      Glucose 108 mg/dL      Calcium 9.1 mg/dL      AST 8 U/L      ALT 9 U/L      Alkaline Phosphatase 53 U/L      Total Protein 6.1 g/dL      Albumin 3.7 g/dL      Total Bilirubin 0.29 mg/dL      eGFR 93 ml/min/1.73sq m     Narrative:      [employer] guidelines for Chronic Kidney Disease (CKD):     Stage 1 with normal or high GFR (GFR > 90 mL/min/1.73 square meters)    Stage 2 Mild CKD (GFR = 60-89 mL/min/1.73 square meters)    Stage 3A Moderate CKD (GFR = 45-59 mL/min/1.73 square meters)    Stage 3B Moderate CKD (GFR = 30-44 mL/min/1.73 square meters)    Stage 4 Severe CKD (GFR = 15-29 mL/min/1.73 square meters)    Stage 5 End Stage CKD (GFR <15 mL/min/1.73 square meters)  Note: GFR calculation is accurate only with a steady state creatinine    CBC and differential [[id number]]  (Abnormal) Collected: 11/04/23 0326    Lab Status: Final result Specimen: Blood from Arm, Right Updated: 11/04/23 0332     WBC 10.98 Thousand/uL      RBC 4.14 Million/uL      Hemoglobin 12.2 g/dL      Hematocrit 38.4 %      MCV 93 fL      MCH 29.5 pg      MCHC 31.8 g/dL      RDW 12.5 %      MPV 9.8 fL      Platelets 172 Thousands/uL      nRBC 0 /100 WBCs      Neutrophils Relative 61 %      Immat GRANS % 1 %      Lymphocytes Relative 31 %      Monocytes Relative 6 %      Eosinophils Relative 1 %      Basophils Relative 0 %      Neutrophils Absolute 6.72 Thousands/µL      Immature Grans Absolute 0.08 Thousand/uL      Lymphocytes Absolute 3.42 Thousands/µL      Monocytes Absolute 0.67 Thousand/µL      Eosinophils Absolute 0.05 Thousand/µL      Basophils Absolute 0.04 Thousands/µL                    No orders to display              Procedures  ECG 12 Lead Documentation Only    Date/Time: 11/4/2023 3:48 AM    Performed by: Elise Blevins DO  Authorized by: Elise Blevins DO    Indications / Diagnosis:  Sob  ECG reviewed by me, the ED Provider: yes    Patient location:  ED  Previous ECG:     Previous ECG:  Unavailable  Interpretation:     Interpretation: normal    Rate:     ECG rate:  67    ECG rate assessment: normal    Rhythm:     Rhythm: sinus rhythm    Ectopy:     Ectopy: none    QRS:     QRS axis:  Normal    QRS intervals:  Normal  Conduction:     Conduction: normal    ST segments:     ST segments:  Normal           ED Course  ED Course as of 11/04/23 0524   Sat Nov 04, 2023   0443 Patient had a stable ED course with no acute findings on the work up. Advised to take her ativan as prescribed. SBIRT 20yo+      Flowsheet Row Most Recent Value   Initial Alcohol Screen: US AUDIT-C     1. How often do you have a drink containing alcohol? 0 Filed at: 11/04/2023 0315   2. How many drinks containing alcohol do you have on a typical day you are drinking? 0 Filed at: 11/04/2023 0315   3b. FEMALE Any Age, or MALE 65+: How often do you have 4 or more drinks on one occassion? 0 Filed at: 11/04/2023 0315   Audit-C Score 0 Filed at: 11/04/2023 2995   LIVIER: How many times in the past year have you. .. Used an illegal drug or used a prescription medication for non-medical reasons? Never Filed at: 11/04/2023 0315                      Medical Decision Making  Ddx: copd exacerbation, chf, asthma, anxiety    Amount and/or Complexity of Data Reviewed  Labs: ordered. Risk  Prescription drug management.              Disposition  Final diagnoses:   Dyspnea   Anxiety     Time reflects when diagnosis was documented in both MDM as applicable and the Disposition within this note       Time User Action Codes Description Comment    11/4/2023  4:46 AM Andreaia Harper Add [R06.00] Dyspnea     11/4/2023  4:47 AM Ashley Saleem Add [F41.9] Anxiety           ED Disposition       ED Disposition   Discharge    Condition   Stable    Date/Time   Sat Nov 4, 2023 1478    Comment   Jessika REID Viaalena discharge to home/self care.                    Follow-up Information       Follow up With Specialties Details Why Contact Mandy Rodríguez DO Family Medicine Schedule an appointment as soon as possible for a visit   [address] [place] [address]  [phone]              Discharge Medication List as of 11/4/2023  4:48 AM        CONTINUE these medications which have NOT CHANGED    Details   albuterol (2.5 mg/3 mL) 0.083 % nebulizer solution Take 3 mL (2.5 mg total) by nebulization every 6 (six) hours as needed for wheezing or shortness of breath, Starting Sun 8/6/2023, Normal      amLODIPine (NORVASC) 10 mg tablet Take 10 mg by mouth daily at bedtime , Historical Med      atorvastatin (LIPITOR) 40 mg tablet Take 40 mg by mouth daily at bedtime , Historical Med      cloNIDine (CATAPRES) 0.2 mg tablet Take 0.2 mg by mouth every 12 (twelve) hours, Historical Med      ergocalciferol (VITAMIN D2) 50,000 units Take 50,000 Units by mouth once a week On saturday, Historical Med      fluticasone (FLONASE) 50 mcg/act nasal spray 1 spray into each nostril daily, Starting Wed 6/28/2023, Normal      fluticasone-umeclidinium-vilanterol (Trelegy Ellipta) 100-62.5-25 mcg/actuation inhaler Inhale 1 puff daily Rinse mouth after use., Starting Mon 9/11/2023, Until Sat 10/21/2023, Normal      glipiZIDE (GLUCOTROL) 10 mg tablet Take 10 mg by mouth daily in the early morning W breakfast, Historical Med      hydrOXYzine HCL (ATARAX) 10 mg tablet Take 10 mg by mouth every 6 (six) hours as needed for itching, Historical Med      ipratropium-albuterol (DUO-NEB) 0.5-2.5 mg/3 mL nebulizer solution Take 3 mL by nebulization 3 (three) times a day, Starting Wed 9/27/2023, Normal      lisinopril (ZESTRIL) 40 mg tablet Take 40 mg by mouth daily, Historical Med      LORazepam (Ativan) 0.5 mg tablet Take 1 tablet (0.5 mg total) by mouth 3 (three) times a day as needed for anxiety for up to 20 doses, Starting Thu 11/2/2023, Normal      metoprolol tartrate (LOPRESSOR) 100 mg tablet Take 100 mg by mouth every 12 (twelve) hours, Historical Med      Nebulizers (Comp Air Compressor Nebulizer) MISC As directed, Starting Fri 7/28/2023, Historical Med      nicotine (NICODERM CQ) 7 mg/24hr TD 24 hr patch Place 1 patch on the skin over 24 hours daily Do not start before October 24, 2023., Starting Tue 10/24/2023, Normal      omeprazole (PriLOSEC OTC) 20 MG tablet Take 20 mg by mouth daily , Historical Med      pioglitazone (ACTOS) 15 mg tablet Take 15 mg by mouth daily, Historical Med      !! predniSONE 10 mg tablet Take 4 tabs daily for thee days then take three tabs daily for three days then take two tablets daily for three days then take one tab daily for three days. , Normal      !! predniSONE 10 mg tablet Multiple Dosages:Starting Wed 10/25/2023, Until Sun 10/29/2023 at 2359, THEN Starting Mon 10/30/2023, Until Fri 11/3/2023 at 2359, THEN Starting Sat 11/4/2023, Until Wed 11/8/2023 at 2359, THEN Starting Thu 11/9/2023, Until Mon 11/13/2023 at 2359Tak e 4 tablets (40 mg total) by mouth daily for 5 days, THEN 2 tablets (20 mg total) daily for 5 days, THEN 1 tablet (10 mg total) daily for 5 days, THEN 0.5 tablets (5 mg total) daily for 5 days. , Normal      sertraline (ZOLOFT) 25 mg tablet Take 25 mg by mouth daily, Historical Med       !! - Potential duplicate medications found. Please discuss with provider. No discharge procedures on file.     PDMP Review         Value Time User    PDMP Reviewed  Yes 11/2/2023  4:52 AM Ike Bates DO            ED Provider  Electronically Signed by             Tere Paredes DO  11/04/23 0983

## 2023-11-04 NOTE — DISCHARGE INSTRUCTIONS
Return immediately for any worsening symptoms. Follow up with your doctor for further evaluation and management.

## 2023-11-07 ENCOUNTER — HOSPITAL ENCOUNTER (OUTPATIENT)
Dept: PULMONOLOGY | Facility: HOSPITAL | Age: 68
Discharge: HOME | End: 2023-11-07

## 2023-11-07 LAB
ATRIAL RATE: 67 BPM
P AXIS: 38 DEGREES
PR INTERVAL: 98 MS
QRS AXIS: 38 DEGREES
QRSD INTERVAL: 76 MS
QT INTERVAL: 388 MS
QTC INTERVAL: 409 MS
T WAVE AXIS: 38 DEGREES
VENTRICULAR RATE: 67 BPM

## 2023-11-07 PROCEDURE — 93010 ELECTROCARDIOGRAM REPORT: CPT | Performed by: INTERNAL MEDICINE

## 2023-11-07 RX ORDER — ALBUTEROL SULFATE 2.5 MG/3ML
2.5 SOLUTION RESPIRATORY (INHALATION) ONCE AS NEEDED
Status: DISCONTINUED | OUTPATIENT
Start: 2023-11-07 | End: 2023-11-11 | Stop reason: HOSPADM

## 2023-11-11 ENCOUNTER — APPOINTMENT (EMERGENCY)
Dept: RADIOLOGY | Facility: HOSPITAL | Age: 68
DRG: 204 | End: 2023-11-11
Payer: COMMERCIAL

## 2023-11-11 ENCOUNTER — HOSPITAL ENCOUNTER (EMERGENCY)
Facility: HOSPITAL | Age: 68
Discharge: HOME | DRG: 204 | End: 2023-11-11
Attending: EMERGENCY MEDICINE
Payer: COMMERCIAL

## 2023-11-11 VITALS
SYSTOLIC BLOOD PRESSURE: 105 MMHG | TEMPERATURE: 97.2 F | OXYGEN SATURATION: 99 % | HEART RATE: 69 BPM | RESPIRATION RATE: 20 BRPM | WEIGHT: 169.31 LBS | DIASTOLIC BLOOD PRESSURE: 69 MMHG | BODY MASS INDEX: 30.97 KG/M2

## 2023-11-11 DIAGNOSIS — J44.1 COPD EXACERBATION (HCC): Primary | ICD-10-CM

## 2023-11-11 LAB
ALBUMIN SERPL BCP-MCNC: 3.9 G/DL (ref 3.5–5)
ALP SERPL-CCNC: 57 U/L (ref 34–104)
ALT SERPL W P-5'-P-CCNC: 9 U/L (ref 7–52)
ANION GAP SERPL CALCULATED.3IONS-SCNC: 5 MMOL/L
AST SERPL W P-5'-P-CCNC: 9 U/L (ref 13–39)
BASOPHILS # BLD AUTO: 0.04 THOUSANDS/ÂΜL (ref 0–0.1)
BASOPHILS NFR BLD AUTO: 0 % (ref 0–1)
BILIRUB SERPL-MCNC: 0.3 MG/DL (ref 0.2–1)
BUN SERPL-MCNC: 14 MG/DL (ref 5–25)
CALCIUM SERPL-MCNC: 9.3 MG/DL (ref 8.4–10.2)
CHLORIDE SERPL-SCNC: 101 MMOL/L (ref 96–108)
CO2 SERPL-SCNC: 31 MMOL/L (ref 21–32)
CREAT SERPL-MCNC: 0.63 MG/DL (ref 0.6–1.3)
EOSINOPHIL # BLD AUTO: 0.12 THOUSAND/ÂΜL (ref 0–0.61)
EOSINOPHIL NFR BLD AUTO: 1 % (ref 0–6)
ERYTHROCYTE [DISTWIDTH] IN BLOOD BY AUTOMATED COUNT: 12.4 % (ref 11.6–15.1)
GFR SERPL CREATININE-BSD FRML MDRD: 93 ML/MIN/1.73SQ M
GLUCOSE SERPL-MCNC: 137 MG/DL (ref 65–140)
HCT VFR BLD AUTO: 39.2 % (ref 34.8–46.1)
HGB BLD-MCNC: 12.2 G/DL (ref 11.5–15.4)
IMM GRANULOCYTES # BLD AUTO: 0.06 THOUSAND/UL (ref 0–0.2)
IMM GRANULOCYTES NFR BLD AUTO: 1 % (ref 0–2)
LYMPHOCYTES # BLD AUTO: 2.56 THOUSANDS/ÂΜL (ref 0.6–4.47)
LYMPHOCYTES NFR BLD AUTO: 28 % (ref 14–44)
MCH RBC QN AUTO: 29.5 PG (ref 26.8–34.3)
MCHC RBC AUTO-ENTMCNC: 31.1 G/DL (ref 31.4–37.4)
MCV RBC AUTO: 95 FL (ref 82–98)
MONOCYTES # BLD AUTO: 0.86 THOUSAND/ÂΜL (ref 0.17–1.22)
MONOCYTES NFR BLD AUTO: 10 % (ref 4–12)
NEUTROPHILS # BLD AUTO: 5.42 THOUSANDS/ÂΜL (ref 1.85–7.62)
NEUTS SEG NFR BLD AUTO: 60 % (ref 43–75)
NRBC BLD AUTO-RTO: 0 /100 WBCS
PLATELET # BLD AUTO: 176 THOUSANDS/UL (ref 149–390)
PMV BLD AUTO: 9.7 FL (ref 8.9–12.7)
POTASSIUM SERPL-SCNC: 4.5 MMOL/L (ref 3.5–5.3)
PROT SERPL-MCNC: 6.4 G/DL (ref 6.4–8.4)
RBC # BLD AUTO: 4.14 MILLION/UL (ref 3.81–5.12)
SODIUM SERPL-SCNC: 137 MMOL/L (ref 135–147)
WBC # BLD AUTO: 9.06 THOUSAND/UL (ref 4.31–10.16)

## 2023-11-11 PROCEDURE — 80053 COMPREHEN METABOLIC PANEL: CPT | Performed by: EMERGENCY MEDICINE

## 2023-11-11 PROCEDURE — 85025 COMPLETE CBC W/AUTO DIFF WBC: CPT | Performed by: EMERGENCY MEDICINE

## 2023-11-11 PROCEDURE — 71046 X-RAY EXAM CHEST 2 VIEWS: CPT

## 2023-11-11 PROCEDURE — 36415 COLL VENOUS BLD VENIPUNCTURE: CPT | Performed by: EMERGENCY MEDICINE

## 2023-11-11 PROCEDURE — 99285 EMERGENCY DEPT VISIT HI MDM: CPT | Performed by: EMERGENCY MEDICINE

## 2023-11-11 PROCEDURE — 94640 AIRWAY INHALATION TREATMENT: CPT

## 2023-11-11 PROCEDURE — 99285 EMERGENCY DEPT VISIT HI MDM: CPT

## 2023-11-11 RX ORDER — PREDNISONE 20 MG/1
60 TABLET ORAL ONCE
Status: COMPLETED | OUTPATIENT
Start: 2023-11-11 | End: 2023-11-11

## 2023-11-11 RX ORDER — IPRATROPIUM BROMIDE AND ALBUTEROL SULFATE 2.5; .5 MG/3ML; MG/3ML
3 SOLUTION RESPIRATORY (INHALATION) ONCE
Status: COMPLETED | OUTPATIENT
Start: 2023-11-11 | End: 2023-11-11

## 2023-11-11 RX ADMIN — IPRATROPIUM BROMIDE AND ALBUTEROL SULFATE 3 ML: 2.5; .5 SOLUTION RESPIRATORY (INHALATION) at 03:02

## 2023-11-11 RX ADMIN — PREDNISONE 60 MG: 20 TABLET ORAL at 03:02

## 2023-11-11 NOTE — ED PROVIDER NOTES
History  Chief Complaint   Patient presents with    Shortness of Breath     Patient reports shortness of breath yesterday morning. Denies fever, sore throat. On 3 liters baseline. Pt hx COPD normally on 3L NC at home c/o increased SOB over the past 24hr. Denies fevers  No sore throat or ear pain  No sick contacts  No increased coughing  No other complaints      History provided by:  Patient   used: No    Shortness of Breath  Severity:  Moderate  Onset quality:  Gradual  Duration:  24 hours  Timing:  Constant  Progression:  Unchanged  Chronicity:  Recurrent  Relieved by:  Nothing  Worsened by:  Nothing  Ineffective treatments:  None tried  Associated symptoms: wheezing    Associated symptoms: no abdominal pain, no chest pain, no cough, no ear pain, no fever, no headaches, no neck pain, no rash, no sore throat, no sputum production, no syncope and no vomiting        Prior to Admission Medications   Prescriptions Last Dose Informant Patient Reported? Taking?    LORazepam (Ativan) 0.5 mg tablet   No No   Sig: Take 1 tablet (0.5 mg total) by mouth 3 (three) times a day as needed for anxiety for up to 20 doses   Nebulizers (Comp Air Compressor Nebulizer) MISC   Yes No   Sig: As directed   albuterol (2.5 mg/3 mL) 0.083 % nebulizer solution   No No   Sig: Take 3 mL (2.5 mg total) by nebulization every 6 (six) hours as needed for wheezing or shortness of breath   Patient taking differently: Take 530 mg by nebulization every 4 (four) hours as needed for wheezing or shortness of breath   amLODIPine (NORVASC) 10 mg tablet   Yes No   Sig: Take 10 mg by mouth daily at bedtime    atorvastatin (LIPITOR) 40 mg tablet   Yes No   Sig: Take 40 mg by mouth daily at bedtime    cloNIDine (CATAPRES) 0.2 mg tablet   Yes No   Sig: Take 0.2 mg by mouth every 12 (twelve) hours   ergocalciferol (VITAMIN D2) 50,000 units   Yes No   Sig: Take 50,000 Units by mouth once a week On saturday   fluticasone (FLONASE) 50 mcg/act nasal spray   No No   Si spray into each nostril daily   fluticasone-umeclidinium-vilanterol (Trelegy Ellipta) 100-62.5-25 mcg/actuation inhaler   No No   Sig: Inhale 1 puff daily Rinse mouth after use. glipiZIDE (GLUCOTROL) 10 mg tablet  Self Yes No   Sig: Take 10 mg by mouth daily in the early morning W breakfast   hydrOXYzine HCL (ATARAX) 10 mg tablet   Yes No   Sig: Take 10 mg by mouth every 6 (six) hours as needed for itching   ipratropium-albuterol (DUO-NEB) 0.5-2.5 mg/3 mL nebulizer solution   No No   Sig: Take 3 mL by nebulization 3 (three) times a day   lisinopril (ZESTRIL) 40 mg tablet   Yes No   Sig: Take 40 mg by mouth daily   metoprolol tartrate (LOPRESSOR) 100 mg tablet   Yes No   Sig: Take 100 mg by mouth every 12 (twelve) hours   nicotine (NICODERM CQ) 7 mg/24hr TD 24 hr patch   No No   Sig: Place 1 patch on the skin over 24 hours daily Do not start before 2023. omeprazole (PriLOSEC OTC) 20 MG tablet   Yes No   Sig: Take 20 mg by mouth daily    pioglitazone (ACTOS) 15 mg tablet   Yes No   Sig: Take 15 mg by mouth daily   predniSONE 10 mg tablet   No No   Sig: Take 4 tabs daily for thee days then take three tabs daily for three days then take two tablets daily for three days then take one tab daily for three days. predniSONE 10 mg tablet   No No   Sig: Take 4 tablets (40 mg total) by mouth daily for 5 days, THEN 2 tablets (20 mg total) daily for 5 days, THEN 1 tablet (10 mg total) daily for 5 days, THEN 0.5 tablets (5 mg total) daily for 5 days. sertraline (ZOLOFT) 25 mg tablet   Yes No   Sig: Take 25 mg by mouth daily      Facility-Administered Medications: None       Past Medical History:   Diagnosis Date    Anxiety     COPD (chronic obstructive pulmonary disease) ([facility])     Diabetes mellitus ([facility])     Essential (primary) hypertension     GERD (gastroesophageal reflux disease)     Smoker        History reviewed. No pertinent surgical history.     Family History   Problem Relation Age of Onset    Diabetes Father      I have reviewed and agree with the history as documented. E-Cigarette/Vaping    E-Cigarette Use Never User      E-Cigarette/Vaping Substances    Nicotine No     THC No     CBD No     Flavoring No      Social History     Tobacco Use    Smoking status: Every Day     Packs/day: 0.25     Types: Cigarettes     Passive exposure: Never    Smokeless tobacco: Never   Vaping Use    Vaping Use: Never used   Substance Use Topics    Alcohol use: Not Currently    Drug use: Never       Review of Systems   Constitutional:  Negative for chills and fever. HENT:  Negative for ear pain, hearing loss, sore throat, trouble swallowing and voice change. Eyes:  Negative for pain and discharge. Respiratory:  Positive for shortness of breath and wheezing. Negative for cough and sputum production. Cardiovascular:  Negative for chest pain, palpitations and syncope. Gastrointestinal:  Negative for abdominal pain, blood in stool, constipation, diarrhea, nausea and vomiting. Genitourinary:  Negative for dysuria, flank pain, frequency and hematuria. Musculoskeletal:  Negative for joint swelling, neck pain and neck stiffness. Skin:  Negative for rash and wound. Neurological:  Negative for dizziness, seizures, syncope, facial asymmetry and headaches. Psychiatric/Behavioral:  Negative for hallucinations, self-injury and suicidal ideas. All other systems reviewed and are negative. Physical Exam  Physical Exam  Vitals and nursing note reviewed. Constitutional:       General: She is not in acute distress. Appearance: She is well-developed. HENT:      Head: Normocephalic and atraumatic. Right Ear: External ear normal.      Left Ear: External ear normal.   Eyes:      General: No scleral icterus. Right eye: No discharge. Left eye: No discharge. Extraocular Movements: Extraocular movements intact.       Conjunctiva/sclera: Conjunctivae normal. Cardiovascular:      Rate and Rhythm: Normal rate and regular rhythm. Heart sounds: Normal heart sounds. No murmur heard. Pulmonary:      Effort: Pulmonary effort is normal.      Breath sounds: Normal breath sounds. No decreased breath sounds, wheezing, rhonchi or rales. Abdominal:      General: Bowel sounds are normal. There is no distension. Palpations: Abdomen is soft. Tenderness: There is no abdominal tenderness. There is no guarding or rebound. Musculoskeletal:         General: No deformity. Normal range of motion. Cervical back: Normal range of motion and neck supple. Skin:     General: Skin is warm and dry. Findings: No rash. Neurological:      General: No focal deficit present. Mental Status: She is alert and oriented to person, place, and time. Cranial Nerves: No cranial nerve deficit. Psychiatric:         Mood and Affect: Mood normal.         Behavior: Behavior normal.         Thought Content:  Thought content normal.         Judgment: Judgment normal.         Vital Signs  ED Triage Vitals [11/11/23 0254]   Temperature Pulse Respirations Blood Pressure SpO2   (!) 97.2 °F (36.2 °C) 80 20 (!) 171/79 96 %      Temp Source Heart Rate Source Patient Position - Orthostatic VS BP Location FiO2 (%)   Temporal Monitor Sitting Right arm --      Pain Score       --           Vitals:    11/11/23 0254   BP: (!) 171/79   Pulse: 80   Patient Position - Orthostatic VS: Sitting         Visual Acuity      ED Medications  Medications   ipratropium-albuterol (DUO-NEB) 0.5-2.5 mg/3 mL inhalation solution 3 mL (3 mL Nebulization Given 11/11/23 0302)   predniSONE tablet 60 mg (60 mg Oral Given 11/11/23 0302)       Diagnostic Studies  Results Reviewed       Procedure Component Value Units Date/Time    Comprehensive metabolic panel [[id number]]  (Abnormal) Collected: 11/11/23 0301    Lab Status: Final result Specimen: Blood from Arm, Left Updated: 11/11/23 0329     Sodium 137 mmol/L Potassium 4.5 mmol/L      Chloride 101 mmol/L      CO2 31 mmol/L      ANION GAP 5 mmol/L      BUN 14 mg/dL      Creatinine 0.63 mg/dL      Glucose 137 mg/dL      Calcium 9.3 mg/dL      AST 9 U/L      ALT 9 U/L      Alkaline Phosphatase 57 U/L      Total Protein 6.4 g/dL      Albumin 3.9 g/dL      Total Bilirubin 0.30 mg/dL      eGFR 93 ml/min/1.73sq m     Narrative:      [employer] guidelines for Chronic Kidney Disease (CKD):     Stage 1 with normal or high GFR (GFR > 90 mL/min/1.73 square meters)    Stage 2 Mild CKD (GFR = 60-89 mL/min/1.73 square meters)    Stage 3A Moderate CKD (GFR = 45-59 mL/min/1.73 square meters)    Stage 3B Moderate CKD (GFR = 30-44 mL/min/1.73 square meters)    Stage 4 Severe CKD (GFR = 15-29 mL/min/1.73 square meters)    Stage 5 End Stage CKD (GFR <15 mL/min/1.73 square meters)  Note: GFR calculation is accurate only with a steady state creatinine    CBC and differential [[id number]]  (Abnormal) Collected: 11/11/23 0301    Lab Status: Final result Specimen: Blood from Arm, Left Updated: 11/11/23 0307     WBC 9.06 Thousand/uL      RBC 4.14 Million/uL      Hemoglobin 12.2 g/dL      Hematocrit 39.2 %      MCV 95 fL      MCH 29.5 pg      MCHC 31.1 g/dL      RDW 12.4 %      MPV 9.7 fL      Platelets 066 Thousands/uL      nRBC 0 /100 WBCs      Neutrophils Relative 60 %      Immat GRANS % 1 %      Lymphocytes Relative 28 %      Monocytes Relative 10 %      Eosinophils Relative 1 %      Basophils Relative 0 %      Neutrophils Absolute 5.42 Thousands/µL      Immature Grans Absolute 0.06 Thousand/uL      Lymphocytes Absolute 2.56 Thousands/µL      Monocytes Absolute 0.86 Thousand/µL      Eosinophils Absolute 0.12 Thousand/µL      Basophils Absolute 0.04 Thousands/µL                    XR chest pa & lateral   ED Interpretation by Alexa Nails MD (11/11 1049)   No acute finding                 Procedures  Procedures         ED Course                               SBIRT 22yo+      Flowsheet Row Most Recent Value   Initial Alcohol Screen: US AUDIT-C     1. How often do you have a drink containing alcohol? 0 Filed at: 11/11/2023 0257   2. How many drinks containing alcohol do you have on a typical day you are drinking? 0 Filed at: 11/11/2023 0257   3b. FEMALE Any Age, or MALE 65+: How often do you have 4 or more drinks on one occassion? 0 Filed at: 11/11/2023 0257   Audit-C Score 0 Filed at: 11/11/2023 1442   LIVIER: How many times in the past year have you. .. Used an illegal drug or used a prescription medication for non-medical reasons? Never Filed at: 11/11/2023 0257                      Medical Decision Making  Based on the history and medical screening exam performed the diagnostic considerations include but are not limited to pneumonia, COVID, flu, COPD exacerbation. Based on the work-up performed in the emergency room which includes physical examination, and which may include laboratory studies and imaging as warranted including advanced imaging such as CT scan or ultrasound, the differential diagnosis is narrowed to exclude limb or life-threatening process. The patient is stable for discharge. Lab work is normal.  Chest x-ray is negative. Patient with normal saturations on her 3 L nasal cannula. Appropriate for discharge at this time. Amount and/or Complexity of Data Reviewed  Labs: ordered. Decision-making details documented in ED Course. Details: Normal  Radiology: ordered and independent interpretation performed. Decision-making details documented in ED Course. Details: Chest x-ray negative    Risk  Prescription drug management.              Disposition  Final diagnoses:   COPD exacerbation ([address])     Time reflects when diagnosis was documented in both MDM as applicable and the Disposition within this note       Time User Action Codes Description Comment    11/11/2023  3:35 AM Anders Brooks Add [J44.1] COPD exacerbation [facility])           ED Disposition       ED Disposition   Discharge    Condition   Stable    Date/Time   Sat Nov 11, 2023 0335    Comment   Jessika Lux discharge to home/self care. Follow-up Information       Follow up With Specialties Details Why Contact Info    Madonna Greer DO Family Medicine   [address] [place] [address]  [phone]              Patient's Medications   Discharge Prescriptions    No medications on file       No discharge procedures on file.     PDMP Review         Value Time User    PDMP Reviewed  Yes 11/2/2023  4:52 AM Chalo Rosado DO            ED Provider  Electronically Signed by             Isidro Galvan MD  11/11/23 5661

## 2023-11-12 ENCOUNTER — HOSPITAL ENCOUNTER (INPATIENT)
Facility: HOSPITAL | Age: 68
LOS: 1 days | Discharge: HOME | DRG: 204 | End: 2023-11-13
Attending: EMERGENCY MEDICINE | Admitting: FAMILY MEDICINE
Payer: COMMERCIAL

## 2023-11-12 ENCOUNTER — APPOINTMENT (EMERGENCY)
Dept: RADIOLOGY | Facility: HOSPITAL | Age: 68
DRG: 204 | End: 2023-11-12
Payer: COMMERCIAL

## 2023-11-12 DIAGNOSIS — J44.1 COPD EXACERBATION (HCC): Primary | ICD-10-CM

## 2023-11-12 DIAGNOSIS — F06.4 ANXIETY DISORDER DUE TO GENERAL MEDICAL CONDITION WITH PANIC ATTACK: ICD-10-CM

## 2023-11-12 DIAGNOSIS — F41.0 ANXIETY DISORDER DUE TO GENERAL MEDICAL CONDITION WITH PANIC ATTACK: ICD-10-CM

## 2023-11-12 LAB
ALBUMIN SERPL BCP-MCNC: 4 G/DL (ref 3.5–5)
ALP SERPL-CCNC: 59 U/L (ref 34–104)
ALT SERPL W P-5'-P-CCNC: 9 U/L (ref 7–52)
ANION GAP SERPL CALCULATED.3IONS-SCNC: 5 MMOL/L
AST SERPL W P-5'-P-CCNC: 8 U/L (ref 13–39)
BASOPHILS # BLD AUTO: 0.03 THOUSANDS/ÂΜL (ref 0–0.1)
BASOPHILS NFR BLD AUTO: 0 % (ref 0–1)
BILIRUB SERPL-MCNC: 0.25 MG/DL (ref 0.2–1)
BNP SERPL-MCNC: 40 PG/ML (ref 0–100)
BUN SERPL-MCNC: 18 MG/DL (ref 5–25)
CALCIUM SERPL-MCNC: 9.6 MG/DL (ref 8.4–10.2)
CARDIAC TROPONIN I PNL SERPL HS: 5 NG/L
CHLORIDE SERPL-SCNC: 99 MMOL/L (ref 96–108)
CO2 SERPL-SCNC: 34 MMOL/L (ref 21–32)
CREAT SERPL-MCNC: 0.68 MG/DL (ref 0.6–1.3)
EOSINOPHIL # BLD AUTO: 0.06 THOUSAND/ÂΜL (ref 0–0.61)
EOSINOPHIL NFR BLD AUTO: 1 % (ref 0–6)
ERYTHROCYTE [DISTWIDTH] IN BLOOD BY AUTOMATED COUNT: 12.3 % (ref 11.6–15.1)
FLUAV RNA RESP QL NAA+PROBE: NEGATIVE
FLUBV RNA RESP QL NAA+PROBE: NEGATIVE
GFR SERPL CREATININE-BSD FRML MDRD: 91 ML/MIN/1.73SQ M
GLUCOSE SERPL-MCNC: 122 MG/DL (ref 65–140)
GLUCOSE SERPL-MCNC: 135 MG/DL (ref 65–140)
GLUCOSE SERPL-MCNC: 225 MG/DL (ref 65–140)
GLUCOSE SERPL-MCNC: 260 MG/DL (ref 65–140)
GLUCOSE SERPL-MCNC: 98 MG/DL (ref 65–140)
HCT VFR BLD AUTO: 38.9 % (ref 34.8–46.1)
HGB BLD-MCNC: 12.3 G/DL (ref 11.5–15.4)
IMM GRANULOCYTES # BLD AUTO: 0.04 THOUSAND/UL (ref 0–0.2)
IMM GRANULOCYTES NFR BLD AUTO: 0 % (ref 0–2)
LYMPHOCYTES # BLD AUTO: 2.34 THOUSANDS/ÂΜL (ref 0.6–4.47)
LYMPHOCYTES NFR BLD AUTO: 25 % (ref 14–44)
MCH RBC QN AUTO: 30.1 PG (ref 26.8–34.3)
MCHC RBC AUTO-ENTMCNC: 31.6 G/DL (ref 31.4–37.4)
MCV RBC AUTO: 95 FL (ref 82–98)
MONOCYTES # BLD AUTO: 0.96 THOUSAND/ÂΜL (ref 0.17–1.22)
MONOCYTES NFR BLD AUTO: 10 % (ref 4–12)
NEUTROPHILS # BLD AUTO: 6.05 THOUSANDS/ÂΜL (ref 1.85–7.62)
NEUTS SEG NFR BLD AUTO: 64 % (ref 43–75)
NRBC BLD AUTO-RTO: 0 /100 WBCS
PLATELET # BLD AUTO: 187 THOUSANDS/UL (ref 149–390)
PMV BLD AUTO: 10 FL (ref 8.9–12.7)
POTASSIUM SERPL-SCNC: 4.2 MMOL/L (ref 3.5–5.3)
PROT SERPL-MCNC: 6.4 G/DL (ref 6.4–8.4)
RBC # BLD AUTO: 4.08 MILLION/UL (ref 3.81–5.12)
RSV RNA RESP QL NAA+PROBE: NEGATIVE
SARS-COV-2 RNA RESP QL NAA+PROBE: NEGATIVE
SODIUM SERPL-SCNC: 138 MMOL/L (ref 135–147)
WBC # BLD AUTO: 9.48 THOUSAND/UL (ref 4.31–10.16)

## 2023-11-12 PROCEDURE — 83880 ASSAY OF NATRIURETIC PEPTIDE: CPT | Performed by: EMERGENCY MEDICINE

## 2023-11-12 PROCEDURE — 94664 DEMO&/EVAL PT USE INHALER: CPT

## 2023-11-12 PROCEDURE — 93005 ELECTROCARDIOGRAM TRACING: CPT

## 2023-11-12 PROCEDURE — 85025 COMPLETE CBC W/AUTO DIFF WBC: CPT | Performed by: EMERGENCY MEDICINE

## 2023-11-12 PROCEDURE — 80053 COMPREHEN METABOLIC PANEL: CPT | Performed by: EMERGENCY MEDICINE

## 2023-11-12 PROCEDURE — 94640 AIRWAY INHALATION TREATMENT: CPT

## 2023-11-12 PROCEDURE — 36415 COLL VENOUS BLD VENIPUNCTURE: CPT | Performed by: EMERGENCY MEDICINE

## 2023-11-12 PROCEDURE — 71046 X-RAY EXAM CHEST 2 VIEWS: CPT

## 2023-11-12 PROCEDURE — 84484 ASSAY OF TROPONIN QUANT: CPT | Performed by: EMERGENCY MEDICINE

## 2023-11-12 PROCEDURE — 82948 REAGENT STRIP/BLOOD GLUCOSE: CPT

## 2023-11-12 PROCEDURE — 94760 N-INVAS EAR/PLS OXIMETRY 1: CPT

## 2023-11-12 PROCEDURE — 87040 BLOOD CULTURE FOR BACTERIA: CPT | Performed by: EMERGENCY MEDICINE

## 2023-11-12 PROCEDURE — 0241U HB NFCT DS VIR RESP RNA 4 TRGT: CPT | Performed by: EMERGENCY MEDICINE

## 2023-11-12 PROCEDURE — 99285 EMERGENCY DEPT VISIT HI MDM: CPT | Performed by: EMERGENCY MEDICINE

## 2023-11-12 PROCEDURE — 99285 EMERGENCY DEPT VISIT HI MDM: CPT

## 2023-11-12 PROCEDURE — 99223 1ST HOSP IP/OBS HIGH 75: CPT | Performed by: FAMILY MEDICINE

## 2023-11-12 RX ORDER — FLUTICASONE FUROATE AND VILANTEROL 100; 25 UG/1; UG/1
1 POWDER RESPIRATORY (INHALATION) DAILY
Status: DISCONTINUED | OUTPATIENT
Start: 2023-11-12 | End: 2023-11-13 | Stop reason: HOSPADM

## 2023-11-12 RX ORDER — HYDROXYZINE HYDROCHLORIDE 25 MG/1
25 TABLET, FILM COATED ORAL EVERY 6 HOURS PRN
Status: DISCONTINUED | OUTPATIENT
Start: 2023-11-12 | End: 2023-11-13 | Stop reason: HOSPADM

## 2023-11-12 RX ORDER — LISINOPRIL 20 MG/1
40 TABLET ORAL DAILY
Status: DISCONTINUED | OUTPATIENT
Start: 2023-11-12 | End: 2023-11-13 | Stop reason: HOSPADM

## 2023-11-12 RX ORDER — HYDROXYZINE HYDROCHLORIDE 10 MG/1
10 TABLET, FILM COATED ORAL EVERY 6 HOURS PRN
Status: DISCONTINUED | OUTPATIENT
Start: 2023-11-12 | End: 2023-11-12

## 2023-11-12 RX ORDER — INSULIN LISPRO 100 [IU]/ML
1-6 INJECTION, SOLUTION INTRAVENOUS; SUBCUTANEOUS
Status: DISCONTINUED | OUTPATIENT
Start: 2023-11-12 | End: 2023-11-13 | Stop reason: HOSPADM

## 2023-11-12 RX ORDER — LORAZEPAM 0.5 MG/1
0.5 TABLET ORAL 3 TIMES DAILY PRN
Status: DISCONTINUED | OUTPATIENT
Start: 2023-11-12 | End: 2023-11-12

## 2023-11-12 RX ORDER — IPRATROPIUM BROMIDE AND ALBUTEROL SULFATE 2.5; .5 MG/3ML; MG/3ML
3 SOLUTION RESPIRATORY (INHALATION) 3 TIMES DAILY
Status: DISCONTINUED | OUTPATIENT
Start: 2023-11-12 | End: 2023-11-12

## 2023-11-12 RX ORDER — NICOTINE 21 MG/24HR
1 PATCH, TRANSDERMAL 24 HOURS TRANSDERMAL DAILY
Status: DISCONTINUED | OUTPATIENT
Start: 2023-11-12 | End: 2023-11-13 | Stop reason: HOSPADM

## 2023-11-12 RX ORDER — IPRATROPIUM BROMIDE AND ALBUTEROL SULFATE 2.5; .5 MG/3ML; MG/3ML
3 SOLUTION RESPIRATORY (INHALATION) ONCE
Status: COMPLETED | OUTPATIENT
Start: 2023-11-12 | End: 2023-11-12

## 2023-11-12 RX ORDER — GLIPIZIDE 5 MG/1
10 TABLET ORAL
Status: DISCONTINUED | OUTPATIENT
Start: 2023-11-12 | End: 2023-11-13 | Stop reason: HOSPADM

## 2023-11-12 RX ORDER — CLONIDINE HYDROCHLORIDE 0.1 MG/1
0.2 TABLET ORAL 2 TIMES DAILY
Status: DISCONTINUED | OUTPATIENT
Start: 2023-11-12 | End: 2023-11-13 | Stop reason: HOSPADM

## 2023-11-12 RX ORDER — PANTOPRAZOLE SODIUM 20 MG/1
20 TABLET, DELAYED RELEASE ORAL
Status: DISCONTINUED | OUTPATIENT
Start: 2023-11-12 | End: 2023-11-13 | Stop reason: HOSPADM

## 2023-11-12 RX ORDER — AMLODIPINE BESYLATE 10 MG/1
10 TABLET ORAL
Status: DISCONTINUED | OUTPATIENT
Start: 2023-11-12 | End: 2023-11-13 | Stop reason: HOSPADM

## 2023-11-12 RX ORDER — ATORVASTATIN CALCIUM 40 MG/1
40 TABLET, FILM COATED ORAL
Status: DISCONTINUED | OUTPATIENT
Start: 2023-11-12 | End: 2023-11-13 | Stop reason: HOSPADM

## 2023-11-12 RX ORDER — METOPROLOL TARTRATE 50 MG/1
100 TABLET, FILM COATED ORAL 2 TIMES DAILY
Status: DISCONTINUED | OUTPATIENT
Start: 2023-11-12 | End: 2023-11-13 | Stop reason: HOSPADM

## 2023-11-12 RX ORDER — PIOGLITAZONEHYDROCHLORIDE 15 MG/1
15 TABLET ORAL DAILY
Status: DISCONTINUED | OUTPATIENT
Start: 2023-11-12 | End: 2023-11-13 | Stop reason: HOSPADM

## 2023-11-12 RX ORDER — SERTRALINE HYDROCHLORIDE 25 MG/1
25 TABLET, FILM COATED ORAL DAILY
Status: DISCONTINUED | OUTPATIENT
Start: 2023-11-12 | End: 2023-11-13 | Stop reason: HOSPADM

## 2023-11-12 RX ORDER — ENOXAPARIN SODIUM 100 MG/ML
40 INJECTION SUBCUTANEOUS DAILY
Status: DISCONTINUED | OUTPATIENT
Start: 2023-11-12 | End: 2023-11-13 | Stop reason: HOSPADM

## 2023-11-12 RX ORDER — ALBUTEROL SULFATE 2.5 MG/3ML
2.5 SOLUTION RESPIRATORY (INHALATION) EVERY 6 HOURS PRN
Status: DISCONTINUED | OUTPATIENT
Start: 2023-11-12 | End: 2023-11-13 | Stop reason: HOSPADM

## 2023-11-12 RX ORDER — METHYLPREDNISOLONE SODIUM SUCCINATE 40 MG/ML
40 INJECTION, POWDER, LYOPHILIZED, FOR SOLUTION INTRAMUSCULAR; INTRAVENOUS DAILY
Status: DISCONTINUED | OUTPATIENT
Start: 2023-11-12 | End: 2023-11-13 | Stop reason: HOSPADM

## 2023-11-12 RX ADMIN — CLONIDINE HYDROCHLORIDE 0.2 MG: 0.1 TABLET ORAL at 09:00

## 2023-11-12 RX ADMIN — ALBUTEROL SULFATE 2.5 MG: 2.5 SOLUTION RESPIRATORY (INHALATION) at 06:15

## 2023-11-12 RX ADMIN — PANTOPRAZOLE SODIUM 20 MG: 20 TABLET, DELAYED RELEASE ORAL at 06:30

## 2023-11-12 RX ADMIN — IPRATROPIUM BROMIDE AND ALBUTEROL SULFATE 3 ML: .5; 3 SOLUTION RESPIRATORY (INHALATION) at 00:29

## 2023-11-12 RX ADMIN — METHYLPREDNISOLONE SODIUM SUCCINATE 40 MG: 40 INJECTION, POWDER, FOR SOLUTION INTRAMUSCULAR; INTRAVENOUS at 14:13

## 2023-11-12 RX ADMIN — DOXYCYCLINE 100 MG: 100 INJECTION, POWDER, LYOPHILIZED, FOR SOLUTION INTRAVENOUS at 14:43

## 2023-11-12 RX ADMIN — SERTRALINE HYDROCHLORIDE 25 MG: 25 TABLET, FILM COATED ORAL at 09:00

## 2023-11-12 RX ADMIN — PIOGLITAZONE HYDROCHLORIDE 15 MG: 15 TABLET ORAL at 09:00

## 2023-11-12 RX ADMIN — FLUTICASONE FUROATE AND VILANTEROL TRIFENATATE 1 PUFF: 100; 25 POWDER RESPIRATORY (INHALATION) at 09:03

## 2023-11-12 RX ADMIN — INSULIN LISPRO 3 UNITS: 100 INJECTION, SOLUTION INTRAVENOUS; SUBCUTANEOUS at 22:01

## 2023-11-12 RX ADMIN — UMECLIDINIUM 1 PUFF: 62.5 AEROSOL, POWDER ORAL at 09:03

## 2023-11-12 RX ADMIN — ATORVASTATIN CALCIUM 40 MG: 40 TABLET, FILM COATED ORAL at 22:01

## 2023-11-12 RX ADMIN — ENOXAPARIN SODIUM 40 MG: 40 INJECTION SUBCUTANEOUS at 09:00

## 2023-11-12 RX ADMIN — INSULIN LISPRO 2 UNITS: 100 INJECTION, SOLUTION INTRAVENOUS; SUBCUTANEOUS at 16:56

## 2023-11-12 RX ADMIN — METOPROLOL TARTRATE 100 MG: 50 TABLET, FILM COATED ORAL at 09:05

## 2023-11-12 RX ADMIN — LISINOPRIL 40 MG: 20 TABLET ORAL at 09:00

## 2023-11-12 RX ADMIN — GLIPIZIDE 10 MG: 5 TABLET ORAL at 09:00

## 2023-11-12 NOTE — ED PROVIDER NOTES
History  Chief Complaint   Patient presents with    Shortness of Breath     Pt continues to feel SOB since discharge from [facility] ER at 0200 today. Denies any new or worsening sx. Pt on 3L via NC at home. PMHX: COPD     History of COPD and anxiety, normally on 3 L nasal cannula, seen multiple times in this emergency room for same continues to complain of shortness of breath and difficulty catching her breath. Denies fevers chills. Denies chest pain. Denies nausea vomiting. History provided by:  Patient   used: No    Shortness of Breath  Severity:  Moderate  Onset quality:  Gradual  Duration:  1 day  Timing:  Constant  Progression:  Unchanged  Chronicity:  New  Relieved by:  Nothing  Worsened by:  Nothing  Ineffective treatments:  None tried  Associated symptoms: cough and wheezing    Associated symptoms: no abdominal pain, no chest pain, no ear pain, no fever, no headaches, no hemoptysis, no neck pain, no rash, no sore throat, no sputum production, no syncope and no vomiting        Prior to Admission Medications   Prescriptions Last Dose Informant Patient Reported? Taking?    LORazepam (Ativan) 0.5 mg tablet Unknown  No No   Sig: Take 1 tablet (0.5 mg total) by mouth 3 (three) times a day as needed for anxiety for up to 20 doses   Nebulizers (Comp Air Compressor Nebulizer) MISC Unknown  Yes No   Sig: As directed   albuterol (2.5 mg/3 mL) 0.083 % nebulizer solution 11/11/2023  No Yes   Sig: Take 3 mL (2.5 mg total) by nebulization every 6 (six) hours as needed for wheezing or shortness of breath   Patient taking differently: Take 530 mg by nebulization every 4 (four) hours as needed for wheezing or shortness of breath   amLODIPine (NORVASC) 10 mg tablet 11/11/2023  Yes Yes   Sig: Take 10 mg by mouth daily at bedtime    atorvastatin (LIPITOR) 40 mg tablet 11/11/2023  Yes Yes   Sig: Take 40 mg by mouth daily at bedtime    cloNIDine (CATAPRES) 0.2 mg tablet 11/11/2023  Yes Yes   Sig: Take 0.2 mg by mouth every 12 (twelve) hours   ergocalciferol (VITAMIN D2) 50,000 units Not Taking  Yes No   Sig: Take 50,000 Units by mouth once a week On saturday   Patient not taking: Reported on 2023   fluticasone (FLONASE) 50 mcg/act nasal spray 2023  No Yes   Si spray into each nostril daily   fluticasone-umeclidinium-vilanterol (Trelegy Ellipta) 100-62.5-25 mcg/actuation inhaler 2023  No Yes   Sig: Inhale 1 puff daily Rinse mouth after use. glipiZIDE (GLUCOTROL) 10 mg tablet 2023 Self Yes Yes   Sig: Take 10 mg by mouth daily in the early morning W breakfast   hydrOXYzine HCL (ATARAX) 10 mg tablet Unknown  Yes No   Sig: Take 10 mg by mouth every 6 (six) hours as needed for itching   ipratropium-albuterol (DUO-NEB) 0.5-2.5 mg/3 mL nebulizer solution 2023  No Yes   Sig: Take 3 mL by nebulization 3 (three) times a day   lisinopril (ZESTRIL) 40 mg tablet 2023  Yes Yes   Sig: Take 40 mg by mouth daily   metoprolol tartrate (LOPRESSOR) 100 mg tablet 2023  Yes Yes   Sig: Take 100 mg by mouth every 12 (twelve) hours   nicotine (NICODERM CQ) 7 mg/24hr TD 24 hr patch   No No   Sig: Place 1 patch on the skin over 24 hours daily Do not start before 2023. omeprazole (PriLOSEC OTC) 20 MG tablet 2023  Yes Yes   Sig: Take 20 mg by mouth daily    pioglitazone (ACTOS) 15 mg tablet 2023  Yes Yes   Sig: Take 15 mg by mouth daily   predniSONE 10 mg tablet 2023  No Yes   Sig: Take 4 tabs daily for thee days then take three tabs daily for three days then take two tablets daily for three days then take one tab daily for three days. predniSONE 10 mg tablet 2023  No Yes   Sig: Take 4 tablets (40 mg total) by mouth daily for 5 days, THEN 2 tablets (20 mg total) daily for 5 days, THEN 1 tablet (10 mg total) daily for 5 days, THEN 0.5 tablets (5 mg total) daily for 5 days.    sertraline (ZOLOFT) 25 mg tablet 2023  Yes Yes   Sig: Take 25 mg by mouth daily Facility-Administered Medications: None       Past Medical History:   Diagnosis Date    Anxiety     COPD (chronic obstructive pulmonary disease) ([facility])     Diabetes mellitus ([address] [facility])     Essential (primary) hypertension     GERD (gastroesophageal reflux disease)     Smoker        No past surgical history on file. Family History   Problem Relation Age of Onset    Diabetes Father      I have reviewed and agree with the history as documented. E-Cigarette/Vaping    E-Cigarette Use Never User      E-Cigarette/Vaping Substances    Nicotine No     THC No     CBD No     Flavoring No      Social History     Tobacco Use    Smoking status: Every Day     Packs/day: 0.25     Types: Cigarettes     Passive exposure: Never    Smokeless tobacco: Never   Vaping Use    Vaping Use: Never used   Substance Use Topics    Alcohol use: Not Currently    Drug use: Never       Review of Systems   Constitutional:  Negative for chills and fever. HENT:  Negative for ear pain, hearing loss, sore throat, trouble swallowing and voice change. Eyes:  Negative for pain and discharge. Respiratory:  Positive for cough, shortness of breath and wheezing. Negative for hemoptysis and sputum production. Cardiovascular:  Negative for chest pain, palpitations and syncope. Gastrointestinal:  Negative for abdominal pain, blood in stool, constipation, diarrhea, nausea and vomiting. Genitourinary:  Negative for dysuria, flank pain, frequency and hematuria. Musculoskeletal:  Negative for joint swelling, neck pain and neck stiffness. Skin:  Negative for rash and wound. Neurological:  Negative for dizziness, seizures, syncope, facial asymmetry and headaches. Psychiatric/Behavioral:  Negative for hallucinations, self-injury and suicidal ideas. All other systems reviewed and are negative. Physical Exam  Physical Exam  Vitals and nursing note reviewed. Constitutional:       General: She is not in acute distress.      Appearance: She is well-developed. HENT:      Head: Normocephalic and atraumatic. Right Ear: External ear normal.      Left Ear: External ear normal.   Eyes:      General: No scleral icterus. Right eye: No discharge. Left eye: No discharge. Extraocular Movements: Extraocular movements intact. Conjunctiva/sclera: Conjunctivae normal.   Cardiovascular:      Rate and Rhythm: Normal rate and regular rhythm. Heart sounds: Normal heart sounds. No murmur heard. Pulmonary:      Effort: Pulmonary effort is normal. No accessory muscle usage or respiratory distress. Breath sounds: Decreased breath sounds and wheezing present. No rhonchi or rales. Abdominal:      General: Bowel sounds are normal. There is no distension. Palpations: Abdomen is soft. Tenderness: There is no abdominal tenderness. There is no guarding or rebound. Musculoskeletal:         General: No deformity. Normal range of motion. Cervical back: Normal range of motion and neck supple. Skin:     General: Skin is warm and dry. Findings: No rash. Neurological:      General: No focal deficit present. Mental Status: She is alert and oriented to person, place, and time. Cranial Nerves: No cranial nerve deficit. Psychiatric:         Mood and Affect: Mood normal.         Behavior: Behavior normal.         Thought Content:  Thought content normal.         Judgment: Judgment normal.         Vital Signs  ED Triage Vitals   Temperature Pulse Respirations Blood Pressure SpO2   11/12/23 0013 11/12/23 0013 11/12/23 0013 11/12/23 0014 11/12/23 0013   97.5 °F (36.4 °C) 76 17 163/75 94 %      Temp Source Heart Rate Source Patient Position - Orthostatic VS BP Location FiO2 (%)   11/12/23 0013 11/12/23 0013 11/12/23 0013 11/12/23 0013 --   Temporal Monitor Lying Right arm       Pain Score       11/12/23 0255       No Pain           Vitals:    11/12/23 0013 11/12/23 0014 11/12/23 0259   BP:  163/75 139/76   Pulse: 76  75 Patient Position - Orthostatic VS: Lying           Visual Acuity      ED Medications  Medications   nicotine (NICODERM CQ) 21 mg/24 hr TD 24 hr patch 1 patch (has no administration in time range)   enoxaparin (LOVENOX) subcutaneous injection 40 mg (has no administration in time range)   insulin lispro (HumaLOG) 100 units/mL subcutaneous injection 1-6 Units (has no administration in time range)   insulin lispro (HumaLOG) 100 units/mL subcutaneous injection 1-6 Units (has no administration in time range)   albuterol inhalation solution 2.5 mg (has no administration in time range)   amLODIPine (NORVASC) tablet 10 mg (has no administration in time range)   atorvastatin (LIPITOR) tablet 40 mg (has no administration in time range)   cloNIDine (CATAPRES) tablet 0.2 mg (0.2 mg Oral Not Given 11/12/23 0311)   lisinopril (ZESTRIL) tablet 40 mg (has no administration in time range)   metoprolol tartrate (LOPRESSOR) tablet 100 mg (100 mg Oral Not Given 11/12/23 0312)   pantoprazole (PROTONIX) EC tablet 20 mg (has no administration in time range)   sertraline (ZOLOFT) tablet 25 mg (has no administration in time range)   Fluticasone Furoate-Vilanterol 100-25 mcg/actuation 1 puff (has no administration in time range)     And   umeclidinium 62.5 mcg/actuation inhaler AEPB 1 puff (has no administration in time range)   hydrOXYzine HCL (ATARAX) tablet 25 mg (has no administration in time range)   ipratropium-albuterol (DUO-NEB) 0.5-2.5 mg/3 mL inhalation solution 3 mL (3 mL Nebulization Given 11/12/23 0029)       Diagnostic Studies  Results Reviewed       Procedure Component Value Units Date/Time    B-Type Natriuretic Peptide(BNP) [[id number]]  (Normal) Collected: 11/12/23 0026    Lab Status: Final result Specimen: Blood from Arm, Right Updated: 11/12/23 0209     BNP 40 pg/mL     COVID19, Influenza A/B, RSV PCR, [facility] [[id number]]  (Normal) Collected: 11/12/23 0026    Lab Status: Final result Specimen: Nares from Nose Updated: 11/12/23 0133     SARS-CoV-2 Negative     INFLUENZA A PCR Negative     INFLUENZA B PCR Negative     RSV PCR Negative    Narrative:      FOR PEDIATRIC PATIENTS - copy/paste COVID Guidelines URL to browser: [email or web address]    SARS-CoV-2 assay is a Nucleic Acid Amplification assay intended for the  qualitative detection of nucleic acid from SARS-CoV-2 in nasopharyngeal  swabs. Results are for the presumptive identification of SARS-CoV-2 RNA. Positive results are indicative of infection with SARS-CoV-2, the virus  causing COVID-19, but do not rule out bacterial infection or co-infection  with other viruses. Laboratories within the [facility] and its  territories are required to report all positive results to the appropriate  public health authorities. Negative results do not preclude SARS-CoV-2  infection and should not be used as the sole basis for treatment or other  patient management decisions. Negative results must be combined with  clinical observations, patient history, and epidemiological information. This test has not been FDA cleared or approved. This test has been authorized by FDA under an Emergency Use Authorization  (EUA). This test is only authorized for the duration of time the  declaration that circumstances exist justifying the authorization of the  emergency use of an in vitro diagnostic tests for detection of SARS-CoV-2  virus and/or diagnosis of COVID-19 infection under section 564(b)(1) of  the Act, 21 U. S.C. 188ICG-4(E)(4), unless the authorization is terminated  or revoked sooner. The test has been validated but independent review by FDA  and CLIA is pending. Test performed using [facility] GeneXpert: This RT-PCR assay targets N2,  a region unique to SARS-CoV-2. A conserved region in the E-gene was chosen  for pan-Sarbecovirus detection which includes SARS-CoV-2.     According to CMS-2020-01-R, this platform meets the definition of highshopandsave technology.     HS Troponin 0hr (reflex protocol) [[id number]]  (Normal) Collected: 11/12/23 0026    Lab Status: Final result Specimen: Blood from Arm, Right Updated: 11/12/23 0121     hs TnI 0hr 5 ng/L     Comprehensive metabolic panel [[id number]]  (Abnormal) Collected: 11/12/23 0026    Lab Status: Final result Specimen: Blood from Arm, Right Updated: 11/12/23 0118     Sodium 138 mmol/L      Potassium 4.2 mmol/L      Chloride 99 mmol/L      CO2 34 mmol/L      ANION GAP 5 mmol/L      BUN 18 mg/dL      Creatinine 0.68 mg/dL      Glucose 135 mg/dL      Calcium 9.6 mg/dL      AST 8 U/L      ALT 9 U/L      Alkaline Phosphatase 59 U/L      Total Protein 6.4 g/dL      Albumin 4.0 g/dL      Total Bilirubin 0.25 mg/dL      eGFR 91 ml/min/1.73sq m     Narrative:      [facility] guidelines for Chronic Kidney Disease (CKD):     Stage 1 with normal or high GFR (GFR > 90 mL/min/1.73 square meters)    Stage 2 Mild CKD (GFR = 60-89 mL/min/1.73 square meters)    Stage 3A Moderate CKD (GFR = 45-59 mL/min/1.73 square meters)    Stage 3B Moderate CKD (GFR = 30-44 mL/min/1.73 square meters)    Stage 4 Severe CKD (GFR = 15-29 mL/min/1.73 square meters)    Stage 5 End Stage CKD (GFR <15 mL/min/1.73 square meters)  Note: GFR calculation is accurate only with a steady state creatinine    CBC and differential [[id number]] Collected: 11/12/23 0026    Lab Status: Final result Specimen: Blood from Arm, Right Updated: 11/12/23 0057     WBC 9.48 Thousand/uL      RBC 4.08 Million/uL      Hemoglobin 12.3 g/dL      Hematocrit 38.9 %      MCV 95 fL      MCH 30.1 pg      MCHC 31.6 g/dL      RDW 12.3 %      MPV 10.0 fL      Platelets 021 Thousands/uL      nRBC 0 /100 WBCs      Neutrophils Relative 64 %      Immat GRANS % 0 %      Lymphocytes Relative 25 %      Monocytes Relative 10 %      Eosinophils Relative 1 %      Basophils Relative 0 %      Neutrophils Absolute 6.05 Thousands/µL      Immature Grans Absolute 0.04 Thousand/uL      Lymphocytes Absolute 2.34 Thousands/µL      Monocytes Absolute 0.96 Thousand/µL      Eosinophils Absolute 0.06 Thousand/µL      Basophils Absolute 0.03 Thousands/µL     Blood culture #2 [[id number]] Collected: 11/12/23 0026    Lab Status: In process Specimen: Blood from Arm, Right Updated: 11/12/23 0054    Blood culture #1 [[id number]] Collected: 11/12/23 0026    Lab Status: In process Specimen: Blood from Hand, Left Updated: 11/12/23 0054                   XR chest pa & lateral    (Results Pending)              Procedures  ECG 12 Lead Documentation Only    Date/Time: 11/12/2023 12:52 AM    Performed by: Alexa Nails MD  Authorized by: Alexa Nails MD    ECG reviewed by me, the ED Provider: yes    Patient location:  ED  Previous ECG:     Previous ECG:  Unavailable  Interpretation:     Interpretation: non-specific    Rate:     ECG rate:  67    ECG rate assessment: normal    Rhythm:     Rhythm: sinus rhythm    Ectopy:     Ectopy: none    QRS:     QRS axis:  Normal    QRS intervals:  Normal  Conduction:     Conduction normal: Short MS otherwise normal.    ST segments:     ST segments:  Normal  T waves:     T waves: normal             ED Course                               SBIRT 20yo+      Flowsheet Row Most Recent Value   Initial Alcohol Screen: US AUDIT-C     1. How often do you have a drink containing alcohol? 0 Filed at: 11/12/2023 0014   2. How many drinks containing alcohol do you have on a typical day you are drinking? 0 Filed at: 11/12/2023 0014   3a. Male UNDER 65: How often do you have five or more drinks on one occasion? 0 Filed at: 11/12/2023 0014   3b. FEMALE Any Age, or MALE 65+: How often do you have 4 or more drinks on one occassion? 0 Filed at: 11/12/2023 0014   Audit-C Score 0 Filed at: 11/12/2023 8258   LIVIER: How many times in the past year have you. .. Used an illegal drug or used a prescription medication for non-medical reasons?  Never Filed at: 11/12/2023 0527 Medical Decision Making  Patient presents to the emergency department with shortness of breath. Based on the MSE and the history provided, diagnostic considerations include but are not limited to COPD exacerbation, CHF exacerbation, pneumonia, COVID, influenza, anxiety    Based on the work-up performed in the emergency department which includes physical examination, laboratory testing, imaging which may include advanced imaging as necessary such as CT scan or ultrasound, it is deemed that the patient will require admission to the hospital for treatment of COPD exacerbation. Amount and/or Complexity of Data Reviewed  Labs: ordered. Decision-making details documented in ED Course. Details: Negative  Radiology: ordered and independent interpretation performed. Decision-making details documented in ED Course. Details: Chest x-ray negative  ECG/medicine tests: ordered and independent interpretation performed. Decision-making details documented in ED Course. Details: Normal sinus rhythm rate 67 with short [place]    Risk  Prescription drug management. Decision regarding hospitalization.              Disposition  Final diagnoses:   COPD exacerbation ([address])     Time reflects when diagnosis was documented in both MDM as applicable and the Disposition within this note       Time User Action Codes Description Comment    11/12/2023  1:59 AM Guillermo Harris Add [J44.1] COPD exacerbation [facility])           ED Disposition       ED Disposition   Admit    Condition   Stable    Date/Time   Sun Nov 12, 2023 0159    Comment                  Follow-up Information    None         Current Discharge Medication List        CONTINUE these medications which have NOT CHANGED    Details   albuterol (2.5 mg/3 mL) 0.083 % nebulizer solution Take 3 mL (2.5 mg total) by nebulization every 6 (six) hours as needed for wheezing or shortness of breath  Qty: 75 mL, Refills: 0    Associated Diagnoses: COPD with acute exacerbation (HCC)      amLODIPine (NORVASC) 10 mg tablet Take 10 mg by mouth daily at bedtime       atorvastatin (LIPITOR) 40 mg tablet Take 40 mg by mouth daily at bedtime       cloNIDine (CATAPRES) 0.2 mg tablet Take 0.2 mg by mouth every 12 (twelve) hours      fluticasone (FLONASE) 50 mcg/act nasal spray 1 spray into each nostril daily  Qty: 9.9 mL, Refills: 0    Associated Diagnoses: COPD exacerbation (HCC)      fluticasone-umeclidinium-vilanterol (Trelegy Ellipta) 100-62.5-25 mcg/actuation inhaler Inhale 1 puff daily Rinse mouth after use. Qty: 60 blister, Refills: 0    Associated Diagnoses: COPD, severity to be determined ([address])      glipiZIDE (GLUCOTROL) 10 mg tablet Take 10 mg by mouth daily in the early morning W breakfast      ipratropium-albuterol (DUO-NEB) 0.5-2.5 mg/3 mL nebulizer solution Take 3 mL by nebulization 3 (three) times a day  Qty: 180 mL, Refills: 3    Associated Diagnoses: COPD with acute exacerbation (HCC)      lisinopril (ZESTRIL) 40 mg tablet Take 40 mg by mouth daily      metoprolol tartrate (LOPRESSOR) 100 mg tablet Take 100 mg by mouth every 12 (twelve) hours      omeprazole (PriLOSEC OTC) 20 MG tablet Take 20 mg by mouth daily       pioglitazone (ACTOS) 15 mg tablet Take 15 mg by mouth daily      ! ! predniSONE 10 mg tablet Take 4 tabs daily for thee days then take three tabs daily for three days then take two tablets daily for three days then take one tab daily for three days. Qty: 30 tablet, Refills: 0    Associated Diagnoses: COPD exacerbation ([address])      ! ! predniSONE 10 mg tablet Take 4 tablets (40 mg total) by mouth daily for 5 days, THEN 2 tablets (20 mg total) daily for 5 days, THEN 1 tablet (10 mg total) daily for 5 days, THEN 0.5 tablets (5 mg total) daily for 5 days.   Qty: 38 tablet, Refills: 0    Associated Diagnoses: COPD with acute exacerbation (HCC)      sertraline (ZOLOFT) 25 mg tablet Take 25 mg by mouth daily      ergocalciferol (VITAMIN D2) 50,000 units Take 50,000 Units by mouth once a week On saturday      hydrOXYzine HCL (ATARAX) 10 mg tablet Take 10 mg by mouth every 6 (six) hours as needed for itching      LORazepam (Ativan) 0.5 mg tablet Take 1 tablet (0.5 mg total) by mouth 3 (three) times a day as needed for anxiety for up to 20 doses  Qty: 20 tablet, Refills: 0    Associated Diagnoses: Anxiety      Nebulizers (Comp Air Compressor Nebulizer) MISC As directed      nicotine (NICODERM CQ) 7 mg/24hr TD 24 hr patch Place 1 patch on the skin over 24 hours daily Do not start before October 24, 2023. Qty: 28 patch, Refills: 0    Associated Diagnoses: Tobacco abuse       !! - Potential duplicate medications found. Please discuss with provider. No discharge procedures on file.     PDMP Review         Value Time User    PDMP Reviewed  Yes 11/12/2023  2:20 AM AYLIN Layton            ED Provider  Electronically Signed by             Vianca Fink MD  11/12/23 9075

## 2023-11-12 NOTE — ASSESSMENT & PLAN NOTE
Lab Results   Component Value Date    HGBA1C 7.0 (H) 07/27/2023       No results for input(s): "POCGLU" in the last 72 hours.     Blood Sugar Average: Last 72 hrs:    Continue Glucotrol and Actos Home regimen  Sliding scale insulin coverage with Accu-Cheks  Hypoglycemia protocol  Carbohydrate controlled diet

## 2023-11-12 NOTE — ASSESSMENT & PLAN NOTE
Patient with history of anxiety and prescribed Atarax 10 mg every 6 hours as needed by PCP  History severe COPD with chronic respiratory failure requiring 3 L nasal cannula and becomes breathless frequently with minimal exertion at baseline  Becomes extremely anxious when she feels short of breath, worsening condition- Takes the Atarax as needed with out improvement  States that she does not sleep well at home because she is terrified of not being able to breathe-states that she is able to sleep in the hospital because she feels more secure  Patient presented to the ED for the 10th time in 30 days, ED physician requested observation admission to address underlying severe anxiety  Continue as needed Atarax, previous ED admissions has been Rxd Ativan but not showing up on [address] psychiatric consultation for recommendations of medication regimen to improve quality of life

## 2023-11-12 NOTE — PLAN OF CARE
Problem: PAIN - ADULT  Goal: Verbalizes/displays adequate comfort level or baseline comfort level  Description: Interventions:  - Encourage patient to monitor pain and request assistance  - Assess pain using appropriate pain scale  - Administer analgesics based on type and severity of pain and evaluate response  - Implement non-pharmacological measures as appropriate and evaluate response  - Consider cultural and social influences on pain and pain management  - Notify physician/advanced practitioner if interventions unsuccessful or patient reports new pain  Outcome: Progressing     Problem: INFECTION - ADULT  Goal: Absence or prevention of progression during hospitalization  Description: INTERVENTIONS:  - Assess and monitor for signs and symptoms of infection  - Monitor lab/diagnostic results  - Monitor all insertion sites, i.e. indwelling lines, tubes, and drains  - Monitor endotracheal if appropriate and nasal secretions for changes in amount and color  - Luther appropriate cooling/warming therapies per order  - Administer medications as ordered  - Instruct and encourage patient and family to use good hand hygiene technique  - Identify and instruct in appropriate isolation precautions for identified infection/condition  Outcome: Progressing  Goal: Absence of fever/infection during neutropenic period  Description: INTERVENTIONS:  - Monitor WBC    Outcome: Progressing     Problem: SAFETY ADULT  Goal: Patient will remain free of falls  Description: INTERVENTIONS:  - Educate patient/family on patient safety including physical limitations  - Instruct patient to call for assistance with activity   - Consult OT/PT to assist with strengthening/mobility   - Keep Call bell within reach  - Keep bed low and locked with side rails adjusted as appropriate  - Keep care items and personal belongings within reach  - Initiate and maintain comfort rounds  - Make Fall Risk Sign visible to staff  - Offer Toileting every 2 Hours, in advance of need  - Apply yellow socks and bracelet for high fall risk patients  - Consider moving patient to room near nurses station  Outcome: Progressing  Goal: Maintain or return to baseline ADL function  Description: INTERVENTIONS:  -  Assess patient's ability to carry out ADLs; assess patient's baseline for ADL function and identify physical deficits which impact ability to perform ADLs (bathing, care of mouth/teeth, toileting, grooming, dressing, etc.)  - Assess/evaluate cause of self-care deficits   - Assess range of motion  - Assess patient's mobility; develop plan if impaired  - Assess patient's need for assistive devices and provide as appropriate  - Encourage maximum independence but intervene and supervise when necessary  - Involve family in performance of ADLs  - Assess for home care needs following discharge   - Consider OT consult to assist with ADL evaluation and planning for discharge  - Provide patient education as appropriate  Outcome: Progressing  Goal: Maintains/Returns to pre admission functional level  Description: INTERVENTIONS:  - Perform BMAT or MOVE assessment daily.   - Set and communicate daily mobility goal to care team and patient/family/caregiver. - Collaborate with rehabilitation services on mobility goals if consulted  - Perform Range of Motion 3 times a day. - Reposition patient every 2 hours.   - Dangle patient 3 times a day  - Stand patient 3 times a day  - Ambulate patient 3 times a day  - Out of bed to chair 3 times a day   - Out of bed for meals 3 times a day  - Out of bed for toileting  - Record patient progress and toleration of activity level   Outcome: Progressing     Problem: DISCHARGE PLANNING  Goal: Discharge to home or other facility with appropriate resources  Description: INTERVENTIONS:  - Identify barriers to discharge w/patient and caregiver  - Arrange for needed discharge resources and transportation as appropriate  - Identify discharge learning needs (meds, wound care, etc.)  - Arrange for interpretive services to assist at discharge as needed  - Refer to Case Management Department for coordinating discharge planning if the patient needs post-hospital services based on physician/advanced practitioner order or complex needs related to functional status, cognitive ability, or social support system  Outcome: Progressing     Problem: Knowledge Deficit  Goal: Patient/family/caregiver demonstrates understanding of disease process, treatment plan, medications, and discharge instructions  Description: Complete learning assessment and assess knowledge base.   Interventions:  - Provide teaching at level of understanding  - Provide teaching via preferred learning methods  Outcome: Progressing

## 2023-11-12 NOTE — ASSESSMENT & PLAN NOTE
Chronically on 2 to 3 L nasal cannula supplemental oxygen at home due to severe COPD  She is at her baseline

## 2023-11-12 NOTE — RESPIRATORY THERAPY NOTE
RT Protocol Note  Marianne Waller 79 y.o. female MRN: [id number]  Unit/Bed#: -01 Encounter: [id number]    Assessment    Principal Problem:    Anxiety disorder due to general medical condition with panic attack  Active Problems:    Tobacco abuse    Essential hypertension    Type 2 diabetes mellitus without complication, without long-term current use of insulin (HCC)    Chronic hypoxemic respiratory failure (HCC)    COPD, severe (HCC)      Home Pulmonary Medications:    Home Devices/Therapy: Home O2    Past Medical History:   Diagnosis Date    Anxiety     COPD (chronic obstructive pulmonary disease) (HCC)     Diabetes mellitus ([address] )     Essential (primary) hypertension     GERD (gastroesophageal reflux disease)     Smoker      Social History     Socioeconomic History    Marital status:      Spouse name: Not on file    Number of children: Not on file    Years of education: Not on file    Highest education level: Not on file   Occupational History    Not on file   Tobacco Use    Smoking status: Every Day     Packs/day: 0.25     Types: Cigarettes     Passive exposure: Never    Smokeless tobacco: Never   Vaping Use    Vaping Use: Never used   Substance and Sexual Activity    Alcohol use: Not Currently    Drug use: Never    Sexual activity: Not Currently   Other Topics Concern    Not on file   Social History Narrative    Not on file     Social Determinants of Health     Financial Resource Strain: Not on file   Food Insecurity: No Food Insecurity (8/26/2023)    Hunger Vital Sign     Worried About Running Out of Food in the Last Year: Never true     Ran Out of Food in the Last Year: Never true   Transportation Needs: No Transportation Needs (8/26/2023)    PRAPARE - Transportation     Lack of Transportation (Medical): No     Lack of Transportation (Non-Medical):  No   Physical Activity: Not on file   Stress: Not on file   Social Connections: Not on file   Intimate Partner Violence: Not on file   Housing Stability: Low Risk  (8/26/2023)    Housing Stability Vital Sign     Unable to Pay for Housing in the Last Year: No     Number of Places Lived in the Last Year: 1     Unstable Housing in the Last Year: No       Subjective         Objective    Physical Exam:   Assessment Type: Assess only  General Appearance: Alert  Respiratory Pattern: (P) Dyspnea with exertion, Dyspnea at rest  Chest Assessment: Chest expansion symmetrical  Bilateral Breath Sounds: Diminished  Cough: None    Vitals:  Blood pressure 163/85, pulse 69, temperature (!) 97 °F (36.1 °C), resp. rate 20, height 5' 2" (1.575 m), weight 76 kg (167 lb 9.6 oz), SpO2 97 %. Imaging and other studies: I have personally reviewed pertinent reports. Plan    Respiratory Plan: Home Bronchodilator Patient pathway        Resp Comments: pt with COPD on home O2 and inhalers. pt has anxiety contributing to her SOB. currently on her baseline O2 of 3 L. pt on home inhaler regimen and prn neb.

## 2023-11-12 NOTE — UTILIZATION REVIEW
Initial Clinical Review    WAS OBSERVATION 11/11/23 ([facility]) CONVERTED TO INPATIENT ADMISSION 11/12/23 DUE TO CONTINUED STAY REQUIRED TO CARE FOR PATIENT WITH SOB with ANXIETY DISORDER. Admission: Date/Time/Statement:   Admission Orders (From admission, onward)       Ordered        11/12/23 1303  Inpatient Admission  Once            11/12/23 0159  Place in Observation  Once                          Orders Placed This Encounter   Procedures    Inpatient Admission     Standing Status:   Standing     Number of Occurrences:   1     Order Specific Question:   Level of Care     Answer:   Med Surg [16]     Order Specific Question:   Estimated length of stay     Answer:   More than 2 Midnights     Order Specific Question:   Certification     Answer:   I certify that inpatient services are medically necessary for this patient for a duration of greater than two midnights. See H&P and MD Progress Notes for additional information about the patient's course of treatment. ED Arrival Information       Expected   -    Arrival   11/12/2023 00:05    Acuity   Urgent              Means of arrival   Walk-In    Escorted by   Family Member    Service   Hospitalist    Admission type   Emergency              Arrival complaint   sob             Chief Complaint   Patient presents with    Shortness of Breath     Pt continues to feel SOB since discharge from [address] [place] [address] ER at 0200 today. Denies any new or worsening sx. Pt on 3L via NC at home. PMHX: COPD       Initial Presentation: 79 y.o. female to ED via walk-in from home  Present to ED with severe dyspnea and anxiety. Endorses she is so anxious at home worried that she will not be able to breathe, she is not sleeping at night and taking the Atarax prescribed by her PCP is not helping. Endorses worsening shortness of breath.   PMHX COPD, chronic hypoxia on 3 L, hypertension, NIDDM, anxiety, tobacco abuse   Admitted to OBS with DX: Anxiety disorder due to general medical condition with panic attack   on exam: tachypnea; lungs with wheezing; mood is anxious  PLAN: cont Duo-neb tx; cont atarax; monitor labs; monitor respiratory status; consult psych; Accuchecks with ssic; trend BP's    Date: 11/12/23 - CHANGED TO INPATIENT   Sitting up position, still having some short of breath and winded. Patient reports even a short distance, she feels very winded and short of breath. Also having some wheezing. Looks mild respiratory distress . Tachypneic. Expiratory wheezing, no accessory muscle in use, mild clubbing. Acute exacerbation of COPD   Plan: start iv solumedrol; Start iv abx; cont Duo-neb tx; cont atarax; monitor labs; monitor respiratory status; consult psych; Accuchecks with ssic; trend BP's    Date: 11/13/23     Day 3: Has surpassed a 2nd midnight with active treatments and services, which include cont iv solumedrol; cont iv abx; cont Duo-neb tx; cont atarax; monitor labs; monitor respiratory status; consult psych; Accuchecks with ssic; trend BP's.         ED Triage Vitals   Temperature Pulse Respirations Blood Pressure SpO2   11/12/23 0013 11/12/23 0013 11/12/23 0013 11/12/23 0014 11/12/23 0013   97.5 °F (36.4 °C) 76 17 163/75 94 %      Temp Source Heart Rate Source Patient Position - Orthostatic VS BP Location FiO2 (%)   11/12/23 0013 11/12/23 0013 11/12/23 0013 11/12/23 0013 --   Temporal Monitor Lying Right arm       Pain Score       11/12/23 0255       No Pain          Wt Readings from Last 1 Encounters:   11/12/23 76 kg (167 lb 9.6 oz)     Additional Vital Signs:   Date/Time Temp Pulse Resp BP MAP (mmHg) SpO2 Calculated FIO2 (%) - Nasal Cannula Nasal Cannula O2 Flow Rate (L/min) O2 Device Patient Position - Orthostatic VS   11/13/23 0751 97 °F (36.1 °C) Abnormal  70 18 157/76 103 100 % 28 2 L/min Nasal cannula --   11/13/23 0550 -- -- -- -- -- 100 % -- -- -- --   11/13/23 0541 -- -- -- -- -- 100 % -- -- -- --   11/13/23 0540 -- -- -- -- -- 100 % 32 3 L/min Nasal cannula --   11/13/23 0539 -- -- -- -- -- 100 % -- -- -- --   11/12/23 23:00:42 97 °F (36.1 °C) Abnormal  63 16 110/67 81 98 % -- -- -- --   11/12/23 2201 -- -- -- -- -- -- 32 3 L/min Nasal cannula --   11/12/23 22:00:12 -- 64 -- 106/67 80 98 % -- -- -- --   11/12/23 2159 -- 64 -- 106/67 -- -- -- -- -- --   11/12/23 16:26:37 -- 74 -- 102/63 76 97 % -- -- -- --   11/12/23 15:56:11 97.2 °F (36.2 °C) Abnormal  65 18 94/72 79 97 % -- -- -- --   11/12/23 1500 -- 69 -- -- -- 97 % -- -- -- --     Date/Time Temp Pulse Resp BP MAP (mmHg) SpO2 Calculated FIO2 (%) - Nasal Cannula Nasal Cannula O2 Flow Rate (L/min) O2 Device Patient Position - Orthostatic VS   11/12/23 07:48:14 97 °F (36.1 °C) Abnormal  96 20 163/85 111 97 % -- -- -- --   11/12/23 0745 -- -- -- -- -- 100 % 32 3 L/min Nasal cannula --   11/12/23 0619 -- -- -- -- -- 99 % 32 3 L/min Nasal cannula --   11/12/23 0616 -- -- -- -- -- 99 % -- -- -- --   11/12/23 0615 -- -- -- -- -- 98 % -- -- -- --   11/12/23 02:59:28 -- 75 24 Abnormal  139/76 97 99 % -- -- -- --   11/12/23 0255 -- -- -- -- -- -- 32 3 L/min Nasal cannula --   11/12/23 0015 -- -- -- -- -- 98 % -- -- -- --   11/12/23 0014 -- -- -- 163/75 -- -- -- -- -- --   11/12/23 0013 97.5 °F (36.4 °C) 76 17 -- -- 94 % 32 3 L/min Nasal cannula Lying         EKG: NSR. HR 67.      Pertinent Labs/Diagnostic Test Results:   XR chest pa & lateral    (Results Pending)     Results from last 7 days   Lab Units 11/12/23 0026   SARS-COV-2  Negative     Results from last 7 days   Lab Units 11/12/23 0026 11/11/23 0301   WBC Thousand/uL 9.48 9.06   HEMOGLOBIN g/dL 12.3 12.2   HEMATOCRIT % 38.9 39.2   PLATELETS Thousands/uL 187 176   NEUTROS ABS Thousands/µL 6.05 5.42        Results from last 7 days   Lab Units 11/12/23 0026 11/11/23 0301   SODIUM mmol/L 138 137   POTASSIUM mmol/L 4.2 4.5   CHLORIDE mmol/L 99 101   CO2 mmol/L 34* 31   ANION GAP mmol/L 5 5   BUN mg/dL 18 14   CREATININE mg/dL 0.68 0.63   EGFR ml/min/1.73sq m 91 93   CALCIUM mg/dL 9.6 9.3 Results from last 7 days   Lab Units 11/12/23  0026 11/11/23  0301   AST U/L 8* 9*   ALT U/L 9 9   ALK PHOS U/L 59 57   TOTAL PROTEIN g/dL 6.4 6.4   ALBUMIN g/dL 4.0 3.9   TOTAL BILIRUBIN mg/dL 0.25 0.30     Results from last 7 days   Lab Units 11/13/23  0748 11/12/23  2057 11/12/23  1555 11/12/23  1158 11/12/23  0744   POC GLUCOSE mg/dl 109 260* 225* 98 122     Results from last 7 days   Lab Units 11/12/23  0026 11/11/23  0301   GLUCOSE RANDOM mg/dL 135 137        Results from last 7 days   Lab Units 11/12/23  0026   HS TNI 0HR ng/L 5        Results from last 7 days   Lab Units 11/12/23  0026   BNP pg/mL 40        Results from last 7 days   Lab Units 11/12/23  0026   INFLUENZA A PCR  Negative   INFLUENZA B PCR  Negative   RSV PCR  Negative          ED Treatment:   Medication Administration from 11/12/2023 0005 to 11/12/2023 0253         Date/Time Order Dose Route Action     11/12/2023 0029 EST ipratropium-albuterol (DUO-NEB) 0.5-2.5 mg/3 mL inhalation solution 3 mL 3 mL Nebulization Given            Present on Admission:   Chronic hypoxemic respiratory failure (HCC)   Type 2 diabetes mellitus without complication, without long-term current use of insulin (HCC)   Tobacco abuse   Essential hypertension   Anxiety disorder due to general medical condition with panic attack   COPD, severe ([address])      Admitting Diagnosis: SOB (shortness of breath) [R06.02]  COPD exacerbation ([address]) [J44.1]    Age/Sex: 79 y.o. female    Admission Orders: SCD;s Consistent Carbohydrate Diet. Blood glucose checks ACHS.       Scheduled Medications:  amLODIPine, 10 mg, Oral, HS  atorvastatin, 40 mg, Oral, HS  cloNIDine, 0.2 mg, Oral, BID  enoxaparin, 40 mg, Subcutaneous, Daily  Fluticasone Furoate-Vilanterol, 1 puff, Inhalation, Daily   And  umeclidinium, 1 puff, Inhalation, Daily  glipiZIDE, 10 mg, Oral, Early Morning  insulin lispro, 1-6 Units, Subcutaneous, TID AC  insulin lispro, 1-6 Units, Subcutaneous, HS  lisinopril, 40 mg, Oral, Daily  metoprolol tartrate, 100 mg, Oral, BID  nicotine, 1 patch, Transdermal, Daily  pantoprazole, 20 mg, Oral, Early Morning  pioglitazone, 15 mg, Oral, Daily  sertraline, 25 mg, Oral, Daily    doxycycline (VIBRAMYCIN) 100 mg in sodium chloride 0.9 % 100 mL IVPB  Dose: 100 mg  Freq: Every 12 hours Route: IV  Last Dose: 100 mg (11/12/23 1443)  Start: 11/12/23 1400 End: 11/17/23 1359     methylPREDNISolone sodium succinate (Solu-MEDROL) injection 40 mg  Dose: 40 mg  Freq: Daily Route: IV  Start: 11/12/23 1315 End: 11/16/23 0859     Continuous IV Infusions: None     PRN Meds:  albuterol, 2.5 mg, Nebulization, Q6H PRN  (11/12 rec'd x1)  (11/13 rec'd x1 so far today)   hydrOXYzine HCL, 25 mg, Oral, Q6H PRN   (11/13 rec'd x1 so far today)         IP CONSULT TO PSYCHIATRY    Network Utilization Review Department  ATTENTION: Please call with any questions or concerns to [phone] and carefully listen to the prompts so that you are directed to the right person. All voicemails are confidential.   For Discharge needs, contact Care Management DC Support Team at [phone] opt. 2  Send all requests for admission clinical reviews, approved or denied determinations and any other requests to dedicated fax number below belonging to the campus where the patient is receiving treatment.  List of dedicated fax numbers for the Facilities:  [place] DENIALS (Administrative/Medical Necessity) [phone]   DISCHARGE SUPPORT TEAM (NETWORK) [id number] [address] Peters (Maternity/NICU/Pediatrics) [phone] [address] [facility] [phone] 1505 [facility] [address] [place] [address] - Gret[place] [phone]   [id number] [address] [place] [address] [phone]

## 2023-11-12 NOTE — ASSESSMENT & PLAN NOTE
Severe COPD with chronic hypoxemia with recurrent hospital admissions due to dyspnea  Presented to the ED tonight for the 10th time in 30 days  Not in acute exacerbation of COPD-it appears anxiety is causing the dyspnea  Continue PTA Trelegy, nebulized bronchodilators as needed  Continue 3 L nasal cannula

## 2023-11-12 NOTE — H&P
[address] [facility]  H&P  Name: John Gould 79 y.o. female I MRN: [id number]  Unit/Bed#: -01 I Date of Admission: 11/12/2023   Date of Service: 11/12/2023 I Hospital Day: 0      Assessment/Plan   * Anxiety disorder due to general medical condition with panic attack  Assessment & Plan  Patient with history of anxiety and prescribed Atarax 10 mg every 6 hours as needed by PCP  History severe COPD with chronic respiratory failure requiring 3 L nasal cannula and becomes breathless frequently with minimal exertion at baseline  Becomes extremely anxious when she feels short of breath, worsening condition- Takes the Atarax as needed with out improvement  States that she does not sleep well at home because she is terrified of not being able to breathe-states that she is able to sleep in the hospital because she feels more secure  Patient presented to the ED for the 10th time in 30 days, ED physician requested observation admission to address underlying severe anxiety  Continue as needed Atarax, previous ED admissions has been Rxd Ativan but not showing up on PDMP  Appreciate psychiatric consultation for recommendations of medication regimen to improve quality of life    COPD, severe ([address] )  Assessment & Plan  Severe COPD with chronic hypoxemia with recurrent hospital admissions due to dyspnea  Presented to the ED tonight for the 10th time in 30 days  Not in acute exacerbation of COPD-it appears anxiety is causing the dyspnea  Continue PTA Trelegy, nebulized bronchodilators as needed  Continue 3 L nasal cannula    Chronic hypoxemic respiratory failure (HCC)  Assessment & Plan  Chronically on 2 to 3 L nasal cannula supplemental oxygen at home due to severe COPD  She is at her baseline    Type 2 diabetes mellitus without complication, without long-term current use of insulin [facility])  Assessment & Plan  Lab Results   Component Value Date    HGBA1C 7.0 (H) 07/27/2023       No results for input(s): "POCGLU" in the last 72 hours. Blood Sugar Average: Last 72 hrs:    Continue Glucotrol and Actos Home regimen  Sliding scale insulin coverage with Accu-Cheks  Hypoglycemia protocol  Carbohydrate controlled diet    Essential hypertension  Assessment & Plan  Continue amlodipine, clonidine, lisinopril, metoprolol  Trend blood pressures    Tobacco abuse  Assessment & Plan  Nicotine patch  Cessation counseling           VTE Pharmacologic Prophylaxis:   High Risk (Score >/= 5) - Pharmacological DVT Prophylaxis Ordered: enoxaparin (Lovenox). Sequential Compression Devices Ordered. Code Status: Level 1 - Full Code   Discussion with family: Patient declined call to . Anticipated Length of Stay: Patient will be admitted on an observation basis with an anticipated length of stay of less than 2 midnights secondary to severe anxiety, panic attack, dyspnea. Total Time Spent on Date of Encounter in care of patient: 50 mins. This time was spent on one or more of the following: performing physical exam; counseling and coordination of care; obtaining or reviewing history; documenting in the medical record; reviewing/ordering tests, medications or procedures; communicating with other healthcare professionals and discussing with patient's family/caregivers. Chief Complaint: Dyspnea    History of Present Illness:  Zulema Giraldo is a 79 y.o. female with a PMH of COPD, chronic hypoxia on 3 L, hypertension, NIDDM, anxiety, tobacco abuse who presents with severe dyspnea and anxiety from home. Patient is actually quite pleasant, lives with her 19-year-old granddaughter who she is very proud of but states she is so anxious at home worried that she will not be able to breathe, she is not sleeping at night and taking the Atarax prescribed by her PCP is not helping. In the emergency department she is found to not be hypoxic, tachypneic or with accessory muscle use.   Treated with DuoNeb x1 and presented to the medical service for further evaluation of severe dyspnea. Patient has been admitted to our service multiple times in review of care everywhere the involvement of complex care coordination and home care. Her PCP prescribes Atarax, she has been prescribed Ativan by the ER physicians on multiple occasions but unfortunately this is not displaying in [address] [place] [address]. ED excerpt from 11/2/23  ED Course      ED Course as of 11/02/23 0458   Thu Nov 02, 2023   0408 In review of patient's recent records it does appear that patient has had her most prolonged periods of time without recurrent ED visits, a full 13 days, following the only visit in which she was prescribed anxiolytics in the ED rather than steroids. Review of Systems:  Review of Systems   Constitutional:  Negative for chills and fever. HENT:  Negative for ear pain and sore throat. Eyes:  Negative for pain and visual disturbance. Respiratory:  Positive for cough, shortness of breath and wheezing. Cardiovascular:  Negative for chest pain and palpitations. Gastrointestinal:  Negative for abdominal pain and vomiting. Genitourinary:  Negative for dysuria and hematuria. Musculoskeletal:  Negative for arthralgias and back pain. Skin:  Negative for color change and rash. Neurological:  Negative for seizures and syncope. Psychiatric/Behavioral:  Positive for sleep disturbance. The patient is nervous/anxious. All other systems reviewed and are negative. Past Medical and Surgical History:   Past Medical History:   Diagnosis Date    Anxiety     COPD (chronic obstructive pulmonary disease) ([facility])     Diabetes mellitus ([address] [facility])     Essential (primary) hypertension     GERD (gastroesophageal reflux disease)     Smoker        No past surgical history on file. Meds/Allergies:  Prior to Admission medications    Medication Sig Start Date End Date Taking?  Authorizing Provider   albuterol (2.5 mg/3 mL) 0.083 % nebulizer solution Take 3 mL (2.5 mg total) by nebulization every 6 (six) hours as needed for wheezing or shortness of breath  Patient taking differently: Take 530 mg by nebulization every 4 (four) hours as needed for wheezing or shortness of breath 8/6/23  Yes Vitaly Walker PA-C   amLODIPine (NORVASC) 10 mg tablet Take 10 mg by mouth daily at bedtime    Yes Historical Provider, MD   atorvastatin (LIPITOR) 40 mg tablet Take 40 mg by mouth daily at bedtime    Yes Historical Provider, MD   cloNIDine (CATAPRES) 0.2 mg tablet Take 0.2 mg by mouth every 12 (twelve) hours   Yes Historical Provider, MD   fluticasone (FLONASE) 50 mcg/act nasal spray 1 spray into each nostril daily 6/28/23  Yes Hermon Brunner, MD   fluticasone-umeclidinium-vilanterol (Trelegy Ellipta) 595-85.7-75 mcg/actuation inhaler Inhale 1 puff daily Rinse mouth after use. 9/11/23 11/12/23 Yes AYLIN Madden   glipiZIDE (GLUCOTROL) 10 mg tablet Take 10 mg by mouth daily in the early morning W breakfast   Yes Historical Provider, MD   ipratropium-albuterol (DUO-NEB) 0.5-2.5 mg/3 mL nebulizer solution Take 3 mL by nebulization 3 (three) times a day 9/27/23  Yes DO Jessica   lisinopril (ZESTRIL) 40 mg tablet Take 40 mg by mouth daily   Yes Historical Provider, MD   metoprolol tartrate (LOPRESSOR) 100 mg tablet Take 100 mg by mouth every 12 (twelve) hours   Yes Historical Provider, MD   omeprazole (PriLOSEC OTC) 20 MG tablet Take 20 mg by mouth daily    Yes Historical Provider, MD   pioglitazone (ACTOS) 15 mg tablet Take 15 mg by mouth daily   Yes Historical Provider, MD   predniSONE 10 mg tablet Take 4 tabs daily for thee days then take three tabs daily for three days then take two tablets daily for three days then take one tab daily for three days.  10/23/23  Yes Mauri Morris PA-C   predniSONE 10 mg tablet Take 4 tablets (40 mg total) by mouth daily for 5 days, THEN 2 tablets (20 mg total) daily for 5 days, THEN 1 tablet (10 mg total) daily for 5 days, THEN 0.5 tablets (5 mg total) daily for 5 days. 10/25/23 11/14/23 Yes Ricardo Aponte PA-C   sertraline (ZOLOFT) 25 mg tablet Take 25 mg by mouth daily   Yes Historical Provider, MD   ergocalciferol (VITAMIN D2) 50,000 units Take 50,000 Units by mouth once a week On saturday  Patient not taking: Reported on 11/12/2023    Historical Provider, MD   hydrOXYzine HCL (ATARAX) 10 mg tablet Take 10 mg by mouth every 6 (six) hours as needed for itching    Historical Provider, MD   LORazepam (Ativan) 0.5 mg tablet Take 1 tablet (0.5 mg total) by mouth 3 (three) times a day as needed for anxiety for up to 20 doses 11/2/23   Brian Larkin DO   Nebulizers (Comp Air Compressor Nebulizer) MISC As directed 7/28/23   Historical Provider, MD   nicotine (NICODERM CQ) 7 mg/24hr TD 24 hr patch Place 1 patch on the skin over 24 hours daily Do not start before October 24, 2023. 10/24/23   Debby Desir PA-C     I have reviewed home medications with patient personally. Allergies: Allergies   Allergen Reactions    Clindamycin        Social History:  Marital Status:     Patient Pre-hospital Living Situation: With other family member: Granddaughter  Patient Pre-hospital Level of Mobility: unable to be assessed at time of evaluation  Patient Pre-hospital Diet Restrictions: none  Substance Use History:   Social History     Substance and Sexual Activity   Alcohol Use Not Currently     Social History     Tobacco Use   Smoking Status Every Day    Packs/day: 0.25    Types: Cigarettes    Passive exposure: Never   Smokeless Tobacco Never     Social History     Substance and Sexual Activity   Drug Use Never       Family History:  Family History   Problem Relation Age of Onset    Diabetes Father        Physical Exam:     Vitals:   Blood Pressure: 139/76 (11/12/23 0259)  Pulse: 75 (11/12/23 0259)  Temperature: 97.5 °F (36.4 °C) (11/12/23 0013)  Temp Source: Temporal (11/12/23 0013)  Respirations: (!) 24 (11/12/23 0259)  Height: 5' 2" (157.5 cm) (11/12/23 0013)  Weight - Scale: 76 kg (167 lb 9.6 oz) (11/12/23 0259)  SpO2: 99 % (11/12/23 6092)    Physical Exam  Vitals and nursing note reviewed. Constitutional:       General: She is not in acute distress. Appearance: She is well-developed. HENT:      Head: Normocephalic and atraumatic. Eyes:      Conjunctiva/sclera: Conjunctivae normal.   Cardiovascular:      Rate and Rhythm: Normal rate and regular rhythm. Heart sounds: No murmur heard. Pulmonary:      Effort: Pulmonary effort is normal. No respiratory distress. Breath sounds: Wheezing present. Abdominal:      Palpations: Abdomen is soft. Tenderness: There is no abdominal tenderness. Musculoskeletal:         General: No swelling. Cervical back: Neck supple. Skin:     General: Skin is warm and dry. Capillary Refill: Capillary refill takes less than 2 seconds. Neurological:      General: No focal deficit present. Mental Status: She is alert and oriented to person, place, and time. Psychiatric:         Mood and Affect: Mood is anxious.          Behavior: Behavior normal.       Additional Data:     Lab Results:  Results from last 7 days   Lab Units 11/12/23  0026   WBC Thousand/uL 9.48   HEMOGLOBIN g/dL 12.3   HEMATOCRIT % 38.9   PLATELETS Thousands/uL 187   NEUTROS PCT % 64   LYMPHS PCT % 25   MONOS PCT % 10   EOS PCT % 1     Results from last 7 days   Lab Units 11/12/23  0026   SODIUM mmol/L 138   POTASSIUM mmol/L 4.2   CHLORIDE mmol/L 99   CO2 mmol/L 34*   BUN mg/dL 18   CREATININE mg/dL 0.68   ANION GAP mmol/L 5   CALCIUM mg/dL 9.6   ALBUMIN g/dL 4.0   TOTAL BILIRUBIN mg/dL 0.25   ALK PHOS U/L 59   ALT U/L 9   AST U/L 8*   GLUCOSE RANDOM mg/dL 135                       Lines/Drains:  Invasive Devices       Peripheral Intravenous Line  Duration             Peripheral IV 11/12/23 Right;Ventral (anterior) Forearm <1 day                        Imaging: Reviewed radiology reports from this admission including: chest xray  XR chest pa & lateral    (Results Pending)       EKG and Other Studies Reviewed on Admission:   EKG: NSR. HR 67.    ** Please Note: This note has been constructed using a voice recognition system.  **

## 2023-11-13 ENCOUNTER — TELEPHONE (OUTPATIENT)
Dept: PSYCHIATRY | Facility: CLINIC | Age: 68
End: 2023-11-13

## 2023-11-13 VITALS
OXYGEN SATURATION: 98 % | WEIGHT: 167.6 LBS | BODY MASS INDEX: 30.84 KG/M2 | HEIGHT: 62 IN | TEMPERATURE: 97.2 F | DIASTOLIC BLOOD PRESSURE: 57 MMHG | RESPIRATION RATE: 16 BRPM | HEART RATE: 72 BPM | SYSTOLIC BLOOD PRESSURE: 99 MMHG

## 2023-11-13 LAB
ATRIAL RATE: 67 BPM
ATRIAL RATE: 67 BPM
GLUCOSE SERPL-MCNC: 109 MG/DL (ref 65–140)
GLUCOSE SERPL-MCNC: 270 MG/DL (ref 65–140)
GLUCOSE SERPL-MCNC: 291 MG/DL (ref 65–140)
P AXIS: 32 DEGREES
P AXIS: 45 DEGREES
PR INTERVAL: 102 MS
PR INTERVAL: 102 MS
QRS AXIS: 43 DEGREES
QRS AXIS: 46 DEGREES
QRSD INTERVAL: 78 MS
QRSD INTERVAL: 82 MS
QT INTERVAL: 376 MS
QT INTERVAL: 386 MS
QTC INTERVAL: 397 MS
QTC INTERVAL: 407 MS
T WAVE AXIS: 41 DEGREES
T WAVE AXIS: 45 DEGREES
VENTRICULAR RATE: 67 BPM
VENTRICULAR RATE: 67 BPM

## 2023-11-13 PROCEDURE — 94760 N-INVAS EAR/PLS OXIMETRY 1: CPT

## 2023-11-13 PROCEDURE — 99239 HOSP IP/OBS DSCHRG MGMT >30: CPT

## 2023-11-13 PROCEDURE — 94640 AIRWAY INHALATION TREATMENT: CPT

## 2023-11-13 PROCEDURE — 82948 REAGENT STRIP/BLOOD GLUCOSE: CPT

## 2023-11-13 RX ORDER — METHYLPREDNISOLONE 4 MG/1
TABLET ORAL
Qty: 21 EACH | Refills: 0 | Status: SHIPPED | OUTPATIENT
Start: 2023-11-13

## 2023-11-13 RX ORDER — DOXYCYCLINE 100 MG/1
100 TABLET ORAL 2 TIMES DAILY
Qty: 6 TABLET | Refills: 0 | Status: SHIPPED | OUTPATIENT
Start: 2023-11-13 | End: 2023-11-16

## 2023-11-13 RX ADMIN — FLUTICASONE FUROATE AND VILANTEROL TRIFENATATE 1 PUFF: 100; 25 POWDER RESPIRATORY (INHALATION) at 08:34

## 2023-11-13 RX ADMIN — METOPROLOL TARTRATE 100 MG: 50 TABLET, FILM COATED ORAL at 08:31

## 2023-11-13 RX ADMIN — HYDROXYZINE HYDROCHLORIDE 25 MG: 25 TABLET ORAL at 05:16

## 2023-11-13 RX ADMIN — DOXYCYCLINE 100 MG: 100 INJECTION, POWDER, LYOPHILIZED, FOR SOLUTION INTRAVENOUS at 02:22

## 2023-11-13 RX ADMIN — INSULIN LISPRO 4 UNITS: 100 INJECTION, SOLUTION INTRAVENOUS; SUBCUTANEOUS at 17:06

## 2023-11-13 RX ADMIN — ALBUTEROL SULFATE 2.5 MG: 2.5 SOLUTION RESPIRATORY (INHALATION) at 05:38

## 2023-11-13 RX ADMIN — INSULIN LISPRO 4 UNITS: 100 INJECTION, SOLUTION INTRAVENOUS; SUBCUTANEOUS at 12:11

## 2023-11-13 RX ADMIN — SERTRALINE HYDROCHLORIDE 25 MG: 25 TABLET, FILM COATED ORAL at 08:31

## 2023-11-13 RX ADMIN — PANTOPRAZOLE SODIUM 20 MG: 20 TABLET, DELAYED RELEASE ORAL at 05:16

## 2023-11-13 RX ADMIN — LISINOPRIL 40 MG: 20 TABLET ORAL at 08:30

## 2023-11-13 RX ADMIN — CLONIDINE HYDROCHLORIDE 0.2 MG: 0.1 TABLET ORAL at 08:30

## 2023-11-13 RX ADMIN — ENOXAPARIN SODIUM 40 MG: 40 INJECTION SUBCUTANEOUS at 08:33

## 2023-11-13 RX ADMIN — DOXYCYCLINE 100 MG: 100 INJECTION, POWDER, LYOPHILIZED, FOR SOLUTION INTRAVENOUS at 13:23

## 2023-11-13 RX ADMIN — UMECLIDINIUM 1 PUFF: 62.5 AEROSOL, POWDER ORAL at 08:34

## 2023-11-13 RX ADMIN — METHYLPREDNISOLONE SODIUM SUCCINATE 40 MG: 40 INJECTION, POWDER, FOR SOLUTION INTRAMUSCULAR; INTRAVENOUS at 08:31

## 2023-11-13 RX ADMIN — PIOGLITAZONE HYDROCHLORIDE 15 MG: 15 TABLET ORAL at 08:31

## 2023-11-13 RX ADMIN — GLIPIZIDE 10 MG: 5 TABLET ORAL at 05:16

## 2023-11-13 NOTE — TELEMEDICINE
TeleConsultation - [address] 79 y.o. female MRN: [id number]  Unit/Bed#: -01 Encounter: [id number]        REQUIRED DOCUMENTATION:     1. This service was provided via Telemedicine. 2. Provider located at [facility].  3. TeleMed provider: Viviana Lopez MD.  4. Identify all parties in room with patient during tele consult:  pt  5. Patient was then informed that this was a Telemedicine visit and that the exam was being conducted confidentially over secure lines. My office door was closed. No one else was in the room. Patient acknowledged consent and understanding of privacy and security of the Telemedicine visit, and gave us permission to have the assistant stay in the room in order to assist with the history and to conduct the exam.  I informed the patient that I have reviewed their record in Epic and presented the opportunity for them to ask any questions regarding the visit today. The patient agreed to participate. Assessment/Plan     Present on Admission:   Chronic hypoxemic respiratory failure (HCC)   Type 2 diabetes mellitus without complication, without long-term current use of insulin (HCC)   Tobacco abuse   Essential hypertension   Anxiety disorder due to general medical condition with panic attack   COPD, severe (HCC)    Assessment:    Unspecified anxiety disorder; unspecified mood disorder; rule out adjustment disorder with mixed emotional features; rule out major depression with anxiety without psychotic features    Treatment Plan:    Recommend increasing his sertraline to 50 mg p.o. daily for anxiety and depression. May continue the current order for hydroxyzine as needed anxiety. No suicide precautions are indicated. Upon discharge the patient would benefit from outpatient psychiatric follow-up for medication management with a psychotherapy/counseling component to assist the patient in optimizing her coping skills for dealing with current stressors.   The patient gives her informed consent for the medication and is in agreement with this plan. Current Medications:     Current Facility-Administered Medications   Medication Dose Route Frequency Provider Last Rate    albuterol  2.5 mg Nebulization Q6H PRN Hailey S Maria Antonia, CRNP      amLODIPine  10 mg Oral HS Hailey S Maria Antonia, CRNP      atorvastatin  40 mg Oral HS Hailey S Maria Antonia, CRNP      cloNIDine  0.2 mg Oral BID Hailey S Maria Antonia, CRNP      doxycycline  100 mg Intravenous Q12H Jennifer Aguilera  mg (11/13/23 1323)    enoxaparin  40 mg Subcutaneous Daily Hailey S Maria Antonia, CRNP      Fluticasone Furoate-Vilanterol  1 puff Inhalation Daily Hailey S Maria Antonia, CRNP      And    umeclidinium  1 puff Inhalation Daily Hailey S Maria Antonia, CRNP      glipiZIDE  10 mg Oral Early Morning Hailey S Maria Antonia, CRNP      hydrOXYzine HCL  25 mg Oral Q6H PRN Hailey S Maria Antonia, CRNP      insulin lispro  1-6 Units Subcutaneous TID AC Hailey S Maria Antonia, CRNP      insulin lispro  1-6 Units Subcutaneous HS Hailey S Maria Antonia, CRNP      lisinopril  40 mg Oral Daily Hailey S Maria Antonia, CRNP      methylPREDNISolone sodium succinate  40 mg Intravenous Daily Ailyn Aguilera MD      metoprolol tartrate  100 mg Oral BID Hailey S Maria Antonia, CRNP      nicotine  1 patch Transdermal Daily Hailey S Maria Antonia, CRNP      pantoprazole  20 mg Oral Early Morning Hailey S Maria Antonia, CRNP      pioglitazone  15 mg Oral Daily Hailey S Maria Antonia, CRNP      sertraline  25 mg Oral Daily Hailey S Maria Antonia, CRNP         Risks / Benefits of Treatment:    Risks, benefits, and possible side effects of medications explained to patient and patient verbalizes understanding. Other treatment modalities recommended as indicated:     Outpatient psychiatric follow-up      Inpatient consult to Psychiatry  Consult performed by: Adrian Newberry MD  Consult ordered by: AYLNI Camacho        Physician Requesting Consult: Skylar Mills MD  Principal Problem:Anxiety disorder due to general medical condition with panic attack    Reason for Consult: anxiety      History of Present Illness      Patient is a 79 y.o. female for whom psychiatry consult has been requested for anxiety. The provider has documented the following in the H&P:  Tip Osuna is a 79 y.o. female with a PMH of COPD, chronic hypoxia on 3 L, hypertension, NIDDM, anxiety, tobacco abuse who presents with severe dyspnea and anxiety from home. Patient is actually quite pleasant, lives with her 44-year-old granddaughter who she is very proud of but states she is so anxious at home worried that she will not be able to breathe, she is not sleeping at night and taking the Atarax prescribed by her PCP is not helping. In the emergency department she is found to not be hypoxic, tachypneic or with accessory muscle use. Treated with DuoNeb x1 and presented to the medical service for further evaluation of severe dyspnea. Patient has been admitted to our service multiple times in review of care everywhere the involvement of complex care coordination and home care. Her PCP prescribes Atarax, she has been prescribed Ativan by the ER physicians on multiple occasions but unfortunately this is not displaying in [address] [place] [address] Street. ED excerpt from 11/2/23  ED Course        ED Course as of 11/02/23 0458   u Nov 02, 2023   0408 In review of patient's recent records it does appear that patient has had her most prolonged periods of time without recurrent ED visits, a full 13 days, following the only visit in which she was prescribed anxiolytics in the ED rather than steroids. Review of Systems:  Review of Systems   Constitutional:  Negative for chills and fever. HENT:  Negative for ear pain and sore throat. Eyes:  Negative for pain and visual disturbance. Respiratory:  Positive for cough, shortness of breath and wheezing. Cardiovascular:  Negative for chest pain and palpitations. Gastrointestinal:  Negative for abdominal pain and vomiting. Genitourinary:  Negative for dysuria and hematuria. Musculoskeletal:  Negative for arthralgias and back pain. Skin:  Negative for color change and rash. Neurological:  Negative for seizures and syncope. Psychiatric/Behavioral:  Positive for sleep disturbance. The patient is nervous/anxious. All other systems reviewed and are negative. Past Medical and Surgical History:   Medical History        Past Medical History:   Diagnosis Date    Anxiety      COPD (chronic obstructive pulmonary disease) ([address] [facility])      Diabetes mellitus ([address] [facility])      Essential (primary) hypertension      GERD (gastroesophageal reflux disease)      Smoker              Surgical History   No past surgical history on file. Meds/Allergies:          Prior to Admission medications    Medication Sig Start Date End Date Taking?  Authorizing Provider   albuterol (2.5 mg/3 mL) 0.083 % nebulizer solution Take 3 mL (2.5 mg total) by nebulization every 6 (six) hours as needed for wheezing or shortness of breath  Patient taking differently: Take 530 mg by nebulization every 4 (four) hours as needed for wheezing or shortness of breath 8/6/23   Yes Richard Green PA-C   amLODIPine (NORVASC) 10 mg tablet Take 10 mg by mouth daily at bedtime      Yes Historical Provider, MD   atorvastatin (LIPITOR) 40 mg tablet Take 40 mg by mouth daily at bedtime      Yes Historical Provider, MD   cloNIDine (CATAPRES) 0.2 mg tablet Take 0.2 mg by mouth every 12 (twelve) hours     Yes Historical Provider, MD   fluticasone (FLONASE) 50 mcg/act nasal spray 1 spray into each nostril daily 6/28/23   Yes Cayetano Lin MD   fluticasone-umeclidinium-vilanterol (Trelegy Ellipta) 543-16.2-19 mcg/actuation inhaler Inhale 1 puff daily Rinse mouth after use. 9/11/23 11/12/23 Yes AYLIN Goel   glipiZIDE (GLUCOTROL) 10 mg tablet Take 10 mg by mouth daily in the early morning W breakfast     Yes Historical Provider, MD   ipratropium-albuterol (DUO-NEB) 0.5-2.5 mg/3 mL nebulizer solution Take 3 mL by nebulization 3 (three) times a day 9/27/23   Yes Ricky Levy DO   lisinopril (ZESTRIL) 40 mg tablet Take 40 mg by mouth daily     Yes Historical Provider, MD   metoprolol tartrate (LOPRESSOR) 100 mg tablet Take 100 mg by mouth every 12 (twelve) hours     Yes Historical Provider, MD   omeprazole (PriLOSEC OTC) 20 MG tablet Take 20 mg by mouth daily      Yes Historical Provider, MD   pioglitazone (ACTOS) 15 mg tablet Take 15 mg by mouth daily     Yes Historical Provider, MD   predniSONE 10 mg tablet Take 4 tabs daily for thee days then take three tabs daily for three days then take two tablets daily for three days then take one tab daily for three days. 10/23/23   Yes Elidia Cheek PA-C   predniSONE 10 mg tablet Take 4 tablets (40 mg total) by mouth daily for 5 days, THEN 2 tablets (20 mg total) daily for 5 days, THEN 1 tablet (10 mg total) daily for 5 days, THEN 0.5 tablets (5 mg total) daily for 5 days. 10/25/23 11/14/23 Yes Anand Aponte PA-C   sertraline (ZOLOFT) 25 mg tablet Take 25 mg by mouth daily     Yes Historical Provider, MD   ergocalciferol (VITAMIN D2) 50,000 units Take 50,000 Units by mouth once a week On saturday  Patient not taking: Reported on 11/12/2023       Historical Provider, MD   hydrOXYzine HCL (ATARAX) 10 mg tablet Take 10 mg by mouth every 6 (six) hours as needed for itching       Historical Provider, MD   LORazepam (Ativan) 0.5 mg tablet Take 1 tablet (0.5 mg total) by mouth 3 (three) times a day as needed for anxiety for up to 20 doses 11/2/23     Barrett Reynoso DO   Nebulizers (Comp Air Compressor Nebulizer) MISC As directed 7/28/23     Historical Provider, MD   nicotine (NICODERM CQ) 7 mg/24hr TD 24 hr patch Place 1 patch on the skin over 24 hours daily Do not start before October 24, 2023. 10/24/23     Elidia Cheek PA-C      I have reviewed home medications with patient personally. Allergies:         Allergies   Allergen Reactions    Clindamycin           Social History:  Marital Status:    Patient Pre-hospital Living Situation: With other family member: Granddaughter  Patient Pre-hospital Level of Mobility: unable to be assessed at time of evaluation  Patient Pre-hospital Diet Restrictions: none  Substance Use History:   Social History          Substance and Sexual Activity   Alcohol Use Not Currently      Social History           Tobacco Use   Smoking Status Every Day    Packs/day: 0.25    Types: Cigarettes    Passive exposure: Never   Smokeless Tobacco Never      Social History          Substance and Sexual Activity   Drug Use Never         Family History:  Family History[]Expand by Default         Family History   Problem Relation Age of Onset    Diabetes Father           In meeting with the patient she reports that she finds herself feeling breathless which exacerbates anxiety which concerns exacerbates her symptoms of feeling breathless. She reports she has not found her current psychiatric medications helpful. She does admit to some loss of interest in activities sensation like tendency To talk with her neighbor several hours daily. Past psychiatric history: The patient has been treated with hydroxyzine as needed and sertraline 25 mg p.o. daily by her primary care provider. Social history: The patient is . She has 3 children. She lives with her granddaughter whom she raised. She reports no abuse. Family history: Unremarkable    Substance use history: The patient states she quit smoking years ago. She denies other substance use. Mental status examination: Patient is alert and well oriented all spheres. Affect is pleasant. She denies any significant depression. She states she feels anxious when she feels out of breath. Sensorium is clear. Thought process is logical and linear. Thought content is reality based. Associations are tight. Memory is intact in all spheres.   She appears to be of average intelligence (vocabulary, general fund of knowledge, sentence structure and syntax. She is suicidal homicidal ideation. She denies hallucinations and other psychotic features. Insight and judgment are intact. [place] suicide severity risk scale: The patient denies any history of suicidal ideation or death wishes. No risk for suicide identified. Past Medical History:   Diagnosis Date    Anxiety     COPD (chronic obstructive pulmonary disease) ([address] )     Diabetes mellitus ([address] )     Essential (primary) hypertension     GERD (gastroesophageal reflux disease)     Smoker        Medical Review Of Systems:    Review of Systems    Meds/Allergies     all current active meds have been reviewed  Allergies   Allergen Reactions    Clindamycin        Objective     Vital signs in last 24 hours:  Temp:  [97 °F (36.1 °C)-97.2 °F (36.2 °C)] 97.2 °F (36.2 °C)  HR:  [63-74] 72  Resp:  [16-18] 16  BP: ()/(57-76) 99/57      Intake/Output Summary (Last 24 hours) at 11/13/2023 1546  Last data filed at 11/13/2023 1200  Gross per 24 hour   Intake 360 ml   Output --   Net 360 ml         Lab Results: I have personally reviewed all pertinent laboratory/tests results. Imaging Studies: XR chest pa & lateral    Result Date: 11/13/2023  Narrative: CHEST INDICATION:   sob. COMPARISON: Chest x-ray performed the previous day. EXAM PERFORMED/VIEWS:  XR CHEST PA & LATERAL The frontal view was performed utilizing dual energy radiographic technique. Images: 4 FINDINGS: Cardiomediastinal silhouette appears unremarkable. Stable linear opacity at the right base consistent with atelectasis. The left lung appears clear on this examination. No evidence for effusion or pneumothorax. No acute osseous abnormalities. Impression: Stable minimal linear atelectasis at the right base. Otherwise unremarkable study.  Workstation performed: [id number]     XR chest pa & lateral    Result Date: 11/12/2023  Narrative: CHEST INDICATION:   SOB. COMPARISON: CXR 11/02/2023 and 10/29/2023, chest CT 10/21/2023. EXAM PERFORMED/VIEWS:  XR CHEST PA & LATERAL. DUAL ENERGY SUBTRACTION. FINDINGS: Cardiomediastinal silhouette normal. Mild new linear bibasilar atelectasis with no acute disease. No effusion or pneumothorax. Upper abdomen normal.  Bones normal for age. Impression: No acute cardiopulmonary disease. Workstation performed: [id number]     XR chest 1 view portable    Result Date: 11/2/2023  Narrative: CHEST INDICATION:   sob. COMPARISON: 10/29/2023 EXAM PERFORMED/VIEWS:  XR CHEST PORTABLE FINDINGS: Lung volumes are within normal limits. Lungs are clear. No effusion or pneumothorax. Stable appearance of the cardiomediastinal silhouette. Cardiomegaly. Atherosclerosis. No acute osseous or soft tissue pathology. Findings agree with the ED preliminary interpretation. Impression: No acute cardiopulmonary findings. Workstation performed: [id number]     XR chest 1 view portable    Result Date: 10/29/2023  Narrative: CHEST INDICATION:  Shortness of breath. COMPARISON: Chest radiograph 10/27/2023, CTA chest 10/21/2023 EXAM PERFORMED/VIEWS:  XR CHEST PORTABLE FINDINGS:  Monitoring leads and clips project over the chest. Cardiomediastinal silhouette appears unremarkable. No acute infiltrate. Subcentimeter spiculated right apical nodule described on previous CT imaging is not clearly evident on this exam. Follow-up should be based on previous CT recommendations. No pneumothorax or pleural effusion. Osseous structures appear within normal limits for patient age. Impression: No acute cardiopulmonary disease. See additional comments above. Workstation performed: [id number]     XR chest 1 view portable    Result Date: 10/28/2023  Narrative: CHEST INDICATION:   worsening sob. COMPARISON: 10/25/2023 EXAM PERFORMED/VIEWS:  XR CHEST PORTABLE Images:  1 FINDINGS: Cardiomediastinal silhouette appears unremarkable. The lungs are clear. No pneumothorax or pleural effusion.  Osseous structures appear within normal limits for patient age. Impression: No acute cardiopulmonary disease. Workstation performed: [id number]     XR chest 1 view portable    Result Date: 10/26/2023  Narrative: CHEST INDICATION:   sob. COMPARISON: 10/21/2023. EXAM PERFORMED/VIEWS:  XR CHEST PORTABLE FINDINGS: Patient is rotated. Cardiomediastinal silhouette appears unremarkable. The lungs are clear. No pneumothorax or pleural effusion. Osseous structures appear within normal limits for patient age. Impression: No acute cardiopulmonary disease. Workstation performed: [id number]     XR chest portable    Result Date: 10/22/2023  Narrative: CHEST INDICATION:   Chest pain. COMPARISON: CXR 10/18/2023 and chest CT 10/21/2023. EXAM PERFORMED/VIEWS:  XR CHEST PORTABLE. FINDINGS: Cardiomediastinal silhouette normal. Benign linear atelectasis in the right base. No significant acute disease. No effusion or pneumothorax. Upper abdomen normal. Bones normal for age. Impression: No acute disease with benign linear atelectasis in the left base. Workstation performed: [id number]     CTA chest pe study    Result Date: 10/21/2023  Narrative: CTA - CHEST WITH IV CONTRAST - PULMONARY ANGIOGRAM INDICATION:   SOB, tachycardia. COMPARISON: CT of the chest from 8/4/2023, 7/18/2023 and 11/2/2020. TECHNIQUE: CTA examination of the chest was performed using angiographic technique according to a protocol specifically tailored to evaluate for pulmonary embolism. Multiplanar 2D reformatted images were created from the source data. In addition, coronal 3D MIP postprocessing was performed on the acquisition scanner. Radiation dose length product (DLP) for this visit:  373.16 mGy-cm . This examination, like all CT scans performed in the [facility], was performed utilizing techniques to minimize radiation dose exposure, including the use of iterative  reconstruction and automated exposure control.  IV Contrast:  100 mL of iohexol (OMNIPAQUE) FINDINGS: PULMONARY ARTERIAL TREE:  No pulmonary embolus is seen. BRONCHOPULMONARY:  Clear central airways. Evaluation of lung parenchyma somewhat limited by motion artifact. Linear opacities are seen at the lung bases, likely areas of atelectasis or scarring. No other airspace opacities. Again seen is a spiculated juxtapleural right apical nodule. Its exact size is difficult to measure accurately due to motion artifact however it measured at least 9 mm on the August 2023 CT and was new from 2020. There is a calcified granuloma in the right upper lobe. PLEURA:  No pleural effusions. No pneumothorax. HEART/GREAT VESSELS: The heart is normal in size. No pericardial effusion. Normal caliber thoracic aorta. MEDIASTINUM/LYMPH NODES:  No axillary, hilar or mediastinal lymphadenopathy. CHEST WALL AND LOWER NECK:  Unremarkable. VISUALIZED STRUCTURES IN THE UPPER ABDOMEN:  Unremarkable. MUSCULOSKELETAL:  No focal aggressive osseous lesions. Impression: 1) No pulmonary embolism. 2) No acute pulmonary pathology. 3) Spiculated right apical nodule as on the August 2023 CT, not accurately measured on the current study due to motion artifact, however at least 9 mm on the prior study, new from 2020. Further evaluation with PET/CT is again recommended versus continued  surveillance with chest CT in 3-6 months. Workstation performed: [id number]     XR chest pa & lateral    Result Date: 10/19/2023  Narrative: CHEST INDICATION:   SOB. COMPARISON: Chest radiograph dated 10/13/2023 EXAM PERFORMED/VIEWS:  XR CHEST PA & LATERAL The frontal view was performed utilizing dual energy radiographic technique. FINDINGS: The aorta is tortuous and calcified. The lungs are clear. No pneumothorax or pleural effusion. Osseous structures appear within normal limits for patient age. Impression: No acute cardiopulmonary disease.  Workstation performed: [id number]     EKG/Pathology/Other Studies:   Lab Results   Component Value Date VENTRATE 67 11/12/2023    ATRIALRATE 67 11/12/2023    PRINT 102 11/12/2023    QRSDINT 82 11/12/2023    QTINT 386 11/12/2023    QTCINT 407 11/12/2023    PAXIS 45 11/12/2023    QRSAXIS 46 11/12/2023    TWAVEAXIS 45 11/12/2023        Code Status: Level 1 - Full Code  Advance Directive and Living Will:      Power of :    POLST:      Screenings:    1. Nutrition Screening  Not available on chart    2. Pain Screening  Not available on chart    3. Suicide Screening  Not available on chart    Counseling / Coordination of Care: Total floor / unit time spent today 30 minutes. Greater than 50% of total time was spent with the patient and / or family counseling and / or coordination of care. A description of the counseling / coordination of care: Chart review, patient evaluation, coordination communication with staff, nursing and provider.

## 2023-11-13 NOTE — RESPIRATORY THERAPY NOTE
RT Protocol Note  Tere Seen 79 y.o. female MRN: [id number]  Unit/Bed#: -01 Encounter: [id number]    Assessment    Principal Problem:    Anxiety disorder due to general medical condition with panic attack  Active Problems:    Tobacco abuse    Essential hypertension    Type 2 diabetes mellitus without complication, without long-term current use of insulin (HCC)    Chronic hypoxemic respiratory failure (HCC)    COPD, severe (HCC)      Home Pulmonary Medications:    Home Devices/Therapy: Home O2    Past Medical History:   Diagnosis Date    Anxiety     COPD (chronic obstructive pulmonary disease) (HCC)     Diabetes mellitus ([address] )     Essential (primary) hypertension     GERD (gastroesophageal reflux disease)     Smoker      Social History     Socioeconomic History    Marital status:      Spouse name: Not on file    Number of children: Not on file    Years of education: Not on file    Highest education level: Not on file   Occupational History    Not on file   Tobacco Use    Smoking status: Every Day     Packs/day: 0.25     Types: Cigarettes     Passive exposure: Never    Smokeless tobacco: Never   Vaping Use    Vaping Use: Never used   Substance and Sexual Activity    Alcohol use: Not Currently    Drug use: Never    Sexual activity: Not Currently   Other Topics Concern    Not on file   Social History Narrative    Not on file     Social Determinants of Health     Financial Resource Strain: Not on file   Food Insecurity: No Food Insecurity (8/26/2023)    Hunger Vital Sign     Worried About Running Out of Food in the Last Year: Never true     Ran Out of Food in the Last Year: Never true   Transportation Needs: No Transportation Needs (8/26/2023)    PRAPARE - Transportation     Lack of Transportation (Medical): No     Lack of Transportation (Non-Medical):  No   Physical Activity: Not on file   Stress: Not on file   Social Connections: Not on file   Intimate Partner Violence: Not on file   Housing Stability: Low Risk  (8/26/2023)    Housing Stability Vital Sign     Unable to Pay for Housing in the Last Year: No     Number of Places Lived in the Last Year: 1     Unstable Housing in the Last Year: No       Subjective         Objective    Physical Exam:   Assessment Type: During-treatment  General Appearance: Awake, Alert  Respiratory Pattern: Dyspnea with exertion  Chest Assessment: Chest expansion symmetrical    Vitals:  Blood pressure 110/67, pulse 63, temperature (!) 97 °F (36.1 °C), resp. rate 16, height 5' 2" (1.575 m), weight 76 kg (167 lb 9.6 oz), SpO2 100 %. Imaging and other studies:           Plan    Respiratory Plan: Home Bronchodilator Patient pathway        Resp Comments: Pt requesting prn neb tx, no distress, BS diminished bilaterally.  Albuterol tx given

## 2023-11-13 NOTE — ASSESSMENT & PLAN NOTE
Lab Results   Component Value Date    HGBA1C 7.0 (H) 07/27/2023       Recent Labs     11/12/23  2057 11/13/23  0748 11/13/23  1118 11/13/23  1636   POCGLU 260* 109 291* 270*       Blood Sugar Average: Last 72 hrs:  (P) [id number]  Continue Glucotrol and Actos Home regimen  Sliding scale insulin coverage with Accu-Cheks  Hypoglycemia protocol  Carbohydrate controlled diet

## 2023-11-13 NOTE — UTILIZATION REVIEW
NOTIFICATION OF INPATIENT ADMISSION   AUTHORIZATION REQUEST   SERVICING FACILITY:   [address]  Tax ID: [id number]  NPI: [id number] ATTENDING PROVIDER:  Attending Name and NPI#: José Luis Rivas Md [[id number]]  Address: [address] [place] [address]  Phone: [phone]   ADMISSION INFORMATION:  Place of Service: [address] [place] [address]  Place of Service Code: 21  Inpatient Admission Date/Time: 11/12/23  1:04 PM  Discharge Date/Time: No discharge date for patient encounter. Admitting Diagnosis Code/Description:  SOB (shortness of breath) [R06.02]  COPD exacerbation ([address] ) [J44.1]     UTILIZATION REVIEW CONTACT:  Elizabeth Kerr Utilization   Network Utilization Review Department  Phone: [phone]  Fax [phone]  Email: [email or web address]  Contact for approvals/pending authorizations, clinical reviews, and discharge. PHYSICIAN ADVISORY SERVICES:  Medical Necessity Denial & Dtrh-uc-Gyjx Review  Phone: [phone]  Fax: [phone]  Email: [email or web address]     DISCHARGE SUPPORT TEAM:  For Patients Discharge Needs & Updates  Phone: [phone] opt. 2 Fax: [phone]  Email: [email or web address]

## 2023-11-13 NOTE — DISCHARGE INSTR - AVS FIRST PAGE
Increase sertraline (Zoloft) to 50 mg daily  Take a medrol dose pack steroids   Finish doxycyline course for 3 more days     Follow up with pulmonary   Follow up with PCP and ask for referral to psychiatry with therapy component

## 2023-11-13 NOTE — ASSESSMENT & PLAN NOTE
Patient with history of anxiety and prescribed Atarax 10 mg every 6 hours as needed by PCP  History severe COPD with chronic respiratory failure requiring 3 L nasal cannula and becomes breathless frequently with minimal exertion at baseline  Becomes extremely anxious when she feels short of breath, worsening condition- Takes the Atarax as needed with out improvement  States that she does not sleep well at home because she is terrified of not being able to breathe-states that she is able to sleep in the hospital because she feels more secure  Patient presented to the ED for the 10th time in 30 days, ED physician requested observation admission to address underlying severe anxiety  Continue as needed Atarax, previous ED admissions has been Rxd Ativan but not showing up on [address] psychiatric consultation for recommendations of medication regimen to improve quality of life  Psychiatry consulted and recommending increase of her Zoloft and continue Atarax.

## 2023-11-13 NOTE — PLAN OF CARE
Problem: PAIN - ADULT  Goal: Verbalizes/displays adequate comfort level or baseline comfort level  Description: Interventions:  - Encourage patient to monitor pain and request assistance  - Assess pain using appropriate pain scale  - Administer analgesics based on type and severity of pain and evaluate response  - Implement non-pharmacological measures as appropriate and evaluate response  - Consider cultural and social influences on pain and pain management  - Notify physician/advanced practitioner if interventions unsuccessful or patient reports new pain  Outcome: Progressing     Problem: INFECTION - ADULT  Goal: Absence or prevention of progression during hospitalization  Description: INTERVENTIONS:  - Assess and monitor for signs and symptoms of infection  - Monitor lab/diagnostic results  - Monitor all insertion sites, i.e. indwelling lines, tubes, and drains  - Monitor endotracheal if appropriate and nasal secretions for changes in amount and color  - [place] appropriate cooling/warming therapies per order  - Administer medications as ordered  - Instruct and encourage patient and family to use good hand hygiene technique  - Identify and instruct in appropriate isolation precautions for identified infection/condition  Outcome: Progressing  Goal: Absence of fever/infection during neutropenic period  Description: INTERVENTIONS:  - Monitor WBC    Outcome: Progressing

## 2023-11-13 NOTE — ASSESSMENT & PLAN NOTE
Severe COPD with chronic hypoxemia with recurrent hospital admissions due to dyspnea  Presented to the ED for the 10th time in 30 days  Not in acute exacerbation of COPD-it appears anxiety is causing the dyspnea  Continue PTA Trelegy, nebulized bronchodilators as needed  Continue 3 L nasal cannula  She was placed on steroids and doxycycline, will continue with quick taper and follow-up with pulm

## 2023-11-13 NOTE — DISCHARGE SUMMARY
[address] [facility]  Discharge- Coralyn Sinks 1955, 79 y.o. female MRN: [id number]  Unit/Bed#: -Atul Encounter: [id number]  Primary Care Provider: Jimmie Morin DO   Date and time admitted to hospital: 11/12/2023 12:10 AM    COPD, severe ([address] )  Assessment & Plan  Severe COPD with chronic hypoxemia with recurrent hospital admissions due to dyspnea  Presented to the ED for the 10th time in 30 days  Not in acute exacerbation of COPD-it appears anxiety is causing the dyspnea  Continue PTA Trelegy, nebulized bronchodilators as needed  Continue 3 L nasal cannula  She was placed on steroids and doxycycline, will continue with quick taper and follow-up with pulm    Chronic hypoxemic respiratory failure (HCC)  Assessment & Plan  Chronically on 2 to 3 L nasal cannula supplemental oxygen at home due to severe COPD  She is at her baseline    Type 2 diabetes mellitus without complication, without long-term current use of insulin [facility])  Assessment & Plan  Lab Results   Component Value Date    HGBA1C 7.0 (H) 07/27/2023       Recent Labs     11/12/23  2057 11/13/23  0748 11/13/23  1118 11/13/23  1636   POCGLU 260* 109 291* 270*       Blood Sugar Average: Last 72 hrs:  (P) [id number]  Continue Glucotrol and Actos Home regimen  Sliding scale insulin coverage with Accu-Cheks  Hypoglycemia protocol  Carbohydrate controlled diet    Essential hypertension  Assessment & Plan  Continue amlodipine, clonidine, lisinopril, metoprolol  Trend blood pressures    Tobacco abuse  Assessment & Plan  Nicotine patch  Cessation counseling    * Anxiety disorder due to general medical condition with panic attack  Assessment & Plan  Patient with history of anxiety and prescribed Atarax 10 mg every 6 hours as needed by PCP  History severe COPD with chronic respiratory failure requiring 3 L nasal cannula and becomes breathless frequently with minimal exertion at baseline  Becomes extremely anxious when she feels short of breath, worsening condition- Takes the Atarax as needed with out improvement  States that she does not sleep well at home because she is terrified of not being able to breathe-states that she is able to sleep in the hospital because she feels more secure  Patient presented to the ED for the 10th time in 30 days, ED physician requested observation admission to address underlying severe anxiety  Continue as needed Atarax, previous ED admissions has been Rxd Ativan but not showing up on [address] psychiatric consultation for recommendations of medication regimen to improve quality of life  Psychiatry consulted and recommending increase of her Zoloft and continue Atarax. Medical Problems       Resolved Problems  Date Reviewed: 9/11/2023   None       Discharging Physician / Practitioner: Genevieve Bryant PA-C  PCP: Richardson Matias DO  Admission Date:   Admission Orders (From admission, onward)       Ordered        11/12/23 1303  Inpatient Admission  Once            11/12/23 0159  Place in Observation  Once                          Discharge Date: 11/13/23    Consultations During Hospital Stay:  Psychiatric     Procedures Performed:   None    Significant Findings / Test Results:   XR chest pa & lateral - Result Date: 11/13/2023  Stable minimal linear atelectasis at the right base. Otherwise unremarkable study. Incidental Findings:   None   I reviewed the above mentioned incidental findings with the patient and/or family and they expressed understanding. Test Results Pending at Discharge (will require follow up): None     Outpatient Tests Requested:  None    Complications:  None    Reason for Admission: Anxiety disorder with panic attack     Hospital Course:   Ashley Saeed is a 79 y.o. female patient who originally presented to the hospital on 11/12/2023 due to increased shortness of breath. Patient has chronic severe COPD and utilizes 2 to 3 L of oxygen at all times.   She Remains at her baseline oxygen requirements. She also has severe anxiety that induces increased shortness of breath without panic attacks. Because multiple ER visits/10 in 30 days. She was seen by psychiatry recommended to increase Zoloft to 50 mg daily. She was treated for COPD exacerbation although doubt this is a true exacerbation. Can continue a very short burst of steroids and doxycycline. We will follow-up with pulm and PCP for referral to psychiatry    The patient, initially admitted to the hospital as inpatient, was discharged earlier than expected given the following: rapid improvement in condition. Please see above list of diagnoses and related plan for additional information. Condition at Discharge: fair    Discharge Day Visit / Exam:   Subjective:  Seen and examined. Patient endorses being anxious, denies any worsening shortness of breath from her baseline  Vitals: Blood Pressure: 99/57 (11/13/23 1521)  Pulse: 72 (11/13/23 1521)  Temperature: (!) 97.2 °F (36.2 °C) (11/13/23 1521)  Temp Source: Temporal (11/12/23 0013)  Respirations: 16 (11/13/23 1521)  Height: 5' 2" (157.5 cm) (11/12/23 0013)  Weight - Scale: 76 kg (167 lb 9.6 oz) (11/12/23 0259)  SpO2: 98 % (11/13/23 1521)  Exam:   Physical Exam  Vitals and nursing note reviewed. Constitutional:       General: She is not in acute distress. Appearance: Normal appearance. HENT:      Head: Normocephalic and atraumatic. Nose: No congestion. Mouth/Throat:      Mouth: Mucous membranes are moist.   Eyes:      Conjunctiva/sclera: Conjunctivae normal.   Cardiovascular:      Rate and Rhythm: Normal rate and regular rhythm. Pulses: Normal pulses. Heart sounds: Normal heart sounds. No murmur heard. Pulmonary:      Effort: Pulmonary effort is normal. No respiratory distress. Breath sounds: Normal breath sounds. Comments: Diminished breath sounds  Abdominal:      General: Bowel sounds are normal.      Palpations: Abdomen is soft. Tenderness: There is no abdominal tenderness. Musculoskeletal:         General: Normal range of motion. Right lower leg: No edema. Left lower leg: No edema. Skin:     General: Skin is warm and dry. Neurological:      Mental Status: She is alert and oriented to person, place, and time. Discussion with Family: Patient declined call to . Discharge instructions/Information to patient and family:   See after visit summary for information provided to patient and family. Provisions for Follow-Up Care:  See after visit summary for information related to follow-up care and any pertinent home health orders. Disposition:   Home    Planned Readmission: None     Discharge Statement:  I spent  minutes discharging the patient. This time was spent on the day of discharge. I had direct contact with the patient on the day of discharge. Greater than 50% of the total time was spent examining patient, answering all patient questions, arranging and discussing plan of care with patient as well as directly providing post-discharge instructions. Additional time then spent on discharge activities. Discharge Medications:  See after visit summary for reconciled discharge medications provided to patient and/or family.       **Please Note: This note may have been constructed using a voice recognition system**

## 2023-11-13 NOTE — PLAN OF CARE
Problem: PAIN - ADULT  Goal: Verbalizes/displays adequate comfort level or baseline comfort level  Description: Interventions:  - Encourage patient to monitor pain and request assistance  - Assess pain using appropriate pain scale  - Administer analgesics based on type and severity of pain and evaluate response  - Implement non-pharmacological measures as appropriate and evaluate response  - Consider cultural and social influences on pain and pain management  - Notify physician/advanced practitioner if interventions unsuccessful or patient reports new pain  Outcome: Progressing     Problem: INFECTION - ADULT  Goal: Absence or prevention of progression during hospitalization  Description: INTERVENTIONS:  - Assess and monitor for signs and symptoms of infection  - Monitor lab/diagnostic results  - Monitor all insertion sites, i.e. indwelling lines, tubes, and drains  - Monitor endotracheal if appropriate and nasal secretions for changes in amount and color  - [place] appropriate cooling/warming therapies per order  - Administer medications as ordered  - Instruct and encourage patient and family to use good hand hygiene technique  - Identify and instruct in appropriate isolation precautions for identified infection/condition  Outcome: Progressing  Goal: Absence of fever/infection during neutropenic period  Description: INTERVENTIONS:  - Monitor WBC    Outcome: Progressing     Problem: SAFETY ADULT  Goal: Patient will remain free of falls  Description: INTERVENTIONS:  - Educate patient/family on patient safety including physical limitations  - Instruct patient to call for assistance with activity   - Consult OT/PT to assist with strengthening/mobility   - Keep Call bell within reach  - Keep bed low and locked with side rails adjusted as appropriate  - Keep care items and personal belongings within reach  - Initiate and maintain comfort rounds  - Make Fall Risk Sign visible to staff  - Offer Toileting every 2 Hours, in advance of need  - Apply yellow socks and bracelet for high fall risk patients  - Consider moving patient to room near nurses station  Outcome: Progressing  Goal: Maintain or return to baseline ADL function  Description: INTERVENTIONS:  -  Assess patient's ability to carry out ADLs; assess patient's baseline for ADL function and identify physical deficits which impact ability to perform ADLs (bathing, care of mouth/teeth, toileting, grooming, dressing, etc.)  - Assess/evaluate cause of self-care deficits   - Assess range of motion  - Assess patient's mobility; develop plan if impaired  - Assess patient's need for assistive devices and provide as appropriate  - Encourage maximum independence but intervene and supervise when necessary  - Involve family in performance of ADLs  - Assess for home care needs following discharge   - Consider OT consult to assist with ADL evaluation and planning for discharge  - Provide patient education as appropriate  Outcome: Progressing  Goal: Maintains/Returns to pre admission functional level  Description: INTERVENTIONS:  - Perform BMAT or MOVE assessment daily.   - Set and communicate daily mobility goal to care team and patient/family/caregiver. - Collaborate with rehabilitation services on mobility goals if consulted  - Perform Range of Motion 3 times a day. - Reposition patient every 2 hours.   - Dangle patient 3 times a day  - Stand patient 3 times a day  - Ambulate patient 3 times a day  - Out of bed to chair 3 times a day   - Out of bed for meals 3 times a day  - Out of bed for toileting  - Record patient progress and toleration of activity level   Outcome: Progressing     Problem: DISCHARGE PLANNING  Goal: Discharge to home or other facility with appropriate resources  Description: INTERVENTIONS:  - Identify barriers to discharge w/patient and caregiver  - Arrange for needed discharge resources and transportation as appropriate  - Identify discharge learning needs (meds, wound care, etc.)  - Arrange for interpretive services to assist at discharge as needed  - Refer to Case Management Department for coordinating discharge planning if the patient needs post-hospital services based on physician/advanced practitioner order or complex needs related to functional status, cognitive ability, or social support system  Outcome: Progressing     Problem: Knowledge Deficit  Goal: Patient/family/caregiver demonstrates understanding of disease process, treatment plan, medications, and discharge instructions  Description: Complete learning assessment and assess knowledge base.   Interventions:  - Provide teaching at level of understanding  - Provide teaching via preferred learning methods  Outcome: Progressing

## 2023-11-13 NOTE — TELEPHONE ENCOUNTER
Inaptient psych consult ordered and placed on the [facility] telepsych queue to be seen virtual by an [facility] provider.

## 2023-11-14 NOTE — CASE MANAGEMENT
Case Management Discharge Planning Note    Patient name Max See  Location /-01 MRN [id number]  : 1955 Date 2023       Current Admission Date: 2023  Current Admission Diagnosis: Anxiety disorder due to general medical condition with panic attack   Patient Active Problem List    Diagnosis Date Noted    Anxiety disorder due to general medical condition with panic attack 2023    Chronic hypoxemic respiratory failure ([address]) 2023    COPD, severe ([address]) 2023    Lung nodule 2023    Abnormal CT scan 2023    Anxiety about health 2023    Bacteremia 2023    Vomiting 2023    COPD exacerbation ([address]) 2020    Acute on chronic respiratory failure ([address]) 2020    Acute hyponatremia 2020    Tobacco abuse 2020    Essential hypertension 2020    Type 2 diabetes mellitus without complication, without long-term current use of insulin ([address]) 2020      LOS (days): 1  Geometric Mean LOS (GMLOS) (days): 2.70  Days to GMLOS:1.5     OBJECTIVE:  Risk of Unplanned Readmission Score: 74.95         Current admission status: Inpatient   Preferred Pharmacy:   [address] [place] [id number]  Phone: [phone] Fax: [phone] [address] [address]  Phone: [phone] Fax: [phone]    Primary Care Provider: Catina Mariscal DO    Primary Insurance: [facility] [place] [facility]  Secondary Insurance: [place]    Late entry: assessment done this AM with Jessika. (See flow sheets)    She admits to anxiety and SOB, plan was have a follow up with Psych for medication management. Per patient her daughter and granddaughter resides with her.  She proudly shared she raised her grandchildren and only has 1 more to raise and it is Mart wh is 15years old. She has 2 adult daughters alive and her son is . She uses 2 1/2- 3 liters of 02 at all times and has a nebulizer. Discussed [facility], she is not interested in this . She is not interested in [facility]. We discussed her many visits to the ED for SOB and she did acknowledged her anxiety. Discharge planning discussed with[de-identified] patient  Freedom of Choice: Yes  Comments - Freedom of Choice: Patient is not interested in [facility]. Pt verbalized she can make her own decisions. CM contacted family/caregiver?: No- see comments (declined)                  Requested [address]          Is the patient interested in [facility] at discharge?: No    DME Referral Provided  Referral made for DME?: No    Other Referral/Resources/Interventions Provided:  Interventions: PCP  Referral Comments:  Followed up with post PCP appointment, per [facility] pt does have a follow up tomorrow AM, Daughter made this appointment for [facility].         Treatment Team Recommendation: Home  Discharge Destination Plan[de-identified] Home

## 2023-11-14 NOTE — UTILIZATION REVIEW
NOTIFICATION OF ADMISSION DISCHARGE   This is a Notification of Discharge from [address]. Please be advised that this patient has been discharge from our facility. Below you will find the admission and discharge date and time including the patient’s disposition. UTILIZATION REVIEW CONTACT:  Arsenio Sam  Utilization   Network Utilization Review Department  Phone: [phone] x carefully listen to the prompts. All voicemails are confidential.  Email: [email or web address]     ADMISSION INFORMATION  PRESENTATION DATE: 11/12/2023 12:10 AM  OBERVATION ADMISSION DATE:   INPATIENT ADMISSION DATE: 11/12/23  1:04 PM   DISCHARGE DATE: 11/13/2023  5:39 PM   DISPOSITION:Home/Self Care    Network Utilization Review Department  ATTENTION: Please call with any questions or concerns to [phone] and carefully listen to the prompts so that you are directed to the right person. All voicemails are confidential.   For Discharge needs, contact Care Management DC Support Team at [phone] opt. 2  Send all requests for admission clinical reviews, approved or denied determinations and any other requests to dedicated fax number below belonging to the campus where the patient is receiving treatment.  List of dedicated fax numbers for the Facilities:  [place] DENIALS (Administrative/Medical Necessity) [phone]   DISCHARGE SUPPORT TEAM (Network) [phone] [facility]) [phone]   [address] [facility] [address] [place] [address] [address]  [facility] [phone]

## 2023-11-17 LAB
BACTERIA BLD CULT: NORMAL
BACTERIA BLD CULT: NORMAL

## 2023-11-21 ENCOUNTER — HOSPITAL ENCOUNTER (OUTPATIENT)
Dept: CT IMAGING | Facility: HOSPITAL | Age: 68
Discharge: HOME | End: 2023-11-21
Payer: COMMERCIAL

## 2023-11-21 ENCOUNTER — HOSPITAL ENCOUNTER (OUTPATIENT)
Dept: PULMONOLOGY | Facility: HOSPITAL | Age: 68
Discharge: HOME | End: 2023-11-21
Payer: COMMERCIAL

## 2023-11-21 DIAGNOSIS — R91.1 LUNG NODULE: ICD-10-CM

## 2023-11-21 DIAGNOSIS — J44.9 COPD, SEVERITY TO BE DETERMINED (HCC): ICD-10-CM

## 2023-11-21 PROCEDURE — 94618 PULMONARY STRESS TESTING: CPT | Performed by: INTERNAL MEDICINE

## 2023-11-21 PROCEDURE — 94729 DIFFUSING CAPACITY: CPT

## 2023-11-21 PROCEDURE — G1004 CDSM NDSC: HCPCS

## 2023-11-21 PROCEDURE — 94060 EVALUATION OF WHEEZING: CPT

## 2023-11-21 PROCEDURE — 94761 N-INVAS EAR/PLS OXIMETRY MLT: CPT

## 2023-11-21 PROCEDURE — 71250 CT THORAX DX C-: CPT

## 2023-11-21 PROCEDURE — 94060 EVALUATION OF WHEEZING: CPT | Performed by: INTERNAL MEDICINE

## 2023-11-21 PROCEDURE — 94729 DIFFUSING CAPACITY: CPT | Performed by: INTERNAL MEDICINE

## 2023-11-21 RX ORDER — ALBUTEROL SULFATE 2.5 MG/3ML
2.5 SOLUTION RESPIRATORY (INHALATION) ONCE AS NEEDED
Status: COMPLETED | OUTPATIENT
Start: 2023-11-21 | End: 2023-11-21

## 2023-11-21 RX ADMIN — ALBUTEROL SULFATE 2.5 MG: 2.5 SOLUTION RESPIRATORY (INHALATION) at 16:54

## 2023-11-28 ENCOUNTER — TELEPHONE (OUTPATIENT)
Dept: PULMONOLOGY | Facility: CLINIC | Age: 68
End: 2023-11-28

## 2023-11-28 DIAGNOSIS — R91.1 LUNG NODULE: Primary | ICD-10-CM

## 2023-11-29 RX ORDER — TIOTROPIUM BROMIDE AND OLODATEROL 3.124; 2.736 UG/1; UG/1
SPRAY, METERED RESPIRATORY (INHALATION)
COMMUNITY
Start: 2023-09-11 | End: 2023-12-05 | Stop reason: ALTCHOICE

## 2023-12-05 ENCOUNTER — OFFICE VISIT (OUTPATIENT)
Dept: PULMONOLOGY | Facility: CLINIC | Age: 68
End: 2023-12-05
Payer: COMMERCIAL

## 2023-12-05 VITALS
TEMPERATURE: 97.6 F | OXYGEN SATURATION: 97 % | DIASTOLIC BLOOD PRESSURE: 88 MMHG | HEART RATE: 73 BPM | BODY MASS INDEX: 31.1 KG/M2 | WEIGHT: 169 LBS | SYSTOLIC BLOOD PRESSURE: 138 MMHG | HEIGHT: 62 IN

## 2023-12-05 DIAGNOSIS — Z72.0 TOBACCO ABUSE: ICD-10-CM

## 2023-12-05 DIAGNOSIS — J44.9 COPD, SEVERITY TO BE DETERMINED (HCC): Primary | ICD-10-CM

## 2023-12-05 DIAGNOSIS — R91.1 LUNG NODULE: ICD-10-CM

## 2023-12-05 DIAGNOSIS — J96.11 CHRONIC HYPOXEMIC RESPIRATORY FAILURE (HCC): ICD-10-CM

## 2023-12-05 PROCEDURE — 99214 OFFICE O/P EST MOD 30 MIN: CPT | Performed by: NURSE PRACTITIONER

## 2023-12-05 RX ORDER — FLUTICASONE FUROATE, UMECLIDINIUM BROMIDE AND VILANTEROL TRIFENATATE 100; 62.5; 25 UG/1; UG/1; UG/1
1 POWDER RESPIRATORY (INHALATION) DAILY
Qty: 60 BLISTER | Refills: 0 | Status: SHIPPED | OUTPATIENT
Start: 2023-12-05 | End: 2024-01-04

## 2023-12-05 NOTE — PROGRESS NOTES
Pulmonary Follow-Up Note   Sourav Robles 79 y.o. female MRN: [id number]  12/5/2023      Assessment/Plan:    Diagnoses and all orders for this visit:    COPD, severity to be determined ([address])  -     fluticasone-umeclidinium-vilanterol (Trelegy Ellipta) 100-62.5-25 mcg/actuation inhaler; Inhale 1 puff daily Rinse mouth after use. Chronic hypoxemic respiratory failure (HCC)    Lung nodule    Tobacco abuse      Vaccines: up to date    Symptoms are in good control at this time  Continue Trelegy daily  DuoNeb when needed up to 4x per day  Continue 3L O2 & maintain saturations >88%  Keep appointment for repeat CT; we did review prior results today in the office  PFT results were reviewed today  Recommend she continue to cut down cigarettes per day - working towards complete smoking cessation. Return in about 3 months (around 3/5/2024). All of Jessika's questions were answered prior to leaving the office today. She is aware to call our office with any further questions or concerns. History of Present Illness   Reason for Visit: Follow up  Chief Complaint: none today  HPI: Sourav Robles is a 79 y.o. female who presents to the office today for follow up. She has decreased cigarettes to 5/day (down from 8 per day prior visit). She feels her breathing is doing well but does report occasional cough with mucus production in the AM. She denies wheezing or tight chest. Short of breath once in a while and it has a close relationship to anxiety. She does not use albuterol HFA very often. Trelegy is taken daily   DuoNeb via nebulizer - taking 4x per day routinely. Sleeping well overall. Wearing her O2 3-4L/m around the clock. Review of Systems   Constitutional:  Negative for chills, fatigue and fever. HENT:  Negative for congestion and postnasal drip. Respiratory:  Positive for cough and shortness of breath. Cardiovascular:  Negative for chest pain, palpitations and leg swelling.    All other systems reviewed and are negative. Historical Information   Past Medical History:   Diagnosis Date    Anxiety     COPD (chronic obstructive pulmonary disease) ([address] [facility])     Diabetes mellitus ([address] [facility])     Essential (primary) hypertension     GERD (gastroesophageal reflux disease)     Smoker      History reviewed. No pertinent surgical history.   Family History   Problem Relation Age of Onset    Diabetes Father      Social History   Social History     Substance and Sexual Activity   Alcohol Use Not Currently     Social History     Substance and Sexual Activity   Drug Use Never     Social History     Tobacco Use   Smoking Status Every Day    Packs/day: 0.25    Types: Cigarettes    Passive exposure: Never   Smokeless Tobacco Never     E-Cigarette/Vaping    E-Cigarette Use Never User      E-Cigarette/Vaping Substances    Nicotine No     THC No     CBD No     Flavoring No        Meds/Allergies     Current Outpatient Medications:     amLODIPine (NORVASC) 10 mg tablet, Take 10 mg by mouth daily at bedtime , Disp: , Rfl:     atorvastatin (LIPITOR) 40 mg tablet, Take 40 mg by mouth daily at bedtime , Disp: , Rfl:     cloNIDine (CATAPRES) 0.2 mg tablet, Take 0.2 mg by mouth every 12 (twelve) hours, Disp: , Rfl:     fluticasone-umeclidinium-vilanterol (Trelegy Ellipta) 100-62.5-25 mcg/actuation inhaler, Inhale 1 puff daily Rinse mouth after use., Disp: 60 blister, Rfl: 0    glipiZIDE (GLUCOTROL) 10 mg tablet, Take 10 mg by mouth daily in the early morning W breakfast, Disp: , Rfl:     hydrOXYzine HCL (ATARAX) 10 mg tablet, Take 10 mg by mouth every 6 (six) hours as needed for itching, Disp: , Rfl:     ipratropium-albuterol (DUO-NEB) 0.5-2.5 mg/3 mL nebulizer solution, Take 3 mL by nebulization 3 (three) times a day, Disp: 180 mL, Rfl: 3    lisinopril (ZESTRIL) 40 mg tablet, Take 40 mg by mouth daily, Disp: , Rfl:     LORazepam (Ativan) 0.5 mg tablet, Take 1 tablet (0.5 mg total) by mouth 3 (three) times a day as needed for anxiety for up to 20 doses, Disp: 20 tablet, Rfl: 0    metoprolol tartrate (LOPRESSOR) 100 mg tablet, Take 100 mg by mouth every 12 (twelve) hours, Disp: , Rfl:     Nebulizers (Comp Air Compressor Nebulizer) MISC, As directed, Disp: , Rfl:     omeprazole (PriLOSEC OTC) 20 MG tablet, Take 20 mg by mouth daily , Disp: , Rfl:     pioglitazone (ACTOS) 15 mg tablet, Take 15 mg by mouth daily, Disp: , Rfl:     sertraline (ZOLOFT) 50 mg tablet, Take 1 tablet (50 mg total) by mouth daily, Disp: 30 tablet, Rfl: 2    ergocalciferol (VITAMIN D2) 50,000 units, Take 50,000 Units by mouth once a week On saturday (Patient not taking: Reported on 11/12/2023), Disp: , Rfl:     fluticasone (FLONASE) 50 mcg/act nasal spray, 1 spray into each nostril daily (Patient not taking: Reported on 12/5/2023), Disp: 9.9 mL, Rfl: 0    nicotine (NICODERM CQ) 7 mg/24hr TD 24 hr patch, Place 1 patch on the skin over 24 hours daily Do not start before October 24, 2023. (Patient not taking: Reported on 12/5/2023), Disp: 28 patch, Rfl: 0  Allergies   Allergen Reactions    Clindamycin        Vitals: Blood pressure 138/88, pulse 73, temperature 97.6 °F (36.4 °C), height 5' 2" (1.575 m), weight 76.7 kg (169 lb), SpO2 97 %. Body mass index is 30.91 kg/m². Oxygen Therapy  SpO2: 97 % (O2 3L)      Physical Exam  Vitals reviewed. Constitutional:       Appearance: Normal appearance. She is normal weight. HENT:      Head: Normocephalic. Nose: Nose normal.      Mouth/Throat:      Mouth: Mucous membranes are moist.      Pharynx: Oropharynx is clear. Eyes:      Conjunctiva/sclera: Conjunctivae normal.      Pupils: Pupils are equal, round, and reactive to light. Cardiovascular:      Rate and Rhythm: Normal rate and regular rhythm. Pulses: Normal pulses. Heart sounds: Normal heart sounds. Pulmonary:      Effort: Pulmonary effort is normal.      Breath sounds: Normal breath sounds. Abdominal:      General: Abdomen is flat.  Bowel sounds are normal. Palpations: Abdomen is soft. Musculoskeletal:         General: Normal range of motion. Skin:     General: Skin is warm and dry. Capillary Refill: Capillary refill takes less than 2 seconds. Neurological:      General: No focal deficit present. Mental Status: She is alert and oriented to person, place, and time. Mental status is at baseline. Psychiatric:         Mood and Affect: Mood normal.         Behavior: Behavior normal.         Thought Content: Thought content normal.         Judgment: Judgment normal.         Labs:   Lab Results   Component Value Date    WBC 9.48 11/12/2023    HGB 12.3 11/12/2023    HCT 38.9 11/12/2023    MCV 95 11/12/2023     11/12/2023    EOSPCT 1 11/12/2023    EOSABS 0.06 11/12/2023    SEGSPCT 91 (H) 10/29/2023    MONOPCT 10 11/12/2023    MONOABS 0.28 10/29/2023    BANDSPCT 1 06/25/2023     Lab Results   Component Value Date    CALCIUM 9.6 11/12/2023    K 4.2 11/12/2023    CO2 34 (H) 11/12/2023    CL 99 11/12/2023    BUN 18 11/12/2023    CREATININE 0.68 11/12/2023     No results found for: "IGE", "RAST"  Lab Results   Component Value Date    ALT 9 11/12/2023    AST 8 (L) 11/12/2023    ALKPHOS 59 11/12/2023         Imaging and other studies: I have personally reviewed pertinent reports. and I have personally reviewed pertinent films in PACS  CT chest 11/21/23  IMPRESSION:     Slight increase in size of a 7 mm right upper lobe nodule with irregular borders. Recommend follow-up CT chest without contrast in 6 months. Neoplasm is in differential.    Pulmonary Results (PFTs, PSG): I have personally reviewed pertinent reports. PFT 11/21/23  FVC 0.53L (24%); FEV1 1.21L (43%); FEV1/FVC 55% predicted  No improvement post bronchodilator. Lung volumes not available - patient became claustrophobic. Very severe obstruction with reduced vital capacity. O2 titration study showed need for 4L O2 with ambulation.       AYLIN Weaver  [facility]        Portions of the record may have been created with voice recognition software. Occasional wrong word or "sound a like" substitutions may have occurred due to the inherent limitations of voice recognition software. Read the chart carefully and recognize, using context, where substitutions have occurred or contact the dictating provider.

## 2024-01-01 ENCOUNTER — PATIENT OUTREACH (OUTPATIENT)
Dept: CASE MANAGEMENT | Facility: OTHER | Age: 69
End: 2024-01-01

## 2024-01-01 ENCOUNTER — APPOINTMENT (EMERGENCY)
Dept: RADIOLOGY | Facility: HOSPITAL | Age: 69
DRG: 871 | End: 2024-01-01
Payer: COMMERCIAL

## 2024-01-01 ENCOUNTER — APPOINTMENT (EMERGENCY)
Dept: CT IMAGING | Facility: HOSPITAL | Age: 69
DRG: 871 | End: 2024-01-01
Payer: COMMERCIAL

## 2024-01-01 ENCOUNTER — HOSPITAL ENCOUNTER (INPATIENT)
Facility: HOSPITAL | Age: 69
LOS: 1 days | DRG: 871 | End: 2024-10-14
Attending: EMERGENCY MEDICINE | Admitting: ANESTHESIOLOGY
Payer: COMMERCIAL

## 2024-01-01 VITALS
TEMPERATURE: 97.4 F | BODY MASS INDEX: 27.75 KG/M2 | HEIGHT: 62 IN | SYSTOLIC BLOOD PRESSURE: 77 MMHG | DIASTOLIC BLOOD PRESSURE: 44 MMHG | RESPIRATION RATE: 17 BRPM | WEIGHT: 150.79 LBS

## 2024-01-01 DIAGNOSIS — J96.21 ACUTE ON CHRONIC RESPIRATORY FAILURE WITH HYPOXIA AND HYPERCAPNIA (HCC): ICD-10-CM

## 2024-01-01 DIAGNOSIS — J96.90 RESPIRATORY FAILURE (HCC): ICD-10-CM

## 2024-01-01 DIAGNOSIS — R06.89 HYPERCAPNIA: ICD-10-CM

## 2024-01-01 DIAGNOSIS — J44.1 COPD WITH ACUTE EXACERBATION (HCC): ICD-10-CM

## 2024-01-01 DIAGNOSIS — J96.22 ACUTE ON CHRONIC RESPIRATORY FAILURE WITH HYPOXIA AND HYPERCAPNIA (HCC): ICD-10-CM

## 2024-01-01 DIAGNOSIS — R41.82 ALTERED MENTAL STATUS, UNSPECIFIED ALTERED MENTAL STATUS TYPE: ICD-10-CM

## 2024-01-01 DIAGNOSIS — J44.1 COPD EXACERBATION (HCC): Primary | ICD-10-CM

## 2024-01-01 DIAGNOSIS — J44.9 COPD, SEVERE (HCC): ICD-10-CM

## 2024-01-01 LAB
2HR DELTA HS TROPONIN: -1 NG/L
4HR DELTA HS TROPONIN: 1 NG/L
ALBUMIN SERPL BCG-MCNC: 4.3 G/DL (ref 3.5–5)
ALP SERPL-CCNC: 100 U/L (ref 34–104)
ALT SERPL W P-5'-P-CCNC: 11 U/L (ref 7–52)
AMORPH URATE CRY URNS QL MICRO: ABNORMAL /HPF
ANION GAP SERPL CALCULATED.3IONS-SCNC: 2 MMOL/L (ref 4–13)
ANION GAP SERPL CALCULATED.3IONS-SCNC: 4 MMOL/L (ref 4–13)
APTT PPP: 37 SECONDS (ref 23–34)
ARTERIAL PATENCY WRIST A: YES
AST SERPL W P-5'-P-CCNC: 14 U/L (ref 13–39)
ATRIAL RATE: 69 BPM
ATRIAL RATE: 77 BPM
ATRIAL RATE: 86 BPM
BACTERIA UR QL AUTO: ABNORMAL /HPF
BASE EX.OXY STD BLDV CALC-SCNC: 60.9 % (ref 60–80)
BASE EX.OXY STD BLDV CALC-SCNC: 74.4 % (ref 60–80)
BASE EX.OXY STD BLDV CALC-SCNC: 76 % (ref 60–80)
BASE EX.OXY STD BLDV CALC-SCNC: 82.2 % (ref 60–80)
BASE EXCESS BLDA CALC-SCNC: 2.9 MMOL/L
BASE EXCESS BLDA CALC-SCNC: 7.4 MMOL/L
BASE EXCESS BLDV CALC-SCNC: 10.3 MMOL/L
BASE EXCESS BLDV CALC-SCNC: 10.3 MMOL/L
BASE EXCESS BLDV CALC-SCNC: 12.2 MMOL/L
BASE EXCESS BLDV CALC-SCNC: 9.2 MMOL/L
BASOPHILS # BLD MANUAL: 0 THOUSAND/UL (ref 0–0.1)
BASOPHILS NFR MAR MANUAL: 0 % (ref 0–1)
BILIRUB SERPL-MCNC: 0.49 MG/DL (ref 0.2–1)
BILIRUB UR QL STRIP: NEGATIVE
BNP SERPL-MCNC: 382 PG/ML (ref 0–100)
BODY TEMPERATURE: 92.7 DEGREES FEHRENHEIT
BODY TEMPERATURE: 96.5 DEGREES FEHRENHEIT
BUN SERPL-MCNC: 11 MG/DL (ref 5–25)
BUN SERPL-MCNC: 11 MG/DL (ref 5–25)
CA-I BLD-SCNC: 1.16 MMOL/L (ref 1.12–1.32)
CALCIUM SERPL-MCNC: 9.1 MG/DL (ref 8.4–10.2)
CALCIUM SERPL-MCNC: 9.7 MG/DL (ref 8.4–10.2)
CARDIAC TROPONIN I PNL SERPL HS: 3 NG/L
CARDIAC TROPONIN I PNL SERPL HS: 4 NG/L
CARDIAC TROPONIN I PNL SERPL HS: 5 NG/L
CHLORIDE SERPL-SCNC: 84 MMOL/L (ref 96–108)
CHLORIDE SERPL-SCNC: 88 MMOL/L (ref 96–108)
CLARITY UR: CLEAR
CO2 SERPL-SCNC: 38 MMOL/L (ref 21–32)
CO2 SERPL-SCNC: 42 MMOL/L (ref 21–32)
COLOR UR: YELLOW
CORTIS SERPL-MCNC: 16.4 UG/DL
CREAT SERPL-MCNC: 0.42 MG/DL (ref 0.6–1.3)
CREAT SERPL-MCNC: 0.52 MG/DL (ref 0.6–1.3)
EOSINOPHIL # BLD MANUAL: 0 THOUSAND/UL (ref 0–0.4)
EOSINOPHIL NFR BLD MANUAL: 0 % (ref 0–6)
ERYTHROCYTE [DISTWIDTH] IN BLOOD BY AUTOMATED COUNT: 12.5 % (ref 11.6–15.1)
ERYTHROCYTE [DISTWIDTH] IN BLOOD BY AUTOMATED COUNT: 12.8 % (ref 11.6–15.1)
EST. AVERAGE GLUCOSE BLD GHB EST-MCNC: 140 MG/DL
FINE GRAN CASTS URNS QL MICRO: ABNORMAL /LPF
FLUAV AG UPPER RESP QL IA.RAPID: NEGATIVE
FLUBV AG UPPER RESP QL IA.RAPID: NEGATIVE
GFR SERPL CREATININE-BSD FRML MDRD: 106 ML/MIN/1.73SQ M
GFR SERPL CREATININE-BSD FRML MDRD: 99 ML/MIN/1.73SQ M
GLUCOSE SERPL-MCNC: 115 MG/DL (ref 65–140)
GLUCOSE SERPL-MCNC: 155 MG/DL (ref 65–140)
GLUCOSE SERPL-MCNC: 172 MG/DL (ref 65–140)
GLUCOSE SERPL-MCNC: 190 MG/DL (ref 65–140)
GLUCOSE SERPL-MCNC: 196 MG/DL (ref 65–140)
GLUCOSE SERPL-MCNC: 218 MG/DL (ref 65–140)
GLUCOSE UR STRIP-MCNC: ABNORMAL MG/DL
HBA1C MFR BLD: 6.5 %
HCO3 BLDA-SCNC: 33.7 MMOL/L (ref 22–28)
HCO3 BLDA-SCNC: 41.1 MMOL/L (ref 22–28)
HCO3 BLDV-SCNC: 40.3 MMOL/L (ref 24–30)
HCO3 BLDV-SCNC: 42.9 MMOL/L (ref 24–30)
HCO3 BLDV-SCNC: 44.3 MMOL/L (ref 24–30)
HCO3 BLDV-SCNC: 46 MMOL/L (ref 24–30)
HCT VFR BLD AUTO: 34.1 % (ref 34.8–46.1)
HCT VFR BLD AUTO: 40.2 % (ref 34.8–46.1)
HGB BLD-MCNC: 10.8 G/DL (ref 11.5–15.4)
HGB BLD-MCNC: 12.6 G/DL (ref 11.5–15.4)
HGB UR QL STRIP.AUTO: ABNORMAL
HYALINE CASTS #/AREA URNS LPF: ABNORMAL /LPF
HYPERCHROMIA BLD QL SMEAR: PRESENT
INR PPP: 0.88 (ref 0.85–1.19)
IPAP: 22
KETONES UR STRIP-MCNC: NEGATIVE MG/DL
L PNEUMO1 AG UR QL IA.RAPID: NEGATIVE
LACTATE SERPL-SCNC: 0.6 MMOL/L (ref 0.5–2)
LEUKOCYTE ESTERASE UR QL STRIP: NEGATIVE
LIPASE SERPL-CCNC: <6 U/L (ref 11–82)
LYMPHOCYTES # BLD AUTO: 0.33 THOUSAND/UL (ref 0.6–4.47)
LYMPHOCYTES # BLD AUTO: 4 % (ref 14–44)
MAGNESIUM SERPL-MCNC: 1.7 MG/DL (ref 1.9–2.7)
MAGNESIUM SERPL-MCNC: 2.6 MG/DL (ref 1.9–2.7)
MCH RBC QN AUTO: 28.2 PG (ref 26.8–34.3)
MCH RBC QN AUTO: 28.2 PG (ref 26.8–34.3)
MCHC RBC AUTO-ENTMCNC: 31.3 G/DL (ref 31.4–37.4)
MCHC RBC AUTO-ENTMCNC: 31.7 G/DL (ref 31.4–37.4)
MCV RBC AUTO: 89 FL (ref 82–98)
MCV RBC AUTO: 90 FL (ref 82–98)
METAMYELOCYTE ABSOLUTE CT: 0.08 THOUSAND/UL (ref 0–0.1)
METAMYELOCYTES NFR BLD MANUAL: 1 % (ref 0–1)
MONOCYTES # BLD AUTO: 0.41 THOUSAND/UL (ref 0–1.22)
MONOCYTES NFR BLD: 5 % (ref 4–12)
NASAL CANNULA: 3
NASAL CANNULA: 5
NEUTROPHILS # BLD MANUAL: 7.43 THOUSAND/UL (ref 1.85–7.62)
NEUTS BAND NFR BLD MANUAL: 8 % (ref 0–8)
NEUTS SEG NFR BLD AUTO: 82 % (ref 43–75)
NITRITE UR QL STRIP: NEGATIVE
NON VENT- BIPAP: ABNORMAL
NON VENT- BIPAP: ABNORMAL
NON-SQ EPI CELLS URNS QL MICRO: ABNORMAL /HPF
NRBC BLD AUTO-RTO: 0 /100 WBCS
O2 CT BLDA-SCNC: 15.6 ML/DL (ref 16–23)
O2 CT BLDA-SCNC: 16.1 ML/DL (ref 16–23)
O2 CT BLDV-SCNC: 11.1 ML/DL
O2 CT BLDV-SCNC: 13.2 ML/DL
O2 CT BLDV-SCNC: 13.6 ML/DL
O2 CT BLDV-SCNC: 15.1 ML/DL
OXYHGB MFR BLDA: 90.4 % (ref 94–97)
OXYHGB MFR BLDA: 92.6 % (ref 94–97)
P AXIS: 71 DEGREES
P AXIS: 72 DEGREES
P AXIS: 72 DEGREES
PCO2 BLDA: 134.3 MM HG (ref 36–44)
PCO2 BLDA: 90.6 MM HG (ref 36–44)
PCO2 BLDV: 134.5 MM HG (ref 42–50)
PCO2 BLDV: 151.2 MM HG (ref 42–50)
PCO2 BLDV: 97.7 MM HG (ref 42–50)
PCO2 BLDV: 98.2 MM HG (ref 42–50)
PCO2 TEMP ADJ BLDA: 116.2 MM HG (ref 36–44)
PCO2 TEMP ADJ BLDA: 86 MM HG (ref 36–44)
PEEP MAX SETTING VENT: 6 CM[H2O]
PH BLD: 7.15 [PH] (ref 7.35–7.45)
PH BLD: 7.2 [PH] (ref 7.35–7.45)
PH BLDA: 7.1 [PH] (ref 7.35–7.45)
PH BLDA: 7.19 [PH] (ref 7.35–7.45)
PH BLDV: 7.1 [PH] (ref 7.3–7.4)
PH BLDV: 7.14 [PH] (ref 7.3–7.4)
PH BLDV: 7.23 [PH] (ref 7.3–7.4)
PH BLDV: 7.26 [PH] (ref 7.3–7.4)
PH UR STRIP.AUTO: 5.5 [PH]
PHOSPHATE SERPL-MCNC: 3.3 MG/DL (ref 2.3–4.1)
PLATELET # BLD AUTO: 151 THOUSANDS/UL (ref 149–390)
PLATELET # BLD AUTO: 167 THOUSANDS/UL (ref 149–390)
PLATELET BLD QL SMEAR: ADEQUATE
PMV BLD AUTO: 10 FL (ref 8.9–12.7)
PMV BLD AUTO: 10.5 FL (ref 8.9–12.7)
PO2 BLD: 73 MM HG (ref 75–129)
PO2 BLD: 77.9 MM HG (ref 75–129)
PO2 BLDA: 84.2 MM HG (ref 75–129)
PO2 BLDA: 89.9 MM HG (ref 75–129)
PO2 BLDV: 34.8 MM HG (ref 35–45)
PO2 BLDV: 42.2 MM HG (ref 35–45)
PO2 BLDV: 49.2 MM HG (ref 35–45)
PO2 BLDV: 51.1 MM HG (ref 35–45)
POTASSIUM SERPL-SCNC: 3.8 MMOL/L (ref 3.5–5.3)
POTASSIUM SERPL-SCNC: 4.1 MMOL/L (ref 3.5–5.3)
PR INTERVAL: 116 MS
PR INTERVAL: 128 MS
PR INTERVAL: 150 MS
PROCALCITONIN SERPL-MCNC: <0.05 NG/ML
PROT SERPL-MCNC: 7.7 G/DL (ref 6.4–8.4)
PROT UR STRIP-MCNC: ABNORMAL MG/DL
PROTHROMBIN TIME: 12.3 SECONDS (ref 12.3–15)
QRS AXIS: 41 DEGREES
QRS AXIS: 43 DEGREES
QRS AXIS: 52 DEGREES
QRSD INTERVAL: 74 MS
QRSD INTERVAL: 80 MS
QRSD INTERVAL: 88 MS
QT INTERVAL: 340 MS
QT INTERVAL: 378 MS
QT INTERVAL: 404 MS
QTC INTERVAL: 405 MS
QTC INTERVAL: 406 MS
QTC INTERVAL: 457 MS
RBC # BLD AUTO: 3.83 MILLION/UL (ref 3.81–5.12)
RBC # BLD AUTO: 4.47 MILLION/UL (ref 3.81–5.12)
RBC #/AREA URNS AUTO: ABNORMAL /HPF
RBC MORPH BLD: NORMAL
S PNEUM AG UR QL: POSITIVE
SARS-COV+SARS-COV-2 AG RESP QL IA.RAPID: NEGATIVE
SODIUM SERPL-SCNC: 128 MMOL/L (ref 135–147)
SODIUM SERPL-SCNC: 130 MMOL/L (ref 135–147)
SP GR UR STRIP.AUTO: >=1.03 (ref 1–1.03)
SPECIMEN SOURCE: ABNORMAL
SPECIMEN SOURCE: ABNORMAL
T WAVE AXIS: 35 DEGREES
T WAVE AXIS: 43 DEGREES
T WAVE AXIS: 57 DEGREES
UROBILINOGEN UR QL STRIP.AUTO: 0.2 E.U./DL
VENT BIPAP FIO2: 40 %
VENTRICULAR RATE: 69 BPM
VENTRICULAR RATE: 77 BPM
VENTRICULAR RATE: 86 BPM
WBC # BLD AUTO: 6.62 THOUSAND/UL (ref 4.31–10.16)
WBC # BLD AUTO: 8.26 THOUSAND/UL (ref 4.31–10.16)
WBC #/AREA URNS AUTO: ABNORMAL /HPF

## 2024-01-01 PROCEDURE — 80053 COMPREHEN METABOLIC PANEL: CPT | Performed by: EMERGENCY MEDICINE

## 2024-01-01 PROCEDURE — 82805 BLOOD GASES W/O2 SATURATION: CPT | Performed by: EMERGENCY MEDICINE

## 2024-01-01 PROCEDURE — 82330 ASSAY OF CALCIUM: CPT | Performed by: PHYSICIAN ASSISTANT

## 2024-01-01 PROCEDURE — 80048 BASIC METABOLIC PNL TOTAL CA: CPT | Performed by: PHYSICIAN ASSISTANT

## 2024-01-01 PROCEDURE — 94640 AIRWAY INHALATION TREATMENT: CPT

## 2024-01-01 PROCEDURE — 96361 HYDRATE IV INFUSION ADD-ON: CPT

## 2024-01-01 PROCEDURE — 82805 BLOOD GASES W/O2 SATURATION: CPT | Performed by: PHYSICIAN ASSISTANT

## 2024-01-01 PROCEDURE — 83735 ASSAY OF MAGNESIUM: CPT | Performed by: PHYSICIAN ASSISTANT

## 2024-01-01 PROCEDURE — 87040 BLOOD CULTURE FOR BACTERIA: CPT | Performed by: EMERGENCY MEDICINE

## 2024-01-01 PROCEDURE — 71045 X-RAY EXAM CHEST 1 VIEW: CPT

## 2024-01-01 PROCEDURE — 85027 COMPLETE CBC AUTOMATED: CPT | Performed by: EMERGENCY MEDICINE

## 2024-01-01 PROCEDURE — 83605 ASSAY OF LACTIC ACID: CPT | Performed by: EMERGENCY MEDICINE

## 2024-01-01 PROCEDURE — 71275 CT ANGIOGRAPHY CHEST: CPT

## 2024-01-01 PROCEDURE — 84484 ASSAY OF TROPONIN QUANT: CPT | Performed by: EMERGENCY MEDICINE

## 2024-01-01 PROCEDURE — 85730 THROMBOPLASTIN TIME PARTIAL: CPT | Performed by: EMERGENCY MEDICINE

## 2024-01-01 PROCEDURE — 82948 REAGENT STRIP/BLOOD GLUCOSE: CPT

## 2024-01-01 PROCEDURE — 85027 COMPLETE CBC AUTOMATED: CPT | Performed by: PHYSICIAN ASSISTANT

## 2024-01-01 PROCEDURE — 96375 TX/PRO/DX INJ NEW DRUG ADDON: CPT

## 2024-01-01 PROCEDURE — 87449 NOS EACH ORGANISM AG IA: CPT | Performed by: PHYSICIAN ASSISTANT

## 2024-01-01 PROCEDURE — 84145 PROCALCITONIN (PCT): CPT | Performed by: EMERGENCY MEDICINE

## 2024-01-01 PROCEDURE — 83880 ASSAY OF NATRIURETIC PEPTIDE: CPT | Performed by: EMERGENCY MEDICINE

## 2024-01-01 PROCEDURE — 94760 N-INVAS EAR/PLS OXIMETRY 1: CPT

## 2024-01-01 PROCEDURE — 82533 TOTAL CORTISOL: CPT | Performed by: PHYSICIAN ASSISTANT

## 2024-01-01 PROCEDURE — 94002 VENT MGMT INPAT INIT DAY: CPT

## 2024-01-01 PROCEDURE — 74177 CT ABD & PELVIS W/CONTRAST: CPT

## 2024-01-01 PROCEDURE — 87804 INFLUENZA ASSAY W/OPTIC: CPT | Performed by: EMERGENCY MEDICINE

## 2024-01-01 PROCEDURE — 93005 ELECTROCARDIOGRAM TRACING: CPT

## 2024-01-01 PROCEDURE — 85007 BL SMEAR W/DIFF WBC COUNT: CPT | Performed by: EMERGENCY MEDICINE

## 2024-01-01 PROCEDURE — 96366 THER/PROPH/DIAG IV INF ADDON: CPT

## 2024-01-01 PROCEDURE — 94664 DEMO&/EVAL PT USE INHALER: CPT

## 2024-01-01 PROCEDURE — 36600 WITHDRAWAL OF ARTERIAL BLOOD: CPT

## 2024-01-01 PROCEDURE — 83735 ASSAY OF MAGNESIUM: CPT | Performed by: EMERGENCY MEDICINE

## 2024-01-01 PROCEDURE — 82805 BLOOD GASES W/O2 SATURATION: CPT

## 2024-01-01 PROCEDURE — 36415 COLL VENOUS BLD VENIPUNCTURE: CPT | Performed by: EMERGENCY MEDICINE

## 2024-01-01 PROCEDURE — 99285 EMERGENCY DEPT VISIT HI MDM: CPT

## 2024-01-01 PROCEDURE — 85610 PROTHROMBIN TIME: CPT | Performed by: EMERGENCY MEDICINE

## 2024-01-01 PROCEDURE — 70450 CT HEAD/BRAIN W/O DYE: CPT

## 2024-01-01 PROCEDURE — 83036 HEMOGLOBIN GLYCOSYLATED A1C: CPT

## 2024-01-01 PROCEDURE — 83690 ASSAY OF LIPASE: CPT | Performed by: EMERGENCY MEDICINE

## 2024-01-01 PROCEDURE — 84100 ASSAY OF PHOSPHORUS: CPT | Performed by: PHYSICIAN ASSISTANT

## 2024-01-01 PROCEDURE — 96365 THER/PROPH/DIAG IV INF INIT: CPT

## 2024-01-01 PROCEDURE — 81001 URINALYSIS AUTO W/SCOPE: CPT | Performed by: EMERGENCY MEDICINE

## 2024-01-01 PROCEDURE — 87811 SARS-COV-2 COVID19 W/OPTIC: CPT | Performed by: EMERGENCY MEDICINE

## 2024-01-01 RX ORDER — ALBUTEROL SULFATE 0.83 MG/ML
2.5 SOLUTION RESPIRATORY (INHALATION) ONCE
Status: COMPLETED | OUTPATIENT
Start: 2024-01-01 | End: 2024-01-01

## 2024-01-01 RX ORDER — GLYCOPYRROLATE 0.2 MG/ML
0.1 INJECTION INTRAMUSCULAR; INTRAVENOUS EVERY 4 HOURS PRN
Status: DISCONTINUED | OUTPATIENT
Start: 2024-01-01 | End: 2024-01-01 | Stop reason: HOSPADM

## 2024-01-01 RX ORDER — CLONIDINE HYDROCHLORIDE 0.1 MG/1
0.2 TABLET ORAL EVERY 12 HOURS SCHEDULED
Status: DISCONTINUED | OUTPATIENT
Start: 2024-01-01 | End: 2024-01-01

## 2024-01-01 RX ORDER — FLUTICASONE FUROATE AND VILANTEROL 100; 25 UG/1; UG/1
1 POWDER RESPIRATORY (INHALATION) DAILY
Status: DISCONTINUED | OUTPATIENT
Start: 2024-01-01 | End: 2024-01-01

## 2024-01-01 RX ORDER — ATORVASTATIN CALCIUM 40 MG/1
40 TABLET, FILM COATED ORAL
Status: DISCONTINUED | OUTPATIENT
Start: 2024-01-01 | End: 2024-01-01

## 2024-01-01 RX ORDER — METOPROLOL TARTRATE 50 MG
50 TABLET ORAL EVERY 12 HOURS SCHEDULED
Status: DISCONTINUED | OUTPATIENT
Start: 2024-01-01 | End: 2024-01-01

## 2024-01-01 RX ORDER — NALOXONE HYDROCHLORIDE 0.4 MG/ML
1 INJECTION, SOLUTION INTRAMUSCULAR; INTRAVENOUS; SUBCUTANEOUS ONCE
Status: DISCONTINUED | OUTPATIENT
Start: 2024-01-01 | End: 2024-01-01

## 2024-01-01 RX ORDER — IPRATROPIUM BROMIDE AND ALBUTEROL SULFATE 2.5; .5 MG/3ML; MG/3ML
3 SOLUTION RESPIRATORY (INHALATION)
Status: DISCONTINUED | OUTPATIENT
Start: 2024-01-01 | End: 2024-01-01

## 2024-01-01 RX ORDER — INSULIN LISPRO 100 [IU]/ML
1-5 INJECTION, SOLUTION INTRAVENOUS; SUBCUTANEOUS
Status: DISCONTINUED | OUTPATIENT
Start: 2024-01-01 | End: 2024-01-01

## 2024-01-01 RX ORDER — PANTOPRAZOLE SODIUM 20 MG/1
20 TABLET, DELAYED RELEASE ORAL
Status: DISCONTINUED | OUTPATIENT
Start: 2024-01-01 | End: 2024-01-01

## 2024-01-01 RX ORDER — NALOXONE HYDROCHLORIDE 0.4 MG/ML
0.1 INJECTION, SOLUTION INTRAMUSCULAR; INTRAVENOUS; SUBCUTANEOUS ONCE
Status: COMPLETED | OUTPATIENT
Start: 2024-01-01 | End: 2024-01-01

## 2024-01-01 RX ORDER — KETOROLAC TROMETHAMINE 30 MG/ML
15 INJECTION, SOLUTION INTRAMUSCULAR; INTRAVENOUS ONCE
Status: COMPLETED | OUTPATIENT
Start: 2024-01-01 | End: 2024-01-01

## 2024-01-01 RX ORDER — HYDROMORPHONE HCL/PF 1 MG/ML
0.3 SYRINGE (ML) INJECTION EVERY 2 HOUR PRN
Status: DISCONTINUED | OUTPATIENT
Start: 2024-01-01 | End: 2024-01-01 | Stop reason: HOSPADM

## 2024-01-01 RX ORDER — BISACODYL 10 MG
10 SUPPOSITORY, RECTAL RECTAL DAILY PRN
Status: DISCONTINUED | OUTPATIENT
Start: 2024-01-01 | End: 2024-01-01 | Stop reason: HOSPADM

## 2024-01-01 RX ORDER — POTASSIUM CHLORIDE 14.9 MG/ML
20 INJECTION INTRAVENOUS ONCE
Status: COMPLETED | OUTPATIENT
Start: 2024-01-01 | End: 2024-01-01

## 2024-01-01 RX ORDER — QUETIAPINE FUMARATE 25 MG/1
25 TABLET, FILM COATED ORAL
Status: DISCONTINUED | OUTPATIENT
Start: 2024-01-01 | End: 2024-01-01 | Stop reason: HOSPADM

## 2024-01-01 RX ORDER — SODIUM CHLORIDE 1 G/1
2 TABLET ORAL DAILY
Status: DISCONTINUED | OUTPATIENT
Start: 2024-01-01 | End: 2024-01-01

## 2024-01-01 RX ORDER — ALBUTEROL SULFATE 0.83 MG/ML
2.5 SOLUTION RESPIRATORY (INHALATION) EVERY 6 HOURS PRN
Status: DISCONTINUED | OUTPATIENT
Start: 2024-01-01 | End: 2024-01-01

## 2024-01-01 RX ORDER — ENOXAPARIN SODIUM 100 MG/ML
40 INJECTION SUBCUTANEOUS DAILY
Status: DISCONTINUED | OUTPATIENT
Start: 2024-01-01 | End: 2024-01-01

## 2024-01-01 RX ORDER — HALOPERIDOL 5 MG/ML
0.5 INJECTION INTRAMUSCULAR EVERY 2 HOUR PRN
Status: DISCONTINUED | OUTPATIENT
Start: 2024-01-01 | End: 2024-01-01 | Stop reason: HOSPADM

## 2024-01-01 RX ORDER — DEXMEDETOMIDINE HYDROCHLORIDE 4 UG/ML
.1-.7 INJECTION, SOLUTION INTRAVENOUS
Status: DISCONTINUED | OUTPATIENT
Start: 2024-01-01 | End: 2024-01-01

## 2024-01-01 RX ORDER — LORAZEPAM 2 MG/ML
0.5 INJECTION INTRAMUSCULAR ONCE
Status: COMPLETED | OUTPATIENT
Start: 2024-01-01 | End: 2024-01-01

## 2024-01-01 RX ORDER — LORAZEPAM 2 MG/ML
1 INJECTION INTRAMUSCULAR
Status: DISCONTINUED | OUTPATIENT
Start: 2024-01-01 | End: 2024-01-01 | Stop reason: HOSPADM

## 2024-01-01 RX ORDER — MAGNESIUM SULFATE HEPTAHYDRATE 40 MG/ML
2 INJECTION, SOLUTION INTRAVENOUS ONCE
Status: COMPLETED | OUTPATIENT
Start: 2024-01-01 | End: 2024-01-01

## 2024-01-01 RX ORDER — ARIPIPRAZOLE 2 MG/1
2 TABLET ORAL DAILY
Status: DISCONTINUED | OUTPATIENT
Start: 2024-01-01 | End: 2024-01-01

## 2024-01-01 RX ORDER — METOPROLOL TARTRATE 50 MG
100 TABLET ORAL EVERY 12 HOURS SCHEDULED
Status: DISCONTINUED | OUTPATIENT
Start: 2024-01-01 | End: 2024-01-01

## 2024-01-01 RX ORDER — INSULIN LISPRO 100 [IU]/ML
1-5 INJECTION, SOLUTION INTRAVENOUS; SUBCUTANEOUS EVERY 6 HOURS SCHEDULED
Status: DISCONTINUED | OUTPATIENT
Start: 2024-01-01 | End: 2024-01-01

## 2024-01-01 RX ORDER — METHYLPREDNISOLONE SODIUM SUCCINATE 40 MG/ML
40 INJECTION, POWDER, LYOPHILIZED, FOR SOLUTION INTRAMUSCULAR; INTRAVENOUS EVERY 8 HOURS
Status: DISCONTINUED | OUTPATIENT
Start: 2024-01-01 | End: 2024-01-01

## 2024-01-01 RX ORDER — METHYLPREDNISOLONE SODIUM SUCCINATE 125 MG/2ML
100 INJECTION, POWDER, LYOPHILIZED, FOR SOLUTION INTRAMUSCULAR; INTRAVENOUS ONCE
Status: COMPLETED | OUTPATIENT
Start: 2024-01-01 | End: 2024-01-01

## 2024-01-01 RX ORDER — AMLODIPINE BESYLATE 10 MG/1
10 TABLET ORAL
Status: DISCONTINUED | OUTPATIENT
Start: 2024-01-01 | End: 2024-01-01

## 2024-01-01 RX ORDER — VANCOMYCIN HYDROCHLORIDE 1 G/200ML
15 INJECTION, SOLUTION INTRAVENOUS ONCE
Status: COMPLETED | OUTPATIENT
Start: 2024-01-01 | End: 2024-01-01

## 2024-01-01 RX ORDER — IPRATROPIUM BROMIDE AND ALBUTEROL SULFATE 2.5; .5 MG/3ML; MG/3ML
3 SOLUTION RESPIRATORY (INHALATION) ONCE
Status: COMPLETED | OUTPATIENT
Start: 2024-01-01 | End: 2024-01-01

## 2024-01-01 RX ORDER — CEFTRIAXONE 2 G/50ML
2000 INJECTION, SOLUTION INTRAVENOUS EVERY 24 HOURS
Status: DISCONTINUED | OUTPATIENT
Start: 2024-01-01 | End: 2024-01-01

## 2024-01-01 RX ORDER — CHLORHEXIDINE GLUCONATE ORAL RINSE 1.2 MG/ML
15 SOLUTION DENTAL EVERY 12 HOURS SCHEDULED
Status: DISCONTINUED | OUTPATIENT
Start: 2024-01-01 | End: 2024-01-01

## 2024-01-01 RX ORDER — HYDROXYZINE HYDROCHLORIDE 25 MG/1
25 TABLET, FILM COATED ORAL EVERY 6 HOURS PRN
Status: DISCONTINUED | OUTPATIENT
Start: 2024-01-01 | End: 2024-01-01

## 2024-01-01 RX ADMIN — FLUTICASONE FUROATE AND VILANTEROL TRIFENATATE 1 PUFF: 100; 25 POWDER RESPIRATORY (INHALATION) at 11:30

## 2024-01-01 RX ADMIN — POTASSIUM CHLORIDE 20 MEQ: 14.9 INJECTION, SOLUTION INTRAVENOUS at 09:34

## 2024-01-01 RX ADMIN — AZITHROMYCIN MONOHYDRATE 500 MG: 500 INJECTION, POWDER, LYOPHILIZED, FOR SOLUTION INTRAVENOUS at 01:32

## 2024-01-01 RX ADMIN — KETOROLAC TROMETHAMINE 15 MG: 30 INJECTION, SOLUTION INTRAMUSCULAR; INTRAVENOUS at 23:16

## 2024-01-01 RX ADMIN — LORAZEPAM 1 MG: 2 INJECTION INTRAMUSCULAR; INTRAVENOUS at 01:31

## 2024-01-01 RX ADMIN — IPRATROPIUM BROMIDE AND ALBUTEROL SULFATE 3 ML: .5; 3 SOLUTION RESPIRATORY (INHALATION) at 13:20

## 2024-01-01 RX ADMIN — IPRATROPIUM BROMIDE AND ALBUTEROL SULFATE 3 ML: .5; 3 SOLUTION RESPIRATORY (INHALATION) at 21:44

## 2024-01-01 RX ADMIN — GLYCOPYRROLATE 0.1 MG: 0.2 INJECTION, SOLUTION INTRAMUSCULAR; INTRAVENOUS at 19:59

## 2024-01-01 RX ADMIN — Medication 2 G: at 11:44

## 2024-01-01 RX ADMIN — METHYLPREDNISOLONE SODIUM SUCCINATE 40 MG: 40 INJECTION, POWDER, FOR SOLUTION INTRAMUSCULAR; INTRAVENOUS at 05:14

## 2024-01-01 RX ADMIN — METHYLPREDNISOLONE SODIUM SUCCINATE 40 MG: 40 INJECTION, POWDER, FOR SOLUTION INTRAMUSCULAR; INTRAVENOUS at 14:05

## 2024-01-01 RX ADMIN — IPRATROPIUM BROMIDE AND ALBUTEROL SULFATE 3 ML: .5; 3 SOLUTION RESPIRATORY (INHALATION) at 01:47

## 2024-01-01 RX ADMIN — IPRATROPIUM BROMIDE AND ALBUTEROL SULFATE 3 ML: .5; 3 SOLUTION RESPIRATORY (INHALATION) at 07:09

## 2024-01-01 RX ADMIN — CHLORHEXIDINE GLUCONATE 15 ML: 1.2 RINSE ORAL at 09:34

## 2024-01-01 RX ADMIN — MAGNESIUM SULFATE HEPTAHYDRATE 2 G: 40 INJECTION, SOLUTION INTRAVENOUS at 01:33

## 2024-01-01 RX ADMIN — LORAZEPAM 1 MG: 2 INJECTION INTRAMUSCULAR; INTRAVENOUS at 19:55

## 2024-01-01 RX ADMIN — ARIPIPRAZOLE 2 MG: 2 TABLET ORAL at 11:44

## 2024-01-01 RX ADMIN — PANTOPRAZOLE SODIUM 20 MG: 20 TABLET, DELAYED RELEASE ORAL at 11:44

## 2024-01-01 RX ADMIN — NALOXONE HYDROCHLORIDE 0.1 MG: 0.4 INJECTION, SOLUTION INTRAMUSCULAR; INTRAVENOUS; SUBCUTANEOUS at 21:28

## 2024-01-01 RX ADMIN — METHYLPREDNISOLONE SODIUM SUCCINATE 100 MG: 125 INJECTION, POWDER, FOR SOLUTION INTRAMUSCULAR; INTRAVENOUS at 21:32

## 2024-01-01 RX ADMIN — LORAZEPAM 0.5 MG: 2 INJECTION INTRAMUSCULAR; INTRAVENOUS at 00:19

## 2024-01-01 RX ADMIN — METOPROLOL TARTRATE 50 MG: 50 TABLET, FILM COATED ORAL at 11:44

## 2024-01-01 RX ADMIN — IOHEXOL 85 ML: 350 INJECTION, SOLUTION INTRAVENOUS at 22:49

## 2024-01-01 RX ADMIN — VANCOMYCIN HYDROCHLORIDE 1000 MG: 1 INJECTION, SOLUTION INTRAVENOUS at 21:59

## 2024-01-01 RX ADMIN — PIPERACILLIN AND TAZOBACTAM 4.5 G: 4; .5 INJECTION, POWDER, FOR SOLUTION INTRAVENOUS at 22:02

## 2024-01-01 RX ADMIN — HYDROMORPHONE HYDROCHLORIDE 0.3 MG: 1 INJECTION, SOLUTION INTRAMUSCULAR; INTRAVENOUS; SUBCUTANEOUS at 22:38

## 2024-01-01 RX ADMIN — HYDROMORPHONE HYDROCHLORIDE 0.3 MG: 1 INJECTION, SOLUTION INTRAMUSCULAR; INTRAVENOUS; SUBCUTANEOUS at 19:58

## 2024-01-01 RX ADMIN — CLONIDINE HYDROCHLORIDE 0.2 MG: 0.1 TABLET ORAL at 12:12

## 2024-01-01 RX ADMIN — ENOXAPARIN SODIUM 40 MG: 40 INJECTION SUBCUTANEOUS at 09:34

## 2024-01-01 RX ADMIN — HYDROMORPHONE HYDROCHLORIDE 0.3 MG: 1 INJECTION, SOLUTION INTRAMUSCULAR; INTRAVENOUS; SUBCUTANEOUS at 01:31

## 2024-01-01 RX ADMIN — ALBUTEROL SULFATE 2.5 MG: 2.5 SOLUTION RESPIRATORY (INHALATION) at 21:50

## 2024-01-01 RX ADMIN — QUETIAPINE FUMARATE 25 MG: 25 TABLET ORAL at 22:38

## 2024-01-01 RX ADMIN — SODIUM CHLORIDE 1000 ML: 0.9 INJECTION, SOLUTION INTRAVENOUS at 21:49

## 2024-01-01 RX ADMIN — UMECLIDINIUM 1 PUFF: 62.5 AEROSOL, POWDER ORAL at 11:30

## 2024-01-01 RX ADMIN — DEXMEDETOMIDINE HYDROCHLORIDE 0.2 MCG/KG/HR: 400 INJECTION INTRAVENOUS at 14:55

## 2024-01-01 RX ADMIN — CEFTRIAXONE 2000 MG: 2 INJECTION, SOLUTION INTRAVENOUS at 04:11

## 2024-01-11 DIAGNOSIS — J44.9 COPD, SEVERITY TO BE DETERMINED (HCC): ICD-10-CM

## 2024-01-11 RX ORDER — FLUTICASONE FUROATE, UMECLIDINIUM BROMIDE AND VILANTEROL TRIFENATATE 100; 62.5; 25 UG/1; UG/1; UG/1
POWDER RESPIRATORY (INHALATION)
Qty: 60 BLISTER | Refills: 5 | Status: SHIPPED | OUTPATIENT
Start: 2024-01-11

## 2024-01-23 ENCOUNTER — PATIENT OUTREACH (OUTPATIENT)
Dept: CASE MANAGEMENT | Facility: OTHER | Age: 69
End: 2024-01-23

## 2024-01-23 NOTE — PROGRESS NOTES
** PLEASE SIGN, DATE AND TIME CERTIFICATION BELOW AND RETURN TO [facility] OUTPATIENT REHABILITATION (FAX #: [phone]). ATTEST/CO-SIGN IF ACCESSING VIA [employer]. THANK YOU.**    I certify that I have examined the patient below and determined that Physical Medicine and Rehabilitation service is necessary and that I approve the established plan of care for up to 90 days or as specifically noted. Attestation, signature or co-signature of physician indicates approval of certification requirements.    ________________________ ____________ __________  Physician Signature   Date   Time           [address] THERAPY  [] EVALUATION  [] DAILY NOTE (LAND) [] DAILY NOTE (AQUATIC )   [x] PROGRESS NOTE [] DISCHARGE NOTE    [x] [address] [facility]   [] [address]    [] [address] [facility]   [] Barbara Peters    Date: 2021  Patient Name:  Shawna Contreras  : 1961  MRN: [id number]  CSN: [id number]    Referring Practitioner KANIKA Santiago *   Diagnosis Unspecified sprain of left wrist, initial encounter [U29.763Z]    Treatment Diagnosis Decreased ROM left wrist, decreased strength   Date of Evaluation 4/15/21      Functional Outcome Measure Used UEFS   Functional Outcome Score 29 (4/15/21) 37/80 on 21      Insurance: Primary: Payor: Nisa Gonzales /  /  / ,   Secondary:    Authorization Information: [facility] - approved 2-3x/week for 13 visits from 3/31/21 to 5/15/21   Visit # 9, 9/ for progress note 21 PN completed   Visits Allowed: 13   Recertification Date: 42- submitted PN for date extension on 21   Physician Follow-Up: 21  with Dayanna BOUDREAUX-CNP   Physician Orders: Eval and treat   Pertinent History: In mid 2021, pt. Tripped on a skid at work and fell with left arm outstretched to catch self.  Xrays on 3/26/21 showed no fracture or dislocation but did show a \"well defined sclerotic lesion Email received for referral to High Utilizer Committee. Patient will be reviewed for a care plan.     Review of chart shows patient enrolled and active with [facility] care management         at distal radius likely a benign bone island\". Pt. Was given a steroid which pt. States has helped a little with pain. Pt. Is wearing a wrist support splint. Pt. On a 2# weight lifting restriction with left hand. SUBJECTIVE:  Patient reports physician is pleased with progress. The CNP is in agreement with continuing with therapy and potential additional visits for ROM and strengthening. Patient reports the pain is better and has more strength. He reports remains limited with pain with push up from a chair, pulling up into fork lift. Edema in radial side of mid forearm. Trialed Kinesiotape with good tolerance      TREATMENT   Precautions: 2# lifting restriction with left hand    Pain:  1/10 radial side of the wrist     X in shaded column indicates Activity Completed Today   Modalities Parameters/  Location  Notes/Comments   Ultrasound to left radial wrist EPL, 3.3 MHz, 100 % continuous, 1.2 le/cm2  x                Manual Therapy Time/  Technique  Notes/Comments   STM to left radial wrist EPL and up forearm   x    Edema massage to the left wrist/forearm to decrease edema  x Kinesiotape applied for radial side of wrist to mid forearm due to min edema noted . Exercises   Sets/  Sec Reps  Notes/Comments   PROM to L wrist all motions with digits straight and flexed to tolerance     Achieved full ROM with only minimal pain into extension. AROM R wrist flexion/extension with hand fisted then extended digits, UD, RD,Wrist circles clockwise, counterclockwise 1 10 ea x    AROM L 1, IP and CMC flexion, palmar and radial ABD, opposition 1 10 x Min pulling/ pain with L 1 to base of L 5.     Full Fisting ex with wrist stabilized  1 10     2 # wt for wrist flexion, extension, UD, RD ,  2 10 x    ergogripper with 1 red, 2 blue and  2 Green rubber band,  2 10 x    Supination/pronation bar- one ring 1 10 x    Red flexer bar for palm up/ palm down  1 10     Wrist maze  5 x    Light resistence theraputty for gentle grasp with wrist flexion / extension, UD RD 1 10  x    Activities Time    Notes/Comments   Instructed pt. On HEP for left wrist and thumb   Reviewed with Pt without concerns. Specific Interventions Next Treatment: Pulsed ultrasound to left radial side of wrist/EPL, IASTM/STM to forearm/EPL, ROM and stretching to left wrist and thumb, gentle strength as able to left hand and wrist    Activity/Treatment Tolerance:  [x]  Patient tolerated treatment well  []  Patient limited by fatigue  []  Patient limited by pain   []  Patient limited by other medical complications  []  Other:     Assessment: Patient is progressing towards goals. Tolerated continued light  and wrist strengthening well. Pt displays increased ROM by 8 degrees wrist flexion, 10 degrees wrist extension. He displays increased  by 10 # up to 80#. He reports good follow through with HEP. He is most limited by pain lateral middle forearm area with resisted activities and push / pull with transfers. He continues to wear his wrist support at work. He tolerates US to left middle radial side of the forearm without complaints. He would benefit from additional strengthening and stretching as well as pain tolerance with US and kinesiotaping  To assist with decreasing pain to increase strength needed for transfers and RTW activities. Areas for Improvement: impaired ROM, impaired strength and pain  Prognosis: good    GOALS:  Patient Goal: To decrease pain in left wrist for return to full work duties    Short Term Goals:  Time Frame: 6 visits  1. Pt. Will demo understanding of HEP for left hand and wrist to facilitate improved motion, strength and decreased pain for return to full work duties- [employer]  2. Pt. Will demo improved left wrist AROM to flexion= 65, extension= 65 for ease with bathing and dressing NOT MET AROM R wrist flexion 60 extension 65. CONTINUE  3. Pt.  Will report decreased pain in left wrist to 3/10 1349  [facility] [address] [facility] OTR/L 0905

## 2024-01-29 ENCOUNTER — PATIENT OUTREACH (OUTPATIENT)
Dept: CASE MANAGEMENT | Facility: OTHER | Age: 69
End: 2024-01-29

## 2024-01-29 NOTE — PROGRESS NOTES
Email received from High Utilizer Committee member stating case reviewed and a care plan is not appropriate at this time.  Recommendation from committee is to refer patient to Palliative Care.     Call placed to [facility] Case Management to inquire about Palliative Care services.   Per Cristel, patient enrolled since 1/12/2023 in their 'special needs program' for which patient is followed annually by a .  This writer requested RN Case manager referral specifically for management of her COPD exacerbations informing Cristel patient with 17 EDV and 4 admissions within last 6 months for the same.  She agreed to send message to her  Aubree Hong.     No further action to be taken at this time.  Please refer as appropriate.

## 2024-02-19 ENCOUNTER — PATIENT OUTREACH (OUTPATIENT)
Dept: CASE MANAGEMENT | Facility: OTHER | Age: 69
End: 2024-02-19

## 2024-02-19 DIAGNOSIS — J44.1 COPD EXACERBATION (HCC): Primary | ICD-10-CM

## 2024-02-19 NOTE — PROGRESS NOTES
.OP RT CM called and left a VM requesting a return phone call. I will outreach in two weeks unless I hear back from patient prior. IN basket message and referral.

## 2024-02-20 ENCOUNTER — PATIENT OUTREACH (OUTPATIENT)
Dept: CASE MANAGEMENT | Facility: OTHER | Age: 69
End: 2024-02-20

## 2024-02-20 NOTE — PROGRESS NOTES
OP RT CM had missed call from Jessika.  I was on the phone with another patient.   ________________________________  .OP RT CM called and left a VM requesting a return phone call. I will outreach in two weeks unless I hear back from patient prior.

## 2024-02-21 ENCOUNTER — TELEPHONE (OUTPATIENT)
Dept: OTHER | Facility: HOSPITAL | Age: 69
End: 2024-02-21

## 2024-02-21 NOTE — TELEPHONE ENCOUNTER
Attempted to call patient to introduce myself and to discuss care plan. Voicemail reached, but with generic greeting without patient identifying factors. Left message advising that a  will reach out in the coming weeks with anything she may need regarding her health care. Attempted to call daughter, maeve reached, no message left.     Rc Mistry Jr., PA-C

## 2024-02-23 ENCOUNTER — PATIENT OUTREACH (OUTPATIENT)
Dept: CASE MANAGEMENT | Facility: OTHER | Age: 69
End: 2024-02-23

## 2024-02-23 ENCOUNTER — DOCUMENTATION (OUTPATIENT)
Dept: CASE MANAGEMENT | Facility: OTHER | Age: 69
End: 2024-02-23

## 2024-02-23 DIAGNOSIS — Z71.89 COMPLEX CARE COORDINATION: Primary | ICD-10-CM

## 2024-02-23 NOTE — PROGRESS NOTES
Patient was approved for a High Utilizer Care Plan. Authoring provider attempted to notify her of the plan.    Patient is referred for complex care management as part of the program.

## 2024-02-23 NOTE — MEDICAL HIGH UTILIZER
High Utilizer Care Plan for: Jessika Lux  Patient Name: Jessika Lux MRN: [id number]       : 1955    Age: 69   Sex: F      Utilization Background: In the 6 months prior to the care plan being written the patient has had 21 ED Visits and 4 IP Admissions secondary to dyspnea/COPD/anxiety.     In the last 90 Days:    Most Recent Work Up:    CXR 2023: Stable minimal linear atelectasis at the right base     CT Chest 2023: Slight increase in size of a 7 mm right upper lobe nodule with irregular borders. Neoplasm is in the differential.  Treatment Recommendations:  ED Recommendations: Patient will typically present with COPD/dyspnea. At the patient's baseline she wears 3 L NC O2 and becomes breathless with minimal exertion. This causes the patient to become very anxious.     Recommend evaluation with basic blood work, chest x-ray likely unavoidable given history and chief complaint. Can consider treatment with corticosteroid and nebulized breathing treatment of provider choice. Would also consider giving patient her home Atarax dose or other anxiolytic of provider choice.     If after treatment the patient is back to her baseline would recommend discharge home with outpatient follow up with pulmonary disease. If discharging home would recommend short course of prednisone taper per provider discretion.     Would refer patient to Palliative Care or encourage follow up for better control of anxiety and symptom control.       Inpatient Recommendations: Would refrain from admission if patient is at her baseline 3 L O2 and satting well. Can consider psych consult as has been done in the past if anxiety remains uncontrolled. If true COPD exacerbation can consult Pulmonary disease and continue treatment with steroids IV and nebulized breathing treatments.         Outpatient recommendations: Patient should establish with Palliative Care for better anxiety/symptom management. Continue to follow up closely with  Pulmonary Disease.      Situation: Patient is a 69 year old female with history of COPD, Chronic Respiratory Failure on Oxygen, and Anxiety presenting frequently with COPD exacerbation/anxiety driven by her dyspnea.      PMH/PSH: COPD, Chronic Respiratory Failure on 3 L O2, Anxiety, T2DM, HTN, GERD, Tobacco Abuse      Assessment                              Drivers of repeated utilization:                 Dyspnea  COPD  Anxiety     Community Resources in place:    Needs Palliative Care Follow Up    Patient Care Team:   Karlie Bruno DO - PCP (Rodrigue)  [facility]   Jose Guadalupe Ribera RN - Outpatient RN Care Manager              Care plan date and owner: Rc Mistry Jr., PA-C   1/30/2024     Reviewed with patient before discharge?   No

## 2024-02-26 ENCOUNTER — PATIENT OUTREACH (OUTPATIENT)
Dept: CASE MANAGEMENT | Facility: OTHER | Age: 69
End: 2024-02-26

## 2024-02-26 NOTE — PROGRESS NOTES
In basket notification received for new care plan upload to chart.  Review of chart and care plan completed.      PMH includes COPD, Chronic Respiratory Failure on O2 3L, Anxiety, DM2,  HTN, GERD and Tobacco Abuse.  See problem list for complete list of medical diagnosis.      PCP: Dr. Karlie Bruno      Future appointments:  3/14/2024 10:30 AM Appointment with AYLIN High at [facility] ([phone])   3/22/2024 2:30 PM Appointment with AYLIN Hua at [facility] ([phone])      Hospital Risk Assessment completed.      Initial outreach for care management introduction scheduled.

## 2024-02-28 ENCOUNTER — HOSPITAL ENCOUNTER (INPATIENT)
Facility: HOSPITAL | Age: 69
LOS: 4 days | Discharge: HOME | DRG: 643 | End: 2024-03-03
Attending: EMERGENCY MEDICINE | Admitting: FAMILY MEDICINE
Payer: COMMERCIAL

## 2024-02-28 ENCOUNTER — APPOINTMENT (EMERGENCY)
Dept: RADIOLOGY | Facility: HOSPITAL | Age: 69
DRG: 643 | End: 2024-02-28
Payer: COMMERCIAL

## 2024-02-28 DIAGNOSIS — J44.1 COPD WITH ACUTE EXACERBATION (HCC): ICD-10-CM

## 2024-02-28 DIAGNOSIS — E87.1 HYPONATREMIA: ICD-10-CM

## 2024-02-28 DIAGNOSIS — R53.1 WEAKNESS: Primary | ICD-10-CM

## 2024-02-28 PROBLEM — I16.0 HYPERTENSIVE URGENCY: Status: ACTIVE | Noted: 2020-11-02

## 2024-02-28 LAB
2HR DELTA HS TROPONIN: 0 NG/L
4HR DELTA HS TROPONIN: 1 NG/L
ALBUMIN SERPL BCP-MCNC: 4.6 G/DL (ref 3.5–5)
ALP SERPL-CCNC: 102 U/L (ref 34–104)
ALT SERPL W P-5'-P-CCNC: 10 U/L (ref 7–52)
ANION GAP SERPL CALCULATED.3IONS-SCNC: 10 MMOL/L
ANION GAP SERPL CALCULATED.3IONS-SCNC: 5 MMOL/L
ANION GAP SERPL CALCULATED.3IONS-SCNC: 7 MMOL/L
ANION GAP SERPL CALCULATED.3IONS-SCNC: 8 MMOL/L
AST SERPL W P-5'-P-CCNC: 14 U/L (ref 13–39)
ATRIAL RATE: 95 BPM
BASOPHILS # BLD AUTO: 0.03 THOUSANDS/ÂΜL (ref 0–0.1)
BASOPHILS NFR BLD AUTO: 0 % (ref 0–1)
BILIRUB SERPL-MCNC: 0.43 MG/DL (ref 0.2–1)
BUN SERPL-MCNC: 5 MG/DL (ref 5–25)
BUN SERPL-MCNC: 6 MG/DL (ref 5–25)
BUN SERPL-MCNC: 6 MG/DL (ref 5–25)
BUN SERPL-MCNC: 8 MG/DL (ref 5–25)
CALCIUM SERPL-MCNC: 10.1 MG/DL (ref 8.4–10.2)
CALCIUM SERPL-MCNC: 9.5 MG/DL (ref 8.4–10.2)
CALCIUM SERPL-MCNC: 9.6 MG/DL (ref 8.4–10.2)
CALCIUM SERPL-MCNC: 9.6 MG/DL (ref 8.4–10.2)
CARDIAC TROPONIN I PNL SERPL HS: 4 NG/L
CARDIAC TROPONIN I PNL SERPL HS: 4 NG/L
CARDIAC TROPONIN I PNL SERPL HS: 5 NG/L
CHLORIDE SERPL-SCNC: 78 MMOL/L (ref 96–108)
CHLORIDE SERPL-SCNC: 78 MMOL/L (ref 96–108)
CHLORIDE SERPL-SCNC: 80 MMOL/L (ref 96–108)
CHLORIDE SERPL-SCNC: 81 MMOL/L (ref 96–108)
CO2 SERPL-SCNC: 30 MMOL/L (ref 21–32)
CO2 SERPL-SCNC: 34 MMOL/L (ref 21–32)
CO2 SERPL-SCNC: 36 MMOL/L (ref 21–32)
CO2 SERPL-SCNC: 36 MMOL/L (ref 21–32)
CREAT SERPL-MCNC: 0.57 MG/DL (ref 0.6–1.3)
CREAT SERPL-MCNC: 0.62 MG/DL (ref 0.6–1.3)
CREAT SERPL-MCNC: 0.65 MG/DL (ref 0.6–1.3)
CREAT SERPL-MCNC: 0.66 MG/DL (ref 0.6–1.3)
EOSINOPHIL # BLD AUTO: 0.03 THOUSAND/ÂΜL (ref 0–0.61)
EOSINOPHIL NFR BLD AUTO: 0 % (ref 0–6)
ERYTHROCYTE [DISTWIDTH] IN BLOOD BY AUTOMATED COUNT: 11.8 % (ref 11.6–15.1)
FLUAV RNA RESP QL NAA+PROBE: NEGATIVE
FLUBV RNA RESP QL NAA+PROBE: NEGATIVE
GFR SERPL CREATININE-BSD FRML MDRD: 91 ML/MIN/1.73SQ M
GFR SERPL CREATININE-BSD FRML MDRD: 92 ML/MIN/1.73SQ M
GFR SERPL CREATININE-BSD FRML MDRD: 93 ML/MIN/1.73SQ M
GFR SERPL CREATININE-BSD FRML MDRD: 96 ML/MIN/1.73SQ M
GLUCOSE SERPL-MCNC: 109 MG/DL (ref 65–140)
GLUCOSE SERPL-MCNC: 147 MG/DL (ref 65–140)
GLUCOSE SERPL-MCNC: 197 MG/DL (ref 65–140)
GLUCOSE SERPL-MCNC: 200 MG/DL (ref 65–140)
GLUCOSE SERPL-MCNC: 324 MG/DL (ref 65–140)
GLUCOSE SERPL-MCNC: 335 MG/DL (ref 65–140)
HCT VFR BLD AUTO: 39.7 % (ref 34.8–46.1)
HGB BLD-MCNC: 13.6 G/DL (ref 11.5–15.4)
IMM GRANULOCYTES # BLD AUTO: 0.07 THOUSAND/UL (ref 0–0.2)
IMM GRANULOCYTES NFR BLD AUTO: 1 % (ref 0–2)
LACTATE SERPL-SCNC: 1 MMOL/L (ref 0.5–2)
LYMPHOCYTES # BLD AUTO: 1.41 THOUSANDS/ÂΜL (ref 0.6–4.47)
LYMPHOCYTES NFR BLD AUTO: 11 % (ref 14–44)
MCH RBC QN AUTO: 28.6 PG (ref 26.8–34.3)
MCHC RBC AUTO-ENTMCNC: 34.3 G/DL (ref 31.4–37.4)
MCV RBC AUTO: 84 FL (ref 82–98)
MONOCYTES # BLD AUTO: 1.14 THOUSAND/ÂΜL (ref 0.17–1.22)
MONOCYTES NFR BLD AUTO: 9 % (ref 4–12)
NEUTROPHILS # BLD AUTO: 9.68 THOUSANDS/ÂΜL (ref 1.85–7.62)
NEUTS SEG NFR BLD AUTO: 79 % (ref 43–75)
NRBC BLD AUTO-RTO: 0 /100 WBCS
OSMOLALITY UR/SERPL-RTO: 262 MMOL/KG (ref 282–298)
OSMOLALITY UR: 181 MMOL/KG
P AXIS: 72 DEGREES
PLATELET # BLD AUTO: 251 THOUSANDS/UL (ref 149–390)
PMV BLD AUTO: 10.2 FL (ref 8.9–12.7)
POTASSIUM SERPL-SCNC: 4.2 MMOL/L (ref 3.5–5.3)
POTASSIUM SERPL-SCNC: 4.2 MMOL/L (ref 3.5–5.3)
POTASSIUM SERPL-SCNC: 4.7 MMOL/L (ref 3.5–5.3)
POTASSIUM SERPL-SCNC: 4.9 MMOL/L (ref 3.5–5.3)
PR INTERVAL: 128 MS
PROT SERPL-MCNC: 8 G/DL (ref 6.4–8.4)
QRS AXIS: 44 DEGREES
QRSD INTERVAL: 80 MS
QT INTERVAL: 342 MS
QTC INTERVAL: 429 MS
RBC # BLD AUTO: 4.75 MILLION/UL (ref 3.81–5.12)
RSV RNA RESP QL NAA+PROBE: NEGATIVE
SARS-COV-2 RNA RESP QL NAA+PROBE: NEGATIVE
SODIUM 24H UR-SCNC: 30 MOL/L
SODIUM SERPL-SCNC: 118 MMOL/L (ref 135–147)
SODIUM SERPL-SCNC: 121 MMOL/L (ref 135–147)
SODIUM SERPL-SCNC: 122 MMOL/L (ref 135–147)
SODIUM SERPL-SCNC: 122 MMOL/L (ref 135–147)
T WAVE AXIS: 61 DEGREES
VENTRICULAR RATE: 95 BPM
WBC # BLD AUTO: 12.36 THOUSAND/UL (ref 4.31–10.16)

## 2024-02-28 PROCEDURE — 83930 ASSAY OF BLOOD OSMOLALITY: CPT | Performed by: FAMILY MEDICINE

## 2024-02-28 PROCEDURE — 93005 ELECTROCARDIOGRAM TRACING: CPT

## 2024-02-28 PROCEDURE — 99223 1ST HOSP IP/OBS HIGH 75: CPT | Performed by: FAMILY MEDICINE

## 2024-02-28 PROCEDURE — 80048 BASIC METABOLIC PNL TOTAL CA: CPT | Performed by: STUDENT IN AN ORGANIZED HEALTH CARE EDUCATION/TRAINING PROGRAM

## 2024-02-28 PROCEDURE — 84300 ASSAY OF URINE SODIUM: CPT | Performed by: FAMILY MEDICINE

## 2024-02-28 PROCEDURE — 83605 ASSAY OF LACTIC ACID: CPT | Performed by: EMERGENCY MEDICINE

## 2024-02-28 PROCEDURE — 71045 X-RAY EXAM CHEST 1 VIEW: CPT

## 2024-02-28 PROCEDURE — 94640 AIRWAY INHALATION TREATMENT: CPT

## 2024-02-28 PROCEDURE — 0241U HB NFCT DS VIR RESP RNA 4 TRGT: CPT | Performed by: EMERGENCY MEDICINE

## 2024-02-28 PROCEDURE — 85025 COMPLETE CBC W/AUTO DIFF WBC: CPT | Performed by: EMERGENCY MEDICINE

## 2024-02-28 PROCEDURE — 83935 ASSAY OF URINE OSMOLALITY: CPT | Performed by: FAMILY MEDICINE

## 2024-02-28 PROCEDURE — 84484 ASSAY OF TROPONIN QUANT: CPT | Performed by: EMERGENCY MEDICINE

## 2024-02-28 PROCEDURE — 94760 N-INVAS EAR/PLS OXIMETRY 1: CPT

## 2024-02-28 PROCEDURE — 99285 EMERGENCY DEPT VISIT HI MDM: CPT | Performed by: EMERGENCY MEDICINE

## 2024-02-28 PROCEDURE — 36415 COLL VENOUS BLD VENIPUNCTURE: CPT | Performed by: EMERGENCY MEDICINE

## 2024-02-28 PROCEDURE — 87040 BLOOD CULTURE FOR BACTERIA: CPT | Performed by: EMERGENCY MEDICINE

## 2024-02-28 PROCEDURE — 80053 COMPREHEN METABOLIC PANEL: CPT | Performed by: EMERGENCY MEDICINE

## 2024-02-28 PROCEDURE — 80048 BASIC METABOLIC PNL TOTAL CA: CPT | Performed by: EMERGENCY MEDICINE

## 2024-02-28 PROCEDURE — 99285 EMERGENCY DEPT VISIT HI MDM: CPT

## 2024-02-28 PROCEDURE — 93010 ELECTROCARDIOGRAM REPORT: CPT | Performed by: INTERNAL MEDICINE

## 2024-02-28 PROCEDURE — 82948 REAGENT STRIP/BLOOD GLUCOSE: CPT

## 2024-02-28 RX ORDER — GLIPIZIDE 5 MG/1
10 TABLET ORAL
Status: DISCONTINUED | OUTPATIENT
Start: 2024-02-29 | End: 2024-03-03 | Stop reason: HOSPADM

## 2024-02-28 RX ORDER — CEFTRIAXONE 2 G/50ML
2000 INJECTION, SOLUTION INTRAVENOUS ONCE
Status: COMPLETED | OUTPATIENT
Start: 2024-02-28 | End: 2024-02-28

## 2024-02-28 RX ORDER — ATORVASTATIN CALCIUM 40 MG/1
40 TABLET, FILM COATED ORAL
Status: DISCONTINUED | OUTPATIENT
Start: 2024-02-28 | End: 2024-03-03 | Stop reason: HOSPADM

## 2024-02-28 RX ORDER — METOPROLOL TARTRATE 50 MG/1
100 TABLET, FILM COATED ORAL EVERY 12 HOURS SCHEDULED
Status: DISCONTINUED | OUTPATIENT
Start: 2024-02-28 | End: 2024-03-03 | Stop reason: HOSPADM

## 2024-02-28 RX ORDER — AMLODIPINE BESYLATE 10 MG/1
10 TABLET ORAL
Status: DISCONTINUED | OUTPATIENT
Start: 2024-02-28 | End: 2024-03-03 | Stop reason: HOSPADM

## 2024-02-28 RX ORDER — PIOGLITAZONEHYDROCHLORIDE 15 MG/1
15 TABLET ORAL DAILY
Status: DISCONTINUED | OUTPATIENT
Start: 2024-02-28 | End: 2024-03-03 | Stop reason: HOSPADM

## 2024-02-28 RX ORDER — HYDROXYZINE HYDROCHLORIDE 25 MG/1
25 TABLET, FILM COATED ORAL ONCE
Status: COMPLETED | OUTPATIENT
Start: 2024-02-28 | End: 2024-02-28

## 2024-02-28 RX ORDER — LISINOPRIL 20 MG/1
40 TABLET ORAL DAILY
Status: DISCONTINUED | OUTPATIENT
Start: 2024-02-28 | End: 2024-03-03 | Stop reason: HOSPADM

## 2024-02-28 RX ORDER — HYDROXYZINE HYDROCHLORIDE 10 MG/1
10 TABLET, FILM COATED ORAL EVERY 6 HOURS PRN
Status: DISCONTINUED | OUTPATIENT
Start: 2024-02-28 | End: 2024-03-02

## 2024-02-28 RX ORDER — IPRATROPIUM BROMIDE AND ALBUTEROL SULFATE 2.5; .5 MG/3ML; MG/3ML
3 SOLUTION RESPIRATORY (INHALATION) 3 TIMES DAILY
Status: DISCONTINUED | OUTPATIENT
Start: 2024-02-28 | End: 2024-02-29

## 2024-02-28 RX ORDER — IPRATROPIUM BROMIDE AND ALBUTEROL SULFATE 2.5; .5 MG/3ML; MG/3ML
3 SOLUTION RESPIRATORY (INHALATION) ONCE
Status: COMPLETED | OUTPATIENT
Start: 2024-02-28 | End: 2024-02-28

## 2024-02-28 RX ORDER — FLUTICASONE FUROATE AND VILANTEROL 100; 25 UG/1; UG/1
1 POWDER RESPIRATORY (INHALATION) DAILY
Status: DISCONTINUED | OUTPATIENT
Start: 2024-02-28 | End: 2024-03-03 | Stop reason: HOSPADM

## 2024-02-28 RX ORDER — SODIUM CHLORIDE 9 MG/ML
125 INJECTION, SOLUTION INTRAVENOUS CONTINUOUS
Status: DISCONTINUED | OUTPATIENT
Start: 2024-02-28 | End: 2024-02-28

## 2024-02-28 RX ORDER — INSULIN LISPRO 100 [IU]/ML
1-6 INJECTION, SOLUTION INTRAVENOUS; SUBCUTANEOUS
Status: DISCONTINUED | OUTPATIENT
Start: 2024-02-28 | End: 2024-03-03 | Stop reason: HOSPADM

## 2024-02-28 RX ORDER — CLONIDINE HYDROCHLORIDE 0.1 MG/1
0.2 TABLET ORAL EVERY 12 HOURS SCHEDULED
Status: DISCONTINUED | OUTPATIENT
Start: 2024-02-28 | End: 2024-03-03 | Stop reason: HOSPADM

## 2024-02-28 RX ORDER — ACETAMINOPHEN 325 MG/1
650 TABLET ORAL EVERY 6 HOURS PRN
Status: DISCONTINUED | OUTPATIENT
Start: 2024-02-28 | End: 2024-03-03 | Stop reason: HOSPADM

## 2024-02-28 RX ORDER — ENOXAPARIN SODIUM 100 MG/ML
40 INJECTION SUBCUTANEOUS DAILY
Status: DISCONTINUED | OUTPATIENT
Start: 2024-02-28 | End: 2024-03-03 | Stop reason: HOSPADM

## 2024-02-28 RX ORDER — CEFTRIAXONE 1 G/50ML
1000 INJECTION, SOLUTION INTRAVENOUS EVERY 24 HOURS
Status: DISCONTINUED | OUTPATIENT
Start: 2024-02-29 | End: 2024-03-02

## 2024-02-28 RX ORDER — ONDANSETRON 2 MG/ML
4 INJECTION INTRAMUSCULAR; INTRAVENOUS EVERY 6 HOURS PRN
Status: DISCONTINUED | OUTPATIENT
Start: 2024-02-28 | End: 2024-03-03 | Stop reason: HOSPADM

## 2024-02-28 RX ORDER — PANTOPRAZOLE SODIUM 20 MG/1
20 TABLET, DELAYED RELEASE ORAL
Status: DISCONTINUED | OUTPATIENT
Start: 2024-02-29 | End: 2024-03-03 | Stop reason: HOSPADM

## 2024-02-28 RX ORDER — PREDNISONE 20 MG/1
60 TABLET ORAL ONCE
Status: COMPLETED | OUTPATIENT
Start: 2024-02-28 | End: 2024-02-28

## 2024-02-28 RX ORDER — IPRATROPIUM BROMIDE AND ALBUTEROL SULFATE 2.5; .5 MG/3ML; MG/3ML
3 SOLUTION RESPIRATORY (INHALATION) 3 TIMES DAILY
Status: DISCONTINUED | OUTPATIENT
Start: 2024-02-28 | End: 2024-02-28

## 2024-02-28 RX ADMIN — ATORVASTATIN CALCIUM 40 MG: 40 TABLET, FILM COATED ORAL at 20:48

## 2024-02-28 RX ADMIN — LISINOPRIL 40 MG: 20 TABLET ORAL at 15:29

## 2024-02-28 RX ADMIN — IPRATROPIUM BROMIDE AND ALBUTEROL SULFATE 3 ML: 2.5; .5 SOLUTION RESPIRATORY (INHALATION) at 09:24

## 2024-02-28 RX ADMIN — INSULIN LISPRO 5 UNITS: 100 INJECTION, SOLUTION INTRAVENOUS; SUBCUTANEOUS at 17:40

## 2024-02-28 RX ADMIN — METOPROLOL TARTRATE 100 MG: 50 TABLET, FILM COATED ORAL at 15:30

## 2024-02-28 RX ADMIN — ENOXAPARIN SODIUM 40 MG: 40 INJECTION SUBCUTANEOUS at 15:28

## 2024-02-28 RX ADMIN — UMECLIDINIUM 1 PUFF: 62.5 AEROSOL, POWDER ORAL at 16:46

## 2024-02-28 RX ADMIN — SODIUM CHLORIDE 40 ML/HR: 4 INJECTION, SOLUTION, CONCENTRATE INTRAVENOUS at 18:37

## 2024-02-28 RX ADMIN — CLONIDINE HYDROCHLORIDE 0.2 MG: 0.1 TABLET ORAL at 15:33

## 2024-02-28 RX ADMIN — CLONIDINE HYDROCHLORIDE 0.2 MG: 0.1 TABLET ORAL at 20:46

## 2024-02-28 RX ADMIN — HYDROXYZINE HYDROCHLORIDE 10 MG: 10 TABLET ORAL at 20:47

## 2024-02-28 RX ADMIN — IPRATROPIUM BROMIDE AND ALBUTEROL SULFATE 3 ML: 2.5; .5 SOLUTION RESPIRATORY (INHALATION) at 19:36

## 2024-02-28 RX ADMIN — PIOGLITAZONE HYDROCHLORIDE 15 MG: 15 TABLET ORAL at 15:29

## 2024-02-28 RX ADMIN — HYDROXYZINE HYDROCHLORIDE 25 MG: 25 TABLET ORAL at 09:24

## 2024-02-28 RX ADMIN — FLUTICASONE FUROATE AND VILANTEROL TRIFENATATE 1 PUFF: 100; 25 POWDER RESPIRATORY (INHALATION) at 16:46

## 2024-02-28 RX ADMIN — PREDNISONE 60 MG: 20 TABLET ORAL at 09:24

## 2024-02-28 RX ADMIN — AMLODIPINE BESYLATE 10 MG: 10 TABLET ORAL at 20:51

## 2024-02-28 RX ADMIN — METOPROLOL TARTRATE 100 MG: 50 TABLET, FILM COATED ORAL at 20:46

## 2024-02-28 RX ADMIN — CEFTRIAXONE 2000 MG: 2 INJECTION, SOLUTION INTRAVENOUS at 12:35

## 2024-02-28 NOTE — ASSESSMENT & PLAN NOTE
Patient states that she was feeling little short of breath and fatigue in the ER got a dose of prednisone, DuoNeb treatment antibiotic.  Denies any recent fevers or chills.  Leukocytosis noted.  Will check Pro-Carlos Alberto level.  COVID RSV influenza is negative.  Will continue on DuoNeb treatments and IV Rocephin for now and monitor  Chest X-ray does not show any evidence of pneumonia maybe she has a bronchitis flare

## 2024-02-28 NOTE — QUICK NOTE
Brief note:    Serum sodium 123 corrected for glucose. Acute on chronic hyponatremia, recently seen by Dr. Perez in the settings of [facility].  Recommend discontinue fluids, continue with fluid restriction 1.5 L.  BMP every 4 hours.  Will reassess with next of labs.  Formal consult to follow. Goal is Na 128 by tomorrow am         Joselyn Reyes Bahamonde, MD  Nephrology Attending

## 2024-02-28 NOTE — H&P
"[facility]  H&P  Name: Jessika Lux 69 y.o. female I MRN: [id number]  Unit/Bed#: [facility]-01 I Date of Admission: 2/28/2024   Date of Service: 2/28/2024 I Hospital Day: 0      Assessment/Plan   * Acute hyponatremia  Assessment & Plan  baseline sodium is usually 136-138.  Currently sodium is down to 121.  He denies any recent nausea vomiting diarrhea or GI illness.  She states that she normally drinks 3 bottles of diet Pepsi every day 500 mL each (1500 ml). does Not drink any water or any other liquids.  States her oral intake is also inconsistent denies any alcohol abuse.  Currently placed on normal saline and repeat BMP after 6 hours.  Will check urine sodium urine osmolarity serum osmolarity   Check TSH and chest x-ray reviewed.  Asked nephrology for evaluation  Hold Zoloft for now    COPD with acute exacerbation (HCC)  Assessment & Plan  Patient states that she was feeling little short of breath and fatigue in the ER got a dose of prednisone, DuoNeb treatment antibiotic.  Denies any recent fevers or chills.  Leukocytosis noted.  Will check Pro-Carlos Alberto level.  COVID RSV influenza is negative.  Will continue on DuoNeb treatments and IV Rocephin for now and monitor  Chest X-ray does not show any evidence of pneumonia maybe she has a bronchitis flare    Hypertensive urgency  Assessment & Plan  Pressures currently elevated will restart home medicine regimen of Norvasc clonidine lisinopril and metoprolol and observe    Chronic hypoxemic respiratory failure (HCC)  Assessment & Plan  Patient states she normally uses 3 L of oxygen at baseline. currently at her baseline range.    Type 2 diabetes mellitus without complication, without long-term current use of insulin (HCC)  Assessment & Plan  Lab Results   Component Value Date    HGBA1C 7.0 (H) 07/27/2023       No results for input(s): \"POCGLU\" in the last 72 hours.    Blood Sugar Average: Last 72 hrs:  placed on insulin sliding scale and continue " glipizide and Actos .observe      Tobacco abuse  Assessment & Plan  Counseled on smoking cessation and placed on nicotine replacement therapy         VTE Prophylaxis: Enoxaparin (Lovenox)  / sequential compression device   Code Status: Full code  POLST: There is no POLST form on file for this patient (pre-hospital)  Discussion with family: None    Anticipated Length of Stay:  Patient will be admitted on an Inpatient basis with an anticipated length of stay of more than 2 midnights.   Justification for Hospital Stay: Acute hyponatremia    Total Time for Visit, including Counseling / Coordination of Care: 60 minutes.  Greater than 50% of this total time spent on direct patient counseling and coordination of care.    Chief Complaint:   Shortness of breath    History of Present Illness:    Jessika Lux is a 69 y.o. female who presents with shortness of breath and complaining of some cough and congestion however noted to have sodium incidentally up from 121-122.  Patient states that she does not eat healthy foods and eats a lot of junk food does not drink any water and only drinks 3 diet Pepsi daily 500 mL each.  Denies any recent nausea vomiting diarrhea or fever or illness otherwise.  She denies any alcohol abuse.  She denies ever having knowledge of having a low sodium problem    Review of Systems:    Review of Systems   Constitutional:  Positive for appetite change and fatigue. Negative for chills and fever.   HENT:  Negative for hearing loss, sore throat and trouble swallowing.    Eyes:  Negative for photophobia, discharge and visual disturbance.   Respiratory:  Positive for cough, shortness of breath and wheezing. Negative for chest tightness.    Cardiovascular:  Negative for chest pain and palpitations.   Gastrointestinal:  Negative for abdominal pain, blood in stool and vomiting.   Endocrine: Negative for polydipsia and polyuria.   Genitourinary:  Negative for difficulty urinating, dysuria, flank pain and  hematuria.   Musculoskeletal:  Negative for back pain and gait problem.   Skin:  Negative for rash.   Allergic/Immunologic: Negative for environmental allergies and food allergies.   Neurological:  Positive for weakness. Negative for dizziness, seizures, syncope and headaches.   Hematological:  Does not bruise/bleed easily.   Psychiatric/Behavioral:  Negative for behavioral problems.    All other systems reviewed and are negative.      Past Medical and Surgical History:     Past Medical History:   Diagnosis Date    Anxiety     COPD (chronic obstructive pulmonary disease) (HCC)     Diabetes mellitus (HCC)     Essential (primary) hypertension     GERD (gastroesophageal reflux disease)     Smoker        History reviewed. No pertinent surgical history.    Meds/Allergies:    Prior to Admission medications    Medication Sig Start Date End Date Taking? Authorizing Provider   amLODIPine (NORVASC) 10 mg tablet Take 10 mg by mouth daily at bedtime    Yes Historical Provider, MD   atorvastatin (LIPITOR) 40 mg tablet Take 40 mg by mouth daily at bedtime    Yes Historical Provider, MD   cloNIDine (CATAPRES) 0.2 mg tablet Take 0.2 mg by mouth every 12 (twelve) hours   Yes Historical Provider, MD   ergocalciferol (VITAMIN D2) 50,000 units Take 50,000 Units by mouth once a week On saturday   Yes Historical Provider, MD   glipiZIDE (GLUCOTROL) 10 mg tablet Take 10 mg by mouth daily in the early morning W breakfast   Yes Historical Provider, MD   hydrOXYzine HCL (ATARAX) 10 mg tablet Take 10 mg by mouth every 6 (six) hours as needed for itching   Yes Historical Provider, MD   ipratropium-albuterol (DUO-NEB) 0.5-2.5 mg/3 mL nebulizer solution Take 3 mL by nebulization 3 (three) times a day 9/27/23  Yes Korina Olivarez DO   lisinopril (ZESTRIL) 40 mg tablet Take 40 mg by mouth daily   Yes Historical Provider, MD   metoprolol tartrate (LOPRESSOR) 100 mg tablet Take 100 mg by mouth every 12 (twelve) hours   Yes Historical Provider, MD    omeprazole (PriLOSEC OTC) 20 MG tablet Take 20 mg by mouth daily    Yes Historical Provider, MD   pioglitazone (ACTOS) 15 mg tablet Take 15 mg by mouth daily   Yes Historical Provider, MD   sertraline (ZOLOFT) 50 mg tablet Take 1 tablet (50 mg total) by mouth daily 11/13/23  Yes Tamika Bernstein PA-C   Trelegy Ellipta 100-62.5-25 MCG/ACT inhaler inhale 1 puff by mouth and INTO THE LUNGS once daily Rinse mouth after use 1/11/24  Yes AYLIN High   fluticasone (FLONASE) 50 mcg/act nasal spray 1 spray into each nostril daily  Patient not taking: Reported on 12/5/2023 6/28/23   Nettie Moore MD   Nebulizers (Comp Air Compressor Nebulizer) MISC As directed 7/28/23   Historical Provider, MD   nicotine (NICODERM CQ) 7 mg/24hr TD 24 hr patch Place 1 patch on the skin over 24 hours daily Do not start before October 24, 2023.  Patient not taking: Reported on 12/5/2023 10/24/23   Tamika Bernstein PA-C   LORazepam (Ativan) 0.5 mg tablet Take 1 tablet (0.5 mg total) by mouth 3 (three) times a day as needed for anxiety for up to 20 doses  Patient not taking: Reported on 2/28/2024 11/2/23 2/28/24  Srikanth Morrison, DO     I have reviewed home medications with patient personally.    Allergies:   Allergies   Allergen Reactions    Clindamycin        Social History:     Marital Status:      Social History     Substance and Sexual Activity   Alcohol Use Not Currently     Social History     Tobacco Use   Smoking Status Every Day    Current packs/day: 0.25    Types: Cigarettes    Passive exposure: Never   Smokeless Tobacco Never     Social History     Substance and Sexual Activity   Drug Use Never       Family History:    Family History   Problem Relation Age of Onset    Diabetes Father        Physical Exam:     Vitals:   Blood Pressure: (!) 204/107 (02/28/24 1215)  Pulse: 103 (02/28/24 1215)  Temperature: 98.4 °F (36.9 °C) (02/28/24 0901)  Temp Source: Temporal (02/28/24 0901)  Respirations: (!) 32 (02/28/24 1015)  Height: 5'  "2\" (157.5 cm) (02/28/24 0901)  Weight - Scale: 75 kg (165 lb 5.5 oz) (02/28/24 0901)  SpO2: 93 % (02/28/24 1215)    Physical Exam  Vitals and nursing note reviewed.   Constitutional:       Appearance: She is ill-appearing.   HENT:      Head: Normocephalic and atraumatic.      Right Ear: External ear normal.      Left Ear: External ear normal.      Nose: Nose normal.      Mouth/Throat:      Pharynx: Oropharynx is clear.   Eyes:      Pupils: Pupils are equal, round, and reactive to light.   Cardiovascular:      Rate and Rhythm: Normal rate and regular rhythm.      Heart sounds: Normal heart sounds.   Pulmonary:      Effort: Pulmonary effort is normal.      Breath sounds: Rhonchi present.   Abdominal:      General: Bowel sounds are normal.      Palpations: Abdomen is soft.      Tenderness: There is no abdominal tenderness.   Musculoskeletal:         General: Normal range of motion.      Cervical back: Normal range of motion and neck supple.   Skin:     General: Skin is warm and dry.      Capillary Refill: Capillary refill takes less than 2 seconds.   Neurological:      General: No focal deficit present.      Mental Status: She is alert and oriented to person, place, and time.   Psychiatric:         Mood and Affect: Mood normal.           Additional Data:     Lab Results: I have personally reviewed pertinent reports.      Results from last 7 days   Lab Units 02/28/24  0923   WBC Thousand/uL 12.36*   HEMOGLOBIN g/dL 13.6   HEMATOCRIT % 39.7   PLATELETS Thousands/uL 251   NEUTROS PCT % 79*   LYMPHS PCT % 11*   MONOS PCT % 9   EOS PCT % 0     Results from last 7 days   Lab Units 02/28/24  1007 02/28/24  0923   SODIUM mmol/L 121* 122*   POTASSIUM mmol/L 4.2 4.2   CHLORIDE mmol/L 80* 78*   CO2 mmol/L 36* 36*   BUN mg/dL 5 6   CREATININE mg/dL 0.57* 0.62   ANION GAP mmol/L 5 8   CALCIUM mg/dL 9.5 10.1   ALBUMIN g/dL  --  4.6   TOTAL BILIRUBIN mg/dL  --  0.43   ALK PHOS U/L  --  102   ALT U/L  --  10   AST U/L  --  14 "   GLUCOSE RANDOM mg/dL 200* 197*                 Results from last 7 days   Lab Units 02/28/24  1229   LACTIC ACID mmol/L 1.0       Imaging: I have personally reviewed pertinent reports.      XR chest 1 view portable   ED Interpretation by Terry Buckley DO (02/28 0950)   No significant change from prior imaging studies.  Chronic atelectasis right base.      Final Result by John Key MD (02/28 1127)      No acute cardiopulmonary disease.            Workstation performed: [id number]             EKG, Pathology, and Other Studies Reviewed on Admission:   EKG: Reviewed    Allscripts / Epic Records Reviewed: Yes     ** Please Note: This note has been constructed using a voice recognition system. **

## 2024-02-28 NOTE — ASSESSMENT & PLAN NOTE
"Lab Results   Component Value Date    HGBA1C 7.0 (H) 07/27/2023       No results for input(s): \"POCGLU\" in the last 72 hours.    Blood Sugar Average: Last 72 hrs:  placed on insulin sliding scale and continue glipizide and Actos .observe    "

## 2024-02-28 NOTE — ED PROVIDER NOTES
"History  Chief Complaint   Patient presents with    Shortness of Breath     PT STATES SINCE YESTERDAY THEY HAVE BEEN \"HAVING A HARD TIME CATCHING BREATH\" ON ARRIVAL PATIENT 87% ON NORMAL 3L NC     Patient is a 69-year-old female with a history of anxiety, hypertension, hyperlipidemia, COPD, diabetes, and tobacco use disorder presenting to the emergency room with chief complaint of shortness of breath ongoing since yesterday.  Patient is on chronic oxygen and has a high utilizer care plan.     Patient reports that she used her normal at home medications without significant relief and presented to the emergency room for further evaluation.      History provided by:  Patient  Shortness of Breath  Severity:  Severe  Onset quality:  Gradual  Duration:  2 days  Associated symptoms: cough and wheezing    Associated symptoms: no chest pain and no fever        Prior to Admission Medications   Prescriptions Last Dose Informant Patient Reported? Taking?   Nebulizers (Comp Air Compressor Nebulizer) MISC   Yes No   Sig: As directed   Trelegy Ellipta 100-62.5-25 MCG/ACT inhaler 2024  No Yes   Sig: inhale 1 puff by mouth and INTO THE LUNGS once daily Rinse mouth after use   amLODIPine (NORVASC) 10 mg tablet 2024  Yes Yes   Sig: Take 10 mg by mouth daily at bedtime    atorvastatin (LIPITOR) 40 mg tablet 2024  Yes Yes   Sig: Take 40 mg by mouth daily at bedtime    cloNIDine (CATAPRES) 0.2 mg tablet 2024  Yes Yes   Sig: Take 0.2 mg by mouth every 12 (twelve) hours   ergocalciferol (VITAMIN D2) 50,000 units 2024  Yes Yes   Sig: Take 50,000 Units by mouth once a week On saturday   fluticasone (FLONASE) 50 mcg/act nasal spray Not Taking  No No   Si spray into each nostril daily   Patient not taking: Reported on 2023   glipiZIDE (GLUCOTROL) 10 mg tablet 2024 Self Yes Yes   Sig: Take 10 mg by mouth daily in the early morning W breakfast   hydrOXYzine HCL (ATARAX) 10 mg tablet 2024  Yes Yes "   Sig: Take 10 mg by mouth every 6 (six) hours as needed for itching   ipratropium-albuterol (DUO-NEB) 0.5-2.5 mg/3 mL nebulizer solution 2/28/2024  No Yes   Sig: Take 3 mL by nebulization 3 (three) times a day   lisinopril (ZESTRIL) 40 mg tablet 2/27/2024  Yes Yes   Sig: Take 40 mg by mouth daily   metoprolol tartrate (LOPRESSOR) 100 mg tablet 2/27/2024  Yes Yes   Sig: Take 100 mg by mouth every 12 (twelve) hours   nicotine (NICODERM CQ) 7 mg/24hr TD 24 hr patch Not Taking  No No   Sig: Place 1 patch on the skin over 24 hours daily Do not start before October 24, 2023.   Patient not taking: Reported on 12/5/2023   omeprazole (PriLOSEC OTC) 20 MG tablet 2/27/2024  Yes Yes   Sig: Take 20 mg by mouth daily    pioglitazone (ACTOS) 15 mg tablet 2/27/2024  Yes Yes   Sig: Take 15 mg by mouth daily   sertraline (ZOLOFT) 50 mg tablet 2/27/2024  No Yes   Sig: Take 1 tablet (50 mg total) by mouth daily      Facility-Administered Medications: None       Past Medical History:   Diagnosis Date    Anxiety     COPD (chronic obstructive pulmonary disease) (HCC)     Diabetes mellitus (HCC)     Essential (primary) hypertension     GERD (gastroesophageal reflux disease)     Smoker        History reviewed. No pertinent surgical history.    Family History   Problem Relation Age of Onset    Diabetes Father      I have reviewed and agree with the history as documented.    E-Cigarette/Vaping    E-Cigarette Use Never User      E-Cigarette/Vaping Substances    Nicotine No     THC No     CBD No     Flavoring No      Social History     Tobacco Use    Smoking status: Every Day     Current packs/day: 0.25     Types: Cigarettes     Passive exposure: Never    Smokeless tobacco: Never   Vaping Use    Vaping status: Never Used   Substance Use Topics    Alcohol use: Not Currently    Drug use: Never       Review of Systems   Constitutional:  Negative for fever.   Respiratory:  Positive for cough, chest tightness, shortness of breath and wheezing.     Cardiovascular: Negative.  Negative for chest pain and leg swelling.   Gastrointestinal: Negative.    Psychiatric/Behavioral:  The patient is nervous/anxious.    All other systems reviewed and are negative.      Physical Exam  Physical Exam  Vitals and nursing note reviewed.   Constitutional:       General: She is in acute distress.      Appearance: She is normal weight. She is not ill-appearing or toxic-appearing.   HENT:      Head: Normocephalic and atraumatic.      Right Ear: External ear normal.      Left Ear: External ear normal.      Nose: Nose normal.      Mouth/Throat:      Mouth: Mucous membranes are moist.   Cardiovascular:      Rate and Rhythm: Tachycardia present.   Pulmonary:      Effort: Pulmonary effort is normal. Tachypnea present. No respiratory distress.      Breath sounds: No stridor. Decreased breath sounds and wheezing present.   Abdominal:      General: Abdomen is flat. There is no distension.   Musculoskeletal:         General: No signs of injury.   Skin:     Coloration: Skin is not pale.   Neurological:      General: No focal deficit present.      Mental Status: She is alert.   Psychiatric:         Mood and Affect: Mood is anxious.         Thought Content: Thought content normal.         Judgment: Judgment normal.         Vital Signs  ED Triage Vitals   Temperature Pulse Respirations Blood Pressure SpO2   02/28/24 0901 02/28/24 0901 02/28/24 0901 02/28/24 0901 02/28/24 0901   98.4 °F (36.9 °C) (!) 108 20 168/82 (!) 87 %      Temp Source Heart Rate Source Patient Position - Orthostatic VS BP Location FiO2 (%)   02/28/24 0901 02/28/24 0901 -- 02/28/24 1000 --   Temporal Monitor  Left arm       Pain Score       --                  Vitals:    02/28/24 1100 02/28/24 1130 02/28/24 1200 02/28/24 1215   BP:  157/79 150/70 (!) 204/107   Pulse: 102 88 88 103         Visual Acuity      ED Medications  Medications   amLODIPine (NORVASC) tablet 10 mg (has no administration in time range)   atorvastatin  (LIPITOR) tablet 40 mg (has no administration in time range)   cloNIDine (CATAPRES) tablet 0.2 mg (has no administration in time range)   glipiZIDE (GLUCOTROL) tablet 10 mg (has no administration in time range)   hydrOXYzine HCL (ATARAX) tablet 10 mg (has no administration in time range)   ipratropium-albuterol (DUO-NEB) 0.5-2.5 mg/3 mL inhalation solution 3 mL (has no administration in time range)   lisinopril (ZESTRIL) tablet 40 mg (has no administration in time range)   metoprolol tartrate (LOPRESSOR) tablet 100 mg (has no administration in time range)   nicotine (NICODERM CQ) 7 mg/24hr TD 24 hr patch 1 patch (has no administration in time range)   pantoprazole (PROTONIX) EC tablet 20 mg (has no administration in time range)   Fluticasone Furoate-Vilanterol 100-25 mcg/actuation 1 puff (has no administration in time range)     And   umeclidinium 62.5 mcg/actuation inhaler AEPB 1 puff (has no administration in time range)   pioglitazone (ACTOS) tablet 15 mg (has no administration in time range)   sodium chloride 0.9 % infusion (has no administration in time range)   acetaminophen (TYLENOL) tablet 650 mg (has no administration in time range)   ondansetron (ZOFRAN) injection 4 mg (has no administration in time range)   nicotine (NICODERM CQ) 7 mg/24hr TD 24 hr patch 1 patch (has no administration in time range)   enoxaparin (LOVENOX) subcutaneous injection 40 mg (has no administration in time range)   insulin lispro (HumALOG/ADMELOG) 100 units/mL subcutaneous injection 1-6 Units (has no administration in time range)   insulin lispro (HumALOG/ADMELOG) 100 units/mL subcutaneous injection 1-6 Units (has no administration in time range)   hydrOXYzine HCL (ATARAX) tablet 25 mg (25 mg Oral Given 2/28/24 0924)   ipratropium-albuterol (DUO-NEB) 0.5-2.5 mg/3 mL inhalation solution 3 mL (3 mL Nebulization Given 2/28/24 0924)   predniSONE tablet 60 mg (60 mg Oral Given 2/28/24 0924)   cefTRIAXone (ROCEPHIN) IVPB (premix in  "dextrose) 2,000 mg 50 mL (2,000 mg Intravenous New Bag 2/28/24 1235)       Diagnostic Studies  Results Reviewed       Procedure Component Value Units Date/Time    Sodium, urine, random [[id number]]     Lab Status: No result Specimen: Urine     Osmolality, urine [[id number]]     Lab Status: No result Specimen: Urine     Osmolality-\"If this is regarding a toxic alcohol, STOP. Test is not routinely indicated. Please consult medical  on call for further guidance.\" [[id number]]     Lab Status: No result Specimen: Blood     HS Troponin I 4hr [[id number]]  (Normal) Collected: 02/28/24 1229    Lab Status: Final result Specimen: Blood from Hand, Left Updated: 02/28/24 1308     hs TnI 4hr 5 ng/L      Delta 4hr hsTnI 1 ng/L     Lactic acid, plasma (w/reflex if result > 2.0) [[id number]]  (Normal) Collected: 02/28/24 1229    Lab Status: Final result Specimen: Blood from Arm, Left Updated: 02/28/24 1303     LACTIC ACID 1.0 mmol/L     Narrative:      Result may be elevated if tourniquet was used during collection.    Blood culture #2 [[id number]] Collected: 02/28/24 1229    Lab Status: In process Specimen: Blood from Arm, Left Updated: 02/28/24 1232    Blood culture #1 [[id number]] Collected: 02/28/24 1229    Lab Status: In process Specimen: Blood from Hand, Left Updated: 02/28/24 1232    HS Troponin I 2hr [[id number]]  (Normal) Collected: 02/28/24 1121    Lab Status: Final result Specimen: Blood from Arm, Right Updated: 02/28/24 1149     hs TnI 2hr 4 ng/L      Delta 2hr hsTnI 0 ng/L     Basic metabolic panel [[id number]]  (Abnormal) Collected: 02/28/24 1007    Lab Status: Final result Specimen: Blood from Arm, Right Updated: 02/28/24 1052     Sodium 121 mmol/L      Potassium 4.2 mmol/L      Chloride 80 mmol/L      CO2 36 mmol/L      ANION GAP 5 mmol/L      BUN 5 mg/dL      Creatinine 0.57 mg/dL      Glucose 200 mg/dL      Calcium 9.5 mg/dL      eGFR 96 ml/min/1.73sq m     Narrative:      National Kidney Disease Foundation " guidelines for Chronic Kidney Disease (CKD):     Stage 1 with normal or high GFR (GFR > 90 mL/min/1.73 square meters)    Stage 2 Mild CKD (GFR = 60-89 mL/min/1.73 square meters)    Stage 3A Moderate CKD (GFR = 45-59 mL/min/1.73 square meters)    Stage 3B Moderate CKD (GFR = 30-44 mL/min/1.73 square meters)    Stage 4 Severe CKD (GFR = 15-29 mL/min/1.73 square meters)    Stage 5 End Stage CKD (GFR <15 mL/min/1.73 square meters)  Note: GFR calculation is accurate only with a steady state creatinine    FLU/RSV/COVID - if FLU/RSV clinically relevant [[id number]]  (Normal) Collected: 02/28/24 0923    Lab Status: Final result Specimen: Nares from Nose Updated: 02/28/24 1015     SARS-CoV-2 Negative     INFLUENZA A PCR Negative     INFLUENZA B PCR Negative     RSV PCR Negative    Narrative:      FOR PEDIATRIC PATIENTS - copy/paste COVID Guidelines URL to browser: [email or web address]    SARS-CoV-2 assay is a Nucleic Acid Amplification assay intended for the  qualitative detection of nucleic acid from SARS-CoV-2 in nasopharyngeal  swabs. Results are for the presumptive identification of SARS-CoV-2 RNA.    Positive results are indicative of infection with SARS-CoV-2, the virus  causing COVID-19, but do not rule out bacterial infection or co-infection  with other viruses. Laboratories within the [place] and its  territories are required to report all positive results to the appropriate  public health authorities. Negative results do not preclude SARS-CoV-2  infection and should not be used as the sole basis for treatment or other  patient management decisions. Negative results must be combined with  clinical observations, patient history, and epidemiological information.  This test has not been FDA cleared or approved.    This test has been authorized by FDA under an Emergency Use Authorization  (EUA). This test is only authorized for the duration of time  the  declaration that circumstances exist justifying the authorization of the  emergency use of an in vitro diagnostic tests for detection of SARS-CoV-2  virus and/or diagnosis of COVID-19 infection under section 564(b)(1) of  the Act, 21 U.S.C. 360bbb-3(b)(1), unless the authorization is terminated  or revoked sooner. The test has been validated but independent review by FDA  and CLIA is pending.    Test performed using [employer]: This RT-PCR assay targets N2,  a region unique to SARS-CoV-2. A conserved region in the E-gene was chosen  for pan-Sarbecovirus detection which includes SARS-CoV-2.    According to CMS-2020-01-R, this platform meets the definition of high-throughput technology.    HS Troponin 0hr (reflex protocol) [[id number]]  (Normal) Collected: 02/28/24 0923    Lab Status: Final result Specimen: Blood from Arm, Right Updated: 02/28/24 0954     hs TnI 0hr 4 ng/L     Comprehensive metabolic panel [[id number]]  (Abnormal) Collected: 02/28/24 0923    Lab Status: Final result Specimen: Blood from Arm, Right Updated: 02/28/24 0950     Sodium 122 mmol/L      Potassium 4.2 mmol/L      Chloride 78 mmol/L      CO2 36 mmol/L      ANION GAP 8 mmol/L      BUN 6 mg/dL      Creatinine 0.62 mg/dL      Glucose 197 mg/dL      Calcium 10.1 mg/dL      AST 14 U/L      ALT 10 U/L      Alkaline Phosphatase 102 U/L      Total Protein 8.0 g/dL      Albumin 4.6 g/dL      Total Bilirubin 0.43 mg/dL      eGFR 93 ml/min/1.73sq m     Narrative:      National Kidney Disease Foundation guidelines for Chronic Kidney Disease (CKD):     Stage 1 with normal or high GFR (GFR > 90 mL/min/1.73 square meters)    Stage 2 Mild CKD (GFR = 60-89 mL/min/1.73 square meters)    Stage 3A Moderate CKD (GFR = 45-59 mL/min/1.73 square meters)    Stage 3B Moderate CKD (GFR = 30-44 mL/min/1.73 square meters)    Stage 4 Severe CKD (GFR = 15-29 mL/min/1.73 square meters)    Stage 5 End Stage CKD (GFR <15 mL/min/1.73 square meters)  Note: GFR  calculation is accurate only with a steady state creatinine    CBC and differential [[id number]]  (Abnormal) Collected: 02/28/24 0923    Lab Status: Final result Specimen: Blood from Arm, Right Updated: 02/28/24 0932     WBC 12.36 Thousand/uL      RBC 4.75 Million/uL      Hemoglobin 13.6 g/dL      Hematocrit 39.7 %      MCV 84 fL      MCH 28.6 pg      MCHC 34.3 g/dL      RDW 11.8 %      MPV 10.2 fL      Platelets 251 Thousands/uL      nRBC 0 /100 WBCs      Neutrophils Relative 79 %      Immat GRANS % 1 %      Lymphocytes Relative 11 %      Monocytes Relative 9 %      Eosinophils Relative 0 %      Basophils Relative 0 %      Neutrophils Absolute 9.68 Thousands/µL      Immature Grans Absolute 0.07 Thousand/uL      Lymphocytes Absolute 1.41 Thousands/µL      Monocytes Absolute 1.14 Thousand/µL      Eosinophils Absolute 0.03 Thousand/µL      Basophils Absolute 0.03 Thousands/µL                    XR chest 1 view portable   ED Interpretation by Terry Buckley DO (02/28 0950)   No significant change from prior imaging studies.  Chronic atelectasis right base.      Final Result by John Key MD (02/28 1127)      No acute cardiopulmonary disease.            Workstation performed: [id number]                    Procedures  ECG 12 Lead Documentation Only    Date/Time: 2/28/2024 9:26 AM    Performed by: Terry Buckley DO  Authorized by: Terry Buckley DO    ECG reviewed by me, the ED Provider: yes    Patient location:  ED  Previous ECG:     Previous ECG:  Compared to current    Comparison ECG info:  No significant change from November 2023 other than tachycardia and ST segment nonspecific changes    Similarity:  No change  Interpretation:     Interpretation: non-specific    Rate:     ECG rate assessment: normal    Rhythm:     Rhythm: sinus rhythm    Ectopy:     Ectopy: none    QRS:     QRS axis:  Normal  Conduction:     Conduction: normal    ST segments:     ST segments:  Non-specific  T waves:     T waves:  non-specific    Other findings:     Other findings: QUINN             ED Course  ED Course as of 02/28/24 1400   Wed Feb 28, 2024 0920 Patient has a high utilizer care plan.  Reviewed and will respond accordingly   1143 Sodium(!): 121   1145 Will discuss with ICU team given patient's sodium level.                               SBIRT 20yo+      Flowsheet Row Most Recent Value   Initial Alcohol Screen: US AUDIT-C     1. How often do you have a drink containing alcohol? 0 Filed at: 02/28/2024 0900   2. How many drinks containing alcohol do you have on a typical day you are drinking?  0 Filed at: 02/28/2024 0900   3b. FEMALE Any Age, or MALE 65+: How often do you have 4 or more drinks on one occassion? 0 Filed at: 02/28/2024 0900   Audit-C Score 0 Filed at: 02/28/2024 0900   LIVIER: How many times in the past year have you...    Used an illegal drug or used a prescription medication for non-medical reasons? Never Filed at: 02/28/2024 0900                      Medical Decision Making  Patient presented to the emergency department and a MSE was performed. The patient was evaluated for complaint related to acute shortness of breath.  Patient is potentially at risk for, but not limited to, acute coronary syndrome, arrhythmia, myocardial infarction, pulmonary embolism, pneumothorax, infectious process of the lungs such as pneumonia, reactive airway disease, asthma, COPD exacerbation, cardiomyopathy, congestive heart failure or viral syndrome.  Several of these diagnoses have been evaluated and ruled out by history and physical.  As needed, patient will be further evaluated with laboratory and imaging studies.  Higher level diagnostics, such as CT imaging or ultrasound, may also be required.  Please see work-up portion of the note for further evaluation of patient's risk.  Socioeconomic factors were also considered as part of the decision-making process.  Unless otherwise stated in the chart or patient is admitted as elsewhere  documented, any previously prescribed medications will be maintained.      Problems Addressed:  COPD with acute exacerbation (HCC): chronic illness or injury with exacerbation, progression, or side effects of treatment  Hyponatremia: acute illness or injury  Weakness: acute illness or injury    Amount and/or Complexity of Data Reviewed  Labs: ordered. Decision-making details documented in ED Course.  Radiology: ordered and independent interpretation performed.    Risk  Prescription drug management.  Decision regarding hospitalization.  Risk Details: Patient presented to the emergency department and a MSE was performed. The patient was evaluated and diagnosed with acute exacerbation of COPD with hyponatremia.This is an acute exacerbation of a chronic disease process that will require additional planned work-up and treatment in a hospitalized setting. As may have been required as part of this evaluation, clinical laboratory test, radiology imaging and medical testing (I.e. EKG) were ordered as necessitated by the patient's presentation. I independently reviewed these studies, imaging and testing. This patient's case is considered to be a considerable risk secondary to the above listed disease process and poses a threat to the patient's well-being and baseline function. Further in-patient diagnostic testing and management, which may include the administration of parenteral medications, is required.                    Disposition  Final diagnoses:   Weakness   COPD with acute exacerbation (HCC)   Hyponatremia     Time reflects when diagnosis was documented in both MDM as applicable and the Disposition within this note       Time User Action Codes Description Comment    2/28/2024 12:01 PM Terry Buckley [R53.1] Weakness     2/28/2024 12:01 PM Terry Buckley Add [J44.1] COPD exacerbation (HCC)     2/28/2024 12:01 PM Terry Buckley Remove [J44.1] COPD exacerbation (HCC)     2/28/2024 12:01 PM Terry Buckley Add [J44.1]  COPD with acute exacerbation (HCC)     2/28/2024 12:01 PM Terry Buckley Add [E87.1] Hyponatremia           ED Disposition       ED Disposition   Admit    Condition   Stable    Date/Time   Wed Feb 28, 2024 1201    Comment                  Follow-up Information    None         Patient's Medications   Discharge Prescriptions    No medications on file       No discharge procedures on file.    PDMP Review         Value Time User    PDMP Reviewed  Yes 11/12/2023  2:20 AM AYLIN Morrell            ED Provider  Electronically Signed by             Terry Buckley DO  02/28/24 1400     How Severe Is Your Acne?: moderate Is This A New Presentation, Or A Follow-Up?: Acne

## 2024-02-28 NOTE — ASSESSMENT & PLAN NOTE
baseline sodium is usually 136-138.  Currently sodium is down to 121.  He denies any recent nausea vomiting diarrhea or GI illness.  She states that she normally drinks 3 bottles of diet Pepsi every day 500 mL each (1500 ml). does Not drink any water or any other liquids.  States her oral intake is also inconsistent denies any alcohol abuse.  Currently placed on normal saline and repeat BMP after 6 hours.  Will check urine sodium urine osmolarity serum osmolarity   Check TSH and chest x-ray reviewed.  Asked nephrology for evaluation  Hold Zoloft for now

## 2024-02-28 NOTE — ASSESSMENT & PLAN NOTE
Pressures currently elevated will restart home medicine regimen of Norvasc clonidine lisinopril and metoprolol and observe

## 2024-02-28 NOTE — PLAN OF CARE
Problem: Potential for Falls  Goal: Patient will remain free of falls  Description: INTERVENTIONS:  - Educate patient/family on patient safety including physical limitations  - Instruct patient to call for assistance with activity   - Consult OT/PT to assist with strengthening/mobility   - Keep Call bell within reach  - Keep bed low and locked with side rails adjusted as appropriate  - Keep care items and personal belongings within reach  - Initiate and maintain comfort rounds  - Make Fall Risk Sign visible to staff  - Apply yellow socks and bracelet for high fall risk patients  - Consider moving patient to room near nurses station  Outcome: Progressing     Problem: PAIN - ADULT  Goal: Verbalizes/displays adequate comfort level or baseline comfort level  Description: Interventions:  - Encourage patient to monitor pain and request assistance  - Assess pain using appropriate pain scale  - Administer analgesics based on type and severity of pain and evaluate response  - Implement non-pharmacological measures as appropriate and evaluate response  - Consider cultural and social influences on pain and pain management  - Notify physician/advanced practitioner if interventions unsuccessful or patient reports new pain  Outcome: Progressing     Problem: INFECTION - ADULT  Goal: Absence or prevention of progression during hospitalization  Description: INTERVENTIONS:  - Assess and monitor for signs and symptoms of infection  - Monitor lab/diagnostic results  - Monitor all insertion sites, i.e. indwelling lines, tubes, and drains  - Monitor endotracheal if appropriate and nasal secretions for changes in amount and color  - [place] appropriate cooling/warming therapies per order  - Administer medications as ordered  - Instruct and encourage patient and family to use good hand hygiene technique  - Identify and instruct in appropriate isolation precautions for identified infection/condition  Outcome: Progressing     Problem:  SAFETY ADULT  Goal: Patient will remain free of falls  Description: INTERVENTIONS:  - Educate patient/family on patient safety including physical limitations  - Instruct patient to call for assistance with activity   - Consult OT/PT to assist with strengthening/mobility   - Keep Call bell within reach  - Keep bed low and locked with side rails adjusted as appropriate  - Keep care items and personal belongings within reach  - Initiate and maintain comfort rounds  - Make Fall Risk Sign visible to staff  - Obtain necessary fall risk management equipment  - Apply yellow socks and bracelet for high fall risk patients  - Consider moving patient to room near nurses station  Outcome: Progressing  Goal: Maintain or return to baseline ADL function  Description: INTERVENTIONS:  -  Assess patient's ability to carry out ADLs; assess patient's baseline for ADL function and identify physical deficits which impact ability to perform ADLs (bathing, care of mouth/teeth, toileting, grooming, dressing, etc.)  - Assess/evaluate cause of self-care deficits   - Assess range of motion  - Assess patient's mobility; develop plan if impaired  - Assess patient's need for assistive devices and provide as appropriate  - Encourage maximum independence but intervene and supervise when necessary  - Involve family in performance of ADLs  - Assess for home care needs following discharge   - Consider OT consult to assist with ADL evaluation and planning for discharge  - Provide patient education as appropriate  Outcome: Progressing  Goal: Maintains/Returns to pre admission functional level  Description: INTERVENTIONS:  - Perform AM-PAC 6 Click Basic Mobility/ Daily Activity assessment daily.  - Set and communicate daily mobility goal to care team and patient/family/caregiver.   - Collaborate with rehabilitation services on mobility goals if consulted  - Ambulate patient 3 times a day  - Out of bed to chair 3 times a day   - Out of bed for meals 3  times a day  - Out of bed for toileting  - Record patient progress and toleration of activity level   Outcome: Progressing     Problem: DISCHARGE PLANNING  Goal: Discharge to home or other facility with appropriate resources  Description: INTERVENTIONS:  - Identify barriers to discharge w/patient and caregiver  - Arrange for needed discharge resources and transportation as appropriate  - Identify discharge learning needs (meds, wound care, etc.)  - Arrange for interpretive services to assist at discharge as needed  - Refer to Case Management Department for coordinating discharge planning if the patient needs post-hospital services based on physician/advanced practitioner order or complex needs related to functional status, cognitive ability, or social support system  Outcome: Progressing     Problem: Knowledge Deficit  Goal: Patient/family/caregiver demonstrates understanding of disease process, treatment plan, medications, and discharge instructions  Description: Complete learning assessment and assess knowledge base.  Interventions:  - Provide teaching at level of understanding  - Provide teaching via preferred learning methods  Outcome: Progressing     Problem: RESPIRATORY - ADULT  Goal: Achieves optimal ventilation and oxygenation  Description: INTERVENTIONS:  - Assess for changes in respiratory status  - Assess for changes in mentation and behavior  - Position to facilitate oxygenation and minimize respiratory effort  - Oxygen administered by appropriate delivery if ordered  - Initiate smoking cessation education as indicated  - Encourage broncho-pulmonary hygiene including cough, deep breathe, Incentive Spirometry  - Assess the need for suctioning and aspirate as needed  - Assess and instruct to report SOB or any respiratory difficulty  - Respiratory Therapy support as indicated  Outcome: Progressing     Problem: METABOLIC, FLUID AND ELECTROLYTES - ADULT  Goal: Electrolytes maintained within normal  limits  Description: INTERVENTIONS:  - Monitor labs and assess patient for signs and symptoms of electrolyte imbalances  - Administer electrolyte replacement as ordered  - Monitor response to electrolyte replacements, including repeat lab results as appropriate  - Instruct patient on fluid and nutrition as appropriate  Outcome: Progressing  Goal: Fluid balance maintained  Description: INTERVENTIONS:  - Monitor labs   - Monitor I/O and WT  - Instruct patient on fluid and nutrition as appropriate  - Assess for signs & symptoms of volume excess or deficit  Outcome: Progressing  Goal: Glucose maintained within target range  Description: INTERVENTIONS:  - Monitor Blood Glucose as ordered  - Assess for signs and symptoms of hyperglycemia and hypoglycemia  - Administer ordered medications to maintain glucose within target range  - Assess nutritional intake and initiate nutrition service referral as needed  Outcome: Progressing     Problem: Anxiety  Goal: Anxiety is at manageable level  Description: Interventions:  - Assess and monitor patient's anxiety level.   - Monitor for signs and symptoms (heart palpitations, chest pain, shortness of breath, headaches, nausea, feeling jumpy, restlessness, irritable, apprehensive).   - Collaborate with interdisciplinary team and initiate plan and interventions as ordered.  - [place] patient to unit/surroundings  - Explain treatment plan  - Encourage participation in care  - Encourage verbalization of concerns/fears  - Identify coping mechanisms  - Assist in developing anxiety-reducing skills  - Administer/offer alternative therapies  - Limit or eliminate stimulants  Outcome: Progressing

## 2024-02-29 PROBLEM — J96.21 ACUTE ON CHRONIC RESPIRATORY FAILURE WITH HYPOXIA (HCC): Status: ACTIVE | Noted: 2023-09-11

## 2024-02-29 LAB
ANION GAP SERPL CALCULATED.3IONS-SCNC: 4 MMOL/L
ANION GAP SERPL CALCULATED.3IONS-SCNC: 5 MMOL/L
ANION GAP SERPL CALCULATED.3IONS-SCNC: 8 MMOL/L
BUN SERPL-MCNC: 10 MG/DL (ref 5–25)
BUN SERPL-MCNC: 14 MG/DL (ref 5–25)
BUN SERPL-MCNC: 6 MG/DL (ref 5–25)
BUN SERPL-MCNC: 6 MG/DL (ref 5–25)
BUN SERPL-MCNC: 7 MG/DL (ref 5–25)
BUN SERPL-MCNC: 7 MG/DL (ref 5–25)
CALCIUM SERPL-MCNC: 10 MG/DL (ref 8.4–10.2)
CALCIUM SERPL-MCNC: 10.4 MG/DL (ref 8.4–10.2)
CALCIUM SERPL-MCNC: 9.4 MG/DL (ref 8.4–10.2)
CALCIUM SERPL-MCNC: 9.5 MG/DL (ref 8.4–10.2)
CALCIUM SERPL-MCNC: 9.7 MG/DL (ref 8.4–10.2)
CALCIUM SERPL-MCNC: 9.8 MG/DL (ref 8.4–10.2)
CHLORIDE SERPL-SCNC: 80 MMOL/L (ref 96–108)
CHLORIDE SERPL-SCNC: 81 MMOL/L (ref 96–108)
CHLORIDE SERPL-SCNC: 82 MMOL/L (ref 96–108)
CHLORIDE SERPL-SCNC: 83 MMOL/L (ref 96–108)
CHLORIDE SERPL-SCNC: 84 MMOL/L (ref 96–108)
CHLORIDE SERPL-SCNC: 84 MMOL/L (ref 96–108)
CO2 SERPL-SCNC: 36 MMOL/L (ref 21–32)
CO2 SERPL-SCNC: 37 MMOL/L (ref 21–32)
CO2 SERPL-SCNC: 37 MMOL/L (ref 21–32)
CO2 SERPL-SCNC: 38 MMOL/L (ref 21–32)
CO2 SERPL-SCNC: 39 MMOL/L (ref 21–32)
CO2 SERPL-SCNC: 40 MMOL/L (ref 21–32)
CREAT SERPL-MCNC: 0.54 MG/DL (ref 0.6–1.3)
CREAT SERPL-MCNC: 0.56 MG/DL (ref 0.6–1.3)
CREAT SERPL-MCNC: 0.56 MG/DL (ref 0.6–1.3)
CREAT SERPL-MCNC: 0.62 MG/DL (ref 0.6–1.3)
CREAT SERPL-MCNC: 0.62 MG/DL (ref 0.6–1.3)
CREAT SERPL-MCNC: 0.7 MG/DL (ref 0.6–1.3)
GFR SERPL CREATININE-BSD FRML MDRD: 89 ML/MIN/1.73SQ M
GFR SERPL CREATININE-BSD FRML MDRD: 93 ML/MIN/1.73SQ M
GFR SERPL CREATININE-BSD FRML MDRD: 93 ML/MIN/1.73SQ M
GFR SERPL CREATININE-BSD FRML MDRD: 96 ML/MIN/1.73SQ M
GFR SERPL CREATININE-BSD FRML MDRD: 96 ML/MIN/1.73SQ M
GFR SERPL CREATININE-BSD FRML MDRD: 97 ML/MIN/1.73SQ M
GLUCOSE SERPL-MCNC: 101 MG/DL (ref 65–140)
GLUCOSE SERPL-MCNC: 103 MG/DL (ref 65–140)
GLUCOSE SERPL-MCNC: 115 MG/DL (ref 65–140)
GLUCOSE SERPL-MCNC: 130 MG/DL (ref 65–140)
GLUCOSE SERPL-MCNC: 137 MG/DL (ref 65–140)
GLUCOSE SERPL-MCNC: 143 MG/DL (ref 65–140)
GLUCOSE SERPL-MCNC: 143 MG/DL (ref 65–140)
GLUCOSE SERPL-MCNC: 148 MG/DL (ref 65–140)
GLUCOSE SERPL-MCNC: 90 MG/DL (ref 65–140)
GLUCOSE SERPL-MCNC: 98 MG/DL (ref 65–140)
POTASSIUM SERPL-SCNC: 4.1 MMOL/L (ref 3.5–5.3)
POTASSIUM SERPL-SCNC: 4.4 MMOL/L (ref 3.5–5.3)
POTASSIUM SERPL-SCNC: 4.5 MMOL/L (ref 3.5–5.3)
POTASSIUM SERPL-SCNC: 4.7 MMOL/L (ref 3.5–5.3)
PROCALCITONIN SERPL-MCNC: <0.05 NG/ML
SODIUM SERPL-SCNC: 123 MMOL/L (ref 135–147)
SODIUM SERPL-SCNC: 124 MMOL/L (ref 135–147)
SODIUM SERPL-SCNC: 125 MMOL/L (ref 135–147)
SODIUM SERPL-SCNC: 126 MMOL/L (ref 135–147)
TSH SERPL DL<=0.05 MIU/L-ACNC: 0.79 UIU/ML (ref 0.45–4.5)

## 2024-02-29 PROCEDURE — 80048 BASIC METABOLIC PNL TOTAL CA: CPT | Performed by: FAMILY MEDICINE

## 2024-02-29 PROCEDURE — 84145 PROCALCITONIN (PCT): CPT | Performed by: FAMILY MEDICINE

## 2024-02-29 PROCEDURE — 94760 N-INVAS EAR/PLS OXIMETRY 1: CPT

## 2024-02-29 PROCEDURE — 84443 ASSAY THYROID STIM HORMONE: CPT | Performed by: FAMILY MEDICINE

## 2024-02-29 PROCEDURE — 82948 REAGENT STRIP/BLOOD GLUCOSE: CPT

## 2024-02-29 PROCEDURE — 99223 1ST HOSP IP/OBS HIGH 75: CPT | Performed by: STUDENT IN AN ORGANIZED HEALTH CARE EDUCATION/TRAINING PROGRAM

## 2024-02-29 PROCEDURE — 80048 BASIC METABOLIC PNL TOTAL CA: CPT | Performed by: STUDENT IN AN ORGANIZED HEALTH CARE EDUCATION/TRAINING PROGRAM

## 2024-02-29 PROCEDURE — 99232 SBSQ HOSP IP/OBS MODERATE 35: CPT | Performed by: FAMILY MEDICINE

## 2024-02-29 PROCEDURE — 94664 DEMO&/EVAL PT USE INHALER: CPT

## 2024-02-29 PROCEDURE — 94640 AIRWAY INHALATION TREATMENT: CPT

## 2024-02-29 RX ORDER — IPRATROPIUM BROMIDE AND ALBUTEROL SULFATE 2.5; .5 MG/3ML; MG/3ML
3 SOLUTION RESPIRATORY (INHALATION)
Status: DISCONTINUED | OUTPATIENT
Start: 2024-02-29 | End: 2024-02-29

## 2024-02-29 RX ORDER — SODIUM CHLORIDE 1 G/1
1 TABLET ORAL
Status: DISCONTINUED | OUTPATIENT
Start: 2024-02-29 | End: 2024-03-02

## 2024-02-29 RX ORDER — TORSEMIDE 10 MG/1
5 TABLET ORAL DAILY
Status: DISCONTINUED | OUTPATIENT
Start: 2024-02-29 | End: 2024-03-03 | Stop reason: HOSPADM

## 2024-02-29 RX ORDER — SODIUM CHLORIDE 1 G/1
1 TABLET ORAL
Status: DISCONTINUED | OUTPATIENT
Start: 2024-02-29 | End: 2024-02-29

## 2024-02-29 RX ORDER — SODIUM CHLORIDE 1 G/1
2 TABLET ORAL ONCE
Status: COMPLETED | OUTPATIENT
Start: 2024-02-29 | End: 2024-02-29

## 2024-02-29 RX ORDER — BUDESONIDE 0.5 MG/2ML
0.5 INHALANT ORAL
Status: DISCONTINUED | OUTPATIENT
Start: 2024-02-29 | End: 2024-03-03 | Stop reason: HOSPADM

## 2024-02-29 RX ORDER — IPRATROPIUM BROMIDE AND ALBUTEROL SULFATE 2.5; .5 MG/3ML; MG/3ML
3 SOLUTION RESPIRATORY (INHALATION)
Status: DISCONTINUED | OUTPATIENT
Start: 2024-02-29 | End: 2024-03-03 | Stop reason: HOSPADM

## 2024-02-29 RX ORDER — PREDNISONE 20 MG/1
40 TABLET ORAL DAILY
Status: DISCONTINUED | OUTPATIENT
Start: 2024-03-01 | End: 2024-03-03 | Stop reason: HOSPADM

## 2024-02-29 RX ADMIN — AMLODIPINE BESYLATE 10 MG: 10 TABLET ORAL at 20:37

## 2024-02-29 RX ADMIN — PANTOPRAZOLE SODIUM 20 MG: 20 TABLET, DELAYED RELEASE ORAL at 06:06

## 2024-02-29 RX ADMIN — FLUTICASONE FUROATE AND VILANTEROL TRIFENATATE 1 PUFF: 100; 25 POWDER RESPIRATORY (INHALATION) at 08:22

## 2024-02-29 RX ADMIN — LISINOPRIL 40 MG: 20 TABLET ORAL at 08:22

## 2024-02-29 RX ADMIN — METOPROLOL TARTRATE 100 MG: 50 TABLET, FILM COATED ORAL at 08:21

## 2024-02-29 RX ADMIN — SODIUM CHLORIDE TAB 1 GM 1 G: 1 TAB at 12:34

## 2024-02-29 RX ADMIN — SODIUM CHLORIDE TAB 1 GM 2 G: 1 TAB at 10:47

## 2024-02-29 RX ADMIN — IPRATROPIUM BROMIDE AND ALBUTEROL SULFATE 3 ML: 2.5; .5 SOLUTION RESPIRATORY (INHALATION) at 13:26

## 2024-02-29 RX ADMIN — BUDESONIDE 0.5 MG: 0.5 INHALANT RESPIRATORY (INHALATION) at 19:26

## 2024-02-29 RX ADMIN — IPRATROPIUM BROMIDE AND ALBUTEROL SULFATE 3 ML: 2.5; .5 SOLUTION RESPIRATORY (INHALATION) at 17:13

## 2024-02-29 RX ADMIN — ENOXAPARIN SODIUM 40 MG: 40 INJECTION SUBCUTANEOUS at 17:02

## 2024-02-29 RX ADMIN — PIOGLITAZONE HYDROCHLORIDE 15 MG: 15 TABLET ORAL at 08:22

## 2024-02-29 RX ADMIN — IPRATROPIUM BROMIDE AND ALBUTEROL SULFATE 3 ML: 2.5; .5 SOLUTION RESPIRATORY (INHALATION) at 08:53

## 2024-02-29 RX ADMIN — TORSEMIDE 5 MG: 10 TABLET ORAL at 12:34

## 2024-02-29 RX ADMIN — CEFTRIAXONE 1000 MG: 1 INJECTION, SOLUTION INTRAVENOUS at 10:48

## 2024-02-29 RX ADMIN — METOPROLOL TARTRATE 100 MG: 50 TABLET, FILM COATED ORAL at 20:37

## 2024-02-29 RX ADMIN — CLONIDINE HYDROCHLORIDE 0.2 MG: 0.1 TABLET ORAL at 20:37

## 2024-02-29 RX ADMIN — SODIUM CHLORIDE TAB 1 GM 1 G: 1 TAB at 17:02

## 2024-02-29 RX ADMIN — UMECLIDINIUM 1 PUFF: 62.5 AEROSOL, POWDER ORAL at 08:22

## 2024-02-29 RX ADMIN — ATORVASTATIN CALCIUM 40 MG: 40 TABLET, FILM COATED ORAL at 20:37

## 2024-02-29 RX ADMIN — GLIPIZIDE 10 MG: 5 TABLET ORAL at 06:06

## 2024-02-29 RX ADMIN — CLONIDINE HYDROCHLORIDE 0.2 MG: 0.1 TABLET ORAL at 08:22

## 2024-02-29 NOTE — CASE MANAGEMENT
Case Management Assessment & Discharge Planning Note    Patient name Jessika Lux  Location /-01 MRN [id number]  : 1955 Date 2024       Current Admission Date: 2024  Current Admission Diagnosis:Acute hyponatremia   Patient Active Problem List    Diagnosis Date Noted    Anxiety disorder due to general medical condition with panic attack 2023    Acute on chronic respiratory failure with hypoxia (HCC) 2023    COPD, severe (HCC) 2023    Lung nodule 2023    Abnormal CT scan 2023    Anxiety about health 2023    Bacteremia 2023    Vomiting 2023    COPD with acute exacerbation (HCC) 2020    Acute on chronic respiratory failure (HCC) 2020    Acute hyponatremia 2020    Tobacco abuse 2020    Hypertensive urgency 2020    Type 2 diabetes mellitus without complication, without long-term current use of insulin (HCC) 2020      LOS (days): 1  Geometric Mean LOS (GMLOS) (days): 2.7  Days to GMLOS:1.8     OBJECTIVE:    Risk of Unplanned Readmission Score: 61.64         Current admission status: Inpatient       Preferred Pharmacy:   [id number] [address] [facility]  [address]  [facility] [address]  Phone: [phone] Fax: [phone]    [id number] - [place], [place] - [address] [facility]  [address] [facility] [address]  Phone: [phone] Fax: [phone]    Primary Care Provider: Karlie Bruno DO    Primary Insurance: [employer]  Secondary Insurance: [place] [employer] [facility]    ASSESSMENT:  Active Health Care Proxies    There are no active Health Care Proxies on file.       Advance Directives  Does patient have a Health Care POA?: No  Was patient offered paperwork?: Yes (declined)  Does patient currently have a Health Care decision maker?: Yes, please see Health Care Proxy section (grand daughter,  Alejandra)  Does patient have Advance Directives?: No  Was patient offered paperwork?: Yes (declined)  Primary Contact: grand daughterAlejandra              Patient Information  Admitted from:: Home  Mental Status: Alert  During Assessment patient was accompanied by: Not accompanied during assessment  Assessment information provided by:: Patient  Primary Caregiver: Self  Support Systems: Daughter, Family members  County of Residence: [facility]  Home entry access options. Select all that apply.: Stairs  Number of steps to enter home.: 2  Type of Current Residence: 2 story home  Upon entering residence, is there a bedroom on the main floor (no further steps)?: Yes  Upon entering residence, is there a bathroom on the main floor (no further steps)?: Yes  Living Arrangements: Lives w/ Daughter, Lives w/ Extended Family    Activities of Daily Living Prior to Admission  Functional Status: Independent  Completes ADLs independently?: Yes  Ambulates independently?: Yes  Does patient use assisted devices?: No  Does patient currently own DME?: No  Does patient have a history of Outpatient Therapy (PT/OT)?: No  Does the patient have a history of Short-Term Rehab?: No  Does patient have a history of HHC?: No  Does patient currently have HHC?: No         Patient Information Continued  Income Source: SSI/SSD  Does patient have prescription coverage?: Yes  Does patient receive dialysis treatments?: No  Does patient have a history of substance abuse?: No  Does patient have a history of Mental Health Diagnosis?: No         Means of Transportation  Means of Transport to Appts:: Family transport      Social Determinants of Health (SDOH)      Flowsheet Row Most Recent Value   Housing Stability    In the last 12 months, was there a time when you were not able to pay the mortgage or rent on time? N   In the last 12 months, how many places have you lived? 1   In the last 12 months, was there a time when you did not have a steady place to  sleep or slept in a shelter (including now)? N   Transportation Needs    In the past 12 months, has lack of transportation kept you from medical appointments or from getting medications? no   In the past 12 months, has lack of transportation kept you from meetings, work, or from getting things needed for daily living? No   Food Insecurity    Within the past 12 months, you worried that your food would run out before you got the money to buy more. Never true   Within the past 12 months, the food you bought just didn't last and you didn't have money to get more. Never true   Utilities    In the past 12 months has the electric, gas, oil, or water company threatened to shut off services in your home? No            DISCHARGE DETAILS:    Discharge planning discussed with:: patient  Freedom of Choice: Yes     CM contacted family/caregiver?: No- see comments (declined)             Contacts  Patient Contacts: grand daughterAlejandra  Relationship to Patient:: Family                   Would you like to participate in our [facility] service program?  : No - Declined              Pt from home, lives with daughter and grand daughter.  Pt IPTA. Pt uses Oxygen 2.5-3L at all times, through [employer] [facility]

## 2024-02-29 NOTE — ASSESSMENT & PLAN NOTE
Lab Results   Component Value Date    HGBA1C 7.0 (H) 07/27/2023       Recent Labs     02/28/24  1659 02/28/24 2043 02/29/24  0801   POCGLU 324* 109 90       Blood Sugar Average: Last 72 hrs:  (P) [id number] on insulin sliding scale and continue glipizide and Actos .observe

## 2024-02-29 NOTE — UTILIZATION REVIEW
NOTIFICATION OF INPATIENT ADMISSION   AUTHORIZATION REQUEST   SERVICING FACILITY:   [place] [address]  Tax ID: [id number]  NPI: [id number] ATTENDING PROVIDER:  Attending Name and NPI#: Luzma Jones Md [[id number]]  Address: [address] [place] [address]  Phone: [phone]   ADMISSION INFORMATION:  Place of Service: Inpatient [facility]  Place of Service Code: 21  Inpatient Admission Date/Time: 2/28/24 12:01 PM  Discharge Date/Time: No discharge date for patient encounter.  Admitting Diagnosis Code/Description:  Hyponatremia [E87.1]  Weakness [R53.1]  SOB (shortness of breath) [R06.02]  COPD with acute exacerbation (HCC) [J44.1]     UTILIZATION REVIEW CONTACT:  Kelsey Sam, Utilization   Network Utilization Review Department  Phone: [phone]  Fax [phone]  Email: [email or web address]  Contact for approvals/pending authorizations, clinical reviews, and discharge.     PHYSICIAN ADVISORY SERVICES:  Medical Necessity Denial & Nedv-pn-Bxlt Review  Phone: [phone]  Fax: [phone]  Email: [email or web address]     DISCHARGE SUPPORT TEAM:  For Patients Discharge Needs & Updates  Phone: [phone] opt. 2 Fax: [phone]  Email: [email or web address]

## 2024-02-29 NOTE — ASSESSMENT & PLAN NOTE
Blood Pressures controlled.cont home medicine regimen of Norvasc clonidine lisinopril and metoprolol and observe

## 2024-02-29 NOTE — PROGRESS NOTES
[facility]  Progress Note  Name: Jessika Lux I  MRN: [id number]  Unit/Bed#: -Atul I Date of Admission: 2/28/2024   Date of Service: 2/29/2024 I Hospital Day: 1    Assessment/Plan   * Acute hyponatremia  Assessment & Plan  baseline sodium is usually 136-138.  Currently sodium is down to 121 on admission.  Now slightly improved to 124  sHe denies any recent nausea vomiting diarrhea or GI illness.  She states that she normally drinks 3 bottles of diet Pepsi every day 500 mL each (1500 ml). does Not drink any water or any other liquids.  States her oral intake is also inconsistent denies any alcohol abuse.  normal TSH and chest x-ray .  Hold Zoloft for now  Further hyponatremia Management as per nephrology.currently on salt tabs and 1500 ml fluid restriction.    Acute on chronic respiratory failure with hypoxia (HCC)  Assessment & Plan  in setting of COPD Exacerbation, evidenced by Tachypnea, RR  32>38>32, Tachycardia, >109 and O2 Sat on 3L NC  87%, treated with increase of oxygen to 4L NC, Duoneb and Inruse Ellipta.   Findings:   Pt on baseline oxygen at 3L NC.     Now clinically improving after treatment and wean back to her previous regimen of 3 L    COPD with acute exacerbation (HCC)  Assessment & Plan  Patient states that she was feeling little short of breath and fatigue in the ER got a dose of prednisone, DuoNeb treatment antibiotic.  Denies any recent fevers or chills.  Leukocytosis noted.  Will check Pro-Carlos Alberot level.  COVID RSV influenza is negative.  Will continue on DuoNeb treatments and IV Rocephin for now and monitor  Chest X-ray does not show any evidence of pneumonia maybe she has a bronchitis flare  placed on budesonide and avoid oral steroids for now.  seems to be clinically improving    Hypertensive urgency  Assessment & Plan  Blood Pressures controlled.cont home medicine regimen of Norvasc clonidine lisinopril and metoprolol and observe    Type 2 diabetes  mellitus without complication, without long-term current use of insulin (HCC)  Assessment & Plan  Lab Results   Component Value Date    HGBA1C 7.0 (H) 07/27/2023       Recent Labs     02/28/24  1659 02/28/24 2043 02/29/24  0801   POCGLU 324* 109 90       Blood Sugar Average: Last 72 hrs:  (P) [id number] on insulin sliding scale and continue glipizide and Actos .observe      Tobacco abuse  Assessment & Plan  Counseled on smoking cessation and placed on nicotine replacement therapy    SIADH leading to hyponatremia: Continue on fluid restriction and salt tablets and monitor for now     VTE Pharmacologic Prophylaxis:   Moderate Risk (Score 3-4) - Pharmacological DVT Prophylaxis Ordered: enoxaparin (Lovenox).    Mobility:   Basic Mobility Inpatient Raw Score: 23  JH-HLM Goal: 7: Walk 25 feet or more  JH-HLM Achieved: 7: Walk 25 feet or more  HLM Goal achieved. Continue to encourage appropriate mobility.    Patient Centered Rounds: I performed bedside rounds with nursing staff today.   Discussions with Specialists or Other Care Team Provider: will dw nephrology    Education and Discussions with Family / Patient: will update    Total Time Spent on Date of Encounter in care of patient: 35 mins. This time was spent on one or more of the following: performing physical exam; counseling and coordination of care; obtaining or reviewing history; documenting in the medical record; reviewing/ordering tests, medications or procedures; communicating with other healthcare professionals and discussing with patient's family/caregivers.    Current Length of Stay: 1 day(s)  Current Patient Status: Inpatient   Certification Statement: The patient will continue to require additional inpatient hospital stay due to acute hyponatremia  Discharge Plan: Anticipate discharge in 24-48 hrs to home.    Code Status: Level 1 - Full Code    Subjective:   Patient states her breathing is improving denies any fevers or chills or abdominal  pain or diarrhea    Objective:     Vitals:   Temp (24hrs), Av.7 °F (36.5 °C), Min:97.2 °F (36.2 °C), Max:98.2 °F (36.8 °C)    Temp:  [97.2 °F (36.2 °C)-98.2 °F (36.8 °C)] 97.2 °F (36.2 °C)  HR:  [] 77  Resp:  [18-38] 18  BP: (122-204)/() 146/78  SpO2:  [93 %-99 %] 95 %  Body mass index is 29.56 kg/m².     Input and Output Summary (last 24 hours):     Intake/Output Summary (Last 24 hours) at 2024 0936  Last data filed at 2024 0900  Gross per 24 hour   Intake 960 ml   Output --   Net 960 ml       Physical Exam:   Physical Exam  Vitals and nursing note reviewed.   Constitutional:       Appearance: She is ill-appearing.   HENT:      Head: Normocephalic and atraumatic.      Right Ear: External ear normal.      Left Ear: External ear normal.      Nose: Nose normal.      Mouth/Throat:      Pharynx: Oropharynx is clear.   Eyes:      Pupils: Pupils are equal, round, and reactive to light.   Cardiovascular:      Rate and Rhythm: Normal rate and regular rhythm.      Heart sounds: Normal heart sounds.   Pulmonary:      Effort: Pulmonary effort is normal.      Comments: Moderate air entry bilaterally with mild decreased breath sounds bilateral bases  Abdominal:      General: Bowel sounds are normal.      Palpations: Abdomen is soft.      Tenderness: There is no abdominal tenderness.   Musculoskeletal:         General: Normal range of motion.      Cervical back: Normal range of motion and neck supple.   Skin:     General: Skin is warm and dry.      Capillary Refill: Capillary refill takes less than 2 seconds.   Neurological:      General: No focal deficit present.      Mental Status: She is alert and oriented to person, place, and time.   Psychiatric:         Mood and Affect: Mood normal.            Additional Data:     Labs:  Results from last 7 days   Lab Units 24  0923   WBC Thousand/uL 12.36*   HEMOGLOBIN g/dL 13.6   HEMATOCRIT % 39.7   PLATELETS Thousands/uL 251   NEUTROS PCT % 79*   LYMPHS  PCT % 11*   MONOS PCT % 9   EOS PCT % 0     Results from last 7 days   Lab Units 02/29/24  0845 02/28/24  1007 02/28/24  0923   SODIUM mmol/L 124*   < > 122*   POTASSIUM mmol/L 4.1   < > 4.2   CHLORIDE mmol/L 80*   < > 78*   CO2 mmol/L 40*   < > 36*   BUN mg/dL 7   < > 6   CREATININE mg/dL 0.62   < > 0.62   ANION GAP mmol/L 4   < > 8   CALCIUM mg/dL 10.4*   < > 10.1   ALBUMIN g/dL  --   --  4.6   TOTAL BILIRUBIN mg/dL  --   --  0.43   ALK PHOS U/L  --   --  102   ALT U/L  --   --  10   AST U/L  --   --  14   GLUCOSE RANDOM mg/dL 115   < > 197*    < > = values in this interval not displayed.         Results from last 7 days   Lab Units 02/29/24  0801 02/28/24  2043 02/28/24  1659   POC GLUCOSE mg/dl 90 109 324*         Results from last 7 days   Lab Units 02/28/24  1229   LACTIC ACID mmol/L 1.0       Lines/Drains:  Invasive Devices       Peripheral Intravenous Line  Duration             Peripheral IV 02/28/24 Proximal;Right;Ventral (anterior) Forearm 1 day                          Imaging: Reviewed radiology reports from this admission including: chest xray    Recent Cultures (last 7 days):   Results from last 7 days   Lab Units 02/28/24  1229   BLOOD CULTURE  Received in Microbiology Lab. Culture in Progress.  Received in Microbiology Lab. Culture in Progress.       Last 24 Hours Medication List:   Current Facility-Administered Medications   Medication Dose Route Frequency Provider Last Rate    acetaminophen  650 mg Oral Q6H PRN Luzma Jones MD      amLODIPine  10 mg Oral HS Luzma Jones MD      atorvastatin  40 mg Oral HS Luzma Jones MD      budesonide  0.5 mg Nebulization Q12H Luzma Jones MD      cefTRIAXone  1,000 mg Intravenous Q24H Luzma Jones MD      cloNIDine  0.2 mg Oral Q12H NATHALY Luzma Jones MD      enoxaparin  40 mg Subcutaneous Daily Luzma Jones MD      Fluticasone Furoate-Vilanterol  1 puff Inhalation Daily Luzma Jones MD      And    umeclidinium  1 puff Inhalation Daily Luzma Jones MD       glipiZIDE  10 mg Oral Early Morning Luzma Jones MD      hydrOXYzine HCL  10 mg Oral Q6H PRN Luzma Jones MD      insulin lispro  1-6 Units Subcutaneous TID AC Luzma Jones MD      insulin lispro  1-6 Units Subcutaneous HS Luzma Jones MD      ipratropium-albuterol  3 mL Nebulization TID Luzma Jones MD      lisinopril  40 mg Oral Daily Luzma Jones MD      metoprolol tartrate  100 mg Oral Q12H NATHALY Luzma Jones MD      nicotine  1 patch Transdermal Daily Luzma Jones MD      ondansetron  4 mg Intravenous Q6H PRN Luzma Jones MD      pantoprazole  20 mg Oral Early Morning Luzma Jones MD      pioglitazone  15 mg Oral Daily Luzma Jones MD      sodium chloride  1 g Oral TID With Meals Joselyn Reyes Bahamonde, MD          Today, Patient Was Seen By: Luzma Jones MD    **Please Note: This note may have been constructed using a voice recognition system.**

## 2024-02-29 NOTE — ASSESSMENT & PLAN NOTE
baseline sodium is usually 136-138.  Currently sodium is down to 121 on admission.  Now slightly improved to 124  sHe denies any recent nausea vomiting diarrhea or GI illness.  She states that she normally drinks 3 bottles of diet Pepsi every day 500 mL each (1500 ml). does Not drink any water or any other liquids.  States her oral intake is also inconsistent denies any alcohol abuse.  normal TSH and chest x-ray .  Hold Zoloft for now  Further hyponatremia Management as per nephrology.currently on salt tabs and 1500 ml fluid restriction.

## 2024-02-29 NOTE — ASSESSMENT & PLAN NOTE
Patient states that she was feeling little short of breath and fatigue in the ER got a dose of prednisone, DuoNeb treatment antibiotic.  Denies any recent fevers or chills.  Leukocytosis noted.  Will check Pro-Carlos Alberto level.  COVID RSV influenza is negative.  Will continue on DuoNeb treatments and IV Rocephin for now and monitor  Chest X-ray does not show any evidence of pneumonia maybe she has a bronchitis flare  placed on budesonide and avoid oral steroids for now.  seems to be clinically improving

## 2024-02-29 NOTE — PLAN OF CARE
Problem: Potential for Falls  Goal: Patient will remain free of falls  Description: INTERVENTIONS:  - Educate patient/family on patient safety including physical limitations  - Instruct patient to call for assistance with activity   - Consult OT/PT to assist with strengthening/mobility   - Keep Call bell within reach  - Keep bed low and locked with side rails adjusted as appropriate  - Keep care items and personal belongings within reach  - Initiate and maintain comfort rounds  - Make Fall Risk Sign visible to staff  - Offer Toileting every 2 Hours, in advance of need  - Initiate/Maintain alarm  - Obtain necessary fall risk management equipment:   - Apply yellow socks and bracelet for high fall risk patients  - Consider moving patient to room near nurses station  Outcome: Progressing     Problem: PAIN - ADULT  Goal: Verbalizes/displays adequate comfort level or baseline comfort level  Description: Interventions:  - Encourage patient to monitor pain and request assistance  - Assess pain using appropriate pain scale  - Administer analgesics based on type and severity of pain and evaluate response  - Implement non-pharmacological measures as appropriate and evaluate response  - Consider cultural and social influences on pain and pain management  - Notify physician/advanced practitioner if interventions unsuccessful or patient reports new pain  Outcome: Progressing     Problem: INFECTION - ADULT  Goal: Absence or prevention of progression during hospitalization  Description: INTERVENTIONS:  - Assess and monitor for signs and symptoms of infection  - Monitor lab/diagnostic results  - Monitor all insertion sites, i.e. indwelling lines, tubes, and drains  - Monitor endotracheal if appropriate and nasal secretions for changes in amount and color  - [place] appropriate cooling/warming therapies per order  - Administer medications as ordered  - Instruct and encourage patient and family to use good hand hygiene  technique  - Identify and instruct in appropriate isolation precautions for identified infection/condition  Outcome: Progressing     Problem: SAFETY ADULT  Goal: Patient will remain free of falls  Description: INTERVENTIONS:  - Educate patient/family on patient safety including physical limitations  - Instruct patient to call for assistance with activity   - Consult OT/PT to assist with strengthening/mobility   - Keep Call bell within reach  - Keep bed low and locked with side rails adjusted as appropriate  - Keep care items and personal belongings within reach  - Initiate and maintain comfort rounds  - Make Fall Risk Sign visible to staff  - Offer Toileting every 2 Hours, in advance of need  - Initiate/Maintain alarm  - Obtain necessary fall risk management equipment:   - Apply yellow socks and bracelet for high fall risk patients  - Consider moving patient to room near nurses station  Outcome: Progressing  Goal: Maintain or return to baseline ADL function  Description: INTERVENTIONS:  -  Assess patient's ability to carry out ADLs; assess patient's baseline for ADL function and identify physical deficits which impact ability to perform ADLs (bathing, care of mouth/teeth, toileting, grooming, dressing, etc.)  - Assess/evaluate cause of self-care deficits   - Assess range of motion  - Assess patient's mobility; develop plan if impaired  - Assess patient's need for assistive devices and provide as appropriate  - Encourage maximum independence but intervene and supervise when necessary  - Involve family in performance of ADLs  - Assess for home care needs following discharge   - Consider OT consult to assist with ADL evaluation and planning for discharge  - Provide patient education as appropriate  Outcome: Progressing  Goal: Maintains/Returns to pre admission functional level  Description: INTERVENTIONS:  - Perform AM-PAC 6 Click Basic Mobility/ Daily Activity assessment daily.  - Set and communicate daily mobility  goal to care team and patient/family/caregiver.   - Collaborate with rehabilitation services on mobility goals if consulted  - Perform Range of Motion 3 times a day.  - Reposition patient every 2 hours.  - Dangle patient 3 times a day  - Stand patient 3 times a day  - Ambulate patient 3 times a day  - Out of bed to chair 3 times a day   - Out of bed for meals 3 times a day  - Out of bed for toileting  - Record patient progress and toleration of activity level   Outcome: Progressing     Problem: DISCHARGE PLANNING  Goal: Discharge to home or other facility with appropriate resources  Description: INTERVENTIONS:  - Identify barriers to discharge w/patient and caregiver  - Arrange for needed discharge resources and transportation as appropriate  - Identify discharge learning needs (meds, wound care, etc.)  - Arrange for interpretive services to assist at discharge as needed  - Refer to Case Management Department for coordinating discharge planning if the patient needs post-hospital services based on physician/advanced practitioner order or complex needs related to functional status, cognitive ability, or social support system  Outcome: Progressing     Problem: Knowledge Deficit  Goal: Patient/family/caregiver demonstrates understanding of disease process, treatment plan, medications, and discharge instructions  Description: Complete learning assessment and assess knowledge base.  Interventions:  - Provide teaching at level of understanding  - Provide teaching via preferred learning methods  Outcome: Progressing     Problem: RESPIRATORY - ADULT  Goal: Achieves optimal ventilation and oxygenation  Description: INTERVENTIONS:  - Assess for changes in respiratory status  - Assess for changes in mentation and behavior  - Position to facilitate oxygenation and minimize respiratory effort  - Oxygen administered by appropriate delivery if ordered  - Initiate smoking cessation education as indicated  - Encourage  broncho-pulmonary hygiene including cough, deep breathe, Incentive Spirometry  - Assess the need for suctioning and aspirate as needed  - Assess and instruct to report SOB or any respiratory difficulty  - Respiratory Therapy support as indicated  Outcome: Progressing     Problem: METABOLIC, FLUID AND ELECTROLYTES - ADULT  Goal: Electrolytes maintained within normal limits  Description: INTERVENTIONS:  - Monitor labs and assess patient for signs and symptoms of electrolyte imbalances  - Administer electrolyte replacement as ordered  - Monitor response to electrolyte replacements, including repeat lab results as appropriate  - Instruct patient on fluid and nutrition as appropriate  Outcome: Progressing  Goal: Fluid balance maintained  Description: INTERVENTIONS:  - Monitor labs   - Monitor I/O and WT  - Instruct patient on fluid and nutrition as appropriate  - Assess for signs & symptoms of volume excess or deficit  Outcome: Progressing  Goal: Glucose maintained within target range  Description: INTERVENTIONS:  - Monitor Blood Glucose as ordered  - Assess for signs and symptoms of hyperglycemia and hypoglycemia  - Administer ordered medications to maintain glucose within target range  - Assess nutritional intake and initiate nutrition service referral as needed  Outcome: Progressing     Problem: Anxiety  Goal: Anxiety is at manageable level  Description: Interventions:  - Assess and monitor patient's anxiety level.   - Monitor for signs and symptoms (heart palpitations, chest pain, shortness of breath, headaches, nausea, feeling jumpy, restlessness, irritable, apprehensive).   - Collaborate with interdisciplinary team and initiate plan and interventions as ordered.  - [place] patient to unit/surroundings  - Explain treatment plan  - Encourage participation in care  - Encourage verbalization of concerns/fears  - Identify coping mechanisms  - Assist in developing anxiety-reducing skills  - Administer/offer alternative  therapies  - Limit or eliminate stimulants  Outcome: Progressing

## 2024-02-29 NOTE — ASSESSMENT & PLAN NOTE
in setting of COPD Exacerbation, evidenced by Tachypnea, RR  32>38>32, Tachycardia, >109 and O2 Sat on 3L NC  87%, treated with increase of oxygen to 4L NC, Duoneb and Inruse Ellipta.   Findings:   Pt on baseline oxygen at 3L NC.     Now clinically improving after treatment and wean back to her previous regimen of 3 L

## 2024-02-29 NOTE — PHYSICAL THERAPY NOTE
Physical Therapy Screen    Patient Name: Jesskia Lux    Today's Date: 2/29/2024     Problem List  Principal Problem:    Acute hyponatremia  Active Problems:    Tobacco abuse    Hypertensive urgency    Type 2 diabetes mellitus without complication, without long-term current use of insulin (HCC)    COPD with acute exacerbation (HCC)    Acute on chronic respiratory failure with hypoxia (HCC)       Past Medical History  Past Medical History:   Diagnosis Date    Anxiety     COPD (chronic obstructive pulmonary disease) (HCC)     Diabetes mellitus (HCC)     Essential (primary) hypertension     GERD (gastroesophageal reflux disease)     Smoker            02/29/24 1200   Note Type   Note type Screen     PT orders received, chart review completed. Spoke with pt's RN who states that the pt is independent with ambulation in room. Spoke with pt who reports PTA was independent with ADLs, IADLs, mobility without AD, and drives. Pt reports no issues with mobility or balance and has no concerns regarding mobility or self care upon return home. Pt demonstrated gait for therapist as pt with steady gait and Good ambulatory balance. Will D/C PT effective this date. Please reconsult if new needs arise.    Sierra Valentin, PT, DPT

## 2024-02-29 NOTE — PLAN OF CARE
Problem: Potential for Falls  Goal: Patient will remain free of falls  Description: INTERVENTIONS:  - Educate patient/family on patient safety including physical limitations  - Instruct patient to call for assistance with activity   - Consult OT/PT to assist with strengthening/mobility   - Keep Call bell within reach  - Keep bed low and locked with side rails adjusted as appropriate  - Keep care items and personal belongings within reach  - Initiate and maintain comfort rounds  Outcome: Progressing     Problem: PAIN - ADULT  Goal: Verbalizes/displays adequate comfort level or baseline comfort level  Description: Interventions:  - Encourage patient to monitor pain and request assistance  - Assess pain using appropriate pain scale  - Administer analgesics based on type and severity of pain and evaluate response  - Implement non-pharmacological measures as appropriate and evaluate response  - Consider cultural and social influences on pain and pain management  - Notify physician/advanced practitioner if interventions unsuccessful or patient reports new pain  Outcome: Progressing     Problem: INFECTION - ADULT  Goal: Absence or prevention of progression during hospitalization  Description: INTERVENTIONS:  - Assess and monitor for signs and symptoms of infection  - Monitor lab/diagnostic results  - Monitor all insertion sites, i.e. indwelling lines, tubes, and drains  - Monitor endotracheal if appropriate and nasal secretions for changes in amount and color  - [place] appropriate cooling/warming therapies per order  - Administer medications as ordered  - Instruct and encourage patient and family to use good hand hygiene technique  - Identify and instruct in appropriate isolation precautions for identified infection/condition  Outcome: Progressing     Problem: SAFETY ADULT  Goal: Patient will remain free of falls  Description: INTERVENTIONS:  - Educate patient/family on patient safety including physical limitations  -  Instruct patient to call for assistance with activity   - Consult OT/PT to assist with strengthening/mobility   - Keep Call bell within reach  - Keep bed low and locked with side rails adjusted as appropriate  - Keep care items and personal belongings within reach  - Initiate and maintain comfort rounds    Outcome: Progressing  Goal: Maintain or return to baseline ADL function  Description: INTERVENTIONS:  -  Assess patient's ability to carry out ADLs; assess patient's baseline for ADL function and identify physical deficits which impact ability to perform ADLs (bathing, care of mouth/teeth, toileting, grooming, dressing, etc.)  - Assess/evaluate cause of self-care deficits   - Assess range of motion  - Assess patient's mobility; develop plan if impaired  - Assess patient's need for assistive devices and provide as appropriate  - Encourage maximum independence but intervene and supervise when necessary  - Involve family in performance of ADLs  - Assess for home care needs following discharge   - Consider OT consult to assist with ADL evaluation and planning for discharge  - Provide patient education as appropriate  Outcome: Progressing  Goal: Maintains/Returns to pre admission functional level  Description: INTERVENTIONS:  - Perform AM-PAC 6 Click Basic Mobility/ Daily Activity assessment daily.  - Set and communicate daily mobility goal to care team and patient/family/caregiver.   - Collaborate with rehabilitation services on mobility goals if consulted  - Perform Range of Motion 2 times a day.  - Reposition patient every 2 hours.  - Dangle patient 2 times a day  - Stand patient 2 times a day  - Ambulate patient 2 times a day  - Out of bed to chair 2 times a day   - Out of bed for meals 2 times a day  - Out of bed for toileting  - Record patient progress and toleration of activity level   Outcome: Progressing     Problem: DISCHARGE PLANNING  Goal: Discharge to home or other facility with appropriate  resources  Description: INTERVENTIONS:  - Identify barriers to discharge w/patient and caregiver  - Arrange for needed discharge resources and transportation as appropriate  - Identify discharge learning needs (meds, wound care, etc.)  - Arrange for interpretive services to assist at discharge as needed  - Refer to Case Management Department for coordinating discharge planning if the patient needs post-hospital services based on physician/advanced practitioner order or complex needs related to functional status, cognitive ability, or social support system  Outcome: Progressing     Problem: Knowledge Deficit  Goal: Patient/family/caregiver demonstrates understanding of disease process, treatment plan, medications, and discharge instructions  Description: Complete learning assessment and assess knowledge base.  Interventions:  - Provide teaching at level of understanding  - Provide teaching via preferred learning methods  Outcome: Progressing     Problem: RESPIRATORY - ADULT  Goal: Achieves optimal ventilation and oxygenation  Description: INTERVENTIONS:  - Assess for changes in respiratory status  - Assess for changes in mentation and behavior  - Position to facilitate oxygenation and minimize respiratory effort  - Oxygen administered by appropriate delivery if ordered  - Initiate smoking cessation education as indicated  - Encourage broncho-pulmonary hygiene including cough, deep breathe, Incentive Spirometry  - Assess the need for suctioning and aspirate as needed  - Assess and instruct to report SOB or any respiratory difficulty  - Respiratory Therapy support as indicated  Outcome: Progressing     Problem: METABOLIC, FLUID AND ELECTROLYTES - ADULT  Goal: Electrolytes maintained within normal limits  Description: INTERVENTIONS:  - Monitor labs and assess patient for signs and symptoms of electrolyte imbalances  - Administer electrolyte replacement as ordered  - Monitor response to electrolyte replacements,  including repeat lab results as appropriate  - Instruct patient on fluid and nutrition as appropriate  Outcome: Progressing  Goal: Fluid balance maintained  Description: INTERVENTIONS:  - Monitor labs   - Monitor I/O and WT  - Instruct patient on fluid and nutrition as appropriate  - Assess for signs & symptoms of volume excess or deficit  Outcome: Progressing  Goal: Glucose maintained within target range  Description: INTERVENTIONS:  - Monitor Blood Glucose as ordered  - Assess for signs and symptoms of hyperglycemia and hypoglycemia  - Administer ordered medications to maintain glucose within target range  - Assess nutritional intake and initiate nutrition service referral as needed  Outcome: Progressing     Problem: Anxiety  Goal: Anxiety is at manageable level  Description: Interventions:  - Assess and monitor patient's anxiety level.   - Monitor for signs and symptoms (heart palpitations, chest pain, shortness of breath, headaches, nausea, feeling jumpy, restlessness, irritable, apprehensive).   - Collaborate with interdisciplinary team and initiate plan and interventions as ordered.  - [place] patient to unit/surroundings  - Explain treatment plan  - Encourage participation in care  - Encourage verbalization of concerns/fears  - Identify coping mechanisms  - Assist in developing anxiety-reducing skills  - Administer/offer alternative therapies  - Limit or eliminate stimulants  Outcome: Progressing

## 2024-02-29 NOTE — UTILIZATION REVIEW
"    Initial Clinical Review    Admission: Date/Time/Statement:   Admission Orders (From admission, onward)       Ordered        02/28/24 1201  INPATIENT ADMISSION  Once                          Orders Placed This Encounter   Procedures    INPATIENT ADMISSION     Standing Status:   Standing     Number of Occurrences:   1     Order Specific Question:   Level of Care     Answer:   Med Surg [16]     Order Specific Question:   Estimated length of stay     Answer:   More than 2 Midnights     Order Specific Question:   Certification     Answer:   I certify that inpatient services are medically necessary for this patient for a duration of greater than two midnights. See H&P and MD Progress Notes for additional information about the patient's course of treatment.     ED Arrival Information       Expected   -    Arrival   2/28/2024 08:57    Acuity   Emergent              Means of arrival   Walk-In    Escorted by   Family Member    Service   Hospitalist    Admission type   Emergency              Arrival complaint   sob             Chief Complaint   Patient presents with    Shortness of Breath     PT STATES SINCE YESTERDAY THEY HAVE BEEN \"HAVING A HARD TIME CATCHING BREATH\" ON ARRIVAL PATIENT 87% ON NORMAL 3L NC       Initial Presentation: 69 y.o. female presents to ed from home for evaluation and treatment of shortness of breath.    PMHX: COPD, DM, HTN, ON 3L CHRONIC O2.     Clinical assessment significant for tachycardia, tachypnea, hypertension, 87% hypoxia on 3L O2nc, wheezing, decreased breath sounds, anxious. .  , CO2 36, GLUCOSE 200, WBC 12.36.  Initially treated with atarax, duo- neb x 1,  po prednisone, iv ceftriaxone x1.  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  .        Date: 2-29-24    Day 2: inpatient med surg    Oxygen weaned to baseline.  Continue iv ceftriaxone daily, sq lovenox, duo-neb tid.  Iv fluids discontinued. " Continue 1.5 L fluid restriction. BMP QQ4 HR. Goal for today .  Na currently 126. Nephrology consult completed:  start torsemide 4 mg.  If not improvement will plan for tolvaptan.      Date: 3-1-24     Day 3: Has surpassed a 2nd midnight with active treatments and services, which include iv ceftriaxone, duo neb q6 hr, monitor for hypoxia, monitor sodium levels, trend BMP and procalcitonin.      ED Triage Vitals   02/28/24 0901 02/28/24 0901 02/28/24 0901 02/28/24 0901 02/28/24 0901   98.4 °F (36.9 °C) (!) 108 20 168/82 (!) 87 %      Temporal Monitor         No Pain          02/29/24 73.3 kg (161 lb 9.6 oz)     Additional Vital Signs:   Date/Time Temp Pulse Resp BP MAP (mmHg) SpO2 Nasal Cannula O2 Flow Rate (L/min)   02/29/24 1327 -- -- -- -- -- 98 % 3 L/min   02/29/24 0900 -- -- -- -- -- -- 3 L/min   02/29/24 0854 -- -- -- -- -- 95 % 3 L/min   02/29/24 07:41:01 97.2 °F (36.2 °C)   Abnormal  77 18 146/78 101 99 % --   02/28/24 22:36:49 -- 64 -- 122/64 83 98 % --   02/28/24 22:35:42 -- 65 -- -- -- 98 % --   02/28/24 2051 -- -- -- 166/88 -- -- --   02/28/24 2046 -- 95 -- 166/88 -- -- --   02/28/24 20:44:05 -- 68 -- 166/88 114 97 % --   02/28/24 1937 -- -- -- -- -- 98 % 3 L/min   02/28/24 14:10:18 98.2 °F (36.8 °C) 114 Abnormal  22 160/93 115 96 % --   02/28/24 1215 -- 103 -- 204/107 Abnormal  137 93 % --   02/28/24 1200 -- 88 -- 150/70 101 97 % --   02/28/24 1130 -- 88 -- 157/79 111 97 % 4 L/min   02/28/24 1100 -- 102 -- -- -- 97 % --   02/28/24 1015 -- 109 Abnormal  32 Abnormal  150/77 104 95 % --   02/28/24 1000 -- 90 38 Abnormal  122/62 86 97 % --   02/28/24 0918 -- -- -- -- -- -- --   02/28/24 0915 -- 60 32 Abnormal  156/80 112 95 % 4 L/min   02/28/24 0904 -- -- -- -- -- 96 % 4 L/min   02/28/24 0901 98.4 °F (36.9 °C) 108 Abnormal  20 168/82 -- 87 % Abnormal  3 L/min             Pertinent Labs/Diagnostic Test Results:     XR chest 1 view portable   (02/28 1127)      No acute cardiopulmonary disease.         Results from last 7 days   Lab Units 02/28/24  0923   SARS-COV-2  Negative     Results from last 7 days   Lab Units 02/28/24  0923   WBC Thousand/uL 12.36*   HEMOGLOBIN g/dL 13.6   HEMATOCRIT % 39.7   PLATELETS Thousands/uL 251   NEUTROS ABS Thousands/µL 9.68*         Results from last 7 days   Lab Units 02/29/24  1258 02/29/24  0845 02/29/24  0447 02/28/24 2358 02/28/24 2006   SODIUM mmol/L 126* 124* 126*  125* 123* 122*   POTASSIUM mmol/L 4.4 4.1 4.4  4.4 4.7 4.9   CHLORIDE mmol/L 82* 80* 84*  84* 83* 81*   CO2 mmol/L 39* 40* 38*  37* 36* 34*   ANION GAP mmol/L 5 4 4  4 4 7   BUN mg/dL 10 7 6  6 7 8   CREATININE mg/dL 0.62 0.62 0.56*  0.54* 0.56* 0.66   EGFR ml/min/1.73sq m 93 93 96  97 96 91   CALCIUM mg/dL 9.5 10.4* 9.8  9.7 9.4 9.6     Results from last 7 days   Lab Units 02/28/24  0923   AST U/L 14   ALT U/L 10   ALK PHOS U/L 102   TOTAL PROTEIN g/dL 8.0   ALBUMIN g/dL 4.6   TOTAL BILIRUBIN mg/dL 0.43     Results from last 7 days   Lab Units 02/29/24  1118 02/29/24  0801 02/28/24  2043 02/28/24  1659   POC GLUCOSE mg/dl 101 90 109 324*     Results from last 7 days   Lab Units 02/29/24  1258 02/29/24  0845 02/29/24  0447 02/28/24 2358 02/28/24 2006 02/28/24  1615 02/28/24 1007 02/28/24  0923   GLUCOSE RANDOM mg/dL 103 115 143*  143* 137 147* 335* 200* 197*     Results from last 7 days   Lab Units 02/28/24  1615   OSMOLALITY, SERUM mmol/*           Results from last 7 days   Lab Units 02/28/24  1229 02/28/24  1121 02/28/24  0923   HS TNI 0HR ng/L  --   --  4   HS TNI 2HR ng/L  --  4  --    HSTNI D2 ng/L  --  0  --    HS TNI 4HR ng/L 5  --   --    HSTNI D4 ng/L 1  --   --              Results from last 7 days   Lab Units 02/29/24  0447   TSH 3RD GENERATON uIU/mL 0.788     Results from last 7 days   Lab Units 02/29/24  0845   PROCALCITONIN ng/ml <0.05     Results from last 7 days   Lab Units 02/28/24  1229   LACTIC ACID mmol/L 1.0           Results from last 7 days   Lab Units  02/28/24  1650 02/28/24  1615   OSMOLALITY, SERUM mmol/KG  --  262*   OSMO UR mmol/*  --      Results from last 7 days   Lab Units 02/28/24  1650   SODIUM UR  30     Results from last 7 days   Lab Units 02/28/24  0923   INFLUENZA A PCR  Negative   INFLUENZA B PCR  Negative   RSV PCR  Negative     Results from last 7 days   Lab Units 02/28/24  1229   BLOOD CULTURE  Received in Microbiology Lab. Culture in Progress.    Received in Microbiology Lab. Culture in Progress.     ED Treatment:   Medication Administration from 02/28/2024 0857 to 02/28/2024 1402         Date/Time Order Dose Route Action     02/28/2024 0924 EST hydrOXYzine HCL (ATARAX) tablet 25 mg 25 mg Oral Given     02/28/2024 0924 EST ipratropium-albuterol (DUO-NEB) 0.5-2.5 mg/3 mL inhalation solution 3 mL 3 mL Nebulization Given     02/28/2024 0924 EST predniSONE tablet 60 mg 60 mg Oral Given     02/28/2024 1235 EST cefTRIAXone (ROCEPHIN) IVPB (premix in dextrose) 2,000 mg 50 mL 2,000 mg Intravenous New Bag          Past Medical History:   Diagnosis Date    Anxiety     COPD (chronic obstructive pulmonary disease) ([facility])     Diabetes mellitus ([facility])     Essential (primary) hypertension     GERD (gastroesophageal reflux disease)     Smoker      Present on Admission:   Acute hyponatremia   Tobacco abuse   Hypertensive urgency   Type 2 diabetes mellitus without complication, without long-term current use of insulin ([facility])   COPD with acute exacerbation ([facility])   Acute on chronic respiratory failure with hypoxia ([facility])      Admitting Diagnosis:     Hyponatremia [E87.1]  Weakness [R53.1]  SOB (shortness of breath) [R06.02]  COPD with acute exacerbation ([facility]) [J44.1]    Age/Sex: 69 y.o. female    Scheduled Medications:    amLODIPine, 10 mg, Oral, HS  atorvastatin, 40 mg, Oral, HS  budesonide, 0.5 mg, Nebulization, Q12H  cefTRIAXone, 1,000 mg, Intravenous, Q24H  cloNIDine, 0.2 mg, Oral, Q12H NATHALY  enoxaparin, 40 mg, Subcutaneous, Daily  Fluticasone  Furoate-Vilanterol, 1 puff, Inhalation, Daily   And  umeclidinium, 1 puff, Inhalation, Daily  glipiZIDE, 10 mg, Oral, Early Morning  insulin lispro, 1-6 Units, Subcutaneous, TID AC  insulin lispro, 1-6 Units, Subcutaneous, HS  ipratropium-albuterol, 3 mL, Nebulization, TID  lisinopril, 40 mg, Oral, Daily  metoprolol tartrate, 100 mg, Oral, Q12H NATHALY  nicotine, 1 patch, Transdermal, Daily  pantoprazole, 20 mg, Oral, Early Morning  pioglitazone, 15 mg, Oral, Daily  sodium chloride, 1 g, Oral, TID With Meals  torsemide, 5 mg, Oral, Daily      Continuous IV Infusions:     PRN Meds:  acetaminophen, 650 mg, Oral, Q6H PRN  hydrOXYzine HCL, 10 mg, Oral, Q6H PRN  ondansetron, 4 mg, Intravenous, Q6H PRN        IP CONSULT TO NEPHROLOGY    Network Utilization Review Department  ATTENTION: Please call with any questions or concerns to [phone] and carefully listen to the prompts so that you are directed to the right person. All voicemails are confidential.   For Discharge needs, contact Care Management DC Support Team at [phone] opt. [phone]  Send all requests for admission clinical reviews, approved or denied determinations and any other requests to dedicated fax number below belonging to the campus where the patient is receiving treatment. List of dedicated fax numbers for the Facilities:  FACILITY NAME UR FAX NUMBER   ADMISSION DENIALS (Administrative/Medical Necessity) [phone]   DISCHARGE SUPPORT TEAM (NETWORK) [phone]   PARENT CHILD HEALTH (Maternity/NICU/Pediatrics) [phone]   [facility] [phone]   [facility] [phone]   [facility] [phone]   [facility] [phone]   [facility] [phone]   [facility] [phone]   [facility] [phone]   [facility] [phone]   [facility]  [facility] [phone]   [facility] [phone]   [facility] [phone]   [facility] [phone]

## 2024-02-29 NOTE — OCCUPATIONAL THERAPY NOTE
Occupational Therapy Screen     Patient Name: Jessika Lux  Today's Date: 2/29/2024  Problem List  Principal Problem:    Acute hyponatremia  Active Problems:    Tobacco abuse    Hypertensive urgency    Type 2 diabetes mellitus without complication, without long-term current use of insulin (HCC)    COPD with acute exacerbation (HCC)    Acute on chronic respiratory failure with hypoxia (HCC)    Past Medical History  Past Medical History:   Diagnosis Date    Anxiety     COPD (chronic obstructive pulmonary disease) (HCC)     Diabetes mellitus (HCC)     Essential (primary) hypertension     GERD (gastroesophageal reflux disease)     Smoker      Past Surgical History  History reviewed. No pertinent surgical history.        02/29/24 1232   Note Type   Note type Screen       OT orders received. Chart review completed. Patient admitted to [facility] on 2/28 with Dx: Acute hyponatremia Spoke with pt's nursing staff who reported patient was I in room. Spoke with pt who reported no concerns regarding ADLs, IADLs or functional mobility upon return to home. Pt appears to be at prior level of functioning at this time and does not require acute OT services. D/C OT effective this date. If new concerns arise, please re-consult.      Austin Cruz MS, OTR/L

## 2024-02-29 NOTE — CONSULTS
Consultation - Nephrology   Jessika Lux 69 y.o. female MRN: [id number]  Unit/Bed#: -01 Encounter: [id number]    ASSESSMENT AND PLAN:   69 y.o woman with PMH anxiety, COPD, DM, hypertension, chronic hyponatremia p/w cough and shortness of breath.  Nephrology is consulted for management of hyponatremia      PLAN    # Acute on chronic Hyposomolar Hyponatremia   Sodium on admission: 118  Serum osm: 262  Urine osm: 181   Urine Sodium: 30  Etiology: Likely secondary to SIADH exacerbated by increase free water intake  Patient received 1.8% yesterday due to worsening hyponatremia  Rate of correction: 118>122>126>124, correction at goal for the first 24 hours  Plan:  Give 2 g of sodium chloride tablets and then continue with 1 g 3 times daily  Start torsemide 5 mg  Fluid restriction 1.5 L, patient had 2 cups of water at bedside and 1 cup of soda with ice  If no improvement will plan for tolvaptan  BMP every 6 hours   Avoid overcorrection: no more than 6-8mEq in the next 24hr, goal </=130  Please monitor UOP  Will monitor labs, call renal if SNa+ <124 or >130      #Volume status/hypertension:  Volume: Euvolemic   Blood pressure: Borderline hypertension, /78  Recommend:  Amlodipine 10 milligrams  Clonidine 0.2 twice daily  Lisinopril 40 mg  Metoprolol 100 mg twice daily  Advised to maintain a good BP control to prevent  CKD       #COPD exacerbation  Patient is short of breath  On nasal cannula  As per primary team    #Obesity   BMI 29.56  Recommend weight loss , heathy diet  Lifestyle modification      #DM  HbA1c 7  Advised to maintain a good DM control to prevent progression of CKD   Maintain healthy diet (vegetables, fruits, whole grains, nonfat or low fat)  Weight loss  Physical activity (5 to 10 minutes to start the increase to 30 min a day)      The highlighted and/or bolded points in my assessment, plan, and disposition were discussed with the primary team and they agree with those points and the  plan.  Previous records were personally reviewed by me to obtain a baseline creatinine.   The images (CXR) were personally reviewed by me in PACS      HISTORY OF PRESENT ILLNESS:  Requesting Physician: Luzma Jones MD  Reason for Consult: Hyponatremia    Jessika Lux is a 69 y.o.  female anxiety, COPD, DM, hypertension, chronic hyponatremia who was admitted to [facility] after presenting with cough and shortness of breath. A renal consultation is requested today for assistance in the management of hyponatremia.    PAST MEDICAL HISTORY:  Past Medical History:   Diagnosis Date    Anxiety     COPD (chronic obstructive pulmonary disease) (HCC)     Diabetes mellitus (HCC)     Essential (primary) hypertension     GERD (gastroesophageal reflux disease)     Smoker        PAST SURGICAL HISTORY:  History reviewed. No pertinent surgical history.    ALLERGIES:  Allergies   Allergen Reactions    Clindamycin        SOCIAL HISTORY:  Social History     Substance and Sexual Activity   Alcohol Use Not Currently     Social History     Substance and Sexual Activity   Drug Use Never     Social History     Tobacco Use   Smoking Status Every Day    Current packs/day: 0.25    Types: Cigarettes    Passive exposure: Never   Smokeless Tobacco Never       FAMILY HISTORY:  Family History   Problem Relation Age of Onset    Diabetes Father        MEDICATIONS:    Current Facility-Administered Medications:     acetaminophen (TYLENOL) tablet 650 mg, 650 mg, Oral, Q6H PRN, Luzma Jones MD    amLODIPine (NORVASC) tablet 10 mg, 10 mg, Oral, HS, Luzma Jones MD, 10 mg at 02/28/24 2051    atorvastatin (LIPITOR) tablet 40 mg, 40 mg, Oral, HS, Luzma Jones MD, 40 mg at 02/28/24 2048    budesonide (PULMICORT) inhalation solution 0.5 mg, 0.5 mg, Nebulization, Q12H, Luzma Jones MD    cefTRIAXone (ROCEPHIN) IVPB (premix in dextrose) 1,000 mg 50 mL, 1,000 mg, Intravenous, Q24H, Luzma Jones MD, Last Rate: 100 mL/hr at 02/29/24 1048, 1,000 mg at 02/29/24  1048    cloNIDine (CATAPRES) tablet 0.2 mg, 0.2 mg, Oral, Q12H NATHALY, Luzma Jones MD, 0.2 mg at 02/29/24 0822    enoxaparin (LOVENOX) subcutaneous injection 40 mg, 40 mg, Subcutaneous, Daily, Luzma Jones MD, 40 mg at 02/28/24 1528    Fluticasone Furoate-Vilanterol 100-25 mcg/actuation 1 puff, 1 puff, Inhalation, Daily, 1 puff at 02/29/24 0822 **AND** umeclidinium 62.5 mcg/actuation inhaler AEPB 1 puff, 1 puff, Inhalation, Daily, Luzma Jones MD, 1 puff at 02/29/24 0822    glipiZIDE (GLUCOTROL) tablet 10 mg, 10 mg, Oral, Early Morning, Luzma Jones MD, 10 mg at 02/29/24 0606    hydrOXYzine HCL (ATARAX) tablet 10 mg, 10 mg, Oral, Q6H PRN, Luzma Jones MD, 10 mg at 02/28/24 2047    insulin lispro (HumALOG/ADMELOG) 100 units/mL subcutaneous injection 1-6 Units, 1-6 Units, Subcutaneous, TID AC, 5 Units at 02/28/24 1740 **AND** Fingerstick Glucose (POCT), , , TID AC, Luzma Jones MD    insulin lispro (HumALOG/ADMELOG) 100 units/mL subcutaneous injection 1-6 Units, 1-6 Units, Subcutaneous, HS, Luzma Jones MD    ipratropium-albuterol (DUO-NEB) 0.5-2.5 mg/3 mL inhalation solution 3 mL, 3 mL, Nebulization, TID, Luzma Jones MD, 3 mL at 02/29/24 0853    lisinopril (ZESTRIL) tablet 40 mg, 40 mg, Oral, Daily, Luzma Jones MD, 40 mg at 02/29/24 0822    metoprolol tartrate (LOPRESSOR) tablet 100 mg, 100 mg, Oral, Q12H NATHALY, Luzma Jones MD, 100 mg at 02/29/24 0821    nicotine (NICODERM CQ) 7 mg/24hr TD 24 hr patch 1 patch, 1 patch, Transdermal, Daily, Luzma Jones MD    ondansetron (ZOFRAN) injection 4 mg, 4 mg, Intravenous, Q6H PRN, Luzma Jones MD    pantoprazole (PROTONIX) EC tablet 20 mg, 20 mg, Oral, Early Morning, Luzma Jones MD, 20 mg at 02/29/24 0606    pioglitazone (ACTOS) tablet 15 mg, 15 mg, Oral, Daily, Luzma Jones MD, 15 mg at 02/29/24 0822    sodium chloride tablet 1 g, 1 g, Oral, TID With Meals, Joselyn Reyes Bahamonde, MD    torsemide (DEMADEX) tablet 5 mg, 5 mg, Oral, Daily, Joselyn Reyes Bahamonde, MD    REVIEW  OF SYSTEMS:  Complete 10 point review of systems were obtained and discussed in length with the patient. Complete review of systems were negative / unremarkable except mentioned in the HPI section.     Review of Systems - Ophthalmic ROS: negative  ENT ROS: negative  Hematological and Lymphatic ROS: negative  Endocrine ROS: negative  Respiratory ROS: positive for - shortness of breath  Cardiovascular ROS: no chest pain or dyspnea on exertion  Gastrointestinal ROS: no abdominal pain, change in bowel habits, or black or bloody stools  Genito-Urinary ROS: no dysuria, trouble voiding, or hematuria  Musculoskeletal ROS: negative  Neurological ROS: no TIA or stroke symptoms  Dermatological ROS: negative     PHYSICAL EXAM:  Current Weight: Weight - Scale: 73.3 kg (161 lb 9.6 oz)  First Weight: Weight - Scale: 75 kg (165 lb 5.5 oz)  Vitals:    02/29/24 0854   BP:    Pulse:    Resp:    Temp:    SpO2: 95%       Intake/Output Summary (Last 24 hours) at 2/29/2024 1048  Last data filed at 2/29/2024 0900  Gross per 24 hour   Intake 960 ml   Output --   Net 960 ml     Wt Readings from Last 3 Encounters:   02/29/24 73.3 kg (161 lb 9.6 oz)   12/05/23 76.7 kg (169 lb)   11/12/23 76 kg (167 lb 9.6 oz)     Temp Readings from Last 3 Encounters:   02/29/24 (!) 97.2 °F (36.2 °C)   12/05/23 97.6 °F (36.4 °C)   11/13/23 (!) 97.2 °F (36.2 °C)     BP Readings from Last 3 Encounters:   02/29/24 146/78   12/05/23 138/88   11/13/23 99/57     Pulse Readings from Last 3 Encounters:   02/29/24 77   12/05/23 73   11/13/23 72      General:  no acute distress at this time  Skin:  No acute rash  Eyes:  No scleral icterus and noninjected  ENT:  mucous membranes moist  Neck:  no carotid bruits  Chest: Breath sounds decreased in both lungs , good respiratory effort, no use of accessory respiratory muscles  CVS:  Regular rate and rhythm without rub   Abdomen:  soft and nontender   Extremities: no significant lower extremity edema  Neuro:  No gross  "focality  Psych:  Alert , cooperative       Invasive Devices:      Lab Results:   Results from last 7 days   Lab Units 02/29/24  0845 02/29/24  0447 02/28/24  2358 02/28/24 2006 02/28/24  1615 02/28/24  1007 02/28/24  0923   WBC Thousand/uL  --   --   --   --   --   --  12.36*   HEMOGLOBIN g/dL  --   --   --   --   --   --  13.6   HEMATOCRIT %  --   --   --   --   --   --  39.7   PLATELETS Thousands/uL  --   --   --   --   --   --  251   POTASSIUM mmol/L 4.1 4.4  4.4 4.7 4.9 4.7 4.2 4.2   CHLORIDE mmol/L 80* 84*  84* 83* 81* 78* 80* 78*   CO2 mmol/L 40* 38*  37* 36* 34* 30 36* 36*   BUN mg/dL 7 6  6 7 8 6 5 6   CREATININE mg/dL 0.62 0.56*  0.54* 0.56* 0.66 0.65 0.57* 0.62   CALCIUM mg/dL 10.4* 9.8  9.7 9.4 9.6 9.6 9.5 10.1       Other Studies:   XR chest 1 view portable   ED Interpretation by Terry Buckley DO (02/28 0950)   No significant change from prior imaging studies.  Chronic atelectasis right base.      Final Result by John Key MD (02/28 1127)      No acute cardiopulmonary disease.            Workstation performed: [id number]             Portions of the record may have been created with voice recognition software. Occasional wrong word or \"sound a like\" substitutions may have occurred due to the inherent limitations of voice recognition software. Read the chart carefully and recognize, using context, where substitutions have occurred.    "

## 2024-03-01 ENCOUNTER — PATIENT OUTREACH (OUTPATIENT)
Dept: CASE MANAGEMENT | Facility: OTHER | Age: 69
End: 2024-03-01

## 2024-03-01 LAB
ANION GAP SERPL CALCULATED.3IONS-SCNC: 5 MMOL/L
ANION GAP SERPL CALCULATED.3IONS-SCNC: 5 MMOL/L
ANION GAP SERPL CALCULATED.3IONS-SCNC: 6 MMOL/L
ANION GAP SERPL CALCULATED.3IONS-SCNC: 9 MMOL/L
BUN SERPL-MCNC: 10 MG/DL (ref 5–25)
BUN SERPL-MCNC: 12 MG/DL (ref 5–25)
BUN SERPL-MCNC: 13 MG/DL (ref 5–25)
BUN SERPL-MCNC: 16 MG/DL (ref 5–25)
CALCIUM SERPL-MCNC: 10.2 MG/DL (ref 8.4–10.2)
CALCIUM SERPL-MCNC: 9.3 MG/DL (ref 8.4–10.2)
CALCIUM SERPL-MCNC: 9.7 MG/DL (ref 8.4–10.2)
CALCIUM SERPL-MCNC: 9.8 MG/DL (ref 8.4–10.2)
CHLORIDE SERPL-SCNC: 83 MMOL/L (ref 96–108)
CHLORIDE SERPL-SCNC: 84 MMOL/L (ref 96–108)
CHLORIDE SERPL-SCNC: 84 MMOL/L (ref 96–108)
CHLORIDE SERPL-SCNC: 90 MMOL/L (ref 96–108)
CO2 SERPL-SCNC: 34 MMOL/L (ref 21–32)
CO2 SERPL-SCNC: 36 MMOL/L (ref 21–32)
CO2 SERPL-SCNC: 36 MMOL/L (ref 21–32)
CO2 SERPL-SCNC: 37 MMOL/L (ref 21–32)
CREAT SERPL-MCNC: 0.56 MG/DL (ref 0.6–1.3)
CREAT SERPL-MCNC: 0.6 MG/DL (ref 0.6–1.3)
CREAT SERPL-MCNC: 0.7 MG/DL (ref 0.6–1.3)
CREAT SERPL-MCNC: 0.74 MG/DL (ref 0.6–1.3)
GFR SERPL CREATININE-BSD FRML MDRD: 84 ML/MIN/1.73SQ M
GFR SERPL CREATININE-BSD FRML MDRD: 89 ML/MIN/1.73SQ M
GFR SERPL CREATININE-BSD FRML MDRD: 94 ML/MIN/1.73SQ M
GFR SERPL CREATININE-BSD FRML MDRD: 96 ML/MIN/1.73SQ M
GLUCOSE SERPL-MCNC: 125 MG/DL (ref 65–140)
GLUCOSE SERPL-MCNC: 138 MG/DL (ref 65–140)
GLUCOSE SERPL-MCNC: 139 MG/DL (ref 65–140)
GLUCOSE SERPL-MCNC: 148 MG/DL (ref 65–140)
GLUCOSE SERPL-MCNC: 161 MG/DL (ref 65–140)
GLUCOSE SERPL-MCNC: 214 MG/DL (ref 65–140)
GLUCOSE SERPL-MCNC: 229 MG/DL (ref 65–140)
GLUCOSE SERPL-MCNC: 235 MG/DL (ref 65–140)
POTASSIUM SERPL-SCNC: 4.2 MMOL/L (ref 3.5–5.3)
POTASSIUM SERPL-SCNC: 4.3 MMOL/L (ref 3.5–5.3)
POTASSIUM SERPL-SCNC: 4.4 MMOL/L (ref 3.5–5.3)
POTASSIUM SERPL-SCNC: 4.5 MMOL/L (ref 3.5–5.3)
PROCALCITONIN SERPL-MCNC: <0.05 NG/ML
SODIUM SERPL-SCNC: 123 MMOL/L (ref 135–147)
SODIUM SERPL-SCNC: 125 MMOL/L (ref 135–147)
SODIUM SERPL-SCNC: 129 MMOL/L (ref 135–147)
SODIUM SERPL-SCNC: 132 MMOL/L (ref 135–147)

## 2024-03-01 PROCEDURE — 99232 SBSQ HOSP IP/OBS MODERATE 35: CPT | Performed by: STUDENT IN AN ORGANIZED HEALTH CARE EDUCATION/TRAINING PROGRAM

## 2024-03-01 PROCEDURE — 94664 DEMO&/EVAL PT USE INHALER: CPT

## 2024-03-01 PROCEDURE — 94760 N-INVAS EAR/PLS OXIMETRY 1: CPT

## 2024-03-01 PROCEDURE — 82948 REAGENT STRIP/BLOOD GLUCOSE: CPT

## 2024-03-01 PROCEDURE — 84145 PROCALCITONIN (PCT): CPT | Performed by: STUDENT IN AN ORGANIZED HEALTH CARE EDUCATION/TRAINING PROGRAM

## 2024-03-01 PROCEDURE — 94640 AIRWAY INHALATION TREATMENT: CPT

## 2024-03-01 PROCEDURE — 80048 BASIC METABOLIC PNL TOTAL CA: CPT | Performed by: STUDENT IN AN ORGANIZED HEALTH CARE EDUCATION/TRAINING PROGRAM

## 2024-03-01 RX ORDER — CALCIUM CARBONATE 500 MG/1
500 TABLET, CHEWABLE ORAL 2 TIMES DAILY PRN
Status: DISCONTINUED | OUTPATIENT
Start: 2024-03-01 | End: 2024-03-03 | Stop reason: HOSPADM

## 2024-03-01 RX ORDER — SODIUM CHLORIDE 1 G/1
2 TABLET ORAL ONCE
Status: COMPLETED | OUTPATIENT
Start: 2024-03-01 | End: 2024-03-01

## 2024-03-01 RX ORDER — HYDROXYZINE HYDROCHLORIDE 25 MG/1
50 TABLET, FILM COATED ORAL ONCE
Status: COMPLETED | OUTPATIENT
Start: 2024-03-01 | End: 2024-03-01

## 2024-03-01 RX ADMIN — IPRATROPIUM BROMIDE AND ALBUTEROL SULFATE 3 ML: 2.5; .5 SOLUTION RESPIRATORY (INHALATION) at 13:47

## 2024-03-01 RX ADMIN — PREDNISONE 40 MG: 20 TABLET ORAL at 08:10

## 2024-03-01 RX ADMIN — PANTOPRAZOLE SODIUM 20 MG: 20 TABLET, DELAYED RELEASE ORAL at 05:30

## 2024-03-01 RX ADMIN — BUDESONIDE 0.5 MG: 0.5 INHALANT RESPIRATORY (INHALATION) at 07:46

## 2024-03-01 RX ADMIN — INSULIN LISPRO 2 UNITS: 100 INJECTION, SOLUTION INTRAVENOUS; SUBCUTANEOUS at 16:09

## 2024-03-01 RX ADMIN — INSULIN LISPRO 1 UNITS: 100 INJECTION, SOLUTION INTRAVENOUS; SUBCUTANEOUS at 21:29

## 2024-03-01 RX ADMIN — HYDROXYZINE HYDROCHLORIDE 10 MG: 10 TABLET ORAL at 10:28

## 2024-03-01 RX ADMIN — INSULIN LISPRO 2 UNITS: 100 INJECTION, SOLUTION INTRAVENOUS; SUBCUTANEOUS at 12:16

## 2024-03-01 RX ADMIN — PIOGLITAZONE HYDROCHLORIDE 15 MG: 15 TABLET ORAL at 08:10

## 2024-03-01 RX ADMIN — Medication 7.5 MG: at 10:00

## 2024-03-01 RX ADMIN — HYDROXYZINE HYDROCHLORIDE 50 MG: 25 TABLET ORAL at 01:56

## 2024-03-01 RX ADMIN — SODIUM CHLORIDE TAB 1 GM 1 G: 1 TAB at 08:10

## 2024-03-01 RX ADMIN — SODIUM CHLORIDE TAB 1 GM 2 G: 1 TAB at 01:56

## 2024-03-01 RX ADMIN — IPRATROPIUM BROMIDE AND ALBUTEROL SULFATE 3 ML: 2.5; .5 SOLUTION RESPIRATORY (INHALATION) at 20:53

## 2024-03-01 RX ADMIN — ENOXAPARIN SODIUM 40 MG: 40 INJECTION SUBCUTANEOUS at 16:07

## 2024-03-01 RX ADMIN — SODIUM CHLORIDE TAB 1 GM 1 G: 1 TAB at 12:16

## 2024-03-01 RX ADMIN — BUDESONIDE 0.5 MG: 0.5 INHALANT RESPIRATORY (INHALATION) at 20:53

## 2024-03-01 RX ADMIN — GLIPIZIDE 10 MG: 5 TABLET ORAL at 05:30

## 2024-03-01 RX ADMIN — LISINOPRIL 40 MG: 20 TABLET ORAL at 08:10

## 2024-03-01 RX ADMIN — IPRATROPIUM BROMIDE AND ALBUTEROL SULFATE 3 ML: 2.5; .5 SOLUTION RESPIRATORY (INHALATION) at 00:29

## 2024-03-01 RX ADMIN — CALCIUM CARBONATE (ANTACID) CHEW TAB 500 MG 500 MG: 500 CHEW TAB at 01:56

## 2024-03-01 RX ADMIN — SODIUM CHLORIDE TAB 1 GM 1 G: 1 TAB at 16:07

## 2024-03-01 RX ADMIN — METOPROLOL TARTRATE 100 MG: 50 TABLET, FILM COATED ORAL at 08:10

## 2024-03-01 RX ADMIN — UMECLIDINIUM 1 PUFF: 62.5 AEROSOL, POWDER ORAL at 08:10

## 2024-03-01 RX ADMIN — IPRATROPIUM BROMIDE AND ALBUTEROL SULFATE 3 ML: 2.5; .5 SOLUTION RESPIRATORY (INHALATION) at 07:46

## 2024-03-01 RX ADMIN — CALCIUM CARBONATE (ANTACID) CHEW TAB 500 MG 500 MG: 500 CHEW TAB at 09:59

## 2024-03-01 RX ADMIN — CLONIDINE HYDROCHLORIDE 0.2 MG: 0.1 TABLET ORAL at 08:10

## 2024-03-01 RX ADMIN — ATORVASTATIN CALCIUM 40 MG: 40 TABLET, FILM COATED ORAL at 21:28

## 2024-03-01 RX ADMIN — FLUTICASONE FUROATE AND VILANTEROL TRIFENATATE 1 PUFF: 100; 25 POWDER RESPIRATORY (INHALATION) at 08:10

## 2024-03-01 RX ADMIN — TORSEMIDE 5 MG: 10 TABLET ORAL at 10:00

## 2024-03-01 RX ADMIN — CEFTRIAXONE 1000 MG: 1 INJECTION, SOLUTION INTRAVENOUS at 10:00

## 2024-03-01 NOTE — PLAN OF CARE
Problem: Potential for Falls  Goal: Patient will remain free of falls  Description: INTERVENTIONS:  - Educate patient/family on patient safety including physical limitations  - Instruct patient to call for assistance with activity   - Consult OT/PT to assist with strengthening/mobility   - Keep Call bell within reach  - Keep bed low and locked with side rails adjusted as appropriate  - Keep care items and personal belongings within reach  - Initiate and maintain comfort rounds  Outcome: Progressing     Problem: PAIN - ADULT  Goal: Verbalizes/displays adequate comfort level or baseline comfort level  Description: Interventions:  - Encourage patient to monitor pain and request assistance  - Assess pain using appropriate pain scale  - Administer analgesics based on type and severity of pain and evaluate response  - Implement non-pharmacological measures as appropriate and evaluate response  - Consider cultural and social influences on pain and pain management  - Notify physician/advanced practitioner if interventions unsuccessful or patient reports new pain  Outcome: Progressing     Problem: INFECTION - ADULT  Goal: Absence or prevention of progression during hospitalization  Description: INTERVENTIONS:  - Assess and monitor for signs and symptoms of infection  - Monitor lab/diagnostic results  - Monitor all insertion sites, i.e. indwelling lines, tubes, and drains  - Monitor endotracheal if appropriate and nasal secretions for changes in amount and color  - [place] appropriate cooling/warming therapies per order  - Administer medications as ordered  - Instruct and encourage patient and family to use good hand hygiene technique  - Identify and instruct in appropriate isolation precautions for identified infection/condition  Outcome: Progressing     Problem: SAFETY ADULT  Goal: Patient will remain free of falls  Description: INTERVENTIONS:  - Educate patient/family on patient safety including physical limitations  -  Instruct patient to call for assistance with activity   - Consult OT/PT to assist with strengthening/mobility   - Keep Call bell within reach  - Keep bed low and locked with side rails adjusted as appropriate  - Keep care items and personal belongings within reach  - Initiate and maintain comfort rounds  Outcome: Progressing  Goal: Maintain or return to baseline ADL function  Description: INTERVENTIONS:  -  Assess patient's ability to carry out ADLs; assess patient's baseline for ADL function and identify physical deficits which impact ability to perform ADLs (bathing, care of mouth/teeth, toileting, grooming, dressing, etc.)  - Assess/evaluate cause of self-care deficits   - Assess range of motion  - Assess patient's mobility; develop plan if impaired  - Assess patient's need for assistive devices and provide as appropriate  - Encourage maximum independence but intervene and supervise when necessary  - Involve family in performance of ADLs  - Assess for home care needs following discharge   - Consider OT consult to assist with ADL evaluation and planning for discharge  - Provide patient education as appropriate  Outcome: Progressing  Goal: Maintains/Returns to pre admission functional level  Description: INTERVENTIONS:  - Perform AM-PAC 6 Click Basic Mobility/ Daily Activity assessment daily.  - Set and communicate daily mobility goal to care team and patient/family/caregiver.   - Collaborate with rehabilitation services on mobility goals if consulted  - Perform Range of Motion 2 times a day.  - Reposition patient every 2 hours.  - Dangle patient 2 times a day  - Stand patient 2 times a day  - Ambulate patient 2 times a day  - Out of bed to chair 2 times a day   - Out of bed for meals 2 times a day  - Out of bed for toileting  - Record patient progress and toleration of activity level   Outcome: Progressing     Problem: DISCHARGE PLANNING  Goal: Discharge to home or other facility with appropriate  resources  Description: INTERVENTIONS:  - Identify barriers to discharge w/patient and caregiver  - Arrange for needed discharge resources and transportation as appropriate  - Identify discharge learning needs (meds, wound care, etc.)  - Arrange for interpretive services to assist at discharge as needed  - Refer to Case Management Department for coordinating discharge planning if the patient needs post-hospital services based on physician/advanced practitioner order or complex needs related to functional status, cognitive ability, or social support system  Outcome: Progressing     Problem: Knowledge Deficit  Goal: Patient/family/caregiver demonstrates understanding of disease process, treatment plan, medications, and discharge instructions  Description: Complete learning assessment and assess knowledge base.  Interventions:  - Provide teaching at level of understanding  - Provide teaching via preferred learning methods  Outcome: Progressing     Problem: RESPIRATORY - ADULT  Goal: Achieves optimal ventilation and oxygenation  Description: INTERVENTIONS:  - Assess for changes in respiratory status  - Assess for changes in mentation and behavior  - Position to facilitate oxygenation and minimize respiratory effort  - Oxygen administered by appropriate delivery if ordered  - Initiate smoking cessation education as indicated  - Encourage broncho-pulmonary hygiene including cough, deep breathe, Incentive Spirometry  - Assess the need for suctioning and aspirate as needed  - Assess and instruct to report SOB or any respiratory difficulty  - Respiratory Therapy support as indicated  Outcome: Progressing     Problem: METABOLIC, FLUID AND ELECTROLYTES - ADULT  Goal: Electrolytes maintained within normal limits  Description: INTERVENTIONS:  - Monitor labs and assess patient for signs and symptoms of electrolyte imbalances  - Administer electrolyte replacement as ordered  - Monitor response to electrolyte replacements,  including repeat lab results as appropriate  - Instruct patient on fluid and nutrition as appropriate  Outcome: Progressing  Goal: Fluid balance maintained  Description: INTERVENTIONS:  - Monitor labs   - Monitor I/O and WT  - Instruct patient on fluid and nutrition as appropriate  - Assess for signs & symptoms of volume excess or deficit  Outcome: Progressing  Goal: Glucose maintained within target range  Description: INTERVENTIONS:  - Monitor Blood Glucose as ordered  - Assess for signs and symptoms of hyperglycemia and hypoglycemia  - Administer ordered medications to maintain glucose within target range  - Assess nutritional intake and initiate nutrition service referral as needed  Outcome: Progressing     Problem: Anxiety  Goal: Anxiety is at manageable level  Description: Interventions:  - Assess and monitor patient's anxiety level.   - Monitor for signs and symptoms (heart palpitations, chest pain, shortness of breath, headaches, nausea, feeling jumpy, restlessness, irritable, apprehensive).   - Collaborate with interdisciplinary team and initiate plan and interventions as ordered.  - [place] patient to unit/surroundings  - Explain treatment plan  - Encourage participation in care  - Encourage verbalization of concerns/fears  - Identify coping mechanisms  - Assist in developing anxiety-reducing skills  - Administer/offer alternative therapies  - Limit or eliminate stimulants  Outcome: Progressing

## 2024-03-01 NOTE — RESPIRATORY THERAPY NOTE
RT Protocol Note  Jessika Lux 69 y.o. female MRN: [id number]  Unit/Bed#: -01 Encounter: [id number]    Assessment    Principal Problem:    Acute hyponatremia  Active Problems:    Tobacco abuse    Hypertensive urgency    Type 2 diabetes mellitus without complication, without long-term current use of insulin (HCC)    COPD with acute exacerbation (HCC)    Acute on chronic respiratory failure with hypoxia (HCC)      Home pulmonary Medications:         Past Medical History:   Diagnosis Date    Anxiety     COPD (chronic obstructive pulmonary disease) (HCC)     Diabetes mellitus (HCC)     Essential (primary) hypertension     GERD (gastroesophageal reflux disease)     Smoker      Social History     Socioeconomic History    Marital status:      Spouse name: None    Number of children: None    Years of education: None    Highest education level: None   Occupational History    None   Tobacco Use    Smoking status: Every Day     Current packs/day: 0.25     Types: Cigarettes     Passive exposure: Never    Smokeless tobacco: Never   Vaping Use    Vaping status: Never Used   Substance and Sexual Activity    Alcohol use: Not Currently    Drug use: Never    Sexual activity: Not Currently   Other Topics Concern    None   Social History Narrative    None     Social Determinants of Health     Financial Resource Strain: Not on file   Food Insecurity: No Food Insecurity (2/29/2024)    Hunger Vital Sign     Worried About Running Out of Food in the Last Year: Never true     Ran Out of Food in the Last Year: Never true   Transportation Needs: No Transportation Needs (2/29/2024)    PRAPARE - Transportation     Lack of Transportation (Medical): No     Lack of Transportation (Non-Medical): No   Physical Activity: Not on file   Stress: Not on file   Social Connections: Not on file   Intimate Partner Violence: Not on file   Housing Stability: Low Risk  (2/29/2024)    Housing Stability Vital Sign     Unable to Pay for Housing in  "the Last Year: No     Number of Places Lived in the Last Year: 1     Unstable Housing in the Last Year: No       Subjective         Objective    Physical Exam:   Assessment Type: During-treatment  General Appearance: Alert, Awake  Respiratory Pattern: Normal  Chest Assessment: Chest expansion symmetrical  Bilateral Breath Sounds: Diminished  Cough: None  O2 Device: 3LPM NC    Vitals:  Blood pressure 136/80, pulse 64, temperature 98.1 °F (36.7 °C), resp. rate 18, height 5' 2\" (1.575 m), weight 72.5 kg (159 lb 12.8 oz), SpO2 98%.          Imaging and other studies: I have personally reviewed pertinent reports.      O2 Device: 3LPM NC     Plan             Resp Comments: Pt stable on 3LPM NC, doing well with udn tx.   "

## 2024-03-01 NOTE — PROGRESS NOTES
NEPHROLOGY PROGRESS NOTE   Jessika Lux 69 y.o. female MRN: [id number]  Unit/Bed#: -01 Encounter: [id number]  Reason for Consult: Hyponatremia     ASSESSMENT AND PLAN:  69 y.o woman with PMH anxiety, COPD, DM, hypertension, chronic hyponatremia p/w cough and shortness of breath.  Nephrology is consulted for management of hyponatremia        PLAN     # Acute on chronic Hyposomolar Hyponatremia   Sodium on admission: 118  Serum osm: 262  Urine osm: 181   Urine Sodium: 30  Etiology: Likely secondary to SIADH exacerbated by increase free water intake  Patient received 1.8% yesterday due to worsening hyponatremia  Rate of correction: 118>122>126>124>125, not improving as expected   As per nurse note, sodium chloride tablets were found at bedside.  Patient declined missing any dose  Plan:  Continue sodium chloride tablets 1 g 3 times daily for now  We will do a trial of tolvaptan 7.5 mg  Fluid restriction 1.8 L while on tolvaptan   BMP every 6 hours   Avoid overcorrection: no more than 6-8mEq in the next 24hr, goal </=131 by tomorrow morning  Please monitor UOP  Will monitor labs, call renal if SNa+ <124 or >131  Continue torsemide 5  Strict ins and outs        #Volume status/hypertension:  Volume: Euvolemic on exam  Blood pressure: Normotensive,  over 80 mg/dL, goal less than 140/90   Recommend:  Continue amlodipine 10  Torsemide 5   lisinopril 40  Clonidine 0.2 twice daily  Metoprolol 100 mg twice daily  Advised to maintain a good BP control to prevent  CKD         #COPD exacerbation  On nasal cannula  As per primary team     #Obesity   BMI 29.23  Recommend weight loss , heathy diet  Lifestyle modification       #DM  HbA1c 7  Advised to maintain a good DM control to prevent progression of CKD   Maintain healthy diet (vegetables, fruits, whole grains, nonfat or low fat)  Weight loss  Physical activity (5 to 10 minutes to start the increase to 30 min a day)         SUBJECTIVE:  Patient seen and examined at  bedside. No chest pain, shortness of breath,     OBJECTIVE:  Current Weight: Weight - Scale: 72.5 kg (159 lb 12.8 oz)  Vitals:    03/01/24 0745   BP:    Pulse:    Resp:    Temp:    SpO2: 98%       Intake/Output Summary (Last 24 hours) at 3/1/2024 1050  Last data filed at 3/1/2024 0854  Gross per 24 hour   Intake 342 ml   Output --   Net 342 ml     Wt Readings from Last 3 Encounters:   03/01/24 72.5 kg (159 lb 12.8 oz)   12/05/23 76.7 kg (169 lb)   11/12/23 76 kg (167 lb 9.6 oz)     Temp Readings from Last 3 Encounters:   03/01/24 98.1 °F (36.7 °C)   12/05/23 97.6 °F (36.4 °C)   11/13/23 (!) 97.2 °F (36.2 °C)     BP Readings from Last 3 Encounters:   03/01/24 136/80   12/05/23 138/88   11/13/23 99/57     Pulse Readings from Last 3 Encounters:   03/01/24 64   12/05/23 73   11/13/23 72      General:  no acute distress at this time  Skin:  No acute rash  Eyes:  No scleral icterus and noninjected  ENT:  mucous membranes moist  Neck:  no carotid bruits  Chest:  breath sound decreased in both lungs , good respiratory effort, no use of accessory respiratory muscles  CVS:  Regular rate and rhythm without rub   Abdomen:  soft and nontender   Extremities: no significant lower extremity edema  Neuro:  No gross focality  Psych:  Alert , cooperative       Medications:    Current Facility-Administered Medications:     acetaminophen (TYLENOL) tablet 650 mg, 650 mg, Oral, Q6H PRN, Luzma Jones MD    amLODIPine (NORVASC) tablet 10 mg, 10 mg, Oral, HS, Luzma Jones MD, 10 mg at 02/29/24 2037    atorvastatin (LIPITOR) tablet 40 mg, 40 mg, Oral, HS, Luzma Jones MD, 40 mg at 02/29/24 2037    budesonide (PULMICORT) inhalation solution 0.5 mg, 0.5 mg, Nebulization, Q12H, Luzma Jones MD, 0.5 mg at 03/01/24 0746    calcium carbonate (TUMS) chewable tablet 500 mg, 500 mg, Oral, BID PRN, Hailey Em, AYLIN, 500 mg at 03/01/24 0959    cefTRIAXone (ROCEPHIN) IVPB (premix in dextrose) 1,000 mg 50 mL, 1,000 mg, Intravenous, Q24H, Luzma Jones,  MD, Last Rate: 100 mL/hr at 03/01/24 1000, 1,000 mg at 03/01/24 1000    cloNIDine (CATAPRES) tablet 0.2 mg, 0.2 mg, Oral, Q12H NATHALY, Luzma Jones MD, 0.2 mg at 03/01/24 0810    enoxaparin (LOVENOX) subcutaneous injection 40 mg, 40 mg, Subcutaneous, Daily, Luzma Jones MD, 40 mg at 02/29/24 1702    Fluticasone Furoate-Vilanterol 100-25 mcg/actuation 1 puff, 1 puff, Inhalation, Daily, 1 puff at 03/01/24 0810 **AND** umeclidinium 62.5 mcg/actuation inhaler AEPB 1 puff, 1 puff, Inhalation, Daily, Luzma Jones MD, 1 puff at 03/01/24 0810    glipiZIDE (GLUCOTROL) tablet 10 mg, 10 mg, Oral, Early Morning, Luzma Jones MD, 10 mg at 03/01/24 0530    hydrOXYzine HCL (ATARAX) tablet 10 mg, 10 mg, Oral, Q6H PRN, Luzma Jones MD, 10 mg at 03/01/24 1028    insulin lispro (HumALOG/ADMELOG) 100 units/mL subcutaneous injection 1-6 Units, 1-6 Units, Subcutaneous, TID AC, 5 Units at 02/28/24 1740 **AND** Fingerstick Glucose (POCT), , , TID AC, Luzma Jones MD    insulin lispro (HumALOG/ADMELOG) 100 units/mL subcutaneous injection 1-6 Units, 1-6 Units, Subcutaneous, HS, Luzma Jones MD    ipratropium-albuterol (DUO-NEB) 0.5-2.5 mg/3 mL inhalation solution 3 mL, 3 mL, Nebulization, Q6H, Luzma Jones MD, 3 mL at 03/01/24 0746    lisinopril (ZESTRIL) tablet 40 mg, 40 mg, Oral, Daily, Luzma Jones MD, 40 mg at 03/01/24 0810    metoprolol tartrate (LOPRESSOR) tablet 100 mg, 100 mg, Oral, Q12H NATHALY, Luzma Jones MD, 100 mg at 03/01/24 0810    nicotine (NICODERM CQ) 7 mg/24hr TD 24 hr patch 1 patch, 1 patch, Transdermal, Daily, Luzma Jones MD    ondansetron (ZOFRAN) injection 4 mg, 4 mg, Intravenous, Q6H PRN, Luzma Jones MD    pantoprazole (PROTONIX) EC tablet 20 mg, 20 mg, Oral, Early Morning, Luzma Jones MD, 20 mg at 03/01/24 0530    pioglitazone (ACTOS) tablet 15 mg, 15 mg, Oral, Daily, Luzma Jones MD, 15 mg at 03/01/24 0810    predniSONE tablet 40 mg, 40 mg, Oral, Daily, Luzma Jones MD, 40 mg at 03/01/24 0810    sodium chloride tablet  "1 g, 1 g, Oral, TID With Meals, Joselyn Reyes Bahamonde, MD, 1 g at 03/01/24 0810    torsemide (DEMADEX) tablet 5 mg, 5 mg, Oral, Daily, Joselyn Reyes Bahamonde, MD, 5 mg at 03/01/24 1000    Laboratory Results:  Results from last 7 days   Lab Units 03/01/24  0442 03/01/24  0009 02/29/24  1824 02/29/24  1258 02/29/24  0845 02/29/24  0447 02/28/24  2358 02/28/24  1007 02/28/24  0923   WBC Thousand/uL  --   --   --   --   --   --   --   --  12.36*   HEMOGLOBIN g/dL  --   --   --   --   --   --   --   --  13.6   HEMATOCRIT %  --   --   --   --   --   --   --   --  39.7   PLATELETS Thousands/uL  --   --   --   --   --   --   --   --  251   SODIUM mmol/L 125* 123* 126* 126* 124* 126*  125* 123*   < > 122*   POTASSIUM mmol/L 4.3 4.2 4.5 4.4 4.1 4.4  4.4 4.7   < > 4.2   CHLORIDE mmol/L 84* 83* 81* 82* 80* 84*  84* 83*   < > 78*   CO2 mmol/L 36* 34* 37* 39* 40* 38*  37* 36*   < > 36*   BUN mg/dL 10 12 14 10 7 6  6 7   < > 6   CREATININE mg/dL 0.56* 0.60 0.70 0.62 0.62 0.56*  0.54* 0.56*   < > 0.62   CALCIUM mg/dL 9.7 9.3 10.0 9.5 10.4* 9.8  9.7 9.4   < > 10.1    < > = values in this interval not displayed.       XR chest 1 view portable   ED Interpretation by Terry Buckley DO (02/28 0950)   No significant change from prior imaging studies.  Chronic atelectasis right base.      Final Result by John Key MD (02/28 1127)      No acute cardiopulmonary disease.            Workstation performed: [id number]             Portions of the record may have been created with voice recognition software. Occasional wrong word or \"sound a like\" substitutions may have occurred due to the inherent limitations of voice recognition software. Read the chart carefully and recognize, using context, where substitutions have occurred.    "

## 2024-03-01 NOTE — RESPIRATORY THERAPY NOTE
Patient observed trying to trap air in her stomach and burp.  Her inability to find relief repeating this maneuver is making her anxious

## 2024-03-01 NOTE — ASSESSMENT & PLAN NOTE
Lab Results   Component Value Date    HGBA1C 7.0 (H) 07/27/2023       Recent Labs     02/29/24  1118 02/29/24  1604 02/29/24 2046 03/01/24  0745   POCGLU 101 98 130 125       Placed on insulin sliding scale  continue glipizide and Actos  Hypoglycemia protocol

## 2024-03-01 NOTE — PROGRESS NOTES
In basket notification received for patient admission to [facility] on 2/28 for management of acute hyponatremia.       Patient originally scheduled for outreach today to introduce care management services.      Will continue to monitor via chart review throughout hospitalization and plan for outreach upon discharge pending disposition.    Note routed to Geni CORONADO, outpatient respiratory therapist

## 2024-03-01 NOTE — PROGRESS NOTES
[facility]  [facility]  Name: Jessika Lux I  MRN: [id number]  Unit/Bed#: -Atul I Date of Admission: 2/28/2024   Date of Service: 3/1/2024 I Hospital Day: 2    Assessment/Plan   * Acute hyponatremia  Assessment & Plan  Na on admission decreased to 121 from baseline of 136-138  Continue q6h BMP, Na currently 125  Normal TSH and chest x-ray  Hold Zoloft for now  Further management per Nephrology    COPD with acute exacerbation (HCC)  Assessment & Plan  Patient states that she was feeling little short of breath and fatigued   In ER, got a dose of prednisone, DuoNeb treatment antibiotic.  Denies any recent fevers or chills.  Leukocytosis noted.    Procalcitonin <0.05 x 1, will check another. If negative, will discontinue antibiotics  COVID RSV influenza is negative.    Continue on DuoNeb treatments and IV Rocephin for now and monitor  Chest X-ray does not show any evidence of pneumonia   Started on po Prednisone, continue for COPD exacerbation    Type 2 diabetes mellitus without complication, without long-term current use of insulin (HCC)  Assessment & Plan  Lab Results   Component Value Date    HGBA1C 7.0 (H) 07/27/2023       Recent Labs     02/29/24  1118 02/29/24  1604 02/29/24  2046 03/01/24  0745   POCGLU 101 98 130 125       Placed on insulin sliding scale  continue glipizide and Actos  Hypoglycemia protocol      Hypertensive urgency  Assessment & Plan  Blood pressures controlled  Cont home medicine regimen of Norvasc, clonidine, lisinopril, and metoprolol    Tobacco abuse  Assessment & Plan  Counseled on smoking cessation and placed on nicotine replacement therapy             VTE Pharmacologic Prophylaxis:   Moderate Risk (Score 3-4) - Pharmacological DVT Prophylaxis Ordered: enoxaparin (Lovenox).    Mobility:   Basic Mobility Inpatient Raw Score: 23  JH-HLM Goal: 7: Walk 25 feet or more  JH-HLM Achieved: 7: Walk 25 feet or more  HLM Goal achieved. Continue to encourage  appropriate mobility.    Patient Centered Rounds: I performed bedside rounds with nursing staff today.   Discussions with Specialists or Other Care Team Provider: Nephrology    Education and Discussions with Family / Patient: Updated  (patient) at bedside.    Current Length of Stay: 2 day(s)  Current Patient Status: Inpatient   Certification Statement: The patient will continue to require additional inpatient hospital stay due to ongoing medical management for hyponatremia  Discharge Plan: Anticipate discharge in 24-48 hrs to home.    Code Status: Level 1 - Full Code    Subjective:   Patient seen and examined at bedside.  Endorses breathing is significantly better than when she first came in.  Patient denies any complaints at this time.    Objective:     Vitals:   Temp (24hrs), Av.7 °F (36.5 °C), Min:97.3 °F (36.3 °C), Max:98.1 °F (36.7 °C)    Temp:  [97.3 °F (36.3 °C)-98.1 °F (36.7 °C)] 98.1 °F (36.7 °C)  HR:  [61-73] 64  Resp:  [18-24] 18  BP: (122-136)/(75-84) 136/80  SpO2:  [96 %-99 %] 98 %  Body mass index is 29.23 kg/m².     Input and Output Summary (last 24 hours):     Intake/Output Summary (Last 24 hours) at 3/1/2024 0858  Last data filed at 3/1/2024 0854  Gross per 24 hour   Intake 582 ml   Output --   Net 582 ml       Physical Exam:   Physical Exam  Vitals and nursing note reviewed.   Constitutional:       General: She is not in acute distress.     Appearance: She is well-developed.   HENT:      Head: Normocephalic and atraumatic.   Eyes:      Conjunctiva/sclera: Conjunctivae normal.   Cardiovascular:      Rate and Rhythm: Normal rate and regular rhythm.      Heart sounds: No murmur heard.  Pulmonary:      Effort: Pulmonary effort is normal. No respiratory distress.      Breath sounds: Normal breath sounds. No wheezing or rales.   Abdominal:      Palpations: Abdomen is soft.      Tenderness: There is no abdominal tenderness.   Musculoskeletal:         General: No swelling.      Cervical  back: Neck supple.   Skin:     General: Skin is warm and dry.      Capillary Refill: Capillary refill takes less than 2 seconds.   Neurological:      Mental Status: She is alert.   Psychiatric:         Mood and Affect: Mood normal.            Additional Data:     Labs:  Results from last 7 days   Lab Units 02/28/24  0923   WBC Thousand/uL 12.36*   HEMOGLOBIN g/dL 13.6   HEMATOCRIT % 39.7   PLATELETS Thousands/uL 251   NEUTROS PCT % 79*   LYMPHS PCT % 11*   MONOS PCT % 9   EOS PCT % 0     Results from last 7 days   Lab Units 03/01/24  0442 02/28/24  1007 02/28/24  0923   SODIUM mmol/L 125*   < > 122*   POTASSIUM mmol/L 4.3   < > 4.2   CHLORIDE mmol/L 84*   < > 78*   CO2 mmol/L 36*   < > 36*   BUN mg/dL 10   < > 6   CREATININE mg/dL 0.56*   < > 0.62   ANION GAP mmol/L 5   < > 8   CALCIUM mg/dL 9.7   < > 10.1   ALBUMIN g/dL  --   --  4.6   TOTAL BILIRUBIN mg/dL  --   --  0.43   ALK PHOS U/L  --   --  102   ALT U/L  --   --  10   AST U/L  --   --  14   GLUCOSE RANDOM mg/dL 138   < > 197*    < > = values in this interval not displayed.         Results from last 7 days   Lab Units 03/01/24  0745 02/29/24  2046 02/29/24  1604 02/29/24  1118 02/29/24  0801 02/28/24  2043 02/28/24  1659   POC GLUCOSE mg/dl 125 130 98 101 90 109 324*         Results from last 7 days   Lab Units 02/29/24  0845 02/28/24  1229   LACTIC ACID mmol/L  --  1.0   PROCALCITONIN ng/ml <0.05  --        Lines/Drains:  Invasive Devices       Peripheral Intravenous Line  Duration             Peripheral IV 02/28/24 Proximal;Right;Ventral (anterior) Forearm 1 day                          Imaging: Reviewed radiology reports from this admission including: chest xray    Recent Cultures (last 7 days):   Results from last 7 days   Lab Units 02/28/24  1229   BLOOD CULTURE  No Growth at 24 hrs.  No Growth at 24 hrs.       Last 24 Hours Medication List:   Current Facility-Administered Medications   Medication Dose Route Frequency Provider Last Rate    acetaminophen   650 mg Oral Q6H PRN Luzma Jones MD      amLODIPine  10 mg Oral HS Luzma Jones MD      atorvastatin  40 mg Oral HS Luzma Jones MD      budesonide  0.5 mg Nebulization Q12H Luzma Jones MD      calcium carbonate  500 mg Oral BID PRN Hailey Wattx, CRNP      cefTRIAXone  1,000 mg Intravenous Q24H Luzma Jones MD 1,000 mg (02/29/24 1048)    cloNIDine  0.2 mg Oral Q12H NATHALY Luzma Jones MD      enoxaparin  40 mg Subcutaneous Daily Luzma Jones MD      Fluticasone Furoate-Vilanterol  1 puff Inhalation Daily Luzma Jones MD      And    umeclidinium  1 puff Inhalation Daily Luzma Jones MD      glipiZIDE  10 mg Oral Early Morning Luzma Jones MD      hydrOXYzine HCL  10 mg Oral Q6H PRN Luzma Jones MD      insulin lispro  1-6 Units Subcutaneous TID AC Luzma Jones MD      insulin lispro  1-6 Units Subcutaneous HS Luzma Jones MD      ipratropium-albuterol  3 mL Nebulization Q6H Luzma Jones MD      lisinopril  40 mg Oral Daily Luzma Jones MD      metoprolol tartrate  100 mg Oral Q12H NATHALY Luzma Jones MD      nicotine  1 patch Transdermal Daily Lumza Jones MD      ondansetron  4 mg Intravenous Q6H PRN Luzma Jones MD      pantoprazole  20 mg Oral Early Morning Luzma Jones MD      pioglitazone  15 mg Oral Daily Luzma Jones MD      predniSONE  40 mg Oral Daily Luzma Jones MD      sodium chloride  1 g Oral TID With Meals Joselyn Reyes Bahamonde, MD      torsemide  5 mg Oral Daily Joselyn Reyes Bahamonde, MD          Today, Patient Was Seen By: Whitney Wilburn MD    **Please Note: This note may have been constructed using a voice recognition system.**

## 2024-03-01 NOTE — NURSING NOTE
Assumed care of patient at this time. Report received from previous RN. Patient resting comfortably in bed with eyes closed. No signs of distress noted. Vital signs stable.

## 2024-03-01 NOTE — PLAN OF CARE
Problem: Potential for Falls  Goal: Patient will remain free of falls  Description: INTERVENTIONS:  - Educate patient/family on patient safety including physical limitations  - Instruct patient to call for assistance with activity   - Consult OT/PT to assist with strengthening/mobility   - Keep Call bell within reach  - Keep bed low and locked with side rails adjusted as appropriate  - Keep care items and personal belongings within reach  - Initiate and maintain comfort rounds  - Make Fall Risk Sign visible to staff  - Offer Toileting every 2 Hours, in advance of need  - Initiate/Maintain alarm  - Obtain necessary fall risk management equipment:   - Apply yellow socks and bracelet for high fall risk patients  - Consider moving patient to room near nurses station  Outcome: Progressing     Problem: PAIN - ADULT  Goal: Verbalizes/displays adequate comfort level or baseline comfort level  Description: Interventions:  - Encourage patient to monitor pain and request assistance  - Assess pain using appropriate pain scale  - Administer analgesics based on type and severity of pain and evaluate response  - Implement non-pharmacological measures as appropriate and evaluate response  - Consider cultural and social influences on pain and pain management  - Notify physician/advanced practitioner if interventions unsuccessful or patient reports new pain  Outcome: Progressing     Problem: INFECTION - ADULT  Goal: Absence or prevention of progression during hospitalization  Description: INTERVENTIONS:  - Assess and monitor for signs and symptoms of infection  - Monitor lab/diagnostic results  - Monitor all insertion sites, i.e. indwelling lines, tubes, and drains  - Monitor endotracheal if appropriate and nasal secretions for changes in amount and color  - [place] appropriate cooling/warming therapies per order  - Administer medications as ordered  - Instruct and encourage patient and family to use good hand hygiene  technique  - Identify and instruct in appropriate isolation precautions for identified infection/condition  Outcome: Progressing     Problem: SAFETY ADULT  Goal: Patient will remain free of falls  Description: INTERVENTIONS:  - Educate patient/family on patient safety including physical limitations  - Instruct patient to call for assistance with activity   - Consult OT/PT to assist with strengthening/mobility   - Keep Call bell within reach  - Keep bed low and locked with side rails adjusted as appropriate  - Keep care items and personal belongings within reach  - Initiate and maintain comfort rounds  - Make Fall Risk Sign visible to staff  - Offer Toileting every 2 Hours, in advance of need  - Initiate/Maintain alarm  - Obtain necessary fall risk management equipment:   - Apply yellow socks and bracelet for high fall risk patients  - Consider moving patient to room near nurses station  Outcome: Progressing  Goal: Maintain or return to baseline ADL function  Description: INTERVENTIONS:  -  Assess patient's ability to carry out ADLs; assess patient's baseline for ADL function and identify physical deficits which impact ability to perform ADLs (bathing, care of mouth/teeth, toileting, grooming, dressing, etc.)  - Assess/evaluate cause of self-care deficits   - Assess range of motion  - Assess patient's mobility; develop plan if impaired  - Assess patient's need for assistive devices and provide as appropriate  - Encourage maximum independence but intervene and supervise when necessary  - Involve family in performance of ADLs  - Assess for home care needs following discharge   - Consider OT consult to assist with ADL evaluation and planning for discharge  - Provide patient education as appropriate  Outcome: Progressing  Goal: Maintains/Returns to pre admission functional level  Description: INTERVENTIONS:  - Perform AM-PAC 6 Click Basic Mobility/ Daily Activity assessment daily.  - Set and communicate daily mobility  goal to care team and patient/family/caregiver.   - Collaborate with rehabilitation services on mobility goals if consulted  - Perform Range of Motion 3 times a day.  - Reposition patient every 2 hours.  - Dangle patient 3 times a day  - Stand patient 3 times a day  - Ambulate patient 3 times a day  - Out of bed to chair 3 times a day   - Out of bed for meals 3 times a day  - Out of bed for toileting  - Record patient progress and toleration of activity level   Outcome: Progressing     Problem: DISCHARGE PLANNING  Goal: Discharge to home or other facility with appropriate resources  Description: INTERVENTIONS:  - Identify barriers to discharge w/patient and caregiver  - Arrange for needed discharge resources and transportation as appropriate  - Identify discharge learning needs (meds, wound care, etc.)  - Arrange for interpretive services to assist at discharge as needed  - Refer to Case Management Department for coordinating discharge planning if the patient needs post-hospital services based on physician/advanced practitioner order or complex needs related to functional status, cognitive ability, or social support system  Outcome: Progressing     Problem: Knowledge Deficit  Goal: Patient/family/caregiver demonstrates understanding of disease process, treatment plan, medications, and discharge instructions  Description: Complete learning assessment and assess knowledge base.  Interventions:  - Provide teaching at level of understanding  - Provide teaching via preferred learning methods  Outcome: Progressing     Problem: RESPIRATORY - ADULT  Goal: Achieves optimal ventilation and oxygenation  Description: INTERVENTIONS:  - Assess for changes in respiratory status  - Assess for changes in mentation and behavior  - Position to facilitate oxygenation and minimize respiratory effort  - Oxygen administered by appropriate delivery if ordered  - Initiate smoking cessation education as indicated  - Encourage  broncho-pulmonary hygiene including cough, deep breathe, Incentive Spirometry  - Assess the need for suctioning and aspirate as needed  - Assess and instruct to report SOB or any respiratory difficulty  - Respiratory Therapy support as indicated  Outcome: Progressing     Problem: METABOLIC, FLUID AND ELECTROLYTES - ADULT  Goal: Electrolytes maintained within normal limits  Description: INTERVENTIONS:  - Monitor labs and assess patient for signs and symptoms of electrolyte imbalances  - Administer electrolyte replacement as ordered  - Monitor response to electrolyte replacements, including repeat lab results as appropriate  - Instruct patient on fluid and nutrition as appropriate  Outcome: Progressing  Goal: Fluid balance maintained  Description: INTERVENTIONS:  - Monitor labs   - Monitor I/O and WT  - Instruct patient on fluid and nutrition as appropriate  - Assess for signs & symptoms of volume excess or deficit  Outcome: Progressing  Goal: Glucose maintained within target range  Description: INTERVENTIONS:  - Monitor Blood Glucose as ordered  - Assess for signs and symptoms of hyperglycemia and hypoglycemia  - Administer ordered medications to maintain glucose within target range  - Assess nutritional intake and initiate nutrition service referral as needed  Outcome: Progressing     Problem: Anxiety  Goal: Anxiety is at manageable level  Description: Interventions:  - Assess and monitor patient's anxiety level.   - Monitor for signs and symptoms (heart palpitations, chest pain, shortness of breath, headaches, nausea, feeling jumpy, restlessness, irritable, apprehensive).   - Collaborate with interdisciplinary team and initiate plan and interventions as ordered.  - [place] patient to unit/surroundings  - Explain treatment plan  - Encourage participation in care  - Encourage verbalization of concerns/fears  - Identify coping mechanisms  - Assist in developing anxiety-reducing skills  - Administer/offer alternative  therapies  - Limit or eliminate stimulants  Outcome: Progressing

## 2024-03-01 NOTE — ASSESSMENT & PLAN NOTE
Blood pressures controlled  Cont home medicine regimen of Norvasc, clonidine, lisinopril, and metoprolol

## 2024-03-01 NOTE — ASSESSMENT & PLAN NOTE
Patient states that she was feeling little short of breath and fatigued   In ER, got a dose of prednisone, DuoNeb treatment antibiotic.  Denies any recent fevers or chills.  Leukocytosis noted.    Procalcitonin <0.05 x 1, will check another. If negative, will discontinue antibiotics  COVID RSV influenza is negative.    Continue on DuoNeb treatments and IV Rocephin for now and monitor  Chest X-ray does not show any evidence of pneumonia   Started on po Prednisone, continue for COPD exacerbation

## 2024-03-01 NOTE — NURSING NOTE
Aide found pill at pts bedside. RN identified pill as sodium chloride, so pt did not receive her last dose. Notified NP and instructed to not give, but wait until next dose at 0730.

## 2024-03-01 NOTE — ASSESSMENT & PLAN NOTE
Na on admission decreased to 121 from baseline of 136-138  Continue q6h BMP, Na currently 125  Normal TSH and chest x-ray  Hold Zoloft for now  Further management per Nephrology

## 2024-03-02 LAB
ANION GAP SERPL CALCULATED.3IONS-SCNC: 3 MMOL/L
ANION GAP SERPL CALCULATED.3IONS-SCNC: 4 MMOL/L
BUN SERPL-MCNC: 14 MG/DL (ref 5–25)
BUN SERPL-MCNC: 18 MG/DL (ref 5–25)
CALCIUM SERPL-MCNC: 9.6 MG/DL (ref 8.4–10.2)
CALCIUM SERPL-MCNC: 9.9 MG/DL (ref 8.4–10.2)
CHLORIDE SERPL-SCNC: 91 MMOL/L (ref 96–108)
CHLORIDE SERPL-SCNC: 92 MMOL/L (ref 96–108)
CO2 SERPL-SCNC: 39 MMOL/L (ref 21–32)
CO2 SERPL-SCNC: 40 MMOL/L (ref 21–32)
CREAT SERPL-MCNC: 0.73 MG/DL (ref 0.6–1.3)
CREAT SERPL-MCNC: 0.84 MG/DL (ref 0.6–1.3)
GFR SERPL CREATININE-BSD FRML MDRD: 72 ML/MIN/1.73SQ M
GFR SERPL CREATININE-BSD FRML MDRD: 85 ML/MIN/1.73SQ M
GLUCOSE SERPL-MCNC: 135 MG/DL (ref 65–140)
GLUCOSE SERPL-MCNC: 137 MG/DL (ref 65–140)
GLUCOSE SERPL-MCNC: 195 MG/DL (ref 65–140)
GLUCOSE SERPL-MCNC: 223 MG/DL (ref 65–140)
GLUCOSE SERPL-MCNC: 244 MG/DL (ref 65–140)
GLUCOSE SERPL-MCNC: 249 MG/DL (ref 65–140)
POTASSIUM SERPL-SCNC: 3.8 MMOL/L (ref 3.5–5.3)
POTASSIUM SERPL-SCNC: 4.7 MMOL/L (ref 3.5–5.3)
SODIUM SERPL-SCNC: 134 MMOL/L (ref 135–147)
SODIUM SERPL-SCNC: 135 MMOL/L (ref 135–147)

## 2024-03-02 PROCEDURE — 80048 BASIC METABOLIC PNL TOTAL CA: CPT | Performed by: STUDENT IN AN ORGANIZED HEALTH CARE EDUCATION/TRAINING PROGRAM

## 2024-03-02 PROCEDURE — 99232 SBSQ HOSP IP/OBS MODERATE 35: CPT | Performed by: INTERNAL MEDICINE

## 2024-03-02 PROCEDURE — 93005 ELECTROCARDIOGRAM TRACING: CPT

## 2024-03-02 PROCEDURE — 94640 AIRWAY INHALATION TREATMENT: CPT

## 2024-03-02 PROCEDURE — 99233 SBSQ HOSP IP/OBS HIGH 50: CPT | Performed by: INTERNAL MEDICINE

## 2024-03-02 PROCEDURE — 94760 N-INVAS EAR/PLS OXIMETRY 1: CPT

## 2024-03-02 PROCEDURE — 82948 REAGENT STRIP/BLOOD GLUCOSE: CPT

## 2024-03-02 PROCEDURE — 80048 BASIC METABOLIC PNL TOTAL CA: CPT | Performed by: INTERNAL MEDICINE

## 2024-03-02 RX ORDER — FLUTICASONE PROPIONATE 50 MCG
1 SPRAY, SUSPENSION (ML) NASAL DAILY
Status: DISCONTINUED | OUTPATIENT
Start: 2024-03-02 | End: 2024-03-03 | Stop reason: HOSPADM

## 2024-03-02 RX ORDER — HYDROXYZINE HYDROCHLORIDE 10 MG/1
10 TABLET, FILM COATED ORAL EVERY 6 HOURS PRN
Status: DISCONTINUED | OUTPATIENT
Start: 2024-03-02 | End: 2024-03-03 | Stop reason: HOSPADM

## 2024-03-02 RX ADMIN — METOPROLOL TARTRATE 100 MG: 50 TABLET, FILM COATED ORAL at 08:30

## 2024-03-02 RX ADMIN — BUDESONIDE 0.5 MG: 0.5 INHALANT RESPIRATORY (INHALATION) at 19:27

## 2024-03-02 RX ADMIN — SERTRALINE HYDROCHLORIDE 50 MG: 50 TABLET ORAL at 14:12

## 2024-03-02 RX ADMIN — PIOGLITAZONE HYDROCHLORIDE 15 MG: 15 TABLET ORAL at 08:30

## 2024-03-02 RX ADMIN — TORSEMIDE 5 MG: 10 TABLET ORAL at 07:45

## 2024-03-02 RX ADMIN — BUDESONIDE 0.5 MG: 0.5 INHALANT RESPIRATORY (INHALATION) at 08:20

## 2024-03-02 RX ADMIN — MAGNESIUM HYDROXIDE 30 ML: 400 SUSPENSION ORAL at 08:30

## 2024-03-02 RX ADMIN — LISINOPRIL 40 MG: 20 TABLET ORAL at 08:30

## 2024-03-02 RX ADMIN — IPRATROPIUM BROMIDE AND ALBUTEROL SULFATE 3 ML: 2.5; .5 SOLUTION RESPIRATORY (INHALATION) at 19:27

## 2024-03-02 RX ADMIN — INSULIN LISPRO 2 UNITS: 100 INJECTION, SOLUTION INTRAVENOUS; SUBCUTANEOUS at 17:04

## 2024-03-02 RX ADMIN — FLUTICASONE FUROATE AND VILANTEROL TRIFENATATE 1 PUFF: 100; 25 POWDER RESPIRATORY (INHALATION) at 09:08

## 2024-03-02 RX ADMIN — METOPROLOL TARTRATE 100 MG: 50 TABLET, FILM COATED ORAL at 21:03

## 2024-03-02 RX ADMIN — INSULIN LISPRO 3 UNITS: 100 INJECTION, SOLUTION INTRAVENOUS; SUBCUTANEOUS at 21:03

## 2024-03-02 RX ADMIN — PANTOPRAZOLE SODIUM 20 MG: 20 TABLET, DELAYED RELEASE ORAL at 05:56

## 2024-03-02 RX ADMIN — GLIPIZIDE 10 MG: 5 TABLET ORAL at 05:56

## 2024-03-02 RX ADMIN — PREDNISONE 40 MG: 20 TABLET ORAL at 08:30

## 2024-03-02 RX ADMIN — SODIUM CHLORIDE TAB 1 GM 1 G: 1 TAB at 07:42

## 2024-03-02 RX ADMIN — IPRATROPIUM BROMIDE AND ALBUTEROL SULFATE 3 ML: 2.5; .5 SOLUTION RESPIRATORY (INHALATION) at 13:27

## 2024-03-02 RX ADMIN — CLONIDINE HYDROCHLORIDE 0.2 MG: 0.1 TABLET ORAL at 21:03

## 2024-03-02 RX ADMIN — IPRATROPIUM BROMIDE AND ALBUTEROL SULFATE 3 ML: 2.5; .5 SOLUTION RESPIRATORY (INHALATION) at 08:20

## 2024-03-02 RX ADMIN — ATORVASTATIN CALCIUM 40 MG: 40 TABLET, FILM COATED ORAL at 21:03

## 2024-03-02 RX ADMIN — FLUTICASONE PROPIONATE 1 SPRAY: 50 SPRAY, METERED NASAL at 14:08

## 2024-03-02 RX ADMIN — IPRATROPIUM BROMIDE AND ALBUTEROL SULFATE 3 ML: 2.5; .5 SOLUTION RESPIRATORY (INHALATION) at 01:57

## 2024-03-02 RX ADMIN — AMLODIPINE BESYLATE 10 MG: 10 TABLET ORAL at 21:03

## 2024-03-02 RX ADMIN — ENOXAPARIN SODIUM 40 MG: 40 INJECTION SUBCUTANEOUS at 14:12

## 2024-03-02 RX ADMIN — HYDROXYZINE HYDROCHLORIDE 10 MG: 10 TABLET ORAL at 15:07

## 2024-03-02 RX ADMIN — INSULIN LISPRO 2 UNITS: 100 INJECTION, SOLUTION INTRAVENOUS; SUBCUTANEOUS at 12:09

## 2024-03-02 RX ADMIN — UMECLIDINIUM 1 PUFF: 62.5 AEROSOL, POWDER ORAL at 09:08

## 2024-03-02 RX ADMIN — CALCIUM CARBONATE (ANTACID) CHEW TAB 500 MG 500 MG: 500 CHEW TAB at 14:55

## 2024-03-02 RX ADMIN — CLONIDINE HYDROCHLORIDE 0.2 MG: 0.1 TABLET ORAL at 08:30

## 2024-03-02 NOTE — PLAN OF CARE
Problem: PAIN - ADULT  Goal: Verbalizes/displays adequate comfort level or baseline comfort level  Description: Interventions:  - Encourage patient to monitor pain and request assistance  - Assess pain using appropriate pain scale  - Administer analgesics based on type and severity of pain and evaluate response  - Implement non-pharmacological measures as appropriate and evaluate response  - Consider cultural and social influences on pain and pain management  - Notify physician/advanced practitioner if interventions unsuccessful or patient reports new pain  Outcome: Progressing     Problem: INFECTION - ADULT  Goal: Absence or prevention of progression during hospitalization  Description: INTERVENTIONS:  - Assess and monitor for signs and symptoms of infection  - Monitor lab/diagnostic results  - Monitor all insertion sites, i.e. indwelling lines, tubes, and drains  - Monitor endotracheal if appropriate and nasal secretions for changes in amount and color  - [place] appropriate cooling/warming therapies per order  - Administer medications as ordered  - Instruct and encourage patient and family to use good hand hygiene technique  - Identify and instruct in appropriate isolation precautions for identified infection/condition  Outcome: Progressing     Problem: DISCHARGE PLANNING  Goal: Discharge to home or other facility with appropriate resources  Description: INTERVENTIONS:  - Identify barriers to discharge w/patient and caregiver  - Arrange for needed discharge resources and transportation as appropriate  - Identify discharge learning needs (meds, wound care, etc.)  - Arrange for interpretive services to assist at discharge as needed  - Refer to Case Management Department for coordinating discharge planning if the patient needs post-hospital services based on physician/advanced practitioner order or complex needs related to functional status, cognitive ability, or social support system  Outcome: Progressing      Problem: Knowledge Deficit  Goal: Patient/family/caregiver demonstrates understanding of disease process, treatment plan, medications, and discharge instructions  Description: Complete learning assessment and assess knowledge base.  Interventions:  - Provide teaching at level of understanding  - Provide teaching via preferred learning methods  Outcome: Progressing     Problem: RESPIRATORY - ADULT  Goal: Achieves optimal ventilation and oxygenation  Description: INTERVENTIONS:  - Assess for changes in respiratory status  - Assess for changes in mentation and behavior  - Position to facilitate oxygenation and minimize respiratory effort  - Oxygen administered by appropriate delivery if ordered  - Initiate smoking cessation education as indicated  - Encourage broncho-pulmonary hygiene including cough, deep breathe, Incentive Spirometry  - Assess the need for suctioning and aspirate as needed  - Assess and instruct to report SOB or any respiratory difficulty  - Respiratory Therapy support as indicated  Outcome: Progressing     Problem: METABOLIC, FLUID AND ELECTROLYTES - ADULT  Goal: Electrolytes maintained within normal limits  Description: INTERVENTIONS:  - Monitor labs and assess patient for signs and symptoms of electrolyte imbalances  - Administer electrolyte replacement as ordered  - Monitor response to electrolyte replacements, including repeat lab results as appropriate  - Instruct patient on fluid and nutrition as appropriate  Outcome: Progressing

## 2024-03-02 NOTE — ASSESSMENT & PLAN NOTE
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  S/p 1.8% normal saline and 1 dose of tolvaptan.  Sodium improved to 135.  Baseline appears to be around 136-138.  Appears euvolemic    Repeat BMP at 2 PM  Low-dose torsemide 5 mg daily  Fluid restriction to 2 L  Hold further salt tablet and tolvaptan doses  Nephrology following  I's and O's, daily weight

## 2024-03-02 NOTE — ASSESSMENT & PLAN NOTE
Lab Results   Component Value Date    HGBA1C 7.0 (H) 07/27/2023     Placed on insulin sliding scale  continue glipizide and Actos  Hypoglycemia protocol

## 2024-03-02 NOTE — PROGRESS NOTES
[facility]  Progress Note  Name: Jessika Lux I  MRN: [id number]  Unit/Bed#: -Atul I Date of Admission: 2/28/2024   Date of Service: 3/2/2024 I Hospital Day: 3    Assessment/Plan   * Acute hyponatremia  Assessment & Plan  Presented with sodium of 118 on admission suspected secondary to SIADH based on urine electrolytes.  S/p 1.8% normal saline and 1 dose of tolvaptan.  Sodium improved to 135.  Baseline appears to be around 136-138.  Appears euvolemic    Repeat BMP at 2 PM  Low-dose torsemide 5 mg daily  Fluid restriction to 2 L  Hold further salt tablet and tolvaptan doses  Nephrology following  I's and O's, daily weight    Acute on chronic respiratory failure with hypoxia (HCC)  Assessment & Plan  Uses 2 to 3 L oxygen and at baseline.  Worsening hypoxia, tachycardia, tachypnea likely due to COPD exacerbation.  Worsening oxygen requirement to 4 L on presentation.    Now clinically improving after treatment and wean back to her previous regimen of 3 L    COPD with acute exacerbation (HCC)  Assessment & Plan  Presented with shortness of breath, fatigue.  Suspected COPD exacerbation.  Wheezing has improved significantly.    Continue p.o. prednisone 40 mg daily for total of 5 doses  Fluticasone vilanterol inhalers  Atrovent, Xopenex, Pulmicort nebulization    Type 2 diabetes mellitus without complication, without long-term current use of insulin (HCC)  Assessment & Plan  Lab Results   Component Value Date    HGBA1C 7.0 (H) 07/27/2023     Placed on insulin sliding scale  continue glipizide and Actos  Hypoglycemia protocol      Primary hypertension  Assessment & Plan  Cont home medicine regimen of Norvasc, clonidine, lisinopril, and metoprolol    Tobacco abuse  Assessment & Plan  Counseled on smoking cessation and placed on nicotine replacement therapy             VTE Pharmacologic Prophylaxis:   Moderate Risk (Score 3-4) - Pharmacological DVT Prophylaxis Ordered: enoxaparin  (Lovenox).    Mobility:   Basic Mobility Inpatient Raw Score: 23  JH-HLM Goal: 7: Walk 25 feet or more  JH-HLM Achieved: 7: Walk 25 feet or more  HLM Goal achieved. Continue to encourage appropriate mobility.    Patient Centered Rounds: I performed bedside rounds with nursing staff today.   Discussions with Specialists or Other Care Team Provider: Nephrology    Education and Discussions with Family / Patient: Updated  (granddaughter) via phone.    Current Length of Stay: 3 day(s)  Current Patient Status: Inpatient   Certification Statement: The patient will continue to require additional inpatient hospital stay due to ongoing medical management for hyponatremia  Discharge Plan: Anticipate discharge in 24-48 hrs to home.    Code Status: Level 1 - Full Code    Subjective:   States shortness of breath is improved mildly.  Denies any other complaints.  On baseline 2 to 3 L.    Objective:     Vitals:   Temp (24hrs), Av.6 °F (36.4 °C), Min:97 °F (36.1 °C), Max:98.1 °F (36.7 °C)    Temp:  [97 °F (36.1 °C)-98.1 °F (36.7 °C)] 97.7 °F (36.5 °C)  HR:  [] 117  Resp:  [18] 18  BP: (101-168)/(59-90) 168/90  SpO2:  [96 %-99 %] 98 %  Body mass index is 28.7 kg/m².     Input and Output Summary (last 24 hours):   No intake or output data in the 24 hours ending 24 1344      Physical Exam:   Physical Exam  Vitals and nursing note reviewed.   Constitutional:       General: She is not in acute distress.     Appearance: Normal appearance. She is well-developed.   HENT:      Head: Normocephalic and atraumatic.   Eyes:      General: No scleral icterus.        Right eye: No discharge.         Left eye: No discharge.      Conjunctiva/sclera: Conjunctivae normal.   Cardiovascular:      Rate and Rhythm: Normal rate and regular rhythm.      Pulses: Normal pulses.      Heart sounds: Normal heart sounds. No murmur heard.  Pulmonary:      Effort: Pulmonary effort is normal. No respiratory distress.      Breath sounds:  Normal breath sounds. No wheezing or rales.   Chest:      Chest wall: No tenderness.   Abdominal:      General: Abdomen is flat. Bowel sounds are normal. There is no distension.      Palpations: Abdomen is soft.      Tenderness: There is no abdominal tenderness.   Musculoskeletal:         General: No swelling or deformity. Normal range of motion.      Cervical back: Normal range of motion and neck supple.      Right lower leg: No edema.      Left lower leg: No edema.   Skin:     General: Skin is warm and dry.      Capillary Refill: Capillary refill takes less than 2 seconds.      Coloration: Skin is not jaundiced or pale.      Findings: No rash.   Neurological:      General: No focal deficit present.      Mental Status: She is alert and oriented to person, place, and time. Mental status is at baseline.      Cranial Nerves: No cranial nerve deficit.      Motor: No weakness.   Psychiatric:         Mood and Affect: Mood normal.         Behavior: Behavior normal.            Additional Data:     Labs:  Results from last 7 days   Lab Units 02/28/24  0923   WBC Thousand/uL 12.36*   HEMOGLOBIN g/dL 13.6   HEMATOCRIT % 39.7   PLATELETS Thousands/uL 251   NEUTROS PCT % 79*   LYMPHS PCT % 11*   MONOS PCT % 9   EOS PCT % 0     Results from last 7 days   Lab Units 03/02/24  0458 02/28/24  1007 02/28/24  0923   SODIUM mmol/L 135   < > 122*   POTASSIUM mmol/L 3.8   < > 4.2   CHLORIDE mmol/L 92*   < > 78*   CO2 mmol/L 39*   < > 36*   BUN mg/dL 14   < > 6   CREATININE mg/dL 0.73   < > 0.62   ANION GAP mmol/L 4   < > 8   CALCIUM mg/dL 9.9   < > 10.1   ALBUMIN g/dL  --   --  4.6   TOTAL BILIRUBIN mg/dL  --   --  0.43   ALK PHOS U/L  --   --  102   ALT U/L  --   --  10   AST U/L  --   --  14   GLUCOSE RANDOM mg/dL 135   < > 197*    < > = values in this interval not displayed.         Results from last 7 days   Lab Units 03/02/24  1123 03/02/24  0730 03/01/24  2115 03/01/24  1549 03/01/24  1118 03/01/24  0745 02/29/24 2046  02/29/24  1604 02/29/24  1118 02/29/24  0801 02/28/24  2043 02/28/24  1659   POC GLUCOSE mg/dl 195* 137 161* 229* 214* 125 130 98 101 90 109 324*         Results from last 7 days   Lab Units 03/01/24  0442 02/29/24  0845 02/28/24  1229   LACTIC ACID mmol/L  --   --  1.0   PROCALCITONIN ng/ml <0.05 <0.05  --        Lines/Drains:  Invasive Devices       Peripheral Intravenous Line  Duration             Peripheral IV Dorsal (posterior);Left Hand -- days    Peripheral IV 02/28/24 Proximal;Right;Ventral (anterior) Forearm 3 days                          Imaging: Reviewed radiology reports from this admission including: chest xray    Recent Cultures (last 7 days):   Results from last 7 days   Lab Units 02/28/24  1229   BLOOD CULTURE  No Growth at 48 hrs.  No Growth at 48 hrs.       Last 24 Hours Medication List:   Current Facility-Administered Medications   Medication Dose Route Frequency Provider Last Rate    acetaminophen  650 mg Oral Q6H PRN Luzma Jones MD      amLODIPine  10 mg Oral HS Luzma Jones MD      atorvastatin  40 mg Oral HS Luzma Jones MD      budesonide  0.5 mg Nebulization Q12H Luzma Jones MD      calcium carbonate  500 mg Oral BID PRN AYLIN Morrell      cloNIDine  0.2 mg Oral Q12H NATHALY Luzma Jones MD      enoxaparin  40 mg Subcutaneous Daily Luzma Jones MD      fluticasone  1 spray Each Nare Daily Rylie White MD      Fluticasone Furoate-Vilanterol  1 puff Inhalation Daily Luzma Jones MD      And    umeclidinium  1 puff Inhalation Daily Luzma Jones MD      glipiZIDE  10 mg Oral Early Morning Luzma Jones MD      hydrOXYzine HCL  10 mg Oral Q6H PRN Luzma Jones MD      insulin lispro  1-6 Units Subcutaneous TID AC Luzma Jones MD      insulin lispro  1-6 Units Subcutaneous HS Luzma Jones MD      ipratropium-albuterol  3 mL Nebulization Q6H Luzma Jones MD      lisinopril  40 mg Oral Daily Luzma Jones MD      magnesium hydroxide  30 mL Oral Daily PRN Rylie White MD       metoprolol tartrate  100 mg Oral Q12H [facility] Luzma Jones MD      nicotine  1 patch Transdermal Daily Luzma Jones MD      ondansetron  4 mg Intravenous Q6H PRN Luzma Jones MD      pantoprazole  20 mg Oral Early Morning Luzma Jones MD      pioglitazone  15 mg Oral Daily Luzma Jones MD      predniSONE  40 mg Oral Daily Luzma Jones MD      sertraline  50 mg Oral Daily Rylie White MD      torsemide  5 mg Oral Daily Joselyn Reyes Bahamonde, MD          Today, Patient Was Seen By: Rylie White MD    **Please Note: This note may have been constructed using a voice recognition system.**

## 2024-03-02 NOTE — PROGRESS NOTES
Progress Note - Nephrology   Jessika JEANETTE Lux 69 y.o. female MRN: [id number]  Unit/Bed#: -01 Encounter: [id number]    A/P:  1.  Acute on chronic hyponatremia, hypoosmolar, appears euvolemic to slightly hypovolemic at present.  Patient received 1.8% saline on 2/29 and apparently worsened.   She was started on salt tablets on 2/29 but did not have a robust response.  There was a question if she was taking her sodium chloride tablets appropriately.  She received a one-time dose of tolvaptan 7.5 mg and was continued on a fluid restriction at 1.8 L.  Etiology of hyponatremia felt to be due to SIADH.    In the past 24 hours, her sodium has climbed 10 mmol/L, which is a bit brisk in the setting of tolvaptan, fluid restriction, salt tablets and diuretic.    Recommend to liberalize fluid restriction to 2 L.  Hold on further tolvaptan and sodium chloride tablet ministration.  Can cautiously continue low-dose torsemide 5 mg daily. Will follow-up chemistry at 2 PM.    Discussed care with primary team about holding tolvaptan/sodium chloride tablets and following up BMP at 2.    2.  Tachycardia, potentially slightly volume contracted with combination of diuretics and tolvaptan.  May administer  mL bolus if this persists.  LR has slightly lower sodium concentration which may be helpful.    3.  Hypertension  Appears euvolemic to slightly hypovolemic with tachycardia.  Continue her current regimen amlodipine 10 mg/day, torsemide 5 mg/day, lisinopril 40 mg a day, clonidine 0.2 mg twice daily, metoprolol 100 mg twice daily.    4.  COPD  Management per primary team.    5.  Diabetes, A1c 7, under reasonable control at present.  Continue diabetic control per primary care team.    6.  Elevated bicarbonate due to metabolic alkalosis from diuretics and compensation from chronic respiratory acidosis in setting of COPD      Follow up reason for today's visit:     Acute hyponatremia      Subjective:   Patient seen and examined  "today.  She reports to be cold but otherwise has mild chronic shortness of breath.  Denies chest pain, nausea, vomiting, diarrhea, dizziness, lightheadedness.  She did report increased urine output which can be expected with Tolvaptan.  A complete 10 point review of systems was performed and is otherwise negative.     Objective:     Vitals: Blood pressure 168/90, pulse (!) 117, temperature 97.7 °F (36.5 °C), resp. rate 18, height 5' 2\" (1.575 m), weight 71.2 kg (156 lb 14.4 oz), SpO2 98%.,Body mass index is 28.7 kg/m².    Weight (last 2 days)       Date/Time Weight    03/02/24 0600 71.2 (156.9)    03/01/24 0600 72.5 (159.8)    02/29/24 0600 73.3 (161.6)              Intake/Output Summary (Last 24 hours) at 3/2/2024 0814  Last data filed at 3/1/2024 0854  Gross per 24 hour   Intake 120 ml   Output --   Net 120 ml     I/O last 3 completed shifts:  In: 342 [P.O.:342]  Out: -          Physical Exam: /90   Pulse (!) 117   Temp 97.7 °F (36.5 °C)   Resp 18   Ht 5' 2\" (1.575 m)   Wt 71.2 kg (156 lb 14.4 oz)   SpO2 98%   BMI 28.70 kg/m²     General Appearance:    Alert, cooperative, no distress, appears stated age   Head:    Normocephalic, without obvious abnormality, atraumatic   Eyes:    Conjunctiva/corneas clear   Ears:    Normal external ears   Nose:   Nares normal, septum midline, mucosa normal, no drainage    or sinus tenderness   Throat:   Lips, mucosa, and tongue normal; teeth and gums normal   Neck:      Back:     Symmetric, no curvature, ROM normal, no CVA tenderness   Lungs:     Clear to auscultation bilaterally, respirations unlabored   Chest wall:    No tenderness or deformity   Heart:    Regular rate and rhythm, S1 and S2 normal, no murmur, rub   or gallop   Abdomen:     Soft, non-tender, bowel sounds active   Extremities:   Extremities normal, atraumatic, no cyanosis or edema   Skin:   Skin color, texture, turgor normal, no rashes or lesions       Neurologic:   CNII-XII intact, tremulous    " "        Lab, Imaging and other studies: I have personally reviewed pertinent labs.  CBC: No results found for: \"WBC\", \"HGB\", \"HCT\", \"MCV\", \"PLT\", \"ADJUSTEDWBC\", \"RBC\", \"MCH\", \"MCHC\", \"RDW\", \"MPV\", \"NRBC\"  CMP:   Lab Results   Component Value Date    K 3.8 03/02/2024    CL 92 (L) 03/02/2024    CO2 39 (H) 03/02/2024    BUN 14 03/02/2024    CREATININE 0.73 03/02/2024    CALCIUM 9.9 03/02/2024    EGFR 85 03/02/2024       .  Results from last 7 days   Lab Units 03/02/24  0458 03/01/24  2220 03/01/24  1626 02/28/24  1007 02/28/24  0923   POTASSIUM mmol/L 3.8 4.5 4.4   < > 4.2   CHLORIDE mmol/L 92* 90* 84*   < > 78*   CO2 mmol/L 39* 37* 36*   < > 36*   BUN mg/dL 14 16 13   < > 6   CREATININE mg/dL 0.73 0.74 0.70   < > 0.62   CALCIUM mg/dL 9.9 9.8 10.2   < > 10.1   ALK PHOS U/L  --   --   --   --  102   ALT U/L  --   --   --   --  10   AST U/L  --   --   --   --  14    < > = values in this interval not displayed.         Phosphorus: No results found for: \"PHOS\"  Magnesium: No results found for: \"MG\"  Urinalysis: No results found for: \"COLORU\", \"CLARITYU\", \"SPECGRAV\", \"PHUR\", \"LEUKOCYTESUR\", \"NITRITE\", \"PROTEINUA\", \"GLUCOSEU\", \"KETONESU\", \"BILIRUBINUR\", \"BLOODU\"  Ionized Calcium: No results found for: \"CAION\"  Coagulation: No results found for: \"PT\", \"INR\", \"APTT\"  Troponin: No results found for: \"TROPONINI\"  ABG: No results found for: \"PHART\", \"GBR9EUE\", \"PO2ART\", \"RHW3IEV\", \"V0WPLMAM\", \"BEART\", \"SOURCE\"  Radiology review:     IMAGING  Procedure: XR chest 1 view portable    Result Date: 2/28/2024  Narrative: XR CHEST PORTABLE INDICATION: Shortness of breath. COMPARISON: November 12, 2023 FINDINGS: Clear right basilar atelectasis/scarring. Otherwise clear lungs. No pneumothorax or pleural effusion. Normal cardiomediastinal silhouette. Bones are unremarkable for age. Normal upper abdomen.     Impression: No acute cardiopulmonary disease. Workstation performed: [id number]       Current Facility-Administered Medications "   Medication Dose Route Frequency    acetaminophen (TYLENOL) tablet 650 mg  650 mg Oral Q6H PRN    amLODIPine (NORVASC) tablet 10 mg  10 mg Oral HS    atorvastatin (LIPITOR) tablet 40 mg  40 mg Oral HS    budesonide (PULMICORT) inhalation solution 0.5 mg  0.5 mg Nebulization Q12H    calcium carbonate (TUMS) chewable tablet 500 mg  500 mg Oral BID PRN    cloNIDine (CATAPRES) tablet 0.2 mg  0.2 mg Oral Q12H NATHALY    enoxaparin (LOVENOX) subcutaneous injection 40 mg  40 mg Subcutaneous Daily    Fluticasone Furoate-Vilanterol 100-25 mcg/actuation 1 puff  1 puff Inhalation Daily    And    umeclidinium 62.5 mcg/actuation inhaler AEPB 1 puff  1 puff Inhalation Daily    glipiZIDE (GLUCOTROL) tablet 10 mg  10 mg Oral Early Morning    hydrOXYzine HCL (ATARAX) tablet 10 mg  10 mg Oral Q6H PRN    insulin lispro (HumALOG/ADMELOG) 100 units/mL subcutaneous injection 1-6 Units  1-6 Units Subcutaneous TID AC    insulin lispro (HumALOG/ADMELOG) 100 units/mL subcutaneous injection 1-6 Units  1-6 Units Subcutaneous HS    ipratropium-albuterol (DUO-NEB) 0.5-2.5 mg/3 mL inhalation solution 3 mL  3 mL Nebulization Q6H    lisinopril (ZESTRIL) tablet 40 mg  40 mg Oral Daily    magnesium hydroxide (MILK OF MAGNESIA) oral suspension 30 mL  30 mL Oral Daily PRN    metoprolol tartrate (LOPRESSOR) tablet 100 mg  100 mg Oral Q12H NATHALY    nicotine (NICODERM CQ) 7 mg/24hr TD 24 hr patch 1 patch  1 patch Transdermal Daily    ondansetron (ZOFRAN) injection 4 mg  4 mg Intravenous Q6H PRN    pantoprazole (PROTONIX) EC tablet 20 mg  20 mg Oral Early Morning    pioglitazone (ACTOS) tablet 15 mg  15 mg Oral Daily    predniSONE tablet 40 mg  40 mg Oral Daily    sodium chloride tablet 1 g  1 g Oral TID With Meals    torsemide (DEMADEX) tablet 5 mg  5 mg Oral Daily     Medications Discontinued During This Encounter   Medication Reason    LORazepam (Ativan) 0.5 mg tablet Error    nicotine (NICODERM CQ) 7 mg/24hr TD 24 hr patch 1 patch     ipratropium-albuterol  (DUO-NEB) 0.5-2.5 mg/3 mL inhalation solution 3 mL     sodium chloride 0.9 % infusion     sodium chloride tablet 1 g     ipratropium-albuterol (DUO-NEB) 0.5-2.5 mg/3 mL inhalation solution 3 mL     ipratropium-albuterol (DUO-NEB) 0.5-2.5 mg/3 mL inhalation solution 3 mL     cefTRIAXone (ROCEPHIN) IVPB (premix in dextrose) 1,000 mg 50 mL        Karen Verduzco,       This progress note was produced in part using a dictation device which may document imprecise wording from author's original intent.

## 2024-03-02 NOTE — PLAN OF CARE
Problem: Potential for Falls  Goal: Patient will remain free of falls  Description: INTERVENTIONS:  - Educate patient/family on patient safety including physical limitations  - Instruct patient to call for assistance with activity   - Consult OT/PT to assist with strengthening/mobility   - Keep Call bell within reach  - Keep bed low and locked with side rails adjusted as appropriate  - Keep care items and personal belongings within reach  - Initiate and maintain comfort rounds  - Make Fall Risk Sign visible to staff  - Offer Toileting every 2 Hours, in advance of need  - Apply yellow socks and bracelet for high fall risk patients  - Consider moving patient to room near nurses station  Outcome: Progressing     Problem: PAIN - ADULT  Goal: Verbalizes/displays adequate comfort level or baseline comfort level  Description: Interventions:  - Encourage patient to monitor pain and request assistance  - Assess pain using appropriate pain scale  - Administer analgesics based on type and severity of pain and evaluate response  - Implement non-pharmacological measures as appropriate and evaluate response  - Consider cultural and social influences on pain and pain management  - Notify physician/advanced practitioner if interventions unsuccessful or patient reports new pain  Outcome: Progressing     Problem: INFECTION - ADULT  Goal: Absence or prevention of progression during hospitalization  Description: INTERVENTIONS:  - Assess and monitor for signs and symptoms of infection  - Monitor lab/diagnostic results  - Monitor all insertion sites, i.e. indwelling lines, tubes, and drains  - Monitor endotracheal if appropriate and nasal secretions for changes in amount and color  - [place] appropriate cooling/warming therapies per order  - Administer medications as ordered  - Instruct and encourage patient and family to use good hand hygiene technique  - Identify and instruct in appropriate isolation precautions for identified  infection/condition  Outcome: Progressing     Problem: SAFETY ADULT  Goal: Patient will remain free of falls  Description: INTERVENTIONS:  - Educate patient/family on patient safety including physical limitations  - Instruct patient to call for assistance with activity   - Consult OT/PT to assist with strengthening/mobility   - Keep Call bell within reach  - Keep bed low and locked with side rails adjusted as appropriate  - Keep care items and personal belongings within reach  - Initiate and maintain comfort rounds  - Make Fall Risk Sign visible to staff  - Offer Toileting every 2 Hours, in advance of need  - Apply yellow socks and bracelet for high fall risk patients  - Consider moving patient to room near nurses station  Outcome: Progressing  Goal: Maintain or return to baseline ADL function  Description: INTERVENTIONS:  - Educate patient/family on patient safety including physical limitations  - Instruct patient to call for assistance with activity   - Consult OT/PT to assist with strengthening/mobility   - Keep Call bell within reach  - Keep bed low and locked with side rails adjusted as appropriate  - Keep care items and personal belongings within reach  - Initiate and maintain comfort rounds  - Make Fall Risk Sign visible to staff  - Offer Toileting every 2 Hours, in advance of need  - Apply yellow socks and bracelet for high fall risk patients  - Consider moving patient to room near nurses station  Outcome: Progressing  Goal: Maintains/Returns to pre admission functional level  Description: INTERVENTIONS:  -  Assess patient's ability to carry out ADLs; assess patient's baseline for ADL function and identify physical deficits which impact ability to perform ADLs (bathing, care of mouth/teeth, toileting, grooming, dressing, etc.)  - Assess/evaluate cause of self-care deficits   - Assess range of motion  - Assess patient's mobility; develop plan if impaired  - Assess patient's need for assistive devices and  provide as appropriate  - Encourage maximum independence but intervene and supervise when necessary  - Involve family in performance of ADLs  - Assess for home care needs following discharge   - Consider OT consult to assist with ADL evaluation and planning for discharge  - Provide patient education as appropriate  Outcome: Progressing     Problem: DISCHARGE PLANNING  Goal: Discharge to home or other facility with appropriate resources  Description: INTERVENTIONS:  - Identify barriers to discharge w/patient and caregiver  - Arrange for needed discharge resources and transportation as appropriate  - Identify discharge learning needs (meds, wound care, etc.)  - Arrange for interpretive services to assist at discharge as needed  - Refer to Case Management Department for coordinating discharge planning if the patient needs post-hospital services based on physician/advanced practitioner order or complex needs related to functional status, cognitive ability, or social support system  Outcome: Progressing     Problem: Knowledge Deficit  Goal: Patient/family/caregiver demonstrates understanding of disease process, treatment plan, medications, and discharge instructions  Description: Complete learning assessment and assess knowledge base.  Interventions:  - Provide teaching at level of understanding  - Provide teaching via preferred learning methods  Outcome: Progressing     Problem: RESPIRATORY - ADULT  Goal: Achieves optimal ventilation and oxygenation  Description: INTERVENTIONS:  - Assess for changes in respiratory status  - Assess for changes in mentation and behavior  - Position to facilitate oxygenation and minimize respiratory effort  - Oxygen administered by appropriate delivery if ordered  - Initiate smoking cessation education as indicated  - Encourage broncho-pulmonary hygiene including cough, deep breathe, Incentive Spirometry  - Assess the need for suctioning and aspirate as needed  - Assess and instruct to  report SOB or any respiratory difficulty  - Respiratory Therapy support as indicated  Outcome: Progressing     Problem: METABOLIC, FLUID AND ELECTROLYTES - ADULT  Goal: Electrolytes maintained within normal limits  Description: INTERVENTIONS:  - Monitor labs and assess patient for signs and symptoms of electrolyte imbalances  - Administer electrolyte replacement as ordered  - Monitor response to electrolyte replacements, including repeat lab results as appropriate  - Instruct patient on fluid and nutrition as appropriate  Outcome: Progressing  Goal: Fluid balance maintained  Description: INTERVENTIONS:  - Monitor labs   - Monitor I/O and WT  - Instruct patient on fluid and nutrition as appropriate  - Assess for signs & symptoms of volume excess or deficit  Outcome: Progressing  Goal: Glucose maintained within target range  Description: INTERVENTIONS:  - Monitor Blood Glucose as ordered  - Assess for signs and symptoms of hyperglycemia and hypoglycemia  - Administer ordered medications to maintain glucose within target range  - Assess nutritional intake and initiate nutrition service referral as needed  Outcome: Progressing     Problem: Anxiety  Goal: Anxiety is at manageable level  Description: Interventions:  - Assess and monitor patient's anxiety level.   - Monitor for signs and symptoms (heart palpitations, chest pain, shortness of breath, headaches, nausea, feeling jumpy, restlessness, irritable, apprehensive).   - Collaborate with interdisciplinary team and initiate plan and interventions as ordered.  - [place] patient to unit/surroundings  - Explain treatment plan  - Encourage participation in care  - Encourage verbalization of concerns/fears  - Identify coping mechanisms  - Assist in developing anxiety-reducing skills  - Administer/offer alternative therapies  - Limit or eliminate stimulants  Outcome: Progressing

## 2024-03-02 NOTE — ASSESSMENT & PLAN NOTE
Presented with shortness of breath, fatigue.  Suspected COPD exacerbation.  Wheezing has improved significantly.    Continue p.o. prednisone 40 mg daily for total of 5 doses  Fluticasone vilanterol inhalers  Atrovent, Xopenex, Pulmicort nebulization

## 2024-03-02 NOTE — ASSESSMENT & PLAN NOTE
Uses 2 to 3 L oxygen and at baseline.  Worsening hypoxia, tachycardia, tachypnea likely due to COPD exacerbation.  Worsening oxygen requirement to 4 L on presentation.    Now clinically improving after treatment and wean back to her previous regimen of 3 L

## 2024-03-03 VITALS
BODY MASS INDEX: 29.21 KG/M2 | OXYGEN SATURATION: 98 % | HEIGHT: 62 IN | WEIGHT: 158.73 LBS | TEMPERATURE: 97.9 F | HEART RATE: 65 BPM | SYSTOLIC BLOOD PRESSURE: 130 MMHG | DIASTOLIC BLOOD PRESSURE: 71 MMHG | RESPIRATION RATE: 20 BRPM

## 2024-03-03 LAB
ANION GAP SERPL CALCULATED.3IONS-SCNC: 6 MMOL/L
BASOPHILS # BLD AUTO: 0.01 THOUSANDS/ÂΜL (ref 0–0.1)
BASOPHILS NFR BLD AUTO: 0 % (ref 0–1)
BUN SERPL-MCNC: 18 MG/DL (ref 5–25)
CALCIUM SERPL-MCNC: 9.8 MG/DL (ref 8.4–10.2)
CHLORIDE SERPL-SCNC: 92 MMOL/L (ref 96–108)
CO2 SERPL-SCNC: 37 MMOL/L (ref 21–32)
CREAT SERPL-MCNC: 0.7 MG/DL (ref 0.6–1.3)
EOSINOPHIL # BLD AUTO: 0 THOUSAND/ÂΜL (ref 0–0.61)
EOSINOPHIL NFR BLD AUTO: 0 % (ref 0–6)
ERYTHROCYTE [DISTWIDTH] IN BLOOD BY AUTOMATED COUNT: 12.1 % (ref 11.6–15.1)
GFR SERPL CREATININE-BSD FRML MDRD: 89 ML/MIN/1.73SQ M
GLUCOSE SERPL-MCNC: 103 MG/DL (ref 65–140)
GLUCOSE SERPL-MCNC: 153 MG/DL (ref 65–140)
GLUCOSE SERPL-MCNC: 184 MG/DL (ref 65–140)
HCT VFR BLD AUTO: 35 % (ref 34.8–46.1)
HGB BLD-MCNC: 11.5 G/DL (ref 11.5–15.4)
IMM GRANULOCYTES # BLD AUTO: 0.04 THOUSAND/UL (ref 0–0.2)
IMM GRANULOCYTES NFR BLD AUTO: 1 % (ref 0–2)
LYMPHOCYTES # BLD AUTO: 1.69 THOUSANDS/ÂΜL (ref 0.6–4.47)
LYMPHOCYTES NFR BLD AUTO: 21 % (ref 14–44)
MCH RBC QN AUTO: 29 PG (ref 26.8–34.3)
MCHC RBC AUTO-ENTMCNC: 32.9 G/DL (ref 31.4–37.4)
MCV RBC AUTO: 88 FL (ref 82–98)
MONOCYTES # BLD AUTO: 0.93 THOUSAND/ÂΜL (ref 0.17–1.22)
MONOCYTES NFR BLD AUTO: 12 % (ref 4–12)
NEUTROPHILS # BLD AUTO: 5.43 THOUSANDS/ÂΜL (ref 1.85–7.62)
NEUTS SEG NFR BLD AUTO: 66 % (ref 43–75)
NRBC BLD AUTO-RTO: 0 /100 WBCS
PLATELET # BLD AUTO: 224 THOUSANDS/UL (ref 149–390)
PMV BLD AUTO: 10 FL (ref 8.9–12.7)
POTASSIUM SERPL-SCNC: 4.3 MMOL/L (ref 3.5–5.3)
RBC # BLD AUTO: 3.97 MILLION/UL (ref 3.81–5.12)
SODIUM SERPL-SCNC: 135 MMOL/L (ref 135–147)
WBC # BLD AUTO: 8.1 THOUSAND/UL (ref 4.31–10.16)

## 2024-03-03 PROCEDURE — 80048 BASIC METABOLIC PNL TOTAL CA: CPT | Performed by: INTERNAL MEDICINE

## 2024-03-03 PROCEDURE — 94640 AIRWAY INHALATION TREATMENT: CPT

## 2024-03-03 PROCEDURE — 94760 N-INVAS EAR/PLS OXIMETRY 1: CPT

## 2024-03-03 PROCEDURE — 99232 SBSQ HOSP IP/OBS MODERATE 35: CPT | Performed by: INTERNAL MEDICINE

## 2024-03-03 PROCEDURE — 85025 COMPLETE CBC W/AUTO DIFF WBC: CPT | Performed by: INTERNAL MEDICINE

## 2024-03-03 PROCEDURE — 99239 HOSP IP/OBS DSCHRG MGMT >30: CPT | Performed by: STUDENT IN AN ORGANIZED HEALTH CARE EDUCATION/TRAINING PROGRAM

## 2024-03-03 PROCEDURE — 82948 REAGENT STRIP/BLOOD GLUCOSE: CPT

## 2024-03-03 PROCEDURE — 94664 DEMO&/EVAL PT USE INHALER: CPT

## 2024-03-03 RX ORDER — TORSEMIDE 5 MG/1
5 TABLET ORAL DAILY
Qty: 30 TABLET | Refills: 1 | Status: ON HOLD | OUTPATIENT
Start: 2024-03-04

## 2024-03-03 RX ADMIN — FLUTICASONE PROPIONATE 1 SPRAY: 50 SPRAY, METERED NASAL at 08:20

## 2024-03-03 RX ADMIN — SERTRALINE HYDROCHLORIDE 50 MG: 50 TABLET ORAL at 08:25

## 2024-03-03 RX ADMIN — IPRATROPIUM BROMIDE AND ALBUTEROL SULFATE 3 ML: 2.5; .5 SOLUTION RESPIRATORY (INHALATION) at 08:29

## 2024-03-03 RX ADMIN — IPRATROPIUM BROMIDE AND ALBUTEROL SULFATE 3 ML: 2.5; .5 SOLUTION RESPIRATORY (INHALATION) at 13:21

## 2024-03-03 RX ADMIN — INSULIN LISPRO 1 UNITS: 100 INJECTION, SOLUTION INTRAVENOUS; SUBCUTANEOUS at 12:10

## 2024-03-03 RX ADMIN — GLIPIZIDE 10 MG: 5 TABLET ORAL at 05:33

## 2024-03-03 RX ADMIN — IPRATROPIUM BROMIDE AND ALBUTEROL SULFATE 3 ML: 2.5; .5 SOLUTION RESPIRATORY (INHALATION) at 01:00

## 2024-03-03 RX ADMIN — FLUTICASONE FUROATE AND VILANTEROL TRIFENATATE 1 PUFF: 100; 25 POWDER RESPIRATORY (INHALATION) at 08:20

## 2024-03-03 RX ADMIN — PANTOPRAZOLE SODIUM 20 MG: 20 TABLET, DELAYED RELEASE ORAL at 05:33

## 2024-03-03 RX ADMIN — BUDESONIDE 0.5 MG: 0.5 INHALANT RESPIRATORY (INHALATION) at 08:29

## 2024-03-03 RX ADMIN — CALCIUM CARBONATE (ANTACID) CHEW TAB 500 MG 500 MG: 500 CHEW TAB at 10:54

## 2024-03-03 RX ADMIN — PIOGLITAZONE HYDROCHLORIDE 15 MG: 15 TABLET ORAL at 08:25

## 2024-03-03 RX ADMIN — HYDROXYZINE HYDROCHLORIDE 10 MG: 10 TABLET ORAL at 09:19

## 2024-03-03 RX ADMIN — TORSEMIDE 5 MG: 10 TABLET ORAL at 08:26

## 2024-03-03 RX ADMIN — PREDNISONE 40 MG: 20 TABLET ORAL at 08:25

## 2024-03-03 RX ADMIN — METOPROLOL TARTRATE 100 MG: 50 TABLET, FILM COATED ORAL at 08:25

## 2024-03-03 RX ADMIN — UMECLIDINIUM 1 PUFF: 62.5 AEROSOL, POWDER ORAL at 08:21

## 2024-03-03 RX ADMIN — LISINOPRIL 40 MG: 20 TABLET ORAL at 08:25

## 2024-03-03 RX ADMIN — CLONIDINE HYDROCHLORIDE 0.2 MG: 0.1 TABLET ORAL at 08:25

## 2024-03-03 NOTE — DISCHARGE INSTR - AVS FIRST PAGE
Starting daily torsemide 5 mg   Follow up with PCP in 1-2 weeks after discharge   Follow up with nephrology as instructed   Take medications as listed on discharge or otherwise only if instructed by provider.   Call your doctor or seek medical care if confusion, lightheadness or new concerning symptoms.   Repeate blood work , Basic Metabolic Panel , I na week from discharge to check your kidney function and sodium level.

## 2024-03-03 NOTE — PLAN OF CARE
Problem: PAIN - ADULT  Goal: Verbalizes/displays adequate comfort level or baseline comfort level  Description: Interventions:  - Encourage patient to monitor pain and request assistance  - Assess pain using appropriate pain scale  - Administer analgesics based on type and severity of pain and evaluate response  - Implement non-pharmacological measures as appropriate and evaluate response  - Consider cultural and social influences on pain and pain management  - Notify physician/advanced practitioner if interventions unsuccessful or patient reports new pain  Outcome: Progressing     Problem: INFECTION - ADULT  Goal: Absence or prevention of progression during hospitalization  Description: INTERVENTIONS:  - Assess and monitor for signs and symptoms of infection  - Monitor lab/diagnostic results  - Monitor all insertion sites, i.e. indwelling lines, tubes, and drains  - Monitor endotracheal if appropriate and nasal secretions for changes in amount and color  - [place] appropriate cooling/warming therapies per order  - Administer medications as ordered  - Instruct and encourage patient and family to use good hand hygiene technique  - Identify and instruct in appropriate isolation precautions for identified infection/condition  Outcome: Progressing     Problem: RESPIRATORY - ADULT  Goal: Achieves optimal ventilation and oxygenation  Description: INTERVENTIONS:  - Assess for changes in respiratory status  - Assess for changes in mentation and behavior  - Position to facilitate oxygenation and minimize respiratory effort  - Oxygen administered by appropriate delivery if ordered  - Initiate smoking cessation education as indicated  - Encourage broncho-pulmonary hygiene including cough, deep breathe, Incentive Spirometry  - Assess the need for suctioning and aspirate as needed  - Assess and instruct to report SOB or any respiratory difficulty  - Respiratory Therapy support as indicated  Outcome: Progressing     Problem:  METABOLIC, FLUID AND ELECTROLYTES - ADULT  Goal: Electrolytes maintained within normal limits  Description: INTERVENTIONS:  - Monitor labs and assess patient for signs and symptoms of electrolyte imbalances  - Administer electrolyte replacement as ordered  - Monitor response to electrolyte replacements, including repeat lab results as appropriate  - Instruct patient on fluid and nutrition as appropriate  Outcome: Progressing     Problem: Anxiety  Goal: Anxiety is at manageable level  Description: Interventions:  - Assess and monitor patient's anxiety level.   - Monitor for signs and symptoms (heart palpitations, chest pain, shortness of breath, headaches, nausea, feeling jumpy, restlessness, irritable, apprehensive).   - Collaborate with interdisciplinary team and initiate plan and interventions as ordered.  - [place] patient to unit/surroundings  - Explain treatment plan  - Encourage participation in care  - Encourage verbalization of concerns/fears  - Identify coping mechanisms  - Assist in developing anxiety-reducing skills  - Administer/offer alternative therapies  - Limit or eliminate stimulants  Outcome: Progressing

## 2024-03-03 NOTE — PROGRESS NOTES
NEPHROLOGY PROGRESS NOTE   Jessika Lux 69 y.o. female MRN: [id number]  Unit/Bed#: -01 Encounter: [id number]    Assessment/Plan:    70 yo female with T2DM, HTN, COPD, chronic hypoxic respiratory failure, smoker presented 2/28 with shortness of breath incidentally found to have severe hyponatremia for which nephrology consulted.  Serum sodium appropriate today and tentative discharge    1.  Acute on chronic hypoosmolar hyponatremia  Examines euvolemic with some trace peripheral edema.  Vivek serum sodium 118 mmol/L.  Typical serum sodium 133-138 mmol/L.  Has a history of severe hyponatremia in 2023.  Workup urine osmolality 181, urine sodium 30, TSH normal, uric acid 3.4 mg/dL, serum osmolality 262, no recent a.m. cortisol, no PVR  Etiology felt to be SIADH exacerbated by increased free water intake.  She has been treated with hypertonic saline, sodium tablets, fluid restriction, tolvaptan and, torsemide during this hospitalization.  Serum sodium quickly bhupinder yesterday prompting liberation of fluid restriction and discontinuation of sodium chloride tablets  Serum sodium 135 mmol/L today.  Recommend 50 ounce fluid restriction daily and torsemide 5 mg daily.  Repeat lab work no later than Wednesday.  Already has follow-up with me on March 22.    2.  Potential underlying SIADH  Continue recommendations as outlined above  3.  Essential hypertension  Thiazide and thiazide like diuretics contraindicated      ROS  Examined sitting upright in bed.  States she feels fine and is ready to go home.  A complete 10 point review of systems have been performed and are otherwise negative.       Historical Information   Past Medical History:   Diagnosis Date    Anxiety     COPD (chronic obstructive pulmonary disease) (HCC)     Diabetes mellitus (HCC)     Essential (primary) hypertension     GERD (gastroesophageal reflux disease)     Smoker      History reviewed. No pertinent surgical history.  Social History   Social History      Substance and Sexual Activity   Alcohol Use Not Currently     Social History     Substance and Sexual Activity   Drug Use Never     Social History     Tobacco Use   Smoking Status Every Day    Current packs/day: 0.25    Types: Cigarettes    Passive exposure: Never   Smokeless Tobacco Never       Family History:   Family History   Problem Relation Age of Onset    Diabetes Father        Medications:  Pertinent medications were reviewed  Current Facility-Administered Medications   Medication Dose Route Frequency Provider Last Rate    acetaminophen  650 mg Oral Q6H PRN Luzma Jones MD      amLODIPine  10 mg Oral HS Luzma Jones MD      atorvastatin  40 mg Oral HS Luzma Jones MD      budesonide  0.5 mg Nebulization Q12H Luzma Jones MD      calcium carbonate  500 mg Oral BID PRN AYLIN Morrell      cloNIDine  0.2 mg Oral Q12H NATHALY Luzma Jones MD      enoxaparin  40 mg Subcutaneous Daily Luzma Jones MD      fluticasone  1 spray Each Nare Daily Rylie White MD      Fluticasone Furoate-Vilanterol  1 puff Inhalation Daily Luzma Jones MD      And    umeclidinium  1 puff Inhalation Daily Luzma Jones MD      glipiZIDE  10 mg Oral Early Morning Luzma Jones MD      hydrOXYzine HCL  10 mg Oral Q6H PRN Rylie White MD      insulin lispro  1-6 Units Subcutaneous TID AC Luzma Jones MD      insulin lispro  1-6 Units Subcutaneous HS Luzma Jones MD      ipratropium-albuterol  3 mL Nebulization Q6H Luzma Jones MD      lisinopril  40 mg Oral Daily Luzma Jones MD      magnesium hydroxide  30 mL Oral Daily PRN Rylie White MD      metoprolol tartrate  100 mg Oral Q12H NATHALY Luzma Jones MD      nicotine  1 patch Transdermal Daily Luzma Jones MD      ondansetron  4 mg Intravenous Q6H PRN Luzma Jones MD      pantoprazole  20 mg Oral Early Morning Luzma Jones MD      pioglitazone  15 mg Oral Daily Luzma Jones MD      predniSONE  40 mg Oral Daily Luzma Jones MD      sertraline  50 mg Oral Daily  "Rylie White MD      torsemide  5 mg Oral Daily Joselyn Reyes Bahamonde, MD           Allergies   Allergen Reactions    Clindamycin          Vitals:   /71 (BP Location: Left arm)   Pulse 65   Temp 97.9 °F (36.6 °C) (Temporal)   Resp 20   Ht 5' 2\" (1.575 m)   Wt 72 kg (158 lb 11.7 oz)   SpO2 98%   BMI 29.03 kg/m²   Body mass index is 29.03 kg/m².  SpO2: 98 %,   SpO2 Activity: At Rest,   O2 Device: Nasal cannula      Intake/Output Summary (Last 24 hours) at 3/3/2024 1324  Last data filed at 3/2/2024 2000  Gross per 24 hour   Intake 600 ml   Output --   Net 600 ml     Invasive Devices       Peripheral Intravenous Line  Duration             Peripheral IV 03/03/24 Dorsal (posterior);Left Forearm <1 day                    Physical Exam  General: conscious, cooperative, in no acute distress  Eyes: conjunctivae pink, anicteric sclerae  ENT: lips and mucous membranes moist  Neck: supple, no JVD, no masses  Chest: Diminished breath sounds bilaterally, no crackles, ronchus or wheezings on nasal cannula  CVS: S1 & S2, normal rate, regular rhythm  Abdomen: soft, non-tender, non-distended, normoactive bowel sounds  Extremities: Trace edema of both legs  Skin: no rash  Neuro: awake, alert, oriented      Diagnostic Data:  Lab: I have personally reviewed pertinent lab results.,   CBC:  Results from last 7 days   Lab Units 03/03/24  0534   WBC Thousand/uL 8.10   HEMOGLOBIN g/dL 11.5   HEMATOCRIT % 35.0   PLATELETS Thousands/uL 224      CMP:   Lab Results   Component Value Date    SODIUM 135 03/03/2024    K 4.3 03/03/2024    CL 92 (L) 03/03/2024    CO2 37 (H) 03/03/2024    BUN 18 03/03/2024    CREATININE 0.70 03/03/2024    CALCIUM 9.8 03/03/2024    EGFR 89 03/03/2024   ,   PT/INR: No results found for: \"PT\", \"INR\",   Magnesium: No components found for: \"MAG\",  Phosphorous: No results found for: \"PHOS\"    Microbiology:  @LABRCNTIP,(urinecx:7)@        AYLIN Hua    Portions of the record may have been created " "with voice recognition software. Occasional wrong word or \"sound a like\" substitutions may have occurred due to the inherent limitations of voice recognition software. Read the chart carefully and recognize, using context, where substitutions have occurred.  "

## 2024-03-03 NOTE — RESPIRATORY THERAPY NOTE
RT Protocol Note  Jessika Lux 69 y.o. female MRN: [id number]  Unit/Bed#: -01 Encounter: [id number]    Assessment    Principal Problem:    Acute hyponatremia  Active Problems:    Tobacco abuse    Primary hypertension    Type 2 diabetes mellitus without complication, without long-term current use of insulin (HCC)    COPD with acute exacerbation (HCC)    Acute on chronic respiratory failure with hypoxia (HCC)      Home Pulmonary Medications:         Past Medical History:   Diagnosis Date    Anxiety     COPD (chronic obstructive pulmonary disease) (HCC)     Diabetes mellitus (HCC)     Essential (primary) hypertension     GERD (gastroesophageal reflux disease)     Smoker      Social History     Socioeconomic History    Marital status:      Spouse name: None    Number of children: None    Years of education: None    Highest education level: None   Occupational History    None   Tobacco Use    Smoking status: Every Day     Current packs/day: 0.25     Types: Cigarettes     Passive exposure: Never    Smokeless tobacco: Never   Vaping Use    Vaping status: Never Used   Substance and Sexual Activity    Alcohol use: Not Currently    Drug use: Never    Sexual activity: Not Currently   Other Topics Concern    None   Social History Narrative    None     Social Determinants of Health     Financial Resource Strain: Not on file   Food Insecurity: No Food Insecurity (2/29/2024)    Hunger Vital Sign     Worried About Running Out of Food in the Last Year: Never true     Ran Out of Food in the Last Year: Never true   Transportation Needs: No Transportation Needs (2/29/2024)    PRAPARE - Transportation     Lack of Transportation (Medical): No     Lack of Transportation (Non-Medical): No   Physical Activity: Not on file   Stress: Not on file   Social Connections: Not on file   Intimate Partner Violence: Not on file   Housing Stability: Low Risk  (2/29/2024)    Housing Stability Vital Sign     Unable to Pay for Housing in  "the Last Year: No     Number of Places Lived in the Last Year: 1     Unstable Housing in the Last Year: No       Subjective         Objective    Physical Exam:        Vitals:  Blood pressure 130/71, pulse 65, temperature 97.9 °F (36.6 °C), temperature source Temporal, resp. rate 20, height 5' 2\" (1.575 m), weight 72 kg (158 lb 11.7 oz), SpO2 98%.          Imaging and other studies: I have personally reviewed pertinent reports.      O2 Device: 2LPM NC     Plan             Resp Comments: (P) Pt stable on 2LPM NC, doing well with udn tx.   "

## 2024-03-03 NOTE — PLAN OF CARE
Problem: Potential for Falls  Goal: Patient will remain free of falls  Description: INTERVENTIONS:  - Educate patient/family on patient safety including physical limitations  - Instruct patient to call for assistance with activity   - Consult OT/PT to assist with strengthening/mobility   - Keep Call bell within reach  - Keep bed low and locked with side rails adjusted as appropriate  - Keep care items and personal belongings within reach  - Initiate and maintain comfort rounds  - Make Fall Risk Sign visible to staff  - Offer Toileting every 2 Hours, in advance of need  - Apply yellow socks and bracelet for high fall risk patients  - Consider moving patient to room near nurses station  Outcome: Progressing     Problem: PAIN - ADULT  Goal: Verbalizes/displays adequate comfort level or baseline comfort level  Description: Interventions:  - Encourage patient to monitor pain and request assistance  - Assess pain using appropriate pain scale  - Administer analgesics based on type and severity of pain and evaluate response  - Implement non-pharmacological measures as appropriate and evaluate response  - Consider cultural and social influences on pain and pain management  - Notify physician/advanced practitioner if interventions unsuccessful or patient reports new pain  Outcome: Progressing     Problem: INFECTION - ADULT  Goal: Absence or prevention of progression during hospitalization  Description: INTERVENTIONS:  - Assess and monitor for signs and symptoms of infection  - Monitor lab/diagnostic results  - Monitor all insertion sites, i.e. indwelling lines, tubes, and drains  - Monitor endotracheal if appropriate and nasal secretions for changes in amount and color  - [place] appropriate cooling/warming therapies per order  - Administer medications as ordered  - Instruct and encourage patient and family to use good hand hygiene technique  - Identify and instruct in appropriate isolation precautions for identified  infection/condition  Outcome: Progressing     Problem: SAFETY ADULT  Goal: Patient will remain free of falls  Description: INTERVENTIONS:  - Educate patient/family on patient safety including physical limitations  - Instruct patient to call for assistance with activity   - Consult OT/PT to assist with strengthening/mobility   - Keep Call bell within reach  - Keep bed low and locked with side rails adjusted as appropriate  - Keep care items and personal belongings within reach  - Initiate and maintain comfort rounds  - Make Fall Risk Sign visible to staff  - Apply yellow socks and bracelet for high fall risk patients  - Consider moving patient to room near nurses station  Outcome: Progressing  Goal: Maintain or return to baseline ADL function  Description: INTERVENTIONS:  -  Assess patient's ability to carry out ADLs; assess patient's baseline for ADL function and identify physical deficits which impact ability to perform ADLs (bathing, care of mouth/teeth, toileting, grooming, dressing, etc.)  - Assess/evaluate cause of self-care deficits   - Assess range of motion  - Assess patient's mobility; develop plan if impaired  - Assess patient's need for assistive devices and provide as appropriate  - Encourage maximum independence but intervene and supervise when necessary  - Involve family in performance of ADLs  - Assess for home care needs following discharge   - Consider OT consult to assist with ADL evaluation and planning for discharge  - Provide patient education as appropriate  Outcome: Progressing  Goal: Maintains/Returns to pre admission functional level  Description: INTERVENTIONS:  - Perform AM-PAC 6 Click Basic Mobility/ Daily Activity assessment daily.  - Set and communicate daily mobility goal to care team and patient/family/caregiver.   - Collaborate with rehabilitation services on mobility goals if consulted  - Stand patient 3 times a day  - Ambulate patient 3 times a day  - Out of bed to chair 3 times a  day   - Out of bed for meals 3 times a day  - Out of bed for toileting  - Record patient progress and toleration of activity level   Outcome: Progressing     Problem: DISCHARGE PLANNING  Goal: Discharge to home or other facility with appropriate resources  Description: INTERVENTIONS:  - Identify barriers to discharge w/patient and caregiver  - Arrange for needed discharge resources and transportation as appropriate  - Identify discharge learning needs (meds, wound care, etc.)  - Arrange for interpretive services to assist at discharge as needed  - Refer to Case Management Department for coordinating discharge planning if the patient needs post-hospital services based on physician/advanced practitioner order or complex needs related to functional status, cognitive ability, or social support system  Outcome: Progressing     Problem: Knowledge Deficit  Goal: Patient/family/caregiver demonstrates understanding of disease process, treatment plan, medications, and discharge instructions  Description: Complete learning assessment and assess knowledge base.  Interventions:  - Provide teaching at level of understanding  - Provide teaching via preferred learning methods  Outcome: Progressing     Problem: RESPIRATORY - ADULT  Goal: Achieves optimal ventilation and oxygenation  Description: INTERVENTIONS:  - Assess for changes in respiratory status  - Assess for changes in mentation and behavior  - Position to facilitate oxygenation and minimize respiratory effort  - Oxygen administered by appropriate delivery if ordered  - Initiate smoking cessation education as indicated  - Encourage broncho-pulmonary hygiene including cough, deep breathe, Incentive Spirometry  - Assess the need for suctioning and aspirate as needed  - Assess and instruct to report SOB or any respiratory difficulty  - Respiratory Therapy support as indicated  Outcome: Progressing     Problem: METABOLIC, FLUID AND ELECTROLYTES - ADULT  Goal: Electrolytes  maintained within normal limits  Description: INTERVENTIONS:  - Monitor labs and assess patient for signs and symptoms of electrolyte imbalances  - Administer electrolyte replacement as ordered  - Monitor response to electrolyte replacements, including repeat lab results as appropriate  - Instruct patient on fluid and nutrition as appropriate  Outcome: Progressing  Goal: Fluid balance maintained  Description: INTERVENTIONS:  - Monitor labs   - Monitor I/O and WT  - Instruct patient on fluid and nutrition as appropriate  - Assess for signs & symptoms of volume excess or deficit  Outcome: Progressing  Goal: Glucose maintained within target range  Description: INTERVENTIONS:  - Monitor Blood Glucose as ordered  - Assess for signs and symptoms of hyperglycemia and hypoglycemia  - Administer ordered medications to maintain glucose within target range  - Assess nutritional intake and initiate nutrition service referral as needed  Outcome: Progressing     Problem: Anxiety  Goal: Anxiety is at manageable level  Description: Interventions:  - Assess and monitor patient's anxiety level.   - Monitor for signs and symptoms (heart palpitations, chest pain, shortness of breath, headaches, nausea, feeling jumpy, restlessness, irritable, apprehensive).   - Collaborate with interdisciplinary team and initiate plan and interventions as ordered.  - [place] patient to unit/surroundings  - Explain treatment plan  - Encourage participation in care  - Encourage verbalization of concerns/fears  - Identify coping mechanisms  - Assist in developing anxiety-reducing skills  - Administer/offer alternative therapies  - Limit or eliminate stimulants  Outcome: Progressing

## 2024-03-03 NOTE — DISCHARGE SUMMARY
[facility]  Discharge- Jessika REID Viars 1955, 69 y.o. female MRN: [id number]  Unit/Bed#: -01 Encounter: [id number]  Primary Care Provider: Karlie Bruno DO   Date and time admitted to hospital: 2/28/2024  8:57 AM    Acute on chronic respiratory failure with hypoxia (HCC)  Assessment & Plan  Uses 2 to 3 L oxygen and at baseline.  Worsening hypoxia, tachycardia, tachypnea likely due to COPD exacerbation.  Worsening oxygen requirement to 4 L on presentation.    Now clinically improving after treatment and wean back to her previous regimen of 3 L    COPD with acute exacerbation (HCC)  Assessment & Plan  Presented with shortness of breath, fatigue.  Suspected COPD exacerbation.  Wheezing has improved significantly.    Continue p.o. prednisone 40 mg daily for total of 5 doses  Fluticasone vilanterol inhalers  Atrovent, Xopenex, Pulmicort nebulization    Type 2 diabetes mellitus without complication, without long-term current use of insulin ([facility])  Assessment & Plan  Lab Results   Component Value Date    HGBA1C 7.0 (H) 07/27/2023     Placed on insulin sliding scale  continue glipizide and Actos  Hypoglycemia protocol      Primary hypertension  Assessment & Plan  Cont home medicine regimen of Norvasc, clonidine, lisinopril, and metoprolol    Tobacco abuse  Assessment & Plan  Counseled on smoking cessation and placed on nicotine replacement therapy    * Acute hyponatremia  Assessment & Plan  Presented with sodium of 118 on admission suspected secondary to SIADH based on urine electrolytes.  S/p 1.8% normal saline and 1 dose of tolvaptan.  Sodium improved to 135.  Baseline appears to be around 136-138.  Appears euvolemic    Repeat BMP at 2 PM  Low-dose torsemide 5 mg daily  Fluid restriction to 2 L  Hold further salt tablet and tolvaptan doses  Nephrology following  I's and O's, daily weight        Medical Problems       Resolved Problems  Date Reviewed: 3/3/2024   None    "    Discharging Physician / Practitioner: Woodrow Cummings DO  PCP: Karlie Bruno DO  Admission Date:   Admission Orders (From admission, onward)       Ordered        02/28/24 1201  INPATIENT ADMISSION  Once                          Discharge Date: 03/03/2024    Consultations During Hospital Stay:  NEPHROLOGY      Procedures Performed:   No procedures     Significant Findings / Test Results:   COPD Exacerbation   Hyponatremia     Incidental Findings:   Hyponatremia    \    Test Results Pending at Discharge (will require follow up):   None      Outpatient Tests Requested:  Repeate BMP within a week     Complications:  none     Reason for Admission: SOB     Hospital Course:   Jessika Lux is a 69 y.o. female patient who originally presented to the hospital on 2/28/2024 due to SOB found to have hyponatremia and was in COPD exacerbation - see above for details.     On day of discharge 3/3/24 seen and examined, in no acute distress, not wheezing , on 2 L NC which is her baseline;  ; explained need to continue follow instructions as per nephrology \"Recommend 50 ounce fluid restriction daily and torsemide 5 mg daily. \" And to recheck her BMP in few days after discharge. Patient also was advised to call and scheduled follow up appointment with PCP within a 1-2 weeks of dc as well as f/u with nephro.     She denies any new symptoms at the day of dc , confirming feeling better and at baseline.       Please see above list of diagnoses and related plan for additional information.     Condition at Discharge: stable    Discharge Day Visit / Exam:   Subjective:  see above   Vitals: Blood Pressure: 130/71 (03/03/24 0712)  Pulse: 65 (03/03/24 0712)  Temperature: 97.9 °F (36.6 °C) (03/03/24 0712)  Temp Source: Temporal (03/03/24 0712)  Respirations: 20 (03/03/24 0712)  Height: 5' 2\" (157.5 cm) (02/28/24 1410)  Weight - Scale: 72 kg (158 lb 11.7 oz) (03/03/24 0552)  SpO2: 98 % (03/03/24 0714)  Exam:   Physical Exam   - GEN: " Appears well, alert and oriented x 3, pleasant and cooperative, in no acute distress  - HEENT: Anicteric, mucous membranes moist, PERRL and EOMI   - NECK: No lymphadenopathy, JVD or carotid bruits   - HEART: RRR, normal S1 and S2, no murmurs, clicks, gallops or rubs   - LUNGS: Clear to auscultation bilaterally; no wheezes, rales, or rhonchi ; on 2 L NC   - ABDOMEN: Normal bowel sounds, soft, no tenderness, no distention, no organomegaly or masses felt on exam.   - EXTREMITIES: Peripheral pulses normal; no clubbing, cyanosis, or edema  - NEURO: No focal findings, CN II-XII are grossly intact.   - Musculoskeletal: 5/5 strength, normal ROM, no swollen or erythematous joints.   - SKIN: Normal without suspicious lesions on exposed skin      Discussion with Family: Patient declined call to .     Discharge instructions/Information to patient and family:   See after visit summary for information provided to patient and family.      Provisions for Follow-Up Care:  See after visit summary for information related to follow-up care and any pertinent home health orders.      Mobility at time of Discharge:   Basic Mobility Inpatient Raw Score: 23  JH-HLM Goal: 7: Walk 25 feet or more  JH-HLM Achieved: 7: Walk 25 feet or more  HLM Goal achieved. Continue to encourage appropriate mobility.     Disposition:   Home    Planned Readmission:      Discharge Statement:  I spent 45 minutes discharging the patient. This time was spent on the day of discharge. I had direct contact with the patient on the day of discharge. Greater than 50% of the total time was spent examining patient, answering all patient questions, arranging and discussing plan of care with patient as well as directly providing post-discharge instructions.  Additional time then spent on discharge activities.    Discharge Medications:  See after visit summary for reconciled discharge medications provided to patient and/or family.      **Please Note: This note  may have been constructed using a voice recognition system**

## 2024-03-04 LAB
ATRIAL RATE: 108 BPM
BACTERIA BLD CULT: NORMAL
BACTERIA BLD CULT: NORMAL
P AXIS: 68 DEGREES
PR INTERVAL: 136 MS
QRS AXIS: 43 DEGREES
QRSD INTERVAL: 62 MS
QT INTERVAL: 356 MS
QTC INTERVAL: 477 MS
T WAVE AXIS: 60 DEGREES
VENTRICULAR RATE: 108 BPM

## 2024-03-04 NOTE — UTILIZATION REVIEW
NOTIFICATION OF ADMISSION DISCHARGE   This is a Notification of Discharge from [facility]. Please be advised that this patient has been discharge from our facility. Below you will find the admission and discharge date and time including the patient’s disposition.   UTILIZATION REVIEW CONTACT:  Kelsey Sam  Utilization   Network Utilization Review Department  Phone: [phone] x carefully listen to the prompts. All voicemails are confidential.  Email: [email or web address]     ADMISSION INFORMATION  PRESENTATION DATE: 2/28/2024  8:57 AM  OBERVATION ADMISSION DATE:   INPATIENT ADMISSION DATE: 2/28/24 12:01 PM   DISCHARGE DATE: 3/3/2024  2:56 PM   DISPOSITION:Home/Self Care    Network Utilization Review Department  ATTENTION: Please call with any questions or concerns to [phone] and carefully listen to the prompts so that you are directed to the right person. All voicemails are confidential.   For Discharge needs, contact Care Management DC Support Team at [phone] opt. 2  Send all requests for admission clinical reviews, approved or denied determinations and any other requests to dedicated fax number below belonging to the campus where the patient is receiving treatment. List of dedicated fax numbers for the Facilities:  FACILITY NAME UR FAX NUMBER   ADMISSION DENIALS (Administrative/Medical Necessity) [phone]   DISCHARGE SUPPORT TEAM ([facility]) [phone]   PARENT CHILD HEALTH (Maternity/NICU/Pediatrics) [phone]   [facility] [phone]   General acute hospital [phone]   [facility] [phone]   [facility] [phone]   [facility] [phone]   [facility] [phone]   [facility] [phone]   [facility]  [phone]   [facility] [phone]   [facility] [phone]   [facility] [phone]   [facility] [phone]

## 2024-03-05 ENCOUNTER — PATIENT OUTREACH (OUTPATIENT)
Dept: CASE MANAGEMENT | Facility: OTHER | Age: 69
End: 2024-03-05

## 2024-03-05 NOTE — PROGRESS NOTES
.OP RT CM called and left a VM requesting a return phone call. I will outreach in two weeks unless I hear back from patient prior. IN basket message received.

## 2024-03-06 ENCOUNTER — PATIENT OUTREACH (OUTPATIENT)
Dept: CASE MANAGEMENT | Facility: OTHER | Age: 69
End: 2024-03-06

## 2024-03-06 NOTE — PROGRESS NOTES
In basket ADT notification received for patient discharge from hospital.  Jesskia was hospitalized 4 days for acute hyponatremia.  Patient presented with shortness of breath and upon admission lab work revealed sodium 121, Chloride 78, CO2 36 and blood glucose 197.   CXR negative for acute findings. Nephrology consulted; patient administered hypertonic saline, sodium tablets, tolvaptan and torsemide throughout admission.  Recommended 50 ounce fluid restriction daily and torsemide 5 mg daily.  She discharged home with scripts for torsemide and lab work.  She was also directed to f/u with PCP in 1-2 weeks and nephrologist.     Chart reflects PCP appt on 3/11 @ 820 am, Pulm on 3/14 @ 1030 am and nephro 3/22 @ 230 pm.     Patient discharged from 3/3/24.     Discharge follow up call placed to patient.    Is this a good time for you to talk?     No answer when contacted.  Left generic msg without PHI containing this RN CM's call back number, office hours and request for return call.      Will attempt outreach within 1 week of today if no response from patient.     Update to team member Geni CORONADO

## 2024-03-07 ENCOUNTER — PATIENT OUTREACH (OUTPATIENT)
Dept: CASE MANAGEMENT | Facility: OTHER | Age: 69
End: 2024-03-07

## 2024-03-07 PROBLEM — R53.1 WEAKNESS: Status: ACTIVE | Noted: 2024-03-07

## 2024-03-07 NOTE — PROGRESS NOTES
.OP RT CM called and spoke with patient regarding her breathing, medications and O2.    Jessika is feeling well, she is using her respiratory medications as prescribed. She  stated she is coughing up small amount white sputum. SpO2 99% on 3lpm nasal cannula. Jessika has an upcoming appointment with PCP and pulm. We confirmed date, time location and provider.   Jessika stated she has been smoke free since prior to her hospitalization.  I encouraged [facility] smoking cessation but she declined.  She stated the NRT patches she has 'don't work for me'.  She is not using NRT products and denied cravings and/or withdrawals symptoms. We discussed the hazards of smoking and O2.   Jessika was appreciative of the outreach and accepted my contact information.   I will outreach in two weeks.

## 2024-03-08 RX ORDER — OMEPRAZOLE 20 MG/1
20 CAPSULE, DELAYED RELEASE ORAL EVERY MORNING
Status: ON HOLD | COMMUNITY
Start: 2024-01-14 | End: 2024-03-17

## 2024-03-12 DIAGNOSIS — E87.1 HYPONATREMIA: Primary | ICD-10-CM

## 2024-03-13 ENCOUNTER — HOSPITAL ENCOUNTER (EMERGENCY)
Facility: HOSPITAL | Age: 69
Discharge: HOME | End: 2024-03-13
Attending: EMERGENCY MEDICINE
Payer: COMMERCIAL

## 2024-03-13 ENCOUNTER — APPOINTMENT (EMERGENCY)
Dept: RADIOLOGY | Facility: HOSPITAL | Age: 69
End: 2024-03-13
Payer: COMMERCIAL

## 2024-03-13 VITALS
RESPIRATION RATE: 20 BRPM | DIASTOLIC BLOOD PRESSURE: 75 MMHG | OXYGEN SATURATION: 99 % | SYSTOLIC BLOOD PRESSURE: 143 MMHG | TEMPERATURE: 97.6 F | HEART RATE: 73 BPM

## 2024-03-13 DIAGNOSIS — J44.1 COPD EXACERBATION (HCC): Primary | ICD-10-CM

## 2024-03-13 DIAGNOSIS — E87.1 HYPONATREMIA: ICD-10-CM

## 2024-03-13 LAB
ALBUMIN SERPL BCP-MCNC: 4.1 G/DL (ref 3.5–5)
ALP SERPL-CCNC: 85 U/L (ref 34–104)
ALT SERPL W P-5'-P-CCNC: 9 U/L (ref 7–52)
ANION GAP SERPL CALCULATED.3IONS-SCNC: 8 MMOL/L (ref 4–13)
AST SERPL W P-5'-P-CCNC: 12 U/L (ref 13–39)
BASOPHILS # BLD AUTO: 0.04 THOUSANDS/ÂΜL (ref 0–0.1)
BASOPHILS NFR BLD AUTO: 1 % (ref 0–1)
BILIRUB SERPL-MCNC: 0.3 MG/DL (ref 0.2–1)
BNP SERPL-MCNC: 22 PG/ML (ref 0–100)
BUN SERPL-MCNC: 9 MG/DL (ref 5–25)
CALCIUM SERPL-MCNC: 9.6 MG/DL (ref 8.4–10.2)
CARDIAC TROPONIN I PNL SERPL HS: 4 NG/L
CHLORIDE SERPL-SCNC: 89 MMOL/L (ref 96–108)
CO2 SERPL-SCNC: 30 MMOL/L (ref 21–32)
CREAT SERPL-MCNC: 0.73 MG/DL (ref 0.6–1.3)
EOSINOPHIL # BLD AUTO: 0.04 THOUSAND/ÂΜL (ref 0–0.61)
EOSINOPHIL NFR BLD AUTO: 1 % (ref 0–6)
ERYTHROCYTE [DISTWIDTH] IN BLOOD BY AUTOMATED COUNT: 11.9 % (ref 11.6–15.1)
FLUAV RNA RESP QL NAA+PROBE: NEGATIVE
FLUBV RNA RESP QL NAA+PROBE: NEGATIVE
GFR SERPL CREATININE-BSD FRML MDRD: 85 ML/MIN/1.73SQ M
GLUCOSE SERPL-MCNC: 130 MG/DL (ref 65–140)
HCT VFR BLD AUTO: 38.1 % (ref 34.8–46.1)
HGB BLD-MCNC: 12.8 G/DL (ref 11.5–15.4)
IMM GRANULOCYTES # BLD AUTO: 0.04 THOUSAND/UL (ref 0–0.2)
IMM GRANULOCYTES NFR BLD AUTO: 1 % (ref 0–2)
LYMPHOCYTES # BLD AUTO: 1.49 THOUSANDS/ÂΜL (ref 0.6–4.47)
LYMPHOCYTES NFR BLD AUTO: 18 % (ref 14–44)
MCH RBC QN AUTO: 28.8 PG (ref 26.8–34.3)
MCHC RBC AUTO-ENTMCNC: 33.6 G/DL (ref 31.4–37.4)
MCV RBC AUTO: 86 FL (ref 82–98)
MONOCYTES # BLD AUTO: 0.83 THOUSAND/ÂΜL (ref 0.17–1.22)
MONOCYTES NFR BLD AUTO: 10 % (ref 4–12)
NEUTROPHILS # BLD AUTO: 6.06 THOUSANDS/ÂΜL (ref 1.85–7.62)
NEUTS SEG NFR BLD AUTO: 69 % (ref 43–75)
NRBC BLD AUTO-RTO: 0 /100 WBCS
PLATELET # BLD AUTO: 239 THOUSANDS/UL (ref 149–390)
PMV BLD AUTO: 10 FL (ref 8.9–12.7)
POTASSIUM SERPL-SCNC: 4.5 MMOL/L (ref 3.5–5.3)
PROT SERPL-MCNC: 7.3 G/DL (ref 6.4–8.4)
RBC # BLD AUTO: 4.44 MILLION/UL (ref 3.81–5.12)
RSV RNA RESP QL NAA+PROBE: NEGATIVE
SARS-COV-2 RNA RESP QL NAA+PROBE: NEGATIVE
SODIUM SERPL-SCNC: 127 MMOL/L (ref 135–147)
WBC # BLD AUTO: 8.5 THOUSAND/UL (ref 4.31–10.16)

## 2024-03-13 PROCEDURE — 84484 ASSAY OF TROPONIN QUANT: CPT | Performed by: EMERGENCY MEDICINE

## 2024-03-13 PROCEDURE — 83880 ASSAY OF NATRIURETIC PEPTIDE: CPT | Performed by: EMERGENCY MEDICINE

## 2024-03-13 PROCEDURE — 80053 COMPREHEN METABOLIC PANEL: CPT | Performed by: EMERGENCY MEDICINE

## 2024-03-13 PROCEDURE — 99285 EMERGENCY DEPT VISIT HI MDM: CPT

## 2024-03-13 PROCEDURE — 99285 EMERGENCY DEPT VISIT HI MDM: CPT | Performed by: EMERGENCY MEDICINE

## 2024-03-13 PROCEDURE — 93005 ELECTROCARDIOGRAM TRACING: CPT

## 2024-03-13 PROCEDURE — 94640 AIRWAY INHALATION TREATMENT: CPT

## 2024-03-13 PROCEDURE — 85025 COMPLETE CBC W/AUTO DIFF WBC: CPT | Performed by: EMERGENCY MEDICINE

## 2024-03-13 PROCEDURE — 0241U HB NFCT DS VIR RESP RNA 4 TRGT: CPT | Performed by: EMERGENCY MEDICINE

## 2024-03-13 PROCEDURE — 36415 COLL VENOUS BLD VENIPUNCTURE: CPT | Performed by: EMERGENCY MEDICINE

## 2024-03-13 PROCEDURE — 71046 X-RAY EXAM CHEST 2 VIEWS: CPT

## 2024-03-13 RX ORDER — MELATONIN
COMMUNITY
Start: 2024-03-11

## 2024-03-13 RX ORDER — IPRATROPIUM BROMIDE AND ALBUTEROL SULFATE 2.5; .5 MG/3ML; MG/3ML
3 SOLUTION RESPIRATORY (INHALATION) ONCE
Status: COMPLETED | OUTPATIENT
Start: 2024-03-13 | End: 2024-03-13

## 2024-03-13 RX ORDER — PREDNISONE 10 MG/1
TABLET ORAL DAILY
Qty: 30 TABLET | Refills: 0 | Status: SHIPPED | OUTPATIENT
Start: 2024-03-13 | End: 2024-03-21

## 2024-03-13 RX ORDER — PREDNISONE 20 MG/1
60 TABLET ORAL ONCE
Status: COMPLETED | OUTPATIENT
Start: 2024-03-13 | End: 2024-03-13

## 2024-03-13 RX ADMIN — PREDNISONE 60 MG: 20 TABLET ORAL at 14:42

## 2024-03-13 RX ADMIN — IPRATROPIUM BROMIDE AND ALBUTEROL SULFATE 3 ML: 2.5; .5 SOLUTION RESPIRATORY (INHALATION) at 14:36

## 2024-03-13 NOTE — ED PROVIDER NOTES
"History  Chief Complaint   Patient presents with    Shortness of Breath     Pt reports worsening SOB since yesterday, hx of COPD on 3L NC chronically      Patient with history of COPD normally on 3 L nasal cannula complains of 1 day of \"trouble catching my breath\".  She denies fevers or chills.  She denies chest pain.  She denies vomiting.  No other complaints.      History provided by:  Patient   used: No    Shortness of Breath  Severity:  Moderate  Onset quality:  Gradual  Duration:  1 day  Timing:  Constant  Progression:  Unchanged  Chronicity:  New  Relieved by:  Nothing  Worsened by:  Nothing  Ineffective treatments:  None tried  Associated symptoms: wheezing    Associated symptoms: no abdominal pain, no chest pain, no cough, no ear pain, no fever, no headaches, no hemoptysis, no neck pain, no rash, no sore throat, no syncope and no vomiting        Prior to Admission Medications   Prescriptions Last Dose Informant Patient Reported? Taking?   Nebulizers (Comp Air Compressor Nebulizer) MISC   Yes No   Sig: As directed   Trelegy Ellipta 100-62.5-25 MCG/ACT inhaler   No No   Sig: inhale 1 puff by mouth and INTO THE LUNGS once daily Rinse mouth after use   amLODIPine (NORVASC) 10 mg tablet   Yes No   Sig: Take 10 mg by mouth daily at bedtime    atorvastatin (LIPITOR) 40 mg tablet   Yes No   Sig: Take 40 mg by mouth daily at bedtime    cholecalciferol (VITAMIN D3) 1,000 units tablet   Yes No   cloNIDine (CATAPRES) 0.2 mg tablet   Yes No   Sig: Take 0.2 mg by mouth every 12 (twelve) hours   ergocalciferol (VITAMIN D2) 50,000 units   Yes No   Sig: Take 50,000 Units by mouth once a week On saturday   fluticasone (FLONASE) 50 mcg/act nasal spray   No No   Si spray into each nostril daily   Patient not taking: Reported on 2023   glipiZIDE (GLUCOTROL) 10 mg tablet  Self Yes No   Sig: Take 10 mg by mouth daily in the early morning W breakfast   hydrOXYzine HCL (ATARAX) 10 mg tablet   Yes No "   Sig: Take 10 mg by mouth every 6 (six) hours as needed for itching   ipratropium-albuterol (DUO-NEB) 0.5-2.5 mg/3 mL nebulizer solution   No No   Sig: Take 3 mL by nebulization 3 (three) times a day   lisinopril (ZESTRIL) 40 mg tablet   Yes No   Sig: Take 40 mg by mouth daily   metoprolol tartrate (LOPRESSOR) 100 mg tablet   Yes No   Sig: Take 100 mg by mouth every 12 (twelve) hours   nicotine (NICODERM CQ) 7 mg/24hr TD 24 hr patch   No No   Sig: Place 1 patch on the skin over 24 hours daily Do not start before October 24, 2023.   Patient not taking: Reported on 12/5/2023   omeprazole (PriLOSEC OTC) 20 MG tablet   Yes No   Sig: Take 20 mg by mouth daily    omeprazole (PriLOSEC) 20 mg delayed release capsule   Yes No   Sig: Take 20 mg by mouth every morning   pioglitazone (ACTOS) 15 mg tablet   Yes No   Sig: Take 15 mg by mouth daily   sertraline (ZOLOFT) 50 mg tablet   No No   Sig: Take 1 tablet (50 mg total) by mouth daily   torsemide (DEMADEX) 5 MG tablet   No No   Sig: Take 1 tablet (5 mg total) by mouth daily      Facility-Administered Medications: None       Past Medical History:   Diagnosis Date    Anxiety     COPD (chronic obstructive pulmonary disease) (HCC)     Diabetes mellitus (HCC)     Essential (primary) hypertension     GERD (gastroesophageal reflux disease)     Smoker        History reviewed. No pertinent surgical history.    Family History   Problem Relation Age of Onset    Diabetes Father      I have reviewed and agree with the history as documented.    E-Cigarette/Vaping    E-Cigarette Use Never User      E-Cigarette/Vaping Substances    Nicotine No     THC No     CBD No     Flavoring No      Social History     Tobacco Use    Smoking status: Every Day     Current packs/day: 0.25     Types: Cigarettes     Passive exposure: Never    Smokeless tobacco: Never   Vaping Use    Vaping status: Never Used   Substance Use Topics    Alcohol use: Not Currently    Drug use: Never       Review of Systems    Constitutional:  Negative for chills and fever.   HENT:  Negative for ear pain, hearing loss, sore throat, trouble swallowing and voice change.    Eyes:  Negative for pain and discharge.   Respiratory:  Positive for shortness of breath and wheezing. Negative for cough and hemoptysis.    Cardiovascular:  Negative for chest pain, palpitations and syncope.   Gastrointestinal:  Negative for abdominal pain, blood in stool, constipation, diarrhea, nausea and vomiting.   Genitourinary:  Negative for dysuria, flank pain, frequency and hematuria.   Musculoskeletal:  Negative for joint swelling, neck pain and neck stiffness.   Skin:  Negative for rash and wound.   Neurological:  Negative for dizziness, seizures, syncope, facial asymmetry and headaches.   Psychiatric/Behavioral:  Negative for hallucinations, self-injury and suicidal ideas.    All other systems reviewed and are negative.      Physical Exam  Physical Exam  Vitals and nursing note reviewed.   Constitutional:       General: She is not in acute distress.     Appearance: She is well-developed.   HENT:      Head: Normocephalic and atraumatic.      Right Ear: External ear normal.      Left Ear: External ear normal.   Eyes:      General: No scleral icterus.        Right eye: No discharge.         Left eye: No discharge.      Extraocular Movements: Extraocular movements intact.      Conjunctiva/sclera: Conjunctivae normal.   Cardiovascular:      Rate and Rhythm: Normal rate and regular rhythm.      Heart sounds: Normal heart sounds. No murmur heard.  Pulmonary:      Effort: Pulmonary effort is normal. No tachypnea, accessory muscle usage or respiratory distress.      Breath sounds: Examination of the right-lower field reveals rales. Examination of the left-lower field reveals rales. Decreased breath sounds, wheezing and rales present. No rhonchi.   Abdominal:      General: Bowel sounds are normal. There is no distension.      Palpations: Abdomen is soft.       Tenderness: There is no abdominal tenderness. There is no guarding or rebound.   Musculoskeletal:         General: No deformity. Normal range of motion.      Cervical back: Normal range of motion and neck supple.   Skin:     General: Skin is warm and dry.      Findings: No rash.   Neurological:      General: No focal deficit present.      Mental Status: She is alert and oriented to person, place, and time.      Cranial Nerves: No cranial nerve deficit.   Psychiatric:         Mood and Affect: Mood normal.         Behavior: Behavior normal.         Thought Content: Thought content normal.         Judgment: Judgment normal.         Vital Signs  ED Triage Vitals [03/13/24 1419]   Temperature Pulse Respirations Blood Pressure SpO2   97.6 °F (36.4 °C) 81 20 (!) 181/89 97 %      Temp Source Heart Rate Source Patient Position - Orthostatic VS BP Location FiO2 (%)   Temporal Monitor Sitting Right arm --      Pain Score       --           Vitals:    03/13/24 1419 03/13/24 1500   BP: (!) 181/89 143/75   Pulse: 81 68   Patient Position - Orthostatic VS: Sitting Sitting         Visual Acuity      ED Medications  Medications   ipratropium-albuterol (DUO-NEB) 0.5-2.5 mg/3 mL inhalation solution 3 mL (3 mL Nebulization Given 3/13/24 1436)   predniSONE tablet 60 mg (60 mg Oral Given 3/13/24 1442)       Diagnostic Studies  Results Reviewed       Procedure Component Value Units Date/Time    COVID[facility], [facility]/B, [facility] [[id number]]  (Normal) Collected: 03/13/24 1436    Lab Status: Final result Specimen: Nares from Nasopharyngeal Swab Updated: 03/13/24 1534     SARS-CoV-2 Negative     INFLUENZA A PCR Negative     INFLUENZA B PCR Negative     RSV PCR Negative    Narrative:      FOR PEDIATRIC PATIENTS - copy/paste COVID Guidelines URL to browser: [email or web address]    SARS-CoV-2 assay is a Nucleic Acid Amplification assay intended for the  qualitative detection of nucleic  acid from SARS-CoV-2 in nasopharyngeal  swabs. Results are for the presumptive identification of SARS-CoV-2 RNA.    Positive results are indicative of infection with SARS-CoV-2, the virus  causing COVID-19, but do not rule out bacterial infection or co-infection  with other viruses. Laboratories within the [place] and its  territories are required to report all positive results to the appropriate  public health authorities. Negative results do not preclude SARS-CoV-2  infection and should not be used as the sole basis for treatment or other  patient management decisions. Negative results must be combined with  clinical observations, patient history, and epidemiological information.  This test has not been FDA cleared or approved.    This test has been authorized by FDA under an Emergency Use Authorization  (EUA). This test is only authorized for the duration of time the  declaration that circumstances exist justifying the authorization of the  emergency use of an in vitro diagnostic tests for detection of SARS-CoV-2  virus and/or diagnosis of COVID-19 infection under section 564(b)(1) of  the Act, 21 U.S.C. 360bbb-3(b)(1), unless the authorization is terminated  or revoked sooner. The test has been validated but independent review by FDA  and CLIA is pending.    Test performed using [employer] GeneXpert: This RT-PCR assay targets N2,  a region unique to SARS-CoV-2. A conserved region in the E-gene was chosen  for pan-Sarbecovirus detection which includes SARS-CoV-2.    According to CMS-2020-01-R, this platform meets the definition of high-throughput technology.    Comprehensive metabolic panel [[id number]]  (Abnormal) Collected: 03/13/24 1436    Lab Status: Final result Specimen: Blood from Arm, Right Updated: 03/13/24 1514     Sodium 127 mmol/L      Potassium 4.5 mmol/L      Chloride 89 mmol/L      CO2 30 mmol/L      ANION GAP 8 mmol/L      BUN 9 mg/dL      Creatinine 0.73 mg/dL      Glucose 130 mg/dL       Calcium 9.6 mg/dL      AST 12 U/L      ALT 9 U/L      Alkaline Phosphatase 85 U/L      Total Protein 7.3 g/dL      Albumin 4.1 g/dL      Total Bilirubin 0.30 mg/dL      eGFR 85 ml/min/1.73sq m     Narrative:      National Kidney Disease Foundation guidelines for Chronic Kidney Disease (CKD):     Stage 1 with normal or high GFR (GFR > 90 mL/min/1.73 square meters)    Stage 2 Mild CKD (GFR = 60-89 mL/min/1.73 square meters)    Stage 3A Moderate CKD (GFR = 45-59 mL/min/1.73 square meters)    Stage 3B Moderate CKD (GFR = 30-44 mL/min/1.73 square meters)    Stage 4 Severe CKD (GFR = 15-29 mL/min/1.73 square meters)    Stage 5 End Stage CKD (GFR <15 mL/min/1.73 square meters)  Note: GFR calculation is accurate only with a steady state creatinine    HS Troponin 0hr (reflex protocol) [[id number]]  (Normal) Collected: 03/13/24 1436    Lab Status: Final result Specimen: Blood from Arm, Right Updated: 03/13/24 1512     hs TnI 0hr 4 ng/L     HS Troponin I 2hr [[id number]]     Lab Status: No result Specimen: Blood     CBC and differential [[id number]] Collected: 03/13/24 1436    Lab Status: Final result Specimen: Blood from Arm, Right Updated: 03/13/24 1449     WBC 8.50 Thousand/uL      RBC 4.44 Million/uL      Hemoglobin 12.8 g/dL      Hematocrit 38.1 %      MCV 86 fL      MCH 28.8 pg      MCHC 33.6 g/dL      RDW 11.9 %      MPV 10.0 fL      Platelets 239 Thousands/uL      nRBC 0 /100 WBCs      Neutrophils Relative 69 %      Immature Grans % 1 %      Lymphocytes Relative 18 %      Monocytes Relative 10 %      Eosinophils Relative 1 %      Basophils Relative 1 %      Neutrophils Absolute 6.06 Thousands/µL      Absolute Immature Grans 0.04 Thousand/uL      Absolute Lymphocytes 1.49 Thousands/µL      Absolute Monocytes 0.83 Thousand/µL      Eosinophils Absolute 0.04 Thousand/µL      Basophils Absolute 0.04 Thousands/µL     B-Type Natriuretic Peptide(BNP) [[id number]] Collected: 03/13/24 1436    Lab Status: In process Specimen: Blood  from Arm, Right Updated: 03/13/24 1446                   XR chest pa & lateral   ED Interpretation by Miguelangel Brooks MD (03/13 3373)   No acute finding                 Procedures  Procedures         ED Course                               SBIRT 20yo+      Flowsheet Row Most Recent Value   Initial Alcohol Screen: US AUDIT-C     1. How often do you have a drink containing alcohol? 0 Filed at: 03/13/2024 1418   2. How many drinks containing alcohol do you have on a typical day you are drinking?  0 Filed at: 03/13/2024 1418   3b. FEMALE Any Age, or MALE 65+: How often do you have 4 or more drinks on one occassion? 0 Filed at: 03/13/2024 1418   Audit-C Score 0 Filed at: 03/13/2024 1418   LIVIER: How many times in the past year have you...    Used an illegal drug or used a prescription medication for non-medical reasons? Never Filed at: 03/13/2024 1418                      Medical Decision Making  Based on the history and medical screening exam performed the diagnostic considerations include but are not limited to COPD/asthma/flu/pneumonia/COVID/anxiety.    Based on the work-up performed in the emergency room which includes physical examination, and which may include laboratory studies and imaging as warranted including advanced imaging such as CT scan or ultrasound, the diagnostic considerations are narrowed to exclude limb or life-threatening process.    The patient is stable for discharge.    Reviewed high utilizer plan.  Patient frequently presents with similar symptoms, usually combination of anxiety and COPD exacerbation.  In accordance with the plan, will attempt did not admit patient as she is improved after nebulization and has negative workup otherwise.  Chest x-ray is negative.  Lab work reveals mild hyponatremia but no other acute findings.  COVID testing negative.  Patient advised to follow-up with primary care physician and is agreeable.  Will place patient on steroid taper.  Discussed finding of mild  hyponatremia with patient.  Advised PCP follow-up.    Amount and/or Complexity of Data Reviewed  Labs: ordered. Decision-making details documented in ED Course.     Details: Sodium 127 otherwise negative  Radiology: ordered and independent interpretation performed. Decision-making details documented in ED Course.     Details: Chest x-ray negative    Risk  Prescription drug management.             Disposition  Final diagnoses:   COPD exacerbation (HCC)   Hyponatremia     Time reflects when diagnosis was documented in both MDM as applicable and the Disposition within this note       Time User Action Codes Description Comment    3/13/2024  3:50 PM Miguelangel Brooks [J44.1] COPD exacerbation (HCC)     3/13/2024  3:51 PM Miguelangel Brooks [E87.1] Hyponatremia           ED Disposition       ED Disposition   Discharge    Condition   Stable    Date/Time   Wed Mar 13, 2024 1550    Comment   Jessika REID Viaalena discharge to home/self care.                   Follow-up Information       Follow up With Specialties Details Why Contact Info    Karlie Bruno DO [place] [address]  [phone]              Patient's Medications   Discharge Prescriptions    PREDNISONE 10 MG TABLET    Take 5 tablets (50 mg total) by mouth daily for 2 days, THEN 4 tablets (40 mg total) daily for 2 days, THEN 3 tablets (30 mg total) daily for 2 days, THEN 2 tablets (20 mg total) daily for 2 days, THEN 1 tablet (10 mg total) daily for 2 days.       Start Date: 3/13/2024 End Date: 3/23/2024       Order Dose: --       Quantity: 30 tablet    Refills: 0       No discharge procedures on file.    PDMP Review         Value Time User    PDMP Reviewed  Yes 11/12/2023  2:20 AM AYLIN Morrell            ED Provider  Electronically Signed by             Miguelangel Brooks MD  03/13/24 9259

## 2024-03-14 ENCOUNTER — OFFICE VISIT (OUTPATIENT)
Dept: PULMONOLOGY | Facility: CLINIC | Age: 69
End: 2024-03-14

## 2024-03-14 ENCOUNTER — PATIENT OUTREACH (OUTPATIENT)
Dept: CASE MANAGEMENT | Facility: OTHER | Age: 69
End: 2024-03-14

## 2024-03-14 VITALS
SYSTOLIC BLOOD PRESSURE: 144 MMHG | HEART RATE: 78 BPM | BODY MASS INDEX: 29.12 KG/M2 | OXYGEN SATURATION: 98 % | DIASTOLIC BLOOD PRESSURE: 78 MMHG | WEIGHT: 159.2 LBS | TEMPERATURE: 98.1 F

## 2024-03-14 DIAGNOSIS — J96.21 ACUTE ON CHRONIC RESPIRATORY FAILURE WITH HYPOXIA (HCC): ICD-10-CM

## 2024-03-14 DIAGNOSIS — Z72.0 TOBACCO ABUSE: ICD-10-CM

## 2024-03-14 DIAGNOSIS — J44.1 COPD WITH ACUTE EXACERBATION (HCC): Primary | ICD-10-CM

## 2024-03-14 DIAGNOSIS — R91.1 LUNG NODULE: ICD-10-CM

## 2024-03-14 DIAGNOSIS — J44.9 COPD, SEVERE (HCC): ICD-10-CM

## 2024-03-14 LAB
ATRIAL RATE: 77 BPM
P AXIS: 34 DEGREES
PR INTERVAL: 106 MS
QRS AXIS: 45 DEGREES
QRSD INTERVAL: 78 MS
QT INTERVAL: 360 MS
QTC INTERVAL: 407 MS
T WAVE AXIS: 43 DEGREES
VENTRICULAR RATE: 77 BPM

## 2024-03-14 PROCEDURE — 93010 ELECTROCARDIOGRAM REPORT: CPT | Performed by: INTERNAL MEDICINE

## 2024-03-14 RX ORDER — FLUTICASONE FUROATE, UMECLIDINIUM BROMIDE AND VILANTEROL TRIFENATATE 200; 62.5; 25 UG/1; UG/1; UG/1
1 POWDER RESPIRATORY (INHALATION) DAILY
Qty: 60 BLISTER | Refills: 5 | Status: SHIPPED | OUTPATIENT
Start: 2024-03-14 | End: 2024-09-10

## 2024-03-14 NOTE — PROGRESS NOTES
In basket notifications received for patient being treated and released from  Emergency Department.  Jessika presented with c/o worsening SOB. She was administered duo-neb breathing treatment and prednisone 60mg orally.  CXR without abnormal findings.  She discharged with prednisone taper and direction to f/u with PCP.      Patient presented today wit Pulm appt as scheduled.  Provider recommended completion of prednisone taper, increased her Trelegy form 100 to 200 and encouraged smoking cessation.  Palliative Care referral discussed; patient not interested at this time.     Discharge follow up call. Patient discharged from 3/13/24.     Is this a good time for you to talk?     No answer when contacted.  Left generic msg with RN CM contact info.     Will plan for outreach within 1 week of today if no response from patient.     Update to team member Geni CORONADO

## 2024-03-14 NOTE — PROGRESS NOTES
Pulmonary Follow-Up Note   Jessika Lux 69 y.o. female MRN: [id number]  3/14/2024      Assessment/Plan:    Diagnoses and all orders for this visit:    COPD with acute exacerbation (HCC)    Acute on chronic respiratory failure with hypoxia (HCC)    Pulmonary Nodule    Tobacco abuse    She has frequent symptoms despite triple therapy and supplemental oxygen, with frequent nebulizer use and recurrent need for oral steroids.  On exam today her chest is clear  Recommend she complete prednisone taper as given by ER  Will increase to Trelegy 200 (currently on Trelegy 100)  We discussed referral to Palliative Care - patient feels she does not need or want that at this time but will keep an open mind for the future.  She has upcoming chest CT in May for pulmonary nodule; we will review the images at next visit however will call with results prior.  She is continuing to smoke and not interested in smoking cessation at this time.  Continue O2 4L/m supplemental O2 and titrate to maintain saturations >90%    Other orders  -     cholecalciferol (VITAMIN D3) 1,000 units tablet  Vaccines: Up to date    Return in about 3 months (around 6/14/2024).    All of Jessika's questions were answered prior to leaving the office today.  She is aware to call our office with any further questions or concerns.    History of Present Illness   Reason for Visit: Follow up  Chief Complaint: short of breath  HPI: Jessika Lux is a 69 y.o. female who presents to the office today for routine follow up however she is also not feeling well. Was seen yesterday at the ER for shortness of breath and was prescribed a short prednisone taper.    She has shortness of breath but denies wheezing or cough. No sputum. She feels she is sleeping well. She feels about the same today as yesterday. Denies upper respiratory symptoms or feeling of illness.  Trelegy 100  DuoNeb  Prednisone - 50mg today, decreasing by 10mg every day (did not start yet)    Review of  Systems   Constitutional:  Negative for chills, fatigue and fever.   HENT:  Negative for congestion and postnasal drip.    Respiratory:  Positive for shortness of breath. Negative for cough, chest tightness and wheezing.    Cardiovascular:  Negative for chest pain, palpitations and leg swelling.   Gastrointestinal:  Negative for abdominal pain.   Allergic/Immunologic: Negative for environmental allergies.   All other systems reviewed and are negative.      Historical Information   Past Medical History:   Diagnosis Date    Anxiety     COPD (chronic obstructive pulmonary disease) (HCC)     Diabetes mellitus (HCC)     Essential (primary) hypertension     GERD (gastroesophageal reflux disease)     Smoker      History reviewed. No pertinent surgical history.  Family History   Problem Relation Age of Onset    Diabetes Father      Social History   Social History     Substance and Sexual Activity   Alcohol Use Not Currently     Social History     Substance and Sexual Activity   Drug Use Never     Social History     Tobacco Use   Smoking Status Every Day    Current packs/day: 0.25    Types: Cigarettes    Passive exposure: Never   Smokeless Tobacco Never     E-Cigarette/Vaping    E-Cigarette Use Never User      E-Cigarette/Vaping Substances    Nicotine No     THC No     CBD No     Flavoring No        Meds/Allergies     Current Outpatient Medications:     amLODIPine (NORVASC) 10 mg tablet, Take 10 mg by mouth daily at bedtime , Disp: , Rfl:     atorvastatin (LIPITOR) 40 mg tablet, Take 40 mg by mouth daily at bedtime , Disp: , Rfl:     cholecalciferol (VITAMIN D3) 1,000 units tablet, , Disp: , Rfl:     cloNIDine (CATAPRES) 0.2 mg tablet, Take 0.2 mg by mouth every 12 (twelve) hours, Disp: , Rfl:     ergocalciferol (VITAMIN D2) 50,000 units, Take 50,000 Units by mouth once a week On saturday, Disp: , Rfl:     fluticasone-umeclidinium-vilanterol (Trelegy Ellipta) 200-62.5-25 mcg/actuation AEPB inhaler, Inhale 1 puff daily Rinse  mouth after use., Disp: 60 blister, Rfl: 5    glipiZIDE (GLUCOTROL) 10 mg tablet, Take 10 mg by mouth daily in the early morning W breakfast, Disp: , Rfl:     hydrOXYzine HCL (ATARAX) 10 mg tablet, Take 10 mg by mouth every 6 (six) hours as needed for itching, Disp: , Rfl:     ipratropium-albuterol (DUO-NEB) 0.5-2.5 mg/3 mL nebulizer solution, Take 3 mL by nebulization 3 (three) times a day, Disp: 180 mL, Rfl: 3    lisinopril (ZESTRIL) 40 mg tablet, Take 40 mg by mouth daily, Disp: , Rfl:     metoprolol tartrate (LOPRESSOR) 100 mg tablet, Take 100 mg by mouth every 12 (twelve) hours, Disp: , Rfl:     Nebulizers (Comp Air Compressor Nebulizer) MISC, As directed, Disp: , Rfl:     omeprazole (PriLOSEC OTC) 20 MG tablet, Take 20 mg by mouth daily , Disp: , Rfl:     predniSONE 10 mg tablet, Take 5 tablets (50 mg total) by mouth daily for 2 days, THEN 4 tablets (40 mg total) daily for 2 days, THEN 3 tablets (30 mg total) daily for 2 days, THEN 2 tablets (20 mg total) daily for 2 days, THEN 1 tablet (10 mg total) daily for 2 days., Disp: 30 tablet, Rfl: 0    sertraline (ZOLOFT) 50 mg tablet, Take 1 tablet (50 mg total) by mouth daily, Disp: 30 tablet, Rfl: 2    torsemide (DEMADEX) 5 MG tablet, Take 1 tablet (5 mg total) by mouth daily, Disp: 30 tablet, Rfl: 1    fluticasone (FLONASE) 50 mcg/act nasal spray, 1 spray into each nostril daily (Patient not taking: Reported on 12/5/2023), Disp: 9.9 mL, Rfl: 0    nicotine (NICODERM CQ) 7 mg/24hr TD 24 hr patch, Place 1 patch on the skin over 24 hours daily Do not start before October 24, 2023. (Patient not taking: Reported on 3/14/2024), Disp: 28 patch, Rfl: 0    omeprazole (PriLOSEC) 20 mg delayed release capsule, Take 20 mg by mouth every morning (Patient not taking: Reported on 3/14/2024), Disp: , Rfl:     pioglitazone (ACTOS) 15 mg tablet, Take 15 mg by mouth daily (Patient not taking: Reported on 3/14/2024), Disp: , Rfl:   No current facility-administered medications for  this visit.  Allergies   Allergen Reactions    Clindamycin        Vitals: Blood pressure 144/78, pulse 78, temperature 98.1 °F (36.7 °C), weight 72.2 kg (159 lb 3.2 oz), SpO2 98%. Body mass index is 29.12 kg/m². Oxygen Therapy  SpO2: 98 % (4 liters)      Physical Exam  Vitals reviewed.   Constitutional:       Appearance: Normal appearance.   HENT:      Head: Normocephalic.      Nose: Nose normal.      Mouth/Throat:      Mouth: Mucous membranes are moist.      Pharynx: Oropharynx is clear.   Eyes:      Conjunctiva/sclera: Conjunctivae normal.      Pupils: Pupils are equal, round, and reactive to light.   Cardiovascular:      Rate and Rhythm: Normal rate and regular rhythm.      Pulses: Normal pulses.      Heart sounds: Normal heart sounds.   Pulmonary:      Effort: Pulmonary effort is normal.      Breath sounds: Normal breath sounds.   Abdominal:      General: Abdomen is flat. Bowel sounds are normal.      Palpations: Abdomen is soft.   Musculoskeletal:         General: Normal range of motion.   Skin:     General: Skin is warm and dry.      Capillary Refill: Capillary refill takes less than 2 seconds.   Neurological:      General: No focal deficit present.      Mental Status: She is alert and oriented to person, place, and time. Mental status is at baseline.   Psychiatric:         Mood and Affect: Mood normal.         Behavior: Behavior normal.         Thought Content: Thought content normal.         Judgment: Judgment normal.         Labs:   Lab Results   Component Value Date    WBC 8.50 03/13/2024    HGB 12.8 03/13/2024    HCT 38.1 03/13/2024    MCV 86 03/13/2024     03/13/2024    EOSPCT 1 03/13/2024    EOSABS 0.04 03/13/2024    SEGSPCT 91 (H) 10/29/2023    MONOPCT 10 03/13/2024    MONOABS 0.28 10/29/2023    BANDSPCT 1 06/25/2023     Lab Results   Component Value Date    CALCIUM 9.6 03/13/2024    K 4.5 03/13/2024    CO2 30 03/13/2024    CL 89 (L) 03/13/2024    BUN 9 03/13/2024    CREATININE 0.73 03/13/2024  "    No results found for: \"IGE\", \"RAST\"  Lab Results   Component Value Date    ALT 9 03/13/2024    AST 12 (L) 03/13/2024    ALKPHOS 85 03/13/2024         Imaging and other studies: I have personally reviewed pertinent reports.   and I have personally reviewed pertinent films in PACS  CXR 3/13/24  No acute cardiopulmonary disease.     Pulmonary Results (PFTs, PSG): I have personally reviewed pertinent reports.    PFT 2023 showed very severe obstruction. She had difficulty completing pleth maneuvers and nitrogen washout. No bronchodilator response.      AYLIN High  [facility]        Portions of the record may have been created with voice recognition software.  Occasional wrong word or \"sound a like\" substitutions may have occurred due to the inherent limitations of voice recognition software.  Read the chart carefully and recognize, using context, where substitutions have occurred or contact the dictating provider.  "

## 2024-03-15 ENCOUNTER — TELEPHONE (OUTPATIENT)
Dept: NEPHROLOGY | Facility: CLINIC | Age: 69
End: 2024-03-15

## 2024-03-15 NOTE — TELEPHONE ENCOUNTER
"I tried calling Jessika regarding completing labs prior to upcoming appt on 03/22/24 but after a few rings a prompt came on stating \"your call cannot be completed at this time.\"  "

## 2024-03-17 ENCOUNTER — APPOINTMENT (EMERGENCY)
Dept: RADIOLOGY | Facility: HOSPITAL | Age: 69
DRG: 191 | End: 2024-03-17
Payer: COMMERCIAL

## 2024-03-17 ENCOUNTER — HOSPITAL ENCOUNTER (INPATIENT)
Facility: HOSPITAL | Age: 69
LOS: 4 days | Discharge: HOME | DRG: 191 | End: 2024-03-21
Attending: EMERGENCY MEDICINE | Admitting: FAMILY MEDICINE
Payer: COMMERCIAL

## 2024-03-17 DIAGNOSIS — J44.1 COPD WITH ACUTE EXACERBATION (HCC): ICD-10-CM

## 2024-03-17 DIAGNOSIS — J44.9 COPD, SEVERE (HCC): ICD-10-CM

## 2024-03-17 DIAGNOSIS — E87.1 HYPONATREMIA: Primary | ICD-10-CM

## 2024-03-17 DIAGNOSIS — J44.1 COPD EXACERBATION (HCC): ICD-10-CM

## 2024-03-17 LAB
2HR DELTA HS TROPONIN: 0 NG/L
4HR DELTA HS TROPONIN: 0 NG/L
ALBUMIN SERPL BCP-MCNC: 4.6 G/DL (ref 3.5–5)
ALP SERPL-CCNC: 95 U/L (ref 34–104)
ALT SERPL W P-5'-P-CCNC: 11 U/L (ref 7–52)
ANION GAP SERPL CALCULATED.3IONS-SCNC: 1 MMOL/L (ref 4–13)
ANION GAP SERPL CALCULATED.3IONS-SCNC: 5 MMOL/L (ref 4–13)
APTT PPP: 27 SECONDS (ref 23–37)
AST SERPL W P-5'-P-CCNC: 12 U/L (ref 13–39)
BASOPHILS # BLD AUTO: 0.01 THOUSANDS/ÂΜL (ref 0–0.1)
BASOPHILS NFR BLD AUTO: 0 % (ref 0–1)
BILIRUB SERPL-MCNC: 0.38 MG/DL (ref 0.2–1)
BNP SERPL-MCNC: 86 PG/ML (ref 0–100)
BUN SERPL-MCNC: 11 MG/DL (ref 5–25)
BUN SERPL-MCNC: 11 MG/DL (ref 5–25)
BUN SERPL-MCNC: 12 MG/DL (ref 5–25)
BUN SERPL-MCNC: 12 MG/DL (ref 5–25)
CALCIUM SERPL-MCNC: 10.2 MG/DL (ref 8.4–10.2)
CALCIUM SERPL-MCNC: 9 MG/DL (ref 8.4–10.2)
CALCIUM SERPL-MCNC: 9.4 MG/DL (ref 8.4–10.2)
CALCIUM SERPL-MCNC: 9.5 MG/DL (ref 8.4–10.2)
CARDIAC TROPONIN I PNL SERPL HS: 5 NG/L
CHLORIDE SERPL-SCNC: 75 MMOL/L (ref 96–108)
CHLORIDE SERPL-SCNC: 76 MMOL/L (ref 96–108)
CHLORIDE SERPL-SCNC: 78 MMOL/L (ref 96–108)
CHLORIDE SERPL-SCNC: 83 MMOL/L (ref 96–108)
CO2 SERPL-SCNC: 36 MMOL/L (ref 21–32)
CO2 SERPL-SCNC: 36 MMOL/L (ref 21–32)
CO2 SERPL-SCNC: 38 MMOL/L (ref 21–32)
CO2 SERPL-SCNC: 39 MMOL/L (ref 21–32)
CREAT SERPL-MCNC: 0.57 MG/DL (ref 0.6–1.3)
CREAT SERPL-MCNC: 0.58 MG/DL (ref 0.6–1.3)
CREAT SERPL-MCNC: 0.6 MG/DL (ref 0.6–1.3)
CREAT SERPL-MCNC: 0.62 MG/DL (ref 0.6–1.3)
D DIMER PPP FEU-MCNC: <0.27 UG/ML FEU
EOSINOPHIL # BLD AUTO: 0.02 THOUSAND/ÂΜL (ref 0–0.61)
EOSINOPHIL NFR BLD AUTO: 0 % (ref 0–6)
ERYTHROCYTE [DISTWIDTH] IN BLOOD BY AUTOMATED COUNT: 11.6 % (ref 11.6–15.1)
FLUAV RNA RESP QL NAA+PROBE: NEGATIVE
FLUBV RNA RESP QL NAA+PROBE: NEGATIVE
GFR SERPL CREATININE-BSD FRML MDRD: 93 ML/MIN/1.73SQ M
GFR SERPL CREATININE-BSD FRML MDRD: 94 ML/MIN/1.73SQ M
GFR SERPL CREATININE-BSD FRML MDRD: 95 ML/MIN/1.73SQ M
GFR SERPL CREATININE-BSD FRML MDRD: 96 ML/MIN/1.73SQ M
GLUCOSE SERPL-MCNC: 150 MG/DL (ref 65–140)
GLUCOSE SERPL-MCNC: 191 MG/DL (ref 65–140)
GLUCOSE SERPL-MCNC: 225 MG/DL (ref 65–140)
GLUCOSE SERPL-MCNC: 248 MG/DL (ref 65–140)
GLUCOSE SERPL-MCNC: 272 MG/DL (ref 65–140)
GLUCOSE SERPL-MCNC: 277 MG/DL (ref 65–140)
GLUCOSE SERPL-MCNC: 281 MG/DL (ref 65–140)
HCT VFR BLD AUTO: 40.5 % (ref 34.8–46.1)
HGB BLD-MCNC: 13.9 G/DL (ref 11.5–15.4)
IMM GRANULOCYTES # BLD AUTO: 0.06 THOUSAND/UL (ref 0–0.2)
IMM GRANULOCYTES NFR BLD AUTO: 1 % (ref 0–2)
INR PPP: 0.84 (ref 0.84–1.19)
LACTATE SERPL-SCNC: 0.8 MMOL/L (ref 0.5–2)
LIPASE SERPL-CCNC: 16 U/L (ref 11–82)
LYMPHOCYTES # BLD AUTO: 1.79 THOUSANDS/ÂΜL (ref 0.6–4.47)
LYMPHOCYTES NFR BLD AUTO: 14 % (ref 14–44)
MAGNESIUM SERPL-MCNC: 1.8 MG/DL (ref 1.9–2.7)
MCH RBC QN AUTO: 28.7 PG (ref 26.8–34.3)
MCHC RBC AUTO-ENTMCNC: 34.3 G/DL (ref 31.4–37.4)
MCV RBC AUTO: 84 FL (ref 82–98)
MONOCYTES # BLD AUTO: 1.23 THOUSAND/ÂΜL (ref 0.17–1.22)
MONOCYTES NFR BLD AUTO: 10 % (ref 4–12)
NEUTROPHILS # BLD AUTO: 9.87 THOUSANDS/ÂΜL (ref 1.85–7.62)
NEUTS SEG NFR BLD AUTO: 75 % (ref 43–75)
NRBC BLD AUTO-RTO: 0 /100 WBCS
PLATELET # BLD AUTO: 285 THOUSANDS/UL (ref 149–390)
PMV BLD AUTO: 9.5 FL (ref 8.9–12.7)
POTASSIUM SERPL-SCNC: 4.1 MMOL/L (ref 3.5–5.3)
POTASSIUM SERPL-SCNC: 4.7 MMOL/L (ref 3.5–5.3)
POTASSIUM SERPL-SCNC: 4.8 MMOL/L (ref 3.5–5.3)
POTASSIUM SERPL-SCNC: 4.9 MMOL/L (ref 3.5–5.3)
PROCALCITONIN SERPL-MCNC: <0.05 NG/ML
PROT SERPL-MCNC: 8 G/DL (ref 6.4–8.4)
PROTHROMBIN TIME: 11.8 SECONDS (ref 11.6–14.5)
RBC # BLD AUTO: 4.84 MILLION/UL (ref 3.81–5.12)
RSV RNA RESP QL NAA+PROBE: NEGATIVE
SARS-COV-2 RNA RESP QL NAA+PROBE: NEGATIVE
SODIUM SERPL-SCNC: 117 MMOL/L (ref 135–147)
SODIUM SERPL-SCNC: 119 MMOL/L (ref 135–147)
SODIUM SERPL-SCNC: 119 MMOL/L (ref 135–147)
SODIUM SERPL-SCNC: 122 MMOL/L (ref 135–147)
WBC # BLD AUTO: 12.98 THOUSAND/UL (ref 4.31–10.16)

## 2024-03-17 PROCEDURE — 94640 AIRWAY INHALATION TREATMENT: CPT

## 2024-03-17 PROCEDURE — 83880 ASSAY OF NATRIURETIC PEPTIDE: CPT | Performed by: EMERGENCY MEDICINE

## 2024-03-17 PROCEDURE — 0241U HB NFCT DS VIR RESP RNA 4 TRGT: CPT | Performed by: EMERGENCY MEDICINE

## 2024-03-17 PROCEDURE — 85379 FIBRIN DEGRADATION QUANT: CPT | Performed by: EMERGENCY MEDICINE

## 2024-03-17 PROCEDURE — 93005 ELECTROCARDIOGRAM TRACING: CPT

## 2024-03-17 PROCEDURE — 99223 1ST HOSP IP/OBS HIGH 75: CPT | Performed by: FAMILY MEDICINE

## 2024-03-17 PROCEDURE — 80053 COMPREHEN METABOLIC PANEL: CPT | Performed by: EMERGENCY MEDICINE

## 2024-03-17 PROCEDURE — 94664 DEMO&/EVAL PT USE INHALER: CPT

## 2024-03-17 PROCEDURE — 80048 BASIC METABOLIC PNL TOTAL CA: CPT | Performed by: INTERNAL MEDICINE

## 2024-03-17 PROCEDURE — 85025 COMPLETE CBC W/AUTO DIFF WBC: CPT | Performed by: EMERGENCY MEDICINE

## 2024-03-17 PROCEDURE — 80048 BASIC METABOLIC PNL TOTAL CA: CPT | Performed by: FAMILY MEDICINE

## 2024-03-17 PROCEDURE — 96375 TX/PRO/DX INJ NEW DRUG ADDON: CPT

## 2024-03-17 PROCEDURE — 96365 THER/PROPH/DIAG IV INF INIT: CPT

## 2024-03-17 PROCEDURE — 83605 ASSAY OF LACTIC ACID: CPT | Performed by: EMERGENCY MEDICINE

## 2024-03-17 PROCEDURE — 83690 ASSAY OF LIPASE: CPT | Performed by: EMERGENCY MEDICINE

## 2024-03-17 PROCEDURE — 99285 EMERGENCY DEPT VISIT HI MDM: CPT | Performed by: EMERGENCY MEDICINE

## 2024-03-17 PROCEDURE — 99285 EMERGENCY DEPT VISIT HI MDM: CPT

## 2024-03-17 PROCEDURE — 82948 REAGENT STRIP/BLOOD GLUCOSE: CPT

## 2024-03-17 PROCEDURE — 83735 ASSAY OF MAGNESIUM: CPT | Performed by: EMERGENCY MEDICINE

## 2024-03-17 PROCEDURE — 36415 COLL VENOUS BLD VENIPUNCTURE: CPT | Performed by: EMERGENCY MEDICINE

## 2024-03-17 PROCEDURE — 94760 N-INVAS EAR/PLS OXIMETRY 1: CPT

## 2024-03-17 PROCEDURE — 84484 ASSAY OF TROPONIN QUANT: CPT | Performed by: FAMILY MEDICINE

## 2024-03-17 PROCEDURE — 85730 THROMBOPLASTIN TIME PARTIAL: CPT | Performed by: EMERGENCY MEDICINE

## 2024-03-17 PROCEDURE — 85610 PROTHROMBIN TIME: CPT | Performed by: EMERGENCY MEDICINE

## 2024-03-17 PROCEDURE — 71045 X-RAY EXAM CHEST 1 VIEW: CPT

## 2024-03-17 PROCEDURE — 84145 PROCALCITONIN (PCT): CPT | Performed by: ANESTHESIOLOGY

## 2024-03-17 PROCEDURE — 87040 BLOOD CULTURE FOR BACTERIA: CPT | Performed by: EMERGENCY MEDICINE

## 2024-03-17 PROCEDURE — 84484 ASSAY OF TROPONIN QUANT: CPT | Performed by: EMERGENCY MEDICINE

## 2024-03-17 RX ORDER — LORAZEPAM 0.5 MG/1
0.5 TABLET ORAL EVERY 8 HOURS PRN
COMMUNITY

## 2024-03-17 RX ORDER — FLUTICASONE PROPIONATE 50 MCG
1 SPRAY, SUSPENSION (ML) NASAL DAILY
Status: DISCONTINUED | OUTPATIENT
Start: 2024-03-17 | End: 2024-03-21 | Stop reason: HOSPADM

## 2024-03-17 RX ORDER — MAGNESIUM SULFATE HEPTAHYDRATE 40 MG/ML
2 INJECTION, SOLUTION INTRAVENOUS ONCE
Status: COMPLETED | OUTPATIENT
Start: 2024-03-17 | End: 2024-03-17

## 2024-03-17 RX ORDER — BUDESONIDE 0.5 MG/2ML
0.5 INHALANT ORAL
Status: DISCONTINUED | OUTPATIENT
Start: 2024-03-17 | End: 2024-03-21 | Stop reason: HOSPADM

## 2024-03-17 RX ORDER — INSULIN LISPRO 100 [IU]/ML
2-12 INJECTION, SOLUTION INTRAVENOUS; SUBCUTANEOUS
Status: DISCONTINUED | OUTPATIENT
Start: 2024-03-17 | End: 2024-03-21 | Stop reason: HOSPADM

## 2024-03-17 RX ORDER — INSULIN LISPRO 100 [IU]/ML
2-12 INJECTION, SOLUTION INTRAVENOUS; SUBCUTANEOUS
Status: DISCONTINUED | OUTPATIENT
Start: 2024-03-17 | End: 2024-03-19

## 2024-03-17 RX ORDER — LEVALBUTEROL INHALATION SOLUTION 1.25 MG/3ML
1.25 SOLUTION RESPIRATORY (INHALATION) EVERY 4 HOURS
Status: DISCONTINUED | OUTPATIENT
Start: 2024-03-17 | End: 2024-03-17

## 2024-03-17 RX ORDER — ATORVASTATIN CALCIUM 40 MG/1
40 TABLET, FILM COATED ORAL
Status: DISCONTINUED | OUTPATIENT
Start: 2024-03-17 | End: 2024-03-21 | Stop reason: HOSPADM

## 2024-03-17 RX ORDER — IPRATROPIUM BROMIDE AND ALBUTEROL SULFATE 2.5; .5 MG/3ML; MG/3ML
3 SOLUTION RESPIRATORY (INHALATION)
Status: DISCONTINUED | OUTPATIENT
Start: 2024-03-17 | End: 2024-03-21 | Stop reason: HOSPADM

## 2024-03-17 RX ORDER — IPRATROPIUM BROMIDE AND ALBUTEROL SULFATE 2.5; .5 MG/3ML; MG/3ML
3 SOLUTION RESPIRATORY (INHALATION) ONCE
Status: COMPLETED | OUTPATIENT
Start: 2024-03-17 | End: 2024-03-17

## 2024-03-17 RX ORDER — LEVALBUTEROL INHALATION SOLUTION 1.25 MG/3ML
1.25 SOLUTION RESPIRATORY (INHALATION) 3 TIMES DAILY PRN
Status: DISCONTINUED | OUTPATIENT
Start: 2024-03-17 | End: 2024-03-21 | Stop reason: HOSPADM

## 2024-03-17 RX ORDER — ACETAMINOPHEN 325 MG/1
650 TABLET ORAL EVERY 6 HOURS PRN
Status: DISCONTINUED | OUTPATIENT
Start: 2024-03-17 | End: 2024-03-21 | Stop reason: HOSPADM

## 2024-03-17 RX ORDER — METOPROLOL TARTRATE 50 MG/1
100 TABLET, FILM COATED ORAL EVERY 12 HOURS SCHEDULED
Status: DISCONTINUED | OUTPATIENT
Start: 2024-03-17 | End: 2024-03-21 | Stop reason: HOSPADM

## 2024-03-17 RX ORDER — SODIUM CHLORIDE FOR INHALATION 0.9 %
3 VIAL, NEBULIZER (ML) INHALATION
Status: DISCONTINUED | OUTPATIENT
Start: 2024-03-17 | End: 2024-03-17

## 2024-03-17 RX ORDER — ENOXAPARIN SODIUM 100 MG/ML
40 INJECTION SUBCUTANEOUS DAILY
Status: DISCONTINUED | OUTPATIENT
Start: 2024-03-17 | End: 2024-03-21 | Stop reason: HOSPADM

## 2024-03-17 RX ORDER — METHYLPREDNISOLONE SODIUM SUCCINATE 40 MG/ML
40 INJECTION, POWDER, LYOPHILIZED, FOR SOLUTION INTRAMUSCULAR; INTRAVENOUS EVERY 8 HOURS
Status: DISCONTINUED | OUTPATIENT
Start: 2024-03-17 | End: 2024-03-19

## 2024-03-17 RX ORDER — AMLODIPINE BESYLATE 10 MG/1
10 TABLET ORAL
Status: DISCONTINUED | OUTPATIENT
Start: 2024-03-17 | End: 2024-03-18

## 2024-03-17 RX ORDER — NICOTINE 21 MG/24HR
1 PATCH, TRANSDERMAL 24 HOURS TRANSDERMAL DAILY
Status: DISCONTINUED | OUTPATIENT
Start: 2024-03-17 | End: 2024-03-18

## 2024-03-17 RX ORDER — GUAIFENESIN 600 MG/1
600 TABLET, EXTENDED RELEASE ORAL 2 TIMES DAILY
Status: DISCONTINUED | OUTPATIENT
Start: 2024-03-17 | End: 2024-03-21 | Stop reason: HOSPADM

## 2024-03-17 RX ORDER — LORAZEPAM 0.5 MG/1
0.5 TABLET ORAL EVERY 8 HOURS PRN
Status: DISCONTINUED | OUTPATIENT
Start: 2024-03-17 | End: 2024-03-18

## 2024-03-17 RX ORDER — PANTOPRAZOLE SODIUM 40 MG/1
40 TABLET, DELAYED RELEASE ORAL
Status: DISCONTINUED | OUTPATIENT
Start: 2024-03-17 | End: 2024-03-21 | Stop reason: HOSPADM

## 2024-03-17 RX ORDER — LISINOPRIL 20 MG/1
40 TABLET ORAL DAILY
Status: DISCONTINUED | OUTPATIENT
Start: 2024-03-17 | End: 2024-03-18

## 2024-03-17 RX ORDER — DOCUSATE SODIUM 100 MG/1
100 CAPSULE, LIQUID FILLED ORAL 2 TIMES DAILY
Status: DISCONTINUED | OUTPATIENT
Start: 2024-03-17 | End: 2024-03-18

## 2024-03-17 RX ORDER — METHYLPREDNISOLONE SODIUM SUCCINATE 125 MG/2ML
125 INJECTION, POWDER, LYOPHILIZED, FOR SOLUTION INTRAMUSCULAR; INTRAVENOUS ONCE
Status: COMPLETED | OUTPATIENT
Start: 2024-03-17 | End: 2024-03-17

## 2024-03-17 RX ORDER — CLONIDINE HYDROCHLORIDE 0.1 MG/1
0.2 TABLET ORAL EVERY 12 HOURS SCHEDULED
Status: DISCONTINUED | OUTPATIENT
Start: 2024-03-17 | End: 2024-03-18

## 2024-03-17 RX ORDER — INSULIN LISPRO 100 [IU]/ML
2-12 INJECTION, SOLUTION INTRAVENOUS; SUBCUTANEOUS
Status: DISCONTINUED | OUTPATIENT
Start: 2024-03-17 | End: 2024-03-17

## 2024-03-17 RX ADMIN — BUDESONIDE 0.5 MG: 0.5 INHALANT RESPIRATORY (INHALATION) at 19:50

## 2024-03-17 RX ADMIN — LEVALBUTEROL HYDROCHLORIDE 1.25 MG: 1.25 SOLUTION RESPIRATORY (INHALATION) at 15:48

## 2024-03-17 RX ADMIN — PANTOPRAZOLE SODIUM 40 MG: 40 TABLET, DELAYED RELEASE ORAL at 15:19

## 2024-03-17 RX ADMIN — ATORVASTATIN CALCIUM 40 MG: 40 TABLET, FILM COATED ORAL at 21:30

## 2024-03-17 RX ADMIN — LORAZEPAM 0.5 MG: 0.5 TABLET ORAL at 21:30

## 2024-03-17 RX ADMIN — DOXYCYCLINE 100 MG: 100 INJECTION, POWDER, LYOPHILIZED, FOR SOLUTION INTRAVENOUS at 12:03

## 2024-03-17 RX ADMIN — CLONIDINE HYDROCHLORIDE 0.2 MG: 0.1 TABLET ORAL at 15:19

## 2024-03-17 RX ADMIN — METHYLPREDNISOLONE SODIUM SUCCINATE 125 MG: 125 INJECTION, POWDER, FOR SOLUTION INTRAMUSCULAR; INTRAVENOUS at 07:56

## 2024-03-17 RX ADMIN — INSULIN LISPRO 6 UNITS: 100 INJECTION, SOLUTION INTRAVENOUS; SUBCUTANEOUS at 12:07

## 2024-03-17 RX ADMIN — INSULIN LISPRO 6 UNITS: 100 INJECTION, SOLUTION INTRAVENOUS; SUBCUTANEOUS at 17:19

## 2024-03-17 RX ADMIN — METHYLPREDNISOLONE SODIUM SUCCINATE 40 MG: 40 INJECTION, POWDER, FOR SOLUTION INTRAMUSCULAR; INTRAVENOUS at 23:48

## 2024-03-17 RX ADMIN — SODIUM CHLORIDE 500 ML: 0.9 INJECTION, SOLUTION INTRAVENOUS at 08:58

## 2024-03-17 RX ADMIN — METHYLPREDNISOLONE SODIUM SUCCINATE 40 MG: 40 INJECTION, POWDER, FOR SOLUTION INTRAMUSCULAR; INTRAVENOUS at 17:19

## 2024-03-17 RX ADMIN — FLUTICASONE PROPIONATE 1 SPRAY: 50 SPRAY, METERED NASAL at 12:05

## 2024-03-17 RX ADMIN — DOXYCYCLINE 100 MG: 100 INJECTION, POWDER, LYOPHILIZED, FOR SOLUTION INTRAVENOUS at 23:48

## 2024-03-17 RX ADMIN — MAGNESIUM SULFATE HEPTAHYDRATE 2 G: 40 INJECTION, SOLUTION INTRAVENOUS at 09:02

## 2024-03-17 RX ADMIN — IPRATROPIUM BROMIDE AND ALBUTEROL SULFATE 3 ML: 2.5; .5 SOLUTION RESPIRATORY (INHALATION) at 19:50

## 2024-03-17 RX ADMIN — AMLODIPINE BESYLATE 10 MG: 10 TABLET ORAL at 21:30

## 2024-03-17 RX ADMIN — CLONIDINE HYDROCHLORIDE 0.2 MG: 0.1 TABLET ORAL at 21:30

## 2024-03-17 RX ADMIN — IPRATROPIUM BROMIDE AND ALBUTEROL SULFATE 3 ML: 2.5; .5 SOLUTION RESPIRATORY (INHALATION) at 07:55

## 2024-03-17 RX ADMIN — SODIUM CHLORIDE 50 ML/HR: 4 INJECTION, SOLUTION, CONCENTRATE INTRAVENOUS at 14:08

## 2024-03-17 RX ADMIN — METOPROLOL TARTRATE 100 MG: 50 TABLET, FILM COATED ORAL at 21:30

## 2024-03-17 RX ADMIN — LISINOPRIL 40 MG: 20 TABLET ORAL at 15:19

## 2024-03-17 RX ADMIN — LEVALBUTEROL HYDROCHLORIDE 1.25 MG: 1.25 SOLUTION RESPIRATORY (INHALATION) at 11:52

## 2024-03-17 RX ADMIN — INSULIN LISPRO 2 UNITS: 100 INJECTION, SOLUTION INTRAVENOUS; SUBCUTANEOUS at 22:10

## 2024-03-17 RX ADMIN — ENOXAPARIN SODIUM 40 MG: 40 INJECTION SUBCUTANEOUS at 12:05

## 2024-03-17 NOTE — RESPIRATORY THERAPY NOTE
RT Protocol Note  Jessika Lux 69 y.o. female MRN: [id number]  Unit/Bed#: -01 Encounter: [id number]    Assessment    Principal Problem:    COPD with acute exacerbation (HCC)  Active Problems:    Acute hyponatremia    Type 2 diabetes mellitus without complication, without long-term current use of insulin (HCC)      Home Pulmonary Medications:         Past Medical History:   Diagnosis Date    Anxiety     COPD (chronic obstructive pulmonary disease) (HCC)     Diabetes mellitus (HCC)     Essential (primary) hypertension     GERD (gastroesophageal reflux disease)     Smoker      Social History     Socioeconomic History    Marital status:      Spouse name: None    Number of children: None    Years of education: None    Highest education level: None   Occupational History    None   Tobacco Use    Smoking status: Every Day     Current packs/day: 0.25     Types: Cigarettes     Passive exposure: Never    Smokeless tobacco: Never   Vaping Use    Vaping status: Never Used   Substance and Sexual Activity    Alcohol use: Not Currently    Drug use: Never    Sexual activity: Not Currently   Other Topics Concern    None   Social History Narrative    None     Social Determinants of Health     Financial Resource Strain: Not on file   Food Insecurity: No Food Insecurity (2/29/2024)    Hunger Vital Sign     Worried About Running Out of Food in the Last Year: Never true     Ran Out of Food in the Last Year: Never true   Transportation Needs: No Transportation Needs (2/29/2024)    PRAPARE - Transportation     Lack of Transportation (Medical): No     Lack of Transportation (Non-Medical): No   Physical Activity: Not on file   Stress: Not on file   Social Connections: Not on file   Intimate Partner Violence: Not on file   Housing Stability: Low Risk  (2/29/2024)    Housing Stability Vital Sign     Unable to Pay for Housing in the Last Year: No     Number of Places Lived in the Last Year: 1     Unstable Housing in the Last  "Year: No       Subjective         Objective    Physical Exam:   Assessment Type: During-treatment  General Appearance: Alert, Awake  Respiratory Pattern: (P) Shallow, Dyspnea with exertion, Dyspnea at rest  Chest Assessment: Chest expansion symmetrical  Bilateral Breath Sounds: Expiratory wheezes, Inspiratory wheezes  O2 Device: 3L    Vitals:  Blood pressure (!) 179/86, pulse 84, temperature 97.9 °F (36.6 °C), resp. rate 22, height 5' 2\" (1.575 m), weight 70.8 kg (156 lb 1.4 oz), SpO2 97%.          Imaging and other studies: I have personally reviewed pertinent reports.      O2 Device: 3L     Plan             Resp Comments: (P) Pt admitted w. a COPD exacerbation w/  inspiratory and exp wheeze bilaterally.  Appears stable on 3L, (home use) however claims some SOB at rest and w/ exertion. Home reg is duoneb TID and trelegy.  Order modified to duoneb q6 pulmicort BID and prn xopenex   pt aware that she can call between tx if needed, xopenex administered along with atrovent at 1200  duoneb to start at 2000   "

## 2024-03-17 NOTE — PLAN OF CARE
Problem: PAIN - ADULT  Goal: Verbalizes/displays adequate comfort level or baseline comfort level  Description: Interventions:  - Encourage patient to monitor pain and request assistance  - Assess pain using appropriate pain scale  - Administer analgesics based on type and severity of pain and evaluate response  - Implement non-pharmacological measures as appropriate and evaluate response  - Consider cultural and social influences on pain and pain management  - Notify physician/advanced practitioner if interventions unsuccessful or patient reports new pain  Outcome: Progressing     Problem: INFECTION - ADULT  Goal: Absence or prevention of progression during hospitalization  Description: INTERVENTIONS:  - Assess and monitor for signs and symptoms of infection  - Monitor lab/diagnostic results  - Monitor all insertion sites, i.e. indwelling lines, tubes, and drains  - Monitor endotracheal if appropriate and nasal secretions for changes in amount and color  - Tovey appropriate cooling/warming therapies per order  - Administer medications as ordered  - Instruct and encourage patient and family to use good hand hygiene technique  - Identify and instruct in appropriate isolation precautions for identified infection/condition  Outcome: Progressing  Goal: Absence of fever/infection during neutropenic period  Description: INTERVENTIONS:  - Monitor WBC    Outcome: Progressing     Problem: SAFETY ADULT  Goal: Patient will remain free of falls  Description: INTERVENTIONS:  - Educate patient/family on patient safety including physical limitations  - Instruct patient to call for assistance with activity   - Consult OT/PT to assist with strengthening/mobility   - Keep Call bell within reach  - Keep bed low and locked with side rails adjusted as appropriate  - Keep care items and personal belongings within reach  - Initiate and maintain comfort rounds  - Apply yellow socks and bracelet for high fall risk patients  - Consider  moving patient to room near nurses station  Outcome: Progressing     Problem: DISCHARGE PLANNING  Goal: Discharge to home or other facility with appropriate resources  Description: INTERVENTIONS:  - Identify barriers to discharge w/patient and caregiver  - Arrange for needed discharge resources and transportation as appropriate  - Identify discharge learning needs (meds, wound care, etc.)  - Arrange for interpretive services to assist at discharge as needed  - Refer to Case Management Department for coordinating discharge planning if the patient needs post-hospital services based on physician/advanced practitioner order or complex needs related to functional status, cognitive ability, or social support system  Outcome: Progressing     Problem: Knowledge Deficit  Goal: Patient/family/caregiver demonstrates understanding of disease process, treatment plan, medications, and discharge instructions  Description: Complete learning assessment and assess knowledge base.  Interventions:  - Provide teaching at level of understanding  - Provide teaching via preferred learning methods  Outcome: Progressing     Problem: RESPIRATORY - ADULT  Goal: Achieves optimal ventilation and oxygenation  Description: INTERVENTIONS:  - Assess for changes in respiratory status  - Assess for changes in mentation and behavior  - Position to facilitate oxygenation and minimize respiratory effort  - Oxygen administered by appropriate delivery if ordered  - Initiate smoking cessation education as indicated  - Encourage broncho-pulmonary hygiene including cough, deep breathe, Incentive Spirometry  - Assess the need for suctioning and aspirate as needed  - Assess and instruct to report SOB or any respiratory difficulty  - Respiratory Therapy support as indicated  Outcome: Progressing      Performing Laboratory: [id number] Performing Laboratory: [id number]

## 2024-03-17 NOTE — NURSING NOTE
Prescription Torsemide and Lorazepam medications present in patient possession. Patient agreeable to sending medications to pharmacy. Seven lorazepam tablets 0.5mg counted with Adriana Bañuelos RN and in front of patient. Medications sealed in appropriate medication bag and sent to pharmacy.

## 2024-03-17 NOTE — QUICK NOTE
Progress Note - Triage Asssessment   Jessika REID Viars 69 y.o. female MRN: [id number]    Time Called ( Time): 836  Date Called: 03/17/24  Room#: ED- TR13A  Time Evaluated: 850  Person requesting evaluation: Dr. Carroll    Situation:    69 year old female with COPD, chronic hyponatremia, DM II, HTN, HLD, anxiety who presents today with shortness of breath. She was seen on the ED on 3/13 for shortness of breath and prescribed a prednisone taper and not admitted. Previous to that she had an admission from 2/28-3/3 for hyponatremia in which urine lytes were consistent with SIADH; she received 1.8% saline and 1 dose of tolvaptan with improvement of sodium to 135. She was discharged on low dose torsemide which she has been taking. Of note she's on zoloft, glipizide, lisinopril chronically. She denies any symptoms of hyponatremia (nausea, vomiting, dizziness) and does not appear to be increasing her free water intake significantly. She does admit to some weakness and increased urination (but may be from new from torsemide). On labs she was noted to have a Na 119 (corrected to 121), chloride of 75, serum bicarb of 39 (up from 30 on the 13th) and hyperglycemia (225). We were asked to eval for hyponatremia and COPD exacerbation    Interventions:   Patient seen and evaluated. As stated above no concerning signs/symptoms of hyponatremia. She complains of being short of breath but remains comfortable on her home O2 levels. She does have wheezes at her bases but otherwise lungs are diminished but clear. She has increased skin turgor, appears dry, complains of thirst.         Triage Assessment:     Patient can be admitted to med-surg level of care    Would give 500 cc NSS now as patient does look contracted on labs and on exam.    Would consult nephrology, obtain serum and urine Os, urine sodium.     She would benefit from some chronic meds to be changed to alternatives if possible as several could be causing her  hyponatremia (mainly zoloft, glipizide, lisinopril)    Recommendations discussed with Dr. Carroll

## 2024-03-17 NOTE — H&P
"[facility]  H&P  Name: Jessika Lux 69 y.o. female I MRN: [id number]  Unit/Bed#: -01 I Date of Admission: 3/17/2024   Date of Service: 3/17/2024 I Hospital Day: 0      Assessment/Plan   Acute hyponatremia  Assessment & Plan  Acute on chronic in nature,  Patient is coming with a sodium level of 119-corrected sodium came to 122  Patient was recently admitted into this hospital and evaluated by nephrology, at that time, patient received dose of tolvaptan  Because for hyponatremia suspected SIADH.  Placed patient on 1.5 L fluid restriction  Follow nephrology recommendation-reach out to nephrology over the phone, recommending if repeat sodium level does not improve, can you start 1.8% fluid    * COPD with acute exacerbation (HCC)  Assessment & Plan  Coming with shortness of breath, patient does have history of COPD, and recently evaluated by pulmonology office  Came with short of breath, cough  Patient does use 3 L of oxygen chronically  Continue IV steroid, inhaled corticosteroids, nebulizer treatment on a scheduled basis  Follow respiratory protocol  COVID/flu panel negative    Type 2 diabetes mellitus without complication, without long-term current use of insulin (HCC)  Assessment & Plan  Lab Results   Component Value Date    HGBA1C 7.9 (H) 03/06/2024       No results for input(s): \"POCGLU\" in the last 72 hours.    Blood Sugar Average: Last 72 hrs:  Patient placed on IV steroid which will affect patient blood sugar, follow sliding scale, adjust insulin as appropriate.           VTE Pharmacologic Prophylaxis: VTE Score: 4 Moderate Risk (Score 3-4) - Pharmacological DVT Prophylaxis Ordered: enoxaparin (Lovenox).  Code Status: Level 1 - Full Code per patient  Discussion with family:  Left voice message for daughter and grandchild..     Anticipated Length of Stay: Patient will be admitted on an inpatient basis with an anticipated length of stay of greater than 2 midnights secondary to " monitor above conditions.      Chief Complaint: Shortness of breath    History of Present Illness:  Jessika Lux is a 69 y.o. female with a PMH of COPD, diabetes, SIADH who presents with shortness of breath which started increasing since last night.  Patient reported recently she followed up with primary pulmonology and following up their recommendation.  Denies any cough, congestion, any chest pain, dizziness, lightheadedness..    Review of Systems:  Review of Systems   Constitutional:  Positive for activity change. Negative for appetite change, chills, diaphoresis, fatigue, fever and unexpected weight change.   HENT:  Negative for congestion, dental problem, drooling, sneezing, sore throat and tinnitus.    Respiratory:  Positive for cough, shortness of breath and wheezing. Negative for apnea.    Cardiovascular:  Negative for chest pain, palpitations and leg swelling.   Gastrointestinal:  Negative for abdominal distention, abdominal pain, anal bleeding, blood in stool and constipation.   Genitourinary:  Negative for difficulty urinating, dyspareunia and dysuria.   Musculoskeletal:  Negative for arthralgias and back pain.   Skin:  Negative for color change and pallor.   Neurological:  Negative for dizziness, facial asymmetry, light-headedness and numbness.   Hematological:  Negative for adenopathy.   Psychiatric/Behavioral:  Negative for agitation and behavioral problems.    All other systems reviewed and are negative.      Past Medical and Surgical History:   Past Medical History:   Diagnosis Date    Anxiety     COPD (chronic obstructive pulmonary disease) (HCC)     Diabetes mellitus (HCC)     Essential (primary) hypertension     GERD (gastroesophageal reflux disease)     Smoker        History reviewed. No pertinent surgical history.    Meds/Allergies:  Prior to Admission medications    Medication Sig Start Date End Date Taking? Authorizing Provider   fluticasone (FLONASE) 50 mcg/act nasal spray 1 spray into each  nostril daily  Patient taking differently: 1 spray into each nostril daily as needed for rhinitis or allergies 6/28/23  Yes Nettie Moore MD   LORazepam (ATIVAN) 0.5 mg tablet Take 0.5 mg by mouth every 8 (eight) hours as needed for anxiety   Yes Historical Provider, MD   amLODIPine (NORVASC) 10 mg tablet Take 10 mg by mouth daily at bedtime     Historical Provider, MD   atorvastatin (LIPITOR) 40 mg tablet Take 40 mg by mouth daily at bedtime     Historical Provider, MD   cholecalciferol (VITAMIN D3) 1,000 units tablet  3/11/24   Historical Provider, MD   cloNIDine (CATAPRES) 0.2 mg tablet Take 0.2 mg by mouth every 12 (twelve) hours    Historical Provider, MD   ergocalciferol (VITAMIN D2) 50,000 units Take 50,000 Units by mouth once a week On saturday    Historical Provider, MD   fluticasone-umeclidinium-vilanterol (Trelegy Ellipta) 200-62.5-25 mcg/actuation AEPB inhaler Inhale 1 puff daily Rinse mouth after use. 3/14/24 9/10/24  AYLIN High   glipiZIDE (GLUCOTROL) 10 mg tablet Take 10 mg by mouth daily in the early morning W breakfast    Historical Provider, MD   hydrOXYzine HCL (ATARAX) 10 mg tablet Take 10 mg by mouth every 6 (six) hours as needed for itching    Historical Provider, MD   ipratropium-albuterol (DUO-NEB) 0.5-2.5 mg/3 mL nebulizer solution Take 3 mL by nebulization 3 (three) times a day 9/27/23   Korina Olivarez DO   lisinopril (ZESTRIL) 40 mg tablet Take 40 mg by mouth daily    Historical Provider, MD   metoprolol tartrate (LOPRESSOR) 100 mg tablet Take 100 mg by mouth every 12 (twelve) hours    Historical Provider, MD   Nebulizers (Comp Air Compressor Nebulizer) MISC As directed 7/28/23   Historical Provider, MD   omeprazole (PriLOSEC OTC) 20 MG tablet Take 20 mg by mouth daily     Historical Provider, MD   predniSONE 10 mg tablet Take 5 tablets (50 mg total) by mouth daily for 2 days, THEN 4 tablets (40 mg total) daily for 2 days, THEN 3 tablets (30 mg total) daily for 2 days, THEN 2  "tablets (20 mg total) daily for 2 days, THEN 1 tablet (10 mg total) daily for 2 days. 3/13/24 3/23/24  Miguelangel Brooks MD   sertraline (ZOLOFT) 50 mg tablet Take 1 tablet (50 mg total) by mouth daily 11/13/23   Tamika Bernstein PA-C   torsemide (DEMADEX) 5 MG tablet Take 1 tablet (5 mg total) by mouth daily 3/4/24   Woodrow Cummings DO   nicotine (NICODERM CQ) 7 mg/24hr TD 24 hr patch Place 1 patch on the skin over 24 hours daily Do not start before October 24, 2023.  Patient not taking: Reported on 3/14/2024 10/24/23 3/17/24  Tamika Bernstein PA-C   omeprazole (PriLOSEC) 20 mg delayed release capsule Take 20 mg by mouth every morning  Patient not taking: Reported on 3/14/2024 1/14/24 3/17/24  Historical Provider, MD   pioglitazone (ACTOS) 15 mg tablet Take 15 mg by mouth daily  Patient not taking: Reported on 3/14/2024  3/17/24  Historical Provider, MD     I have reviewed home medications with patient personally.    Allergies:   Allergies   Allergen Reactions    Clindamycin        Social History:  Marital Status:    Occupation: Unknown  Patient Pre-hospital Living Situation: Home  Patient Pre-hospital Level of Mobility: walks  Patient Pre-hospital Diet Restrictions: Cardiac/diabetic diet  Substance Use History:   Social History     Substance and Sexual Activity   Alcohol Use Not Currently     Social History     Tobacco Use   Smoking Status Every Day    Current packs/day: 0.25    Types: Cigarettes    Passive exposure: Never   Smokeless Tobacco Never     Social History     Substance and Sexual Activity   Drug Use Never       Family History:  Family History   Problem Relation Age of Onset    Diabetes Father        Physical Exam:     Vitals:   Blood Pressure: (!) 179/86 (03/17/24 1004)  Pulse: 84 (03/17/24 1004)  Temperature: 97.9 °F (36.6 °C) (03/17/24 1004)  Respirations: 22 (03/17/24 0930)  Height: 5' 2\" (157.5 cm) (03/17/24 0741)  Weight - Scale: 70.8 kg (156 lb 1.4 oz) (03/17/24 0741)  SpO2: 97 % (03/17/24 " 1004)    Physical Exam  Vitals and nursing note reviewed.   Constitutional:       Appearance: Normal appearance. She is obese. She is not ill-appearing or diaphoretic.   HENT:      Nose: Nose normal. No congestion.      Mouth/Throat:      Mouth: Mucous membranes are moist.      Pharynx: No oropharyngeal exudate.   Eyes:      General: No scleral icterus.        Left eye: No discharge.      Extraocular Movements: Extraocular movements intact.      Conjunctiva/sclera: Conjunctivae normal.      Pupils: Pupils are equal, round, and reactive to light.   Cardiovascular:      Rate and Rhythm: Normal rate.      Heart sounds: No murmur heard.     No friction rub. No gallop.   Pulmonary:      Effort: Respiratory distress present.      Breath sounds: Wheezing and rhonchi present. No rales.   Chest:      Chest wall: No tenderness.   Abdominal:      General: Abdomen is flat. Bowel sounds are normal. There is no distension.      Palpations: There is no mass.      Tenderness: There is no abdominal tenderness.      Hernia: No hernia is present.   Musculoskeletal:      Right lower leg: No edema.      Left lower leg: No edema.   Skin:     General: Skin is warm.      Capillary Refill: Capillary refill takes less than 2 seconds.      Coloration: Skin is not jaundiced or pale.      Findings: No bruising or erythema.   Neurological:      General: No focal deficit present.      Mental Status: She is alert and oriented to person, place, and time.      Cranial Nerves: No cranial nerve deficit.      Sensory: No sensory deficit.      Motor: No weakness.      Coordination: Coordination normal.   Psychiatric:         Mood and Affect: Mood normal.          Additional Data:     Lab Results:  Results from last 7 days   Lab Units 03/17/24  0748   WBC Thousand/uL 12.98*   HEMOGLOBIN g/dL 13.9   HEMATOCRIT % 40.5   PLATELETS Thousands/uL 285   NEUTROS PCT % 75   LYMPHS PCT % 14   MONOS PCT % 10   EOS PCT % 0     Results from last 7 days   Lab Units  03/17/24  1151 03/17/24  0748   SODIUM mmol/L 117* 119*   POTASSIUM mmol/L 4.8 4.1   CHLORIDE mmol/L 76* 75*   CO2 mmol/L 36* 39*   BUN mg/dL 11 11   CREATININE mg/dL 0.57* 0.58*   ANION GAP mmol/L 5 5   CALCIUM mg/dL 9.5 10.2   ALBUMIN g/dL  --  4.6   TOTAL BILIRUBIN mg/dL  --  0.38   ALK PHOS U/L  --  95   ALT U/L  --  11   AST U/L  --  12*   GLUCOSE RANDOM mg/dL 248* 225*     Results from last 7 days   Lab Units 03/17/24  0748   INR  0.84     Results from last 7 days   Lab Units 03/17/24  1145   POC GLUCOSE mg/dl 272*         Results from last 7 days   Lab Units 03/17/24  0748   LACTIC ACID mmol/L 0.8   PROCALCITONIN ng/ml <0.05       Lines/Drains:  Invasive Devices       Peripheral Intravenous Line  Duration             Peripheral IV 03/17/24 Left Antecubital <1 day                        Imaging: Reviewed radiology reports from this admission including: chest xray  XR chest 1 view portable   ED Interpretation by Justen Carroll MD (03/17 3017)   NAD      Final Result by Ish Barone MD (03/17 1001)      Slightly limited but unremarkable chest radiograph            Workstation performed: [id number]             EKG and Other Studies Reviewed on Admission:   EKG: No EKG obtained.    ** Please Note: This note has been constructed using a voice recognition system. **

## 2024-03-17 NOTE — ED PROVIDER NOTES
History  Chief Complaint   Patient presents with    Shortness of Breath     Pt presented to this ED with daughter from home c/o increased sob starting last night. Hx copd-wearing 3L o2 daily. Denies travel/fevers/cough/n/v/d     Patient with a history of COPD.  Normally on 3 L nasal cannula at home.  Was just seen here 3 days ago for the same.  Complains of increasing shortness of breath.  No fevers or chills.  No nausea vomiting or diarrhea.  No increased leg swelling.  States this feels like her previous COPD exacerbation episodes.  Patient states she still smokes 4 to 5 cigarettes a day.      History provided by:  Patient   used: No    Shortness of Breath  Severity:  Mild  Onset quality:  Gradual  Duration:  3 days  Timing:  Constant  Progression:  Worsening  Chronicity:  Recurrent  Context: not activity, not animal exposure, not pollens and not weather changes    Relieved by:  Nothing  Worsened by:  Nothing  Ineffective treatments: Nebulizer treatment and prednisone.  Associated symptoms: cough and wheezing    Associated symptoms: no abdominal pain, no chest pain, no ear pain, no fever, no headaches, no neck pain, no rash, no sore throat, no sputum production, no swollen glands and no vomiting        Prior to Admission Medications   Prescriptions Last Dose Informant Patient Reported? Taking?   Nebulizers (Comp Air Compressor Nebulizer) MISC   Yes No   Sig: As directed   amLODIPine (NORVASC) 10 mg tablet   Yes No   Sig: Take 10 mg by mouth daily at bedtime    atorvastatin (LIPITOR) 40 mg tablet   Yes No   Sig: Take 40 mg by mouth daily at bedtime    cholecalciferol (VITAMIN D3) 1,000 units tablet   Yes No   cloNIDine (CATAPRES) 0.2 mg tablet   Yes No   Sig: Take 0.2 mg by mouth every 12 (twelve) hours   ergocalciferol (VITAMIN D2) 50,000 units   Yes No   Sig: Take 50,000 Units by mouth once a week On saturday   fluticasone (FLONASE) 50 mcg/act nasal spray   No No   Si spray into each  nostril daily   Patient not taking: Reported on 12/5/2023   fluticasone-umeclidinium-vilanterol (Trelegy Ellipta) 200-62.5-25 mcg/actuation AEPB inhaler   No No   Sig: Inhale 1 puff daily Rinse mouth after use.   glipiZIDE (GLUCOTROL) 10 mg tablet  Self Yes No   Sig: Take 10 mg by mouth daily in the early morning W breakfast   hydrOXYzine HCL (ATARAX) 10 mg tablet   Yes No   Sig: Take 10 mg by mouth every 6 (six) hours as needed for itching   ipratropium-albuterol (DUO-NEB) 0.5-2.5 mg/3 mL nebulizer solution   No No   Sig: Take 3 mL by nebulization 3 (three) times a day   lisinopril (ZESTRIL) 40 mg tablet   Yes No   Sig: Take 40 mg by mouth daily   metoprolol tartrate (LOPRESSOR) 100 mg tablet   Yes No   Sig: Take 100 mg by mouth every 12 (twelve) hours   nicotine (NICODERM CQ) 7 mg/24hr TD 24 hr patch   No No   Sig: Place 1 patch on the skin over 24 hours daily Do not start before October 24, 2023.   Patient not taking: Reported on 3/14/2024   omeprazole (PriLOSEC OTC) 20 MG tablet   Yes No   Sig: Take 20 mg by mouth daily    omeprazole (PriLOSEC) 20 mg delayed release capsule   Yes No   Sig: Take 20 mg by mouth every morning   Patient not taking: Reported on 3/14/2024   pioglitazone (ACTOS) 15 mg tablet   Yes No   Sig: Take 15 mg by mouth daily   Patient not taking: Reported on 3/14/2024   predniSONE 10 mg tablet   No No   Sig: Take 5 tablets (50 mg total) by mouth daily for 2 days, THEN 4 tablets (40 mg total) daily for 2 days, THEN 3 tablets (30 mg total) daily for 2 days, THEN 2 tablets (20 mg total) daily for 2 days, THEN 1 tablet (10 mg total) daily for 2 days.   sertraline (ZOLOFT) 50 mg tablet   No No   Sig: Take 1 tablet (50 mg total) by mouth daily   torsemide (DEMADEX) 5 MG tablet   No No   Sig: Take 1 tablet (5 mg total) by mouth daily      Facility-Administered Medications: None       Past Medical History:   Diagnosis Date    Anxiety     COPD (chronic obstructive pulmonary disease) (HCC)     Diabetes  mellitus (HCC)     Essential (primary) hypertension     GERD (gastroesophageal reflux disease)     Smoker        History reviewed. No pertinent surgical history.    Family History   Problem Relation Age of Onset    Diabetes Father      I have reviewed and agree with the history as documented.    E-Cigarette/Vaping    E-Cigarette Use Never User      E-Cigarette/Vaping Substances    Nicotine No     THC No     CBD No     Flavoring No      Social History     Tobacco Use    Smoking status: Every Day     Current packs/day: 0.25     Types: Cigarettes     Passive exposure: Never    Smokeless tobacco: Never   Vaping Use    Vaping status: Never Used   Substance Use Topics    Alcohol use: Not Currently    Drug use: Never       Review of Systems   Constitutional:  Negative for chills and fever.   HENT:  Negative for ear pain, hearing loss, sore throat, trouble swallowing and voice change.    Eyes:  Negative for pain and discharge.   Respiratory:  Positive for cough, shortness of breath and wheezing. Negative for sputum production.    Cardiovascular:  Negative for chest pain and palpitations.   Gastrointestinal:  Negative for abdominal pain, blood in stool, constipation, diarrhea, nausea and vomiting.   Genitourinary:  Negative for dysuria, flank pain, frequency and hematuria.   Musculoskeletal:  Negative for joint swelling, neck pain and neck stiffness.   Skin:  Negative for rash and wound.   Neurological:  Negative for dizziness, seizures, syncope, facial asymmetry and headaches.   Psychiatric/Behavioral:  Negative for hallucinations, self-injury and suicidal ideas.    All other systems reviewed and are negative.      Physical Exam  Physical Exam  Vitals and nursing note reviewed.   Constitutional:       General: She is not in acute distress.     Appearance: She is well-developed.   HENT:      Head: Normocephalic and atraumatic.      Right Ear: External ear normal.      Left Ear: External ear normal.   Eyes:      General: No  scleral icterus.        Right eye: No discharge.         Left eye: No discharge.      Extraocular Movements: Extraocular movements intact.      Conjunctiva/sclera: Conjunctivae normal.   Cardiovascular:      Rate and Rhythm: Normal rate and regular rhythm.      Heart sounds: Normal heart sounds. No murmur heard.  Pulmonary:      Effort: Accessory muscle usage present.      Breath sounds: Decreased breath sounds present. No wheezing or rales.   Abdominal:      General: Bowel sounds are normal. There is no distension.      Palpations: Abdomen is soft.      Tenderness: There is no abdominal tenderness. There is no guarding or rebound.   Musculoskeletal:         General: No deformity. Normal range of motion.      Cervical back: Normal range of motion and neck supple.   Skin:     General: Skin is warm and dry.      Findings: No rash.   Neurological:      General: No focal deficit present.      Mental Status: She is alert and oriented to person, place, and time.      Cranial Nerves: No cranial nerve deficit.   Psychiatric:         Mood and Affect: Mood normal.         Behavior: Behavior normal.         Thought Content: Thought content normal.         Judgment: Judgment normal.         Vital Signs  ED Triage Vitals [03/17/24 0741]   Temperature Pulse Respirations Blood Pressure SpO2   97.9 °F (36.6 °C) 96 18 (!) 189/94 91 %      Temp src Heart Rate Source Patient Position - Orthostatic VS BP Location FiO2 (%)   -- Monitor Lying Right arm --      Pain Score       No Pain           Vitals:    03/17/24 0800 03/17/24 0815 03/17/24 0830 03/17/24 0900   BP: 152/77 131/80 128/69 121/83   Pulse: 80 75 73 71   Patient Position - Orthostatic VS:             Visual Acuity      ED Medications  Medications   sodium chloride 0.9 % bolus 500 mL (500 mL Intravenous New Bag 3/17/24 0858)   magnesium sulfate 2 g/50 mL IVPB (premix) 2 g (2 g Intravenous New Bag 3/17/24 0902)   ipratropium-albuterol (DUO-NEB) 0.5-2.5 mg/3 mL inhalation  solution 3 mL (3 mL Nebulization Given 3/17/24 0755)   methylPREDNISolone sodium succinate (Solu-MEDROL) injection 125 mg (125 mg Intravenous Given 3/17/24 0756)   ipratropium-albuterol (DUO-NEB) 0.5-2.5 mg/3 mL inhalation solution 3 mL (3 mL Nebulization Given 3/17/24 0755)       Diagnostic Studies  Results Reviewed       Procedure Component Value Units Date/Time    Procalcitonin [[id number]]  (Normal) Collected: 03/17/24 0748    Lab Status: Final result Specimen: Blood from Arm, Left Updated: 03/17/24 0917     Procalcitonin <0.05 ng/ml     Basic metabolic panel [[id number]]     Lab Status: No result Specimen: Blood     B-Type Natriuretic Peptide(BNP) [[id number]]  (Normal) Collected: 03/17/24 0748    Lab Status: Final result Specimen: Blood from Arm, Left Updated: 03/17/24 0833     BNP 86 pg/mL     FLU/RSV/COVID - if FLU/RSV clinically relevant [[id number]]  (Normal) Collected: 03/17/24 0748    Lab Status: Final result Specimen: Nares from Nose Updated: 03/17/24 0831     SARS-CoV-2 Negative     INFLUENZA A PCR Negative     INFLUENZA B PCR Negative     RSV PCR Negative    Narrative:      FOR PEDIATRIC PATIENTS - copy/paste COVID Guidelines URL to browser: [email or web address]    SARS-CoV-2 assay is a Nucleic Acid Amplification assay intended for the  qualitative detection of nucleic acid from SARS-CoV-2 in nasopharyngeal  swabs. Results are for the presumptive identification of SARS-CoV-2 RNA.    Positive results are indicative of infection with SARS-CoV-2, the virus  causing COVID-19, but do not rule out bacterial infection or co-infection  with other viruses. Laboratories within the [place] and its  territories are required to report all positive results to the appropriate  public health authorities. Negative results do not preclude SARS-CoV-2  infection and should not be used as the sole basis for treatment or other  patient management decisions. Negative  results must be combined with  clinical observations, patient history, and epidemiological information.  This test has not been FDA cleared or approved.    This test has been authorized by FDA under an Emergency Use Authorization  (EUA). This test is only authorized for the duration of time the  declaration that circumstances exist justifying the authorization of the  emergency use of an in vitro diagnostic tests for detection of SARS-CoV-2  virus and/or diagnosis of COVID-19 infection under section 564(b)(1) of  the Act, 21 U.S.C. 360bbb-3(b)(1), unless the authorization is terminated  or revoked sooner. The test has been validated but independent review by FDA  and CLIA is pending.    Test performed using [employer] GeneXpert: This RT-PCR assay targets N2,  a region unique to SARS-CoV-2. A conserved region in the E-gene was chosen  for pan-Sarbecovirus detection which includes SARS-CoV-2.    According to CMS-2020-01-R, this platform meets the definition of high-throughput technology.    Comprehensive metabolic panel [[id number]]  (Abnormal) Collected: 03/17/24 0748    Lab Status: Final result Specimen: Blood from Arm, Left Updated: 03/17/24 0822     Sodium 119 mmol/L      Potassium 4.1 mmol/L      Chloride 75 mmol/L      CO2 39 mmol/L      ANION GAP 5 mmol/L      BUN 11 mg/dL      Creatinine 0.58 mg/dL      Glucose 225 mg/dL      Calcium 10.2 mg/dL      AST 12 U/L      ALT 11 U/L      Alkaline Phosphatase 95 U/L      Total Protein 8.0 g/dL      Albumin 4.6 g/dL      Total Bilirubin 0.38 mg/dL      eGFR 95 ml/min/1.73sq m     Narrative:      National Kidney Disease Foundation guidelines for Chronic Kidney Disease (CKD):     Stage 1 with normal or high GFR (GFR > 90 mL/min/1.73 square meters)    Stage 2 Mild CKD (GFR = 60-89 mL/min/1.73 square meters)    Stage 3A Moderate CKD (GFR = 45-59 mL/min/1.73 square meters)    Stage 3B Moderate CKD (GFR = 30-44 mL/min/1.73 square meters)    Stage 4 Severe CKD (GFR = 15-29  mL/min/1.73 square meters)    Stage 5 End Stage CKD (GFR <15 mL/min/1.73 square meters)  Note: GFR calculation is accurate only with a steady state creatinine    Magnesium [[id number]]  (Abnormal) Collected: 03/17/24 0748    Lab Status: Final result Specimen: Blood from Arm, Left Updated: 03/17/24 0822     Magnesium 1.8 mg/dL     Lipase [[id number]]  (Normal) Collected: 03/17/24 0748    Lab Status: Final result Specimen: Blood from Arm, Left Updated: 03/17/24 0822     Lipase 16 u/L     HS Troponin 0hr (reflex protocol) [[id number]]  (Normal) Collected: 03/17/24 0748    Lab Status: Final result Specimen: Blood from Arm, Left Updated: 03/17/24 0820     hs TnI 0hr 5 ng/L     HS Troponin I 2hr [[id number]]     Lab Status: No result Specimen: Blood     HS Troponin I 4hr [[id number]]     Lab Status: No result Specimen: Blood     Lactic acid, plasma (w/reflex if result > 2.0) [[id number]]  (Normal) Collected: 03/17/24 0748    Lab Status: Final result Specimen: Blood from Arm, Left Updated: 03/17/24 0817     LACTIC ACID 0.8 mmol/L     Narrative:      Result may be elevated if tourniquet was used during collection.    D-Dimer [[id number]]  (Normal) Collected: 03/17/24 0748    Lab Status: Final result Specimen: Blood from Arm, Left Updated: 03/17/24 0816     D-Dimer, Quant <0.27 ug/ml FEU     Narrative:      In the evaluation for possible pulmonary embolism, in the appropriate (Well's Score of 4 or less) patient, the age adjusted d-dimer cutoff for this patient can be calculated as:    Age x 0.01 (in ug/mL) for Age-adjusted D-dimer exclusion threshold for a patient over 50 years.    Protime-INR [[id number]]  (Normal) Collected: 03/17/24 0748    Lab Status: Final result Specimen: Blood from Arm, Left Updated: 03/17/24 0809     Protime 11.8 seconds      INR 0.84    APTT [[id number]]  (Normal) Collected: 03/17/24 0748    Lab Status: Final result Specimen: Blood from Arm, Left Updated: 03/17/24 0809     PTT 27 seconds     Blood culture #1  [[id number]] Collected: 03/17/24 0754    Lab Status: In process Specimen: Blood from Arm, Right Updated: 03/17/24 0756    CBC and differential [[id number]]  (Abnormal) Collected: 03/17/24 0748    Lab Status: Final result Specimen: Blood from Arm, Left Updated: 03/17/24 0756     WBC 12.98 Thousand/uL      RBC 4.84 Million/uL      Hemoglobin 13.9 g/dL      Hematocrit 40.5 %      MCV 84 fL      MCH 28.7 pg      MCHC 34.3 g/dL      RDW 11.6 %      MPV 9.5 fL      Platelets 285 Thousands/uL      nRBC 0 /100 WBCs      Neutrophils Relative 75 %      Immature Grans % 1 %      Lymphocytes Relative 14 %      Monocytes Relative 10 %      Eosinophils Relative 0 %      Basophils Relative 0 %      Neutrophils Absolute 9.87 Thousands/µL      Absolute Immature Grans 0.06 Thousand/uL      Absolute Lymphocytes 1.79 Thousands/µL      Absolute Monocytes 1.23 Thousand/µL      Eosinophils Absolute 0.02 Thousand/µL      Basophils Absolute 0.01 Thousands/µL     Blood culture #2 [[id number]] Collected: 03/17/24 0748    Lab Status: In process Specimen: Blood from Arm, Left Updated: 03/17/24 0754                   XR chest 1 view portable   ED Interpretation by Justen Carroll MD (03/17 0754)   NAD                 Procedures  ECG 12 Lead Documentation Only    Date/Time: 3/17/2024 7:49 AM    Performed by: Justen Carroll MD  Authorized by: Justen Carroll MD    ECG reviewed by me, the ED Provider: yes    Patient location:  ED  Previous ECG:     Previous ECG:  Compared to current    Similarity:  No change  Rate:     ECG rate:  80  Rhythm:     Rhythm: sinus rhythm    Ectopy:     Ectopy: none    QRS:     QRS axis:  Normal           ED Course  ED Course as of 03/17/24 0921   Sun Mar 17, 2024   0757 WBC(!): 12.98  Currently on prednisone.   0817 D-Dimer, Quant: <0.27   0833 Patient seen.  Resting comfortably on her 3 L nasal cannula.  Pulse ox is in the mid to high 90s.   0836 BNP: 86   0836 Sodium(!): 119   0838 Critical care contacted  regarding sodium of 119.  Will be down to see the patient.                               SBIRT 22yo+      Flowsheet Row Most Recent Value   Initial Alcohol Screen: US AUDIT-C     1. How often do you have a drink containing alcohol? 0 Filed at: 03/17/2024 0759   2. How many drinks containing alcohol do you have on a typical day you are drinking?  0 Filed at: 03/17/2024 0759   3a. Male UNDER 65: How often do you have five or more drinks on one occasion? 0 Filed at: 03/17/2024 0759   3b. FEMALE Any Age, or MALE 65+: How often do you have 4 or more drinks on one occassion? 0 Filed at: 03/17/2024 0759   Audit-C Score 0 Filed at: 03/17/2024 0759   LIVIER: How many times in the past year have you...    Used an illegal drug or used a prescription medication for non-medical reasons? Never Filed at: 03/17/2024 0759                      Medical Decision Making  Amount and/or Complexity of Data Reviewed  Labs: ordered. Decision-making details documented in ED Course.  Radiology: ordered and independent interpretation performed. Decision-making details documented in ED Course.  ECG/medicine tests: ordered and independent interpretation performed. Decision-making details documented in ED Course.  Discussion of management or test interpretation with external provider(s): Differential diagnosis includes but not limited to STEMI, NSTEMI, PE, pneumonia, pneumothorax, musculoskeletal chest pain, costochondritis, gastritis, cholelithiasis, contusion, strain    Risk  Prescription drug management.  Decision regarding hospitalization.             Disposition  Final diagnoses:   Hyponatremia   COPD exacerbation (HCC)     Time reflects when diagnosis was documented in both MDM as applicable and the Disposition within this note       Time User Action Codes Description Comment    3/17/2024  8:36 AM Justen Carroll Add [E87.1] Hyponatremia     3/17/2024  8:36 AM Justen Carroll Add [J44.1] COPD exacerbation (HCC)           ED Disposition        ED Disposition   Admit    Condition   Stable    Date/Time   Sun Mar 17, 2024 0830    Comment                  Follow-up Information    None         Patient's Medications   Discharge Prescriptions    No medications on file       No discharge procedures on file.    PDMP Review         Value Time User    PDMP Reviewed  Yes 11/12/2023  2:20 AM AYLIN Morrell            ED Provider  Electronically Signed by             Justen Carroll MD  03/17/24 0911

## 2024-03-17 NOTE — ASSESSMENT & PLAN NOTE
Coming with shortness of breath, patient does have history of COPD, and recently evaluated by pulmonology office  Came with short of breath, cough  Patient does use 3 L of oxygen chronically  Continue IV steroid, inhaled corticosteroids, nebulizer treatment on a scheduled basis  Follow respiratory protocol  COVID/flu panel negative

## 2024-03-17 NOTE — ASSESSMENT & PLAN NOTE
"Lab Results   Component Value Date    HGBA1C 7.9 (H) 03/06/2024       No results for input(s): \"POCGLU\" in the last 72 hours.    Blood Sugar Average: Last 72 hrs:  Patient placed on IV steroid which will affect patient blood sugar, follow sliding scale, adjust insulin as appropriate.    "

## 2024-03-17 NOTE — ASSESSMENT & PLAN NOTE
Acute on chronic in nature,  Patient is coming with a sodium level of 119  Patient was recently admitted into this hospital and evaluated by nephrology, at that time, patient received dose of tolvaptan  Because for hyponatremia suspected SIADH.  Placed patient on 1.5 L fluid restriction  Follow nephrology recommendation-reach out to nephrology over the phone, recommending if repeat sodium level does not improve, can you start 1.8% fluid

## 2024-03-17 NOTE — QUICK NOTE
Contacted by primary regarding hyponatremia. Na 117 but when corrects for BG corrects closer to 119-120.   Will start 1.8% saline at 50ml/hr and check BMP Q4.  Check TSH/cortisol in AM.  Goal correction 4-6 meq in 24 hr, max 8 meq.   Patient does not need central line for 1.8%.

## 2024-03-17 NOTE — ED NOTES
Critical care team at bedside to evaluate and speak with pt and family     Mary Alice Blevins RN  03/17/24 0602

## 2024-03-17 NOTE — ASSESSMENT & PLAN NOTE
Acute on chronic in nature,  Patient is coming with a sodium level of 119-corrected sodium comes around 122  Patient was recently admitted into this hospital and evaluated by nephrology, at that time, patient received dose of tolvaptan  hyponatremia suspected from SIADH.  Placed patient on 1.5 L fluid restriction  On admission days, discussed the case with nephrology, per nephrology recommendation patient started on 1.8% hypertonic solution-with that, patient sodium came up this morning 125, corrected sodium is 1 27-1 28.  Per nephrology recommendation, target correction rate was 4 to 6 mEq, seems patient mildly overcorrected.  Will try to reduce sodium after discussion has done with nephrology  Also per nephrology recommendation, will do CT chest abdomen pelvis for any kind of malignancy.  Since patient has pulmonary nodule previously.

## 2024-03-17 NOTE — QUICK NOTE
Repeat sodium level came back 117.  Corrected sodium based on blood glucose is 121    Reach out to nephrology, will also upgrade the level of care patients to level 2.  Critical care team made aware about this patient

## 2024-03-18 ENCOUNTER — PATIENT OUTREACH (OUTPATIENT)
Dept: CASE MANAGEMENT | Facility: OTHER | Age: 69
End: 2024-03-18

## 2024-03-18 ENCOUNTER — APPOINTMENT (INPATIENT)
Dept: CT IMAGING | Facility: HOSPITAL | Age: 69
DRG: 191 | End: 2024-03-18
Payer: COMMERCIAL

## 2024-03-18 PROBLEM — J96.11 CHRONIC RESPIRATORY FAILURE WITH HYPOXIA (HCC): Status: ACTIVE | Noted: 2020-11-02

## 2024-03-18 LAB
ANION GAP SERPL CALCULATED.3IONS-SCNC: 1 MMOL/L (ref 4–13)
ANION GAP SERPL CALCULATED.3IONS-SCNC: 2 MMOL/L (ref 4–13)
ANION GAP SERPL CALCULATED.3IONS-SCNC: 3 MMOL/L (ref 4–13)
ANION GAP SERPL CALCULATED.3IONS-SCNC: 4 MMOL/L (ref 4–13)
ANION GAP SERPL CALCULATED.3IONS-SCNC: 5 MMOL/L (ref 4–13)
ATRIAL RATE: 83 BPM
BUN SERPL-MCNC: 11 MG/DL (ref 5–25)
BUN SERPL-MCNC: 15 MG/DL (ref 5–25)
BUN SERPL-MCNC: 19 MG/DL (ref 5–25)
CALCIUM SERPL-MCNC: 8.8 MG/DL (ref 8.4–10.2)
CALCIUM SERPL-MCNC: 9 MG/DL (ref 8.4–10.2)
CALCIUM SERPL-MCNC: 9.2 MG/DL (ref 8.4–10.2)
CALCIUM SERPL-MCNC: 9.3 MG/DL (ref 8.4–10.2)
CALCIUM SERPL-MCNC: 9.3 MG/DL (ref 8.4–10.2)
CHLORIDE SERPL-SCNC: 84 MMOL/L (ref 96–108)
CHLORIDE SERPL-SCNC: 84 MMOL/L (ref 96–108)
CHLORIDE SERPL-SCNC: 85 MMOL/L (ref 96–108)
CHLORIDE SERPL-SCNC: 86 MMOL/L (ref 96–108)
CHLORIDE SERPL-SCNC: 86 MMOL/L (ref 96–108)
CO2 SERPL-SCNC: 36 MMOL/L (ref 21–32)
CO2 SERPL-SCNC: 37 MMOL/L (ref 21–32)
CO2 SERPL-SCNC: 37 MMOL/L (ref 21–32)
CORTIS AM PEAK SERPL-MCNC: 2.9 UG/DL (ref 6.7–22.6)
CREAT SERPL-MCNC: 0.54 MG/DL (ref 0.6–1.3)
CREAT SERPL-MCNC: 0.57 MG/DL (ref 0.6–1.3)
CREAT SERPL-MCNC: 0.58 MG/DL (ref 0.6–1.3)
CREAT SERPL-MCNC: 0.62 MG/DL (ref 0.6–1.3)
CREAT SERPL-MCNC: 0.64 MG/DL (ref 0.6–1.3)
GFR SERPL CREATININE-BSD FRML MDRD: 92 ML/MIN/1.73SQ M
GFR SERPL CREATININE-BSD FRML MDRD: 93 ML/MIN/1.73SQ M
GFR SERPL CREATININE-BSD FRML MDRD: 95 ML/MIN/1.73SQ M
GFR SERPL CREATININE-BSD FRML MDRD: 96 ML/MIN/1.73SQ M
GFR SERPL CREATININE-BSD FRML MDRD: 97 ML/MIN/1.73SQ M
GLUCOSE SERPL-MCNC: 136 MG/DL (ref 65–140)
GLUCOSE SERPL-MCNC: 201 MG/DL (ref 65–140)
GLUCOSE SERPL-MCNC: 201 MG/DL (ref 65–140)
GLUCOSE SERPL-MCNC: 208 MG/DL (ref 65–140)
GLUCOSE SERPL-MCNC: 210 MG/DL (ref 65–140)
GLUCOSE SERPL-MCNC: 225 MG/DL (ref 65–140)
GLUCOSE SERPL-MCNC: 245 MG/DL (ref 65–140)
GLUCOSE SERPL-MCNC: 252 MG/DL (ref 65–140)
GLUCOSE SERPL-MCNC: 269 MG/DL (ref 65–140)
OSMOLALITY UR: 593 MMOL/KG
P AXIS: 79 DEGREES
POTASSIUM SERPL-SCNC: 4.5 MMOL/L (ref 3.5–5.3)
POTASSIUM SERPL-SCNC: 4.6 MMOL/L (ref 3.5–5.3)
POTASSIUM SERPL-SCNC: 4.6 MMOL/L (ref 3.5–5.3)
POTASSIUM SERPL-SCNC: 4.9 MMOL/L (ref 3.5–5.3)
POTASSIUM SERPL-SCNC: 5.2 MMOL/L (ref 3.5–5.3)
PR INTERVAL: 126 MS
PROCALCITONIN SERPL-MCNC: <0.05 NG/ML
QRS AXIS: 55 DEGREES
QRSD INTERVAL: 86 MS
QT INTERVAL: 364 MS
QTC INTERVAL: 427 MS
SODIUM 24H UR-SCNC: 101 MOL/L
SODIUM SERPL-SCNC: 122 MMOL/L (ref 135–147)
SODIUM SERPL-SCNC: 124 MMOL/L (ref 135–147)
SODIUM SERPL-SCNC: 124 MMOL/L (ref 135–147)
SODIUM SERPL-SCNC: 125 MMOL/L (ref 135–147)
SODIUM SERPL-SCNC: 127 MMOL/L (ref 135–147)
T WAVE AXIS: 66 DEGREES
T4 FREE SERPL-MCNC: 0.9 NG/DL (ref 0.61–1.12)
TSH SERPL DL<=0.05 MIU/L-ACNC: 0.36 UIU/ML (ref 0.45–4.5)
VENTRICULAR RATE: 83 BPM

## 2024-03-18 PROCEDURE — 84439 ASSAY OF FREE THYROXINE: CPT | Performed by: PHYSICIAN ASSISTANT

## 2024-03-18 PROCEDURE — 82948 REAGENT STRIP/BLOOD GLUCOSE: CPT

## 2024-03-18 PROCEDURE — 94760 N-INVAS EAR/PLS OXIMETRY 1: CPT

## 2024-03-18 PROCEDURE — 80048 BASIC METABOLIC PNL TOTAL CA: CPT | Performed by: INTERNAL MEDICINE

## 2024-03-18 PROCEDURE — 84145 PROCALCITONIN (PCT): CPT | Performed by: INTERNAL MEDICINE

## 2024-03-18 PROCEDURE — 99223 1ST HOSP IP/OBS HIGH 75: CPT | Performed by: INTERNAL MEDICINE

## 2024-03-18 PROCEDURE — 71260 CT THORAX DX C+: CPT

## 2024-03-18 PROCEDURE — 74177 CT ABD & PELVIS W/CONTRAST: CPT

## 2024-03-18 PROCEDURE — 99233 SBSQ HOSP IP/OBS HIGH 50: CPT | Performed by: FAMILY MEDICINE

## 2024-03-18 PROCEDURE — 94640 AIRWAY INHALATION TREATMENT: CPT

## 2024-03-18 PROCEDURE — 83935 ASSAY OF URINE OSMOLALITY: CPT | Performed by: INTERNAL MEDICINE

## 2024-03-18 PROCEDURE — 84443 ASSAY THYROID STIM HORMONE: CPT | Performed by: PHYSICIAN ASSISTANT

## 2024-03-18 PROCEDURE — 94664 DEMO&/EVAL PT USE INHALER: CPT

## 2024-03-18 PROCEDURE — 84300 ASSAY OF URINE SODIUM: CPT | Performed by: INTERNAL MEDICINE

## 2024-03-18 PROCEDURE — 82533 TOTAL CORTISOL: CPT | Performed by: INTERNAL MEDICINE

## 2024-03-18 RX ORDER — SODIUM CHLORIDE 1 G/1
3 TABLET ORAL
Status: DISCONTINUED | OUTPATIENT
Start: 2024-03-19 | End: 2024-03-20

## 2024-03-18 RX ORDER — LISINOPRIL 20 MG/1
40 TABLET ORAL DAILY
Status: DISCONTINUED | OUTPATIENT
Start: 2024-03-18 | End: 2024-03-21 | Stop reason: HOSPADM

## 2024-03-18 RX ORDER — LORAZEPAM 0.5 MG/1
0.5 TABLET ORAL EVERY 6 HOURS PRN
Status: DISCONTINUED | OUTPATIENT
Start: 2024-03-18 | End: 2024-03-21 | Stop reason: HOSPADM

## 2024-03-18 RX ORDER — DESMOPRESSIN ACETATE 4 UG/ML
2 INJECTION, SOLUTION INTRAVENOUS; SUBCUTANEOUS ONCE
Status: COMPLETED | OUTPATIENT
Start: 2024-03-18 | End: 2024-03-18

## 2024-03-18 RX ORDER — SODIUM CHLORIDE 1 G/1
2 TABLET ORAL ONCE
Status: COMPLETED | OUTPATIENT
Start: 2024-03-18 | End: 2024-03-18

## 2024-03-18 RX ORDER — CLONIDINE HYDROCHLORIDE 0.1 MG/1
0.2 TABLET ORAL EVERY 12 HOURS SCHEDULED
Status: DISCONTINUED | OUTPATIENT
Start: 2024-03-18 | End: 2024-03-21 | Stop reason: HOSPADM

## 2024-03-18 RX ORDER — DESMOPRESSIN ACETATE 4 UG/ML
1 INJECTION, SOLUTION INTRAVENOUS; SUBCUTANEOUS ONCE
Status: DISCONTINUED | OUTPATIENT
Start: 2024-03-18 | End: 2024-03-18

## 2024-03-18 RX ORDER — AMLODIPINE BESYLATE 10 MG/1
10 TABLET ORAL
Status: DISCONTINUED | OUTPATIENT
Start: 2024-03-18 | End: 2024-03-21 | Stop reason: HOSPADM

## 2024-03-18 RX ORDER — SODIUM CHLORIDE 1 G/1
2 TABLET ORAL
Status: DISCONTINUED | OUTPATIENT
Start: 2024-03-18 | End: 2024-03-18

## 2024-03-18 RX ADMIN — INSULIN LISPRO 6 UNITS: 100 INJECTION, SOLUTION INTRAVENOUS; SUBCUTANEOUS at 23:00

## 2024-03-18 RX ADMIN — DOXYCYCLINE 100 MG: 100 INJECTION, POWDER, LYOPHILIZED, FOR SOLUTION INTRAVENOUS at 11:06

## 2024-03-18 RX ADMIN — IOHEXOL 100 ML: 350 INJECTION, SOLUTION INTRAVENOUS at 13:35

## 2024-03-18 RX ADMIN — IPRATROPIUM BROMIDE AND ALBUTEROL SULFATE 3 ML: 2.5; .5 SOLUTION RESPIRATORY (INHALATION) at 13:08

## 2024-03-18 RX ADMIN — DESMOPRESSIN ACETATE 2 MCG: 4 SOLUTION INTRAVENOUS at 05:54

## 2024-03-18 RX ADMIN — SODIUM CHLORIDE TAB 1 GM 2 G: 1 TAB at 21:11

## 2024-03-18 RX ADMIN — LEVALBUTEROL HYDROCHLORIDE 1.25 MG: 1.25 SOLUTION RESPIRATORY (INHALATION) at 16:12

## 2024-03-18 RX ADMIN — LORAZEPAM 0.5 MG: 0.5 TABLET ORAL at 16:38

## 2024-03-18 RX ADMIN — SODIUM CHLORIDE TAB 1 GM 2 G: 1 TAB at 16:03

## 2024-03-18 RX ADMIN — METHYLPREDNISOLONE SODIUM SUCCINATE 40 MG: 40 INJECTION, POWDER, FOR SOLUTION INTRAMUSCULAR; INTRAVENOUS at 16:03

## 2024-03-18 RX ADMIN — METOPROLOL TARTRATE 100 MG: 50 TABLET, FILM COATED ORAL at 21:12

## 2024-03-18 RX ADMIN — PANTOPRAZOLE SODIUM 40 MG: 40 TABLET, DELAYED RELEASE ORAL at 06:00

## 2024-03-18 RX ADMIN — IPRATROPIUM BROMIDE AND ALBUTEROL SULFATE 3 ML: 2.5; .5 SOLUTION RESPIRATORY (INHALATION) at 01:47

## 2024-03-18 RX ADMIN — GUAIFENESIN 600 MG: 600 TABLET, EXTENDED RELEASE ORAL at 08:17

## 2024-03-18 RX ADMIN — LORAZEPAM 0.5 MG: 0.5 TABLET ORAL at 10:26

## 2024-03-18 RX ADMIN — ATORVASTATIN CALCIUM 40 MG: 40 TABLET, FILM COATED ORAL at 21:12

## 2024-03-18 RX ADMIN — IOHEXOL 50 ML: 240 INJECTION, SOLUTION INTRATHECAL; INTRAVASCULAR; INTRAVENOUS; ORAL at 13:35

## 2024-03-18 RX ADMIN — GUAIFENESIN 600 MG: 600 TABLET, EXTENDED RELEASE ORAL at 17:55

## 2024-03-18 RX ADMIN — IPRATROPIUM BROMIDE AND ALBUTEROL SULFATE 3 ML: 2.5; .5 SOLUTION RESPIRATORY (INHALATION) at 19:28

## 2024-03-18 RX ADMIN — CLONIDINE HYDROCHLORIDE 0.2 MG: 0.1 TABLET ORAL at 21:12

## 2024-03-18 RX ADMIN — DEXTROSE 300 ML: 5 SOLUTION INTRAVENOUS at 10:14

## 2024-03-18 RX ADMIN — CLONIDINE HYDROCHLORIDE 0.2 MG: 0.1 TABLET ORAL at 08:17

## 2024-03-18 RX ADMIN — METHYLPREDNISOLONE SODIUM SUCCINATE 40 MG: 40 INJECTION, POWDER, FOR SOLUTION INTRAMUSCULAR; INTRAVENOUS at 23:15

## 2024-03-18 RX ADMIN — IPRATROPIUM BROMIDE AND ALBUTEROL SULFATE 3 ML: 2.5; .5 SOLUTION RESPIRATORY (INHALATION) at 07:56

## 2024-03-18 RX ADMIN — LISINOPRIL 40 MG: 20 TABLET ORAL at 08:17

## 2024-03-18 RX ADMIN — INSULIN LISPRO 4 UNITS: 100 INJECTION, SOLUTION INTRAVENOUS; SUBCUTANEOUS at 08:10

## 2024-03-18 RX ADMIN — METOPROLOL TARTRATE 100 MG: 50 TABLET, FILM COATED ORAL at 08:17

## 2024-03-18 RX ADMIN — DESMOPRESSIN ACETATE 2 MCG: 4 SOLUTION INTRAVENOUS at 10:27

## 2024-03-18 RX ADMIN — INSULIN LISPRO 4 UNITS: 100 INJECTION, SOLUTION INTRAVENOUS; SUBCUTANEOUS at 11:09

## 2024-03-18 RX ADMIN — INSULIN LISPRO 4 UNITS: 100 INJECTION, SOLUTION INTRAVENOUS; SUBCUTANEOUS at 16:38

## 2024-03-18 RX ADMIN — ENOXAPARIN SODIUM 40 MG: 40 INJECTION SUBCUTANEOUS at 08:12

## 2024-03-18 RX ADMIN — BUDESONIDE 0.5 MG: 0.5 INHALANT RESPIRATORY (INHALATION) at 19:28

## 2024-03-18 RX ADMIN — BUDESONIDE 0.5 MG: 0.5 INHALANT RESPIRATORY (INHALATION) at 07:56

## 2024-03-18 RX ADMIN — DOXYCYCLINE 100 MG: 100 INJECTION, POWDER, LYOPHILIZED, FOR SOLUTION INTRAVENOUS at 23:15

## 2024-03-18 RX ADMIN — AMLODIPINE BESYLATE 10 MG: 10 TABLET ORAL at 21:12

## 2024-03-18 RX ADMIN — FLUTICASONE PROPIONATE 1 SPRAY: 50 SPRAY, METERED NASAL at 08:18

## 2024-03-18 RX ADMIN — METHYLPREDNISOLONE SODIUM SUCCINATE 40 MG: 40 INJECTION, POWDER, FOR SOLUTION INTRAMUSCULAR; INTRAVENOUS at 08:12

## 2024-03-18 RX ADMIN — PANTOPRAZOLE SODIUM 40 MG: 40 TABLET, DELAYED RELEASE ORAL at 16:03

## 2024-03-18 NOTE — RESPIRATORY THERAPY NOTE
RT Protocol Note  Jessika Lux 69 y.o. female MRN: [id number]  Unit/Bed#: -01 Encounter: [id number]    Assessment    Principal Problem:    COPD with acute exacerbation (HCC)  Active Problems:    Acute hyponatremia    Type 2 diabetes mellitus without complication, without long-term current use of insulin (HCC)      Home Pulmonary Medications:         Past Medical History:   Diagnosis Date    Anxiety     COPD (chronic obstructive pulmonary disease) (HCC)     Diabetes mellitus (HCC)     Essential (primary) hypertension     GERD (gastroesophageal reflux disease)     Smoker      Social History     Socioeconomic History    Marital status:      Spouse name: None    Number of children: None    Years of education: None    Highest education level: None   Occupational History    None   Tobacco Use    Smoking status: Every Day     Current packs/day: 0.25     Types: Cigarettes     Passive exposure: Never    Smokeless tobacco: Never   Vaping Use    Vaping status: Never Used   Substance and Sexual Activity    Alcohol use: Not Currently    Drug use: Never    Sexual activity: Not Currently   Other Topics Concern    None   Social History Narrative    None     Social Determinants of Health     Financial Resource Strain: Not on file   Food Insecurity: No Food Insecurity (2/29/2024)    Hunger Vital Sign     Worried About Running Out of Food in the Last Year: Never true     Ran Out of Food in the Last Year: Never true   Transportation Needs: No Transportation Needs (2/29/2024)    PRAPARE - Transportation     Lack of Transportation (Medical): No     Lack of Transportation (Non-Medical): No   Physical Activity: Not on file   Stress: Not on file   Social Connections: Not on file   Intimate Partner Violence: Not on file   Housing Stability: Low Risk  (2/29/2024)    Housing Stability Vital Sign     Unable to Pay for Housing in the Last Year: No     Number of Places Lived in the Last Year: 1     Unstable Housing in the Last  "Year: No       Subjective         Objective    Physical Exam:   Assessment Type: During-treatment  General Appearance: Alert, Awake  Respiratory Pattern: Dyspnea with exertion  Chest Assessment: Chest expansion symmetrical  O2 Device: 3LPM NC    Vitals:  Blood pressure 137/67, pulse 71, temperature 97.6 °F (36.4 °C), temperature source Temporal, resp. rate (P) 21, height 5' 2\" (1.575 m), weight 71 kg (156 lb 8.4 oz), SpO2 100%.          Imaging and other studies: I have personally reviewed pertinent reports.      O2 Device: 3LPM NC     Plan             Resp Comments: (P) Pt stable on baseline 3LPM NC, pt states breathing is better.   "

## 2024-03-18 NOTE — CONSULTS
Consultation - Nephrology   Jessika Lux 69 y.o. female MRN: [id number]  Unit/Bed#: -01 Encounter: [id number]    Inpatient consult to Nephrology  Consult performed by: Christ Sylvester MD  Consult ordered by: Ailyn Aguilera MD          History of Present Illness   Physician Requesting Consult: Ailyn Aguilera MD  Reason for Consult / Principal Problem: Hyponatremia  Hx and PE limited by: Patient is a fair historian  HPI: Jessika Lux is a 69 y.o. year old female with a PMH of COPD/diabetes mellitus/hypertension/anxiety/GERD/cigarette smoking apparent history of hyponatremia: Who presents with shortness of breath for 1 day not associated with a cough leg edema or chest pain  We are asked to see the patient for hyponatremia acute on chronic      Review of records demonstrates chronic intermittent hyponatremia dating back to at least July 2023.  Most recently admitted this month with acute hyponatremia down to 118.  Urology felt to be SIADH with increased fluid intake treated at that time with hypertonic saline sodium chloride fluid restriction and tolvaptan 10 and torsemide.  Discharge sodium 135.    Sodium on admission from 3/17/2024 was 117  Sodium was starting to decrease as of 3/13/2024 down to 127  Admission sodium as of 3/17/2024: 7:48 AM: 119 with a glucose of 225  Sodium ming 117 as of 3/17/2024 at 11:51 AM  Sodium as of 3/18/2024: 8:21  with a glucose of 210 therefore correcting close to 128-129    She denies excessive water intake/NSAID use/lower extremity edema/diuretics?      History obtained from patient, the chart [facility] records to completely reviewed        Review of systems:  General: No fevers or chills does not eat well  Cardiovascular: No chest pain or leg swelling  Respiratory: No significant cough but short of breath as outlined  Gastrointestinal: No nausea vomiting or diarrhea or abdominal pain  Genitourinary: No active urinary symptoms including dysuria hematuria voiding  symptoms  Neuro: No headaches  All other systems were reviewed and are negative    Historical Information   Past Medical History:   Diagnosis Date    Anxiety     COPD (chronic obstructive pulmonary disease) (HCC)     Diabetes mellitus (HCC)     Essential (primary) hypertension     GERD (gastroesophageal reflux disease)     Smoker      History reviewed. No pertinent surgical history.  Social History   Social History     Substance and Sexual Activity   Alcohol Use Not Currently     Social History     Substance and Sexual Activity   Drug Use Never     Social History     Tobacco Use   Smoking Status Every Day    Current packs/day: 0.25    Types: Cigarettes    Passive exposure: Never   Smokeless Tobacco Never     Family History   Problem Relation Age of Onset    Diabetes Father        Meds/Allergies   all current active meds have been reviewed, current meds:   Current Facility-Administered Medications   Medication Dose Route Frequency    acetaminophen (TYLENOL) tablet 650 mg  650 mg Oral Q6H PRN    amLODIPine (NORVASC) tablet 10 mg  10 mg Oral HS    atorvastatin (LIPITOR) tablet 40 mg  40 mg Oral HS    budesonide (PULMICORT) inhalation solution 0.5 mg  0.5 mg Nebulization Q12H    cloNIDine (CATAPRES) tablet 0.2 mg  0.2 mg Oral Q12H NATHALY    docusate sodium (COLACE) capsule 100 mg  100 mg Oral BID    doxycycline (VIBRAMYCIN) 100 mg in sodium chloride 0.9 % 100 mL IVPB  100 mg Intravenous Q12H    enoxaparin (LOVENOX) subcutaneous injection 40 mg  40 mg Subcutaneous Daily    fluticasone (FLONASE) 50 mcg/act nasal spray 1 spray  1 spray Nasal Daily    guaiFENesin (MUCINEX) 12 hr tablet 600 mg  600 mg Oral BID    insulin lispro (HumALOG/ADMELOG) 100 units/mL subcutaneous injection 2-12 Units  2-12 Units Subcutaneous HS    insulin lispro (HumALOG/ADMELOG) 100 units/mL subcutaneous injection 2-12 Units  2-12 Units Subcutaneous TID AC    ipratropium-albuterol (DUO-NEB) 0.5-2.5 mg/3 mL inhalation solution 3 mL  3 mL Nebulization  Q6H    levalbuterol (XOPENEX) inhalation solution 1.25 mg  1.25 mg Nebulization TID PRN    lisinopril (ZESTRIL) tablet 40 mg  40 mg Oral Daily    LORazepam (ATIVAN) tablet 0.5 mg  0.5 mg Oral Q8H PRN    methylPREDNISolone sodium succinate (Solu-MEDROL) injection 40 mg  40 mg Intravenous Q8H    metoprolol tartrate (LOPRESSOR) tablet 100 mg  100 mg Oral Q12H NATHALY    nicotine (NICODERM CQ) 14 mg/24hr TD 24 hr patch 1 patch  1 patch Transdermal Daily    pantoprazole (PROTONIX) EC tablet 40 mg  40 mg Oral BID AC    and PTA meds:    Medications Prior to Admission   Medication    fluticasone (FLONASE) 50 mcg/act nasal spray    LORazepam (ATIVAN) 0.5 mg tablet    torsemide (DEMADEX) 5 MG tablet    amLODIPine (NORVASC) 10 mg tablet    atorvastatin (LIPITOR) 40 mg tablet    cholecalciferol (VITAMIN D3) 1,000 units tablet    cloNIDine (CATAPRES) 0.2 mg tablet    ergocalciferol (VITAMIN D2) 50,000 units    fluticasone-umeclidinium-vilanterol (Trelegy Ellipta) 200-62.5-25 mcg/actuation AEPB inhaler    glipiZIDE (GLUCOTROL) 10 mg tablet    hydrOXYzine HCL (ATARAX) 10 mg tablet    ipratropium-albuterol (DUO-NEB) 0.5-2.5 mg/3 mL nebulizer solution    lisinopril (ZESTRIL) 40 mg tablet    metoprolol tartrate (LOPRESSOR) 100 mg tablet    Nebulizers (Comp Air Compressor Nebulizer) MISC    omeprazole (PriLOSEC OTC) 20 MG tablet    predniSONE 10 mg tablet    sertraline (ZOLOFT) 50 mg tablet       Allergies   Allergen Reactions    Clindamycin        Objective     Intake/Output Summary (Last 24 hours) at 3/18/2024 0936  Last data filed at 3/18/2024 0901  Gross per 24 hour   Intake 1095 ml   Output 1075 ml   Net 20 ml   Patient Vitals for the past 24 hrs:   BP Temp Temp src Pulse Resp SpO2 Height Weight   03/18/24 0755 -- -- -- 96 21 99 % -- --   03/18/24 0709 137/67 97.6 °F (36.4 °C) Temporal 71 (!) 30 100 % -- --   03/18/24 0410 135/67 -- -- 65 20 100 % -- --   03/18/24 0408 -- 98 °F (36.7 °C) Temporal -- -- -- -- --   03/18/24 0359 -- --  "-- -- -- -- -- 71 kg (156 lb 8.4 oz)   03/18/24 0148 -- -- -- -- -- 100 % -- --   03/18/24 0143 (!) 190/85 -- -- 84 (!) 28 96 % -- --   03/17/24 2218 126/58 -- -- 57 16 100 % -- --   03/17/24 2130 (!) 195/92 -- -- 89 -- -- -- --   03/17/24 1955 -- -- -- -- -- -- 5' 2\" (1.575 m) --   03/17/24 1952 -- -- -- 66 (!) 32 100 % -- --   03/17/24 1907 134/65 97.5 °F (36.4 °C) Temporal 85 (!) 42 96 % -- --   03/17/24 1549 -- -- -- -- -- 98 % -- --   03/17/24 1519 (!) 179/84 -- -- 80 (!) 33 99 % -- --   03/17/24 1506 (!) 180/88 98.3 °F (36.8 °C) Oral 92 (!) 50 92 % -- --   03/17/24 1351 127/70 97.5 °F (36.4 °C) Temporal 78 (!) 34 99 % -- --   03/17/24 1326 162/98 -- -- 97 -- 96 % -- --   03/17/24 1113 -- -- -- -- -- -- -- 71.4 kg (157 lb 6.4 oz)   03/17/24 1004 (!) 179/86 97.9 °F (36.6 °C) -- 84 -- 97 % -- --       Invasive Devices:      Vitals:    03/18/24 0755   BP:    Pulse: 96   Resp: 21   Temp:    SpO2: 99%     General: Well-developed well-nourished no acute distress  Skin: No acute rash  Eyes: No scleral icterus and noninjected  ENT: Normocephalic/atraumatic, mucous membranes moist  Neck: Supple, no jugular distention, no carotid bruits, 1+ carotid upstroke, no thyromegaly and trachea midline  Back: No CVA tenderness  Chest: Inspiratory and expiratory wheezing no rales; no dullness to percussion, relatively good respiratory effort and no use of accessory respiratory muscles  CVS: Regular rate and rhythm without murmurs rubs or gallops appreciable  Abdomen: Soft and nontender normal bowel sounds, hepatosplenomegaly or bruits appreciable  Extremities: No clubbing, no cyanosis, no edema, 1+ dorsalis pedis pulses, no femoral bruits, no significant arthritic changes  Neuro: No gross focality  Psych: Alert, oriented and appropriate    Current Weight: Weight - Scale: 71 kg (156 lb 8.4 oz)  First Weight: Weight - Scale: 70.8 kg (156 lb 1.4 oz)    Lab Results:  I have personally reviewed pertinent labs.  CBC:   Lab Results " "  Component Value Date    WBC 12.98 (H) 03/17/2024    RBC 4.84 03/17/2024     CMP:   Lab Results   Component Value Date    CL 86 (L) 03/18/2024    CL 94 (L) 03/06/2024    CO2 36 (H) 03/18/2024    CO2 34 (H) 03/06/2024    BUN 11 03/18/2024    BUN 10 03/06/2024    CREATININE 0.58 (L) 03/18/2024    CREATININE 0.8 03/06/2024    CALCIUM 9.3 03/18/2024    CALCIUM 9.4 03/06/2024    AST 12 (L) 03/17/2024    AST 12 03/06/2024    ALT 11 03/17/2024    ALT 16 03/06/2024    ALKPHOS 95 03/17/2024    ALKPHOS 95 03/06/2024    EGFR 95 03/18/2024    EGFR 81 03/06/2024    EGFR >60.0 09/28/2020     Phosphorus: No results found for: \"PHOS\"  Magnesium:   Lab Results   Component Value Date    MG 1.8 (L) 03/17/2024     Urinalysis:   Lab Results   Component Value Date    COLORU Yellow 07/16/2023    CLARITYU Clear 07/16/2023    SPECGRAV <=1.005 07/16/2023    PHUR 6.5 07/16/2023    LEUKOCYTESUR Negative 07/16/2023    NITRITE Negative 07/16/2023    GLUCOSEU Negative 07/16/2023    KETONESU Negative 07/16/2023    BILIRUBINUR Negative 07/16/2023    BLOODU Negative 07/16/2023     BMP:   Lab Results   Component Value Date    SODIUM 127 (L) 03/18/2024    SODIUM 136 03/06/2024    CO2 36 (H) 03/18/2024    CO2 34 (H) 03/06/2024    BUN 11 03/18/2024    BUN 10 03/06/2024    CREATININE 0.58 (L) 03/18/2024    CREATININE 0.8 03/06/2024    CALCIUM 9.3 03/18/2024    CALCIUM 9.4 03/06/2024     ABGs: No results found for: \"PH\"  Radiology review:     Chest x-ray from 3/17/2024: Unremarkable chest x-ray      Assessment/Plan   69 y.o. year old female with a PMH of COPD/diabetes mellitus/hypertension/anxiety/GERD/cigarette smoking apparent history of hyponatremia: Who presents with shortness of breath for 1 day not associated with a cough leg edema or chest pain  We are asked to see the patient for hyponatremia acute on chronic    1.  Hyponatremia: Acute on top of chronic: Etiology suggestive of decreased solute intake, possible SIADH from pulmonary status or " "other.    Workup:  - Normal free T4 TSH slightly low  - Serum osmolality last admission 262 compatible true hypotonic hyponatremia  - Uric acid 3.4  - Cortisol 2.9: But patient has been receiving methylprednisolone so this is an accurate will need to be repeated at some point as an outpatient  - Prior urine sodium 30 and urine osmolality 181 will repeat    To complete workup I recommend the following  - Repeat urinary studies  - CT scan of the chest abdomen pelvis there was an abnormal right upper lobe nodule seen previously!    Treatment:    Goal is no faster than 127 at 7:48 this morning which is the case to avoid osmotic demyelinating syndrome and actually slightly higher given hyperglycemia.  Therefore I am going to give DDAVP/D5 and monitor to relower.  Then once sodium is at the appropriate increase will place most likely on fluid restriction/sodium chloride tablets and torsemide and she may need this on a regular basis.    Can allow the sodium did not correct to 134 range tomorrow morning      2.  Hypertension: Maintained on amlodipine 10 mg/clonidine 0.2 mg/lisinopril 40 mg.  Ideally torsemide would be the next agent and then possibly spironolactone.  Will monitor and adjust accordingly      I discussed the hyponatremia with primary service as a consequence/result we will complete the workup as outlined with the CT scan of the chest abdomen pelvis and relower the sodium with D5/DDAVP      Portions of the record may have been created with voice recognition software.  Occasional wrong word or \"sound a like\" substitutions may have occurred due to the inherent limitations of voice recognition software.  Read the chart carefully and recognize, using context, where substitutions have occurred.    "

## 2024-03-18 NOTE — UTILIZATION REVIEW
NOTIFICATION OF INPATIENT ADMISSION   AUTHORIZATION REQUEST   SERVICING FACILITY:   [place] [address]  Tax ID: [id number]  NPI: [id number] ATTENDING PROVIDER:  Attending Name and NPI#: Ailyn Aguilera Md [[id number]]  Address: [address] [place] [address]  Phone: [phone]   ADMISSION INFORMATION:  Place of Service: Inpatient [facility]  Place of Service Code: 21  Inpatient Admission Date/Time: 3/17/24  9:21 AM  Discharge Date/Time: No discharge date for patient encounter.  Admitting Diagnosis Code/Description:  Hyponatremia [E87.1]  SOB (shortness of breath) [R06.02]  COPD exacerbation (HCC) [J44.1]     UTILIZATION REVIEW CONTACT:  Kelsey Sam, Utilization   Network Utilization Review Department  Phone: [phone]  Fax [phone]  Email: [email or web address]  Contact for approvals/pending authorizations, clinical reviews, and discharge.     PHYSICIAN ADVISORY SERVICES:  Medical Necessity Denial & Brnx-qk-Etnz Review  Phone: [phone]  Fax: [phone]  Email: [email or web address]     DISCHARGE SUPPORT TEAM:  For Patients Discharge Needs & Updates  Phone: [phone] opt. 2 Fax: [phone]  Email: [email or web address]

## 2024-03-18 NOTE — CASE MANAGEMENT
Case Management Assessment & Discharge Planning Note    Patient name Jessika Lux  Location /-01 MRN [id number]  : 1955 Date 3/18/2024       Current Admission Date: 3/17/2024  Current Admission Diagnosis:COPD with acute exacerbation (HCC)   Patient Active Problem List    Diagnosis Date Noted    Weakness 2024    Anxiety disorder due to general medical condition with panic attack 2023    Acute on chronic respiratory failure with hypoxia (HCC) 2023    COPD, severe (HCC) 2023    Lung nodule 2023    Abnormal CT scan 2023    Anxiety about health 2023    Bacteremia 2023    Vomiting 2023    COPD with acute exacerbation (HCC) 2020    Acute on chronic respiratory failure (HCC) 2020    Acute hyponatremia 2020    Tobacco abuse 2020    Primary hypertension 2020    Type 2 diabetes mellitus without complication, without long-term current use of insulin (HCC) 2020      LOS (days): 1  Geometric Mean LOS (GMLOS) (days):   Days to GMLOS:     OBJECTIVE:  PATIENT READMITTED TO HOSPITAL  Risk of Unplanned Readmission Score: 59.28         Current admission status: Inpatient  Referral Reason:  (Post Acute Placement (specify)  Post Acute Home Needs (VNA/DME/Infusion)  Medications)    Preferred Pharmacy:   [id number] - [place], [place] - [address]  [address] [facility] [address]  [facility] [address]  Phone: [phone] Fax: [phone]    [id number] - [place], [place] - [address]  [address] [address]  Phone: [phone] Fax: [phone]    Primary Care Provider: Karlie Bruno DO    Primary Insurance: [employer] REP  Secondary Insurance: [place] [employer] AND [facility]    ASSESSMENT:  Active Health Care Proxies    There are no active Health Care Proxies on file.       Advance Directives  Does patient have a Health Care POA?: No  Was  patient offered paperwork?: Yes (patient declined)  Does patient currently have a Health Care decision maker?: Yes, please see Health Care Proxy section  Does patient have Advance Directives?: No  Was patient offered paperwork?: Yes (patient declined)  Primary Contact: Zach Leon, grandkrystal         Readmission Root Cause  30 Day Readmission: Yes  Who directed you to return to the hospital?: Self  Did you understand whom to contact if you had questions or problems?: Yes  Did you get your prescriptions before you left the hospital?: No  Reason:: Not preferred pharmacy  Were you able to get your prescriptions filled when you left the hospital?: Yes  Did you take your medications as prescribed?: Yes  Were you able to get to your follow-up appointments?: Yes  During previous admission, was a post-acute recommendation made?: No  Patient was readmitted due to: SOB, COPD exacerbation  Action Plan: return home following medical stability    Patient Information  Admitted from:: Home  Mental Status: Alert  During Assessment patient was accompanied by: Not accompanied during assessment  Assessment information provided by:: Patient  Primary Caregiver: Self  Support Systems: Daughter, Family members  [place] Residence: [facility]  What [facility] do you live in?: [place]  Home entry access options. Select all that apply.: Stairs  Number of steps to enter home.: 2  Do the steps have railings?: No  Type of Current Residence: 2 story home  Upon entering residence, is there a bedroom on the main floor (no further steps)?: No (patient sleeps on sofa)  A bedroom is located on the following floor levels of residence (select all that apply):: 2nd Floor  Upon entering residence, is there a bathroom on the main floor (no further steps)?: Yes  Number of steps to 2nd floor from main floor: One Flight  Living Arrangements: Lives w/ Extended Family  Is patient a ?: No    Activities of Daily Living Prior to Admission  Functional Status:  Assistance  Completes ADLs independently?: No  Level of ADL dependence: Assistance  Ambulates independently?: Yes  Does patient use assisted devices?: Yes  Assisted Devices (DME) used: Home Oxygen concentrator, Portable Oxygen tanks, Nebulizer  DME Company Name (respiratory supplies): [employer]  O2 Rate(s): 3L  Does patient currently own DME?: Yes  What DME does the patient currently own?: Home Oxygen concentrator, Nebulizer, Portable Oxygen tanks  Does patient have a history of Outpatient Therapy (PT/OT)?: No  Does the patient have a history of Short-Term Rehab?: No  Does patient have a history of HHC?: No  Does patient currently have HHC?: No         Patient Information Continued  Income Source: SSI/SSD  Does patient have prescription coverage?: Yes  Does patient receive dialysis treatments?: No  Does patient have a history of substance abuse?: No  Does patient have a history of Mental Health Diagnosis?: Yes (anxiety)  Is patient receiving treatment for mental health?: Yes (medication management)  Has patient received inpatient treatment related to mental health in the last 2 years?: No         Means of Transportation  Means of Transport to [facility]:: Family transport      Social Determinants of Health (SDOH)      Flowsheet Row Most Recent Value   Housing Stability    In the last 12 months, was there a time when you were not able to pay the mortgage or rent on time? N   In the last 12 months, how many places have you lived? 1   In the last 12 months, was there a time when you did not have a steady place to sleep or slept in a shelter (including now)? N   Transportation Needs    In the past 12 months, has lack of transportation kept you from medical appointments or from getting medications? no   In the past 12 months, has lack of transportation kept you from meetings, work, or from getting things needed for daily living? No   Food Insecurity    Within the past 12 months, you worried that your food would  run out before you got the money to buy more. Never true   Within the past 12 months, the food you bought just didn't last and you didn't have money to get more. Never true   Utilities    In the past 12 months has the electric, gas, oil, or water company threatened to shut off services in your home? No            DISCHARGE DETAILS:    Discharge planning discussed with:: patient  Freedom of Choice: Yes     CM contacted family/caregiver?: No- see comments (patient declined)                  Requested Home Health Care         Is the patient interested in HHC at discharge?: No    DME Referral Provided  Referral made for DME?: No         Would you like to participate in our [facility] service program?  : No - Declined    Treatment Team Recommendation: Home  Discharge Destination Plan:: Home  Transport at Discharge : Family           CM met with patient at the bedside, baseline information was obtained. CM discussed the role of CM in helping the patient develop a discharge plan and assist the patient in carry out their plan.    Patient lives in 2 story home with first floor set up for living. Patient reports her 14 year old granddaughter, Nafisa Muñiz, resides with her and granddaughter's mother, Morena Lux, visits home 4-5 days/week to assist with ADLs. Patient pays $280/mo rent and patient reports income meets her needs.    CM to follow for CM discharge referral needs.

## 2024-03-18 NOTE — UTILIZATION REVIEW
Initial Clinical Review    Admission: Date/Time/Statement:   Admission Orders (From admission, onward)       Ordered        03/17/24 0921  INPATIENT ADMISSION  Once                          Orders Placed This Encounter   Procedures    INPATIENT ADMISSION     Standing Status:   Standing     Number of Occurrences:   1     Order Specific Question:   Level of Care     Answer:   Med Surg [16]     Order Specific Question:   Estimated length of stay     Answer:   More than 2 Midnights     Order Specific Question:   Certification     Answer:   I certify that inpatient services are medically necessary for this patient for a duration of greater than two midnights. See H&P and MD Progress Notes for additional information about the patient's course of treatment.     ED Arrival Information       Expected   -    Arrival   3/17/2024 07:34    Acuity   Emergent              Means of arrival   Walk-In    Escorted by   Family Member    Service   Hospitalist    Admission type   Emergency              Arrival complaint   SOB (already on oxygen)             Chief Complaint   Patient presents with    Shortness of Breath     Pt presented to this ED with daughter from home c/o increased sob starting last night. Hx copd-wearing 3L o2 daily. Denies travel/fevers/cough/n/v/d       Initial Presentation: 69 y.o. female to ED via walk-in from home   Present to ED with worsening shortness of breath.  recently evaluated by pulmonology office . Patient does use 3 L of oxygen chronically   PMHX: OPD, diabetes, SIADH    Admitted to Level 2 Stepdown / HOT with DX: COPD with acute exacerbation   on exam: hypertensive; tachypnea; respiratory distress; lungs with wheezing and rhonchi; WBC 12/98; Na 117  PLAN: start 1.8% saline at 50ml/hr ;cont solumedrol; cont DuoNebs; cont iv abx; recd additional DN tx; rec'd ativan po x1; monitor labs; Accuchecks with ssic; fluid restriction; nephrology consulted; f/u TSH/ cortisol levels      Date: 3/18/24      Day  2  patient is still wheezing and rhonchi, and having mild accessory muscle using. Endorses still feeling short of breath. patient sodium came up this morning 125, corrected sodium is 1 27-1 28. Per nephrology recommendation, target correction rate was 4 to 6 mEq, seems patient mildly overcorrected. Will try to reduce sodium after discussion has done with nephrology . Also per nephrology recommendation, will do CT chest abdomen pelvis for any kind of malignancy.  Since patient has pulmonary nodule previously.     Plan: cont 1.8% saline at 50ml/hr ; rec'd DDAVP x2; ; rec'd ivf bolus 300 of D5; cont solumedrol; cont DuoNebs; cont iv abx; recd additional DN tx; rec'd ativan po x1; monitor labs; Accuchecks with ssic; fluid restriction; f/u TSH/ cortisol levels; f/u CT chest / abdomen / pelvis    NEPHROLOGY CONSULT  presents with shortness of breath for 1 day not associated with a cough leg edema or chest pain. We are asked to see the patient for hyponatremia acute on chronic  Sodium on admission from 3/17/2024 was 117.  Sodium was starting to decrease as of 3/13/2024 down to 127.  Admission sodium as of 3/17/2024: 7:48 AM: 119 with a glucose of 225.  Sodium ming 117 as of 3/17/2024 at 11:51 AM.  Sodium as of 3/18/2024: 8:21  with a glucose of 210 therefore correcting close to 128-129  Hyponatremia: Acute on top of chronic: Etiology suggestive of decreased solute intake, possible SIADH from pulmonary status or other.  Workup:  Normal free T4 TSH slightly low.  Serum osmolality last admission 262 compatible true hypotonic hyponatremia.  Uric acid 3.4.  Cortisol 2.9: But patient has been receiving methylprednisolone so this is an accurate will need to be repeated at some point as an outpatient.  Prior urine sodium 30 and urine osmolality 181 . Goal is no faster than 127 at 748 this morning which is the case to avoid osmotic demyelinating syndrome and actually slightly higher given hyperglycemia. Then once sodium is  at the appropriate increase will place most likely on fluid restriction/sodium chloride tablets and torsemide and she may need this on a regular basis.   Plan: f/u urine osmolality; f/u urine studies; f/u CT chest / abdomen / pelvis; give DDAVP; monitor labs    Day 3: 3/19/24    Has surpassed a 2nd midnight with active treatments and services. require additional inpatient hospital stay due to 2/2 copd exacerbation and hyponatremia   still short of breath but not coughing up anything today. still has decreased breath sounds expiratory wheezing. Tachpnea; no evidence of pneumonia on the CT ; Na 125  Plan: cont solumedrol; cont DuoNebs; cont iv abx; recd additional DN tx; rec'd ativan po x2; monitor labs; Accuchecks with ssic; fluid restriction; f/u TSH/ cortisol levels    NEPHROLOGY:   Current sodium: 125 improving and corrects for glucose approximately 126-127; Urine sodium 101 compatible with SIADH; urine osmolality 593   Plan: Sodium chloride 3 g 3 times a day; Fluid restriction 1000 mL/day ; Nutritional supplements to increase solute; Torsemide 10 mg daily starting today especially with high urine osmolality; Will follow sodium through the course of the day eventually goal of getting sodium over 130    ED Triage Vitals   Temperature Pulse Respirations Blood Pressure SpO2   03/17/24 0741 03/17/24 0741 03/17/24 0741 03/17/24 0741 03/17/24 0741   97.9 °F (36.6 °C) 96 18 (!) 189/94 91 %      Temp Source Heart Rate Source Patient Position - Orthostatic VS BP Location FiO2 (%)   03/17/24 1351 03/17/24 0741 03/17/24 0741 03/17/24 0741 --   Temporal Monitor Lying Right arm       Pain Score       03/17/24 0741       No Pain          Wt Readings from Last 1 Encounters:   03/19/24 71.5 kg (157 lb 10.1 oz)     Additional Vital Signs:   Date/Time Temp Pulse Resp BP MAP (mmHg) SpO2 Calculated FIO2 (%) - Nasal Cannula Nasal Cannula O2 Flow Rate (L/min) O2 Device Patient Position - Orthostatic VS   03/19/24 0819 -- -- -- -- -- --  32 3 L/min Nasal cannula --   03/19/24 0755 -- 85 33 Abnormal  -- -- 99 % 32 3 L/min Nasal cannula --   03/19/24 0712 98.6 °F (37 °C) 71 31 Abnormal  128/72 96 99 % 32 3 L/min Nasal cannula Lying   03/19/24 0400 98.4 °F (36.9 °C) 74 16 166/74 107 96 % 32 3 L/min Nasal cannula Lying   03/19/24 0158 -- -- -- -- -- 100 % -- -- -- --   03/18/24 2300 97.9 °F (36.6 °C) 63 24 Abnormal  145/71 100 100 % 32 3 L/min Nasal cannula Lying   03/18/24 2120 -- -- -- 155/80 111 -- -- -- -- --   03/18/24 1930 -- -- -- -- -- 96 % -- -- -- --   03/18/24 1859 98.2 °F (36.8 °C) 72 20 139/67 96 100 % 32 3 L/min Nasal cannula Lying   03/18/24 1612 -- -- -- -- -- 99 % 32 3 L/min Nasal cannula      Date/Time Temp Pulse Resp BP MAP (mmHg) SpO2 Calculated FIO2 (%) - Nasal Cannula Nasal Cannula O2 Flow Rate (L/min) O2 Device Patient Position - Orthostatic VS   03/18/24 1511 98.1 °F (36.7 °C) 77 30 Abnormal  171/75 Abnormal  115 98 % 32 3 L/min Nasal cannula Lying   03/18/24 1108 97.2 °F (36.2 °C) Abnormal  67 37 Abnormal  130/62 89 100 % 32 3 L/min Nasal cannula Lying   03/18/24 0755 -- 96 21 -- -- 99 % 32 3 L/min Nasal cannula --   03/18/24 0709 97.6 °F (36.4 °C) 71 30 Abnormal  137/67 96 100 % -- -- Nasal cannula Lying   03/18/24 0410 -- 65 20 135/67 -- 100 % 32 3 L/min -- --   03/18/24 0408 98 °F (36.7 °C) -- -- -- -- -- -- -- -- --   03/18/24 0148 -- -- -- -- -- 100 % -- -- -- --   03/18/24 0143 -- 84 28 Abnormal  190/85 Abnormal   122 96 % -- -- -- --   BP: post ambulation to bathroom at 03/18/24 0143 03/17/24 2218 -- 57 16 126/58 84 100 % -- -- -- --   03/17/24 2130 -- 89 -- 195/92 Abnormal  -- -- -- -- -- --   03/17/24 1952 -- 66 32 Abnormal  -- -- 100 % 32 3 L/min Nasal cannula --   03/17/24 1907 97.5 °F (36.4 °C) 85 42 Abnormal  134/65 94 96 % 32 3 L/min Nasal cannula Sitting   03/17/24 1549 -- -- -- -- -- 98 % 32 3 L/min Nasal cannula --   03/17/24 1519 -- 80 33 Abnormal  179/84 Abnormal  120 99 % -- -- -- --   03/17/24 1506 98.3 °F  (36.8 °C) 92 50 Abnormal  180/88 Abnormal  119 92 % 32 3 L/min Nasal cannula Lying   03/17/24 1351 97.5 °F (36.4 °C) 78 34 Abnormal  127/70 93 99 % 32 3 L/min Nasal cannula Lying   03/17/24 13:26:36 -- 97 -- 162/98 119 96 % -- -- -- --   03/17/24 1152 -- -- -- -- -- -- 32 3 L/min Nasal cannula --   03/17/24 1025 -- -- -- -- -- -- 32 3 L/min Nasal cannula --   03/17/24 10:04:14 97.9 °F (36.6 °C) 84 -- 179/86 Abnormal  117 97 % -- -- -- --   03/17/24 0930 -- 71 22 136/70 94 96 % 32 3 L/min Nasal cannula --   03/17/24 0900 -- 71 20 121/83 99 98 % 32 3 L/min Nasal cannula --   03/17/24 0830 -- 73 18 128/69 93 98 % 32 3 L/min Nasal cannula --   03/17/24 0815 -- 75 22 131/80 100 97 % 32 3 L/min Nasal cannula --   03/17/24 0800 -- 80 20 152/77 109 99 % 32 3 L/min Nasal cannula --   03/17/24 0759 -- -- -- -- -- -- -- -- Nasal cannula --   03/17/24 0741 97.9 °F (36.6 °C) 96 18 189/94 Abnormal  -- 91 % 32 3 L/min Nasal cannula Lying       EKG:   Normal sinus rhythm  Right atrial enlargement  Borderline ECG  When compared with ECG of 13-MAR-2024 14:25,  No significant change was found         Pertinent Labs/Diagnostic Test Results:   CT chest abdomen pelvis w contrast   Final Result by John Key MD (03/18 5622)      Irregular shaped nodule along the pleural surface in the posterior right lung apex, 7 x 6 mm in size, unchanged in size and CT appearance at least as far back as July 2023; because this is somewhat linear on sagittal imaging and is unchanged for at least    8 months, the finding is most suspicious for focal subpleural scar. Continued close interval low radiation dose noncontrast chest CT follow-up recommended for 6 to 12 months from this exam.      No CT findings to suggest developing malignancy or to convincingly account for the patient's unexpected weight loss.      Sigmoid diverticulosis without acute diverticulitis.         Workstation performed: [id number]         XR chest 1 view portable   ED  Interpretation by Justen Carroll MD (03/17 8321)   NAD      Final Result by Ish Barone MD (03/17 1001)      Slightly limited but unremarkable chest radiograph            Workstation performed: [id number]           Results from last 7 days   Lab Units 03/17/24  0748 03/13/24  1436   SARS-COV-2  Negative Negative     Results from last 7 days   Lab Units 03/19/24  0502 03/17/24  0748 03/13/24  1436   WBC Thousand/uL 6.82 12.98* 8.50   HEMOGLOBIN g/dL 11.1* 13.9 12.8   HEMATOCRIT % 32.2* 40.5 38.1   PLATELETS Thousands/uL 201 285 239   NEUTROS ABS Thousands/µL 6.01 9.87* 6.06        Results from last 7 days   Lab Units 03/19/24  0502 03/18/24  1954 03/18/24  1408 03/18/24  0821 03/18/24  0402 03/17/24  1151 03/17/24  0748   SODIUM mmol/L 125* 124* 124* 127* 125*   < > 119*   POTASSIUM mmol/L 4.5 4.6 4.5 4.6 4.9   < > 4.1   CHLORIDE mmol/L 87* 85* 84* 86* 86*   < > 75*   CO2 mmol/L 36* 37* 36* 36* 36*   < > 39*   ANION GAP mmol/L 2* 2* 4 5 3*   < > 5   BUN mg/dL 15 19 15 11 11   < > 11   CREATININE mg/dL 0.55* 0.62 0.64 0.58* 0.57*   < > 0.58*   EGFR ml/min/1.73sq m 97 93 92 95 96   < > 95   CALCIUM mg/dL 9.4 9.2 9.3 9.3 9.0   < > 10.2   MAGNESIUM mg/dL 1.9  --   --   --   --   --  1.8*    < > = values in this interval not displayed.     Results from last 7 days   Lab Units 03/19/24  0502 03/17/24  0748 03/13/24  1436   AST U/L 7* 12* 12*   ALT U/L 8 11 9   ALK PHOS U/L 65 95 85   TOTAL PROTEIN g/dL 6.1* 8.0 7.3   ALBUMIN g/dL 3.6 4.6 4.1   TOTAL BILIRUBIN mg/dL 0.35 0.38 0.30     Results from last 7 days   Lab Units 03/19/24  0712 03/18/24  2243 03/18/24  1603 03/18/24  1107 03/18/24  0707 03/17/24  2126 03/17/24  1553 03/17/24  1145   POC GLUCOSE mg/dl 208* 269* 201* 245* 208* 150* 277* 272*     Results from last 7 days   Lab Units 03/19/24  0502 03/18/24  1954 03/18/24  1408 03/18/24  0821 03/18/24  0402 03/17/24  2346 03/17/24  1935 03/17/24  1613 03/17/24  1151 03/17/24  0748 03/13/24  1436   GLUCOSE  RANDOM mg/dL 176* 252* 136 210* 225* 201* 191* 281* 248* 225* 130        Results from last 7 days   Lab Units 03/17/24  1403 03/17/24  1151 03/17/24  0748 03/13/24  1436   HS TNI 0HR ng/L  --   --  5 4   HS TNI 2HR ng/L  --  5  --   --    HSTNI D2 ng/L  --  0  --   --    HS TNI 4HR ng/L 5  --   --   --    HSTNI D4 ng/L 0  --   --   --      Results from last 7 days   Lab Units 03/17/24  0748   D-DIMER QUANTITATIVE ug/ml FEU <0.27     Results from last 7 days   Lab Units 03/17/24  0748   PROTIME seconds 11.8   INR  0.84   PTT seconds 27     Results from last 7 days   Lab Units 03/18/24  0402   TSH 3RD GENERATON uIU/mL 0.359*     Results from last 7 days   Lab Units 03/18/24  0402 03/17/24  0748   PROCALCITONIN ng/ml <0.05 <0.05     Results from last 7 days   Lab Units 03/17/24  0748   LACTIC ACID mmol/L 0.8        Results from last 7 days   Lab Units 03/17/24  0748 03/13/24  1436   BNP pg/mL 86 22        Results from last 7 days   Lab Units 03/17/24  0748   LIPASE u/L 16        Results from last 7 days   Lab Units 03/18/24  1031   SODIUM UR  101     Results from last 7 days   Lab Units 03/17/24  0748 03/13/24  1436   INFLUENZA A PCR  Negative Negative   INFLUENZA B PCR  Negative Negative   RSV PCR  Negative Negative        Results from last 7 days   Lab Units 03/17/24  0754 03/17/24  0748   BLOOD CULTURE  No Growth at 24 hrs. No Growth at 24 hrs.          ED Treatment:   Medication Administration from 03/17/2024 0734 to 03/17/2024 0959         Date/Time Order Dose Route Action     03/17/2024 0755 EDT ipratropium-albuterol (DUO-NEB) 0.5-2.5 mg/3 mL inhalation solution 3 mL 3 mL Nebulization Given     03/17/2024 0756 EDT methylPREDNISolone sodium succinate (Solu-MEDROL) injection 125 mg 125 mg Intravenous Given     03/17/2024 0755 EDT ipratropium-albuterol (DUO-NEB) 0.5-2.5 mg/3 mL inhalation solution 3 mL 3 mL Nebulization Given     03/17/2024 0858 EDT sodium chloride 0.9 % bolus 500 mL 500 mL Intravenous New Bag      03/17/2024 0902 EDT magnesium sulfate 2 g/50 mL IVPB (premix) 2 g 2 g Intravenous New Bag            Present on Admission:   Acute hyponatremia   COPD with acute exacerbation (HCC)   Type 2 diabetes mellitus without complication, without long-term current use of insulin (HCC)   Chronic respiratory failure with hypoxia (HCC)      Admitting Diagnosis: Hyponatremia [E87.1]  SOB (shortness of breath) [R06.02]  COPD exacerbation (HCC) [J44.1]    Age/Sex: 69 y.o. female    Admission Orders: SCDs; I/O; Daily wts; Consistent Carbohydrate Diet. Blood glucose checks ACHS. Fluid restriction       Scheduled Medications:  amLODIPine, 10 mg, Oral, HS  atorvastatin, 40 mg, Oral, HS  budesonide, 0.5 mg, Nebulization, Q12H  cloNIDine, 0.2 mg, Oral, Q12H NATHALY  doxycycline, 100 mg, Intravenous, Q12H  enoxaparin, 40 mg, Subcutaneous, Daily  fluticasone, 1 spray, Nasal, Daily  guaiFENesin, 600 mg, Oral, BID  insulin lispro, 2-12 Units, Subcutaneous, HS  insulin lispro, 2-12 Units, Subcutaneous, TID AC  ipratropium-albuterol, 3 mL, Nebulization, Q6H  lisinopril, 40 mg, Oral, Daily  methylPREDNISolone sodium succinate, 40 mg, Intravenous, Q8H  metoprolol tartrate, 100 mg, Oral, Q12H NATHALY  pantoprazole, 40 mg, Oral, BID AC    desmopressin (DDAVP) injection 2 mcg  Dose: 2 mcg  Freq: Once Route: IV  Start: 03/18/24 0545 End: 03/18/24 0554     desmopressin (DDAVP) injection 2 mcg  Dose: 2 mcg  Freq: Once Route: IV  Start: 03/18/24 1000 End: 03/18/24 1027     dextrose 5 % bolus 300 mL  Dose: 300 mL  Freq: Once Route: IV  Last Dose: Stopped (03/18/24 1101)  Start: 03/18/24 1000 End: 03/18/24 1101       Continuous IV Infusions:   sodium chloride (HYPERTONIC) infusion 1.8%  Rate: 50 mL/hr Dose: 50 mL/hr          PRN Meds:  acetaminophen, 650 mg, Oral, Q6H PRN  levalbuterol, 1.25 mg, Nebulization, TID PRN  (3/17 rec'd x1)  (3/18 rec'd x1)   LORazepam, 0.5 mg, Oral, Q8H PRN  (3/17 rec'd x1)   (3/18 rec'd x2)  (3/19 rec'd x2 so far today)   magnesium  hydroxide, 30 mL, Oral, Daily PRN        IP CONSULT TO CASE MANAGEMENT  IP CONSULT TO NEPHROLOGY  IP CONSULT TO PULMONOLOGY    Network Utilization Review Department  ATTENTION: Please call with any questions or concerns to [phone] and carefully listen to the prompts so that you are directed to the right person. All voicemails are confidential.   For Discharge needs, contact Care Management DC Support Team at [phone] opt. [phone]  Send all requests for admission clinical reviews, approved or denied determinations and any other requests to dedicated fax number below belonging to the campus where the patient is receiving treatment. List of dedicated fax numbers for the Facilities:  FACILITY NAME UR FAX NUMBER   ADMISSION DENIALS (Administrative/Medical Necessity) [phone]   DISCHARGE SUPPORT TEAM (NETWORK) [phone]   PARENT CHILD HEALTH (Maternity/NICU/Pediatrics) [phone]   [facility] [phone]   [facility] [phone]   [facility] [phone]   [facility] [phone]   [facility] [phone]   [facility] [phone]   [facility] [phone]   [facility] [phone]   [facility] [phone]   [facility] [phone]   [facility] [phone]   [facility] [phone]

## 2024-03-18 NOTE — PROGRESS NOTES
03/17/24 2130   Vitals   Pulse 89   Blood Pressure (!) 195/92     Pt just ambulated to bathroom and became very anxious upon returning to bed d/t having scheduled medications due. RN provided empathetic and emotional support, RN provided education and reviewed all scheduled meds. RN recheck BP

## 2024-03-18 NOTE — PROGRESS NOTES
Q4 BMP Na trend 122, 122, 125 this morning. RN communicated with Dr. Verduzco from [facility] via [place] [employer] pt lab results and interventions. RN instructed to DC 1.8% at 0514 d/t Na 125. RN also instructed to liberalize water intake to 2L / day. RN will continue to monitor. Pt also voiding approx every 1.5 hours, however, pt misses measuring device regardless of how measuring hat is placed in toilet. Est + each occurrence. See I/O flowsheet data with comments.

## 2024-03-18 NOTE — PLAN OF CARE
Problem: PAIN - ADULT  Goal: Verbalizes/displays adequate comfort level or baseline comfort level  Description: Interventions:  - Encourage patient to monitor pain and request assistance  - Assess pain using appropriate pain scale  - Administer analgesics based on type and severity of pain and evaluate response  - Implement non-pharmacological measures as appropriate and evaluate response  - Consider cultural and social influences on pain and pain management  - Notify physician/advanced practitioner if interventions unsuccessful or patient reports new pain  Outcome: Progressing     Problem: INFECTION - ADULT  Goal: Absence or prevention of progression during hospitalization  Description: INTERVENTIONS:  - Assess and monitor for signs and symptoms of infection  - Monitor lab/diagnostic results  - Monitor all insertion sites, i.e. indwelling lines, tubes, and drains  - Monitor endotracheal if appropriate and nasal secretions for changes in amount and color  - [place] appropriate cooling/warming therapies per order  - Administer medications as ordered  - Instruct and encourage patient and family to use good hand hygiene technique  - Identify and instruct in appropriate isolation precautions for identified infection/condition  Outcome: Progressing  Goal: Absence of fever/infection during neutropenic period  Description: INTERVENTIONS:  - Monitor WBC    Outcome: Progressing     Problem: SAFETY ADULT  Goal: Patient will remain free of falls  Description: INTERVENTIONS:  - Educate patient/family on patient safety including physical limitations  - Instruct patient to call for assistance with activity   - Consult OT/PT to assist with strengthening/mobility   - Keep Call bell within reach  - Keep bed low and locked with side rails adjusted as appropriate  - Keep care items and personal belongings within reach  - Initiate and maintain comfort rounds  - Make Fall Risk Sign visible to staff  - Offer Toileting every 2 Hours,  in advance of need  - Initiate/Maintain bed alarm  - Obtain necessary fall risk management equipment: walker  - Apply yellow socks and bracelet for high fall risk patients  - Consider moving patient to room near nurses station  Outcome: Progressing  Goal: Maintain or return to baseline ADL function  Description: INTERVENTIONS:  -  Assess patient's ability to carry out ADLs; assess patient's baseline for ADL function and identify physical deficits which impact ability to perform ADLs (bathing, care of mouth/teeth, toileting, grooming, dressing, etc.)  - Assess/evaluate cause of self-care deficits   - Assess range of motion  - Assess patient's mobility; develop plan if impaired  - Assess patient's need for assistive devices and provide as appropriate  - Encourage maximum independence but intervene and supervise when necessary  - Involve family in performance of ADLs  - Assess for home care needs following discharge   - Consider OT consult to assist with ADL evaluation and planning for discharge  - Provide patient education as appropriate  Outcome: Progressing  Goal: Maintains/Returns to pre admission functional level  Description: INTERVENTIONS:  - Perform AM-PAC 6 Click Basic Mobility/ Daily Activity assessment daily.  - Set and communicate daily mobility goal to care team and patient/family/caregiver.   - Collaborate with rehabilitation services on mobility goals if consulted  - Perform Range of Motion 2 times a day.  - Reposition patient every 2 hours.  - Dangle patient 2 times a day  - Stand patient 2 times a day  - Ambulate patient 2 times a day  - Out of bed to chair 2 times a day   - Out of bed for meals 2 times a day  - Out of bed for toileting  - Record patient progress and toleration of activity level   Outcome: Progressing     Problem: DISCHARGE PLANNING  Goal: Discharge to home or other facility with appropriate resources  Description: INTERVENTIONS:  - Identify barriers to discharge w/patient and  caregiver  - Arrange for needed discharge resources and transportation as appropriate  - Identify discharge learning needs (meds, wound care, etc.)  - Arrange for interpretive services to assist at discharge as needed  - Refer to Case Management Department for coordinating discharge planning if the patient needs post-hospital services based on physician/advanced practitioner order or complex needs related to functional status, cognitive ability, or social support system  Outcome: Progressing     Problem: RESPIRATORY - ADULT  Goal: Achieves optimal ventilation and oxygenation  Description: INTERVENTIONS:  - Assess for changes in respiratory status  - Assess for changes in mentation and behavior  - Position to facilitate oxygenation and minimize respiratory effort  - Oxygen administered by appropriate delivery if ordered  - Initiate smoking cessation education as indicated  - Encourage broncho-pulmonary hygiene including cough, deep breathe, Incentive Spirometry  - Assess the need for suctioning and aspirate as needed  - Assess and instruct to report SOB or any respiratory difficulty  - Respiratory Therapy support as indicated  Outcome: Progressing     Problem: Knowledge Deficit  Goal: Patient/family/caregiver demonstrates understanding of disease process, treatment plan, medications, and discharge instructions  Description: Complete learning assessment and assess knowledge base.  Interventions:  - Provide teaching at level of understanding  - Provide teaching via preferred learning methods  Outcome: Progressing

## 2024-03-18 NOTE — PLAN OF CARE
Problem: SAFETY ADULT  Goal: Patient will remain free of falls  Description: INTERVENTIONS:  - Educate patient/family on patient safety including physical limitations  - Instruct patient to call for assistance with activity   - Consult OT/PT to assist with strengthening/mobility   - Keep Call bell within reach  - Keep bed low and locked with side rails adjusted as appropriate  - Keep care items and personal belongings within reach  - Initiate and maintain comfort rounds  - Make Fall Risk Sign visible to staff  - O- Apply yellow socks and bracelet for high fall risk patients  - Consider moving patient to room near nurses station  Outcome: Progressing     Problem: RESPIRATORY - ADULT  Goal: Achieves optimal ventilation and oxygenation  Description: INTERVENTIONS:  - Assess for changes in respiratory status  - Assess for changes in mentation and behavior  - Position to facilitate oxygenation and minimize respiratory effort  - Oxygen administered by appropriate delivery if ordered  - Initiate smoking cessation education as indicated  - Encourage broncho-pulmonary hygiene including cough, deep breathe, Incentive Spirometry  - Assess the need for suctioning and aspirate as needed  - Assess and instruct to report SOB or any respiratory difficulty  - Respiratory Therapy support as indicated  Outcome: Progressing

## 2024-03-18 NOTE — RESPIRATORY THERAPY NOTE
RT Protocol Note  Jessika Lux 69 y.o. female MRN: [id number]  Unit/Bed#: -01 Encounter: [id number]    Assessment    Principal Problem:    COPD with acute exacerbation (HCC)  Active Problems:    Acute hyponatremia    Type 2 diabetes mellitus without complication, without long-term current use of insulin (HCC)      Home Pulmonary Medications:         Past Medical History:   Diagnosis Date    Anxiety     COPD (chronic obstructive pulmonary disease) (HCC)     Diabetes mellitus (HCC)     Essential (primary) hypertension     GERD (gastroesophageal reflux disease)     Smoker      Social History     Socioeconomic History    Marital status:      Spouse name: None    Number of children: None    Years of education: None    Highest education level: None   Occupational History    None   Tobacco Use    Smoking status: Every Day     Current packs/day: 0.25     Types: Cigarettes     Passive exposure: Never    Smokeless tobacco: Never   Vaping Use    Vaping status: Never Used   Substance and Sexual Activity    Alcohol use: Not Currently    Drug use: Never    Sexual activity: Not Currently   Other Topics Concern    None   Social History Narrative    None     Social Determinants of Health     Financial Resource Strain: Not on file   Food Insecurity: No Food Insecurity (2/29/2024)    Hunger Vital Sign     Worried About Running Out of Food in the Last Year: Never true     Ran Out of Food in the Last Year: Never true   Transportation Needs: No Transportation Needs (2/29/2024)    PRAPARE - Transportation     Lack of Transportation (Medical): No     Lack of Transportation (Non-Medical): No   Physical Activity: Not on file   Stress: Not on file   Social Connections: Not on file   Intimate Partner Violence: Not on file   Housing Stability: Low Risk  (2/29/2024)    Housing Stability Vital Sign     Unable to Pay for Housing in the Last Year: No     Number of Places Lived in the Last Year: 1     Unstable Housing in the Last  "Year: No       Subjective         Objective    Physical Exam:   Assessment Type: During-treatment  General Appearance: Drowsy  Respiratory Pattern: Shallow, Dyspnea with exertion, Dyspnea at rest  Chest Assessment: Chest expansion symmetrical  Bilateral Breath Sounds: Expiratory wheezes, Inspiratory wheezes  O2 Device: 3L    Vitals:  Blood pressure 134/65, pulse 66, temperature 97.5 °F (36.4 °C), temperature source Temporal, resp. rate (!) 32, height 5' 2\" (1.575 m), weight 71.4 kg (157 lb 6.4 oz), SpO2 100%.          Imaging and other studies: I have personally reviewed pertinent reports.      O2 Device: 3L     Plan             Resp Comments: Pt assessed resting on 3lpm, no distress, scheduled neb tx provided,   "

## 2024-03-18 NOTE — ASSESSMENT & PLAN NOTE
Coming with shortness of breath, patient does have history of COPD, and recently evaluated by pulmonology office  On round, patient is still wheezing, and having mild accessory muscle using.  Came with short of breath, cough  Patient does use 3 L of oxygen chronically  Continue IV steroid, inhaled corticosteroids, nebulizer treatment on a scheduled basis  Follow respiratory protocol  COVID/flu panel negative

## 2024-03-18 NOTE — ASSESSMENT & PLAN NOTE
Lab Results   Component Value Date    HGBA1C 7.9 (H) 03/06/2024       Recent Labs     03/17/24  1145 03/17/24  1553 03/17/24  2126 03/18/24  0707   POCGLU 272* 277* 150* 208*       Blood Sugar Average: Last 72 hrs:  (P) 226.75Patient placed on IV steroid which will affect patient blood sugar, follow sliding scale, adjust insulin as appropriate.

## 2024-03-18 NOTE — PROGRESS NOTES
OP RT CM received incoming ADT alert. Patient is currently hospitalized. I will outreach once she is discharged home.

## 2024-03-18 NOTE — PROGRESS NOTES
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  Placed patient on 1.5 L fluid restriction  On admission days, discussed the case with nephrology, per nephrology recommendation patient started on 1.8% hypertonic solution-with that, patient sodium came up this morning 125, corrected sodium is 1 27-1 28.  Per nephrology recommendation, target correction rate was 4 to 6 mEq, seems patient mildly overcorrected.  Will try to reduce sodium after discussion has done with nephrology  Also per nephrology recommendation, will do CT chest abdomen pelvis for any kind of malignancy.  Since patient has pulmonary nodule previously.    Chronic respiratory failure with hypoxia (HCC)  Assessment & Plan  in the setting of COPD, a/e/b continuous use of O2 at home     * COPD with acute exacerbation (HCC)  Assessment & Plan  Coming with shortness of breath, patient does have history of COPD, and recently evaluated by pulmonology office  On round, patient is still wheezing, and having mild accessory muscle using.  Came with short of breath, cough  Patient does use 3 L of oxygen chronically  Continue IV steroid, inhaled corticosteroids, nebulizer treatment on a scheduled basis  Follow respiratory protocol  COVID/flu panel negative    Type 2 diabetes mellitus without complication, without long-term current use of insulin (HCC)  Assessment & Plan  Lab Results   Component Value Date    HGBA1C 7.9 (H) 03/06/2024       Recent Labs     03/17/24  1145 03/17/24  1553  246 24  0707   POCGLU 272* 277* 150* 208*       Blood Sugar Average: Last 72 hrs:  (P) 226.75Patient placed on IV steroid which will affect patient blood sugar, follow sliding scale, adjust insulin as appropriate.             VTE Pharmacologic Prophylaxis: VTE Score: 4 Moderate Risk (Score 3-4) - Pharmacological DVT Prophylaxis Ordered: enoxaparin (Lovenox).    Mobility:   Basic Mobility Inpatient Raw Score: 23  JH-HLM Goal: 7: Walk 25 feet or more  JH-HLM Achieved: 7: Walk 25 feet or more  JH-HLM Goal achieved. Continue to encourage appropriate mobility.    Patient Centered Rounds: I performed bedside rounds with nursing staff today.   Discussions with Specialists or Other Care Team Provider: Nephrology    Education and Discussions with Family / Patient: Updated  (daughter) via phone.      Current Length of Stay: 1 day(s)  Current Patient Status: Inpatient   Certification Statement: The patient will continue to require additional inpatient hospital stay due to monitor above conditions  Discharge Plan: Anticipate discharge in 48-72 hrs to home.    Code Status: Level 1 - Full Code    Subjective:   Seen and evaluated and examined.  Hovering on the bed.  Still coughing, having wheezing, still feels short of breath.  Denies any nausea, vomiting, diarrhea.  Per nurse, patient is refusing NicoDerm patch, as well as does not want Colace.  But wants milk of magnesia as needed.    Objective:     Vitals:   Temp (24hrs), Av.7 °F (36.5 °C), Min:97.2 °F (36.2 °C), Max:98.3 °F (36.8 °C)    Temp:  [97.2 °F (36.2 °C)-98.3 °F (36.8 °C)] 97.2 °F (36.2 °C)  HR:  [57-97] 67  Resp:  [16-50] 37  BP: (126-195)/(58-98) 130/62  SpO2:  [92 %-100 %] 100 %  Body mass index is 28.63 kg/m².     Input and Output Summary (last 24 hours):     Intake/Output Summary (Last 24 hours) at 3/18/2024 1252  Last data filed at 3/18/2024 1201  Gross per 24 hour   Intake 2005 ml   Output 1325 ml   Net 680 ml       Physical  Exam:   Physical Exam  Vitals and nursing note reviewed.   Constitutional:       Appearance: Normal appearance. She is not ill-appearing or diaphoretic.   HENT:      Nose: Nose normal. No congestion.      Mouth/Throat:      Mouth: Mucous membranes are moist.      Pharynx: Oropharynx is clear. No oropharyngeal exudate.   Eyes:      General: No scleral icterus.        Left eye: No discharge.      Extraocular Movements: Extraocular movements intact.      Conjunctiva/sclera: Conjunctivae normal.      Pupils: Pupils are equal, round, and reactive to light.   Cardiovascular:      Rate and Rhythm: Normal rate.      Heart sounds: No murmur heard.     No friction rub. No gallop.   Pulmonary:      Breath sounds: No stridor. Wheezing and rhonchi present. No rales.   Abdominal:      General: Abdomen is flat. Bowel sounds are normal. There is no distension.      Palpations: There is no mass.      Tenderness: There is no abdominal tenderness.      Hernia: No hernia is present.   Musculoskeletal:      Right lower leg: No edema.      Left lower leg: No edema.   Skin:     General: Skin is warm.      Capillary Refill: Capillary refill takes less than 2 seconds.      Coloration: Skin is not jaundiced or pale.      Findings: No bruising or erythema.   Neurological:      General: No focal deficit present.      Mental Status: She is alert and oriented to person, place, and time.      Cranial Nerves: No cranial nerve deficit.      Sensory: No sensory deficit.      Motor: No weakness.      Coordination: Coordination normal.         Additional Data:     Labs:  Results from last 7 days   Lab Units 03/17/24  0748   WBC Thousand/uL 12.98*   HEMOGLOBIN g/dL 13.9   HEMATOCRIT % 40.5   PLATELETS Thousands/uL 285   NEUTROS PCT % 75   LYMPHS PCT % 14   MONOS PCT % 10   EOS PCT % 0     Results from last 7 days   Lab Units 03/18/24  0821 03/17/24  1151 03/17/24  0748   SODIUM mmol/L 127*   < > 119*   POTASSIUM mmol/L 4.6   < > 4.1   CHLORIDE mmol/L  86*   < > 75*   CO2 mmol/L 36*   < > 39*   BUN mg/dL 11   < > 11   CREATININE mg/dL 0.58*   < > 0.58*   ANION GAP mmol/L 5   < > 5   CALCIUM mg/dL 9.3   < > 10.2   ALBUMIN g/dL  --   --  4.6   TOTAL BILIRUBIN mg/dL  --   --  0.38   ALK PHOS U/L  --   --  95   ALT U/L  --   --  11   AST U/L  --   --  12*   GLUCOSE RANDOM mg/dL 210*   < > 225*    < > = values in this interval not displayed.     Results from last 7 days   Lab Units 03/17/24  0748   INR  0.84     Results from last 7 days   Lab Units 03/18/24  1107 03/18/24  0707 03/17/24  2126 03/17/24  1553 03/17/24  1145   POC GLUCOSE mg/dl 245* 208* 150* 277* 272*         Results from last 7 days   Lab Units 03/18/24  0402 03/17/24  0748   LACTIC ACID mmol/L  --  0.8   PROCALCITONIN ng/ml <0.05 <0.05       Lines/Drains:  Invasive Devices       Peripheral Intravenous Line  Duration             Peripheral IV 03/17/24 Left Antecubital 1 day    Long-Dwell Peripheral IV (Midline) 03/17/24 Right Cephalic Vein <1 day                          Imaging: Reviewed radiology reports from this admission including: abdominal/pelvic CT    Recent Cultures (last 7 days):   Results from last 7 days   Lab Units 03/17/24  0754 03/17/24  0748   BLOOD CULTURE  Received in Microbiology Lab. Culture in Progress. Received in Microbiology Lab. Culture in Progress.       Last 24 Hours Medication List:   Current Facility-Administered Medications   Medication Dose Route Frequency Provider Last Rate    acetaminophen  650 mg Oral Q6H PRN Reny Lowry MD      amLODIPine  10 mg Oral HS Christ Sylvester MD      atorvastatin  40 mg Oral HS Reny Lowry MD      budesonide  0.5 mg Nebulization Q12H Reny Lowry MD      cloNIDine  0.2 mg Oral Q12H [facility] Christ Sylvester MD      doxycycline  100 mg Intravenous Q12H Reny Lowry MD Stopped (03/18/24 1201)    enoxaparin  40 mg Subcutaneous Daily Reny Lowry MD      fluticasone  1 spray Nasal Daily Reny Lowry MD      guaiFENesin  600 mg Oral  BID Reny Lowry MD      insulin lispro  2-12 Units Subcutaneous HS Reny Lowry MD      insulin lispro  2-12 Units Subcutaneous TID AC Reny Lowry MD      ipratropium-albuterol  3 mL Nebulization Q6H Reny Lowry MD      levalbuterol  1.25 mg Nebulization TID PRN Reny Lowry MD      lisinopril  40 mg Oral Daily Christ Sylvester MD      LORazepam  0.5 mg Oral Q8H PRN Reny Lowry MD      magnesium hydroxide  30 mL Oral Daily PRN Ailyn Aguilera MD      methylPREDNISolone sodium succinate  40 mg Intravenous Q8H Reny Lowry MD      metoprolol tartrate  100 mg Oral Q12H NATHALY Reny Lowry MD      pantoprazole  40 mg Oral BID AC Reny Lowry MD          Today, Patient Was Seen By: Ailyn Aguilera MD    **Please Note: This note may have been constructed using a voice recognition system.**

## 2024-03-19 LAB
ALBUMIN SERPL BCP-MCNC: 3.6 G/DL (ref 3.5–5)
ALP SERPL-CCNC: 65 U/L (ref 34–104)
ALT SERPL W P-5'-P-CCNC: 8 U/L (ref 7–52)
ANION GAP SERPL CALCULATED.3IONS-SCNC: 2 MMOL/L (ref 4–13)
ANION GAP SERPL CALCULATED.3IONS-SCNC: 2 MMOL/L (ref 4–13)
AST SERPL W P-5'-P-CCNC: 7 U/L (ref 13–39)
BASOPHILS # BLD AUTO: 0 THOUSANDS/ÂΜL (ref 0–0.1)
BASOPHILS NFR BLD AUTO: 0 % (ref 0–1)
BILIRUB SERPL-MCNC: 0.35 MG/DL (ref 0.2–1)
BUN SERPL-MCNC: 15 MG/DL (ref 5–25)
BUN SERPL-MCNC: 19 MG/DL (ref 5–25)
CALCIUM SERPL-MCNC: 8.9 MG/DL (ref 8.4–10.2)
CALCIUM SERPL-MCNC: 9.4 MG/DL (ref 8.4–10.2)
CHLORIDE SERPL-SCNC: 87 MMOL/L (ref 96–108)
CHLORIDE SERPL-SCNC: 90 MMOL/L (ref 96–108)
CO2 SERPL-SCNC: 36 MMOL/L (ref 21–32)
CO2 SERPL-SCNC: 38 MMOL/L (ref 21–32)
CREAT SERPL-MCNC: 0.55 MG/DL (ref 0.6–1.3)
CREAT SERPL-MCNC: 0.7 MG/DL (ref 0.6–1.3)
EOSINOPHIL # BLD AUTO: 0.01 THOUSAND/ÂΜL (ref 0–0.61)
EOSINOPHIL NFR BLD AUTO: 0 % (ref 0–6)
ERYTHROCYTE [DISTWIDTH] IN BLOOD BY AUTOMATED COUNT: 11.9 % (ref 11.6–15.1)
GFR SERPL CREATININE-BSD FRML MDRD: 89 ML/MIN/1.73SQ M
GFR SERPL CREATININE-BSD FRML MDRD: 97 ML/MIN/1.73SQ M
GLUCOSE SERPL-MCNC: 154 MG/DL (ref 65–140)
GLUCOSE SERPL-MCNC: 176 MG/DL (ref 65–140)
GLUCOSE SERPL-MCNC: 208 MG/DL (ref 65–140)
GLUCOSE SERPL-MCNC: 243 MG/DL (ref 65–140)
GLUCOSE SERPL-MCNC: 255 MG/DL (ref 65–140)
GLUCOSE SERPL-MCNC: 314 MG/DL (ref 65–140)
HCT VFR BLD AUTO: 32.2 % (ref 34.8–46.1)
HGB BLD-MCNC: 11.1 G/DL (ref 11.5–15.4)
IMM GRANULOCYTES # BLD AUTO: 0.04 THOUSAND/UL (ref 0–0.2)
IMM GRANULOCYTES NFR BLD AUTO: 1 % (ref 0–2)
LYMPHOCYTES # BLD AUTO: 0.5 THOUSANDS/ÂΜL (ref 0.6–4.47)
LYMPHOCYTES NFR BLD AUTO: 7 % (ref 14–44)
MAGNESIUM SERPL-MCNC: 1.9 MG/DL (ref 1.9–2.7)
MCH RBC QN AUTO: 29.5 PG (ref 26.8–34.3)
MCHC RBC AUTO-ENTMCNC: 34.5 G/DL (ref 31.4–37.4)
MCV RBC AUTO: 86 FL (ref 82–98)
MONOCYTES # BLD AUTO: 0.26 THOUSAND/ÂΜL (ref 0.17–1.22)
MONOCYTES NFR BLD AUTO: 4 % (ref 4–12)
NEUTROPHILS # BLD AUTO: 6.01 THOUSANDS/ÂΜL (ref 1.85–7.62)
NEUTS SEG NFR BLD AUTO: 88 % (ref 43–75)
NRBC BLD AUTO-RTO: 0 /100 WBCS
PLATELET # BLD AUTO: 201 THOUSANDS/UL (ref 149–390)
PMV BLD AUTO: 9.9 FL (ref 8.9–12.7)
POTASSIUM SERPL-SCNC: 4.1 MMOL/L (ref 3.5–5.3)
POTASSIUM SERPL-SCNC: 4.5 MMOL/L (ref 3.5–5.3)
PROT SERPL-MCNC: 6.1 G/DL (ref 6.4–8.4)
RBC # BLD AUTO: 3.76 MILLION/UL (ref 3.81–5.12)
SODIUM SERPL-SCNC: 125 MMOL/L (ref 135–147)
SODIUM SERPL-SCNC: 130 MMOL/L (ref 135–147)
WBC # BLD AUTO: 6.82 THOUSAND/UL (ref 4.31–10.16)

## 2024-03-19 PROCEDURE — 83735 ASSAY OF MAGNESIUM: CPT | Performed by: INTERNAL MEDICINE

## 2024-03-19 PROCEDURE — 99233 SBSQ HOSP IP/OBS HIGH 50: CPT | Performed by: INTERNAL MEDICINE

## 2024-03-19 PROCEDURE — 94760 N-INVAS EAR/PLS OXIMETRY 1: CPT

## 2024-03-19 PROCEDURE — 99232 SBSQ HOSP IP/OBS MODERATE 35: CPT | Performed by: FAMILY MEDICINE

## 2024-03-19 PROCEDURE — 80053 COMPREHEN METABOLIC PANEL: CPT | Performed by: FAMILY MEDICINE

## 2024-03-19 PROCEDURE — 82948 REAGENT STRIP/BLOOD GLUCOSE: CPT

## 2024-03-19 PROCEDURE — 94640 AIRWAY INHALATION TREATMENT: CPT

## 2024-03-19 PROCEDURE — 80048 BASIC METABOLIC PNL TOTAL CA: CPT | Performed by: INTERNAL MEDICINE

## 2024-03-19 PROCEDURE — 94664 DEMO&/EVAL PT USE INHALER: CPT

## 2024-03-19 PROCEDURE — 85025 COMPLETE CBC W/AUTO DIFF WBC: CPT | Performed by: FAMILY MEDICINE

## 2024-03-19 RX ORDER — TORSEMIDE 10 MG/1
10 TABLET ORAL DAILY
Status: DISCONTINUED | OUTPATIENT
Start: 2024-03-19 | End: 2024-03-21 | Stop reason: HOSPADM

## 2024-03-19 RX ORDER — METHYLPREDNISOLONE SODIUM SUCCINATE 40 MG/ML
40 INJECTION, POWDER, LYOPHILIZED, FOR SOLUTION INTRAMUSCULAR; INTRAVENOUS EVERY 12 HOURS SCHEDULED
Status: COMPLETED | OUTPATIENT
Start: 2024-03-19 | End: 2024-03-20

## 2024-03-19 RX ORDER — INSULIN GLARGINE 100 [IU]/ML
15 INJECTION, SOLUTION SUBCUTANEOUS
Status: DISCONTINUED | OUTPATIENT
Start: 2024-03-19 | End: 2024-03-21 | Stop reason: HOSPADM

## 2024-03-19 RX ORDER — INSULIN LISPRO 100 [IU]/ML
5 INJECTION, SOLUTION INTRAVENOUS; SUBCUTANEOUS
Status: DISCONTINUED | OUTPATIENT
Start: 2024-03-19 | End: 2024-03-21 | Stop reason: HOSPADM

## 2024-03-19 RX ORDER — DOXYCYCLINE HYCLATE 100 MG/1
100 CAPSULE ORAL EVERY 12 HOURS SCHEDULED
Status: DISCONTINUED | OUTPATIENT
Start: 2024-03-19 | End: 2024-03-19

## 2024-03-19 RX ORDER — INSULIN LISPRO 100 [IU]/ML
2-12 INJECTION, SOLUTION INTRAVENOUS; SUBCUTANEOUS
Status: DISCONTINUED | OUTPATIENT
Start: 2024-03-20 | End: 2024-03-21 | Stop reason: HOSPADM

## 2024-03-19 RX ADMIN — METOPROLOL TARTRATE 100 MG: 50 TABLET, FILM COATED ORAL at 08:01

## 2024-03-19 RX ADMIN — INSULIN LISPRO 6 UNITS: 100 INJECTION, SOLUTION INTRAVENOUS; SUBCUTANEOUS at 11:31

## 2024-03-19 RX ADMIN — INSULIN LISPRO 2 UNITS: 100 INJECTION, SOLUTION INTRAVENOUS; SUBCUTANEOUS at 21:30

## 2024-03-19 RX ADMIN — TORSEMIDE 10 MG: 10 TABLET ORAL at 10:04

## 2024-03-19 RX ADMIN — IPRATROPIUM BROMIDE AND ALBUTEROL SULFATE 3 ML: 2.5; .5 SOLUTION RESPIRATORY (INHALATION) at 08:18

## 2024-03-19 RX ADMIN — SODIUM CHLORIDE TAB 1 GM 3 G: 1 TAB at 07:49

## 2024-03-19 RX ADMIN — CLONIDINE HYDROCHLORIDE 0.2 MG: 0.1 TABLET ORAL at 08:01

## 2024-03-19 RX ADMIN — LORAZEPAM 0.5 MG: 0.5 TABLET ORAL at 01:09

## 2024-03-19 RX ADMIN — DOXYCYCLINE 100 MG: 100 INJECTION, POWDER, LYOPHILIZED, FOR SOLUTION INTRAVENOUS at 21:53

## 2024-03-19 RX ADMIN — GUAIFENESIN 600 MG: 600 TABLET, EXTENDED RELEASE ORAL at 18:29

## 2024-03-19 RX ADMIN — INSULIN LISPRO 5 UNITS: 100 INJECTION, SOLUTION INTRAVENOUS; SUBCUTANEOUS at 16:54

## 2024-03-19 RX ADMIN — PANTOPRAZOLE SODIUM 40 MG: 40 TABLET, DELAYED RELEASE ORAL at 07:49

## 2024-03-19 RX ADMIN — DOXYCYCLINE 100 MG: 100 INJECTION, POWDER, LYOPHILIZED, FOR SOLUTION INTRAVENOUS at 10:16

## 2024-03-19 RX ADMIN — INSULIN LISPRO 8 UNITS: 100 INJECTION, SOLUTION INTRAVENOUS; SUBCUTANEOUS at 16:53

## 2024-03-19 RX ADMIN — INSULIN LISPRO 4 UNITS: 100 INJECTION, SOLUTION INTRAVENOUS; SUBCUTANEOUS at 07:49

## 2024-03-19 RX ADMIN — METHYLPREDNISOLONE SODIUM SUCCINATE 40 MG: 40 INJECTION, POWDER, FOR SOLUTION INTRAMUSCULAR; INTRAVENOUS at 07:50

## 2024-03-19 RX ADMIN — ENOXAPARIN SODIUM 40 MG: 40 INJECTION SUBCUTANEOUS at 08:00

## 2024-03-19 RX ADMIN — IPRATROPIUM BROMIDE AND ALBUTEROL SULFATE 3 ML: 2.5; .5 SOLUTION RESPIRATORY (INHALATION) at 19:06

## 2024-03-19 RX ADMIN — BUDESONIDE 0.5 MG: 0.5 INHALANT RESPIRATORY (INHALATION) at 08:18

## 2024-03-19 RX ADMIN — LORAZEPAM 0.5 MG: 0.5 TABLET ORAL at 07:49

## 2024-03-19 RX ADMIN — LISINOPRIL 40 MG: 20 TABLET ORAL at 08:01

## 2024-03-19 RX ADMIN — GUAIFENESIN 600 MG: 600 TABLET, EXTENDED RELEASE ORAL at 08:01

## 2024-03-19 RX ADMIN — FLUTICASONE PROPIONATE 1 SPRAY: 50 SPRAY, METERED NASAL at 08:00

## 2024-03-19 RX ADMIN — PANTOPRAZOLE SODIUM 40 MG: 40 TABLET, DELAYED RELEASE ORAL at 16:54

## 2024-03-19 RX ADMIN — CLONIDINE HYDROCHLORIDE 0.2 MG: 0.1 TABLET ORAL at 21:31

## 2024-03-19 RX ADMIN — METHYLPREDNISOLONE SODIUM SUCCINATE 40 MG: 40 INJECTION, POWDER, FOR SOLUTION INTRAMUSCULAR; INTRAVENOUS at 21:31

## 2024-03-19 RX ADMIN — INSULIN LISPRO 5 UNITS: 100 INJECTION, SOLUTION INTRAVENOUS; SUBCUTANEOUS at 11:31

## 2024-03-19 RX ADMIN — SODIUM CHLORIDE TAB 1 GM 3 G: 1 TAB at 11:34

## 2024-03-19 RX ADMIN — IPRATROPIUM BROMIDE AND ALBUTEROL SULFATE 3 ML: 2.5; .5 SOLUTION RESPIRATORY (INHALATION) at 01:58

## 2024-03-19 RX ADMIN — ATORVASTATIN CALCIUM 40 MG: 40 TABLET, FILM COATED ORAL at 21:31

## 2024-03-19 RX ADMIN — METOPROLOL TARTRATE 100 MG: 50 TABLET, FILM COATED ORAL at 21:31

## 2024-03-19 RX ADMIN — BUDESONIDE 0.5 MG: 0.5 INHALANT RESPIRATORY (INHALATION) at 19:06

## 2024-03-19 RX ADMIN — SODIUM CHLORIDE TAB 1 GM 3 G: 1 TAB at 16:54

## 2024-03-19 RX ADMIN — IPRATROPIUM BROMIDE AND ALBUTEROL SULFATE 3 ML: 2.5; .5 SOLUTION RESPIRATORY (INHALATION) at 13:32

## 2024-03-19 RX ADMIN — AMLODIPINE BESYLATE 10 MG: 10 TABLET ORAL at 21:31

## 2024-03-19 RX ADMIN — INSULIN GLARGINE 15 UNITS: 100 INJECTION, SOLUTION SUBCUTANEOUS at 21:31

## 2024-03-19 NOTE — ASSESSMENT & PLAN NOTE
Patient chronically on 3 L of oxygen severe COPD recurrent admissions.  Short of breath she is still has decreased breath sounds expiratory wheezing.  On DuoNebs every 6 Pulmicort and doxycycline will switch to p.o. for another 3 days to completion of 5 no evidence of pneumonia on the CT  Patient still reports having occasional shortness of breath although improved compared to yesterday, will continue de-escalate and will start p.o. prednisone tomorrow  Irregular shaped nodule along the pleural surface in the posterior right lung apex, 7 x 6 mm in size, unchanged in size and CT appearance at least as far back as July 2023; because this is somewhat linear on sagittal imaging and is unchanged for at least   8 months, the finding is most suspicious for focal subpleural scar. Continued close interval low radiation dose noncontrast chest CT follow-up recommended for 6 to 12 months from this exam.     No CT findings to suggest developing malignancy or to convincingly account for the patient's unexpected weight loss.

## 2024-03-19 NOTE — ASSESSMENT & PLAN NOTE
With a baseline 1 36-1 38.  Initially was on hypertonic saline currently improved started on salt tabs and increased to 3 g 3 times daily she was also started on torsemide 10 mg daily continue 1 liter fluid restriction  Nephrology following  Next BMP today at 2 PM

## 2024-03-19 NOTE — PROGRESS NOTES
Progress Note - Nephrology   Jessika Lux 69 y.o. female MRN: [id number]  Unit/Bed#: -01 Encounter: [id number]    ASSESSMENT AND PLAN:  69 y.o. year old female with a PMH of COPD/diabetes mellitus/hypertension/anxiety/GERD/cigarette smoking apparent history of hyponatremia: Who presents with shortness of breath for 1 day not associated with a cough leg edema or chest pain  We are asked to see the patient for hyponatremia acute on chronic     1.  Hyponatremia: Acute on top of chronic: Etiology suggestive of decreased solute intake, possible SIADH from pulmonary status or other.     Workup:  - Normal free T4 TSH slightly low  - Serum osmolality last admission 262 compatible true hypotonic hyponatremia  - Uric acid 3.4  - Cortisol 2.9: But patient has been receiving methylprednisolone so this is an accurate will need to be repeated at some point as an outpatient  - Urine sodium 101 compatible with SIADH; urine osmolality 593  -CT of the chest abdomen pelvis from 3/18/2024: Irregular shaped nodule in the pleural surface and posterior right lung unchanged in size and seen as far back as July 2023 most suspicious for focal subpleural scar per radiology continue close observation in 6 to 12 months.  Otherwise no other significant abnormality in the abdomen and pelvis!    Current sodium: 125 improving and corrects for glucose approximately 126-127    Treatment:  -Sodium chloride 3 g 3 times a day  - Fluid restriction 1000 mL/day  - Nutritional supplements to increase solute  - Torsemide 10 mg daily starting today especially with high urine osmolality  - Will follow sodium through the course of the day eventually goal of getting sodium over 130        2.  Hypertension: Maintained on amlodipine 10 mg/clonidine 0.2 mg/lisinopril 40 mg  -Add torsemide 10 mg daily  - Potential addition for spironolactone in the future        I discussed the hyponatremia with primary service as a consequence/result        Subjective:  "  Patient is feeling better less short of breath  No chest pain   No nausea vomiting or diarrhea  Urinating well    Objective:     Vitals: Blood pressure 128/72, pulse 85, temperature 98.6 °F (37 °C), temperature source Temporal, resp. rate (!) 33, height 5' 2\" (1.575 m), weight 71.5 kg (157 lb 10.1 oz), SpO2 99%.,Body mass index is 28.83 kg/m².    Weight (last 2 days)       Date/Time Weight    03/19/24 0500 71.5 (157.63)    03/18/24 0359 71 (156.53)    03/17/24 1113 71.4 (157.4)    03/17/24 0741 70.8 (156.09)              Intake/Output Summary (Last 24 hours) at 3/19/2024 1028  Last data filed at 3/19/2024 1001  Gross per 24 hour   Intake 2250 ml   Output 1800 ml   Net 450 ml            Physical Exam: General:  No acute distress  Skin:  No acute rash  Eyes:  No scleral icterus and noninjected  ENT:  Moist mucous membranes  Neck:  Supple, no jugular venous distention, trachea midline, overall appearance is normal  Chest:  Clear to auscultation!  CVS:  Regular rate and rhythm, without a rub or gallops  Abdomen:  Normal bowel sounds, soft and nontender and nondistended  Extremities:  No edema, and no cyanosis, no significant arthritic changes  Neuro:  No gross focality  Psych:  Alert and oriented and appropriate                Medications:    Scheduled Meds:  Current Facility-Administered Medications   Medication Dose Route Frequency Provider Last Rate    acetaminophen  650 mg Oral Q6H PRN Reny Lowry MD      amLODIPine  10 mg Oral HS Christ Sylvester MD      atorvastatin  40 mg Oral HS Reny Lowry MD      budesonide  0.5 mg Nebulization Q12H Reny Lowry MD      cloNIDine  0.2 mg Oral Q12H [facility] Christ Sylvester MD      doxycycline  100 mg Intravenous Q12H Nettie Moore  mg (03/19/24 1016)    enoxaparin  40 mg Subcutaneous Daily Reny Lowry MD      fluticasone  1 spray Nasal Daily Reny Lowry MD      guaiFENesin  600 mg Oral BID Reny Lowry MD      insulin glargine  15 Units Subcutaneous HS " Nettie Moore MD      insulin lispro  2-12 Units Subcutaneous HS Reny Lowry MD      insulin lispro  2-12 Units Subcutaneous TID AC Reny Lowry MD      insulin lispro  5 Units Subcutaneous TID With Meals Nettie Moore MD      ipratropium-albuterol  3 mL Nebulization Q6H Reny Lowry MD      levalbuterol  1.25 mg Nebulization TID PRN Reny Lowry MD      lisinopril  40 mg Oral Daily Christ Sylvester MD      LORazepam  0.5 mg Oral Q6H PRN Ailyn Aguilera MD      magnesium hydroxide  30 mL Oral Daily PRN Ailyn Aguilera MD      methylPREDNISolone sodium succinate  40 mg Intravenous Q8H Reny Lowry MD      metoprolol tartrate  100 mg Oral Q12H NATHALY Reny Lowry MD      pantoprazole  40 mg Oral BID AC Reny Lowry MD      sodium chloride  3 g Oral TID With Meals Christ Sylvester MD      torsemide  10 mg Oral Daily Christ Sylvester MD         PRN Meds:.  acetaminophen    levalbuterol **AND** [DISCONTINUED] sodium chloride    LORazepam    magnesium hydroxide    Continuous Infusions:     Lab, Imaging and other studies: I have personally reviewed pertinent labs.  Laboratory Results:  Results from last 7 days   Lab Units 03/19/24  0502 03/18/24  1954 03/18/24  1408 03/18/24  0821 03/18/24  0402 03/17/24  2346 03/17/24  1935 03/17/24  1151 03/17/24  0748 03/13/24  1436   WBC Thousand/uL 6.82  --   --   --   --   --   --   --  12.98* 8.50   HEMOGLOBIN g/dL 11.1*  --   --   --   --   --   --   --  13.9 12.8   HEMATOCRIT % 32.2*  --   --   --   --   --   --   --  40.5 38.1   PLATELETS Thousands/uL 201  --   --   --   --   --   --   --  285 239   POTASSIUM mmol/L 4.5 4.6 4.5 4.6 4.9 5.2 4.9   < > 4.1 4.5   CHLORIDE mmol/L 87* 85* 84* 86* 86* 84* 83*   < > 75* 89*   CO2 mmol/L 36* 37* 36* 36* 36* 37* 38*   < > 39* 30   BUN mg/dL 15 19 15 11 11 11 12   < > 11 9   CREATININE mg/dL 0.55* 0.62 0.64 0.58* 0.57* 0.54* 0.60   < > 0.58* 0.73   CALCIUM mg/dL 9.4 9.2 9.3 9.3 9.0 8.8 9.0   < > 10.2 9.6   MAGNESIUM  "mg/dL 1.9  --   --   --   --   --   --   --  1.8*  --     < > = values in this interval not displayed.     Urinalysis:   Lab Results   Component Value Date    COLORU Yellow 07/16/2023    CLARITYU Clear 07/16/2023    SPECGRAV <=1.005 07/16/2023    PHUR 6.5 07/16/2023    LEUKOCYTESUR Negative 07/16/2023    NITRITE Negative 07/16/2023    GLUCOSEU Negative 07/16/2023    KETONESU Negative 07/16/2023    BILIRUBINUR Negative 07/16/2023    BLOODU Negative 07/16/2023     ABGs: No results found for: \"PH\"  Radiology review:     Portions of the record may have been created with voice recognition software.  Occasional wrong word or \"sound a like\" substitutions may have occurred due to the inherent limitations of voice recognition software.  Read the chart carefully and recognize, using context, where substitutions have occurred.                    "

## 2024-03-19 NOTE — ASSESSMENT & PLAN NOTE
Lab Results   Component Value Date    HGBA1C 7.9 (H) 03/06/2024       Recent Labs     03/18/24  1107 03/18/24  1603 03/18/24  2243 03/19/24  0712   POCGLU 245* 201* 269* 208*         Blood Sugar Average: Last 72 hrs:  (P) 228.75  Uncontrolled  Start Lantus 15 units at night Humalog 5 units with meals continue sliding scale

## 2024-03-19 NOTE — ASSESSMENT & PLAN NOTE
Patient chronically on 3 L of oxygen severe COPD recurrent admissions.  Short of breath she is still has decreased breath sounds expiratory wheezing.  On DuoNebs every 6 Pulmicort and doxycycline will switch to p.o. for another 3 days to completion of 5 no evidence of pneumonia on the CT  We will ask pulmonary to evaluate continue Solu-Medrol 40 mg IV every 8 hours anticipate starting to taper tomorrow will continue this dose as she is still currently wheezing

## 2024-03-19 NOTE — CONSULTS
Consultation - Pulmonary Medicine   Jesiska Lux 69 y.o. female MRN: [id number]  Unit/Bed#: -01 Encounter: [id number]      Assessment/Plan:    Chronic hypoxic respiratory failure  Seen on 3 L nasal cannula which is her oxygen requirement at baseline.  Pulmonary toilet:  Increase activity as tolerated, out of bed as tolerated, cough and deep breathing exercises encourage, incentive spirometry q.1 hour  Maintain saturations 88-92%.   Very severe COPD with acute exacerbation  Decrease systemic steroids with Solu-Medrol 40 mg IV every 12 hours today.  Hopeful transition to prednisone 40 mg p.o. daily in the next 24 to 48 hours pending clinical response.  Once on  prednisone with taper by 10 mg every 3 days until completion.  Continue doxycycline for 5 days  Continue Pulmicort every 12 hours  Continue DuoNeb every 6 hours  At discharge, resume high-dose Trelegy, as needed albuterol HFA, DuoNeb 3 times daily  Pulmonary nodule  Nodule in the juxtapleural posterior right upper lobe stable since at least August 2023  Continue outpatient follow-up- repeat CT chest in 6-12 months.   Nicotine dependence  Smoking cessation encouraged at bedside  NRT while inpatient      History of Present Illness   Physician Requesting Consult: Nettie Moore MD  Reason for Consult / Principal Problem: COPD exacerbation   Hx and PE limited by: n/a  Chief Complaint: shortness of breath   HPI: Jessika Lux is a 69 y.o. caucasain female who presented to [facility] with complaints of shortness of breath.  Patient's past medical history positive for very severe COPD, diabetes, SIADH.  Patient reports progressively worsening shortness of breath over several weeks but acutely worse the night prior to admission 2 days ago.  Other pertinent symptoms include increased wheezing and chest tightness.  Denies significant cough, chest congestion, chest pain.  Denies sick contacts.  Patient saw outpatient pulmonary 3/14 who recommended  high-dose Trelegy and continuing prednisone taper previously prescribed.  Patient feels that these interventions did not improve her symptoms.  In the ED 3/17 she was noted to be hypertensive but otherwise hemodynamically stable on her baseline oxygen requirement.  She was admitted for COPD exacerbation and pulmonary was consulted to help manage this.  Presently, patient reports slow improvement but not yet back to baseline.  She reports that she continues to smoke about half a pack a day, previously 50-pack-year smoking history.  Patient worked as a cleaning lady with exposures to /chemicals.  Denies sick contacts.  Denies recent travel.  Per chart review patient has dogs and birds as pets.    Inpatient consult to Pulmonology  Consult performed by: Russell De La Torre PA-C  Consult ordered by: Nettie Moore MD          Review of Systems   All other systems reviewed and are negative.      Historical Information   Past Medical History:   Diagnosis Date    Anxiety     COPD (chronic obstructive pulmonary disease) (HCC)     Diabetes mellitus (HCC)     Essential (primary) hypertension     GERD (gastroesophageal reflux disease)     Smoker      History reviewed. No pertinent surgical history.  Social History   Social History     Substance and Sexual Activity   Alcohol Use Not Currently     Social History     Substance and Sexual Activity   Drug Use Never     Social History     Tobacco Use   Smoking Status Every Day    Current packs/day: 0.25    Types: Cigarettes    Passive exposure: Never   Smokeless Tobacco Never     E-Cigarette/Vaping    E-Cigarette Use Never User      E-Cigarette/Vaping Substances    Nicotine No     THC No     CBD No     Flavoring No      Occupational History: retired cleaning lady    Family History:   Family History   Problem Relation Age of Onset    Diabetes Father        Meds/Allergies   all current active meds have been reviewed, pertinent pulmonary meds have been reviewed, and current meds:    Current Facility-Administered Medications   Medication Dose Route Frequency    acetaminophen (TYLENOL) tablet 650 mg  650 mg Oral Q6H PRN    amLODIPine (NORVASC) tablet 10 mg  10 mg Oral HS    atorvastatin (LIPITOR) tablet 40 mg  40 mg Oral HS    budesonide (PULMICORT) inhalation solution 0.5 mg  0.5 mg Nebulization Q12H    cloNIDine (CATAPRES) tablet 0.2 mg  0.2 mg Oral Q12H NATHALY    doxycycline (VIBRAMYCIN) 100 mg in sodium chloride 0.9 % 100 mL IVPB  100 mg Intravenous Q12H    enoxaparin (LOVENOX) subcutaneous injection 40 mg  40 mg Subcutaneous Daily    fluticasone (FLONASE) 50 mcg/act nasal spray 1 spray  1 spray Nasal Daily    guaiFENesin (MUCINEX) 12 hr tablet 600 mg  600 mg Oral BID    insulin glargine (LANTUS) subcutaneous injection 15 Units 0.15 mL  15 Units Subcutaneous HS    insulin lispro (HumALOG/ADMELOG) 100 units/mL subcutaneous injection 2-12 Units  2-12 Units Subcutaneous HS    insulin lispro (HumALOG/ADMELOG) 100 units/mL subcutaneous injection 2-12 Units  2-12 Units Subcutaneous TID AC    insulin lispro (HumALOG/ADMELOG) 100 units/mL subcutaneous injection 5 Units  5 Units Subcutaneous TID With Meals    ipratropium-albuterol (DUO-NEB) 0.5-2.5 mg/3 mL inhalation solution 3 mL  3 mL Nebulization Q6H    levalbuterol (XOPENEX) inhalation solution 1.25 mg  1.25 mg Nebulization TID PRN    lisinopril (ZESTRIL) tablet 40 mg  40 mg Oral Daily    LORazepam (ATIVAN) tablet 0.5 mg  0.5 mg Oral Q6H PRN    magnesium hydroxide (MILK OF MAGNESIA) oral suspension 30 mL  30 mL Oral Daily PRN    methylPREDNISolone sodium succinate (Solu-MEDROL) injection 40 mg  40 mg Intravenous Q12H NATHALY    metoprolol tartrate (LOPRESSOR) tablet 100 mg  100 mg Oral Q12H NATHALY    pantoprazole (PROTONIX) EC tablet 40 mg  40 mg Oral BID AC    sodium chloride tablet 3 g  3 g Oral TID With Meals    torsemide (DEMADEX) tablet 10 mg  10 mg Oral Daily       Allergies   Allergen Reactions    Clindamycin        Objective   Vitals: Blood  "pressure 126/63, pulse 67, temperature 98.2 °F (36.8 °C), temperature source Temporal, resp. rate (!) 32, height 5' 2\" (1.575 m), weight 71.5 kg (157 lb 10.1 oz), SpO2 100%.3L NC ,Body mass index is 28.83 kg/m².    Intake/Output Summary (Last 24 hours) at 3/19/2024 1349  Last data filed at 3/19/2024 1139  Gross per 24 hour   Intake 1530 ml   Output 1650 ml   Net -120 ml     Invasive Devices       Peripheral Intravenous Line  Duration             Peripheral IV 03/17/24 Left Antecubital 2 days    Long-Dwell Peripheral IV (Midline) 03/17/24 Right Cephalic Vein 1 day                    Physical Exam  Vitals and nursing note reviewed.   Constitutional:       General: She is not in acute distress.     Appearance: Normal appearance. She is obese.   HENT:      Head: Normocephalic and atraumatic.      Mouth/Throat:      Mouth: Mucous membranes are moist.      Pharynx: Oropharynx is clear.   Cardiovascular:      Rate and Rhythm: Normal rate and regular rhythm.      Heart sounds: No murmur heard.  Pulmonary:      Effort: Pulmonary effort is normal. No respiratory distress.      Breath sounds: No wheezing or rhonchi.      Comments: Decreased breath sounds bilaterally  Musculoskeletal:      Cervical back: Normal range of motion.   Skin:     General: Skin is warm and dry.   Neurological:      General: No focal deficit present.      Mental Status: She is alert. Mental status is at baseline.   Psychiatric:         Mood and Affect: Mood normal.         Behavior: Behavior normal.         Lab Results: I have personally reviewed pertinent lab results., ABG: No results found for: \"PHART\", \"IQH1HJR\", \"PO2ART\", \"ZRS5WJY\", \"Z1BNKNJA\", \"BEART\", \"SOURCE\", BNP: No results found for: \"BNP\", CBC:   Lab Results   Component Value Date    WBC 6.82 03/19/2024    HGB 11.1 (L) 03/19/2024    HCT 32.2 (L) 03/19/2024    MCV 86 03/19/2024     03/19/2024    RBC 3.76 (L) 03/19/2024    MCH 29.5 03/19/2024    MCHC 34.5 03/19/2024    RDW 11.9 " "03/19/2024    MPV 9.9 03/19/2024    NRBC 0 03/19/2024   , CMP:   Lab Results   Component Value Date    SODIUM 125 (L) 03/19/2024    K 4.5 03/19/2024    CL 87 (L) 03/19/2024    CO2 36 (H) 03/19/2024    BUN 15 03/19/2024    CREATININE 0.55 (L) 03/19/2024    CALCIUM 9.4 03/19/2024    AST 7 (L) 03/19/2024    ALT 8 03/19/2024    ALKPHOS 65 03/19/2024    EGFR 97 03/19/2024   , PT/INR: No results found for: \"PT\", \"INR\", Troponin: No results found for: \"TROPONINI\"    Lactic Acid: 0.8    Procalcitonin: WNL x2    Flu/COVID/RSV PCR: negative     Culture Data: NGTD at 24 hours x2    D-Dimer: WNL    BNP: WNL    Imaging Studies: I have personally reviewed pertinent reports.   and I have personally reviewed pertinent films in PACS     CT chest abdomen pelvis with contrast 3/18/24  IMPRESSION:     Irregular shaped nodule along the pleural surface in the posterior right lung apex, 7 x 6 mm in size, unchanged in size and CT appearance at least as far back as July 2023; because this is somewhat linear on sagittal imaging and is unchanged for at least   8 months, the finding is most suspicious for focal subpleural scar. Continued close interval low radiation dose noncontrast chest CT follow-up recommended for 6 to 12 months from this exam.     No CT findings to suggest developing malignancy or to convincingly account for the patient's unexpected weight loss.     Sigmoid diverticulosis without acute diverticulitis.    EKG, Pathology, and Other Studies: I have personally reviewed pertinent reports.       Pulmonary Results (PFTs, PSG): I have personally reviewed pertinent reports.       Pulmonary function test with postbronchodilator and 6-minute walk 11/21/2023  FEV1/FVC ratio 44%  FEV1 24% predicted  FVC 43% predicted  No bronchodilator response  Unable to complete lung volumes.  DLCO corrected for patient's hemoglobin 43% predicted  Interpretation: Very severe obstructive airflow defect with reduced vital capacity.  No response to " "bronchodilators.  Unable to measure lung volumes.  Moderate diffusion impairment.  Mixed restrictive/obstructive flow-volume loop.     6-minute walk: Qualifies for 4 L with exertion    Code Status: Level 1 - Full Code    Portions of the record may have been created with voice recognition software.  Occasional wrong word or \"sound a like\" substitutions may have occurred due to the inherent limitations of voice recognition software.  Read the chart carefully and recognize, using context, where substitutions have occurred.    "

## 2024-03-19 NOTE — PLAN OF CARE
Problem: PAIN - ADULT  Goal: Verbalizes/displays adequate comfort level or baseline comfort level  Description: Interventions:  - Encourage patient to monitor pain and request assistance  - Assess pain using appropriate pain scale  - Administer analgesics based on type and severity of pain and evaluate response  - Implement non-pharmacological measures as appropriate and evaluate response  - Consider cultural and social influences on pain and pain management  - Notify physician/advanced practitioner if interventions unsuccessful or patient reports new pain  Outcome: Progressing     Problem: INFECTION - ADULT  Goal: Absence or prevention of progression during hospitalization  Description: INTERVENTIONS:  - Assess and monitor for signs and symptoms of infection  - Monitor lab/diagnostic results  - Monitor all insertion sites, i.e. indwelling lines, tubes, and drains  - Monitor endotracheal if appropriate and nasal secretions for changes in amount and color  - [place] appropriate cooling/warming therapies per order  - Administer medications as ordered  - Instruct and encourage patient and family to use good hand hygiene technique  - Identify and instruct in appropriate isolation precautions for identified infection/condition  Outcome: Progressing  Goal: Absence of fever/infection during neutropenic period  Description: INTERVENTIONS:  - Monitor WBC    Outcome: Progressing     Problem: SAFETY ADULT  Goal: Patient will remain free of falls  Description: INTERVENTIONS:  - Educate patient/family on patient safety including physical limitations  - Instruct patient to call for assistance with activity   - Consult OT/PT to assist with strengthening/mobility   - Keep Call bell within reach  - Keep bed low and locked with side rails adjusted as appropriate  - Keep care items and personal belongings within reach  - Initiate and maintain comfort rounds  - Make Fall Risk Sign visible to staff  - Offer Toileting every 2  bed  Hours, in advance of need  - Initiate/Maintain bed alarm  - Obtain necessary fall risk management equipment: walker, yellow socks  - Apply yellow socks and bracelet for high fall risk patients  - Consider moving patient to room near nurses station  Outcome: Progressing  Goal: Maintain or return to baseline ADL function  Description: INTERVENTIONS:  -  Assess patient's ability to carry out ADLs; assess patient's baseline for ADL function and identify physical deficits which impact ability to perform ADLs (bathing, care of mouth/teeth, toileting, grooming, dressing, etc.)  - Assess/evaluate cause of self-care deficits   - Assess range of motion  - Assess patient's mobility; develop plan if impaired  - Assess patient's need for assistive devices and provide as appropriate  - Encourage maximum independence but intervene and supervise when necessary  - Involve family in performance of ADLs  - Assess for home care needs following discharge   - Consider OT consult to assist with ADL evaluation and planning for discharge  - Provide patient education as appropriate  Outcome: Progressing  Goal: Maintains/Returns to pre admission functional level  Description: INTERVENTIONS:  - Perform AM-PAC 6 Click Basic Mobility/ Daily Activity assessment daily.  - Set and communicate daily mobility goal to care team and patient/family/caregiver.   - Collaborate with rehabilitation services on mobility goals if consulted  - Perform Range of Motion 2 times a day.  - Reposition patient every 2 hours.  - Dangle patient 2 times a day  - Stand patient 2 times a day  - Ambulate patient 22 times a day  - Out of bed to chair 2 times a day   - Out of bed for meals 2 times a day  - Out of bed for toileting  - Record patient progress and toleration of activity level   Outcome: Progressing     Problem: DISCHARGE PLANNING  Goal: Discharge to home or other facility with appropriate resources  Description: INTERVENTIONS:  - Identify barriers to discharge  w/patient and caregiver  - Arrange for needed discharge resources and transportation as appropriate  - Identify discharge learning needs (meds, wound care, etc.)  - Arrange for interpretive services to assist at discharge as needed  - Refer to Case Management Department for coordinating discharge planning if the patient needs post-hospital services based on physician/advanced practitioner order or complex needs related to functional status, cognitive ability, or social support system  Outcome: Progressing     Problem: Knowledge Deficit  Goal: Patient/family/caregiver demonstrates understanding of disease process, treatment plan, medications, and discharge instructions  Description: Complete learning assessment and assess knowledge base.  Interventions:  - Provide teaching at level of understanding  - Provide teaching via preferred learning methods  Outcome: Progressing     Problem: RESPIRATORY - ADULT  Goal: Achieves optimal ventilation and oxygenation  Description: INTERVENTIONS:  - Assess for changes in respiratory status  - Assess for changes in mentation and behavior  - Position to facilitate oxygenation and minimize respiratory effort  - Oxygen administered by appropriate delivery if ordered  - Initiate smoking cessation education as indicated  - Encourage broncho-pulmonary hygiene including cough, deep breathe, Incentive Spirometry  - Assess the need for suctioning and aspirate as needed  - Assess and instruct to report SOB or any respiratory difficulty  - Respiratory Therapy support as indicated  Outcome: Progressing

## 2024-03-19 NOTE — PROGRESS NOTES
[facility]  [facility]  Name: Jessika Lux I  MRN: [id number]  Unit/Bed#: -01 I Date of Admission: 3/17/2024   Date of Service: 3/19/2024 I Hospital Day: 2    Assessment/Plan   Chronic respiratory failure with hypoxia (HCC)  Assessment & Plan  in the setting of COPD, a/e/b continuous use of O2 at home at 3 L    * COPD with acute exacerbation (HCC)  Assessment & Plan  Patient chronically on 3 L of oxygen severe COPD recurrent admissions.  Short of breath she is still has decreased breath sounds expiratory wheezing.  On DuoNebs every 6 Pulmicort and doxycycline will switch to p.o. for another 3 days to completion of 5 no evidence of pneumonia on the CT  We will ask pulmonary to evaluate continue Solu-Medrol 40 mg IV every 8 hours anticipate starting to taper tomorrow will continue this dose as she is still currently wheezing    Type 2 diabetes mellitus without complication, without long-term current use of insulin ([facility])  Assessment & Plan  Lab Results   Component Value Date    HGBA1C 7.9 (H) 03/06/2024       Recent Labs     03/18/24  1107 03/18/24  1603 03/18/24  2243 03/19/24  0712   POCGLU 245* 201* 269* 208*         Blood Sugar Average: Last 72 hrs:  (P) 228.75  Uncontrolled  Start Lantus 15 units at night Humalog 5 units with meals continue sliding scale      Acute hyponatremia  Assessment & Plan  With a baseline 1 36-1 38.  Initially was on hypertonic saline currently improved started on salt tabs and increased to 3 g 3 times daily she was also started on torsemide 10 mg daily continue 1 liter fluid restriction  Nephrology following  Next BMP today at 2 PM               VTE Pharmacologic Prophylaxis: VTE Score: 4 Moderate Risk (Score 3-4) - Pharmacological DVT Prophylaxis Ordered: enoxaparin (Lovenox).    Mobility:   Basic Mobility Inpatient Raw Score: 23  JH-HLM Goal: 7: Walk 25 feet or more  JH-HLM Achieved: 4: Move to chair/commode  JH-HLM Goal achieved. Continue to  encourage appropriate mobility.    Patient Centered Rounds: I performed bedside rounds with nursing staff today.   Discussions with Specialists or Other Care Team Provider: Nephrology    Education and Discussions with Family / Patient: pt    Total Time Spent on Date of Encounter in care of patient: >35 mins. This time was spent on one or more of the following: performing physical exam; counseling and coordination of care; obtaining or reviewing history; documenting in the medical record; reviewing/ordering tests, medications or procedures; communicating with other healthcare professionals and discussing with patient's family/caregivers.    Current Length of Stay: 2 day(s)  Current Patient Status: Inpatient   Certification Statement: The patient will continue to require additional inpatient hospital stay due to 2/2 copd exacerbation and hyponatremia  Discharge Plan: Anticipate discharge in 48-72 hrs to home.    Code Status: Level 1 - Full Code    Subjective:   Patient seen and examined, still short of breath but not coughing up anything today.    Objective:     Vitals:   Temp (24hrs), Av.1 °F (36.7 °C), Min:97.2 °F (36.2 °C), Max:98.6 °F (37 °C)    Temp:  [97.2 °F (36.2 °C)-98.6 °F (37 °C)] 98.6 °F (37 °C)  HR:  [63-85] 85  Resp:  [16-37] 33  BP: (128-171)/(62-80) 128/72  SpO2:  [96 %-100 %] 99 %  Body mass index is 28.83 kg/m².     Input and Output Summary (last 24 hours):     Intake/Output Summary (Last 24 hours) at 3/19/2024 0941  Last data filed at 3/19/2024 0601  Gross per 24 hour   Intake 1830 ml   Output 1600 ml   Net 230 ml       Physical Exam:   Physical Exam  Vitals and nursing note reviewed.   Constitutional:       General: She is not in acute distress.     Appearance: She is well-developed.   HENT:      Head: Normocephalic and atraumatic.   Eyes:      Conjunctiva/sclera: Conjunctivae normal.   Cardiovascular:      Rate and Rhythm: Normal rate and regular rhythm.      Heart sounds: No murmur  heard.  Pulmonary:      Effort: Pulmonary effort is normal. No respiratory distress.      Breath sounds: Wheezing present. No rales.      Comments: Decreased  Abdominal:      Palpations: Abdomen is soft.      Tenderness: There is no abdominal tenderness.   Musculoskeletal:         General: No swelling.      Cervical back: Neck supple.   Skin:     General: Skin is warm and dry.      Capillary Refill: Capillary refill takes less than 2 seconds.   Neurological:      Mental Status: She is alert and oriented to person, place, and time.   Psychiatric:         Mood and Affect: Mood normal.          Additional Data:     Labs:  Results from last 7 days   Lab Units 03/19/24  0502   WBC Thousand/uL 6.82   HEMOGLOBIN g/dL 11.1*   HEMATOCRIT % 32.2*   PLATELETS Thousands/uL 201   NEUTROS PCT % 88*   LYMPHS PCT % 7*   MONOS PCT % 4   EOS PCT % 0     Results from last 7 days   Lab Units 03/19/24  0502   SODIUM mmol/L 125*   POTASSIUM mmol/L 4.5   CHLORIDE mmol/L 87*   CO2 mmol/L 36*   BUN mg/dL 15   CREATININE mg/dL 0.55*   ANION GAP mmol/L 2*   CALCIUM mg/dL 9.4   ALBUMIN g/dL 3.6   TOTAL BILIRUBIN mg/dL 0.35   ALK PHOS U/L 65   ALT U/L 8   AST U/L 7*   GLUCOSE RANDOM mg/dL 176*     Results from last 7 days   Lab Units 03/17/24  0748   INR  0.84     Results from last 7 days   Lab Units 03/19/24  0712 03/18/24  2243 03/18/24  1603 03/18/24  1107 03/18/24  0707 03/17/24  2126 03/17/24  1553 03/17/24  1145   POC GLUCOSE mg/dl 208* 269* 201* 245* 208* 150* 277* 272*         Results from last 7 days   Lab Units 03/18/24  0402 03/17/24  0748   LACTIC ACID mmol/L  --  0.8   PROCALCITONIN ng/ml <0.05 <0.05       Lines/Drains:  Invasive Devices       Peripheral Intravenous Line  Duration             Peripheral IV 03/17/24 Left Antecubital 2 days    Long-Dwell Peripheral IV (Midline) 03/17/24 Right Cephalic Vein 1 day                          Imaging: Reviewed radiology reports from this admission including: chest CT scan and  abdominal/pelvic CT    Recent Cultures (last 7 days):   Results from last 7 days   Lab Units 03/17/24  0754 03/17/24  0748   BLOOD CULTURE  No Growth at 24 hrs. No Growth at 24 hrs.       Last 24 Hours Medication List:   Current Facility-Administered Medications   Medication Dose Route Frequency Provider Last Rate    acetaminophen  650 mg Oral Q6H PRN Reny Lowry MD      amLODIPine  10 mg Oral HS Christ Sylvester MD      atorvastatin  40 mg Oral HS Reny Lowry MD      budesonide  0.5 mg Nebulization Q12H Reny Lowry MD      cloNIDine  0.2 mg Oral Q12H [facility] Christ Sylvester MD      doxycycline hyclate  100 mg Oral Q12H [facility] Nettie Moore MD      enoxaparin  40 mg Subcutaneous Daily Reny Lowry MD      fluticasone  1 spray Nasal Daily Reny Lowry MD      guaiFENesin  600 mg Oral BID Reny Lowry MD      insulin glargine  15 Units Subcutaneous HS Nettie Moore MD      insulin lispro  2-12 Units Subcutaneous HS Reny Lowry MD      insulin lispro  2-12 Units Subcutaneous TID AC Reny Lowry MD      insulin lispro  5 Units Subcutaneous TID With Meals Nettie Moore MD      ipratropium-albuterol  3 mL Nebulization Q6H Reny Lowry MD      levalbuterol  1.25 mg Nebulization TID PRN Reny Lowry MD      lisinopril  40 mg Oral Daily Christ Sylvester MD      LORazepam  0.5 mg Oral Q6H PRN Ailyn Aguilera MD      magnesium hydroxide  30 mL Oral Daily PRN Ailyn Aguilera MD      methylPREDNISolone sodium succinate  40 mg Intravenous Q8H Reny Lowry MD      metoprolol tartrate  100 mg Oral Q12H [facility] Reny Lowry MD      pantoprazole  40 mg Oral BID AC Reny Lowry MD      sodium chloride  3 g Oral TID With Meals Christ Sylvester MD      torsemide  10 mg Oral Daily Christ Sylvester MD          Today, Patient Was Seen By: Nettie Moore MD    **Please Note: This note may have been constructed using a voice recognition system.**

## 2024-03-20 ENCOUNTER — PATIENT OUTREACH (OUTPATIENT)
Dept: CASE MANAGEMENT | Facility: OTHER | Age: 69
End: 2024-03-20

## 2024-03-20 LAB
ANION GAP SERPL CALCULATED.3IONS-SCNC: 3 MMOL/L (ref 4–13)
BUN SERPL-MCNC: 18 MG/DL (ref 5–25)
CALCIUM SERPL-MCNC: 9.3 MG/DL (ref 8.4–10.2)
CHLORIDE SERPL-SCNC: 91 MMOL/L (ref 96–108)
CO2 SERPL-SCNC: 39 MMOL/L (ref 21–32)
CREAT SERPL-MCNC: 0.54 MG/DL (ref 0.6–1.3)
GFR SERPL CREATININE-BSD FRML MDRD: 97 ML/MIN/1.73SQ M
GLUCOSE SERPL-MCNC: 197 MG/DL (ref 65–140)
GLUCOSE SERPL-MCNC: 200 MG/DL (ref 65–140)
GLUCOSE SERPL-MCNC: 210 MG/DL (ref 65–140)
GLUCOSE SERPL-MCNC: 215 MG/DL (ref 65–140)
GLUCOSE SERPL-MCNC: 243 MG/DL (ref 65–140)
POTASSIUM SERPL-SCNC: 4.3 MMOL/L (ref 3.5–5.3)
SODIUM SERPL-SCNC: 133 MMOL/L (ref 135–147)

## 2024-03-20 PROCEDURE — 94640 AIRWAY INHALATION TREATMENT: CPT

## 2024-03-20 PROCEDURE — 97166 OT EVAL MOD COMPLEX 45 MIN: CPT

## 2024-03-20 PROCEDURE — 99232 SBSQ HOSP IP/OBS MODERATE 35: CPT | Performed by: INTERNAL MEDICINE

## 2024-03-20 PROCEDURE — 82948 REAGENT STRIP/BLOOD GLUCOSE: CPT

## 2024-03-20 PROCEDURE — 94760 N-INVAS EAR/PLS OXIMETRY 1: CPT

## 2024-03-20 PROCEDURE — 97116 GAIT TRAINING THERAPY: CPT

## 2024-03-20 PROCEDURE — 80048 BASIC METABOLIC PNL TOTAL CA: CPT | Performed by: FAMILY MEDICINE

## 2024-03-20 PROCEDURE — 99232 SBSQ HOSP IP/OBS MODERATE 35: CPT

## 2024-03-20 PROCEDURE — 97162 PT EVAL MOD COMPLEX 30 MIN: CPT

## 2024-03-20 RX ORDER — SODIUM CHLORIDE 1 G/1
2 TABLET ORAL ONCE
Status: COMPLETED | OUTPATIENT
Start: 2024-03-20 | End: 2024-03-20

## 2024-03-20 RX ORDER — SODIUM CHLORIDE 1 G/1
2 TABLET ORAL
Status: DISCONTINUED | OUTPATIENT
Start: 2024-03-20 | End: 2024-03-21

## 2024-03-20 RX ORDER — PREDNISONE 20 MG/1
40 TABLET ORAL DAILY
Status: DISCONTINUED | OUTPATIENT
Start: 2024-03-21 | End: 2024-03-21 | Stop reason: HOSPADM

## 2024-03-20 RX ADMIN — METOPROLOL TARTRATE 100 MG: 50 TABLET, FILM COATED ORAL at 20:33

## 2024-03-20 RX ADMIN — INSULIN LISPRO 5 UNITS: 100 INJECTION, SOLUTION INTRAVENOUS; SUBCUTANEOUS at 08:58

## 2024-03-20 RX ADMIN — CLONIDINE HYDROCHLORIDE 0.2 MG: 0.1 TABLET ORAL at 20:34

## 2024-03-20 RX ADMIN — INSULIN LISPRO 2 UNITS: 100 INJECTION, SOLUTION INTRAVENOUS; SUBCUTANEOUS at 21:03

## 2024-03-20 RX ADMIN — INSULIN GLARGINE 15 UNITS: 100 INJECTION, SOLUTION SUBCUTANEOUS at 21:03

## 2024-03-20 RX ADMIN — CLONIDINE HYDROCHLORIDE 0.2 MG: 0.1 TABLET ORAL at 09:23

## 2024-03-20 RX ADMIN — BUDESONIDE 0.5 MG: 0.5 INHALANT RESPIRATORY (INHALATION) at 07:51

## 2024-03-20 RX ADMIN — IPRATROPIUM BROMIDE AND ALBUTEROL SULFATE 3 ML: 2.5; .5 SOLUTION RESPIRATORY (INHALATION) at 19:25

## 2024-03-20 RX ADMIN — TORSEMIDE 10 MG: 10 TABLET ORAL at 09:24

## 2024-03-20 RX ADMIN — LORAZEPAM 0.5 MG: 0.5 TABLET ORAL at 20:33

## 2024-03-20 RX ADMIN — LISINOPRIL 40 MG: 20 TABLET ORAL at 09:23

## 2024-03-20 RX ADMIN — IPRATROPIUM BROMIDE AND ALBUTEROL SULFATE 3 ML: 2.5; .5 SOLUTION RESPIRATORY (INHALATION) at 02:14

## 2024-03-20 RX ADMIN — Medication 2 G: at 17:19

## 2024-03-20 RX ADMIN — PANTOPRAZOLE SODIUM 40 MG: 40 TABLET, DELAYED RELEASE ORAL at 17:19

## 2024-03-20 RX ADMIN — Medication 2 G: at 13:04

## 2024-03-20 RX ADMIN — LORAZEPAM 0.5 MG: 0.5 TABLET ORAL at 02:49

## 2024-03-20 RX ADMIN — DOXYCYCLINE 100 MG: 100 INJECTION, POWDER, LYOPHILIZED, FOR SOLUTION INTRAVENOUS at 10:11

## 2024-03-20 RX ADMIN — MAGNESIUM HYDROXIDE 30 ML: 400 SUSPENSION ORAL at 21:41

## 2024-03-20 RX ADMIN — METHYLPREDNISOLONE SODIUM SUCCINATE 40 MG: 40 INJECTION, POWDER, FOR SOLUTION INTRAMUSCULAR; INTRAVENOUS at 20:34

## 2024-03-20 RX ADMIN — INSULIN LISPRO 4 UNITS: 100 INJECTION, SOLUTION INTRAVENOUS; SUBCUTANEOUS at 08:58

## 2024-03-20 RX ADMIN — PANTOPRAZOLE SODIUM 40 MG: 40 TABLET, DELAYED RELEASE ORAL at 05:26

## 2024-03-20 RX ADMIN — BUDESONIDE 0.5 MG: 0.5 INHALANT RESPIRATORY (INHALATION) at 19:25

## 2024-03-20 RX ADMIN — METHYLPREDNISOLONE SODIUM SUCCINATE 40 MG: 40 INJECTION, POWDER, FOR SOLUTION INTRAMUSCULAR; INTRAVENOUS at 09:29

## 2024-03-20 RX ADMIN — INSULIN LISPRO 5 UNITS: 100 INJECTION, SOLUTION INTRAVENOUS; SUBCUTANEOUS at 12:07

## 2024-03-20 RX ADMIN — FLUTICASONE PROPIONATE 1 SPRAY: 50 SPRAY, METERED NASAL at 09:28

## 2024-03-20 RX ADMIN — INSULIN LISPRO 4 UNITS: 100 INJECTION, SOLUTION INTRAVENOUS; SUBCUTANEOUS at 17:00

## 2024-03-20 RX ADMIN — IPRATROPIUM BROMIDE AND ALBUTEROL SULFATE 3 ML: 2.5; .5 SOLUTION RESPIRATORY (INHALATION) at 13:20

## 2024-03-20 RX ADMIN — AMLODIPINE BESYLATE 10 MG: 10 TABLET ORAL at 21:03

## 2024-03-20 RX ADMIN — ACETAMINOPHEN 325MG 650 MG: 325 TABLET ORAL at 02:49

## 2024-03-20 RX ADMIN — INSULIN LISPRO 5 UNITS: 100 INJECTION, SOLUTION INTRAVENOUS; SUBCUTANEOUS at 17:00

## 2024-03-20 RX ADMIN — INSULIN LISPRO 4 UNITS: 100 INJECTION, SOLUTION INTRAVENOUS; SUBCUTANEOUS at 12:07

## 2024-03-20 RX ADMIN — ATORVASTATIN CALCIUM 40 MG: 40 TABLET, FILM COATED ORAL at 21:03

## 2024-03-20 RX ADMIN — IPRATROPIUM BROMIDE AND ALBUTEROL SULFATE 3 ML: 2.5; .5 SOLUTION RESPIRATORY (INHALATION) at 07:51

## 2024-03-20 RX ADMIN — ENOXAPARIN SODIUM 40 MG: 40 INJECTION SUBCUTANEOUS at 09:28

## 2024-03-20 RX ADMIN — LORAZEPAM 0.5 MG: 0.5 TABLET ORAL at 10:00

## 2024-03-20 RX ADMIN — Medication 2 G: at 10:00

## 2024-03-20 RX ADMIN — DOXYCYCLINE 100 MG: 100 INJECTION, POWDER, LYOPHILIZED, FOR SOLUTION INTRAVENOUS at 21:37

## 2024-03-20 RX ADMIN — METOPROLOL TARTRATE 100 MG: 50 TABLET, FILM COATED ORAL at 09:23

## 2024-03-20 RX ADMIN — MAGNESIUM HYDROXIDE 30 ML: 400 SUSPENSION ORAL at 10:00

## 2024-03-20 NOTE — CASE MANAGEMENT
Case Management Discharge Planning Note    Patient name Jessika Lux  Location /-01 MRN [id number]  : 1955 Date 3/20/2024       Current Admission Date: 3/17/2024  Current Admission Diagnosis:COPD with acute exacerbation (HCC)   Patient Active Problem List    Diagnosis Date Noted    Weakness 2024    Anxiety disorder due to general medical condition with panic attack 2023    Acute on chronic respiratory failure with hypoxia (HCC) 2023    COPD, severe (HCC) 2023    Lung nodule 2023    Abnormal CT scan 2023    Anxiety about health 2023    Bacteremia 2023    Vomiting 2023    COPD with acute exacerbation (HCC) 2020    Chronic respiratory failure with hypoxia (HCC) 2020    Acute hyponatremia 2020    Tobacco abuse 2020    Primary hypertension 2020    Type 2 diabetes mellitus without complication, without long-term current use of insulin (HCC) 2020      LOS (days): 3  Geometric Mean LOS (GMLOS) (days): 2.7  Days to GMLOS:-0.5     OBJECTIVE:  Risk of Unplanned Readmission Score: 63.06         Current admission status: Inpatient   Preferred Pharmacy:   [id number] - [place], [place] - [address]  [address] [address]  Phone: [phone] Fax: [phone]    [id number] - [place], [place] - [address] [facility]  [address] [facility] [address]  Phone: [phone] Fax: [phone]    Primary Care Provider: Karlie Bruno DO    Primary Insurance: [employer] [employer]  Secondary Insurance: [place] [employer] [place] [employer] [facility]    DISCHARGE DETAILS:  Anticipate dc home today vs tomorrow  Discussed HHC for COPD Mangement. Pt declined. Agreeable for CM to make a PCP appointment post dc.

## 2024-03-20 NOTE — PROGRESS NOTES
Tiger Text reply received from inpatient CM stating PCP appt has been made for Monday 3/25 at 0820 and this RN CM's contact info has been placed on her AVS.    Note routed to team member Geni CORONADO

## 2024-03-20 NOTE — CASE MANAGEMENT
Case Management Discharge Planning Note    Patient name Jessika Lux  Location /-01 MRN [id number]  : 1955 Date 3/20/2024       Current Admission Date: 3/17/2024  Current Admission Diagnosis:COPD with acute exacerbation (HCC)   Patient Active Problem List    Diagnosis Date Noted    Weakness 2024    Anxiety disorder due to general medical condition with panic attack 2023    Acute on chronic respiratory failure with hypoxia (HCC) 2023    COPD, severe (HCC) 2023    Lung nodule 2023    Abnormal CT scan 2023    Anxiety about health 2023    Bacteremia 2023    Vomiting 2023    COPD with acute exacerbation (HCC) 2020    Chronic respiratory failure with hypoxia (HCC) 2020    Acute hyponatremia 2020    Tobacco abuse 2020    Primary hypertension 2020    Type 2 diabetes mellitus without complication, without long-term current use of insulin (HCC) 2020      LOS (days): 3  Geometric Mean LOS (GMLOS) (days): 2.7  Days to GMLOS:-0.5     OBJECTIVE:  Risk of Unplanned Readmission Score: 63.06         Current admission status: Inpatient   Preferred Pharmacy:   [id number] - [place], [place] - [address]  [address] [facility] [address]  Phone: [phone] Fax: [phone]    [id number] - [place], [place] - [address] [facility]  [address] [facility] [address]  Phone: [phone] Fax: [phone]    Primary Care Provider: Karlie Bruno DO    Primary Insurance: [facility] [employer]  Secondary Insurance: [place] [employer] AND [employer] [facility]    DISCHARGE DETAILS:    Anticipate dc today vs tomorrow, pending Pulm recommendations.    Discharge planning discussed with:: patient  Freedom of Choice: Yes  Comments - Freedom of Choice: Discussed HHC for COPD Mangement. Pt declined. Agreeable for CM to make a PCP appointment post dc.  CM contacted  family/caregiver?:  (declined)          DME Referral Provided  Referral made for DME?: No    Other Referral/Resources/Interventions Provided:  Referral Comments: PCP appointment was made_ 3/25 at 0820, information was placed on [facility] AVS         Treatment Team Recommendation: Home with Home Health Care (Nursing for COPD management)  Discharge Destination Plan:: Home  Transport at Discharge : Family         Was contacted by Care Coordination for [facility]- Jose Guadalupe Ribera who manages high utilizers. CM placed contact number on the AVS for Jessika to call post dc for any needs.           IMM Given (Date):: 03/20/24  IMM Given to:: Patient

## 2024-03-20 NOTE — PLAN OF CARE
"Pt continues to be treated for COPD exacerbation.  Pt on IV steroids and IV doxycycline.  Pt has intermittent dyspnea and reports, \"I can't breathe\"  Pt without apparent increased work of breathing, tachypnea, or wheezing.  Pt given 0.5 mg po ativan for anxiety.  Pt also treated with tylenol for back discomfort.  Pt encouraged to reposition herself.  Pt is noncompliant with fluid restriction.  RN found 30 oz of diet pepsi on pt's bed brought in from family.  Pt educated about importance of fluid restriction.  Pt for BMP this am to reassess Na level.  Pt on torsemide, sodium tablets, in addition to fluid restriction to improve Na levels.  Nephrology and pulmonology continue to follow pt.  Will continue to monitor and support as needed.    Problem: PAIN - ADULT  Goal: Verbalizes/displays adequate comfort level or baseline comfort level  Description: Interventions:  - Encourage patient to monitor pain and request assistance  - Assess pain using appropriate pain scale  - Administer analgesics based on type and severity of pain and evaluate response  - Implement non-pharmacological measures as appropriate and evaluate response  - Consider cultural and social influences on pain and pain management  - Notify physician/advanced practitioner if interventions unsuccessful or patient reports new pain  Outcome: Progressing     Problem: INFECTION - ADULT  Goal: Absence or prevention of progression during hospitalization  Description: INTERVENTIONS:  - Assess and monitor for signs and symptoms of infection  - Monitor lab/diagnostic results  - Monitor all insertion sites, i.e. indwelling lines, tubes, and drains  - Monitor endotracheal if appropriate and nasal secretions for changes in amount and color  - [place] appropriate cooling/warming therapies per order  - Administer medications as ordered  - Instruct and encourage patient and family to use good hand hygiene technique  - Identify and instruct in appropriate isolation " precautions for identified infection/condition  Outcome: Progressing     Problem: SAFETY ADULT  Goal: Patient will remain free of falls  Description: INTERVENTIONS:  - Educate patient/family on patient safety including physical limitations  - Instruct patient to call for assistance with activity   - Consult OT/PT to assist with strengthening/mobility   - Keep Call bell within reach  - Keep bed low and locked with side rails adjusted as appropriate  - Keep care items and personal belongings within reach  - Initiate and maintain comfort rounds  - Make Fall Risk Sign visible to staff  - Apply yellow socks and bracelet for high fall risk patients  - Consider moving patient to room near nurses station  Outcome: Progressing  Goal: Maintain or return to baseline ADL function  Description: INTERVENTIONS:  -  Assess patient's ability to carry out ADLs; assess patient's baseline for ADL function and identify physical deficits which impact ability to perform ADLs (bathing, care of mouth/teeth, toileting, grooming, dressing, etc.)  - Assess/evaluate cause of self-care deficits   - Assess range of motion  - Assess patient's mobility; develop plan if impaired  - Assess patient's need for assistive devices and provide as appropriate  - Encourage maximum independence but intervene and supervise when necessary  - Involve family in performance of ADLs  - Assess for home care needs following discharge   - Consider OT consult to assist with ADL evaluation and planning for discharge  - Provide patient education as appropriate  Outcome: Progressing  Goal: Maintains/Returns to pre admission functional level  Description: INTERVENTIONS:  - Perform AM-PAC 6 Click Basic Mobility/ Daily Activity assessment daily.  - Set and communicate daily mobility goal to care team and patient/family/caregiver.   - Collaborate with rehabilitation services on mobility goals if consulted  - Perform Range of Motion 3 times a day.  - Reposition patient every 2  hours if unable to reposition self  - Out of bed for toileting  - Record patient progress and toleration of activity level   Outcome: Progressing     Problem: DISCHARGE PLANNING  Goal: Discharge to home or other facility with appropriate resources  Description: INTERVENTIONS:  - Identify barriers to discharge w/patient and caregiver  - Arrange for needed discharge resources and transportation as appropriate  - Identify discharge learning needs (meds, wound care, etc.)  - Arrange for interpretive services to assist at discharge as needed  - Refer to Case Management Department for coordinating discharge planning if the patient needs post-hospital services based on physician/advanced practitioner order or complex needs related to functional status, cognitive ability, or social support system  Outcome: Progressing     Problem: Knowledge Deficit  Goal: Patient/family/caregiver demonstrates understanding of disease process, treatment plan, medications, and discharge instructions  Description: Complete learning assessment and assess knowledge base.  Interventions:  - Provide teaching at level of understanding  - Provide teaching via preferred learning methods  Outcome: Progressing     Problem: RESPIRATORY - ADULT  Goal: Achieves optimal ventilation and oxygenation  Description: INTERVENTIONS:  - Assess for changes in respiratory status  - Assess for changes in mentation and behavior  - Position to facilitate oxygenation and minimize respiratory effort  - Oxygen administered by appropriate delivery if ordered  - Initiate smoking cessation education as indicated  - Encourage broncho-pulmonary hygiene including cough, deep breathe, Incentive Spirometry  - Assess the need for suctioning and aspirate as needed  - Assess and instruct to report SOB or any respiratory difficulty  - Respiratory Therapy support as indicated  Outcome: Progressing     Problem: METABOLIC, FLUID AND ELECTROLYTES - ADULT  Goal: Electrolytes maintained  within normal limits  Description: INTERVENTIONS:  - Monitor labs and assess patient for signs and symptoms of electrolyte imbalances  - Administer electrolyte replacement as ordered  - Monitor response to electrolyte replacements, including repeat lab results as appropriate  - Instruct patient on fluid and nutrition as appropriate  Outcome: Progressing  Goal: Fluid balance maintained  Description: INTERVENTIONS:  - Monitor labs   - Monitor I/O and WT  - Instruct patient on fluid and nutrition as appropriate  - Assess for signs & symptoms of volume excess or deficit  Outcome: Progressing  Goal: Glucose maintained within target range  Description: INTERVENTIONS:  - Monitor Blood Glucose as ordered  - Assess for signs and symptoms of hyperglycemia and hypoglycemia  - Administer ordered medications to maintain glucose within target range  - Assess nutritional intake and initiate nutrition service referral as needed  Outcome: Progressing

## 2024-03-20 NOTE — PROGRESS NOTES
Progress Note - Nephrology   Jessika Lux 69 y.o. female MRN: [id number]  Unit/Bed#: -01 Encounter: [id number]    ASSESSMENT AND PLAN:  69 y.o. year old female with a PMH of COPD/diabetes mellitus/hypertension/anxiety/GERD/cigarette smoking apparent history of hyponatremia: Who presents with shortness of breath for 1 day not associated with a cough leg edema or chest pain  We are asked to see the patient for hyponatremia acute on chronic     1.  Hyponatremia: Acute on top of chronic: Etiology suggestive of decreased solute intake, possible SIADH from pulmonary status or other.     Workup:  - Normal free T4 TSH slightly low  - Serum osmolality last admission 262 compatible true hypotonic hyponatremia  - Uric acid 3.4  - Cortisol 2.9: But patient has been receiving methylprednisolone so this is an accurate will need to be repeated at some point as an outpatient  - Urine sodium 101 compatible with SIADH; urine osmolality 593  -CT of the chest abdomen pelvis from 3/18/2024: Irregular shaped nodule in the pleural surface and posterior right lung unchanged in size and seen as far back as July 2023 most suspicious for focal subpleural scar per radiology continue close observation in 6 to 12 months.  Otherwise no other significant abnormality in the abdomen and pelvis!     Current sodium: 133 corrects essentially close to normal at about 134-135 with glucose     Treatment:  -Sodium chloride I would reduce down to 2 g 3 times a day  - Fluid restriction increased to 1500 MLS per day  - Nutritional supplements to increase solute  - Torsemide 10 mg daily           2.  Hypertension: Maintained on amlodipine 10 mg/clonidine 0.2 mg/lisinopril 40 mg  -Added torsemide 10 mg daily.  Overall improved and stable.  - Potential addition for spironolactone in the future              Subjective:   Overall feeling better  Minimal shortness of breath, no significant cough  No chest pain  No nausea vomiting or diarrhea  Urinating  "well    Objective:     Vitals: Blood pressure 147/77, pulse 72, temperature 97.5 °F (36.4 °C), temperature source Temporal, resp. rate 18, height 5' 2\" (1.575 m), weight 70.9 kg (156 lb 4.9 oz), SpO2 97%.,Body mass index is 28.59 kg/m².    Weight (last 2 days)       Date/Time Weight    03/20/24 0300 70.9 (156.31)     Weight: Simultaneous filing. User may not have seen previous data. at 03/20/24 0300    03/19/24 0500 71.5 (157.63)    03/18/24 0359 71 (156.53)              Intake/Output Summary (Last 24 hours) at 3/20/2024 0858  Last data filed at 3/20/2024 0701  Gross per 24 hour   Intake 1065 ml   Output 2350 ml   Net -1285 ml            Physical Exam: General:  No acute distress  Skin:  No acute rash  Eyes:  No scleral icterus and noninjected  ENT:  Moist mucous membranes  Neck:  Supple, no jugular venous distention, trachea midline, overall appearance is normal  Chest: Mild inspiratory and expiratory wheezes, some mild end expiratory rhonchi  CVS:  Regular rate and rhythm, without a rub or gallops  Abdomen:  Normal bowel sounds, soft and nontender and nondistended  Extremities:  No edema, and no cyanosis, no significant arthritic changes  Neuro:  No gross focality  Psych:  Alert and oriented and appropriate                Medications:    Scheduled Meds:  Current Facility-Administered Medications   Medication Dose Route Frequency Provider Last Rate    acetaminophen  650 mg Oral Q6H PRN Reny Lowry MD      amLODIPine  10 mg Oral HS Christ Sylvester MD      atorvastatin  40 mg Oral HS Reny Lowry MD      budesonide  0.5 mg Nebulization Q12H Reny Lowry MD      cloNIDine  0.2 mg Oral Q12H [facility] Christ Sylvester MD      doxycycline  100 mg Intravenous Q12H Nettie Moore MD Stopped (03/19/24 5181)    enoxaparin  40 mg Subcutaneous Daily Reny Lowry MD      fluticasone  1 spray Nasal Daily Reny Lowry MD      guaiFENesin  600 mg Oral BID Reny Lowry MD      insulin glargine  15 Units Subcutaneous HS " Nettie Moore MD      insulin lispro  2-12 Units Subcutaneous HS Reny Lowry MD      insulin lispro  2-12 Units Subcutaneous TID AC Nettie Moore MD      insulin lispro  5 Units Subcutaneous TID With Meals Nettie Moore MD      ipratropium-albuterol  3 mL Nebulization Q6H Reny Lowry MD      levalbuterol  1.25 mg Nebulization TID PRN Reny Lowry MD      lisinopril  40 mg Oral Daily Christ Sylvester MD      LORazepam  0.5 mg Oral Q6H PRN Ailyn Aguilera MD      magnesium hydroxide  30 mL Oral Daily PRN Ailyn Aguilera MD      methylPREDNISolone sodium succinate  40 mg Intravenous Q12H [facility]      metoprolol tartrate  100 mg Oral Q12H [facility] Reny Lowry MD      pantoprazole  40 mg Oral BID AC Reny Lowry MD      sodium chloride  3 g Oral TID With Meals Christ Sylvester MD      torsemide  10 mg Oral Daily Christ Sylvester MD         PRN Meds:.  acetaminophen    levalbuterol **AND** [DISCONTINUED] sodium chloride    LORazepam    magnesium hydroxide    Continuous Infusions:     Lab, Imaging and other studies: I have personally reviewed pertinent labs.  Laboratory Results:  Results from last 7 days   Lab Units 03/20/24  0530 03/19/24  1354 03/19/24  0502 03/18/24  1954 03/18/24  1408 03/18/24  0821 03/18/24  0402 03/17/24  1151 03/17/24  0748 03/13/24  1436   WBC Thousand/uL  --   --  6.82  --   --   --   --   --  12.98* 8.50   HEMOGLOBIN g/dL  --   --  11.1*  --   --   --   --   --  13.9 12.8   HEMATOCRIT %  --   --  32.2*  --   --   --   --   --  40.5 38.1   PLATELETS Thousands/uL  --   --  201  --   --   --   --   --  285 239   POTASSIUM mmol/L 4.3 4.1 4.5 4.6 4.5 4.6 4.9   < > 4.1 4.5   CHLORIDE mmol/L 91* 90* 87* 85* 84* 86* 86*   < > 75* 89*   CO2 mmol/L 39* 38* 36* 37* 36* 36* 36*   < > 39* 30   BUN mg/dL 18 19 15 19 15 11 11   < > 11 9   CREATININE mg/dL 0.54* 0.70 0.55* 0.62 0.64 0.58* 0.57*   < > 0.58* 0.73   CALCIUM mg/dL 9.3 8.9 9.4 9.2 9.3 9.3 9.0   < > 10.2 9.6  "  MAGNESIUM mg/dL  --   --  1.9  --   --   --   --   --  1.8*  --     < > = values in this interval not displayed.     Urinalysis:   Lab Results   Component Value Date    COLORU Yellow 07/16/2023    CLARITYU Clear 07/16/2023    SPECGRAV <=1.005 07/16/2023    PHUR 6.5 07/16/2023    LEUKOCYTESUR Negative 07/16/2023    NITRITE Negative 07/16/2023    GLUCOSEU Negative 07/16/2023    KETONESU Negative 07/16/2023    BILIRUBINUR Negative 07/16/2023    BLOODU Negative 07/16/2023     ABGs: No results found for: \"PH\"  Radiology review:     Portions of the record may have been created with voice recognition software.  Occasional wrong word or \"sound a like\" substitutions may have occurred due to the inherent limitations of voice recognition software.  Read the chart carefully and recognize, using context, where substitutions have occurred.                    "

## 2024-03-20 NOTE — PHYSICAL THERAPY NOTE
PHYSICAL THERAPY EVALUATION      NAME:  Jessika Lux  DATE: 03/20/24    AGE:   69 y.o.  Mrn:   [id number]  ADMIT DX:  Hyponatremia [E87.1]  SOB (shortness of breath) [R06.02]  COPD exacerbation (HCC) [J44.1]    Past Medical History:   Diagnosis Date    Chronic respiratory failure with hypoxia, on home O2 therapy      3L NC at home    COPD exacerbation (HCC)     COPD, group D, by GOLD 2017 classification (HCC)     Diabetes mellitus (HCC)     Diverticulosis of sigmoid colon     Dyslipidemia     Essential (primary) hypertension     NELSON (generalized anxiety disorder)     GERD (gastroesophageal reflux disease)     Lung nodule     RUL    SIADH (syndrome of inappropriate ADH production) (HCC)     Tobacco dependence     Vitamin D deficiency      Length Of Stay: 3  Performed at least 2 patient identifiers during session: Name and Birthday      PHYSICAL THERAPY EVALUATION:       03/20/24 0844   PT Last Visit   PT Visit Date 03/20/24   Note Type   Note type Evaluation   Pain Assessment   Pain Assessment Tool 0-10   Pain Score No Pain   Restrictions/Precautions   Weight Bearing Precautions Per Order No   Other Precautions Fall Risk;O2;Chair Alarm;Bed Alarm  (2L O2 97%)   Home Living   Type of Home House   Home Layout Two level;Performs ADLs on one level;Able to live on main level with bedroom/bathroom  (2 PRESTON B rails; 1st flr set up)   Bathroom Shower/Tub Walk-in shower   Bathroom Toilet Standard   Bathroom Equipment Grab bars in shower;Built-in shower seat;Grab bars around toilet   Bathroom Accessibility Accessible   Additional Comments Sleeps on couch   Prior Function   Level of [place] Independent with ADLs;Independent with functional mobility;Needs assistance with IADLS   Lives With Other (Comment)  (Granddtr & dtr stay with pt)   Receives Help From Family   IADLs Family/Friend/Other provides transportation;Independent with medication management;Independent with meal prep  (dtr drives)   Falls in the last 6 months  0   Comments IND no AD   General   Family/Caregiver Present No   Cognition   Overall Cognitive Status WFL   Arousal/Participation Cooperative   Orientation Level Oriented X4   Memory Within functional limits   Following Commands Follows one step commands without difficulty   RLE Assessment   RLE Assessment WFL   LLE Assessment   LLE Assessment WFL   Light Touch   RLE Light Touch Grossly intact   LLE Light Touch Grossly intact   Bed Mobility   Supine to Sit 7  Independent   Transfers   Sit to Stand 5  Supervision   Stand to Sit 5  Supervision   Stand pivot 5  Supervision   Additional items Verbal cues   Additional Comments No AD   Ambulation/Elevation   Gait Assistance 5  Supervision   Assistive Device None   Distance 19 ft   Stair Management Assistance 5  Supervision   Additional items Verbal cues   Stair Management Technique Step to pattern;One rail R   Number of Stairs 5   Balance   Static Sitting Normal   Dynamic Sitting Good   Static Standing Fair +   Dynamic Standing Fair   Ambulatory Fair   Endurance Deficit   Endurance Deficit Yes   Endurance Deficit Description 2L O2 with mild SOB; SpO2 remains >90% throughout   Activity Tolerance   Activity Tolerance Patient limited by fatigue   Medical Staff Made Aware OT Margy   Nurse Made Aware Yes   Assessment   Prognosis Good   Problem List Decreased strength;Decreased endurance;Impaired balance;Decreased mobility   Barriers to Discharge None   Goals   STG Expiration Date 04/03/24   PT Treatment Day 1   Plan   Treatment/Interventions Functional transfer training;LE strengthening/ROM;Elevations;Therapeutic exercise;Endurance training;Patient/family training;Gait training;Compensatory technique education;Spoke to nursing;OT   PT Frequency 1-2x/wk   Discharge Recommendation   Rehab Resource Intensity Level, PT No post-acute rehabilitation needs   AM-PAC Basic Mobility Inpatient   Turning in Flat Bed Without Bedrails 4   Lying on Back to Sitting on Edge of Flat Bed  Without Bedrails 4   Moving Bed to Chair 3   Standing Up From Chair Using Arms 3   Walk in Room 3   Climb 3-5 Stairs With Railing 3   Basic Mobility Inpatient Raw Score 20   Basic Mobility Standardized Score 43.99   [facility] Highest Level Of Mobility   -HLM Goal 6: Walk 10 steps or more   JH-HLM Achieved 7: Walk 25 feet or more   Additional Treatment Session   Start Time 0859   End Time 0907   Treatment Assessment Pt tolerates tx session fairly well.  Interventions include transfer and gait training without AD.  Pt reports she is just about at her baseline status for mobility.  Pt ambulates 110 ft without AD SPV - demonstrating ability to push O2 tank in hallway.   End of Consult   Patient Position at End of Consult Bedside chair;Bed/Chair alarm activated;All needs within reach   End of Consult Comments 2L O2     (Please find full objective findings from PT assessment regarding body systems outlined above).     Assessment: Pt is a 69 y.o. female seen for PT evaluation s/p admission to [facility] on 3/17/2024 with COPD with acute exacerbation (HCC).  Order placed for PT services.  Upon evaluation: Pt is presenting with impaired functional mobility due to decreased strength, decreased endurance, impaired balance, and fall risk requiring  SPV assistance for transfers and ambulation with no AD . Pt's clinical presentation is currently evolving given the functional mobility deficits above, especially decreased activity tolerance and SOB upon exertion, coupled with fall risks as indicated by AM-PAC 6-Clicks: 20/24 as well as obesity and combined with medical complications of abnormal renal lab values, abnormal CBC, abnormal blood sugars, abnormal sodium values, abnormal CO2 values, and need for input for mobility technique/safety.  Pt's PMHx and comorbidities that may affect physical performance and progress include: COPD, DM, and obesity. Personal factors affecting pt at time of IE include:  step(s) to enter environment and inability to perform IADLs. Pt will benefit from continued skilled PT services to address deficits as defined above and to maximize level of functional mobility to facilitate return toward PLOF and improved QOL. From PT/mobility standpoint, recommendation at time of d/c would be No Post-Acute Rehabilitation Needs pending progress. Recommend ther ex next 1-2 sessions and stair navigation .     The patient's AM-PAC Basic Mobility Inpatient Short Form Raw Score is 20. A Raw score of greater than 16 suggests the patient may benefit from discharge to home. Please also refer to the recommendation of the Physical Therapist for safe discharge planning.       Goals: Pt will: Pt will perform transfers with modified I to increase Indep in home environment and prepare for ambulation. Pt will ambulate with no AD for >/= 250 ft with  modified I  to improve activity tolerance and to access home environment. Pt will complete >/= 2 steps with with bilateral handrails with modified I to return to home with PRESTON. Pt will participate in objective balance assessment to determine baseline fall risk.        Rey Herrera, PT,DPT

## 2024-03-20 NOTE — PROGRESS NOTES
In basket ADT notification received for patient admission to [facility] on 3/17 for management of hyponatremia and COPD exacerbation.     TigerText sent to [facility]-Care Management-IP Day role with request to include this writer contact information on patient's AVS.     This RN CM will continue to monitor via chart review throughout hospitalization and plan for outreach upon discharge pending disposition.

## 2024-03-20 NOTE — ASSESSMENT & PLAN NOTE
With a baseline 1 36-1 38.  Initially was on hypertonic saline  Nephrology following and initially started salt tabs, de-escalated salt tabs from 3 mg to 2 mg 3 times daily  Recommending continued fluid restriction of 1500 mL  May continue torsemide

## 2024-03-20 NOTE — PROGRESS NOTES
[facility]  Progress Note  Name: Jessika Lux I  MRN: [id number]  Unit/Bed#: -01 I Date of Admission: 3/17/2024   Date of Service: 3/20/2024 I Hospital Day: 3    Assessment/Plan   * COPD with acute exacerbation (HCC)  Assessment & Plan  Patient chronically on 3 L of oxygen severe COPD recurrent admissions.  Short of breath she is still has decreased breath sounds expiratory wheezing.  On DuoNebs every 6 Pulmicort and doxycycline will switch to p.o. for another 3 days to completion of 5 no evidence of pneumonia on the CT  Patient still reports having occasional shortness of breath although improved compared to yesterday, will continue de-escalate and will start p.o. prednisone tomorrow  Irregular shaped nodule along the pleural surface in the posterior right lung apex, 7 x 6 mm in size, unchanged in size and CT appearance at least as far back as July 2023; because this is somewhat linear on sagittal imaging and is unchanged for at least   8 months, the finding is most suspicious for focal subpleural scar. Continued close interval low radiation dose noncontrast chest CT follow-up recommended for 6 to 12 months from this exam.     No CT findings to suggest developing malignancy or to convincingly account for the patient's unexpected weight loss.    Type 2 diabetes mellitus without complication, without long-term current use of insulin (HCC)  Assessment & Plan  Lab Results   Component Value Date    HGBA1C 7.9 (H) 03/06/2024       Recent Labs     03/19/24  1652 03/19/24  2051 03/20/24  0648 03/20/24  1133   POCGLU 314* 154* 215* 210*         Blood Sugar Average: Last 72 hrs:  (P) [id number]  Uncontrolled  Start Lantus 15 units at night Humalog 5 units with meals continue sliding scale      Acute hyponatremia  Assessment & Plan  With a baseline 1 36-1 38.  Initially was on hypertonic saline  Nephrology following and initially started salt tabs, de-escalated salt tabs from 3  mg to 2 mg 3 times daily  Recommending continued fluid restriction of 1500 mL  May continue torsemide    Chronic respiratory failure with hypoxia (HCC)  Assessment & Plan  in the setting of COPD, a/e/b continuous use of O2 at home at 3 L           VTE Pharmacologic Prophylaxis: VTE Score: 4 Moderate Risk (Score 3-4) - Pharmacological DVT Prophylaxis Ordered: enoxaparin (Lovenox).    Mobility:   Basic Mobility Inpatient Raw Score: 23  JH-HLM Goal: 7: Walk 25 feet or more  JH-HLM Achieved: 7: Walk 25 feet or more  JH-HLM Goal achieved. Continue to encourage appropriate mobility.    Patient Centered Rounds: I performed bedside rounds with nursing staff today.   Discussions with Specialists or Other Care Team Provider: CM    Education and Discussions with Family / Patient: Patient declined call to .     Total Time Spent on Date of Encounter in care of patient: 35 mins. This time was spent on one or more of the following: performing physical exam; counseling and coordination of care; obtaining or reviewing history; documenting in the medical record; reviewing/ordering tests, medications or procedures; communicating with other healthcare professionals and discussing with patient's family/caregivers.    Current Length of Stay: 3 day(s)  Current Patient Status: Inpatient   Certification Statement: The patient will continue to require additional inpatient hospital stay due to continuing IV steroids in setting of COPD exacerbation  Discharge Plan: Anticipate discharge tomorrow to home.    Code Status: Level 1 - Full Code    Subjective:   Patient reports having some shortness of breath although feeling improved compared to days prior.  She continues to deny any chest pain/pressure, palpitations, lightheadedness, nausea, or chills.    Objective:     Vitals:   Temp (24hrs), Av.6 °F (36.4 °C), Min:97.5 °F (36.4 °C), Max:97.8 °F (36.6 °C)    Temp:  [97.5 °F (36.4 °C)-97.8 °F (36.6 °C)] 97.7 °F (36.5 °C)  HR:   [57-85] 71  Resp:  [18-28] 28  BP: (111-173)/(65-81) 173/79  SpO2:  [87 %-100 %] 94 %  Body mass index is 28.59 kg/m².     Input and Output Summary (last 24 hours):     Intake/Output Summary (Last 24 hours) at 3/20/2024 1454  Last data filed at 3/20/2024 1430  Gross per 24 hour   Intake 495 ml   Output 2350 ml   Net -1855 ml       Physical Exam:   Physical Exam  Vitals and nursing note reviewed.   Constitutional:       Appearance: She is obese.   HENT:      Head: Normocephalic.      Nose: Nose normal.      Mouth/Throat:      Mouth: Mucous membranes are moist.      Pharynx: Oropharynx is clear.   Eyes:      General: No scleral icterus.     Conjunctiva/sclera: Conjunctivae normal.      Pupils: Pupils are equal, round, and reactive to light.   Cardiovascular:      Rate and Rhythm: Normal rate and regular rhythm.      Heart sounds: No murmur heard.     No friction rub. No gallop.   Pulmonary:      Effort: Pulmonary effort is normal. No respiratory distress.      Breath sounds: No stridor. Wheezing (Mild expiratory) present. No rhonchi or rales.   Abdominal:      General: Abdomen is flat.      Palpations: Abdomen is soft.   Musculoskeletal:         General: Normal range of motion.      Cervical back: Normal range of motion and neck supple.      Right lower leg: No edema.      Left lower leg: No edema.   Lymphadenopathy:      Cervical: No cervical adenopathy.   Skin:     General: Skin is warm.      Coloration: Skin is not jaundiced or pale.      Findings: No bruising, erythema or lesion.   Neurological:      General: No focal deficit present.      Mental Status: She is alert and oriented to person, place, and time. Mental status is at baseline.      Cranial Nerves: No cranial nerve deficit.      Motor: No weakness.   Psychiatric:         Mood and Affect: Mood normal.         Behavior: Behavior normal.         Thought Content: Thought content normal.          Additional Data:     Labs:  Results from last 7 days   Lab Units  03/19/24  0502   WBC Thousand/uL 6.82   HEMOGLOBIN g/dL 11.1*   HEMATOCRIT % 32.2*   PLATELETS Thousands/uL 201   NEUTROS PCT % 88*   LYMPHS PCT % 7*   MONOS PCT % 4   EOS PCT % 0     Results from last 7 days   Lab Units 03/20/24  0530 03/19/24  1354 03/19/24  0502   SODIUM mmol/L 133*   < > 125*   POTASSIUM mmol/L 4.3   < > 4.5   CHLORIDE mmol/L 91*   < > 87*   CO2 mmol/L 39*   < > 36*   BUN mg/dL 18   < > 15   CREATININE mg/dL 0.54*   < > 0.55*   ANION GAP mmol/L 3*   < > 2*   CALCIUM mg/dL 9.3   < > 9.4   ALBUMIN g/dL  --   --  3.6   TOTAL BILIRUBIN mg/dL  --   --  0.35   ALK PHOS U/L  --   --  65   ALT U/L  --   --  8   AST U/L  --   --  7*   GLUCOSE RANDOM mg/dL 200*   < > 176*    < > = values in this interval not displayed.     Results from last 7 days   Lab Units 03/17/24  0748   INR  0.84     Results from last 7 days   Lab Units 03/20/24  1133 03/20/24  0648 03/19/24  2051 03/19/24  1652 03/19/24  1053 03/19/24  0712 03/18/24  2243 03/18/24  1603 03/18/24  1107 03/18/24  0707 03/17/24  2126 03/17/24  1553   POC GLUCOSE mg/dl 210* 215* 154* 314* 255* 208* 269* 201* 245* 208* 150* 277*         Results from last 7 days   Lab Units 03/18/24  0402 03/17/24  0748   LACTIC ACID mmol/L  --  0.8   PROCALCITONIN ng/ml <0.05 <0.05       Lines/Drains:  Invasive Devices       Peripheral Intravenous Line  Duration             Long-Dwell Peripheral IV (Midline) 03/17/24 Right Cephalic Vein 3 days    Peripheral IV 03/17/24 Left Antecubital 3 days                          Imaging: Reviewed radiology reports from this admission including: chest xray    Recent Cultures (last 7 days):   Results from last 7 days   Lab Units 03/17/24  0754 03/17/24  0748   BLOOD CULTURE  No Growth at 48 hrs. No Growth at 48 hrs.       Last 24 Hours Medication List:   Current Facility-Administered Medications   Medication Dose Route Frequency Provider Last Rate    acetaminophen  650 mg Oral Q6H PRN Reny Lowry MD      amLODIPine  10 mg Oral HS  Christ Sylvester MD      atorvastatin  40 mg Oral HS Reny Lowry MD      budesonide  0.5 mg Nebulization Q12H Reny Lowry MD      cloNIDine  0.2 mg Oral Q12H [facility] Christ Sylvester MD      doxycycline  100 mg Intravenous Q12H Nettie Moore MD Stopped (03/20/24 1130)    enoxaparin  40 mg Subcutaneous Daily Reny Lowry MD      fluticasone  1 spray Nasal Daily Reny Lowry MD      guaiFENesin  600 mg Oral BID Reny Lowry MD      insulin glargine  15 Units Subcutaneous HS Nettie Moore MD      insulin lispro  2-12 Units Subcutaneous HS Reny Lowry MD      insulin lispro  2-12 Units Subcutaneous TID AC Nettie Moore MD      insulin lispro  5 Units Subcutaneous TID With Meals Nettie Moore MD      ipratropium-albuterol  3 mL Nebulization Q6H Reny Lowry MD      levalbuterol  1.25 mg Nebulization TID PRN Reny Lowry MD      lisinopril  40 mg Oral Daily Christ Sylvester MD      LORazepam  0.5 mg Oral Q6H PRN Ailyn Aguilera MD      magnesium hydroxide  30 mL Oral Daily PRN Ailyn Aguilera MD      methylPREDNISolone sodium succinate  40 mg Intravenous Q12H [facility] Bari Flores PA-C      metoprolol tartrate  100 mg Oral Q12H [facility] Reny Lowry MD      pantoprazole  40 mg Oral BID  Reny Lowry MD      [START ON 3/21/2024] predniSONE  40 mg Oral Daily Bari Flores PA-C      sodium chloride  2 g Oral TID With Meals Christ Sylvester MD      torsemide  10 mg Oral Daily Christ Sylvester MD          Today, Patient Was Seen By: Bari Flores PA-C    **Please Note: This note may have been constructed using a voice recognition system.**

## 2024-03-20 NOTE — OCCUPATIONAL THERAPY NOTE
Occupational Therapy Evaluation     Patient Name: Jessika Lux  Today's Date: 3/20/2024  Problem List  Principal Problem:    COPD with acute exacerbation (HCC)  Active Problems:    Chronic respiratory failure with hypoxia (HCC)    Acute hyponatremia    Type 2 diabetes mellitus without complication, without long-term current use of insulin (HCC)    Past Medical History  Past Medical History:   Diagnosis Date    Chronic respiratory failure with hypoxia, on home O2 therapy      3L NC at home    COPD exacerbation (HCC)     COPD, group D, by GOLD 2017 classification (HCC)     Diabetes mellitus (HCC)     Diverticulosis of sigmoid colon     Dyslipidemia     Essential (primary) hypertension     NELSON (generalized anxiety disorder)     GERD (gastroesophageal reflux disease)     Lung nodule     RUL    SIADH (syndrome of inappropriate ADH production) (HCC)     Tobacco dependence     Vitamin D deficiency      Past Surgical History  History reviewed. No pertinent surgical history.        03/20/24 0845   OT Last Visit   OT Visit Date 03/20/24   Note Type   Note type Evaluation   Pain Assessment   Pain Assessment Tool 0-10   Pain Score No Pain   Restrictions/Precautions   Weight Bearing Precautions Per Order No   Other Precautions Fall Risk;O2;Chair Alarm;Bed Alarm  (2L O2 97%)   Home Living   Type of Home House   Home Layout Two level;Performs ADLs on one level;Able to live on main level with bedroom/bathroom  (2 PRESTON B rails)   Bathroom Shower/Tub Walk-in shower   Bathroom Toilet Standard   Bathroom Equipment Grab bars in shower;Built-in shower seat;Grab bars around toilet   Bathroom Accessibility Accessible   Additional Comments Sleeps on couch   Prior Function   Level of [place] Independent with ADLs;Independent with functional mobility;Needs assistance with IADLS   Lives With Other (Comment)  (Granddtr & dtr stay with pt)   Receives Help From Family   IADLs Family/Friend/Other provides transportation;Independent with  "medication management;Independent with meal prep  (daughter drives)   Falls in the last 6 months 0   Comments Reports she goes grocery shopping with daughter and pushes the shopping cart   Lifestyle   Autonomy IND for all ADLs, IADLs, and functional mobility no AD   General   Family/Caregiver Present No   Subjective   Subjective \"I feel like I'm pretty much back to normal.\"   ADL   Where Assessed Edge of bed   LB Dressing Assistance 5  Supervision/Setup   LB Dressing Deficit Don/doff R sock;Don/doff L sock   Toileting Assistance  5  Supervision/Setup   Toileting Deficit Clothing management up;Clothing management down;Perineal hygiene  (continent of urine)   Additional Comments 2L O2 maintained throughout with SpO2 >94%. Mild LIN after toileting tiago'ed requiring a brief seated rest break. Pt reports this is close to her baseline as she only completes short standing/walking activity within her household.   Bed Mobility   Supine to Sit 7  Independent   Transfers   Sit to Stand 5  Supervision   Additional items Assist x 1   Stand to Sit 5  Supervision   Additional items Assist x 1   Stand pivot 5  Supervision   Additional items Assist x 1   Toilet transfer 5  Supervision   Additional items Assist x 1;Standard toilet   Additional Comments Tiago's good safety with managing O2 line. No AD.   Functional Mobility   Functional Mobility 5  Supervision   Additional Comments short distance functional mobility within room without AD; pt independently managing O2 line without cues   Balance   Static Sitting Normal   Dynamic Sitting Good   Static Standing Fair +   Dynamic Standing Fair   Activity Tolerance   Activity Tolerance Patient limited by fatigue   Medical Staff Made Aware PTRey   Nurse Made Aware Yes   RUE Assessment   RUE Assessment WFL  (3+/5 equal throughout)   LUE Assessment   LUE Assessment WFL  (3+/5 equal throughout)   Psychosocial   Psychosocial (WDL) X   Patient Behaviors/Mood Anxious   Cognition   Overall " Cognitive Status WFL   Arousal/Participation Alert;Cooperative   Attention Within functional limits   Orientation Level Oriented X4   Memory Within functional limits   Following Commands Follows one step commands without difficulty   Assessment   Limitation Decreased ADL status;Decreased UE strength;Decreased endurance;Decreased self-care trans;Decreased high-level ADLs   Prognosis Good   Assessment Jessika is a 69 y.o. female admitted to [facility] 3/17/2024 with admitting diagnosis: COPD with acute exacerbation. Pt with PMHx impacting their performance during ADL tasks, including: chronic hyponatremia, DM II, HTN, HLD, anxiety. Prior to admission to the hospital pt was performing ADLs without physical assistance. IADLs without physical assistance. Functional transfers/ambulation without physical assistance. Cognitive status was PTA was WFL. OT order placed to assess pt's ADLs, cognitive status, and performance during functional tasks in order to maximize safety and independence while making most appropriate d/c recommendations. PT/OT co-evaluation completed at this time d/t mobility deficits and safety concerns. Pt's clinical presentation is currently evolving given new onset deficits that effect pt's occupational performance and ability to safely return to PLOF including decrease activity tolerance, decrease standing balance, decrease performance during ADL tasks, and decrease generalized strength combined with medical complications of hypertension , abnormal blood sugars, and need for input for mobility technique/safety. 2LO2 needs. Personal factors affecting pt at time of initial evaluation include: anxiety, inability to perform IADLs, and inability to navigate community distances. Pt will benefit from continued acute skilled OT services to address deficits as defined above and to maximize level independence/participation during ADLs and functional tasks to facilitate return toward PLOF and improved quality of life.      From an occupational therapy standpoint, recommendation at time of d/c would be no further OT needs. The patient's raw score on the AM-PAC Daily Activity Inpatient Short Form is 21 . A raw score of greater than or equal to 19 suggests the patient may benefit from discharge to home. Please refer to the recommendation of the Occupational Therapist for safe discharge planning.   Plan   Treatment Interventions ADL retraining;Functional transfer training;UE strengthening/ROM;Endurance training;Patient/family training;Equipment evaluation/education;Compensatory technique education;UE splinting;Continued evaluation;Energy conservation   Goal Expiration Date 04/03/24   OT Frequency 1-2x/wk   Discharge Recommendation   Rehab Resource Intensity Level, OT No post-acute rehabilitation needs   AM-PAC Daily Activity Inpatient   Lower Body Dressing 4   Bathing 4   Toileting 4   Upper Body Dressing 4   Grooming 4   Eating 4   Daily Activity Raw Score 24   Daily Activity Standardized Score (Calc for Raw Score >=11) 57.54   -PAC Applied Cognition Inpatient   Following a Speech/Presentation 4   Understanding Ordinary Conversation 4   Taking Medications 4   Remembering Where Things Are Placed or Put Away 4   Remembering List of 4-5 Errands 3   Taking Care of Complicated Tasks 3   Applied Cognition Raw Score 22   Applied Cognition Standardized Score 47.83     Assessment:  Jessika is a 69 y.o. female admitted to [facility] 3/17/2024 with admitting diagnosis: COPD with acute exacerbation. Pt with PMHx impacting their performance during ADL tasks, including: chronic hyponatremia, DM II, HTN, HLD, anxiety. Prior to admission to the hospital pt was performing ADLs without physical assistance. IADLs without physical assistance. Functional transfers/ambulation without physical assistance. Cognitive status was PTA was WFL. OT order placed to assess pt's ADLs, cognitive status, and performance during functional tasks in order to maximize safety and  independence while making most appropriate d/c recommendations. PT/OT co-evaluation completed at this time d/t mobility deficits and safety concerns. Pt's clinical presentation is currently evolving given new onset deficits that effect pt's occupational performance and ability to safely return to PLOF including decrease activity tolerance, decrease standing balance, decrease performance during ADL tasks, and decrease generalized strength combined with medical complications of hypertension , abnormal blood sugars, and need for input for mobility technique/safety. 2LO2 needs. Personal factors affecting pt at time of initial evaluation include: anxiety, inability to perform IADLs, and inability to navigate community distances. Pt will benefit from continued acute skilled OT services to address deficits as defined above and to maximize level independence/participation during ADLs and functional tasks to facilitate return toward PLOF and improved quality of life.     From an occupational therapy standpoint, recommendation at time of d/c would be no further OT needs. The patient's raw score on the -PAC Daily Activity Inpatient Short Form is 21 . A raw score of greater than or equal to 19 suggests the patient may benefit from discharge to home. Please refer to the recommendation of the Occupational Therapist for safe discharge planning.     Goals to be met by 4/3/2024:  Pt will demonstrate ability to complete grooming/hygiene tasks @ Mod I after set-up.  Pt will demonstrate ability to complete supine<>sit @ Mod I in order to increase safety and independence during ADL tasks.  Pt will demonstrate ability to complete UB ADLs including washing/dressing @ Mod I in order to increase performance and participation during meaningful tasks  Pt will demonstrate ability to complete LB dressing @ Mod I with LHAE PRN in order to increase safety and independence during meaningful tasks.   Pt will demonstrate ability to jerry/doff  socks/shoes while sitting @ Mod I in order to increase safety and independence during meaningful tasks.   Pt will demonstrate ability to complete toileting tasks including CM and pericare @ Mod I in order to increase safety and independence during meaningful tasks.  Pt will demonstrate ability to complete EOB, chair, toilet/commode transfers @ Mod I in order to increase performance and participation during functional tasks.  Pt will demonstrate ability to stand for 5-10 minutes while maintaining Fair+ balance with use of least restrictive AD for UB support PRN.  Pt will demonstrate ability to tolerate 30-35 minute OT session with no vc'ing for deep breathing or use of energy conservation techniques in order to increase activity tolerance during functional tasks.   Pt will demonstrate Good carryover of use of energy conservation/compensatory strategies during ADLs and functional tasks in order to increase safety and reduce risk for falls.   Pt will demonstrate Good attention and participation in continued evaluation of functional ambulation house hold distances in order to assist with safe d/c planning.  Pt will attend to continued cognitive assessments 100% of the time in order to provide most appropriate d/c recommendations.   Pt will follow 100% simple 2-step commands and be A&O x4 consistently with environmental cues to increase participation in functional activities.   Pt will identify 3 areas of interest/hobbies and 1 intervention on how to incorporate into daily life in order to increase interaction with environment and peers as well as increase participation in meaningful tasks.   Pt will demonstrate 100% carryover of BUE HEP in order to increase BUE MS and increase performance during functional tasks upon d/c home.      Margy Scott MS, OTR/L

## 2024-03-20 NOTE — NURSING NOTE
"Patient stating she cannot breathe. Oxygen saturation 99% on 4LNC. RN educated the patient on O2 level actually being higher than average COPD patient and this RN will titrate oxygen down to check O2 requirements. Patient's oxygen turned to 2LNC then to room air. Patient educated on deep breathing with demonstration to given to which she replied, \"I can't do that.\" RN explained that shallow, tachypnic breathing with make her feel more SOB and although she cannot take a deep breath like a person without COPD, she should try her best. RN also reviewed POC to get OOB which patient declined, stating, \"I don't want to sit up.\" RN asked what makes the patient not want to get up to which she replied, \"I just don't want to.\" This RN asked if the patient moves around at home to which she replied, \"Yes.\" RN educated the patient on the need to move in order to facilitate discharge, but the patient continued to decline. After 3 minutes, the patient rang the call bell, insisting she have her oxygen. O2 88% on room air. RN applied 2LNC and patient bhupinder to 97%.  "

## 2024-03-20 NOTE — PLAN OF CARE
Problem: PHYSICAL THERAPY ADULT  Goal: Performs mobility at highest level of function for planned discharge setting.  See evaluation for individualized goals.  Description: Treatment/Interventions: Functional transfer training, LE strengthening/ROM, Elevations, Therapeutic exercise, Endurance training, Patient/family training, Gait training, Compensatory technique education, Spoke to nursing, OT          See flowsheet documentation for full assessment, interventions and recommendations.  Note: Prognosis: Good  Problem List: Decreased strength, Decreased endurance, Impaired balance, Decreased mobility  Assessment: Pt is a 69 y.o. female seen for PT evaluation s/p admission to [facility] on 3/17/2024 with COPD with acute exacerbation (HCC).  Order placed for PT services.  Upon evaluation: Pt is presenting with impaired functional mobility due to decreased strength, decreased endurance, impaired balance, and fall risk requiring  SPV assistance for transfers and ambulation with no AD . Pt's clinical presentation is currently evolving given the functional mobility deficits above, especially decreased activity tolerance and SOB upon exertion, coupled with fall risks as indicated by AM-PAC 6-Clicks: 20/24 as well as obesity and combined with medical complications of abnormal renal lab values, abnormal CBC, abnormal blood sugars, abnormal sodium values, abnormal CO2 values, and need for input for mobility technique/safety.  Pt's PMHx and comorbidities that may affect physical performance and progress include: COPD, DM, and obesity. Personal factors affecting pt at time of IE include: step(s) to enter environment and inability to perform IADLs. Pt will benefit from continued skilled PT services to address deficits as defined above and to maximize level of functional mobility to facilitate return toward PLOF and improved QOL. From PT/mobility standpoint, recommendation at time of d/c would be No  Post-Acute Rehabilitation Needs pending progress. Recommend ther ex next 1-2 sessions and stair navigation .  Barriers to Discharge: None     Rehab Resource Intensity Level, PT: No post-acute rehabilitation needs    See flowsheet documentation for full assessment.

## 2024-03-20 NOTE — PLAN OF CARE
Problem: PAIN - ADULT  Goal: Verbalizes/displays adequate comfort level or baseline comfort level  Description: Interventions:  - Encourage patient to monitor pain and request assistance  - Assess pain using appropriate pain scale  - Administer analgesics based on type and severity of pain and evaluate response  - Implement non-pharmacological measures as appropriate and evaluate response  - Consider cultural and social influences on pain and pain management  - Notify physician/advanced practitioner if interventions unsuccessful or patient reports new pain  Outcome: Progressing     Problem: INFECTION - ADULT  Goal: Absence or prevention of progression during hospitalization  Description: INTERVENTIONS:  - Assess and monitor for signs and symptoms of infection  - Monitor lab/diagnostic results  - Monitor all insertion sites, i.e. indwelling lines, tubes, and drains  - Monitor endotracheal if appropriate and nasal secretions for changes in amount and color  - [place] appropriate cooling/warming therapies per order  - Administer medications as ordered  - Instruct and encourage patient and family to use good hand hygiene technique  - Identify and instruct in appropriate isolation precautions for identified infection/condition  Outcome: Progressing     Problem: SAFETY ADULT  Goal: Patient will remain free of falls  Description: INTERVENTIONS:  - Educate patient/family on patient safety including physical limitations  - Instruct patient to call for assistance with activity   - Consult OT/PT to assist with strengthening/mobility   - Keep Call bell within reach  - Keep bed low and locked with side rails adjusted as appropriate  - Keep care items and personal belongings within reach  - Initiate and maintain comfort rounds  - Make Fall Risk Sign visible to staff  - Apply yellow socks and bracelet for high fall risk patients  - Consider moving patient to room near nurses station  Outcome: Progressing  Goal: Maintain or  return to baseline ADL function  Description: INTERVENTIONS:  -  Assess patient's ability to carry out ADLs; assess patient's baseline for ADL function and identify physical deficits which impact ability to perform ADLs (bathing, care of mouth/teeth, toileting, grooming, dressing, etc.)  - Assess/evaluate cause of self-care deficits   - Assess range of motion  - Assess patient's mobility; develop plan if impaired  - Assess patient's need for assistive devices and provide as appropriate  - Encourage maximum independence but intervene and supervise when necessary  - Involve family in performance of ADLs  - Assess for home care needs following discharge   - Consider OT consult to assist with ADL evaluation and planning for discharge  - Provide patient education as appropriate  Outcome: Progressing  Goal: Maintains/Returns to pre admission functional level  Description: INTERVENTIONS:  - Perform AM-PAC 6 Click Basic Mobility/ Daily Activity assessment daily.  - Set and communicate daily mobility goal to care team and patient/family/caregiver.   - Collaborate with rehabilitation services on mobility goals if consulted  - Perform Range of Motion 3 times a day.  - Reposition patient every 2 hours if unable to reposition self  - Out of bed for toileting  - Record patient progress and toleration of activity level   Outcome: Progressing     Problem: DISCHARGE PLANNING  Goal: Discharge to home or other facility with appropriate resources  Description: INTERVENTIONS:  - Identify barriers to discharge w/patient and caregiver  - Arrange for needed discharge resources and transportation as appropriate  - Identify discharge learning needs (meds, wound care, etc.)  - Arrange for interpretive services to assist at discharge as needed  - Refer to Case Management Department for coordinating discharge planning if the patient needs post-hospital services based on physician/advanced practitioner order or complex needs related to  functional status, cognitive ability, or social support system  Outcome: Progressing     Problem: Knowledge Deficit  Goal: Patient/family/caregiver demonstrates understanding of disease process, treatment plan, medications, and discharge instructions  Description: Complete learning assessment and assess knowledge base.  Interventions:  - Provide teaching at level of understanding  - Provide teaching via preferred learning methods  Outcome: Progressing     Problem: RESPIRATORY - ADULT  Goal: Achieves optimal ventilation and oxygenation  Description: INTERVENTIONS:  - Assess for changes in respiratory status  - Assess for changes in mentation and behavior  - Position to facilitate oxygenation and minimize respiratory effort  - Oxygen administered by appropriate delivery if ordered  - Initiate smoking cessation education as indicated  - Encourage broncho-pulmonary hygiene including cough, deep breathe, Incentive Spirometry  - Assess the need for suctioning and aspirate as needed  - Assess and instruct to report SOB or any respiratory difficulty  - Respiratory Therapy support as indicated  Outcome: Progressing     Problem: METABOLIC, FLUID AND ELECTROLYTES - ADULT  Goal: Electrolytes maintained within normal limits  Description: INTERVENTIONS:  - Monitor labs and assess patient for signs and symptoms of electrolyte imbalances  - Administer electrolyte replacement as ordered  - Monitor response to electrolyte replacements, including repeat lab results as appropriate  - Instruct patient on fluid and nutrition as appropriate  Outcome: Progressing  Goal: Fluid balance maintained  Description: INTERVENTIONS:  - Monitor labs   - Monitor I/O and WT  - Instruct patient on fluid and nutrition as appropriate  - Assess for signs & symptoms of volume excess or deficit  Outcome: Progressing  Goal: Glucose maintained within target range  Description: INTERVENTIONS:  - Monitor Blood Glucose as ordered  - Assess for signs and symptoms  of hyperglycemia and hypoglycemia  - Administer ordered medications to maintain glucose within target range  - Assess nutritional intake and initiate nutrition service referral as needed  Outcome: Progressing

## 2024-03-20 NOTE — RESPIRATORY THERAPY NOTE
RT Protocol Note  Jessika Lux 69 y.o. female MRN: [id number]  Unit/Bed#: -01 Encounter: [id number]    Assessment    Principal Problem:    COPD with acute exacerbation (HCC)  Active Problems:    Chronic respiratory failure with hypoxia (HCC)    Acute hyponatremia    Type 2 diabetes mellitus without complication, without long-term current use of insulin (HCC)      Home Pulmonary Medications:  Duoneb TID, trelegy       Past Medical History:   Diagnosis Date    Chronic respiratory failure with hypoxia, on home O2 therapy      3L NC at home    COPD exacerbation (HCC)     COPD, group D, by GOLD 2017 classification (HCC)     Diabetes mellitus (HCC)     Diverticulosis of sigmoid colon     Dyslipidemia     Essential (primary) hypertension     NELSON (generalized anxiety disorder)     GERD (gastroesophageal reflux disease)     Lung nodule     RUL    SIADH (syndrome of inappropriate ADH production) ([facility])     Tobacco dependence     Vitamin D deficiency      Social History     Socioeconomic History    Marital status:      Spouse name: None    Number of children: None    Years of education: None    Highest education level: None   Occupational History    None   Tobacco Use    Smoking status: Every Day     Current packs/day: 0.25     Types: Cigarettes     Passive exposure: Never    Smokeless tobacco: Never   Vaping Use    Vaping status: Never Used   Substance and Sexual Activity    Alcohol use: Not Currently    Drug use: Never    Sexual activity: Not Currently   Other Topics Concern    None   Social History Narrative    None     Social Determinants of Health     Financial Resource Strain: Not on file   Food Insecurity: No Food Insecurity (3/18/2024)    Hunger Vital Sign     Worried About Running Out of Food in the Last Year: Never true     Ran Out of Food in the Last Year: Never true   Transportation Needs: No Transportation Needs (3/18/2024)    PRAPARE - Transportation     Lack of Transportation (Medical): No      "Lack of Transportation (Non-Medical): No   Physical Activity: Not on file   Stress: Not on file   Social Connections: Not on file   Intimate Partner Violence: Not on file   Housing Stability: Low Risk  (3/18/2024)    Housing Stability Vital Sign     Unable to Pay for Housing in the Last Year: No     Number of Places Lived in the Last Year: 1     Unstable Housing in the Last Year: No       Subjective         Objective    Physical Exam:   Assessment Type: During-treatment  General Appearance: Alert, Awake  Respiratory Pattern: Normal  Chest Assessment: Chest expansion symmetrical  Bilateral Breath Sounds: Diminished    Vitals:  Blood pressure 147/77, pulse 72, temperature 97.5 °F (36.4 °C), temperature source Temporal, resp. rate 18, height 5' 2\" (1.575 m), weight 70.9 kg (156 lb 4.9 oz), SpO2 97%.          Imaging and other studies: I have personally reviewed pertinent reports.      O2 Device: 3LPM NC     Plan    Respiratory Plan: Mild Distress pathway        Resp Comments: ptstable on 2L NC. B/s diminished.   "

## 2024-03-20 NOTE — PLAN OF CARE
Problem: PHYSICAL THERAPY ADULT  Goal: Performs mobility at highest level of function for planned discharge setting.  See evaluation for individualized goals.  Description: Treatment/Interventions: Functional transfer training, LE strengthening/ROM, Elevations, Therapeutic exercise, Endurance training, Patient/family training, Gait training, Compensatory technique education, Spoke to nursing, OT          See flowsheet documentation for full assessment, interventions and recommendations.  3/20/2024 1155 by Rey Herrera PT  Outcome: Progressing  Note: Prognosis: Good  Problem List: Decreased strength, Decreased endurance, Impaired balance, Decreased mobility  Pt tolerates tx session fairly well.  Interventions include transfer and gait training without AD.  Pt reports she is just about at her baseline status for mobility.  Pt ambulates 110 ft without AD SPV - demonstrating ability to push O2 tank in hallway.  Barriers to Discharge: None     Rehab Resource Intensity Level, PT: No post-acute rehabilitation needs    See flowsheet documentation for full assessment.

## 2024-03-20 NOTE — PLAN OF CARE
Problem: OCCUPATIONAL THERAPY ADULT  Goal: Performs self-care activities at highest level of function for planned discharge setting.  See evaluation for individualized goals.  Description: Treatment Interventions: ADL retraining, Functional transfer training, UE strengthening/ROM, Endurance training, Patient/family training, Equipment evaluation/education, Compensatory technique education, UE splinting, Continued evaluation, Energy conservation          See flowsheet documentation for full assessment, interventions and recommendations.   Outcome: Progressing  Note: Limitation: Decreased ADL status, Decreased UE strength, Decreased endurance, Decreased self-care trans, Decreased high-level ADLs  Prognosis: Good  Assessment: Jessika is a 69 y.o. female admitted to [facility] 3/17/2024 with admitting diagnosis: COPD with acute exacerbation. Pt with PMHx impacting their performance during ADL tasks, including: chronic hyponatremia, DM II, HTN, HLD, anxiety. Prior to admission to the hospital pt was performing ADLs without physical assistance. IADLs without physical assistance. Functional transfers/ambulation without physical assistance. Cognitive status was PTA was WFL. OT order placed to assess pt's ADLs, cognitive status, and performance during functional tasks in order to maximize safety and independence while making most appropriate d/c recommendations. PT/OT co-evaluation completed at this time d/t mobility deficits and safety concerns. Pt's clinical presentation is currently evolving given new onset deficits that effect pt's occupational performance and ability to safely return to PLOF including decrease activity tolerance, decrease standing balance, decrease performance during ADL tasks, and decrease generalized strength combined with medical complications of hypertension , abnormal blood sugars, and need for input for mobility technique/safety. 2LO2 needs. Personal factors affecting pt at time of initial evaluation  include: anxiety, inability to perform IADLs, and inability to navigate community distances. Pt will benefit from continued acute skilled OT services to address deficits as defined above and to maximize level independence/participation during ADLs and functional tasks to facilitate return toward PLOF and improved quality of life.     From an occupational therapy standpoint, recommendation at time of d/c would be no further OT needs. The patient's raw score on the -PAC Daily Activity Inpatient Short Form is 21 . A raw score of greater than or equal to 19 suggests the patient may benefit from discharge to home. Please refer to the recommendation of the Occupational Therapist for safe discharge planning.     Rehab Resource Intensity Level, OT: No post-acute rehabilitation needs

## 2024-03-20 NOTE — ASSESSMENT & PLAN NOTE
Lab Results   Component Value Date    HGBA1C 7.9 (H) 03/06/2024       Recent Labs     03/19/24  1652 03/19/24 2051 03/20/24  0648 03/20/24  1133   POCGLU 314* 154* 215* 210*         Blood Sugar Average: Last 72 hrs:  (P) [id number]  Uncontrolled  Start Lantus 15 units at night Humalog 5 units with meals continue sliding scale

## 2024-03-21 VITALS
RESPIRATION RATE: 20 BRPM | SYSTOLIC BLOOD PRESSURE: 165 MMHG | BODY MASS INDEX: 28.56 KG/M2 | TEMPERATURE: 97.8 F | HEIGHT: 62 IN | HEART RATE: 76 BPM | WEIGHT: 155.2 LBS | OXYGEN SATURATION: 92 % | DIASTOLIC BLOOD PRESSURE: 76 MMHG

## 2024-03-21 LAB
ANION GAP SERPL CALCULATED.3IONS-SCNC: 0 MMOL/L (ref 4–13)
BASE EX.OXY STD BLDV CALC-SCNC: 85.4 % (ref 60–80)
BASE EXCESS BLDV CALC-SCNC: 7.5 MMOL/L
BUN SERPL-MCNC: 23 MG/DL (ref 5–25)
CALCIUM SERPL-MCNC: 9.1 MG/DL (ref 8.4–10.2)
CHLORIDE SERPL-SCNC: 95 MMOL/L (ref 96–108)
CO2 SERPL-SCNC: 41 MMOL/L (ref 21–32)
CREAT SERPL-MCNC: 0.69 MG/DL (ref 0.6–1.3)
GFR SERPL CREATININE-BSD FRML MDRD: 90 ML/MIN/1.73SQ M
GLUCOSE SERPL-MCNC: 158 MG/DL (ref 65–140)
GLUCOSE SERPL-MCNC: 186 MG/DL (ref 65–140)
GLUCOSE SERPL-MCNC: 212 MG/DL (ref 65–140)
HCO3 BLDV-SCNC: 33.9 MMOL/L (ref 24–30)
MAGNESIUM SERPL-MCNC: 2.1 MG/DL (ref 1.9–2.7)
O2 CT BLDV-SCNC: 15.8 ML/DL
PCO2 BLDV: 55.3 MM HG (ref 42–50)
PH BLDV: 7.41 [PH] (ref 7.3–7.4)
PO2 BLDV: 51.3 MM HG (ref 35–45)
POTASSIUM SERPL-SCNC: 4.6 MMOL/L (ref 3.5–5.3)
SODIUM SERPL-SCNC: 136 MMOL/L (ref 135–147)

## 2024-03-21 PROCEDURE — 94760 N-INVAS EAR/PLS OXIMETRY 1: CPT

## 2024-03-21 PROCEDURE — 80048 BASIC METABOLIC PNL TOTAL CA: CPT | Performed by: INTERNAL MEDICINE

## 2024-03-21 PROCEDURE — 94640 AIRWAY INHALATION TREATMENT: CPT

## 2024-03-21 PROCEDURE — 99239 HOSP IP/OBS DSCHRG MGMT >30: CPT | Performed by: INTERNAL MEDICINE

## 2024-03-21 PROCEDURE — 82805 BLOOD GASES W/O2 SATURATION: CPT | Performed by: INTERNAL MEDICINE

## 2024-03-21 PROCEDURE — 83735 ASSAY OF MAGNESIUM: CPT | Performed by: INTERNAL MEDICINE

## 2024-03-21 PROCEDURE — 99232 SBSQ HOSP IP/OBS MODERATE 35: CPT | Performed by: INTERNAL MEDICINE

## 2024-03-21 PROCEDURE — 82948 REAGENT STRIP/BLOOD GLUCOSE: CPT

## 2024-03-21 RX ORDER — PREDNISONE 10 MG/1
TABLET ORAL DAILY
Qty: 20 TABLET | Refills: 0 | Status: SHIPPED | OUTPATIENT
Start: 2024-03-22 | End: 2024-03-30

## 2024-03-21 RX ORDER — BUDESONIDE 0.5 MG/2ML
0.5 INHALANT ORAL
Qty: 120 ML | Refills: 0 | Status: SHIPPED | OUTPATIENT
Start: 2024-03-21 | End: 2024-03-21

## 2024-03-21 RX ORDER — SODIUM CHLORIDE 1 G/1
2 TABLET ORAL 2 TIMES DAILY WITH MEALS
Status: DISCONTINUED | OUTPATIENT
Start: 2024-03-21 | End: 2024-03-21 | Stop reason: HOSPADM

## 2024-03-21 RX ORDER — SODIUM CHLORIDE 1 G/1
2 TABLET ORAL 2 TIMES DAILY WITH MEALS
Qty: 120 TABLET | Refills: 0 | Status: SHIPPED | OUTPATIENT
Start: 2024-03-21

## 2024-03-21 RX ORDER — TORSEMIDE 10 MG/1
10 TABLET ORAL DAILY
Qty: 30 TABLET | Refills: 0 | Status: SHIPPED | OUTPATIENT
Start: 2024-03-22

## 2024-03-21 RX ADMIN — BUDESONIDE 0.5 MG: 0.5 INHALANT RESPIRATORY (INHALATION) at 07:47

## 2024-03-21 RX ADMIN — LISINOPRIL 40 MG: 20 TABLET ORAL at 09:08

## 2024-03-21 RX ADMIN — INSULIN LISPRO 5 UNITS: 100 INJECTION, SOLUTION INTRAVENOUS; SUBCUTANEOUS at 07:41

## 2024-03-21 RX ADMIN — IPRATROPIUM BROMIDE AND ALBUTEROL SULFATE 3 ML: 2.5; .5 SOLUTION RESPIRATORY (INHALATION) at 07:47

## 2024-03-21 RX ADMIN — LORAZEPAM 0.5 MG: 0.5 TABLET ORAL at 11:11

## 2024-03-21 RX ADMIN — TORSEMIDE 10 MG: 10 TABLET ORAL at 09:09

## 2024-03-21 RX ADMIN — DOXYCYCLINE 100 MG: 100 INJECTION, POWDER, LYOPHILIZED, FOR SOLUTION INTRAVENOUS at 10:50

## 2024-03-21 RX ADMIN — ENOXAPARIN SODIUM 40 MG: 40 INJECTION SUBCUTANEOUS at 09:08

## 2024-03-21 RX ADMIN — PREDNISONE 40 MG: 20 TABLET ORAL at 09:06

## 2024-03-21 RX ADMIN — LEVALBUTEROL HYDROCHLORIDE 1.25 MG: 1.25 SOLUTION RESPIRATORY (INHALATION) at 11:45

## 2024-03-21 RX ADMIN — Medication 2 G: at 07:41

## 2024-03-21 RX ADMIN — CLONIDINE HYDROCHLORIDE 0.2 MG: 0.1 TABLET ORAL at 09:06

## 2024-03-21 RX ADMIN — PANTOPRAZOLE SODIUM 40 MG: 40 TABLET, DELAYED RELEASE ORAL at 06:05

## 2024-03-21 RX ADMIN — FLUTICASONE PROPIONATE 1 SPRAY: 50 SPRAY, METERED NASAL at 09:09

## 2024-03-21 RX ADMIN — INSULIN LISPRO 2 UNITS: 100 INJECTION, SOLUTION INTRAVENOUS; SUBCUTANEOUS at 07:41

## 2024-03-21 RX ADMIN — INSULIN LISPRO 5 UNITS: 100 INJECTION, SOLUTION INTRAVENOUS; SUBCUTANEOUS at 11:48

## 2024-03-21 RX ADMIN — IPRATROPIUM BROMIDE AND ALBUTEROL SULFATE 3 ML: 2.5; .5 SOLUTION RESPIRATORY (INHALATION) at 02:02

## 2024-03-21 RX ADMIN — INSULIN LISPRO 2 UNITS: 100 INJECTION, SOLUTION INTRAVENOUS; SUBCUTANEOUS at 11:48

## 2024-03-21 RX ADMIN — METOPROLOL TARTRATE 100 MG: 50 TABLET, FILM COATED ORAL at 09:06

## 2024-03-21 NOTE — PLAN OF CARE
Problem: PAIN - ADULT  Goal: Verbalizes/displays adequate comfort level or baseline comfort level  Description: Interventions:  - Encourage patient to monitor pain and request assistance  - Assess pain using appropriate pain scale  - Administer analgesics based on type and severity of pain and evaluate response  - Implement non-pharmacological measures as appropriate and evaluate response  - Consider cultural and social influences on pain and pain management  - Notify physician/advanced practitioner if interventions unsuccessful or patient reports new pain  3/21/2024 1416 by Kasey Whiteside RN  Outcome: Adequate for Discharge  3/21/2024 1124 by Kasey Whiteside RN  Outcome: Progressing     Problem: INFECTION - ADULT  Goal: Absence or prevention of progression during hospitalization  Description: INTERVENTIONS:  - Assess and monitor for signs and symptoms of infection  - Monitor lab/diagnostic results  - Monitor all insertion sites, i.e. indwelling lines, tubes, and drains  - Monitor endotracheal if appropriate and nasal secretions for changes in amount and color  - [place] appropriate cooling/warming therapies per order  - Administer medications as ordered  - Instruct and encourage patient and family to use good hand hygiene technique  - Identify and instruct in appropriate isolation precautions for identified infection/condition  3/21/2024 1416 by Kaesy Whiteside RN  Outcome: Adequate for Discharge  3/21/2024 1124 by Kasey Whiteside RN  Outcome: Progressing     Problem: SAFETY ADULT  Goal: Patient will remain free of falls  Description: INTERVENTIONS:  - Educate patient/family on patient safety including physical limitations  - Instruct patient to call for assistance with activity   - Consult OT/PT to assist with strengthening/mobility   - Keep Call bell within reach  - Keep bed low and locked with side rails adjusted as appropriate  - Keep care items and personal belongings within reach  - Initiate and  maintain comfort rounds  - Make Fall Risk Sign visible to staff  - Apply yellow socks and bracelet for high fall risk patients  - Consider moving patient to room near nurses station  3/21/2024 1416 by Kasey Whiteside RN  Outcome: Adequate for Discharge  3/21/2024 1124 by Kasey Whiteside RN  Outcome: Progressing  Goal: Maintain or return to baseline ADL function  Description: INTERVENTIONS:  -  Assess patient's ability to carry out ADLs; assess patient's baseline for ADL function and identify physical deficits which impact ability to perform ADLs (bathing, care of mouth/teeth, toileting, grooming, dressing, etc.)  - Assess/evaluate cause of self-care deficits   - Assess range of motion  - Assess patient's mobility; develop plan if impaired  - Assess patient's need for assistive devices and provide as appropriate  - Encourage maximum independence but intervene and supervise when necessary  - Involve family in performance of ADLs  - Assess for home care needs following discharge   - Consider OT consult to assist with ADL evaluation and planning for discharge  - Provide patient education as appropriate  3/21/2024 1416 by Kasey Whiteside RN  Outcome: Adequate for Discharge  3/21/2024 1124 by Kasey Whiteside RN  Outcome: Progressing  Goal: Maintains/Returns to pre admission functional level  Description: INTERVENTIONS:  - Perform AM-PAC 6 Click Basic Mobility/ Daily Activity assessment daily.  - Set and communicate daily mobility goal to care team and patient/family/caregiver.   - Collaborate with rehabilitation services on mobility goals if consulted  - Perform Range of Motion 3 times a day.  - Reposition patient every 2 hours if unable to reposition self  - Out of bed for toileting  - Record patient progress and toleration of activity level   3/21/2024 1416 by Kasey Whiteside RN  Outcome: Adequate for Discharge  3/21/2024 1124 by Kasey Whiteside RN  Outcome: Progressing     Problem: DISCHARGE PLANNING  Goal:  Discharge to home or other facility with appropriate resources  Description: INTERVENTIONS:  - Identify barriers to discharge w/patient and caregiver  - Arrange for needed discharge resources and transportation as appropriate  - Identify discharge learning needs (meds, wound care, etc.)  - Arrange for interpretive services to assist at discharge as needed  - Refer to Case Management Department for coordinating discharge planning if the patient needs post-hospital services based on physician/advanced practitioner order or complex needs related to functional status, cognitive ability, or social support system  3/21/2024 1416 by Kasey Whiteside RN  Outcome: Adequate for Discharge  3/21/2024 1124 by Kasey Whiteside RN  Outcome: Progressing     Problem: Knowledge Deficit  Goal: Patient/family/caregiver demonstrates understanding of disease process, treatment plan, medications, and discharge instructions  Description: Complete learning assessment and assess knowledge base.  Interventions:  - Provide teaching at level of understanding  - Provide teaching via preferred learning methods  3/21/2024 1416 by Kasey Whiteside RN  Outcome: Adequate for Discharge  3/21/2024 1124 by Kasey Whiteside RN  Outcome: Progressing     Problem: RESPIRATORY - ADULT  Goal: Achieves optimal ventilation and oxygenation  Description: INTERVENTIONS:  - Assess for changes in respiratory status  - Assess for changes in mentation and behavior  - Position to facilitate oxygenation and minimize respiratory effort  - Oxygen administered by appropriate delivery if ordered  - Initiate smoking cessation education as indicated  - Encourage broncho-pulmonary hygiene including cough, deep breathe, Incentive Spirometry  - Assess the need for suctioning and aspirate as needed  - Assess and instruct to report SOB or any respiratory difficulty  - Respiratory Therapy support as indicated  3/21/2024 1416 by Kasey Whiteside RN  Outcome: Adequate for  Discharge  3/21/2024 1124 by Kasey Whiteside RN  Outcome: Progressing     Problem: METABOLIC, FLUID AND ELECTROLYTES - ADULT  Goal: Electrolytes maintained within normal limits  Description: INTERVENTIONS:  - Monitor labs and assess patient for signs and symptoms of electrolyte imbalances  - Administer electrolyte replacement as ordered  - Monitor response to electrolyte replacements, including repeat lab results as appropriate  - Instruct patient on fluid and nutrition as appropriate  3/21/2024 1416 by Kasey Whiteside RN  Outcome: Adequate for Discharge  3/21/2024 1124 by Kasey Whiteside RN  Outcome: Progressing  Goal: Fluid balance maintained  Description: INTERVENTIONS:  - Monitor labs   - Monitor I/O and WT  - Instruct patient on fluid and nutrition as appropriate  - Assess for signs & symptoms of volume excess or deficit  3/21/2024 1416 by Kasey Whiteside RN  Outcome: Adequate for Discharge  3/21/2024 1124 by Kasey Whiteside RN  Outcome: Progressing  Goal: Glucose maintained within target range  Description: INTERVENTIONS:  - Monitor Blood Glucose as ordered  - Assess for signs and symptoms of hyperglycemia and hypoglycemia  - Administer ordered medications to maintain glucose within target range  - Assess nutritional intake and initiate nutrition service referral as needed  3/21/2024 1416 by Kasey Whiteside RN  Outcome: Adequate for Discharge  3/21/2024 1124 by Kasey Whiteside RN  Outcome: Progressing

## 2024-03-21 NOTE — PLAN OF CARE
Problem: PAIN - ADULT  Goal: Verbalizes/displays adequate comfort level or baseline comfort level  Description: Interventions:  - Encourage patient to monitor pain and request assistance  - Assess pain using appropriate pain scale  - Administer analgesics based on type and severity of pain and evaluate response  - Implement non-pharmacological measures as appropriate and evaluate response  - Consider cultural and social influences on pain and pain management  - Notify physician/advanced practitioner if interventions unsuccessful or patient reports new pain  Outcome: Progressing     Problem: INFECTION - ADULT  Goal: Absence or prevention of progression during hospitalization  Description: INTERVENTIONS:  - Assess and monitor for signs and symptoms of infection  - Monitor lab/diagnostic results  - Monitor all insertion sites, i.e. indwelling lines, tubes, and drains  - Monitor endotracheal if appropriate and nasal secretions for changes in amount and color  - [place] appropriate cooling/warming therapies per order  - Administer medications as ordered  - Instruct and encourage patient and family to use good hand hygiene technique  - Identify and instruct in appropriate isolation precautions for identified infection/condition  Outcome: Progressing     Problem: SAFETY ADULT  Goal: Patient will remain free of falls  Description: INTERVENTIONS:  - Educate patient/family on patient safety including physical limitations  - Instruct patient to call for assistance with activity   - Consult OT/PT to assist with strengthening/mobility   - Keep Call bell within reach  - Keep bed low and locked with side rails adjusted as appropriate  - Keep care items and personal belongings within reach  - Initiate and maintain comfort rounds  - Make Fall Risk Sign visible to staff  - Apply yellow socks and bracelet for high fall risk patients  - Consider moving patient to room near nurses station  Outcome: Progressing  Goal: Maintain or  return to baseline ADL function  Description: INTERVENTIONS:  -  Assess patient's ability to carry out ADLs; assess patient's baseline for ADL function and identify physical deficits which impact ability to perform ADLs (bathing, care of mouth/teeth, toileting, grooming, dressing, etc.)  - Assess/evaluate cause of self-care deficits   - Assess range of motion  - Assess patient's mobility; develop plan if impaired  - Assess patient's need for assistive devices and provide as appropriate  - Encourage maximum independence but intervene and supervise when necessary  - Involve family in performance of ADLs  - Assess for home care needs following discharge   - Consider OT consult to assist with ADL evaluation and planning for discharge  - Provide patient education as appropriate  Outcome: Progressing  Goal: Maintains/Returns to pre admission functional level  Description: INTERVENTIONS:  - Perform AM-PAC 6 Click Basic Mobility/ Daily Activity assessment daily.  - Set and communicate daily mobility goal to care team and patient/family/caregiver.   - Collaborate with rehabilitation services on mobility goals if consulted  - Perform Range of Motion 3 times a day.  - Reposition patient every 2 hours if unable to reposition self  - Out of bed for toileting  - Record patient progress and toleration of activity level   Outcome: Progressing     Problem: DISCHARGE PLANNING  Goal: Discharge to home or other facility with appropriate resources  Description: INTERVENTIONS:  - Identify barriers to discharge w/patient and caregiver  - Arrange for needed discharge resources and transportation as appropriate  - Identify discharge learning needs (meds, wound care, etc.)  - Arrange for interpretive services to assist at discharge as needed  - Refer to Case Management Department for coordinating discharge planning if the patient needs post-hospital services based on physician/advanced practitioner order or complex needs related to  functional status, cognitive ability, or social support system  Outcome: Progressing     Problem: Knowledge Deficit  Goal: Patient/family/caregiver demonstrates understanding of disease process, treatment plan, medications, and discharge instructions  Description: Complete learning assessment and assess knowledge base.  Interventions:  - Provide teaching at level of understanding  - Provide teaching via preferred learning methods  Outcome: Progressing     Problem: RESPIRATORY - ADULT  Goal: Achieves optimal ventilation and oxygenation  Description: INTERVENTIONS:  - Assess for changes in respiratory status  - Assess for changes in mentation and behavior  - Position to facilitate oxygenation and minimize respiratory effort  - Oxygen administered by appropriate delivery if ordered  - Initiate smoking cessation education as indicated  - Encourage broncho-pulmonary hygiene including cough, deep breathe, Incentive Spirometry  - Assess the need for suctioning and aspirate as needed  - Assess and instruct to report SOB or any respiratory difficulty  - Respiratory Therapy support as indicated  Outcome: Progressing     Problem: METABOLIC, FLUID AND ELECTROLYTES - ADULT  Goal: Electrolytes maintained within normal limits  Description: INTERVENTIONS:  - Monitor labs and assess patient for signs and symptoms of electrolyte imbalances  - Administer electrolyte replacement as ordered  - Monitor response to electrolyte replacements, including repeat lab results as appropriate  - Instruct patient on fluid and nutrition as appropriate  Outcome: Progressing  Goal: Fluid balance maintained  Description: INTERVENTIONS:  - Monitor labs   - Monitor I/O and WT  - Instruct patient on fluid and nutrition as appropriate  - Assess for signs & symptoms of volume excess or deficit  Outcome: Progressing  Goal: Glucose maintained within target range  Description: INTERVENTIONS:  - Monitor Blood Glucose as ordered  - Assess for signs and symptoms  of hyperglycemia and hypoglycemia  - Administer ordered medications to maintain glucose within target range  - Assess nutritional intake and initiate nutrition service referral as needed  Outcome: Progressing

## 2024-03-21 NOTE — DISCHARGE SUMMARY
Discharge Summary - [facility]  Patient: Jessika Lux 69 y.o. female   MRN: [id number]  PCP: Karlie Bruno DO  Unit/Bed#: -01 Encounter: [id number]            Discharging Physician / Practitioner: Jaja Encinas MD  PCP: Karlie Bruno DO  Admission Date:   Admission Orders (From admission, onward)       Ordered        03/17/24 0921  INPATIENT ADMISSION  Once                          Discharge Date: 03/21/24      Reason for Admission:  Shortness of breath      Discharge Diagnoses:     Principal Problem:    COPD with acute exacerbation (HCC)    Active Problems:    Chronic respiratory failure with hypoxia (HCC)    Essential hypertension     Hyperlipidemia    Type 2 diabetes mellitus without complication, without long-term current use of insulin (HCC)    Resolved Problems:    Hyponatremia    Leukocytosis      Consultations During Hospital Stay:  Pulmonology  Nephrology      Hospital Course:     Jessika Lux is a 69 y.o. female patient who originally presented to the hospital on 3/17/2024 due to complaints of shortness of breath in the setting of COPD exacerbation initially on intravenous corticosteroids, now transitioned over to oral Prednisone to continue a tapering course postdischarge, due to symptomatic improvement.  She will also continue her Trelegy regimen at home.  She is chronically on 3 L of oxygen via nasal cannula in the setting of above.  She will continue her home regimen for diabetes mellitus, with the understanding of blood sugar fluctuation in the setting of corticosteroids.  She was maintained on an optimized insulin regimen through hospital course.  Her home medications for hyperlipidemia and essential hypertension will be continued.  Leukocytosis normalized.  Furthermore, she was evaluated by nephrology for hyponatremia, which also progressively normalized with continuation of a diuretic coupled with salt tablet supplementation, which she will be discharged  "upon indefinitely, with outpatient lab work/follow-up.  She was evaluated by PT/OT with plan for discharge home today without ongoing needs.  Patient agrees with and understands discharge plan.      Condition at Discharge: fair       Discharge Day Visit / Exam:     Vitals:  Blood Pressure: 165/76 (03/21/24 0741)  Pulse: 76 (03/21/24 0906)  Temperature: 97.8 °F (36.6 °C) (03/21/24 0721)  Temp Source: Temporal (03/21/24 0721)  Respirations: 20 (03/21/24 0721)  Height: 5' 2\" (157.5 cm) (03/17/24 1955)  Weight - Scale: 70.4 kg (155 lb 3.3 oz) (03/21/24 0600)  SpO2: 92 % (03/21/24 1146)      Physical exam:  I had a face-to-face encounter with the patient on day of discharge.      Discussion with Patient and/or Family:  The patient has been advised to return to the ER immediately if any symptoms recur or worsen.       Discharge instructions/Information to Patient and/or Family:   See after visit summary for information provided to patient and/or family.        Provisions for Follow-Up Care:  See after visit summary for information related to follow-up care and any pertinent home health orders.        Disposition:   Home      Discharge Medications:  See after visit summary for reconciled discharge medications provided to patient and/or family.        Discharge Statement:  I spent 38 minutes discharging the patient. This time was spent on the day of discharge. I had direct contact with the patient on the day of discharge. Greater than 50% of the total time was spent examining patient, answering all patient questions, arranging and discussing plan of care with patient as well as directly providing post-discharge instructions.  Additional time then spent on discharge activities.           NGIEL BASS MD   Hospitalist - [facility]        ** Please Note: This note is constructed using a voice recognition dictation system.  An occasional wrong word/phrase or “sound-a-like” substitution may have been picked up " by dictation device due to the inherent limitations of voice recognition software.  Read the chart carefully and recognize, using reasonable context, where substitutions may have occurred.**

## 2024-03-21 NOTE — NURSING NOTE
Patient, along with her belongings and paperwork, transported to main entrance via wheelchair accompanied by this RN where she was transferred to private vehicle driven by her daughter.

## 2024-03-21 NOTE — CASE MANAGEMENT
Case Management Discharge Planning Note    Patient name Jessika Lux  Location /-01 MRN [id number]  : 1955 Date 3/21/2024       Current Admission Date: 3/17/2024  Current Admission Diagnosis:COPD with acute exacerbation (HCC)   Patient Active Problem List    Diagnosis Date Noted    Weakness 2024    Anxiety disorder due to general medical condition with panic attack 2023    Acute on chronic respiratory failure with hypoxia (HCC) 2023    COPD, severe (HCC) 2023    Lung nodule 2023    Abnormal CT scan 2023    Anxiety about health 2023    Bacteremia 2023    Vomiting 2023    COPD with acute exacerbation (HCC) 2020    Chronic respiratory failure with hypoxia (HCC) 2020    Acute hyponatremia 2020    Tobacco abuse 2020    Primary hypertension 2020    Type 2 diabetes mellitus without complication, without long-term current use of insulin (HCC) 2020      LOS (days): 4  Geometric Mean LOS (GMLOS) (days): 2.7  Days to GMLOS:-1.3     OBJECTIVE:  Risk of Unplanned Readmission Score: 63.69         Current admission status: Inpatient   Preferred Pharmacy:   [id number] - [place], [place] - [address]  [address]  [facility] [address]  Phone: [phone] Fax: [phone]    [id number] - [place], [place] - [address]  [address] [facility] [address]  Phone: [phone] Fax: [phone]    Primary Care Provider: Karlie Bruno DO    Primary Insurance: [facility]  Secondary Insurance: [place] [employer] [facility]    DISCHARGE DETAILS:     CM met with patient to discuss discharge planning. Patient declined C and indicated her daughter will provide transport when cleared for discharge as long as it is before 1:00 as her daughter goes to work for 3:00 PM.

## 2024-03-21 NOTE — PROGRESS NOTES
Progress Note - Nephrology   Jessika Lux 69 y.o. female MRN: [id number]  Unit/Bed#: -01 Encounter: [id number]    ASSESSMENT AND PLAN:  69 y.o. year old female with a PMH of COPD/diabetes mellitus/hypertension/anxiety/GERD/cigarette smoking apparent history of hyponatremia: Who presents with shortness of breath for 1 day not associated with a cough leg edema or chest pain  We are asked to see the patient for hyponatremia acute on chronic     1.  Hyponatremia: Acute on top of chronic: Etiology suggestive of decreased solute intake, possible SIADH from pulmonary status or other.     Workup:  - Normal free T4 TSH slightly low  - Serum osmolality last admission 262 compatible true hypotonic hyponatremia  - Uric acid 3.4  - Cortisol 2.9: But patient has been receiving methylprednisolone so this is an accurate will need to be repeated at some point as an outpatient  - Urine sodium 101 compatible with SIADH; urine osmolality 593  -CT of the chest abdomen pelvis from 3/18/2024: Irregular shaped nodule in the pleural surface and posterior right lung unchanged in size and seen as far back as July 2023 most suspicious for focal subpleural scar per radiology continue close observation in 6 to 12 months.  Otherwise no other significant abnormality in the abdomen and pelvis!     Current sodium: 136 corrects approximately 137-138 for glucose     Treatment:  -Sodium chloride I would reduce down to 2 g 2 times a day  - Fluid restriction increased to 1500 MLS per day  - Nutritional supplements to increase solute  - Torsemide 10 mg daily   Recheck sodium in a.m.           2.  Hypertension: Maintained on amlodipine 10 mg/clonidine 0.2 mg/lisinopril 40 mg  -Added torsemide 10 mg daily.  Overall improved occasionally slightly low  - Potential addition for spironolactone in the future           Subjective:   Patient is feeling much better no significant shortness of breath  No nausea vomiting or diarrhea  Urinating  "well    Objective:     Vitals: Blood pressure 165/76, pulse 76, temperature 97.8 °F (36.6 °C), temperature source Temporal, resp. rate 20, height 5' 2\" (1.575 m), weight 70.4 kg (155 lb 3.3 oz), SpO2 97%.,Body mass index is 28.39 kg/m².    Weight (last 2 days)       Date/Time Weight    03/21/24 0600 70.4 (155.2)    03/21/24 0500 70.4 (155.2)    03/20/24 0300 70.9 (156.31)     Weight: Simultaneous filing. User may not have seen previous data. at 03/20/24 0300    03/19/24 0500 71.5 (157.63)              Intake/Output Summary (Last 24 hours) at 3/21/2024 0921  Last data filed at 3/21/2024 0901  Gross per 24 hour   Intake 740 ml   Output 1100 ml   Net -360 ml            Physical Exam: General:  No acute distress  Skin:  No acute rash  Eyes:  No scleral icterus and noninjected  ENT:  Moist mucous membranes  Neck:  Supple, no jugular venous distention, trachea midline, overall appearance is normal  Chest:  Clear to auscultation!  CVS:  Regular rate and rhythm, without a rub or gallops  Abdomen:  Normal bowel sounds, soft and nontender and nondistended  Extremities:  No edema, and no cyanosis, no significant arthritic changes  Neuro:  No gross focality  Psych:  Alert and oriented and appropriate                Medications:    Scheduled Meds:  Current Facility-Administered Medications   Medication Dose Route Frequency Provider Last Rate    acetaminophen  650 mg Oral Q6H PRN Reny Lowry MD      amLODIPine  10 mg Oral HS Christ Sylvester MD      atorvastatin  40 mg Oral HS Reny Lowry MD      budesonide  0.5 mg Nebulization Q12H Reny Lowry MD      cloNIDine  0.2 mg Oral Q12H [facility] Christ Sylvester MD      doxycycline  100 mg Intravenous Q12H Nettie Moore  mg (03/20/24 2137)    enoxaparin  40 mg Subcutaneous Daily Reny Lowry MD      fluticasone  1 spray Nasal Daily Reny Lowry MD      guaiFENesin  600 mg Oral BID Reny Lowry MD      insulin glargine  15 Units Subcutaneous HS Nettie Moore MD   "    insulin lispro  2-12 Units Subcutaneous HS Reny Lowry MD      insulin lispro  2-12 Units Subcutaneous TID AC Nettie Moore MD      insulin lispro  5 Units Subcutaneous TID With Meals Nettie Moore MD      ipratropium-albuterol  3 mL Nebulization Q6H Reny Lowry MD      levalbuterol  1.25 mg Nebulization TID PRN Reny Lowry MD      lisinopril  40 mg Oral Daily Christ Sylvester MD      LORazepam  0.5 mg Oral Q6H PRN Ailyn Aguilera MD      magnesium hydroxide  30 mL Oral Daily PRN Ailyn Aguilera MD      metoprolol tartrate  100 mg Oral Q12H [facility] Reny Lowry MD      pantoprazole  40 mg Oral BID AC Reny Lowry MD      predniSONE  40 mg Oral Daily Bari Flores PA-C      sodium chloride  2 g Oral TID With Meals Christ Sylvester MD      torsemide  10 mg Oral Daily Christ Sylvester MD         PRN Meds:.  acetaminophen    levalbuterol **AND** [DISCONTINUED] sodium chloride    LORazepam    magnesium hydroxide    Continuous Infusions:     Lab, Imaging and other studies: I have personally reviewed pertinent labs.  Laboratory Results:  Results from last 7 days   Lab Units 03/21/24  0421 03/20/24  0530 03/19/24  1354 03/19/24  0502 03/18/24  1954 03/18/24  1408 03/18/24  0821 03/17/24  1151 03/17/24  0748   WBC Thousand/uL  --   --   --  6.82  --   --   --   --  12.98*   HEMOGLOBIN g/dL  --   --   --  11.1*  --   --   --   --  13.9   HEMATOCRIT %  --   --   --  32.2*  --   --   --   --  40.5   PLATELETS Thousands/uL  --   --   --  201  --   --   --   --  285   POTASSIUM mmol/L 4.6 4.3 4.1 4.5 4.6 4.5 4.6   < > 4.1   CHLORIDE mmol/L 95* 91* 90* 87* 85* 84* 86*   < > 75*   CO2 mmol/L 41* 39* 38* 36* 37* 36* 36*   < > 39*   BUN mg/dL 23 18 19 15 19 15 11   < > 11   CREATININE mg/dL 0.69 0.54* 0.70 0.55* 0.62 0.64 0.58*   < > 0.58*   CALCIUM mg/dL 9.1 9.3 8.9 9.4 9.2 9.3 9.3   < > 10.2   MAGNESIUM mg/dL 2.1  --   --  1.9  --   --   --   --  1.8*    < > = values in this interval not displayed.  "    Urinalysis:   Lab Results   Component Value Date    COLORU Yellow 07/16/2023    CLARITYU Clear 07/16/2023    SPECGRAV <=1.005 07/16/2023    PHUR 6.5 07/16/2023    LEUKOCYTESUR Negative 07/16/2023    NITRITE Negative 07/16/2023    GLUCOSEU Negative 07/16/2023    KETONESU Negative 07/16/2023    BILIRUBINUR Negative 07/16/2023    BLOODU Negative 07/16/2023     ABGs: No results found for: \"PH\"  Radiology review:     Portions of the record may have been created with voice recognition software.  Occasional wrong word or \"sound a like\" substitutions may have occurred due to the inherent limitations of voice recognition software.  Read the chart carefully and recognize, using context, where substitutions have occurred.                    "

## 2024-03-22 ENCOUNTER — PATIENT OUTREACH (OUTPATIENT)
Dept: CASE MANAGEMENT | Facility: OTHER | Age: 69
End: 2024-03-22

## 2024-03-22 ENCOUNTER — TELEPHONE (OUTPATIENT)
Dept: NEPHROLOGY | Facility: CLINIC | Age: 69
End: 2024-03-22

## 2024-03-22 DIAGNOSIS — E87.1 ACUTE HYPONATREMIA: Primary | ICD-10-CM

## 2024-03-22 LAB
BACTERIA BLD CULT: NORMAL
BACTERIA BLD CULT: NORMAL

## 2024-03-22 NOTE — TELEPHONE ENCOUNTER
Spoke with patient and advised her about the bloodwork and appointment. She said that all our locations are very far for her, so she will ask her PCP on Monday if there is any closer location outside of  and if not she will call back and schedule with us. Thank you

## 2024-03-22 NOTE — UTILIZATION REVIEW
NOTIFICATION OF ADMISSION DISCHARGE   This is a Notification of Discharge from [facility]. Please be advised that this patient has been discharge from our facility. Below you will find the admission and discharge date and time including the patient’s disposition.   UTILIZATION REVIEW CONTACT:  Kelsey Sam  Utilization   Network Utilization Review Department  Phone: [phone] x carefully listen to the prompts. All voicemails are confidential.  Email: [email or web address]     ADMISSION INFORMATION  PRESENTATION DATE: 3/17/2024  7:36 AM  OBERVATION ADMISSION DATE:   INPATIENT ADMISSION DATE: 3/17/24  9:21 AM   DISCHARGE DATE: 3/21/2024  2:08 PM   DISPOSITION:Home/Self Care    Network Utilization Review Department  ATTENTION: Please call with any questions or concerns to [phone] and carefully listen to the prompts so that you are directed to the right person. All voicemails are confidential.   For Discharge needs, contact Care Management DC Support Team at [phone] opt. 2  Send all requests for admission clinical reviews, approved or denied determinations and any other requests to dedicated fax number below belonging to the campus where the patient is receiving treatment. List of dedicated fax numbers for the Facilities:  FACILITY NAME UR FAX NUMBER   ADMISSION DENIALS (Administrative/Medical Necessity) [phone]   DISCHARGE SUPPORT TEAM ([facility]) [phone]   PARENT CHILD HEALTH (Maternity/NICU/Pediatrics) [phone]   [facility] [phone]   [facility] [phone]   [facility] [phone]   [facility] [phone]   [facility] [phone]   [facility] [phone]   [facility] [phone]   [facility]  [phone]   [facility] [phone]   [facility] [phone]   [facility] [phone]   [facility] [phone]

## 2024-03-22 NOTE — TELEPHONE ENCOUNTER
----- Message from Christ Sylvester MD sent at 3/21/2024 11:22 AM EDT -----  Hey guys this patient will be discharged from [facility] today  We were seeing her for hyponatremia  Sodium improving nicely to 136  Will be sent home on sodium chloride 2 g twice daily with meals, modest fluid restriction 48 to 60 ounces, torsemide 10 mg daily    She will need a basic metabolic profile/magnesium 7 to 10 days  Follow-up in the office will depend in terms of how fast upon the labs but I would make an appointment in the next few weeks

## 2024-03-22 NOTE — PROGRESS NOTES
.OP RT CM called and left a VM requesting a return phone call. I will outreach next week unless I hear back from patient prior. IN basket message received.

## 2024-03-22 NOTE — TELEPHONE ENCOUNTER
I called and left a message for patient to call back to schedule a hospital F/U. Labs ordered in Epic. Left patient a detailed message regarding labs needing to be done.        ----- Message from Christ Sylvester MD sent at 3/21/2024 11:22 AM EDT -----  Hey guys this patient will be discharged from [facility] today  We were seeing her for hyponatremia  Sodium improving nicely to 136  Will be sent home on sodium chloride 2 g twice daily with meals, modest fluid restriction 48 to 60 ounces, torsemide 10 mg daily    She will need a basic metabolic profile/magnesium 7 to 10 days  Follow-up in the office will depend in terms of how fast upon the labs but I would make an appointment in the next few weeks

## 2024-03-25 ENCOUNTER — PATIENT OUTREACH (OUTPATIENT)
Dept: CASE MANAGEMENT | Facility: OTHER | Age: 69
End: 2024-03-25

## 2024-03-25 NOTE — PROGRESS NOTES
In basket reminder received to review chart for discharge .progress.   Jessika hospitalized 3/17/24 - 3/21/24 for COPD exacerbation; managed with IV corticosteroids.  Nephrology consulted for hyponatremia; controlled with diuretic and salt supplement.   Patient discharged with changes to daily diuretic and steroid dosages and prescription for sodium chloride tablet (Tablets twice a day).  As requested previously, this RN Care Manager's contact information was placed on patient's AVS.     No answer when contacted this am.  Left generic msg on preferred number ending.    Unsuccessful attempt to reach patient's daughter Lindsey on preferred number ending [phone].  Left generic msg without PHI requesting return call.      Unable to reach letter drafted today.   In basket sent to Department  with request to mail letter to patient to address on file:   [address]   [place] [facility] [address]     Episode changed from complex to surveillance.  Will remain available to assist and review chart again 30 days from today or sooner if utilization present.       Update to Geni CORONADO

## 2024-03-25 NOTE — LETTER
Date: 03/25/24    Dear Jessika Lux,   My name is Jose Guadalupe Ribera; I am a registered nurse care manager working with [facility].  I have not been able to reach you and would like to set a time that I can talk with you over the phone.  My work is to help patients that have recently been in the hospital or emergency room get access to the care they need.  This includes coordination of appointments, providing education on medical conditions and medications along with offering assistance in locating community resources should you be experiencing financial hardship with food, heating, housing and / or transportation.   Please kindly call me at the number below if you require any assistance or have any questions. I look forward to hearing from you.  Sincerely,    Jose Guadalupe Ribera RN  Outpatient Care Management  [phone]  [email or web address]  The above number does not accept text messages.

## 2024-03-25 NOTE — TELEPHONE ENCOUNTER
Patient called the RX Refill Line. About medication. Patient was told doctors orders.   torsemide 10 mg daily call completed

## 2024-03-26 ENCOUNTER — PATIENT OUTREACH (OUTPATIENT)
Dept: CASE MANAGEMENT | Facility: OTHER | Age: 69
End: 2024-03-26

## 2024-03-26 NOTE — PROGRESS NOTES
.OP RT CM called and left a VM requesting a return phone call. I will outreach in two weeks unless I hear back from patient prior. In basket reminder message received.   Unable to reach letter will be sent thru standard mail.

## 2024-03-29 NOTE — UTILIZATION REVIEW
Initial Clinical Review    Admission: Date/Time/Statement:   Admission Orders (From admission, onward)     Ordered        08/04/23 0808  INPATIENT ADMISSION  Once                      Orders Placed This Encounter   Procedures   • INPATIENT ADMISSION     Standing Status:   Standing     Number of Occurrences:   1     Order Specific Question:   Level of Care     Answer:   Med Surg [16]     Order Specific Question:   Estimated length of stay     Answer:   More than 2 Midnights     Order Specific Question:   Certification     Answer:   I certify that inpatient services are medically necessary for this patient for a duration of greater than two midnights. See H&P and MD Progress Notes for additional information about the patient's course of treatment. ED Arrival Information     Expected   -    Arrival   8/4/2023 05:43    Acuity   Urgent            Means of arrival   Walk-In    Escorted by   Family Member    Service   Hospitalist    Admission type   Emergency            Arrival complaint   difficulty breathing           Chief Complaint   Patient presents with   • Shortness of Breath     Pt started with sob yesterday and has been increasingly worse. Pt has COPD and wears 2.5L NC chronically       Initial Presentation: 79 y.o. female to ED presents for short of breath and wheezing. Symptoms ongoing for last few weeks. Pt visited the ED multiple times due to shortness of breath. Pt was still smokes, states she quit about 1-1/2 wk ago . She requires ATC O2 2.5L. PMH for COPD, smoker, diabetes. Chronic respiratory failure. Admit Inpatient level of care for COPD exacerbation. Currently remains on O2 2.5L at baseline. On Iv Steroid, doxycycline, inhaled nebs. Check CTA PE. Last Echo in 2020, EF 55 to 60%. On exam; Wheezing and rhonchi. 8/4  Pulmonology cons; Severe COPD. Continue Iv Steriod and nebs. Continue Pulmicort. Pt would benefit from updated PFTs. O2 2L NC. Wean as tolerated to maintain SPO2 greater than 88%.  On This HSAT was performed using a Noxturnal T3 device which recorded snore, sound, movement activity, body position, nasal pressure, oronasal thermal airflow, pulse, oximetry and both chest and abdominal respiratory effort. HSAT data was restricted to the time patient states they were in bed.     HSAT was scored using 1B 4% hypopnea rule.     AHI: 107.1  Snoring was reported as loud.  Time with SpO2 below 89% was 176 minutes.   Overall signal quality was good     Pt will follow up with sleep provider to determine appropriate therapy.     Ordering Provider, Marjorie Casey MD C. Oyugi, BA, RPSGT, RST System Clinical Specialist/ 3/29/2024    exam; Decreased breath sounds, minimal wheezing. Date: 8/5  Day 2:   Progress notes; Continue Iv Steriod, decrease to q12h. Pulmicort and nebs. Pt does have some anxiety over her health, but stating that this is chronic for her. Does endorse feeling weak and has not been out of bed much. She states she does not remember talking to pulmonology yesterday. Date: 8/6  Day 3: Has surpassed a 2nd midnight with active treatments and services, which include COPD exacerbation, continued on Iv Steriod      ED Triage Vitals [08/04/23 0549]   Temperature Pulse Respirations Blood Pressure SpO2   (!) 96.4 °F (35.8 °C) 70 22 119/56 98 %      Temp Source Heart Rate Source Patient Position - Orthostatic VS BP Location FiO2 (%)   Temporal Monitor Lying Left arm --      Pain Score       No Pain          Wt Readings from Last 1 Encounters:   08/05/23 70.5 kg (155 lb 6.8 oz)     Additional Vital Signs:   08/05/23 07:26:39 97.3 °F (36.3 °C)   Abnormal  91 19 154/82 106 100 % -- -- -- --   08/04/23 22:00:55 97.2 °F (36.2 °C)   Abnormal  75 -- 124/69 87 98 % -- -- -- --   08/04/23 2157 -- -- -- 124/69 -- -- -- -- -- --   08/04/23 1958 -- -- -- -- -- 97 % 30 2.5 L/min Nasal cannula --   08/04/23 1942 -- -- -- -- -- -- 30 2.5 L/min Nasal cannula --   08/04/23 16:55:04 -- 87 -- 133/80 98 96 % -- -- -- --   08/04/23 1512 97.5 °F (36.4 °C) 77 -- 85/50   Abnormal  -- 97 % 30 2.5 L/min Nasal cannula --   08/04/23 1319 -- -- -- -- -- 98 % 30 2.5 L/min -- --   08/04/23 11:53:27 97.3 °F (36.3 °C)   Abnormal  84 -- 119/59 79 97 % -- -- -- --     Pertinent Labs/Diagnostic Test Results:   CTA chest pe study   Final Result by Deuce Urias MD (08/04 1128)   Addendum (preliminary) 1 of 1 by Deuce Urias MD (08/04 1128)   ADDENDUM:      When compared to a more recent CT from July 18, 2023, there has been no    change in the size or appearance of the right apical nodule.  Because of    the short time interval, the lack of change does not alter the    differential diagnosis. This nodule should    still be considered suspicious. Follow-up is recommended with either    repeat CT of the chest in 3 months or PET/CT. Final      1. No evidence of pulmonary embolus. 2.  6 x 9 mm spiculated nodule in the apex of the right upper lobe, not present on a CTA from 11/2/2020, suspicious for cancer. Further evaluation with FDG PET/CT is recommended to determine the metabolic status of this nodule. The study was marked in [facility] for immediate notification. Workstation performed: [id number]         XR chest 2 views   Final Result by Anne-Marie Cordova MD (08/04 1435)      No acute cardiopulmonary disease.                   Workstation performed: [id number]           Results from last 7 days   Lab Units 08/01/23  1022   SARS-COV-2  Negative     Results from last 7 days   Lab Units 08/04/23  0615 08/01/23  1022   WBC Thousand/uL 7.40 9.62   HEMOGLOBIN g/dL 12.5 12.7   HEMATOCRIT % 38.5 39.2   PLATELETS Thousands/uL 155 186   NEUTROS ABS Thousands/µL 4.59 6.81         Results from last 7 days   Lab Units 08/04/23  0615 08/01/23  1022   SODIUM mmol/L 135 136   POTASSIUM mmol/L 3.9 4.0   CHLORIDE mmol/L 97 96   CO2 mmol/L 34* 34*   ANION GAP mmol/L 4 6   BUN mg/dL 18 12   CREATININE mg/dL 0.96 0.80   EGFR ml/min/1.73sq m 61 77   CALCIUM mg/dL 9.3 9.9   MAGNESIUM mg/dL 2.1 2.1     Results from last 7 days   Lab Units 08/04/23  0615 08/01/23  1022   AST U/L 8* 9*   ALT U/L 12 11   ALK PHOS U/L 50 58   TOTAL PROTEIN g/dL 6.5 7.1   ALBUMIN g/dL 3.7 4.0   TOTAL BILIRUBIN mg/dL 0.49 0.41     Results from last 7 days   Lab Units 08/05/23  1059 08/05/23  0724 08/04/23  2154 08/04/23  2055 08/04/23  1625 08/04/23  1135   POC GLUCOSE mg/dl 297* 199* 282* 315* 245* 107     Results from last 7 days   Lab Units 08/04/23  0615 08/01/23  1022   GLUCOSE RANDOM mg/dL 84 74       Results from last 7 days   Lab Units 08/04/23  0615   PH IDA  7.356   PCO2 IDA mm Hg 57.5*   PO2 IDA mm Hg 29.9*   HCO3 IDA mmol/L 31.5*   BASE EXC IDA mmol/L 4.4   O2 CONTENT IDA ml/dL 10.3   O2 HGB, VENOUS % 53.4*             Results from last 7 days   Lab Units 08/04/23  0615 08/01/23  1022   HS TNI 0HR ng/L 4 4     Results from last 7 days   Lab Units 08/01/23  1022   D-DIMER QUANTITATIVE ug/ml FEU 0.49             Results from last 7 days   Lab Units 08/05/23  0720 08/04/23  1134   PROCALCITONIN ng/ml <0.05 <0.05                 Results from last 7 days   Lab Units 08/04/23  0615 08/01/23  1022   BNP pg/mL 11 52       Results from last 7 days   Lab Units 08/01/23  1022   LIPASE u/L 16                     Results from last 7 days   Lab Units 08/01/23  1022   INFLUENZA A PCR  Negative   INFLUENZA B PCR  Negative   RSV PCR  Negative       Results from last 7 days   Lab Units 08/04/23  1135 08/04/23  1134   BLOOD CULTURE  Received in Microbiology Lab. Culture in Progress. Received in Microbiology Lab. Culture in Progress.          ED Treatment:   Medication Administration from 08/04/2023 0543 to 08/04/2023 0768       Date/Time Order Dose Route Action     08/04/2023 0612 EDT ipratropium-albuterol (DUO-NEB) 0.5-2.5 mg/3 mL inhalation solution 3 mL 3 mL Nebulization Given     08/04/2023 0612 EDT LORazepam (ATIVAN) tablet 1 mg 1 mg Oral Given     08/04/2023 0931 EDT iohexol (OMNIPAQUE) 350 MG/ML injection (SINGLE-DOSE) 85 mL 85 mL Intravenous Given        Past Medical History:   Diagnosis Date   • Anxiety    • COPD (chronic obstructive pulmonary disease) ([facility])    • Diabetes mellitus ([address])    • Essential (primary) hypertension    • GERD (gastroesophageal reflux disease)    • Smoker      Present on Admission:  • Chronic respiratory failure (HCC)  • COPD exacerbation ([facility])  • Tobacco abuse  • Type 2 diabetes mellitus without complication, without long-term current use of insulin ([facility])  • Anxiety about health      Admitting Diagnosis: Shortness of breath [R06.02]  Anxiety [F41.9]  COPD exacerbation ([address] [facility]) [J44.1]  Age/Sex: 79 y.o. female     Admission Orders:  Scheduled Medications:  amLODIPine, 10 mg, Oral, HS  atorvastatin, 40 mg, Oral, HS  budesonide, 0.5 mg, Nebulization, Q12H  buPROPion, 150 mg, Oral, Daily  cloNIDine, 0.2 mg, Oral, Q12H NATHALY  docusate sodium, 100 mg, Oral, BID  doxycycline hyclate, 100 mg, Oral, Q12H  enoxaparin, 40 mg, Subcutaneous, Daily  fluticasone, 1 spray, Nasal, Daily  guaiFENesin, 600 mg, Oral, BID  insulin lispro, 2-12 Units, Subcutaneous, TID AC  insulin lispro, 2-12 Units, Subcutaneous, HS  insulin lispro, 6 Units, Subcutaneous, TID With Meals  ipratropium, 0.5 mg, Nebulization, TID  levalbuterol, 1.25 mg, Nebulization, TID  lisinopril, 40 mg, Oral, Daily  methylPREDNISolone sodium succinate, 40 mg, Intravenous, Q12H NATHALY  nicotine, 1 patch, Transdermal, Daily  pantoprazole, 40 mg, Oral, Early Morning  polyethylene glycol, 17 g, Oral, Daily  [START ON 8/6/2023] predniSONE, 40 mg, Oral, Daily  sodium chloride, 2 g, Oral, TID With Meals    methylPREDNISolone sodium succinate (Solu-MEDROL) injection 40 mg  Dose: 40 mg  Freq: Every 8 hours Route: IV  Start: 08/04/23 1130 End: 08/05/23 0956      Continuous IV Infusions:    sodium chloride 0.9 % infusion  Rate: 75 mL/hr Dose: 75 mL/hr  Freq: Continuous Route: IV  Indications of Use: IV Hydration  Last Dose: Stopped (08/05/23 1032)  Start: 08/04/23 1530 End: 08/05/23 1014      PRN Meds:  acetaminophen, 650 mg, Oral, Q6H PRN  benzonatate, 100 mg, Oral, TID PRN        IP CONSULT TO CASE MANAGEMENT  IP CONSULT TO PULMONOLOGY    Network Utilization Review Department  ATTENTION: Please call with any questions or concerns to [phone] and carefully listen to the prompts so that you are directed to the right person.  All voicemails are confidential.  Elmer De La Cruz all requests for admission clinical reviews, approved or denied determinations and any other requests to dedicated fax number below belonging to the campus where the patient is receiving treatment.  List of dedicated fax numbers for the Facilities:  [place] DENIALS (Administrative/Medical Necessity) [phone] [address] (Maternity/NICU/Pediatrics) [phone]   [address] [place] [address] [phone]   [facility] [address] [facility] [phone] [address] [id number] [phone]   [address] [address] [phone]

## 2024-04-03 ENCOUNTER — HOSPITAL ENCOUNTER (EMERGENCY)
Facility: HOSPITAL | Age: 69
Discharge: HOME | End: 2024-04-03
Attending: EMERGENCY MEDICINE
Payer: COMMERCIAL

## 2024-04-03 VITALS
BODY MASS INDEX: 28.84 KG/M2 | WEIGHT: 156.75 LBS | HEIGHT: 62 IN | DIASTOLIC BLOOD PRESSURE: 88 MMHG | OXYGEN SATURATION: 97 % | RESPIRATION RATE: 20 BRPM | SYSTOLIC BLOOD PRESSURE: 186 MMHG | HEART RATE: 106 BPM | TEMPERATURE: 97.9 F

## 2024-04-03 DIAGNOSIS — R10.13 EPIGASTRIC PAIN: Primary | ICD-10-CM

## 2024-04-03 DIAGNOSIS — E87.6 HYPOKALEMIA: ICD-10-CM

## 2024-04-03 DIAGNOSIS — E87.1 HYPONATREMIA: ICD-10-CM

## 2024-04-03 LAB
4HR DELTA HS TROPONIN: 1 NG/L
ALBUMIN SERPL BCP-MCNC: 4.3 G/DL (ref 3.5–5)
ALP SERPL-CCNC: 62 U/L (ref 34–104)
ALT SERPL W P-5'-P-CCNC: 11 U/L (ref 7–52)
ANION GAP SERPL CALCULATED.3IONS-SCNC: 11 MMOL/L (ref 4–13)
APTT PPP: 27 SECONDS (ref 23–37)
AST SERPL W P-5'-P-CCNC: 10 U/L (ref 13–39)
BASOPHILS # BLD AUTO: 0.02 THOUSANDS/ÂΜL (ref 0–0.1)
BASOPHILS NFR BLD AUTO: 0 % (ref 0–1)
BILIRUB SERPL-MCNC: 0.44 MG/DL (ref 0.2–1)
BUN SERPL-MCNC: 9 MG/DL (ref 5–25)
CALCIUM SERPL-MCNC: 9.8 MG/DL (ref 8.4–10.2)
CARDIAC TROPONIN I PNL SERPL HS: 12 NG/L
CARDIAC TROPONIN I PNL SERPL HS: 13 NG/L
CHLORIDE SERPL-SCNC: 86 MMOL/L (ref 96–108)
CO2 SERPL-SCNC: 34 MMOL/L (ref 21–32)
CREAT SERPL-MCNC: 0.81 MG/DL (ref 0.6–1.3)
EOSINOPHIL # BLD AUTO: 0.04 THOUSAND/ÂΜL (ref 0–0.61)
EOSINOPHIL NFR BLD AUTO: 1 % (ref 0–6)
ERYTHROCYTE [DISTWIDTH] IN BLOOD BY AUTOMATED COUNT: 12.5 % (ref 11.6–15.1)
GFR SERPL CREATININE-BSD FRML MDRD: 75 ML/MIN/1.73SQ M
GLUCOSE SERPL-MCNC: 210 MG/DL (ref 65–140)
HCT VFR BLD AUTO: 38.2 % (ref 34.8–46.1)
HGB BLD-MCNC: 12.3 G/DL (ref 11.5–15.4)
IMM GRANULOCYTES # BLD AUTO: 0.03 THOUSAND/UL (ref 0–0.2)
IMM GRANULOCYTES NFR BLD AUTO: 0 % (ref 0–2)
INR PPP: 0.9 (ref 0.84–1.19)
LACTATE SERPL-SCNC: 1.9 MMOL/L (ref 0.5–2)
LIPASE SERPL-CCNC: 9 U/L (ref 11–82)
LYMPHOCYTES # BLD AUTO: 1.62 THOUSANDS/ÂΜL (ref 0.6–4.47)
LYMPHOCYTES NFR BLD AUTO: 18 % (ref 14–44)
MCH RBC QN AUTO: 28.6 PG (ref 26.8–34.3)
MCHC RBC AUTO-ENTMCNC: 32.2 G/DL (ref 31.4–37.4)
MCV RBC AUTO: 89 FL (ref 82–98)
MONOCYTES # BLD AUTO: 0.89 THOUSAND/ÂΜL (ref 0.17–1.22)
MONOCYTES NFR BLD AUTO: 10 % (ref 4–12)
NEUTROPHILS # BLD AUTO: 6.24 THOUSANDS/ÂΜL (ref 1.85–7.62)
NEUTS SEG NFR BLD AUTO: 71 % (ref 43–75)
NRBC BLD AUTO-RTO: 0 /100 WBCS
PLATELET # BLD AUTO: 162 THOUSANDS/UL (ref 149–390)
PMV BLD AUTO: 10.2 FL (ref 8.9–12.7)
POTASSIUM SERPL-SCNC: 3.2 MMOL/L (ref 3.5–5.3)
PROT SERPL-MCNC: 7.1 G/DL (ref 6.4–8.4)
PROTHROMBIN TIME: 12.5 SECONDS (ref 11.6–14.5)
RBC # BLD AUTO: 4.3 MILLION/UL (ref 3.81–5.12)
SODIUM SERPL-SCNC: 131 MMOL/L (ref 135–147)
WBC # BLD AUTO: 8.84 THOUSAND/UL (ref 4.31–10.16)

## 2024-04-03 PROCEDURE — 85610 PROTHROMBIN TIME: CPT | Performed by: EMERGENCY MEDICINE

## 2024-04-03 PROCEDURE — 83605 ASSAY OF LACTIC ACID: CPT | Performed by: EMERGENCY MEDICINE

## 2024-04-03 PROCEDURE — 36415 COLL VENOUS BLD VENIPUNCTURE: CPT | Performed by: EMERGENCY MEDICINE

## 2024-04-03 PROCEDURE — 85025 COMPLETE CBC W/AUTO DIFF WBC: CPT | Performed by: EMERGENCY MEDICINE

## 2024-04-03 PROCEDURE — 96374 THER/PROPH/DIAG INJ IV PUSH: CPT

## 2024-04-03 PROCEDURE — 84484 ASSAY OF TROPONIN QUANT: CPT | Performed by: EMERGENCY MEDICINE

## 2024-04-03 PROCEDURE — 99283 EMERGENCY DEPT VISIT LOW MDM: CPT

## 2024-04-03 PROCEDURE — 80053 COMPREHEN METABOLIC PANEL: CPT | Performed by: EMERGENCY MEDICINE

## 2024-04-03 PROCEDURE — 85730 THROMBOPLASTIN TIME PARTIAL: CPT | Performed by: EMERGENCY MEDICINE

## 2024-04-03 PROCEDURE — C9113 INJ PANTOPRAZOLE SODIUM, VIA: HCPCS | Performed by: EMERGENCY MEDICINE

## 2024-04-03 PROCEDURE — 93005 ELECTROCARDIOGRAM TRACING: CPT

## 2024-04-03 PROCEDURE — 99285 EMERGENCY DEPT VISIT HI MDM: CPT | Performed by: EMERGENCY MEDICINE

## 2024-04-03 PROCEDURE — 83690 ASSAY OF LIPASE: CPT | Performed by: EMERGENCY MEDICINE

## 2024-04-03 RX ORDER — LIDOCAINE HYDROCHLORIDE 20 MG/ML
15 SOLUTION OROPHARYNGEAL ONCE
Status: COMPLETED | OUTPATIENT
Start: 2024-04-03 | End: 2024-04-03

## 2024-04-03 RX ORDER — POTASSIUM CHLORIDE 20 MEQ/1
40 TABLET, EXTENDED RELEASE ORAL ONCE
Status: COMPLETED | OUTPATIENT
Start: 2024-04-03 | End: 2024-04-03

## 2024-04-03 RX ORDER — PANTOPRAZOLE SODIUM 40 MG/10ML
40 INJECTION, POWDER, LYOPHILIZED, FOR SOLUTION INTRAVENOUS ONCE
Status: COMPLETED | OUTPATIENT
Start: 2024-04-03 | End: 2024-04-03

## 2024-04-03 RX ORDER — MAGNESIUM HYDROXIDE/ALUMINUM HYDROXICE/SIMETHICONE 120; 1200; 1200 MG/30ML; MG/30ML; MG/30ML
30 SUSPENSION ORAL ONCE
Status: COMPLETED | OUTPATIENT
Start: 2024-04-03 | End: 2024-04-03

## 2024-04-03 RX ADMIN — ALUMINUM HYDROXIDE, MAGNESIUM HYDROXIDE, AND DIMETHICONE 30 ML: 200; 20; 200 SUSPENSION ORAL at 19:19

## 2024-04-03 RX ADMIN — POTASSIUM CHLORIDE 40 MEQ: 1500 TABLET, EXTENDED RELEASE ORAL at 20:01

## 2024-04-03 RX ADMIN — LIDOCAINE HYDROCHLORIDE 15 ML: 20 SOLUTION ORAL at 19:19

## 2024-04-03 RX ADMIN — PANTOPRAZOLE SODIUM 40 MG: 40 INJECTION, POWDER, FOR SOLUTION INTRAVENOUS at 20:02

## 2024-04-03 NOTE — ED PROVIDER NOTES
History  Chief Complaint   Patient presents with    Heartburn     Pt reports having heartburn all day with no relief with medications. Reports being unable to burp. Denies nausea.     Patient complains of an indigestion-like feeling in her upper stomach all day today.  No change with eating.  No fevers or chills.  No nausea vomiting or diarrhea no rash.  No trauma.  Took a Tums without any relief.  No shortness of breath.  Has been compliant with her salt pills.  Has been compliant with her fluid restrictions.  No cough or cold symptoms.  No nausea or vomiting.  No radiation of the pain.      History provided by:  Patient   used: No    Abdominal Pain  Pain location:  Epigastric  Pain quality: burning    Pain radiates to:  Does not radiate  Pain severity:  Mild  Onset quality:  Gradual  Duration: Started this morning.  Timing:  Constant  Progression:  Unchanged  Chronicity:  New  Context: not alcohol use, not eating, not sick contacts and not suspicious food intake    Relieved by:  Nothing  Worsened by:  Nothing  Ineffective treatments: tums.  Associated symptoms: no anorexia, no chest pain, no chills, no constipation, no cough, no diarrhea, no dysuria, no fever, no hematuria, no nausea, no shortness of breath, no sore throat and no vomiting        Prior to Admission Medications   Prescriptions Last Dose Informant Patient Reported? Taking?   LORazepam (ATIVAN) 0.5 mg tablet  Self Yes No   Sig: Take 0.5 mg by mouth every 8 (eight) hours as needed for anxiety   Nebulizers (Comp Air Compressor Nebulizer) MISC   Yes No   Sig: As directed   amLODIPine (NORVASC) 10 mg tablet   Yes No   Sig: Take 10 mg by mouth daily at bedtime    atorvastatin (LIPITOR) 40 mg tablet   Yes No   Sig: Take 40 mg by mouth daily at bedtime    cholecalciferol (VITAMIN D3) 1,000 units tablet   Yes No   cloNIDine (CATAPRES) 0.2 mg tablet   Yes No   Sig: Take 0.2 mg by mouth every 12 (twelve) hours   fluticasone (FLONASE) 50  mcg/act nasal spray  Self No No   Si spray into each nostril daily   Patient taking differently: 1 spray into each nostril daily as needed for rhinitis or allergies   fluticasone-umeclidinium-vilanterol (Trelegy Ellipta) 200-62.5-25 mcg/actuation AEPB inhaler   No No   Sig: Inhale 1 puff daily Rinse mouth after use.   glipiZIDE (GLUCOTROL) 10 mg tablet  Self Yes No   Sig: Take 10 mg by mouth daily in the early morning W breakfast   hydrOXYzine HCL (ATARAX) 10 mg tablet   Yes No   Sig: Take 10 mg by mouth every 6 (six) hours as needed for itching   ipratropium-albuterol (DUO-NEB) 0.5-2.5 mg/3 mL nebulizer solution   No No   Sig: Take 3 mL by nebulization 3 (three) times a day   lisinopril (ZESTRIL) 40 mg tablet   Yes No   Sig: Take 40 mg by mouth daily   metoprolol tartrate (LOPRESSOR) 100 mg tablet   Yes No   Sig: Take 100 mg by mouth every 12 (twelve) hours   omeprazole (PriLOSEC OTC) 20 MG tablet   Yes No   Sig: Take 20 mg by mouth daily    sertraline (ZOLOFT) 50 mg tablet   No No   Sig: Take 1 tablet (50 mg total) by mouth daily   sodium chloride 1 g tablet   No No   Sig: Take 2 tablets (2 g total) by mouth 2 (two) times a day with meals   torsemide (DEMADEX) 10 mg tablet   No No   Sig: Take 1 tablet (10 mg total) by mouth daily      Facility-Administered Medications: None       Past Medical History:   Diagnosis Date    Chronic respiratory failure with hypoxia, on home O2 therapy  ([facility])     3L NC at home    COPD exacerbation ([facility])     COPD, group D, by GOLD 2017 classification ([facility])     Diabetes mellitus ([facility])     Diverticulosis of sigmoid colon     Dyslipidemia     Essential (primary) hypertension     NELSON (generalized anxiety disorder)     GERD (gastroesophageal reflux disease)     Lung nodule     RUL    SIADH (syndrome of inappropriate ADH production) ([facility])     Tobacco dependence     Vitamin D deficiency        History reviewed. No pertinent surgical history.    Family History   Problem Relation Age of  Onset    Diabetes Father      I have reviewed and agree with the history as documented.    E-Cigarette/Vaping    E-Cigarette Use Never User      E-Cigarette/Vaping Substances    Nicotine No     THC No     CBD No     Flavoring No      Social History     Tobacco Use    Smoking status: Every Day     Current packs/day: 0.25     Types: Cigarettes     Passive exposure: Never    Smokeless tobacco: Never   Vaping Use    Vaping status: Never Used   Substance Use Topics    Alcohol use: Not Currently    Drug use: Never       Review of Systems   Constitutional:  Negative for chills and fever.   HENT:  Negative for ear pain, hearing loss, sore throat, trouble swallowing and voice change.    Eyes:  Negative for pain and discharge.   Respiratory:  Negative for cough, shortness of breath and wheezing.    Cardiovascular:  Negative for chest pain and palpitations.   Gastrointestinal:  Positive for abdominal pain. Negative for anorexia, blood in stool, constipation, diarrhea, nausea and vomiting.   Genitourinary:  Negative for dysuria, flank pain, frequency and hematuria.   Musculoskeletal:  Negative for joint swelling, neck pain and neck stiffness.   Skin:  Negative for rash and wound.   Neurological:  Negative for dizziness, seizures, syncope, facial asymmetry and headaches.   Psychiatric/Behavioral:  Negative for hallucinations, self-injury and suicidal ideas.    All other systems reviewed and are negative.      Physical Exam  Physical Exam  Vitals and nursing note reviewed.   Constitutional:       General: She is not in acute distress.     Appearance: She is well-developed.   HENT:      Head: Normocephalic and atraumatic.      Right Ear: External ear normal.      Left Ear: External ear normal.   Eyes:      General: No scleral icterus.        Right eye: No discharge.         Left eye: No discharge.      Extraocular Movements: Extraocular movements intact.      Conjunctiva/sclera: Conjunctivae normal.   Cardiovascular:      Rate and  Rhythm: Normal rate and regular rhythm.      Heart sounds: Normal heart sounds. No murmur heard.  Pulmonary:      Effort: Pulmonary effort is normal.      Breath sounds: Normal breath sounds. No wheezing or rales.   Abdominal:      General: Bowel sounds are normal. There is no distension.      Palpations: Abdomen is soft.      Tenderness: There is abdominal tenderness. There is no guarding or rebound.      Comments: Mild tenderness in the epigastric region.  No guarding or rebound.   Musculoskeletal:         General: No deformity. Normal range of motion.      Cervical back: Normal range of motion and neck supple.   Skin:     General: Skin is warm and dry.      Findings: No rash.   Neurological:      General: No focal deficit present.      Mental Status: She is alert and oriented to person, place, and time.      Cranial Nerves: No cranial nerve deficit.   Psychiatric:         Mood and Affect: Mood normal.         Behavior: Behavior normal.         Thought Content: Thought content normal.         Judgment: Judgment normal.         Vital Signs  ED Triage Vitals [04/03/24 1906]   Temperature Pulse Respirations Blood Pressure SpO2   97.9 °F (36.6 °C) (!) 106 20 (!) 186/88 97 %      Temp Source Heart Rate Source Patient Position - Orthostatic VS BP Location FiO2 (%)   Temporal Monitor Lying Right arm --      Pain Score       --           Vitals:    04/03/24 1906   BP: (!) 186/88   Pulse: (!) 106   Patient Position - Orthostatic VS: Lying         Visual Acuity      ED Medications  Medications   aluminum-magnesium hydroxide-simethicone (MAALOX) oral suspension 30 mL (30 mL Oral Given 4/3/24 1919)   Lidocaine Viscous HCl (XYLOCAINE) 2 % mucosal solution 15 mL (15 mL Swish & Spit Given 4/3/24 1919)   potassium chloride (Klor-Con M20) CR tablet 40 mEq (40 mEq Oral Given 4/3/24 2001)   pantoprazole (PROTONIX) injection 40 mg (40 mg Intravenous Given 4/3/24 2002)       Diagnostic Studies  Results Reviewed       Procedure  Component Value Units Date/Time    HS Troponin I 4hr [[id number]]  (Normal) Collected: 04/03/24 2047    Lab Status: Final result Specimen: Blood from Arm, Right Updated: 04/03/24 2112     hs TnI 4hr 13 ng/L      Delta 4hr hsTnI 1 ng/L     HS Troponin I 2hr [[id number]]     Lab Status: No result Specimen: Blood     HS Troponin 0hr (reflex protocol) [[id number]]  (Normal) Collected: 04/03/24 1912    Lab Status: Final result Specimen: Blood from Arm, Right Updated: 04/03/24 1942     hs TnI 0hr 12 ng/L     Comprehensive metabolic panel [[id number]]  (Abnormal) Collected: 04/03/24 1912    Lab Status: Final result Specimen: Blood from Arm, Right Updated: 04/03/24 1938     Sodium 131 mmol/L      Potassium 3.2 mmol/L      Chloride 86 mmol/L      CO2 34 mmol/L      ANION GAP 11 mmol/L      BUN 9 mg/dL      Creatinine 0.81 mg/dL      Glucose 210 mg/dL      Calcium 9.8 mg/dL      AST 10 U/L      ALT 11 U/L      Alkaline Phosphatase 62 U/L      Total Protein 7.1 g/dL      Albumin 4.3 g/dL      Total Bilirubin 0.44 mg/dL      eGFR 75 ml/min/1.73sq m     Narrative:      [employer] guidelines for Chronic Kidney Disease (CKD):     Stage 1 with normal or high GFR (GFR > 90 mL/min/1.73 square meters)    Stage 2 Mild CKD (GFR = 60-89 mL/min/1.73 square meters)    Stage 3A Moderate CKD (GFR = 45-59 mL/min/1.73 square meters)    Stage 3B Moderate CKD (GFR = 30-44 mL/min/1.73 square meters)    Stage 4 Severe CKD (GFR = 15-29 mL/min/1.73 square meters)    Stage 5 End Stage CKD (GFR <15 mL/min/1.73 square meters)  Note: GFR calculation is accurate only with a steady state creatinine    Lipase [[id number]]  (Abnormal) Collected: 04/03/24 1912    Lab Status: Final result Specimen: Blood from Arm, Right Updated: 04/03/24 1938     Lipase 9 u/L     Lactic acid, plasma (w/reflex if result > 2.0) [[id number]]  (Normal) Collected: 04/03/24 1912    Lab Status: Final result Specimen: Blood from Arm, Right Updated: 04/03/24 1937      LACTIC ACID 1.9 mmol/L     Narrative:      Result may be elevated if tourniquet was used during collection.    Protime-INR [[id number]]  (Normal) Collected: 04/03/24 1912    Lab Status: Final result Specimen: Blood from Arm, Right Updated: 04/03/24 1930     Protime 12.5 seconds      INR 0.90    APTT [[id number]]  (Normal) Collected: 04/03/24 1912    Lab Status: Final result Specimen: Blood from Arm, Right Updated: 04/03/24 1930     PTT 27 seconds     CBC and differential [[id number]] Collected: 04/03/24 1912    Lab Status: Final result Specimen: Blood from Arm, Right Updated: 04/03/24 1918     WBC 8.84 Thousand/uL      RBC 4.30 Million/uL      Hemoglobin 12.3 g/dL      Hematocrit 38.2 %      MCV 89 fL      MCH 28.6 pg      MCHC 32.2 g/dL      RDW 12.5 %      MPV 10.2 fL      Platelets 162 Thousands/uL      nRBC 0 /100 WBCs      Neutrophils Relative 71 %      Immature Grans % 0 %      Lymphocytes Relative 18 %      Monocytes Relative 10 %      Eosinophils Relative 1 %      Basophils Relative 0 %      Neutrophils Absolute 6.24 Thousands/µL      Absolute Immature Grans 0.03 Thousand/uL      Absolute Lymphocytes 1.62 Thousands/µL      Absolute Monocytes 0.89 Thousand/µL      Eosinophils Absolute 0.04 Thousand/µL      Basophils Absolute 0.02 Thousands/µL                    No orders to display              Procedures  ECG 12 Lead Documentation Only    Date/Time: 4/3/2024 7:28 PM    Performed by: Justen Carroll MD  Authorized by: Justen Carroll MD    ECG reviewed by me, the ED Provider: yes    Patient location:  ED  Previous ECG:     Previous ECG:  Compared to current    Similarity:  No change  Rate:     ECG rate:  100  Rhythm:     Rhythm: sinus rhythm    Ectopy:     Ectopy: none    QRS:     QRS axis:  Normal           ED Course  ED Course as of 04/03/24 2113 Wed Apr 03, 2024 1917 Recent CAT scan showed no abnormalities in the abdominal cavity.  This was done approximately 2 weeks ago.             HEART Risk Score       Flowsheet Row Most Recent Value   Heart Score Risk Calculator    History 0 Filed at: 04/03/2024 2044   ECG 1 Filed at: 04/03/2024 2044   Age 2 Filed at: 04/03/2024 2044   Risk Factors 1 Filed at: 04/03/2024 2044   Troponin 0 Filed at: 04/03/2024 2044   HEART Score 4 Filed at: 04/03/2024 2044                          SBIRT 22yo+      Flowsheet Row Most Recent Value   Initial Alcohol Screen: US AUDIT-C     1. How often do you have a drink containing alcohol? 0 Filed at: 04/03/2024 1908   2. How many drinks containing alcohol do you have on a typical day you are drinking?  0 Filed at: 04/03/2024 1908   3b. FEMALE Any Age, or MALE 65+: How often do you have 4 or more drinks on one occassion? 0 Filed at: 04/03/2024 1908   Audit-C Score 0 Filed at: 04/03/2024 1908   LIVIER: How many times in the past year have you...    Used an illegal drug or used a prescription medication for non-medical reasons? Never Filed at: 04/03/2024 1908                      Medical Decision Making  Amount and/or Complexity of Data Reviewed  Labs: ordered. Decision-making details documented in ED Course.  Radiology: ordered. Decision-making details documented in ED Course.  ECG/medicine tests: ordered and independent interpretation performed. Decision-making details documented in ED Course.  Discussion of management or test interpretation with external provider(s): Differential diagnosis includes but not limited to STEMI, NSTEMI, cholelithiasis, cholecystitis, peptic ulcer disease, gastritis, pancreatitis, diverticulitis, colitis, bowel obstruction, appendicitis, UTI, ureteral stone, kidney stone, inflammatory bowel disease.    Risk  OTC drugs.  Prescription drug management.             Disposition  Final diagnoses:   Epigastric pain   Hypokalemia   Hyponatremia     Time reflects when diagnosis was documented in both MDM as applicable and the Disposition within this note       Time User Action Codes Description Comment    4/3/2024  7:39 PM  Justen Carroll [R10.13] Epigastric pain     4/3/2024  7:39 PM Justen Carroll Add [E87.6] Hypokalemia     4/3/2024  7:39 PM Justen Carroll [E87.1] Hyponatremia           ED Disposition       ED Disposition   Discharge    Condition   Stable    Date/Time   Wed Apr 3, 2024 2112    Comment   Jessika REID Viars discharge to home/self care.                   Follow-up Information    None         Patient's Medications   Discharge Prescriptions    No medications on file       No discharge procedures on file.    PDMP Review         Value Time User    PDMP Reviewed  Yes 3/17/2024 11:09 AM Ailyn Aguilera MD            ED Provider  Electronically Signed by             Justen Carroll MD  04/03/24 2113

## 2024-04-03 NOTE — DISCHARGE INSTRUCTIONS
Your potassium was on the low side of normal.  You are given a dose of potassium here to help correct this.    Your sodium today was on the low side of normal.  Please continue taking your salt pills and be adherent to your fluid restrictions

## 2024-04-04 ENCOUNTER — PATIENT OUTREACH (OUTPATIENT)
Dept: CASE MANAGEMENT | Facility: OTHER | Age: 69
End: 2024-04-04

## 2024-04-04 LAB
ATRIAL RATE: 99 BPM
P AXIS: 76 DEGREES
PR INTERVAL: 112 MS
QRS AXIS: 42 DEGREES
QRSD INTERVAL: 82 MS
QT INTERVAL: 340 MS
QTC INTERVAL: 436 MS
T WAVE AXIS: 52 DEGREES
VENTRICULAR RATE: 99 BPM

## 2024-04-04 NOTE — PROGRESS NOTES
Discharge follow up call placed to Jessika this afternoon. Patient discharged from 4/10/24.     Is this a good time for you to talk?     Yes    We want to make sure you continue to improve now that you are home. Do you have your discharge instructions/AVS?      Yes    Do you have your medication list?     Yes  Do you have your medications?     Yes  Are you able to afford your medications?     No  Are you taking your medications as ordered?     Yes  Do you have any questions about your medications?     Yes  Reviewed medication list with patient?   No; patient requested return call tomorrow around this same time    Are you experiencing any new or worsening symptoms?     No  Did you make your follow-up appointments?     Yes, 'I go to Dr. VAIL on Monday'  Do you need assistance scheduling follow-up appointments?      No  Do you have transportation for appointments & to  medications?     No  Do you have sufficient caregiver support at home?     Yes  Have you been contacted by, or visited by, Home Care Services?     No  Home health agency is N/A.  Educated on appropriate ED use?     Attempted to provide education on ED use when patient replied ' I had real bad heartburn.'      May I follow up with you again?    Yes    Before ending the call, patient reports wearing 3L O2 continuously; supplied by '[employer]  She also states just completing a breathing treatment and uses nebulizer 3-4 times day.     Per chart, patient receives DME / O2 supplies from [facility].     Will plan to outreach tomorrow @1245 / 1pm as requested.

## 2024-04-04 NOTE — PROGRESS NOTES
In basket ADT notifications received for patient being treated and released from  Emergency Department for c/o heartburn. Jessika reports no changes to diet and compliance with sodium tablets. Glucose level of 210, Potassium 3.2, Sodium 131 and lipase 9.  Troponin negative and EKG showed NSR.  She was administered Maalox, Potassium Chloride orally and IV Protonix.  She discharged home in stable condition.     Per chart, patient has PCP appt 4/8.     Will plan for outreach later today.

## 2024-04-05 ENCOUNTER — PATIENT OUTREACH (OUTPATIENT)
Dept: CASE MANAGEMENT | Facility: OTHER | Age: 69
End: 2024-04-05

## 2024-04-05 NOTE — PROGRESS NOTES
Outside records through [facility] requested and refreshed.  Chart review completed.      Will plan to outreach this afternoon as requested by patient.

## 2024-04-05 NOTE — ASSESSMENT & PLAN NOTE
History of COPD chronically on 2 L still smoking hypertension diabetes and anxiety presents with worsening shortness of breath found to have COPD exacerbation  · No infiltrates on CXR  · Continue IV methylprednisolone and bronchodilators  · Ceftriaxone for possibility of tracheobronchitis  · Advise smoking cessation. She states that she will stop after this hospitalization. Will provide nicotine patch. [facility]    History and Physical - Hospitalist Service       Date of Admission:  4/4/2024    Assessment & Plan   Savanna Rehman is a 83 year-old female with past medical history including chronic atrial fibrillation, hypertension, HFpEF, DM2, CRISTIANA, FERREIRA who presents initially to [facility] with n/v and abdominal pain, admitted for diverticulitis. Subsequently with light-headedness and syncopal episode associated with atrial fibrillation with SVR and transferred to [facility] 4/4/2024 for consideration of pacemaker placement.     Atrial fibrillation with slow ventricular rate, symptomatic  *Noted to have brief syncopal and persistent light-headedness associated with atrial fibrillation with SVR  *Echocardiogram with EF 55-60%, no significant changes from prior   *TSH normal, not on AV shannon blocking agents  *Evaluated by cardiology who discussed with EP and felt appropriate for pacemaker  - Admit to CICU/IMC  - EP consulted  - NPO for anticipated pacemaker   - Pacer pads in place  - Hold warfarin    Acute sigmoid diverticulitis with abscess  *Presented with left-lower quadrant pain, n/v, diarrhea  *CT abd/pelvis with acute sigmoid diverticulitis with possible adjacent 2.5 x 1.3 x 2.9 cm abscess  *Colorectal surgery following at [facility], not amenable to IR drainage   - Full liquid diet, NPO at midnight   - Colorectal surgery consult  - Piperacillin-tazobactam IV   - Antiemetics PRN  - Acetaminophen PRN  - Monitor fever curve, WBC count    Chronic HFpEF  Hypertension   *Off fluids   - Resume fluids with NPO  - Daily weights      Acute kidney injury on chronic kidney disease, stage 3  Baseline creatinine 1.1-1.3. Creatinine increased to 1.65 4/4/24. Received contrast 4/1, possibly contributing. Has been receiving frequent ASA as part of Fiorinal, also had furosemide resumed 4/3.   - Resume IVF  - Hold diuretics  - Avoid NSAIDs  - BMP in AM      Diabetes mellitus, type 2   HgbA1c  6.1% 2/8/2024. Prior to admission on glipizide XL 2.5 mg BID.  - Medium sliding scale insulin per NPO protocol  - Hold oral medications while hospitalized  - Hypoglycemia protocol     FERREIRA cirrhosis  Chronic Thrombocytopenia  LFTs wnl.  Plt 139.  CT revealed cirrhotic changes of the liver with associated secondary findings of portal venous hypertension which would include varicosities and splenomegaly. The portal veins are patent.   - Follow up with GI     Chronic diarrhea  Takes loperamide multiple times daily  - Resume loperamide when okay from CRS in setting of diverticulitis     Depression  - Continue prior to admission escitalopram     Obstructive sleep apnea  Non-compliant with CPAP  - Oxygen with sleep as needed     Right Buttocks Fluid Collection, suspected hematoma   Incidentally noted on CT imaging is a nonspecific fluid collection in the deep subcutaneous fat over the right buttocks region measuring 7.8 x 3.0 x 5.9 cm.  Likely due to hematoma as pt reports this has been present for months following a prior fall.  - Monitor       Diet: NPO per Anesthesia Guidelines for Procedure/Surgery Except for: Meds   DVT Prophylaxis: Warfarin  Aguilar Catheter: Not present  Code Status: Full Code     Disposition Plan   Admit to inpatient. Anticipate greater than or equal to 2 midnights prior to discharge.     Entered: Rashid Scanlon MD 04/05/2024, 3:14 AM     The patient's care was discussed with the patient        Clinically Significant Risk Factors Present on Admission               # Drug Induced Coagulation Defect: home medication list includes an anticoagulant medication  # Thrombocytopenia: Lowest platelets = 95 in last 2 days, will monitor for bleeding  # Acute Kidney Injury, unspecified: based on a >150% or 0.3 mg/dL increase in last creatinine compared to past 90 day average, will monitor renal function   # Chronic heart failure with preserved ejection fraction: heart failure noted on problem list and last  "echo with EF >50%     # Obesity: Estimated body mass index is 37.41 kg/m  as calculated from the following:    Height as of 4/1/24: 1.727 m (5' 8\").    Weight as of an earlier encounter on 4/4/24: 111.6 kg (246 lb 0.5 oz).                   Rashid Scanlon MD  [facility]    ______________________________________________________________________    Chief Complaint   Light headedness, loss of consciousness     History of Present Illness   Savanna Rehman is a 83 year old female who presents with the above chief complaint.    History is obtained from the patient and review of medical record. The patient initially presented to [facility] with nausea, vomiting and lower abdominal pain.  CT abdomen/pelvis with sigmoid diverticulitis with possible adjacent abscess.  She was admitted and managed conservatively with piperacillin-tazobactam IV and CRS consult, abscess not amenable to drainage. On 4/4 she had an brief episode of loss of consciousness when turning over in bed. She c/o ongoing \"dizziness\", when pressed described more as a light-headedness, worse with position changes. This was associated with atrial fibrillation with slow ventricular response.  Cardiology was consulted, after discussion with EP, it was felt she was appropriate candidate for pacemaker placement and she was transferred [facility] for this. At present she reports ongoing light-headedness and a mild headache (has chronic headaches and has been consistently taking Fiorinal). Denies any chest pain or shortness of breath.      Past Medical History    I have reviewed this patient's medical history and updated it with pertinent information if needed.   Past Medical History:   Diagnosis Date    Acute encephalopathy 09/21/2023    Anemia     Iron Deficiency anemia    Atrial fibrillation (H)     CAD (coronary artery disease)     non-obstructive    Chronic pain     neck, low back, legs    Congestive heart failure (H)     " Degenerative disk disease     Diabetes (H)     Fibromyalgia     Gastro-oesophageal reflux disease     Gout     Hiatal hernia     Mumps     Neuropathy     CRISTIANA (obstructive sleep apnea) - CPAP     Palpitations     Pernicious anemia     Sleep apnea     uses CPAP.    Urinary incontinence     Vitamin D deficiency        Past Surgical History   I have reviewed this patient's surgical history and updated it with pertinent information if needed.  Past Surgical History:   Procedure Laterality Date    APPENDECTOMY      CHOLECYSTECTOMY      COLONOSCOPY  3/15/2011    COLONOSCOPY N/A 3/15/2023    Procedure: COLONOSCOPY, WITH POLYPECTOMY AND BIOPSY;  Surgeon: Maci Coronel MD;  Location:  GI    COLONOSCOPY N/A 3/15/2023    Procedure: COLONOSCOPY, FLEXIBLE, WITH LESION REMOVAL USING SNARE;  Surgeon: Maci Coronel MD;  Location:  GI    CORONARY ANGIOGRAPHY ADULT ORDER      CYSTOSCOPY, BIOPSY BLADDER, COMBINED N/A 2020    Procedure: CYSTOSCOPY, WITH BLADDER BIOPSY;  Surgeon: Deisy Wilson MD;  Location:  OR    HEART CATH LEFT HEART CATH  16    medication management    HYSTERECTOMY TOTAL ABDOMINAL      Knee replacement NOS Left     LAPAROSCOPIC NISSEN FUNDOPLICATION N/A 2015    Procedure: LAPAROSCOPIC NISSEN FUNDOPLICATION;  Surgeon: Armando Ansari MD;  Location:  OR    TONSILLECTOMY      TRANSPOSITION ULNAR NERVE (ELBOW)           Social History   I have reviewed this patient's social history and updated it with pertinent information if needed.  Social History     Tobacco Use    Smoking status: Former     Packs/day: 0.50     Years: 50.00     Additional pack years: 0.00     Total pack years: 25.00     Types: Cigarettes     Quit date: 2014     Years since quittin.6     Passive exposure: Past    Smokeless tobacco: Never   Vaping Use    Vaping Use: Never used   Substance Use Topics    Alcohol use: Yes     Comment: couple a month    Drug use: Never          Family History   I have  reviewed this patient's family history and updated it with pertinent information if needed.   Family History   Problem Relation Age of Onset    Alzheimer Disease Mother     Lung Cancer Father     No Known Problems Brother     No Known Problems Brother     No Known Problems Brother     Unknown/Adopted No family hx of         Prior to Admission Medications     Prior to Admission Medications   Prescriptions Last Dose Informant Patient Reported? Taking?   Cholecalciferol (VITAMIN D-3) 125 MCG (5000 UT) TABS 4/1/2024 at am  No Yes   Sig: Take 5,000 Units by mouth daily   acetaminophen (TYLENOL) 500 MG tablet Past Week  Yes Yes   Sig: Take 1,000 mg by mouth every 6 hours as needed for mild pain   escitalopram (LEXAPRO) 5 MG tablet 4/3/2024 at pm  No Yes   Sig: Take 1 tablet by mouth once daily   famotidine (PEPCID) 20 MG tablet 4/1/2024  Yes Yes   Sig: Take 20 mg by mouth 2 times daily   furosemide (LASIX) 20 MG tablet 4/3/2024  No Yes   Sig: Take 1 tablet (20 mg) by mouth daily   glipiZIDE (GLUCOTROL XL) 2.5 MG 24 hr tablet 4/1/2024  No Yes   Sig: Take 1 tablet by mouth twice daily   indapamide (LOZOL) 2.5 MG tablet 4/1/2024  No Yes   Sig: Take 1 tablet (2.5 mg) by mouth every morning   loperamide (IMODIUM) 2 MG capsule 3/31/2024  No Yes   Sig: TAKE 1 CAPSULE BY MOUTH 4 TIMES DAILY AS NEEDED FOR DIARRHEA   warfarin ANTICOAGULANT (COUMADIN) 2.5 MG tablet 4/1/2024  Yes Yes   Sig: Take 2.5 mg by mouth once a week On Monday   warfarin ANTICOAGULANT (COUMADIN) 2.5 MG tablet 4/3/2024  Yes Yes   Sig: Take 3.75 mg by mouth six times a week Daily except on Mondays      Facility-Administered Medications: None     Allergies   Allergies   Allergen Reactions    Augmented Betamethasone Diprop [Betamethasone] Other (See Comments)     Severe yeast infection    Petroleum Jelly [Petrolatum] Anaphylaxis     Rash and swelling    Shellfish-Derived Products Anaphylaxis     Tongue swelling    Aspirin Swelling     tiongue swelling    Bacitracin       Rash swelling    Bactrim [Sulfamethoxazole-Trimethoprim] Dizziness    Coumadin [Warfarin] Swelling     Leg swelling    Darvon [Propoxyphene] Swelling     Throat closes    Dilaudid [Hydromorphone]      No side effects from fentanyl June 2023    Levaquin [Levofloxacin] Swelling     Tongue swelling    Lidocaine     Morphine Unknown     [facility]    Neomycin Swelling     rash    Neosporin [Neomycin-Polymyxin-Gramicidin] Swelling     rash    Nitrofurantoin      SOB, GI upset,    Oxycodone      Severe itching    Percocet [Oxycodone-Acetaminophen] Unknown    Percodan [Oxycodone-Aspirin]      Severe itching    Polymyxin B     Pramoxine     Tramadol     Vicodin [Hydrocodone-Acetaminophen]      Severe itching      Xarelto [Rivaroxaban]     Adhesive Tape Rash     Band aids     Codeine Rash    Hydrocortisone Rash and Swelling    Other Environmental Allergy Rash     Adhesive tape   Band aids        Physical Exam   Vital Signs: Temp: 97.5  F (36.4  C) Temp src: Oral BP: 122/51 Pulse: (!) 45   Resp: 17 SpO2: 94 % O2 Device: None (Room air)    Weight: 0 lbs 0 oz    Constitutional: Well-appearing, NAD  Respiratory: Clear to auscultation bilaterally, good air movement, normal effort   Cardiovascular: Irregularly irregular with slow rate. Mild bilateral lower extremity edema   GI: Soft, tender to palpation in LLQ. No rebound tenderness or guarding.   Skin: Warm, dry   Neurologic: Alert. Responding to questions appropriately. Following commands.   Psychiatric: Normal affect, appropriate      Data   Data reviewed today: I reviewed all medications, new labs and imaging results over the last 24 hours. I personally reviewed the EKG tracing showing atrial fibrillation with RVR .    Recent Labs   Lab 04/05/24  0230 04/04/24  2131 04/04/24  1459 04/04/24  1325 04/04/24  1324 04/04/24  0814 04/04/24  0659 04/03/24  0915 04/03/24  0721 04/02/24  2038 04/02/24  1719 04/02/24  0849 04/02/24  0632 04/01/24 2139  04/01/24  1625   WBC  --   --   --   --   --   --  3.6*  --  3.3*  --   --   --  4.2  --  4.5   HGB  --   --   --   --   --   --  12.1  --  11.6*  --   --   --  11.7  --  12.9   MCV  --   --   --   --   --   --  91  --  93  --   --   --  92  --  92   PLT  --   --   --   --   --   --  95*  --  105*  --   --   --  106*  --  139*   INR  --   --   --   --   --   --  2.55*  --  1.99*  --  2.04*  --   --   --  2.12*   NA  --   --   --   --   --   --  138  --  139  --   --   --  138  --  138   POTASSIUM  --   --   --   --  4.0  --  4.1  --  4.0  --   --   --  4.0  --  3.8   CHLORIDE  --   --   --   --   --   --  100  --  101  --   --   --  98  --  97*   CO2  --   --   --   --   --   --  26  --  25  --   --   --  29  --  32*   BUN  --   --   --   --   --   --  18.4  --  16.4  --   --   --  18.9  --  20.6   CR  --   --   --   --   --   --  1.65*  --  1.16*  --   --   --  1.25*  --  1.18*   ANIONGAP  --   --   --   --   --   --  12  --  13  --   --   --  11  --  9   SAUL  --   --   --   --   --   --  8.5*  --  8.6*  --   --   --  8.6*  --  9.2   GLC 93 93 103*   < >  --    < > 102*   < > 84   < >  --    < > 101*   < > 99   ALBUMIN  --   --   --   --   --   --   --   --   --   --   --   --  3.4*  --  3.9   PROTTOTAL  --   --   --   --   --   --   --   --   --   --   --   --  6.8  --  7.9   BILITOTAL  --   --   --   --   --   --   --   --   --   --   --   --  1.1  --  1.2   ALKPHOS  --   --   --   --   --   --   --   --   --   --   --   --  99  --  118   ALT  --   --   --   --   --   --   --   --   --   --   --   --  20  --  24   AST  --   --   --   --   --   --   --   --   --   --   --   --  41  --  44    < > = values in this interval not displayed.       Recent Results (from the past 24 hour(s))   Echocardiogram Complete   Result Value    LVEF  55-60%    [facility]    [id number]  [id number]  [id number]  [id number]^ANDERS^AL^LESLIE     [facility]  Echocardiography Laboratory  [address] [place]  [address]     Name: KRIS SANDERSON  MRN: [id number]  : 1940  Study Date: 2024 02:06 PM  Age: 83 yrs  Gender: Female  Patient Location: [facility]  Reason For Study: Atrial Fibrillation  Ordering Physician: NATHAN MCNAMARA  Performed By: Dale Liu RDCS     BSA: 2.2 m2  Height: 68 in  Weight: 246 lb  BP: 120/56 mmHg  ______________________________________________________________________________  Procedure  Complete Portable Echo Adult.  ______________________________________________________________________________  Interpretation Summary     Left ventricular systolic function is normal.The visual ejection fraction is  55-60%.  The right ventricular systolic function is normal.  The left atrium is moderately dilated.  There is mild (1+) mitral regurgitation.There is mild to moderate (1-2+)  tricuspid regurgitation.There is mild (1+) aortic regurgitation.  Dilation of the inferior vena cava is present with abnormal respiratory  variation in diameter.     Compared to prior study dated 2024 no significant changes  ______________________________________________________________________________  Left Ventricle  The left ventricle is normal in size. There is normal left ventricular wall  thickness. Left ventricular systolic function is normal. The visual ejection  fraction is 55-60%. Diastolic Doppler findings (E/E' ratio and/or other  parameters) suggest left ventricular filling pressures are indeterminate. No  regional wall motion abnormalities noted.     Right Ventricle  The right ventricle is normal size. The right ventricular systolic function is  normal.     Atria  The left atrium is moderately dilated. The right atrium is moderately dilated.  There is no color Doppler evidence of an atrial shunt.     Mitral Valve  There is mild (1+) mitral regurgitation.     Tricuspid Valve  There is mild to moderate (1-2+) tricuspid regurgitation. The right  ventricular systolic pressure is approximated at  24mmHg plus the right atrial  pressure.     Aortic Valve  There is mild trileaflet aortic sclerosis. There is mild (1+) aortic  regurgitation. No aortic stenosis is present.     Pulmonic Valve  There is mild (1+) pulmonic valvular regurgitation. There is no pulmonic  valvular stenosis.     Vessels  The aortic root is normal size. Normal size ascending aorta. Dilation of the  inferior vena cava is present with abnormal respiratory variation in diameter.     Pericardium  There is no pericardial effusion.     Rhythm  The rhythm was atrial fibrillation.  ______________________________________________________________________________  MMode/2D Measurements & Calculations  IVSd: 0.81 cm  LVIDd: 4.8 cm  LVIDs: 3.3 cm  LVPWd: 0.87 cm  IVC diam: 3.4 cm  FS: 31.3 %  LV mass(C)d: 133.5 grams  LV mass(C)dI: 59.8 grams/m2  Ao root diam: 3.2 cm  asc Aorta Diam: 3.5 cm  LVOT diam: 1.7 cm  LVOT area: 2.3 cm2  Ao root diam index Ht(cm/m): 1.9  Ao root diam index BSA (cm/m2): 1.5  Asc Ao diam index BSA (cm/m2): 1.6  Asc Ao diam index Ht(cm/m): 2.0  EF Biplane: 60.2 %  LA Volume (BP): 71.2 ml     LA Volume Index (BP): 31.9 ml/m2  RWT: 0.36  TAPSE: 2.2 cm     Time Measurements  Aortic HR: 43.0 BPM     Doppler Measurements & Calculations  MV E max everett: 126.3 cm/sec  AI P1/2t: 691.8 msec  LV V1 max P.6 mmHg  LV V1 max: 107.0 cm/sec  LV V1 VTI: 27.5 cm  CO(LVOT): 2.7 l/min  CI(LVOT): 1.2 l/min/m2  SV(LVOT): 62.8 ml  SI(LVOT): 28.2 ml/m2     PA acc time: 0.11 sec  TR max everett: 235.5 cm/sec  TR max P.3 mmHg  E/E' av.0  Lateral E/e': 8.5  Medial E/e': 9.4  RV S Everett: 10.6 cm/sec     ______________________________________________________________________________  Report approved by: June Blas 2024 02:48 PM

## 2024-04-05 NOTE — PROGRESS NOTES
Call placed to patient this afternoon as requested; no answer.  Left generic msg with name, call back number, office hours and request to return call.     Next outreach scheduled.

## 2024-04-22 ENCOUNTER — PATIENT OUTREACH (OUTPATIENT)
Dept: CASE MANAGEMENT | Facility: OTHER | Age: 69
End: 2024-04-22

## 2024-04-22 NOTE — PROGRESS NOTES
In basket reminder received to review chart and outreach to patient today. Outside records through Care Everywhere requested and refreshed.  Chart review completed.      Jessika attended PCP appt on 4/8; BP soft at 92/58, Pulse 85, RR 24, O2 sat 95% on 3L, temp 96.6 F and weight 152.1 pounds.   Provider notes patient continues to smoke and is directed to continue all medications and follow up with specialists.     Labwork post appt drawn: Glucose 328, CO2 34, Chloride 89 and Sodium 134.      Patient with PCP appt noted 4/16 for c/o lump in rectal area.  She admits to constipation sometimes straining with bowel movements. Provider determine it to be a skin tag and suggested metamucil daily to aid with constipation.     Care management call placed to patient this am.   Her daughter Mel answered stating patient was sleeping.  Daughter wrote down and repeated back RN CM phone number.     Will remain available to assist and await return call.

## 2024-04-23 ENCOUNTER — OFFICE VISIT (OUTPATIENT)
Dept: NEPHROLOGY | Facility: CLINIC | Age: 69
End: 2024-04-23
Payer: COMMERCIAL

## 2024-04-23 VITALS
SYSTOLIC BLOOD PRESSURE: 142 MMHG | BODY MASS INDEX: 27.98 KG/M2 | OXYGEN SATURATION: 98 % | HEART RATE: 68 BPM | WEIGHT: 153 LBS | DIASTOLIC BLOOD PRESSURE: 82 MMHG

## 2024-04-23 DIAGNOSIS — E87.1 ACUTE HYPONATREMIA: ICD-10-CM

## 2024-04-23 DIAGNOSIS — N18.2 STAGE 2 CHRONIC KIDNEY DISEASE: Primary | ICD-10-CM

## 2024-04-23 DIAGNOSIS — E22.2 SYNDROME OF INAPPROPRIATE SECRETION OF ANTIDIURETIC HORMONE (HCC): ICD-10-CM

## 2024-04-23 PROCEDURE — 99213 OFFICE O/P EST LOW 20 MIN: CPT

## 2024-04-23 NOTE — PROGRESS NOTES
OFFICE FOLLOW UP - Nephrology   Jessika Lux 69 y.o. female MRN: [id number]     Interval HPI  I had the pleasure of seeing Jessika Lux who returns to office for hospital follow-up for hyponatremia.  She had presented for shortness of breath with a history of COPD and chronically on 3 L nasal cannula.  She was found to have a sodium of 119 on admission and she was asymptomatic at that time. She also had a prior recent hospitalization for hyponatremia suspected due to SIADH and had received a dose of tolvaptan.  She was started on 1.8% saline during this hospitalization. Etiology was thought to be secondary to decreased solute intake, possible SIADH from pulmonary status.  CT of the abdomen pelvis was pursued for further workup of pulmonary nodule with no finding suspicious for malignancy.  She was placed on sodium chloride tablets 3 g 3 times a day, 1 L fluid restriction and nutritional supplements to increase solute intake, torsemide 10 mg daily.  She was discharged on sodium chloride tablets 2 g 3 times a day and 1500 mL fluid restriction  Since discharge, the patient has no complaints at this time and is feeling well. Patient denies any chest pain, shortness of breath or swelling. The last blood work was done on 4/8  which we have reviewed together.      ASSESSMENT and PLAN:  Jessika was seen today for follow-up.    Diagnoses and all orders for this visit:    Stage 2 chronic kidney disease    Acute hyponatremia  -     Ambulatory referral to Nephrology  -     Phosphorus; Future  -     Magnesium; Future  -     Protein / creatinine ratio, urine; Future  -     PTH, intact; Future  -     Basic metabolic panel; Future  -     CBC; Future  -     Urinalysis with microscopic; Future  -     Albumin / creatinine urine ratio; Future    Syndrome of inappropriate secretion of antidiuretic hormone (HCC)      Hyponatremia  Continue with salt tablets, fluid restriction, and torsemide 5 mg daily. Encourage protein/solute  intake.   Volume status is fairly euvolemic   Serum sodium corrects to normal when correcting for glucose   Educated on pathophysiology of SIADH    Hypertension:  Current /80  Currently on amlodipine 10 mg, metoprolol 100 mg BID  Recommend low sodium diet  Recommend Hme BP monitoring  Recommended diet and low impact exercise for weight loss and health benefits    Type 2 diabetes  Educated on dietary restrictions, continue management per PCP      Medical records through [facility] and [facility] and care everywhere been reviewed for this patient encounter        Patient Instructions   It was nice seeing you today. Your kidney function is stable. Please follow up with us in 6-9 months. I have ordered lab work for you to get done before then. In the meantime, please avoid NSAIDs and have a healthy diet and exercise. Please continue with a 1.5 L fluid restriction. Continue salt tablets and increase protein intake. Continue torsemide 5 mg daily.           ROS:   Review of Systems   Constitutional:  Negative for chills and fever.   Respiratory:  Negative for cough and shortness of breath.    Cardiovascular:  Negative for chest pain and leg swelling.   Gastrointestinal:  Negative for abdominal pain, diarrhea, nausea and vomiting.   Genitourinary:  Negative for dysuria and hematuria.   Neurological:  Negative for dizziness and headaches.        Allergies: Clindamycin    Medications:   Current Outpatient Medications:     amLODIPine (NORVASC) 10 mg tablet, Take 10 mg by mouth daily at bedtime , Disp: , Rfl:     atorvastatin (LIPITOR) 40 mg tablet, Take 40 mg by mouth daily at bedtime , Disp: , Rfl:     cholecalciferol (VITAMIN D3) 1,000 units tablet, , Disp: , Rfl:     cloNIDine (CATAPRES) 0.2 mg tablet, Take 0.2 mg by mouth every 12 (twelve) hours, Disp: , Rfl:     fluticasone (FLONASE) 50 mcg/act nasal spray, 1 spray into each nostril daily (Patient taking differently: 1 spray into each nostril daily as needed for rhinitis or  allergies), Disp: 9.9 mL, Rfl: 0    fluticasone-umeclidinium-vilanterol (Trelegy Ellipta) 200-62.5-25 mcg/actuation AEPB inhaler, Inhale 1 puff daily Rinse mouth after use., Disp: 60 blister, Rfl: 5    glipiZIDE (GLUCOTROL) 10 mg tablet, Take 10 mg by mouth daily in the early morning W breakfast, Disp: , Rfl:     hydrOXYzine HCL (ATARAX) 10 mg tablet, Take 10 mg by mouth every 6 (six) hours as needed for itching, Disp: , Rfl:     ipratropium-albuterol (DUO-NEB) 0.5-2.5 mg/3 mL nebulizer solution, Take 3 mL by nebulization 3 (three) times a day, Disp: 180 mL, Rfl: 3    lisinopril (ZESTRIL) 40 mg tablet, Take 40 mg by mouth daily, Disp: , Rfl:     LORazepam (ATIVAN) 0.5 mg tablet, Take 0.5 mg by mouth every 8 (eight) hours as needed for anxiety, Disp: , Rfl:     metoprolol tartrate (LOPRESSOR) 100 mg tablet, Take 100 mg by mouth every 12 (twelve) hours, Disp: , Rfl:     Nebulizers (Comp Air Compressor Nebulizer) MISC, As directed, Disp: , Rfl:     omeprazole (PriLOSEC OTC) 20 MG tablet, Take 20 mg by mouth daily , Disp: , Rfl:     sertraline (ZOLOFT) 50 mg tablet, Take 1 tablet (50 mg total) by mouth daily, Disp: 30 tablet, Rfl: 2    sodium chloride 1 g tablet, Take 2 tablets (2 g total) by mouth 2 (two) times a day with meals, Disp: 120 tablet, Rfl: 0    torsemide (DEMADEX) 10 mg tablet, Take 1 tablet (10 mg total) by mouth daily (Patient taking differently: Take 5 mg by mouth daily), Disp: 30 tablet, Rfl: 0    Past Medical History:   Diagnosis Date    Chronic respiratory failure with hypoxia, on home O2 therapy  (HCC)     3L NC at home    COPD exacerbation (HCC)     COPD, group D, by GOLD 2017 classification (HCC)     Diabetes mellitus (HCC)     Diverticulosis of sigmoid colon     Dyslipidemia     Essential (primary) hypertension     NELSON (generalized anxiety disorder)     GERD (gastroesophageal reflux disease)     Lung nodule     RUL    SIADH (syndrome of inappropriate ADH production) (HCC)     Tobacco dependence      Vitamin D deficiency      History reviewed. No pertinent surgical history.  Family History   Problem Relation Age of Onset    Diabetes Father       reports that she has been smoking cigarettes. She has never been exposed to tobacco smoke. She has never used smokeless tobacco. She reports that she does not currently use alcohol. She reports that she does not use drugs.      Physical Exam:   Vitals:    04/23/24 0843   BP: 142/82   BP Location: Left arm   Patient Position: Sitting   Cuff Size: Standard   Pulse: 68   SpO2: 98%   Weight: 69.4 kg (153 lb)     Body mass index is 27.98 kg/m².  Physical Exam  Constitutional:       General: She is not in acute distress.  HENT:      Head: Normocephalic and atraumatic.   Cardiovascular:      Rate and Rhythm: Normal rate and regular rhythm.      Pulses: Normal pulses.      Heart sounds: No murmur heard.  Pulmonary:      Effort: Pulmonary effort is normal. No respiratory distress.      Breath sounds: Normal breath sounds.   Abdominal:      General: Bowel sounds are normal.      Palpations: Abdomen is soft.      Tenderness: There is no abdominal tenderness.   Musculoskeletal:      Right lower leg: Edema present.      Left lower leg: Edema present.   Skin:     General: Skin is warm and dry.   Neurological:      General: No focal deficit present.      Mental Status: She is alert.   Psychiatric:         Mood and Affect: Mood normal.         Behavior: Behavior normal.            Lab Results:  Results for orders placed or performed during the hospital encounter of 04/03/24   CBC and differential   Result Value Ref Range    WBC 8.84 4.31 - 10.16 Thousand/uL    RBC 4.30 3.81 - 5.12 Million/uL    Hemoglobin 12.3 11.5 - 15.4 g/dL    Hematocrit 38.2 34.8 - 46.1 %    MCV 89 82 - 98 fL    MCH 28.6 26.8 - 34.3 pg    MCHC 32.2 31.4 - 37.4 g/dL    RDW 12.5 11.6 - 15.1 %    MPV 10.2 8.9 - 12.7 fL    Platelets 162 149 - 390 Thousands/uL    nRBC 0 /100 WBCs    Segmented % 71 43 - 75 %     "Immature Grans % 0 0 - 2 %    Lymphocytes % 18 14 - 44 %    Monocytes % 10 4 - 12 %    Eosinophils Relative 1 0 - 6 %    Basophils Relative 0 0 - 1 %    Absolute Neutrophils 6.24 1.85 - 7.62 Thousands/µL    Absolute Immature Grans 0.03 0.00 - 0.20 Thousand/uL    Absolute Lymphocytes 1.62 0.60 - 4.47 Thousands/µL    Absolute Monocytes 0.89 0.17 - 1.22 Thousand/µL    Eosinophils Absolute 0.04 0.00 - 0.61 Thousand/µL    Basophils Absolute 0.02 0.00 - 0.10 Thousands/µL   Protime-INR   Result Value Ref Range    Protime 12.5 11.6 - 14.5 seconds    INR 0.90 0.84 - 1.19   APTT   Result Value Ref Range    PTT 27 23 - 37 seconds   Comprehensive metabolic panel   Result Value Ref Range    Sodium 131 (L) 135 - 147 mmol/L    Potassium 3.2 (L) 3.5 - 5.3 mmol/L    Chloride 86 (L) 96 - 108 mmol/L    CO2 34 (H) 21 - 32 mmol/L    ANION GAP 11 4 - 13 mmol/L    BUN 9 5 - 25 mg/dL    Creatinine 0.81 0.60 - 1.30 mg/dL    Glucose 210 (H) 65 - 140 mg/dL    Calcium 9.8 8.4 - 10.2 mg/dL    AST 10 (L) 13 - 39 U/L    ALT 11 7 - 52 U/L    Alkaline Phosphatase 62 34 - 104 U/L    Total Protein 7.1 6.4 - 8.4 g/dL    Albumin 4.3 3.5 - 5.0 g/dL    Total Bilirubin 0.44 0.20 - 1.00 mg/dL    eGFR 75 ml/min/1.73sq m   Lipase   Result Value Ref Range    Lipase 9 (L) 11 - 82 u/L   Lactic acid, plasma (w/reflex if result > 2.0)   Result Value Ref Range    LACTIC ACID 1.9 0.5 - 2.0 mmol/L   HS Troponin 0hr (reflex protocol)   Result Value Ref Range    hs TnI 0hr 12 \"Refer to ACS Flowchart\"- see link ng/L   HS Troponin I 4hr   Result Value Ref Range    hs TnI 4hr 13 \"Refer to ACS Flowchart\"- see link ng/L    Delta 4hr hsTnI 1 <20 ng/L   ECG 12 lead   Result Value Ref Range    Ventricular Rate 99 BPM    Atrial Rate 99 BPM    MD Interval 112 ms    QRSD Interval 82 ms    QT Interval 340 ms    QTC Interval 436 ms    P Axis 76 degrees    QRS Axis 42 degrees    T Wave Axis 52 degrees             Invalid input(s): \"ALBUMIN\"        Portions of the record may have " "been created with voice recognition software. Occasional wrong word or \"sound a like\" substitutions may have occurred due to the inherent limitations of voice recognition software. Read the chart carefully and recognize,    "

## 2024-04-23 NOTE — PATIENT INSTRUCTIONS
It was nice seeing you today. Your kidney function is stable. Please follow up with us in 6-9 months. I have ordered lab work for you to get done before then. In the meantime, please avoid NSAIDs and have a healthy diet and exercise. Please continue with a 1.5 L fluid restriction. Continue salt tablets and increase protein intake. Continue torsemide 5 mg daily.

## 2024-04-29 ENCOUNTER — PATIENT OUTREACH (OUTPATIENT)
Dept: CASE MANAGEMENT | Facility: OTHER | Age: 69
End: 2024-04-29

## 2024-04-29 NOTE — LETTER
Date: 04/29/24    Dear Jessika Lux,   My name is Jose Guadalupe Ribera; I am a registered nurse care manager working with [employer] [facility] and [facility].  I have not been able to reach you and would like to set a time that I can talk with you over the phone.    My work is to help patients that have recently been in the hospital or emergency room get access to the care they need.  This includes coordination of appointments, providing education on medical conditions and medications along with offering assistance in locating community resources should you be experiencing financial hardship with food, heating, housing and / or transportation.   Please kindly call me at the number below as I would like to set a time that I can talk with you over the phone.   I look forward to hearing from you.  Sincerely,    Jose Guadalupe Ribera RN  Lead Outpatient Care Manager  P. [phone] (this number does not accept text messages)  [email or web address] [email or web address]  W. [email or web address]  Office Hours:  Mon - Friday 8am - 430p

## 2024-04-29 NOTE — PROGRESS NOTES
In basket reminder received to send unable to reach letter to patient today if no response.     Unable to reach letter drafted today.    In basket sent to Department  with request to mail letter to patient to address on file:  [address] [id number]     Will remain available to assist and review chart again 30 days from today or sooner if utilization present.

## 2024-05-20 ENCOUNTER — DOCUMENTATION (OUTPATIENT)
Dept: CASE MANAGEMENT | Facility: OTHER | Age: 69
End: 2024-05-20

## 2024-05-20 NOTE — MEDICAL HIGH UTILIZER
High Utilizer Care Plan for: Jessika Lux  Updated: 2024  Patient Name: Jessika Lux MRN: [id number]       : 1955    Age: 69   Sex: F      Utilization Background: In the 6 months prior to the care plan being written the patient has had 21 ED Visits and 4 IP Admissions secondary to dyspnea/COPD/anxiety.     In the last 90 Days: 4 ED visits, 2 IP Admissions    Most Recent Work Up:    CXR 3/2024: Slightly limited but unremarkable chest radiograph.      CT Chest 2023: Slight increase in size of a 7 mm right upper lobe nodule with irregular borders. Neoplasm is in the differential.    CT Chest, Abdomen, Pelvis 3/2024 Irregular shaped nodule along the pleural surface in the posterior right lung apex, 7 x 6 mm in size, unchanged in size and CT appearance at least as far back as 2023; because this is somewhat linear on sagittal imaging and is unchanged for at least 8 months, the findings are most suspicious for a focal subpleural scar. No CT findings to suggest developing malignancy or to convincingly account for the patient's unexpected weight loss. Sigmoid diverticulosis without acute diverticulitis.  Treatment Recommendations:  ED Recommendations: Patient will typically present with COPD/dyspnea. At the patient's baseline she wears 4 L NC O2 and becomes breathless with minimal exertion. This causes the patient to become very anxious.     Recommend evaluation with basic blood work, chest x-ray likely unavoidable given history and chief complaint. Can consider treatment with corticosteroid and nebulized breathing treatment of provider choice. Would also consider giving patient her home Atarax dose or other anxiolytic of provider choice.     If after treatment the patient is back to her baseline would recommend discharge home with outpatient follow up with pulmonary disease. If discharging home would recommend short course of prednisone taper per provider discretion.     Would refer patient to  Palliative Care or encourage follow up for better control of anxiety and symptom control.     Inpatient Recommendations: Would refrain from admission if patient is at her baseline 3 L O2 and satting well. Can consider psych consult as has been done in the past if anxiety remains uncontrolled. If true COPD exacerbation can consult Pulmonary disease and continue treatment with steroids IV and nebulized breathing treatments.         Outpatient recommendations: Patient should establish with Palliative Care for better anxiety/symptom management. Continue to follow up closely with Pulmonary Disease.      Situation: Patient is a 69 year old female with history of COPD, Chronic Respiratory Failure on Oxygen, and Anxiety presenting frequently with COPD exacerbation/anxiety driven by her dyspnea.      PMH/PSH: COPD, Chronic Respiratory Failure on 4 L O2, Anxiety, T2DM, HTN, GERD, Tobacco Abuse      Assessment                              Drivers of repeated utilization:                 Dyspnea  COPD  Anxiety     Community Resources in place:    Needs Palliative Care Follow Up    Patient Care Team:   Karlie Bruno DO - PCP ([place])  [facility] [place]   Jose Guadalupe Ribera RN - Outpatient RN Care Manager              Care plan date and owner: Rc Mistry Jr., PA-C   1/30/2024     Reviewed with patient before discharge?   No

## 2024-05-21 ENCOUNTER — HOSPITAL ENCOUNTER (INPATIENT)
Facility: HOSPITAL | Age: 69
LOS: 5 days | Discharge: HOME | DRG: 643 | End: 2024-05-26
Attending: STUDENT IN AN ORGANIZED HEALTH CARE EDUCATION/TRAINING PROGRAM | Admitting: ANESTHESIOLOGY
Payer: COMMERCIAL

## 2024-05-21 ENCOUNTER — APPOINTMENT (EMERGENCY)
Dept: RADIOLOGY | Facility: HOSPITAL | Age: 69
DRG: 643 | End: 2024-05-21
Payer: COMMERCIAL

## 2024-05-21 DIAGNOSIS — J44.1 COPD WITH ACUTE EXACERBATION (HCC): Primary | ICD-10-CM

## 2024-05-21 DIAGNOSIS — J44.1 COPD EXACERBATION (HCC): ICD-10-CM

## 2024-05-21 DIAGNOSIS — E87.1 HYPONATREMIA: ICD-10-CM

## 2024-05-21 LAB
2HR DELTA HS TROPONIN: -1 NG/L
4HR DELTA HS TROPONIN: 0 NG/L
ALBUMIN SERPL BCP-MCNC: 4.4 G/DL (ref 3.5–5)
ALP SERPL-CCNC: 96 U/L (ref 34–104)
ALT SERPL W P-5'-P-CCNC: 8 U/L (ref 7–52)
ANION GAP SERPL CALCULATED.3IONS-SCNC: 7 MMOL/L (ref 4–13)
ANION GAP SERPL CALCULATED.3IONS-SCNC: 8 MMOL/L (ref 4–13)
AST SERPL W P-5'-P-CCNC: 14 U/L (ref 13–39)
ATRIAL RATE: 79 BPM
BACTERIA UR QL AUTO: ABNORMAL /HPF
BASOPHILS # BLD AUTO: 0.01 THOUSANDS/ÂΜL (ref 0–0.1)
BASOPHILS NFR BLD AUTO: 0 % (ref 0–1)
BILIRUB SERPL-MCNC: 0.6 MG/DL (ref 0.2–1)
BILIRUB UR QL STRIP: NEGATIVE
BNP SERPL-MCNC: 95 PG/ML (ref 0–100)
BUN SERPL-MCNC: 5 MG/DL (ref 5–25)
BUN SERPL-MCNC: 6 MG/DL (ref 5–25)
CALCIUM SERPL-MCNC: 9.4 MG/DL (ref 8.4–10.2)
CALCIUM SERPL-MCNC: 9.6 MG/DL (ref 8.4–10.2)
CARDIAC TROPONIN I PNL SERPL HS: 4 NG/L
CARDIAC TROPONIN I PNL SERPL HS: 5 NG/L
CARDIAC TROPONIN I PNL SERPL HS: 5 NG/L
CHLORIDE SERPL-SCNC: 73 MMOL/L (ref 96–108)
CHLORIDE SERPL-SCNC: 75 MMOL/L (ref 96–108)
CLARITY UR: ABNORMAL
CO2 SERPL-SCNC: 35 MMOL/L (ref 21–32)
CO2 SERPL-SCNC: 38 MMOL/L (ref 21–32)
COLOR UR: YELLOW
CORTIS SERPL-MCNC: 44.3 UG/DL
CREAT SERPL-MCNC: 0.39 MG/DL (ref 0.6–1.3)
CREAT SERPL-MCNC: 0.45 MG/DL (ref 0.6–1.3)
EOSINOPHIL # BLD AUTO: 0.01 THOUSAND/ÂΜL (ref 0–0.61)
EOSINOPHIL NFR BLD AUTO: 0 % (ref 0–6)
ERYTHROCYTE [DISTWIDTH] IN BLOOD BY AUTOMATED COUNT: 12.1 % (ref 11.6–15.1)
GFR SERPL CREATININE-BSD FRML MDRD: 104 ML/MIN/1.73SQ M
GFR SERPL CREATININE-BSD FRML MDRD: 109 ML/MIN/1.73SQ M
GLUCOSE SERPL-MCNC: 138 MG/DL (ref 65–140)
GLUCOSE SERPL-MCNC: 161 MG/DL (ref 65–140)
GLUCOSE SERPL-MCNC: 212 MG/DL (ref 65–140)
GLUCOSE SERPL-MCNC: 230 MG/DL (ref 65–140)
GLUCOSE SERPL-MCNC: 255 MG/DL (ref 65–140)
GLUCOSE UR STRIP-MCNC: NEGATIVE MG/DL
HCT VFR BLD AUTO: 37.3 % (ref 34.8–46.1)
HGB BLD-MCNC: 12.9 G/DL (ref 11.5–15.4)
HGB UR QL STRIP.AUTO: ABNORMAL
IMM GRANULOCYTES # BLD AUTO: 0.03 THOUSAND/UL (ref 0–0.2)
IMM GRANULOCYTES NFR BLD AUTO: 0 % (ref 0–2)
KETONES UR STRIP-MCNC: ABNORMAL MG/DL
LEUKOCYTE ESTERASE UR QL STRIP: NEGATIVE
LIPASE SERPL-CCNC: <6 U/L (ref 11–82)
LYMPHOCYTES # BLD AUTO: 0.91 THOUSANDS/ÂΜL (ref 0.6–4.47)
LYMPHOCYTES NFR BLD AUTO: 12 % (ref 14–44)
MAGNESIUM SERPL-MCNC: 1.6 MG/DL (ref 1.9–2.7)
MCH RBC QN AUTO: 28.7 PG (ref 26.8–34.3)
MCHC RBC AUTO-ENTMCNC: 34.6 G/DL (ref 31.4–37.4)
MCV RBC AUTO: 83 FL (ref 82–98)
MONOCYTES # BLD AUTO: 0.55 THOUSAND/ÂΜL (ref 0.17–1.22)
MONOCYTES NFR BLD AUTO: 7 % (ref 4–12)
NEUTROPHILS # BLD AUTO: 6.33 THOUSANDS/ÂΜL (ref 1.85–7.62)
NEUTS SEG NFR BLD AUTO: 81 % (ref 43–75)
NITRITE UR QL STRIP: NEGATIVE
NON-SQ EPI CELLS URNS QL MICRO: ABNORMAL /HPF
NRBC BLD AUTO-RTO: 0 /100 WBCS
OSMOLALITY UR/SERPL-RTO: 257 MMOL/KG (ref 282–298)
OTHER STN SPEC: ABNORMAL
P AXIS: 50 DEGREES
PH UR STRIP.AUTO: 6.5 [PH]
PLATELET # BLD AUTO: 212 THOUSANDS/UL (ref 149–390)
PMV BLD AUTO: 9.8 FL (ref 8.9–12.7)
POTASSIUM SERPL-SCNC: 3.8 MMOL/L (ref 3.5–5.3)
POTASSIUM SERPL-SCNC: 4 MMOL/L (ref 3.5–5.3)
PR INTERVAL: 100 MS
PROT SERPL-MCNC: 7.1 G/DL (ref 6.4–8.4)
PROT UR STRIP-MCNC: ABNORMAL MG/DL
QRS AXIS: 47 DEGREES
QRSD INTERVAL: 84 MS
QT INTERVAL: 390 MS
QTC INTERVAL: 447 MS
RBC # BLD AUTO: 4.5 MILLION/UL (ref 3.81–5.12)
RBC #/AREA URNS AUTO: ABNORMAL /HPF
SODIUM 24H UR-SCNC: 69 MOL/L
SODIUM SERPL-SCNC: 118 MMOL/L (ref 135–147)
SODIUM SERPL-SCNC: 118 MMOL/L (ref 135–147)
SP GR UR STRIP.AUTO: 1.02 (ref 1–1.03)
T WAVE AXIS: 65 DEGREES
UROBILINOGEN UR QL STRIP.AUTO: 0.2 E.U./DL
VENTRICULAR RATE: 79 BPM
WBC # BLD AUTO: 7.84 THOUSAND/UL (ref 4.31–10.16)
WBC #/AREA URNS AUTO: ABNORMAL /HPF

## 2024-05-21 PROCEDURE — 84300 ASSAY OF URINE SODIUM: CPT | Performed by: PHYSICIAN ASSISTANT

## 2024-05-21 PROCEDURE — 83735 ASSAY OF MAGNESIUM: CPT | Performed by: EMERGENCY MEDICINE

## 2024-05-21 PROCEDURE — 94640 AIRWAY INHALATION TREATMENT: CPT

## 2024-05-21 PROCEDURE — 83690 ASSAY OF LIPASE: CPT | Performed by: EMERGENCY MEDICINE

## 2024-05-21 PROCEDURE — 83930 ASSAY OF BLOOD OSMOLALITY: CPT | Performed by: PHYSICIAN ASSISTANT

## 2024-05-21 PROCEDURE — 93010 ELECTROCARDIOGRAM REPORT: CPT | Performed by: INTERNAL MEDICINE

## 2024-05-21 PROCEDURE — 81001 URINALYSIS AUTO W/SCOPE: CPT | Performed by: EMERGENCY MEDICINE

## 2024-05-21 PROCEDURE — 80048 BASIC METABOLIC PNL TOTAL CA: CPT | Performed by: PHYSICIAN ASSISTANT

## 2024-05-21 PROCEDURE — 96366 THER/PROPH/DIAG IV INF ADDON: CPT

## 2024-05-21 PROCEDURE — 94760 N-INVAS EAR/PLS OXIMETRY 1: CPT

## 2024-05-21 PROCEDURE — 80053 COMPREHEN METABOLIC PANEL: CPT | Performed by: EMERGENCY MEDICINE

## 2024-05-21 PROCEDURE — 82533 TOTAL CORTISOL: CPT | Performed by: PHYSICIAN ASSISTANT

## 2024-05-21 PROCEDURE — 99285 EMERGENCY DEPT VISIT HI MDM: CPT

## 2024-05-21 PROCEDURE — 83880 ASSAY OF NATRIURETIC PEPTIDE: CPT | Performed by: EMERGENCY MEDICINE

## 2024-05-21 PROCEDURE — 84484 ASSAY OF TROPONIN QUANT: CPT | Performed by: EMERGENCY MEDICINE

## 2024-05-21 PROCEDURE — 96361 HYDRATE IV INFUSION ADD-ON: CPT

## 2024-05-21 PROCEDURE — 93005 ELECTROCARDIOGRAM TRACING: CPT

## 2024-05-21 PROCEDURE — 85025 COMPLETE CBC W/AUTO DIFF WBC: CPT | Performed by: EMERGENCY MEDICINE

## 2024-05-21 PROCEDURE — 96375 TX/PRO/DX INJ NEW DRUG ADDON: CPT

## 2024-05-21 PROCEDURE — 96365 THER/PROPH/DIAG IV INF INIT: CPT

## 2024-05-21 PROCEDURE — 83735 ASSAY OF MAGNESIUM: CPT | Performed by: PHYSICIAN ASSISTANT

## 2024-05-21 PROCEDURE — 71045 X-RAY EXAM CHEST 1 VIEW: CPT

## 2024-05-21 PROCEDURE — 99223 1ST HOSP IP/OBS HIGH 75: CPT | Performed by: PHYSICIAN ASSISTANT

## 2024-05-21 PROCEDURE — 83935 ASSAY OF URINE OSMOLALITY: CPT | Performed by: PHYSICIAN ASSISTANT

## 2024-05-21 PROCEDURE — 36415 COLL VENOUS BLD VENIPUNCTURE: CPT | Performed by: EMERGENCY MEDICINE

## 2024-05-21 PROCEDURE — 82948 REAGENT STRIP/BLOOD GLUCOSE: CPT

## 2024-05-21 PROCEDURE — 99285 EMERGENCY DEPT VISIT HI MDM: CPT | Performed by: EMERGENCY MEDICINE

## 2024-05-21 RX ORDER — PANTOPRAZOLE SODIUM 20 MG/1
20 TABLET, DELAYED RELEASE ORAL
Status: DISCONTINUED | OUTPATIENT
Start: 2024-05-22 | End: 2024-05-26 | Stop reason: HOSPADM

## 2024-05-21 RX ORDER — HYDRALAZINE HYDROCHLORIDE 20 MG/ML
10 INJECTION INTRAMUSCULAR; INTRAVENOUS ONCE
Status: COMPLETED | OUTPATIENT
Start: 2024-05-21 | End: 2024-05-21

## 2024-05-21 RX ORDER — HEPARIN SODIUM 5000 [USP'U]/ML
5000 INJECTION, SOLUTION INTRAVENOUS; SUBCUTANEOUS EVERY 8 HOURS SCHEDULED
Status: DISCONTINUED | OUTPATIENT
Start: 2024-05-21 | End: 2024-05-21

## 2024-05-21 RX ORDER — METOPROLOL TARTRATE 50 MG/1
100 TABLET, FILM COATED ORAL EVERY 12 HOURS SCHEDULED
Status: DISCONTINUED | OUTPATIENT
Start: 2024-05-21 | End: 2024-05-26 | Stop reason: HOSPADM

## 2024-05-21 RX ORDER — METHYLPREDNISOLONE SODIUM SUCCINATE 40 MG/ML
40 INJECTION, POWDER, LYOPHILIZED, FOR SOLUTION INTRAMUSCULAR; INTRAVENOUS EVERY 8 HOURS
Status: DISCONTINUED | OUTPATIENT
Start: 2024-05-21 | End: 2024-05-22

## 2024-05-21 RX ORDER — DOXYCYCLINE HYCLATE 100 MG/1
100 CAPSULE ORAL EVERY 12 HOURS SCHEDULED
Status: DISCONTINUED | OUTPATIENT
Start: 2024-05-21 | End: 2024-05-21

## 2024-05-21 RX ORDER — LORAZEPAM 0.5 MG/1
0.5 TABLET ORAL EVERY 8 HOURS PRN
Status: DISCONTINUED | OUTPATIENT
Start: 2024-05-21 | End: 2024-05-26 | Stop reason: HOSPADM

## 2024-05-21 RX ORDER — IPRATROPIUM BROMIDE AND ALBUTEROL SULFATE 2.5; .5 MG/3ML; MG/3ML
3 SOLUTION RESPIRATORY (INHALATION) ONCE
Status: COMPLETED | OUTPATIENT
Start: 2024-05-21 | End: 2024-05-21

## 2024-05-21 RX ORDER — AZITHROMYCIN 250 MG/1
500 TABLET, FILM COATED ORAL EVERY 24 HOURS
Status: DISCONTINUED | OUTPATIENT
Start: 2024-05-21 | End: 2024-05-21

## 2024-05-21 RX ORDER — FUROSEMIDE 10 MG/ML
20 INJECTION INTRAMUSCULAR; INTRAVENOUS ONCE
Status: COMPLETED | OUTPATIENT
Start: 2024-05-21 | End: 2024-05-21

## 2024-05-21 RX ORDER — FLUTICASONE PROPIONATE 50 MCG
1 SPRAY, SUSPENSION (ML) NASAL DAILY PRN
Status: ON HOLD | COMMUNITY
End: 2024-05-22 | Stop reason: CLARIF

## 2024-05-21 RX ORDER — HEPARIN SODIUM 5000 [USP'U]/ML
5000 INJECTION, SOLUTION INTRAVENOUS; SUBCUTANEOUS EVERY 8 HOURS SCHEDULED
Status: DISCONTINUED | OUTPATIENT
Start: 2024-05-21 | End: 2024-05-26 | Stop reason: HOSPADM

## 2024-05-21 RX ORDER — NICOTINE 21 MG/24HR
1 PATCH, TRANSDERMAL 24 HOURS TRANSDERMAL DAILY
Status: DISCONTINUED | OUTPATIENT
Start: 2024-05-22 | End: 2024-05-22

## 2024-05-21 RX ORDER — LEVALBUTEROL INHALATION SOLUTION 1.25 MG/3ML
1.25 SOLUTION RESPIRATORY (INHALATION)
Status: DISCONTINUED | OUTPATIENT
Start: 2024-05-21 | End: 2024-05-26 | Stop reason: HOSPADM

## 2024-05-21 RX ORDER — TORSEMIDE 5 MG/1
5 TABLET ORAL DAILY
COMMUNITY
End: 2024-05-26

## 2024-05-21 RX ORDER — ATORVASTATIN CALCIUM 40 MG/1
40 TABLET, FILM COATED ORAL
Status: DISCONTINUED | OUTPATIENT
Start: 2024-05-21 | End: 2024-05-26 | Stop reason: HOSPADM

## 2024-05-21 RX ORDER — SODIUM CHLORIDE FOR INHALATION 0.9 %
3 VIAL, NEBULIZER (ML) INHALATION
Status: DISCONTINUED | OUTPATIENT
Start: 2024-05-21 | End: 2024-05-21

## 2024-05-21 RX ORDER — FLUTICASONE PROPIONATE 50 MCG
1 SPRAY, SUSPENSION (ML) NASAL DAILY
Status: DISCONTINUED | OUTPATIENT
Start: 2024-05-22 | End: 2024-05-26 | Stop reason: HOSPADM

## 2024-05-21 RX ORDER — MAGNESIUM SULFATE HEPTAHYDRATE 40 MG/ML
2 INJECTION, SOLUTION INTRAVENOUS ONCE
Status: COMPLETED | OUTPATIENT
Start: 2024-05-21 | End: 2024-05-21

## 2024-05-21 RX ORDER — DEXAMETHASONE SODIUM PHOSPHATE 10 MG/ML
10 INJECTION, SOLUTION INTRAMUSCULAR; INTRAVENOUS ONCE
Status: COMPLETED | OUTPATIENT
Start: 2024-05-21 | End: 2024-05-21

## 2024-05-21 RX ORDER — LISINOPRIL 20 MG/1
40 TABLET ORAL DAILY
Status: DISCONTINUED | OUTPATIENT
Start: 2024-05-22 | End: 2024-05-26 | Stop reason: HOSPADM

## 2024-05-21 RX ORDER — AMLODIPINE BESYLATE 10 MG/1
10 TABLET ORAL
Status: DISCONTINUED | OUTPATIENT
Start: 2024-05-21 | End: 2024-05-26 | Stop reason: HOSPADM

## 2024-05-21 RX ORDER — SODIUM CHLORIDE 1 G/1
1 TABLET ORAL 3 TIMES DAILY
Status: DISCONTINUED | OUTPATIENT
Start: 2024-05-21 | End: 2024-05-22

## 2024-05-21 RX ORDER — CEFTRIAXONE 1 G/50ML
1000 INJECTION, SOLUTION INTRAVENOUS EVERY 24 HOURS
Status: DISCONTINUED | OUTPATIENT
Start: 2024-05-21 | End: 2024-05-22

## 2024-05-21 RX ORDER — CLONIDINE HYDROCHLORIDE 0.1 MG/1
0.2 TABLET ORAL EVERY 12 HOURS SCHEDULED
Status: DISCONTINUED | OUTPATIENT
Start: 2024-05-21 | End: 2024-05-26 | Stop reason: HOSPADM

## 2024-05-21 RX ORDER — HYDROXYZINE HYDROCHLORIDE 10 MG/1
10 TABLET, FILM COATED ORAL EVERY 6 HOURS PRN
Status: DISCONTINUED | OUTPATIENT
Start: 2024-05-21 | End: 2024-05-26 | Stop reason: HOSPADM

## 2024-05-21 RX ORDER — BUDESONIDE 0.5 MG/2ML
0.5 INHALANT ORAL
Status: DISCONTINUED | OUTPATIENT
Start: 2024-05-21 | End: 2024-05-26 | Stop reason: HOSPADM

## 2024-05-21 RX ORDER — INSULIN LISPRO 100 [IU]/ML
1-5 INJECTION, SOLUTION INTRAVENOUS; SUBCUTANEOUS EVERY 6 HOURS SCHEDULED
Status: DISCONTINUED | OUTPATIENT
Start: 2024-05-22 | End: 2024-05-22

## 2024-05-21 RX ORDER — ONDANSETRON 2 MG/ML
4 INJECTION INTRAMUSCULAR; INTRAVENOUS ONCE
Status: COMPLETED | OUTPATIENT
Start: 2024-05-21 | End: 2024-05-21

## 2024-05-21 RX ORDER — CHLORHEXIDINE GLUCONATE ORAL RINSE 1.2 MG/ML
15 SOLUTION DENTAL EVERY 12 HOURS SCHEDULED
Status: DISCONTINUED | OUTPATIENT
Start: 2024-05-21 | End: 2024-05-26 | Stop reason: HOSPADM

## 2024-05-21 RX ADMIN — HEPARIN SODIUM 5000 UNITS: 5000 INJECTION, SOLUTION INTRAVENOUS; SUBCUTANEOUS at 21:00

## 2024-05-21 RX ADMIN — HYDRALAZINE HYDROCHLORIDE 10 MG: 20 INJECTION INTRAMUSCULAR; INTRAVENOUS at 23:31

## 2024-05-21 RX ADMIN — LORAZEPAM 0.5 MG: 0.5 TABLET ORAL at 20:39

## 2024-05-21 RX ADMIN — SODIUM CHLORIDE 500 ML: 0.9 INJECTION, SOLUTION INTRAVENOUS at 15:45

## 2024-05-21 RX ADMIN — CLONIDINE HYDROCHLORIDE 0.2 MG: 0.1 TABLET ORAL at 20:39

## 2024-05-21 RX ADMIN — ONDANSETRON 4 MG: 2 INJECTION INTRAMUSCULAR; INTRAVENOUS at 13:35

## 2024-05-21 RX ADMIN — INSULIN LISPRO 2 UNITS: 100 INJECTION, SOLUTION INTRAVENOUS; SUBCUTANEOUS at 23:35

## 2024-05-21 RX ADMIN — CEFTRIAXONE 1000 MG: 1 INJECTION, SOLUTION INTRAVENOUS at 21:23

## 2024-05-21 RX ADMIN — METHYLPREDNISOLONE SODIUM SUCCINATE 40 MG: 40 INJECTION, POWDER, FOR SOLUTION INTRAMUSCULAR; INTRAVENOUS at 21:01

## 2024-05-21 RX ADMIN — BUDESONIDE 0.5 MG: 0.5 INHALANT ORAL at 20:11

## 2024-05-21 RX ADMIN — CHLORHEXIDINE GLUCONATE 15 ML: 1.2 RINSE ORAL at 20:40

## 2024-05-21 RX ADMIN — MAGNESIUM SULFATE HEPTAHYDRATE 2 G: 40 INJECTION, SOLUTION INTRAVENOUS at 15:27

## 2024-05-21 RX ADMIN — IPRATROPIUM BROMIDE AND ALBUTEROL SULFATE 3 ML: 2.5; .5 SOLUTION RESPIRATORY (INHALATION) at 13:57

## 2024-05-21 RX ADMIN — FUROSEMIDE 20 MG: 10 INJECTION, SOLUTION INTRAMUSCULAR; INTRAVENOUS at 20:53

## 2024-05-21 RX ADMIN — SODIUM CHLORIDE 250 ML: 0.9 INJECTION, SOLUTION INTRAVENOUS at 13:34

## 2024-05-21 RX ADMIN — Medication 1 G: at 20:40

## 2024-05-21 RX ADMIN — LEVALBUTEROL HYDROCHLORIDE 1.25 MG: 1.25 SOLUTION RESPIRATORY (INHALATION) at 20:11

## 2024-05-21 RX ADMIN — METOPROLOL TARTRATE 100 MG: 50 TABLET, FILM COATED ORAL at 20:40

## 2024-05-21 RX ADMIN — ATORVASTATIN CALCIUM 40 MG: 40 TABLET, FILM COATED ORAL at 21:01

## 2024-05-21 RX ADMIN — DEXAMETHASONE SODIUM PHOSPHATE 10 MG: 10 INJECTION, SOLUTION INTRAMUSCULAR; INTRAVENOUS at 13:57

## 2024-05-21 RX ADMIN — AMLODIPINE BESYLATE 10 MG: 10 TABLET ORAL at 21:01

## 2024-05-21 RX ADMIN — IPRATROPIUM BROMIDE 0.5 MG: 0.5 SOLUTION RESPIRATORY (INHALATION) at 20:11

## 2024-05-21 NOTE — ED NOTES
Ambulated pt to bathroom on 3 l o2 pt did not give enough urine for a sample pts pulse ox dropped to 89 upon arrival back to the room approx. 40 foot ambulation      Aubree Moore  05/21/24 [id number]

## 2024-05-21 NOTE — QUICK NOTE
Progress Note - Triage Asssessment   Jessika REID Viars 69 y.o. female MRN: [id number]    Time Called ( Time): 1535  Date Called: 05/21/24  Room#: ED 08  Person requesting evaluation: Dr Figueroa    Situation:    Patient presented with N/V   with a PMHx of hyponatremia in the setting of SIADH, hypotonic hyponatremia      Interventions:   Patient administered 250cc NSS  Ordered 500cc NSS   Will plan to recheck following 500cc NSS and determine LOC for admission.        Triage Assessment:     Disposition pending repeat chemistry     Recommendations discussed with Dr. Lischner Jared Blake Zavilla, PA-C

## 2024-05-21 NOTE — ED PROVIDER NOTES
History  Chief Complaint   Patient presents with    Vomiting     Pt c/o nausea and vomiting w/weakness starting at 1130 today while laying watching tv. Pt mentioned having diarrhea couple days ago resolved taking MOM. Denies travel/sob/fevers/cough/diarrhea     This is a 69-year-old female presenting to the ED for evaluation of nausea with vomiting and weakness starting at 1130 today.  Patient states that she has been having constipation for couple days which resolved after taking milk of magnesia.  She denies any travel or sick contacts.  Patient has a history of COPD and wears 3 L at baseline.  States that she has been having some shortness of breath without wheezing.        Prior to Admission Medications   Prescriptions Last Dose Informant Patient Reported? Taking?   LORazepam (ATIVAN) 0.5 mg tablet  Self Yes No   Sig: Take 0.5 mg by mouth every 8 (eight) hours as needed for anxiety   Nebulizers (Comp Air Compressor Nebulizer) MISC  Self Yes No   Sig: As directed   amLODIPine (NORVASC) 10 mg tablet  Self Yes No   Sig: Take 10 mg by mouth daily at bedtime    atorvastatin (LIPITOR) 40 mg tablet  Self Yes No   Sig: Take 40 mg by mouth daily at bedtime    cholecalciferol (VITAMIN D3) 1,000 units tablet  Self Yes No   cloNIDine (CATAPRES) 0.2 mg tablet  Self Yes No   Sig: Take 0.2 mg by mouth every 12 (twelve) hours   fluticasone (FLONASE) 50 mcg/act nasal spray  Self No No   Si spray into each nostril daily   Patient taking differently: 1 spray into each nostril daily as needed for rhinitis or allergies   fluticasone-umeclidinium-vilanterol (Trelegy Ellipta) 200-62.5-25 mcg/actuation AEPB inhaler  Self No No   Sig: Inhale 1 puff daily Rinse mouth after use.   glipiZIDE (GLUCOTROL) 10 mg tablet  Self Yes No   Sig: Take 10 mg by mouth daily in the early morning W breakfast   hydrOXYzine HCL (ATARAX) 10 mg tablet  Self Yes No   Sig: Take 10 mg by mouth every 6 (six) hours as needed for itching    ipratropium-albuterol (DUO-NEB) 0.5-2.5 mg/3 mL nebulizer solution  Self No No   Sig: Take 3 mL by nebulization 3 (three) times a day   lisinopril (ZESTRIL) 40 mg tablet  Self Yes No   Sig: Take 40 mg by mouth daily   metoprolol tartrate (LOPRESSOR) 100 mg tablet  Self Yes No   Sig: Take 100 mg by mouth every 12 (twelve) hours   omeprazole (PriLOSEC OTC) 20 MG tablet  Self Yes No   Sig: Take 20 mg by mouth daily    sertraline (ZOLOFT) 50 mg tablet  Self No No   Sig: Take 1 tablet (50 mg total) by mouth daily   sodium chloride 1 g tablet  Self No No   Sig: Take 2 tablets (2 g total) by mouth 2 (two) times a day with meals   torsemide (DEMADEX) 10 mg tablet  Self No No   Sig: Take 1 tablet (10 mg total) by mouth daily   Patient taking differently: Take 5 mg by mouth daily      Facility-Administered Medications: None       Past Medical History:   Diagnosis Date    Chronic respiratory failure with hypoxia, on home O2 therapy  ([facility])     3L NC at home    COPD exacerbation ([facility])     COPD, group D, by GOLD 2017 classification ([facility])     Diabetes mellitus ([facility])     Diverticulosis of sigmoid colon     Dyslipidemia     Essential (primary) hypertension     NELSON (generalized anxiety disorder)     GERD (gastroesophageal reflux disease)     Lung nodule     RUL    SIADH (syndrome of inappropriate ADH production) ([facility])     Tobacco dependence     Vitamin D deficiency        History reviewed. No pertinent surgical history.    Family History   Problem Relation Age of Onset    Diabetes Father      I have reviewed and agree with the history as documented.    E-Cigarette/Vaping    E-Cigarette Use Never User      E-Cigarette/Vaping Substances    Nicotine No     THC No     CBD No     Flavoring No      Social History     Tobacco Use    Smoking status: Every Day     Current packs/day: 0.25     Types: Cigarettes     Passive exposure: Never    Smokeless tobacco: Never   Vaping Use    Vaping status: Never Used   Substance Use Topics    Alcohol  use: Not Currently    Drug use: Never       Review of Systems   Constitutional:  Negative for chills and fever.   HENT:  Negative for ear pain and sore throat.    Eyes:  Negative for pain and visual disturbance.   Respiratory:  Negative for cough and shortness of breath.    Cardiovascular:  Negative for chest pain and palpitations.   Gastrointestinal:  Positive for constipation, nausea and vomiting. Negative for abdominal pain.   Genitourinary:  Negative for dysuria and hematuria.   Musculoskeletal:  Negative for arthralgias and back pain.   Skin:  Negative for color change and rash.   Neurological:  Positive for weakness. Negative for seizures and syncope.   All other systems reviewed and are negative.      Physical Exam  Physical Exam  Vitals and nursing note reviewed.   Constitutional:       Appearance: Normal appearance. She is well-developed and normal weight. She is ill-appearing.   HENT:      Head: Normocephalic and atraumatic.      Right Ear: External ear normal.      Left Ear: External ear normal.      Nose: Nose normal.      Mouth/Throat:      Mouth: Mucous membranes are moist.      Pharynx: No oropharyngeal exudate or posterior oropharyngeal erythema.   Eyes:      Extraocular Movements: Extraocular movements intact.      Conjunctiva/sclera: Conjunctivae normal.   Cardiovascular:      Rate and Rhythm: Normal rate and regular rhythm.      Pulses: Normal pulses.      Heart sounds: Normal heart sounds. No murmur heard.  Pulmonary:      Effort: Pulmonary effort is normal. No respiratory distress.      Breath sounds: Normal breath sounds.   Abdominal:      General: Abdomen is flat. Bowel sounds are normal. There is no distension.      Palpations: Abdomen is soft. There is no mass.      Tenderness: There is no abdominal tenderness. There is no guarding or rebound.      Hernia: No hernia is present.   Musculoskeletal:         General: No swelling, tenderness, deformity or signs of injury. Normal range of motion.       Cervical back: Normal range of motion and neck supple.      Right lower leg: No edema.      Left lower leg: No edema.   Skin:     General: Skin is warm and dry.      Capillary Refill: Capillary refill takes less than 2 seconds.   Neurological:      General: No focal deficit present.      Mental Status: She is alert and oriented to person, place, and time. Mental status is at baseline.   Psychiatric:         Mood and Affect: Mood normal.         Vital Signs  ED Triage Vitals [05/21/24 1318]   Temperature Pulse Respirations Blood Pressure SpO2   97.8 °F (36.6 °C) 80 18 (!) 188/84 96 %      Temp Source Heart Rate Source Patient Position - Orthostatic VS BP Location FiO2 (%)   Oral Monitor Lying Right arm --      Pain Score       No Pain           Vitals:    05/21/24 1430 05/21/24 1500 05/21/24 1700 05/21/24 1730   BP: 137/68 118/61 162/77 160/80   Pulse: 75 71 71 71   Patient Position - Orthostatic VS: Sitting Sitting Sitting Sitting         Visual Acuity      ED Medications  Medications   chlorhexidine (PERIDEX) 0.12 % oral rinse 15 mL (has no administration in time range)   heparin (porcine) subcutaneous injection 5,000 Units (has no administration in time range)   amLODIPine (NORVASC) tablet 10 mg (has no administration in time range)   atorvastatin (LIPITOR) tablet 40 mg (has no administration in time range)   Cholecalciferol (VITAMIN D3) tablet 1,000 Units (has no administration in time range)   cloNIDine (CATAPRES) tablet 0.2 mg (has no administration in time range)   fluticasone (FLONASE) 50 mcg/act nasal spray 1 spray (has no administration in time range)   hydrOXYzine HCL (ATARAX) tablet 10 mg (has no administration in time range)   lisinopril (ZESTRIL) tablet 40 mg (has no administration in time range)   LORazepam (ATIVAN) tablet 0.5 mg (has no administration in time range)   metoprolol tartrate (LOPRESSOR) tablet 100 mg (has no administration in time range)   pantoprazole (PROTONIX) EC tablet 20 mg (has  "no administration in time range)   sertraline (ZOLOFT) tablet 50 mg (has no administration in time range)   sodium chloride 0.9 % bolus 250 mL (0 mL Intravenous Stopped 5/21/24 1453)   ondansetron (ZOFRAN) injection 4 mg (4 mg Intravenous Given 5/21/24 1335)   ipratropium-albuterol (DUO-NEB) 0.5-2.5 mg/3 mL inhalation solution 3 mL (3 mL Nebulization Given 5/21/24 1357)   dexamethasone (PF) (DECADRON) injection 10 mg (10 mg Intravenous Given 5/21/24 1357)   magnesium sulfate 2 g/50 mL IVPB (premix) 2 g (0 g Intravenous Stopped 5/21/24 1753)   sodium chloride 0.9 % bolus 500 mL (0 mL Intravenous Stopped 5/21/24 1703)       Diagnostic Studies  Results Reviewed       Procedure Component Value Units Date/Time    Urine Microscopic [[id number]]  (Abnormal) Collected: 05/21/24 1811    Lab Status: Final result Specimen: Urine, Clean Catch Updated: 05/21/24 1849     RBC, UA 4-10 /hpf      WBC, UA 10-20 /hpf      Epithelial Cells Moderate /hpf      Bacteria, UA Moderate /hpf      OTHER OBSERVATIONS Transitional Epithelial Cells    Osmolality-\"If this is regarding a toxic alcohol, STOP. Test is not routinely indicated. Please consult medical  on call for further guidance.\" [[id number]] Collected: 05/21/24 1731    Lab Status: In process Specimen: Blood from Arm, Right Updated: 05/21/24 1847    Platelet count [[id number]]     Lab Status: No result Specimen: Blood     Osmolality, urine [[id number]]     Lab Status: No result Specimen: Urine     Sodium, urine, random [[id number]]     Lab Status: No result Specimen: Urine     UA (URINE) with reflex to Scope [[id number]]  (Abnormal) Collected: 05/21/24 1811    Lab Status: Final result Specimen: Urine, Clean Catch Updated: 05/21/24 1824     Color, UA Yellow     Clarity, UA Cloudy     Specific Gravity, UA 1.025     pH, UA 6.5     Leukocytes, UA Negative     Nitrite, UA Negative     Protein,  (2+) mg/dl      Glucose, UA Negative mg/dl      Ketones, UA 15 (1+) mg/dl      " Urobilinogen, UA 0.2 E.U./dl      Bilirubin, UA Negative     Occult Blood, UA Small    HS Troponin I 4hr [[id number]]  (Normal) Collected: 05/21/24 1731    Lab Status: Final result Specimen: Blood from Arm, Right Updated: 05/21/24 1803     hs TnI 4hr 5 ng/L      Delta 4hr hsTnI 0 ng/L     Basic metabolic panel [[id number]]  (Abnormal) Collected: 05/21/24 1710    Lab Status: Final result Specimen: Blood Updated: 05/21/24 1756     Sodium 118 mmol/L      Potassium 4.0 mmol/L      Chloride 75 mmol/L      CO2 35 mmol/L      ANION GAP 8 mmol/L      BUN 5 mg/dL      Creatinine 0.39 mg/dL      Glucose 138 mg/dL      Calcium 9.4 mg/dL      eGFR 109 ml/min/1.73sq m     Narrative:      National Kid[employer] Foundation guidelines for Chronic Kidney Disease (CKD):     Stage 1 with normal or high GFR (GFR > 90 mL/min/1.73 square meters)    Stage 2 Mild CKD (GFR = 60-89 mL/min/1.73 square meters)    Stage 3A Moderate CKD (GFR = 45-59 mL/min/1.73 square meters)    Stage 3B Moderate CKD (GFR = 30-44 mL/min/1.73 square meters)    Stage 4 Severe CKD (GFR = 15-29 mL/min/1.73 square meters)    Stage 5 End Stage CKD (GFR <15 mL/min/1.73 square meters)  Note: GFR calculation is accurate only with a steady state creatinine    HS Troponin I 2hr [[id number]]  (Normal) Collected: 05/21/24 1527    Lab Status: Final result Specimen: Blood from Arm, Right Updated: 05/21/24 1611     hs TnI 2hr 4 ng/L      Delta 2hr hsTnI -1 ng/L     Cortisol [[id number]] Collected: 05/21/24 1330    Lab Status: In process Specimen: Blood from Arm, Right Updated: 05/21/24 1541    CMP [[id number]]  (Abnormal) Collected: 05/21/24 1330    Lab Status: Final result Specimen: Blood from Arm, Right Updated: 05/21/24 1410     Sodium 118 mmol/L      Potassium 3.8 mmol/L      Chloride 73 mmol/L      CO2 38 mmol/L      ANION GAP 7 mmol/L      BUN 6 mg/dL      Creatinine 0.45 mg/dL      Glucose 161 mg/dL      Calcium 9.6 mg/dL      AST 14 U/L      ALT 8 U/L      Alkaline  Phosphatase 96 U/L      Total Protein 7.1 g/dL      Albumin 4.4 g/dL      Total Bilirubin 0.60 mg/dL      eGFR 104 ml/min/1.73sq m     Narrative:      National Kidney Disease Foundation guidelines for Chronic Kidney Disease (CKD):     Stage 1 with normal or high GFR (GFR > 90 mL/min/1.73 square meters)    Stage 2 Mild CKD (GFR = 60-89 mL/min/1.73 square meters)    Stage 3A Moderate CKD (GFR = 45-59 mL/min/1.73 square meters)    Stage 3B Moderate CKD (GFR = 30-44 mL/min/1.73 square meters)    Stage 4 Severe CKD (GFR = 15-29 mL/min/1.73 square meters)    Stage 5 End Stage CKD (GFR <15 mL/min/1.73 square meters)  Note: GFR calculation is accurate only with a steady state creatinine    HS Troponin 0hr (reflex protocol) [[id number]]  (Normal) Collected: 05/21/24 1330    Lab Status: Final result Specimen: Blood from Arm, Right Updated: 05/21/24 1403     hs TnI 0hr 5 ng/L     B-Type Natriuretic Peptide(BNP) [[id number]]  (Normal) Collected: 05/21/24 1330    Lab Status: Final result Specimen: Blood from Arm, Right Updated: 05/21/24 1403     BNP 95 pg/mL     Lipase [[id number]]  (Abnormal) Collected: 05/21/24 1330    Lab Status: Final result Specimen: Blood from Arm, Right Updated: 05/21/24 1401     Lipase <6 u/L     Magnesium [[id number]]  (Abnormal) Collected: 05/21/24 1330    Lab Status: Final result Specimen: Blood from Arm, Right Updated: 05/21/24 1401     Magnesium 1.6 mg/dL     CBC and differential [[id number]]  (Abnormal) Collected: 05/21/24 1330    Lab Status: Final result Specimen: Blood from Arm, Right Updated: 05/21/24 1337     WBC 7.84 Thousand/uL      RBC 4.50 Million/uL      Hemoglobin 12.9 g/dL      Hematocrit 37.3 %      MCV 83 fL      MCH 28.7 pg      MCHC 34.6 g/dL      RDW 12.1 %      MPV 9.8 fL      Platelets 212 Thousands/uL      nRBC 0 /100 WBCs      Segmented % 81 %      Immature Grans % 0 %      Lymphocytes % 12 %      Monocytes % 7 %      Eosinophils Relative 0 %      Basophils Relative 0 %      Absolute  Neutrophils 6.33 Thousands/µL      Absolute Immature Grans 0.03 Thousand/uL      Absolute Lymphocytes 0.91 Thousands/µL      Absolute Monocytes 0.55 Thousand/µL      Eosinophils Absolute 0.01 Thousand/µL      Basophils Absolute 0.01 Thousands/µL                    XR chest 1 view portable    (Results Pending)              Procedures  ECG 12 Lead Documentation Only    Date/Time: 5/21/2024 6:49 PM    Performed by: Mamta Figueroa DO  Authorized by: Mamta Figueroa DO    Indications / Diagnosis:  Weakness  ECG reviewed by me, the ED Provider: yes    Patient location:  ED  Previous ECG:     Previous ECG:  Unavailable  Interpretation:     Interpretation: abnormal    Rate:     ECG rate:  79  Rhythm:     Rhythm: sinus rhythm    Comments:      Sinus rhythm 79 bpm with short OK.  Nonspecific ST abnormality           ED Course  ED Course as of 05/21/24 1851   Tue May 21, 2024   1603 Patient found to have hyponatremia.  Sodium is 118.  Case discussed with critical care.  Patient will be assessed by the critical care DEEPA   1609 Patient seen at bedside by critical care DEEPA.  Recommends that the patient get another 500 cc bolus of fluids and will repeat chemistry to reassess level of care.   1847 Patient still has hyponatremia after the fluid bolus.  Seen at bedside by critical care and being admitted.                                             Medical Decision Making  This is a 69-year-old female presenting to the ED for evaluation of nausea vomiting and generalized weakness.  Differential diagnosis includes but not limited to: Arrhythmia, dehydration, electrolyte abnormality.    Amount and/or Complexity of Data Reviewed  Labs: ordered.  Radiology: ordered.    Risk  Prescription drug management.  Decision regarding hospitalization.             Disposition  Final diagnoses:   COPD with acute exacerbation (HCC)   Hyponatremia     Time reflects when diagnosis was documented in both MDM as applicable and the  Disposition within this note       Time User Action Codes Description Comment    5/21/2024  6:35 PM Karuna Sellers [J44.1] COPD with acute exacerbation (HCC)     5/21/2024  6:47 PM Mamta Figueroa [E87.1] Hyponatremia           ED Disposition       ED Disposition   Admit    Condition   Stable    Date/Time   Tue May 21, 2024  6:46 PM    Comment                  Follow-up Information    None         Patient's Medications   Discharge Prescriptions    No medications on file       No discharge procedures on file.    PDMP Review         Value Time User    PDMP Reviewed  Yes 3/17/2024 11:09 AM Ailyn Aguilera MD            ED Provider  Electronically Signed by             Mamta Figueroa DO  05/21/24 4269

## 2024-05-22 ENCOUNTER — PATIENT OUTREACH (OUTPATIENT)
Dept: CASE MANAGEMENT | Facility: OTHER | Age: 69
End: 2024-05-22

## 2024-05-22 LAB
ANION GAP SERPL CALCULATED.3IONS-SCNC: 3 MMOL/L (ref 4–13)
ANION GAP SERPL CALCULATED.3IONS-SCNC: 4 MMOL/L (ref 4–13)
BASOPHILS # BLD AUTO: 0 THOUSANDS/ÂΜL (ref 0–0.1)
BASOPHILS NFR BLD AUTO: 0 % (ref 0–1)
BUN SERPL-MCNC: 10 MG/DL (ref 5–25)
BUN SERPL-MCNC: 14 MG/DL (ref 5–25)
BUN SERPL-MCNC: 15 MG/DL (ref 5–25)
BUN SERPL-MCNC: 7 MG/DL (ref 5–25)
BUN SERPL-MCNC: 7 MG/DL (ref 5–25)
BUN SERPL-MCNC: 8 MG/DL (ref 5–25)
CA-I BLD-SCNC: 1.15 MMOL/L (ref 1.12–1.32)
CALCIUM SERPL-MCNC: 8.6 MG/DL (ref 8.4–10.2)
CALCIUM SERPL-MCNC: 8.9 MG/DL (ref 8.4–10.2)
CALCIUM SERPL-MCNC: 9 MG/DL (ref 8.4–10.2)
CALCIUM SERPL-MCNC: 9.4 MG/DL (ref 8.4–10.2)
CHLORIDE SERPL-SCNC: 75 MMOL/L (ref 96–108)
CHLORIDE SERPL-SCNC: 76 MMOL/L (ref 96–108)
CHLORIDE SERPL-SCNC: 76 MMOL/L (ref 96–108)
CHLORIDE SERPL-SCNC: 79 MMOL/L (ref 96–108)
CHLORIDE SERPL-SCNC: 80 MMOL/L (ref 96–108)
CHLORIDE SERPL-SCNC: 82 MMOL/L (ref 96–108)
CO2 SERPL-SCNC: 39 MMOL/L (ref 21–32)
CO2 SERPL-SCNC: 40 MMOL/L (ref 21–32)
CO2 SERPL-SCNC: 41 MMOL/L (ref 21–32)
CO2 SERPL-SCNC: 41 MMOL/L (ref 21–32)
CREAT SERPL-MCNC: 0.5 MG/DL (ref 0.6–1.3)
CREAT SERPL-MCNC: 0.52 MG/DL (ref 0.6–1.3)
CREAT SERPL-MCNC: 0.54 MG/DL (ref 0.6–1.3)
CREAT SERPL-MCNC: 0.56 MG/DL (ref 0.6–1.3)
CREAT SERPL-MCNC: 0.56 MG/DL (ref 0.6–1.3)
CREAT SERPL-MCNC: 0.63 MG/DL (ref 0.6–1.3)
EOSINOPHIL # BLD AUTO: 0 THOUSAND/ÂΜL (ref 0–0.61)
EOSINOPHIL NFR BLD AUTO: 0 % (ref 0–6)
ERYTHROCYTE [DISTWIDTH] IN BLOOD BY AUTOMATED COUNT: 12.3 % (ref 11.6–15.1)
FLUAV RNA RESP QL NAA+PROBE: NEGATIVE
FLUBV RNA RESP QL NAA+PROBE: NEGATIVE
GFR SERPL CREATININE-BSD FRML MDRD: 100 ML/MIN/1.73SQ M
GFR SERPL CREATININE-BSD FRML MDRD: 93 ML/MIN/1.73SQ M
GFR SERPL CREATININE-BSD FRML MDRD: 96 ML/MIN/1.73SQ M
GFR SERPL CREATININE-BSD FRML MDRD: 96 ML/MIN/1.73SQ M
GFR SERPL CREATININE-BSD FRML MDRD: 97 ML/MIN/1.73SQ M
GFR SERPL CREATININE-BSD FRML MDRD: 99 ML/MIN/1.73SQ M
GLUCOSE SERPL-MCNC: 134 MG/DL (ref 65–140)
GLUCOSE SERPL-MCNC: 138 MG/DL (ref 65–140)
GLUCOSE SERPL-MCNC: 145 MG/DL (ref 65–140)
GLUCOSE SERPL-MCNC: 145 MG/DL (ref 65–140)
GLUCOSE SERPL-MCNC: 199 MG/DL (ref 65–140)
GLUCOSE SERPL-MCNC: 213 MG/DL (ref 65–140)
GLUCOSE SERPL-MCNC: 224 MG/DL (ref 65–140)
GLUCOSE SERPL-MCNC: 230 MG/DL (ref 65–140)
GLUCOSE SERPL-MCNC: 235 MG/DL (ref 65–140)
GLUCOSE SERPL-MCNC: 237 MG/DL (ref 65–140)
HCT VFR BLD AUTO: 34 % (ref 34.8–46.1)
HGB BLD-MCNC: 11.7 G/DL (ref 11.5–15.4)
IMM GRANULOCYTES # BLD AUTO: 0.02 THOUSAND/UL (ref 0–0.2)
IMM GRANULOCYTES NFR BLD AUTO: 0 % (ref 0–2)
LYMPHOCYTES # BLD AUTO: 0.56 THOUSANDS/ÂΜL (ref 0.6–4.47)
LYMPHOCYTES NFR BLD AUTO: 12 % (ref 14–44)
MAGNESIUM SERPL-MCNC: 1.8 MG/DL (ref 1.9–2.7)
MAGNESIUM SERPL-MCNC: 2 MG/DL (ref 1.9–2.7)
MAGNESIUM SERPL-MCNC: 2.9 MG/DL (ref 1.9–2.7)
MCH RBC QN AUTO: 28.7 PG (ref 26.8–34.3)
MCHC RBC AUTO-ENTMCNC: 34.4 G/DL (ref 31.4–37.4)
MCV RBC AUTO: 83 FL (ref 82–98)
MONOCYTES # BLD AUTO: 0.12 THOUSAND/ÂΜL (ref 0.17–1.22)
MONOCYTES NFR BLD AUTO: 3 % (ref 4–12)
NEUTROPHILS # BLD AUTO: 3.92 THOUSANDS/ÂΜL (ref 1.85–7.62)
NEUTS SEG NFR BLD AUTO: 85 % (ref 43–75)
NRBC BLD AUTO-RTO: 0 /100 WBCS
OSMOLALITY UR: 304 MMOL/KG
OSMOLALITY UR: 397 MMOL/KG
PHOSPHATE SERPL-MCNC: 3.1 MG/DL (ref 2.3–4.1)
PLATELET # BLD AUTO: 191 THOUSANDS/UL (ref 149–390)
PMV BLD AUTO: 10 FL (ref 8.9–12.7)
POTASSIUM SERPL-SCNC: 3.7 MMOL/L (ref 3.5–5.3)
POTASSIUM SERPL-SCNC: 3.7 MMOL/L (ref 3.5–5.3)
POTASSIUM SERPL-SCNC: 3.8 MMOL/L (ref 3.5–5.3)
POTASSIUM SERPL-SCNC: 3.9 MMOL/L (ref 3.5–5.3)
POTASSIUM SERPL-SCNC: 4.1 MMOL/L (ref 3.5–5.3)
POTASSIUM SERPL-SCNC: 4.3 MMOL/L (ref 3.5–5.3)
PROCALCITONIN SERPL-MCNC: <0.05 NG/ML
RBC # BLD AUTO: 4.08 MILLION/UL (ref 3.81–5.12)
RSV RNA RESP QL NAA+PROBE: NEGATIVE
SARS-COV-2 RNA RESP QL NAA+PROBE: NEGATIVE
SODIUM SERPL-SCNC: 117 MMOL/L (ref 135–147)
SODIUM SERPL-SCNC: 119 MMOL/L (ref 135–147)
SODIUM SERPL-SCNC: 120 MMOL/L (ref 135–147)
SODIUM SERPL-SCNC: 123 MMOL/L (ref 135–147)
SODIUM SERPL-SCNC: 123 MMOL/L (ref 135–147)
SODIUM SERPL-SCNC: 126 MMOL/L (ref 135–147)
WBC # BLD AUTO: 4.62 THOUSAND/UL (ref 4.31–10.16)

## 2024-05-22 PROCEDURE — NC001 PR NO CHARGE: Performed by: STUDENT IN AN ORGANIZED HEALTH CARE EDUCATION/TRAINING PROGRAM

## 2024-05-22 PROCEDURE — 99233 SBSQ HOSP IP/OBS HIGH 50: CPT | Performed by: STUDENT IN AN ORGANIZED HEALTH CARE EDUCATION/TRAINING PROGRAM

## 2024-05-22 PROCEDURE — 82330 ASSAY OF CALCIUM: CPT | Performed by: PHYSICIAN ASSISTANT

## 2024-05-22 PROCEDURE — 0241U HB NFCT DS VIR RESP RNA 4 TRGT: CPT

## 2024-05-22 PROCEDURE — 82948 REAGENT STRIP/BLOOD GLUCOSE: CPT

## 2024-05-22 PROCEDURE — 84145 PROCALCITONIN (PCT): CPT | Performed by: PHYSICIAN ASSISTANT

## 2024-05-22 PROCEDURE — 84100 ASSAY OF PHOSPHORUS: CPT | Performed by: PHYSICIAN ASSISTANT

## 2024-05-22 PROCEDURE — 99222 1ST HOSP IP/OBS MODERATE 55: CPT | Performed by: INTERNAL MEDICINE

## 2024-05-22 PROCEDURE — 80048 BASIC METABOLIC PNL TOTAL CA: CPT | Performed by: STUDENT IN AN ORGANIZED HEALTH CARE EDUCATION/TRAINING PROGRAM

## 2024-05-22 PROCEDURE — 94760 N-INVAS EAR/PLS OXIMETRY 1: CPT

## 2024-05-22 PROCEDURE — 80048 BASIC METABOLIC PNL TOTAL CA: CPT | Performed by: INTERNAL MEDICINE

## 2024-05-22 PROCEDURE — 94640 AIRWAY INHALATION TREATMENT: CPT

## 2024-05-22 PROCEDURE — 83735 ASSAY OF MAGNESIUM: CPT | Performed by: PHYSICIAN ASSISTANT

## 2024-05-22 PROCEDURE — 83935 ASSAY OF URINE OSMOLALITY: CPT

## 2024-05-22 PROCEDURE — NC001 PR NO CHARGE: Performed by: INTERNAL MEDICINE

## 2024-05-22 PROCEDURE — 80048 BASIC METABOLIC PNL TOTAL CA: CPT | Performed by: PHYSICIAN ASSISTANT

## 2024-05-22 PROCEDURE — 85025 COMPLETE CBC W/AUTO DIFF WBC: CPT | Performed by: PHYSICIAN ASSISTANT

## 2024-05-22 PROCEDURE — 94664 DEMO&/EVAL PT USE INHALER: CPT

## 2024-05-22 RX ORDER — MAGNESIUM SULFATE HEPTAHYDRATE 40 MG/ML
2 INJECTION, SOLUTION INTRAVENOUS ONCE
Status: COMPLETED | OUTPATIENT
Start: 2024-05-22 | End: 2024-05-22

## 2024-05-22 RX ORDER — SODIUM CHLORIDE 1 G/1
1 TABLET ORAL ONCE
Status: COMPLETED | OUTPATIENT
Start: 2024-05-22 | End: 2024-05-22

## 2024-05-22 RX ORDER — NICOTINE 21 MG/24HR
1 PATCH, TRANSDERMAL 24 HOURS TRANSDERMAL DAILY
Status: DISCONTINUED | OUTPATIENT
Start: 2024-05-22 | End: 2024-05-26 | Stop reason: HOSPADM

## 2024-05-22 RX ORDER — INSULIN LISPRO 100 [IU]/ML
1-5 INJECTION, SOLUTION INTRAVENOUS; SUBCUTANEOUS
Status: DISCONTINUED | OUTPATIENT
Start: 2024-05-22 | End: 2024-05-22

## 2024-05-22 RX ORDER — ALBUTEROL SULFATE 2.5 MG/3ML
5 SOLUTION RESPIRATORY (INHALATION) EVERY 6 HOURS PRN
Status: DISCONTINUED | OUTPATIENT
Start: 2024-05-22 | End: 2024-05-26 | Stop reason: HOSPADM

## 2024-05-22 RX ORDER — FUROSEMIDE 10 MG/ML
20 INJECTION INTRAMUSCULAR; INTRAVENOUS ONCE
Status: COMPLETED | OUTPATIENT
Start: 2024-05-22 | End: 2024-05-22

## 2024-05-22 RX ORDER — METHYLPREDNISOLONE SODIUM SUCCINATE 40 MG/ML
40 INJECTION, POWDER, LYOPHILIZED, FOR SOLUTION INTRAMUSCULAR; INTRAVENOUS EVERY 8 HOURS
Status: DISCONTINUED | OUTPATIENT
Start: 2024-05-22 | End: 2024-05-22

## 2024-05-22 RX ORDER — FUROSEMIDE 10 MG/ML
20 INJECTION INTRAMUSCULAR; INTRAVENOUS ONCE
Status: DISCONTINUED | OUTPATIENT
Start: 2024-05-22 | End: 2024-05-22

## 2024-05-22 RX ORDER — INSULIN LISPRO 100 [IU]/ML
1-5 INJECTION, SOLUTION INTRAVENOUS; SUBCUTANEOUS
Status: DISCONTINUED | OUTPATIENT
Start: 2024-05-22 | End: 2024-05-26 | Stop reason: HOSPADM

## 2024-05-22 RX ORDER — METHYLPREDNISOLONE SODIUM SUCCINATE 40 MG/ML
40 INJECTION, POWDER, LYOPHILIZED, FOR SOLUTION INTRAMUSCULAR; INTRAVENOUS DAILY
Status: COMPLETED | OUTPATIENT
Start: 2024-05-23 | End: 2024-05-25

## 2024-05-22 RX ORDER — PIOGLITAZONEHYDROCHLORIDE 15 MG/1
15 TABLET ORAL DAILY
COMMUNITY

## 2024-05-22 RX ORDER — FUROSEMIDE 10 MG/ML
20 INJECTION INTRAMUSCULAR; INTRAVENOUS
Status: DISCONTINUED | OUTPATIENT
Start: 2024-05-22 | End: 2024-05-23

## 2024-05-22 RX ORDER — SODIUM CHLORIDE 1 G/1
2 TABLET ORAL 3 TIMES DAILY
Status: DISCONTINUED | OUTPATIENT
Start: 2024-05-22 | End: 2024-05-22

## 2024-05-22 RX ORDER — SODIUM CHLORIDE 9 MG/ML
100 INJECTION, SOLUTION INTRAVENOUS CONTINUOUS
Status: DISCONTINUED | OUTPATIENT
Start: 2024-05-22 | End: 2024-05-22

## 2024-05-22 RX ADMIN — CHLORHEXIDINE GLUCONATE 15 ML: 1.2 RINSE ORAL at 20:13

## 2024-05-22 RX ADMIN — AMLODIPINE BESYLATE 10 MG: 10 TABLET ORAL at 21:19

## 2024-05-22 RX ADMIN — Medication 1 G: at 00:39

## 2024-05-22 RX ADMIN — NICOTINE 1 PATCH: 14 PATCH, EXTENDED RELEASE TRANSDERMAL at 00:19

## 2024-05-22 RX ADMIN — Medication 1000 UNITS: at 08:59

## 2024-05-22 RX ADMIN — UMECLIDINIUM BROMIDE AND VILANTEROL TRIFENATATE 1 PUFF: 62.5; 25 POWDER RESPIRATORY (INHALATION) at 12:20

## 2024-05-22 RX ADMIN — FUROSEMIDE 20 MG: 10 INJECTION, SOLUTION INTRAMUSCULAR; INTRAVENOUS at 17:24

## 2024-05-22 RX ADMIN — METHYLPREDNISOLONE SODIUM SUCCINATE 40 MG: 40 INJECTION, POWDER, FOR SOLUTION INTRAMUSCULAR; INTRAVENOUS at 14:13

## 2024-05-22 RX ADMIN — INSULIN LISPRO 2 UNITS: 100 INJECTION, SOLUTION INTRAVENOUS; SUBCUTANEOUS at 21:20

## 2024-05-22 RX ADMIN — LISINOPRIL 40 MG: 20 TABLET ORAL at 08:59

## 2024-05-22 RX ADMIN — CHLORHEXIDINE GLUCONATE 15 ML: 1.2 RINSE ORAL at 08:59

## 2024-05-22 RX ADMIN — METOPROLOL TARTRATE 100 MG: 50 TABLET, FILM COATED ORAL at 08:59

## 2024-05-22 RX ADMIN — METOPROLOL TARTRATE 100 MG: 50 TABLET, FILM COATED ORAL at 20:11

## 2024-05-22 RX ADMIN — FUROSEMIDE 20 MG: 10 INJECTION, SOLUTION INTRAMUSCULAR; INTRAVENOUS at 12:21

## 2024-05-22 RX ADMIN — LEVALBUTEROL HYDROCHLORIDE 1.25 MG: 1.25 SOLUTION RESPIRATORY (INHALATION) at 08:11

## 2024-05-22 RX ADMIN — HEPARIN SODIUM 5000 UNITS: 5000 INJECTION, SOLUTION INTRAVENOUS; SUBCUTANEOUS at 14:13

## 2024-05-22 RX ADMIN — FUROSEMIDE 20 MG: 10 INJECTION, SOLUTION INTRAMUSCULAR; INTRAVENOUS at 00:40

## 2024-05-22 RX ADMIN — ALBUTEROL SULFATE 5 MG: 2.5 SOLUTION RESPIRATORY (INHALATION) at 00:24

## 2024-05-22 RX ADMIN — CLONIDINE HYDROCHLORIDE 0.2 MG: 0.1 TABLET ORAL at 08:59

## 2024-05-22 RX ADMIN — SODIUM CHLORIDE: 4 INJECTION, SOLUTION, CONCENTRATE INTRAVENOUS at 10:46

## 2024-05-22 RX ADMIN — BUDESONIDE 0.5 MG: 0.5 INHALANT ORAL at 08:11

## 2024-05-22 RX ADMIN — MAGNESIUM SULFATE HEPTAHYDRATE 2 G: 40 INJECTION, SOLUTION INTRAVENOUS at 00:19

## 2024-05-22 RX ADMIN — LORAZEPAM 0.5 MG: 0.5 TABLET ORAL at 16:33

## 2024-05-22 RX ADMIN — PANTOPRAZOLE SODIUM 20 MG: 20 TABLET, DELAYED RELEASE ORAL at 05:57

## 2024-05-22 RX ADMIN — FLUTICASONE PROPIONATE 1 SPRAY: 50 SPRAY, METERED NASAL at 10:03

## 2024-05-22 RX ADMIN — LEVALBUTEROL HYDROCHLORIDE 1.25 MG: 1.25 SOLUTION RESPIRATORY (INHALATION) at 19:36

## 2024-05-22 RX ADMIN — IPRATROPIUM BROMIDE 0.5 MG: 0.5 SOLUTION RESPIRATORY (INHALATION) at 08:11

## 2024-05-22 RX ADMIN — SERTRALINE HYDROCHLORIDE 50 MG: 50 TABLET ORAL at 08:59

## 2024-05-22 RX ADMIN — INSULIN LISPRO 2 UNITS: 100 INJECTION, SOLUTION INTRAVENOUS; SUBCUTANEOUS at 12:21

## 2024-05-22 RX ADMIN — LEVALBUTEROL HYDROCHLORIDE 1.25 MG: 1.25 SOLUTION RESPIRATORY (INHALATION) at 13:56

## 2024-05-22 RX ADMIN — BUDESONIDE 0.5 MG: 0.5 INHALANT ORAL at 19:36

## 2024-05-22 RX ADMIN — HEPARIN SODIUM 5000 UNITS: 5000 INJECTION, SOLUTION INTRAVENOUS; SUBCUTANEOUS at 21:19

## 2024-05-22 RX ADMIN — INSULIN LISPRO 1 UNITS: 100 INJECTION, SOLUTION INTRAVENOUS; SUBCUTANEOUS at 17:23

## 2024-05-22 RX ADMIN — ATORVASTATIN CALCIUM 40 MG: 40 TABLET, FILM COATED ORAL at 21:19

## 2024-05-22 RX ADMIN — HEPARIN SODIUM 5000 UNITS: 5000 INJECTION, SOLUTION INTRAVENOUS; SUBCUTANEOUS at 06:00

## 2024-05-22 RX ADMIN — NICOTINE 1 PATCH: 14 PATCH, EXTENDED RELEASE TRANSDERMAL at 10:06

## 2024-05-22 RX ADMIN — Medication 2 G: at 08:59

## 2024-05-22 RX ADMIN — METHYLPREDNISOLONE SODIUM SUCCINATE 40 MG: 40 INJECTION, POWDER, FOR SOLUTION INTRAMUSCULAR; INTRAVENOUS at 06:00

## 2024-05-22 RX ADMIN — ALBUTEROL SULFATE 5 MG: 2.5 SOLUTION RESPIRATORY (INHALATION) at 16:34

## 2024-05-22 RX ADMIN — CLONIDINE HYDROCHLORIDE 0.2 MG: 0.1 TABLET ORAL at 20:11

## 2024-05-22 RX ADMIN — LORAZEPAM 0.5 MG: 0.5 TABLET ORAL at 04:05

## 2024-05-22 RX ADMIN — HYDROXYZINE HYDROCHLORIDE 10 MG: 10 TABLET ORAL at 00:14

## 2024-05-22 NOTE — ASSESSMENT & PLAN NOTE
Lab Results   Component Value Date    HGBA1C 7.9 (H) 03/06/2024       Recent Labs     05/21/24 1949   POCGLU 212*       Blood Sugar Average: Last 72 hrs:  (P) 212    Is on glipizide at home hold for now  Started insulin sliding scale every 6 hours

## 2024-05-22 NOTE — PROGRESS NOTES
Critical Care Interval Transfer Note:    Please refer to progress note from earlier today for full details.     Barriers to discharge:   Hyponatremia     Consults: IP CONSULT TO NEPHROLOGY  IP CONSULT TO CASE MANAGEMENT    Recommended to review admission imaging for incidental findings and document in discharge navigator: Chart reviewed, no known incidental findings noted at this time.      Discharge Plan: Anticipate discharge in 48 hrs to discharge location to be determined pending rehab evaluations.        Patient seen and evaluated by Critical Care today and deemed to be appropriate for transfer to Med Surg. Spoke to Dr felix  from [facility] to accept transfer. Critical care can be contacted via [facility] [employer] with any questions or concerns.    Updated porch to hospitalist

## 2024-05-22 NOTE — PROGRESS NOTES
In basket ADT notification received for patient admission to [facility].  Jessika presented with SOB and found to have sodium level of 118.      This RN CM will continue to monitor via chart review throughout hospitalization and plan for outreach upon discharge pending disposition.

## 2024-05-22 NOTE — ASSESSMENT & PLAN NOTE
2 to 3 L of oxygen at baseline  Presented with worsening hypoxemia shortness of breath ductopenia and wheezing  Up to 4 to 5 L on presentation  Started treatment for COPD exacerbation  weaned back down to baseline oxygen requirements

## 2024-05-22 NOTE — ASSESSMENT & PLAN NOTE
Patient is chronically on 3 L of oxygen severe COPD has care management plan in place  Patient complains of shortness of breath and wheezing  Chest x-ray is clear  started on Atrovent Xopenex and Pulmicort round-the-clock   Solu-Medrol 40 mg daily x 3 days   Consider pulmonary consult if not improving.   Follow respiratory protocol  COVID and flu panel is negative  Lungs improving, without wheezing on examination today.

## 2024-05-22 NOTE — ASSESSMENT & PLAN NOTE
Lab Results   Component Value Date    HGBA1C 7.9 (H) 03/06/2024       Recent Labs     05/21/24  2330 05/22/24  0559 05/22/24  1101 05/22/24  1642   POCGLU 255* 138 237* 199*       Blood Sugar Average: Last 72 hrs:  (P) 211.1977027426729248    Insulin sliding scale

## 2024-05-22 NOTE — NURSING NOTE
Patient unable to assist in medication reconciliation.  Pt reports daughter Mel knows medications.  Left message on Mel's phone to return call to reconcile medications.

## 2024-05-22 NOTE — ASSESSMENT & PLAN NOTE
Lab Results   Component Value Date    HGBA1C 7.9 (H) 03/06/2024       Recent Labs     05/21/24  1949 05/21/24 2118 05/21/24  2330   POCGLU 212* 230* 255*         Blood Sugar Average: Last 72 hrs:  (P) 232.1170414496185644    Is on glipizide at home hold for now  Started insulin sliding scale every 6 hours

## 2024-05-22 NOTE — CONSULTS
Consultation - Nephrology   Jessika Lux 69 y.o. female MRN: [id number]  Unit/Bed#: -01 Encounter: [id number]      Assessment & Plan     Assessment / Plan:    1.  Hyponatremia    The patient has history of chronic hyponatremia and she has been on an SSRI as I have reviewed records going back at least 6 months.  She presented to the hospital with exacerbation of some shortness of breath and was found to have significantly lower sodium concentration.  Urine studies are consistent with SIADH as urine osmolality is inappropriately concentrated in this setting.  Given that the patient has had recurrent admissions for severe events of hyponatremia I would discontinue her SSRI.  If she requires medication it would be better to find an alternative class than the SSRIs.    Will start 1.8% saline.  This IV fluid will have a higher osmolality in the urine so the solute will be excreted in a higher volume therefore improving water excretion.    Sodium concentration today is 120 mEq/L.    1.8% saline at 50 cc/h  Continue fluid restriction  Monitor sodium concentration  Lasix okay to continue as will help lower urine osmolality effectively excreting more free water.  Discontinue salt tablets  Discontinue IV normal saline  Discontinue Zoloft and monitor      History of Present Illness   Physician Requesting Consult: [place] MD *  Reason for Consult / Principal Problem: Hyponatremia  Hx and PE limited by:   HPI: Jessika Lux is a 69 y.o. year old female who presents to the hospital complaining of shortness of breath.  She has a history of severe COPD and states that she was wheezing it was difficult to take deep breaths and she felt short of breath.  On arrival in evaluation found to have sodium concentration 118 mill equivalents per liter she was admitted to the hospital were asked to see her regarding this for recommendations.  The patient was awake and alert and asymptomatic related to the  hyponatremia.  History obtained from chart review and the patient.    Consults    Review of Systems   Constitutional:  Positive for fatigue. Negative for chills, diaphoresis and fever.   HENT: Negative.     Eyes: Negative.    Respiratory:  Positive for cough, chest tightness, shortness of breath and wheezing.    Cardiovascular:  Negative for chest pain, palpitations and leg swelling.   Gastrointestinal:  Negative for abdominal pain, diarrhea, nausea and vomiting.   Genitourinary: Negative.    Neurological:  Negative for dizziness, seizures, light-headedness and headaches.   Psychiatric/Behavioral:  Negative for agitation, confusion, decreased concentration and hallucinations.        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 No pertinent surgical history.  Social History   Social  History     Substance and Sexual Activity   Alcohol Use Not Currently     Social History     Substance and Sexual Activity   Drug Use Never     Social History     Tobacco Use   Smoking Status Every Day    Current packs/day: 0.25    Types: Cigarettes    Passive exposure: Never   Smokeless Tobacco Never     Family History   Problem Relation Age of Onset    Diabetes Father        Meds/Allergies   current meds:   Current Facility-Administered Medications   Medication Dose Route Frequency    albuterol inhalation solution 5 mg  5 mg Nebulization Q6H PRN    amLODIPine (NORVASC) tablet 10 mg  10 mg Oral HS    atorvastatin (LIPITOR) tablet 40 mg  40 mg Oral HS    budesonide (PULMICORT) inhalation solution 0.5 mg  0.5 mg Nebulization Q12H    cefTRIAXone (ROCEPHIN) IVPB (premix in dextrose) 1,000 mg 50 mL  1,000 mg Intravenous Q24H    chlorhexidine (PERIDEX) 0.12 % oral rinse 15 mL  15 mL Mouth/Throat Q12H NATHALY    Cholecalciferol (VITAMIN D3) tablet 1,000 Units  1,000 Units Oral Daily    cloNIDine (CATAPRES) tablet 0.2 mg  0.2 mg Oral Q12H [facility]    fluticasone (FLONASE) 50 mcg/act nasal spray 1 spray  1 spray Nasal Daily    furosemide (LASIX) injection 20 mg  20 mg Intravenous TID (diuretic)    heparin (porcine) subcutaneous injection 5,000 Units  5,000 Units Subcutaneous Q8H [facility]    hydrOXYzine HCL (ATARAX) tablet 10 mg  10 mg Oral Q6H PRN    insulin lispro (HumALOG/ADMELOG) 100 units/mL subcutaneous injection 1-5 Units  1-5 Units Subcutaneous Q6H [facility]    levalbuterol (XOPENEX) inhalation solution 1.25 mg  1.25 mg Nebulization TID    lisinopril (ZESTRIL) tablet 40 mg  40 mg Oral Daily    LORazepam (ATIVAN) tablet 0.5 mg  0.5 mg Oral Q8H PRN    methylPREDNISolone sodium succinate (Solu-MEDROL) injection 40 mg  40 mg Intravenous Q8H    metoprolol tartrate (LOPRESSOR) tablet 100 mg  100 mg Oral Q12H NATHALY    nicotine (NICODERM CQ) 14 mg/24hr TD 24 hr patch 1 patch  1 patch Transdermal Daily    pantoprazole (PROTONIX) EC tablet 20 mg   "20 mg Oral Early Morning    sertraline (ZOLOFT) tablet 50 mg  50 mg Oral Daily    sodium chloride 0.9 % infusion  100 mL/hr Intravenous Continuous    sodium chloride tablet 2 g  2 g Oral TID    umeclidinium-vilanterol 62.5-25 mcg/actuation inhaler 1 puff  1 puff Inhalation Daily     Allergies   Allergen Reactions    Clindamycin Other (See Comments)     unknown       Objective     Intake/Output Summary (Last 24 hours) at 5/22/2024 0949  Last data filed at 5/22/2024 0901  Gross per 24 hour   Intake 1675 ml   Output 1375 ml   Net 300 ml     Body mass index is 27.7 kg/m².    Invasive Devices:        PHYSICAL EXAM:  /56 (BP Location: Right arm)   Pulse 76   Temp 97.8 °F (36.6 °C) (Temporal)   Resp (!) 29   Ht 5' 2\" (1.575 m)   Wt 68.7 kg (151 lb 7.3 oz)   SpO2 97%   BMI 27.70 kg/m²     Physical Exam  Constitutional:       General: She is not in acute distress.     Appearance: She is not toxic-appearing.   HENT:      Head: Normocephalic and atraumatic.      Nose: Nose normal.      Mouth/Throat:      Mouth: Mucous membranes are dry.   Eyes:      General: No scleral icterus.     Extraocular Movements: Extraocular movements intact.   Cardiovascular:      Rate and Rhythm: Normal rate and regular rhythm.      Heart sounds:      No friction rub. No gallop.      Comments: No significant edema.  Pulmonary:      Effort: No respiratory distress.      Breath sounds: No rhonchi or rales.      Comments: Shallow inspiration.  No use of accessory muscles.  Talks full sentences.  Abdominal:      General: Bowel sounds are normal. There is no distension.      Palpations: Abdomen is soft.      Tenderness: There is no abdominal tenderness. There is no rebound.   Musculoskeletal:      Cervical back: Normal range of motion and neck supple.   Neurological:      Mental Status: She is alert and oriented to person, place, and time.   Psychiatric:         Mood and Affect: Mood normal.         Behavior: Behavior normal.         Thought " Content: Thought content normal.           Current Weight: Weight - Scale: 68.7 kg (151 lb 7.3 oz)  First Weight: Weight - Scale: 71.6 kg (157 lb 13.6 oz)    Lab Results:    Results from last 7 days   Lab Units 05/22/24  0314   WBC Thousand/uL 4.62   HEMOGLOBIN g/dL 11.7   HEMATOCRIT % 34.0*   PLATELETS Thousands/uL 191     Results from last 7 days   Lab Units 05/22/24  0909   POTASSIUM mmol/L 3.8   CHLORIDE mmol/L 76*   CO2 mmol/L 40*   BUN mg/dL 8   CREATININE mg/dL 0.52*   CALCIUM mg/dL 9.0     Results from last 7 days   Lab Units 05/22/24  0909 05/21/24  1710 05/21/24  1330   POTASSIUM mmol/L 3.8   < > 3.8   CHLORIDE mmol/L 76*   < > 73*   CO2 mmol/L 40*   < > 38*   BUN mg/dL 8   < > 6   CREATININE mg/dL 0.52*   < > 0.45*   CALCIUM mg/dL 9.0   < > 9.6   ALK PHOS U/L  --   --  96   ALT U/L  --   --  8   AST U/L  --   --  14    < > = values in this interval not displayed.

## 2024-05-22 NOTE — PLAN OF CARE
Problem: Prexisting or High Potential for Compromised Skin Integrity  Goal: Skin integrity is maintained or improved  Description: INTERVENTIONS:  - Identify patients at risk for skin breakdown  - Assess and monitor skin integrity  - Assess and monitor nutrition and hydration status  - Monitor labs   - Assess for incontinence   - Turn and reposition patient  - Assist with mobility/ambulation  - Relieve pressure over bony prominences  - Avoid friction and shearing  - Provide appropriate hygiene as needed including keeping skin clean and dry  - Evaluate need for skin moisturizer/barrier cream  - Collaborate with interdisciplinary team   - Patient/family teaching  - Consider wound care consult   Outcome: Progressing     Problem: PAIN - ADULT  Goal: Verbalizes/displays adequate comfort level or baseline comfort level  Description: Interventions:  - Encourage patient to monitor pain and request assistance  - Assess pain using appropriate pain scale  - Administer analgesics based on type and severity of pain and evaluate response  - Implement non-pharmacological measures as appropriate and evaluate response  - Consider cultural and social influences on pain and pain management  - Notify physician/advanced practitioner if interventions unsuccessful or patient reports new pain  Outcome: Progressing     Problem: INFECTION - ADULT  Goal: Absence or prevention of progression during hospitalization  Description: INTERVENTIONS:  - Assess and monitor for signs and symptoms of infection  - Monitor lab/diagnostic results  - Monitor all insertion sites, i.e. indwelling lines, tubes, and drains  - Monitor endotracheal if appropriate and nasal secretions for changes in amount and color  - [place] appropriate cooling/warming therapies per order  - Administer medications as ordered  - Instruct and encourage patient and family to use good hand hygiene technique  - Identify and instruct in appropriate isolation precautions for  identified infection/condition  Outcome: Progressing     Problem: SAFETY ADULT  Goal: Patient will remain free of falls  Description: INTERVENTIONS:  - Educate patient/family on patient safety including physical limitations  - Instruct patient to call for assistance with activity   - Consult OT/PT to assist with strengthening/mobility   - Keep Call bell within reach  - Keep bed low and locked with side rails adjusted as appropriate  - Keep care items and personal belongings within reach  - Initiate and maintain comfort rounds  - Make Fall Risk Sign visible to staff  - Offer Toileting every 2 Hours, in advance of need if unable to notify staff  - Initiate/Maintain bed/chair alarm for confusion, impulsivity, or noncompliance with notifying staff.  - Apply yellow socks and bracelet for high fall risk patients  - Consider moving patient to room near nurses station  Outcome: Progressing  Goal: Maintain or return to baseline ADL function  Description: INTERVENTIONS:  -  Assess patient's ability to carry out ADLs; assess patient's baseline for ADL function and identify physical deficits which impact ability to perform ADLs (bathing, care of mouth/teeth, toileting, grooming, dressing, etc.)  - Assess/evaluate cause of self-care deficits   - Assess range of motion  - Assess patient's mobility; develop plan if impaired  - Assess patient's need for assistive devices and provide as appropriate  - Encourage maximum independence but intervene and supervise when necessary  - Involve family in performance of ADLs  - Assess for home care needs following discharge   - Consider OT consult to assist with ADL evaluation and planning for discharge  - Provide patient education as appropriate  Outcome: Progressing  Goal: Maintains/Returns to pre admission functional level  Description: INTERVENTIONS:  - Perform AM-PAC 6 Click Basic Mobility/ Daily Activity assessment daily.  - Set and communicate daily mobility goal to care team and  patient/family/caregiver.   - Collaborate with rehabilitation services on mobility goals if consulted  - Perform Range of Motion 3 times a day.  - Reposition patient every 2 hours if unable to reposition self  - Out of bed for toileting if medically appropriate  - Record patient progress and toleration of activity level   Outcome: Progressing     Problem: DISCHARGE PLANNING  Goal: Discharge to home or other facility with appropriate resources  Description: INTERVENTIONS:  - Identify barriers to discharge w/patient and caregiver  - Arrange for needed discharge resources and transportation as appropriate  - Identify discharge learning needs (meds, wound care, etc.)  - Arrange for interpretive services to assist at discharge as needed  - Refer to Case Management Department for coordinating discharge planning if the patient needs post-hospital services based on physician/advanced practitioner order or complex needs related to functional status, cognitive ability, or social support system  Outcome: Progressing     Problem: Knowledge Deficit  Goal: Patient/family/caregiver demonstrates understanding of disease process, treatment plan, medications, and discharge instructions  Description: Complete learning assessment and assess knowledge base.  Interventions:  - Provide teaching at level of understanding  - Provide teaching via preferred learning methods  Outcome: Progressing     Problem: NEUROSENSORY - ADULT  Goal: Achieves stable or improved neurological status  Description: INTERVENTIONS  - Monitor and report changes in neurological status  - Monitor vital signs such as temperature, blood pressure, glucose, and any other labs ordered   - Initiate measures to prevent increased intracranial pressure  - Monitor for seizure activity and implement precautions if appropriate      Outcome: Progressing  Goal: Remains free of injury related to seizures activity  Description: INTERVENTIONS  - Maintain airway, patient safety  and  administer oxygen as ordered  - Monitor patient for seizure activity, document and report duration and description of seizure to physician/advanced practitioner  - If seizure occurs,  ensure patient safety during seizure  - Reorient patient post seizure  - Seizure pads on all 4 side rails  - Instruct patient/family to notify RN of any seizure activity including if an aura is experienced  - Instruct patient/family to call for assistance with activity based on nursing assessment  - Administer anti-seizure medications if ordered    Outcome: Progressing     Problem: CARDIOVASCULAR - ADULT  Goal: Maintains optimal cardiac output and hemodynamic stability  Description: INTERVENTIONS:  - Monitor I/O, vital signs and rhythm  - Monitor for S/S and trends of decreased cardiac output  - Administer and titrate ordered vasoactive medications to optimize hemodynamic stability  - Assess quality of pulses, skin color and temperature  - Assess for signs of decreased coronary artery perfusion  - Instruct patient to report change in severity of symptoms  Outcome: Progressing  Goal: Absence of cardiac dysrhythmias or at baseline rhythm  Description: INTERVENTIONS:  - Continuous cardiac monitoring, vital signs, obtain 12 lead EKG if ordered  - Administer antiarrhythmic and heart rate control medications as ordered  - Monitor electrolytes and administer replacement therapy as ordered  Outcome: Progressing     Problem: RESPIRATORY - ADULT  Goal: Achieves optimal ventilation and oxygenation  Description: INTERVENTIONS:  - Assess for changes in respiratory status  - Assess for changes in mentation and behavior  - Position to facilitate oxygenation and minimize respiratory effort  - Oxygen administered by appropriate delivery if ordered  - Initiate smoking cessation education as indicated  - Encourage broncho-pulmonary hygiene including cough, deep breathe, Incentive Spirometry  - Assess the need for suctioning and aspirate as needed  -  Assess and instruct to report SOB or any respiratory difficulty  - Respiratory Therapy support as indicated  Outcome: Progressing     Problem: METABOLIC, FLUID AND ELECTROLYTES - ADULT  Goal: Electrolytes maintained within normal limits  Description: INTERVENTIONS:  - Monitor labs and assess patient for signs and symptoms of electrolyte imbalances  - Administer electrolyte replacement as ordered  - Monitor response to electrolyte replacements, including repeat lab results as appropriate  - Instruct patient on fluid and nutrition as appropriate  Outcome: Progressing  Goal: Fluid balance maintained  Description: INTERVENTIONS:  - Monitor labs   - Monitor I/O and WT  - Instruct patient on fluid and nutrition as appropriate  - Assess for signs & symptoms of volume excess or deficit  Outcome: Progressing  Goal: Glucose maintained within target range  Description: INTERVENTIONS:  - Monitor Blood Glucose as ordered  - Assess for signs and symptoms of hyperglycemia and hypoglycemia  - Administer ordered medications to maintain glucose within target range  - Assess nutritional intake and initiate nutrition service referral as needed  Outcome: Progressing     Pt. Alert and oriented X 4.  Medications given per orders. Continues with fluids restriction of 1200 mL. Neuro checks continues every 2 hours per orders. Transfers to [facility]  to void with 1 assist. Gait noted to be  steady. Continues on 3L O2 with saturations maintained in the mid 90's. C/O SOB neb treatment given. C/O feeling anxious PRN medication given per MAR. Bed in lowest position, call bell and belongings within reach.

## 2024-05-22 NOTE — ASSESSMENT & PLAN NOTE
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 is on 1 g salt tablets twice daily and 10 mg on torsemide last visit with nephrologist was 4/25   Patient received in the  mL of normal saline the sodium stayed from 118 - 118  Contacted nephrology and consulted  Give 1 dose of Lasix 20 mg IV, start 1 g of salt tablets 3 times daily and placed on fluid restriction of 1200 mL  Check BMP at midnight  I and O  Daily weight  Serum urine osmolalities and urine sodium is pending  As of note patient stated to me that she takes all her medications and was compliant but she refused to salt tablets because there were too big and had to break them in 4 pieces and she also refused capsules of doxycycline -doxycycline to ceftriaxone 1 g for 3 days, patient stopped taking salt tablet may need to be on 1.8% sodium

## 2024-05-22 NOTE — ASSESSMENT & PLAN NOTE
Patient is chronically on 3 L of oxygen severe COPD has care management plan in place  Patient complains of shortness of breath and wheezing  Chest x-ray is clear  started on Atrovent Xopenex and Pulmicort round-the-clock and doxycycline 100 mg p.o. twice daily for 5 days  started Solu-Medrol 40 mg every 8 hours  Consider pulmonary consult  Follow respiratory protocol  COVID and flu panel is negative

## 2024-05-22 NOTE — UTILIZATION REVIEW
Initial Clinical Review    Admission: Date/Time/Statement:   Admission Orders (From admission, onward)       Ordered        05/21/24 1835  Inpatient Admission  Once                          Orders Placed This Encounter   Procedures    Inpatient Admission     Standing Status:   Standing     Number of Occurrences:   1     Order Specific Question:   Level of Care     Answer:   Level 1 Stepdown [13]     Order Specific Question:   Estimated length of stay     Answer:   More than 2 Midnights     Order Specific Question:   Certification     Answer:   I certify that inpatient services are medically necessary for this patient for a duration of greater than two midnights. See H&P and MD Progress Notes for additional information about the patient's course of treatment.     ED Arrival Information       Expected   -    Arrival   5/21/2024 13:11    Acuity   Urgent              Means of arrival   Walk-In    Escorted by   Family Member    Service   Anesthesiology    Admission type   Emergency              Arrival complaint   Vomiting/ Weakness             Chief Complaint   Patient presents with    Vomiting     Pt c/o nausea and vomiting w/weakness starting at 1130 today while laying watching tv. Pt mentioned having diarrhea couple days ago resolved taking MOM. Denies travel/sob/fevers/cough/diarrhea       Initial Presentation: 69 y.o. female to ED via walk-in from home  Present to ED with conplaints of nausea, significant weakness and shortness of breath wheezing since 1130 today. 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 Patient received in the  mL of normal saline the sodium stayed from 118 - 118. 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 B/L LE edema; O2 @ 4-5L via nc sat 96%; lungs with decreased breath sounds and wheezing  PLAN: cont solumedrol iv; rec'd lasix iv x1; cont DuoNebs; monitor labs; Cardiopulmonary monitoring; fluid restriction; consult nephrology; f/u Serum urine osmolalities and urine sodium; titrate O2; Accuchecks with [facility]        Date: 5/22/24      Day 2  Tachypnea; lungs decreased and wheezing; calm - disoriented to time and situation; Repeat sodium 117 - corrected 119    Plan: cont solumedrol iv; cont lasix iv x2; start ivf 1.8%; cont DuoNebs; monitor labs; Cardiopulmonary monitoring; fluid restriction; f/u Serum urine osmolalities and urine sodium; titrate O2; Accuchecks with [facility]      NEPHROLOGY CONSULT   Hyponatremia: The patient has history of chronic hyponatremia and she has been on an SSRI as I have reviewed records going back at least 6 months.  She presented to the hospital with exacerbation of some shortness of breath and was found to have significantly lower sodium concentration.  Urine studies are consistent with SIADH as urine osmolality is inappropriately concentrated in this setting.  Given that the patient has had recurrent admissions for severe events of hyponatremia I would discontinue her SSRI.  If she requires medication it would be better to find an alternative class than the SSRIs. Sodium concentration today is 120 mEq/L.  PLAN: Will start 1.8% saline.  This IV fluid will have a higher osmolality in the urine so the solute will be excreted in a higher volume therefore improving water excretion. Continue fluid restriction  Monitor sodium concentration. Lasix okay to continue as will help lower urine osmolality effectively excreting more free water. Discontinue salt tablets. Discontinue IV normal saline. Discontinue Zoloft and monitor      Date: 5/23/24      Day 3: Has  surpassed a 2nd midnight with active treatments and services.  During the night stopped the 1.8% saline solution because the sodium was 126, contacted on-call nephrologist wanted BMP every 4 hours, 4 hours later sodium was 128.  Then in the morning sodium was 131-started D5 water at 100 mL/h for 4 hours.  Patient always appears to have a contraction alkalosis with Lasix 20 mg IV 3 times daily, was holding the am dose of lasix. Nephrology is aware. Exam: Patient is awake alert says that her breathing feels a little bit better. No confusion or neurological complaints. I/O Net -445; O2 @ 3L via nc.   Plan: cont solumedrol iv; start D5% ivf; cont DuoNebs; rec'd KCL PO; monitor labs; Cardiopulmonary monitoring; fluid restriction; f/u Serum urine osmolalities and urine sodium; titrate O2; Accuchecks with [facility]      ED Triage Vitals [05/21/24 1318]   Temperature Pulse Respirations Blood Pressure SpO2   97.8 °F (36.6 °C) 80 18 (!) 188/84 96 %      Temp Source Heart Rate Source Patient Position - Orthostatic VS BP Location FiO2 (%)   Oral Monitor Lying Right arm --      Pain Score       No Pain          Wt Readings from Last 1 Encounters:   05/22/24 68.7 kg (151 lb 7.3 oz)     Additional Vital Signs:   Date/Time Temp Pulse Resp BP MAP (mmHg) SpO2 Calculated FIO2 (%) - Nasal Cannula Nasal Cannula O2 Flow Rate (L/min) O2 Device Patient Position - Orthostatic VS   05/23/24 0844 -- -- -- -- -- 93 % 32 3 L/min Nasal cannula --   05/23/24 0838 -- 77 -- -- -- -- -- -- -- --   05/23/24 0719 98.1 °F (36.7 °C) 60 18 118/65 83 98 % -- -- -- --   05/23/24 0600 -- 54 Abnormal  -- -- -- 100 % -- -- -- --   05/23/24 0305 97.2 °F (36.2 °C) Abnormal  66 18 113/58 80 100 % 32 3 L/min Nasal cannula Lying   05/23/24 0302 -- 36 Abnormal  -- -- -- 100 % -- -- -- --   05/23/24 0000 97 °F (36.1 °C) Abnormal  68 18 138/67 91 97 % 32 3 L/min Nasal cannula Sitting   05/22/24 2119 -- 73 -- 116/56 80 96 % -- -- -- --   05/22/24 2015 -- 83 -- 118/59 -- 97  % 32 3 L/min Nasal cannula --   05/22/24 2011 -- 85 -- 118/59 83 98 % -- -- -- Sitting   05/22/24 1937 -- -- -- -- -- 99 % -- -- -- --   05/22/24 1901 97.4 °F (36.3 °C) Abnormal  71 20 115/60 82 99 % 32 3 L/min Nasal cannula Lying   05/22/24 1900 -- 71 21 -- -- 99 % -- -- -- --   05/22/24 1458 98 °F (36.7 °C) 71 32 Abnormal  105/52 74 98 % 32 3 L/min Nasal cannula Lying   05/22/24 1300 -- 86 -- 132/64 89 90 % -- -- -- --   05/22/24 1200 -- 74 24 Abnormal  143/71 101 98 %         Date/Time Temp Pulse Resp BP MAP (mmHg) SpO2 Calculated FIO2 (%) - Nasal Cannula Nasal Cannula O2 Flow Rate (L/min) O2 Device Patient Position - Orthostatic VS   05/22/24 1100 97.5 °F (36.4 °C) 69 39 Abnormal  123/61 85 98 % -- -- Nasal cannula Lying   05/22/24 0900 -- 76 29 Abnormal  117/56 80 97 % -- -- Nasal cannula Lying   05/22/24 0859 -- -- -- 117/56 -- -- -- -- -- --   05/22/24 0810 -- -- -- -- -- -- 32 3 L/min Nasal cannula --   05/22/24 0711 97.8 °F (36.6 °C) 68 26 Abnormal  128/63 89 99 % 32 3 L/min Nasal cannula Lying   05/22/24 0600 -- 83 35 Abnormal  -- -- 94 % -- -- -- --   05/22/24 0345 98.3 °F (36.8 °C) 65 29 Abnormal  124/58 84 98 % 32 3 L/min Nasal cannula Lying   05/22/24 0200 -- 72 24 Abnormal  -- -- 95 % -- -- -- --   05/22/24 0024 -- -- -- -- -- 90 % -- -- -- --   05/22/24 0015 -- 87 34 Abnormal  150/68 98 97 % -- -- -- --   05/21/24 2315 97.6 °F (36.4 °C) 78 28 Abnormal  199/84 Abnormal   121 97 % 32 3 L/min Nasal cannula Lying   BP: IV hydralazine given at 05/21/24 2315 05/21/24 2101 -- 87 47 Abnormal  171/92 Abnormal  124 94 % -- -- -- --   05/21/24 2100 -- 90 41 Abnormal  171/92 Abnormal  -- 98 % -- -- -- --   05/21/24 2012 -- -- -- -- -- 95 % -- -- -- --   05/21/24 2000 -- 81 33 Abnormal  182/86 Abnormal  124 96 % 32 3 L/min Nasal cannula Lying   05/21/24 1930 -- 87 31 Abnormal  185/86 Abnormal  123 95 % -- -- -- --   05/21/24 1915 97.5 °F (36.4 °C) 82 28 Abnormal  167/86 118 96 % 32 3 L/min Nasal cannula Lying    05/21/24 1900 -- 76 -- 140/75 101 99 % -- -- -- --   05/21/24 1800 -- 80 -- 165/94 122 98 % -- -- -- --   05/21/24 1730 -- 71 18 160/80 105 99 % 32 3 L/min Nasal cannula Sitting   05/21/24 1700 -- 71 20 162/77 111 99 % -- -- None (Room air) Sitting   05/21/24 1500 -- 71 18 118/61 84 99 % 32 3 L/min Nasal cannula Sitting   05/21/24 1430 -- 75 20 137/68 95 96 % 32 3 L/min Nasal cannula Sitting   05/21/24 1400 -- 71 18 150/72 104 95 % 32 3 L/min Nasal cannula Sitting   05/21/24 1345 -- 68 20 133/75 97 100 % 32 3 L/min Nasal cannula Lying   05/21/24 1335 -- -- -- -- -- -- -- -- Nasal cannula  --   O2 Device: 3L NC baseline at 05/21/24 1335   05/21/24 1318 97.8 °F (36.6 °C) 80 18 188/84 Abnormal  -- 96 % 32 3 L/min Nasal cannula Lying         EKG: Sinus rhythm with short VA  Nonspecific ST abnormality  Abnormal ECG  When compared with ECG of 03-APR-2024 19:25,  No significant change was found      Pertinent Labs/Diagnostic Test Results:   XR chest 1 view portable   Final Result by Aris Prabhakar MD (05/22 0818)      No active pulmonary disease.            Workstation performed: [id number]           Results from last 7 days   Lab Units 05/22/24  1046   SARS-COV-2  Negative     Results from last 7 days   Lab Units 05/22/24  0314 05/21/24  1330   WBC Thousand/uL 4.62 7.84   HEMOGLOBIN g/dL 11.7 12.9   HEMATOCRIT % 34.0* 37.3   PLATELETS Thousands/uL 191 212   TOTAL NEUT ABS Thousands/µL 3.92 6.33        Results from last 7 days   Lab Units 05/22/24  0909 05/22/24  0314 05/21/24  2332 05/21/24  1710 05/21/24  1330   SODIUM mmol/L 120* 119* 117* 118* 118*   POTASSIUM mmol/L 3.8 4.3 4.1 4.0 3.8   CHLORIDE mmol/L 76* 76* 75* 75* 73*   CO2 mmol/L 40* 39* 39* 35* 38*   ANION GAP mmol/L 4 4 3* 8 7   BUN mg/dL 8 7 7 5 6   CREATININE mg/dL 0.52* 0.54* 0.50* 0.39* 0.45*   EGFR ml/min/1.73sq m 99 97 100 109 104   CALCIUM mg/dL 9.0 9.4 8.6 9.4 9.6   CALCIUM, IONIZED mmol/L  --  1.15  --   --   --    MAGNESIUM mg/dL  --  2.9* 1.8*   --  1.6*   PHOSPHORUS mg/dL  --  3.1  --   --   --      Results from last 7 days   Lab Units 05/21/24  1330   AST U/L 14   ALT U/L 8   ALK PHOS U/L 96   TOTAL PROTEIN g/dL 7.1   ALBUMIN g/dL 4.4   TOTAL BILIRUBIN mg/dL 0.60     Results from last 7 days   Lab Units 05/22/24  1101 05/22/24  0559 05/21/24  2330 05/21/24  2118 05/21/24  1949   POC GLUCOSE mg/dl 237* 138 255* 230* 212*     Results from last 7 days   Lab Units 05/22/24  0909 05/22/24  0314 05/21/24  2332 05/21/24  1710 05/21/24  1330   GLUCOSE RANDOM mg/dL 134 145* 230* 138 161*     Results from last 7 days   Lab Units 05/21/24  1731   OSMOLALITY, SERUM mmol/*        Results from last 7 days   Lab Units 05/21/24  1731 05/21/24  1527 05/21/24  1330   HS TNI 0HR ng/L  --   --  5   HS TNI 2HR ng/L  --  4  --    HSTNI D2 ng/L  --  -1  --    HS TNI 4HR ng/L 5  --   --    HSTNI D4 ng/L 0  --   --         Results from last 7 days   Lab Units 05/22/24  0314   PROCALCITONIN ng/ml <0.05        Results from last 7 days   Lab Units 05/21/24  1330   BNP pg/mL 95      Results from last 7 days   Lab Units 05/21/24  1330   LIPASE u/L <6*        Results from last 7 days   Lab Units 05/21/24  1930 05/21/24  1731   OSMOLALITY, SERUM mmol/KG  --  257*   OSMO UR mmol/  --      Results from last 7 days   Lab Units 05/21/24 1930 05/21/24  1811   CLARITY UA   --  Cloudy   COLOR UA   --  Yellow   SPEC GRAV UA   --  1.025   PH UA   --  6.5   GLUCOSE UA mg/dl  --  Negative   KETONES UA mg/dl  --  15 (1+)*   BLOOD UA   --  Small*   PROTEIN UA mg/dl  --  100 (2+)*   NITRITE UA   --  Negative   BILIRUBIN UA   --  Negative   UROBILINOGEN UA E.U./dl  --  0.2   LEUKOCYTES UA   --  Negative   WBC UA /hpf  --  10-20*   RBC UA /hpf  --  4-10*   BACTERIA UA /hpf  --  Moderate*   EPITHELIAL CELLS WET PREP /hpf  --  Moderate*   SODIUM UR  69  --      Results from last 7 days   Lab Units 05/22/24  1046   INFLUENZA A PCR  Negative   INFLUENZA B PCR  Negative   RSV PCR  Negative           ED Treatment:   Medication Administration from 05/21/2024 1310 to 05/21/2024 1921         Date/Time Order Dose Route Action     05/21/2024 1334 EDT sodium chloride 0.9 % bolus 250 mL 250 mL Intravenous New Bag     05/21/2024 1335 EDT ondansetron (ZOFRAN) injection 4 mg 4 mg Intravenous Given     05/21/2024 1357 EDT ipratropium-albuterol (DUO-NEB) 0.5-2.5 mg/3 mL inhalation solution 3 mL 3 mL Nebulization Given     05/21/2024 1357 EDT dexamethasone (PF) (DECADRON) injection 10 mg 10 mg Intravenous Given     05/21/2024 1527 EDT magnesium sulfate 2 g/50 mL IVPB (premix) 2 g 2 g Intravenous New Bag     05/21/2024 1545 EDT sodium chloride 0.9 % bolus 500 mL 500 mL Intravenous New Bag            Present on Admission:   Hyponatremia   Acute on chronic respiratory failure with hypoxia (HCC)   Anxiety disorder due to general medical condition with panic attack   COPD with acute exacerbation (HCC)   Primary hypertension   Tobacco abuse   Type 2 diabetes mellitus without complication, without long-term current use of insulin (HCC)      Admitting Diagnosis: Hyponatremia [E87.1]  Vomiting [R11.10]  COPD with acute exacerbation (HCC) [J44.1]    Age/Sex: 69 y.o. female    Admission Orders: SCDs; I/O; daily wts; neuro checks; Cardiopulmonary monitoring; Consistent Carbohydrate Diet. Blood glucose checks ACHS. Fluid restriction 1200      Scheduled Medications:  amLODIPine, 10 mg, Oral, HS  atorvastatin, 40 mg, Oral, HS  budesonide, 0.5 mg, Nebulization, Q12H  chlorhexidine, 15 mL, Mouth/Throat, Q12H NATHALY  cholecalciferol, 1,000 Units, Oral, Daily  cloNIDine, 0.2 mg, Oral, Q12H NATHALY  fluticasone, 1 spray, Nasal, Daily  furosemide, 20 mg, Intravenous, TID (diuretic)  furosemide, 20 mg, Intravenous, Once (5/21 recd)   heparin (porcine), 5,000 Units, Subcutaneous, Q8H NATHALY  insulin lispro, 1-5 Units, Subcutaneous, Q6H NATHALY  levalbuterol, 1.25 mg, Nebulization, TID  lisinopril, 40 mg, Oral, Daily  methylPREDNISolone sodium succinate, 40  mg, Intravenous, Q8H  metoprolol tartrate, 100 mg, Oral, Q12H NATHALY  nicotine, 1 patch, Transdermal, Daily  pantoprazole, 20 mg, Oral, Early Morning  umeclidinium-vilanterol, 1 puff, Inhalation, Daily    hydrALAZINE (APRESOLINE) injection 10 mg  Dose: 10 mg  Freq: Once Route: IV  Start: 05/21/24 2330 End: 05/21/24 2331    magnesium sulfate 2 g/50 mL IVPB (premix) 2 g  Dose: 2 g  Freq: Once Route: IV  Last Dose: Stopped (05/22/24 0219)  Start: 05/22/24 0015 End: 05/22/24 0219     potassium chloride oral solution 20 mEq  Dose: 20 mEq  Freq: Once Route: PO  Start: 05/23/24 0045 End: 05/23/24 0216       Continuous IV Infusions:  sodium chloride 23.4% 308 mEq in sterile water 1,000 mL infusion, , Intravenous, Continuous (started 5/22)     dextrose 5 % infusion  Rate: 100 mL/hr Dose: 100 mL/hr  Freq: Continuous Route: IV  Indications of Use: IV Hydration  Last Dose: 100 mL/hr (05/23/24 0522)  Start: 05/23/24 0500 End: 05/23/24 0910    PRN Meds:  albuterol, 5 mg, Nebulization, Q6H PRN  (5/22 recd x2)   hydrOXYzine HCL, 10 mg, Oral, Q6H PRN   (5/22 recd x1)   LORazepam, 0.5 mg, Oral, Q8H PRN  (5/21 recd x1)   (5/22 recd x2)         IP CONSULT TO NEPHROLOGY  IP CONSULT TO CASE MANAGEMENT    Network Utilization Review Department  ATTENTION: Please call with any questions or concerns to [phone] and carefully listen to the prompts so that you are directed to the right person. All voicemails are confidential.   For Discharge needs, contact Care Management DC Support Team at [phone] opt. [phone]  Send all requests for admission clinical reviews, approved or denied determinations and any other requests to dedicated fax number below belonging to the campus where the patient is receiving treatment. List of dedicated fax numbers for the Facilities:  FACILITY NAME UR FAX NUMBER   ADMISSION DENIALS (Administrative/Medical Necessity) [phone]   DISCHARGE SUPPORT TEAM (NETWORK) [phone]   PARENT CHILD HEALTH  (Maternity/NICU/Pediatrics) [phone]   [facility] [phone]   [facility] [phone]   [facility] [phone]   [facility] [phone]   [facility] [phone]   [facility] [phone]   [facility] [phone]   [facility] [phone]   [facility] [phone]   [facility] [phone]   [facility] [phone]   [facility] [phone]

## 2024-05-22 NOTE — CASE MANAGEMENT
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) 2024     Weakness 2024     Anxiety disorder due to general medical condition with panic attack 2023     Acute on chronic respiratory failure with hypoxia (HCC) 2023     COPD, severe (HCC) 2023     Lung nodule 2023     Abnormal CT scan 2023     Anxiety about health 2023     Bacteremia 2023     Vomiting 2023     COPD with acute exacerbation (HCC) 2020     Chronic respiratory failure with hypoxia (HCC) 2020     Hyponatremia 2020     Tobacco abuse 2020     Primary hypertension 2020     Type 2 diabetes mellitus without complication, without long-term current use of insulin (HCC) 2020       LOS (days): 1  Geometric Mean LOS (GMLOS) (days): 3.6  Days to GMLOS:2.8     OBJECTIVE:    Risk of Unplanned Readmission Score: 36.81         Current admission status: Inpatient  Referral Reason: Other (Disposition planning)    Preferred Pharmacy:   [id number] - [place], [place] - [address]  [address]  [facility] [address]  Phone: [phone] Fax: [phone]    [id number] [place], [place] - [address]  [address] [address]  Phone: [phone] Fax: [phone]    [id number] - [place], [place] - [address]  [address]  [facility] [address]  Phone: [phone] Fax: [phone]    Primary Care Provider: Karlie Bruno DO    Primary Insurance: [employer] [facility]  Secondary Insurance: [place] [employer] [facility]  HEALTHCHOICES    ASSESSMENT:  Active Health Care Proxies    There are no active Health Care Proxies on file.       Advance Directives  Does patient have a Health Care POA?: No  Was patient offered paperwork?: Yes (pt declined)  Does patient currently have a Health Care decision maker?: Yes, please see Health Care Proxy section  Does patient have Advance Directives?: No  Was patient offered paperwork?: Yes (pt declined)  Primary Contact: Mel Lux or Lindsey Rodney, daughters         Readmission Root Cause  30 Day Readmission: No    Patient Information  Admitted from:: Home  Mental Status: Alert  During Assessment patient was accompanied by: Not accompanied during assessment  Assessment information provided by:: Patient  Primary Caregiver: Self  Support Systems: Daughter, Family members  [place] Residence: [place] [facility]  What [facility] do you live in?: [place]  Home entry access options. Select all that apply.: Stairs  Number of steps to enter home.: 2  Do the steps have railings?: No  Type of Current Residence: 2 story home  Upon entering residence, is there a bedroom on the main floor (no further steps)?: No (pt sleeps on sofa)  A bedroom is located on the following floor levels of residence (select all that apply):: 2nd Floor  Upon entering residence, is there a bathroom on the main floor (no further steps)?: Yes (bathroom with shower)  Number of steps to 2nd floor from main floor: One Flight  Living Arrangements: Lives w/ Extended Family (Nafisa Muñiz, 14 year old granddaughter lives with patient)  Is patient a ?: No    Activities of Daily Living Prior to Admission  Functional Status: Assistance  Completes ADLs independently?: No  Level of ADL dependence: Assistance  Ambulates independently?: Yes  Does patient use assisted devices?: Yes  Assisted Devices (DME) used: Home Oxygen concentrator, Portable Oxygen tanks, Nebulizer  DME Company Name (respiratory supplies): [employer]  O2 Rate(s):  3L  Does patient currently own DME?: Yes  What DME does the patient currently own?: Walker, Home Oxygen concentrator, Nebulizer, Portable Oxygen tanks  Does patient have a history of Outpatient Therapy (PT/OT)?: No  Does the patient have a history of Short-Term Rehab?: No  Does patient have a history of HHC?: No  Does patient currently have HHC?: No         Patient Information Continued  Income Source: SSI/SSD  Does patient have prescription coverage?: Yes  Does patient receive dialysis treatments?: No  Does patient have a history of substance abuse?: No  Does patient have a history of Mental Health Diagnosis?: Yes (Anxiety)  Is patient receiving treatment for mental health?: Yes (medication management)  Has patient received inpatient treatment related to mental health in the last 2 years?: No         Means of Transportation  Means of Transport to [facility]:: Family transport      Social Determinants of Health (SDOH)      Flowsheet Row Most Recent Value   Housing Stability    In the last 12 months, was there a time when you were not able to pay the mortgage or rent on time? N   In the past 12 months, how many times have you moved where you were living? 1   At any time in the past 12 months, were you homeless or living in a shelter (including now)? N   Transportation Needs    In the past 12 months, has lack of transportation kept you from medical appointments or from getting medications? no   In the past 12 months, has lack of transportation kept you from meetings, work, or from getting things needed for daily living? No   Food Insecurity    Within the past 12 months, you worried that your food would run out before you got the money to buy more. Never true   Within the past 12 months, the food you bought just didn't last and you didn't have money to get more. Never true   Utilities    In the past 12 months has the electric, gas, oil, or water company threatened to shut off services in your home? No            DISCHARGE  DETAILS:    Discharge planning discussed with:: patient  Freedom of Choice: Yes     CM contacted family/caregiver?: No- see comments (pt declined)                  CM met with patient at the bedside, baseline information was obtained. CM discussed the role of CM in helping the patient develop a discharge plan and assist the patient in carry out their plan.    Patient lives in 2 story home with first floor set up, patient sleeping on sofa and full bath located on first floor.  Patients granddaughter, Nafisa Muñiz, age 14, lives with patient. Daughters, Mel Lux, and Lindsey Rodney, check on patient and Nafisa regularly. Patients rent is $243/mo and patient also received Food stamps for herself and Nafisa.    CM to follow for CM discharge referral needs.

## 2024-05-22 NOTE — ASSESSMENT & PLAN NOTE
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 is on 1 g salt tablets twice daily and 10 mg on torsemide last visit with nephrologist was 4/25   Patient received in the  mL of normal saline the sodium stayed from 118 - 118  Nephrology is following   S/p Lasix 20 mg IV TID   1.8 % saline DC when sodium is greater than 126  I and O  Daily weight  Last sodium 123 corrected 125, Na now 131  Nephro: d/c IVF liberalize fluid intake to 2L /day, off SSRI monitor, BMP in AM, d/c diuretic for now.

## 2024-05-22 NOTE — PROGRESS NOTES
[facility]  Progress Note  Name: Jessika Lux I  MRN: [id number]  Unit/Bed#: -01 I Date of Admission: 5/21/2024   Date of Service: 5/22/2024 I Hospital Day: 1    Assessment & Plan   * Acute hyponatremia  Assessment & Plan  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 is on 1 g salt tablets twice daily and 10 mg on torsemide last visit with nephrologist was 4/25   Patient received in the  mL of normal saline the sodium stayed from 118 - 118  Contacted nephrology and consulted  Give 1 dose of Lasix 20 mg IV, start 1 g of salt tablets 3 times daily and placed on fluid restriction of 1200 mL  Check BMP at midnight  I and O  Daily weight  Serum urine osmolalities and urine sodium is pending  As of note patient stated to me that she takes all her medications and was compliant but she refused to salt tablets because there were too big and had to break them in 4 pieces and she also refused capsules of doxycycline -doxycycline to ceftriaxone 1 g for 3 days, patient stopped taking salt tablet may need to be on 1.8% sodium  Repeat sodium 117 - corrected 119 1 dose of Lasix 20 mg IV hourly will need 1.8% saline in the a.m.    Anxiety disorder due to general medical condition with panic attack  Assessment & Plan  Documented history of anxiety due to respiratory status is on Atarax at 10 mg every 6 hours as needed  Becomes extremely anxious when she feels short of breath and has worsening condition  Patient is on Zoloft 50 mg  Continues to use the Atarax 10 mg every 6 hours  Also takes Ativan 0.5 mg every 8 hours - check PDMP is prescription     Acute on chronic respiratory failure with hypoxia (HCC)  Assessment & Plan  2 to 3 L of oxygen at baseline  Presented with worsening hypoxemia shortness of breath ductopenia and wheezing  Up to 4 to 5 L on presentation  Started treatment for COPD exacerbation  wean down oxygen back to  baseline    COPD with acute exacerbation (HCC)  Assessment & Plan  Patient is chronically on 3 L of oxygen severe COPD has care management plan in place  Patient complains of shortness of breath and wheezing  Chest x-ray is clear  started on Atrovent Xopenex and Pulmicort round-the-clock and doxycycline 100 mg p.o. twice daily for 5 days  started Solu-Medrol 40 mg every 8 hours  Consider pulmonary consult  Follow respiratory protocol  COVID and flu panel is negative    Type 2 diabetes mellitus without complication, without long-term current use of insulin (HCC)  Assessment & Plan  Lab Results   Component Value Date    HGBA1C 7.9 (H) 03/06/2024       Recent Labs     05/21/24  1949 05/21/24  2118 05/21/24  2330   POCGLU 212* 230* 255*         Blood Sugar Average: Last 72 hrs:  (P) [id number]    Is on glipizide at home hold for now  Started insulin sliding scale every 6 hours      Primary hypertension  Assessment & Plan  Start home therapy of Norvasc clonidine lisinopril and metoprolol    Tobacco abuse  Assessment & Plan  Insulted for smoking cessation and placed on nicotine replacement therapy             Disposition: Stepdown Level 1    ICU Core Measures     A: Assess, Prevent, and Manage Pain Has pain been assessed? NA  Need for changes to pain regimen? NA   B: Both SAT/SAT  N/A   C: Choice of Sedation RASS Goal: 0 Alert and Calm or N/A patient not on sedation  Need for changes to sedation or analgesia regimen? NA   D: Delirium CAM-ICU: Negative   E: Early Mobility  Plan for early mobility? Yes   F: Family Engagement Plan for family engagement today? Yes       Antibiotic Review: copd xerbation       Prophylaxis:  VTE VTE covered by:  heparin (porcine), Subcutaneous, 5,000 Units at 05/21/24 2100       Stress Ulcer  covered byomeprazole (PriLOSEC OTC) 20 MG tablet [[id number]] (Long-Term Med), pantoprazole (PROTONIX) EC tablet 20 mg [[id number]]         Significant 24hr Events     Fluid restriction to 1200 ml,  Lasix 20 mg IV and salt tablet sodium was 117 corrected 119  Another dose of Lasix 20 mg IV repeat sodium in the a.m. 119 corrected sodium is 120   Possibly 1.8% sodium in the a.m.     Subjective   Review of Systems   Constitutional:  Positive for fatigue.   Gastrointestinal:  Positive for nausea.   Neurological:  Positive for weakness.   Psychiatric/Behavioral:  Positive for confusion.    All other systems reviewed and are negative.     Objective                            Vitals I/O      Most Recent Min/Max in 24hrs   Temp 98.3 °F (36.8 °C) Temp  Min: 97.5 °F (36.4 °C)  Max: 98.3 °F (36.8 °C)   Pulse 65 Pulse  Min: 65  Max: 90   Resp (!) 29 Resp  Min: 18  Max: 47   /58 BP  Min: 118/61  Max: 199/84   O2 Sat 98 % SpO2  Min: 90 %  Max: 100 %      Intake/Output Summary (Last 24 hours) at 5/22/2024 0432  Last data filed at 5/22/2024 0429  Gross per 24 hour   Intake 1405 ml   Output 1175 ml   Net 230 ml       Diet Clear Liquid    Invasive Monitoring           Physical Exam   Physical Exam  Vitals and nursing note reviewed.   Eyes:      Extraocular Movements: Extraocular movements intact.      Pupils: Pupils are equal, round, and reactive to light.   Skin:     General: Skin is warm, dry and not mottled extremities.      Capillary Refill: Capillary refill takes less than 2 seconds.      Coloration: Skin is not pale.   HENT:      Head: Normocephalic and atraumatic.   Cardiovascular:      Rate and Rhythm: Normal rate and regular rhythm.      Pulses: Normal pulses.      Heart sounds: Normal heart sounds.   Musculoskeletal:         General: Normal range of motion.      Cervical back: Full passive range of motion without pain, normal range of motion and neck supple.      Right lower leg: Trace Edema present.      Left lower leg: Trace Edema present.   Abdominal: General: Bowel sounds are normal.      Palpations: Abdomen is soft.   Constitutional:       General: She is awake.      Appearance: She is well-developed, normal  weight and well-nourished. She is ill-appearing.      Interventions: Nasal cannula in place.   Pulmonary:      Effort: Tachypnea present.      Breath sounds: Examination of the right-lower field reveals decreased breath sounds. Examination of the left-lower field reveals decreased breath sounds. Decreased breath sounds and wheezing present. No rhonchi.   Psychiatric:         Behavior: Behavior is cooperative.   Neurological:      General: No focal deficit present.      Mental Status: She is alert. She is calm, disoriented to time and disoriented to situation.      Sensory: Sensation is intact.      Motor: gross motor function is at baseline for patient. Strength full and intact in all extremities.            Diagnostic Studies      EKG: NSR  Imaging:  I have personally reviewed pertinent reports.       Medications:  Scheduled PRN   amLODIPine, 10 mg, HS  atorvastatin, 40 mg, HS  budesonide, 0.5 mg, Q12H  cefTRIAXone, 1,000 mg, Q24H  chlorhexidine, 15 mL, Q12H NATHALY  cholecalciferol, 1,000 Units, Daily  cloNIDine, 0.2 mg, Q12H NATHALY  fluticasone, 1 spray, Daily  heparin (porcine), 5,000 Units, Q8H NATHALY  insulin lispro, 1-5 Units, Q6H NATHALY  ipratropium, 0.5 mg, TID  levalbuterol, 1.25 mg, TID  lisinopril, 40 mg, Daily  methylPREDNISolone sodium succinate, 40 mg, Q8H  metoprolol tartrate, 100 mg, Q12H NATHALY  nicotine, 1 patch, Daily  pantoprazole, 20 mg, Early Morning  sertraline, 50 mg, Daily  sodium chloride, 2 g, TID      albuterol, 5 mg, Q6H PRN  hydrOXYzine HCL, 10 mg, Q6H PRN  LORazepam, 0.5 mg, Q8H PRN       Continuous          Labs:    CBC    Recent Labs     05/21/24  1330 05/22/24  0314   WBC 7.84 4.62   HGB 12.9 11.7   HCT 37.3 34.0*    191     BMP    Recent Labs     05/21/24  2332 05/22/24  0314   SODIUM 117* 119*   K 4.1 4.3   CL 75* 76*   CO2 39* 39*   AGAP 3* 4   BUN 7 7   CREATININE 0.50* 0.54*   CALCIUM 8.6 9.4       Coags    No recent results     Additional Electrolytes  Recent Labs     05/21/24  7055  05/22/24  0314   MG 1.8* 2.9*   PHOS  --  3.1   CAIONIZED  --  1.15          Blood Gas    No recent results  No recent results LFTs  Recent Labs     05/21/24  1330   ALT 8   AST 14   ALKPHOS 96   ALB 4.4   TBILI 0.60       Infectious  Recent Labs     05/22/24  0314   PROCALCITONI <0.05     Glucose  Recent Labs     05/21/24  1330 05/21/24  1710 05/21/24  2332 05/22/24  0314   GLUC 161* 138 230* 145*               Karuna Sellers PA-C

## 2024-05-22 NOTE — ASSESSMENT & PLAN NOTE
Documented history of anxiety due to respiratory status is on Atarax at 10 mg every 6 hours as needed  Becomes extremely anxious when she feels short of breath and has worsening condition  Patient is on Zoloft 50 mg  Continues to use the Atarax 10 mg every 6 hours  Also takes Ativan 0.5 mg every 8 hours - check PDMP is prescription

## 2024-05-22 NOTE — ASSESSMENT & PLAN NOTE
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 is on 1 g salt tablets twice daily and 10 mg on torsemide last visit with nephrologist was 4/25   Patient received in the  mL of normal saline the sodium stayed from 118 - 118  Contacted nephrology and consulted  Give 1 dose of Lasix 20 mg IV, start 1 g of salt tablets 3 times daily and placed on fluid restriction of 1200 mL  Check BMP at midnight  I and O  Daily weight  Serum urine osmolalities and urine sodium is pending  As of note patient stated to me that she takes all her medications and was compliant but she refused to salt tablets because there were too big and had to break them in 4 pieces and she also refused capsules of doxycycline -doxycycline to ceftriaxone 1 g for 3 days, patient stopped taking salt tablet may need to be on 1.8% sodium  Repeat sodium 117 - corrected 119 1 dose of Lasix 20 mg IV hourly will need 1.8% saline in the a.m.

## 2024-05-22 NOTE — ASSESSMENT & PLAN NOTE
2 to 3 L of oxygen at baseline  Presented with worsening hypoxemia shortness of breath ductopenia and wheezing  Up to 4 to 5 L on presentation  Started treatment for COPD exacerbation  wean down oxygen back to baseline

## 2024-05-22 NOTE — H&P
[facility]  [facility]  Name: Jessika Lux 69 y.o. female I MRN: [id number]  Unit/Bed#: -01 I Date of Admission: 5/21/2024   Date of Service: 5/21/2024 I Hospital Day: 0      Assessment & Plan   * Acute hyponatremia  Assessment & Plan  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 is on 1 g salt tablets twice daily and 10 mg on torsemide last visit with nephrologist was 4/25   Patient received in the  mL of normal saline the sodium stayed from 118 - 118  Contacted nephrology and consulted  Give 1 dose of Lasix 20 mg IV, start 1 g of salt tablets 3 times daily and placed on fluid restriction of 1200 mL  Check BMP at midnight  I and O  Daily weight  Serum urine osmolalities and urine sodium is pending    Anxiety disorder due to general medical condition with panic attack  Assessment & Plan  Documented history of anxiety due to respiratory status is on Atarax at 10 mg every 6 hours as needed  Becomes extremely anxious when she feels short of breath and has worsening condition  Patient is on Zoloft 50 mg  Continues to use the Atarax 10 mg every 6 hours  Also takes Ativan 0.5 mg every 8 hours - check PDMP is prescription     Acute on chronic respiratory failure with hypoxia (HCC)  Assessment & Plan  2 to 3 L of oxygen at baseline  Presented with worsening hypoxemia shortness of breath ductopenia and wheezing  Up to 4 to 5 L on presentation  Started treatment for COPD exacerbation  wean down oxygen back to baseline    COPD with acute exacerbation (HCC)  Assessment & Plan  Patient is chronically on 3 L of oxygen severe COPD has care management plan in place  Patient complains of shortness of breath and wheezing  Chest x-ray is clear  started on Atrovent Xopenex and Pulmicort round-the-clock and doxycycline 100 mg p.o. twice daily for 5 days  started Solu-Medrol 40 mg every 8 hours  Consider pulmonary consult  Follow respiratory  protocol  COVID and flu panel is negative    Type 2 diabetes mellitus without complication, without long-term current use of insulin ([facility])  Assessment & Plan  Lab Results   Component Value Date    HGBA1C 7.9 (H) 03/06/2024       Recent Labs     05/21/24 1949   POCGLU 212*       Blood Sugar Average: Last 72 hrs:  (P) 212    Is on glipizide at home hold for now  Started insulin sliding scale every 6 hours      Primary hypertension  Assessment & Plan  Start home therapy of Norvasc clonidine lisinopril and metoprolol    Tobacco abuse  Assessment & Plan  Insulted for smoking cessation and placed on nicotine replacement therapy           History of Present Illness     HPI: Jessika Lux is a 69 y.o. with a medical history of hyponatremia due to SISDH, hypertension, severe COPD, dyslipidemia, GERD, NELSON, DM who comes to the ER with shortness of breath wheezing and sodium of 118, patient has a high utilizer note on the chart in case it is for COPD.  Patient complains of nausea and significant weakness since 1130 today.  Stated she is compliant with the torsemide and the salt tablets, does not appear to be 100% clear.  Stated she had constipation and took milk of magnesia and had relief from the that, stated she had vomiting.  Also seems to have problems here to try to take her medications so I am not sure if she actually was compliant with her torsemide and salt tablets.  Is a very poor historian consistent with her health history and with what happened.   Neurology was consulted for now start patient back on 2 g of sodium 3 times daily and Lasix 20 mg IV x 1    History obtained from chart review and the patient.  Review of Systems   Constitutional:  Positive for appetite change and fatigue.   Respiratory:  Positive for cough, shortness of breath and wheezing.      Disposition: Stepdown Level 1  Historical Information   Past Medical History:  No date: Chronic respiratory failure with hypoxia, on home O2 therapy    (HCC)       Comment:  3L NC at home  No date: COPD exacerbation ([facility])  No date: COPD, group D, by GOLD 2017 classification ([facility])  No date: Diabetes mellitus ([facility])  No date: Diverticulosis of sigmoid colon  No date: Dyslipidemia  No date: Essential (primary) hypertension  No date: NELSON (generalized anxiety disorder)  No date: GERD (gastroesophageal reflux disease)  No date: Lung nodule      Comment:  RUL  No date: SIADH (syndrome of inappropriate ADH production) ([facility])  No date: Tobacco dependence  No date: Vitamin D deficiency No past surgical history on file.   Current Outpatient Medications   Medication Instructions    amLODIPine (NORVASC) 10 mg, Oral, Daily at bedtime    atorvastatin (LIPITOR) 40 mg, Oral, Daily at bedtime    cholecalciferol (VITAMIN D3) 1,000 units tablet     cloNIDine (CATAPRES) 0.2 mg, Oral, Every 12 hours scheduled    fluticasone (FLONASE) 50 mcg/act nasal spray 1 spray, Nasal, Daily    fluticasone-umeclidinium-vilanterol (Trelegy Ellipta) 200-62.5-25 mcg/actuation AEPB inhaler 1 puff, Inhalation, Daily, Rinse mouth after use.    glipiZIDE (GLUCOTROL) 10 mg, Oral, Daily (early morning), W breakfast    hydrOXYzine HCL (ATARAX) 10 mg, Oral, Every 6 hours PRN    ipratropium-albuterol (DUO-NEB) 0.5-2.5 mg/3 mL nebulizer solution 3 mL, Nebulization, 3 times daily    lisinopril (ZESTRIL) 40 mg, Oral, Daily    LORazepam (ATIVAN) 0.5 mg, Oral, Every 8 hours PRN    metoprolol tartrate (LOPRESSOR) 100 mg, Oral, Every 12 hours scheduled    Nebulizers (Comp Air Compressor Nebulizer) MISC As directed    omeprazole (PRILOSEC OTC) 20 mg, Oral, Daily    sertraline (ZOLOFT) 50 mg, Oral, Daily    sodium chloride 2 g, Oral, 2 times daily with meals    torsemide (DEMADEX) 10 mg, Oral, Daily    Allergies   Allergen Reactions    Clindamycin       Social History     Tobacco Use    Smoking status: Every Day     Current packs/day: 0.25     Types: Cigarettes     Passive exposure: Never    Smokeless tobacco: Never   Vaping Use     Vaping status: Never Used   Substance Use Topics    Alcohol use: Not Currently    Drug use: Never    Family History   Problem Relation Age of Onset    Diabetes Father           Objective                              Vitals I/O      Most Recent Min/Max in 24hrs   Temp 97.5 °F (36.4 °C) Temp  Min: 97.5 °F (36.4 °C)  Max: 97.8 °F (36.6 °C)   Pulse 81 Pulse  Min: 68  Max: 87   Resp (!) 33 Resp  Min: 18  Max: 33   BP (!) 182/86 BP  Min: 118/61  Max: 188/84   O2 Sat 95 % SpO2  Min: 95 %  Max: 100 %      Intake/Output Summary (Last 24 hours) at 5/21/2024 2048  Last data filed at 5/21/2024 1753  Gross per 24 hour   Intake 800 ml   Output --   Net 800 ml       Diet Clear Liquid    Invasive Monitoring           Physical Exam   Physical Exam  Vitals and nursing note reviewed.   Eyes:      Extraocular Movements: Extraocular movements intact.      Pupils: Pupils are equal, round, and reactive to light.   Skin:     General: Skin is warm, dry and not mottled extremities.      Capillary Refill: Capillary refill takes less than 2 seconds.      Coloration: Skin is pale.   HENT:      Head: Normocephalic and atraumatic.   Cardiovascular:      Rate and Rhythm: Normal rate and regular rhythm.      Pulses: Normal pulses.      Heart sounds: Normal heart sounds.   Musculoskeletal:         General: Normal range of motion.      Cervical back: Full passive range of motion without pain, normal range of motion and neck supple.      Right lower leg: Trace Edema present.      Left lower leg: Trace Edema present.   Abdominal: General: Bowel sounds are normal.      Palpations: Abdomen is soft.   Constitutional:       General: She is awake.      Appearance: She is well-developed, normal weight and well-nourished. She is ill-appearing.      Interventions: Nasal cannula in place.   Pulmonary:      Effort: Tachypnea present.      Breath sounds: Examination of the right-lower field reveals decreased breath sounds. Examination of the left-lower field  reveals decreased breath sounds. Decreased breath sounds and wheezing present. No rhonchi.   Psychiatric:         Behavior: Behavior is cooperative.   Neurological:      General: No focal deficit present.      Mental Status: She is alert. She is disoriented to time and disoriented to situation.      Sensory: Sensation is intact.      Motor: gross motor function is at baseline for patient. Strength full and intact in all extremities.            Diagnostic Studies      EKG: nsr  Imaging:  I have personally reviewed pertinent reports.       Medications:  Scheduled PRN   amLODIPine, 10 mg, HS  atorvastatin, 40 mg, HS  budesonide, 0.5 mg, Q12H  chlorhexidine, 15 mL, Q12H NATHALY  [START ON 5/22/2024] cholecalciferol, 1,000 Units, Daily  cloNIDine, 0.2 mg, Q12H NATHALY  doxycycline hyclate, 100 mg, Q12H NATHALY  [START ON 5/22/2024] fluticasone, 1 spray, Daily  furosemide, 20 mg, Once  heparin (porcine), 5,000 Units, Q8H NATHALY  [START ON 5/22/2024] insulin lispro, 1-5 Units, Q6H NATHALY  ipratropium, 0.5 mg, TID  levalbuterol, 1.25 mg, TID  [START ON 5/22/2024] lisinopril, 40 mg, Daily  methylPREDNISolone sodium succinate, 40 mg, Q8H  metoprolol tartrate, 100 mg, Q12H NATHALY  [START ON 5/22/2024] nicotine, 1 patch, Daily  [START ON 5/22/2024] pantoprazole, 20 mg, Early Morning  [START ON 5/22/2024] sertraline, 50 mg, Daily  sodium chloride, 1 g, TID      hydrOXYzine HCL, 10 mg, Q6H PRN  LORazepam, 0.5 mg, Q8H PRN       Continuous          Labs:    CBC    Recent Labs     05/21/24  1330   WBC 7.84   HGB 12.9   HCT 37.3        BMP    Recent Labs     05/21/24  1330 05/21/24  1710   SODIUM 118* 118*   K 3.8 4.0   CL 73* 75*   CO2 38* 35*   AGAP 7 8   BUN 6 5   CREATININE 0.45* 0.39*   CALCIUM 9.6 9.4       Coags    No recent results     Additional Electrolytes  Recent Labs     05/21/24  1330   MG 1.6*          Blood Gas    No recent results  No recent results LFTs  Recent Labs     05/21/24  1330   ALT 8   AST 14   ALKPHOS 96   ALB 4.4    TBILI 0.60       Infectious  No recent results  Glucose  Recent Labs     05/21/24  1330 05/21/24  1710   GLUC 161* 138             Anticipated Length of Stay is > 2 midnights  Karuna Sellers PA-C

## 2024-05-22 NOTE — PLAN OF CARE
Problem: Prexisting or High Potential for Compromised Skin Integrity  Goal: Skin integrity is maintained or improved  Description: INTERVENTIONS:  - Identify patients at risk for skin breakdown  - Assess and monitor skin integrity  - Assess and monitor nutrition and hydration status  - Monitor labs   - Assess for incontinence   - Turn and reposition patient  - Assist with mobility/ambulation  - Relieve pressure over bony prominences  - Avoid friction and shearing  - Provide appropriate hygiene as needed including keeping skin clean and dry  - Evaluate need for skin moisturizer/barrier cream  - Collaborate with interdisciplinary team   - Patient/family teaching  - Consider wound care consult   Outcome: Progressing     Problem: PAIN - ADULT  Goal: Verbalizes/displays adequate comfort level or baseline comfort level  Description: Interventions:  - Encourage patient to monitor pain and request assistance  - Assess pain using appropriate pain scale  - Administer analgesics based on type and severity of pain and evaluate response  - Implement non-pharmacological measures as appropriate and evaluate response  - Consider cultural and social influences on pain and pain management  - Notify physician/advanced practitioner if interventions unsuccessful or patient reports new pain  Outcome: Progressing     Problem: INFECTION - ADULT  Goal: Absence or prevention of progression during hospitalization  Description: INTERVENTIONS:  - Assess and monitor for signs and symptoms of infection  - Monitor lab/diagnostic results  - Monitor all insertion sites, i.e. indwelling lines, tubes, and drains  - Monitor endotracheal if appropriate and nasal secretions for changes in amount and color  - Isabel appropriate cooling/warming therapies per order  - Administer medications as ordered  - Instruct and encourage patient and family to use good hand hygiene technique  - Identify and instruct in appropriate isolation precautions for  identified infection/condition  Outcome: Progressing

## 2024-05-23 PROBLEM — J96.21 ACUTE ON CHRONIC RESPIRATORY FAILURE WITH HYPOXIA (HCC): Status: RESOLVED | Noted: 2023-09-11 | Resolved: 2024-05-23

## 2024-05-23 LAB
ANION GAP SERPL CALCULATED.3IONS-SCNC: 0 MMOL/L (ref 4–13)
ANION GAP SERPL CALCULATED.3IONS-SCNC: 2 MMOL/L (ref 4–13)
ANION GAP SERPL CALCULATED.3IONS-SCNC: 2 MMOL/L (ref 4–13)
BUN SERPL-MCNC: 11 MG/DL (ref 5–25)
BUN SERPL-MCNC: 11 MG/DL (ref 5–25)
BUN SERPL-MCNC: 13 MG/DL (ref 5–25)
CA-I BLD-SCNC: 1.11 MMOL/L (ref 1.12–1.32)
CALCIUM SERPL-MCNC: 8.8 MG/DL (ref 8.4–10.2)
CALCIUM SERPL-MCNC: 9.1 MG/DL (ref 8.4–10.2)
CALCIUM SERPL-MCNC: 9.3 MG/DL (ref 8.4–10.2)
CHLORIDE SERPL-SCNC: 85 MMOL/L (ref 96–108)
CHLORIDE SERPL-SCNC: 87 MMOL/L (ref 96–108)
CHLORIDE SERPL-SCNC: 88 MMOL/L (ref 96–108)
CO2 SERPL-SCNC: 41 MMOL/L (ref 21–32)
CO2 SERPL-SCNC: 42 MMOL/L (ref 21–32)
CO2 SERPL-SCNC: 44 MMOL/L (ref 21–32)
CREAT SERPL-MCNC: 0.52 MG/DL (ref 0.6–1.3)
CREAT SERPL-MCNC: 0.54 MG/DL (ref 0.6–1.3)
CREAT SERPL-MCNC: 0.56 MG/DL (ref 0.6–1.3)
ERYTHROCYTE [DISTWIDTH] IN BLOOD BY AUTOMATED COUNT: 12.6 % (ref 11.6–15.1)
GFR SERPL CREATININE-BSD FRML MDRD: 96 ML/MIN/1.73SQ M
GFR SERPL CREATININE-BSD FRML MDRD: 97 ML/MIN/1.73SQ M
GFR SERPL CREATININE-BSD FRML MDRD: 99 ML/MIN/1.73SQ M
GLUCOSE SERPL-MCNC: 128 MG/DL (ref 65–140)
GLUCOSE SERPL-MCNC: 136 MG/DL (ref 65–140)
GLUCOSE SERPL-MCNC: 143 MG/DL (ref 65–140)
GLUCOSE SERPL-MCNC: 161 MG/DL (ref 65–140)
GLUCOSE SERPL-MCNC: 170 MG/DL (ref 65–140)
GLUCOSE SERPL-MCNC: 176 MG/DL (ref 65–140)
GLUCOSE SERPL-MCNC: 408 MG/DL (ref 65–140)
HCT VFR BLD AUTO: 30.4 % (ref 34.8–46.1)
HGB BLD-MCNC: 10.3 G/DL (ref 11.5–15.4)
MAGNESIUM SERPL-MCNC: 2.1 MG/DL (ref 1.9–2.7)
MCH RBC QN AUTO: 28.9 PG (ref 26.8–34.3)
MCHC RBC AUTO-ENTMCNC: 33.9 G/DL (ref 31.4–37.4)
MCV RBC AUTO: 85 FL (ref 82–98)
PHOSPHATE SERPL-MCNC: 2.2 MG/DL (ref 2.3–4.1)
PLATELET # BLD AUTO: 155 THOUSANDS/UL (ref 149–390)
PMV BLD AUTO: 9.7 FL (ref 8.9–12.7)
POTASSIUM SERPL-SCNC: 3.5 MMOL/L (ref 3.5–5.3)
POTASSIUM SERPL-SCNC: 3.5 MMOL/L (ref 3.5–5.3)
POTASSIUM SERPL-SCNC: 3.9 MMOL/L (ref 3.5–5.3)
RBC # BLD AUTO: 3.56 MILLION/UL (ref 3.81–5.12)
SODIUM SERPL-SCNC: 128 MMOL/L (ref 135–147)
SODIUM SERPL-SCNC: 131 MMOL/L (ref 135–147)
SODIUM SERPL-SCNC: 132 MMOL/L (ref 135–147)
WBC # BLD AUTO: 5.65 THOUSAND/UL (ref 4.31–10.16)

## 2024-05-23 PROCEDURE — 80048 BASIC METABOLIC PNL TOTAL CA: CPT | Performed by: PHYSICIAN ASSISTANT

## 2024-05-23 PROCEDURE — 85027 COMPLETE CBC AUTOMATED: CPT | Performed by: PHYSICIAN ASSISTANT

## 2024-05-23 PROCEDURE — 82948 REAGENT STRIP/BLOOD GLUCOSE: CPT

## 2024-05-23 PROCEDURE — 83735 ASSAY OF MAGNESIUM: CPT | Performed by: PHYSICIAN ASSISTANT

## 2024-05-23 PROCEDURE — 82330 ASSAY OF CALCIUM: CPT | Performed by: PHYSICIAN ASSISTANT

## 2024-05-23 PROCEDURE — 99232 SBSQ HOSP IP/OBS MODERATE 35: CPT | Performed by: INTERNAL MEDICINE

## 2024-05-23 PROCEDURE — 84100 ASSAY OF PHOSPHORUS: CPT | Performed by: PHYSICIAN ASSISTANT

## 2024-05-23 PROCEDURE — 94640 AIRWAY INHALATION TREATMENT: CPT

## 2024-05-23 PROCEDURE — 94760 N-INVAS EAR/PLS OXIMETRY 1: CPT

## 2024-05-23 PROCEDURE — 99232 SBSQ HOSP IP/OBS MODERATE 35: CPT

## 2024-05-23 RX ORDER — DEXTROSE MONOHYDRATE 50 MG/ML
50 INJECTION, SOLUTION INTRAVENOUS CONTINUOUS
Status: DISCONTINUED | OUTPATIENT
Start: 2024-05-23 | End: 2024-05-23

## 2024-05-23 RX ORDER — POTASSIUM CHLORIDE 20MEQ/15ML
20 LIQUID (ML) ORAL ONCE
Status: COMPLETED | OUTPATIENT
Start: 2024-05-23 | End: 2024-05-23

## 2024-05-23 RX ORDER — DEXTROSE MONOHYDRATE 50 MG/ML
100 INJECTION, SOLUTION INTRAVENOUS CONTINUOUS
Status: DISCONTINUED | OUTPATIENT
Start: 2024-05-23 | End: 2024-05-23

## 2024-05-23 RX ORDER — POTASSIUM CHLORIDE 20 MEQ/1
20 TABLET, EXTENDED RELEASE ORAL
Status: DISCONTINUED | OUTPATIENT
Start: 2024-05-23 | End: 2024-05-23

## 2024-05-23 RX ADMIN — LORAZEPAM 0.5 MG: 0.5 TABLET ORAL at 16:26

## 2024-05-23 RX ADMIN — CHLORHEXIDINE GLUCONATE 15 ML: 1.2 RINSE ORAL at 21:32

## 2024-05-23 RX ADMIN — INSULIN LISPRO 5 UNITS: 100 INJECTION, SOLUTION INTRAVENOUS; SUBCUTANEOUS at 16:26

## 2024-05-23 RX ADMIN — BUDESONIDE 0.5 MG: 0.5 INHALANT ORAL at 08:43

## 2024-05-23 RX ADMIN — BUDESONIDE 0.5 MG: 0.5 INHALANT ORAL at 20:32

## 2024-05-23 RX ADMIN — DEXTROSE 50 ML/HR: 5 SOLUTION INTRAVENOUS at 05:11

## 2024-05-23 RX ADMIN — HEPARIN SODIUM 5000 UNITS: 5000 INJECTION, SOLUTION INTRAVENOUS; SUBCUTANEOUS at 21:33

## 2024-05-23 RX ADMIN — LISINOPRIL 40 MG: 20 TABLET ORAL at 08:37

## 2024-05-23 RX ADMIN — METOPROLOL TARTRATE 100 MG: 50 TABLET, FILM COATED ORAL at 21:32

## 2024-05-23 RX ADMIN — POTASSIUM CHLORIDE 20 MEQ: 20 SOLUTION ORAL at 02:16

## 2024-05-23 RX ADMIN — ATORVASTATIN CALCIUM 40 MG: 40 TABLET, FILM COATED ORAL at 21:31

## 2024-05-23 RX ADMIN — PANTOPRAZOLE SODIUM 20 MG: 20 TABLET, DELAYED RELEASE ORAL at 05:11

## 2024-05-23 RX ADMIN — CLONIDINE HYDROCHLORIDE 0.2 MG: 0.1 TABLET ORAL at 08:36

## 2024-05-23 RX ADMIN — UMECLIDINIUM BROMIDE AND VILANTEROL TRIFENATATE 1 PUFF: 62.5; 25 POWDER RESPIRATORY (INHALATION) at 08:48

## 2024-05-23 RX ADMIN — POTASSIUM PHOSPHATE, MONOBASIC AND POTASSIUM PHOSPHATE, DIBASIC 21 MMOL: 224; 236 INJECTION, SOLUTION, CONCENTRATE INTRAVENOUS at 08:38

## 2024-05-23 RX ADMIN — LEVALBUTEROL HYDROCHLORIDE 1.25 MG: 1.25 SOLUTION RESPIRATORY (INHALATION) at 13:46

## 2024-05-23 RX ADMIN — CLONIDINE HYDROCHLORIDE 0.2 MG: 0.1 TABLET ORAL at 21:32

## 2024-05-23 RX ADMIN — INSULIN LISPRO 1 UNITS: 100 INJECTION, SOLUTION INTRAVENOUS; SUBCUTANEOUS at 12:37

## 2024-05-23 RX ADMIN — FLUTICASONE PROPIONATE 1 SPRAY: 50 SPRAY, METERED NASAL at 08:48

## 2024-05-23 RX ADMIN — LEVALBUTEROL HYDROCHLORIDE 1.25 MG: 1.25 SOLUTION RESPIRATORY (INHALATION) at 08:41

## 2024-05-23 RX ADMIN — AMLODIPINE BESYLATE 10 MG: 10 TABLET ORAL at 21:31

## 2024-05-23 RX ADMIN — INSULIN LISPRO 1 UNITS: 100 INJECTION, SOLUTION INTRAVENOUS; SUBCUTANEOUS at 21:33

## 2024-05-23 RX ADMIN — HEPARIN SODIUM 5000 UNITS: 5000 INJECTION, SOLUTION INTRAVENOUS; SUBCUTANEOUS at 05:11

## 2024-05-23 RX ADMIN — NICOTINE 1 PATCH: 14 PATCH, EXTENDED RELEASE TRANSDERMAL at 08:39

## 2024-05-23 RX ADMIN — Medication 1000 UNITS: at 08:35

## 2024-05-23 RX ADMIN — HEPARIN SODIUM 5000 UNITS: 5000 INJECTION, SOLUTION INTRAVENOUS; SUBCUTANEOUS at 16:26

## 2024-05-23 RX ADMIN — LEVALBUTEROL HYDROCHLORIDE 1.25 MG: 1.25 SOLUTION RESPIRATORY (INHALATION) at 20:32

## 2024-05-23 RX ADMIN — METHYLPREDNISOLONE SODIUM SUCCINATE 40 MG: 40 INJECTION, POWDER, FOR SOLUTION INTRAMUSCULAR; INTRAVENOUS at 08:37

## 2024-05-23 RX ADMIN — METOPROLOL TARTRATE 100 MG: 50 TABLET, FILM COATED ORAL at 08:38

## 2024-05-23 NOTE — PROGRESS NOTES
[facility]  Progress Note  Name: Jessika Lux I  MRN: [id number]  Unit/Bed#: -01 I Date of Admission: 5/21/2024   Date of Service: 5/23/2024 I Hospital Day: 2    Assessment & Plan   * Hyponatremia  Assessment & Plan  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 is on 1 g salt tablets twice daily and 10 mg on torsemide last visit with nephrologist was 4/25   Patient received in the  mL of normal saline the sodium stayed from 118 - 118  Nephrology is following   S/p Lasix 20 mg IV TID   1.8 % saline DC when sodium is greater than 126  I and O  Daily weight  Last sodium 123 corrected 125, Na now 131  Nephro: d/c IVF liberalize fluid intake to 2L /day, off SSRI monitor, BMP in AM, d/c diuretic for now.     COPD with acute exacerbation (HCC)  Assessment & Plan  Patient is chronically on 3 L of oxygen severe COPD has care management plan in place  Patient complains of shortness of breath and wheezing  Chest x-ray is clear  started on Atrovent Xopenex and Pulmicort round-the-clock   Solu-Medrol 40 mg daily x 3 days   Consider pulmonary consult if not improving.   Follow respiratory protocol  COVID and flu panel is negative  Lungs improving, without wheezing on examination today.       Anxiety disorder due to general medical condition with panic attack  Assessment & Plan  Documented history of anxiety due to respiratory status is on Atarax at 10 mg every 6 hours as needed  Becomes extremely anxious when she feels short of breath and has worsening condition  Patient is on Zoloft 50 mg  Continues to use the Atarax 10 mg every 6 hours  Also takes Ativan 0.5 mg every 8 hours - check PDMP is prescription     Type 2 diabetes mellitus without complication, without long-term current use of insulin (HCC)  Assessment & Plan  Lab Results   Component Value Date    HGBA1C 7.9 (H) 03/06/2024       Recent Labs     05/21/24  2330 05/22/24  0559  05/22/24  1101 05/22/24  1642   POCGLU 255* 138 237* 199*       Blood Sugar Average: Last 72 hrs:  (P) [id number]    Insulin sliding scale     Primary hypertension  Assessment & Plan  Continue home meds     Tobacco abuse  Assessment & Plan  Nictoine patch     Chronic respiratory failure with hypoxia (HCC)  Assessment & Plan  Uses 2-3L NC of oxygen at baseline  Currently at baseline o2 requirements  Follow up with pulm outpatient.     Acute on chronic respiratory failure with hypoxia (HCC)-resolved as of 5/23/2024  Assessment & Plan  2 to 3 L of oxygen at baseline  Presented with worsening hypoxemia shortness of breath ductopenia and wheezing  Up to 4 to 5 L on presentation  Started treatment for COPD exacerbation  weaned back down to baseline oxygen requirements            VTE Pharmacologic Prophylaxis:   Moderate Risk (Score 3-4) - Pharmacological DVT Prophylaxis Ordered: heparin.    Mobility:   Basic Mobility Inpatient Raw Score: 21  JH-HLM Goal: 6: Walk 10 steps or more  JH-HLM Achieved: 6: Walk 10 steps or more  JH-HLM Goal achieved. Continue to encourage appropriate mobility.    Patient Centered Rounds: I performed bedside rounds with nursing staff today.   Discussions with Specialists or Other Care Team Provider: nursing, case management, nephrology    Education and Discussions with Family / Patient: Attempted to update  (daughter) via phone. Left voicemail.     Total Time Spent on Date of Encounter in care of patient:  mins. This time was spent on one or more of the following: performing physical exam; counseling and coordination of care; obtaining or reviewing history; documenting in the medical record; reviewing/ordering tests, medications or procedures; communicating with other healthcare professionals and discussing with patient's family/caregivers.    Current Length of Stay: 2 day(s)  Current Patient Status: Inpatient   Certification Statement: The patient will continue to require  additional inpatient hospital stay due to hyponatremia  Discharge Plan: Anticipate discharge in 24-48 hrs to discharge location to be determined pending rehab evaluations.    Code Status: Level 1 - Full Code    Subjective:   Seen and examined today. Feeling better today than yesterday. States she still feels a little SOB, but much better than when she came in. No nausea, vomiting, diarrhea, constipation, abdominal pain, CP. She is hopeful that her breathing continues to improve. No other complaints at this time.     Objective:     Vitals:   Temp (24hrs), Av.5 °F (36.4 °C), Min:97 °F (36.1 °C), Max:98.1 °F (36.7 °C)    Temp:  [97 °F (36.1 °C)-98.1 °F (36.7 °C)] 98.1 °F (36.7 °C)  HR:  [36-85] 77  Resp:  [18-32] 18  BP: (105-138)/(52-67) 118/65  SpO2:  [93 %-100 %] 97 %  Body mass index is 27.06 kg/m².     Input and Output Summary (last 24 hours):     Intake/Output Summary (Last 24 hours) at 2024 1418  Last data filed at 2024 0640  Gross per 24 hour   Intake 1081.67 ml   Output 1650 ml   Net -568.33 ml       Physical Exam:   Physical Exam  Vitals reviewed.   Constitutional:       General: She is not in acute distress.     Appearance: She is ill-appearing (chronically). She is not toxic-appearing.   HENT:      Head: Normocephalic and atraumatic.      Mouth/Throat:      Mouth: Mucous membranes are moist.   Cardiovascular:      Rate and Rhythm: Normal rate and regular rhythm.      Heart sounds: No murmur heard.  Pulmonary:      Effort: No respiratory distress.      Breath sounds: No stridor. No wheezing, rhonchi or rales.      Comments: No wheezing on exam, decreased breath sounds bilaterally. Saturating around 93% on 3L NC baseline o2 requirements  Abdominal:      General: Bowel sounds are normal. There is no distension.      Palpations: Abdomen is soft. There is no mass.      Tenderness: There is no abdominal tenderness.   Musculoskeletal:      Right lower leg: No edema.      Left lower leg: No edema.    Skin:     General: Skin is warm and dry.   Neurological:      Mental Status: She is alert and oriented to person, place, and time.   Psychiatric:         Mood and Affect: Mood normal.         Behavior: Behavior normal.          Additional Data:     Labs:  Results from last 7 days   Lab Units 05/23/24  0357 05/22/24  0314   WBC Thousand/uL 5.65 4.62   HEMOGLOBIN g/dL 10.3* 11.7   HEMATOCRIT % 30.4* 34.0*   PLATELETS Thousands/uL 155 191   SEGS PCT %  --  85*   LYMPHO PCT %  --  12*   MONO PCT %  --  3*   EOS PCT %  --  0     Results from last 7 days   Lab Units 05/23/24  0857 05/21/24  1710 05/21/24  1330   SODIUM mmol/L 132*   < > 118*   POTASSIUM mmol/L 3.5   < > 3.8   CHLORIDE mmol/L 88*   < > 73*   CO2 mmol/L 42*   < > 38*   BUN mg/dL 11   < > 6   CREATININE mg/dL 0.56*   < > 0.45*   ANION GAP mmol/L 2*   < > 7   CALCIUM mg/dL 9.3   < > 9.6   ALBUMIN g/dL  --   --  4.4   TOTAL BILIRUBIN mg/dL  --   --  0.60   ALK PHOS U/L  --   --  96   ALT U/L  --   --  8   AST U/L  --   --  14   GLUCOSE RANDOM mg/dL 136   < > 161*    < > = values in this interval not displayed.         Results from last 7 days   Lab Units 05/23/24  1117 05/23/24  0758 05/22/24  2025 05/22/24  1642 05/22/24  1101 05/22/24  0559 05/21/24  2330 05/21/24  2118 05/21/24  1949   POC GLUCOSE mg/dl 161* 128 224* 199* 237* 138 255* 230* 212*         Results from last 7 days   Lab Units 05/22/24  0314   PROCALCITONIN ng/ml <0.05       Lines/Drains:  Invasive Devices       Peripheral Intravenous Line  Duration             Peripheral IV 05/21/24 Right Antecubital 2 days    Long-Dwell Peripheral IV (Midline) 05/21/24 Left Basilic 1 day                          Imaging: Reviewed radiology reports from this admission including: chest xray    Recent Cultures (last 7 days):         Last 24 Hours Medication List:   Current Facility-Administered Medications   Medication Dose Route Frequency Provider Last Rate    albuterol  5 mg Nebulization Q6H PRN Hailey BLAS  Maria Antonia, CRNP      amLODIPine  10 mg Oral HS [facility] I LEYDI Sellers      atorvastatin  40 mg Oral HS [facility] I LEYDI Sellers      budesonide  0.5 mg Nebulization Q12H Hailey S Maria Antonia, CRNP      chlorhexidine  15 mL Mouth[facility]Throat Q12H [facility] LEYDI Sellers      cholecalciferol  1,000 Units Oral Daily [facility] I LEYDI Sellers      cloNIDine  0.2 mg Oral Q12H [facility] LEYDI Sellers      fluticasone  1 spray Nasal Daily [facility] I LEYDI Sellers      heparin (porcine)  5,000 Units Subcutaneous Q8H [facility] Hailey S Maria Antonia, CRNP      hydrOXYzine HCL  10 mg Oral Q6H PRN [facility] LEYDI Sellers      insulin lispro  1-5 Units Subcutaneous 4x Daily (AC & HS) Hailey S Maria Antonia, CRNP      levalbuterol  1.25 mg Nebulization TID Hailey S Maria Antonia, CRNP      lisinopril  40 mg Oral Daily [facility] I LEYDI Sellers      LORazepam  0.5 mg Oral Q8H PRN [facility] I LEYDI Sellers      methylPREDNISolone sodium succinate  40 mg Intravenous Daily Hailey S Maria Antonia, CRNP      metoprolol tartrate  100 mg Oral Q12H [facility] I LEYDI Sellers      nicotine  1 patch Transdermal Daily Hailey S Maria Antonia, CRNP      pantoprazole  20 mg Oral Early Morning [facility] LEYDI Sellers      umeclidinium-vilanterol  1 puff Inhalation Daily Hailey S Maria Antonia, CRNP          Today, Patient Was Seen By: Alejandra Green PA-C    **Please Note: This note may have been constructed using a voice recognition system.**

## 2024-05-23 NOTE — PROGRESS NOTES
During the night patient at 522 2100 stopped the 1.8% saline solution because the sodium was 126, contacted on-call nephrologist wanted BMP every 4 hours, 4 hours later sodium was 128.  Then in the morning sodium was 131-started D5 water at 100 mL/h for 4 hours.   Patient always appears to have a contraction alkalosis with Lasix 20 mg IV 3 times daily, was holding the am dose of lasix. Nephrology is aware

## 2024-05-23 NOTE — PROGRESS NOTES
Report called to Nurse on Med surg for pt transfer. Pt transferred to room 235-01 with her belongings including her O2 tank.

## 2024-05-23 NOTE — NURSING NOTE
Pt sleeping soundly and HR down to low 30's.  Based on SpO2 pleth, rhythm looked like bigem.  Pt woke up and HR's up to 60's.  BP WDL.  Pt asymptomatic.

## 2024-05-23 NOTE — PLAN OF CARE
Problem: Prexisting or High Potential for Compromised Skin Integrity  Goal: Skin integrity is maintained or improved  Description: INTERVENTIONS:  - Identify patients at risk for skin breakdown  - Assess and monitor skin integrity  - Assess and monitor nutrition and hydration status  - Monitor labs   - Assess for incontinence   - Turn and reposition patient  - Assist with mobility/ambulation  - Relieve pressure over bony prominences  - Avoid friction and shearing  - Provide appropriate hygiene as needed including keeping skin clean and dry  - Evaluate need for skin moisturizer/barrier cream  - Collaborate with interdisciplinary team   - Patient/family teaching  - Consider wound care consult   Outcome: Progressing     Problem: PAIN - ADULT  Goal: Verbalizes/displays adequate comfort level or baseline comfort level  Description: Interventions:  - Encourage patient to monitor pain and request assistance  - Assess pain using appropriate pain scale  - Administer analgesics based on type and severity of pain and evaluate response  - Implement non-pharmacological measures as appropriate and evaluate response  - Consider cultural and social influences on pain and pain management  - Notify physician/advanced practitioner if interventions unsuccessful or patient reports new pain  Outcome: Progressing     Problem: INFECTION - ADULT  Goal: Absence or prevention of progression during hospitalization  Description: INTERVENTIONS:  - Assess and monitor for signs and symptoms of infection  - Monitor lab/diagnostic results  - Monitor all insertion sites, i.e. indwelling lines, tubes, and drains  - Monitor endotracheal if appropriate and nasal secretions for changes in amount and color  - [place] appropriate cooling/warming therapies per order  - Administer medications as ordered  - Instruct and encourage patient and family to use good hand hygiene technique  - Identify and instruct in appropriate isolation precautions for  identified infection/condition  Outcome: Progressing     Problem: SAFETY ADULT  Goal: Patient will remain free of falls  Description: INTERVENTIONS:  - Educate patient/family on patient safety including physical limitations  - Instruct patient to call for assistance with activity   - Consult OT/PT to assist with strengthening/mobility   - Keep Call bell within reach  - Keep bed low and locked with side rails adjusted as appropriate  - Keep care items and personal belongings within reach  - Initiate and maintain comfort rounds  - Make Fall Risk Sign visible to staff  - Offer Toileting every 2 Hours, in advance of need if unable to notify staff  - Initiate/Maintain bed/chair alarm for confusion, impulsivity, or noncompliance with notifying staff.  - Apply yellow socks and bracelet for high fall risk patients  - Consider moving patient to room near nurses station  Outcome: Progressing  Goal: Maintain or return to baseline ADL function  Description: INTERVENTIONS:  -  Assess patient's ability to carry out ADLs; assess patient's baseline for ADL function and identify physical deficits which impact ability to perform ADLs (bathing, care of mouth/teeth, toileting, grooming, dressing, etc.)  - Assess/evaluate cause of self-care deficits   - Assess range of motion  - Assess patient's mobility; develop plan if impaired  - Assess patient's need for assistive devices and provide as appropriate  - Encourage maximum independence but intervene and supervise when necessary  - Involve family in performance of ADLs  - Assess for home care needs following discharge   - Consider OT consult to assist with ADL evaluation and planning for discharge  - Provide patient education as appropriate  Outcome: Progressing  Goal: Maintains/Returns to pre admission functional level  Description: INTERVENTIONS:  - Perform AM-PAC 6 Click Basic Mobility/ Daily Activity assessment daily.  - Set and communicate daily mobility goal to care team and  patient/family/caregiver.   - Collaborate with rehabilitation services on mobility goals if consulted  - Perform Range of Motion 3 times a day.  - Reposition patient every 2 hours if unable to reposition self  - Out of bed for toileting if medically appropriate  - Record patient progress and toleration of activity level   Outcome: Progressing     Problem: DISCHARGE PLANNING  Goal: Discharge to home or other facility with appropriate resources  Description: INTERVENTIONS:  - Identify barriers to discharge w/patient and caregiver  - Arrange for needed discharge resources and transportation as appropriate  - Identify discharge learning needs (meds, wound care, etc.)  - Arrange for interpretive services to assist at discharge as needed  - Refer to Case Management Department for coordinating discharge planning if the patient needs post-hospital services based on physician/advanced practitioner order or complex needs related to functional status, cognitive ability, or social support system  Outcome: Progressing     Problem: Knowledge Deficit  Goal: Patient/family/caregiver demonstrates understanding of disease process, treatment plan, medications, and discharge instructions  Description: Complete learning assessment and assess knowledge base.  Interventions:  - Provide teaching at level of understanding  - Provide teaching via preferred learning methods  Outcome: Progressing     Problem: NEUROSENSORY - ADULT  Goal: Achieves stable or improved neurological status  Description: INTERVENTIONS  - Monitor and report changes in neurological status  - Monitor vital signs such as temperature, blood pressure, glucose, and any other labs ordered   - Initiate measures to prevent increased intracranial pressure  - Monitor for seizure activity and implement precautions if appropriate      Outcome: Progressing  Goal: Remains free of injury related to seizures activity  Description: INTERVENTIONS  - Maintain airway, patient safety  and  administer oxygen as ordered  - Monitor patient for seizure activity, document and report duration and description of seizure to physician/advanced practitioner  - If seizure occurs,  ensure patient safety during seizure  - Reorient patient post seizure  - Seizure pads on all 4 side rails  - Instruct patient/family to notify RN of any seizure activity including if an aura is experienced  - Instruct patient/family to call for assistance with activity based on nursing assessment  - Administer anti-seizure medications if ordered    Outcome: Progressing     Problem: CARDIOVASCULAR - ADULT  Goal: Maintains optimal cardiac output and hemodynamic stability  Description: INTERVENTIONS:  - Monitor I/O, vital signs and rhythm  - Monitor for S/S and trends of decreased cardiac output  - Administer and titrate ordered vasoactive medications to optimize hemodynamic stability  - Assess quality of pulses, skin color and temperature  - Assess for signs of decreased coronary artery perfusion  - Instruct patient to report change in severity of symptoms  Outcome: Progressing  Goal: Absence of cardiac dysrhythmias or at baseline rhythm  Description: INTERVENTIONS:  - Continuous cardiac monitoring, vital signs, obtain 12 lead EKG if ordered  - Administer antiarrhythmic and heart rate control medications as ordered  - Monitor electrolytes and administer replacement therapy as ordered  Outcome: Progressing     Problem: RESPIRATORY - ADULT  Goal: Achieves optimal ventilation and oxygenation  Description: INTERVENTIONS:  - Assess for changes in respiratory status  - Assess for changes in mentation and behavior  - Position to facilitate oxygenation and minimize respiratory effort  - Oxygen administered by appropriate delivery if ordered  - Initiate smoking cessation education as indicated  - Encourage broncho-pulmonary hygiene including cough, deep breathe, Incentive Spirometry  - Assess the need for suctioning and aspirate as needed  -  Assess and instruct to report SOB or any respiratory difficulty  - Respiratory Therapy support as indicated  Outcome: Progressing     Problem: METABOLIC, FLUID AND ELECTROLYTES - ADULT  Goal: Electrolytes maintained within normal limits  Description: INTERVENTIONS:  - Monitor labs and assess patient for signs and symptoms of electrolyte imbalances  - Administer electrolyte replacement as ordered  - Monitor response to electrolyte replacements, including repeat lab results as appropriate  - Instruct patient on fluid and nutrition as appropriate  Outcome: Progressing  Goal: Fluid balance maintained  Description: INTERVENTIONS:  - Monitor labs   - Monitor I/O and WT  - Instruct patient on fluid and nutrition as appropriate  - Assess for signs & symptoms of volume excess or deficit  Outcome: Progressing  Goal: Glucose maintained within target range  Description: INTERVENTIONS:  - Monitor Blood Glucose as ordered  - Assess for signs and symptoms of hyperglycemia and hypoglycemia  - Administer ordered medications to maintain glucose within target range  - Assess nutritional intake and initiate nutrition service referral as needed  Outcome: Progressing     Pt is alert and oriented X 4. D5% started this AM at 100 mL/hr. Denies pain or discomfort. No complaints of feeling anxious throughout the night. OOB to BSC to void numerous times. Labs and medication done per orders. Call bell within reach and bed in lowest position.

## 2024-05-23 NOTE — ASSESSMENT & PLAN NOTE
Uses 2-3L NC of oxygen at baseline  Currently at baseline o2 requirements  Follow up with pulm outpatient.

## 2024-05-23 NOTE — PLAN OF CARE
Problem: Prexisting or High Potential for Compromised Skin Integrity  Goal: Skin integrity is maintained or improved  Description: INTERVENTIONS:  - Identify patients at risk for skin breakdown  - Assess and monitor skin integrity  - Assess and monitor nutrition and hydration status  - Monitor labs   - Assess for incontinence   - Turn and reposition patient  - Assist with mobility/ambulation  - Relieve pressure over bony prominences  - Avoid friction and shearing  - Provide appropriate hygiene as needed including keeping skin clean and dry  - Evaluate need for skin moisturizer/barrier cream  - Collaborate with interdisciplinary team   - Patient/family teaching  - Consider wound care consult   Outcome: Progressing     Problem: PAIN - ADULT  Goal: Verbalizes/displays adequate comfort level or baseline comfort level  Description: Interventions:  - Encourage patient to monitor pain and request assistance  - Assess pain using appropriate pain scale  - Administer analgesics based on type and severity of pain and evaluate response  - Implement non-pharmacological measures as appropriate and evaluate response  - Consider cultural and social influences on pain and pain management  - Notify physician/advanced practitioner if interventions unsuccessful or patient reports new pain  Outcome: Progressing     Problem: INFECTION - ADULT  Goal: Absence or prevention of progression during hospitalization  Description: INTERVENTIONS:  - Assess and monitor for signs and symptoms of infection  - Monitor lab/diagnostic results  - Monitor all insertion sites, i.e. indwelling lines, tubes, and drains  - Monitor endotracheal if appropriate and nasal secretions for changes in amount and color  - [place] appropriate cooling/warming therapies per order  - Administer medications as ordered  - Instruct and encourage patient and family to use good hand hygiene technique  - Identify and instruct in appropriate isolation precautions for  identified infection/condition  Outcome: Progressing     Problem: SAFETY ADULT  Goal: Patient will remain free of falls  Description: INTERVENTIONS:  - Educate patient/family on patient safety including physical limitations  - Instruct patient to call for assistance with activity   - Consult OT/PT to assist with strengthening/mobility   - Keep Call bell within reach  - Keep bed low and locked with side rails adjusted as appropriate  - Keep care items and personal belongings within reach  - Initiate and maintain comfort rounds  - Make Fall Risk Sign visible to staff  - Offer Toileting every 2 Hours, in advance of need if unable to notify staff  - Initiate/Maintain bed/chair alarm for confusion, impulsivity, or noncompliance with notifying staff.  - Apply yellow socks and bracelet for high fall risk patients  - Consider moving patient to room near nurses station  Outcome: Progressing  Goal: Maintain or return to baseline ADL function  Description: INTERVENTIONS:  -  Assess patient's ability to carry out ADLs; assess patient's baseline for ADL function and identify physical deficits which impact ability to perform ADLs (bathing, care of mouth/teeth, toileting, grooming, dressing, etc.)  - Assess/evaluate cause of self-care deficits   - Assess range of motion  - Assess patient's mobility; develop plan if impaired  - Assess patient's need for assistive devices and provide as appropriate  - Encourage maximum independence but intervene and supervise when necessary  - Involve family in performance of ADLs  - Assess for home care needs following discharge   - Consider OT consult to assist with ADL evaluation and planning for discharge  - Provide patient education as appropriate  Outcome: Progressing  Goal: Maintains/Returns to pre admission functional level  Description: INTERVENTIONS:  - Perform AM-PAC 6 Click Basic Mobility/ Daily Activity assessment daily.  - Set and communicate daily mobility goal to care team and  patient/family/caregiver.   - Collaborate with rehabilitation services on mobility goals if consulted  - Perform Range of Motion 3 times a day.  - Reposition patient every 2 hours if unable to reposition self  - Out of bed for toileting if medically appropriate  - Record patient progress and toleration of activity level   Outcome: Progressing     Problem: DISCHARGE PLANNING  Goal: Discharge to home or other facility with appropriate resources  Description: INTERVENTIONS:  - Identify barriers to discharge w/patient and caregiver  - Arrange for needed discharge resources and transportation as appropriate  - Identify discharge learning needs (meds, wound care, etc.)  - Arrange for interpretive services to assist at discharge as needed  - Refer to Case Management Department for coordinating discharge planning if the patient needs post-hospital services based on physician/advanced practitioner order or complex needs related to functional status, cognitive ability, or social support system  Outcome: Progressing     Problem: Knowledge Deficit  Goal: Patient/family/caregiver demonstrates understanding of disease process, treatment plan, medications, and discharge instructions  Description: Complete learning assessment and assess knowledge base.  Interventions:  - Provide teaching at level of understanding  - Provide teaching via preferred learning methods  Outcome: Progressing     Problem: NEUROSENSORY - ADULT  Goal: Achieves stable or improved neurological status  Description: INTERVENTIONS  - Monitor and report changes in neurological status  - Monitor vital signs such as temperature, blood pressure, glucose, and any other labs ordered   - Initiate measures to prevent increased intracranial pressure  - Monitor for seizure activity and implement precautions if appropriate      Outcome: Progressing  Goal: Remains free of injury related to seizures activity  Description: INTERVENTIONS  - Maintain airway, patient safety  and  administer oxygen as ordered  - Monitor patient for seizure activity, document and report duration and description of seizure to physician/advanced practitioner  - If seizure occurs,  ensure patient safety during seizure  - Reorient patient post seizure  - Seizure pads on all 4 side rails  - Instruct patient/family to notify RN of any seizure activity including if an aura is experienced  - Instruct patient/family to call for assistance with activity based on nursing assessment  - Administer anti-seizure medications if ordered    Outcome: Progressing     Problem: CARDIOVASCULAR - ADULT  Goal: Maintains optimal cardiac output and hemodynamic stability  Description: INTERVENTIONS:  - Monitor I/O, vital signs and rhythm  - Monitor for S/S and trends of decreased cardiac output  - Administer and titrate ordered vasoactive medications to optimize hemodynamic stability  - Assess quality of pulses, skin color and temperature  - Assess for signs of decreased coronary artery perfusion  - Instruct patient to report change in severity of symptoms  Outcome: Progressing  Goal: Absence of cardiac dysrhythmias or at baseline rhythm  Description: INTERVENTIONS:  - Continuous cardiac monitoring, vital signs, obtain 12 lead EKG if ordered  - Administer antiarrhythmic and heart rate control medications as ordered  - Monitor electrolytes and administer replacement therapy as ordered  Outcome: Progressing     Problem: RESPIRATORY - ADULT  Goal: Achieves optimal ventilation and oxygenation  Description: INTERVENTIONS:  - Assess for changes in respiratory status  - Assess for changes in mentation and behavior  - Position to facilitate oxygenation and minimize respiratory effort  - Oxygen administered by appropriate delivery if ordered  - Initiate smoking cessation education as indicated  - Encourage broncho-pulmonary hygiene including cough, deep breathe, Incentive Spirometry  - Assess the need for suctioning and aspirate as needed  -  Assess and instruct to report SOB or any respiratory difficulty  - Respiratory Therapy support as indicated  Outcome: Progressing     Problem: METABOLIC, FLUID AND ELECTROLYTES - ADULT  Goal: Electrolytes maintained within normal limits  Description: INTERVENTIONS:  - Monitor labs and assess patient for signs and symptoms of electrolyte imbalances  - Administer electrolyte replacement as ordered  - Monitor response to electrolyte replacements, including repeat lab results as appropriate  - Instruct patient on fluid and nutrition as appropriate  Outcome: Progressing  Goal: Fluid balance maintained  Description: INTERVENTIONS:  - Monitor labs   - Monitor I/O and WT  - Instruct patient on fluid and nutrition as appropriate  - Assess for signs & symptoms of volume excess or deficit  Outcome: Progressing  Goal: Glucose maintained within target range  Description: INTERVENTIONS:  - Monitor Blood Glucose as ordered  - Assess for signs and symptoms of hyperglycemia and hypoglycemia  - Administer ordered medications to maintain glucose within target range  - Assess nutritional intake and initiate nutrition service referral as needed  Outcome: Progressing

## 2024-05-23 NOTE — UTILIZATION REVIEW
NOTIFICATION OF INPATIENT ADMISSION   AUTHORIZATION REQUEST   SERVICING FACILITY:   [place] [address]  Tax ID: [id number]  NPI: [id number] ATTENDING PROVIDER:  Attending Name and NPI#: Ailyn Aguilera Md [[id number]]  Address: [address] [place] [address]  Phone: [phone]   ADMISSION INFORMATION:  Place of Service: Inpatient [facility]  Place of Service Code: 21  Inpatient Admission Date/Time: 5/21/24  6:35 PM  Discharge Date/Time: No discharge date for patient encounter.  Admitting Diagnosis Code/Description:  Hyponatremia [E87.1]  Vomiting [R11.10]  COPD with acute exacerbation (HCC) [J44.1]     UTILIZATION REVIEW CONTACT:  Kelsey Sam Utilization   Network Utilization Review Department  Phone: [phone]  Fax [phone]  Email: [email or web address]  Contact for approvals/pending authorizations, clinical reviews, and discharge.     PHYSICIAN ADVISORY SERVICES:  Medical Necessity Denial & [place] Review  Phone: [phone]  Fax: [phone]  Email: [email or web address]     DISCHARGE SUPPORT TEAM:  For Patients Discharge Needs & Updates  Phone: [phone] opt. 2 Fax: [phone]  Email: [email or web address]

## 2024-05-23 NOTE — PROGRESS NOTES
"NEPHROLOGY PROGRESS NOTE    Jessika Lux 69 y.o. female MRN: [id number]  Unit/Bed#: -01 Encounter: [id number]  Reason for Consult: Hyponatremia    Patient is awake alert says that her breathing feels a little bit better.  No confusion or neurological complaints.  Has been transferred out of the intensive care unit.    ASSESSMENT/PLAN:    1.  Hyponatremia    Patient has hyponatremia which is chronic and she was on an SSRI.  She presented with severe hyponatremia.  She was treated with 1.8% saline and sodium concentration has improved over the last 24 hours is 132 mEq/L.  Her SSRI has been discontinued as well.  Urine studies were consistent with SIADH.    Discontinue IV fluids  Liberalize fluid intake to 2 L/day for now  Off SSRI monitor  BMP in a.m.  Discontinue diuretic for now.    2.  Pulmonary    Patient's main reason for presentation was shortness of breath.  Initially admitted to ICU for hypoxic respiratory insufficiency.  Patient has been treated with glucocorticoids and bronchodilators and is slowly feeling better.  Pulmonary following.      SUBJECTIVE:  Review of Systems   Constitutional: Negative for chills and diaphoresis.   HENT: Negative.     Eyes: Negative.    Cardiovascular:  Positive for dyspnea on exertion. Negative for chest pain, leg swelling and orthopnea.   Respiratory:  Positive for shortness of breath. Negative for cough and sputum production.    Gastrointestinal:  Negative for abdominal pain, diarrhea, nausea and vomiting.   Genitourinary: Negative.    Neurological:  Negative for dizziness, headaches and light-headedness.   Psychiatric/Behavioral:  Negative for altered mental status.        OBJECTIVE:  Current Weight: Weight - Scale: 67.1 kg (147 lb 14.9 oz)  Vitals:Temp (24hrs), Av.5 °F (36.4 °C), Min:97 °F (36.1 °C), Max:98.1 °F (36.7 °C)  Current: Temperature: 98.1 °F (36.7 °C)   Blood pressure 118/65, pulse 77, temperature 98.1 °F (36.7 °C), resp. rate 18, height 5' 2\" (1.575 " "m), weight 67.1 kg (147 lb 14.9 oz), SpO2 93%. , Body mass index is 27.06 kg/m².      Intake/Output Summary (Last 24 hours) at 5/23/2024 1002  Last data filed at 5/23/2024 0640  Gross per 24 hour   Intake 1604.17 ml   Output 2050 ml   Net -445.83 ml       Physical Exam: /65   Pulse 77   Temp 98.1 °F (36.7 °C)   Resp 18   Ht 5' 2\" (1.575 m)   Wt 67.1 kg (147 lb 14.9 oz)   SpO2 93%   BMI 27.06 kg/m²   Physical Exam  Constitutional:       General: She is not in acute distress.     Appearance: She is not toxic-appearing.   HENT:      Head: Normocephalic and atraumatic.      Nose: Nose normal.      Mouth/Throat:      Mouth: Mucous membranes are dry.   Eyes:      General: No scleral icterus.     Extraocular Movements: Extraocular movements intact.   Cardiovascular:      Rate and Rhythm: Normal rate and regular rhythm.      Heart sounds:      No friction rub. No gallop.   Pulmonary:      Effort: No respiratory distress.      Breath sounds: No wheezing or rales.      Comments: Slightly less respiratory rate.  Abdominal:      General: Bowel sounds are normal. There is no distension.      Palpations: Abdomen is soft.      Tenderness: There is no abdominal tenderness. There is no rebound.   Musculoskeletal:      Cervical back: Normal range of motion and neck supple.   Neurological:      Mental Status: She is alert and oriented to person, place, and time.   Psychiatric:         Mood and Affect: Mood normal.         Behavior: Behavior normal.         Thought Content: Thought content normal.         Medications:    Current Facility-Administered Medications:     albuterol inhalation solution 5 mg, 5 mg, Nebulization, Q6H PRN, Hailey S Maria Antonia, CRNP, 5 mg at 05/22/24 1634    amLODIPine (NORVASC) tablet 10 mg, 10 mg, Oral, HS, TICO Walter-MILLIE, 10 mg at 05/22/24 2119    atorvastatin (LIPITOR) tablet 40 mg, 40 mg, Oral, HS, TICO Walter-C, 40 mg at 05/22/24 2119    budesonide (PULMICORT) inhalation " solution 0.5 mg, 0.5 mg, Nebulization, Q12H, Hailey S Maria Antonia, CRNP, 0.5 mg at 05/23/24 0843    chlorhexidine (PERIDEX) 0.12 % oral rinse 15 mL, 15 mL, Mouth/Throat, Q12H NATHALY, Karuna Sellers PA-C, 15 mL at 05/22/24 2013    Cholecalciferol (VITAMIN D3) tablet 1,000 Units, 1,000 Units, Oral, Daily, Karuna Sellers PA-C, 1,000 Units at 05/23/24 0835    cloNIDine (CATAPRES) tablet 0.2 mg, 0.2 mg, Oral, Q12H NATHALY, Karuna Sellers PA-C, 0.2 mg at 05/23/24 0836    fluticasone (FLONASE) 50 mcg/act nasal spray 1 spray, 1 spray, Nasal, Daily, Karuna Sellers PA-C, 1 spray at 05/23/24 0848    furosemide (LASIX) injection 20 mg, 20 mg, Intravenous, TID (diuretic), Hailey BLAS Maria Antonia, CRNP, 20 mg at 05/22/24 1724    heparin (porcine) subcutaneous injection 5,000 Units, 5,000 Units, Subcutaneous, Q8H NATHALY, 5,000 Units at 05/23/24 0511 **AND** [CANCELED] Platelet count, , , Once, Karuna Sellers PA-C    hydrOXYzine HCL (ATARAX) tablet 10 mg, 10 mg, Oral, Q6H PRN, Karuna Sellers PA-C, 10 mg at 05/22/24 0014    insulin lispro (HumALOG/ADMELOG) 100 units/mL subcutaneous injection 1-5 Units, 1-5 Units, Subcutaneous, 4x Daily (AC & HS), 2 Units at 05/22/24 2120 **AND** Fingerstick Glucose (POCT), , , 4x Daily AC and at bedtime, Hailey LBAS Maria Antonia, CRNP    levalbuterol (XOPENEX) inhalation solution 1.25 mg, 1.25 mg, Nebulization, TID, 1.25 mg at 05/23/24 0841 **AND** [DISCONTINUED] sodium chloride 0.9 % inhalation solution 3 mL, 3 mL, Nebulization, TID, Karuna Sellers PA-C    lisinopril (ZESTRIL) tablet 40 mg, 40 mg, Oral, Daily, Karuna Sellers PA-C, 40 mg at 05/23/24 0837    LORazepam (ATIVAN) tablet 0.5 mg, 0.5 mg, Oral, Q8H PRN, Karuna Sellers PA-C, 0.5 mg at 05/22/24 1633    methylPREDNISolone sodium succinate (Solu-MEDROL) injection 40 mg, 40 mg, Intravenous, Daily, AYLIN Morrell, 40 mg at 05/23/24 0837    metoprolol tartrate (LOPRESSOR) tablet 100 mg, 100 mg, Oral, Q12H  "NATHALY, Karuna Sellers PA-C, 100 mg at 05/23/24 0838    nicotine (NICODERM CQ) 14 mg/24hr TD 24 hr patch 1 patch, 1 patch, Transdermal, Daily, Hailey S Maria Antonia, CRNP, 1 patch at 05/23/24 0839    pantoprazole (PROTONIX) EC tablet 20 mg, 20 mg, Oral, Early Morning, Karuna Sellers PA-C, 20 mg at 05/23/24 0511    potassium phosphates 21 mmol in sodium chloride 0.9 % 250 mL infusion, 21 mmol, Intravenous, Once, Hailey S Maria Antonia, CRNP, Last Rate: 62.5 mL/hr at 05/23/24 0838, 21 mmol at 05/23/24 0838    umeclidinium-vilanterol 62.5-25 mcg/actuation inhaler 1 puff, 1 puff, Inhalation, Daily, Hailey S Maria Antonia, CRNP, 1 puff at 05/23/24 0848    Laboratory Results:  Lab Results   Component Value Date    WBC 5.65 05/23/2024    HGB 10.3 (L) 05/23/2024    HCT 30.4 (L) 05/23/2024    MCV 85 05/23/2024     05/23/2024     Lab Results   Component Value Date    SODIUM 132 (L) 05/23/2024    K 3.5 05/23/2024    CL 88 (L) 05/23/2024    CO2 42 (H) 05/23/2024    BUN 11 05/23/2024    CREATININE 0.56 (L) 05/23/2024    GLUC 136 05/23/2024    CALCIUM 9.3 05/23/2024     Lab Results   Component Value Date    CALCIUM 9.3 05/23/2024    PHOS 2.2 (L) 05/23/2024     No results found for: \"LABPROT\"    "

## 2024-05-24 LAB
ANION GAP SERPL CALCULATED.3IONS-SCNC: 1 MMOL/L (ref 4–13)
BUN SERPL-MCNC: 12 MG/DL (ref 5–25)
CALCIUM SERPL-MCNC: 9.1 MG/DL (ref 8.4–10.2)
CHLORIDE SERPL-SCNC: 91 MMOL/L (ref 96–108)
CO2 SERPL-SCNC: 43 MMOL/L (ref 21–32)
CREAT SERPL-MCNC: 0.51 MG/DL (ref 0.6–1.3)
ERYTHROCYTE [DISTWIDTH] IN BLOOD BY AUTOMATED COUNT: 13.1 % (ref 11.6–15.1)
GFR SERPL CREATININE-BSD FRML MDRD: 99 ML/MIN/1.73SQ M
GLUCOSE SERPL-MCNC: 118 MG/DL (ref 65–140)
GLUCOSE SERPL-MCNC: 161 MG/DL (ref 65–140)
GLUCOSE SERPL-MCNC: 171 MG/DL (ref 65–140)
GLUCOSE SERPL-MCNC: 234 MG/DL (ref 65–140)
GLUCOSE SERPL-MCNC: 345 MG/DL (ref 65–140)
HCT VFR BLD AUTO: 32 % (ref 34.8–46.1)
HGB BLD-MCNC: 10.3 G/DL (ref 11.5–15.4)
MAGNESIUM SERPL-MCNC: 1.9 MG/DL (ref 1.9–2.7)
MCH RBC QN AUTO: 28.7 PG (ref 26.8–34.3)
MCHC RBC AUTO-ENTMCNC: 32.2 G/DL (ref 31.4–37.4)
MCV RBC AUTO: 89 FL (ref 82–98)
PHOSPHATE SERPL-MCNC: 2.9 MG/DL (ref 2.3–4.1)
PLATELET # BLD AUTO: 167 THOUSANDS/UL (ref 149–390)
PMV BLD AUTO: 9.7 FL (ref 8.9–12.7)
POTASSIUM SERPL-SCNC: 3.9 MMOL/L (ref 3.5–5.3)
RBC # BLD AUTO: 3.59 MILLION/UL (ref 3.81–5.12)
SODIUM SERPL-SCNC: 135 MMOL/L (ref 135–147)
WBC # BLD AUTO: 7.46 THOUSAND/UL (ref 4.31–10.16)

## 2024-05-24 PROCEDURE — 99232 SBSQ HOSP IP/OBS MODERATE 35: CPT | Performed by: NURSE PRACTITIONER

## 2024-05-24 PROCEDURE — 94664 DEMO&/EVAL PT USE INHALER: CPT

## 2024-05-24 PROCEDURE — 80048 BASIC METABOLIC PNL TOTAL CA: CPT | Performed by: INTERNAL MEDICINE

## 2024-05-24 PROCEDURE — 83735 ASSAY OF MAGNESIUM: CPT

## 2024-05-24 PROCEDURE — 82948 REAGENT STRIP/BLOOD GLUCOSE: CPT

## 2024-05-24 PROCEDURE — 94760 N-INVAS EAR/PLS OXIMETRY 1: CPT

## 2024-05-24 PROCEDURE — 85027 COMPLETE CBC AUTOMATED: CPT

## 2024-05-24 PROCEDURE — 84100 ASSAY OF PHOSPHORUS: CPT

## 2024-05-24 PROCEDURE — 97162 PT EVAL MOD COMPLEX 30 MIN: CPT

## 2024-05-24 PROCEDURE — 99232 SBSQ HOSP IP/OBS MODERATE 35: CPT | Performed by: INTERNAL MEDICINE

## 2024-05-24 PROCEDURE — 94640 AIRWAY INHALATION TREATMENT: CPT

## 2024-05-24 RX ADMIN — NICOTINE 1 PATCH: 14 PATCH, EXTENDED RELEASE TRANSDERMAL at 08:31

## 2024-05-24 RX ADMIN — LEVALBUTEROL HYDROCHLORIDE 1.25 MG: 1.25 SOLUTION RESPIRATORY (INHALATION) at 08:50

## 2024-05-24 RX ADMIN — CHLORHEXIDINE GLUCONATE 15 ML: 1.2 RINSE ORAL at 08:30

## 2024-05-24 RX ADMIN — INSULIN LISPRO 1 UNITS: 100 INJECTION, SOLUTION INTRAVENOUS; SUBCUTANEOUS at 08:32

## 2024-05-24 RX ADMIN — BUDESONIDE 0.5 MG: 0.5 INHALANT ORAL at 20:24

## 2024-05-24 RX ADMIN — METHYLPREDNISOLONE SODIUM SUCCINATE 40 MG: 40 INJECTION, POWDER, FOR SOLUTION INTRAMUSCULAR; INTRAVENOUS at 08:31

## 2024-05-24 RX ADMIN — PANTOPRAZOLE SODIUM 20 MG: 20 TABLET, DELAYED RELEASE ORAL at 06:36

## 2024-05-24 RX ADMIN — Medication 1000 UNITS: at 08:30

## 2024-05-24 RX ADMIN — UMECLIDINIUM BROMIDE AND VILANTEROL TRIFENATATE 1 PUFF: 62.5; 25 POWDER RESPIRATORY (INHALATION) at 08:32

## 2024-05-24 RX ADMIN — HEPARIN SODIUM 5000 UNITS: 5000 INJECTION, SOLUTION INTRAVENOUS; SUBCUTANEOUS at 15:31

## 2024-05-24 RX ADMIN — CLONIDINE HYDROCHLORIDE 0.2 MG: 0.1 TABLET ORAL at 21:41

## 2024-05-24 RX ADMIN — INSULIN LISPRO 1 UNITS: 100 INJECTION, SOLUTION INTRAVENOUS; SUBCUTANEOUS at 21:41

## 2024-05-24 RX ADMIN — LEVALBUTEROL HYDROCHLORIDE 1.25 MG: 1.25 SOLUTION RESPIRATORY (INHALATION) at 20:24

## 2024-05-24 RX ADMIN — HEPARIN SODIUM 5000 UNITS: 5000 INJECTION, SOLUTION INTRAVENOUS; SUBCUTANEOUS at 06:36

## 2024-05-24 RX ADMIN — HEPARIN SODIUM 5000 UNITS: 5000 INJECTION, SOLUTION INTRAVENOUS; SUBCUTANEOUS at 21:38

## 2024-05-24 RX ADMIN — INSULIN LISPRO 4 UNITS: 100 INJECTION, SOLUTION INTRAVENOUS; SUBCUTANEOUS at 16:46

## 2024-05-24 RX ADMIN — BUDESONIDE 0.5 MG: 0.5 INHALANT ORAL at 08:50

## 2024-05-24 RX ADMIN — AMLODIPINE BESYLATE 10 MG: 10 TABLET ORAL at 21:41

## 2024-05-24 RX ADMIN — LEVALBUTEROL HYDROCHLORIDE 1.25 MG: 1.25 SOLUTION RESPIRATORY (INHALATION) at 13:59

## 2024-05-24 RX ADMIN — INSULIN LISPRO 2 UNITS: 100 INJECTION, SOLUTION INTRAVENOUS; SUBCUTANEOUS at 11:54

## 2024-05-24 RX ADMIN — METOPROLOL TARTRATE 100 MG: 50 TABLET, FILM COATED ORAL at 21:42

## 2024-05-24 RX ADMIN — METOPROLOL TARTRATE 100 MG: 50 TABLET, FILM COATED ORAL at 08:31

## 2024-05-24 RX ADMIN — CLONIDINE HYDROCHLORIDE 0.2 MG: 0.1 TABLET ORAL at 08:30

## 2024-05-24 RX ADMIN — LISINOPRIL 40 MG: 20 TABLET ORAL at 08:30

## 2024-05-24 RX ADMIN — ATORVASTATIN CALCIUM 40 MG: 40 TABLET, FILM COATED ORAL at 21:38

## 2024-05-24 RX ADMIN — FLUTICASONE PROPIONATE 1 SPRAY: 50 SPRAY, METERED NASAL at 08:32

## 2024-05-24 NOTE — PHYSICAL THERAPY NOTE
"   PHYSICAL THERAPY NOTE      Patient Name: Jessika Lux  Today's Date: 5/24/2024 05/24/24 0844   Note Type   Note type Cancelled Session;Evaluation   Cancel Reasons Refusal       Received order for PT consult. Chart reviewed. Pt admitted with diagnosis of hyponatremia, COPD. Spoke with patient and RN. At this time, PT session cancelled due to patient declining participation.  Patient states, \"I'm too tired, I need to stay in bed, maybe later.\"  PT and RN educate pt and encourage participation however, she continues to decline.  Will follow and see patient as medically appropriate at a later time when she is agreeable.        Rey Herrera, PT   "

## 2024-05-24 NOTE — PLAN OF CARE
Problem: PHYSICAL THERAPY ADULT  Goal: Performs mobility at highest level of function for planned discharge setting.  See evaluation for individualized goals.  Description: Treatment/Interventions: Functional transfer training, LE strengthening/ROM, Elevations, Therapeutic exercise, Endurance training, Patient/family training, Equipment eval/education, Gait training, Compensatory technique education, Spoke to nursing          See flowsheet documentation for full assessment, interventions and recommendations.  Note: Prognosis: Good  Problem List: Decreased endurance, Impaired balance, Decreased mobility  Assessment: Pt is a 69 y.o. female seen for PT evaluation s/p admission to [facility] on 5/21/2024 with Hyponatremia.  Order placed for PT services.  Upon evaluation: Pt is presenting with impaired functional mobility due to decreased strength, decreased endurance, impaired balance, gait deviations, and fall risk requiring  SPV assistance for transfers and ambulation with no AD . Pt's clinical presentation is currently evolving given the functional mobility deficits above, especially decreased endurance and decreased activity tolerance, coupled with fall risks as indicated by AM-PAC 6-Clicks: 20/24 as well as  medical complications of abnormal renal lab values, abnormal CBC, abnormal CO2 values, and need for input for mobility technique/safety.  Pt's PMHx and comorbidities that may affect physical performance and progress include: anxiety and COPD. Personal factors affecting pt at time of IE include: anxiety, step(s) to enter environment, inability to perform IADLs, and tobacco use. Pt will benefit from continued skilled PT services to address deficits as defined above and to maximize level of functional mobility to facilitate return toward PLOF and improved QOL. From PT/mobility standpoint, recommendation at time of d/c would be Level III (Minimum Resource Intensity) pending progress in order to  reduce fall risk and maximize pt's functional independence and consistency with mobility in order to facilitate return to PLOF.  Recommend ther ex next 1-2 sessions.  Barriers to Discharge: Other (Comment)  Barriers to Discharge Comments: impaired endurance  Rehab Resource Intensity Level, PT: III (Minimum Resource Intensity)    See flowsheet documentation for full assessment.

## 2024-05-24 NOTE — PHYSICAL THERAPY NOTE
PHYSICAL THERAPY EVALUATION      NAME:  Jessika Lux  DATE: 05/24/24    AGE:   69 y.o.  Mrn:   [id number]  ADMIT DX:  Hyponatremia [E87.1]  Vomiting [R11.10]  COPD with acute exacerbation (HCC) [J44.1]    Past Medical History:   Diagnosis Date    Chronic respiratory failure with hypoxia, on home O2 therapy  (HCC)     3L NC at home    COPD exacerbation (HCC)     COPD, group D, by GOLD 2017 classification (HCC)     Diabetes mellitus (HCC)     Diverticulosis of sigmoid colon     Dyslipidemia     Essential (primary) hypertension     NELSON (generalized anxiety disorder)     GERD (gastroesophageal reflux disease)     Lung nodule     RUL    SIADH (syndrome of inappropriate ADH production) (Formerly [facility])     Tobacco dependence     Vitamin D deficiency      Length Of Stay: 3  Performed at least 2 patient identifiers during session: Name and Birthday         PHYSICAL THERAPY EVALUATION:       05/24/24 1439   PT Last Visit   PT Visit Date 05/24/24   Note Type   Note type Evaluation   Pain Assessment   Pain Assessment Tool 0-10   Pain Score No Pain   Restrictions/Precautions   Weight Bearing Precautions Per Order No   Other Precautions O2;Fall Risk;Chair Alarm;Bed Alarm  (3L O2)   Home Living   Type of Home House   Home Layout Two level;Performs ADLs on one level;Able to live on main level with bedroom/bathroom  (2 PRESTON B rails; 1st flr set up)   Bathroom Shower/Tub Walk-in shower   Bathroom Toilet Standard   Bathroom Equipment Grab bars in shower;Built-in shower seat;Grab bars around toilet   Bathroom Accessibility Accessible   Home Equipment   (Sleeps on couch)   Additional Comments Sleeps on couch/chair   Prior Function   Level of [place] Independent with ADLs;Independent with functional mobility;Needs assistance with IADLS   Lives With   (Dtr & granddtr)   Receives Help From Family   IADLs Family/Friend/Other provides transportation;Independent with medication management;Independent with meal prep   Falls in the last 6 months  "0   Comments IND no AD.  Pt reports not going outside of the home without someone present with her.   General   Family/Caregiver Present No   Cognition   Overall Cognitive Status WFL   Arousal/Participation Alert   Orientation Level Oriented X4   Following Commands Follows one step commands without difficulty   Subjective   Subjective \"I still haven't taken a nap\"   RLE Assessment   RLE Assessment WFL   LLE Assessment   LLE Assessment WFL   Bed Mobility   Supine to Sit 6  Modified independent   Sit to Supine 6  Modified independent   Transfers   Sit to Stand 5  Supervision   Stand to Sit 5  Supervision   Stand pivot 5  Supervision   Additional items Verbal cues   Additional Comments No AD   Ambulation/Elevation   Gait pattern Improper Weight shift;Inconsistent yasmine   Gait Assistance 5  Supervision   Additional items Verbal cues   Assistive Device None   Distance 71 ft   Stair Management Assistance 5  Supervision   Additional items Verbal cues   Stair Management Technique Two rails;Alternating pattern   Number of Stairs 4   Balance   Static Sitting Normal   Dynamic Sitting Good   Static Standing Fair +   Dynamic Standing Fair   Ambulatory Fair   Endurance Deficit   Endurance Deficit Yes   Activity Tolerance   Activity Tolerance Patient limited by fatigue   Medical Staff Made Aware PCA   Assessment   Prognosis Good   Problem List Decreased endurance;Impaired balance;Decreased mobility   Barriers to Discharge Other (Comment)   Barriers to Discharge Comments impaired endurance   Goals   STG Expiration Date 06/07/24   PT Treatment Day 0   Plan   Treatment/Interventions Functional transfer training;LE strengthening/ROM;Elevations;Therapeutic exercise;Endurance training;Patient/family training;Equipment eval/education;Gait training;Compensatory technique education;Spoke to nursing   PT Frequency 1-2x/wk   Discharge Recommendation   Rehab Resource Intensity Level, PT III (Minimum Resource Intensity)   AM-PAC Basic " Mobility Inpatient   Turning in Flat Bed Without Bedrails 4   Lying on Back to Sitting on Edge of Flat Bed Without Bedrails 4   Moving Bed to Chair 3   Standing Up From Chair Using Arms 3   Walk in Room 3   Climb 3-5 Stairs With Railing 3   Basic Mobility Inpatient Raw Score 20   Basic Mobility Standardized Score 43.99   [facility] Highest Level Of Mobility   JH-HLM Goal 6: Walk 10 steps or more   JH-HLM Achieved 7: Walk 25 feet or more   End of Consult   Patient Position at End of Consult Bedside chair;Bed/Chair alarm activated;All needs within reach   End of Consult Comments 3L O2     (Please find full objective findings from PT assessment regarding body systems outlined above).     Assessment: Pt is a 69 y.o. female seen for PT evaluation s/p admission to [facility] on 5/21/2024 with Hyponatremia.  Order placed for PT services.  Upon evaluation: Pt is presenting with impaired functional mobility due to decreased strength, decreased endurance, impaired balance, gait deviations, and fall risk requiring  SPV assistance for transfers and ambulation with no AD . Pt's clinical presentation is currently evolving given the functional mobility deficits above, especially decreased endurance and decreased activity tolerance, coupled with fall risks as indicated by AM-PAC 6-Clicks: 20/24 as well as  medical complications of abnormal renal lab values, abnormal CBC, abnormal CO2 values, and need for input for mobility technique/safety.  Pt's PMHx and comorbidities that may affect physical performance and progress include: anxiety and COPD. Personal factors affecting pt at time of IE include: anxiety, step(s) to enter environment, inability to perform IADLs, and tobacco use. Pt will benefit from continued skilled PT services to address deficits as defined above and to maximize level of functional mobility to facilitate return toward PLOF and improved QOL. From PT/mobility standpoint, recommendation at time  of d/c would be Level III (Minimum Resource Intensity) pending progress in order to reduce fall risk and maximize pt's functional independence and consistency with mobility in order to facilitate return to PLOF.  Recommend ther ex next 1-2 sessions.     The patient's AM-PAC Basic Mobility Inpatient Short Form Raw Score is 20. A Raw score of greater than 16 suggests the patient may benefit from discharge to home. Please also refer to the recommendation of the Physical Therapist for safe discharge planning.       Goals: Pt will perform transfers with modified I to increase Indep in home environment and prepare for ambulation. Pt will ambulate with no AD for >/= 175 ft with  modified I  to decrease risk for falls and improve activity tolerance and to access home environment. Pt will complete >/= 2 steps with with bilateral handrails with modified I to return to home with PRESTON. Pt will participate in objective balance assessment to determine baseline fall risk.        Rey Herrera, PT,DPT

## 2024-05-24 NOTE — QUICK NOTE
Patient encouraged to get out of bed to recliner chair. Patient refused when asked why patient stated she does not want to get up, patient educated on importance of getting out of bed and moving. Patient stated at home she sits on her couch and watches tv all day and she is not getting out of bed. Patient and family educated at lunch time as family brought patient in extra food of [employer]. Patient continued to request extra snacks in between meals.

## 2024-05-24 NOTE — PROGRESS NOTES
NEPHROLOGY PROGRESS NOTE    Jessika Lux 69 y.o. female MRN: [id number]  Unit/Bed#: -01 Encounter: [id number]  Reason for Consult: Hyponatremia    Patient is awake and alert at her baseline mental status.  She was laughing with me and says she felt better but not quite to baseline.  Still felt a little tight in her chest was receiving her nebulizer treatment.    ASSESSMENT/PLAN:  1.  Hyponatremia    The patient presented with severe hyponatremia and this is occurred in the past and she was on an SSRI.  She required treatment initially with 1.8% saline and has had improvement as well as stopping the SSRI.  She is off with diuretics.  Her sodium concentration has normalized and is 135 mill equivalents per liter.  She has normal renal function.    Given that things have normalized just monitor on fluid restriction of 1.8 L/day.  Continue to hold SSRI 1 discharge  Monitor clinically    2.  Pulmonary    Patient with bronchospasm exacerbation.  Received glucocorticoids bronchodilators slowly improving pulmonary is following and managing.      We will sign off please call if we can be of further assistance.        SUBJECTIVE:  Review of Systems   Constitutional: Negative for chills, diaphoresis and fever.   HENT: Negative.     Eyes: Negative.    Cardiovascular:  Positive for dyspnea on exertion. Negative for chest pain and orthopnea.   Respiratory:  Negative for cough, shortness of breath and sputum production.         Still feels she is a little tight in her chest.   Gastrointestinal:  Negative for abdominal pain, diarrhea, nausea and vomiting.   Genitourinary: Negative.    Neurological:  Negative for dizziness, headaches and light-headedness.   Psychiatric/Behavioral:  Negative for altered mental status.        OBJECTIVE:  Current Weight: Weight - Scale: 70.2 kg (154 lb 12.2 oz)  Vitals:Temp (24hrs), Av.4 °F (36.3 °C), Min:97.3 °F (36.3 °C), Max:97.5 °F (36.4 °C)  Current: Temperature: (!) 97.3 °F (36.3  "°C)   Blood pressure 129/71, pulse 66, temperature (!) 97.3 °F (36.3 °C), temperature source Temporal, resp. rate 18, height 5' 2\" (1.575 m), weight 70.2 kg (154 lb 12.2 oz), SpO2 97%. , Body mass index is 28.31 kg/m².      Intake/Output Summary (Last 24 hours) at 5/24/2024 0933  Last data filed at 5/24/2024 0846  Gross per 24 hour   Intake 840 ml   Output 850 ml   Net -10 ml       Physical Exam: /71   Pulse 66   Temp (!) 97.3 °F (36.3 °C) (Temporal)   Resp 18   Ht 5' 2\" (1.575 m)   Wt 70.2 kg (154 lb 12.2 oz)   SpO2 97%   BMI 28.31 kg/m²   Physical Exam  Constitutional:       General: She is not in acute distress.     Appearance: She is not toxic-appearing.   HENT:      Head: Normocephalic and atraumatic.      Nose: Nose normal.      Mouth/Throat:      Mouth: Mucous membranes are dry.   Eyes:      General: No scleral icterus.     Extraocular Movements: Extraocular movements intact.   Cardiovascular:      Rate and Rhythm: Normal rate and regular rhythm.      Heart sounds:      No friction rub. No gallop.   Pulmonary:      Effort: Pulmonary effort is normal. No respiratory distress.      Breath sounds: No wheezing or rales.   Abdominal:      General: Bowel sounds are normal. There is no distension.      Palpations: Abdomen is soft.      Tenderness: There is no abdominal tenderness. There is no rebound.   Musculoskeletal:      Cervical back: Normal range of motion and neck supple.   Neurological:      Mental Status: She is alert and oriented to person, place, and time. Mental status is at baseline.         Medications:    Current Facility-Administered Medications:     albuterol inhalation solution 5 mg, 5 mg, Nebulization, Q6H PRN, Hailey S Maria Antonia, AYLIN, 5 mg at 05/22/24 1634    amLODIPine (NORVASC) tablet 10 mg, 10 mg, Oral, HS, Karuna Sellers PA-C, 10 mg at 05/23/24 2131    atorvastatin (LIPITOR) tablet 40 mg, 40 mg, Oral, HS, Karuna Sellers PA-C, 40 mg at 05/23/24 2131    budesonide " (PULMICORT) inhalation solution 0.5 mg, 0.5 mg, Nebulization, Q12H, Hailey S Maria Antonia, CRNP, 0.5 mg at 05/24/24 0850    chlorhexidine (PERIDEX) 0.12 % oral rinse 15 mL, 15 mL, Mouth/Throat, Q12H NATHALY, Karuna Sellers PA-C, 15 mL at 05/24/24 0830    Cholecalciferol (VITAMIN D3) tablet 1,000 Units, 1,000 Units, Oral, Daily, Karuna Sellers PA-C, 1,000 Units at 05/24/24 0830    cloNIDine (CATAPRES) tablet 0.2 mg, 0.2 mg, Oral, Q12H NATHALY, Karuna Sellers PA-C, 0.2 mg at 05/24/24 0830    fluticasone (FLONASE) 50 mcg/act nasal spray 1 spray, 1 spray, Nasal, Daily, Karuna Sellers PA-C, 1 spray at 05/24/24 0832    heparin (porcine) subcutaneous injection 5,000 Units, 5,000 Units, Subcutaneous, Q8H NATHALY, 5,000 Units at 05/24/24 0636 **AND** [CANCELED] Platelet count, , , Once, Karuna Sellers PA-C    hydrOXYzine HCL (ATARAX) tablet 10 mg, 10 mg, Oral, Q6H PRN, Karuna Sellers PA-C, 10 mg at 05/22/24 0014    insulin lispro (HumALOG/ADMELOG) 100 units/mL subcutaneous injection 1-5 Units, 1-5 Units, Subcutaneous, 4x Daily (AC & HS), 1 Units at 05/24/24 0832 **AND** Fingerstick Glucose (POCT), , , 4x Daily AC and at bedtime, Hailey S Maria Antonia, CRNP    levalbuterol (XOPENEX) inhalation solution 1.25 mg, 1.25 mg, Nebulization, TID, 1.25 mg at 05/24/24 0850 **AND** [DISCONTINUED] sodium chloride 0.9 % inhalation solution 3 mL, 3 mL, Nebulization, TID, Karuna Sellers PA-C    lisinopril (ZESTRIL) tablet 40 mg, 40 mg, Oral, Daily, Karuna Sellers PA-C, 40 mg at 05/24/24 0830    LORazepam (ATIVAN) tablet 0.5 mg, 0.5 mg, Oral, Q8H PRN, Karuna Sellers PA-C, 0.5 mg at 05/23/24 1626    methylPREDNISolone sodium succinate (Solu-MEDROL) injection 40 mg, 40 mg, Intravenous, Daily, GURU MorrellNP, 40 mg at 05/24/24 0831    metoprolol tartrate (LOPRESSOR) tablet 100 mg, 100 mg, Oral, Q12H NATHALY, Karuna Sellers PA-C, 100 mg at 05/24/24 0831    nicotine (NICODERM CQ) 14 mg/24hr TD  "24 hr patch 1 patch, 1 patch, Transdermal, Daily, Hailey BLAS Maria Antonia, CRNP, 1 patch at 05/24/24 0831    pantoprazole (PROTONIX) EC tablet 20 mg, 20 mg, Oral, Early Morning, Karuna Sellers PA-C, 20 mg at 05/24/24 0636    umeclidinium-vilanterol 62.5-25 mcg/actuation inhaler 1 puff, 1 puff, Inhalation, Daily, Hailey BLAS Maria Antonia, CRNP, 1 puff at 05/24/24 0832    Laboratory Results:  Lab Results   Component Value Date    WBC 7.46 05/24/2024    HGB 10.3 (L) 05/24/2024    HCT 32.0 (L) 05/24/2024    MCV 89 05/24/2024     05/24/2024     Lab Results   Component Value Date    SODIUM 135 05/24/2024    K 3.9 05/24/2024    CL 91 (L) 05/24/2024    CO2 43 (H) 05/24/2024    BUN 12 05/24/2024    CREATININE 0.51 (L) 05/24/2024    GLUC 118 05/24/2024    CALCIUM 9.1 05/24/2024     Lab Results   Component Value Date    CALCIUM 9.1 05/24/2024    PHOS 2.9 05/24/2024     No results found for: \"LABPROT\"    "

## 2024-05-24 NOTE — PROGRESS NOTES
[facility]  Progress Note  Name: Jessika Lux I  MRN: [id number]  Unit/Bed#: -01 I Date of Admission: 5/21/2024   Date of Service: 5/24/2024 I Hospital Day: 3    Assessment & Plan   COPD with acute exacerbation (HCC)  Assessment & Plan  Patient is chronically on 3 L of oxygen severe COPD has care management plan in place  Patient complains of shortness of breath and wheezing  Chest x-ray is clear  started on Atrovent Xopenex and Pulmicort round-the-clock   Solu-Medrol 40 mg daily x Day # 4 , pt still stating sob with ativity    Consider pulmonary consult if not improving.   Follow respiratory protocol  COVID and flu panel is negative  Lungs improving, without wheezing on examination today.       * Hyponatremia  Assessment & Plan  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 is on 1 g salt tablets twice daily and 10 mg on torsemide last visit with nephrologist was 4/25   Patient received in the  mL of normal saline the sodium stayed from 118 - 118  Nephrology is following   S/p Lasix 20 mg IV TID   1.8 % saline DC when sodium is greater than 126  Liberalize fluid to 2 liters today for now   Remain off of SSRI for now   I and O  Daily weight  Last sodium 123 corrected 125, Na now 135  Nephro: d/c IVF liberalize fluid intake to 2L /day, off SSRI monitor, BMP in AM, d/c diuretic for now.     Anxiety disorder due to general medical condition with panic attack  Assessment & Plan  Documented history of anxiety due to respiratory status is on Atarax at 10 mg every 6 hours as needed  Becomes extremely anxious when she feels short of breath and has worsening condition  Patient is on Zoloft 50 mg  Continues to use the Atarax 10 mg every 6 hours  Also takes Ativan 0.5 mg every 8 hours - check PDMP is prescription     Type 2 diabetes mellitus without complication, without long-term current use of insulin (HCC)  Assessment & Plan  Lab  Results   Component Value Date    HGBA1C 7.9 (H) 03/06/2024       Recent Labs     05/23/24  1623 05/23/24  2059 05/24/24  0813 05/24/24  1121   POCGLU 408* 176* 161* 234*         Blood Sugar Average: Last 72 hrs:  (P) [id number]    Insulin sliding scale     Primary hypertension  Assessment & Plan  Continue home meds     Tobacco abuse  Assessment & Plan  Nictoine patch     Chronic respiratory failure with hypoxia (HCC)  Assessment & Plan  Uses 2-3L NC of oxygen at baseline  Currently at baseline o2 requirements  Follow up with pulm outpatient.     Acute on chronic respiratory failure with hypoxia (HCC)-resolved as of 5/23/2024  Assessment & Plan  2 to 3 L of oxygen at baseline  Presented with worsening hypoxemia shortness of breath ductopenia and wheezing  Up to 4 to 5 L on presentation  Started treatment for COPD exacerbation  weaned back down to baseline oxygen requirements            VTE Pharmacologic Prophylaxis:   High Risk (Score >/= 5) - Pharmacological DVT Prophylaxis Ordered: heparin. Sequential Compression Devices Ordered.    Mobility:   Basic Mobility Inpatient Raw Score: 20  JH-HLM Goal: 6: Walk 10 steps or more  JH-HLM Achieved: 7: Walk 25 feet or more  JH-HLM Goal achieved. Continue to encourage appropriate mobility.    Patient Centered Rounds: I performed bedside rounds with nursing staff today.   Discussions with Specialists or Other Care Team Provider: nursing    Education and Discussions with Family / Patient: Attempted to update  (daughter) via phone. Unable to contact.    Total Time Spent on Date of Encounter in care of patient: 40 mins. This time was spent on one or more of the following: performing physical exam; counseling and coordination of care; obtaining or reviewing history; documenting in the medical record; reviewing/ordering tests, medications or procedures; communicating with other healthcare professionals and discussing with patient's  family/caregivers.    Current Length of Stay: 3 day(s)  Current Patient Status: Inpatient   Certification Statement: The patient will continue to require additional inpatient hospital stay due to ongoing iv steroid tx and weakness   Discharge Plan: Anticipate discharge in 48-72 hrs to home.    Code Status: Level 1 - Full Code    Subjective:   Pt reports feeling rather weak. She was just walked with PT did some steps. She has increased sob with mobility and feels that she keeps coming in and out of the hospital and is frustrated with that. She has no n/v/d . Had a bm today     Objective:     Vitals:   Temp (24hrs), Av.3 °F (36.3 °C), Min:97.3 °F (36.3 °C), Max:97.3 °F (36.3 °C)    Temp:  [97.3 °F (36.3 °C)] 97.3 °F (36.3 °C)  HR:  [59-66] 66  Resp:  [18-20] 18  BP: (107-129)/(61-71) 129/71  SpO2:  [97 %-99 %] 98 %  Body mass index is 28.31 kg/m².     Input and Output Summary (last 24 hours):     Intake/Output Summary (Last 24 hours) at 2024 1533  Last data filed at 2024 0846  Gross per 24 hour   Intake 720 ml   Output 850 ml   Net -130 ml       Physical Exam:   Physical Exam  Constitutional:       General: She is not in acute distress.     Appearance: She is obese. She is not ill-appearing, toxic-appearing or diaphoretic.   HENT:      Head: Normocephalic and atraumatic.      Nose: No congestion.   Eyes:      General:         Right eye: No discharge.         Left eye: No discharge.   Cardiovascular:      Rate and Rhythm: Normal rate.      Heart sounds: No murmur heard.     No friction rub. No gallop.   Pulmonary:      Effort: No respiratory distress.      Breath sounds: No stridor. No wheezing, rhonchi or rales.   Chest:      Chest wall: No tenderness.   Abdominal:      General: There is no distension.      Palpations: Abdomen is soft. There is no mass.      Tenderness: There is no abdominal tenderness. There is no guarding or rebound.      Hernia: No hernia is present.   Musculoskeletal:          General: No swelling, tenderness, deformity or signs of injury.   Skin:     Coloration: Skin is not jaundiced or pale.      Findings: No bruising, erythema, lesion or rash.   Neurological:      Mental Status: She is alert and oriented to person, place, and time.   Psychiatric:         Behavior: Behavior normal.          Additional Data:     Labs:  Results from last 7 days   Lab Units 05/24/24  0635 05/23/24  0357 05/22/24  0314   WBC Thousand/uL 7.46   < > 4.62   HEMOGLOBIN g/dL 10.3*   < > 11.7   HEMATOCRIT % 32.0*   < > 34.0*   PLATELETS Thousands/uL 167   < > 191   SEGS PCT %  --   --  85*   LYMPHO PCT %  --   --  12*   MONO PCT %  --   --  3*   EOS PCT %  --   --  0    < > = values in this interval not displayed.     Results from last 7 days   Lab Units 05/24/24  0635 05/21/24  1710 05/21/24  1330   SODIUM mmol/L 135   < > 118*   POTASSIUM mmol/L 3.9   < > 3.8   CHLORIDE mmol/L 91*   < > 73*   CO2 mmol/L 43*   < > 38*   BUN mg/dL 12   < > 6   CREATININE mg/dL 0.51*   < > 0.45*   ANION GAP mmol/L 1*   < > 7   CALCIUM mg/dL 9.1   < > 9.6   ALBUMIN g/dL  --   --  4.4   TOTAL BILIRUBIN mg/dL  --   --  0.60   ALK PHOS U/L  --   --  96   ALT U/L  --   --  8   AST U/L  --   --  14   GLUCOSE RANDOM mg/dL 118   < > 161*    < > = values in this interval not displayed.         Results from last 7 days   Lab Units 05/24/24  1121 05/24/24  0813 05/23/24  2059 05/23/24  1623 05/23/24  1117 05/23/24  0758 05/22/24  2025 05/22/24  1642 05/22/24  1101 05/22/24  0559 05/21/24  2330 05/21/24  2118   POC GLUCOSE mg/dl 234* 161* 176* 408* 161* 128 224* 199* 237* 138 255* 230*         Results from last 7 days   Lab Units 05/22/24  0314   PROCALCITONIN ng/ml <0.05       Lines/Drains:  Invasive Devices       Peripheral Intravenous Line  Duration             Peripheral IV 05/21/24 Right Antecubital 3 days    Long-Dwell Peripheral IV (Midline) 05/21/24 Left Basilic 2 days                          Imaging: Reviewed radiology reports from  this admission including: chest xray    Recent Cultures (last 7 days):         Last 24 Hours Medication List:   Current Facility-Administered Medications   Medication Dose Route Frequency Provider Last Rate    albuterol  5 mg Nebulization Q6H PRN Hailey S Maria Antonia, CRNP      amLODIPine  10 mg Oral HS Heidemarie I LEYDI Sellers      atorvastatin  40 mg Oral HS Heidemarie I LEYDI Sellers      budesonide  0.5 mg Nebulization Q12H Hailey S Maria Antonia, CRNP      chlorhexidine  15 mL Mouth/Throat Q12H [facility] Heidemarie I LEYDI schuster      cholecalciferol  1,000 Units Oral Daily [facility]      cloNIDine  0.2 mg Oral Q12H [facility]      fluticasone  1 spray Nasal Daily [facility]      heparin (porcine)  5,000 Units Subcutaneous Q8H [facility] Hailey S Maria Antonia, CRNP      hydrOXYzine HCL  10 mg Oral Q6H PRN [facility] I LEYDI Sellers      insulin lispro  1-5 Units Subcutaneous 4x Daily (AC & HS) Hailey S Maria Antonia, CRIMMANUEL      levalbuterol  1.25 mg Nebulization TID Hailey S Maria Antonia, CRIMMANUEL      lisinopril  40 mg Oral Daily [facility]      LORazepam  0.5 mg Oral Q8H PRN [facility] LE[facility]DI Sellers      methylPREDNISolone sodium succinate  40 mg Intravenous Daily Hailey S Maria Antonia, CRNP      metoprolol tartrate  100 mg Oral Q12H [facility] LEYDI S[facility]      nicotine  1 patch Transdermal Daily Hailey S Maria Antonia, CRIMMANUEL      pantoprazole  20 mg Oral Early Morning Heidemarie I LEYDI Sellers      umeclidinium-vilanterol  1 puff Inhalation Daily Hailey S Maria Antonia, AYLIN          Today, Patient Was Seen By: AYLIN Mckeon    **Please Note: This note may have been constructed using a voice recognition system.**

## 2024-05-24 NOTE — ASSESSMENT & PLAN NOTE
Patient is chronically on 3 L of oxygen severe COPD has care management plan in place  Patient complains of shortness of breath and wheezing  Chest x-ray is clear  started on Atrovent Xopenex and Pulmicort round-the-clock   Solu-Medrol 40 mg daily x Day # 4 , pt still stating sob with ativity    Consider pulmonary consult if not improving.   Follow respiratory protocol  COVID and flu panel is negative  Lungs improving, without wheezing on examination today.

## 2024-05-24 NOTE — ASSESSMENT & PLAN NOTE
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

## 2024-05-24 NOTE — PLAN OF CARE
Problem: RESPIRATORY - ADULT  Goal: Achieves optimal ventilation and oxygenation  Description: INTERVENTIONS:  - Assess for changes in respiratory status  - Assess for changes in mentation and behavior  - Position to facilitate oxygenation and minimize respiratory effort  - Oxygen administered by appropriate delivery if ordered  - Initiate smoking cessation education as indicated  - Encourage broncho-pulmonary hygiene including cough, deep breathe, Incentive Spirometry  - Assess the need for suctioning and aspirate as needed  - Assess and instruct to report SOB or any respiratory difficulty  - Respiratory Therapy support as indicated  Outcome: Progressing     Problem: METABOLIC, FLUID AND ELECTROLYTES - ADULT  Goal: Electrolytes maintained within normal limits  Description: INTERVENTIONS:  - Monitor labs and assess patient for signs and symptoms of electrolyte imbalances  - Administer electrolyte replacement as ordered  - Monitor response to electrolyte replacements, including repeat lab results as appropriate  - Instruct patient on fluid and nutrition as appropriate  Outcome: Progressing

## 2024-05-24 NOTE — ASSESSMENT & PLAN NOTE
Lab Results   Component Value Date    HGBA1C 7.9 (H) 03/06/2024       Recent Labs     05/23/24  1623 05/23/24 2059 05/24/24  0813 05/24/24  1121   POCGLU 408* 176* 161* 234*         Blood Sugar Average: Last 72 hrs:  (P) [id number]    Insulin sliding scale

## 2024-05-25 LAB
GLUCOSE SERPL-MCNC: 122 MG/DL (ref 65–140)
GLUCOSE SERPL-MCNC: 151 MG/DL (ref 65–140)
GLUCOSE SERPL-MCNC: 191 MG/DL (ref 65–140)
GLUCOSE SERPL-MCNC: 353 MG/DL (ref 65–140)

## 2024-05-25 PROCEDURE — 94760 N-INVAS EAR/PLS OXIMETRY 1: CPT

## 2024-05-25 PROCEDURE — 82948 REAGENT STRIP/BLOOD GLUCOSE: CPT

## 2024-05-25 PROCEDURE — 94640 AIRWAY INHALATION TREATMENT: CPT

## 2024-05-25 PROCEDURE — 99232 SBSQ HOSP IP/OBS MODERATE 35: CPT | Performed by: NURSE PRACTITIONER

## 2024-05-25 PROCEDURE — 94664 DEMO&/EVAL PT USE INHALER: CPT

## 2024-05-25 RX ORDER — ECHINACEA PURPUREA EXTRACT 125 MG
1 TABLET ORAL
Status: DISCONTINUED | OUTPATIENT
Start: 2024-05-25 | End: 2024-05-26 | Stop reason: HOSPADM

## 2024-05-25 RX ADMIN — HEPARIN SODIUM 5000 UNITS: 5000 INJECTION, SOLUTION INTRAVENOUS; SUBCUTANEOUS at 13:22

## 2024-05-25 RX ADMIN — HEPARIN SODIUM 5000 UNITS: 5000 INJECTION, SOLUTION INTRAVENOUS; SUBCUTANEOUS at 05:53

## 2024-05-25 RX ADMIN — CLONIDINE HYDROCHLORIDE 0.2 MG: 0.1 TABLET ORAL at 09:34

## 2024-05-25 RX ADMIN — INSULIN LISPRO 1 UNITS: 100 INJECTION, SOLUTION INTRAVENOUS; SUBCUTANEOUS at 13:15

## 2024-05-25 RX ADMIN — INSULIN LISPRO 4 UNITS: 100 INJECTION, SOLUTION INTRAVENOUS; SUBCUTANEOUS at 18:05

## 2024-05-25 RX ADMIN — LEVALBUTEROL HYDROCHLORIDE 1.25 MG: 1.25 SOLUTION RESPIRATORY (INHALATION) at 13:32

## 2024-05-25 RX ADMIN — METHYLPREDNISOLONE SODIUM SUCCINATE 40 MG: 40 INJECTION, POWDER, FOR SOLUTION INTRAMUSCULAR; INTRAVENOUS at 09:34

## 2024-05-25 RX ADMIN — AMLODIPINE BESYLATE 10 MG: 10 TABLET ORAL at 21:27

## 2024-05-25 RX ADMIN — FLUTICASONE PROPIONATE 1 SPRAY: 50 SPRAY, METERED NASAL at 09:39

## 2024-05-25 RX ADMIN — NICOTINE 1 PATCH: 14 PATCH, EXTENDED RELEASE TRANSDERMAL at 09:38

## 2024-05-25 RX ADMIN — BUDESONIDE 0.5 MG: 0.5 INHALANT ORAL at 19:31

## 2024-05-25 RX ADMIN — METOPROLOL TARTRATE 100 MG: 50 TABLET, FILM COATED ORAL at 09:34

## 2024-05-25 RX ADMIN — Medication 1000 UNITS: at 09:34

## 2024-05-25 RX ADMIN — CHLORHEXIDINE GLUCONATE 15 ML: 1.2 RINSE ORAL at 09:38

## 2024-05-25 RX ADMIN — HEPARIN SODIUM 5000 UNITS: 5000 INJECTION, SOLUTION INTRAVENOUS; SUBCUTANEOUS at 21:26

## 2024-05-25 RX ADMIN — CLONIDINE HYDROCHLORIDE 0.2 MG: 0.1 TABLET ORAL at 21:26

## 2024-05-25 RX ADMIN — PANTOPRAZOLE SODIUM 20 MG: 20 TABLET, DELAYED RELEASE ORAL at 05:53

## 2024-05-25 RX ADMIN — BUDESONIDE 0.5 MG: 0.5 INHALANT ORAL at 09:00

## 2024-05-25 RX ADMIN — LEVALBUTEROL HYDROCHLORIDE 1.25 MG: 1.25 SOLUTION RESPIRATORY (INHALATION) at 09:00

## 2024-05-25 RX ADMIN — MAGNESIUM HYDROXIDE 30 ML: 400 SUSPENSION ORAL at 15:41

## 2024-05-25 RX ADMIN — LISINOPRIL 40 MG: 20 TABLET ORAL at 09:34

## 2024-05-25 RX ADMIN — ATORVASTATIN CALCIUM 40 MG: 40 TABLET, FILM COATED ORAL at 21:26

## 2024-05-25 RX ADMIN — LEVALBUTEROL HYDROCHLORIDE 1.25 MG: 1.25 SOLUTION RESPIRATORY (INHALATION) at 19:31

## 2024-05-25 RX ADMIN — UMECLIDINIUM BROMIDE AND VILANTEROL TRIFENATATE 1 PUFF: 62.5; 25 POWDER RESPIRATORY (INHALATION) at 09:39

## 2024-05-25 NOTE — PLAN OF CARE
Problem: Prexisting or High Potential for Compromised Skin Integrity  Goal: Skin integrity is maintained or improved  Description: INTERVENTIONS:  - Identify patients at risk for skin breakdown  - Assess and monitor skin integrity  - Assess and monitor nutrition and hydration status  - Monitor labs   - Assess for incontinence   - Turn and reposition patient  - Assist with mobility/ambulation  - Relieve pressure over bony prominences  - Avoid friction and shearing  - Provide appropriate hygiene as needed including keeping skin clean and dry  - Evaluate need for skin moisturizer/barrier cream  - Collaborate with interdisciplinary team   - Patient/family teaching  - Consider wound care consult   Outcome: Progressing     Problem: PAIN - ADULT  Goal: Verbalizes/displays adequate comfort level or baseline comfort level  Description: Interventions:  - Encourage patient to monitor pain and request assistance  - Assess pain using appropriate pain scale  - Administer analgesics based on type and severity of pain and evaluate response  - Implement non-pharmacological measures as appropriate and evaluate response  - Consider cultural and social influences on pain and pain management  - Notify physician/advanced practitioner if interventions unsuccessful or patient reports new pain  Outcome: Progressing     Problem: INFECTION - ADULT  Goal: Absence or prevention of progression during hospitalization  Description: INTERVENTIONS:  - Assess and monitor for signs and symptoms of infection  - Monitor lab/diagnostic results  - Monitor all insertion sites, i.e. indwelling lines, tubes, and drains  - Monitor endotracheal if appropriate and nasal secretions for changes in amount and color  - [place] appropriate cooling/warming therapies per order  - Administer medications as ordered  - Instruct and encourage patient and family to use good hand hygiene technique  - Identify and instruct in appropriate isolation precautions for  identified infection/condition  Outcome: Progressing     Problem: SAFETY ADULT  Goal: Patient will remain free of falls  Description: INTERVENTIONS:  - Educate patient/family on patient safety including physical limitations  - Instruct patient to call for assistance with activity   - Consult OT/PT to assist with strengthening/mobility   - Keep Call bell within reach  - Keep bed low and locked with side rails adjusted as appropriate  - Keep care items and personal belongings within reach  - Initiate and maintain comfort rounds  - Make Fall Risk Sign visible to staff  - Offer Toileting every 2 Hours, in advance of need if unable to notify staff  - Initiate/Maintain bed/chair alarm for confusion, impulsivity, or noncompliance with notifying staff.  - Apply yellow socks and bracelet for high fall risk patients  - Consider moving patient to room near nurses station  Outcome: Progressing     Problem: DISCHARGE PLANNING  Goal: Discharge to home or other facility with appropriate resources  Description: INTERVENTIONS:  - Identify barriers to discharge w/patient and caregiver  - Arrange for needed discharge resources and transportation as appropriate  - Identify discharge learning needs (meds, wound care, etc.)  - Arrange for interpretive services to assist at discharge as needed  - Refer to Case Management Department for coordinating discharge planning if the patient needs post-hospital services based on physician/advanced practitioner order or complex needs related to functional status, cognitive ability, or social support system  Outcome: Progressing     Problem: Knowledge Deficit  Goal: Patient/family/caregiver demonstrates understanding of disease process, treatment plan, medications, and discharge instructions  Description: Complete learning assessment and assess knowledge base.  Interventions:  - Provide teaching at level of understanding  - Provide teaching via preferred learning methods  Outcome: Progressing      Problem: NEUROSENSORY - ADULT  Goal: Achieves stable or improved neurological status  Description: INTERVENTIONS  - Monitor and report changes in neurological status  - Monitor vital signs such as temperature, blood pressure, glucose, and any other labs ordered   - Initiate measures to prevent increased intracranial pressure  - Monitor for seizure activity and implement precautions if appropriate      Outcome: Progressing  Goal: Remains free of injury related to seizures activity  Description: INTERVENTIONS  - Maintain airway, patient safety  and administer oxygen as ordered  - Monitor patient for seizure activity, document and report duration and description of seizure to physician/advanced practitioner  - If seizure occurs,  ensure patient safety during seizure  - Reorient patient post seizure  - Seizure pads on all 4 side rails  - Instruct patient/family to notify RN of any seizure activity including if an aura is experienced  - Instruct patient/family to call for assistance with activity based on nursing assessment  - Administer anti-seizure medications if ordered    Outcome: Progressing     Problem: CARDIOVASCULAR - ADULT  Goal: Maintains optimal cardiac output and hemodynamic stability  Description: INTERVENTIONS:  - Monitor I/O, vital signs and rhythm  - Monitor for S/S and trends of decreased cardiac output  - Administer and titrate ordered vasoactive medications to optimize hemodynamic stability  - Assess quality of pulses, skin color and temperature  - Assess for signs of decreased coronary artery perfusion  - Instruct patient to report change in severity of symptoms  Outcome: Progressing  Goal: Absence of cardiac dysrhythmias or at baseline rhythm  Description: INTERVENTIONS:  - Continuous cardiac monitoring, vital signs, obtain 12 lead EKG if ordered  - Administer antiarrhythmic and heart rate control medications as ordered  - Monitor electrolytes and administer replacement therapy as ordered  Outcome:  Progressing     Problem: RESPIRATORY - ADULT  Goal: Achieves optimal ventilation and oxygenation  Description: INTERVENTIONS:  - Assess for changes in respiratory status  - Assess for changes in mentation and behavior  - Position to facilitate oxygenation and minimize respiratory effort  - Oxygen administered by appropriate delivery if ordered  - Initiate smoking cessation education as indicated  - Encourage broncho-pulmonary hygiene including cough, deep breathe, Incentive Spirometry  - Assess the need for suctioning and aspirate as needed  - Assess and instruct to report SOB or any respiratory difficulty  - Respiratory Therapy support as indicated  Outcome: Progressing     Problem: METABOLIC, FLUID AND ELECTROLYTES - ADULT  Goal: Electrolytes maintained within normal limits  Description: INTERVENTIONS:  - Monitor labs and assess patient for signs and symptoms of electrolyte imbalances  - Administer electrolyte replacement as ordered  - Monitor response to electrolyte replacements, including repeat lab results as appropriate  - Instruct patient on fluid and nutrition as appropriate  Outcome: Progressing  Goal: Fluid balance maintained  Description: INTERVENTIONS:  - Monitor labs   - Monitor I/O and WT  - Instruct patient on fluid and nutrition as appropriate  - Assess for signs & symptoms of volume excess or deficit  Outcome: Progressing  Goal: Glucose maintained within target range  Description: INTERVENTIONS:  - Monitor Blood Glucose as ordered  - Assess for signs and symptoms of hyperglycemia and hypoglycemia  - Administer ordered medications to maintain glucose within target range  - Assess nutritional intake and initiate nutrition service referral as needed  Outcome: Progressing

## 2024-05-25 NOTE — ASSESSMENT & PLAN NOTE
Lab Results   Component Value Date    HGBA1C 7.9 (H) 03/06/2024       Recent Labs     05/24/24  1628 05/24/24  2141 05/25/24  0808 05/25/24  1149   POCGLU 345* 171* 122 191*         Blood Sugar Average: Last 72 hrs:  (P) [id number]    Insulin sliding scale   Now off of steroids continue to monitor

## 2024-05-25 NOTE — ASSESSMENT & PLAN NOTE
Patient is chronically on 3 L of oxygen severe COPD has care management plan in place  Patient complains of shortness of breath and wheezing  Chest x-ray is clear  started on Atrovent Xopenex and Pulmicort round-the-clock   Solu-Medrol 40 mg daily x Day # 5 , pt   Still feeling short of breath with mobility   Consider pulmonary consult if not improving.   Follow respiratory protocol  COVID and flu panel is negative  Lungs improving,  left with some crackles ? Need for diuretic will chk labs in am   Last xray 3 days ago

## 2024-05-25 NOTE — PLAN OF CARE
Problem: Prexisting or High Potential for Compromised Skin Integrity  Goal: Skin integrity is maintained or improved  Description: INTERVENTIONS:  - Identify patients at risk for skin breakdown  - Assess and monitor skin integrity  - Assess and monitor nutrition and hydration status  - Monitor labs   - Assess for incontinence   - Turn and reposition patient  - Assist with mobility/ambulation  - Relieve pressure over bony prominences  - Avoid friction and shearing  - Provide appropriate hygiene as needed including keeping skin clean and dry  - Evaluate need for skin moisturizer/barrier cream  - Collaborate with interdisciplinary team   - Patient/family teaching  - Consider wound care consult   Outcome: Progressing     Problem: PAIN - ADULT  Goal: Verbalizes/displays adequate comfort level or baseline comfort level  Description: Interventions:  - Encourage patient to monitor pain and request assistance  - Assess pain using appropriate pain scale  - Administer analgesics based on type and severity of pain and evaluate response  - Implement non-pharmacological measures as appropriate and evaluate response  - Consider cultural and social influences on pain and pain management  - Notify physician/advanced practitioner if interventions unsuccessful or patient reports new pain  Outcome: Progressing     Problem: INFECTION - ADULT  Goal: Absence or prevention of progression during hospitalization  Description: INTERVENTIONS:  - Assess and monitor for signs and symptoms of infection  - Monitor lab/diagnostic results  - Monitor all insertion sites, i.e. indwelling lines, tubes, and drains  - Monitor endotracheal if appropriate and nasal secretions for changes in amount and color  - [place] appropriate cooling/warming therapies per order  - Administer medications as ordered  - Instruct and encourage patient and family to use good hand hygiene technique  - Identify and instruct in appropriate isolation precautions for  identified infection/condition  Outcome: Progressing     Problem: SAFETY ADULT  Goal: Patient will remain free of falls  Description: INTERVENTIONS:  - Educate patient/family on patient safety including physical limitations  - Instruct patient to call for assistance with activity   - Consult OT/PT to assist with strengthening/mobility   - Keep Call bell within reach  - Keep bed low and locked with side rails adjusted as appropriate  - Keep care items and personal belongings within reach  - Initiate and maintain comfort rounds  - Make Fall Risk Sign visible to staff  - Offer Toileting every 2 Hours, in advance of need if unable to notify staff  - Initiate/Maintain bed/chair alarm for confusion, impulsivity, or noncompliance with notifying staff.  - Apply yellow socks and bracelet for high fall risk patients  - Consider moving patient to room near nurses station  Outcome: Progressing  Goal: Maintain or return to baseline ADL function  Description: INTERVENTIONS:  -  Assess patient's ability to carry out ADLs; assess patient's baseline for ADL function and identify physical deficits which impact ability to perform ADLs (bathing, care of mouth/teeth, toileting, grooming, dressing, etc.)  - Assess/evaluate cause of self-care deficits   - Assess range of motion  - Assess patient's mobility; develop plan if impaired  - Assess patient's need for assistive devices and provide as appropriate  - Encourage maximum independence but intervene and supervise when necessary  - Involve family in performance of ADLs  - Assess for home care needs following discharge   - Consider OT consult to assist with ADL evaluation and planning for discharge  - Provide patient education as appropriate  Outcome: Progressing  Goal: Maintains/Returns to pre admission functional level  Description: INTERVENTIONS:  - Perform AM-PAC 6 Click Basic Mobility/ Daily Activity assessment daily.  - Set and communicate daily mobility goal to care team and  patient/family/caregiver.   - Collaborate with rehabilitation services on mobility goals if consulted  - Perform Range of Motion 3 times a day.  - Reposition patient every 2 hours if unable to reposition self  - Out of bed for toileting if medically appropriate  - Record patient progress and toleration of activity level   Outcome: Progressing     Problem: DISCHARGE PLANNING  Goal: Discharge to home or other facility with appropriate resources  Description: INTERVENTIONS:  - Identify barriers to discharge w/patient and caregiver  - Arrange for needed discharge resources and transportation as appropriate  - Identify discharge learning needs (meds, wound care, etc.)  - Arrange for interpretive services to assist at discharge as needed  - Refer to Case Management Department for coordinating discharge planning if the patient needs post-hospital services based on physician/advanced practitioner order or complex needs related to functional status, cognitive ability, or social support system  Outcome: Progressing     Problem: Knowledge Deficit  Goal: Patient/family/caregiver demonstrates understanding of disease process, treatment plan, medications, and discharge instructions  Description: Complete learning assessment and assess knowledge base.  Interventions:  - Provide teaching at level of understanding  - Provide teaching via preferred learning methods  Outcome: Progressing     Problem: NEUROSENSORY - ADULT  Goal: Achieves stable or improved neurological status  Description: INTERVENTIONS  - Monitor and report changes in neurological status  - Monitor vital signs such as temperature, blood pressure, glucose, and any other labs ordered   - Initiate measures to prevent increased intracranial pressure  - Monitor for seizure activity and implement precautions if appropriate      Outcome: Progressing  Goal: Remains free of injury related to seizures activity  Description: INTERVENTIONS  - Maintain airway, patient safety  and  administer oxygen as ordered  - Monitor patient for seizure activity, document and report duration and description of seizure to physician/advanced practitioner  - If seizure occurs,  ensure patient safety during seizure  - Reorient patient post seizure  - Seizure pads on all 4 side rails  - Instruct patient/family to notify RN of any seizure activity including if an aura is experienced  - Instruct patient/family to call for assistance with activity based on nursing assessment  - Administer anti-seizure medications if ordered    Outcome: Progressing     Problem: CARDIOVASCULAR - ADULT  Goal: Maintains optimal cardiac output and hemodynamic stability  Description: INTERVENTIONS:  - Monitor I/O, vital signs and rhythm  - Monitor for S/S and trends of decreased cardiac output  - Administer and titrate ordered vasoactive medications to optimize hemodynamic stability  - Assess quality of pulses, skin color and temperature  - Assess for signs of decreased coronary artery perfusion  - Instruct patient to report change in severity of symptoms  Outcome: Progressing  Goal: Absence of cardiac dysrhythmias or at baseline rhythm  Description: INTERVENTIONS:  - Continuous cardiac monitoring, vital signs, obtain 12 lead EKG if ordered  - Administer antiarrhythmic and heart rate control medications as ordered  - Monitor electrolytes and administer replacement therapy as ordered  Outcome: Progressing     Problem: RESPIRATORY - ADULT  Goal: Achieves optimal ventilation and oxygenation  Description: INTERVENTIONS:  - Assess for changes in respiratory status  - Assess for changes in mentation and behavior  - Position to facilitate oxygenation and minimize respiratory effort  - Oxygen administered by appropriate delivery if ordered  - Initiate smoking cessation education as indicated  - Encourage broncho-pulmonary hygiene including cough, deep breathe, Incentive Spirometry  - Assess the need for suctioning and aspirate as needed  -  Assess and instruct to report SOB or any respiratory difficulty  - Respiratory Therapy support as indicated  Outcome: Progressing     Problem: METABOLIC, FLUID AND ELECTROLYTES - ADULT  Goal: Electrolytes maintained within normal limits  Description: INTERVENTIONS:  - Monitor labs and assess patient for signs and symptoms of electrolyte imbalances  - Administer electrolyte replacement as ordered  - Monitor response to electrolyte replacements, including repeat lab results as appropriate  - Instruct patient on fluid and nutrition as appropriate  Outcome: Progressing  Goal: Fluid balance maintained  Description: INTERVENTIONS:  - Monitor labs   - Monitor I/O and DW  - Instruct patient on fluid and nutrition as appropriate  - Assess for signs & symptoms of volume excess or deficit  Outcome: Progressing  Goal: Glucose maintained within target range  Description: INTERVENTIONS:  - Monitor Blood Glucose as ordered  - Assess for signs and symptoms of hyperglycemia and hypoglycemia  - Administer ordered medications to maintain glucose within target range  - Assess nutritional intake and initiate nutrition service referral as needed  Outcome: Progressing

## 2024-05-25 NOTE — PROGRESS NOTES
[facility]  Progress Note  Name: Jessika Lux I  MRN: [id number]  Unit/Bed#: -01 I Date of Admission: 5/21/2024   Date of Service: 5/25/2024 I Hospital Day: 4    Assessment & Plan   COPD with acute exacerbation (HCC)  Assessment & Plan  Patient is chronically on 3 L of oxygen severe COPD has care management plan in place  Patient complains of shortness of breath and wheezing  Chest x-ray is clear  started on Atrovent Xopenex and Pulmicort round-the-clock   Solu-Medrol 40 mg daily x Day # 5 , pt   Still feeling short of breath with mobility   Consider pulmonary consult if not improving.   Follow respiratory protocol  COVID and flu panel is negative  Lungs improving,  left with some crackles ? 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 is on 1 g salt tablets twice daily and 10 mg on torsemide last visit with nephrologist was 4/25   Patient received in the  mL of normal saline the sodium stayed from 118 - 118  Nephrology is following   S/p Lasix 20 mg IV TID   1.8 % saline DC when sodium is greater than 126  Liberalize fluid to 2 liters today for now   Remain off of SSRI for now - unclear when stopped pt reports stopping it before admission   I and O  Daily weight  Last sodium 123 corrected 125, Na now 135  Will repeat labs in am   Will need to determine if pt dcd on her daily po toresemide based on lab work in am   Nephro: d/c IVF liberalize fluid intake to 2L /day, off SSRI monitor, BMP in AM, d/c diuretic for now.     Anxiety disorder due to general medical condition with panic attack  Assessment & Plan  Documented history of anxiety due to respiratory status is on Atarax at 10 mg every 6 hours as needed  Becomes extremely anxious when she feels short of breath and has worsening condition  Patient is on Zoloft 50  mg  Continues to use the Atarax 10 mg every 6 hours  Also takes Ativan 0.5 mg every 8 hours - check PDMP is prescription     Type 2 diabetes mellitus without complication, without long-term current use of insulin (HCC)  Assessment & Plan  Lab Results   Component Value Date    HGBA1C 7.9 (H) 03/06/2024       Recent Labs     05/24/24  1628 05/24/24  2141 05/25/24  0808 05/25/24  1149   POCGLU 345* 171* 122 191*         Blood Sugar Average: Last 72 hrs:  (P) 206.9891784225857465    Insulin sliding scale   Now off of steroids continue to monitor     Primary hypertension  Assessment & Plan  Continue home meds     Tobacco abuse  Assessment & Plan  Nictoine patch   Cessation     Chronic respiratory failure with hypoxia (HCC)  Assessment & Plan  Uses 2-3L NC of oxygen at baseline  Currently at baseline o2 requirements  Follow up with pulm outpatient.     Acute on chronic respiratory failure with hypoxia (HCC)-resolved as of 5/23/2024  Assessment & Plan  2 to 3 L of oxygen at baseline  Presented with worsening hypoxemia shortness of breath ductopenia and wheezing  Up to 4 to 5 L on presentation  Started treatment for COPD exacerbation  weaned back down to baseline oxygen requirements              VTE Pharmacologic Prophylaxis:   High Risk (Score >/= 5) - Pharmacological DVT Prophylaxis Ordered: heparin. Sequential Compression Devices Ordered.    Mobility:   Basic Mobility Inpatient Raw Score: 22  JH-HLM Goal: 7: Walk 25 feet or more  JH-HLM Achieved: 6: Walk 10 steps or more  JH-HLM Goal NOT achieved. Continue with multidisciplinary rounding and encourage appropriate mobility to improve upon JH-HLM goals.    Patient Centered Rounds: I performed bedside rounds with nursing staff today.   Discussions with Specialists or Other Care Team Provider: nursing     Education and Discussions with Family / Patient: Updated  (daughter) via phone. Called left message for Natasha and then called Mel whom I updated today      Total Time Spent on Date of Encounter in care of patient: 40 mins. This time was spent on one or more of the following: performing physical exam; counseling and coordination of care; obtaining or reviewing history; documenting in the medical record; reviewing/ordering tests, medications or procedures; communicating with other healthcare professionals and discussing with patient's family/caregivers.    Current Length of Stay: 4 day(s)  Current Patient Status: Inpatient   Certification Statement: The patient will continue to require additional inpatient hospital stay due to pending discharge tomorrow if stable sodium / and improved sob   Discharge Plan: Anticipate discharge tomorrow to home.    Code Status: Level 1 - Full Code    Subjective:   Pt today still feeling sob with movement . She has some anterior chest and posterior crackles on the left little bit of mucus production today. No concerns with bms. Discussed potential discharge tomorrow     Objective:     Vitals:   Temp (24hrs), Av.5 °F (36.4 °C), Min:97.3 °F (36.3 °C), Max:97.5 °F (36.4 °C)    Temp:  [97.3 °F (36.3 °C)-97.5 °F (36.4 °C)] 97.5 °F (36.4 °C)  HR:  [58-74] 74  Resp:  [17-18] 17  BP: (109-139)/() 111/59  SpO2:  [95 %-98 %] 96 %  Body mass index is 27.62 kg/m².     Input and Output Summary (last 24 hours):     Intake/Output Summary (Last 24 hours) at 2024 1235  Last data filed at 2024 0944  Gross per 24 hour   Intake 1080 ml   Output 1100 ml   Net -20 ml       Physical Exam:   Physical Exam  Constitutional:       General: She is not in acute distress.     Appearance: She is obese. She is not ill-appearing, toxic-appearing or diaphoretic.   HENT:      Head: Normocephalic and atraumatic.      Mouth/Throat:      Pharynx: No oropharyngeal exudate.   Eyes:      General:         Right eye: No discharge.         Left eye: No discharge.   Cardiovascular:      Rate and Rhythm: Normal rate.      Heart sounds: No murmur heard.     No  friction rub. No gallop.   Pulmonary:      Effort: No respiratory distress.      Breath sounds: No stridor. Rales (left chest /posterior) present. No wheezing or rhonchi.      Comments: 2 liters nasal canula  Chest:      Chest wall: No tenderness.   Abdominal:      General: There is no distension.      Palpations: There is no mass.      Tenderness: There is no abdominal tenderness. There is no guarding or rebound.      Hernia: No hernia is present.   Musculoskeletal:         General: No swelling, tenderness, deformity or signs of injury.      Right lower leg: No edema.      Left lower leg: No edema.   Skin:     Coloration: Skin is not jaundiced or pale.      Findings: No bruising, erythema, lesion or rash.   Neurological:      Mental Status: She is alert and oriented to person, place, and time.   Psychiatric:         Behavior: Behavior normal.          Additional Data:     Labs:  Results from last 7 days   Lab Units 05/24/24  0635 05/23/24  0357 05/22/24  0314   WBC Thousand/uL 7.46   < > 4.62   HEMOGLOBIN g/dL 10.3*   < > 11.7   HEMATOCRIT % 32.0*   < > 34.0*   PLATELETS Thousands/uL 167   < > 191   SEGS PCT %  --   --  85*   LYMPHO PCT %  --   --  12*   MONO PCT %  --   --  3*   EOS PCT %  --   --  0    < > = values in this interval not displayed.     Results from last 7 days   Lab Units 05/24/24  0635 05/21/24  1710 05/21/24  1330   SODIUM mmol/L 135   < > 118*   POTASSIUM mmol/L 3.9   < > 3.8   CHLORIDE mmol/L 91*   < > 73*   CO2 mmol/L 43*   < > 38*   BUN mg/dL 12   < > 6   CREATININE mg/dL 0.51*   < > 0.45*   ANION GAP mmol/L 1*   < > 7   CALCIUM mg/dL 9.1   < > 9.6   ALBUMIN g/dL  --   --  4.4   TOTAL BILIRUBIN mg/dL  --   --  0.60   ALK PHOS U/L  --   --  96   ALT U/L  --   --  8   AST U/L  --   --  14   GLUCOSE RANDOM mg/dL 118   < > 161*    < > = values in this interval not displayed.         Results from last 7 days   Lab Units 05/25/24  1149 05/25/24  0808 05/24/24  2141 05/24/24  1628 05/24/24  1121  05/24/24  0813 05/23/24 2059 05/23/24  1623 05/23/24  1117 05/23/24  0758 05/22/24 2025 05/22/24  1642   POC GLUCOSE mg/dl 191* 122 171* 345* 234* 161* 176* 408* 161* 128 224* 199*         Results from last 7 days   Lab Units 05/22/24  0314   PROCALCITONIN ng/ml <0.05       Lines/Drains:  Invasive Devices       Peripheral Intravenous Line  Duration             Long-Dwell Peripheral IV (Midline) 05/21/24 Left Basilic 3 days    Peripheral IV 05/21/24 Right Antecubital 3 days                          Imaging: No pertinent imaging reviewed.    Recent Cultures (last 7 days):         Last 24 Hours Medication List:   Current Facility-Administered Medications   Medication Dose Route Frequency Provider Last Rate    albuterol  5 mg Nebulization Q6H PRN Hailey S Maria Antonia, GURUNP      amLODIPine  10 mg Oral HS [facility] LEYDI Sellers      atorvastatin  40 mg Oral HS [facility] LEYDI Sellers      budesonide  0.5 mg Nebulization Q12H Hailey S Maria AntoniaAYLIN      chlorhexidine  15 mL Mouth/Throat Q12H [facility] LEYDI Sellers      cholecalciferol  1,000 Units Oral Daily [facility] LEYDI Sellers      cloNIDine  0.2 mg Oral Q12H [facility] LEYDI Sellers      fluticasone  1 spray Nasal Daily [facility] LEYDI Sellers      heparin (porcine)  5,000 Units Subcutaneous Q8H [facility] Hailey S Maria AntoniaAYLIN      hydrOXYzine HCL  10 mg Oral Q6H PRN [facility] LEYDI Sellers      insulin lispro  1-5 Units Subcutaneous 4x Daily (AC & HS) Hailey S Maria Antonia, AYLIN      levalbuterol  1.25 mg Nebulization TID Hailey S AYLIN Em      lisinopril  40 mg Oral Daily [facility] LEYDI Sellers      LORazepam  0.5 mg [facility] Q8H [facility]      metoprolol tartrate  100 mg Oral Q12H [facility]  LEY[facility] Sellers      nicotine  1 patch Transdermal Daily Hailey S Maria AntoniaAYLIN      pantoprazole  20 mg Oral Early Morning Karuna Sellers PA-C      sodium chloride  1 spray Each Nare Q1H PRN AYLIN Mckeon       umeclidinium-vilanterol  1 puff Inhalation Daily AYLIN Morrell          Today, Patient Was Seen By: AYLIN Mckeon    **Please Note: This note may have been constructed using a voice recognition system.**

## 2024-05-25 NOTE — NURSING NOTE
Resumed care of patient at this time. Resting comfortably with no complaints. No outwards signs of distress. Call bell within reach.

## 2024-05-25 NOTE — ASSESSMENT & PLAN NOTE
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 is on 1 g salt tablets twice daily and 10 mg on torsemide last visit with nephrologist was 4/25   Patient received in the  mL of normal saline the sodium stayed from 118 - 118  Nephrology is following   S/p Lasix 20 mg IV TID   1.8 % saline DC when sodium is greater than 126  Liberalize fluid to 2 liters today for now   Remain off of SSRI for now - unclear when stopped pt reports stopping it before admission   I and O  Daily weight  Last sodium 123 corrected 125, Na now 135  Will repeat labs in am   Will need to determine if pt dcd on her daily po toresemide based on lab work in am   Nephro: d/c IVF liberalize fluid intake to 2L /day, off SSRI monitor, BMP in AM, d/c diuretic for now.

## 2024-05-26 VITALS
DIASTOLIC BLOOD PRESSURE: 62 MMHG | BODY MASS INDEX: 27.64 KG/M2 | TEMPERATURE: 97.7 F | RESPIRATION RATE: 20 BRPM | SYSTOLIC BLOOD PRESSURE: 121 MMHG | HEIGHT: 62 IN | OXYGEN SATURATION: 95 % | HEART RATE: 70 BPM | WEIGHT: 150.2 LBS

## 2024-05-26 LAB
ALBUMIN SERPL BCP-MCNC: 3.4 G/DL (ref 3.5–5)
ALP SERPL-CCNC: 54 U/L (ref 34–104)
ALT SERPL W P-5'-P-CCNC: 12 U/L (ref 7–52)
ANION GAP SERPL CALCULATED.3IONS-SCNC: 4 MMOL/L (ref 4–13)
AST SERPL W P-5'-P-CCNC: 11 U/L (ref 13–39)
BASOPHILS # BLD AUTO: 0.02 THOUSANDS/ÂΜL (ref 0–0.1)
BASOPHILS NFR BLD AUTO: 0 % (ref 0–1)
BILIRUB SERPL-MCNC: 0.37 MG/DL (ref 0.2–1)
BUN SERPL-MCNC: 17 MG/DL (ref 5–25)
CALCIUM ALBUM COR SERPL-MCNC: 9.4 MG/DL (ref 8.3–10.1)
CALCIUM SERPL-MCNC: 8.9 MG/DL (ref 8.4–10.2)
CHLORIDE SERPL-SCNC: 95 MMOL/L (ref 96–108)
CO2 SERPL-SCNC: 37 MMOL/L (ref 21–32)
CREAT SERPL-MCNC: 0.54 MG/DL (ref 0.6–1.3)
EOSINOPHIL # BLD AUTO: 0.02 THOUSAND/ÂΜL (ref 0–0.61)
EOSINOPHIL NFR BLD AUTO: 0 % (ref 0–6)
ERYTHROCYTE [DISTWIDTH] IN BLOOD BY AUTOMATED COUNT: 13 % (ref 11.6–15.1)
GFR SERPL CREATININE-BSD FRML MDRD: 97 ML/MIN/1.73SQ M
GLUCOSE SERPL-MCNC: 137 MG/DL (ref 65–140)
GLUCOSE SERPL-MCNC: 148 MG/DL (ref 65–140)
HCT VFR BLD AUTO: 34.5 % (ref 34.8–46.1)
HGB BLD-MCNC: 11 G/DL (ref 11.5–15.4)
IMM GRANULOCYTES # BLD AUTO: 0.03 THOUSAND/UL (ref 0–0.2)
IMM GRANULOCYTES NFR BLD AUTO: 0 % (ref 0–2)
LYMPHOCYTES # BLD AUTO: 2.35 THOUSANDS/ÂΜL (ref 0.6–4.47)
LYMPHOCYTES NFR BLD AUTO: 28 % (ref 14–44)
MCH RBC QN AUTO: 28.4 PG (ref 26.8–34.3)
MCHC RBC AUTO-ENTMCNC: 31.9 G/DL (ref 31.4–37.4)
MCV RBC AUTO: 89 FL (ref 82–98)
MONOCYTES # BLD AUTO: 0.64 THOUSAND/ÂΜL (ref 0.17–1.22)
MONOCYTES NFR BLD AUTO: 8 % (ref 4–12)
NEUTROPHILS # BLD AUTO: 5.43 THOUSANDS/ÂΜL (ref 1.85–7.62)
NEUTS SEG NFR BLD AUTO: 64 % (ref 43–75)
NRBC BLD AUTO-RTO: 0 /100 WBCS
PLATELET # BLD AUTO: 187 THOUSANDS/UL (ref 149–390)
PMV BLD AUTO: 10.1 FL (ref 8.9–12.7)
POTASSIUM SERPL-SCNC: 4.2 MMOL/L (ref 3.5–5.3)
PROT SERPL-MCNC: 5.4 G/DL (ref 6.4–8.4)
RBC # BLD AUTO: 3.87 MILLION/UL (ref 3.81–5.12)
SODIUM SERPL-SCNC: 136 MMOL/L (ref 135–147)
WBC # BLD AUTO: 8.49 THOUSAND/UL (ref 4.31–10.16)

## 2024-05-26 PROCEDURE — 82948 REAGENT STRIP/BLOOD GLUCOSE: CPT

## 2024-05-26 PROCEDURE — 80053 COMPREHEN METABOLIC PANEL: CPT | Performed by: NURSE PRACTITIONER

## 2024-05-26 PROCEDURE — 94760 N-INVAS EAR/PLS OXIMETRY 1: CPT

## 2024-05-26 PROCEDURE — 99239 HOSP IP/OBS DSCHRG MGMT >30: CPT | Performed by: NURSE PRACTITIONER

## 2024-05-26 PROCEDURE — 94640 AIRWAY INHALATION TREATMENT: CPT

## 2024-05-26 PROCEDURE — 85025 COMPLETE CBC W/AUTO DIFF WBC: CPT | Performed by: NURSE PRACTITIONER

## 2024-05-26 RX ORDER — FLUTICASONE PROPIONATE 50 MCG
1 SPRAY, SUSPENSION (ML) NASAL DAILY
Qty: 9.9 ML | Refills: 0 | Status: SHIPPED | OUTPATIENT
Start: 2024-05-26

## 2024-05-26 RX ORDER — MELATONIN
1000 DAILY
Qty: 30 TABLET | Refills: 0 | Status: SHIPPED | OUTPATIENT
Start: 2024-05-26

## 2024-05-26 RX ADMIN — Medication 1000 UNITS: at 09:50

## 2024-05-26 RX ADMIN — FLUTICASONE PROPIONATE 1 SPRAY: 50 SPRAY, METERED NASAL at 09:56

## 2024-05-26 RX ADMIN — BUDESONIDE 0.5 MG: 0.5 INHALANT ORAL at 08:49

## 2024-05-26 RX ADMIN — UMECLIDINIUM BROMIDE AND VILANTEROL TRIFENATATE 1 PUFF: 62.5; 25 POWDER RESPIRATORY (INHALATION) at 09:56

## 2024-05-26 RX ADMIN — LEVALBUTEROL HYDROCHLORIDE 1.25 MG: 1.25 SOLUTION RESPIRATORY (INHALATION) at 08:49

## 2024-05-26 RX ADMIN — PANTOPRAZOLE SODIUM 20 MG: 20 TABLET, DELAYED RELEASE ORAL at 05:04

## 2024-05-26 RX ADMIN — HEPARIN SODIUM 5000 UNITS: 5000 INJECTION, SOLUTION INTRAVENOUS; SUBCUTANEOUS at 05:04

## 2024-05-26 NOTE — NURSING NOTE
Midline removed. AVS reviewed with patient and daughter. All questions answered. Patient educated on COPD, smoking and oxygen, and diabetes. Patient has at home oxygen with her.

## 2024-05-26 NOTE — ASSESSMENT & PLAN NOTE
Lab Results   Component Value Date    HGBA1C 7.9 (H) 03/06/2024       Recent Labs     05/25/24  1149 05/25/24  1711 05/25/24  2110 05/26/24  0747   POCGLU 191* 353* 151* 148*       Blood Sugar Average: Last 72 hrs:  (P) 211.0469779021959937    Insulin sliding scale   Now off of steroids continue to monitor

## 2024-05-26 NOTE — DISCHARGE SUMMARY
[facility]  [facility]- Jessika REID Viaalena 1955, 69 y.o. female MRN: [id number]  Unit/Bed#: -01 Encounter: [id number]  Primary Care Provider: Karlie Bruno DO   Date and time admitted to hospital: 5/21/2024  1:12 PM    COPD with acute exacerbation (HCC)  Assessment & Plan  Patient is chronically on 3 L of oxygen severe COPD has care management plan in place  Patient complains of shortness of breath and wheezing  Chest x-ray is clear  started on Atrovent Xopenex and Pulmicort round-the-clock   Solu-Medrol 40 mg daily x Day # 5 ,   Still feeling short of breath with mobility   Consider pulmonary consult if not improving.   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 is on 1 g salt tablets twice daily and 10 mg on torsemide last visit with nephrologist was 4/25   Patient received in the  mL of normal saline the sodium stayed from 118 - 118  Nephrology is following   S/p Lasix 20 mg IV TID   1.8 % saline DC when sodium is greater than 126  Liberalize fluid to 2 liters today for now   Remain off of SSRI for now - unclear when stopped pt reports stopping it before admission   I and O  Daily weight  Last sodium 123 corrected 125, Na now 135  Will repeat labs in am   Will need to determine if pt dcd on her daily po toresemide based on lab work in am   Nephro: d/c IVF liberalize fluid intake to 2L /day, off SSRI monitor, BMP in AM, d/c diuretic for now.     Anxiety disorder due to general medical condition with panic attack  Assessment & Plan  Documented history of anxiety due to respiratory status is on Atarax at 10 mg every 6 hours as needed  Becomes extremely anxious when she feels short of breath and has worsening condition  Patient is on Zoloft 50 mg this had been held due to  her hyponatremia continue to hold per nephrology pt is doing well off of it at this time   Continues to use the Atarax 10 mg every 6 hours  Also takes Ativan 0.5 mg every 8 hours - check PDMP is prescription     Type 2 diabetes mellitus without complication, without long-term current use of insulin (HCC)  Assessment & Plan  Lab Results   Component Value Date    HGBA1C 7.9 (H) 03/06/2024       Recent Labs     05/25/24  1149 05/25/24  1711 05/25/24  2110 05/26/24  0747   POCGLU 191* 353* 151* 148*       Blood Sugar Average: Last 72 hrs:  (P) 211.3210852267612981    Insulin sliding scale   Now off of steroids continue to monitor     Primary hypertension  Assessment & Plan  Continue home meds     Tobacco abuse  Assessment & Plan  Nictoine patch   Cessation     Chronic respiratory failure with hypoxia (HCC)  Assessment & Plan  Uses 2-3L NC of oxygen at baseline  Currently at baseline o2 requirements  Follow up with pulm outpatient.     Acute on chronic respiratory failure with hypoxia (HCC)-resolved as of 5/23/2024  Assessment & Plan  2 to 3 L of oxygen at baseline  Presented with worsening hypoxemia shortness of breath ductopenia and wheezing  Up to 4 to 5 L on presentation now back to baseline doing well today   Completed treatment for COPD exacerbation  weaned back down to baseline oxygen requirements       Medical Problems       Resolved Problems  Date Reviewed: 5/26/2024            Resolved    Acute on chronic respiratory failure with hypoxia (HCC) 5/23/2024     Resolved by  Alejandra Green PA-C        Discharging Physician / Practitioner: AYLIN Mckeon  PCP: Karlie Bruno DO  Admission Date:   Admission Orders (From admission, onward)       Ordered        05/21/24 1835  Inpatient Admission  Once                          Discharge Date: 05/26/24    Consultations During Hospital Stay:  Nephrology _ Dr Mireles     Procedures Performed:   Sodium at discharge is 136  Pt on 3 liters nc at baseline   Chest  xray with no active pulmonary disease on 5/22  Flu/rsv/covid / flu 5/22: negative     Significant Findings / Test Results:   See above     Incidental Findings:   none       Test Results Pending at Discharge (will require follow up):   none     Outpatient Tests Requested:  Call to schedule followup with pcp in one week   Call to schedule follow up with pulmonary as previously scheduled     Complications:  none    Reason for Admission: shortness of breath    Hospital Course:   Jessika Lux is a 69 y.o. female patient who originally presented to the hospital on 5/21/2024 due to shortness of breath. Pt has past medical hx of hyponatremia due to siadh, htn, copd, dyslipidemia, gerd, kaycee, dm and now presents with ed with sob. High utilizer note was reviewed on admission. Pt was complaining of nausea and significant weakness on day of admission. Pt report compliance with her torsemide and salt tabs, but was not sure. She complained of constipation and took a mom and had relief with that but she still vomited.   It is unclear if she is compliant with her medications poor historian. Pt was started back on the salt tabs and torsemide and nephrology were consulted .   She had been on an SSRI for about 6 months and now is noted to have hyponatremia. Her SSRI was discontinued at the recommendation of nephrology, mental status labs and vitals have all remained stable with discharge day being day 5 without it. Pt was started on 1.8 saline and has done well with improvement in sodium with now a 2  liter fluid restriction also at the recommendation of nephrology.   Pt is stable and ready for discharge to follow up with pcp /nephrology/ pulmonary.           Please see above list of diagnoses and related plan for additional information.     Condition at Discharge: fair    Discharge Day Visit / Exam:   Subjective:  pt very eager to go home. No complaints today feels more alert and doing better. She says her daughter will pick her up  "asked me to call from the room, which I did daughter to be here shortly   Vitals: Blood Pressure: 121/62 (05/26/24 1046)  Pulse: 70 (05/26/24 1046)  Temperature: 97.7 °F (36.5 °C) (05/26/24 0748)  Temp Source: Temporal (05/25/24 2124)  Respirations: 20 (05/26/24 0748)  Height: 5' 2\" (157.5 cm) (05/21/24 1915)  Weight - Scale: 68.1 kg (150 lb 3.2 oz) (05/26/24 0600)  SpO2: 95 % (05/26/24 1046)  Exam:   Physical Exam  Constitutional:       General: She is not in acute distress.     Appearance: She is obese. She is not ill-appearing or toxic-appearing.   HENT:      Head: Normocephalic and atraumatic.   Cardiovascular:      Rate and Rhythm: Normal rate.      Heart sounds: No murmur heard.     No friction rub. No gallop.   Pulmonary:      Effort: No respiratory distress.      Breath sounds: No stridor. No wheezing, rhonchi or rales.   Chest:      Chest wall: No tenderness.   Abdominal:      General: There is no distension.      Palpations: There is no mass.      Tenderness: There is no abdominal tenderness. There is no guarding or rebound.      Hernia: No hernia is present.   Musculoskeletal:         General: No swelling, tenderness, deformity or signs of injury.      Right lower leg: No edema.      Left lower leg: No edema.   Skin:     Coloration: Skin is not jaundiced or pale.      Findings: No bruising, erythema, lesion or rash.   Neurological:      Mental Status: She is alert and oriented to person, place, and time.   Psychiatric:         Behavior: Behavior normal.          Discussion with Family: Updated  (daughter) via phone.    Discharge instructions/Information to patient and family:   See after visit summary for information provided to patient and family.      Provisions for Follow-Up Care:  See after visit summary for information related to follow-up care and any pertinent home health orders.      Mobility at time of Discharge:   Basic Mobility Inpatient Raw Score: 22  -[facility] Goal: 7: Walk 25 feet " or more  JH-HLM Achieved: 6: Walk 10 steps or more  HLM Goal achieved. Continue to encourage appropriate mobility.     Disposition:   Home    Planned Readmission: no plan for readmission high risk      Discharge Statement:  I spent 45 minutes discharging the patient. This time was spent on the day of discharge. I had direct contact with the patient on the day of discharge. Greater than 50% of the total time was spent examining patient, answering all patient questions, arranging and discussing plan of care with patient as well as directly providing post-discharge instructions.  Additional time then spent on discharge activities.    Discharge Medications:  See after visit summary for reconciled discharge medications provided to patient and/or family.      **Please Note: This note may have been constructed using a voice recognition system**

## 2024-05-26 NOTE — ASSESSMENT & PLAN NOTE
Patient is chronically on 3 L of oxygen severe COPD has care management plan in place  Patient complains of shortness of breath and wheezing  Chest x-ray is clear  started on Atrovent Xopenex and Pulmicort round-the-clock   Solu-Medrol 40 mg daily x Day # 5 ,   Still feeling short of breath with mobility   Consider pulmonary consult if not improving.   Follow respiratory protocol  COVID and flu panel is negative  Lungs improved today   Last xray 3 days ago

## 2024-05-26 NOTE — PLAN OF CARE
Problem: Prexisting or High Potential for Compromised Skin Integrity  Goal: Skin integrity is maintained or improved  Description: INTERVENTIONS:  - Identify patients at risk for skin breakdown  - Assess and monitor skin integrity  - Assess and monitor nutrition and hydration status  - Monitor labs   - Assess for incontinence   - Turn and reposition patient  - Assist with mobility/ambulation  - Relieve pressure over bony prominences  - Avoid friction and shearing  - Provide appropriate hygiene as needed including keeping skin clean and dry  - Evaluate need for skin moisturizer/barrier cream  - Collaborate with interdisciplinary team   - Patient/family teaching  - Consider wound care consult   Outcome: Progressing     Problem: PAIN - ADULT  Goal: Verbalizes/displays adequate comfort level or baseline comfort level  Description: Interventions:  - Encourage patient to monitor pain and request assistance  - Assess pain using appropriate pain scale  - Administer analgesics based on type and severity of pain and evaluate response  - Implement non-pharmacological measures as appropriate and evaluate response  - Consider cultural and social influences on pain and pain management  - Notify physician/advanced practitioner if interventions unsuccessful or patient reports new pain  Outcome: Progressing     Problem: INFECTION - ADULT  Goal: Absence or prevention of progression during hospitalization  Description: INTERVENTIONS:  - Assess and monitor for signs and symptoms of infection  - Monitor lab/diagnostic results  - Monitor all insertion sites, i.e. indwelling lines, tubes, and drains  - Monitor endotracheal if appropriate and nasal secretions for changes in amount and color  - [place] appropriate cooling/warming therapies per order  - Administer medications as ordered  - Instruct and encourage patient and family to use good hand hygiene technique  - Identify and instruct in appropriate isolation precautions for  identified infection/condition  Outcome: Progressing     Problem: SAFETY ADULT  Goal: Patient will remain free of falls  Description: INTERVENTIONS:  - Educate patient/family on patient safety including physical limitations  - Instruct patient to call for assistance with activity   - Consult OT/PT to assist with strengthening/mobility   - Keep Call bell within reach  - Keep bed low and locked with side rails adjusted as appropriate  - Keep care items and personal belongings within reach  - Initiate and maintain comfort rounds  - Make Fall Risk Sign visible to staff  - Offer Toileting every 2 Hours, in advance of need if unable to notify staff  - Initiate/Maintain bed/chair alarm for confusion, impulsivity, or noncompliance with notifying staff.  - Apply yellow socks and bracelet for high fall risk patients  - Consider moving patient to room near nurses station  Outcome: Progressing  Goal: Maintain or return to baseline ADL function  Description: INTERVENTIONS:  -  Assess patient's ability to carry out ADLs; assess patient's baseline for ADL function and identify physical deficits which impact ability to perform ADLs (bathing, care of mouth/teeth, toileting, grooming, dressing, etc.)  - Assess/evaluate cause of self-care deficits   - Assess range of motion  - Assess patient's mobility; develop plan if impaired  - Assess patient's need for assistive devices and provide as appropriate  - Encourage maximum independence but intervene and supervise when necessary  - Involve family in performance of ADLs  - Assess for home care needs following discharge   - Consider OT consult to assist with ADL evaluation and planning for discharge  - Provide patient education as appropriate  Outcome: Progressing  Goal: Maintains/Returns to pre admission functional level  Description: INTERVENTIONS:  - Perform AM-PAC 6 Click Basic Mobility/ Daily Activity assessment daily.  - Set and communicate daily mobility goal to care team and  patient/family/caregiver.   - Collaborate with rehabilitation services on mobility goals if consulted  - Perform Range of Motion 3 times a day.  - Reposition patient every 2 hours if unable to reposition self  - Out of bed for toileting if medically appropriate  - Record patient progress and toleration of activity level   Outcome: Progressing     Problem: DISCHARGE PLANNING  Goal: Discharge to home or other facility with appropriate resources  Description: INTERVENTIONS:  - Identify barriers to discharge w/patient and caregiver  - Arrange for needed discharge resources and transportation as appropriate  - Identify discharge learning needs (meds, wound care, etc.)  - Arrange for interpretive services to assist at discharge as needed  - Refer to Case Management Department for coordinating discharge planning if the patient needs post-hospital services based on physician/advanced practitioner order or complex needs related to functional status, cognitive ability, or social support system  Outcome: Progressing     Problem: Knowledge Deficit  Goal: Patient/family/caregiver demonstrates understanding of disease process, treatment plan, medications, and discharge instructions  Description: Complete learning assessment and assess knowledge base.  Interventions:  - Provide teaching at level of understanding  - Provide teaching via preferred learning methods  Outcome: Progressing     Problem: NEUROSENSORY - ADULT  Goal: Achieves stable or improved neurological status  Description: INTERVENTIONS  - Monitor and report changes in neurological status  - Monitor vital signs such as temperature, blood pressure, glucose, and any other labs ordered   - Initiate measures to prevent increased intracranial pressure  - Monitor for seizure activity and implement precautions if appropriate      Outcome: Progressing  Goal: Remains free of injury related to seizures activity  Description: INTERVENTIONS  - Maintain airway, patient safety  and  administer oxygen as ordered  - Monitor patient for seizure activity, document and report duration and description of seizure to physician/advanced practitioner  - If seizure occurs,  ensure patient safety during seizure  - Reorient patient post seizure  - Seizure pads on all 4 side rails  - Instruct patient/family to notify RN of any seizure activity including if an aura is experienced  - Instruct patient/family to call for assistance with activity based on nursing assessment  - Administer anti-seizure medications if ordered    Outcome: Progressing     Problem: CARDIOVASCULAR - ADULT  Goal: Maintains optimal cardiac output and hemodynamic stability  Description: INTERVENTIONS:  - Monitor I/O, vital signs and rhythm  - Monitor for S/S and trends of decreased cardiac output  - Administer and titrate ordered vasoactive medications to optimize hemodynamic stability  - Assess quality of pulses, skin color and temperature  - Assess for signs of decreased coronary artery perfusion  - Instruct patient to report change in severity of symptoms  Outcome: Progressing  Goal: Absence of cardiac dysrhythmias or at baseline rhythm  Description: INTERVENTIONS:  - Continuous cardiac monitoring, vital signs, obtain 12 lead EKG if ordered  - Administer antiarrhythmic and heart rate control medications as ordered  - Monitor electrolytes and administer replacement therapy as ordered  Outcome: Progressing     Problem: RESPIRATORY - ADULT  Goal: Achieves optimal ventilation and oxygenation  Description: INTERVENTIONS:  - Assess for changes in respiratory status  - Assess for changes in mentation and behavior  - Position to facilitate oxygenation and minimize respiratory effort  - Oxygen administered by appropriate delivery if ordered  - Initiate smoking cessation education as indicated  - Encourage broncho-pulmonary hygiene including cough, deep breathe, Incentive Spirometry  - Assess the need for suctioning and aspirate as needed  -  Assess and instruct to report SOB or any respiratory difficulty  - Respiratory Therapy support as indicated  Outcome: Progressing     Problem: METABOLIC, FLUID AND ELECTROLYTES - ADULT  Goal: Electrolytes maintained within normal limits  Description: INTERVENTIONS:  - Monitor labs and assess patient for signs and symptoms of electrolyte imbalances  - Administer electrolyte replacement as ordered  - Monitor response to electrolyte replacements, including repeat lab results as appropriate  - Instruct patient on fluid and nutrition as appropriate  Outcome: Progressing  Goal: Fluid balance maintained  Description: INTERVENTIONS:  - Monitor labs   - Monitor I/O and WT  - Instruct patient on fluid and nutrition as appropriate  - Assess for signs & symptoms of volume excess or deficit  Outcome: Progressing  Goal: Glucose maintained within target range  Description: INTERVENTIONS:  - Monitor Blood Glucose as ordered  - Assess for signs and symptoms of hyperglycemia and hypoglycemia  - Administer ordered medications to maintain glucose within target range  - Assess nutritional intake and initiate nutrition service referral as needed  Outcome: Progressing

## 2024-05-26 NOTE — PLAN OF CARE
Problem: Prexisting or High Potential for Compromised Skin Integrity  Goal: Skin integrity is maintained or improved  Description: INTERVENTIONS:  - Identify patients at risk for skin breakdown  - Assess and monitor skin integrity  - Assess and monitor nutrition and hydration status  - Monitor labs   - Assess for incontinence   - Turn and reposition patient  - Assist with mobility/ambulation  - Relieve pressure over bony prominences  - Avoid friction and shearing  - Provide appropriate hygiene as needed including keeping skin clean and dry  - Evaluate need for skin moisturizer/barrier cream  - Collaborate with interdisciplinary team   - Patient/family teaching  - Consider wound care consult   Outcome: Progressing     Problem: PAIN - ADULT  Goal: Verbalizes/displays adequate comfort level or baseline comfort level  Description: Interventions:  - Encourage patient to monitor pain and request assistance  - Assess pain using appropriate pain scale  - Administer analgesics based on type and severity of pain and evaluate response  - Implement non-pharmacological measures as appropriate and evaluate response  - Consider cultural and social influences on pain and pain management  - Notify physician/advanced practitioner if interventions unsuccessful or patient reports new pain  Outcome: Progressing     Problem: INFECTION - ADULT  Goal: Absence or prevention of progression during hospitalization  Description: INTERVENTIONS:  - Assess and monitor for signs and symptoms of infection  - Monitor lab/diagnostic results  - Monitor all insertion sites, i.e. indwelling lines, tubes, and drains  - Monitor endotracheal if appropriate and nasal secretions for changes in amount and color  - [place] appropriate cooling/warming therapies per order  - Administer medications as ordered  - Instruct and encourage patient and family to use good hand hygiene technique  - Identify and instruct in appropriate isolation precautions for  identified infection/condition  Outcome: Progressing     Problem: SAFETY ADULT  Goal: Patient will remain free of falls  Description: INTERVENTIONS:  - Educate patient/family on patient safety including physical limitations  - Instruct patient to call for assistance with activity   - Consult OT/PT to assist with strengthening/mobility   - Keep Call bell within reach  - Keep bed low and locked with side rails adjusted as appropriate  - Keep care items and personal belongings within reach  - Initiate and maintain comfort rounds  - Make Fall Risk Sign visible to staff  - Offer Toileting every 2 Hours, in advance of need if unable to notify staff  - Initiate/Maintain bed/chair alarm for confusion, impulsivity, or noncompliance with notifying staff.  - Apply yellow socks and bracelet for high fall risk patients  - Consider moving patient to room near nurses station  Outcome: Progressing  Goal: Maintain or return to baseline ADL function  Description: INTERVENTIONS:  -  Assess patient's ability to carry out ADLs; assess patient's baseline for ADL function and identify physical deficits which impact ability to perform ADLs (bathing, care of mouth/teeth, toileting, grooming, dressing, etc.)  - Assess/evaluate cause of self-care deficits   - Assess range of motion  - Assess patient's mobility; develop plan if impaired  - Assess patient's need for assistive devices and provide as appropriate  - Encourage maximum independence but intervene and supervise when necessary  - Involve family in performance of ADLs  - Assess for home care needs following discharge   - Consider OT consult to assist with ADL evaluation and planning for discharge  - Provide patient education as appropriate  Outcome: Progressing  Goal: Maintains/Returns to pre admission functional level  Description: INTERVENTIONS:  - Perform AM-PAC 6 Click Basic Mobility/ Daily Activity assessment daily.  - Set and communicate daily mobility goal to care team and  patient/family/caregiver.   - Collaborate with rehabilitation services on mobility goals if consulted  - Perform Range of Motion 3 times a day.  - Reposition patient every 2 hours if unable to reposition self  - Out of bed for toileting if medically appropriate  - Record patient progress and toleration of activity level   Outcome: Progressing     Problem: DISCHARGE PLANNING  Goal: Discharge to home or other facility with appropriate resources  Description: INTERVENTIONS:  - Identify barriers to discharge w/patient and caregiver  - Arrange for needed discharge resources and transportation as appropriate  - Identify discharge learning needs (meds, wound care, etc.)  - Arrange for interpretive services to assist at discharge as needed  - Refer to Case Management Department for coordinating discharge planning if the patient needs post-hospital services based on physician/advanced practitioner order or complex needs related to functional status, cognitive ability, or social support system  Outcome: Progressing     Problem: Knowledge Deficit  Goal: Patient/family/caregiver demonstrates understanding of disease process, treatment plan, medications, and discharge instructions  Description: Complete learning assessment and assess knowledge base.  Interventions:  - Provide teaching at level of understanding  - Provide teaching via preferred learning methods  Outcome: Progressing     Problem: NEUROSENSORY - ADULT  Goal: Achieves stable or improved neurological status  Description: INTERVENTIONS  - Monitor and report changes in neurological status  - Monitor vital signs such as temperature, blood pressure, glucose, and any other labs ordered   - Initiate measures to prevent increased intracranial pressure  - Monitor for seizure activity and implement precautions if appropriate      Outcome: Progressing  Goal: Remains free of injury related to seizures activity  Description: INTERVENTIONS  - Maintain airway, patient safety  and  administer oxygen as ordered  - Monitor patient for seizure activity, document and report duration and description of seizure to physician/advanced practitioner  - If seizure occurs,  ensure patient safety during seizure  - Reorient patient post seizure  - Seizure pads on all 4 side rails  - Instruct patient/family to notify RN of any seizure activity including if an aura is experienced  - Instruct patient/family to call for assistance with activity based on nursing assessment  - Administer anti-seizure medications if ordered    Outcome: Progressing     Problem: CARDIOVASCULAR - ADULT  Goal: Maintains optimal cardiac output and hemodynamic stability  Description: INTERVENTIONS:  - Monitor I/O, vital signs and rhythm  - Monitor for S/S and trends of decreased cardiac output  - Administer and titrate ordered vasoactive medications to optimize hemodynamic stability  - Assess quality of pulses, skin color and temperature  - Assess for signs of decreased coronary artery perfusion  - Instruct patient to report change in severity of symptoms  Outcome: Progressing  Goal: Absence of cardiac dysrhythmias or at baseline rhythm  Description: INTERVENTIONS:  - Continuous cardiac monitoring, vital signs, obtain 12 lead EKG if ordered  - Administer antiarrhythmic and heart rate control medications as ordered  - Monitor electrolytes and administer replacement therapy as ordered  Outcome: Progressing     Problem: RESPIRATORY - ADULT  Goal: Achieves optimal ventilation and oxygenation  Description: INTERVENTIONS:  - Assess for changes in respiratory status  - Assess for changes in mentation and behavior  - Position to facilitate oxygenation and minimize respiratory effort  - Oxygen administered by appropriate delivery if ordered  - Initiate smoking cessation education as indicated  - Encourage broncho-pulmonary hygiene including cough, deep breathe, Incentive Spirometry  - Assess the need for suctioning and aspirate as needed  -  Assess and instruct to report SOB or any respiratory difficulty  - Respiratory Therapy support as indicated  Outcome: Progressing     Problem: METABOLIC, FLUID AND ELECTROLYTES - ADULT  Goal: Electrolytes maintained within normal limits  Description: INTERVENTIONS:  - Monitor labs and assess patient for signs and symptoms of electrolyte imbalances  - Administer electrolyte replacement as ordered  - Monitor response to electrolyte replacements, including repeat lab results as appropriate  - Instruct patient on fluid and nutrition as appropriate  Outcome: Progressing  Goal: Fluid balance maintained  Description: INTERVENTIONS:  - Monitor labs   - Monitor I/O and WT  - Instruct patient on fluid and nutrition as appropriate  - Assess for signs & symptoms of volume excess or deficit  Outcome: Progressing  Goal: Glucose maintained within target range  Description: INTERVENTIONS:  - Monitor Blood Glucose as ordered  - Assess for signs and symptoms of hyperglycemia and hypoglycemia  - Administer ordered medications to maintain glucose within target range  - Assess nutritional intake and initiate nutrition service referral as needed  Outcome: Progressing

## 2024-05-26 NOTE — ASSESSMENT & PLAN NOTE
2 to 3 L of oxygen at baseline  Presented with worsening hypoxemia shortness of breath ductopenia and wheezing  Up to 4 to 5 L on presentation now back to baseline doing well today   Completed treatment for COPD exacerbation  weaned back down to baseline oxygen requirements

## 2024-05-26 NOTE — ASSESSMENT & PLAN NOTE
Documented history of anxiety due to respiratory status is on Atarax at 10 mg every 6 hours as needed  Becomes extremely anxious when she feels short of breath and has worsening condition  Patient is on Zoloft 50 mg this had been held due to her hyponatremia continue to hold per nephrology pt is doing well off of it at this time   Continues to use the Atarax 10 mg every 6 hours  Also takes Ativan 0.5 mg every 8 hours - check PDMP is prescription

## 2024-05-28 NOTE — UTILIZATION REVIEW
NOTIFICATION OF ADMISSION DISCHARGE   This is a Notification of Discharge from [facility]. Please be advised that this patient has been discharge from our facility. Below you will find the admission and discharge date and time including the patient’s disposition.   UTILIZATION REVIEW CONTACT:  Kelsey Sam  Utilization   Network Utilization Review Department  Phone: [phone] x carefully listen to the prompts. All voicemails are confidential.  Email: [email or web address]     ADMISSION INFORMATION  PRESENTATION DATE: 5/21/2024  1:12 PM  OBERVATION ADMISSION DATE:   INPATIENT ADMISSION DATE: 5/21/24  6:35 PM   DISCHARGE DATE: 5/26/2024 11:58 AM   DISPOSITION:Home/Self Care    Network Utilization Review Department  ATTENTION: Please call with any questions or concerns to [phone] and carefully listen to the prompts so that you are directed to the right person. All voicemails are confidential.   For Discharge needs, contact Care Management DC Support Team at [phone] opt. 2  Send all requests for admission clinical reviews, approved or denied determinations and any other requests to dedicated fax number below belonging to the campus where the patient is receiving treatment. List of dedicated fax numbers for the Facilities:  FACILITY NAME UR FAX NUMBER   ADMISSION DENIALS (Administrative/Medical Necessity) [phone]   DISCHARGE SUPPORT TEAM ([facility]) [phone]   PARENT CHILD HEALTH (Maternity/NICU/Pediatrics) [phone]   [facility] [phone]   [facility] [phone]   [facility] [phone]   [facility] [phone]   [facility] [phone]   [facility] [phone]   [facility] [phone]   [facility]  [phone]   [facility] [phone]   [facility] [phone]   [facility] [phone]   [facility] [phone]

## 2024-05-31 ENCOUNTER — PATIENT OUTREACH (OUTPATIENT)
Dept: CASE MANAGEMENT | Facility: OTHER | Age: 69
End: 2024-05-31

## 2024-05-31 NOTE — PROGRESS NOTES
In basket ADT notification received for patient discharge from hospital. Jessika was hospitalized 5 days for hyponatremia and COPD exacerbation.  Questionable compliance with home medications.  Sodium corrected and noted to 136 on day of discharge.  Nephro recommending 2L FR. Please see hospital notes.     Future appointments:  6/10/24 @ 2 pm with Otolaryngology   6/18/24 @ 930 am with Pulm  7/9/24 @ 940 am with PCP     Discharge follow up call. Patient discharged from 5/26/24.     Is this a good time for you to talk?     No answer when contacted.  Left generic msg requesting return call.     No response from patient for previously left VMs or UTR letter.    Will remain available to assist and review chart again with next care plan upload or sooner if utilization present.

## 2024-06-04 ENCOUNTER — TELEPHONE (OUTPATIENT)
Dept: PULMONOLOGY | Facility: CLINIC | Age: 69
End: 2024-06-04

## 2024-06-04 NOTE — TELEPHONE ENCOUNTER
----- Message from AYLIN Parikh sent at 6/4/2024 10:18 AM EDT -----  Please remind patient she was due for CT chest last month for lung nodule follow up. It would be ideal if she could complete prior to her office visit with Parisa on 6/18. Thanks.  ----- Message -----  From: SYSTEM  Sent: 6/4/2024  12:30 AM EDT  To: AYLIN Self

## 2024-06-04 NOTE — TELEPHONE ENCOUNTER
Left message for patient to call office back. She is overdue for CT scan and she has appt coming up on 06/16. Please ask her to schedule test prior to appt

## 2024-06-18 ENCOUNTER — OFFICE VISIT (OUTPATIENT)
Dept: PULMONOLOGY | Facility: CLINIC | Age: 69
End: 2024-06-18

## 2024-06-18 VITALS
WEIGHT: 152 LBS | HEART RATE: 76 BPM | TEMPERATURE: 97.9 F | DIASTOLIC BLOOD PRESSURE: 80 MMHG | HEIGHT: 62 IN | SYSTOLIC BLOOD PRESSURE: 118 MMHG | BODY MASS INDEX: 27.97 KG/M2 | RESPIRATION RATE: 20 BRPM | OXYGEN SATURATION: 98 %

## 2024-06-18 DIAGNOSIS — J96.11 CHRONIC RESPIRATORY FAILURE WITH HYPOXIA (HCC): ICD-10-CM

## 2024-06-18 DIAGNOSIS — Z72.0 TOBACCO ABUSE: ICD-10-CM

## 2024-06-18 DIAGNOSIS — R93.89 ABNORMAL CT SCAN: ICD-10-CM

## 2024-06-18 DIAGNOSIS — J44.9 COPD, SEVERE (HCC): Primary | ICD-10-CM

## 2024-06-18 NOTE — ASSESSMENT & PLAN NOTE
Smoking 1/2 PPD and her main trigger is anxiety. We spent about 4 minutes discussing tobacco cessation efforts and I asked her to focus on small steps - extinguishing her cigarettes as soon as anxiety will allow, and physically rationing her daily allotment. Patient holds the control here and should focus on incremental change. She will attempt to do so.

## 2024-06-18 NOTE — ASSESSMENT & PLAN NOTE
Posterior right lung apex, 7x6mm in size, somewhat linear and unchanged since July 2023.    Given high risk for lung cancer and ongoing nicotine abuse I suggest a 6-month follow up chest CT. If unchanged will return to lung cancer screening CT

## 2024-06-18 NOTE — PROGRESS NOTES
Pulmonary Follow-Up Note   Jessika REID Viars 69 y.o. female MRN: [id number]  6/18/2024      Assessment/Plan:    Problem List Items Addressed This Visit       Chronic respiratory failure with hypoxia (HCC)     At 2-3L/m at her baseline. Continue supplemental O2 and titrate to maintain saturations >90%.  I encouraged her to spot-check O2 levels at home randomly. She and her daughter questioned why hospital typically goes with a lower flow rate than she uses at home and we discussed the difference between hypoxia and shortness of breath.         Relevant Orders    Oxygen Supplies    Tobacco abuse     Smoking 1/2 PPD and her main trigger is anxiety. We spent about 4 minutes discussing tobacco cessation efforts and I asked her to focus on small steps - extinguishing her cigarettes as soon as anxiety will allow, and physically rationing her daily allotment. Patient holds the control here and should focus on incremental change. She will attempt to do so.         Abnormal CT scan     Posterior right lung apex, 7x6mm in size, somewhat linear and unchanged since July 2023.    Given high risk for lung cancer and ongoing nicotine abuse I suggest a 6-month follow up chest CT. If unchanged will return to lung cancer screening CT         Relevant Orders    CT lung nodule follow-up    COPD, severe (HCC) - Primary     Recent exacerbation 5/21/24 did require 5 day hospital stay. She is now feeling back to baseline with her usual daliy level of shortness of breath upon exertion. No current cough or wheeze. Discussed with patient I feel a component of her SOB is related to anxiety and she agrees  Exam is clear.  Continue Trelegy 200 daily  Continue DuoNeb up to 4x per day via nebulizer when needed  Palliative care consult - to help manage SOB symptoms / anxiety component. Patient emphatically declines.          Vaccines: up to date    Return in about 3 months (around 9/18/2024).    All of Jessika's questions were answered prior to leaving  the office today.  She is aware to call our office with any further questions or concerns.    History of Present Illness   Reason for Visit: Follow up  Chief Complaint: anxiety  HPI: Jessika Lux is a 69 y.o. female who presents to the office today following [facility] hospitalization 5/21/24-5/26/24 for COPD exacerbation. She was treated with IV steroids in hospital and she was discharged to home on her usual 3L/m O2 and did not require further prednisone at home.    She continues to identify a relationship between shortness of breath and anxiety. She has no cough or wheeze. She is sleeping well overall. Wearing 3L/m O2 at home even though hospital typically gives 2-2.5L/m and wants to know what the difference is. She is not spot-checking O2 levels. She is taking nebulizer a few times per week and Trelegy daily.    She notes that her O2 tubing at home is in poor shape and has not been replaced by DME recently.    Review of Systems   Constitutional:  Negative for chills, fatigue and fever.   HENT:  Negative for congestion and postnasal drip.    Respiratory:  Positive for shortness of breath. Negative for cough, chest tightness and wheezing.    Cardiovascular:  Negative for chest pain, palpitations and leg swelling.   Gastrointestinal:  Negative for abdominal pain.   All other systems reviewed and are negative.      Historical Information   Past Medical History:   Diagnosis Date    Chronic respiratory failure with hypoxia, on home O2 therapy  (HCC)     3L NC at home    COPD exacerbation (HCC)     COPD, group D, by GOLD 2017 classification (HCC)     Diabetes mellitus (HCC)     Diverticulosis of sigmoid colon     Dyslipidemia     Essential (primary) hypertension     NELSON (generalized anxiety disorder)     GERD (gastroesophageal reflux disease)     Lung nodule     RUL    SIADH (syndrome of inappropriate ADH production) (HCC)     Tobacco dependence     Vitamin D deficiency      History reviewed. No pertinent surgical  history.  Family History   Problem Relation Age of Onset    Diabetes Father      Social History   Social History     Substance and Sexual Activity   Alcohol Use Not Currently     Social History     Substance and Sexual Activity   Drug Use Never     Social History     Tobacco Use   Smoking Status Every Day    Current packs/day: 0.25    Types: Cigarettes    Passive exposure: Never   Smokeless Tobacco Never     E-Cigarette/Vaping    E-Cigarette Use Never User      E-Cigarette/Vaping Substances    Nicotine No     THC No     CBD No     Flavoring No        Meds/Allergies     Current Outpatient Medications:     amLODIPine (NORVASC) 10 mg tablet, Take 10 mg by mouth daily at bedtime , Disp: , Rfl:     atorvastatin (LIPITOR) 40 mg tablet, Take 40 mg by mouth daily at bedtime , Disp: , Rfl:     cholecalciferol (VITAMIN D3) 1,000 units tablet, Take 1 tablet (1,000 Units total) by mouth daily, Disp: 30 tablet, Rfl: 0    cloNIDine (CATAPRES) 0.2 mg tablet, Take 0.2 mg by mouth every 12 (twelve) hours, Disp: , Rfl:     fluticasone (FLONASE) 50 mcg/act nasal spray, 1 spray into each nostril daily, Disp: 9.9 mL, Rfl: 0    fluticasone-umeclidinium-vilanterol (Trelegy Ellipta) 200-62.5-25 mcg/actuation AEPB inhaler, Inhale 1 puff daily Rinse mouth after use., Disp: 60 blister, Rfl: 5    glipiZIDE (GLUCOTROL) 10 mg tablet, Take 10 mg by mouth daily in the early morning W breakfast, Disp: , Rfl:     hydrOXYzine HCL (ATARAX) 10 mg tablet, Take 10 mg by mouth every 6 (six) hours as needed for itching, Disp: , Rfl:     ipratropium-albuterol (DUO-NEB) 0.5-2.5 mg/3 mL nebulizer solution, Take 3 mL by nebulization 3 (three) times a day, Disp: 180 mL, Rfl: 3    lisinopril (ZESTRIL) 40 mg tablet, Take 40 mg by mouth daily, Disp: , Rfl:     LORazepam (ATIVAN) 0.5 mg tablet, Take 0.5 mg by mouth every 8 (eight) hours as needed for anxiety, Disp: , Rfl:     metoprolol tartrate (LOPRESSOR) 100 mg tablet, Take 100 mg by mouth every 12 (twelve)  "hours, Disp: , Rfl:     omeprazole (PriLOSEC OTC) 20 MG tablet, Take 20 mg by mouth daily , Disp: , Rfl:     pioglitazone (ACTOS) 15 mg tablet, Take 15 mg by mouth daily, Disp: , Rfl:   Allergies   Allergen Reactions    Clindamycin Other (See Comments)     unknown       Vitals: Blood pressure 118/80, pulse 76, temperature 97.9 °F (36.6 °C), resp. rate 20, height 5' 2\" (1.575 m), weight 68.9 kg (152 lb), SpO2 98%. Body mass index is 27.8 kg/m². Oxygen Therapy  SpO2: 98 % (O2 3L)      Physical Exam  Vitals reviewed.   Constitutional:       Appearance: Normal appearance.   HENT:      Head: Normocephalic.      Nose: Nose normal.      Mouth/Throat:      Mouth: Mucous membranes are moist.      Pharynx: Oropharynx is clear.   Cardiovascular:      Rate and Rhythm: Normal rate and regular rhythm.      Pulses: Normal pulses.      Heart sounds: Normal heart sounds.   Pulmonary:      Effort: Pulmonary effort is normal.      Breath sounds: Normal breath sounds.   Musculoskeletal:         General: Normal range of motion.   Skin:     General: Skin is warm and dry.      Capillary Refill: Capillary refill takes less than 2 seconds.   Neurological:      General: No focal deficit present.      Mental Status: She is alert and oriented to person, place, and time.   Psychiatric:         Mood and Affect: Mood normal.         Behavior: Behavior normal.         Thought Content: Thought content normal.         Judgment: Judgment normal.         Labs:   Lab Results   Component Value Date    WBC 8.49 05/26/2024    HGB 11.0 (L) 05/26/2024    HCT 34.5 (L) 05/26/2024    MCV 89 05/26/2024     05/26/2024    EOSPCT 0 05/26/2024    EOSABS 0.02 05/26/2024    SEGSPCT 91 (H) 10/29/2023    MONOPCT 8 05/26/2024    MONOABS 0.28 10/29/2023    BANDSPCT 1 06/25/2023     Lab Results   Component Value Date    CALCIUM 9.8 06/11/2024    K 5.1 06/11/2024    CO2 34 (H) 06/11/2024    CL 91 (L) 06/11/2024    BUN 14 06/11/2024    CREATININE 0.8 06/11/2024 " "    No results found for: \"IGE\", \"RAST\"  Lab Results   Component Value Date    ALT 12 05/26/2024    AST 11 (L) 05/26/2024    ALKPHOS 54 05/26/2024         Imaging and other studies: I have personally reviewed pertinent reports.    CXR 5/21/24  No active pulmonary disease.     CT chest abdomen pelvis 3/18/24  Irregular shaped nodule along the pleural surface in the posterior right lung apex, 7 x 6 mm in size, unchanged in size and CT appearance at least as far back as July 2023; because this is somewhat linear on sagittal imaging and is unchanged for at least   8 months, the finding is most suspicious for focal subpleural scar. Continued close interval low radiation dose noncontrast chest CT follow-up recommended for 6 to 12 months from this exam.     No CT findings to suggest developing malignancy or to convincingly account for the patient's unexpected weight loss.     Sigmoid diverticulosis without acute diverticulitis.      AYLIN High  [facility]        Portions of the record may have been created with voice recognition software.  Occasional wrong word or \"sound a like\" substitutions may have occurred due to the inherent limitations of voice recognition software.  Read the chart carefully and recognize, using context, where substitutions have occurred or contact the dictating provider.  "

## 2024-06-18 NOTE — ASSESSMENT & PLAN NOTE
At 2-3L/m at her baseline. Continue supplemental O2 and titrate to maintain saturations >90%.  I encouraged her to spot-check O2 levels at home randomly. She and her daughter questioned why hospital typically goes with a lower flow rate than she uses at home and we discussed the difference between hypoxia and shortness of breath.

## 2024-06-18 NOTE — ASSESSMENT & PLAN NOTE
Recent exacerbation 5/21/24 did require 5 day hospital stay. She is now feeling back to baseline with her usual daliy level of shortness of breath upon exertion. No current cough or wheeze. Discussed with patient I feel a component of her SOB is related to anxiety and she agrees  Exam is clear.  Continue Trelegy 200 daily  Continue DuoNeb up to 4x per day via nebulizer when needed  Palliative care consult - to help manage SOB symptoms / anxiety component. Patient emphatically declines.

## 2024-06-27 ENCOUNTER — HOSPITAL ENCOUNTER (EMERGENCY)
Facility: HOSPITAL | Age: 69
Discharge: HOME | End: 2024-06-27
Attending: EMERGENCY MEDICINE
Payer: COMMERCIAL

## 2024-06-27 ENCOUNTER — APPOINTMENT (EMERGENCY)
Dept: RADIOLOGY | Facility: HOSPITAL | Age: 69
End: 2024-06-27
Payer: COMMERCIAL

## 2024-06-27 VITALS
HEART RATE: 71 BPM | SYSTOLIC BLOOD PRESSURE: 109 MMHG | DIASTOLIC BLOOD PRESSURE: 68 MMHG | TEMPERATURE: 98.4 F | OXYGEN SATURATION: 100 % | WEIGHT: 152.34 LBS | HEIGHT: 62 IN | BODY MASS INDEX: 28.03 KG/M2 | RESPIRATION RATE: 20 BRPM

## 2024-06-27 DIAGNOSIS — J44.1 COPD EXACERBATION (HCC): Primary | ICD-10-CM

## 2024-06-27 LAB
ALBUMIN SERPL BCG-MCNC: 4.3 G/DL (ref 3.5–5)
ALP SERPL-CCNC: 97 U/L (ref 34–104)
ALT SERPL W P-5'-P-CCNC: 8 U/L (ref 7–52)
ANION GAP SERPL CALCULATED.3IONS-SCNC: 7 MMOL/L (ref 4–13)
APTT PPP: 28 SECONDS (ref 23–37)
AST SERPL W P-5'-P-CCNC: 12 U/L (ref 13–39)
BASOPHILS # BLD AUTO: 0.03 THOUSANDS/ÂΜL (ref 0–0.1)
BASOPHILS NFR BLD AUTO: 0 % (ref 0–1)
BILIRUB SERPL-MCNC: 0.33 MG/DL (ref 0.2–1)
BUN SERPL-MCNC: 14 MG/DL (ref 5–25)
CALCIUM SERPL-MCNC: 9.9 MG/DL (ref 8.4–10.2)
CARDIAC TROPONIN I PNL SERPL HS: 5 NG/L
CHLORIDE SERPL-SCNC: 98 MMOL/L (ref 96–108)
CO2 SERPL-SCNC: 30 MMOL/L (ref 21–32)
CREAT SERPL-MCNC: 0.74 MG/DL (ref 0.6–1.3)
EOSINOPHIL # BLD AUTO: 0.05 THOUSAND/ÂΜL (ref 0–0.61)
EOSINOPHIL NFR BLD AUTO: 1 % (ref 0–6)
ERYTHROCYTE [DISTWIDTH] IN BLOOD BY AUTOMATED COUNT: 12.7 % (ref 11.6–15.1)
GFR SERPL CREATININE-BSD FRML MDRD: 84 ML/MIN/1.73SQ M
GLUCOSE SERPL-MCNC: 92 MG/DL (ref 65–140)
HCT VFR BLD AUTO: 39.4 % (ref 34.8–46.1)
HGB BLD-MCNC: 12.9 G/DL (ref 11.5–15.4)
IMM GRANULOCYTES # BLD AUTO: 0.03 THOUSAND/UL (ref 0–0.2)
IMM GRANULOCYTES NFR BLD AUTO: 0 % (ref 0–2)
INR PPP: 0.93 (ref 0.84–1.19)
LACTATE SERPL-SCNC: 0.8 MMOL/L (ref 0.5–2)
LIPASE SERPL-CCNC: 25 U/L (ref 11–82)
LYMPHOCYTES # BLD AUTO: 2.02 THOUSANDS/ÂΜL (ref 0.6–4.47)
LYMPHOCYTES NFR BLD AUTO: 24 % (ref 14–44)
MAGNESIUM SERPL-MCNC: 2 MG/DL (ref 1.9–2.7)
MCH RBC QN AUTO: 29 PG (ref 26.8–34.3)
MCHC RBC AUTO-ENTMCNC: 32.7 G/DL (ref 31.4–37.4)
MCV RBC AUTO: 89 FL (ref 82–98)
MONOCYTES # BLD AUTO: 0.85 THOUSAND/ÂΜL (ref 0.17–1.22)
MONOCYTES NFR BLD AUTO: 10 % (ref 4–12)
NEUTROPHILS # BLD AUTO: 5.56 THOUSANDS/ÂΜL (ref 1.85–7.62)
NEUTS SEG NFR BLD AUTO: 65 % (ref 43–75)
NRBC BLD AUTO-RTO: 0 /100 WBCS
PLATELET # BLD AUTO: 218 THOUSANDS/UL (ref 149–390)
PMV BLD AUTO: 10.2 FL (ref 8.9–12.7)
POTASSIUM SERPL-SCNC: 4.6 MMOL/L (ref 3.5–5.3)
PROT SERPL-MCNC: 7.6 G/DL (ref 6.4–8.4)
PROTHROMBIN TIME: 12.7 SECONDS (ref 11.6–14.5)
RBC # BLD AUTO: 4.45 MILLION/UL (ref 3.81–5.12)
SODIUM SERPL-SCNC: 135 MMOL/L (ref 135–147)
WBC # BLD AUTO: 8.54 THOUSAND/UL (ref 4.31–10.16)

## 2024-06-27 PROCEDURE — 85730 THROMBOPLASTIN TIME PARTIAL: CPT | Performed by: EMERGENCY MEDICINE

## 2024-06-27 PROCEDURE — 99285 EMERGENCY DEPT VISIT HI MDM: CPT | Performed by: EMERGENCY MEDICINE

## 2024-06-27 PROCEDURE — 83605 ASSAY OF LACTIC ACID: CPT | Performed by: EMERGENCY MEDICINE

## 2024-06-27 PROCEDURE — 83735 ASSAY OF MAGNESIUM: CPT | Performed by: EMERGENCY MEDICINE

## 2024-06-27 PROCEDURE — 84484 ASSAY OF TROPONIN QUANT: CPT | Performed by: EMERGENCY MEDICINE

## 2024-06-27 PROCEDURE — 71045 X-RAY EXAM CHEST 1 VIEW: CPT

## 2024-06-27 PROCEDURE — 85610 PROTHROMBIN TIME: CPT | Performed by: EMERGENCY MEDICINE

## 2024-06-27 PROCEDURE — 36415 COLL VENOUS BLD VENIPUNCTURE: CPT | Performed by: EMERGENCY MEDICINE

## 2024-06-27 PROCEDURE — 83690 ASSAY OF LIPASE: CPT | Performed by: EMERGENCY MEDICINE

## 2024-06-27 PROCEDURE — 80053 COMPREHEN METABOLIC PANEL: CPT | Performed by: EMERGENCY MEDICINE

## 2024-06-27 PROCEDURE — 93005 ELECTROCARDIOGRAM TRACING: CPT

## 2024-06-27 PROCEDURE — 85025 COMPLETE CBC W/AUTO DIFF WBC: CPT | Performed by: EMERGENCY MEDICINE

## 2024-06-27 RX ORDER — METHYLPREDNISOLONE SODIUM SUCCINATE 125 MG/2ML
125 INJECTION, POWDER, LYOPHILIZED, FOR SOLUTION INTRAMUSCULAR; INTRAVENOUS ONCE
Status: COMPLETED | OUTPATIENT
Start: 2024-06-27 | End: 2024-06-27

## 2024-06-27 RX ORDER — METHYLPREDNISOLONE 4 MG/1
TABLET ORAL
Qty: 21 TABLET | Refills: 0 | Status: SHIPPED | OUTPATIENT
Start: 2024-06-27

## 2024-06-27 RX ADMIN — METHYLPREDNISOLONE SODIUM SUCCINATE 125 MG: 125 INJECTION, POWDER, FOR SOLUTION INTRAMUSCULAR; INTRAVENOUS at 14:00

## 2024-06-27 NOTE — ED PROVIDER NOTES
History  Chief Complaint   Patient presents with    Shortness of Breath     Pt presented to this ED from home c/o increased sob over past couple days. Hx copd-uses 3L NC daily. Pt took breathing tx this morning w/o relief. Denies travel/fevers/cough/n/v/d/     Patient well-known to this ED.  COPD.  3 L nasal cannula at home.  Took her Ellipta at 6 AM and then at 8 AM.  Was worried that it might have an adverse effect.  Has been slightly short of breath over the last 2 days.  No congestion or cough.  Does have wheezing.  No recent steroids.  No fevers or chills.  No chest pain.  No leg swelling.      History provided by:  Patient   used: No    Shortness of Breath  Severity:  Mild  Onset quality:  Gradual  Duration:  2 days  Timing:  Constant  Progression:  Unchanged  Chronicity:  Recurrent  Context: not animal exposure, not emotional upset, not pollens and not URI    Relieved by:  Nothing  Worsened by:  Nothing  Ineffective treatments:  None tried  Associated symptoms: cough    Associated symptoms: no abdominal pain, no chest pain, no ear pain, no fever, no headaches, no neck pain, no PND, no rash, no sore throat, no sputum production, no swollen glands, no vomiting and no wheezing        Prior to Admission Medications   Prescriptions Last Dose Informant Patient Reported? Taking?   LORazepam (ATIVAN) 0.5 mg tablet  Self Yes No   Sig: Take 0.5 mg by mouth every 8 (eight) hours as needed for anxiety   amLODIPine (NORVASC) 10 mg tablet  Self Yes No   Sig: Take 10 mg by mouth daily at bedtime    atorvastatin (LIPITOR) 40 mg tablet  Self Yes No   Sig: Take 40 mg by mouth daily at bedtime    cholecalciferol (VITAMIN D3) 1,000 units tablet   No No   Sig: Take 1 tablet (1,000 Units total) by mouth daily   cloNIDine (CATAPRES) 0.2 mg tablet  Self Yes No   Sig: Take 0.2 mg by mouth every 12 (twelve) hours   fluticasone (FLONASE) 50 mcg/act nasal spray   No No   Si spray into each nostril daily    fluticasone-umeclidinium-vilanterol (Trelegy Ellipta) 200-62.5-25 mcg/actuation AEPB inhaler  Self No No   Sig: Inhale 1 puff daily Rinse mouth after use.   glipiZIDE (GLUCOTROL) 10 mg tablet  Self Yes No   Sig: Take 10 mg by mouth daily in the early morning W breakfast   hydrOXYzine HCL (ATARAX) 10 mg tablet  Self Yes No   Sig: Take 10 mg by mouth every 6 (six) hours as needed for itching   ipratropium-albuterol (DUO-NEB) 0.5-2.5 mg/3 mL nebulizer solution  Self No No   Sig: Take 3 mL by nebulization 3 (three) times a day   lisinopril (ZESTRIL) 40 mg tablet  Self Yes No   Sig: Take 40 mg by mouth daily   metoprolol tartrate (LOPRESSOR) 100 mg tablet  Self Yes No   Sig: Take 100 mg by mouth every 12 (twelve) hours   omeprazole (PriLOSEC OTC) 20 MG tablet  Self Yes No   Sig: Take 20 mg by mouth daily    pioglitazone (ACTOS) 15 mg tablet   Yes No   Sig: Take 15 mg by mouth daily      Facility-Administered Medications: None       Past Medical History:   Diagnosis Date    Chronic respiratory failure with hypoxia, on home O2 therapy  ([facility])     3L NC at home    COPD exacerbation (HCC)     COPD, group D, by GOLD 2017 classification ([facility])     Diabetes mellitus (HCC)     Diverticulosis of sigmoid colon     Dyslipidemia     Essential (primary) hypertension     NELSON (generalized anxiety disorder)     GERD (gastroesophageal reflux disease)     Lung nodule     RUL    SIADH (syndrome of inappropriate ADH production) ([facility])     Tobacco dependence     Vitamin D deficiency        History reviewed. No pertinent surgical history.    Family History   Problem Relation Age of Onset    Diabetes Father      I have reviewed and agree with the history as documented.    E-Cigarette/Vaping    E-Cigarette Use Never User      E-Cigarette/Vaping Substances    Nicotine No     THC No     CBD No     Flavoring No      Social History     Tobacco Use    Smoking status: Every Day     Current packs/day: 0.25     Types: Cigarettes     Passive exposure:  Never    Smokeless tobacco: Never   Vaping Use    Vaping status: Never Used   Substance Use Topics    Alcohol use: Not Currently    Drug use: Never       Review of Systems   Constitutional:  Negative for chills and fever.   HENT:  Negative for ear pain, hearing loss, sore throat, trouble swallowing and voice change.    Eyes:  Negative for pain and discharge.   Respiratory:  Positive for cough and shortness of breath. Negative for sputum production and wheezing.    Cardiovascular:  Negative for chest pain, palpitations and PND.   Gastrointestinal:  Negative for abdominal pain, blood in stool, constipation, diarrhea, nausea and vomiting.   Genitourinary:  Negative for dysuria, flank pain, frequency and hematuria.   Musculoskeletal:  Negative for joint swelling, neck pain and neck stiffness.   Skin:  Negative for rash and wound.   Neurological:  Negative for dizziness, seizures, syncope, facial asymmetry and headaches.   Psychiatric/Behavioral:  Negative for hallucinations, self-injury and suicidal ideas.    All other systems reviewed and are negative.      Physical Exam  Physical Exam  Vitals and nursing note reviewed.   Constitutional:       General: She is not in acute distress.     Appearance: She is well-developed.   HENT:      Head: Normocephalic and atraumatic.      Right Ear: External ear normal.      Left Ear: External ear normal.   Eyes:      General: No scleral icterus.        Right eye: No discharge.         Left eye: No discharge.      Extraocular Movements: Extraocular movements intact.      Conjunctiva/sclera: Conjunctivae normal.   Cardiovascular:      Rate and Rhythm: Normal rate and regular rhythm.      Heart sounds: Normal heart sounds. No murmur heard.  Pulmonary:      Effort: Pulmonary effort is normal.      Breath sounds: No wheezing or rales.      Comments: Good aeration with an end expiratory wheeze noted.  Abdominal:      General: Bowel sounds are normal. There is no distension.       Palpations: Abdomen is soft.      Tenderness: There is no abdominal tenderness. There is no guarding or rebound.   Musculoskeletal:         General: No deformity. Normal range of motion.      Cervical back: Normal range of motion and neck supple.   Skin:     General: Skin is warm and dry.      Findings: No rash.   Neurological:      General: No focal deficit present.      Mental Status: She is alert and oriented to person, place, and time.      Cranial Nerves: No cranial nerve deficit.   Psychiatric:         Mood and Affect: Mood normal.         Behavior: Behavior normal.         Thought Content: Thought content normal.         Judgment: Judgment normal.         Vital Signs  ED Triage Vitals   Temperature Pulse Respirations Blood Pressure SpO2   06/27/24 1332 06/27/24 1332 06/27/24 1332 06/27/24 1332 06/27/24 1332   98.4 °F (36.9 °C) 82 (!) 24 (!) 177/81 100 %      Temp src Heart Rate Source Patient Position - Orthostatic VS BP Location FiO2 (%)   -- 06/27/24 1332 06/27/24 1332 06/27/24 1332 --    Monitor Lying Right arm       Pain Score       06/27/24 1353       No Pain           Vitals:    06/27/24 1332 06/27/24 1353   BP: (!) 177/81 109/68   Pulse: 82 71   Patient Position - Orthostatic VS: Lying Sitting         Visual Acuity      ED Medications  Medications   methylPREDNISolone sodium succinate (Solu-MEDROL) injection 125 mg (125 mg Intravenous Given 6/27/24 1400)       Diagnostic Studies  Results Reviewed       Procedure Component Value Units Date/Time    Comprehensive metabolic panel [[id number]]  (Abnormal) Collected: 06/27/24 1340    Lab Status: Final result Specimen: Blood from Arm, Right Updated: 06/27/24 1429     Sodium 135 mmol/L      Potassium 4.6 mmol/L      Chloride 98 mmol/L      CO2 30 mmol/L      ANION GAP 7 mmol/L      BUN 14 mg/dL      Creatinine 0.74 mg/dL      Glucose 92 mg/dL      Calcium 9.9 mg/dL      AST 12 U/L      ALT 8 U/L      Alkaline Phosphatase 97 U/L      Total Protein 7.6 g/dL       Albumin 4.3 g/dL      Total Bilirubin 0.33 mg/dL      eGFR 84 ml/min/1.73sq m     Narrative:      National Kidney Disease Foundation guidelines for Chronic Kidney Disease (CKD):     Stage 1 with normal or high GFR (GFR > 90 mL/min/1.73 square meters)    Stage 2 Mild CKD (GFR = 60-89 mL/min/1.73 square meters)    Stage 3A Moderate CKD (GFR = 45-59 mL/min/1.73 square meters)    Stage 3B Moderate CKD (GFR = 30-44 mL/min/1.73 square meters)    Stage 4 Severe CKD (GFR = 15-29 mL/min/1.73 square meters)    Stage 5 End Stage CKD (GFR <15 mL/min/1.73 square meters)  Note: GFR calculation is accurate only with a steady state creatinine    Magnesium [[id number]]  (Normal) Collected: 06/27/24 1340    Lab Status: Final result Specimen: Blood from Arm, Right Updated: 06/27/24 1429     Magnesium 2.0 mg/dL     Lipase [[id number]]  (Normal) Collected: 06/27/24 1340    Lab Status: Final result Specimen: Blood from Arm, Right Updated: 06/27/24 1429     Lipase 25 u/L     HS Troponin 0hr (reflex protocol) [[id number]]  (Normal) Collected: 06/27/24 1340    Lab Status: Final result Specimen: Blood from Arm, Right Updated: 06/27/24 1413     hs TnI 0hr 5 ng/L     Lactic acid, plasma (w/reflex if result > 2.0) [[id number]]  (Normal) Collected: 06/27/24 1340    Lab Status: Final result Specimen: Blood from Arm, Right Updated: 06/27/24 1406     LACTIC ACID 0.8 mmol/L     Narrative:      Result may be elevated if tourniquet was used during collection.    Protime-INR [[id number]]  (Normal) Collected: 06/27/24 1340    Lab Status: Final result Specimen: Blood from Arm, Right Updated: 06/27/24 1401     Protime 12.7 seconds      INR 0.93    APTT [[id number]]  (Normal) Collected: 06/27/24 1340    Lab Status: Final result Specimen: Blood from Arm, Right Updated: 06/27/24 1401     PTT 28 seconds     CBC and differential [[id number]] Collected: 06/27/24 1340    Lab Status: Final result Specimen: Blood from Arm, Right Updated: 06/27/24 1348     WBC 8.54  Thousand/uL      RBC 4.45 Million/uL      Hemoglobin 12.9 g/dL      Hematocrit 39.4 %      MCV 89 fL      MCH 29.0 pg      MCHC 32.7 g/dL      RDW 12.7 %      MPV 10.2 fL      Platelets 218 Thousands/uL      nRBC 0 /100 WBCs      Segmented % 65 %      Immature Grans % 0 %      Lymphocytes % 24 %      Monocytes % 10 %      Eosinophils Relative 1 %      Basophils Relative 0 %      Absolute Neutrophils 5.56 Thousands/µL      Absolute Immature Grans 0.03 Thousand/uL      Absolute Lymphocytes 2.02 Thousands/µL      Absolute Monocytes 0.85 Thousand/µL      Eosinophils Absolute 0.05 Thousand/µL      Basophils Absolute 0.03 Thousands/µL                    XR chest 1 view portable   ED Interpretation by Justen Carroll MD (06/27 1348)   No acute disease.  Possible small right pleural effusion.                 Procedures  ECG 12 Lead Documentation Only    Date/Time: 6/27/2024 1:39 PM    Performed by: Justen Carroll MD  Authorized by: Justen Carroll MD    ECG reviewed by me, the ED Provider: yes    Patient location:  ED  Rate:     ECG rate:  90  Rhythm:     Rhythm: sinus rhythm    Ectopy:     Ectopy: none    QRS:     QRS axis:  Normal           ED Course  ED Course as of 06/27/24 1431   Thu Jun 27, 2024   1359 Ambulating the silva without any difficulty.   1407 LACTIC ACID: 0.8   1415 hs TnI 0hr: 5   1429 Sodium: 135   1429 Patient distress.  Not tachypneic.  Not tachycardic.  Stable for discharge.                                             Medical Decision Making  Amount and/or Complexity of Data Reviewed  Labs: ordered. Decision-making details documented in ED Course.  Radiology: ordered and independent interpretation performed.    Risk  Prescription drug management.             Disposition  Final diagnoses:   COPD exacerbation (HCC)     Time reflects when diagnosis was documented in both MDM as applicable and the Disposition within this note       Time User Action Codes Description Comment    6/27/2024  2:30 PM  Justen Carroll Add [J44.1] COPD exacerbation (HCC)           ED Disposition       ED Disposition   Discharge    Condition   Stable    Date/Time   Thu Jun 27, 2024  2:30 PM    Comment   Jessika Lux discharge to home/self care.                   Follow-up Information       Follow up With Specialties Details Why Contact Info    Karlie Bruno DO Family Medicine Call in 2 days  [address] [facility] [address] [phone]              Patient's Medications   Discharge Prescriptions    METHYLPREDNISOLONE 4 MG TABLET THERAPY PACK    Use as directed on package       Start Date: 6/27/2024 End Date: --       Order Dose: --       Quantity: 21 tablet    Refills: 0       No discharge procedures on file.    PDMP Review         Value Time User    PDMP Reviewed  Yes 3/17/2024 11:09 AM Ailyn Aguilera MD            ED Provider  Electronically Signed by             Justen Carroll MD  06/27/24 4900

## 2024-06-28 LAB
ATRIAL RATE: 91 BPM
P AXIS: 60 DEGREES
PR INTERVAL: 128 MS
QRS AXIS: 52 DEGREES
QRSD INTERVAL: 78 MS
QT INTERVAL: 354 MS
QTC INTERVAL: 435 MS
T WAVE AXIS: 59 DEGREES
VENTRICULAR RATE: 91 BPM

## 2024-07-01 ENCOUNTER — PATIENT OUTREACH (OUTPATIENT)
Dept: CASE MANAGEMENT | Facility: HOSPITAL | Age: 69
End: 2024-07-01

## 2024-07-01 NOTE — PROGRESS NOTES
ADT alert received from covering RN BRIANNE Ribera. Pt was recently in ED with SOB 6/27. ECG, labs, and CXR completed. Pt wears home O2 at baseline. She was discharged with steroids.   She has pulmonary follow up scheduled for 9/19.    Call placed to Jessika for follow up care management. No answer. Message left with call back contact information for Jose Guadalupe Ribera RN CM. Note routed to same.

## 2024-07-04 ENCOUNTER — HOSPITAL ENCOUNTER (EMERGENCY)
Facility: HOSPITAL | Age: 69
Discharge: HOME | End: 2024-07-04
Attending: EMERGENCY MEDICINE
Payer: COMMERCIAL

## 2024-07-04 VITALS
OXYGEN SATURATION: 100 % | TEMPERATURE: 98.3 F | SYSTOLIC BLOOD PRESSURE: 149 MMHG | HEIGHT: 62 IN | WEIGHT: 150 LBS | HEART RATE: 86 BPM | DIASTOLIC BLOOD PRESSURE: 81 MMHG | RESPIRATION RATE: 18 BRPM | BODY MASS INDEX: 27.6 KG/M2

## 2024-07-04 DIAGNOSIS — Z01.30 ENCOUNTER FOR BLOOD PRESSURE EXAMINATION: Primary | ICD-10-CM

## 2024-07-04 PROCEDURE — 99283 EMERGENCY DEPT VISIT LOW MDM: CPT

## 2024-07-04 PROCEDURE — 99284 EMERGENCY DEPT VISIT MOD MDM: CPT | Performed by: EMERGENCY MEDICINE

## 2024-07-04 NOTE — DISCHARGE INSTRUCTIONS
Patient is to resume her normal medication schedule starting this evening.  Verbal discharge instructions were provided.

## 2024-07-04 NOTE — ED PROVIDER NOTES
History  Chief Complaint   Patient presents with    Medical Problem     Pt. Voiced she forgot to take her medications this morning including blood pressure medicine and wants blood pressure checked      Patient is a 69-year-old female with the stated medical history as below presenting to the emergency room for advice regarding blood pressure medications as she forgot to take her medications this morning.  Patient is anxious about this but has no symptomatology related to any physical derangement.  She is having no shortness of breath, no chest pain.  Feels at her baseline.      History provided by:  Patient  Medical Problem  Associated symptoms: no chest pain, no diarrhea, no nausea, no shortness of breath, no vomiting and no wheezing        Prior to Admission Medications   Prescriptions Last Dose Informant Patient Reported? Taking?   LORazepam (ATIVAN) 0.5 mg tablet  Self Yes No   Sig: Take 0.5 mg by mouth every 8 (eight) hours as needed for anxiety   amLODIPine (NORVASC) 10 mg tablet  Self Yes No   Sig: Take 10 mg by mouth daily at bedtime    atorvastatin (LIPITOR) 40 mg tablet  Self Yes No   Sig: Take 40 mg by mouth daily at bedtime    cholecalciferol (VITAMIN D3) 1,000 units tablet   No No   Sig: Take 1 tablet (1,000 Units total) by mouth daily   cloNIDine (CATAPRES) 0.2 mg tablet  Self Yes No   Sig: Take 0.2 mg by mouth every 12 (twelve) hours   fluticasone (FLONASE) 50 mcg/act nasal spray   No No   Si spray into each nostril daily   fluticasone-umeclidinium-vilanterol (Trelegy Ellipta) 200-62.5-25 mcg/actuation AEPB inhaler  Self No No   Sig: Inhale 1 puff daily Rinse mouth after use.   glipiZIDE (GLUCOTROL) 10 mg tablet  Self Yes No   Sig: Take 10 mg by mouth daily in the early morning W breakfast   hydrOXYzine HCL (ATARAX) 10 mg tablet  Self Yes No   Sig: Take 10 mg by mouth every 6 (six) hours as needed for itching   ipratropium-albuterol (DUO-NEB) 0.5-2.5 mg/3 mL nebulizer solution  Self No No   Sig:  Take 3 mL by nebulization 3 (three) times a day   lisinopril (ZESTRIL) 40 mg tablet  Self Yes No   Sig: Take 40 mg by mouth daily   methylPREDNISolone 4 MG tablet therapy pack   No No   Sig: Use as directed on package   metoprolol tartrate (LOPRESSOR) 100 mg tablet  Self Yes No   Sig: Take 100 mg by mouth every 12 (twelve) hours   omeprazole (PriLOSEC OTC) 20 MG tablet  Self Yes No   Sig: Take 20 mg by mouth daily    pioglitazone (ACTOS) 15 mg tablet   Yes No   Sig: Take 15 mg by mouth daily      Facility-Administered Medications: None       Past Medical History:   Diagnosis Date    Chronic respiratory failure with hypoxia, on home O2 therapy  ([facility])     3L NC at home    COPD exacerbation ([facility])     COPD, group D, by GOLD 2017 classification ([facility])     Diabetes mellitus ([facility])     Diverticulosis of sigmoid colon     Dyslipidemia     Essential (primary) hypertension     NELSON (generalized anxiety disorder)     GERD (gastroesophageal reflux disease)     Lung nodule     RUL    SIADH (syndrome of inappropriate ADH production) ([facility])     Tobacco dependence     Vitamin D deficiency        History reviewed. No pertinent surgical history.    Family History   Problem Relation Age of Onset    Diabetes Father      I have reviewed and agree with the history as documented.    E-Cigarette/Vaping    E-Cigarette Use Never User      E-Cigarette/Vaping Substances    Nicotine No     THC No     CBD No     Flavoring No      Social History     Tobacco Use    Smoking status: Every Day     Current packs/day: 0.25     Types: Cigarettes     Passive exposure: Never    Smokeless tobacco: Never   Vaping Use    Vaping status: Never Used   Substance Use Topics    Alcohol use: Not Currently    Drug use: Never       Review of Systems   Respiratory:  Negative for chest tightness, shortness of breath and wheezing.    Cardiovascular:  Negative for chest pain and palpitations.   Gastrointestinal:  Negative for diarrhea, nausea and vomiting.    Psychiatric/Behavioral:  The patient is nervous/anxious.        Physical Exam  Physical Exam  Vitals and nursing note reviewed.   Constitutional:       General: She is not in acute distress.     Appearance: She is normal weight. She is not ill-appearing.   HENT:      Head: Normocephalic and atraumatic.      Right Ear: External ear normal.      Left Ear: External ear normal.   Cardiovascular:      Rate and Rhythm: Normal rate.   Pulmonary:      Effort: Pulmonary effort is normal. No respiratory distress.      Breath sounds: No stridor. No wheezing or rales.   Skin:     Coloration: Skin is not pale.   Neurological:      General: No focal deficit present.      Mental Status: She is alert.   Psychiatric:         Mood and Affect: Mood is anxious.         Vital Signs  ED Triage Vitals [07/04/24 1301]   Temperature Pulse Respirations Blood Pressure SpO2   98.3 °F (36.8 °C) 86 18 149/81 100 %      Temp Source Heart Rate Source Patient Position - Orthostatic VS BP Location FiO2 (%)   Oral Monitor Lying Left arm --      Pain Score       No Pain           Vitals:    07/04/24 1301   BP: 149/81   Pulse: 86   Patient Position - Orthostatic VS: Lying         Visual Acuity      ED Medications  Medications - No data to display    Diagnostic Studies  Results Reviewed       None                   No orders to display              Procedures  Procedures         ED Course  ED Course as of 07/04/24 1312   Thu Jul 04, 2024   1311 Patient was provided her own lisinopril and Actos.  She was instructed to resume her normal medications at her normal times this evening and then resume her normal medications tomorrow.  She has no symptoms and was seeking advice as she had forgotten her medications.                                             Medical Decision Making           Disposition  Final diagnoses:   Encounter for blood pressure examination     Time reflects when diagnosis was documented in both MDM as applicable and the Disposition  within this note       Time User Action Codes Description Comment    7/4/2024  1:08 PM Terry Buckley Add [Z01.30,  Z79.3] Encounter for check of blood pressure while on oral contraceptives     7/4/2024  1:08 PM Terry Buckley Remove [Z01.30,  Z79.3] Encounter for check of blood pressure while on oral contraceptives     7/4/2024  1:08 PM Terry Buckley [Z01.30] Encounter for blood pressure examination           ED Disposition       ED Disposition   Discharge    Condition   Stable    Date/Time   Thu Jul 4, 2024  1:08 PM    Comment   Jessika Lux discharge to home/self care.                   Follow-up Information    None         Patient's Medications   Discharge Prescriptions    No medications on file       No discharge procedures on file.    PDMP Review         Value Time User    PDMP Reviewed  Yes 3/17/2024 11:09 AM Ailyn Aguilera MD            ED Provider  Electronically Signed by             Terry Buckley DO  07/04/24 9381

## 2024-07-15 ENCOUNTER — PATIENT OUTREACH (OUTPATIENT)
Dept: CASE MANAGEMENT | Facility: OTHER | Age: 69
End: 2024-07-15

## 2024-07-15 NOTE — PROGRESS NOTES
In basket received for patient being treated and released from  Emergency Department for concerns of high blood pressure due to forgetting to take me morning medications.  It was noted she was without SOB or chest pain.  BP of 149/81. Actos and Lisinopril administered.    Discharge follow up call. Patient discharged from 7/4/24.     Is this a good time for you to talk?     No answer when contacted.  Left generic msg requesting return call.     Patient not responsive to calls and letter sent by this RN CM.  Will remain available to assist.  Next chart review scheduled.

## 2024-07-28 ENCOUNTER — HOSPITAL ENCOUNTER (EMERGENCY)
Facility: HOSPITAL | Age: 69
Discharge: HOME | End: 2024-07-28
Attending: EMERGENCY MEDICINE
Payer: COMMERCIAL

## 2024-07-28 VITALS
SYSTOLIC BLOOD PRESSURE: 110 MMHG | DIASTOLIC BLOOD PRESSURE: 81 MMHG | HEART RATE: 76 BPM | HEIGHT: 62 IN | TEMPERATURE: 97.2 F | WEIGHT: 154.32 LBS | OXYGEN SATURATION: 97 % | RESPIRATION RATE: 18 BRPM | BODY MASS INDEX: 28.4 KG/M2

## 2024-07-28 DIAGNOSIS — F06.4 ANXIETY DISORDER DUE TO GENERAL MEDICAL CONDITION WITH PANIC ATTACK: ICD-10-CM

## 2024-07-28 DIAGNOSIS — Z72.0 TOBACCO ABUSE: ICD-10-CM

## 2024-07-28 DIAGNOSIS — J44.1 COPD WITH ACUTE EXACERBATION (HCC): ICD-10-CM

## 2024-07-28 DIAGNOSIS — R06.00 DYSPNEA: Primary | ICD-10-CM

## 2024-07-28 DIAGNOSIS — E87.1 HYPONATREMIA: ICD-10-CM

## 2024-07-28 DIAGNOSIS — J96.11 CHRONIC RESPIRATORY FAILURE WITH HYPOXIA (HCC): ICD-10-CM

## 2024-07-28 DIAGNOSIS — F41.0 ANXIETY DISORDER DUE TO GENERAL MEDICAL CONDITION WITH PANIC ATTACK: ICD-10-CM

## 2024-07-28 LAB
ALBUMIN SERPL BCG-MCNC: 4 G/DL (ref 3.5–5)
ALP SERPL-CCNC: 96 U/L (ref 34–104)
ALT SERPL W P-5'-P-CCNC: 8 U/L (ref 7–52)
ANION GAP SERPL CALCULATED.3IONS-SCNC: 8 MMOL/L (ref 4–13)
AST SERPL W P-5'-P-CCNC: 14 U/L (ref 13–39)
BASOPHILS # BLD AUTO: 0.05 THOUSANDS/ÂΜL (ref 0–0.1)
BASOPHILS NFR BLD AUTO: 1 % (ref 0–1)
BILIRUB SERPL-MCNC: 0.29 MG/DL (ref 0.2–1)
BUN SERPL-MCNC: 14 MG/DL (ref 5–25)
CALCIUM SERPL-MCNC: 9.9 MG/DL (ref 8.4–10.2)
CHLORIDE SERPL-SCNC: 94 MMOL/L (ref 96–108)
CO2 SERPL-SCNC: 29 MMOL/L (ref 21–32)
CREAT SERPL-MCNC: 0.75 MG/DL (ref 0.6–1.3)
EOSINOPHIL # BLD AUTO: 0.09 THOUSAND/ÂΜL (ref 0–0.61)
EOSINOPHIL NFR BLD AUTO: 1 % (ref 0–6)
ERYTHROCYTE [DISTWIDTH] IN BLOOD BY AUTOMATED COUNT: 12.1 % (ref 11.6–15.1)
GFR SERPL CREATININE-BSD FRML MDRD: 82 ML/MIN/1.73SQ M
GLUCOSE SERPL-MCNC: 137 MG/DL (ref 65–140)
HCT VFR BLD AUTO: 39 % (ref 34.8–46.1)
HGB BLD-MCNC: 12.6 G/DL (ref 11.5–15.4)
IMM GRANULOCYTES # BLD AUTO: 0.03 THOUSAND/UL (ref 0–0.2)
IMM GRANULOCYTES NFR BLD AUTO: 0 % (ref 0–2)
LYMPHOCYTES # BLD AUTO: 2.33 THOUSANDS/ÂΜL (ref 0.6–4.47)
LYMPHOCYTES NFR BLD AUTO: 30 % (ref 14–44)
MAGNESIUM SERPL-MCNC: 1.8 MG/DL (ref 1.9–2.7)
MCH RBC QN AUTO: 28.4 PG (ref 26.8–34.3)
MCHC RBC AUTO-ENTMCNC: 32.3 G/DL (ref 31.4–37.4)
MCV RBC AUTO: 88 FL (ref 82–98)
MONOCYTES # BLD AUTO: 0.79 THOUSAND/ÂΜL (ref 0.17–1.22)
MONOCYTES NFR BLD AUTO: 10 % (ref 4–12)
NEUTROPHILS # BLD AUTO: 4.59 THOUSANDS/ÂΜL (ref 1.85–7.62)
NEUTS SEG NFR BLD AUTO: 58 % (ref 43–75)
NRBC BLD AUTO-RTO: 0 /100 WBCS
PLATELET # BLD AUTO: 224 THOUSANDS/UL (ref 149–390)
PMV BLD AUTO: 10.1 FL (ref 8.9–12.7)
POTASSIUM SERPL-SCNC: 4.6 MMOL/L (ref 3.5–5.3)
PROT SERPL-MCNC: 6.9 G/DL (ref 6.4–8.4)
RBC # BLD AUTO: 4.43 MILLION/UL (ref 3.81–5.12)
SODIUM SERPL-SCNC: 131 MMOL/L (ref 135–147)
WBC # BLD AUTO: 7.88 THOUSAND/UL (ref 4.31–10.16)

## 2024-07-28 PROCEDURE — 96374 THER/PROPH/DIAG INJ IV PUSH: CPT

## 2024-07-28 PROCEDURE — 85025 COMPLETE CBC W/AUTO DIFF WBC: CPT | Performed by: EMERGENCY MEDICINE

## 2024-07-28 PROCEDURE — 99285 EMERGENCY DEPT VISIT HI MDM: CPT

## 2024-07-28 PROCEDURE — 94640 AIRWAY INHALATION TREATMENT: CPT

## 2024-07-28 PROCEDURE — 99284 EMERGENCY DEPT VISIT MOD MDM: CPT | Performed by: EMERGENCY MEDICINE

## 2024-07-28 PROCEDURE — 83735 ASSAY OF MAGNESIUM: CPT | Performed by: EMERGENCY MEDICINE

## 2024-07-28 PROCEDURE — 80053 COMPREHEN METABOLIC PANEL: CPT | Performed by: EMERGENCY MEDICINE

## 2024-07-28 PROCEDURE — 36415 COLL VENOUS BLD VENIPUNCTURE: CPT | Performed by: EMERGENCY MEDICINE

## 2024-07-28 RX ORDER — IPRATROPIUM BROMIDE AND ALBUTEROL SULFATE 2.5; .5 MG/3ML; MG/3ML
3 SOLUTION RESPIRATORY (INHALATION) ONCE
Status: COMPLETED | OUTPATIENT
Start: 2024-07-28 | End: 2024-07-28

## 2024-07-28 RX ORDER — LANOLIN ALCOHOL/MO/W.PET/CERES
800 CREAM (GRAM) TOPICAL ONCE
Status: COMPLETED | OUTPATIENT
Start: 2024-07-28 | End: 2024-07-28

## 2024-07-28 RX ORDER — DEXAMETHASONE SODIUM PHOSPHATE 10 MG/ML
6 INJECTION, SOLUTION INTRAMUSCULAR; INTRAVENOUS ONCE
Status: COMPLETED | OUTPATIENT
Start: 2024-07-28 | End: 2024-07-28

## 2024-07-28 RX ADMIN — Medication 800 MG: at 06:21

## 2024-07-28 RX ADMIN — IPRATROPIUM BROMIDE AND ALBUTEROL SULFATE 3 ML: 2.5; .5 SOLUTION RESPIRATORY (INHALATION) at 05:48

## 2024-07-28 RX ADMIN — DEXAMETHASONE SODIUM PHOSPHATE 6 MG: 10 INJECTION, SOLUTION INTRAMUSCULAR; INTRAVENOUS at 05:47

## 2024-07-28 NOTE — ED CARE HANDOFF
"Emergency Department Sign Out Note        Sign out and transfer of care from Dr. Olivarez. See Separate Emergency Department note.     The patient, Jessika Lux, was evaluated by the previous provider for shortness of breath.    Workup Completed:  69-year-old female presenting to the emergency room for evaluation of shortness of breath and reporting that \"I think my sodium is down again.\"  Patient is a frequent utilizer of the emergency department and has a high utilizer plan secondary to frequent visits for complaints related to shortness of breath.  Likely related to chronic anxiety.  However, will also check patient's sodium as she has had hyponatremia in the past.    ED Course / Workup Pending (followup):  History and physical  Review of records and studies as needed  Laboratory studies and imaging as appropriate  Resuscitative measures as required or needed, pain medications and comfort needs as appropriate  Re-evaluation as needed  Signed out to me    Patient presented to the emergency department and a MSE was performed. The patient was evaluated for complaint related to acute shortness of breath.  Patient is potentially at risk for, but not limited to, acute coronary syndrome, arrhythmia, myocardial infarction, pulmonary embolism, pneumothorax, infectious process of the lungs such as pneumonia, reactive airway disease, asthma, COPD exacerbation, cardiomyopathy, congestive heart failure, hyponatremia, anxiety, or viral syndrome.  Several of these diagnoses have been evaluated and ruled out by history and physical.  As needed, patient will be further evaluated with laboratory and imaging studies.  Higher level diagnostics, such as CT imaging or ultrasound, may also be required.  Please see work-up portion of the note for further evaluation of patient's risk.  Socioeconomic factors were also considered as part of the decision-making process.  Unless otherwise stated in the chart or patient is admitted as " elsewhere documented, any previously prescribed medications will be maintained.    Please see workup/ED course for further information regarding this patient.              Disposition  Final diagnoses:   Dyspnea     Time reflects when diagnosis was documented in both MDM as applicable and the Disposition within this note       Time User Action Codes Description Comment    7/28/2024  6:11 AM Terry Buckley Add [R06.00] Dyspnea           ED Disposition       None          Follow-up Information    None       Patient's Medications   Discharge Prescriptions    No medications on file     No discharge procedures on file.       ED Provider  Electronically Signed by     Terry Buckley DO  07/28/24 0612

## 2024-07-28 NOTE — ED PROVIDER NOTES
"History  Chief Complaint   Patient presents with    Shortness of Breath     Shortness of breath since . Pt also states \" I think my sodium is down again\"      Patient is a 69-year-old female with history of COPD, chronically on 3 L of oxygen via nasal cannula, presenting to the emergency department complaining of shortness of breath worsening throughout the night, she took a breathing treatment at 3:00 in the morning and reports no improvement, she denies a cough, no fever, no sick contacts, she also reports her PCP recently told her her sodium was low and started her on sodium tablets which she started taking yesterday        Prior to Admission Medications   Prescriptions Last Dose Informant Patient Reported? Taking?   LORazepam (ATIVAN) 0.5 mg tablet  Self Yes No   Sig: Take 0.5 mg by mouth every 8 (eight) hours as needed for anxiety   amLODIPine (NORVASC) 10 mg tablet  Self Yes No   Sig: Take 10 mg by mouth daily at bedtime    atorvastatin (LIPITOR) 40 mg tablet  Self Yes No   Sig: Take 40 mg by mouth daily at bedtime    cholecalciferol (VITAMIN D3) 1,000 units tablet   No No   Sig: Take 1 tablet (1,000 Units total) by mouth daily   cloNIDine (CATAPRES) 0.2 mg tablet  Self Yes No   Sig: Take 0.2 mg by mouth every 12 (twelve) hours   fluticasone (FLONASE) 50 mcg/act nasal spray   No No   Si spray into each nostril daily   fluticasone-umeclidinium-vilanterol (Trelegy Ellipta) 200-62.5-25 mcg/actuation AEPB inhaler  Self No No   Sig: Inhale 1 puff daily Rinse mouth after use.   glipiZIDE (GLUCOTROL) 10 mg tablet  Self Yes No   Sig: Take 10 mg by mouth daily in the early morning W breakfast   hydrOXYzine HCL (ATARAX) 10 mg tablet  Self Yes No   Sig: Take 10 mg by mouth every 6 (six) hours as needed for itching   ipratropium-albuterol (DUO-NEB) 0.5-2.5 mg/3 mL nebulizer solution  Self No No   Sig: Take 3 mL by nebulization 3 (three) times a day   lisinopril (ZESTRIL) 40 mg tablet  Self Yes No   Sig: Take 40 mg " by mouth daily   methylPREDNISolone 4 MG tablet therapy pack   No No   Sig: Use as directed on package   metoprolol tartrate (LOPRESSOR) 100 mg tablet  Self Yes No   Sig: Take 100 mg by mouth every 12 (twelve) hours   omeprazole (PriLOSEC OTC) 20 MG tablet  Self Yes No   Sig: Take 20 mg by mouth daily    pioglitazone (ACTOS) 15 mg tablet   Yes No   Sig: Take 15 mg by mouth daily      Facility-Administered Medications: None       Past Medical History:   Diagnosis Date    Chronic respiratory failure with hypoxia, on home O2 therapy  ([facility])     3L NC at home    COPD exacerbation ([facility])     COPD, group D, by GOLD 2017 classification ([facility])     Diabetes mellitus ([facility])     Diverticulosis of sigmoid colon     Dyslipidemia     Essential (primary) hypertension     NELSON (generalized anxiety disorder)     GERD (gastroesophageal reflux disease)     Lung nodule     RUL    SIADH (syndrome of inappropriate ADH production) ([facility])     Tobacco dependence     Vitamin D deficiency        History reviewed. No pertinent surgical history.    Family History   Problem Relation Age of Onset    Diabetes Father      I have reviewed and agree with the history as documented.    E-Cigarette/Vaping    E-Cigarette Use Never User      E-Cigarette/Vaping Substances    Nicotine No     THC No     CBD No     Flavoring No      Social History     Tobacco Use    Smoking status: Every Day     Current packs/day: 0.25     Types: Cigarettes     Passive exposure: Never    Smokeless tobacco: Never   Vaping Use    Vaping status: Never Used   Substance Use Topics    Alcohol use: Not Currently    Drug use: Never       Review of Systems   Constitutional: Negative.    HENT: Negative.     Eyes: Negative.    Respiratory:  Positive for shortness of breath and wheezing.    Cardiovascular: Negative.    Gastrointestinal: Negative.    Endocrine: Negative.    Genitourinary: Negative.    Musculoskeletal: Negative.    Skin: Negative.    Allergic/Immunologic: Negative.     Neurological: Negative.    Hematological: Negative.    Psychiatric/Behavioral: Negative.         Physical Exam  Physical Exam  Constitutional:       Appearance: She is well-developed.   HENT:      Head: Normocephalic and atraumatic.   Eyes:      Conjunctiva/sclera: Conjunctivae normal.      Pupils: Pupils are equal, round, and reactive to light.   Cardiovascular:      Rate and Rhythm: Normal rate.   Pulmonary:      Effort: Pulmonary effort is normal.      Breath sounds: Wheezing present.   Abdominal:      Palpations: Abdomen is soft.   Musculoskeletal:         General: Normal range of motion.      Cervical back: Normal range of motion and neck supple.   Skin:     General: Skin is warm and dry.   Neurological:      Mental Status: She is alert and oriented to person, place, and time.         Vital Signs  ED Triage Vitals [07/28/24 0531]   Temperature Pulse Respirations Blood Pressure SpO2   (!) 97.2 °F (36.2 °C) 99 20 143/77 90 %      Temp Source Heart Rate Source Patient Position - Orthostatic VS BP Location FiO2 (%)   Temporal Monitor Sitting Left arm --      Pain Score       No Pain           Vitals:    07/28/24 0531 07/28/24 0545 07/28/24 0600 07/28/24 0645   BP: 143/77 143/77 99/53 110/81   Pulse: 99 76 73 76   Patient Position - Orthostatic VS: Sitting Sitting Sitting          Visual Acuity      ED Medications  Medications   ipratropium-albuterol (DUO-NEB) 0.5-2.5 mg/3 mL inhalation solution 3 mL (3 mL Nebulization Given 7/28/24 0548)   dexamethasone (PF) (DECADRON) injection 6 mg (6 mg Intravenous Given 7/28/24 0547)   magnesium Oxide (MAG-OX) tablet 800 mg (800 mg Oral Given 7/28/24 0621)       Diagnostic Studies  Results Reviewed       Procedure Component Value Units Date/Time    Comprehensive metabolic panel [[id number]]  (Abnormal) Collected: 07/28/24 0544    Lab Status: Final result Specimen: Blood from Arm, Right Updated: 07/28/24 0633     Sodium 131 mmol/L      Potassium 4.6 mmol/L      Chloride 94  mmol/L      CO2 29 mmol/L      ANION GAP 8 mmol/L      BUN 14 mg/dL      Creatinine 0.75 mg/dL      Glucose 137 mg/dL      Calcium 9.9 mg/dL      AST 14 U/L      ALT 8 U/L      Alkaline Phosphatase 96 U/L      Total Protein 6.9 g/dL      Albumin 4.0 g/dL      Total Bilirubin 0.29 mg/dL      eGFR 82 ml/min/1.73sq m     Narrative:      National Kid[employer] Foundation guidelines for Chronic Kidney Disease (CKD):     Stage 1 with normal or high GFR (GFR > 90 mL/min/1.73 square meters)    Stage 2 Mild CKD (GFR = 60-89 mL/min/1.73 square meters)    Stage 3A Moderate CKD (GFR = 45-59 mL/min/1.73 square meters)    Stage 3B Moderate CKD (GFR = 30-44 mL/min/1.73 square meters)    Stage 4 Severe CKD (GFR = 15-29 mL/min/1.73 square meters)    Stage 5 End Stage CKD (GFR <15 mL/min/1.73 square meters)  Note: GFR calculation is accurate only with a steady state creatinine    Magnesium [[id number]]  (Abnormal) Collected: 07/28/24 0544    Lab Status: Final result Specimen: Blood from Arm, Right Updated: 07/28/24 0608     Magnesium 1.8 mg/dL     CBC and differential [[id number]] Collected: 07/28/24 0544    Lab Status: Final result Specimen: Blood from Arm, Right Updated: 07/28/24 0549     WBC 7.88 Thousand/uL      RBC 4.43 Million/uL      Hemoglobin 12.6 g/dL      Hematocrit 39.0 %      MCV 88 fL      MCH 28.4 pg      MCHC 32.3 g/dL      RDW 12.1 %      MPV 10.1 fL      Platelets 224 Thousands/uL      nRBC 0 /100 WBCs      Segmented % 58 %      Immature Grans % 0 %      Lymphocytes % 30 %      Monocytes % 10 %      Eosinophils Relative 1 %      Basophils Relative 1 %      Absolute Neutrophils 4.59 Thousands/µL      Absolute Immature Grans 0.03 Thousand/uL      Absolute Lymphocytes 2.33 Thousands/µL      Absolute Monocytes 0.79 Thousand/µL      Eosinophils Absolute 0.09 Thousand/µL      Basophils Absolute 0.05 Thousands/µL                    No orders to display              Procedures  Procedures         ED Course                                  SBIRT 20yo+      Flowsheet Row Most Recent Value   Initial Alcohol Screen: US AUDIT-C     1. How often do you have a drink containing alcohol? 0 Filed at: 07/28/2024 0530   2. How many drinks containing alcohol do you have on a typical day you are drinking?  0 Filed at: 07/28/2024 0530   3a. Male UNDER 65: How often do you have five or more drinks on one occasion? 0 Filed at: 07/28/2024 0530   3b. FEMALE Any Age, or MALE 65+: How often do you have 4 or more drinks on one occassion? 0 Filed at: 07/28/2024 0530   Audit-C Score 0 Filed at: 07/28/2024 0530   LIVIER: How many times in the past year have you...    Used an illegal drug or used a prescription medication for non-medical reasons? Never Filed at: 07/28/2024 0530                      Medical Decision Making  This patient presents with symptoms most consistent with an acute COPD exacerbation.  The constellation of symptoms are similar to prior exacerbations.  The likely precipitant is viral respiratory infection versus weather change or air quality.  Low suspicion for alternate etiologies such as pneumothorax, acute PE, pneumonia.  Presentation not consistent with other acute cardiopulmonary causes including ACS, CHF.  Patient given DuoNeb, albuterol and IV steroids here with improvement of symptoms. No hypoxia noted.  Workup pending at time of transfer of care to Dr. Buckley    Problems Addressed:  Anxiety disorder due to general medical condition with panic attack: chronic illness or injury with exacerbation, progression, or side effects of treatment  Chronic respiratory failure with hypoxia (HCC): chronic illness or injury  COPD with acute exacerbation (HCC): chronic illness or injury  Dyspnea: chronic illness or injury with exacerbation, progression, or side effects of treatment  Hyponatremia: chronic illness or injury  Tobacco abuse: chronic illness or injury    Amount and/or Complexity of Data Reviewed  Labs: ordered.  Decision-making details documented in ED Course.  Radiology: ordered and independent interpretation performed. Decision-making details documented in ED Course.  ECG/medicine tests: ordered and independent interpretation performed. Decision-making details documented in ED Course.    Risk  OTC drugs.  Prescription drug management.                 Disposition  Final diagnoses:   Dyspnea   Chronic respiratory failure with hypoxia (HCC)   Hyponatremia   Tobacco abuse   COPD with acute exacerbation (HCC)   Anxiety disorder due to general medical condition with panic attack     Time reflects when diagnosis was documented in both MDM as applicable and the Disposition within this note       Time User Action Codes Description Comment    7/28/2024  6:11 AM Bonfante Terry Add [R06.00] Dyspnea     7/28/2024  6:13 AM Bonfante, Terry Add [J96.11] Chronic respiratory failure with hypoxia (HCC)     7/28/2024  6:13 AM Bonfante Terry Add [E87.1] Hyponatremia     7/28/2024  6:13 AM Bonfante, Terry Add [Z72.0] Tobacco abuse     7/28/2024  6:13 AM Bonfante Terry Add [J44.1] COPD with acute exacerbation (HCC)     7/28/2024  6:13 AM Bonfante, Terry Add [F06.4,  F41.0] Anxiety disorder due to general medical condition with panic attack           ED Disposition       ED Disposition   Discharge    Condition   Stable    Date/Time   Sun Jul 28, 2024 0644    Comment   Jessika Lux discharge to home/self care.                   Follow-up Information       Follow up With Specialties Details Why Contact Info    Karlie Bruno DO [address] [id number] [phone]              Discharge Medication List as of 7/28/2024  6:49 AM        CONTINUE these medications which have NOT CHANGED    Details   amLODIPine (NORVASC) 10 mg tablet Take 10 mg by mouth daily at bedtime , Historical Med      atorvastatin (LIPITOR) 40 mg tablet Take 40 mg by mouth daily at bedtime , Historical Med      cholecalciferol (VITAMIN D3)  1,000 units tablet Take 1 tablet (1,000 Units total) by mouth daily, Starting Sun 5/26/2024, Normal      cloNIDine (CATAPRES) 0.2 mg tablet Take 0.2 mg by mouth every 12 (twelve) hours, Historical Med      fluticasone (FLONASE) 50 mcg/act nasal spray 1 spray into each nostril daily, Starting Sun 5/26/2024, Normal      fluticasone-umeclidinium-vilanterol (Trelegy Ellipta) 200-62.5-25 mcg/actuation AEPB inhaler Inhale 1 puff daily Rinse mouth after use., Starting Thu 3/14/2024, Until Tue 9/10/2024, Normal      glipiZIDE (GLUCOTROL) 10 mg tablet Take 10 mg by mouth daily in the early morning W breakfast, Historical Med      hydrOXYzine HCL (ATARAX) 10 mg tablet Take 10 mg by mouth every 6 (six) hours as needed for itching, Historical Med      ipratropium-albuterol (DUO-NEB) 0.5-2.5 mg/3 mL nebulizer solution Take 3 mL by nebulization 3 (three) times a day, Starting Wed 9/27/2023, Normal      lisinopril (ZESTRIL) 40 mg tablet Take 40 mg by mouth daily, Historical Med      LORazepam (ATIVAN) 0.5 mg tablet Take 0.5 mg by mouth every 8 (eight) hours as needed for anxiety, Historical Med      methylPREDNISolone 4 MG tablet therapy pack Use as directed on package, Normal      metoprolol tartrate (LOPRESSOR) 100 mg tablet Take 100 mg by mouth every 12 (twelve) hours, Historical Med      omeprazole (PriLOSEC OTC) 20 MG tablet Take 20 mg by mouth daily , Historical Med      pioglitazone (ACTOS) 15 mg tablet Take 15 mg by mouth daily, Historical Med                 PDMP Review         Value Time User    PDMP Reviewed  Yes 3/17/2024 11:09 AM Ailyn Aguilera MD            ED Provider  Electronically Signed by             Korina Olivarez DO  07/29/24 2548

## 2024-07-31 ENCOUNTER — PATIENT OUTREACH (OUTPATIENT)
Dept: CASE MANAGEMENT | Facility: OTHER | Age: 69
End: 2024-07-31

## 2024-07-31 NOTE — PROGRESS NOTES
In basket received for patient triage at  Emergency Department for c/o SOB.  Patient treated for COPD exacerbation; DuoNeb, albuterol and IV steroids administered with noted improvement.  Referral was placed to palliative care.      Review of chart reflects referral to Palliative Care has been denied. All listed associated diagnosis are not an approved diagnosis for Palliative Care Services.      Discharge follow up call. Patient discharged from 7/28.     Is this a good time for you to talk?     No answer when contacted on preferred number ending [phone].  Left generic msg requesting return call.     Left generic VM on [facility] phone.      Unable to reach letter mailed to address on file.     Will remain available to assist; next outreach scheduled.

## 2024-07-31 NOTE — LETTER
Date: 07/31/24    Dear Jessika Lux,   My name is Jose Guadalupe Ribera; I am an Outpatient Registered Nurse Care Manager working with [facility] and [facility].  I have not been able to reach you and would like to set a time that I can talk with you over the phone.  Please see my contact information below.  My work is to help patients that have recently been in the hospital or emergency room get access to the care they need.  This includes coordination of appointments, providing education on medical conditions and medications along with offering assistance in locating community resources should you be experiencing financial hardship with food, heating, housing and / or transportation.   I look forward to hearing from you.  Sincerely,    Jose Guadalupe Ribera RN  Lead Outpatient Care Manager  P. [phone] (this number does not accept text messages)  BRAYDON [email or web address]  W. [email or web address]  Office Hours:  Monday- Friday 8am - 430p

## 2024-08-05 ENCOUNTER — PATIENT OUTREACH (OUTPATIENT)
Dept: CASE MANAGEMENT | Facility: OTHER | Age: 69
End: 2024-08-05

## 2024-08-05 DIAGNOSIS — Z78.9 NEED FOR FOLLOW-UP BY SOCIAL WORKER: ICD-10-CM

## 2024-08-05 DIAGNOSIS — Z78.9 NEEDS ASSISTANCE WITH COMMUNITY RESOURCES: Primary | ICD-10-CM

## 2024-08-05 RX ORDER — SODIUM CHLORIDE 1 G/1
2 TABLET ORAL DAILY
COMMUNITY

## 2024-08-05 NOTE — PROGRESS NOTES
Return call to patient.  Prompted with recording 'We're sorry, your call cannot be completed at this time. Please check the listing and try again later.'   A second call was placed with same outcome.     Call placed to PIYUSH/Miladis Leal Viars.  Miladis reports her phone was just fixed and requested call back to her home phone stating she is with patient and will answer.     Discharge follow up call. Patient discharged from 7/28.     Is this a good time for you to talk?     Yes -- all questions answered by patient's daughter who was on speaker phone.    We want to make sure you continue to improve now that you are home. Do you have your discharge instructions/AVS?      Yes    Meds:  Do you have your medication list?     Yes  Do you have your medications?     Yes  Are you able to afford your medications?     Yes  Are you taking your medications as ordered?     Yes; miladis reports she provides to patient.  She requested assistance with patient's anxiety and reports 'bizarre behavior' that started one month ago.  Mel reports patient 1) Uses flashlight and mirror to stare up nose for hours looking for pills 2) Taking ring off plastic water bottles for fear that she may swallow and saving and 3) Reading exp dates on everything if not visible, she will not eat / drink.  RN CM agreed to msg provider with request call and discuss.    Do you have any questions about your medications?     No  Reviewed medication list with patient?     Yes; patient chooses to take Atarax once daily and is taking Na Chloride 2g every am.   Are you experiencing any new or worsening symptoms?     No    Appts:  Did you make your follow-up appointments?     Yes  Do you need assistance scheduling follow-up appointments?      No  Do you have transportation for appointments & to  medications?     Yes    Caregiver Support:  Do you have sufficient caregiver support at home?     No. Miladis Leal is paid caregiver for 11hrs / week.    Mel employed through [employer].  She reports working presently w/[employer] @P. [phone] due to her other coordinator being on vacation.   Miladis states trying to contact waiver  [facility] [facility] for the last 2 months without success.  Contact info: M. [phone]; O. [phone] and BRAYDON [email or web address].    Informed miladis this RN CM will enlist the help of additional outpatient teams members for follow up on this.   Have you been contacted by, or visited by, Home Care Services?     N/a; not interested    Patient refuses to check blood glucose levels.  Encouraged patient drink plenty of water and Miladis reports patient refuses and consumes mostly soda.      Jessika wears oxygen @ 3L w/O2 Sat 97-98%.  She continues to smokes 0.5 pack per day and does not wear patch.  Educated miladis on oxygen being highly flammable and being at risk for fire/explosion when near flames.  Mel reports patient does not wear oxygen when smoking.      Discussed palliative care and symptom management of end stage diseases.  Thalia reports patient will not be interested and this RN CM could hear Jessika yelling out in the background 'leave me alone.'    Daughter wrote and repeated back number of this RN CM.  She requested f/u call to her cell number as patient will not likely answer her phone.      Placed to Ambulatory referral to social work care management program for assistance with add'l waiver hours / support.  Note routed.     Call placed to PCP office to report above symptoms and request to contact miladis. Spoke with nurse Karl to report symptoms. Provided Mel's number.    Next outreach scheduled.      (0) Normal

## 2024-08-05 NOTE — PROGRESS NOTES
VM received from patient acknowledging call of this RN Care Manager.     Return call placed to patient this afternoon.  Per female party that answered, patient was not available.  RN BRIANNE stated name and reason for call was to returns Jessika's msg and female party requested call back in 15 min.

## 2024-08-06 ENCOUNTER — PATIENT OUTREACH (OUTPATIENT)
Dept: CASE MANAGEMENT | Facility: OTHER | Age: 69
End: 2024-08-06

## 2024-08-06 NOTE — PROGRESS NOTES
OP.CM JUAN RAMON received new referral from Jose Guadalupe Ribera RN, CM. Jessika is a HU and has a care plan. Chart review completed. Referred to JUAN RAMON DECKER due to patient requesting more wavier hours and not hearing back from her  in two months.     JUAN RAMON DECKER outreached to  Ramses López to discuss. Introduced self to Ramses. Ramses explained to me that he just did an assessment 3 0 days ago to increase hours, in which he did not feel she needed. Ramses also let me know that he did reach out to both of the sisters two days ago, and have not received a call back. He states that if they request an evaluation they will have to do one. He let me know that if they want Jessika to be re-evaluated to have the sisters please call him back. He will follow up again by the end of the week. No other SW needs noted.     Update to Jose Guadalupe Ribera RN, CM regarding same.

## 2024-08-10 ENCOUNTER — HOSPITAL ENCOUNTER (EMERGENCY)
Facility: HOSPITAL | Age: 69
Discharge: HOME | End: 2024-08-10
Attending: EMERGENCY MEDICINE
Payer: COMMERCIAL

## 2024-08-10 ENCOUNTER — APPOINTMENT (EMERGENCY)
Dept: RADIOLOGY | Facility: HOSPITAL | Age: 69
End: 2024-08-10
Payer: COMMERCIAL

## 2024-08-10 VITALS
WEIGHT: 150.35 LBS | SYSTOLIC BLOOD PRESSURE: 124 MMHG | OXYGEN SATURATION: 100 % | RESPIRATION RATE: 26 BRPM | BODY MASS INDEX: 27.5 KG/M2 | TEMPERATURE: 97.9 F | DIASTOLIC BLOOD PRESSURE: 73 MMHG | HEART RATE: 76 BPM

## 2024-08-10 DIAGNOSIS — R06.00 DYSPNEA: Primary | ICD-10-CM

## 2024-08-10 LAB
ALBUMIN SERPL BCG-MCNC: 4.3 G/DL (ref 3.5–5)
ALP SERPL-CCNC: 94 U/L (ref 34–104)
ALT SERPL W P-5'-P-CCNC: 9 U/L (ref 7–52)
ANION GAP SERPL CALCULATED.3IONS-SCNC: 8 MMOL/L (ref 4–13)
AST SERPL W P-5'-P-CCNC: 12 U/L (ref 13–39)
BASOPHILS # BLD AUTO: 0.03 THOUSANDS/ÂΜL (ref 0–0.1)
BASOPHILS NFR BLD AUTO: 0 % (ref 0–1)
BILIRUB SERPL-MCNC: 0.39 MG/DL (ref 0.2–1)
BUN SERPL-MCNC: 7 MG/DL (ref 5–25)
CALCIUM SERPL-MCNC: 9.9 MG/DL (ref 8.4–10.2)
CARDIAC TROPONIN I PNL SERPL HS: 5 NG/L
CHLORIDE SERPL-SCNC: 98 MMOL/L (ref 96–108)
CO2 SERPL-SCNC: 32 MMOL/L (ref 21–32)
CREAT SERPL-MCNC: 0.68 MG/DL (ref 0.6–1.3)
EOSINOPHIL # BLD AUTO: 0.08 THOUSAND/ÂΜL (ref 0–0.61)
EOSINOPHIL NFR BLD AUTO: 1 % (ref 0–6)
ERYTHROCYTE [DISTWIDTH] IN BLOOD BY AUTOMATED COUNT: 12.2 % (ref 11.6–15.1)
GFR SERPL CREATININE-BSD FRML MDRD: 90 ML/MIN/1.73SQ M
GLUCOSE SERPL-MCNC: 148 MG/DL (ref 65–140)
HCT VFR BLD AUTO: 41.8 % (ref 34.8–46.1)
HGB BLD-MCNC: 13.4 G/DL (ref 11.5–15.4)
IMM GRANULOCYTES # BLD AUTO: 0.02 THOUSAND/UL (ref 0–0.2)
IMM GRANULOCYTES NFR BLD AUTO: 0 % (ref 0–2)
LYMPHOCYTES # BLD AUTO: 1.81 THOUSANDS/ÂΜL (ref 0.6–4.47)
LYMPHOCYTES NFR BLD AUTO: 24 % (ref 14–44)
MCH RBC QN AUTO: 28.4 PG (ref 26.8–34.3)
MCHC RBC AUTO-ENTMCNC: 32.1 G/DL (ref 31.4–37.4)
MCV RBC AUTO: 89 FL (ref 82–98)
MONOCYTES # BLD AUTO: 0.57 THOUSAND/ÂΜL (ref 0.17–1.22)
MONOCYTES NFR BLD AUTO: 8 % (ref 4–12)
NEUTROPHILS # BLD AUTO: 4.96 THOUSANDS/ÂΜL (ref 1.85–7.62)
NEUTS SEG NFR BLD AUTO: 67 % (ref 43–75)
NRBC BLD AUTO-RTO: 0 /100 WBCS
PLATELET # BLD AUTO: 242 THOUSANDS/UL (ref 149–390)
PMV BLD AUTO: 9.8 FL (ref 8.9–12.7)
POTASSIUM SERPL-SCNC: 3.9 MMOL/L (ref 3.5–5.3)
PROT SERPL-MCNC: 7.5 G/DL (ref 6.4–8.4)
RBC # BLD AUTO: 4.72 MILLION/UL (ref 3.81–5.12)
SODIUM SERPL-SCNC: 138 MMOL/L (ref 135–147)
WBC # BLD AUTO: 7.47 THOUSAND/UL (ref 4.31–10.16)

## 2024-08-10 PROCEDURE — 99285 EMERGENCY DEPT VISIT HI MDM: CPT

## 2024-08-10 PROCEDURE — 80053 COMPREHEN METABOLIC PANEL: CPT | Performed by: EMERGENCY MEDICINE

## 2024-08-10 PROCEDURE — 94640 AIRWAY INHALATION TREATMENT: CPT

## 2024-08-10 PROCEDURE — 93005 ELECTROCARDIOGRAM TRACING: CPT

## 2024-08-10 PROCEDURE — 84484 ASSAY OF TROPONIN QUANT: CPT | Performed by: EMERGENCY MEDICINE

## 2024-08-10 PROCEDURE — 36415 COLL VENOUS BLD VENIPUNCTURE: CPT

## 2024-08-10 PROCEDURE — 71045 X-RAY EXAM CHEST 1 VIEW: CPT

## 2024-08-10 PROCEDURE — 96374 THER/PROPH/DIAG INJ IV PUSH: CPT

## 2024-08-10 PROCEDURE — 99285 EMERGENCY DEPT VISIT HI MDM: CPT | Performed by: EMERGENCY MEDICINE

## 2024-08-10 PROCEDURE — 85025 COMPLETE CBC W/AUTO DIFF WBC: CPT | Performed by: EMERGENCY MEDICINE

## 2024-08-10 RX ORDER — LORAZEPAM 2 MG/ML
0.5 INJECTION INTRAMUSCULAR ONCE
Status: COMPLETED | OUTPATIENT
Start: 2024-08-10 | End: 2024-08-10

## 2024-08-10 RX ORDER — LEVALBUTEROL INHALATION SOLUTION 1.25 MG/3ML
1.25 SOLUTION RESPIRATORY (INHALATION) ONCE
Status: COMPLETED | OUTPATIENT
Start: 2024-08-10 | End: 2024-08-10

## 2024-08-10 RX ORDER — LORAZEPAM 2 MG/ML
1 INJECTION INTRAMUSCULAR ONCE
Status: DISCONTINUED | OUTPATIENT
Start: 2024-08-10 | End: 2024-08-10

## 2024-08-10 RX ADMIN — LEVALBUTEROL HYDROCHLORIDE 1.25 MG: 1.25 SOLUTION RESPIRATORY (INHALATION) at 08:44

## 2024-08-10 RX ADMIN — LORAZEPAM 0.5 MG: 2 INJECTION INTRAMUSCULAR; INTRAVENOUS at 08:42

## 2024-08-10 NOTE — DISCHARGE INSTRUCTIONS
Continue your normal medications at home.  Return with any worsening.  Continue outpatient follow-up with your primary care provider.

## 2024-08-10 NOTE — ED PROVIDER NOTES
History  Chief Complaint   Patient presents with    Shortness of Breath     PT STATES SOB STARTED LAST NIGHT. HX OF COPD AND WEARS 3 L OF NC OXYGEN. PT DENIES CP. DENIES FEVER, COUGH     69-year-old female to the emergency room with chief complaint of anxiety and shortness of breath.  Patient reports that she started with shortness of breath last evening.  She took one of her breathing treatments and when she not improved she was brought to the hospital for further evaluation.  Patient reports that he is feeling slightly improved at this time.  She is with a pulse ox of 92% on her normal baseline 3 L of oxygen.  Patient was seen by her physician yesterday and prescribed some new medications related to her mental health disorder.  She has not taken her morning medications as of yet.  Currently she has no chest pain or pressure.  Denies any diaphoresis.  No reported fevers or chills.      History provided by:  Patient  Shortness of Breath  Severity:  Unable to specify  Onset quality:  Gradual  Duration:  4 hours  Timing:  Constant  Chronicity:  Chronic  Context: emotional upset    Associated symptoms: cough and wheezing    Associated symptoms: no diaphoresis, no headaches, no hemoptysis, no swollen glands and no vomiting        Prior to Admission Medications   Prescriptions Last Dose Informant Patient Reported? Taking?   LORazepam (ATIVAN) 0.5 mg tablet  Self, Child Yes No   Sig: Take 0.5 mg by mouth every 8 (eight) hours as needed for anxiety   amLODIPine (NORVASC) 10 mg tablet  Self, Child Yes No   Sig: Take 10 mg by mouth daily at bedtime    atorvastatin (LIPITOR) 40 mg tablet  Self, Child Yes No   Sig: Take 40 mg by mouth daily at bedtime    cholecalciferol (VITAMIN D3) 1,000 units tablet  Child No No   Sig: Take 1 tablet (1,000 Units total) by mouth daily   Patient not taking: Reported on 8/5/2024   cloNIDine (CATAPRES) 0.2 mg tablet  Self, Child Yes No   Sig: Take 0.2 mg by mouth every 12 (twelve) hours    fluticasone (FLONASE) 50 mcg/act nasal spray  Child No No   Si spray into each nostril daily   fluticasone-umeclidinium-vilanterol (Trelegy Ellipta) 200-62.5-25 mcg/actuation AEPB inhaler  Self, Child No No   Sig: Inhale 1 puff daily Rinse mouth after use.   glipiZIDE (GLUCOTROL) 10 mg tablet  Self, Child Yes No   Sig: Take 10 mg by mouth daily in the early morning W breakfast   hydrOXYzine HCL (ATARAX) 10 mg tablet  Self, Child Yes No   Sig: Take 10 mg by mouth every 6 (six) hours as needed for itching   ipratropium-albuterol (DUO-NEB) 0.5-2.5 mg/3 mL nebulizer solution  Self, Child No No   Sig: Take 3 mL by nebulization 3 (three) times a day   lisinopril (ZESTRIL) 40 mg tablet  Self, Child Yes No   Sig: Take 40 mg by mouth daily   metoprolol tartrate (LOPRESSOR) 100 mg tablet  Self, Child Yes No   Sig: Take 100 mg by mouth every 12 (twelve) hours   omeprazole (PriLOSEC OTC) 20 MG tablet  Self, Child Yes No   Sig: Take 20 mg by mouth daily    pioglitazone (ACTOS) 15 mg tablet  Child Yes No   Sig: Take 15 mg by mouth daily   sodium chloride 1 g tablet  Child Yes No   Sig: Take 2 g by mouth in the morning      Facility-Administered Medications: None       Past Medical History:   Diagnosis Date    Chronic respiratory failure with hypoxia, on home O2 therapy  ([facility])     3L NC at home    COPD exacerbation ([facility])     COPD, group D, by GOLD 2017 classification ([facility])     Diabetes mellitus (HCC)     Diverticulosis of sigmoid colon     Dyslipidemia     Essential (primary) hypertension     NELSON (generalized anxiety disorder)     GERD (gastroesophageal reflux disease)     Lung nodule     RUL    SIADH (syndrome of inappropriate ADH production) ([facility])     Tobacco dependence     Vitamin D deficiency        History reviewed. No pertinent surgical history.    Family History   Problem Relation Age of Onset    Diabetes Father      I have reviewed and agree with the history as documented.    E-Cigarette/Vaping    E-Cigarette Use  Never User      E-Cigarette/Vaping Substances    Nicotine No     THC No     CBD No     Flavoring No      Social History     Tobacco Use    Smoking status: Some Days     Current packs/day: 0.25     Types: Cigarettes     Passive exposure: Never    Smokeless tobacco: Never   Vaping Use    Vaping status: Never Used   Substance Use Topics    Alcohol use: Not Currently    Drug use: Never       Review of Systems   Constitutional:  Negative for diaphoresis.   Respiratory:  Positive for cough, shortness of breath and wheezing. Negative for hemoptysis.    Cardiovascular: Negative.    Gastrointestinal: Negative.  Negative for vomiting.   Genitourinary: Negative.    Neurological:  Negative for headaches.       Physical Exam  Physical Exam  Vitals and nursing note reviewed.   Constitutional:       General: She is not in acute distress.     Appearance: She is normal weight. She is not ill-appearing or toxic-appearing.   HENT:      Head: Normocephalic and atraumatic.      Right Ear: External ear normal.      Left Ear: External ear normal.      Nose: Nose normal.      Mouth/Throat:      Mouth: Mucous membranes are moist.   Cardiovascular:      Rate and Rhythm: Normal rate.   Pulmonary:      Effort: Pulmonary effort is normal. No respiratory distress.      Breath sounds: Decreased breath sounds present. No wheezing, rhonchi or rales.      Comments: Patient has no wheezing rales or rhonchi on today's exam  Abdominal:      General: Abdomen is flat. There is no distension.   Musculoskeletal:         General: No signs of injury.   Skin:     Coloration: Skin is not pale.   Neurological:      General: No focal deficit present.      Mental Status: She is alert.   Psychiatric:         Mood and Affect: Mood is anxious.         Thought Content: Thought content normal.         Judgment: Judgment normal.         Vital Signs  ED Triage Vitals [08/10/24 0746]   Temperature Pulse Respirations Blood Pressure SpO2   97.9 °F (36.6 °C) 90 (!) 26 (!)  189/84 92 %      Temp Source Heart Rate Source Patient Position - Orthostatic VS BP Location FiO2 (%)   Temporal -- -- -- --      Pain Score       No Pain           Vitals:    08/10/24 0746 08/10/24 0848   BP: (!) 189/84 124/73   Pulse: 90          Visual Acuity      ED Medications  Medications   LORazepam (ATIVAN) injection 0.5 mg (0.5 mg Intravenous Given 8/10/24 0842)   levalbuterol (XOPENEX) inhalation solution 1.25 mg (1.25 mg Nebulization Given 8/10/24 0844)       Diagnostic Studies  Results Reviewed       Procedure Component Value Units Date/Time    HS Troponin 0hr (reflex protocol) [[id number]]  (Normal) Collected: 08/10/24 0750    Lab Status: Final result Specimen: Blood from Arm, Right Updated: 08/10/24 0820     hs TnI 0hr 5 ng/L     Comprehensive metabolic panel [[id number]]  (Abnormal) Collected: 08/10/24 0750    Lab Status: Final result Specimen: Blood from Arm, Right Updated: 08/10/24 0812     Sodium 138 mmol/L      Potassium 3.9 mmol/L      Chloride 98 mmol/L      CO2 32 mmol/L      ANION GAP 8 mmol/L      BUN 7 mg/dL      Creatinine 0.68 mg/dL      Glucose 148 mg/dL      Calcium 9.9 mg/dL      AST 12 U/L      ALT 9 U/L      Alkaline Phosphatase 94 U/L      Total Protein 7.5 g/dL      Albumin 4.3 g/dL      Total Bilirubin 0.39 mg/dL      eGFR 90 ml/min/1.73sq m     Narrative:      National Kidney Disease Foundation guidelines for Chronic Kidney Disease (CKD):     Stage 1 with normal or high GFR (GFR > 90 mL/min/1.73 square meters)    Stage 2 Mild CKD (GFR = 60-89 mL/min/1.73 square meters)    Stage 3A Moderate CKD (GFR = 45-59 mL/min/1.73 square meters)    Stage 3B Moderate CKD (GFR = 30-44 mL/min/1.73 square meters)    Stage 4 Severe CKD (GFR = 15-29 mL/min/1.73 square meters)    Stage 5 End Stage CKD (GFR <15 mL/min/1.73 square meters)  Note: GFR calculation is accurate only with a steady state creatinine    CBC and differential [[id number]] Collected: 08/10/24 0750    Lab Status: Final result  Specimen: Blood from Arm, Right Updated: 08/10/24 0755     WBC 7.47 Thousand/uL      RBC 4.72 Million/uL      Hemoglobin 13.4 g/dL      Hematocrit 41.8 %      MCV 89 fL      MCH 28.4 pg      MCHC 32.1 g/dL      RDW 12.2 %      MPV 9.8 fL      Platelets 242 Thousands/uL      nRBC 0 /100 WBCs      Segmented % 67 %      Immature Grans % 0 %      Lymphocytes % 24 %      Monocytes % 8 %      Eosinophils Relative 1 %      Basophils Relative 0 %      Absolute Neutrophils 4.96 Thousands/µL      Absolute Immature Grans 0.02 Thousand/uL      Absolute Lymphocytes 1.81 Thousands/µL      Absolute Monocytes 0.57 Thousand/µL      Eosinophils Absolute 0.08 Thousand/µL      Basophils Absolute 0.03 Thousands/µL                    XR chest 1 view portable   Final Result by Pavan Pham MD (08/10 0808)      No acute disease in the chest.            Workstation performed: [id number]                    Procedures  ECG 12 Lead Documentation Only    Date/Time: 8/10/2024 8:26 AM    Performed by: Terry Buckley DO  Authorized by: Terry Buckley DO    Indications / Diagnosis:  Chest pain  ECG reviewed by me, the ED Provider: yes    Patient location:  ED  Interpretation:     Interpretation: normal    Rate:     ECG rate assessment: normal    Rhythm:     Rhythm: sinus rhythm    Ectopy:     Ectopy: none    QRS:     QRS axis:  Normal  Conduction:     Conduction: normal    ST segments:     ST segments:  Normal  T waves:     T waves: normal             ED Course  ED Course as of 08/10/24 0849   Sat Aug 10, 2024   0819 Patient is an emergency department high utilizer with a history of anxiety and COPD.  She did not take her morning meds.  She was feeling short of breath.  Came to the emergency room for further evaluation.  Patient is currently at her baseline of 3 L of oxygen with a normal oxygen saturation.   0826 Chest x-ray negative for acute process.   0848 Heart score 4.  Shortness of breath very typical for this patient.  She  appears clinically stable for discharge.               HEART Risk Score      Flowsheet Row Most Recent Value   Heart Score Risk Calculator    History 0 Filed at: 08/10/2024 0847   ECG 1 Filed at: 08/10/2024 0847   Age 2 Filed at: 08/10/2024 0847   Risk Factors 1 Filed at: 08/10/2024 0847   Troponin 0 Filed at: 08/10/2024 0847   HEART Score 4 Filed at: 08/10/2024 0847                          SBIRT 22yo+      Flowsheet Row Most Recent Value   Initial Alcohol Screen: US AUDIT-C     1. How often do you have a drink containing alcohol? 0 Filed at: 08/10/2024 0748   2. How many drinks containing alcohol do you have on a typical day you are drinking?  0 Filed at: 08/10/2024 0748   3b. FEMALE Any Age, or MALE 65+: How often do you have 4 or more drinks on one occassion? 0 Filed at: 08/10/2024 0748   Audit-C Score 0 Filed at: 08/10/2024 0748   LIVIER: How many times in the past year have you...    Used an illegal drug or used a prescription medication for non-medical reasons? Never Filed at: 08/10/2024 0748                      Medical Decision Making  Patient presented to the emergency department and a MSE was performed. The patient was evaluated for complaint related to acute shortness of breath.  Patient is potentially at risk for, but not limited to, acute coronary syndrome, arrhythmia, myocardial infarction, pulmonary embolism, pneumothorax, infectious process of the lungs such as pneumonia, reactive airway disease, asthma, COPD exacerbation, cardiomyopathy, congestive heart failure or viral syndrome.  Several of these diagnoses have been evaluated and ruled out by history and physical.  As needed, patient will be further evaluated with laboratory and imaging studies.  Higher level diagnostics, such as CT imaging or ultrasound, may also be required.  Please see work-up portion of the note for further evaluation of patient's risk.  Socioeconomic factors were also considered as part of the decision-making process.  Unless  otherwise stated in the chart or patient is admitted as elsewhere documented, any previously prescribed medications will be maintained.      Problems Addressed:  Dyspnea: chronic illness or injury with exacerbation, progression, or side effects of treatment    Amount and/or Complexity of Data Reviewed  Labs: ordered.  Radiology: ordered.    Risk  Prescription drug management.                 Disposition  Final diagnoses:   Dyspnea     Time reflects when diagnosis was documented in both MDM as applicable and the Disposition within this note       Time User Action Codes Description Comment    8/10/2024  8:27 AM Terry Buckley Add [R06.00] Dyspnea           ED Disposition       ED Disposition   Discharge    Condition   Stable    Date/Time   Sat Aug 10, 2024 0827    Comment   Jessika Lux discharge to home/self care.                   Follow-up Information    None         Patient's Medications   Discharge Prescriptions    No medications on file       No discharge procedures on file.    PDMP Review         Value Time User    PDMP Reviewed  Yes 3/17/2024 11:09 AM Ailyn Aguilera MD            ED Provider  Electronically Signed by             Terry Buckley DO  08/10/24 0849

## 2024-08-11 LAB
ATRIAL RATE: 74 BPM
P AXIS: 62 DEGREES
PR INTERVAL: 102 MS
QRS AXIS: 44 DEGREES
QRSD INTERVAL: 76 MS
QT INTERVAL: 368 MS
QTC INTERVAL: 408 MS
T WAVE AXIS: 52 DEGREES
VENTRICULAR RATE: 74 BPM

## 2024-08-11 PROCEDURE — 93010 ELECTROCARDIOGRAM REPORT: CPT | Performed by: INTERNAL MEDICINE

## 2024-08-15 ENCOUNTER — PATIENT OUTREACH (OUTPATIENT)
Dept: CASE MANAGEMENT | Facility: OTHER | Age: 69
End: 2024-08-15

## 2024-08-15 NOTE — PROGRESS NOTES
Outpatient Care Management   Patient was in the ED with dyspnea. She used 02 @ 3 L and has a history of COPD. She explained to the provicder that she has not taken her morning meds.She was treated with IV ativan and had a nebulizer treatment. She was able to be discharged home with reinforcement on taking her medications as prescribed.    Called and left a  without any PHI. Introduced myself as an OP Nurse Care Manager assisting Jose Guadalupe today.Left Jose Guadalupe's phone #. Will continue to follow via chart review and telephone outreach.    Note routed to Jose Guadalupe.

## 2024-08-18 ENCOUNTER — DOCUMENTATION (OUTPATIENT)
Dept: CASE MANAGEMENT | Facility: OTHER | Age: 69
End: 2024-08-18

## 2024-08-18 NOTE — MEDICAL HIGH UTILIZER
High Utilizer Care Plan for: Jessika Lux  Updated: 2024  Patient Name: Jessika Lux MRN: [id number]       : 1955    Age: 69   Sex: F      Utilization Background: In the 6 months prior to the care plan being written the patient has had 21 ED Visits and 4 IP Admissions secondary to dyspnea/COPD/anxiety.     In the last 90 Days: 4 ED visits, 1 IP Admissions    Most Recent Work Up:    CXR 2024: No acute pulmonary disease      CT Chest 2023: Slight increase in size of a 7 mm right upper lobe nodule with irregular borders. Neoplasm is in the differential.    CT Chest, Abdomen, Pelvis 3/2024 Irregular shaped nodule along the pleural surface in the posterior right lung apex, 7 x 6 mm in size, unchanged in size and CT appearance at least as far back as 2023; because this is somewhat linear on sagittal imaging and is unchanged for at least 8 months, the findings are most suspicious for a focal subpleural scar. No CT findings to suggest developing malignancy or to convincingly account for the patient's unexpected weight loss. Sigmoid diverticulosis without acute diverticulitis.  Treatment Recommendations:    ED Recommendations: Patient will typically present with COPD/dyspnea. At the patient's baseline she wears 4 L NC O2 and becomes breathless with minimal exertion. This causes the patient to become very anxious.     Recommend evaluation with basic blood work, chest x-ray likely unavoidable given history and chief complaint. Can consider treatment with corticosteroid and nebulized breathing treatment of provider choice. Would also consider giving patient her home Atarax dose or other anxiolytic of provider choice.     If after treatment the patient is back to her baseline would recommend discharge home with outpatient follow up with pulmonary disease. If discharging home would recommend short course of prednisone taper per provider discretion.     Would refer patient to Palliative Care or encourage  follow up for better control of anxiety and symptom control.     Inpatient Recommendations: Would refrain from admission if patient is at her baseline 3 L O2 and satting well. Can consider psych consult as has been done in the past if anxiety remains uncontrolled. If true COPD exacerbation can consult Pulmonary disease and continue treatment with steroids IV and nebulized breathing treatments.         Outpatient recommendations: Patient should establish with Palliative Care for better anxiety/symptom management. Continue to follow up closely with Pulmonary Disease. She can consider following Psychiatry as well, however they need to be aware of her SIADH/hyponatremia history as many psych meds can exacerbate this.      Situation: Patient is a 69 year old female with history of COPD, Chronic Respiratory Failure on Oxygen, and Anxiety presenting frequently with COPD exacerbation/anxiety driven by her dyspnea.      PMH/PSH: COPD, Chronic Respiratory Failure on 4 L O2, Anxiety, SIADH, T2DM, HTN, GERD, Tobacco Abuse      Assessment                              Drivers of repeated utilization:                 Dyspnea  COPD  Anxiety     Community Resources in place:    Needs Palliative Care Follow Up    Patient Care Team:   Karlie Bruno DO - PCP ([place])  [facility]   Jose Guadalupe Ribera RN - Outpatient RN Care Manager              Care plan date and owner: Rc Mistry Jr., PA-C   1/30/2024     Reviewed with patient before discharge?   No

## 2024-08-28 DIAGNOSIS — J44.1 COPD WITH ACUTE EXACERBATION (HCC): ICD-10-CM

## 2024-08-28 DIAGNOSIS — J44.9 COPD, SEVERE (HCC): ICD-10-CM

## 2024-08-28 RX ORDER — FLUTICASONE FUROATE, UMECLIDINIUM BROMIDE AND VILANTEROL TRIFENATATE 200; 62.5; 25 UG/1; UG/1; UG/1
1 POWDER RESPIRATORY (INHALATION) DAILY
Qty: 60 EACH | Refills: 5 | Status: SHIPPED | OUTPATIENT
Start: 2024-08-28 | End: 2025-02-24

## 2024-09-11 RX ORDER — ARIPIPRAZOLE 2 MG/1
2 TABLET ORAL DAILY
COMMUNITY
Start: 2024-08-09

## 2024-09-11 RX ORDER — QUETIAPINE FUMARATE 25 MG/1
TABLET, FILM COATED ORAL
COMMUNITY
Start: 2024-08-23

## 2024-09-13 ENCOUNTER — APPOINTMENT (EMERGENCY)
Dept: RADIOLOGY | Facility: HOSPITAL | Age: 69
End: 2024-09-13
Payer: COMMERCIAL

## 2024-09-13 ENCOUNTER — HOSPITAL ENCOUNTER (EMERGENCY)
Facility: HOSPITAL | Age: 69
Discharge: HOME | End: 2024-09-13
Attending: EMERGENCY MEDICINE
Payer: COMMERCIAL

## 2024-09-13 VITALS
RESPIRATION RATE: 36 BRPM | BODY MASS INDEX: 27.98 KG/M2 | SYSTOLIC BLOOD PRESSURE: 99 MMHG | HEART RATE: 76 BPM | TEMPERATURE: 99 F | OXYGEN SATURATION: 97 % | DIASTOLIC BLOOD PRESSURE: 58 MMHG | WEIGHT: 153 LBS

## 2024-09-13 DIAGNOSIS — F41.9 ANXIETY: ICD-10-CM

## 2024-09-13 DIAGNOSIS — J44.1 COPD EXACERBATION (HCC): Primary | ICD-10-CM

## 2024-09-13 LAB
ALBUMIN SERPL BCG-MCNC: 3.9 G/DL (ref 3.5–5)
ALP SERPL-CCNC: 85 U/L (ref 34–104)
ALT SERPL W P-5'-P-CCNC: 10 U/L (ref 7–52)
ANION GAP SERPL CALCULATED.3IONS-SCNC: 4 MMOL/L (ref 4–13)
AST SERPL W P-5'-P-CCNC: 17 U/L (ref 13–39)
BASOPHILS # BLD AUTO: 0.03 THOUSANDS/ÂΜL (ref 0–0.1)
BASOPHILS NFR BLD AUTO: 0 % (ref 0–1)
BILIRUB SERPL-MCNC: 0.26 MG/DL (ref 0.2–1)
BUN SERPL-MCNC: 13 MG/DL (ref 5–25)
CALCIUM SERPL-MCNC: 9.3 MG/DL (ref 8.4–10.2)
CHLORIDE SERPL-SCNC: 92 MMOL/L (ref 96–108)
CO2 SERPL-SCNC: 37 MMOL/L (ref 21–32)
CREAT SERPL-MCNC: 0.61 MG/DL (ref 0.6–1.3)
EOSINOPHIL # BLD AUTO: 0.03 THOUSAND/ÂΜL (ref 0–0.61)
EOSINOPHIL NFR BLD AUTO: 0 % (ref 0–6)
ERYTHROCYTE [DISTWIDTH] IN BLOOD BY AUTOMATED COUNT: 12.8 % (ref 11.6–15.1)
FLUAV RNA RESP QL NAA+PROBE: NEGATIVE
FLUBV RNA RESP QL NAA+PROBE: NEGATIVE
GFR SERPL CREATININE-BSD FRML MDRD: 94 ML/MIN/1.73SQ M
GLUCOSE SERPL-MCNC: 125 MG/DL (ref 65–140)
HCT VFR BLD AUTO: 37.2 % (ref 34.8–46.1)
HGB BLD-MCNC: 11.9 G/DL (ref 11.5–15.4)
IMM GRANULOCYTES # BLD AUTO: 0.02 THOUSAND/UL (ref 0–0.2)
IMM GRANULOCYTES NFR BLD AUTO: 0 % (ref 0–2)
LYMPHOCYTES # BLD AUTO: 1.76 THOUSANDS/ÂΜL (ref 0.6–4.47)
LYMPHOCYTES NFR BLD AUTO: 26 % (ref 14–44)
MCH RBC QN AUTO: 28.5 PG (ref 26.8–34.3)
MCHC RBC AUTO-ENTMCNC: 32 G/DL (ref 31.4–37.4)
MCV RBC AUTO: 89 FL (ref 82–98)
MONOCYTES # BLD AUTO: 0.95 THOUSAND/ÂΜL (ref 0.17–1.22)
MONOCYTES NFR BLD AUTO: 14 % (ref 4–12)
NEUTROPHILS # BLD AUTO: 4.11 THOUSANDS/ÂΜL (ref 1.85–7.62)
NEUTS SEG NFR BLD AUTO: 60 % (ref 43–75)
NRBC BLD AUTO-RTO: 0 /100 WBCS
PLATELET # BLD AUTO: 209 THOUSANDS/UL (ref 149–390)
PMV BLD AUTO: 9.8 FL (ref 8.9–12.7)
POTASSIUM SERPL-SCNC: 4.5 MMOL/L (ref 3.5–5.3)
PROT SERPL-MCNC: 6.7 G/DL (ref 6.4–8.4)
RBC # BLD AUTO: 4.18 MILLION/UL (ref 3.81–5.12)
RSV RNA RESP QL NAA+PROBE: NEGATIVE
SARS-COV-2 RNA RESP QL NAA+PROBE: NEGATIVE
SODIUM SERPL-SCNC: 133 MMOL/L (ref 135–147)
WBC # BLD AUTO: 6.9 THOUSAND/UL (ref 4.31–10.16)

## 2024-09-13 PROCEDURE — 36415 COLL VENOUS BLD VENIPUNCTURE: CPT | Performed by: EMERGENCY MEDICINE

## 2024-09-13 PROCEDURE — 99285 EMERGENCY DEPT VISIT HI MDM: CPT

## 2024-09-13 PROCEDURE — 99285 EMERGENCY DEPT VISIT HI MDM: CPT | Performed by: EMERGENCY MEDICINE

## 2024-09-13 PROCEDURE — 94640 AIRWAY INHALATION TREATMENT: CPT

## 2024-09-13 PROCEDURE — 96374 THER/PROPH/DIAG INJ IV PUSH: CPT

## 2024-09-13 PROCEDURE — 93005 ELECTROCARDIOGRAM TRACING: CPT

## 2024-09-13 PROCEDURE — 71045 X-RAY EXAM CHEST 1 VIEW: CPT

## 2024-09-13 PROCEDURE — 96375 TX/PRO/DX INJ NEW DRUG ADDON: CPT

## 2024-09-13 PROCEDURE — 80053 COMPREHEN METABOLIC PANEL: CPT | Performed by: EMERGENCY MEDICINE

## 2024-09-13 PROCEDURE — 85025 COMPLETE CBC W/AUTO DIFF WBC: CPT | Performed by: EMERGENCY MEDICINE

## 2024-09-13 PROCEDURE — 0241U HB NFCT DS VIR RESP RNA 4 TRGT: CPT | Performed by: EMERGENCY MEDICINE

## 2024-09-13 RX ORDER — METHYLPREDNISOLONE SODIUM SUCCINATE 125 MG/2ML
100 INJECTION, POWDER, LYOPHILIZED, FOR SOLUTION INTRAMUSCULAR; INTRAVENOUS ONCE
Status: COMPLETED | OUTPATIENT
Start: 2024-09-13 | End: 2024-09-13

## 2024-09-13 RX ORDER — LORAZEPAM 2 MG/ML
0.5 INJECTION INTRAMUSCULAR ONCE
Status: COMPLETED | OUTPATIENT
Start: 2024-09-13 | End: 2024-09-13

## 2024-09-13 RX ORDER — IPRATROPIUM BROMIDE AND ALBUTEROL SULFATE 2.5; .5 MG/3ML; MG/3ML
3 SOLUTION RESPIRATORY (INHALATION) ONCE
Status: COMPLETED | OUTPATIENT
Start: 2024-09-13 | End: 2024-09-13

## 2024-09-13 RX ADMIN — LORAZEPAM 0.5 MG: 2 INJECTION INTRAMUSCULAR; INTRAVENOUS at 17:03

## 2024-09-13 RX ADMIN — IPRATROPIUM BROMIDE AND ALBUTEROL SULFATE 3 ML: .5; 3 SOLUTION RESPIRATORY (INHALATION) at 17:04

## 2024-09-13 RX ADMIN — METHYLPREDNISOLONE SODIUM SUCCINATE 100 MG: 125 INJECTION, POWDER, FOR SOLUTION INTRAMUSCULAR; INTRAVENOUS at 17:03

## 2024-09-13 NOTE — ED PROVIDER NOTES
1. COPD exacerbation (HCC)    2. Anxiety      ED Disposition       ED Disposition   Discharge    Condition   Stable    Date/Time   Fri Sep 13, 2024  6:06 PM    Comment   Jessika M Viars discharge to home/self care.                   Assessment & Plan       Medical Decision Making  Based on the history and medical screening exam performed the may be at risk for the OPD exacerbation, anxiety, COVID/flu/RSV, pneumonia, hyponatremia, other electrolyte abnormality.    Based on the work-up performed in the emergency room which includes physical examination, and which may include laboratory studies and imaging as warranted including advanced imaging such as CT scan or ultrasound, the diagnostic considerations are narrowed to exclude limb or life-threatening process.    The patient is stable for discharge.  Patient's lab work is benign and COVID/flu RSV testing is negative.  Her chest x-ray is negative.  Improved after nebulization/Ativan.  Appropriate for discharge    Amount and/or Complexity of Data Reviewed  Labs: ordered. Decision-making details documented in ED Course.     Details: Normal without hyponatremia  Radiology: ordered and independent interpretation performed. Decision-making details documented in ED Course.     Details: Chest x-ray negative  ECG/medicine tests: ordered and independent interpretation performed. Decision-making details documented in ED Course.     Details: Normal sinus rhythm rate 81 with left axis    Risk  Prescription drug management.                       Medications   ipratropium-albuterol (DUO-NEB) 0.5-2.5 mg/3 mL inhalation solution 3 mL (3 mL Nebulization Given 9/13/24 1704)   methylPREDNISolone sodium succinate (Solu-MEDROL) injection 100 mg (100 mg Intravenous Given 9/13/24 1703)   LORazepam (ATIVAN) injection 0.5 mg (0.5 mg Intravenous Given 9/13/24 1703)       History of Present Illness       Patient with history of COPD, anxiety, normally on 3-4 L home nasal cannula oxygen complains  of 1 day of shortness of breath.  She states she feels her sodium may be low.  She denies fevers or chills or nausea or vomiting or chest pain.      History provided by:  Patient   used: No    Shortness of Breath  Severity:  Moderate  Onset quality:  Gradual  Duration:  1 day  Timing:  Constant  Progression:  Unchanged  Chronicity:  New  Relieved by:  Nothing  Worsened by:  Nothing  Ineffective treatments:  None tried  Associated symptoms: wheezing    Associated symptoms: no abdominal pain, no chest pain, no claudication, no cough, no ear pain, no fever, no headaches, no hemoptysis, no neck pain, no rash, no sore throat, no sputum production, no syncope and no vomiting            Review of Systems   Constitutional:  Negative for chills and fever.   HENT:  Negative for ear pain, hearing loss, sore throat, trouble swallowing and voice change.    Eyes:  Negative for pain and discharge.   Respiratory:  Positive for shortness of breath and wheezing. Negative for cough, hemoptysis and sputum production.    Cardiovascular:  Negative for chest pain, palpitations, claudication and syncope.   Gastrointestinal:  Negative for abdominal pain, blood in stool, constipation, diarrhea, nausea and vomiting.   Genitourinary:  Negative for dysuria, flank pain, frequency and hematuria.   Musculoskeletal:  Negative for joint swelling, neck pain and neck stiffness.   Skin:  Negative for rash and wound.   Neurological:  Negative for dizziness, seizures, syncope, facial asymmetry and headaches.   Psychiatric/Behavioral:  Negative for hallucinations, self-injury and suicidal ideas.    All other systems reviewed and are negative.          Objective     ED Triage Vitals   Temperature Pulse Blood Pressure Respirations SpO2 Patient Position - Orthostatic VS   09/13/24 1651 09/13/24 1651 09/13/24 1651 09/13/24 1651 09/13/24 1651 09/13/24 1651   99 °F (37.2 °C) 86 140/83 20 93 % Sitting      Temp Source Heart Rate Source BP  Location FiO2 (%) Pain Score    09/13/24 1651 09/13/24 1651 09/13/24 1651 -- 09/13/24 1759    Temporal Monitor Right arm  No Pain        Physical Exam  Vitals and nursing note reviewed.   Constitutional:       General: She is not in acute distress.     Appearance: She is well-developed.   HENT:      Head: Normocephalic and atraumatic.      Right Ear: External ear normal.      Left Ear: External ear normal.   Eyes:      General: No scleral icterus.        Right eye: No discharge.         Left eye: No discharge.      Extraocular Movements: Extraocular movements intact.      Conjunctiva/sclera: Conjunctivae normal.   Cardiovascular:      Rate and Rhythm: Normal rate and regular rhythm.      Heart sounds: Normal heart sounds. No murmur heard.  Pulmonary:      Effort: Pulmonary effort is normal. No respiratory distress.      Breath sounds: Examination of the right-upper field reveals wheezing. Examination of the left-upper field reveals wheezing. Examination of the right-middle field reveals wheezing. Examination of the left-middle field reveals wheezing. Examination of the right-lower field reveals wheezing. Examination of the left-lower field reveals wheezing. Wheezing present. No decreased breath sounds, rhonchi or rales.   Abdominal:      General: Bowel sounds are normal. There is no distension.      Palpations: Abdomen is soft.      Tenderness: There is no abdominal tenderness. There is no guarding or rebound.   Musculoskeletal:         General: No deformity. Normal range of motion.      Cervical back: Normal range of motion and neck supple.   Skin:     General: Skin is warm and dry.      Findings: No rash.   Neurological:      General: No focal deficit present.      Mental Status: She is alert and oriented to person, place, and time.      Cranial Nerves: No cranial nerve deficit.   Psychiatric:         Mood and Affect: Mood normal.         Behavior: Behavior normal.         Thought Content: Thought content normal.          Judgment: Judgment normal.         Labs Reviewed   CBC AND DIFFERENTIAL - Abnormal       Result Value    WBC 6.90      RBC 4.18      Hemoglobin 11.9      Hematocrit 37.2      MCV 89      MCH 28.5      MCHC 32.0      RDW 12.8      MPV 9.8      Platelets 209      nRBC 0      Segmented % 60      Immature Grans % 0      Lymphocytes % 26      Monocytes % 14 (*)     Eosinophils Relative 0      Basophils Relative 0      Absolute Neutrophils 4.11      Absolute Immature Grans 0.02      Absolute Lymphocytes 1.76      Absolute Monocytes 0.95      Eosinophils Absolute 0.03      Basophils Absolute 0.03     COMPREHENSIVE METABOLIC PANEL - Abnormal    Sodium 133 (*)     Potassium 4.5      Chloride 92 (*)     CO2 37 (*)     ANION GAP 4      BUN 13      Creatinine 0.61      Glucose 125      Calcium 9.3      AST 17      ALT 10      Alkaline Phosphatase 85      Total Protein 6.7      Albumin 3.9      Total Bilirubin 0.26      eGFR 94      Narrative:     National Kidney Disease Foundation guidelines for Chronic Kidney Disease (CKD):     Stage 1 with normal or high GFR (GFR > 90 mL/min/1.73 square meters)    Stage 2 Mild CKD (GFR = 60-89 mL/min/1.73 square meters)    Stage 3A Moderate CKD (GFR = 45-59 mL/min/1.73 square meters)    Stage 3B Moderate CKD (GFR = 30-44 mL/min/1.73 square meters)    Stage 4 Severe CKD (GFR = 15-29 mL/min/1.73 square meters)    Stage 5 End Stage CKD (GFR <15 mL/min/1.73 square meters)  Note: GFR calculation is accurate only with a steady state creatinine   COVID19, INFLUENZA A/B, RSV PCR, [facility] - Normal    SARS-CoV-2 Negative      INFLUENZA A PCR Negative      INFLUENZA B PCR Negative      RSV PCR Negative      Narrative:     This test has been performed using the CoV-2/Flu/RSV plus assay on the Fluency GeneXpert platform. This test has been validated by the  and verified by the performing laboratory.     This test is designed to amplify and detect the following: nucleocapsid (N), envelope  (E), and RNA-dependent RNA polymerase (RdRP) genes of the SARS-CoV-2 genome; matrix (M), basic polymerase (PB2), and acidic protein (PA) segments of the influenza A genome; matrix (M) and non-structural protein (NS) segments of the influenza B genome, and the nucleocapsid genes of RSV A and RSV B.     Positive results are indicative of the presence of Flu A, Flu B, RSV, and/or SARS-CoV-2 RNA. Positive results for SARS-CoV-2 or suspected novel influenza should be reported to state, local, or federal health departments according to local reporting requirements.      All results should be assessed in conjunction with clinical presentation and other laboratory markers for clinical management.     FOR PEDIATRIC PATIENTS - copy/paste COVID Guidelines URL to browser: [email or web address]        XR chest portable   ED Interpretation by Miguelangel Brooks MD (09/13 6458)   NAD          ECG 12 Lead Documentation Only    Date/Time: 9/13/2024 4:54 PM    Performed by: Miguelangel Brooks MD  Authorized by: Miguelangel Brooks MD    ECG reviewed by me, the ED Provider: yes    Patient location:  ED  Previous ECG:     Previous ECG:  Unavailable  Interpretation:     Interpretation: normal    Rate:     ECG rate:  81    ECG rate assessment: normal    Rhythm:     Rhythm: sinus rhythm    Ectopy:     Ectopy: none    QRS:     QRS axis:  Left    QRS intervals:  Normal  Conduction:     Conduction: normal    ST segments:     ST segments:  Normal  T waves:     T waves: normal           Miguelangel Brooks MD  09/13/24 5244

## 2024-09-16 LAB
ATRIAL RATE: 81 BPM
P AXIS: -16 DEGREES
PR INTERVAL: 94 MS
QRS AXIS: -51 DEGREES
QRSD INTERVAL: 84 MS
QT INTERVAL: 356 MS
QTC INTERVAL: 413 MS
T WAVE AXIS: -54 DEGREES
VENTRICULAR RATE: 81 BPM

## 2024-09-17 ENCOUNTER — PATIENT OUTREACH (OUTPATIENT)
Dept: CASE MANAGEMENT | Facility: OTHER | Age: 69
End: 2024-09-17

## 2024-09-19 ENCOUNTER — OFFICE VISIT (OUTPATIENT)
Dept: PULMONOLOGY | Facility: CLINIC | Age: 69
End: 2024-09-19
Payer: COMMERCIAL

## 2024-09-19 VITALS
TEMPERATURE: 97.2 F | HEIGHT: 62 IN | HEART RATE: 68 BPM | OXYGEN SATURATION: 91 % | WEIGHT: 151 LBS | BODY MASS INDEX: 27.79 KG/M2 | SYSTOLIC BLOOD PRESSURE: 132 MMHG | DIASTOLIC BLOOD PRESSURE: 74 MMHG

## 2024-09-19 DIAGNOSIS — Z72.0 TOBACCO ABUSE: ICD-10-CM

## 2024-09-19 DIAGNOSIS — R93.89 ABNORMAL CT SCAN: ICD-10-CM

## 2024-09-19 DIAGNOSIS — J96.11 CHRONIC RESPIRATORY FAILURE WITH HYPOXIA (HCC): Primary | ICD-10-CM

## 2024-09-19 DIAGNOSIS — J44.9 COPD, SEVERE (HCC): ICD-10-CM

## 2024-09-19 LAB
DME PARACHUTE DELIVERY DATE REQUESTED: NORMAL
DME PARACHUTE ITEM DESCRIPTION: NORMAL
DME PARACHUTE ORDER STATUS: NORMAL
DME PARACHUTE SUPPLIER NAME: NORMAL
DME PARACHUTE SUPPLIER PHONE: NORMAL

## 2024-09-19 PROCEDURE — G2211 COMPLEX E/M VISIT ADD ON: HCPCS | Performed by: NURSE PRACTITIONER

## 2024-09-19 PROCEDURE — 99214 OFFICE O/P EST MOD 30 MIN: CPT | Performed by: NURSE PRACTITIONER

## 2024-09-19 NOTE — ASSESSMENT & PLAN NOTE
Following with upcoming CT in December:  Posterior right lung apex, 7x6mm in size, somewhat linear and unchanged since July 2023

## 2024-09-19 NOTE — PROGRESS NOTES
Pulmonary Follow-Up Note   Jessika Lux 69 y.o. female MRN: [id number]  9/19/2024      Assessment/Plan:    Problem List Items Addressed This Visit       Chronic respiratory failure with hypoxia (HCC) - Primary     Patient advised to continue 4L/m O2 with exertion at home - her usual flow rate. We discussed target saturations to be >90% and not to increase O2 for SOB only (check O2).    Reviewing recent labs, bicarb is elevated at times but not consistently. Will check overnight oximetry and ABG on 4L/m.         Relevant Orders    Blood gas, arterial    Pulse oximetry overnight    Tobacco abuse     Remains 1/4-1/2 PPD and feels she cannot tolerate quit smoking due to anxiety.  LDCT scheduled for December.           Abnormal CT scan     Following with upcoming CT in December:  Posterior right lung apex, 7x6mm in size, somewhat linear and unchanged since July 2023         COPD, severe (HCC)     Recurrent exacerbation of SOB has resulted in multiple ER visits over the summer. She did have some component of anxiety and had recent medication change. We discussed that a portion of her dyspnea may be related to hypercapnia as seen in the form of elevated serum bicarb - will follow upcoming tests to see if she may be a candidate for bipap (no suspicion of SWAPNA).  Continue Trelegy  Continue DuoNeb up to 4x per day           Relevant Orders    Blood gas, arterial    Pulse oximetry overnight     Vaccines: recommend seasonal flu shot    Return in about 4 months (around 1/19/2025).    All of Jessika's questions were answered prior to leaving the office today.  She is aware to call our office with any further questions or concerns.    History of Present Illness   Reason for Visit: Follow up  Chief Complaint: SOB  HPI: Jessika Lux is a 69 y.o. female who presents to the office today for follow up; she has had frequent symptoms since last visit and 5 ER visits for shortness of breath that resulted in oral steroid doses.    Today  she reports she is doing well. Shortness of breath with exertion is felt to be at her baseline and no worse than usual. She denies cough or wheezing. She admits she sleeps frequently - sometimes a good portion of the day as well as the night. She denies significant snoring or witnessed apneas, and daughter agrees.    Review of Systems  Please note that a 14-point review of systems was performed to include Constitutional, HEENT, Respiratory, CVS, GI, , Musculoskeletal, Integumentary, Neurologic, Rheumatologic, Endocrinologic, Psychiatric, Lymphatic, and Hematologic/Oncologic systems were reviewed and are negative unless otherwise stated in HPI. Positive and negative findings pertinent to this evaluation are incorporated into the history of present illness.       Historical Information   Past Medical History:   Diagnosis Date    Chronic respiratory failure with hypoxia, on home O2 therapy  ([facility])     3L NC at home    COPD exacerbation ([facility])     COPD, group D, by GOLD 2017 classification ([facility])     Diabetes mellitus ([facility])     Diverticulosis of sigmoid colon     Dyslipidemia     Essential (primary) hypertension     NELSON (generalized anxiety disorder)     GERD (gastroesophageal reflux disease)     Lung nodule     RUL    SIADH (syndrome of inappropriate ADH production) ([facility])     Tobacco dependence     Vitamin D deficiency      History reviewed. No pertinent surgical history.  Family History   Problem Relation Age of Onset    Diabetes Father      Social History   Social History     Substance and Sexual Activity   Alcohol Use Not Currently     Social History     Substance and Sexual Activity   Drug Use Never     Social History     Tobacco Use   Smoking Status Some Days    Current packs/day: 0.25    Types: Cigarettes    Passive exposure: Never   Smokeless Tobacco Never     E-Cigarette/Vaping    E-Cigarette Use Never User      E-Cigarette/Vaping Substances    Nicotine No     THC No     CBD No     Flavoring No         Meds/Allergies     Current Outpatient Medications:     amLODIPine (NORVASC) 10 mg tablet, Take 10 mg by mouth daily at bedtime , Disp: , Rfl:     ARIPiprazole (ABILIFY) 2 mg tablet, Take 2 mg by mouth daily, Disp: , Rfl:     atorvastatin (LIPITOR) 40 mg tablet, Take 40 mg by mouth daily at bedtime , Disp: , Rfl:     cloNIDine (CATAPRES) 0.2 mg tablet, Take 0.2 mg by mouth every 12 (twelve) hours, Disp: , Rfl:     fluticasone (FLONASE) 50 mcg/act nasal spray, 1 spray into each nostril daily, Disp: 9.9 mL, Rfl: 0    glipiZIDE (GLUCOTROL) 10 mg tablet, Take 10 mg by mouth daily in the early morning W breakfast, Disp: , Rfl:     hydrOXYzine HCL (ATARAX) 10 mg tablet, Take 10 mg by mouth every 6 (six) hours as needed for itching, Disp: , Rfl:     ipratropium-albuterol (DUO-NEB) 0.5-2.5 mg/3 mL nebulizer solution, Take 3 mL by nebulization 3 (three) times a day, Disp: 180 mL, Rfl: 3    lisinopril (ZESTRIL) 40 mg tablet, Take 40 mg by mouth daily, Disp: , Rfl:     LORazepam (ATIVAN) 0.5 mg tablet, Take 0.5 mg by mouth every 8 (eight) hours as needed for anxiety, Disp: , Rfl:     metoprolol tartrate (LOPRESSOR) 100 mg tablet, Take 100 mg by mouth every 12 (twelve) hours, Disp: , Rfl:     omeprazole (PriLOSEC OTC) 20 MG tablet, Take 20 mg by mouth daily , Disp: , Rfl:     pioglitazone (ACTOS) 15 mg tablet, Take 15 mg by mouth daily, Disp: , Rfl:     sodium chloride 1 g tablet, Take 2 g by mouth in the morning, Disp: , Rfl:     Trelegy Ellipta 200-62.5-25 MCG/ACT AEPB inhaler, INHALE 1 PUFF DAILY RINSE MOUTH AFTER USE, Disp: 60 each, Rfl: 5    cholecalciferol (VITAMIN D3) 1,000 units tablet, Take 1 tablet (1,000 Units total) by mouth daily (Patient not taking: Reported on 8/5/2024), Disp: 30 tablet, Rfl: 0    omeprazole (PriLOSEC) 20 mg delayed release capsule, Take 20 mg by mouth every morning (Patient not taking: Reported on 9/19/2024), Disp: , Rfl:     QUEtiapine (SEROquel) 25 mg tablet, , Disp: , Rfl:   Allergies  "  Allergen Reactions    Clindamycin Other (See Comments)     unknown       Vitals: Blood pressure 132/74, pulse 68, temperature (!) 97.2 °F (36.2 °C), height 5' 2\" (1.575 m), weight 68.5 kg (151 lb), SpO2 91%. Body mass index is 27.62 kg/m². Oxygen Therapy  SpO2: 91 %      Physical Exam  Vitals reviewed.   Constitutional:       Appearance: Normal appearance.   HENT:      Head: Normocephalic.      Nose: Nose normal.      Mouth/Throat:      Mouth: Mucous membranes are moist.      Pharynx: Oropharynx is clear.   Cardiovascular:      Rate and Rhythm: Normal rate and regular rhythm.      Pulses: Normal pulses.      Heart sounds: Normal heart sounds.   Pulmonary:      Effort: Pulmonary effort is normal.      Breath sounds: Normal breath sounds.   Musculoskeletal:         General: Normal range of motion.   Skin:     General: Skin is warm.      Capillary Refill: Capillary refill takes less than 2 seconds.   Neurological:      General: No focal deficit present.      Mental Status: She is alert and oriented to person, place, and time.   Psychiatric:         Mood and Affect: Mood normal.         Behavior: Behavior normal.         Labs:   Lab Results   Component Value Date    WBC 6.90 09/13/2024    HGB 11.9 09/13/2024    HCT 37.2 09/13/2024    MCV 89 09/13/2024     09/13/2024    EOSPCT 0 09/13/2024    EOSABS 0.03 09/13/2024    SEGSPCT 91 (H) 10/29/2023    MONOPCT 14 (H) 09/13/2024    MONOABS 0.28 10/29/2023    BANDSPCT 1 06/25/2023     Lab Results   Component Value Date    CALCIUM 9.3 09/13/2024    K 4.5 09/13/2024    CO2 37 (H) 09/13/2024    CL 92 (L) 09/13/2024    BUN 13 09/13/2024    CREATININE 0.61 09/13/2024     No results found for: \"IGE\", \"RAST\"  Lab Results   Component Value Date    ALT 10 09/13/2024    AST 17 09/13/2024    ALKPHOS 85 09/13/2024         Imaging and other studies: Reviewed radiology reports from this admission including: chest xray.  CXR 9/13/24  No acute cardiopulmonary disease.     CXR " "8/10/24  No acute cardiopulmonary disease.     CXR 6/27/24  No acute cardiopulmonary disease.       AYLIN High  [facility]        Portions of the record may have been created with voice recognition software.  Occasional wrong word or \"sound a like\" substitutions may have occurred due to the inherent limitations of voice recognition software.  Read the chart carefully and recognize, using context, where substitutions have occurred or contact the dictating provider.  "

## 2024-09-19 NOTE — ASSESSMENT & PLAN NOTE
Recurrent exacerbation of SOB has resulted in multiple ER visits over the summer. She did have some component of anxiety and had recent medication change. We discussed that a portion of her dyspnea may be related to hypercapnia as seen in the form of elevated serum bicarb - will follow upcoming tests to see if she may be a candidate for bipap (no suspicion of SWAPNA).  Continue Trelegy  Continue DuoNeb up to 4x per day

## 2024-09-19 NOTE — ASSESSMENT & PLAN NOTE
Patient advised to continue 4L/m O2 with exertion at home - her usual flow rate. We discussed target saturations to be >90% and not to increase O2 for SOB only (check O2).    Reviewing recent labs, bicarb is elevated at times but not consistently. Will check overnight oximetry and ABG on 4L/m.

## 2024-09-19 NOTE — ASSESSMENT & PLAN NOTE
Remains 1/4-1/2 PPD and feels she cannot tolerate quit smoking due to anxiety.  LDCT scheduled for December.

## 2024-09-26 ENCOUNTER — HOSPITAL ENCOUNTER (EMERGENCY)
Facility: HOSPITAL | Age: 69
Discharge: HOME | End: 2024-09-26
Attending: EMERGENCY MEDICINE
Payer: COMMERCIAL

## 2024-09-26 VITALS
TEMPERATURE: 97.3 F | HEART RATE: 76 BPM | DIASTOLIC BLOOD PRESSURE: 87 MMHG | SYSTOLIC BLOOD PRESSURE: 166 MMHG | RESPIRATION RATE: 22 BRPM | OXYGEN SATURATION: 96 %

## 2024-09-26 DIAGNOSIS — H61.23 BILATERAL IMPACTED CERUMEN: Primary | ICD-10-CM

## 2024-09-26 DIAGNOSIS — H60.91 RIGHT OTITIS EXTERNA: ICD-10-CM

## 2024-09-26 PROCEDURE — 99284 EMERGENCY DEPT VISIT MOD MDM: CPT | Performed by: PHYSICIAN ASSISTANT

## 2024-09-26 PROCEDURE — 69210 REMOVE IMPACTED EAR WAX UNI: CPT | Performed by: PHYSICIAN ASSISTANT

## 2024-09-26 PROCEDURE — 99282 EMERGENCY DEPT VISIT SF MDM: CPT

## 2024-09-26 RX ORDER — NEOMYCIN SULFATE, POLYMYXIN B SULFATE AND HYDROCORTISONE 10; 3.5; 1 MG/ML; MG/ML; [USP'U]/ML
4 SUSPENSION/ DROPS AURICULAR (OTIC) 3 TIMES DAILY
Qty: 10 ML | Refills: 0 | Status: ON HOLD | OUTPATIENT
Start: 2024-09-26 | End: 2024-10-03

## 2024-09-26 RX ORDER — NEOMYCIN SULFATE, POLYMYXIN B SULFATE AND HYDROCORTISONE 10; 3.5; 1 MG/ML; MG/ML; [USP'U]/ML
4 SUSPENSION/ DROPS AURICULAR (OTIC) ONCE
Status: DISCONTINUED | OUTPATIENT
Start: 2024-09-26 | End: 2024-09-26

## 2024-09-26 NOTE — ED PROVIDER NOTES
"Final diagnoses:   Bilateral impacted cerumen   Right otitis externa     ED Disposition       ED Disposition   Discharge    Condition   Stable    Date/Time   Thu Sep 26, 2024  5:27 PM    Comment   Jessika Lux discharge to home/self care.                   Assessment & Plan       Medical Decision Making  Patient is a 69-year-old female presents Today with a chief complaint of right ear pain.  Patient states that she thinks that she has cerumen impaction.  She states she \"always comes here to have the ears cleaned out\".    ceruman impaction was cleared, right ear canal is slightly erythematous will treat with drops    Risk  Prescription drug management.             Medications - No data to display    ED Risk Strat Scores                           SBIRT 22yo+      Flowsheet Row Most Recent Value   Initial Alcohol Screen: US AUDIT-C     1. How often do you have a drink containing alcohol? 0 Filed at: 09/26/2024 1643   2. How many drinks containing alcohol do you have on a typical day you are drinking?  0 Filed at: 09/26/2024 1643   3b. FEMALE Any Age, or MALE 65+: How often do you have 4 or more drinks on one occassion? 0 Filed at: 09/26/2024 1643   Audit-C Score 0 Filed at: 09/26/2024 1643   LIVIER: How many times in the past year have you...    Used an illegal drug or used a prescription medication for non-medical reasons? Never Filed at: 09/26/2024 1643                            History of Present Illness       Chief Complaint   Patient presents with    Earache     Pt reports right ear pain, concerned for wax build up.        Past Medical History:   Diagnosis Date    Chronic respiratory failure with hypoxia, on home O2 therapy  (HCC)     3L NC at home    COPD exacerbation (HCC)     COPD, group D, by GOLD 2017 classification (HCC)     Diabetes mellitus (HCC)     Diverticulosis of sigmoid colon     Dyslipidemia     Essential (primary) hypertension     NELSON (generalized anxiety disorder)     GERD (gastroesophageal " "reflux disease)     Lung nodule     RUL    SIADH (syndrome of inappropriate ADH production) (HCC)     Tobacco dependence     Vitamin D deficiency       History reviewed. No pertinent surgical history.   Family History   Problem Relation Age of Onset    Diabetes Father       Social History     Tobacco Use    Smoking status: Some Days     Current packs/day: 0.25     Types: Cigarettes     Passive exposure: Never    Smokeless tobacco: Never   Vaping Use    Vaping status: Never Used   Substance Use Topics    Alcohol use: Not Currently    Drug use: Never      E-Cigarette/Vaping    E-Cigarette Use Never User       E-Cigarette/Vaping Substances    Nicotine No     THC No     CBD No     Flavoring No       I have reviewed and agree with the history as documented.     Patient is a 69-year-old female presents Today with a chief complaint of right ear pain.  Patient states that she thinks that she has cerumen impaction.  She states she \"always comes here to have the ears cleaned out\".          Review of Systems   Constitutional:  Negative for chills and fever.   HENT:  Negative for ear pain and sore throat.    Eyes:  Negative for pain and visual disturbance.   Respiratory:  Negative for cough, shortness of breath and wheezing.    Cardiovascular:  Negative for chest pain, palpitations and leg swelling.   Gastrointestinal:  Negative for abdominal pain and vomiting.   Genitourinary:  Negative for dysuria and hematuria.   Musculoskeletal:  Negative for arthralgias and back pain.   Skin:  Negative for color change and rash.   Neurological:  Negative for dizziness, seizures and syncope.   All other systems reviewed and are negative.          Objective       ED Triage Vitals [09/26/24 1641]   Temperature Pulse Blood Pressure Respirations SpO2 Patient Position - Orthostatic VS   (!) 97.3 °F (36.3 °C) 76 166/87 22 96 % Sitting      Temp Source Heart Rate Source BP Location FiO2 (%) Pain Score    Temporal Monitor Left arm -- --      Vitals "      Date and Time Temp Pulse SpO2 Resp BP Pain Score FACES Pain Rating User   09/26/24 1641 97.3 °F (36.3 °C) 76 96 % 22 166/87 -- -- MB            Physical Exam  Vitals and nursing note reviewed.   Constitutional:       General: She is not in acute distress.     Appearance: She is well-developed.   HENT:      Head: Normocephalic and atraumatic.      Right Ear: External ear normal. There is impacted cerumen. No hemotympanum.      Left Ear: External ear normal. There is impacted cerumen. No hemotympanum.      Ears:      Comments: After cerumen was removed TM was and intact, right ear canal was erythematous and slightly swollen  Eyes:      Pupils: Pupils are equal, round, and reactive to light.   Cardiovascular:      Rate and Rhythm: Normal rate and regular rhythm.      Heart sounds: No murmur heard.  Pulmonary:      Effort: Pulmonary effort is normal. No respiratory distress.      Breath sounds: Normal breath sounds. No wheezing.   Abdominal:      General: Bowel sounds are normal.      Palpations: Abdomen is soft. There is no mass.      Tenderness: There is no abdominal tenderness. There is no rebound.      Hernia: No hernia is present.   Musculoskeletal:      Cervical back: Normal range of motion and neck supple.   Skin:     General: Skin is warm and dry.      Capillary Refill: Capillary refill takes less than 2 seconds.   Neurological:      Mental Status: She is alert and oriented to person, place, and time.      Coordination: Coordination normal.   Psychiatric:         Behavior: Behavior normal.         Results Reviewed       None            No orders to display       Ear cerumen removal    Date/Time: 9/26/2024 5:30 PM    Performed by: Miguel Angel Aponte PA-C  Authorized by: Miguel Angel Aponte PA-C  Universal Protocol:  Consent: Verbal consent obtained.  Risks and benefits: risks, benefits and alternatives were discussed  Consent given by: patient  Patient identity confirmed: verbally with patient    Patient  location:  ED  Indications / Diagnosis:  Impaction  Procedure details:     Location:  Both ears    Procedure type: curette      Approach:  External  Post-procedure details:     Complication:  None    Hearing quality:  Normal    Patient tolerance of procedure:  Tolerated well, no immediate complications  Comments:      Patient's ear canal on the right was erythematous slightly swollen, will treat for this with eardrops      ED Medication and Procedure Management   Prior to Admission Medications   Prescriptions Last Dose Informant Patient Reported? Taking?   ARIPiprazole (ABILIFY) 2 mg tablet   Yes No   Sig: Take 2 mg by mouth daily   LORazepam (ATIVAN) 0.5 mg tablet  Self, Child Yes No   Sig: Take 0.5 mg by mouth every 8 (eight) hours as needed for anxiety   QUEtiapine (SEROquel) 25 mg tablet   Yes No   Patient not taking: Reported on 2024   Trelegy Ellipta 200-62.5-25 MCG/ACT AEPB inhaler   No No   Sig: INHALE 1 PUFF DAILY RINSE MOUTH AFTER USE   amLODIPine (NORVASC) 10 mg tablet  Self, Child Yes No   Sig: Take 10 mg by mouth daily at bedtime    atorvastatin (LIPITOR) 40 mg tablet  Self, Child Yes No   Sig: Take 40 mg by mouth daily at bedtime    cholecalciferol (VITAMIN D3) 1,000 units tablet  Child No No   Sig: Take 1 tablet (1,000 Units total) by mouth daily   Patient not taking: Reported on 2024   cloNIDine (CATAPRES) 0.2 mg tablet  Self, Child Yes No   Sig: Take 0.2 mg by mouth every 12 (twelve) hours   fluticasone (FLONASE) 50 mcg/act nasal spray  Child No No   Si spray into each nostril daily   glipiZIDE (GLUCOTROL) 10 mg tablet  Self, Child Yes No   Sig: Take 10 mg by mouth daily in the early morning W breakfast   hydrOXYzine HCL (ATARAX) 10 mg tablet  Self, Child Yes No   Sig: Take 10 mg by mouth every 6 (six) hours as needed for itching   ipratropium-albuterol (DUO-NEB) 0.5-2.5 mg/3 mL nebulizer solution  Self, Child No No   Sig: Take 3 mL by nebulization 3 (three) times a day   lisinopril  (ZESTRIL) 40 mg tablet  Self, Child Yes No   Sig: Take 40 mg by mouth daily   metoprolol tartrate (LOPRESSOR) 100 mg tablet  Self, Child Yes No   Sig: Take 100 mg by mouth every 12 (twelve) hours   omeprazole (PriLOSEC OTC) 20 MG tablet  Self, Child Yes No   Sig: Take 20 mg by mouth daily    omeprazole (PriLOSEC) 20 mg delayed release capsule   Yes No   Sig: Take 20 mg by mouth every morning   Patient not taking: Reported on 9/19/2024   pioglitazone (ACTOS) 15 mg tablet  Child Yes No   Sig: Take 15 mg by mouth daily   sodium chloride 1 g tablet  Child Yes No   Sig: Take 2 g by mouth in the morning      Facility-Administered Medications: None     Patient's Medications   Discharge Prescriptions    NEOMYCIN-POLYMYXIN-HYDROCORTISONE (CORTISPORIN) 0.35%-10,000 UNITS/ML-1% OTIC SUSPENSION    Administer 4 drops to the right ear 3 (three) times a day for 7 days       Start Date: 9/26/2024 End Date: 10/3/2024       Order Dose: 4 drops       Quantity: 10 mL    Refills: 0     No discharge procedures on file.  ED SEPSIS DOCUMENTATION   Time reflects when diagnosis was documented in both MDM as applicable and the Disposition within this note       Time User Action Codes Description Comment    9/26/2024  5:27 PM Miguel Angel Aponte Add [H61.23] Bilateral impacted cerumen     9/26/2024  5:27 PM Miguel Angel Aponte [H60.91] Right otitis externa                  Miguel Angel Aponte PA-C  09/26/24 5487

## 2024-09-27 ENCOUNTER — PATIENT OUTREACH (OUTPATIENT)
Dept: CASE MANAGEMENT | Facility: OTHER | Age: 69
End: 2024-09-27

## 2024-09-27 NOTE — PROGRESS NOTES
In basket received [facility] ED visit on 9/26  Patient presented with c/o right ear pain. Cerumen impaction cleared bilaterally and cortisporin drops prescribed for right ear infection.      PCP appt scheduled 11/4.     Discharge follow up call.     No answer when contacted.  Left generic msg requesting return call.       Will remain available to assist and await return call.  In basket sent to self with reminder for next outreach.

## 2024-10-13 PROBLEM — J96.22 ACUTE ON CHRONIC RESPIRATORY FAILURE WITH HYPOXIA AND HYPERCAPNIA (HCC): Status: ACTIVE | Noted: 2020-11-02

## 2024-10-13 PROBLEM — R65.10 SIRS (SYSTEMIC INFLAMMATORY RESPONSE SYNDROME) (HCC): Status: ACTIVE | Noted: 2024-01-01

## 2024-10-13 PROBLEM — G93.41 ACUTE METABOLIC ENCEPHALOPATHY: Status: ACTIVE | Noted: 2024-01-01

## 2024-10-13 PROBLEM — E83.42 HYPOMAGNESEMIA: Status: ACTIVE | Noted: 2024-01-01

## 2024-10-13 NOTE — PLAN OF CARE
Problem: Potential for Falls  Goal: Patient will remain free of falls  Description: INTERVENTIONS:  - Educate patient/family on patient safety including physical limitations  - Instruct patient to call for assistance with activity   - Consult OT/PT to assist with strengthening/mobility   - Keep Call bell within reach  - Keep bed low and locked with side rails adjusted as appropriate  - Keep care items and personal belongings within reach  - Initiate and maintain comfort rounds  - Make Fall Risk Sign visible to staff  - Offer Toileting every 2 Hours, in advance of need  - Initiate/Maintain bed/chair alarm  - Obtain necessary fall risk management equipment  - Apply yellow socks and bracelet for high fall risk patients  - Consider moving patient to room near nurses station  Outcome: Progressing     Problem: Prexisting or High Potential for Compromised Skin Integrity  Goal: Skin integrity is maintained or improved  Description: INTERVENTIONS:  - Identify patients at risk for skin breakdown  - Assess and monitor skin integrity  - Assess and monitor nutrition and hydration status  - Monitor labs   - Assess for incontinence   - Turn and reposition patient  - Assist with mobility/ambulation  - Relieve pressure over bony prominences  - Avoid friction and shearing  - Provide appropriate hygiene as needed including keeping skin clean and dry  - Evaluate need for skin moisturizer/barrier cream  - Collaborate with interdisciplinary team   - Patient/family teaching  - Consider wound care consult   Outcome: Progressing     Problem: NEUROSENSORY - ADULT  Goal: Achieves stable or improved neurological status  Description: INTERVENTIONS  - Monitor and report changes in neurological status  - Monitor vital signs such as temperature, blood pressure, glucose, and any other labs ordered   - Initiate measures to prevent increased intracranial pressure  - Monitor for seizure activity and implement precautions if appropriate      Outcome:  Progressing  Goal: Achieves maximal functionality and self care  Description: INTERVENTIONS  - Monitor swallowing and airway patency with patient fatigue and changes in neurological status  - Encourage and assist patient to increase activity and self care.   - Encourage visually impaired, hearing impaired and aphasic patients to use assistive/communication devices  Outcome: Progressing     Problem: CARDIOVASCULAR - ADULT  Goal: Absence of cardiac dysrhythmias or at baseline rhythm  Description: INTERVENTIONS:  - Continuous cardiac monitoring, vital signs, obtain 12 lead EKG if ordered  - Administer antiarrhythmic and heart rate control medications as ordered  - Monitor electrolytes and administer replacement therapy as ordered  Outcome: Progressing     Problem: RESPIRATORY - ADULT  Goal: Achieves optimal ventilation and oxygenation  Description: INTERVENTIONS:  - Assess for changes in respiratory status  - Assess for changes in mentation and behavior  - Position to facilitate oxygenation and minimize respiratory effort  - Oxygen administered by appropriate delivery if ordered  - Initiate smoking cessation education as indicated  - Encourage broncho-pulmonary hygiene including cough, deep breathe, Incentive Spirometry  - Assess the need for suctioning and aspirate as needed  - Assess and instruct to report SOB or any respiratory difficulty  - Respiratory Therapy support as indicated  Outcome: Progressing     Problem: METABOLIC, FLUID AND ELECTROLYTES - ADULT  Goal: Electrolytes maintained within normal limits  Description: INTERVENTIONS:  - Monitor labs and assess patient for signs and symptoms of electrolyte imbalances  - Administer electrolyte replacement as ordered  - Monitor response to electrolyte replacements, including repeat lab results as appropriate  - Instruct patient on fluid and nutrition as appropriate  Outcome: Progressing  Goal: Glucose maintained within target range  Description: INTERVENTIONS:  -  Monitor Blood Glucose as ordered  - Assess for signs and symptoms of hyperglycemia and hypoglycemia  - Administer ordered medications to maintain glucose within target range  - Assess nutritional intake and initiate nutrition service referral as needed  Outcome: Progressing     Problem: MUSCULOSKELETAL - ADULT  Goal: Maintain or return mobility to safest level of function  Description: INTERVENTIONS:  - Assess patient's ability to carry out ADLs; assess patient's baseline for ADL function and identify physical deficits which impact ability to perform ADLs (bathing, care of mouth/teeth, toileting, grooming, dressing, etc.)  - Assess/evaluate cause of self-care deficits   - Assess range of motion  - Assess patient's mobility  - Assess patient's need for assistive devices and provide as appropriate  - Encourage maximum independence but intervene and supervise when necessary  - Involve family in performance of ADLs  - Assess for home care needs following discharge   - Consider OT consult to assist with ADL evaluation and planning for discharge  - Provide patient education as appropriate  Outcome: Progressing

## 2024-10-13 NOTE — RESPIRATORY THERAPY NOTE
RT Ventilator Management Note  Jessika REID Viars 69 y.o. female MRN: [id number]  Unit/Bed#: ED 09 Encounter: [id number]      Daily Screen    No data found in the last 10 encounters.           Physical Exam:   Assessment Type: Assess only  General Appearance: Unresponsive  Respiratory Pattern: Shallow  Chest Assessment: Chest expansion symmetrical  Bilateral Breath Sounds: Diminished      Resp Comments: 69 yr old pt, S/p altered mental status, unresponsive, BS short and shallow, pt started on BP support, 22/6 and 40% to elevate exhaled  VTs to approx 300cc, Sp02 ~ 97%, neb tx given inline as ordered, will monitor for adjustments.    01:45  Lab results on venous gas, pt continues on bipap support, mode adjusted to AVAPs 400/6 rate of 24 and 40%.

## 2024-10-13 NOTE — ASSESSMENT & PLAN NOTE
Lab Results   Component Value Date    HGBA1C 6.5 (H) 10/13/2024       Recent Labs     10/12/24  2126 10/13/24  0117 10/13/24  0509 10/13/24  1641   POCGLU 196* 218* 172* 115       Blood Sugar Average: Last 72 hrs:  (P) 175.25    ACHS glucose checks with SSI  Hgb A1C 6.5

## 2024-10-13 NOTE — RESPIRATORY THERAPY NOTE
RT Protocol Note  Jessika Lux 69 y.o. female MRN: [id number]  Unit/Bed#: -01 Encounter: [id number]    Assessment    Active Problems:  There are no active Hospital Problems.      Home Pulmonary Medications:    Home Devices/Therapy: Home O2    Past Medical History:   Diagnosis Date    Chronic respiratory failure with hypoxia, on home O2 therapy  (HCC)     3L NC at home    COPD exacerbation (HCC)     COPD, group D, by GOLD 2017 classification ([facility])     Diabetes mellitus (HCC)     Diverticulosis of sigmoid colon     Dyslipidemia     Essential (primary) hypertension     NELSON (generalized anxiety disorder)     GERD (gastroesophageal reflux disease)     Lung nodule     RUL    SIADH (syndrome of inappropriate ADH production) ([facility])     Tobacco dependence     Vitamin D deficiency      Social History     Socioeconomic History    Marital status:      Spouse name: None    Number of children: None    Years of education: None    Highest education level: None   Occupational History    None   Tobacco Use    Smoking status: Some Days     Current packs/day: 0.25     Types: Cigarettes     Passive exposure: Never    Smokeless tobacco: Never   Vaping Use    Vaping status: Never Used   Substance and Sexual Activity    Alcohol use: Not Currently    Drug use: Never    Sexual activity: Not Currently   Other Topics Concern    None   Social History Narrative    None     Social Determinants of Health     Financial Resource Strain: Low Risk  (2/2/2024)    Received from [email or web address]    Financial Resource Strain     Do you have any trouble paying for your medications, or do you think you might in the future?: No     Does your family have trouble paying for medicine? (Household - for ages 0-17 years): Not on file   Food Insecurity: No Food Insecurity (5/22/2024)    Hunger Vital Sign     Worried About Running Out of Food in the Last Year: Never true     Ran Out of Food in the Last Year: Never true   Transportation Needs: No  Transportation Needs (5/22/2024)    PRAPARE - Transportation     Lack of Transportation (Medical): No     Lack of Transportation (Non-Medical): No   Physical Activity: Not on file   Stress: Not on file   Social Connections: Socially Integrated (2/2/2024)    Received from [email or web address]     How often do you feel lonely or isolated from those around you?: Never   Intimate Partner Violence: Not on file   Housing Stability: Low Risk  (5/22/2024)    Housing Stability Vital Sign     Unable to Pay for Housing in the Last Year: No     Number of Times Moved in the Last Year: 1     Homeless in the Last Year: No       Subjective         Objective    Physical Exam:   Assessment Type: Assess only  General Appearance: Unresponsive  Respiratory Pattern: Assisted  Chest Assessment: Chest expansion symmetrical  Bilateral Breath Sounds: Diminished  O2 Device: Bipap    Vitals:  Blood pressure 103/58, pulse 81, temperature (!) 94.6 °F (34.8 °C), resp. rate (!) 38, weight 66.7 kg (147 lb 0.8 oz), SpO2 95%.    Results from last 7 days   Lab Units 10/12/24  2259   PH ART  7.104*   PCO2 ART mm Hg 134.3*   PO2 ART mm Hg 89.9   HCO3 ART mmol/L 41.1*   BASE EXC ART mmol/L 7.4   O2 CONTENT ART mL/dL 15.6*   O2 HGB, ARTERIAL % 90.4*   ABG SOURCE  Radial, Left   BRENDA TEST  Yes       Imaging and other studies: Results Review Statement: No pertinent imaging studies reviewed.    O2 Device: Bipap     Plan    Respiratory Plan: Moderate/Severe Distress pathway, Home Bronchodilator Patient pathway, Vent/NIV/HFNC        Resp Comments: 69 yr old pt, S/p altered mental status requiring bipap support, Hx of COPD exacerbation, pt  unable to provide any hx of home nebs, previous hospital admit documentation indicates home 02 of 3lpm,  home nebs with Duoneb and Trekaelyngy. Will continue with bipap support at this time.

## 2024-10-13 NOTE — ASSESSMENT & PLAN NOTE
Patient noted to be hypoxic in the 80s and have respiratory distress.  She was placed on BiPAP in the ED.  VBG: pH 7.1, pCO2 151, HCO3 46; ABG (1 hr after being on BiPAP): pH 7.1, pCO2 134, HCO3 41.  VBG (upon arrival to ICU. Patient wouldn't allow ABG to be drawn): pH 7.1, pCO2 134, HCO3 44  Likely secondary to COPD exacerbation.  CTA PE - negative for PE. Concerning for inflammatory process vs infection.     Plan  Continue BiPAP - changed AVAPS in ICU  Abx: azythro x3 days and ceftriaxone x5 days.  Methylpred  Check ABG/VBG PRN  Duoneb with respiratory protocol

## 2024-10-13 NOTE — ED NOTES
Family at bedside. Daughter was at her home til 1130 am. Spoke to her on the phone at 1730 and had no concerns. She was able to get ahold of her at 1930 and had family go to check on her, they arrived 2000 he arrived and called EMS.      Zack Hairston RN  10/12/24 7509

## 2024-10-13 NOTE — ASSESSMENT & PLAN NOTE
Restarted metoprolol 50mg BID and Clonidine 0.2mg q12hrs on 10/13  Restart lisinopril once appropriate

## 2024-10-13 NOTE — ASSESSMENT & PLAN NOTE
Met criteria with hypothermia and RR > 20.   LA 0.6, PCT < 0.05, no leukocytosis.   Blood and sputum cx pending.  UA negative.   Flu/RSV/COVID negative.  Monitor for signs of sepsis.

## 2024-10-13 NOTE — RESPIRATORY THERAPY NOTE
10/13/24 1320   Respiratory Assessment   General Appearance Alert   Respiratory Pattern Dyspnea with exertion;Tachypneic   Chest Assessment Chest expansion symmetrical   Bilateral Breath Sounds Diminished;Coarse   Resp Comments bipap removed from patient this am. pt currently on baseline O2 3L.   O2 Device NC 3L   Additional Assessments   SpO2 92 %     1530 repeat VBG done. Bipap attempted  and patient ripped off. Pt does not want. RN and CC provider present.

## 2024-10-13 NOTE — ASSESSMENT & PLAN NOTE
Lab Results   Component Value Date    HGBA1C 7.3 (H) 06/11/2024       Recent Labs     10/12/24  2126   POCGLU 196*       Blood Sugar Average: Last 72 hrs:  (P) 196    Q6H glucose checks with SSI.

## 2024-10-13 NOTE — ED NOTES
Back to room from CT, bear hugger started, rectal probe in place      Zack Hairston, RN  10/12/24 4157

## 2024-10-13 NOTE — ASSESSMENT & PLAN NOTE
Sodium 128 on admission.  1L NSS administered in ED.   Monitor Na with daily BMP.  Restart home salt tabs

## 2024-10-13 NOTE — ASSESSMENT & PLAN NOTE
Patient noted to be hypoxic in the 80s and have respiratory distress.  She was placed on BiPAP in the ED.  VBG: pH 7.1, pCO2 151, HCO3 46; ABG (1 hr after being on BiPAP): pH 7.1, pCO2 134, HCO3 41.  VBG (upon arrival to ICU. Patient wouldn't allow ABG to be drawn): pH 7.1, pCO2 134, HCO3 44  Likely secondary to COPD exacerbation.  CTA PE - negative for PE. Concerning for inflammatory process vs infection.   S. PNA positive.    Plan  Comfort Care - Patient transitioned to comfort care on 10/13 per her daughter, Julissa, wishes.

## 2024-10-13 NOTE — PLAN OF CARE
Pt remains in critical care bed with soft limb wrist restraints and 1:1 for safety purposes. Pt continues to be verbally and physically aggressive towards staff, pulling at BiPaP and iv medications and monitoring wires. Pt is not oriented and is not redirectable. RN and 1:1 PCA provide frequent repositioning and meet comfort needs of pt. All other fall safety interventions remain in place.       Problem: Potential for Falls  Goal: Patient will remain free of falls  Description: INTERVENTIONS:  - Educate patient/family on patient safety including physical limitations  - Instruct patient to call for assistance with activity   - Consult OT/PT to assist with strengthening/mobility   - Keep Call bell within reach  - Keep bed low and locked with side rails adjusted as appropriate  - Keep care items and personal belongings within reach  - Initiate and maintain comfort rounds  - Make Fall Risk Sign visible to staff  - - Apply yellow socks and bracelet for high fall risk patients  - Consider moving patient to room near nurses station  Outcome: Progressing

## 2024-10-13 NOTE — SEPSIS NOTE
"  Sepsis Note   Jessika Lux 69 y.o. female MRN: [id number]  Unit/Bed#: -01 Encounter: [id number]      The 30ml/kg fluid bolus was not given to the patient despite hypotension and/or significantly elevated lactate of >= 4 and/or presence of septic shock due to: Concern for fluid/volume overload. The patient will be administered 1L of crystalloid fluid instead. Orders for this have been placed in [facility]. The patient may receive additional colloid or crystalloid fluids thereafter based on clinical condition.     Fidencio Hartman Jr, PA-C       Default Flowsheet Data (Last 720 Hours)       Sepsis Reassess       Row Name 10/13/24 0012                   Repeat Volume Status and Tissue Perfusion Assessment Performed    Date of Reassessment: 10/13/24  -        Time of Reassessment: 0012  -        Sepsis Reassessment Note: Click \"NEXT\" below (NOT \"close\") to generate sepsis reassessment note. YES (proceed by clicking \"NEXT\")  -        Repeat Volume Status and Tissue Perfusion Assessment Performed --                  User Key  (r) = Recorded By, (t) = Taken By, (c) = Cosigned By      Initials Name Provider Type     Fidencio Hartman Jr., PA-C Physician Assistant                    Body mass index is 26.9 kg/m².  Wt Readings from Last 1 Encounters:   10/12/24 66.7 kg (147 lb 0.8 oz)        Ideal body weight: 50.1 kg (110 lb 7.2 oz)  Adjusted ideal body weight: 56.7 kg (125 lb 1.4 oz)    "

## 2024-10-13 NOTE — ASSESSMENT & PLAN NOTE
Likely secondary to hypercapnia vs polypharmacy   CTH negative for acute intracranial abnormalities.   Treat COPD exacerbation.  Cortisol normal, TSH pending  Monitor for sepsis.  Monitor Neuro status.

## 2024-10-13 NOTE — QUICK NOTE
Afternoon VBG showed persistent hypercapnia Co2 98 despite improving clinical status on 3L NC. Pt awake, alert, and oriented with intermittent confusion to situation. Attempted to educate pt on need for BiPAP therapy for a few hours to improve hypercapnia, pt refused. Due to pt's baseline cognitive impairment, decision was deferred to healthcare proxy, daughter, Lindsey. I called Lindsey to provide another update. I informed her Jessika did not want to wear the BiPAP but that it would likely be necessary to improve her hypercapnia. She reported at home the pt often becomes agitated and refuses therapies such as medications or oxygen. She requested we still try to pursue BiPAP therapy and if sedation was required, to initiate it. Pt. Still noncompliant with BiPAP after further education, started precedex at 0.2 and titrated up to 0.4mcg. Attempted BiPAP therapy and education again and pt still noncompliant/refusing. Lindsey planning to visit later this evening, will follow-up with further C discussion.

## 2024-10-13 NOTE — H&P
H&P - Critical Care/ICU   Name: Jessika Lux 69 y.o. female I MRN: [id number]  Unit/Bed#: -01 I Date of Admission: 10/12/2024   Date of Service: 10/13/2024 I Hospital Day: 0       Assessment & Plan  Acute on chronic respiratory failure with hypoxia and hypercapnia (HCC)  Patient noted to be hypoxic in the 80s and have respiratory distress.  She was placed on BiPAP in the ED.  VBG: pH 7.1, pCO2 151, HCO3 46; ABG (1 hr after being on BiPAP): pH 7.1, pCO2 134, HCO3 41.  VBG (upon arrival to ICU. Patient wouldn't allow ABG to be drawn): pH 7.1, pCO2 134, HCO3 44  Likely secondary to COPD exacerbation.  CTA PE - negative for PE. Concerning for inflammatory process vs infection.     Plan  Continue BiPAP - changed AVAPS in ICU  Abx: azythro x3 days and ceftriaxone x5 days.  Methylpred  Check ABG/VBG PRN  Duoneb with respiratory protocol    COPD with acute exacerbation (HCC)  See plan above.  Acute metabolic encephalopathy  Likely secondary to hypercapnia.  CTH negative for acute intracranial abnormalities.   Treat COPD exacerbation.  Check TSH and cortisol level.  Monitor for sepsis.  Monitor Neuro status.  Hyponatremia  Sodium 128 on admission.  1L NSS administered in ED.   Monitor Na with daily BMP.  SIRS (systemic inflammatory response syndrome) ([facility])  Met criteria with hypothermia and RR > 20.   LA 0.6, PCT < 0.05, no leukocytosis.   Blood and sputum cx pending.  UA negative.   Flu/RSV/COVID negative.  Monitor for signs of sepsis.  Hypomagnesemia  1.7 on admission.  Repletion complete in ICU.   Check with daily labs while in ICU.    Primary hypertension  Hold home meds while NPO.   Use PRN IV antihypertensives until patient can take PO.  Type 2 diabetes mellitus without complication, without long-term current use of insulin ([facility])  Lab Results   Component Value Date    HGBA1C 7.3 (H) 06/11/2024       Recent Labs     10/12/24  2126   POCGLU 196*       Blood Sugar Average: Last 72 hrs:  (P) 196    Q6H glucose  checks with SSI.       Disposition: Stepdown Level 1    History of Present Illness   Jessika Lux is a 69 y.o. who presents with a past medical history of multiple admissions for COPD exacerbations, diabetes type 2, hypertension, hyponatremia, tobacco abuse.  Per emergency department records the patient was found down at home after family members could not get a hold of her.  Her daughter last spoke to her at 1730 and there did not seem to be any concerns at that time.  In 1930 the daughter was unable to get a hold of her mother and another family member/neighbor went to check on her and found her on the floor.  EMS was called at that time.  EMS reported that the patient's mental status was altered, she was difficult to arouse and there was foul-smelling urine upon their arrival.  Upon arrival to the emergency department the patient was found to be hypoxic in the 80s and respiratory distress.  She was placed on BiPAP and a VBG was obtained.  pH showed 7.1, pCO2 151 with HCO3 46.  A CTA chest/abdomen/pelvis was completed and was negative for PE.  It was however concerning for an inflammatory process versus infectious process.  A CT of the head was completed did not show any acute intracranial abnormalities.  An ABG was completed 1 hour after being on BiPAP and showed very little to no improvement.  Critical care was consulted for admission and the patient was accepted as a stepdown 1.    History obtained from chart review.  Review of Systems: Review of Systems   Unable to perform ROS: Mental status change       Historical Information   Past Medical History:  No date: Chronic respiratory failure with hypoxia, on home O2 therapy    ([facility])      Comment:  3L NC at home  No date: COPD exacerbation (HCC)  No date: COPD, group D, by GOLD 2017 classification ([facility])  No date: Diabetes mellitus (HCC)  No date: Diverticulosis of sigmoid colon  No date: Dyslipidemia  No date: Essential (primary) hypertension  No date: NELSON  (generalized anxiety disorder)  No date: GERD (gastroesophageal reflux disease)  No date: Lung nodule      Comment:  RUL  No date: SIADH (syndrome of inappropriate ADH production) ([facility])  No date: Tobacco dependence  No date: Vitamin D deficiency No past surgical history on file.   Current Outpatient Medications   Medication Instructions    amLODIPine (NORVASC) 10 mg, Oral, Daily at bedtime    ARIPiprazole (ABILIFY) 2 mg, Oral, Daily    atorvastatin (LIPITOR) 40 mg, Oral, Daily at bedtime    cholecalciferol (VITAMIN D3) 1,000 Units, Oral, Daily    cloNIDine (CATAPRES) 0.2 mg, Oral, Every 12 hours scheduled    fluticasone (FLONASE) 50 mcg/act nasal spray 1 spray, Nasal, Daily    glipiZIDE (GLUCOTROL) 10 mg, Oral, Daily (early morning), W breakfast    hydrOXYzine HCL (ATARAX) 10 mg, Oral, Every 6 hours PRN    ipratropium-albuterol (DUO-NEB) 0.5-2.5 mg/3 mL nebulizer solution 3 mL, Nebulization, 3 times daily    lisinopril (ZESTRIL) 40 mg, Oral, Daily    LORazepam (ATIVAN) 0.5 mg, Oral, Every 8 hours PRN    metoprolol tartrate (LOPRESSOR) 100 mg, Oral, Every 12 hours scheduled    neomycin-polymyxin-hydrocortisone (CORTISPORIN) 0.35%-10,000 units/mL-1% otic suspension 4 drops, Right Ear, 3 times daily    omeprazole (PRILOSEC OTC) 20 mg, Oral, Daily    omeprazole (PRILOSEC) 20 mg, Every morning    pioglitazone (ACTOS) 15 mg, Oral, Daily    QUEtiapine (SEROquel) 25 mg tablet     sodium chloride 2 g, Oral, Daily    Trelegy Ellipta 200-62.5-25 MCG/ACT AEPB inhaler 1 puff, Inhalation, Daily, Rinse mouth after use.    Allergies   Allergen Reactions    Clindamycin Other (See Comments)     unknown      Social History     Tobacco Use    Smoking status: Some Days     Current packs/day: 0.25     Types: Cigarettes     Passive exposure: Never    Smokeless tobacco: Never   Vaping Use    Vaping status: Never Used   Substance Use Topics    Alcohol use: Not Currently    Drug use: Never    Family History   Problem Relation Age of Onset     Diabetes Father           Objective :                   Vitals I/O      Most Recent Min/Max in 24hrs   Temp (!) 94.6 °F (34.8 °C) Temp  Min: 92.2 °F (33.4 °C)  Max: 94.6 °F (34.8 °C)   Pulse 81 Pulse  Min: 62  Max: 97   Resp (!) 38 Resp  Min: 16  Max: 54   /58 BP  Min: 103/58  Max: 166/74   O2 Sat 95 % SpO2  Min: 80 %  Max: 100 %      Intake/Output Summary (Last 24 hours) at 10/13/2024 0121  Last data filed at 10/12/2024 2332  Gross per 24 hour   Intake 1300 ml   Output --   Net 1300 ml       Diet NPO    Invasive Monitoring           Physical Exam   Physical Exam  Eyes:      General:         Right eye: No discharge.         Left eye: No discharge.      Extraocular Movements: Extraocular movements intact.      Pupils: Pupils are equal, round, and reactive to light.   Skin:     General: Skin is cool and dry.      Coloration: Skin is not jaundiced or pale.      Findings: No rash.   HENT:      Head: Normocephalic and atraumatic.   Neck:      Vascular: No JVD.   no midline tenderness Cardiovascular:      Rate and Rhythm: Normal rate and regular rhythm.   Musculoskeletal:         General: No deformity or signs of injury. Normal range of motion.      Right lower leg: No edema.      Left lower leg: No edema.   Abdominal: General: Bowel sounds are normal.      Palpations: Abdomen is soft.   Constitutional:       General: She is not in acute distress.     Appearance: She is well-developed. She is cachectic. She is ill-appearing (Chronic).      Interventions: Face mask in place.      Comments: Attempting to pull mask off and/or disconnect BiPAP tubing upon arrival to ICU.   Pulmonary:      Effort: Tachypnea present.      Breath sounds: Decreased breath sounds and wheezing present.   Psychiatric:         Behavior: Behavior is uncooperative.   Neurological:      General: No focal deficit present.      Mental Status: She is alert. She is disoriented to place, disoriented to time and disoriented to situation.           Diagnostic Studies        Lab Results: I have reviewed the following results:     Medications:  Scheduled PRN   azithromycin, 500 mg, Q24H  cefTRIAXone, 2,000 mg, Q24H  chlorhexidine, 15 mL, Q12H NATHALY  enoxaparin, 40 mg, Daily  insulin lispro, 1-5 Units, Q6H NATHALY  ipratropium-albuterol, 3 mL, Q6H  magnesium sulfate, 2 g, Once  methylPREDNISolone sodium succinate, 40 mg, Q8H          Continuous          Labs:   CBC    Recent Labs     10/12/24  2119   WBC 8.26   HGB 12.6   HCT 40.2      BANDSPCT 8     BMP    Recent Labs     10/12/24  2119   SODIUM 128*   K 4.1   CL 84*   CO2 42*   AGAP 2*   BUN 11   CREATININE 0.42*   CALCIUM 9.7       Coags    Recent Labs     10/12/24  2119   INR 0.88   PTT 37*        Additional Electrolytes  Recent Labs     10/12/24  2119   MG 1.7*          Blood Gas    Recent Labs     10/12/24  2259   PHART 7.104*   [id number] 134.3*   PO2ART 89.9   [id number] 41.1*   BEART 7.4   SOURCE Radial, Left     Recent Labs     10/12/24  2119 10/12/24  2259   PHVEN 7.101*  --    [id number] 151.2*  --    PO2VEN 51.1*  --    [id number] 46.0*  --    BEVEN 10.3  --    B3TGIQB 76.0  --    SOURCE  --  Radial, Left    LFTs  Recent Labs     10/12/24  2119   ALT 11   AST 14   ALKPHOS 100   ALB 4.3   TBILI 0.49       Infectious  Recent Labs     10/12/24  2119   PROCALCITONI <0.05     Glucose  Recent Labs     10/12/24  2119   GLUC 190*

## 2024-10-13 NOTE — HOSPITAL COURSE
69F Hx of COPD (baseline 3L), T2DM, HTN, hyponatremia, tobacco use, who PRESENTED with altered mental status and somnolence and was ADMITTED on 10/13 for type 2 respiratory failure and toxic metabolic encephalopathy secondary to polypharmacy vs. Hypercapnia in the setting of COPD. Upon arrival she was placed on BiPAP and VBG was 7.1 Co2 151. She was given one dose of zosyn/vanco. CTA PE study was negative for PE but showed inflammatory vs. Infectious pneumonitis. Repeat ABG after 1 hr on BiPAP showed minimal improvement, she was transitioned to AVAPs settings. Due to agitation and trying to take off the BiPAP she was briefly a 1:1 observation. Other treatments included duoneb, solumedrol, azithromycin 3 day course, and ceftriaxone 5 day course. Follow-up ABG showed improvement in pH to 7.18 and Pco2 90. She was transitioned off BiPAP to NC and is now on her baseline O2. She is now alert and at her baseline, mildly confused, mental status.

## 2024-10-13 NOTE — ASSESSMENT & PLAN NOTE
Likely secondary to hypercapnia.  CTH negative for acute intracranial abnormalities.   Treat COPD exacerbation.  Check TSH and cortisol level.  Monitor for sepsis.  Monitor Neuro status.

## 2024-10-13 NOTE — PROGRESS NOTES
Pt currently sleeping comfortably on baseline 3L NC RR 17. Upon RN assessment no signs of respiratory distress, no audible secretions, no nonverbal indicators of pain. No family present at bedside at this time, RN will continue to monitor pt for maintaining comfort, and provide support to family upon their bedside visits.

## 2024-10-13 NOTE — ED PROVIDER NOTES
Time reflects when diagnosis was documented in both MDM as applicable and the Disposition within this note       Time User Action Codes Description Comment    10/13/2024 12:17 AM Andrea Colbert [R41.82] Altered mental status, unspecified altered mental status type     10/13/2024 12:17 AM Andrea Colbert [R06.89] Hypercapnia     10/13/2024 12:17 AM Andrea Colbert [J96.90] Respiratory failure (HCC)           ED Disposition       ED Disposition   Admit    Condition   Stable    Date/Time   Sun Oct 13, 2024 12:20 AM    Comment   Case was discussed with ICU team and the patient's admission status was agreed to be Admission Status: inpatient status to the service of Dr. Foote.               Assessment & Plan       Medical Decision Making  69-year-old female with past medical history of COPD presents to the emergency department with altered mental status, tachypnea, decreased breath sounds, differential diagnosis include hypoxemia, hypercapnia, respiratory failure, ACS, CHF, pneumonia, COVID, flu, RSV, intracranial process, subarachnoid hemorrhage, sepsis, UTI, hypothermia, DKA/HHS.  Patient placed on BiPAP, initial VBG shows CO2 greater than 100.  Will perform serial VBG/ABG.  Patient does remain arousable while on BiPAP, and can follow commands.  Patient was placed on a Jorge hugger due to hypothermia.  Sepsis workup initiated, broad-spectrum antibiotics started.  Will perform CT head, chest abdomen pelvis, observe respiratory status, and mental status, and reassess frequently.  Anticipate admission to ICU for further evaluation.    Discussed case with ICU, patient will be admitted for further evaluation.    Amount and/or Complexity of Data Reviewed  Labs: ordered. Decision-making details documented in ED Course.  Radiology: ordered.    Risk  Prescription drug management.        ED Course as of 10/13/24 0020   Sat Oct 12, 2024   2111 EKG performed at 2111, normal sinus rhythm at a rate of 77, normal intervals,  normal axis, no ST elevations throughout, artifact due to movement.   2141 pCO2, Jose(!!): 151.2   2141 ABG ordered - patient is able to answer questions and follow commands while on bipap, however somnolent.  Easily arouses   2145 The 30ml/kg fluid bolus was not given to the patient despite hypotension and/or significantly elevated lactate of = 4 and/or presence of septic shock due to: Concern for fluid/volume overload. The patient will be administered 1L of crystalloid fluid instead. Orders for this have been placed in Psychiatric. The patient may receive additional colloid or crystalloid fluids thereafter based on clinical condition.     Andrea Colbert, DO     2312 pCO2, Arterial(!!): 134.3   2312 Improving, mental status also improving, patient requesting food   Sun Oct 13, 2024   0009 Will admit for respiratory failure, hypercapnia, requiring BiPAP, hypothermia, hyponatremia of 128.       Medications   naloxone (NARCAN) injection 1 mg (0 mg Intravenous Hold 10/12/24 2204)   ipratropium-albuterol (DUO-NEB) 0.5-2.5 mg/3 mL inhalation solution 3 mL (3 mL Nebulization Given 10/12/24 2144)   methylPREDNISolone sodium succinate (Solu-MEDROL) injection 100 mg (100 mg Intravenous Given 10/12/24 2132)   naloxone (NARCAN) injection 0.1 mg (0.1 mg Intravenous Given 10/12/24 2128)   vancomycin (VANCOCIN) IVPB (premix in dextrose) 1,000 mg 200 mL (0 mg Intravenous Stopped 10/12/24 2332)   piperacillin-tazobactam (ZOSYN) 4.5 g in sodium chloride 0.9 % 100 mL IVPB (0 g Intravenous Stopped 10/12/24 2219)   sodium chloride 0.9 % bolus 1,000 mL (0 mL Intravenous Stopped 10/12/24 2332)   albuterol inhalation solution 2.5 mg (2.5 mg Nebulization Given 10/12/24 2150)   iohexol (OMNIPAQUE) 350 MG/ML injection (SINGLE-DOSE) 85 mL (85 mL Intravenous Given 10/12/24 2249)   ketorolac (TORADOL) injection 15 mg (15 mg Intravenous Given 10/12/24 2316)   LORazepam (ATIVAN) injection 0.5 mg (0.5 mg Intravenous Given 10/13/24 0019)        ED Risk Strat Scores                           SBIRT 22yo+      Flowsheet Row Most Recent Value   Initial Alcohol Screen: US AUDIT-C     1. How often do you have a drink containing alcohol? 0 Filed at: 10/12/2024 2111   2. How many drinks containing alcohol do you have on a typical day you are drinking?  0 Filed at: 10/12/2024 2111   3b. FEMALE Any Age, or MALE 65+: How often do you have 4 or more drinks on one occassion? 0 Filed at: 10/12/2024 2111   Audit-C Score 0 Filed at: 10/12/2024 2111   LIVIER: How many times in the past year have you...    Used an illegal drug or used a prescription medication for non-medical reasons? Never Filed at: 10/12/2024 2111                            History of Present Illness       Chief Complaint   Patient presents with    Altered Mental Status     Patient presents to the ED via EMS with reports of altered mental status. EMS reports that the patient's neighbor did a wellness check on her and found her to be obtunded, prompting a 911 call. EMS reports foul-smelling urine and difficult to arouse.        Past Medical History:   Diagnosis Date    Chronic respiratory failure with hypoxia, on home O2 therapy  (HCC)     3L NC at home    COPD exacerbation (HCC)     COPD, group D, by GOLD 2017 classification (HCC)     Diabetes mellitus (HCC)     Diverticulosis of sigmoid colon     Dyslipidemia     Essential (primary) hypertension     NELSON (generalized anxiety disorder)     GERD (gastroesophageal reflux disease)     Lung nodule     RUL    SIADH (syndrome of inappropriate ADH production) (HCC)     Tobacco dependence     Vitamin D deficiency       History reviewed. No pertinent surgical history.   Family History   Problem Relation Age of Onset    Diabetes Father       Social History     Tobacco Use    Smoking status: Some Days     Current packs/day: 0.25     Types: Cigarettes     Passive exposure: Never    Smokeless tobacco: Never   Vaping Use    Vaping status: Never Used   Substance Use  Topics    Alcohol use: Not Currently    Drug use: Never      E-Cigarette/Vaping    E-Cigarette Use Never User       E-Cigarette/Vaping Substances    Nicotine No     THC No     CBD No     Flavoring No       I have reviewed and agree with the history as documented.     69-year-old female with past medical history of COPD, diabetes, dyslipidemia, hypertension, generalized anxiety disorder, GERD, on 3 L nasal cannula at home at baseline, presents to the emergency department with altered mental status.  Patient's neighbor today well check on the patient, and found her to be obtunded earlier tonight.  Daughter states that she was at her normal baseline at approximately 5 PM on their past phone call.  On EMS arrival, patient was found on the couch, somnolent, decreased respiratory rate, however intermittently arousable to answer questions, and follow commands.  On initial evaluation, patient is arousable to sternal rub, is able to tell me her name, and follow commands, however goes back to sleep.  Concern for hypoxemia/hypercapnia.  Patient denies any chest pain, abdominal pain.  No obvious signs of trauma throughout.  Pupils are 3 mm bilaterally equal and reactive.  Patient does take clonidine at home.  She denies taking any pain pills, or narcotics earlier today.  Patient was found to be satting 91% on room air, temperature 92.6 rectally.  Patient was tachypneic, with decreased breath sounds.  Patient was placed on BiPAP with concern for hypercapnia.  Patient's initial physical exam was nonfocal with no deficits throughout.  Sepsis workup initiated.            Altered Mental Status      Review of Systems   Unable to perform ROS: Mental status change           Objective       ED Triage Vitals   Temperature Pulse Blood Pressure Respirations SpO2 Patient Position - Orthostatic VS   10/12/24 2107 10/12/24 2107 10/12/24 2107 10/12/24 2107 10/12/24 2107 10/12/24 2107   (!) 92.2 °F (33.4 °C) 82 166/74 16 100 % Lying      Temp  Source Heart Rate Source BP Location FiO2 (%) Pain Score    10/12/24 2107 10/12/24 2107 10/12/24 2107 -- 10/12/24 2316    Rectal Monitor Left arm  10 - Worst Possible Pain      Vitals      Date and Time Temp Pulse SpO2 Resp BP Pain Score FACES Pain Rating User   10/13/24 0015 94.6 °F (34.8 °C) 89 94 % 40 103/58 -- -- RG   10/12/24 2358 -- -- 94 % -- -- -- -- PM   10/12/24 2345 93.7 °F (34.3 °C) 96 83 % 51 -- -- -- RG   10/12/24 2330 93.4 °F (34.1 °C) 89 96 % 48 131/67 -- -- RG   10/12/24 2316 -- -- -- -- -- 10 - Worst Possible Pain -- RG   10/12/24 2315 93 °F (33.9 °C) 96 97 % 38 150/64 -- -- RG   10/12/24 2300 -- 96 95 % 54 139/75 -- -- LAK   10/12/24 2245 92.7 °F (33.7 °C) 97 97 % 40 129/68 -- -- LAK   10/12/24 2232 92.6 °F (33.7 °C) -- -- -- -- -- -- LAK   10/12/24 2230 92.3 °F (33.5 °C) 86 98 % 40 127/73 -- -- LAK   10/12/24 2215 92.3 °F (33.5 °C) 82 96 % 34 131/64 -- -- LAK   10/12/24 2144 -- -- 97 % -- -- -- -- PM   10/12/24 2130 92.7 °F (33.7 °C) 71 95 % 32 137/72 -- -- LAK   10/12/24 2115 -- 62 80 % 48 147/65 -- -- RR   10/12/24 2107 -- 82 100 % 16 166/74 -- -- RR   10/12/24 2107 92.2 °F (33.4 °C) -- -- -- -- -- -- LAK            Physical Exam  Vitals and nursing note reviewed.   Constitutional:       General: She is in acute distress.      Appearance: She is well-developed. She is ill-appearing.   HENT:      Head: Normocephalic and atraumatic.   Eyes:      Conjunctiva/sclera: Conjunctivae normal.   Cardiovascular:      Rate and Rhythm: Normal rate and regular rhythm.      Heart sounds: No murmur heard.  Pulmonary:      Effort: Respiratory distress present.      Breath sounds: Wheezing present.   Abdominal:      Palpations: Abdomen is soft.      Tenderness: There is no abdominal tenderness.   Musculoskeletal:         General: No swelling.      Cervical back: Neck supple.   Skin:     General: Skin is warm and dry.      Capillary Refill: Capillary refill takes less than 2 seconds.   Neurological:      Mental  Status: She is disoriented and confused.      GCS: GCS eye subscore is 3. GCS verbal subscore is 4. GCS motor subscore is 6.      Cranial Nerves: No cranial nerve deficit, dysarthria or facial asymmetry.      Sensory: No sensory deficit.      Motor: Weakness present.      Comments: Generalized weakness throughout   Psychiatric:         Mood and Affect: Mood normal.         Results Reviewed       Procedure Component Value Units Date/Time    HS Troponin I 2hr [[id number]]  (Normal) Collected: 10/12/24 2338    Lab Status: Final result Specimen: Blood from Arm, Right Updated: 10/13/24 0010     hs TnI 2hr 3 ng/L      Delta 2hr hsTnI -1 ng/L     HS Troponin I 4hr [[id number]]     Lab Status: No result Specimen: Blood     Blood gas, arterial [[id number]]  (Abnormal) Collected: 10/12/24 2259    Lab Status: Final result Specimen: Blood, Arterial from Radial, Left Updated: 10/12/24 2312     pH, Arterial 7.104     PH ART TC 7.146     pCO2, Arterial 134.3 mm Hg      PCO2 (TC) Arterial 116.2 mm Hg      pO2, Arterial 89.9 mm Hg      PO2 (TC) Arterial 73.0 mm Hg      HCO3, Arterial 41.1 mmol/L      Base Excess, Arterial 7.4 mmol/L      O2 Content, Arterial 15.6 mL/dL      O2 HGB,Arterial  90.4 %      SOURCE Radial, Left     BRENDA TEST Yes     Temperature 92.7 Degrees Fehrenheit      Non Vent type BIPAP BIPAP     IPAP 22     EPAP 6     BIPAP fio2 40 %     B-Type Natriuretic Peptide(BNP) [[id number]]  (Abnormal) Collected: 10/12/24 2119    Lab Status: Final result Specimen: Blood from Arm, Right Updated: 10/12/24 2205      pg/mL     Procalcitonin [[id number]]  (Normal) Collected: 10/12/24 2119    Lab Status: Final result Specimen: Blood from Arm, Right Updated: 10/12/24 2157     Procalcitonin <0.05 ng/ml     Protime-INR [[id number]]  (Normal) Collected: 10/12/24 2119    Lab Status: Final result Specimen: Blood from Arm, Right Updated: 10/12/24 2156     Protime 12.3 seconds      INR 0.88    Narrative:      INR Therapeutic  Range    Indication                                             INR Range      Atrial Fibrillation                                               2.0-3.0  Hypercoagulable State                                    2.0.2.3  Left Ventricular Asist Device                            2.0-3.0  Mechanical Heart Valve                                  -    Aortic(with afib, MI, embolism, HF, LA enlargement,    and/or coagulopathy)                                     2.0-3.0 (2.5-3.5)     Mitral                                                             2.5-3.5  Prosthetic/Bioprosthetic Heart Valve               2.0-3.0  Venous thromboembolism (VTE: VT, PE        2.0-3.0    APTT [[id number]]  (Abnormal) Collected: 10/12/24 2119    Lab Status: Final result Specimen: Blood from Arm, Right Updated: 10/12/24 2156     PTT 37 seconds     Manual Differential(PHLEBS Do Not Order) [[id number]]  (Abnormal) Collected: 10/12/24 2119    Lab Status: Final result Specimen: Blood from Arm, Right Updated: 10/12/24 2155     Segmented % 82 %      Bands % 8 %      Lymphocytes % 4 %      Monocytes % 5 %      Eosinophils % 0 %      Basophils % 0 %      Metamyelocytes % 1 %      Absolute Neutrophils 7.43 Thousand/uL      Absolute Lymphocytes 0.33 Thousand/uL      Absolute Monocytes 0.41 Thousand/uL      Absolute Eosinophils 0.00 Thousand/uL      Absolute Basophils 0.00 Thousand/uL      Absolute Metamyelocytes 0.08 Thousand/uL      Total Counted --     RBC Morphology Normal     Platelet Estimate Adequate     Hypochromia Present    HS Troponin 0hr (reflex protocol) [[id number]]  (Normal) Collected: 10/12/24 2119    Lab Status: Final result Specimen: Blood from Arm, Right Updated: 10/12/24 2154     hs TnI 0hr 4 ng/L     FLU/COVID Rapid Antigen (30 min. TAT) - Preferred screening test in ED [[id number]]  (Normal) Collected: 10/12/24 2119    Lab Status: Final result Specimen: Nares from Nose Updated: 10/12/24 2149     SARS COV Rapid Antigen Negative      Influenza A Rapid Antigen Negative     Influenza B Rapid Antigen Negative    Narrative:      This test has been performed using the [employer] Rukhsana 2 FLU+SARS Antigen test under the Emergency Use Authorization (EUA). This test has been validated by the  and verified by the performing laboratory. The Rukhsana uses lateral flow immunofluorescent sandwich assay to detect SARS-COV, Influenza A and Influenza B Antigen.     The Quidel Rukhsana 2 SARS Antigen test does not differentiate between SARS-CoV and SARS-CoV-2.     Negative results are presumptive and may be confirmed with a molecular assay, if necessary, for patient management. Negative results do not rule out SARS-CoV-2 or influenza infection and should not be used as the sole basis for treatment or patient management decisions. A negative test result may occur if the level of antigen in a sample is below the limit of detection of this test.     Positive results are indicative of the presence of viral antigens, but do not rule out bacterial infection or co-infection with other viruses.     All test results should be used as an adjunct to clinical observations and other information available to the provider.    FOR PEDIATRIC PATIENTS - copy/paste COVID Guidelines URL to browser: [email or web address]    Blood culture #1 [[id number]] Collected: 10/12/24 2146    Lab Status: In process Specimen: Blood from Arm, Right Updated: 10/12/24 2148    Comprehensive metabolic panel [[id number]]  (Abnormal) Collected: 10/12/24 2119    Lab Status: Final result Specimen: Blood from Arm, Right Updated: 10/12/24 2147     Sodium 128 mmol/L      Potassium 4.1 mmol/L      Chloride 84 mmol/L      CO2 42 mmol/L      ANION GAP 2 mmol/L      BUN 11 mg/dL      Creatinine 0.42 mg/dL      Glucose 190 mg/dL      Calcium 9.7 mg/dL      AST 14 U/L      ALT 11 U/L      Alkaline Phosphatase 100 U/L      Total Protein 7.7 g/dL      Albumin 4.3  g/dL      Total Bilirubin 0.49 mg/dL      eGFR 106 ml/min/1.73sq m     Narrative:      National Kidney Disease Foundation guidelines for Chronic Kidney Disease (CKD):     Stage 1 with normal or high GFR (GFR > 90 mL/min/1.73 square meters)    Stage 2 Mild CKD (GFR = 60-89 mL/min/1.73 square meters)    Stage 3A Moderate CKD (GFR = 45-59 mL/min/1.73 square meters)    Stage 3B Moderate CKD (GFR = 30-44 mL/min/1.73 square meters)    Stage 4 Severe CKD (GFR = 15-29 mL/min/1.73 square meters)    Stage 5 End Stage CKD (GFR <15 mL/min/1.73 square meters)  Note: GFR calculation is accurate only with a steady state creatinine    Lipase [[id number]]  (Abnormal) Collected: 10/12/24 2119    Lab Status: Final result Specimen: Blood from Arm, Right Updated: 10/12/24 2147     Lipase <6 u/L     Magnesium [[id number]]  (Abnormal) Collected: 10/12/24 2119    Lab Status: Final result Specimen: Blood from Arm, Right Updated: 10/12/24 2147     Magnesium 1.7 mg/dL     Lactic acid [[id number]]  (Normal) Collected: 10/12/24 2119    Lab Status: Final result Specimen: Blood from Arm, Right Updated: 10/12/24 2147     LACTIC ACID 0.6 mmol/L     Narrative:      Result may be elevated if tourniquet was used during collection.    Urine Microscopic [[id number]]  (Abnormal) Collected: 10/12/24 2119    Lab Status: Final result Specimen: Urine, Straight Cath Updated: 10/12/24 2146     RBC, UA 0-5 /hpf      WBC, UA 0-5 /hpf      Epithelial Cells Occasional /hpf      Bacteria, UA Moderate /hpf      Hyaline Casts, UA 0-1 /lpf      Fine granular casts 0-3 /lpf      AMORPH URATES Occasional /hpf     Blood gas, Venous [[id number]]  (Abnormal) Collected: 10/12/24 2119    Lab Status: Final result Specimen: Blood from Arm, Right Updated: 10/12/24 2140     pH, Jose 7.101     pCO2, Jose 151.2 mm Hg      pO2, Jose 51.1 mm Hg      HCO3, Jose 46.0 mmol/L      Base Excess, Jose 10.3 mmol/L      O2 Content, Jose 15.1 ml/dL      O2 HGB, VENOUS 76.0 %     CBC and differential  [[id number]]  (Abnormal) Collected: 10/12/24 2119    Lab Status: Final result Specimen: Blood from Arm, Right Updated: 10/12/24 2136     WBC 8.26 Thousand/uL      RBC 4.47 Million/uL      Hemoglobin 12.6 g/dL      Hematocrit 40.2 %      MCV 90 fL      MCH 28.2 pg      MCHC 31.3 g/dL      RDW 12.5 %      MPV 10.0 fL      Platelets 167 Thousands/uL     UA w Reflex to Microscopic w Reflex to Culture [[id number]]  (Abnormal) Collected: 10/12/24 2119    Lab Status: Final result Specimen: Urine, Straight Cath Updated: 10/12/24 2134     Color, UA Yellow     Clarity, UA Clear     Specific Gravity, UA >=1.030     pH, UA 5.5     Leukocytes, UA Negative     Nitrite, UA Negative     Protein,  (2+) mg/dl      Glucose,  (1/2%) mg/dl      Ketones, UA Negative mg/dl      Urobilinogen, UA 0.2 E.U./dl      Bilirubin, UA Negative     Occult Blood, UA Small    Blood culture #2 [[id number]] Collected: 10/12/24 2119    Lab Status: In process Specimen: Blood from Arm, Right Updated: 10/12/24 2129    Fingerstick Glucose (POCT) [[id number]]  (Abnormal) Collected: 10/12/24 2126    Lab Status: Final result Specimen: Blood Updated: 10/12/24 2126     POC Glucose 196 mg/dl             CT pe study w abdomen pelvis w contrast   Final Interpretation by Richy Wilson DO (10/12 2359)      Some images are suboptimal secondary to respiratory motion which decreases sensitivity for evaluation of peripheral pulmonary emboli, subject to this, no pulmonary embolism is seen.      Patchy alveolar opacities and reticulonodular opacities in the bilateral upper lung fields, left greater than right, suspicious for infectious or inflammatory pneumonitis. Correlation with the patient's symptoms and laboratory values recommended.      Partial attenuation in the bilateral mainstem bronchi likely related to inspissated secretions/mucous.      Partially distended bladder. Mild circumferential bladder wall thickening noted, probably exaggerated by  under distention. Superimposed cystitis considered in the appropriate clinical setting.      Coronary atherosclerosis, colonic diverticulosis without evidence of acute diverticulitis, and other findings as above.         Workstation performed: [id number]         CT head without contrast   Final Interpretation by Richy Wilson DO (10/12 2236)      No acute intracranial abnormality is seen.      Other findings as above.                  Workstation performed: [id number]         XR chest portable    (Results Pending)       Procedures    ED Medication and Procedure Management   Prior to Admission Medications   Prescriptions Last Dose Informant Patient Reported? Taking?   ARIPiprazole (ABILIFY) 2 mg tablet   Yes No   Sig: Take 2 mg by mouth daily   LORazepam (ATIVAN) 0.5 mg tablet  Self, Child Yes No   Sig: Take 0.5 mg by mouth every 8 (eight) hours as needed for anxiety   QUEtiapine (SEROquel) 25 mg tablet   Yes No   Patient not taking: Reported on 2024   Trelegy Ellipta 200-62.5-25 MCG/ACT AEPB inhaler   No No   Sig: INHALE 1 PUFF DAILY RINSE MOUTH AFTER USE   amLODIPine (NORVASC) 10 mg tablet  Self, Child Yes No   Sig: Take 10 mg by mouth daily at bedtime    atorvastatin (LIPITOR) 40 mg tablet  Self, Child Yes No   Sig: Take 40 mg by mouth daily at bedtime    cholecalciferol (VITAMIN D3) 1,000 units tablet  Child No No   Sig: Take 1 tablet (1,000 Units total) by mouth daily   Patient not taking: Reported on 2024   cloNIDine (CATAPRES) 0.2 mg tablet  Self, Child Yes No   Sig: Take 0.2 mg by mouth every 12 (twelve) hours   fluticasone (FLONASE) 50 mcg/act nasal spray  Child No No   Si spray into each nostril daily   glipiZIDE (GLUCOTROL) 10 mg tablet  Self, Child Yes No   Sig: Take 10 mg by mouth daily in the early morning W breakfast   hydrOXYzine HCL (ATARAX) 10 mg tablet  Self, Child Yes No   Sig: Take 10 mg by mouth every 6 (six) hours as needed for itching   ipratropium-albuterol (DUO-NEB)  0.5-2.5 mg/3 mL nebulizer solution  Self, Child No No   Sig: Take 3 mL by nebulization 3 (three) times a day   lisinopril (ZESTRIL) 40 mg tablet  Self, Child Yes No   Sig: Take 40 mg by mouth daily   metoprolol tartrate (LOPRESSOR) 100 mg tablet  Self, Child Yes No   Sig: Take 100 mg by mouth every 12 (twelve) hours   neomycin-polymyxin-hydrocortisone (CORTISPORIN) 0.35%-10,000 units/mL-1% otic suspension   No No   Sig: Administer 4 drops to the right ear 3 (three) times a day for 7 days   omeprazole (PriLOSEC OTC) 20 MG tablet  Self, Child Yes No   Sig: Take 20 mg by mouth daily    omeprazole (PriLOSEC) 20 mg delayed release capsule   Yes No   Sig: Take 20 mg by mouth every morning   Patient not taking: Reported on 9/19/2024   pioglitazone (ACTOS) 15 mg tablet  Child Yes No   Sig: Take 15 mg by mouth daily   sodium chloride 1 g tablet  Child Yes No   Sig: Take 2 g by mouth in the morning      Facility-Administered Medications: None     Patient's Medications   Discharge Prescriptions    No medications on file     No discharge procedures on file.  ED SEPSIS DOCUMENTATION   Time reflects when diagnosis was documented in both MDM as applicable and the Disposition within this note       Time User Action Codes Description Comment    10/13/2024 12:17 AM Andrea Colbert [R41.82] Altered mental status, unspecified altered mental status type     10/13/2024 12:17 AM Andrea Colbert [R06.89] Hypercapnia     10/13/2024 12:17 Andrea Davis [J96.90] Respiratory failure (HCC)                  Andrea Colbert DO  10/13/24 0020

## 2024-10-13 NOTE — ACP (ADVANCE CARE PLANNING)
"Patient's daughter, Lindsey, was contacted and asked to come to the hospital for a [facility] discussion.  Present for the discussion were Roberto Portillo (LEYDI) and myself.  Jessika's wishes were discussed as well as her co-morbidities and her end stage COPD.  Jessika has refused to wear BiPAP impeding her treatment and yelling \"I don't want this.\"  It was discussed that BiPAP is the last option before intubation.  If she were to be intubated, it would be unlikely that she would be able to be extubated and could possibly need a tracheostomy.  Lindsey verbalized that her mother would not want that and the decision was made to take the option of intubation off the table. We then discussed the cyclic nature of end stage COPD in regards to the CO2 rising, needing BiPAP/hospitalization, getting better with BiPAP, worsening with BiPAP off, etc.  It was also discussed that she most likely needs BiPAP for home use to prevent hospitalizations.  Lindsey verbalized that her mother would not agree to that nor be compliant.  Lindsey asked if it were possible to make her mother comfort care given the circumstances of her mother's COPD, her non-compliance with needed treatments and her mother's wishes to not be put on life support. The Critical Care team agreed that comfort care was a reasonable choice. Comfort care orders were placed.   "

## 2024-10-14 NOTE — PROGRESS NOTES
In basket received for patient admission hospital on 10/12.   Admitting diagnosis of: acute on respiratory failure with hypoxia.  Patient found by neighbor performing wellness check with altered mental status.  Patient hypoxic and hypercarbic requiring BiPAP.  Sepsis protocol initiated.   Throughout admission, patient remained hypercarbic and refused BiPAP yesterday.  GOC discussion held yesterday with family / patient regarding end stage COPD and possible need for intubation.  Patient / family elected for comfort care and she was placed on 3L oxygen.      Patient  shortly after creation of this note.  Update to Lead HU RN CM.

## 2024-10-14 NOTE — CASE MANAGEMENT
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) 2024     Weakness 2024     Anxiety disorder due to general medical condition with panic attack 2023     COPD, severe (HCC) 2023     Lung nodule 2023     Abnormal CT scan 2023     Anxiety about health 2023     Bacteremia 2023     Vomiting 2023     COPD with acute exacerbation (HCC) 2020     Acute on chronic respiratory failure with hypoxia and hypercapnia (HCC) 2020     Hyponatremia 2020     Tobacco abuse 2020     Primary hypertension 2020     Type 2 diabetes mellitus without complication, without long-term current use of insulin (HCC) 2020       LOS (days): 1  Geometric Mean LOS (GMLOS) (days):   Days to GMLOS:     OBJECTIVE:  Risk of Unplanned Readmission Score: 36.53         Current admission status: Inpatient   Preferred Pharmacy:   [id number] - [place], [place] - [address] [facility]  [address] [facility] [address]  Phone: [phone] Fax: [phone]    [id number] - [place], [place] - [address]  [address] [facility] [address]  Phone: [phone] Fax: [phone]    [id number] [place] [facility] [address] [facility] [address] [facility]  [address] [facility]  [facility] [address]  Phone: [phone] Fax: [phone]    Primary Care Provider: Karlie Bruno DO    Primary Insurance: [employer]   REP  Secondary Insurance: [place] [facility]    DISCHARGE DETAILS:     Aware of consults,NA as patient has CTB.

## 2024-10-14 NOTE — DISCHARGE SUMMARY
Admission Date: 10/12/2024   Admitting Diagnosis: Respiratory failure (HCC) [J96.90]  Hypercapnia [R06.89]  COPD exacerbation (HCC) [J44.1]  Altered mental status, unspecified altered mental status type [R41.82]  AMS (altered mental status) [R41.82]  Discharge Date: 10/14/24    HPI: 68 yo F admitted 10/13 for change in mental status 2/2 hypercarbia requiring bipap and with ongoing goals of care discussion was transitioned to comfort measures on 10/14.    Procedures Performed: No orders of the defined types were placed in this encounter.      Summary of Hospital Course:   68 yo F with PMH COPD on 3L oxygen, DM, HTN, GERD, SIADH, anxiety who presented with altered mental status found to be hypercarbic requiring bipap therapy. CT revealed b/l UL opacities treated with antibx. Hypercarbia improved and pt agitated requesting bipap removal. Goals of care discussion held with daughter given need for bipap compliance and decision was made for comfort measure only. Pt  10/14.    Significant Findings, Care, Treatment and Services Provided:   10/12 CTH neg  10/12 CTCAP-Some images are suboptimal secondary to respiratory motion which decreases sensitivity for evaluation of peripheral pulmonary emboli, subject to this, no pulmonary embolism is seen. Patchy alveolar opacities and reticulonodular opacities in the bilateral upper lung fields, left greater than right, suspicious for infectious or inflammatory pneumonitis. Correlation with the patient's symptoms and laboratory values recommended. Partial attenuation in the bilateral mainstem bronchi likely related to inspissated secretions/mucous. Partially distended bladder. Mild circumferential bladder wall thickening noted, probably exaggerated by under distention. Superimposed cystitis considered in the appropriate clinical setting. Coronary atherosclerosis, colonic diverticulosis without evidence of acute diverticulitis, and other findings as above.     Complications:  None    Disposition:      Final Diagnosis: Acute on chronic hypoxic/hypercapneic respiratory failure    Medical Problems       Resolved Problems  Date Reviewed: 2024   None         Condition at Time of Death: comfort  Date, Time and Cause of Death    Date of Death: 10/14/24  Time of Death:  8:10 AM  Preliminary Cause of Death: Acute on chronic respiratory failure with hypoxia and hypercapnia (HCC)  Entered by: Cass VIERA[SS1.1]       Attribution       SS1.1 AYLIN Sadler 10/14/24 08:17            Death Note:  INPATIENT DEATH NOTE  Jessika JEANETTE Lux 69 y.o. female MRN: [id number]  Unit/Bed#: -01 Encounter: [id number]    Date, Time and Cause of Death    Date of Death: 10/14/24  Time of Death:  8:10 AM  Preliminary Cause of Death: Acute on chronic respiratory failure with hypoxia and hypercapnia (HCC)  Entered by: Cass VIERA[SS1.1]       Attribution       SS1.1 AYLIN Sadler 10/14/24 08:17             Patient's Information  Pronounced by: Cass VIERA  Did the patient's death occur in the ED?: No  Did the patient's death occur in the OR?: No  Did the patient's death occur less than 10 days post-op?: No  Did the patient's death occur within 24 hours of admission?: No  Was code status DNR at the time of death?: Yes    PHYSICAL EXAM:  Unresponsive to noxious stimuli, Spontaneous respirations absent, Breath sounds absent, Carotid pulse absent, Heart sounds absent, and Pupillary light reflex absent    Medical Examiner notification criteria:  NONE APPLICABLE   Medical Examiner's office notified?:  No, does not meet ME notification criteria   Medical Examiner accepted case?:  na  Name of Medical Examiner: na    Family Notification  Was the family notified?: Yes  Date Notified: 10/14/24  Time Notified: 815  Notified by: Cass VIERA  Name of Family Notified of Death: Lindsey   Relationship to Patient: Daughter  Family  Notification Route: Telephone  Was the family told to contact a  home?: Yes  Name of  Home:: pending    Autopsy Options:  Decision for post-mortem examination not yet made by next of kin.    Primary Service Attending Physician notified?:  yes - Attending:  Qamar Buckley*    Physician/Resident responsible for completing Discharge Summary:  Cass VIERA

## 2024-10-14 NOTE — UTILIZATION REVIEW
Initial Clinical Review    Admission: Date/Time/Statement:   Admission Orders (From admission, onward)       Ordered        10/13/24 0019  INPATIENT ADMISSION  Once                          Orders Placed This Encounter   Procedures    INPATIENT ADMISSION     Standing Status:   Standing     Number of Occurrences:   1     Order Specific Question:   Level of Care     Answer:   Level 1 Stepdown [13]     Order Specific Question:   Estimated length of stay     Answer:   More than 2 Midnights     Order Specific Question:   Certification     Answer:   I certify that inpatient services are medically necessary for this patient for a duration of greater than two midnights. See H&P and MD Progress Notes for additional information about the patient's course of treatment.     ED Arrival Information       Expected   -    Arrival   10/12/2024 21:05    Acuity   Emergent              Means of arrival   Ambulance    Escorted by   [facility]    Service   Critical Care/ICU    Admission type   Emergency              Arrival complaint   -             Chief Complaint   Patient presents with    Altered Mental Status     Patient presents to the ED via EMS with reports of altered mental status. EMS reports that the patient's neighbor did a wellness check on her and found her to be obtunded, prompting a 911 call. EMS reports foul-smelling urine and difficult to arouse.        Initial Presentation: 69 y.o. female to ED via EMS from home  Present to ED after patient was found down at home after family members could not get a hold of her. EMS reported that the patient's mental status was altered, she was difficult to arouse and there was foul-smelling urine upon their arrival.  Upon arrival to the emergency department the patient was found to be hypoxic in the 80s and respiratory distress.  She was placed on BiPAP.  An ABG was completed 1 hour after being on BiPAP and showed very little to no improvement.   PMHX: COPD exacerbations (3L NC  O2 at baseline), diabetes type 2, hypertension, hyponatremia, tobacco abuse.   Admitted to Level 1 Stepdown with DX: Acute on chronic respiratory failure with hypoxia and hypercapnia   on exam: Disoriented to place, time and situation; tachypnea; BiPAP; lungs decreased bilaterally with wheezing; Mg 1.7; Na 128; VBG: pH 7.1, pCO2 151, HCO3 46; ABG (1 hr after being on BiPAP): pH 7.1, pCO2 134, HCO3 41. VBG (upon arrival to ICU. Patient wouldn't allow ABG to be drawn): pH 7.1, pCO2 134, HCO3 44   CTA PE - negative for PE. Concerning for inflammatory process vs infection.   PLAN:  s/p 1L NSS in ED; cont iv abx; cont solumedrol iv; cont DuoNebs; Cardiopulmonary monitoring; monitor labs; titrate O2; pain / nausea control; Accuchecks with ssic; f/u TSH and cortisol level. F/u blood / sputum cx      Date: 10/13/24      Day 2  Mentation improved this morning. Weaned off Bipap this morning to 5L NC O2. Exam: Alert, Oriented x1, in mild distress; tele sinus tach; Decreased breath sounds with diffuse wheezing b/l   Plan: cont iv abx; cont solumedrol iv; cont DuoNebs; recd Mg Sulf iv x1; recd KCL iv x1; Cardiopulmonary monitoring; monitor labs; titrate O2; pain / nausea control; Accuchecks with ssic; f/u TSH and cortisol level. F/u blood / sputum cx    Date: 10/14/24  Day 3: Has surpassed a 2nd midnight with active treatments and services.  Discharge Summary    INPATIENT DEATH NOTE   Date, Time and Cause of Death    Date of Death: 10/14/24  Time of Death:  8:10 AM  Preliminary Cause of Death: Acute on chronic respiratory failure with hypoxia and hypercapnia         ED Treatment-Medication Administration from 10/12/2024 2105 to 10/13/2024 0055         Date/Time Order Dose Route Action     10/12/2024 2144 ipratropium-albuterol (DUO-NEB) 0.5-2.5 mg/3 mL inhalation solution 3 mL 3 mL Nebulization Given     10/12/2024 2132 methylPREDNISolone sodium succinate (Solu-MEDROL) injection 100 mg 100 mg Intravenous Given     10/12/2024 2128  naloxone (NARCAN) injection 0.1 mg 0.1 mg Intravenous Given     10/12/2024 2159 vancomycin (VANCOCIN) IVPB (premix in dextrose) 1,000 mg 200 mL 1,000 mg Intravenous New Bag     10/12/2024 2202 piperacillin-tazobactam (ZOSYN) 4.5 g in sodium chloride 0.9 % 100 mL IVPB 4.5 g Intravenous New Bag     10/12/2024 2149 sodium chloride 0.9 % bolus 1,000 mL 1,000 mL Intravenous New Bag     10/12/2024 2150 albuterol inhalation solution 2.5 mg 2.5 mg Nebulization Given     10/12/2024 2249 iohexol (OMNIPAQUE) 350 MG/ML injection (SINGLE-DOSE) 85 mL 85 mL Intravenous Given     10/12/2024 2316 ketorolac (TORADOL) injection 15 mg 15 mg Intravenous Given     10/13/2024 0019 LORazepam (ATIVAN) injection 0.5 mg 0.5 mg Intravenous Given            Scheduled Medications:  QUEtiapine, 25 mg, Oral, HS  azithromycin (ZITHROMAX) 500 mg in sodium chloride 0.9 % 250 mL IVPB, Q24H  cefTRIAXone (ROCEPHIN) IVPB (premix in dextrose) 2,000 mg 50 mL, Intravenous Q8H  ipratropium-albuterol (DUO-NEB) 0.5-2.5 mg/3 mL inhalation solution 3 mL, Q6H   methylPREDNISolone sodium succinate (Solu-MEDROL) injection 40 mg, Intravenous Q8H      magnesium sulfate 2 g/50 mL IVPB (premix) 2 g  Dose: 2 g  Freq: Once Route: IV  Last Dose: Stopped (10/13/24 0333)  Start: 10/13/24 0130 End: 10/13/24 0333    potassium chloride 20 mEq IVPB (premix)  Dose: 20 mEq  Freq: Once Route: IV  Last Dose: Stopped (10/13/24 1201)  Start: 10/13/24 0900 End: 10/13/24 1201      Continuous IV Infusions: dexmedeTOMIDine (Precedex) 400 mcg in sodium chloride 0.9% 100 mL        PRN Meds:  bisacodyl, 10 mg, Rectal, Daily PRN  glycopyrrolate, 0.1 mg, Intravenous, Q4H PRN   (10/13 recd x1)   haloperidol lactate, 0.5 mg, Intravenous, Q2H PRN  HYDROmorphone, 0.3 mg, Intravenous, Q2H PRN   (10/13 recd x2)  (10/14 recd x1 so far today)   LORazepam, 1 mg, Intravenous, Q10 Min PRN  (10/13 recd x2) (10/14 recd x1 so far today)       ED Triage Vitals   Temperature Pulse Respirations Blood Pressure  SpO2 Pain Score   10/12/24 2107 10/12/24 2107 10/12/24 2107 10/12/24 2107 10/12/24 2107 10/12/24 2316   (!) 92.2 °F (33.4 °C) 82 16 166/74 100 % 10 - Worst Possible Pain     Weight (last 2 days)       Date/Time Weight    10/13/24 0526 68.4 (150.79)    10/13/24 0303 66.7 (147.05)    10/12/24 2107 66.7 (147.05)            Vital Signs (last 3 days)       Date/Time Temp Pulse Resp BP MAP (mmHg) SpO2 Calculated FIO2 (%) - Nasal Cannula Nasal Cannula O2 Flow Rate (L/min) O2 Device O2 Interface Device Patient Position - Orthostatic VS Eileen Coma Scale Score Pain    10/13/24 1958 -- -- -- -- -- -- -- -- -- -- -- -- Med Not Given for Pain - for MAR use only    10/13/24 1910 -- 61 17 77/44 56 99 % 32 3 L/min Nasal cannula -- -- -- --    10/13/24 1600 -- -- -- -- -- -- -- -- -- -- -- 15 --    10/13/24 1530 97.4 °F (36.3 °C) 82 39 137/69 98 97 % -- -- -- -- -- -- --    10/13/24 1320 -- -- -- -- -- 92 % 32 3 L/min Nasal cannula -- -- -- --    10/13/24 1200 -- -- -- -- -- -- -- -- -- -- -- 15 --    10/13/24 1139 97.5 °F (36.4 °C) 120 30 171/80 115 95 % 32 3 L/min Nasal cannula -- Lying -- --    10/13/24 0800 -- -- -- -- -- 97 % 40 5 L/min Nasal cannula -- -- 15 --    10/13/24 0709 -- -- -- -- -- 97 % -- -- BiPAP -- -- -- --    10/13/24 0700 98.4 °F (36.9 °C) 103 -- 133/72 95 98 % -- -- BiPAP -- -- -- --    10/13/24 0600 98.2 °F (36.8 °C) 94 42 124/68 90 97 % -- -- -- -- -- -- --    10/13/24 0530 98.1 °F (36.7 °C) 96 41 120/64 90 97 % -- -- -- -- -- -- --    10/13/24 0526 97.9 °F (36.6 °C) 92 44 -- -- -- -- -- -- -- -- 13 No Pain    10/13/24 0500 -- 89 36 101/55 69 96 % -- -- -- -- -- -- --    10/13/24 0430 -- 91 -- 131/103 112 97 % -- -- -- -- -- -- --    10/13/24 0423 -- -- -- -- -- -- -- -- -- Face mask -- -- --    10/13/24 0422 -- 89 36 -- -- 96 % -- -- -- -- -- -- --    10/13/24 0415 96.4 °F (35.8 °C) 97 46 -- -- 96 % -- -- -- -- -- -- --    10/13/24 0400 96.3 °F (35.7 °C) 77 25 -- -- 97 % -- -- -- -- -- -- --    10/13/24  0345 96.3 °F (35.7 °C) 70 24 -- -- 99 % -- -- -- -- -- -- --    10/13/24 0330 96.1 °F (35.6 °C) 83 37 135/58 83 98 % -- -- -- -- -- -- --    10/13/24 0315 95.7 °F (35.4 °C) 81 40 -- -- 99 % -- -- -- -- -- -- --    10/13/24 0300 95.4 °F (35.2 °C) 85 37 125/78 97 97 % -- -- -- -- -- -- --    10/13/24 0245 95.2 °F (35.1 °C) 92 35 -- -- 97 % -- -- -- -- -- -- --    10/13/24 0230 97.2 °F (36.2 °C) 96 37 110/70 84 97 % -- -- -- -- -- -- --    10/13/24 0215 -- 103 -- -- -- 96 % -- -- -- -- -- -- --    10/13/24 0200 97 °F (36.1 °C) 89 -- -- -- 95 % -- -- -- -- -- -- --    10/13/24 0148 -- -- -- -- -- 97 % -- -- -- -- -- -- --    10/13/24 0145 96.8 °F (36 °C) 66 36 -- -- 97 % -- -- -- -- -- -- --    10/13/24 0140 96.6 °F (35.9 °C) 64 23 -- -- 95 % -- -- -- -- -- -- --    10/13/24 0130 96.4 °F (35.8 °C) 74 37 99/54 71 90 % -- -- -- -- -- -- --    10/13/24 0120 96.3 °F (35.7 °C) 74 28 -- -- 78 % -- -- -- -- -- -- --    10/13/24 0115 96.3 °F (35.7 °C) 93 32 -- -- 81 % -- -- -- -- -- -- --    10/13/24 0110 96.1 °F (35.6 °C) 100 42 127/47 68 95 % -- -- -- -- -- 13 --    10/13/24 0107 -- 81 38 -- -- 95 % -- -- -- -- -- -- --    10/13/24 0100 95.7 °F (35.4 °C) 91 31 -- -- 97 % -- -- -- -- -- -- --    10/13/24 0015 94.6 °F (34.8 °C) 89 40 103/58 71 94 % -- -- -- -- -- -- --    10/12/24 2358 -- -- -- -- -- 94 % -- -- -- Face mask -- -- --    10/12/24 2345 93.7 °F (34.3 °C) 96 51 -- -- 83 % -- -- -- -- -- -- --    10/12/24 2330 93.4 °F (34.1 °C) 89 48 131/67 91 96 % -- -- BiPAP -- -- -- --    10/12/24 2316 -- -- -- -- -- -- -- -- -- -- -- -- 10 - Worst Possible Pain    10/12/24 2315 93 °F (33.9 °C) 96 38 150/64 87 97 % -- -- -- -- Lying -- --    10/12/24 2300 -- 96 54 139/75 93 95 % -- -- -- -- -- -- --    10/12/24 2255 -- -- -- -- -- -- -- -- -- -- -- 14 --    10/12/24 2245 92.7 °F (33.7 °C) 97 40 129/68 -- 97 % -- -- -- -- -- -- --    10/12/24 2232 92.6 °F (33.7 °C) -- -- -- -- -- -- -- -- -- -- -- --    10/12/24 2230 92.3 °F (33.5  °C) 86 40 127/73 95 98 % -- -- -- -- -- -- --    10/12/24 2215 92.3 °F (33.5 °C) 82 34 131/64 92 96 % -- -- -- -- -- -- --    10/12/24 2154 -- -- -- -- -- -- -- -- BiPAP -- -- 8 --    10/12/24 2153 -- -- -- -- -- -- -- -- -- Face mask -- -- --    10/12/24 2144 -- -- -- -- -- 97 % -- -- -- -- -- -- --    10/12/24 2130 92.7 °F (33.7 °C) 71 32 137/72 -- 95 % -- -- -- -- -- -- --    10/12/24 2115 -- 62 48 147/65 94 80 % -- -- None (Room air) -- Lying -- --    10/12/24 2107 92.2 °F (33.4 °C) 82 16 166/74 -- 100 % -- -- None (Room air) -- Lying -- --              Pertinent Labs/Diagnostic Test Results:   Radiology:  CT pe study w abdomen pelvis w contrast   Final Interpretation by Richy Wilson DO (10/12 0829)      Some images are suboptimal secondary to respiratory motion which decreases sensitivity for evaluation of peripheral pulmonary emboli, subject to this, no pulmonary embolism is seen.      Patchy alveolar opacities and reticulonodular opacities in the bilateral upper lung fields, left greater than right, suspicious for infectious or inflammatory pneumonitis. Correlation with the patient's symptoms and laboratory values recommended.      Partial attenuation in the bilateral mainstem bronchi likely related to inspissated secretions/mucous.      Partially distended bladder. Mild circumferential bladder wall thickening noted, probably exaggerated by under distention. Superimposed cystitis considered in the appropriate clinical setting.      Coronary atherosclerosis, colonic diverticulosis without evidence of acute diverticulitis, and other findings as above.         Workstation performed: [id number]         CT head without contrast   Final Interpretation by Richy Wilson DO (10/12 1816)      No acute intracranial abnormality is seen.      Other findings as above.                  Workstation performed: [id number]         XR chest portable   Final Interpretation by Henry Pa MD  (10/14 0640)   Left upper lobe pneumonia.            Workstation performed: [id number]           Cardiology:  ECG 12 lead   Final Result by Constantino Beltrán MD (10/14 0934)   Sinus rhythm with Premature supraventricular complexes   Otherwise normal ECG   When compared with ECG of 13-OCT-2024 16:48, (unconfirmed)   Premature supraventricular complexes are now Present   Confirmed by Constantino Beltrán ([id number]) on 10/14/2024 9:34:34 AM      ECG 12 lead   Final Result by Constantino Beltrán MD (10/14 0935)   Normal sinus rhythm   Normal ECG   When compared with ECG of 12-OCT-2024 21:11, (unconfirmed)   No significant change was found   Confirmed by Contsantino Beltrán ([id number]) on 10/14/2024 9:35:17 AM              Results from last 7 days   Lab Units 10/13/24  0507 10/12/24  2119   WBC Thousand/uL 6.62 8.26   HEMOGLOBIN g/dL 10.8* 12.6   HEMATOCRIT % 34.1* 40.2   PLATELETS Thousands/uL 151 167   BANDS PCT %  --  8        Results from last 7 days   Lab Units 10/13/24  0507 10/12/24  2119   SODIUM mmol/L 130* 128*   POTASSIUM mmol/L 3.8 4.1   CHLORIDE mmol/L 88* 84*   CO2 mmol/L 38* 42*   ANION GAP mmol/L 4 2*   BUN mg/dL 11 11   CREATININE mg/dL 0.52* 0.42*   EGFR ml/min/1.73sq m 99 106   CALCIUM mg/dL 9.1 9.7   CALCIUM, IONIZED mmol/L 1.16  --    MAGNESIUM mg/dL 2.6 1.7*   PHOSPHORUS mg/dL 3.3  --      Results from last 7 days   Lab Units 10/12/24  2119   AST U/L 14   ALT U/L 11   ALK PHOS U/L 100   TOTAL PROTEIN g/dL 7.7   ALBUMIN g/dL 4.3   TOTAL BILIRUBIN mg/dL 0.49     Results from last 7 days   Lab Units 10/13/24  1641 10/13/24  0509 10/13/24  0117 10/12/24  2126   POC GLUCOSE mg/dl 115 172* 218* 196*     Results from last 7 days   Lab Units 10/13/24  0507 10/12/24  2119   GLUCOSE RANDOM mg/dL 155* 190*        Results from last 7 days   Lab Units 10/13/24  0507   HEMOGLOBIN A1C % 6.5*   EAG mg/dl 140       Results from last 7 days   Lab Units 10/13/24  0413 10/12/24  4289   PH ART  7.188* 7.104*   PCO2 ART mm Hg 90.6*  134.3*   PO2 ART mm Hg 84.2 89.9   HCO3 ART mmol/L 33.7* 41.1*   BASE EXC ART mmol/L 2.9 7.4   O2 CONTENT ART mL/dL 16.1 15.6*   O2 HGB, ARTERIAL % 92.6* 90.4*   ABG SOURCE  Radial, Left Radial, Left     Results from last 7 days   Lab Units 10/13/24  1411 10/13/24  0957 10/13/24  0129   PH IDA  7.258* 7.233* 7.136*   PCO2 IDA mm Hg 98.2* 97.7* 134.5*   PO2 IDA mm Hg 49.2* 42.2 34.8*   HCO3 IDA mmol/L 42.9* 40.3* 44.3*   BASE EXC IDA mmol/L 12.2 9.2 10.3   O2 CONTENT IDA ml/dL 13.6 13.2 11.1   O2 HGB, VENOUS % 82.2* 74.4 60.9        Results from last 7 days   Lab Units 10/13/24  0121 10/12/24  2338 10/12/24  2119   HS TNI 0HR ng/L  --   --  4   HS TNI 2HR ng/L  --  3  --    HSTNI D2 ng/L  --  -1  --    HS TNI 4HR ng/L 5  --   --    HSTNI D4 ng/L 1  --   --         Results from last 7 days   Lab Units 10/12/24  2119   PROTIME seconds 12.3   INR  0.88   PTT seconds 37*        Results from last 7 days   Lab Units 10/12/24  2119   PROCALCITONIN ng/ml <0.05     Results from last 7 days   Lab Units 10/12/24  2119   LACTIC ACID mmol/L 0.6        Results from last 7 days   Lab Units 10/12/24  2119   BNP pg/mL 382*        Results from last 7 days   Lab Units 10/12/24  2119   LIPASE u/L <6*        Results from last 7 days   Lab Units 10/12/24  2119   CLARITY UA  Clear   COLOR UA  Yellow   SPEC GRAV UA  >=1.030   PH UA  5.5   GLUCOSE UA mg/dl 500 (1/2%)*   KETONES UA mg/dl Negative   BLOOD UA  Small*   PROTEIN UA mg/dl 100 (2+)*   NITRITE UA  Negative   BILIRUBIN UA  Negative   UROBILINOGEN UA E.U./dl 0.2   LEUKOCYTES UA  Negative   WBC UA /hpf 0-5   RBC UA /hpf 0-5   BACTERIA UA /hpf Moderate*   EPITHELIAL CELLS WET PREP /hpf Occasional     Results from last 7 days   Lab Units 10/13/24  1111   STREP PNEUMONIAE ANTIGEN, URINE  Positive*   LEGIONELLA URINARY ANTIGEN  Negative        Results from last 7 days   Lab Units 10/12/24  2146 10/12/24  2119   BLOOD CULTURE  Received in Microbiology Lab. Culture in Progress. Received in  Microbiology Lab. Culture in Progress.          Past Medical History:   Diagnosis Date    Chronic respiratory failure with hypoxia, on home O2 therapy  (HCC)     3L NC at home    COPD exacerbation (HCC)     COPD, group D, by GOLD 2017 classification (HCC)     Diabetes mellitus (HCC)     Diverticulosis of sigmoid colon     Dyslipidemia     Essential (primary) hypertension     NELSON (generalized anxiety disorder)     GERD (gastroesophageal reflux disease)     Lung nodule     RUL    SIADH (syndrome of inappropriate ADH production) (HCC)     Tobacco dependence     Vitamin D deficiency      Present on Admission:   Hyponatremia   Type 2 diabetes mellitus without complication, without long-term current use of insulin (HCC)   Primary hypertension   COPD with acute exacerbation (HCC)   Acute on chronic respiratory failure with hypoxia and hypercapnia (HCC)   Tobacco abuse      Admitting Diagnosis: Respiratory failure (HCC) [J96.90]  Hypercapnia [R06.89]  COPD exacerbation (HCC) [J44.1]  Altered mental status, unspecified altered mental status type [R41.82]  AMS (altered mental status) [R41.82]  Age/Sex: 69 y.o. female    Network Utilization Review Department  ATTENTION: Please call with any questions or concerns to [phone] and carefully listen to the prompts so that you are directed to the right person. All voicemails are confidential.   For Discharge needs, contact Care Management DC Support Team at [phone] opt. 2  Send all requests for admission clinical reviews, approved or denied determinations and any other requests to dedicated fax number below belonging to the campus where the patient is receiving treatment. List of dedicated fax numbers for the Facilities:  FACILITY NAME UR FAX NUMBER   ADMISSION DENIALS (Administrative/Medical Necessity) [phone]   DISCHARGE SUPPORT TEAM (NETWORK) [phone]   PARENT CHILD HEALTH (Maternity/NICU/Pediatrics) [phone]   General acute hospital  [phone]   [facility] [phone]   [facility] [phone]   [facility] [phone]   [facility] [phone]   [facility] [phone]   [facility] [phone]   [facility] [phone]   [facility] [phone]   [facility] [phone]   [facility] [phone]   [facility] [phone]

## 2024-10-14 NOTE — PROGRESS NOTES
Overnight pt tolerated baseline 3L NC. Pt was able to get oob once to the bedside commode to urinate.     Pt tolerated prn doses of IV robinul, dilaudid, and ativan for air hunger, breathing pattern, restlessness, and secretions. Pt also took scheduled seroquel which helped her sleep through the night.    At this time pt has no signs of air hunger, pain, or restlessness. RN will continue to monitor.

## 2024-10-14 NOTE — PROGRESS NOTES
Progress Note - Critical Care/ICU   Name: Jessika Lux 69 y.o. female I MRN: [id number]  Unit/Bed#: -01 I Date of Admission: 10/12/2024   Date of Service: 10/14/2024 I Hospital Day: 1      Assessment & Plan  Acute on chronic respiratory failure with hypoxia and hypercapnia (HCC)  Patient noted to be hypoxic in the 80s and have respiratory distress.  She was placed on BiPAP in the ED.  VBG: pH 7.1, pCO2 151, HCO3 46; ABG (1 hr after being on BiPAP): pH 7.1, pCO2 134, HCO3 41.  VBG (upon arrival to ICU. Patient wouldn't allow ABG to be drawn): pH 7.1, pCO2 134, HCO3 44  Likely secondary to COPD exacerbation.  CTA PE - negative for PE. Concerning for inflammatory process vs infection.   S. PNA positive.    Plan  Comfort Care - Patient transitioned to comfort care on 10/13 per her daughter, Julissa, wishes.  COPD with acute exacerbation (HCC)  Follows pulm outpt.   Continue Trelegy and duoneb as they are outpt meds  See plan above.  Acute metabolic encephalopathy  Likely secondary to hypercapnia vs polypharmacy   CTH negative for acute intracranial abnormalities.   Treat COPD exacerbation.  Cortisol normal, TSH pending  Monitor for sepsis.  Monitor Neuro status.  Hyponatremia  Sodium 128 on admission.  1L NSS administered in ED.   Monitor Na with daily BMP.  Restart home salt tabs  SIRS (systemic inflammatory response syndrome) ([facility])  Met criteria with hypothermia and RR > 20.   LA 0.6, PCT < 0.05, no leukocytosis.   Blood and sputum cx pending.  UA negative.   Flu/RSV/COVID negative.  Monitor for signs of sepsis.  Hypomagnesemia  1.7 on admission.  Repletion complete in ICU.   Check with daily labs while in ICU.    Primary hypertension  Restarted metoprolol 50mg BID and Clonidine 0.2mg q12hrs on 10/13  Restart lisinopril once appropriate  Type 2 diabetes mellitus without complication, without long-term current use of insulin (HCC)  Lab Results   Component Value Date    HGBA1C 6.5 (H) 10/13/2024       Recent  Labs     10/12/24  2126 10/13/24  0117 10/13/24  0509 10/13/24  1641   POCGLU 196* 218* 172* 115       Blood Sugar Average: Last 72 hrs:  (P) 175.25    ACHS glucose checks with SSI  Hgb A1C 6.5      Tobacco abuse  Daughter reports current and ongoing tobacco use, smokes 2 ppd  Disposition: Med Surg    ICU Core Measures     A: Assess, Prevent, and Manage Pain Has pain been assessed? Yes  Need for changes to pain regimen? No   B: Both SAT/SAT  N/A   C: Choice of Sedation RASS Goal: 0 Alert and Calm  Need for changes to sedation or analgesia regimen? No   D: Delirium CAM-ICU: Unable to perform secondary to Acute cognitive dysfunction   E: Early Mobility  Plan for early mobility? NA   F: Family Engagement Plan for family engagement today? Yes         Prophylaxis:  VTE Contraindicated secondary to: Comfort care   Stress Ulcer  not orderedcovered byomeprazole (PriLOSEC OTC) 20 MG tablet [[id number]] (Long-Term Med)         24 Hour Events : Patient was made comfort care per the ACP note yesterday, 10/13/2024.    Subjective   Review of Systems: Review of Systems   Unable to perform ROS: Mental status change       Objective :                   Vitals I/O      Most Recent Min/Max in 24hrs   Temp (!) 97.4 °F (36.3 °C) Temp  Min: 95.2 °F (35.1 °C)  Max: 98.4 °F (36.9 °C)   Pulse 61 Pulse  Min: 61  Max: 120   Resp 17 Resp  Min: 17  Max: 46   BP (!) 77/44 BP  Min: 77/44  Max: 171/80   O2 Sat 99 % SpO2  Min: 92 %  Max: 99 %      Intake/Output Summary (Last 24 hours) at 10/14/2024 0242  Last data filed at 10/13/2024 2200  Gross per 24 hour   Intake 570.38 ml   Output 300 ml   Net 270.38 ml       Diet Regular; Pleasure Feed    Invasive Monitoring           Physical Exam   Physical Exam  Constitutional:       General: She is not in acute distress.     Appearance: She is ill-appearing.   Pulmonary:      Effort: No respiratory distress.          Diagnostic Studies        Lab Results: I have reviewed the following results:      Medications:  Scheduled PRN   chlorhexidine, 15 mL, Q12H NATHALY  QUEtiapine, 25 mg, HS      bisacodyl, 10 mg, Daily PRN  glycopyrrolate, 0.1 mg, Q4H PRN  haloperidol lactate, 0.5 mg, Q2H PRN  HYDROmorphone, 0.3 mg, Q2H PRN  LORazepam, 1 mg, Q10 Min PRN       Continuous    dexmedetomidine, 0.1-0.7 mcg/kg/hr, Last Rate: Stopped (10/13/24 1842)         Labs:   CBC    Recent Labs     10/12/24  2119 10/13/24  0507   WBC 8.26 6.62   HGB 12.6 10.8*   HCT 40.2 34.1*    151   BANDSPCT 8  --      BMP    Recent Labs     10/12/24  2119 10/13/24  0507   SODIUM 128* 130*   K 4.1 3.8   CL 84* 88*   CO2 42* 38*   AGAP 2* 4   BUN 11 11   CREATININE 0.42* 0.52*   CALCIUM 9.7 9.1       Coags    Recent Labs     10/12/24  2119   INR 0.88   PTT 37*        Additional Electrolytes  Recent Labs     10/12/24  2119 10/13/24  0507   MG 1.7* 2.6   PHOS  --  3.3   CAIONIZED  --  1.16          Blood Gas    Recent Labs     10/13/24  0413   PHART 7.188*   [id number] 90.6*   PO2ART 84.2   [id number] 33.7*   BEART 2.9   SOURCE Radial, Left     Recent Labs     10/13/24  0413 10/13/24  0957 10/13/24  1411   PHVEN  --    < > 7.258*   RMN1VFT  --    < > 98.2*   PO2VEN  --    < > 49.2*   [id number]  --    < > 42.9*   BEVEN  --    < > 12.2   [id number]  --    < > 82.2*   SOURCE Radial, Left  --   --     < > = values in this interval not displayed.    LFTs  Recent Labs     10/12/24  2119   ALT 11   AST 14   ALKPHOS 100   ALB 4.3   TBILI 0.49       Infectious  Recent Labs     10/12/24  2119   PROCALCITONI <0.05     Glucose  Recent Labs     10/12/24  2119 10/13/24  0507   GLUC 190* 155*

## 2024-10-14 NOTE — DEATH NOTE
INPATIENT DEATH NOTE  Jessika Lux 69 y.o. female MRN: [id number]  Unit/Bed#: -01 Encounter: [id number]    Date, Time and Cause of Death    Date of Death: 10/14/24  Time of Death:  8:10 AM  Preliminary Cause of Death: Acute on chronic respiratory failure with hypoxia and hypercapnia (HCC)  Entered by: Cass VIERA[SS1.1]       Attribution       SS1.1 AYLIN Sadler 10/14/24 08:17             Patient's Information  Pronounced by: Cass VIERA  Did the patient's death occur in the ED?: No  Did the patient's death occur in the OR?: No  Did the patient's death occur less than 10 days post-op?: No  Did the patient's death occur within 24 hours of admission?: No  Was code status DNR at the time of death?: Yes    PHYSICAL EXAM:  Unresponsive to noxious stimuli, Spontaneous respirations absent, Breath sounds absent, Carotid pulse absent, Heart sounds absent, and Pupillary light reflex absent    Medical Examiner notification criteria:  NONE APPLICABLE   Medical Examiner's office notified?:  No, does not meet ME notification criteria   Medical Examiner accepted case?:  na  Name of Medical Examiner: na    Family Notification  Was the family notified?: Yes  Date Notified: 10/14/24  Time Notified: 815  Notified by: Cass VIERA  Name of Family Notified of Death: Lindsey   Relationship to Patient: Daughter  Family Notification Route: Telephone  Was the family told to contact a  home?: Yes  Name of  Home:: pending    Autopsy Options:  Decision for post-mortem examination not yet made by next of kin.    Primary Service Attending Physician notified?:  yes - Attending:  Qamar Buckley*    Physician/Resident responsible for completing Discharge Summary:  Cass VIERA

## 2024-10-15 NOTE — UTILIZATION REVIEW
NOTIFICATION OF ADMISSION DISCHARGE   This is a Notification of Discharge from [facility]. Please be advised that this patient has been discharge from our facility. Below you will find the admission and discharge date and time including the patient’s disposition.   UTILIZATION REVIEW CONTACT:  Kelsey Sam  Utilization   Network Utilization Review Department  Phone: [phone] x carefully listen to the prompts. All voicemails are confidential.  Email: [email or web address]     ADMISSION INFORMATION  PRESENTATION DATE: 10/12/2024  9:05 PM  OBERVATION ADMISSION DATE: N/A  INPATIENT ADMISSION DATE: 10/13/24 12:19 AM   DISCHARGE DATE: 10/14/2024  3:00 PM   DISPOSITION:    Network Utilization Review Department  ATTENTION: Please call with any questions or concerns to [phone] and carefully listen to the prompts so that you are directed to the right person. All voicemails are confidential.   For Discharge needs, contact Care Management DC Support Team at [phone] opt. [phone]  Send all requests for admission clinical reviews, approved or denied determinations and any other requests to dedicated fax number below belonging to the campus where the patient is receiving treatment. List of dedicated fax numbers for the Facilities:  FACILITY NAME UR FAX NUMBER   ADMISSION DENIALS (Administrative/Medical Necessity) [phone]   DISCHARGE SUPPORT TEAM ([facility]) [phone]   PARENT CHILD HEALTH (Maternity/NICU/Pediatrics) [phone]   [facility] [phone]   [facility] [phone]   [facility] [phone]   [facility] [phone]   [facility] [phone]   [facility] [phone]   [facility] [phone]   [facility]  [phone]   [facility] [phone]   formerly [facility] [phone]   [facility] [phone]   [facility] [phone]

## 2024-10-18 LAB
BACTERIA BLD CULT: NORMAL
BACTERIA BLD CULT: NORMAL